# Patient Record
Sex: MALE | Race: WHITE
[De-identification: names, ages, dates, MRNs, and addresses within clinical notes are randomized per-mention and may not be internally consistent; named-entity substitution may affect disease eponyms.]

---

## 2018-01-24 ENCOUNTER — TRANSCRIPTION ENCOUNTER (OUTPATIENT)
Age: 48
End: 2018-01-24

## 2019-11-22 ENCOUNTER — EMERGENCY (EMERGENCY)
Facility: HOSPITAL | Age: 49
LOS: 0 days | Discharge: LEFT BEFORE DISCHARGE | End: 2019-11-22
Attending: EMERGENCY MEDICINE | Admitting: EMERGENCY MEDICINE
Payer: MEDICAID

## 2019-11-22 VITALS
TEMPERATURE: 97 F | WEIGHT: 279.99 LBS | HEART RATE: 89 BPM | SYSTOLIC BLOOD PRESSURE: 111 MMHG | HEIGHT: 69 IN | DIASTOLIC BLOOD PRESSURE: 71 MMHG | OXYGEN SATURATION: 98 % | RESPIRATION RATE: 18 BRPM

## 2019-11-22 DIAGNOSIS — S61.501A UNSPECIFIED OPEN WOUND OF RIGHT WRIST, INITIAL ENCOUNTER: ICD-10-CM
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
## 2019-11-22 DIAGNOSIS — S61.502A UNSPECIFIED OPEN WOUND OF LEFT WRIST, INITIAL ENCOUNTER: ICD-10-CM
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
## 2019-11-22 DIAGNOSIS — M25.539 PAIN IN UNSPECIFIED WRIST: ICD-10-CM

## 2019-11-22 PROCEDURE — 99283 EMERGENCY DEPT VISIT LOW MDM: CPT

## 2019-11-22 RX ORDER — AZTREONAM 2 G
1 VIAL (EA) INJECTION
Qty: 14 | Refills: 0
Start: 2019-11-22 | End: 2019-11-28

## 2019-11-22 RX ORDER — CEPHALEXIN 500 MG
1 CAPSULE ORAL
Qty: 28 | Refills: 0
Start: 2019-11-22 | End: 2019-11-28

## 2019-11-22 NOTE — ED PROVIDER NOTE - ATTENDING CONTRIBUTION TO CARE
50yo M with PMHx polysusbtance use disorder, h/o IVDA (heroin) sober for several months, presents for wounds to B/L dorsal wrists. Pt was brought in by his friend who was concerned about the way the wounds looked. Pt was previously undergoing wound debridements but stopped going 4 months ago, pt states he checked into a drug rehab program and thus has been sober but has not been following up for the wounds. Denies fever. Denies pain. Denies discharge from wounds. Denies numbness, tingling, weakness.     Vital signs reviewed  GENERAL: Patient nontoxic appearing, NAD  HEAD: NCAT  EYES: Anicteric  ENT: MMM  RESPIRATORY: Normal respiratory effort. CTA B/L. No wheezing, rales, rhonchi  CARDIOVASCULAR: Regular rate and rhythm  ABDOMEN: Soft. Nondistended. Nontender. No guarding or rebound.   MUSCULOSKELETAL/EXTREMITIES: Brisk cap refill. Equal radial pulses.   SKIN:  Warm and dry. B/L wrist wounds on dorsal aspects, pink granulation tissue with areas of white nonviable tissue, areas of scabs, minimal serous drainage. Nontender. Mild surrounding erythema. Skin contractures in surrounding areas.   NEURO: AAOx3. Speech clear and coherent. No gross FND.

## 2019-11-22 NOTE — ED PROVIDER NOTE - PHYSICAL EXAMINATION
CONST: Well appearing in NAD  EYES: Sclera and conjunctiva clear.   NECK: Non-tender, normal ROM  CARD: Normal S1 S2; Normal rate and rhythm  RESP: Equal BS B/L, No wheezes, rhonchi or rales. No distress  MS: Normal ROM in all extremities. UE: FROM of shoulder, elbow, wrist and metacarpals bilaterally. pulses 2 + bilaterally.   SKIN: Bilateral open weeping wounds to dorsal aspects of wrists with surrounding necrotic tissue. dorsum of hands with diffuse swelling, no pitting edema, no warmth. no purulent drainage. no apparent odor. RUE: Dorsal aspect of wrist with exposed fascia, no tendon/bone/muscle exposure.   NEURO: A&Ox3, No focal deficits. Strength 5/5 with no sensory deficits. Steady gait.

## 2019-11-22 NOTE — ED ADULT NURSE NOTE - NSIMPLEMENTINTERV_GEN_ALL_ED
Implemented All Universal Safety Interventions:  [place] to call system. Call bell, personal items and telephone within reach. Instruct patient to call for assistance. Room bathroom lighting operational. Non-slip footwear when patient is off stretcher. Physically safe environment: no spills, clutter or unnecessary equipment. Stretcher in lowest position, wheels locked, appropriate side rails in place.

## 2019-11-22 NOTE — ED PROVIDER NOTE - NSFOLLOWUPINSTRUCTIONS_ED_ALL_ED_FT
Please return to the emergency room if you decide to receive further care for your wrist wounds.  Please follow-up with burn clinic for treatment of your wounds.   Please take Keflex four times a day and Bactrim twice a day for 7 days.

## 2019-11-22 NOTE — ED PROVIDER NOTE - NSFOLLOWUPCLINICS_GEN_ALL_ED_FT
[facility]  Burn  [address], [address]  [place] [address]  Phone: [phone]  Fax:   Follow Up Time: 1-3 Days

## 2019-11-22 NOTE — ED PROVIDER NOTE - OBJECTIVE STATEMENT
49 year old male, hx polysubstance use disorder, who presents with open wounds to bilateral wrists x4 months. Patient with history of IVDU resulting in bilateral wounds to wrists. Patient was undergoing weekly wound debridement, however, stopped going 4 months ago. Denies fever, chills, chest pain, SOB. No wrist pain, weakness, numbness, paresthesias. Denies current IVDU.

## 2019-11-22 NOTE — ED PROVIDER NOTE - PATIENT PORTAL LINK FT
You can access the FollowMyHealth Patient Portal offered by [facility] by registering at the following website: [email or web address]. By joining [facility]’s FollowMyHealth portal, you will also be able to view your health information using other applications (apps) compatible with our system.

## 2019-11-22 NOTE — ED PROVIDER NOTE - NS ED ROS FT
Review of Systems:  	•	CONSTITUTIONAL: no fever, no diaphoresis, no chills  	•	SKIN: open wounds to bilateral wrists with hand swelling bilaterally   	•	EYES: no eye pain, no blurry vision  	•	RESPIRATORY: no shortness of breath, no cough  	•	CARDIAC: no chest pain, no palpitations  	•	GI: no abd pain, no nausea, no vomiting, no diarrhea  	•	MUSCULOSKELETAL: no joint paint  	•	NEUROLOGIC: no weakness, no numbness, no paresthesias

## 2019-11-22 NOTE — ED PROVIDER NOTE - CLINICAL SUMMARY MEDICAL DECISION MAKING FREE TEXT BOX
50yo M presents with chronic wounds to B/L wrists. Wounds have granulation tissue as well as several nonviable areas. Afebrile. I offered to do workup and transfer to [facility] to have burn eval as pt has unreliable follow up. Pt does not wish to stay, states he only came because his friend made him and is adamant about not wanting workup and transfer as he needs to f/u with his drug rehab program. I explained that the program will understand if he does not show up if he is admitted and pt still refuses intervention. Will provide ABx and told to go to burn clinic for f/u or to return to ED for worsening symptoms. Wounds dressed.    The patient wishes to leave against medical advice.  I have discussed the risks, benefits and alternatives (including the possibility of worsening of disease, pain, permanent disability, and/or death) with the patient and his/her family (if available).  The patient voices understanding of these risks, benefits, and alternatives and still wishes to sign out against medical advice.  The patient is awake, alert, oriented  x 3 and has demonstrated capacity to refuse/direct care.  I have advised the patient that they can and should return immediately should they develop any worse/different/additional symptoms, or if they change their mind and want to continue their care.

## 2022-01-15 ENCOUNTER — INPATIENT (INPATIENT)
Facility: HOSPITAL | Age: 52
LOS: 17 days | Discharge: NURSING, REHAB OR LONG-TERM CARE | End: 2022-02-02
Attending: HOSPITALIST | Admitting: HOSPITALIST
Payer: MEDICAID

## 2022-01-15 VITALS
HEIGHT: 69 IN | TEMPERATURE: 98 F | RESPIRATION RATE: 17 BRPM | DIASTOLIC BLOOD PRESSURE: 84 MMHG | SYSTOLIC BLOOD PRESSURE: 144 MMHG | HEART RATE: 91 BPM | OXYGEN SATURATION: 97 %

## 2022-01-15 LAB
ALBUMIN SERPL ELPH-MCNC: 3.4 G/DL — LOW (ref 3.5–5.2)
ALP SERPL-CCNC: 99 U/L — SIGNIFICANT CHANGE UP (ref 30–115)
ALT FLD-CCNC: 10 U/L — SIGNIFICANT CHANGE UP (ref 0–41)
ANION GAP SERPL CALC-SCNC: 14 MMOL/L — SIGNIFICANT CHANGE UP (ref 7–14)
APTT BLD: 31 SEC — SIGNIFICANT CHANGE UP (ref 27–39.2)
AST SERPL-CCNC: 11 U/L — SIGNIFICANT CHANGE UP (ref 0–41)
BASOPHILS # BLD AUTO: 0.02 K/UL — SIGNIFICANT CHANGE UP (ref 0–0.2)
BASOPHILS NFR BLD AUTO: 0.1 % — SIGNIFICANT CHANGE UP (ref 0–1)
BILIRUB SERPL-MCNC: 0.5 MG/DL — SIGNIFICANT CHANGE UP (ref 0.2–1.2)
BUN SERPL-MCNC: 14 MG/DL — SIGNIFICANT CHANGE UP (ref 10–20)
CALCIUM SERPL-MCNC: 8.9 MG/DL — SIGNIFICANT CHANGE UP (ref 8.5–10.1)
CHLORIDE SERPL-SCNC: 96 MMOL/L — LOW (ref 98–110)
CK SERPL-CCNC: 63 U/L — SIGNIFICANT CHANGE UP (ref 0–225)
CO2 SERPL-SCNC: 23 MMOL/L — SIGNIFICANT CHANGE UP (ref 17–32)
CREAT SERPL-MCNC: 0.8 MG/DL — SIGNIFICANT CHANGE UP (ref 0.7–1.5)
EOSINOPHIL # BLD AUTO: 0 K/UL — SIGNIFICANT CHANGE UP (ref 0–0.7)
EOSINOPHIL NFR BLD AUTO: 0 % — SIGNIFICANT CHANGE UP (ref 0–8)
GLUCOSE SERPL-MCNC: 124 MG/DL — HIGH (ref 70–99)
HCT VFR BLD CALC: 31.2 % — LOW (ref 42–52)
HGB BLD-MCNC: 10 G/DL — LOW (ref 14–18)
IMM GRANULOCYTES NFR BLD AUTO: 0.8 % — HIGH (ref 0.1–0.3)
INR BLD: 1.38 RATIO — HIGH (ref 0.65–1.3)
LYMPHOCYTES # BLD AUTO: 0.63 K/UL — LOW (ref 1.2–3.4)
LYMPHOCYTES # BLD AUTO: 3.8 % — LOW (ref 20.5–51.1)
MCHC RBC-ENTMCNC: 26.1 PG — LOW (ref 27–31)
MCHC RBC-ENTMCNC: 32.1 G/DL — SIGNIFICANT CHANGE UP (ref 32–37)
MCV RBC AUTO: 81.5 FL — SIGNIFICANT CHANGE UP (ref 80–94)
MONOCYTES # BLD AUTO: 0.94 K/UL — HIGH (ref 0.1–0.6)
MONOCYTES NFR BLD AUTO: 5.7 % — SIGNIFICANT CHANGE UP (ref 1.7–9.3)
NEUTROPHILS # BLD AUTO: 14.91 K/UL — HIGH (ref 1.4–6.5)
NEUTROPHILS NFR BLD AUTO: 89.6 % — HIGH (ref 42.2–75.2)
NRBC # BLD: 0 /100 WBCS — SIGNIFICANT CHANGE UP (ref 0–0)
PLATELET # BLD AUTO: 352 K/UL — SIGNIFICANT CHANGE UP (ref 130–400)
POTASSIUM SERPL-MCNC: 3.9 MMOL/L — SIGNIFICANT CHANGE UP (ref 3.5–5)
POTASSIUM SERPL-SCNC: 3.9 MMOL/L — SIGNIFICANT CHANGE UP (ref 3.5–5)
PROT SERPL-MCNC: 8.2 G/DL — HIGH (ref 6–8)
PROTHROM AB SERPL-ACNC: 15.8 SEC — HIGH (ref 9.95–12.87)
RBC # BLD: 3.83 M/UL — LOW (ref 4.7–6.1)
RBC # FLD: 15.9 % — HIGH (ref 11.5–14.5)
SODIUM SERPL-SCNC: 133 MMOL/L — LOW (ref 135–146)
WBC # BLD: 16.63 K/UL — HIGH (ref 4.8–10.8)
WBC # FLD AUTO: 16.63 K/UL — HIGH (ref 4.8–10.8)

## 2022-01-15 PROCEDURE — 99285 EMERGENCY DEPT VISIT HI MDM: CPT | Mod: 25

## 2022-01-15 PROCEDURE — 93010 ELECTROCARDIOGRAM REPORT: CPT

## 2022-01-15 PROCEDURE — 74177 CT ABD & PELVIS W/CONTRAST: CPT | Mod: 26,MA

## 2022-01-15 PROCEDURE — 36000 PLACE NEEDLE IN VEIN: CPT

## 2022-01-15 PROCEDURE — 73090 X-RAY EXAM OF FOREARM: CPT | Mod: 26,50

## 2022-01-15 PROCEDURE — 76937 US GUIDE VASCULAR ACCESS: CPT | Mod: 26

## 2022-01-15 PROCEDURE — 71260 CT THORAX DX C+: CPT | Mod: 26,MA

## 2022-01-15 PROCEDURE — 72125 CT NECK SPINE W/O DYE: CPT | Mod: 26,MA

## 2022-01-15 PROCEDURE — 73110 X-RAY EXAM OF WRIST: CPT | Mod: 26,50

## 2022-01-15 PROCEDURE — 70450 CT HEAD/BRAIN W/O DYE: CPT | Mod: 26,MA

## 2022-01-15 RX ORDER — MORPHINE SULFATE 50 MG/1
2 CAPSULE, EXTENDED RELEASE ORAL ONCE
Refills: 0 | Status: DISCONTINUED | OUTPATIENT
Start: 2022-01-15 | End: 2022-01-15

## 2022-01-15 RX ORDER — VANCOMYCIN HCL 1 G
1000 VIAL (EA) INTRAVENOUS ONCE
Refills: 0 | Status: COMPLETED | OUTPATIENT
Start: 2022-01-15 | End: 2022-01-15

## 2022-01-15 RX ORDER — CEFEPIME 1 G/1
2000 INJECTION, POWDER, FOR SOLUTION INTRAMUSCULAR; INTRAVENOUS ONCE
Refills: 0 | Status: COMPLETED | OUTPATIENT
Start: 2022-01-15 | End: 2022-01-15

## 2022-01-15 RX ORDER — TETANUS TOXOID, REDUCED DIPHTHERIA TOXOID AND ACELLULAR PERTUSSIS VACCINE, ADSORBED 5; 2.5; 8; 8; 2.5 [IU]/.5ML; [IU]/.5ML; UG/.5ML; UG/.5ML; UG/.5ML
0.5 SUSPENSION INTRAMUSCULAR ONCE
Refills: 0 | Status: COMPLETED | OUTPATIENT
Start: 2022-01-15 | End: 2022-01-15

## 2022-01-15 RX ORDER — SODIUM CHLORIDE 9 MG/ML
2200 INJECTION INTRAMUSCULAR; INTRAVENOUS; SUBCUTANEOUS ONCE
Refills: 0 | Status: COMPLETED | OUTPATIENT
Start: 2022-01-15 | End: 2022-01-15

## 2022-01-15 RX ADMIN — Medication 250 MILLIGRAM(S): at 20:11

## 2022-01-15 RX ADMIN — TETANUS TOXOID, REDUCED DIPHTHERIA TOXOID AND ACELLULAR PERTUSSIS VACCINE, ADSORBED 0.5 MILLILITER(S): 5; 2.5; 8; 8; 2.5 SUSPENSION INTRAMUSCULAR at 23:06

## 2022-01-15 RX ADMIN — MORPHINE SULFATE 2 MILLIGRAM(S): 50 CAPSULE, EXTENDED RELEASE ORAL at 23:07

## 2022-01-15 RX ADMIN — CEFEPIME 100 MILLIGRAM(S): 1 INJECTION, POWDER, FOR SOLUTION INTRAMUSCULAR; INTRAVENOUS at 20:11

## 2022-01-15 RX ADMIN — MORPHINE SULFATE 2 MILLIGRAM(S): 50 CAPSULE, EXTENDED RELEASE ORAL at 21:15

## 2022-01-15 RX ADMIN — SODIUM CHLORIDE 2200 MILLILITER(S): 9 INJECTION INTRAMUSCULAR; INTRAVENOUS; SUBCUTANEOUS at 20:11

## 2022-01-15 RX ADMIN — MORPHINE SULFATE 2 MILLIGRAM(S): 50 CAPSULE, EXTENDED RELEASE ORAL at 21:51

## 2022-01-15 NOTE — ED PROVIDER NOTE - NS ED ROS FT
Constitutional: (-) fever (-) chills (-) lightheadedness   Eyes/ENT: (-) blurry vision, (-) epistaxis (-) rhinorrhea (-) nasal congestion  Cardiovascular: (-) chest pain, (-) syncope (-) palpitations   Respiratory: (-) cough, (-) shortness of breath (-) pleurisy   Gastrointestinal: (-) vomiting, (-) diarrhea (-) abdominal pain (-) nausea (-) anorexia  Musculoskeletal: (-) neck pain, (+) back pain, (-) joint pain (-) joint swelling (-) painful ROM  Integumentary: (-) rash, (-) edema (-) lacerations (-) pruritis   Neurological: (-) headache, (-) altered mental status (-) LOC (-) dizziness (-) paresthesias (-) gait abnormalities   Psychiatric: (-) hallucinations (-) SI (-) HI

## 2022-01-15 NOTE — ED PROVIDER NOTE - PHYSICAL EXAMINATION
Physical Exam    Vital Signs: I have reviewed the initial vital signs.  Constitutional: well-nourished, appears stated age, no acute distress  Eyes: Conjunctiva pink, Sclera clear, PERRLA, EOMI, no ptosis, no entrapment, no racoon eyes.  Cardiovascular: S1 and S2, regular rate, regular rhythm, well-perfused extremities, radial pulses equal and 2+, calves non ttp, equal in size  Respiratory: unlabored respiratory effort, speaking in full sentences, handling oral secretions,  clear to auscultation bilaterally no wheezing, rales and rhonchi  Gastrointestinal: soft, non-tender abdomen, no pulsatile mass, normal bowl sounds  Musculoskeletal: supple neck, no lower extremity edema, no midline tenderness, + thoracic and lumbar b/l reproducible paraspinal tenderness, no swelling.  Integumentary: warm, dry, + necrosis and open wounds with purulence, exposed bone b/l at wrists.   Neurologic: awake, alert, no nystagmus, tremors, no fasciculations no facial droop, no ataxia, no dysmetria  unable to ambulate

## 2022-01-15 NOTE — ED ADULT TRIAGE NOTE - CHIEF COMPLAINT QUOTE
Pt BIBA from home. Pt states he fell last night and couldn't get up. Has been on floor for 24 hours. As per EMS, about 100 needles on the floor In apt. necrotic tissue noted to both wrists. Pt awake and alert in triage

## 2022-01-15 NOTE — ED PROVIDER NOTE - OBJECTIVE STATEMENT
: 51-year-old male past medical history of polysubstance abuse who presents status post fall.  Patient states that approximately 12 hours ago he fell from bed due to sciatica pain, and has and was not able to get up from the floor.  Patient also states that he has been having pain to his wrist bilaterally.  Of note patient has known open wounds to bilateral wrist.  Patient reports that the last time he used heroin IV was approximately 1 week ago.  Patient has no other medical complaints. 51-year-old male past medical history of polysubstance abuse who presents status post fall.  Patient states that approximately 12 hours ago he fell from bed due to sciatica pain, and has and was not able to get up from the floor.  Patient also states that he has been having pain to his wrist bilaterally.  Of note patient has known open wounds to bilateral wrist.  Patient reports that the last time he used heroin IV was approximately 1 week ago.  Patient has no other medical complaints.

## 2022-01-15 NOTE — ED PROVIDER NOTE - CARE PLAN
1 Principal Discharge DX:	Osteomyelitis of vertebra, lumbar region  Secondary Diagnosis:	Multiple wounds of skin

## 2022-01-15 NOTE — ED PROVIDER NOTE - PROGRESS NOTE DETAILS
ER: pt signed out to Dr. Wilson Discussed with ortho, recommended to obtain repeat x-rays of the wrist and hand and reconsult as needed. Called radiology core and requested for the x-ray readings. Discussed with GABBI Marques for burn consult regarding chronic b/l wrist wounds. Will evaluate in.  Discussed case with Neurosurgery for osteomyelitis , evaluated in ER and recommending MR w w/o IV contrast Thoracic and Lumbar.

## 2022-01-15 NOTE — ED PROVIDER NOTE - ATTENDING CONTRIBUTION TO CARE
51-year-old male past medical history of polysubstance abuse who presents status post fall.  Patient states that approximately 12 hours ago he fell from bed due to sciatica pain, and has and was not able to get up from the floor.  Patient also states that he has been having pain to his wrist bilaterally.  Of note patient has known open wounds to bilateral wrist.  Patient reports that the last time he used heroin IV was approximately 1 week ago.  Patient has no other medical complaints.    VITAL SIGNS: I have reviewed nursing notes and confirm.  CONSTITUTIONAL: non-toxic, well appearing  SKIN: no rash, no petechiae.  EYES: EOMI, pink conjunctiva, anicteric  ENT: tongue midline, no exudates, MMM  NECK: Supple; no meningismus, no JVD  CARD: RRR, no murmurs, equal radial pulses bilaterally 2+  RESP: CTAB, no respiratory distress  ABD: Soft, non-tender, non-distended, no peritoneal signs,   EXT: No edema. No calves tenderness. wound noted to the wrist bilaterally, discharge noted from the area w/ a foul smell. pt has sensation in the area. There is pain on palpation midline in the thoracic and lumbar area. no pain on palpation of the cervical, no step offs, no ecchymosis or any other complaints.   NEURO: Alert, oriented x3. CN2-12 intact, equal strength bilaterally    a/p  51 yr old m that presents s/p fall   -labs  -imaging  -iv antibiotics  -consider admission

## 2022-01-15 NOTE — ED PROVIDER NOTE - CLINICAL SUMMARY MEDICAL DECISION MAKING FREE TEXT BOX
Patient remained hemodynamically stable during the course of ED stay. Discussed with patient about the results of the diagnostic studies. Discussed with admitting physician and MAR, patient is admitted to Medicine for further evaluation and care.

## 2022-01-16 PROBLEM — F19.10 OTHER PSYCHOACTIVE SUBSTANCE ABUSE, UNCOMPLICATED: Chronic | Status: ACTIVE | Noted: 2019-11-22

## 2022-01-16 LAB
ALBUMIN SERPL ELPH-MCNC: 3.2 G/DL — LOW (ref 3.5–5.2)
ALBUMIN SERPL ELPH-MCNC: 3.3 G/DL — LOW (ref 3.5–5.2)
ALP SERPL-CCNC: 94 U/L — SIGNIFICANT CHANGE UP (ref 30–115)
ALP SERPL-CCNC: 96 U/L — SIGNIFICANT CHANGE UP (ref 30–115)
ALT FLD-CCNC: 14 U/L — SIGNIFICANT CHANGE UP (ref 0–41)
ALT FLD-CCNC: 15 U/L — SIGNIFICANT CHANGE UP (ref 0–41)
ANION GAP SERPL CALC-SCNC: 16 MMOL/L — HIGH (ref 7–14)
ANION GAP SERPL CALC-SCNC: 16 MMOL/L — HIGH (ref 7–14)
AST SERPL-CCNC: 16 U/L — SIGNIFICANT CHANGE UP (ref 0–41)
AST SERPL-CCNC: 18 U/L — SIGNIFICANT CHANGE UP (ref 0–41)
BILIRUB DIRECT SERPL-MCNC: <0.2 MG/DL — SIGNIFICANT CHANGE UP (ref 0–0.3)
BILIRUB INDIRECT FLD-MCNC: >0.1 MG/DL — LOW (ref 0.2–1.2)
BILIRUB SERPL-MCNC: 0.3 MG/DL — SIGNIFICANT CHANGE UP (ref 0.2–1.2)
BILIRUB SERPL-MCNC: 0.3 MG/DL — SIGNIFICANT CHANGE UP (ref 0.2–1.2)
BUN SERPL-MCNC: 15 MG/DL — SIGNIFICANT CHANGE UP (ref 10–20)
BUN SERPL-MCNC: 17 MG/DL — SIGNIFICANT CHANGE UP (ref 10–20)
CALCIUM SERPL-MCNC: 8.2 MG/DL — LOW (ref 8.5–10.1)
CALCIUM SERPL-MCNC: 8.4 MG/DL — LOW (ref 8.5–10.1)
CHLORIDE SERPL-SCNC: 97 MMOL/L — LOW (ref 98–110)
CHLORIDE SERPL-SCNC: 98 MMOL/L — SIGNIFICANT CHANGE UP (ref 98–110)
CHOLEST SERPL-MCNC: 132 MG/DL — SIGNIFICANT CHANGE UP
CO2 SERPL-SCNC: 20 MMOL/L — SIGNIFICANT CHANGE UP (ref 17–32)
CO2 SERPL-SCNC: 21 MMOL/L — SIGNIFICANT CHANGE UP (ref 17–32)
CREAT SERPL-MCNC: 0.7 MG/DL — SIGNIFICANT CHANGE UP (ref 0.7–1.5)
CREAT SERPL-MCNC: 0.8 MG/DL — SIGNIFICANT CHANGE UP (ref 0.7–1.5)
ERYTHROCYTE [SEDIMENTATION RATE] IN BLOOD: 131 MM/HR — HIGH (ref 0–10)
GLUCOSE SERPL-MCNC: 123 MG/DL — HIGH (ref 70–99)
GLUCOSE SERPL-MCNC: 127 MG/DL — HIGH (ref 70–99)
GRAM STN FLD: SIGNIFICANT CHANGE UP
HCT VFR BLD CALC: 31.3 % — LOW (ref 42–52)
HDLC SERPL-MCNC: 44 MG/DL — SIGNIFICANT CHANGE UP
HGB BLD-MCNC: 9.6 G/DL — LOW (ref 14–18)
IRON SATN MFR SERPL: 18 UG/DL — LOW (ref 35–150)
IRON SATN MFR SERPL: 8 % — LOW (ref 15–50)
LACTATE SERPL-SCNC: 0.9 MMOL/L — SIGNIFICANT CHANGE UP (ref 0.7–2)
LIPID PNL WITH DIRECT LDL SERPL: 78 MG/DL — SIGNIFICANT CHANGE UP
MAGNESIUM SERPL-MCNC: 1.9 MG/DL — SIGNIFICANT CHANGE UP (ref 1.8–2.4)
MCHC RBC-ENTMCNC: 25.3 PG — LOW (ref 27–31)
MCHC RBC-ENTMCNC: 30.7 G/DL — LOW (ref 32–37)
MCV RBC AUTO: 82.4 FL — SIGNIFICANT CHANGE UP (ref 80–94)
METHOD TYPE: SIGNIFICANT CHANGE UP
MSSA DNA SPEC QL NAA+PROBE: SIGNIFICANT CHANGE UP
NON HDL CHOLESTEROL: 88 MG/DL — SIGNIFICANT CHANGE UP
NRBC # BLD: 0 /100 WBCS — SIGNIFICANT CHANGE UP (ref 0–0)
PHOSPHATE SERPL-MCNC: 2.4 MG/DL — SIGNIFICANT CHANGE UP (ref 2.1–4.9)
PLATELET # BLD AUTO: 319 K/UL — SIGNIFICANT CHANGE UP (ref 130–400)
POTASSIUM SERPL-MCNC: 3.7 MMOL/L — SIGNIFICANT CHANGE UP (ref 3.5–5)
POTASSIUM SERPL-MCNC: 3.9 MMOL/L — SIGNIFICANT CHANGE UP (ref 3.5–5)
POTASSIUM SERPL-SCNC: 3.7 MMOL/L — SIGNIFICANT CHANGE UP (ref 3.5–5)
POTASSIUM SERPL-SCNC: 3.9 MMOL/L — SIGNIFICANT CHANGE UP (ref 3.5–5)
PROT SERPL-MCNC: 7.4 G/DL — SIGNIFICANT CHANGE UP (ref 6–8)
PROT SERPL-MCNC: 7.5 G/DL — SIGNIFICANT CHANGE UP (ref 6–8)
RBC # BLD: 3.8 M/UL — LOW (ref 4.7–6.1)
RBC # FLD: 15.9 % — HIGH (ref 11.5–14.5)
SARS-COV-2 RNA SPEC QL NAA+PROBE: SIGNIFICANT CHANGE UP
SODIUM SERPL-SCNC: 133 MMOL/L — LOW (ref 135–146)
SODIUM SERPL-SCNC: 135 MMOL/L — SIGNIFICANT CHANGE UP (ref 135–146)
SPECIMEN SOURCE: SIGNIFICANT CHANGE UP
SPECIMEN SOURCE: SIGNIFICANT CHANGE UP
TIBC SERPL-MCNC: 215 UG/DL — LOW (ref 220–430)
TRIGL SERPL-MCNC: 66 MG/DL — SIGNIFICANT CHANGE UP
UIBC SERPL-MCNC: 197 UG/DL — SIGNIFICANT CHANGE UP (ref 110–370)
WBC # BLD: 9.99 K/UL — SIGNIFICANT CHANGE UP (ref 4.8–10.8)
WBC # FLD AUTO: 9.99 K/UL — SIGNIFICANT CHANGE UP (ref 4.8–10.8)

## 2022-01-16 PROCEDURE — 73120 X-RAY EXAM OF HAND: CPT | Mod: 26,LT

## 2022-01-16 PROCEDURE — 99221 1ST HOSP IP/OBS SF/LOW 40: CPT

## 2022-01-16 PROCEDURE — 73620 X-RAY EXAM OF FOOT: CPT | Mod: 26,LT

## 2022-01-16 PROCEDURE — 93010 ELECTROCARDIOGRAM REPORT: CPT

## 2022-01-16 PROCEDURE — 73110 X-RAY EXAM OF WRIST: CPT | Mod: 26,50

## 2022-01-16 PROCEDURE — 73090 X-RAY EXAM OF FOREARM: CPT | Mod: 26,50

## 2022-01-16 PROCEDURE — 99222 1ST HOSP IP/OBS MODERATE 55: CPT

## 2022-01-16 PROCEDURE — 99223 1ST HOSP IP/OBS HIGH 75: CPT

## 2022-01-16 RX ORDER — HYDROMORPHONE HYDROCHLORIDE 2 MG/ML
1.5 INJECTION INTRAMUSCULAR; INTRAVENOUS; SUBCUTANEOUS ONCE
Refills: 0 | Status: DISCONTINUED | OUTPATIENT
Start: 2022-01-16 | End: 2022-01-16

## 2022-01-16 RX ORDER — ALPRAZOLAM 0.25 MG
2 TABLET ORAL
Qty: 0 | Refills: 0 | DISCHARGE

## 2022-01-16 RX ORDER — QUETIAPINE FUMARATE 200 MG/1
200 TABLET, FILM COATED ORAL THREE TIMES A DAY
Refills: 0 | Status: DISCONTINUED | OUTPATIENT
Start: 2022-01-16 | End: 2022-02-02

## 2022-01-16 RX ORDER — KETOROLAC TROMETHAMINE 30 MG/ML
15 SYRINGE (ML) INJECTION EVERY 6 HOURS
Refills: 0 | Status: DISCONTINUED | OUTPATIENT
Start: 2022-01-16 | End: 2022-01-16

## 2022-01-16 RX ORDER — KETOROLAC TROMETHAMINE 30 MG/ML
30 SYRINGE (ML) INJECTION ONCE
Refills: 0 | Status: DISCONTINUED | OUTPATIENT
Start: 2022-01-16 | End: 2022-01-16

## 2022-01-16 RX ORDER — VANCOMYCIN HCL 1 G
VIAL (EA) INTRAVENOUS
Refills: 0 | Status: DISCONTINUED | OUTPATIENT
Start: 2022-01-16 | End: 2022-01-17

## 2022-01-16 RX ORDER — VANCOMYCIN HCL 1 G
2000 VIAL (EA) INTRAVENOUS ONCE
Refills: 0 | Status: COMPLETED | OUTPATIENT
Start: 2022-01-16 | End: 2022-01-16

## 2022-01-16 RX ORDER — CEFEPIME 1 G/1
2000 INJECTION, POWDER, FOR SOLUTION INTRAMUSCULAR; INTRAVENOUS EVERY 8 HOURS
Refills: 0 | Status: DISCONTINUED | OUTPATIENT
Start: 2022-01-16 | End: 2022-01-17

## 2022-01-16 RX ORDER — LANOLIN ALCOHOL/MO/W.PET/CERES
3 CREAM (GRAM) TOPICAL AT BEDTIME
Refills: 0 | Status: DISCONTINUED | OUTPATIENT
Start: 2022-01-16 | End: 2022-02-02

## 2022-01-16 RX ORDER — THIAMINE MONONITRATE (VIT B1) 100 MG
100 TABLET ORAL DAILY
Refills: 0 | Status: COMPLETED | OUTPATIENT
Start: 2022-01-16 | End: 2022-01-18

## 2022-01-16 RX ORDER — ONDANSETRON 8 MG/1
4 TABLET, FILM COATED ORAL EVERY 8 HOURS
Refills: 0 | Status: DISCONTINUED | OUTPATIENT
Start: 2022-01-16 | End: 2022-02-02

## 2022-01-16 RX ORDER — ACETAMINOPHEN 500 MG
650 TABLET ORAL EVERY 6 HOURS
Refills: 0 | Status: DISCONTINUED | OUTPATIENT
Start: 2022-01-16 | End: 2022-01-17

## 2022-01-16 RX ORDER — MORPHINE SULFATE 50 MG/1
4 CAPSULE, EXTENDED RELEASE ORAL ONCE
Refills: 0 | Status: DISCONTINUED | OUTPATIENT
Start: 2022-01-16 | End: 2022-01-16

## 2022-01-16 RX ORDER — MORPHINE SULFATE 50 MG/1
4 CAPSULE, EXTENDED RELEASE ORAL EVERY 4 HOURS
Refills: 0 | Status: DISCONTINUED | OUTPATIENT
Start: 2022-01-16 | End: 2022-01-17

## 2022-01-16 RX ORDER — MIRTAZAPINE 45 MG/1
60 TABLET, ORALLY DISINTEGRATING ORAL AT BEDTIME
Refills: 0 | Status: DISCONTINUED | OUTPATIENT
Start: 2022-01-16 | End: 2022-02-02

## 2022-01-16 RX ORDER — VANCOMYCIN HCL 1 G
1250 VIAL (EA) INTRAVENOUS EVERY 8 HOURS
Refills: 0 | Status: DISCONTINUED | OUTPATIENT
Start: 2022-01-17 | End: 2022-01-17

## 2022-01-16 RX ORDER — HYDROMORPHONE HYDROCHLORIDE 2 MG/ML
1 INJECTION INTRAMUSCULAR; INTRAVENOUS; SUBCUTANEOUS ONCE
Refills: 0 | Status: DISCONTINUED | OUTPATIENT
Start: 2022-01-16 | End: 2022-01-16

## 2022-01-16 RX ORDER — ALPRAZOLAM 0.25 MG
0.5 TABLET ORAL ONCE
Refills: 0 | Status: DISCONTINUED | OUTPATIENT
Start: 2022-01-16 | End: 2022-01-16

## 2022-01-16 RX ORDER — PANTOPRAZOLE SODIUM 20 MG/1
40 TABLET, DELAYED RELEASE ORAL
Refills: 0 | Status: DISCONTINUED | OUTPATIENT
Start: 2022-01-16 | End: 2022-02-02

## 2022-01-16 RX ORDER — FOLIC ACID 0.8 MG
1 TABLET ORAL DAILY
Refills: 0 | Status: DISCONTINUED | OUTPATIENT
Start: 2022-01-16 | End: 2022-02-02

## 2022-01-16 RX ORDER — INFLUENZA VIRUS VACCINE 15; 15; 15; 15 UG/.5ML; UG/.5ML; UG/.5ML; UG/.5ML
0.5 SUSPENSION INTRAMUSCULAR ONCE
Refills: 0 | Status: DISCONTINUED | OUTPATIENT
Start: 2022-01-16 | End: 2022-02-02

## 2022-01-16 RX ORDER — MIRTAZAPINE 45 MG/1
1 TABLET, ORALLY DISINTEGRATING ORAL
Qty: 0 | Refills: 0 | DISCHARGE

## 2022-01-16 RX ORDER — ENOXAPARIN SODIUM 100 MG/ML
40 INJECTION SUBCUTANEOUS DAILY
Refills: 0 | Status: DISCONTINUED | OUTPATIENT
Start: 2022-01-16 | End: 2022-02-02

## 2022-01-16 RX ORDER — SODIUM CHLORIDE 9 MG/ML
1000 INJECTION, SOLUTION INTRAVENOUS
Refills: 0 | Status: DISCONTINUED | OUTPATIENT
Start: 2022-01-16 | End: 2022-01-21

## 2022-01-16 RX ORDER — BUPROPION HYDROCHLORIDE 150 MG/1
300 TABLET, EXTENDED RELEASE ORAL DAILY
Refills: 0 | Status: DISCONTINUED | OUTPATIENT
Start: 2022-01-16 | End: 2022-02-02

## 2022-01-16 RX ORDER — ALPRAZOLAM 0.25 MG
2 TABLET ORAL THREE TIMES A DAY
Refills: 0 | Status: DISCONTINUED | OUTPATIENT
Start: 2022-01-16 | End: 2022-01-23

## 2022-01-16 RX ORDER — VANCOMYCIN HCL 1 G
1750 VIAL (EA) INTRAVENOUS EVERY 12 HOURS
Refills: 0 | Status: DISCONTINUED | OUTPATIENT
Start: 2022-01-16 | End: 2022-01-16

## 2022-01-16 RX ADMIN — QUETIAPINE FUMARATE 200 MILLIGRAM(S): 200 TABLET, FILM COATED ORAL at 13:28

## 2022-01-16 RX ADMIN — BUPROPION HYDROCHLORIDE 300 MILLIGRAM(S): 150 TABLET, EXTENDED RELEASE ORAL at 13:28

## 2022-01-16 RX ADMIN — MORPHINE SULFATE 4 MILLIGRAM(S): 50 CAPSULE, EXTENDED RELEASE ORAL at 09:18

## 2022-01-16 RX ADMIN — ENOXAPARIN SODIUM 40 MILLIGRAM(S): 100 INJECTION SUBCUTANEOUS at 12:39

## 2022-01-16 RX ADMIN — Medication 100 MILLIGRAM(S): at 12:36

## 2022-01-16 RX ADMIN — Medication 1 MILLIGRAM(S): at 16:51

## 2022-01-16 RX ADMIN — PANTOPRAZOLE SODIUM 40 MILLIGRAM(S): 20 TABLET, DELAYED RELEASE ORAL at 12:42

## 2022-01-16 RX ADMIN — Medication 1 MILLIGRAM(S): at 09:18

## 2022-01-16 RX ADMIN — Medication 2 MILLIGRAM(S): at 13:47

## 2022-01-16 RX ADMIN — Medication 2 MILLIGRAM(S): at 22:34

## 2022-01-16 RX ADMIN — MORPHINE SULFATE 4 MILLIGRAM(S): 50 CAPSULE, EXTENDED RELEASE ORAL at 14:19

## 2022-01-16 RX ADMIN — Medication 1 MILLIGRAM(S): at 20:27

## 2022-01-16 RX ADMIN — Medication 0.5 MILLIGRAM(S): at 00:59

## 2022-01-16 RX ADMIN — Medication 1 TABLET(S): at 12:41

## 2022-01-16 RX ADMIN — Medication 250 MILLIGRAM(S): at 22:50

## 2022-01-16 RX ADMIN — QUETIAPINE FUMARATE 200 MILLIGRAM(S): 200 TABLET, FILM COATED ORAL at 22:35

## 2022-01-16 RX ADMIN — HYDROMORPHONE HYDROCHLORIDE 1 MILLIGRAM(S): 2 INJECTION INTRAMUSCULAR; INTRAVENOUS; SUBCUTANEOUS at 17:04

## 2022-01-16 RX ADMIN — Medication 30 MILLIGRAM(S): at 12:33

## 2022-01-16 RX ADMIN — Medication 650 MILLIGRAM(S): at 04:09

## 2022-01-16 RX ADMIN — CEFEPIME 100 MILLIGRAM(S): 1 INJECTION, POWDER, FOR SOLUTION INTRAMUSCULAR; INTRAVENOUS at 22:34

## 2022-01-16 RX ADMIN — HYDROMORPHONE HYDROCHLORIDE 1 MILLIGRAM(S): 2 INJECTION INTRAMUSCULAR; INTRAVENOUS; SUBCUTANEOUS at 16:51

## 2022-01-16 RX ADMIN — MIRTAZAPINE 60 MILLIGRAM(S): 45 TABLET, ORALLY DISINTEGRATING ORAL at 22:34

## 2022-01-16 RX ADMIN — Medication 15 MILLIGRAM(S): at 20:27

## 2022-01-16 RX ADMIN — Medication 1 MILLIGRAM(S): at 12:36

## 2022-01-16 RX ADMIN — HYDROMORPHONE HYDROCHLORIDE 1.5 MILLIGRAM(S): 2 INJECTION INTRAMUSCULAR; INTRAVENOUS; SUBCUTANEOUS at 21:09

## 2022-01-16 RX ADMIN — Medication 30 MILLIGRAM(S): at 14:19

## 2022-01-16 NOTE — CONSULT NOTE ADULT - SUBJECTIVE AND OBJECTIVE BOX
51y  Male  HPI:  51-year-old male past medical history of polysubstance abuse who presents status post fall.  Patient states that approximately 12 hours ago he fell from bed due to sciatica pain, and has and was not able to get up from the floor.  The pain is sacral with a shooting pain to his left foot. It feels as if his foot can fall off. He states he is unable to lie flat secondary to the pain. Patient also states that he has been having pain to his wrist bilaterally.  Of note patient has known open wounds to bilateral wrist.  Patient reports that the last time he used heroin IV was approximately 2 week ago.  Patient has no other medical complaints. He reports no f/c/n/v, change in urinary or bowel habits.    Pt states that he takes 135mg of methadone a day, call his clinic and doctor (Dr. Milton Gates), no response.    In the ED: Pt tachy 91, wbc 16.6k, CT spine: osseous destructive process at the left L5-S1 facet joints with infiltrative changes extending to the adjacent spinal canal and   neural foramina and associated stenosis. Additional erosive changes noted at the left T10-T11 facet joint. Neuro sx consulted, pt needs MRI. Pt unable to tolerate procedure since he cant lie flat. s/p bolus and broad spectrum abx.    (16 Jan 2022 07:50)    Hospital course***  Allergies    No Known Allergies    Intolerances      PAST MEDICAL & SURGICAL HISTORY:  IV drug abuse        Labs:                        9.6    9.99  )-----------( 319      ( 16 Jan 2022 04:30 )             31.3     01-16    133<L>  |  97<L>  |  17  ----------------------------<  123<H>  3.7   |  20  |  0.8    Ca    8.2<L>      16 Jan 2022 04:30    TPro  7.4  /  Alb  3.3<L>  /  TBili  0.3  /  DBili  x   /  AST  16  /  ALT  14  /  AlkPhos  94  01-16        PE:  PHYSICAL EXAM: AAO x 3  Full thickness wounds to b/l dorsal wrists with granulation tissue  dry crusty and epibole edges  contractures at the wrist joints

## 2022-01-16 NOTE — H&P ADULT - ATTENDING COMMENTS
ROS and physical exam as above.    A/P    51-year-old male past medical history of polysubstance abuse who presents status post fall. Has back pain radiating to LLE, CT concerning for osteomyelitis.     # Radiating Back pain: R/O Vertebral Osteomyelitis   # Sepsis POA (HR>90, WBC 16)  - Back pain for several days, hx of IVDU   - on admission HR 91, wbc 16.6k, s/p bolus and abx  - CT scan noted   - Neuro sx consulted:   - MRI Thoracic / Lumbar spine with and without contrast    - pain control   - ESR/CRP, BCx, U/A U/c  - ID consult   - start on cefepime and vanc for now     # IVDU  - Pt reports taking Herion for years, last use 2 weeks ago  - c/w home meds for now, confirm methadone dose  - addiction medicine consult , watch susy withdrawal symp  - f/u drug screen     # Fall: looks mechanical, PT/Physiatry     # Bl open wounds on wrist : wound care per burn recs     Rest as above  discussed with patient and admitting resident ROS and physical exam as above.    A/P    51-year-old male past medical history of polysubstance abuse who presents status post fall. Has back pain radiating to LLE, CT concerning for osteomyelitis.     # Radiating Back pain: R/O Vertebral Osteomyelitis   # Sepsis POA (HR>90, WBC 16)  - Back pain for several days, hx of IVDU   - CT scan noted   - Neuro sx is following   - MRI Thoracic / Lumbar spine with and without contrast    - pain control   - ESR/CRP, BCx, U/A U/c  - ID consult   - start on cefepime and vanc for now     # IVDU  - Pt reports taking Herion for years, last use 2 weeks ago  - c/w home meds for now, confirm methadone dose  - addiction medicine consult , watch susy withdrawal symp  - EKG with QTc 462  - f/u drug screen     # Fall: looks mechanical, PT/Physiatry     # Bl open wounds on wrist : wound care per burn recs     Rest as above  discussed with patient and admitting resident

## 2022-01-16 NOTE — H&P ADULT - HISTORY OF PRESENT ILLNESS
51-year-old male past medical history of polysubstance abuse who presents status post fall.  Patient states that approximately 12 hours ago he fell from bed due to sciatica pain, and has and was not able to get up from the floor.  Patient also states that he has been having pain to his wrist bilaterally.  Of note patient has known open wounds to bilateral wrist.  Patient reports that the last time he used heroin IV was approximately 1 week ago.  Patient has no other medical complaints 51-year-old male past medical history of polysubstance abuse who presents status post fall.  Patient states that approximately 12 hours ago he fell from bed due to sciatica pain, and has and was not able to get up from the floor.  The pain is sacral with a shooting pain to his left foot. It feels as if his foot can fall off. He states he is unable to lie flat secondary to the pain. Patient also states that he has been having pain to his wrist bilaterally.  Of note patient has known open wounds to bilateral wrist.  Patient reports that the last time he used heroin IV was approximately 2 week ago.  Patient has no other medical complaints. He reports no f/c/n/v, change in urinary or bowel habits.    Pt states that he takes 135mg of methadone a day, call his clinic and doctor (Dr. Milton Gates), no response.    In the ED: Pt tachy 91, wbc 16.6k, CT spine: osseous destructive process at the left L5-S1 facet joints with infiltrative changes extending to the adjacent spinal canal and   neural foramina and associated stenosis. Additional erosive changes noted at the left T10-T11 facet joint. Neuro sx consulted, pt needs MRI. Pt unable to tolerate procedure since he cant lie flat. s/p bolus and broad spectrum abx.

## 2022-01-16 NOTE — H&P ADULT - NSHPLABSRESULTS_GEN_ALL_CORE
.  LABS:                         10.0   16.63 )-----------( 352      ( 15 Baljinder 2022 20:00 )             31.2     01-15    133<L>  |  96<L>  |  14  ----------------------------<  124<H>  3.9   |  23  |  0.8    Ca    8.9      15 Baljinder 2022 20:00    TPro  8.2<H>  /  Alb  3.4<L>  /  TBili  0.5  /  DBili  x   /  AST  11  /  ALT  10  /  AlkPhos  99  01-15    PT/INR - ( 15 Baljinder 2022 20:00 )   PT: 15.80 sec;   INR: 1.38 ratio         PTT - ( 15 Baljinder 2022 20:00 )  PTT:31.0 sec          RADIOLOGY, EKG & ADDITIONAL TESTS: Reviewed.

## 2022-01-16 NOTE — PATIENT PROFILE ADULT - FALL HARM RISK - HARM RISK INTERVENTIONS
Assistance with ambulation/Assistance OOB with selected safe patient handling equipment/Communicate Risk of Fall with Harm to all staff/Discuss with provider need for PT consult/Monitor gait and stability/Reinforce activity limits and safety measures with patient and family/Tailored Fall Risk Interventions/Visual Cue: Yellow wristband and red socks/Bed in lowest position, wheels locked, appropriate side rails in place/Call bell, personal items and telephone in reach/Instruct patient to call for assistance before getting out of bed or chair/Non-slip footwear when patient is out of bed/[place] to call system/Physically safe environment - no spills, clutter or unnecessary equipment/Purposeful Proactive Rounding/Room/bathroom lighting operational, light cord in reach

## 2022-01-16 NOTE — CONSULT NOTE ADULT - SUBJECTIVE AND OBJECTIVE BOX
HPI:  51-year-old male past medical history of polysubstance abuse who presents status post fall.  Patient states that approximately 12 hours ago he fell from bed due to sciatica pain, and has and was not able to get up from the floor.  Patient also states that he has been having pain to his wrist bilaterally.  Of note patient has known open wounds to bilateral wrist.  Patient reports that the last time he used heroin IV was approximately 1 week ago.  Patient has no other medical complaints.    PAST MEDICAL & SURGICAL HISTORY:  IV drug abuse    FAMILY HISTORY:    Allergies    No Known Allergies    Intolerances    REVIEW OF SYSTEMS : As listed in HPI  Head and Neck:   Cardio :   Pum :  GI:  Neuro:     MEDICATIONS  (STANDING):    MEDICATIONS  (PRN):    Anticoagulation:    Vital Signs Last 24 Hrs  T(C): 36.9 (15 Baljinder 2022 17:00), Max: 36.9 (15 Baljinder 2022 17:00)  T(F): 98.5 (15 Baljinder 2022 17:00), Max: 98.5 (15 Baljinder 2022 17:00)  HR: 91 (15 Baljinder 2022 17:00) (91 - 91)  BP: 144/84 (15 Baljinder 2022 17:00) (144/84 - 144/84)  BP(mean): --  RR: 17 (15 Baljinder 2022 17:00) (17 - 17)  SpO2: 97% (15 Baljinder 2022 17:00) (97% - 97%)    Physical Exam :  General :   A&O x  Tongue midline  Facial features symmetric, No droop  Speech clear and appropriate, no slur   Pt speaking in full sentences   Follows all commands   Occular :   PERRLA, EOMI   Motor :   MAEx4   LUE  RUE  LLE  LLE   No spinal point tenderness   Sensory :  Intact bilaterally   Cerbellar :    Pronator Drift :     Hoffmans :     Drains / Devices :    LABS:                        10.0   16.63 )-----------( 352      ( 15 Baljinder 2022 20:00 )             31.2     01-15    133<L>  |  96<L>  |  14  ----------------------------<  124<H>  3.9   |  23  |  0.8    Ca    8.9      15 Baljinder 2022 20:00    TPro  8.2<H>  /  Alb  3.4<L>  /  TBili  0.5  /  DBili  x   /  AST  11  /  ALT  10  /  AlkPhos  99  01-15    PT/INR - ( 15 Baljinder 2022 20:00 )   PT: 15.80 sec;   INR: 1.38 ratio      PTT - ( 15 Baljinder 2022 20:00 )  PTT:31.0 sec    CULTURES:    RADIOLOGY & ADDITIONAL STUDIES:  < from: CT Head No Cont (01.15.22 @ 22:23) >  IMPRESSION:    No CT evidence for acute intracranial pathology.    --- End of Report ---      LYLA PINTO MD; Attending Radiologist  This document has been electronically signed. Baljinder 15 2022 11:12PM    < end of copied text >  < from: CT Cervical Spine No Cont (01.15.22 @ 22:23) >    IMPRESSION:    No acute fracture or subluxation of the cervical spine.    --- End of Report ---    LYLA PINTO MD; Attending Radiologist  This document has been electronically signed. Baljinder 15 2022 11:18PM    < end of copied text >    < from: CT Chest w/ IV Cont (01.15.22 @ 22:24) >    IMPRESSION:    Definitive acute traumatic injury is not identified to the chest, abdomen   or pelvis.    There is an osseous destructive process at the left L5-S1 facet joints   with infiltrative changes extending to the adjacent spinal canal and   neural foramina and associated stenosis. Per notes, patient with   polysubstance abuse. Consider osteomyelitis. Underlying mass is not   excluded. Additional erosive changes noted at the left T10-T11 facet   joint. Further evaluation with MRI can be obtained as needed.    --- End of Report ---    < end of copied text >    Assessment / Plan: 51y M with pmhx IVDA presents to ED with acute thoracic / lumbar back pain rendering him unable to ambulate. +TTP of thoracic / lumbar spine, exam grossly limited by pain. WBC 16, pt currently afebrile.   - MRI Thoracic / Lumbar spine with and without contrast STAT   - pain control   - ESR/CRP, BCx, U/A U/c, covid swab   - Medical workup / ID consult   - Will follow imaging    HPI:  51-year-old male past medical history of polysubstance abuse who presents status post fall.  Patient states that approximately 12 hours ago he fell from bed due to sciatica pain, and has and was not able to get up from the floor.  Patient also states that he has been having pain to his wrist bilaterally.  Of note patient has known open wounds to bilateral wrist.  Patient reports that the last time he used heroin IV was approximately 1 week ago.  Patient has no other medical complaints.    PAST MEDICAL & SURGICAL HISTORY:  IV drug abuse    FAMILY HISTORY:    Allergies    No Known Allergies    Intolerances    REVIEW OF SYSTEMS : As listed in HPI  Head and Neck:   Cardio :   Pum :  GI:  Neuro:     MEDICATIONS  (STANDING):    MEDICATIONS  (PRN):    Anticoagulation:    Vital Signs Last 24 Hrs  T(C): 36.9 (15 Baljinder 2022 17:00), Max: 36.9 (15 Baljinder 2022 17:00)  T(F): 98.5 (15 Baljinder 2022 17:00), Max: 98.5 (15 Baljinder 2022 17:00)  HR: 91 (15 Baljinder 2022 17:00) (91 - 91)  BP: 144/84 (15 Baljinder 2022 17:00) (144/84 - 144/84)  BP(mean): --  RR: 17 (15 Baljinder 2022 17:00) (17 - 17)  SpO2: 97% (15 Baljinder 2022 17:00) (97% - 97%)    Physical Exam :   General : Poor Hygiene   A&O x 3 oriented to person place and time  Patient laying on right side states he is unable to sit up or lay on his back   + Large chronic open ulcers to b/l dorsal wrists with tissue destruction and surrounding edema   States he is unable to lift b/l LE d/t pain   Dorsiflexion / plantarflexion 4/5 to right lower extremity 4-/5 to left lower extremity  flexes b/l knees  refuses exam of UE but spontaneously moves left upper extremity vs gravity   + tenderness to palpation of thoracic and lumbar spine   Sensation to light touch intact to b/l LE     Hoffmans : unable to preform     LABS:                        10.0   16.63 )-----------( 352      ( 15 Baljinder 2022 20:00 )             31.2     01-15    133<L>  |  96<L>  |  14  ----------------------------<  124<H>  3.9   |  23  |  0.8    Ca    8.9      15 Baljinder 2022 20:00    TPro  8.2<H>  /  Alb  3.4<L>  /  TBili  0.5  /  DBili  x   /  AST  11  /  ALT  10  /  AlkPhos  99  01-15    PT/INR - ( 15 Baljinder 2022 20:00 )   PT: 15.80 sec;   INR: 1.38 ratio      PTT - ( 15 Baljinder 2022 20:00 )  PTT:31.0 sec    CULTURES:    RADIOLOGY & ADDITIONAL STUDIES:  < from: CT Head No Cont (01.15.22 @ 22:23) >  IMPRESSION:    No CT evidence for acute intracranial pathology.    --- End of Report ---      LYLA PINTO MD; Attending Radiologist  This document has been electronically signed. Baljinder 15 2022 11:12PM    < end of copied text >  < from: CT Cervical Spine No Cont (01.15.22 @ 22:23) >    IMPRESSION:    No acute fracture or subluxation of the cervical spine.    --- End of Report ---    LYLA PINTO MD; Attending Radiologist  This document has been electronically signed. Baljinder 15 2022 11:18PM    < end of copied text >    < from: CT Chest w/ IV Cont (01.15.22 @ 22:24) >    IMPRESSION:    Definitive acute traumatic injury is not identified to the chest, abdomen   or pelvis.    There is an osseous destructive process at the left L5-S1 facet joints   with infiltrative changes extending to the adjacent spinal canal and   neural foramina and associated stenosis. Per notes, patient with   polysubstance abuse. Consider osteomyelitis. Underlying mass is not   excluded. Additional erosive changes noted at the left T10-T11 facet   joint. Further evaluation with MRI can be obtained as needed.    --- End of Report ---    < end of copied text >    Assessment / Plan: 51y M with pmhx IVDA, chronic large open ulcers to b/l dorsal wrists, presents to ED with acute exacerbation of "sciatica" as well as thoracic / lumbar back pain rendering him unable to ambulate. Patient extremely poor historian states he "thinks he fell out of bed and laid on the floor for 12 hours" unable to ambulate since, uncertain about bladder/bowel changes, repetitively asking for Xanax stating he " is going into withdrawal" refusing further physical examination at this time.   - MRI Thoracic / Lumbar spine with and without contrast STAT   - pain control   - ESR/CRP, BCx, U/A U/c, covid swab   - Medical workup / ID consult   - Will follow imaging    HPI:  51-year-old male past medical history of polysubstance abuse who presents status post fall.  Patient states that approximately 12 hours ago he fell from bed due to sciatica pain, and has and was not able to get up from the floor.  Patient also states that he has been having pain to his wrist bilaterally.  Of note patient has known open wounds to bilateral wrist.  Patient reports that the last time he used heroin IV was approximately 1 week ago.  Patient has no other medical complaints.    PAST MEDICAL & SURGICAL HISTORY:  IV drug abuse    FAMILY HISTORY:    Allergies    No Known Allergies    Intolerances    REVIEW OF SYSTEMS : As listed in HPI  Head and Neck:   Cardio :   Pum :  GI:  Neuro:     MEDICATIONS  (STANDING):    MEDICATIONS  (PRN):    Anticoagulation:    Vital Signs Last 24 Hrs  T(C): 36.9 (15 Baljinder 2022 17:00), Max: 36.9 (15 Baljinder 2022 17:00)  T(F): 98.5 (15 Baljinder 2022 17:00), Max: 98.5 (15 Baljinder 2022 17:00)  HR: 91 (15 Baljinder 2022 17:00) (91 - 91)  BP: 144/84 (15 Baljinder 2022 17:00) (144/84 - 144/84)  BP(mean): --  RR: 17 (15 Baljinder 2022 17:00) (17 - 17)  SpO2: 97% (15 Baljinder 2022 17:00) (97% - 97%)    Physical Exam :   General : Poor Hygiene   A&O x 3 oriented to person place and time  Patient laying on right side states he is unable to sit up or lay on his back   + Large chronic open ulcers to b/l dorsal wrists with tissue destruction and surrounding edema   States he is unable to lift b/l LE d/t pain   Dorsiflexion / plantarflexion 4/5 to right lower extremity 4-/5 to left lower extremity  flexes b/l knees  refuses exam of UE but spontaneously moves left upper extremity vs gravity   + tenderness to palpation of thoracic and lumbar spine   Sensation to light touch intact to b/l LE     Hoffmans : unable to preform     LABS:                        10.0   16.63 )-----------( 352      ( 15 Baljinder 2022 20:00 )             31.2     01-15    133<L>  |  96<L>  |  14  ----------------------------<  124<H>  3.9   |  23  |  0.8    Ca    8.9      15 Baljinder 2022 20:00    TPro  8.2<H>  /  Alb  3.4<L>  /  TBili  0.5  /  DBili  x   /  AST  11  /  ALT  10  /  AlkPhos  99  01-15    PT/INR - ( 15 Baljinder 2022 20:00 )   PT: 15.80 sec;   INR: 1.38 ratio      PTT - ( 15 Baljinder 2022 20:00 )  PTT:31.0 sec    CULTURES:    RADIOLOGY & ADDITIONAL STUDIES:  < from: CT Head No Cont (01.15.22 @ 22:23) >  IMPRESSION:    No CT evidence for acute intracranial pathology.    --- End of Report ---      LYLA PINTO MD; Attending Radiologist  This document has been electronically signed. Baljinder 15 2022 11:12PM    < end of copied text >  < from: CT Cervical Spine No Cont (01.15.22 @ 22:23) >    IMPRESSION:    No acute fracture or subluxation of the cervical spine.    --- End of Report ---    LYLA PINTO MD; Attending Radiologist  This document has been electronically signed. Baljinder 15 2022 11:18PM    < end of copied text >    < from: CT Chest w/ IV Cont (01.15.22 @ 22:24) >    IMPRESSION:    Definitive acute traumatic injury is not identified to the chest, abdomen   or pelvis.    There is an osseous destructive process at the left L5-S1 facet joints   with infiltrative changes extending to the adjacent spinal canal and   neural foramina and associated stenosis. Per notes, patient with   polysubstance abuse. Consider osteomyelitis. Underlying mass is not   excluded. Additional erosive changes noted at the left T10-T11 facet   joint. Further evaluation with MRI can be obtained as needed.    --- End of Report ---    < end of copied text >    Assessment / Plan: 51y M with pmhx IVDA, chronic large open ulcers to b/l dorsal wrists, presents to ED with acute exacerbation of "sciatica" as well as thoracic / lumbar back pain rendering him unable to ambulate. Patient extremely poor historian states he "thinks he fell out of bed and laid on the floor for 12 hours" unable to ambulate since, uncertain about bladder/bowel changes, repetitively asking for Xanax stating he " is going into withdrawal" refusing further physical examination at this time.   - MRI Thoracic / Lumbar spine with and without contrast STAT   - pain control   - ESR/CRP, BCx, U/A U/c, covid swab   - Medical workup / ID consult   - Consider bladder scan / PVR   - Will follow imaging

## 2022-01-16 NOTE — CONSULT NOTE ADULT - SUBJECTIVE AND OBJECTIVE BOX
Addiction medicine consult placed for heroin use. Per resident, no detox recommendations necessary at this time, patient's symptoms are currently well-controlled, though patient does require referral for services. Resident was reminded to call Methadone clinic to confirm patient's dose of methadone.    CATCH team social workers to be made aware, and will follow patient.

## 2022-01-16 NOTE — H&P ADULT - ASSESSMENT
51-year-old male past medical history of polysubstance abuse who presents status post fall. 51-year-old male past medical history of polysubstance abuse who presents status post fall. Has back pain radiating to LLE, CT conerning for osteomyolitis.     # Radiating Back pain concerning for Vertebral Osteomyelitis   # Sepsis POA  - Back pain for several days, hx of IVDU,   - on admission HR 91, wbc 16.6k, s/p bolus and abx  - Pan CT scan: There is an osseous destructive process at the left L5-S1 facet joints with infiltrative changes extending to the adjacent spinal canal and neural foramina and associated stenosis. Consider osteomyelitis. Underlying mass is not excluded. erosive changes noted at the left T10-T11 facet joint. Trauma workup (-)  - Neuro sx consulted:   - MRI Thoracic / Lumbar spine with and without contrast STAT, however, pt is unable to tolerate. Will give pain medication and reassess  - pain control   - ESR/CRP, BCx, U/A U/c  - ID consulted   - Consider bladder scan / PVR   - start on cefepime and vanc for now     # Bl open wounds on wrist   - Chronic  - F/u Burn consult, f/u recs    # Lt foot pain, severe  - warm, pulse palpated, parasthesias noted  - f/y xray foot  - f/u duplex     # IVDU  - Pt reports taking heiorn for years, last use 2 weeks ago  - f/u with methadone clinic ([phone]), pt reports taking 135mg methadone per day  - addiction medicine consulted  - pt takes xanax 2mg TID, has withdrawal symptoms when not taking them. Taper while inpatient?  - c/w welbutrin 300 qd and Seroquel 300 TID Meds confirmed with pharmacy  - f/u drug screen     # DVT ppx- LMWH  # GI ppx- PPI  # Activity- AAT  # Diet- Reg  # Code status - full  # Dispo- from home, acute    51-year-old male past medical history of polysubstance abuse who presents status post fall. Has back pain radiating to LLE, CT conerning for osteomyolitis.     # Radiating Back pain concerning for Vertebral Osteomyelitis   # Sepsis POA  - Back pain for several days, hx of IVDU,   - on admission HR 91, wbc 16.6k, s/p bolus and abx  - Pan CT scan: There is an osseous destructive process at the left L5-S1 facet joints with infiltrative changes extending to the adjacent spinal canal and neural foramina and associated stenosis. Consider osteomyelitis. Underlying mass is not excluded. erosive changes noted at the left T10-T11 facet joint. Trauma workup (-)  - Neuro sx consulted:   - MRI Thoracic / Lumbar spine with and without contrast STAT, however, pt is unable to tolerate. Will give pain medication and reassess  - pain control   - ESR/CRP, BCx, U/A U/c  - ID consulted   - Consider bladder scan / PVR   - start on cefepime and vanc for now     # Bl open wounds on wrist   - Chronic  - F/u Burn consult, f/u recs    # Lt foot pain, severe  - warm, pulse palpated, parasthesias noted  - f/y xray foot  - f/u duplex     # IVDU  - Pt reports taking heiorn for years, last use 2 weeks ago  - f/u with methadone clinic ([phone]), pt reports taking 135mg methadone per day  - addiction medicine consulted  - pt takes xanax 2mg TID, has withdrawal symptoms when not taking them. Taper while inpatient?  - c/w welbutrin 300 qd and Seroquel 300 TID Meds confirmed with pharmacy  - f/u drug screen   - anemia noted, start on folate, b12, thiamine, MV    # DVT ppx- LMWH  # GI ppx- PPI  # Activity- AAT  # Diet- Reg  # Code status - full  # Dispo- from home, acute

## 2022-01-16 NOTE — CHART NOTE - NSCHARTNOTEFT_GEN_A_CORE
Attempted to see patient for Physiatry consult. He was too lethargic. Will try tomorrow. PT to see. Attempted to see patient for Physiatry consult. He was too lethargic. Will try tomorrow. Attempted to see patient for Physiatry consult. He was too lethargic. Will try tomorrow. Will order PT consult.

## 2022-01-16 NOTE — H&P ADULT - NSHPPHYSICALEXAM_GEN_ALL_CORE
VITALS:   Vital Signs Last 24 Hrs  T(C): 37.4 (16 Jan 2022 04:00), Max: 37.8 (16 Jan 2022 00:15)  T(F): 99.4 (16 Jan 2022 04:00), Max: 100 (16 Jan 2022 00:15)  HR: 87 (16 Jan 2022 04:00) (87 - 95)  BP: 117/70 (16 Jan 2022 04:00) (117/70 - 144/84)  BP(mean): --  RR: 16 (16 Jan 2022 04:00) (16 - 19)  SpO2: 96% (16 Jan 2022 05:31) (96% - 97%)  I&O's Summary    CAPILLARY BLOOD GLUCOSE          PHYSICAL EXAM:  General: WN/WD NAD  HEENT: PERRLA, EOMI, moist mucous membranes  Neurology: A&Ox3, nonfocal, JACQUES x 4  Respiratory: CTA B/L, normal respiratory effort, no wheezes, crackles, rales  CV: RRR, S1S2, no murmurs, rubs or gallops  Abdominal: Soft, NT, ND +BS, Last BM  Extremities: No edema, + peripheral pulses  Incisions:   Tubes: VITALS:   Vital Signs Last 24 Hrs  T(C): 37.4 (16 Jan 2022 04:00), Max: 37.8 (16 Jan 2022 00:15)  T(F): 99.4 (16 Jan 2022 04:00), Max: 100 (16 Jan 2022 00:15)  HR: 87 (16 Jan 2022 04:00) (87 - 95)  BP: 117/70 (16 Jan 2022 04:00) (117/70 - 144/84)  BP(mean): --  RR: 16 (16 Jan 2022 04:00) (16 - 19)  SpO2: 96% (16 Jan 2022 05:31) (96% - 97%)  I&O's Summary    CAPILLARY BLOOD GLUCOSE          PHYSICAL EXAM:  General: WN/WD NAD  HEENT: PERRLA, EOMI, moist mucous membranes  Neurology: A&Ox3, nonfocal, JACQUES x 4  Respiratory: CTA B/L, normal respiratory effort,   CV: RRR, S1S2, no murmurs, rubs or gallops  Abdominal: Soft, NT, ND   Extremities: No edema, pain on palpaion on left foot, warm, pulses palpated, numbness noted   Spine: Pain on deep palpation of sacral spine with radiation to left foot, pain on active flextion of spine as well   Incisions:   Tubes:

## 2022-01-17 DIAGNOSIS — M54.42 LUMBAGO WITH SCIATICA, LEFT SIDE: ICD-10-CM

## 2022-01-17 LAB
A1C WITH ESTIMATED AVERAGE GLUCOSE RESULT: 6.6 % — HIGH (ref 4–5.6)
APTT BLD: SIGNIFICANT CHANGE UP SEC (ref 27–39.2)
CRP SERPL-MCNC: 141 MG/L — HIGH
ESTIMATED AVERAGE GLUCOSE: 143 MG/DL — HIGH (ref 68–114)
FERRITIN SERPL-MCNC: 153 NG/ML — SIGNIFICANT CHANGE UP (ref 30–400)
INR BLD: 1.21 RATIO — SIGNIFICANT CHANGE UP (ref 0.65–1.3)
PROCALCITONIN SERPL-MCNC: 0.13 NG/ML — HIGH (ref 0.02–0.1)
PROTHROM AB SERPL-ACNC: 13.9 SEC — HIGH (ref 9.95–12.87)

## 2022-01-17 PROCEDURE — 99233 SBSQ HOSP IP/OBS HIGH 50: CPT

## 2022-01-17 PROCEDURE — 93970 EXTREMITY STUDY: CPT | Mod: 26

## 2022-01-17 RX ORDER — ACETAMINOPHEN 500 MG
650 TABLET ORAL EVERY 6 HOURS
Refills: 0 | Status: DISCONTINUED | OUTPATIENT
Start: 2022-01-17 | End: 2022-02-02

## 2022-01-17 RX ORDER — METHADONE HYDROCHLORIDE 40 MG/1
40 TABLET ORAL EVERY 8 HOURS
Refills: 0 | Status: DISCONTINUED | OUTPATIENT
Start: 2022-01-17 | End: 2022-01-24

## 2022-01-17 RX ORDER — CYCLOBENZAPRINE HYDROCHLORIDE 10 MG/1
10 TABLET, FILM COATED ORAL EVERY 8 HOURS
Refills: 0 | Status: DISCONTINUED | OUTPATIENT
Start: 2022-01-17 | End: 2022-02-02

## 2022-01-17 RX ORDER — GABAPENTIN 400 MG/1
300 CAPSULE ORAL THREE TIMES A DAY
Refills: 0 | Status: DISCONTINUED | OUTPATIENT
Start: 2022-01-17 | End: 2022-02-02

## 2022-01-17 RX ORDER — NAFCILLIN 10 G/100ML
2 INJECTION, POWDER, FOR SOLUTION INTRAVENOUS ONCE
Refills: 0 | Status: COMPLETED | OUTPATIENT
Start: 2022-01-17 | End: 2022-01-17

## 2022-01-17 RX ORDER — FERROUS SULFATE 325(65) MG
325 TABLET ORAL DAILY
Refills: 0 | Status: DISCONTINUED | OUTPATIENT
Start: 2022-01-17 | End: 2022-02-02

## 2022-01-17 RX ORDER — NAFCILLIN 10 G/100ML
INJECTION, POWDER, FOR SOLUTION INTRAVENOUS
Refills: 0 | Status: DISCONTINUED | OUTPATIENT
Start: 2022-01-17 | End: 2022-01-24

## 2022-01-17 RX ORDER — NAFCILLIN 10 G/100ML
2 INJECTION, POWDER, FOR SOLUTION INTRAVENOUS EVERY 4 HOURS
Refills: 0 | Status: DISCONTINUED | OUTPATIENT
Start: 2022-01-17 | End: 2022-01-24

## 2022-01-17 RX ORDER — HYDROMORPHONE HYDROCHLORIDE 2 MG/ML
6 INJECTION INTRAMUSCULAR; INTRAVENOUS; SUBCUTANEOUS EVERY 4 HOURS
Refills: 0 | Status: DISCONTINUED | OUTPATIENT
Start: 2022-01-17 | End: 2022-01-17

## 2022-01-17 RX ADMIN — QUETIAPINE FUMARATE 200 MILLIGRAM(S): 200 TABLET, FILM COATED ORAL at 21:41

## 2022-01-17 RX ADMIN — Medication 2 MILLIGRAM(S): at 21:41

## 2022-01-17 RX ADMIN — Medication 250 MILLIGRAM(S): at 05:22

## 2022-01-17 RX ADMIN — Medication 1 TABLET(S): at 12:19

## 2022-01-17 RX ADMIN — Medication 1 MILLIGRAM(S): at 12:19

## 2022-01-17 RX ADMIN — CYCLOBENZAPRINE HYDROCHLORIDE 10 MILLIGRAM(S): 10 TABLET, FILM COATED ORAL at 14:07

## 2022-01-17 RX ADMIN — Medication 2 MILLIGRAM(S): at 14:05

## 2022-01-17 RX ADMIN — HYDROMORPHONE HYDROCHLORIDE 6 MILLIGRAM(S): 2 INJECTION INTRAMUSCULAR; INTRAVENOUS; SUBCUTANEOUS at 12:20

## 2022-01-17 RX ADMIN — QUETIAPINE FUMARATE 200 MILLIGRAM(S): 200 TABLET, FILM COATED ORAL at 14:04

## 2022-01-17 RX ADMIN — QUETIAPINE FUMARATE 200 MILLIGRAM(S): 200 TABLET, FILM COATED ORAL at 05:22

## 2022-01-17 RX ADMIN — Medication 650 MILLIGRAM(S): at 17:37

## 2022-01-17 RX ADMIN — Medication 100 MILLIGRAM(S): at 12:19

## 2022-01-17 RX ADMIN — Medication 650 MILLIGRAM(S): at 18:37

## 2022-01-17 RX ADMIN — Medication 2 MILLIGRAM(S): at 05:27

## 2022-01-17 RX ADMIN — NAFCILLIN 200 GRAM(S): 10 INJECTION, POWDER, FOR SOLUTION INTRAVENOUS at 14:44

## 2022-01-17 RX ADMIN — Medication 30 MILLILITER(S): at 00:38

## 2022-01-17 RX ADMIN — BUPROPION HYDROCHLORIDE 300 MILLIGRAM(S): 150 TABLET, EXTENDED RELEASE ORAL at 12:19

## 2022-01-17 RX ADMIN — MIRTAZAPINE 60 MILLIGRAM(S): 45 TABLET, ORALLY DISINTEGRATING ORAL at 21:40

## 2022-01-17 RX ADMIN — MORPHINE SULFATE 4 MILLIGRAM(S): 50 CAPSULE, EXTENDED RELEASE ORAL at 08:25

## 2022-01-17 RX ADMIN — HYDROMORPHONE HYDROCHLORIDE 6 MILLIGRAM(S): 2 INJECTION INTRAMUSCULAR; INTRAVENOUS; SUBCUTANEOUS at 13:20

## 2022-01-17 RX ADMIN — METHADONE HYDROCHLORIDE 40 MILLIGRAM(S): 40 TABLET ORAL at 16:32

## 2022-01-17 RX ADMIN — ENOXAPARIN SODIUM 40 MILLIGRAM(S): 100 INJECTION SUBCUTANEOUS at 12:20

## 2022-01-17 RX ADMIN — NAFCILLIN 200 GRAM(S): 10 INJECTION, POWDER, FOR SOLUTION INTRAVENOUS at 21:42

## 2022-01-17 RX ADMIN — METHADONE HYDROCHLORIDE 40 MILLIGRAM(S): 40 TABLET ORAL at 21:41

## 2022-01-17 RX ADMIN — NAFCILLIN 200 GRAM(S): 10 INJECTION, POWDER, FOR SOLUTION INTRAVENOUS at 17:37

## 2022-01-17 RX ADMIN — Medication 650 MILLIGRAM(S): at 14:02

## 2022-01-17 RX ADMIN — CEFEPIME 100 MILLIGRAM(S): 1 INJECTION, POWDER, FOR SOLUTION INTRAMUSCULAR; INTRAVENOUS at 05:22

## 2022-01-17 RX ADMIN — PANTOPRAZOLE SODIUM 40 MILLIGRAM(S): 20 TABLET, DELAYED RELEASE ORAL at 05:22

## 2022-01-17 RX ADMIN — MORPHINE SULFATE 4 MILLIGRAM(S): 50 CAPSULE, EXTENDED RELEASE ORAL at 08:11

## 2022-01-17 RX ADMIN — GABAPENTIN 300 MILLIGRAM(S): 400 CAPSULE ORAL at 14:04

## 2022-01-17 RX ADMIN — Medication 325 MILLIGRAM(S): at 14:06

## 2022-01-17 RX ADMIN — Medication 1 MILLIGRAM(S): at 10:57

## 2022-01-17 RX ADMIN — GABAPENTIN 300 MILLIGRAM(S): 400 CAPSULE ORAL at 21:41

## 2022-01-17 RX ADMIN — Medication 650 MILLIGRAM(S): at 15:02

## 2022-01-17 RX ADMIN — CYCLOBENZAPRINE HYDROCHLORIDE 10 MILLIGRAM(S): 10 TABLET, FILM COATED ORAL at 21:40

## 2022-01-17 RX ADMIN — MORPHINE SULFATE 4 MILLIGRAM(S): 50 CAPSULE, EXTENDED RELEASE ORAL at 03:35

## 2022-01-17 NOTE — PHYSICAL THERAPY INITIAL EVALUATION ADULT - PERTINENT HX OF CURRENT PROBLEM, REHAB EVAL
51-year-old male past medical history of polysubstance abuse who presents status post fall.  Patient states that approximately 12 hours ago he fell from bed due to sciatica pain, and has and was not able to get up from the floor.  The pain is sacral with a shooting pain to his left foot. It feels as if his foot can fall off. He states he is unable to lie flat secondary to the pain. Patient also states that he has been having pain to his wrist bilaterally.

## 2022-01-17 NOTE — CONSULT NOTE ADULT - SUBJECTIVE AND OBJECTIVE BOX
GENERAL SURGERY CONSULT NOTE    Patient: MIKAEL MCDERMOTT , 51y ([birth year])Male   MRN: [id number]  Location: [facility] [id number] 022 B  Visit: 01-16-22 Inpatient  Date: 01-17-22 @ 11:56    HPI:  51-year-old male past medical history of polysubstance abuse who presents status post fall.  Patient states that approximately 12 hours ago he fell from bed due to sciatica pain, and has and was not able to get up from the floor.  The pain is sacral with a shooting pain to his left foot. It feels as if his foot can fall off. He states he is unable to lie flat secondary to the pain. Patient also states that he has been having pain to his wrist bilaterally.  Of note patient has known open wounds to bilateral wrist.  Patient reports that the last time he used heroin IV was approximately 2 week ago.  Patient has no other medical complaints. He reports no f/c/n/v, change in urinary or bowel habits.    Pt states that he takes 135mg of methadone a day, call his clinic and doctor (Dr. Milton Gates), no response.    In the ED: Pt tachy 91, wbc 16.6k, CT spine: osseous destructive process at the left L5-S1 facet joints with infiltrative changes extending to the adjacent spinal canal and   neural foramina and associated stenosis. Additional erosive changes noted at the left T10-T11 facet joint. Neuro sx consulted, pt needs MRI. Pt unable to tolerate procedure since he cant lie flat. s/p bolus and broad spectrum abx.    (16 Jan 2022 07:50)      Patient states that he has had chronic wounds of bilateral wrists for over a year and denies any previous trauma or new wounds of his hands/arms    PAST MEDICAL & SURGICAL HISTORY:  IV drug abuse        Home Medications:  Remeron 30 mg oral tablet: 2 tab(s) orally once a day (at bedtime) (16 Jan 2022 10:02)  SEROquel 200 mg oral tablet: orally 3 times a day (16 Jan 2022 10:02)  Wellbutrin: 300 milligram(s) orally once a day (16 Jan 2022 10:02)  Xanax: 2 milligram(s) orally 3 times a day (16 Jan 2022 10:02)        VITALS:  T(F): 99.2 (01-17-22 @ 04:46), Max: 99.6 (01-16-22 @ 20:01)  HR: 81 (01-17-22 @ 04:46) (81 - 90)  BP: 132/68 (01-17-22 @ 04:46) (121/64 - 144/76)  RR: 18 (01-16-22 @ 20:01) (18 - 18)  SpO2: --    PHYSICAL EXAM:  General: NAD, AAOx3, agitated  HEENT: NCAT, KULDEEP, EOMI, Trachea ML, Neck supple  Cardiac: RRR S1, S2,   Respiratory: CTAB, normal respiratory effort, breath sounds equal BL, no wheeze, rhonchi or crackles  Abdomen: Soft, non-distended, non-tender, no rebound, no guarding. +BS.  Musculoskeletal: Strength 5/5 BL UE/LE, however, unable to flex right hand, ROM intact, compartments soft  Neuro: Sensation grossly intact and equal throughout, no focal deficits  Vascular: Pulses 2+ throughout, extremities well perfused  Skin: Warm/dry, normal color, no jaundice  Incision/wound:  dressings in place, clean, dry and intact    MEDICATIONS  (STANDING):  acetaminophen     Tablet .. 650 milliGRAM(s) Oral every 6 hours  ALPRAZolam 2 milliGRAM(s) Oral three times a day  buPROPion XL (24-Hour) . 300 milliGRAM(s) Oral daily  cyclobenzaprine 10 milliGRAM(s) Oral every 8 hours  enoxaparin Injectable 40 milliGRAM(s) SubCutaneous daily  ferrous    sulfate 325 milliGRAM(s) Oral daily  folic acid 1 milliGRAM(s) Oral daily  gabapentin 300 milliGRAM(s) Oral three times a day  influenza   Vaccine 0.5 milliLiter(s) IntraMuscular once  lactated ringers. 1000 milliLiter(s) (100 mL/Hr) IV Continuous <Continuous>  mirtazapine 60 milliGRAM(s) Oral at bedtime  multivitamin 1 Tablet(s) Oral daily  pantoprazole    Tablet 40 milliGRAM(s) Oral before breakfast  QUEtiapine 200 milliGRAM(s) Oral three times a day  thiamine 100 milliGRAM(s) Oral daily  vancomycin  IVPB      vancomycin  IVPB 1250 milliGRAM(s) IV Intermittent every 8 hours    MEDICATIONS  (PRN):  aluminum hydroxide/magnesium hydroxide/simethicone Suspension 30 milliLiter(s) Oral every 4 hours PRN Dyspepsia  HYDROmorphone   Tablet 6 milliGRAM(s) Oral every 4 hours PRN Severe Pain (7 - 10)  ketorolac   Injectable 15 milliGRAM(s) IV Push every 6 hours PRN Moderate Pain (4 - 6)  LORazepam     Tablet 1 milliGRAM(s) Oral every 2 hours PRN CIWA-Ar score increase by 2 points and a total score of 7 or less  melatonin 3 milliGRAM(s) Oral at bedtime PRN Insomnia  ondansetron Injectable 4 milliGRAM(s) IV Push every 8 hours PRN Nausea and/or Vomiting      LAB/STUDIES:                        9.6    9.99  )-----------( 319      ( 16 Jan 2022 04:30 )             31.3     01-16    135  |  98  |  15  ----------------------------<  127<H>  3.9   |  21  |  0.7    Ca    8.4<L>      16 Jan 2022 12:30  Phos  2.4     01-16  Mg     1.9     01-16    TPro  7.5  /  Alb  3.2<L>  /  TBili  0.3  /  DBili  <0.2  /  AST  18  /  ALT  15  /  AlkPhos  96  01-16    PT/INR - ( 15 Baljinder 2022 20:00 )   PT: 15.80 sec;   INR: 1.38 ratio         PTT - ( 15 Baljinder 2022 20:00 )  PTT:31.0 sec  LIVER FUNCTIONS - ( 16 Jan 2022 12:30 )  Alb: 3.2 g/dL / Pro: 7.5 g/dL / ALK PHOS: 96 U/L / ALT: 15 U/L / AST: 18 U/L / GGT: x             CARDIAC MARKERS ( 15 Baljinder 2022 20:00 )  x     / x     / 63 U/L / x     / x                  Culture - Blood (collected 15 Baljinder 2022 20:31)  Source: .Blood Blood-Peripheral  Gram Stain (16 Jan 2022 20:40):    Growth in aerobic bottle: Gram Positive Cocci in Clusters    Growth in anaerobic bottle: Gram Positive Cocci in Clusters  Preliminary Report (16 Jan 2022 20:41):    Growth in aerobic bottle: Gram Positive Cocci in Clusters    Growth in anaerobic bottle: Gram Positive Cocci in Clusters    ***Blood Panel PCR results on this specimen are available    approximately 3 hours after the Gram stain result.***    Gram stain, PCR, and/or culture results may not always    correspond due to difference in methodologies.    ************************************************************    This PCR assay was performed by multiplex PCR. This    Assay tests for 66 bacterial and resistance gene targets.    Please refer to the [facility] test directory    at [email or web address] for details.  Organism: Blood Culture PCR (16 Jan 2022 18:10)  Organism: Blood Culture PCR (16 Jan 2022 18:10)    Culture - Blood (collected 15 Baljinder 2022 20:00)  Source: .Blood Blood-Peripheral  Gram Stain (16 Jan 2022 20:41):    Growth in aerobic bottle: Gram Positive Cocci in Clusters    Growth in anaerobic bottle: Gram Positive Cocci in Clusters  Preliminary Report (16 Jan 2022 20:41):    Growth in aerobic bottle: Gram Positive Cocci in Clusters    Growth in anaerobic bottle: Gram Positive Cocci in Clusters      IMAGING:  < from: Xray Forearm, Bilateral (01.16.22 @ 21:44) >  IMPRESSION:    No acute displaced fracture.    1.2 cm linear foreign body within the soft tissues overlying the right   radius.    Previously seen left forearm foreign body is no longer visualized.    < end of copied text >      ACCESS DEVICES:  [ x] Peripheral IV  [ ] Central Venous Line	[ ] R	[ ] L	[ ] IJ	[ ] Fem	[ ] SC	Placed:   [ ] Arterial Line		[ ] R	[ ] L	[ ] Fem	[ ] Rad	[ ] Ax	Placed:   [ ] PICC:					[ ] Mediport  [ ] Urinary Catheter, Date Placed:

## 2022-01-17 NOTE — CONSULT NOTE ADULT - SUBJECTIVE AND OBJECTIVE BOX
Pain Medicine Consult Note    History of Present Illness  Patient is a 50 y/o man with a history of polysubstance abuse (opioids, benzodiazepines) who presented with acute low back pain after a fall on 1/15/2022. The patient states that he has been having pain in the low back for approximately a week but that this has gotten dramatically worse since a fall on Saturday. He describes the pain as sharp and throbbing in the left low back that radiates to the left posterolateral thigh. No focal numbness or weakness, bowel or bladder incontinence or saddle anesthesia. The patient states that he last used IV heroin approximately one week ago (~1/10/2022). He uses methadone 135mg daily for several years. The patient notes that his current regimen is not helping with his pain and endorses having withdrawal symptoms--restlessness, nausea, abdominal cramping, and diaphoresis.     Current Inpatient Medication Regimen:  acetaminophen     Tablet .. 650 milliGRAM(s) Oral every 6 hours PRN  ALPRAZolam 2 milliGRAM(s) Oral three times a day  aluminum hydroxide/magnesium hydroxide/simethicone Suspension 30 milliLiter(s) Oral every 4 hours PRN  buPROPion XL (24-Hour) . 300 milliGRAM(s) Oral daily  enoxaparin Injectable 40 milliGRAM(s) SubCutaneous daily  folic acid 1 milliGRAM(s) Oral daily  influenza   Vaccine 0.5 milliLiter(s) IntraMuscular once  ketorolac   Injectable 15 milliGRAM(s) IV Push every 6 hours PRN  lactated ringers. 1000 milliLiter(s) IV Continuous <Continuous>  LORazepam     Tablet 1 milliGRAM(s) Oral every 2 hours PRN  melatonin 3 milliGRAM(s) Oral at bedtime PRN  mirtazapine 60 milliGRAM(s) Oral at bedtime  morphine  - Injectable 4 milliGRAM(s) IV Push every 4 hours PRN  multivitamin 1 Tablet(s) Oral daily  ondansetron Injectable 4 milliGRAM(s) IV Push every 8 hours PRN  pantoprazole    Tablet 40 milliGRAM(s) Oral before breakfast  QUEtiapine 200 milliGRAM(s) Oral three times a day  thiamine 100 milliGRAM(s) Oral daily  vancomycin  IVPB      vancomycin  IVPB 1250 milliGRAM(s) IV Intermittent every 8 hours      Home Analgesic Regimen:  Methadone 135mg daily    Allergies:  No Known Allergies      Past Medical History:  Opioid use disorder - on methadone and IV opioids      Past Surgical History:  Denies    Family History:  Denies    Social History:  Tobacco - active smoker   EtOH - denies  Drugs - as per HPI, most recent IV drug use ~1/10/2022      Review of Systems:  General: +sweats/diaphoresis  Eyes: no diplopia, blurred vision  ENT: + rhinorrhea  CV: no chest pain  Resp: no cough, dyspnea  GI: +cramping abdominal pain   : no urinary incontinence, dysuria  Neuro: no focal weakness, numbness   Psych: +anxiety    Physical Exam:  T(C): 37.3 (01-17-22 @ 04:46), Max: 37.6 (01-16-22 @ 20:01)  HR: 81 (01-17-22 @ 04:46) (81 - 90)  BP: 132/68 (01-17-22 @ 04:46) (121/64 - 144/76)  RR: 18 (01-16-22 @ 20:01) (18 - 18)  SpO2: --  Gen: NAD  Eyes: no glasses, scleral icterus  Head: Normocephalic / Atraumatic  CV: no JVD  Lungs: nonlabored breathing  Abdomen: nondistended, soft  : no bee catheter in place  Back: + tenderness to palpation in the low left lumbar facet region  Neuro: AOx3, Cranial nerves intact   Extremities: RUE: wrist fixed in 90 degrees of extension  Psych: normal affect      Labs:  CBC  9.9 > 9.6 / 31.3 < 319      BMP  135 mmol/L [135 - 146] | 98 mmol/L [98 - 110] | 15 mg/dL [10 - 20]  3.9 mmol/L [3.5 - 5.0] | 21 mmol/L [17 - 32] | 0.7 mg/dL [0.7 - 1.5]    127 mg/dL<H> [70 - 99]      Imaging Studies:  CT Chest/abdomen (1/15/2022):  IMPRESSION:    Definitive acute traumatic injury is not identified to the chest, abdomen   or pelvis.    There is an osseous destructive process at the left L5-S1 facet joints   with infiltrative changes extending to the adjacent spinal canal and   neural foramina and associated stenosis. Per notes, patient with   polysubstance abuse. Consider osteomyelitis. Underlying mass is not   excluded. Additional erosive changes noted at the left T10-T11 facet   joint. Further evaluation with MRI can be obtained as needed.    45 minutes of face to face time was spent with the patient, more than 50% of which was spent on counseling.

## 2022-01-17 NOTE — CONSULT NOTE ADULT - PROBLEM SELECTOR RECOMMENDATION 9
Pain likely 2/2 left L5-S1 facet joint changes seen on CT. Considering MRI.  1) Start methadone PO 40mg Q8h; confirm  2) Discontinue morphine IV; will try to avoid IV opioids in the setting of OUD  3) Start hydromorphone 6mg Q4h prn  4) Change acetaminophen to 650mg Q6h standing  5) Start cyclobenzaprine 10mg Q8h  6) Start gabapentin 300mg Q8h  7) Continue ketorolac 15mg Q6h prn; may transition to standing or PO NSAID  8) Continue alprazolam 2mg Q8h; would use caution and monitor respiratory status in the setting of opioid and benzodiazepine administration  9) Follow up with [facility] team for addiction treatment Pain likely 2/2 left L5-S1 facet joint changes seen on CT. Considering MRI.  1) Start methadone PO 40mg Q8h; confirm dose  2) Discontinue morphine IV; will try to avoid IV opioids in the setting of OUD  3) Start hydromorphone 6mg Q4h prn  4) Change acetaminophen to 650mg Q6h standing  5) Start cyclobenzaprine 10mg Q8h  6) Start gabapentin 300mg Q8h  7) Continue ketorolac 15mg Q6h prn; may transition to standing or PO NSAID  8) Continue alprazolam 2mg Q8h; would use caution and monitor respiratory status in the setting of opioid and benzodiazepine administration  9) Follow up with [facility] team for addiction treatment

## 2022-01-17 NOTE — PROGRESS NOTE ADULT - ASSESSMENT
51-year-old male  with PMH of polysubstance abuse on Methadone came to ED for worsening lower back pain and fall. Back pain radiating to LLE, CT concerning for osteomyelitis     A/P:   Acute on Chronic Lower Back pain, to rule out Osteomyelitis.   Bacteremia with sepsis on admission.   Patient with back pain for one week, radiates to lower leg, unable to ambulate, recent IV heroin abuse.   CT abdomen showed osseous destructive process at the left L5-S1 facet joints with infiltrative changes extending to the adjacent spinal canal and neural foramina and associated stenosis. Consider osteomyelitis. Underlying mass is not excluded.  ,   Blood culture is growing gram positive cocci in both bottles.   Unable to do MRI as patient had foreign body, will send bone scan to rule out osteomyelitis.   ID consult.   Continue Vancomycin and Cefepime, check Vancomycin trough before the 4th dose.   Repeat blood cx daily.    Pain management Hydromorphone PO 6mg q 4 hrs, continue Methadone dose. No IV opioids.   Continue Toradol, can switch to Ibuprofen 600mg q 8hrs.     Polysubstance abuse:   On methadone, continue 40mg q 8hrs.   Also patient on Xanax, started on hydromorphone  Check urine toxicology. Monitor for respiratory depression.       Right forearm foreign body:   X ray showed 1.2cm linear foreign body overlying right radius  Surgery consult for removal - no evidence of infection, not recommending removal     Bilateral wrist wounds:   Seen by burn, no need for debridement, granulated tissue noted, continue wound care.     Normocytic Anemia   multifactorial, iron deficiency, anemia of chronic disease,   start on folate, MV,  ferrous sulfate 325mg qd     # DVT ppx- LMWH

## 2022-01-17 NOTE — PHYSICAL THERAPY INITIAL EVALUATION ADULT - BED MOBILITY TRAINING, PT EVAL
Patient/Caregiver provided printed discharge information.
Pt will participate in supine to sit and reverse using side rails with CGA by discharge to facilitate return to PLOF.

## 2022-01-17 NOTE — PROGRESS NOTE ADULT - SUBJECTIVE AND OBJECTIVE BOX
MIKAEL MCDERMOTT  51y  Male      Patient is a 51y old  Male who presents with a chief complaint of vertebral osteomyelitis (17 Jan 2022 11:37)      INTERVAL HPI/OVERNIGHT EVENTS:  He is still with back pain, requesting IV pain medication.   Vital Signs Last 24 Hrs  T(C): 37.3 (17 Jan 2022 04:46), Max: 37.6 (16 Jan 2022 20:01)  T(F): 99.2 (17 Jan 2022 04:46), Max: 99.6 (16 Jan 2022 20:01)  HR: 81 (17 Jan 2022 04:46) (81 - 90)  BP: 132/68 (17 Jan 2022 04:46) (121/64 - 144/76)  BP(mean): 100 (16 Jan 2022 20:01) (100 - 100)  RR: 18 (16 Jan 2022 20:01) (18 - 18)  SpO2: --            Consultant(s) Notes Reviewed:  [x ] YES  [ ] NO          MEDICATIONS  (STANDING):  acetaminophen     Tablet .. 650 milliGRAM(s) Oral every 6 hours  ALPRAZolam 2 milliGRAM(s) Oral three times a day  buPROPion XL (24-Hour) . 300 milliGRAM(s) Oral daily  cyclobenzaprine 10 milliGRAM(s) Oral every 8 hours  enoxaparin Injectable 40 milliGRAM(s) SubCutaneous daily  ferrous    sulfate 325 milliGRAM(s) Oral daily  folic acid 1 milliGRAM(s) Oral daily  gabapentin 300 milliGRAM(s) Oral three times a day  influenza   Vaccine 0.5 milliLiter(s) IntraMuscular once  lactated ringers. 1000 milliLiter(s) (100 mL/Hr) IV Continuous <Continuous>  mirtazapine 60 milliGRAM(s) Oral at bedtime  multivitamin 1 Tablet(s) Oral daily  pantoprazole    Tablet 40 milliGRAM(s) Oral before breakfast  QUEtiapine 200 milliGRAM(s) Oral three times a day  thiamine 100 milliGRAM(s) Oral daily  vancomycin  IVPB      vancomycin  IVPB 1250 milliGRAM(s) IV Intermittent every 8 hours    MEDICATIONS  (PRN):  aluminum hydroxide/magnesium hydroxide/simethicone Suspension 30 milliLiter(s) Oral every 4 hours PRN Dyspepsia  HYDROmorphone   Tablet 6 milliGRAM(s) Oral every 4 hours PRN Severe Pain (7 - 10)  ketorolac   Injectable 15 milliGRAM(s) IV Push every 6 hours PRN Moderate Pain (4 - 6)  LORazepam     Tablet 1 milliGRAM(s) Oral every 2 hours PRN CIWA-Ar score increase by 2 points and a total score of 7 or less  melatonin 3 milliGRAM(s) Oral at bedtime PRN Insomnia  ondansetron Injectable 4 milliGRAM(s) IV Push every 8 hours PRN Nausea and/or Vomiting      LABS                          9.6    9.99  )-----------( 319      ( 16 Jan 2022 04:30 )             31.3     01-16    135  |  98  |  15  ----------------------------<  127<H>  3.9   |  21  |  0.7    Ca    8.4<L>      16 Jan 2022 12:30  Phos  2.4     01-16  Mg     1.9     01-16    TPro  7.5  /  Alb  3.2<L>  /  TBili  0.3  /  DBili  <0.2  /  AST  18  /  ALT  15  /  AlkPhos  96  01-16        PT/INR - ( 15 Baljinder 2022 20:00 )   PT: 15.80 sec;   INR: 1.38 ratio         PTT - ( 15 Baljinder 2022 20:00 )  PTT:31.0 sec  Lactate Trend  01-16 @ 16:00 Lactate:0.9     CARDIAC MARKERS ( 15 Baljinder 2022 20:00 )  x     / x     / 63 U/L / x     / x          CAPILLARY BLOOD GLUCOSE          Culture - Blood (collected 01-15-22 @ 20:31)  Source: .Blood Blood-Peripheral  Gram Stain (01-16-22 @ 20:40):    Growth in aerobic bottle: Gram Positive Cocci in Clusters    Growth in anaerobic bottle: Gram Positive Cocci in Clusters  Preliminary Report (01-16-22 @ 20:41):    Growth in aerobic bottle: Gram Positive Cocci in Clusters    Growth in anaerobic bottle: Gram Positive Cocci in Clusters    ***Blood Panel PCR results on this specimen are available    approximately 3 hours after the Gram stain result.***    Gram stain, PCR, and/or culture results may not always    correspond due to difference in methodologies.    ************************************************************    This PCR assay was performed by multiplex PCR. This    Assay tests for 66 bacterial and resistance gene targets.    Please refer to the [facility] test directory    at [email or web address] for details.  Organism: Blood Culture PCR (01-16-22 @ 18:10)  Organism: Blood Culture PCR (01-16-22 @ 18:10)      -  Staphylococcus aureus: Detec Any isolate of Staphylococcus aureus from a blood culture is NOT considered a contaminant.      Method Type: PCR    Culture - Blood (collected 01-15-22 @ 20:00)  Source: .Blood Blood-Peripheral  Gram Stain (01-16-22 @ 20:41):    Growth in aerobic bottle: Gram Positive Cocci in Clusters    Growth in anaerobic bottle: Gram Positive Cocci in Clusters  Preliminary Report (01-16-22 @ 20:41):    Growth in aerobic bottle: Gram Positive Cocci in Clusters    Growth in anaerobic bottle: Gram Positive Cocci in Clusters        RADIOLOGY & ADDITIONAL TESTS:    Imaging Personally Reviewed:  [ ] YES  [ ] NO    HEALTH ISSUES - PROBLEM Dx:  Acute left-sided low back pain with left-sided sciatica            PHYSICAL EXAM:  GENERAL: NAD, well-developed.  HEAD:  Atraumatic, Normocephalic.  EYES: EOMI, PERRLA, conjunctiva and sclera clear.  NECK: Supple, No JVD.  CHEST/LUNG: Clear to auscultation bilaterally; No wheeze.  HEART: Regular rate and rhythm; S1 S2.   ABDOMEN: Soft, Nontender, Nondistended; Bowel sounds present.  EXTREMITIES: leg edema, venous stasis changes,   PSYCH: AAOx3.  NEUROLOGY: non-focal.  SKIN: bilateral wrist full thickness wound.

## 2022-01-17 NOTE — PHYSICAL THERAPY INITIAL EVALUATION ADULT - GENERAL OBSERVATIONS, REHAB EVAL
14:00-14:30. chart reviewed. Pt received side-lying  at B/S, alert, oriented, able to follow multi-step instructions and agreeable to PT evaluation. +IV, B/L wrist dressing, CC severe back pain radiating to B/L hips 10/10 with movement. able to participate in sitting at EOB unable to stand 2/2 severe pain. PT will follow up.

## 2022-01-17 NOTE — CONSULT NOTE ADULT - SUBJECTIVE AND OBJECTIVE BOX
HPI:  51-year-old male past medical history of polysubstance abuse who presents status post fall.  Patient states that approximately 12 hours ago he fell from bed due to sciatica pain, and has and was not able to get up from the floor.  The pain is sacral with a shooting pain to his left foot. It feels as if his foot can fall off. He states he is unable to lie flat secondary to the pain. Patient also states that he has been having pain to his wrist bilaterally.  Of note patient has known open wounds to bilateral wrist.  Patient reports that the last time he used heroin IV was approximately 2 week ago.  Patient has no other medical complaints. He reports no f/c/n/v, change in urinary or bowel habits.    Pt states that he takes 135mg of methadone a day, call his clinic and doctor (Dr. Milton Gates), no response.    In the ED: Pt tachy 91, wbc 16.6k, CT spine: osseous destructive process at the left L5-S1 facet joints with infiltrative changes extending to the adjacent spinal canal and   neural foramina and associated stenosis. Additional erosive changes noted at the left T10-T11 facet joint. Neuro sx consulted, pt needs MRI. Pt unable to tolerate procedure since he cant lie flat. s/p bolus and broad spectrum abx.    (16 Jan 2022 07:50)      PAST MEDICAL & SURGICAL HISTORY:  IV drug abuse        Hospital Course: Stable. Has not been able to get out of bed secondary to severe LLE pain.     TODAY'S SUBJECTIVE & REVIEW OF SYMPTOMS:     Constitutional WNL   Cardio WNL   Resp WNL   GI WNL  Heme WNL  Endo WNL  Skin ulcers both wrist. IV heroin use  MSK WNL  Neuro WNL  Cognitive WNL  Psych substance abuse, on Methadone. Followed by Psychiatrist.  IV heroin use      MEDICATIONS  (STANDING):  ALPRAZolam 2 milliGRAM(s) Oral three times a day  buPROPion XL (24-Hour) . 300 milliGRAM(s) Oral daily  enoxaparin Injectable 40 milliGRAM(s) SubCutaneous daily  folic acid 1 milliGRAM(s) Oral daily  influenza   Vaccine 0.5 milliLiter(s) IntraMuscular once  lactated ringers. 1000 milliLiter(s) (100 mL/Hr) IV Continuous <Continuous>  mirtazapine 60 milliGRAM(s) Oral at bedtime  multivitamin 1 Tablet(s) Oral daily  pantoprazole    Tablet 40 milliGRAM(s) Oral before breakfast  QUEtiapine 200 milliGRAM(s) Oral three times a day  thiamine 100 milliGRAM(s) Oral daily  vancomycin  IVPB      vancomycin  IVPB 1250 milliGRAM(s) IV Intermittent every 8 hours    MEDICATIONS  (PRN):  acetaminophen     Tablet .. 650 milliGRAM(s) Oral every 6 hours PRN Temp greater or equal to 38C (100.4F), Mild Pain (1 - 3)  aluminum hydroxide/magnesium hydroxide/simethicone Suspension 30 milliLiter(s) Oral every 4 hours PRN Dyspepsia  ketorolac   Injectable 15 milliGRAM(s) IV Push every 6 hours PRN Moderate Pain (4 - 6)  LORazepam     Tablet 1 milliGRAM(s) Oral every 2 hours PRN CIWA-Ar score increase by 2 points and a total score of 7 or less  melatonin 3 milliGRAM(s) Oral at bedtime PRN Insomnia  morphine  - Injectable 4 milliGRAM(s) IV Push every 4 hours PRN Severe Pain (7 - 10)  ondansetron Injectable 4 milliGRAM(s) IV Push every 8 hours PRN Nausea and/or Vomiting      FAMILY HISTORY:      Allergies    No Known Allergies    Intolerances        SOCIAL HISTORY:    [  ] Etoh  [  ] Smoking  [x  ] Substance abuse     Home Environment:  [ x ] Home Alone  [  ] Lives with Family  [  ] Home Health Aid    Dwelling:  [ x ] Apartment  [  ] Private House  [  ] Adult Home  [  ] Skilled Nursing Facility      [  ] Short Term  [  ] Long Term  [ x ] Stairs       Elevator [x  ]    FUNCTIONAL STATUS PTA: (Check all that apply)  Ambulation: [ x  ]Independent   [   ] Requires Assistance   [  ] Dependent     [  ] Non-Ambulatory       Assistive Device: [  ] SA Cane  [  ]  Q Cane  [  ] Walker  [  ]  Wheelchair - using 1 crutch a few days PTA. Drives  ADL : [  ] Independent    [ x  ] Requires Assistance    [  ]  Dependent       Vital Signs Last 24 Hrs  T(C): 37.3 (17 Jan 2022 04:46), Max: 37.6 (16 Jan 2022 20:01)  T(F): 99.2 (17 Jan 2022 04:46), Max: 99.6 (16 Jan 2022 20:01)  HR: 81 (17 Jan 2022 04:46) (81 - 90)  BP: 132/68 (17 Jan 2022 04:46) (121/64 - 144/76)  BP(mean): 100 (16 Jan 2022 20:01) (100 - 100)  RR: 18 (16 Jan 2022 20:01) (18 - 18)  SpO2: --      PHYSICAL EXAM: Alert & Oriented X3  GENERAL: obese,   HEAD:  Atraumatic, Normocephalic  EYES: EOMI, PERRL  NECK: Supple  CHEST/LUNG: Clear to auscultation  HEART: Regular rate and rhythm  ABDOMEN: Soft, obese, Nontender, Nondistended  EXTREMITIES:  Marked right hand swelling. Legs soft  ROM:  [   ] WFL all extremities  [  ] Abnormal  - unable to cooperate BLE secondary to pain    NERVOUS SYSTEM:   Cranial Nerves 2-12: [ x  ] intact  [  ] Abnormal   Motor Strength: [   ] WFL all extremities   [x ] Abnormal  -- prox BLE not testable secondary to pain. DF 5/5 bilaterally. BUE WFL  Sensation: [   ] intact to light touch  [x  ] Abnormal - decreased both feet  Reflexes: [   ] Symmetric  [  ]  Abnormal     FUNCTIONAL STATUS:  Bed Mobility: [   ]Independent  [  ] Supervision  [ x ] Needs Assistance   [  ] N/A   Transfers: [   ] Independent  [  ] Supervision  [x  ] Needs Assistance  [  ]  N/A   Ambulation: [   ] Independent  [  ] Supervision  [ x ] Needs Assistance  [  ] N/A   ADL: [   ] Independent  [x ] Requires Assistance  [  ] N/A       LABS:                        9.6    9.99  )-----------( 319      ( 16 Jan 2022 04:30 )             31.3     01-16    135  |  98  |  15  ----------------------------<  127<H>  3.9   |  21  |  0.7    Ca    8.4<L>      16 Jan 2022 12:30  Phos  2.4     01-16  Mg     1.9     01-16    TPro  7.5  /  Alb  3.2<L>  /  TBili  0.3  /  DBili  <0.2  /  AST  18  /  ALT  15  /  AlkPhos  96  01-16    PT/INR - ( 15 Baljinder 2022 20:00 )   PT: 15.80 sec;   INR: 1.38 ratio         PTT - ( 15 Baljinder 2022 20:00 )  PTT:31.0 sec      RADIOLOGY & ADDITIONAL STUDIES:  per HPI          Assesment:

## 2022-01-17 NOTE — CONSULT NOTE ADULT - TIME BILLING
wound eval and treat  answered all concerns
I have personally seen and examined this patient.    I have reviewed all pertinent clinical information and reviewed all relevant imaging and diagnostic studies personally.   I counseled the patient about diagnostic testing and treatment plan. All questions were answered.   I discussed recommendations with the primary team.

## 2022-01-17 NOTE — PHYSICAL THERAPY INITIAL EVALUATION ADULT - GAIT TRAINING, PT EVAL
Pt will ambulate using RW or least restrictive AD for 30 ft with CGA by discharge to facilitate return to PLOF.

## 2022-01-17 NOTE — PROGRESS NOTE ADULT - ASSESSMENT
51-year-old male past medical history of polysubstance abuse who presents status post fall. Has back pain radiating to LLE, CT conerning for osteomyolitis.     # Radiating Back pain concerning for Vertebral Osteomyelitis,  # Sepsis POA  # gram positive bacteremia   - Back pain for several days, hx of IVDU,   - on admission HR 91, wbc 16.6k, s/p bolus and abx  - Blood culture 1/15 gram positive cocci in clusters    - CT AP - There is an osseous destructive process at the left L5-S1 facet joints with infiltrative changes extending to the adjacent spinal canal and neural foramina and associated stenosis. Consider osteomyelitis. Underlying mass is not excluded. erosive changes noted at the left T10-T11 facet joint. Trauma workup (-)  - Neuro sx consulted:   - MRI Thoracic / Lumbar spine with and without contrast - unable to perform given foreign body concerning for needle   - pain control   - ESR/CRP, BCx, U/A U/c  - ID consulted   - Consider bladder scan / PVR   - start on cefepime and vanc for now     # foreign body in right forearm   - 1.2cm linear foreign body overlying right radius  - surgery consult for removal - no evidence of infection, not recommending removal   -      #Fall   - likely mechanical   - cth negative, trauma workup negative   - pt ot rehab     # Bl open wounds on wrist   - Chronic  - F/u Burn consult, f/u recs    # Lt foot pain, severe  - warm, pulse palpated, parasthesias noted  - f/y xray foot - no fracture no   - f/u duplex     # IVDU  - Pt reports taking heiorn for years, last use 2 weeks ago  - f/u with methadone clinic ([phone]), pt reports taking 135mg methadone per day  - addiction medicine consulted  - pt takes xanax 2mg TID, has withdrawal symptoms when not taking them. Taper while inpatient?  - c/w welbutrin 300 qd and Seroquel 300 TID Meds confirmed with pharmacy  - f/u drug screen   - anemia noted, start on folate, b12, thiamine, MV    # DVT ppx- LMWH  # GI ppx- PPI  # Activity- AAT  # Diet- Reg  # Code status - full  # Dispo- from home, acute    51-year-old male past medical history of polysubstance abuse who presents status post fall. Has back pain radiating to LLE, CT conerning for osteomyolitis.     # Radiating Back pain concerning for Vertebral Osteomyelitis,  # Sepsis POA  # gram positive bacteremia   - on admission HR 91, wbc 16.6k  - , CRP pending    - Blood culture 1/15 gram positive cocci in clusters    - CT AP - There is an osseous destructive process at the left L5-S1 facet joints with infiltrative changes extending to the adjacent spinal canal and neural foramina and associated stenosis. Consider osteomyelitis. Underlying mass is not excluded.  - Neuro sx consulted   - unable to perform MRI in setting of retained foreign body in arm, will need bone scan or tagged WBC scan to eval for osteomyelitis.    - ID consulted   - continue vancomycin 1250mg q8 for now,     # foreign body in right forearm   - 1.2cm linear foreign body overlying right radius  - surgery consult for removal - no evidence of infection, not recommending removal   - unable to obtain MRI, radiology recommending nuclear bone scan to eval for osteomyelitis.     #Fall   - likely mechanical   - cth negative, trauma workup negative   - pt ot rehab     # Bl open wounds on wrist   - Chronic  - F/u Burn consult, f/u recs    # Lt foot pain, severe  - warm, pulse palpated, parasthesias noted  - f/y xray foot - no fracture, midfoot degenerative changes,  - f/u duplex LE     # IVDU  # suspected ETOH use   # suspected thiamine folate de  - Pt reports taking heiorn for years, last use 2 weeks ago  - f/u with methadone clinic ([phone]), pt reports taking 135mg methadone per day  - addiction medicine consulted  - pt takes xanax 2mg TID, has withdrawal symptoms when not taking them.   - c/w welbutrin 300 qd and Seroquel 300 TID   - f/u drug screen     #Normocytic Anemia   - multifactorial, iron deficiency, anemia of chronic disease,   - start on folate, MV,  - ferrous sulfate 325mg qd     # DVT ppx- LMWH  # GI ppx- PPI  # Activity- AAT  # Diet- Reg  # Code status - full  # Dispo- from home, acute    51-year-old male past medical history of polysubstance abuse who presents status post fall. Has back pain radiating to LLE, CT conerning for osteomyolitis.     # Radiating Back pain concerning for Vertebral Osteomyelitis,  # Sepsis POA  # gram positive bacteremia   - on admission HR 91, wbc 16.6k  - , CRP pending    - Blood culture 1/15 gram positive cocci in clusters    - CT AP - There is an osseous destructive process at the left L5-S1 facet joints with infiltrative changes extending to the adjacent spinal canal and neural foramina and associated stenosis. Consider osteomyelitis. Underlying mass is not excluded.  - Neuro sx consulted   - unable to perform MRI in setting of retained foreign body in arm, will need bone scan or tagged WBC scan to eval for osteomyelitis.    - ID consulted   - continue vancomycin 1250mg q8 for now,   - repeat blood cultures   - echo     # foreign body in right forearm   - 1.2cm linear foreign body overlying right radius  - surgery consult for removal - no evidence of infection, not recommending removal   - unable to obtain MRI, radiology recommending nuclear bone scan to eval for osteomyelitis.     #Fall   - likely mechanical   - cth negative, trauma workup negative   - pt ot rehab     # Bl open wounds on wrist   - Chronic  - F/u Burn consult, f/u recs    # Lt foot pain, severe  - warm, pulse palpated, parasthesias noted  - f/y xray foot - no fracture, midfoot degenerative changes,  - f/u duplex LE     # IVDU  # suspected ETOH use   # suspected thiamine folate deficiency   - Pt reports taking heiorn for years, last use 2 weeks ago  - f/u with methadone clinic ([phone]), pt reports taking 135mg methadone per day  - addiction medicine consulted  - pt takes xanax 2mg TID, has withdrawal symptoms when not taking them.   - c/w welbutrin 300 qd and Seroquel 300 TID   - f/u drug screen   - continue thiamine folate supplementation   - pain management appreciated   - tylenol 650mg q8 standing   - gabapentin 300mg q8hr   - cyclobenzaprine 10mg q8hr   - dilaudid 6mg po q4 prn   - methadone 40mg q8hr standing   - ketorolac 15mg q6hr prn   - xanax 2mg q8hr     #Normocytic Anemia   - multifactorial, iron deficiency, anemia of chronic disease,   - start on folate, MV,  - ferrous sulfate 325mg qd     # DVT ppx- LMWH  # GI ppx- PPI  # Activity- AAT  # Diet- Reg  # Code status - full  # Dispo- from home, acute    51-year-old male past medical history of polysubstance abuse who presents status post fall. Has back pain radiating to LLE, CT conerning for osteomyolitis.     # Radiating Back pain concerning for Vertebral Osteomyelitis,  # Sepsis POA  # gram positive bacteremia   - on admission HR 91, wbc 16.6k  - , CRP pending    - Blood culture 1/15 gram positive cocci in clusters    - CT AP - There is an osseous destructive process at the left L5-S1 facet joints with infiltrative changes extending to the adjacent spinal canal and neural foramina and associated stenosis. Consider osteomyelitis. Underlying mass is not excluded.  - Neuro sx consulted   - unable to perform MRI in setting of retained foreign body in arm, will need bone scan or tagged WBC scan to eval for osteomyelitis.    - ID consulted   - continue vancomycin 1250mg q8 for now,   - repeat blood cultures   - echo     #Right forearm foreign body  - 1.2cm linear foreign body overlying right radius  - surgery consult for removal - no evidence of infection, not recommending removal   - unable to obtain MRI, radiology recommending nuclear bone scan to eval for osteomyelitis.     #Fall   - likely mechanical   - cth negative, trauma workup negative   - pt ot rehab     # Bl open wounds on wrist   - burn appreciated, Wound care - Xeroform/Kelrex BID, No Surgical debridement     # Lt foot pain,   - warm, pulse palpated, parasthesias noted  - f/y xray foot - no fracture, midfoot degenerative changes,  - f/u duplex LE     # IVDU  # suspected ETOH use   # suspected thiamine folate deficiency   - Pt reports taking heiorn for years, last use 2 weeks ago  - f/u with methadone clinic ([phone]), pt reports taking 135mg methadone per day  - addiction medicine consulted  - pt takes xanax 2mg TID, has withdrawal symptoms when not taking them.   - c/w welbutrin 300 qd and Seroquel 300 TID   - f/u drug screen   - continue thiamine folate supplementation   - pain management appreciated   - tylenol 650mg q8 standing   - gabapentin 300mg q8hr   - cyclobenzaprine 10mg q8hr   - dilaudid 6mg po q4 prn   - methadone 40mg q8hr standing   - ketorolac 15mg q6hr prn   - xanax 2mg q8hr     #Normocytic Anemia   - multifactorial, iron deficiency, anemia of chronic disease,   - start on folate, MV,  - ferrous sulfate 325mg qd     # DVT ppx- LMWH  # GI ppx- PPI  # Activity- AAT  # Diet- Reg  # Code status - full  # Dispo- from home, acute

## 2022-01-17 NOTE — CONSULT NOTE ADULT - SUBJECTIVE AND OBJECTIVE BOX
MIKAEL MCDERMOTT  51y, Male  Allergy: No Known Allergies      CHIEF COMPLAINT: possible osteomyelitis (16 Jan 2022 11:53)      LOS      HPI:  51-year-old male past medical history of polysubstance abuse who presents status post fall.  Patient states that approximately 12 hours ago he fell from bed due to sciatica pain, and has and was not able to get up from the floor.  The pain is sacral with a shooting pain to his left foot. It feels as if his foot can fall off. He states he is unable to lie flat secondary to the pain. Patient also states that he has been having pain to his wrist bilaterally.  Of note patient has known open wounds to bilateral wrist.  Patient reports that the last time he used heroin IV was approximately 2 week ago.  Patient has no other medical complaints. He reports no f/c/n/v, change in urinary or bowel habits.    Pt states that he takes 135mg of methadone a day, call his clinic and doctor (Dr. Milton Gates), no response.    In the ED: Pt tachy 91, wbc 16.6k, CT spine: osseous destructive process at the left L5-S1 facet joints with infiltrative changes extending to the adjacent spinal canal and  neural foramina and associated stenosis. Additional erosive changes noted at the left T10-T11 facet joint. Neuro sx consulted, pt needs MRI. Pt unable to tolerate procedure since he cant lie flat. s/p bolus and broad spectrum abx.   (16 Jan 2022 07:50)      INFECTIOUS DISEASE HISTORY:  History as above.   Currently IVDA with heroin, also on xanax.   Follows with methadone.   Denies any chest pain, shortness of breath.   Denies any pleuritic chest pain, nausea, vomiting.   Denies any other joint pain other than lower back pain.     PAST MEDICAL & SURGICAL HISTORY:  IV drug abuse        FAMILY HISTORY  Family hx reviewed and non-contributory     SOCIAL HISTORY  Social History:  pt denies drinking  Pt reports smoking 1/2 - 1 pack/day. Last time IVDU 2 weeks ago, heroin (16 Jan 2022 07:50)        ROS  General: Denies rigors, nightsweats  HEENT: Denies headache, rhinorrhea, sore throat, eye pain  CV: Denies CP, palpitations  PULM: Denies wheezing, hemoptysis  GI: Denies hematemesis, hematochezia, melena  : Denies discharge, hematuria  MSK: Denies arthralgias, myalgias  SKIN: Denies rash, lesions  NEURO: Denies paresthesias, weakness  PSYCH: Denies depression, anxiety    VITALS:  T(F): 99.6, Max: 100 (01-16-22 @ 00:15)  HR: 83  BP: 144/76  RR: 18Vital Signs Last 24 Hrs  T(C): 37.6 (16 Jan 2022 20:01), Max: 37.8 (16 Jan 2022 00:15)  T(F): 99.6 (16 Jan 2022 20:01), Max: 100 (16 Jan 2022 00:15)  HR: 83 (16 Jan 2022 20:01) (83 - 95)  BP: 144/76 (16 Jan 2022 20:01) (117/70 - 144/76)  BP(mean): 100 (16 Jan 2022 20:01) (100 - 100)  RR: 18 (16 Jan 2022 20:01) (16 - 19)  SpO2: 96% (16 Jan 2022 05:31) (96% - 97%)    PHYSICAL EXAM:  Gen: NAD, resting in bed  HEENT: Normocephalic, atraumatic  Neck: supple, no lymphadenopathy  CV: Regular rate & regular rhythm  Lungs: decreased BS at bases, no fremitus  Abdomen: Soft, BS present  Ext: Warm, well perfused  Neuro: non focal, awake  Skin: no rash, no erythema  Lines: no phlebitis    TESTS & MEASUREMENTS:                        9.6    9.99  )-----------( 319      ( 16 Jan 2022 04:30 )             31.3    01-16    135  |  98  |  15  ----------------------------<  127<H>  3.9   |  21  |  0.7    Ca    8.4<L>      16 Jan 2022 12:30  Phos  2.4     01-16  Mg     1.9     01-16    TPro  7.5  /  Alb  3.2<L>  /  TBili  0.3  /  DBili  <0.2  /  AST  18  /  ALT  15  /  AlkPhos  96  01-16    eGFR if : 126 mL/min/1.73M2 (01-16-22 @ 12:30)  eGFR if Non African American: 108 mL/min/1.73M2 (01-16-22 @ 12:30)  eGFR if Non African American: 103 mL/min/1.73M2 (01-16-22 @ 04:30)  eGFR if : 119 mL/min/1.73M2 (01-16-22 @ 04:30)    LIVER FUNCTIONS - ( 16 Jan 2022 12:30 )  Alb: 3.2 g/dL / Pro: 7.5 g/dL / ALK PHOS: 96 U/L / ALT: 15 U/L / AST: 18 U/L / GGT: x              Culture - Blood (collected 01-15-22 @ 20:31)  Source: .Blood Blood-Peripheral  Gram Stain (01-16-22 @ 20:40):    Growth in aerobic bottle: Gram Positive Cocci in Clusters    Growth in anaerobic bottle: Gram Positive Cocci in Clusters  Preliminary Report (01-16-22 @ 20:41):    Growth in aerobic bottle: Gram Positive Cocci in Clusters    Growth in anaerobic bottle: Gram Positive Cocci in Clusters    ***Blood Panel PCR results on this specimen are available    approximately 3 hours after the Gram stain result.***    Gram stain, PCR, and/or culture results may not always    correspond due to difference in methodologies.    ************************************************************    This PCR assay was performed by multiplex PCR. This    Assay tests for 66 bacterial and resistance gene targets.    Please refer to the [facility] test directory    at [email or web address] for details.  Organism: Blood Culture PCR (01-16-22 @ 18:10)  Organism: Blood Culture PCR (01-16-22 @ 18:10)      -  Staphylococcus aureus: Detec Any isolate of Staphylococcus aureus from a blood culture is NOT considered a contaminant.      Method Type: PCR    Culture - Blood (collected 01-15-22 @ 20:00)  Source: .Blood Blood-Peripheral  Gram Stain (01-16-22 @ 20:41):    Growth in aerobic bottle: Gram Positive Cocci in Clusters    Growth in anaerobic bottle: Gram Positive Cocci in Clusters  Preliminary Report (01-16-22 @ 20:41):    Growth in aerobic bottle: Gram Positive Cocci in Clusters    Growth in anaerobic bottle: Gram Positive Cocci in Clusters        Lactate, Blood: 0.9 mmol/L (01-16-22 @ 16:00)      INFECTIOUS DISEASES TESTING  COVID-19 PCR: NotDetec (01-15-22 @ 23:32)      RADIOLOGY & ADDITIONAL TESTS:  I have personally reviewed the last Chest xray  CXR      CT  CT Chest w/ IV Cont:  ACC: [id number] EXAM:  CT ABDOMEN AND PELVIS IC                        ACC: [id number] EXAM:  CT CHEST IC                          PROCEDURE DATE:  01/15/2022          INTERPRETATION:  CLINICAL STATEMENT: Trauma code    TECHNIQUE: Contiguous CT images were obtained of the chest, abdomen and  pelvis.    Intravenous Contrast:  Intravenous contrast administered.  100 cc Omnipaque 350 intravenous contrast was administered. 0 cc  discarded.  Oral contrast: was not administered.      COMPARISON:  None    FINDINGS:    CHEST:    LUNGS, PLEURA AND AIRWAYS: Motion degraded evaluation of parenchymal  detail. Paraseptal emphysematous changes. Bilateral subpleural reticular  opacities and subsegmental atelectatic changes. No evidence for focal  consolidation, pleural effusion or pneumothorax. Central airways are  patent.    HEART AND VESSELS: Heart size appears within normal limits. No  pericardial effusion is present. Normal caliber thoracic aorta. Main  pulmonary artery measures upper limits of normal at 3.2 cm.    THORACIC INLET/MEDIASTINUM/LYMPH NODES: The visualized portion of the  thyroid gland is unremarkable. There are no enlarged thoracic lymph nodes.    ABDOMEN/PELVIS:    LIVER: Unremarkable.    SPLEEN: Unremarkable.    PANCREAS: Unremarkable.    GALLBLADDER AND BILIARY TREE: Unremarkable CT appearance of the  gallbladder. No biliary ductal dilatation.    ADRENALS: Unremarkable.    KIDNEYS: Symmetric pattern of renal enhancement. No hydronephrosis.    LYMPH NODES: There are no enlarged abdominal or pelvic lymph nodes.    VASCULATURE: The abdominal aorta is normal in caliber.    BOWEL: No evidence for bowel obstruction or bowel wall thickening.  Unremarkable appendix.    PERITONEUM/RETROPERITONEUM/MESENTERY: There is no ascites or  pneumoperitoneum.    PELVIC VISCERA: Unremarkable.    BONES AND SOFT TISSUES: No definite acute fractures identified. There is  an osseous destructive process at the left L5-S1 facet joints with  infiltrative changes extending to the adjacent spinal canal and neural  foramen. There is spinal canal stenosis at L4-L5 and L5-S1. Additional  erosive changes are noted at the left T10-T11 facet joints.      IMPRESSION:    Definitive acute traumatic injury is not identified to the chest, abdomen  or pelvis.    There is an osseous destructive process at the left L5-S1 facet joints  with infiltrative changes extending to the adjacent spinal canal and  neural foramina and associated stenosis. Per notes, patient with  polysubstance abuse. Consider osteomyelitis. Underlying mass is not  excluded. Additional erosive changes noted at the left T10-T11 facet  joint. Further evaluation with MRI can be obtained as needed.    --- End of Report ---            LYLA PINTO MD; Attending Radiologist  This document has been electronically signed. Baljinder 15 2022 11:46PM (01-15-22 @ 22:24)      CARDIOLOGY TESTING  12 Lead ECG:  Ventricular Rate 79 BPM    Atrial Rate 79 BPM    P-R Interval 146 ms    QRS Duration 90 ms    Q-T Interval 412 ms    QTC Calculation(Bazett) 472 ms    P Axis 65 degrees    R Axis 74 degrees    T Axis 74 degrees    Diagnosis Line Normal sinus rhythm  Normal ECG    Confirmed by Gabe Brown ([id number]) on 1/16/2022 12:56:17 PM (01-16-22 @ 10:43)  12 Lead ECG:  Ventricular Rate 89 BPM    Atrial Rate 89 BPM    P-R Interval 142 ms    QRS Duration 94 ms    Q-T Interval 380 ms    QTC Calculation(Bazett) 462 ms    P Axis 65 degrees    R Axis 65 degrees    T Axis 64 degrees    Diagnosis Line Normal sinus rhythm  Normal ECG    Confirmed by Gabe Brown ([id number]) on 1/16/2022 12:49:12 PM (01-15-22 @ 19:55)      MEDICATIONS  ALPRAZolam 2 Oral three times a day  buPROPion XL (24-Hour) . 300 Oral daily  cefepime   IVPB 2000 IV Intermittent every 8 hours  enoxaparin Injectable 40 SubCutaneous daily  folic acid 1 Oral daily  influenza   Vaccine 0.5 IntraMuscular once  lactated ringers. 1000 IV Continuous <Continuous>  mirtazapine 60 Oral at bedtime  multivitamin 1 Oral daily  pantoprazole    Tablet 40 Oral before breakfast  QUEtiapine 200 Oral three times a day  thiamine 100 Oral daily  vancomycin  IVPB    vancomycin  IVPB 2000 IV Intermittent once      Weight      ANTIBIOTICS:  cefepime   IVPB 2000 milliGRAM(s) IV Intermittent every 8 hours  vancomycin  IVPB      vancomycin  IVPB 2000 milliGRAM(s) IV Intermittent once      ALLERGIES:  No Known Allergies         MIKAEL MCDERMOTT  51y, Male  Allergy: No Known Allergies      CHIEF COMPLAINT: possible osteomyelitis (16 Jan 2022 11:53)      LOS      HPI:  51-year-old male past medical history of polysubstance abuse who presents status post fall.  Patient states that approximately 12 hours ago he fell from bed due to sciatica pain, and has and was not able to get up from the floor.  The pain is sacral with a shooting pain to his left foot. It feels as if his foot can fall off. He states he is unable to lie flat secondary to the pain. Patient also states that he has been having pain to his wrist bilaterally.  Of note patient has known open wounds to bilateral wrist.  Patient reports that the last time he used heroin IV was approximately 2 week ago.  Patient has no other medical complaints. He reports no f/c/n/v, change in urinary or bowel habits.    Pt states that he takes 135mg of methadone a day, call his clinic and doctor (Dr. Milton Gates), no response.    In the ED: Pt tachy 91, wbc 16.6k, CT spine: osseous destructive process at the left L5-S1 facet joints with infiltrative changes extending to the adjacent spinal canal and  neural foramina and associated stenosis. Additional erosive changes noted at the left T10-T11 facet joint. Neuro sx consulted, pt needs MRI. Pt unable to tolerate procedure since he cant lie flat. s/p bolus and broad spectrum abx.   (16 Jan 2022 07:50)      INFECTIOUS DISEASE HISTORY:  History as above.   Currently IVDA with heroin, also on xanax.   Follows with methadone.   Denies any chest pain, shortness of breath.   Denies any pleuritic chest pain, nausea, vomiting.   Denies any other joint pain other than lower back pain.     PAST MEDICAL & SURGICAL HISTORY:  IV drug abuse        FAMILY HISTORY  Family hx reviewed and non-contributory     SOCIAL HISTORY  Social History:  pt denies drinking  Pt reports smoking 1/2 - 1 pack/day. Last time IVDU 2 weeks ago, heroin (16 Jan 2022 07:50)        ROS  General: Denies rigors, nightsweats  HEENT: Denies headache, rhinorrhea, sore throat, eye pain  CV: Denies CP, palpitations  PULM: Denies wheezing, hemoptysis  GI: Denies hematemesis, hematochezia, melena  : Denies discharge, hematuria  MSK: Denies arthralgias, myalgias  SKIN: Denies rash, lesions  NEURO: Denies paresthesias, weakness  PSYCH: Denies depression, anxiety    VITALS:  T(F): 99.6, Max: 100 (01-16-22 @ 00:15)  HR: 83  BP: 144/76  RR: 18Vital Signs Last 24 Hrs  T(C): 37.6 (16 Jan 2022 20:01), Max: 37.8 (16 Jan 2022 00:15)  T(F): 99.6 (16 Jan 2022 20:01), Max: 100 (16 Jan 2022 00:15)  HR: 83 (16 Jan 2022 20:01) (83 - 95)  BP: 144/76 (16 Jan 2022 20:01) (117/70 - 144/76)  BP(mean): 100 (16 Jan 2022 20:01) (100 - 100)  RR: 18 (16 Jan 2022 20:01) (16 - 19)  SpO2: 96% (16 Jan 2022 05:31) (96% - 97%)    PHYSICAL EXAM:  Gen: NAD, resting in bed  HEENT: Normocephalic, atraumatic  Neck: supple, no lymphadenopathy  CV: Regular rate & regular rhythm  Lungs: decreased BS at bases, no fremitus  Abdomen: Soft, BS present  Ext: Warm, well perfused  Neuro: non focal, awake - unable to check gait  - midline lumbar back pain on palpation   Skin: no rash, no erythema: full thickeness wounds to wrist - no erythema   Lines: no phlebitis    TESTS & MEASUREMENTS:                        9.6    9.99  )-----------( 319      ( 16 Jan 2022 04:30 )             31.3    01-16    135  |  98  |  15  ----------------------------<  127<H>  3.9   |  21  |  0.7    Ca    8.4<L>      16 Jan 2022 12:30  Phos  2.4     01-16  Mg     1.9     01-16    TPro  7.5  /  Alb  3.2<L>  /  TBili  0.3  /  DBili  <0.2  /  AST  18  /  ALT  15  /  AlkPhos  96  01-16    eGFR if : 126 mL/min/1.73M2 (01-16-22 @ 12:30)  eGFR if Non African American: 108 mL/min/1.73M2 (01-16-22 @ 12:30)  eGFR if Non African American: 103 mL/min/1.73M2 (01-16-22 @ 04:30)  eGFR if : 119 mL/min/1.73M2 (01-16-22 @ 04:30)    LIVER FUNCTIONS - ( 16 Jan 2022 12:30 )  Alb: 3.2 g/dL / Pro: 7.5 g/dL / ALK PHOS: 96 U/L / ALT: 15 U/L / AST: 18 U/L / GGT: x              Culture - Blood (collected 01-15-22 @ 20:31)  Source: .Blood Blood-Peripheral  Gram Stain (01-16-22 @ 20:40):    Growth in aerobic bottle: Gram Positive Cocci in Clusters    Growth in anaerobic bottle: Gram Positive Cocci in Clusters  Preliminary Report (01-16-22 @ 20:41):    Growth in aerobic bottle: Gram Positive Cocci in Clusters    Growth in anaerobic bottle: Gram Positive Cocci in Clusters    ***Blood Panel PCR results on this specimen are available    approximately 3 hours after the Gram stain result.***    Gram stain, PCR, and/or culture results may not always    correspond due to difference in methodologies.    ************************************************************    This PCR assay was performed by multiplex PCR. This    Assay tests for 66 bacterial and resistance gene targets.    Please refer to the [facility] test directory    at [email or web address] for details.  Organism: Blood Culture PCR (01-16-22 @ 18:10)  Organism: Blood Culture PCR (01-16-22 @ 18:10)      -  Staphylococcus aureus: Detec Any isolate of Staphylococcus aureus from a blood culture is NOT considered a contaminant.      Method Type: PCR    Culture - Blood (collected 01-15-22 @ 20:00)  Source: .Blood Blood-Peripheral  Gram Stain (01-16-22 @ 20:41):    Growth in aerobic bottle: Gram Positive Cocci in Clusters    Growth in anaerobic bottle: Gram Positive Cocci in Clusters  Preliminary Report (01-16-22 @ 20:41):    Growth in aerobic bottle: Gram Positive Cocci in Clusters    Growth in anaerobic bottle: Gram Positive Cocci in Clusters        Lactate, Blood: 0.9 mmol/L (01-16-22 @ 16:00)      INFECTIOUS DISEASES TESTING  COVID-19 PCR: NotDetec (01-15-22 @ 23:32)      RADIOLOGY & ADDITIONAL TESTS:  I have personally reviewed the last Chest xray  CXR      CT  CT Chest w/ IV Cont:  ACC: [id number] EXAM:  CT ABDOMEN AND PELVIS IC                        ACC: [id number] EXAM:  CT CHEST IC                          PROCEDURE DATE:  01/15/2022          INTERPRETATION:  CLINICAL STATEMENT: Trauma code    TECHNIQUE: Contiguous CT images were obtained of the chest, abdomen and  pelvis.    Intravenous Contrast:  Intravenous contrast administered.  100 cc Omnipaque 350 intravenous contrast was administered. 0 cc  discarded.  Oral contrast: was not administered.      COMPARISON:  None    FINDINGS:    CHEST:    LUNGS, PLEURA AND AIRWAYS: Motion degraded evaluation of parenchymal  detail. Paraseptal emphysematous changes. Bilateral subpleural reticular  opacities and subsegmental atelectatic changes. No evidence for focal  consolidation, pleural effusion or pneumothorax. Central airways are  patent.    HEART AND VESSELS: Heart size appears within normal limits. No  pericardial effusion is present. Normal caliber thoracic aorta. Main  pulmonary artery measures upper limits of normal at 3.2 cm.    THORACIC INLET/MEDIASTINUM/LYMPH NODES: The visualized portion of the  thyroid gland is unremarkable. There are no enlarged thoracic lymph nodes.    ABDOMEN/PELVIS:    LIVER: Unremarkable.    SPLEEN: Unremarkable.    PANCREAS: Unremarkable.    GALLBLADDER AND BILIARY TREE: Unremarkable CT appearance of the  gallbladder. No biliary ductal dilatation.    ADRENALS: Unremarkable.    KIDNEYS: Symmetric pattern of renal enhancement. No hydronephrosis.    LYMPH NODES: There are no enlarged abdominal or pelvic lymph nodes.    VASCULATURE: The abdominal aorta is normal in caliber.    BOWEL: No evidence for bowel obstruction or bowel wall thickening.  Unremarkable appendix.    PERITONEUM/RETROPERITONEUM/MESENTERY: There is no ascites or  pneumoperitoneum.    PELVIC VISCERA: Unremarkable.    BONES AND SOFT TISSUES: No definite acute fractures identified. There is  an osseous destructive process at the left L5-S1 facet joints with  infiltrative changes extending to the adjacent spinal canal and neural  foramen. There is spinal canal stenosis at L4-L5 and L5-S1. Additional  erosive changes are noted at the left T10-T11 facet joints.      IMPRESSION:    Definitive acute traumatic injury is not identified to the chest, abdomen  or pelvis.    There is an osseous destructive process at the left L5-S1 facet joints  with infiltrative changes extending to the adjacent spinal canal and  neural foramina and associated stenosis. Per notes, patient with  polysubstance abuse. Consider osteomyelitis. Underlying mass is not  excluded. Additional erosive changes noted at the left T10-T11 facet  joint. Further evaluation with MRI can be obtained as needed.    --- End of Report ---            LYLA PINTO MD; Attending Radiologist  This document has been electronically signed. Baljinder 15 2022 11:46PM (01-15-22 @ 22:24)      CARDIOLOGY TESTING  12 Lead ECG:  Ventricular Rate 79 BPM    Atrial Rate 79 BPM    P-R Interval 146 ms    QRS Duration 90 ms    Q-T Interval 412 ms    QTC Calculation(Bazett) 472 ms    P Axis 65 degrees    R Axis 74 degrees    T Axis 74 degrees    Diagnosis Line Normal sinus rhythm  Normal ECG    Confirmed by Gabe Brown ([id number]) on 1/16/2022 12:56:17 PM (01-16-22 @ 10:43)  12 Lead ECG:  Ventricular Rate 89 BPM    Atrial Rate 89 BPM    P-R Interval 142 ms    QRS Duration 94 ms    Q-T Interval 380 ms    QTC Calculation(Bazett) 462 ms    P Axis 65 degrees    R Axis 65 degrees    T Axis 64 degrees    Diagnosis Line Normal sinus rhythm  Normal ECG    Confirmed by Gabe Brown ([id number]) on 1/16/2022 12:49:12 PM (01-15-22 @ 19:55)      MEDICATIONS  ALPRAZolam 2 Oral three times a day  buPROPion XL (24-Hour) . 300 Oral daily  cefepime   IVPB 2000 IV Intermittent every 8 hours  enoxaparin Injectable 40 SubCutaneous daily  folic acid 1 Oral daily  influenza   Vaccine 0.5 IntraMuscular once  lactated ringers. 1000 IV Continuous <Continuous>  mirtazapine 60 Oral at bedtime  multivitamin 1 Oral daily  pantoprazole    Tablet 40 Oral before breakfast  QUEtiapine 200 Oral three times a day  thiamine 100 Oral daily  vancomycin  IVPB    vancomycin  IVPB 2000 IV Intermittent once      Weight      ANTIBIOTICS:  cefepime   IVPB 2000 milliGRAM(s) IV Intermittent every 8 hours  vancomycin  IVPB      vancomycin  IVPB 2000 milliGRAM(s) IV Intermittent once      ALLERGIES:  No Known Allergies

## 2022-01-17 NOTE — PROGRESS NOTE ADULT - SUBJECTIVE AND OBJECTIVE BOX
MIKAEL MCDERMOTT 51y Male  MRN#: [id number]   CODE STATUS: full code     Hospital Day: 1d    Pt is currently admitted with the primary diagnosis of vertebral osteomyelitis     SUBJECTIVE    no acute events overnight, pt seen and examined, complaining of severe back pain radiating to legs,                                             ----------------------------------------------------------  OBJECTIVE  PAST MEDICAL & SURGICAL HISTORY  IV drug abuse                                              -----------------------------------------------------------  ALLERGIES:  No Known Allergies                                            ------------------------------------------------------------    HOME MEDICATIONS  Home Medications:  Remeron 30 mg oral tablet: 2 tab(s) orally once a day (at bedtime) (16 Jan 2022 10:02)  SEROquel 200 mg oral tablet: orally 3 times a day (16 Jan 2022 10:02)  Wellbutrin: 300 milligram(s) orally once a day (16 Jan 2022 10:02)  Xanax: 2 milligram(s) orally 3 times a day (16 Jan 2022 10:02)                           MEDICATIONS:  STANDING MEDICATIONS  ALPRAZolam 2 milliGRAM(s) Oral three times a day  buPROPion XL (24-Hour) . 300 milliGRAM(s) Oral daily  enoxaparin Injectable 40 milliGRAM(s) SubCutaneous daily  folic acid 1 milliGRAM(s) Oral daily  influenza   Vaccine 0.5 milliLiter(s) IntraMuscular once  lactated ringers. 1000 milliLiter(s) IV Continuous <Continuous>  mirtazapine 60 milliGRAM(s) Oral at bedtime  multivitamin 1 Tablet(s) Oral daily  pantoprazole    Tablet 40 milliGRAM(s) Oral before breakfast  QUEtiapine 200 milliGRAM(s) Oral three times a day  thiamine 100 milliGRAM(s) Oral daily  vancomycin  IVPB      vancomycin  IVPB 1250 milliGRAM(s) IV Intermittent every 8 hours    PRN MEDICATIONS  acetaminophen     Tablet .. 650 milliGRAM(s) Oral every 6 hours PRN  aluminum hydroxide/magnesium hydroxide/simethicone Suspension 30 milliLiter(s) Oral every 4 hours PRN  ketorolac   Injectable 15 milliGRAM(s) IV Push every 6 hours PRN  LORazepam     Tablet 1 milliGRAM(s) Oral every 2 hours PRN  melatonin 3 milliGRAM(s) Oral at bedtime PRN  morphine  - Injectable 4 milliGRAM(s) IV Push every 4 hours PRN  ondansetron Injectable 4 milliGRAM(s) IV Push every 8 hours PRN                                            ------------------------------------------------------------  VITAL SIGNS: Last 24 Hours  T(C): 37.3 (17 Jan 2022 04:46), Max: 37.6 (16 Jan 2022 20:01)  T(F): 99.2 (17 Jan 2022 04:46), Max: 99.6 (16 Jan 2022 20:01)  HR: 81 (17 Jan 2022 04:46) (81 - 90)  BP: 132/68 (17 Jan 2022 04:46) (121/64 - 144/76)  BP(mean): 100 (16 Jan 2022 20:01) (100 - 100)  RR: 18 (16 Jan 2022 20:01) (18 - 18)  SpO2: --                                             --------------------------------------------------------------  LABS:                        9.6    9.99  )-----------( 319      ( 16 Jan 2022 04:30 )             31.3     01-16    135  |  98  |  15  ----------------------------<  127<H>  3.9   |  21  |  0.7    Ca    8.4<L>      16 Jan 2022 12:30  Phos  2.4     01-16  Mg     1.9     01-16    TPro  7.5  /  Alb  3.2<L>  /  TBili  0.3  /  DBili  <0.2  /  AST  18  /  ALT  15  /  AlkPhos  96  01-16    PT/INR - ( 15 Baljinder 2022 20:00 )   PT: 15.80 sec;   INR: 1.38 ratio         PTT - ( 15 Baljinder 2022 20:00 )  PTT:31.0 sec      Sedimentation Rate, Erythrocyte: 131 mm/Hr *H* (01-16-22 @ 18:19)  Lactate, Blood: 0.9 mmol/L (01-16-22 @ 16:00)        Culture - Blood (collected 15 Baljinder 2022 20:31)  Source: .Blood Blood-Peripheral  Gram Stain (16 Jan 2022 20:40):    Growth in aerobic bottle: Gram Positive Cocci in Clusters    Growth in anaerobic bottle: Gram Positive Cocci in Clusters  Preliminary Report (16 Jan 2022 20:41):    Growth in aerobic bottle: Gram Positive Cocci in Clusters    Growth in anaerobic bottle: Gram Positive Cocci in Clusters    ***Blood Panel PCR results on this specimen are available    approximately 3 hours after the Gram stain result.***    Gram stain, PCR, and/or culture results may not always    correspond due to difference in methodologies.    ************************************************************    This PCR assay was performed by multiplex PCR. This    Assay tests for 66 bacterial and resistance gene targets.    Please refer to the [facility] Labs test directory    at [email or web address] for details.  Organism: Blood Culture PCR (16 Jan 2022 18:10)  Organism: Blood Culture PCR (16 Jan 2022 18:10)    Culture - Blood (collected 15 Baljinder 2022 20:00)  Source: .Blood Blood-Peripheral  Gram Stain (16 Jan 2022 20:41):    Growth in aerobic bottle: Gram Positive Cocci in Clusters    Growth in anaerobic bottle: Gram Positive Cocci in Clusters  Preliminary Report (16 Jan 2022 20:41):    Growth in aerobic bottle: Gram Positive Cocci in Clusters    Growth in anaerobic bottle: Gram Positive Cocci in Clusters        CARDIAC MARKERS ( 15 Baljinder 2022 20:00 )  x     / x     / 63 U/L / x     / x                                                  -------------------------------------------------------------  RADIOLOGY:    ACC: [id number] EXAM:  CT BRAIN                          PROCEDURE DATE:  01/15/2022          INTERPRETATION:  Clinical History / Reason for exam: Trauma code    Technique: Noncontrast head CT.  Contiguous unenhanced CT axial images of   the head from the base to the vertex.    Comparison:  CT head dated 4/2/2010    Findings:    The ventricles and cortical sulci pattern are age-appropriate.    Gray-white matter differentiation is maintained.    There is no acute intracranial hemorrhage, extra-axial fluid collection   or midline shift.    No acute osseous abnormality/depressed skull fracture is identified.    The visualized paranasal sinuses are well aerated.    The mastoids are well-aerated.    IMPRESSION:    No CT evidence for acute intracranial pathology.      ACC: [id number] EXAM:  CT CERVICAL SPINE                          PROCEDURE DATE:  01/15/2022          INTERPRETATION:  CLINICAL STATEMENT: Trauma code    TECHNIQUE:  Contiguous unenhanced CT axial images of the cervical spine   with coronal and sagittal re-formations.    COMPARISON: None available    FINDINGS:    There is straightening of the normal cervical lordosis, which may be on   the basis of pain, muscle spasm, positioning or a combination of the   above.    There is no evidence of acute fracture or facet subluxation.    No significant prevertebral soft tissue swelling.    There is complete loss of disc height with bony fusion at C5-C6. There   are multilevel degenerative changes without definitive severe central   canal or neuroforaminal stenosis.      IMPRESSION:    No acute fracture or subluxation of the cervical spine.      ACC: [id number] EXAM:  CT ABDOMEN AND PELVIS IC                        ACC: [id number] EXAM:  CT CHEST IC                          PROCEDURE DATE:  01/15/2022          INTERPRETATION:  CLINICAL STATEMENT: Trauma code    TECHNIQUE: Contiguous CT images were obtained of the chest, abdomen and   pelvis.    Intravenous Contrast:  Intravenous contrast administered.  100 cc Omnipaque 350 intravenous contrast was administered. 0 cc   discarded.  Oral contrast: was not administered.      COMPARISON:  None    FINDINGS:    CHEST:    LUNGS, PLEURA AND AIRWAYS: Motion degraded evaluation of parenchymal   detail. Paraseptal emphysematous changes. Bilateral subpleural reticular   opacities and subsegmental atelectatic changes. No evidence for focal   consolidation, pleural effusion or pneumothorax. Central airways are   patent.    HEART AND VESSELS: Heart size appears within normal limits. No   pericardial effusion is present. Normal caliber thoracic aorta. Main   pulmonary artery measures upper limits of normal at 3.2 cm.    THORACIC INLET/MEDIASTINUM/LYMPH NODES: The visualized portion of the   thyroid gland is unremarkable. There are no enlarged thoracic lymph nodes.    ABDOMEN/PELVIS:    LIVER: Unremarkable.    SPLEEN: Unremarkable.    PANCREAS: Unremarkable.    GALLBLADDER AND BILIARY TREE: Unremarkable CT appearance of the   gallbladder. No biliary ductal dilatation.    ADRENALS: Unremarkable.    KIDNEYS: Symmetric pattern of renal enhancement. No hydronephrosis.    LYMPH NODES: There are no enlarged abdominal or pelvic lymph nodes.    VASCULATURE: The abdominal aorta is normal in caliber.    BOWEL: No evidence for bowel obstruction or bowel wall thickening.   Unremarkable appendix.    PERITONEUM/RETROPERITONEUM/MESENTERY: There is no ascites or   pneumoperitoneum.    PELVIC VISCERA: Unremarkable.    BONES AND SOFT TISSUES: No definite acute fractures identified. There is   an osseous destructive process at the left L5-S1 facet joints with   infiltrative changes extending to the adjacent spinal canal and neural   foramen. There is spinal canal stenosis at L4-L5 and L5-S1. Additional   erosive changes are noted at the left T10-T11 facet joints.      IMPRESSION:    Definitive acute traumatic injury is not identified to the chest, abdomen   or pelvis.    There is an osseous destructive process at the left L5-S1 facet joints   with infiltrative changes extending to the adjacent spinal canal and   neural foramina and associated stenosis. Per notes, patient with   polysubstance abuse. Consider osteomyelitis. Underlying mass is not   excluded. Additional erosive changes noted at the left T10-T11 facet   joint. Further evaluation with MRI can be obtained as needed.        ACC: [id number] EXAM:  XR WRIST COMP MIN 3 VIEWS BI                          PROCEDURE DATE:  01/15/2022          INTERPRETATION:  CLINICAL INDICATION:necrotic tissue, bone exposed    TECHNIQUE: 2 views of the right wrist. 2 views of the left wrist. One   image is provided with indeterminate laterality.    COMPARISON: No direct comparison available    FINDINGS:  There is no evidence of acute fracture or dislocation. Severe apparent   complete soft tissue defect at the distal dorsal wrist.    IMPRESSION:  No evidence of acute fracture or dislocation.    Severe apparent complete soft tissue defect at the distal dorsal wrist.    1.2 cm linear radiodensity within the anterior soft tissue of the right   forearm suspicious for foreign body.        ACC: [id number] EXAM:  XR FOREARM  2 VIEWS BI                          PROCEDURE DATE:  01/15/2022          INTERPRETATION:  CLINICAL INDICATION:necrotic tissue, bone exposed    TECHNIQUE: 2 views of the right forearm. 2 views of the left forearm.    COMPARISON: No direct comparison available    FINDINGS:  There is no evidence of acute fracture or dislocation. Severe apparent   complete soft tissue defect at the distal dorsal wrist bilaterally.    IMPRESSION:  No evidence of acute fracture or dislocation.    Severe apparent complete soft tissue defect at the distal dorsal wrist   bilaterally.    1.2 cm linear radiodensity within the anterior soft tissue of the left   forearm suspicious for foreign body.    --- End of Report ---    SOLEDAD HANNON MD; Attending Radiologist  This document has been electronically signed. Jan 16 2022  6:19PM      ACC: [id number] EXAM:  XR HAND 2 VIEWS LT                          PROCEDURE DATE:  01/16/2022          INTERPRETATION:  CLINICAL INDICATION:trauma    TECHNIQUE: 3 views of the left hand.    COMPARISON: No direct comparison available    FINDINGS:  There is no evidence of acute fracture or dislocation of the left hand.    IMPRESSION:  No evidence of acute fracture or dislocation of the left hand.        ACC: [id number] EXAM:  XR FOOT 2 VIEWS LT                          PROCEDURE DATE:  01/16/2022          INTERPRETATION:  CLINICAL HISTORY / REASON FOR EXAM: Pain    COMPARISON: None    TECHNIQUE: Three views, left foot radiographs    FINDINGS /  IMPRESSION:    No acute displaced fracture. Moderate midfoot degenerative changes.   Achilles tendon insertional enthesophyte measuring 1.1 cm.        ACC: [id number] EXAM:  XR FOREARM  2 VIEWS BI                          PROCEDURE DATE:  01/16/2022          INTERPRETATION:  CLINICAL HISTORY / REASON FOR EXAM: Foreign body    COMPARISON: Forearm radiographs from January 15, 2022    TECHNIQUE: Four views, bilateral forearm radiographs    FINDINGS:    RIGHT FOREARM: No acute displaced fracture. Peripheral IV within the   antecubital soft tissues. 1.2 cm linear foreign body within the soft   tissues overlying the radius.    LEFT FOREARM: No acute displaced fracture. Peripheral IV within the   antecubital soft tissues. Previously seen radiopaque foreign bodies no   longer visualized. Left distal forearm soft tissue defect.      IMPRESSION:    No acute displaced fracture.    1.2 cm linear foreign body within the soft tissues overlying the right   radius.    Previously seen left forearm foreign body is no longer visualized.                                                            --------------------------------------------------------------    PHYSICAL EXAM:  General: disheveled appearance, uncomfortable   LUNGS: air entry bilat  HEART: RRR, +S1,S2,  ABDOMEN: SNTTP, ND x 4 q's  BACK: tender to palpation along spine at lumbar region   EXT: Warm, well perfused x 4,   NEURO: AxOx3, No FND's noted  SKIN: full thickness wound to bilateral wrists                                            --------------------------------------------------------------   MIKAEL MCDERMOTT 51y Male  MRN#: [id number]   CODE STATUS: full code     Hospital Day: 1d    Pt is currently admitted with the primary diagnosis of vertebral osteomyelitis     SUBJECTIVE    no acute events overnight, pt seen and examined, complaining of severe back pain radiating to legs,                                             ----------------------------------------------------------  OBJECTIVE  PAST MEDICAL & SURGICAL HISTORY  IV drug abuse                                              -----------------------------------------------------------  ALLERGIES:  No Known Allergies                                            ------------------------------------------------------------    HOME MEDICATIONS  Home Medications:  Remeron 30 mg oral tablet: 2 tab(s) orally once a day (at bedtime) (16 Jan 2022 10:02)  SEROquel 200 mg oral tablet: orally 3 times a day (16 Jan 2022 10:02)  Wellbutrin: 300 milligram(s) orally once a day (16 Jan 2022 10:02)  Xanax: 2 milligram(s) orally 3 times a day (16 Jan 2022 10:02)                           MEDICATIONS:  STANDING MEDICATIONS  ALPRAZolam 2 milliGRAM(s) Oral three times a day  buPROPion XL (24-Hour) . 300 milliGRAM(s) Oral daily  enoxaparin Injectable 40 milliGRAM(s) SubCutaneous daily  folic acid 1 milliGRAM(s) Oral daily  influenza   Vaccine 0.5 milliLiter(s) IntraMuscular once  lactated ringers. 1000 milliLiter(s) IV Continuous <Continuous>  mirtazapine 60 milliGRAM(s) Oral at bedtime  multivitamin 1 Tablet(s) Oral daily  pantoprazole    Tablet 40 milliGRAM(s) Oral before breakfast  QUEtiapine 200 milliGRAM(s) Oral three times a day  thiamine 100 milliGRAM(s) Oral daily  vancomycin  IVPB      vancomycin  IVPB 1250 milliGRAM(s) IV Intermittent every 8 hours    PRN MEDICATIONS  acetaminophen     Tablet .. 650 milliGRAM(s) Oral every 6 hours PRN  aluminum hydroxide/magnesium hydroxide/simethicone Suspension 30 milliLiter(s) Oral every 4 hours PRN  ketorolac   Injectable 15 milliGRAM(s) IV Push every 6 hours PRN  LORazepam     Tablet 1 milliGRAM(s) Oral every 2 hours PRN  melatonin 3 milliGRAM(s) Oral at bedtime PRN  morphine  - Injectable 4 milliGRAM(s) IV Push every 4 hours PRN  ondansetron Injectable 4 milliGRAM(s) IV Push every 8 hours PRN                                            ------------------------------------------------------------  VITAL SIGNS: Last 24 Hours  T(C): 37.3 (17 Jan 2022 04:46), Max: 37.6 (16 Jan 2022 20:01)  T(F): 99.2 (17 Jan 2022 04:46), Max: 99.6 (16 Jan 2022 20:01)  HR: 81 (17 Jan 2022 04:46) (81 - 90)  BP: 132/68 (17 Jan 2022 04:46) (121/64 - 144/76)  BP(mean): 100 (16 Jan 2022 20:01) (100 - 100)  RR: 18 (16 Jan 2022 20:01) (18 - 18)  SpO2: --                                             --------------------------------------------------------------  LABS:                        9.6    9.99  )-----------( 319      ( 16 Jan 2022 04:30 )             31.3     01-16    135  |  98  |  15  ----------------------------<  127<H>  3.9   |  21  |  0.7    Ca    8.4<L>      16 Jan 2022 12:30  Phos  2.4     01-16  Mg     1.9     01-16    TPro  7.5  /  Alb  3.2<L>  /  TBili  0.3  /  DBili  <0.2  /  AST  18  /  ALT  15  /  AlkPhos  96  01-16    PT/INR - ( 15 Baljinder 2022 20:00 )   PT: 15.80 sec;   INR: 1.38 ratio         PTT - ( 15 Baljinder 2022 20:00 )  PTT:31.0 sec      Sedimentation Rate, Erythrocyte: 131 mm/Hr *H* (01-16-22 @ 18:19)  Lactate, Blood: 0.9 mmol/L (01-16-22 @ 16:00)        Culture - Blood (collected 15 Baljinder 2022 20:31)  Source: .Blood Blood-Peripheral  Gram Stain (16 Jan 2022 20:40):    Growth in aerobic bottle: Gram Positive Cocci in Clusters    Growth in anaerobic bottle: Gram Positive Cocci in Clusters  Preliminary Report (16 Jan 2022 20:41):    Growth in aerobic bottle: Gram Positive Cocci in Clusters    Growth in anaerobic bottle: Gram Positive Cocci in Clusters    ***Blood Panel PCR results on this specimen are available    approximately 3 hours after the Gram stain result.***    Gram stain, PCR, and/or culture results may not always    correspond due to difference in methodologies.    ************************************************************    This PCR assay was performed by multiplex PCR. This    Assay tests for 66 bacterial and resistance gene targets.    Please refer to the [facility] test directory    at [email or web address] for details.  Organism: Blood Culture PCR (16 Jan 2022 18:10)  Organism: Blood Culture PCR (16 Jan 2022 18:10)    Culture - Blood (collected 15 Baljinder 2022 20:00)  Source: .Blood Blood-Peripheral  Gram Stain (16 Jan 2022 20:41):    Growth in aerobic bottle: Gram Positive Cocci in Clusters    Growth in anaerobic bottle: Gram Positive Cocci in Clusters  Preliminary Report (16 Jan 2022 20:41):    Growth in aerobic bottle: Gram Positive Cocci in Clusters    Growth in anaerobic bottle: Gram Positive Cocci in Clusters        CARDIAC MARKERS ( 15 Baljinder 2022 20:00 )  x     / x     / 63 U/L / x     / x                                                  -------------------------------------------------------------  RADIOLOGY:    ACC: [id number] EXAM:  CT BRAIN                          PROCEDURE DATE:  01/15/2022          INTERPRETATION:  Clinical History / Reason for exam: Trauma code    Technique: Noncontrast head CT.  Contiguous unenhanced CT axial images of   the head from the base to the vertex.    Comparison:  CT head dated 4/2/2010    Findings:    The ventricles and cortical sulci pattern are age-appropriate.    Gray-white matter differentiation is maintained.    There is no acute intracranial hemorrhage, extra-axial fluid collection   or midline shift.    No acute osseous abnormality/depressed skull fracture is identified.    The visualized paranasal sinuses are well aerated.    The mastoids are well-aerated.    IMPRESSION:    No CT evidence for acute intracranial pathology.      ACC: [id number] EXAM:  CT CERVICAL SPINE                          PROCEDURE DATE:  01/15/2022          INTERPRETATION:  CLINICAL STATEMENT: Trauma code    TECHNIQUE:  Contiguous unenhanced CT axial images of the cervical spine   with coronal and sagittal re-formations.    COMPARISON: None available    FINDINGS:    There is straightening of the normal cervical lordosis, which may be on   the basis of pain, muscle spasm, positioning or a combination of the   above.    There is no evidence of acute fracture or facet subluxation.    No significant prevertebral soft tissue swelling.    There is complete loss of disc height with bony fusion at C5-C6. There   are multilevel degenerative changes without definitive severe central   canal or neuroforaminal stenosis.      IMPRESSION:    No acute fracture or subluxation of the cervical spine.      ACC: [id number] EXAM:  CT ABDOMEN AND PELVIS IC                        ACC: [id number] EXAM:  CT CHEST IC                          PROCEDURE DATE:  01/15/2022          INTERPRETATION:  CLINICAL STATEMENT: Trauma code    TECHNIQUE: Contiguous CT images were obtained of the chest, abdomen and   pelvis.    Intravenous Contrast:  Intravenous contrast administered.  100 cc Omnipaque 350 intravenous contrast was administered. 0 cc   discarded.  Oral contrast: was not administered.      COMPARISON:  None    FINDINGS:    CHEST:    LUNGS, PLEURA AND AIRWAYS: Motion degraded evaluation of parenchymal   detail. Paraseptal emphysematous changes. Bilateral subpleural reticular   opacities and subsegmental atelectatic changes. No evidence for focal   consolidation, pleural effusion or pneumothorax. Central airways are   patent.    HEART AND VESSELS: Heart size appears within normal limits. No   pericardial effusion is present. Normal caliber thoracic aorta. Main   pulmonary artery measures upper limits of normal at 3.2 cm.    THORACIC INLET/MEDIASTINUM/LYMPH NODES: The visualized portion of the   thyroid gland is unremarkable. There are no enlarged thoracic lymph nodes.    ABDOMEN/PELVIS:    LIVER: Unremarkable.    SPLEEN: Unremarkable.    PANCREAS: Unremarkable.    GALLBLADDER AND BILIARY TREE: Unremarkable CT appearance of the   gallbladder. No biliary ductal dilatation.    ADRENALS: Unremarkable.    KIDNEYS: Symmetric pattern of renal enhancement. No hydronephrosis.    LYMPH NODES: There are no enlarged abdominal or pelvic lymph nodes.    VASCULATURE: The abdominal aorta is normal in caliber.    BOWEL: No evidence for bowel obstruction or bowel wall thickening.   Unremarkable appendix.    PERITONEUM/RETROPERITONEUM/MESENTERY: There is no ascites or   pneumoperitoneum.    PELVIC VISCERA: Unremarkable.    BONES AND SOFT TISSUES: No definite acute fractures identified. There is   an osseous destructive process at the left L5-S1 facet joints with   infiltrative changes extending to the adjacent spinal canal and neural   foramen. There is spinal canal stenosis at L4-L5 and L5-S1. Additional   erosive changes are noted at the left T10-T11 facet joints.      IMPRESSION:    Definitive acute traumatic injury is not identified to the chest, abdomen   or pelvis.    There is an osseous destructive process at the left L5-S1 facet joints   with infiltrative changes extending to the adjacent spinal canal and   neural foramina and associated stenosis. Per notes, patient with   polysubstance abuse. Consider osteomyelitis. Underlying mass is not   excluded. Additional erosive changes noted at the left T10-T11 facet   joint. Further evaluation with MRI can be obtained as needed.        ACC: [id number] EXAM:  XR WRIST COMP MIN 3 VIEWS BI                          PROCEDURE DATE:  01/15/2022          INTERPRETATION:  CLINICAL INDICATION:necrotic tissue, bone exposed    TECHNIQUE: 2 views of the right wrist. 2 views of the left wrist. One   image is provided with indeterminate laterality.    COMPARISON: No direct comparison available    FINDINGS:  There is no evidence of acute fracture or dislocation. Severe apparent   complete soft tissue defect at the distal dorsal wrist.    IMPRESSION:  No evidence of acute fracture or dislocation.    Severe apparent complete soft tissue defect at the distal dorsal wrist.    1.2 cm linear radiodensity within the anterior soft tissue of the right   forearm suspicious for foreign body.        ACC: [id number] EXAM:  XR FOREARM  2 VIEWS BI                          PROCEDURE DATE:  01/15/2022          INTERPRETATION:  CLINICAL INDICATION:necrotic tissue, bone exposed    TECHNIQUE: 2 views of the right forearm. 2 views of the left forearm.    COMPARISON: No direct comparison available    FINDINGS:  There is no evidence of acute fracture or dislocation. Severe apparent   complete soft tissue defect at the distal dorsal wrist bilaterally.    IMPRESSION:  No evidence of acute fracture or dislocation.    Severe apparent complete soft tissue defect at the distal dorsal wrist   bilaterally.    1.2 cm linear radiodensity within the anterior soft tissue of the left   forearm suspicious for foreign body.    --- End of Report ---    SOLEDAD HANNON MD; Attending Radiologist  This document has been electronically signed. Jan 16 2022  6:19PM      ACC: [id number] EXAM:  XR HAND 2 VIEWS LT                          PROCEDURE DATE:  01/16/2022          INTERPRETATION:  CLINICAL INDICATION:trauma    TECHNIQUE: 3 views of the left hand.    COMPARISON: No direct comparison available    FINDINGS:  There is no evidence of acute fracture or dislocation of the left hand.    IMPRESSION:  No evidence of acute fracture or dislocation of the left hand.        ACC: [id number] EXAM:  XR FOOT 2 VIEWS LT                          PROCEDURE DATE:  01/16/2022          INTERPRETATION:  CLINICAL HISTORY / REASON FOR EXAM: Pain    COMPARISON: None    TECHNIQUE: Three views, left foot radiographs    FINDINGS /  IMPRESSION:    No acute displaced fracture. Moderate midfoot degenerative changes.   Achilles tendon insertional enthesophyte measuring 1.1 cm.        ACC: [id number] EXAM:  XR FOREARM  2 VIEWS BI                          PROCEDURE DATE:  01/16/2022          INTERPRETATION:  CLINICAL HISTORY / REASON FOR EXAM: Foreign body    COMPARISON: Forearm radiographs from January 15, 2022    TECHNIQUE: Four views, bilateral forearm radiographs    FINDINGS:    RIGHT FOREARM: No acute displaced fracture. Peripheral IV within the   antecubital soft tissues. 1.2 cm linear foreign body within the soft   tissues overlying the radius.    LEFT FOREARM: No acute displaced fracture. Peripheral IV within the   antecubital soft tissues. Previously seen radiopaque foreign bodies no   longer visualized. Left distal forearm soft tissue defect.      IMPRESSION:    No acute displaced fracture.    1.2 cm linear foreign body within the soft tissues overlying the right   radius.    Previously seen left forearm foreign body is no longer visualized.                                                            --------------------------------------------------------------    PHYSICAL EXAM:  General: disheveled appearance, uncomfortable   LUNGS: air entry bilat  HEART: RRR, +S1,S2,  ABDOMEN: SNTTP, ND x 4 q's  BACK: tender to palpation along spine at lumbar region   EXT: Warm, well perfused x 4,   NEURO: AxOx3, No FND's noted  SKIN: full thickness wound to bilateral wrists                                            --------------------------------------------------------------

## 2022-01-18 LAB
-  AMPICILLIN/SULBACTAM: SIGNIFICANT CHANGE UP
-  CEFAZOLIN: SIGNIFICANT CHANGE UP
-  CLINDAMYCIN: SIGNIFICANT CHANGE UP
-  ERYTHROMYCIN: SIGNIFICANT CHANGE UP
-  GENTAMICIN: SIGNIFICANT CHANGE UP
-  OXACILLIN: SIGNIFICANT CHANGE UP
-  PENICILLIN: SIGNIFICANT CHANGE UP
-  RIFAMPIN: SIGNIFICANT CHANGE UP
-  TETRACYCLINE: SIGNIFICANT CHANGE UP
-  TRIMETHOPRIM/SULFAMETHOXAZOLE: SIGNIFICANT CHANGE UP
-  VANCOMYCIN: SIGNIFICANT CHANGE UP
ALBUMIN SERPL ELPH-MCNC: 3 G/DL — LOW (ref 3.5–5.2)
ALP SERPL-CCNC: 100 U/L — SIGNIFICANT CHANGE UP (ref 30–115)
ALT FLD-CCNC: 35 U/L — SIGNIFICANT CHANGE UP (ref 0–41)
ANION GAP SERPL CALC-SCNC: 14 MMOL/L — SIGNIFICANT CHANGE UP (ref 7–14)
AST SERPL-CCNC: 26 U/L — SIGNIFICANT CHANGE UP (ref 0–41)
BASOPHILS # BLD AUTO: 0.02 K/UL — SIGNIFICANT CHANGE UP (ref 0–0.2)
BASOPHILS NFR BLD AUTO: 0.2 % — SIGNIFICANT CHANGE UP (ref 0–1)
BILIRUB SERPL-MCNC: 0.3 MG/DL — SIGNIFICANT CHANGE UP (ref 0.2–1.2)
BUN SERPL-MCNC: 16 MG/DL — SIGNIFICANT CHANGE UP (ref 10–20)
CALCIUM SERPL-MCNC: 8.2 MG/DL — LOW (ref 8.5–10.1)
CHLORIDE SERPL-SCNC: 101 MMOL/L — SIGNIFICANT CHANGE UP (ref 98–110)
CO2 SERPL-SCNC: 24 MMOL/L — SIGNIFICANT CHANGE UP (ref 17–32)
CREAT SERPL-MCNC: 0.9 MG/DL — SIGNIFICANT CHANGE UP (ref 0.7–1.5)
CULTURE RESULTS: SIGNIFICANT CHANGE UP
CULTURE RESULTS: SIGNIFICANT CHANGE UP
EOSINOPHIL # BLD AUTO: 0.07 K/UL — SIGNIFICANT CHANGE UP (ref 0–0.7)
EOSINOPHIL NFR BLD AUTO: 0.9 % — SIGNIFICANT CHANGE UP (ref 0–8)
GLUCOSE SERPL-MCNC: 142 MG/DL — HIGH (ref 70–99)
GRAM STN FLD: SIGNIFICANT CHANGE UP
GRAM STN FLD: SIGNIFICANT CHANGE UP
HAV IGM SER-ACNC: SIGNIFICANT CHANGE UP
HBV CORE IGM SER-ACNC: SIGNIFICANT CHANGE UP
HBV SURFACE AG SER-ACNC: SIGNIFICANT CHANGE UP
HCT VFR BLD CALC: 31.3 % — LOW (ref 42–52)
HCV AB S/CO SERPL IA: 2.41 S/CO — HIGH (ref 0–0.99)
HCV AB SERPL-IMP: ABNORMAL
HGB BLD-MCNC: 9.7 G/DL — LOW (ref 14–18)
HIV 1+2 AB+HIV1 P24 AG SERPL QL IA: SIGNIFICANT CHANGE UP
IMM GRANULOCYTES NFR BLD AUTO: 0.2 % — SIGNIFICANT CHANGE UP (ref 0.1–0.3)
LYMPHOCYTES # BLD AUTO: 1.47 K/UL — SIGNIFICANT CHANGE UP (ref 1.2–3.4)
LYMPHOCYTES # BLD AUTO: 17.9 % — LOW (ref 20.5–51.1)
MAGNESIUM SERPL-MCNC: 1.9 MG/DL — SIGNIFICANT CHANGE UP (ref 1.8–2.4)
MCHC RBC-ENTMCNC: 25.5 PG — LOW (ref 27–31)
MCHC RBC-ENTMCNC: 31 G/DL — LOW (ref 32–37)
MCV RBC AUTO: 82.2 FL — SIGNIFICANT CHANGE UP (ref 80–94)
METHOD TYPE: SIGNIFICANT CHANGE UP
MONOCYTES # BLD AUTO: 0.68 K/UL — HIGH (ref 0.1–0.6)
MONOCYTES NFR BLD AUTO: 8.3 % — SIGNIFICANT CHANGE UP (ref 1.7–9.3)
NEUTROPHILS # BLD AUTO: 5.94 K/UL — SIGNIFICANT CHANGE UP (ref 1.4–6.5)
NEUTROPHILS NFR BLD AUTO: 72.5 % — SIGNIFICANT CHANGE UP (ref 42.2–75.2)
NRBC # BLD: 0 /100 WBCS — SIGNIFICANT CHANGE UP (ref 0–0)
ORGANISM # SPEC MICROSCOPIC CNT: SIGNIFICANT CHANGE UP
PLATELET # BLD AUTO: 295 K/UL — SIGNIFICANT CHANGE UP (ref 130–400)
POTASSIUM SERPL-MCNC: 3.7 MMOL/L — SIGNIFICANT CHANGE UP (ref 3.5–5)
POTASSIUM SERPL-SCNC: 3.7 MMOL/L — SIGNIFICANT CHANGE UP (ref 3.5–5)
PROT SERPL-MCNC: 7.1 G/DL — SIGNIFICANT CHANGE UP (ref 6–8)
RBC # BLD: 3.81 M/UL — LOW (ref 4.7–6.1)
RBC # FLD: 15.9 % — HIGH (ref 11.5–14.5)
SARS-COV-2 RNA SPEC QL NAA+PROBE: SIGNIFICANT CHANGE UP
SODIUM SERPL-SCNC: 139 MMOL/L — SIGNIFICANT CHANGE UP (ref 135–146)
SPECIMEN SOURCE: SIGNIFICANT CHANGE UP
WBC # BLD: 8.2 K/UL — SIGNIFICANT CHANGE UP (ref 4.8–10.8)
WBC # FLD AUTO: 8.2 K/UL — SIGNIFICANT CHANGE UP (ref 4.8–10.8)

## 2022-01-18 PROCEDURE — 72158 MRI LUMBAR SPINE W/O & W/DYE: CPT | Mod: 26

## 2022-01-18 PROCEDURE — 72157 MRI CHEST SPINE W/O & W/DYE: CPT | Mod: 26

## 2022-01-18 PROCEDURE — 93306 TTE W/DOPPLER COMPLETE: CPT | Mod: 26

## 2022-01-18 PROCEDURE — 99233 SBSQ HOSP IP/OBS HIGH 50: CPT

## 2022-01-18 RX ORDER — MAGNESIUM SULFATE 500 MG/ML
1 VIAL (ML) INJECTION ONCE
Refills: 0 | Status: COMPLETED | OUTPATIENT
Start: 2022-01-18 | End: 2022-01-18

## 2022-01-18 RX ORDER — POLYETHYLENE GLYCOL 3350 17 G/17G
17 POWDER, FOR SOLUTION ORAL
Refills: 0 | Status: DISCONTINUED | OUTPATIENT
Start: 2022-01-18 | End: 2022-02-02

## 2022-01-18 RX ORDER — SENNA PLUS 8.6 MG/1
2 TABLET ORAL AT BEDTIME
Refills: 0 | Status: DISCONTINUED | OUTPATIENT
Start: 2022-01-18 | End: 2022-02-02

## 2022-01-18 RX ADMIN — MIRTAZAPINE 60 MILLIGRAM(S): 45 TABLET, ORALLY DISINTEGRATING ORAL at 21:14

## 2022-01-18 RX ADMIN — CYCLOBENZAPRINE HYDROCHLORIDE 10 MILLIGRAM(S): 10 TABLET, FILM COATED ORAL at 13:18

## 2022-01-18 RX ADMIN — Medication 1 MILLIGRAM(S): at 11:50

## 2022-01-18 RX ADMIN — QUETIAPINE FUMARATE 200 MILLIGRAM(S): 200 TABLET, FILM COATED ORAL at 06:16

## 2022-01-18 RX ADMIN — METHADONE HYDROCHLORIDE 40 MILLIGRAM(S): 40 TABLET ORAL at 06:16

## 2022-01-18 RX ADMIN — METHADONE HYDROCHLORIDE 40 MILLIGRAM(S): 40 TABLET ORAL at 13:19

## 2022-01-18 RX ADMIN — NAFCILLIN 200 GRAM(S): 10 INJECTION, POWDER, FOR SOLUTION INTRAVENOUS at 21:15

## 2022-01-18 RX ADMIN — NAFCILLIN 200 GRAM(S): 10 INJECTION, POWDER, FOR SOLUTION INTRAVENOUS at 01:35

## 2022-01-18 RX ADMIN — Medication 2 MILLIGRAM(S): at 13:19

## 2022-01-18 RX ADMIN — POLYETHYLENE GLYCOL 3350 17 GRAM(S): 17 POWDER, FOR SOLUTION ORAL at 10:14

## 2022-01-18 RX ADMIN — QUETIAPINE FUMARATE 200 MILLIGRAM(S): 200 TABLET, FILM COATED ORAL at 13:18

## 2022-01-18 RX ADMIN — NAFCILLIN 200 GRAM(S): 10 INJECTION, POWDER, FOR SOLUTION INTRAVENOUS at 18:59

## 2022-01-18 RX ADMIN — POLYETHYLENE GLYCOL 3350 17 GRAM(S): 17 POWDER, FOR SOLUTION ORAL at 18:19

## 2022-01-18 RX ADMIN — Medication 100 GRAM(S): at 11:51

## 2022-01-18 RX ADMIN — Medication 325 MILLIGRAM(S): at 11:50

## 2022-01-18 RX ADMIN — CYCLOBENZAPRINE HYDROCHLORIDE 10 MILLIGRAM(S): 10 TABLET, FILM COATED ORAL at 06:17

## 2022-01-18 RX ADMIN — NAFCILLIN 200 GRAM(S): 10 INJECTION, POWDER, FOR SOLUTION INTRAVENOUS at 13:19

## 2022-01-18 RX ADMIN — Medication 650 MILLIGRAM(S): at 18:19

## 2022-01-18 RX ADMIN — Medication 2 MILLIGRAM(S): at 21:13

## 2022-01-18 RX ADMIN — NAFCILLIN 200 GRAM(S): 10 INJECTION, POWDER, FOR SOLUTION INTRAVENOUS at 06:16

## 2022-01-18 RX ADMIN — CYCLOBENZAPRINE HYDROCHLORIDE 10 MILLIGRAM(S): 10 TABLET, FILM COATED ORAL at 21:15

## 2022-01-18 RX ADMIN — PANTOPRAZOLE SODIUM 40 MILLIGRAM(S): 20 TABLET, DELAYED RELEASE ORAL at 06:16

## 2022-01-18 RX ADMIN — GABAPENTIN 300 MILLIGRAM(S): 400 CAPSULE ORAL at 06:16

## 2022-01-18 RX ADMIN — GABAPENTIN 300 MILLIGRAM(S): 400 CAPSULE ORAL at 13:18

## 2022-01-18 RX ADMIN — Medication 650 MILLIGRAM(S): at 11:49

## 2022-01-18 RX ADMIN — Medication 650 MILLIGRAM(S): at 19:00

## 2022-01-18 RX ADMIN — Medication 650 MILLIGRAM(S): at 12:30

## 2022-01-18 RX ADMIN — NAFCILLIN 200 GRAM(S): 10 INJECTION, POWDER, FOR SOLUTION INTRAVENOUS at 10:13

## 2022-01-18 RX ADMIN — Medication 100 MILLIGRAM(S): at 11:50

## 2022-01-18 RX ADMIN — METHADONE HYDROCHLORIDE 40 MILLIGRAM(S): 40 TABLET ORAL at 21:13

## 2022-01-18 RX ADMIN — SENNA PLUS 2 TABLET(S): 8.6 TABLET ORAL at 21:13

## 2022-01-18 RX ADMIN — Medication 2 MILLIGRAM(S): at 06:16

## 2022-01-18 RX ADMIN — QUETIAPINE FUMARATE 200 MILLIGRAM(S): 200 TABLET, FILM COATED ORAL at 21:14

## 2022-01-18 RX ADMIN — Medication 650 MILLIGRAM(S): at 06:17

## 2022-01-18 RX ADMIN — GABAPENTIN 300 MILLIGRAM(S): 400 CAPSULE ORAL at 21:12

## 2022-01-18 RX ADMIN — ENOXAPARIN SODIUM 40 MILLIGRAM(S): 100 INJECTION SUBCUTANEOUS at 11:49

## 2022-01-18 RX ADMIN — BUPROPION HYDROCHLORIDE 300 MILLIGRAM(S): 150 TABLET, EXTENDED RELEASE ORAL at 11:50

## 2022-01-18 RX ADMIN — Medication 1 TABLET(S): at 11:50

## 2022-01-18 RX ADMIN — Medication 650 MILLIGRAM(S): at 23:15

## 2022-01-18 NOTE — CHART NOTE - NSCHARTNOTEFT_GEN_A_CORE
Called by radiologist regarding findings on MRI spine. Called neurosurgery to notify of findings, they will discuss with attending    IMPRESSION:    THORACIC SPINE:  Motion limited examination.    Minimally increased signal and enhancement involving the left T10-T11   facet joint space which may be related to degenerative change though an   early facetitis only and cannot be entirely excluded.    LUMBAR SPINE:  Findings consistent with left L5-S1 facetitis with associated multifocal   abscess formation directly adjacent to the left facet joint, within the   left retroperitoneal soft tissues as well as the right psoas musculature.    Ventral epidural phlegmon overlying the L5 vertebral body causing   significant compression of the descending nerve roots.

## 2022-01-18 NOTE — PROGRESS NOTE ADULT - SUBJECTIVE AND OBJECTIVE BOX
MIKAEL MCDERMOTT 51y Male  MRN#: [id number]   CODE STATUS: full code     Hospital Day: 2d    Pt is currently admitted with the primary diagnosis of vertebral osteomyelitis     SUBJECTIVE    no acute events overnight, pt seen and examined,   pain controlled, no fever chills, nvd abdominal pain   no chest pain, sob                                             ----------------------------------------------------------  OBJECTIVE  PAST MEDICAL & SURGICAL HISTORY  IV drug abuse                                              -----------------------------------------------------------  ALLERGIES:  No Known Allergies                                            ------------------------------------------------------------    HOME MEDICATIONS  Home Medications:  Remeron 30 mg oral tablet: 2 tab(s) orally once a day (at bedtime) (16 Jan 2022 10:02)  SEROquel 200 mg oral tablet: orally 3 times a day (16 Jan 2022 10:02)  Wellbutrin: 300 milligram(s) orally once a day (16 Jan 2022 10:02)  Xanax: 2 milligram(s) orally 3 times a day (16 Jan 2022 10:02)                           MEDICATIONS:  STANDING MEDICATIONS  acetaminophen     Tablet .. 650 milliGRAM(s) Oral every 6 hours  ALPRAZolam 2 milliGRAM(s) Oral three times a day  buPROPion XL (24-Hour) . 300 milliGRAM(s) Oral daily  cyclobenzaprine 10 milliGRAM(s) Oral every 8 hours  enoxaparin Injectable 40 milliGRAM(s) SubCutaneous daily  ferrous    sulfate 325 milliGRAM(s) Oral daily  folic acid 1 milliGRAM(s) Oral daily  gabapentin 300 milliGRAM(s) Oral three times a day  influenza   Vaccine 0.5 milliLiter(s) IntraMuscular once  lactated ringers. 1000 milliLiter(s) IV Continuous <Continuous>  methadone    Tablet 40 milliGRAM(s) Oral every 8 hours  mirtazapine 60 milliGRAM(s) Oral at bedtime  multivitamin 1 Tablet(s) Oral daily  nafcillin  IVPB 2 Gram(s) IV Intermittent every 4 hours  nafcillin  IVPB      pantoprazole    Tablet 40 milliGRAM(s) Oral before breakfast  polyethylene glycol 3350 17 Gram(s) Oral two times a day  QUEtiapine 200 milliGRAM(s) Oral three times a day  senna 2 Tablet(s) Oral at bedtime  thiamine 100 milliGRAM(s) Oral daily    PRN MEDICATIONS  aluminum hydroxide/magnesium hydroxide/simethicone Suspension 30 milliLiter(s) Oral every 4 hours PRN  HYDROmorphone   Tablet 6 milliGRAM(s) Oral every 4 hours PRN  ketorolac   Injectable 15 milliGRAM(s) IV Push every 6 hours PRN  LORazepam     Tablet 1 milliGRAM(s) Oral every 2 hours PRN  melatonin 3 milliGRAM(s) Oral at bedtime PRN  ondansetron Injectable 4 milliGRAM(s) IV Push every 8 hours PRN                                            ------------------------------------------------------------  VITAL SIGNS: Last 24 Hours  T(C): 37.8 (18 Jan 2022 04:56), Max: 37.8 (18 Jan 2022 04:56)  T(F): 100 (18 Jan 2022 04:56), Max: 100 (18 Jan 2022 04:56)  HR: 85 (18 Jan 2022 04:56) (85 - 85)  BP: 137/76 (18 Jan 2022 04:56) (137/76 - 137/76)  BP(mean): --  RR: 18 (18 Jan 2022 04:56) (18 - 18)  SpO2: --      01-17-22 @ 07:01  -  01-18-22 @ 07:00  --------------------------------------------------------  IN: 0 mL / OUT: 2425 mL / NET: -2425 mL                                             --------------------------------------------------------------  LABS:                        9.7    8.20  )-----------( 295      ( 18 Jan 2022 04:30 )             31.3     01-18    139  |  101  |  16  ----------------------------<  142<H>  3.7   |  24  |  0.9    Ca    8.2<L>      18 Jan 2022 04:30  Phos  2.4     01-16  Mg     1.9     01-18    TPro  7.1  /  Alb  3.0<L>  /  TBili  0.3  /  DBili  x   /  AST  26  /  ALT  35  /  AlkPhos  100  01-18    PT/INR - ( 17 Jan 2022 11:00 )   PT: CLOTTED sec;   INR: CLOTTED ratio         PTT - ( 17 Jan 2022 11:00 )  PTT:TNP sec            Culture - Blood (collected 17 Jan 2022 11:00)  Source: .Blood Blood  Gram Stain (18 Jan 2022 09:25):    Growth in aerobic bottle: Gram Positive Cocci in Clusters  Preliminary Report (18 Jan 2022 09:25):    Growth in aerobic bottle: Gram Positive Cocci in Clusters    Culture - Blood (collected 15 Baljinder 2022 20:31)  Source: .Blood Blood-Peripheral  Gram Stain (16 Jan 2022 20:40):    Growth in aerobic bottle: Gram Positive Cocci in Clusters    Growth in anaerobic bottle: Gram Positive Cocci in Clusters  Preliminary Report (17 Jan 2022 18:16):    Growth in aerobic and anaerobic bottles: Staphylococcus aureus    ***Blood Panel PCR results on this specimen are available    approximately 3 hours after the Gram stain result.***    Gram stain, PCR, and/or culture results may not always    correspond dueto difference in methodologies.    ************************************************************    This PCR assay was performed by multiplex PCR. This    Assay tests for 66 bacterial and resistance gene targets.    Please refer to the [facility] test directory    at [email or web address] for details.  Organism: Blood Culture PCR (16 Jan 2022 18:10)  Organism: Blood Culture PCR (16 Jan 2022 18:10)    Culture - Blood (collected 15 Baljinder 2022 20:00)  Source: .Blood Blood-Peripheral  Gram Stain (16 Jan 2022 20:41):    Growth in aerobic bottle: Gram Positive Cocci in Clusters    Growth in anaerobic bottle: Gram Positive Cocci in Clusters  Preliminary Report (17 Jan 2022 19:39):    Growth in aerobic and anaerobic bottles: Staphylococcus aureus    See previous culture [id number]                                                    -------------------------------------------------------------  RADIOLOGY:  ACC: [id number] EXAM:  CT BRAIN                          PROCEDURE DATE:  01/15/2022          INTERPRETATION:  Clinical History / Reason for exam: Trauma code    Technique: Noncontrast head CT.  Contiguous unenhanced CT axial images of   the head from the base to the vertex.    Comparison:  CT head dated 4/2/2010    Findings:    The ventricles and cortical sulci pattern are age-appropriate.    Gray-white matter differentiation is maintained.    There is no acute intracranial hemorrhage, extra-axial fluid collection   or midline shift.    No acute osseous abnormality/depressed skull fracture is identified.    The visualized paranasal sinuses are well aerated.    The mastoids are well-aerated.    IMPRESSION:    No CT evidence for acute intracranial pathology.      ACC: [id number] EXAM:  CT CERVICAL SPINE                          PROCEDURE DATE:  01/15/2022          INTERPRETATION:  CLINICAL STATEMENT: Trauma code    TECHNIQUE:  Contiguous unenhanced CT axial images of the cervical spine   with coronal and sagittal re-formations.    COMPARISON: None available    FINDINGS:    There is straightening of the normal cervical lordosis, which may be on   the basis of pain, muscle spasm, positioning or a combination of the   above.    There is no evidence of acute fracture or facet subluxation.    No significant prevertebral soft tissue swelling.    There is complete loss of disc height with bony fusion at C5-C6. There   are multilevel degenerative changes without definitive severe central   canal or neuroforaminal stenosis.      IMPRESSION:    No acute fracture or subluxation of the cervical spine.      ACC: [id number] EXAM:  CT ABDOMEN AND PELVIS IC                        ACC: [id number] EXAM:  CT CHEST IC                          PROCEDURE DATE:  01/15/2022          INTERPRETATION:  CLINICAL STATEMENT: Trauma code    TECHNIQUE: Contiguous CT images were obtained of the chest, abdomen and   pelvis.    Intravenous Contrast:  Intravenous contrast administered.  100 cc Omnipaque 350 intravenous contrast was administered. 0 cc   discarded.  Oral contrast: was not administered.      COMPARISON:  None    FINDINGS:    CHEST:    LUNGS, PLEURA AND AIRWAYS: Motion degraded evaluation of parenchymal   detail. Paraseptal emphysematous changes. Bilateral subpleural reticular   opacities and subsegmental atelectatic changes. No evidence for focal   consolidation, pleural effusion or pneumothorax. Central airways are   patent.    HEART AND VESSELS: Heart size appears within normal limits. No   pericardial effusion is present. Normal caliber thoracic aorta. Main   pulmonary artery measures upper limits of normal at 3.2 cm.    THORACIC INLET/MEDIASTINUM/LYMPH NODES: The visualized portion of the   thyroid gland is unremarkable. There are no enlarged thoracic lymph nodes.    ABDOMEN/PELVIS:    LIVER: Unremarkable.    SPLEEN: Unremarkable.    PANCREAS: Unremarkable.    GALLBLADDER AND BILIARY TREE: Unremarkable CT appearance of the   gallbladder. No biliary ductal dilatation.    ADRENALS: Unremarkable.    KIDNEYS: Symmetric pattern of renal enhancement. No hydronephrosis.    LYMPH NODES: There are no enlarged abdominal or pelvic lymph nodes.    VASCULATURE: The abdominal aorta is normal in caliber.    BOWEL: No evidence for bowel obstruction or bowel wall thickening.   Unremarkable appendix.    PERITONEUM/RETROPERITONEUM/MESENTERY: There is no ascites or   pneumoperitoneum.    PELVIC VISCERA: Unremarkable.    BONES AND SOFT TISSUES: No definite acute fractures identified. There is   an osseous destructive process at the left L5-S1 facet joints with   infiltrative changes extending to the adjacent spinal canal and neural   foramen. There is spinal canal stenosis at L4-L5 and L5-S1. Additional   erosive changes are noted at the left T10-T11 facet joints.      IMPRESSION:    Definitive acute traumatic injury is not identified to the chest, abdomen   or pelvis.    There is an osseous destructive process at the left L5-S1 facet joints   with infiltrative changes extending to the adjacent spinal canal and   neural foramina and associated stenosis. Per notes, patient with   polysubstance abuse. Consider osteomyelitis. Underlying mass is not   excluded. Additional erosive changes noted at the left T10-T11 facet   joint. Further evaluation with MRI can be obtained as needed.        ACC: [id number] EXAM:  XR WRIST COMP MIN 3 VIEWS BI                          PROCEDURE DATE:  01/15/2022          INTERPRETATION:  CLINICAL INDICATION:necrotic tissue, bone exposed    TECHNIQUE: 2 views of the right wrist. 2 views of the left wrist. One   image is provided with indeterminate laterality.    COMPARISON: No direct comparison available    FINDINGS:  There is no evidence of acute fracture or dislocation. Severe apparent   complete soft tissue defect at the distal dorsal wrist.    IMPRESSION:  No evidence of acute fracture or dislocation.    Severe apparent complete soft tissue defect at the distal dorsal wrist.    1.2 cm linear radiodensity within the anterior soft tissue of the right   forearm suspicious for foreign body.        ACC: [id number] EXAM:  XR FOREARM  2 VIEWS BI                          PROCEDURE DATE:  01/15/2022          INTERPRETATION:  CLINICAL INDICATION:necrotic tissue, bone exposed    TECHNIQUE: 2 views of the right forearm. 2 views of the left forearm.    COMPARISON: No direct comparison available    FINDINGS:  There is no evidence of acute fracture or dislocation. Severe apparent   complete soft tissue defect at the distal dorsal wrist bilaterally.    IMPRESSION:  No evidence of acute fracture or dislocation.    Severe apparent complete soft tissue defect at the distal dorsal wrist   bilaterally.    1.2 cm linear radiodensity within the anterior soft tissue of the left   forearm suspicious for foreign body.    --- End of Report ---    SOLEDAD HANNON MD; Attending Radiologist  This document has been electronically signed. Jan 16 2022  6:19PM      ACC: [id number] EXAM:  XR HAND 2 VIEWS LT                          PROCEDURE DATE:  01/16/2022          INTERPRETATION:  CLINICAL INDICATION:trauma    TECHNIQUE: 3 views of the left hand.    COMPARISON: No direct comparison available    FINDINGS:  There is no evidence of acute fracture or dislocation of the left hand.    IMPRESSION:  No evidence of acute fracture or dislocation of the left hand.        ACC: [id number] EXAM:  XR FOOT 2 VIEWS LT                          PROCEDURE DATE:  01/16/2022          INTERPRETATION:  CLINICAL HISTORY / REASON FOR EXAM: Pain    COMPARISON: None    TECHNIQUE: Three views, left foot radiographs    FINDINGS /  IMPRESSION:    No acute displaced fracture. Moderate midfoot degenerative changes.   Achilles tendon insertional enthesophyte measuring 1.1 cm.        ACC: [id number] EXAM:  XR FOREARM  2 VIEWS BI                          PROCEDURE DATE:  01/16/2022          INTERPRETATION:  CLINICAL HISTORY / REASON FOR EXAM: Foreign body    COMPARISON: Forearm radiographs from January 15, 2022    TECHNIQUE: Four views, bilateral forearm radiographs    FINDINGS:    RIGHT FOREARM: No acute displaced fracture. Peripheral IV within the   antecubital soft tissues. 1.2 cm linear foreign body within the soft   tissues overlying the radius.    LEFT FOREARM: No acute displaced fracture. Peripheral IV within the   antecubital soft tissues. Previously seen radiopaque foreign bodies no   longer visualized. Left distal forearm soft tissue defect.      IMPRESSION:    No acute displaced fracture.    1.2 cm linear foreign body within the soft tissues overlying the right   radius.    Previously seen left forearm foreign body is no longer visualized.                                         --------------------------------------------------------------    PHYSICAL EXAM:  General: not in acute distress  LUNGS: air entry bilat  HEART: RRR,  ABDOMEN: SNTTP, ND x 4 q's  BACK: tender to palpation along spine lower lumbar region   EXT: Warm, well perfused x 4  NEURO: AxOx3, No FND's noted  SKIN: bilateral skin breakdown at wrists                                            --------------------------------------------------------------

## 2022-01-18 NOTE — CONSULT NOTE ADULT - ASSESSMENT
ASSESSMENT:  51yM w/ PMHx of  polysubstance abuse who presented with sciatic pain. Physical exam findings, imaging, and labs as documented above.     PLAN:  -no plan to remove foreign body currently be difficult to find, high potential to be unsuccessful and the foreign body is not actively causing an issue.  -although there is concern for osteomyelitis and the foreign body is preventing MRI, recommend nuclear bone scan to rule out osteomyelitis. If negative, then no further workup. If positive, will revisit removal of foreign body    Lines/Tubes: PIV    Above plan discussed with Attending Surgeon Dr. Sands  , patient, and Primary team  01-17-22 @ 11:56
ASSESSMENT  51-year-old male past medical history of polysubstance abuse who presents status post fall.      IMPRESSION  #s/p Fall    #MSSA Bacteremia  - BLood Cx 1/15 +    #Spinal OM, rule out epidural Abscess  - CT Abdomen and Pelvis w/ IV Cont (01.15.22 @ 22:24):vThere is an osseous destructive process at the left L5-S1 facet joints vwith infiltrative changes extending to the adjacent spinal canal and  neural foramina and associated stenosis. Per notes, patient with vpolysubstance abuse. Consider osteomyelitis. Underlying mass is not excluded. Additional erosive changes noted at the left T10-T11 facet vjoint. Further evaluation with MRI can be obtained as needed.    #IVDA  #Opioid abuse on Methadone   #Bilateral Wrist wounds - s/p Burn evaluation 1/16    #Abx allergy: NKDA      RECOMMENDATIONS  - stop vancomycin + cefepime  - switch to nafcillin 2g q 4 hours  - appreciate surgery evaluation - unable to find foreign body -- he likely has OM, but MRI preferred to evaluate for epidural abscess and any cord involvement   - TTE  - repeat blood cx daily for surveillance  - screen for HIV, Hep B, and Hep C     Please call or message on [employer] Teams if with any questions.  Spectra [id number]
ASSESSMENT:  Chronic Full thickness wound to b/l dorsal wrists  no sign of infection    RECOMMENDATION:  Wound care - Xeroform/Kelrex BID  No Surgical debridement   
IMPRESSION: Rehab of severe LLE radicular pain exacerbated by recent fall out of bed. Unable or unwilling to get out of bed since admission. Unable to cooperate with motor exam of legs. CT LS spine c/w osteomyelitis of L5, S1 facets with local infiltration and involvement of T10/11. Physical therapy to see, but unsure if he will be able/ willing to cooperate. Will likely require STR for physical therapy and antibiotics and to be able to get his Methadone.     PRECAUTIONS: [  ] Cardiac  [  ] Respiratory  [  ] Seizures [  ] Contact Isolation  [  ] Droplet Isolation  [  ] Other    Weight Bearing Status: wbat    RECOMMENDATION:    Out of Bed to Chair     DVT/Decubiti Prophylaxis    REHAB PLAN:     [ x  ] Bedside P/T 3-5 times a week   [   ]   Bedside O/T  2-3 times a week             [   ] No Rehab Therapy Indicated                   [   ]  Speech Therapy   Conditioning/ROM                                    ADL  Bed Mobility                                               Conditioning/ROM  Transfers                                                     Bed Mobility  Sitting /Standing Balance                         Transfers                                        Gait Training                                               Sitting/Standing Balance  Stair Training [   ]Applicable                    Home equipment Eval                                                                        Splinting  [   ] Only      GOALS:   ADL   [ x  ]   Independent                    Transfers  [  x ] Independent                          Ambulation  [ x  ] Independent     [x    ] With device                            [   ]  CG                                                         [   ]  CG                                                                  [   ] CG                            [    ] Min A                                                   [   ] Min A                                                              [   ] Min  A          DISCHARGE PLAN:   [   ]  Good candidate for Intensive Rehabilitation/Hospital based-[facility]A [facility]                                             Will tolerate 3hrs Intensive Rehab Daily                                       [  xx  ]  Short Term Rehab in Skilled Nursing Facility                                       [    ]  Home with Outpatient or VN services                                         [    ]  Possible Candidate for Intensive Hospital based Rehab                                       
51 y.o male patient with PMH as above, found to have bacteremia    # Bacteremia  - Suspected vertebral osteomyelitis  - In light of IVDU, will need to R/O endocarditis  - Will schedule for PAM on 1/19/2022; please keep NPO after midnight tonight; please send COVID swab
Patient is a 52 y/o man with polysubstance abuse now presenting with acute left low back pain with imaging c/f osteomyelitis. 
Patient is a 50 y/o man with polysubstance abuse now presenting with acute left low back pain with imaging c/f osteomyelitis.

## 2022-01-18 NOTE — CONSULT NOTE ADULT - PROBLEM SELECTOR RECOMMENDATION 9
Withdrawal symptoms and pain control improved.  1) Continue methadone PO 40mg Q8h; Qtc 472ms from EKG 1/16  2) Continue hydromorphone PO 6mg Q4h prn  3) Continue acetaminophen 650mg Q6h  4) Continue cyclobenzaprine 10mg Q8h  5) Continue gabapentin 300mg Q8h  6) Continue alprazolam 2mg Q8h; would use caution and monitor respiratory status in the setting of opioid and benzodiazepine administration  7) Continue follow up with CATCH team

## 2022-01-18 NOTE — PROGRESS NOTE ADULT - ASSESSMENT
51-year-old male past medical history of polysubstance abuse who presents status post fall. Has back pain radiating to LLE, CT conerning for osteomyolitis.     # Sepsis POA  # Vertebral Osteomyelitis,  # Gram Positive Bacteremia   - on admission HR 91, wbc 16.6k  - ,      - Blood culture 1/15 + GPC in clusters, 1/17 + GPC in clusters     - CT AP - osseous destructive process at the left L5-S1 facet joints with infiltrative changes extending to the adjacent spinal canal and neural foramina and associated stenosis. Consider osteomyelitis. - unable to perform MRI in setting of retained foreign body in arm, pending gallium scan,   - ID appreciated   - nafcillin 2g q4hrs   - repeat blood cultures daily   - TTE to evaluate for endocarditis     #Right forearm foreign body  - 1.2cm linear foreign body overlying right radius  - surgery consult for removal - not recommending removal given difficulty of extraction, no evidence of infection,   - contraindication for MRI     #Fall   - likely mechanical   - cth negative, trauma workup negative   - pt ot rehab     # Bl open wounds on wrist   - burn appreciated, Wound care - Xeroform/Kelrex BID, no Surgical debridement     # Lt foot pain   - xray foot - no fracture, midfoot degenerative changes,  - duplex LE negative for DVT     # IVDU  # suspected ETOH use   # suspected thiamine folate deficiency   - active heroin use, last use 2 weeks ago  - reports taking 135mg methadone per day, dose confirmed with Juan Ramon Flores RN at methadone clinic, last received 3 day supply on 1/14,  - addiction medicine consulted  - continue thiamine folate supplementation   - pain management appreciated   - methadone 40mg q8hr standing   - tylenol 650mg q8 standing   - gabapentin 300mg q8hr   - cyclobenzaprine 10mg q8hr   - dilaudid 6mg po q4 prn   - ketorolac 15mg q6hr prn     #Mood dx   #Anxiety   - continue home xanax 2mg TID, has withdrawal symptoms when not taking them.   - c/w welbutrin 300 qd and Seroquel 300 TID   - f/u drug screen     #Normocytic Anemia   - multifactorial, iron deficiency, anemia of chronic disease,   - continue folate, MV,  - ferrous sulfate 325mg qd     # DVT ppx- LMWH  # GI ppx- PPI  # Activity- AAT  # Diet- Reg  # Code status - full  # Dispo- from home, acute     Pending - blood cultures, gallium scan, Echo, MRI?          51-year-old male past medical history of polysubstance abuse who presents status post fall. Has back pain radiating to LLE, CT conerning for osteomyolitis.     # Sepsis POA  # Vertebral Osteomyelitis,  # Gram Positive Bacteremia   - on admission HR 91, wbc 16.6k  - ,      - Blood culture 1/15 + GPC in clusters, 1/17 + GPC in clusters     - CT AP - osseous destructive process at the left L5-S1 facet joints with infiltrative changes extending to the adjacent spinal canal and neural foramina and associated stenosis. Consider osteomyelitis. - ok to perform MRI despite retained foreign body in arm per radiology    - ID appreciated   - nafcillin 2g q4hrs   - repeat blood cultures daily   - TTE to evaluate for endocarditis     #Right forearm foreign body  - 1.2cm linear foreign body overlying right radius  - surgery consult for removal - not recommending removal given difficulty of extraction, no evidence of infection,   - not a contraindication for MRI per radiology     #Fall   - likely mechanical   - cth negative, trauma workup negative   - pt ot rehab     # Bl open wounds on wrist   - burn appreciated, Wound care - Xeroform/Kelrex BID, no Surgical debridement     # Lt foot pain   - xray foot - no fracture, midfoot degenerative changes,  - duplex LE negative for DVT     # IVDU  # suspected ETOH use   # suspected thiamine folate deficiency   - active heroin use, last use 2 weeks ago  - reports taking 135mg methadone per day, dose confirmed with Juan Ramon Flores RN at methadone clinic, last received 3 day supply on 1/14,  - addiction medicine consulted  - continue thiamine folate supplementation   - pain management appreciated   - methadone 40mg q8hr standing   - tylenol 650mg q8 standing   - gabapentin 300mg q8hr   - cyclobenzaprine 10mg q8hr   - dilaudid 6mg po q4 prn   - ketorolac 15mg q6hr prn     #Mood dx   #Anxiety   - continue home xanax 2mg TID, has withdrawal symptoms when not taking them.   - c/w welbutrin 300 qd and Seroquel 300 TID   - f/u drug screen     #Normocytic Anemia   - multifactorial, iron deficiency, anemia of chronic disease,   - continue folate, MV,  - ferrous sulfate 325mg qd     # DVT ppx- LMWH  # GI ppx- PPI  # Activity- AAT  # Diet- Reg  # Code status - full  # Dispo- from home, acute     Pending - blood cultures, gallium scan, Echo, MRI?          51-year-old male past medical history of polysubstance abuse who presents status post fall. Has back pain radiating to LLE, CT conerning for osteomyolitis.     # Sepsis POA  # Vertebral Osteomyelitis,  # Gram Positive Bacteremia   - on admission HR 91, wbc 16.6k  - ,      - Blood culture 1/15 + GPC in clusters, 1/17 + GPC in clusters     - CT AP - osseous destructive process at the left L5-S1 facet joints with infiltrative changes extending to the adjacent spinal canal and neural foramina and associated stenosis. Consider osteomyelitis.   - ok to perform MRI despite retained foreign body in arm per radiology    - ID appreciated   - nafcillin 2g q4hrs   - repeat blood cultures daily   - TTE to evaluate for endocarditis     #Right forearm foreign body  - 1.2cm linear foreign body overlying right radius  - surgery consult for removal - not recommending removal given difficulty of extraction, no evidence of infection,   - not a contraindication for MRI per radiology     #Fall   - likely mechanical   - cth negative, trauma workup negative   - pt ot rehab     # Bl open wounds on wrist   - burn appreciated, Wound care - Xeroform/Kelrex BID, no Surgical debridement     # Lt foot pain   - xray foot - no fracture, midfoot degenerative changes,  - duplex LE negative for DVT     # IVDU  # suspected ETOH use   # suspected thiamine folate deficiency   - active heroin use, last use 2 weeks ago  - reports taking 135mg methadone per day, dose confirmed with Juan Ramon Flores RN at methadone clinic, last received 3 day supply on 1/14,  - addiction medicine consulted  - continue thiamine folate supplementation   - pain management appreciated   - methadone 40mg q8hr standing   - tylenol 650mg q8 standing   - gabapentin 300mg q8hr   - cyclobenzaprine 10mg q8hr   - dilaudid 6mg po q4 prn   - ketorolac 15mg q6hr prn     #Mood dx   #Anxiety   - continue home xanax 2mg TID, has withdrawal symptoms when not taking them.   - c/w welbutrin 300 qd and Seroquel 300 TID   - f/u drug screen     #Normocytic Anemia   - multifactorial, iron deficiency, anemia of chronic disease,   - continue folate, MV,  - ferrous sulfate 325mg qd     # DVT ppx- LMWH  # GI ppx- PPI  # Activity- AAT  # Diet- Reg  # Code status - full  # Dispo- from home, acute     Pending - blood cultures, gallium scan, Echo, MRI?

## 2022-01-18 NOTE — MEDICAL STUDENT PROGRESS NOTE(EDUCATION) - NS MD HP STUD ASPLAN PLAN FT
# Radiating Back pain concerning for Vertebral Osteomyelitis,  # Sepsis POA  # Gram positive bacteremia   > on admission HR 91, wbc 16.6k  > ,     > Blood culture 1/15 gram positive cocci in clusters    > CT AP - There is an osseous destructive process at the left L5-S1 facet joints with infiltrative changes extending to the adjacent spinal canal and neural foramina and associated stenosis. Consider osteomyelitis. Underlying mass is not excluded.  > Neuro sx consulted   - unable to perform MRI in setting of retained foreign body in arm, will need bone scan or tagged WBC scan to eval for osteomyelitis.  > ID consulted: (d/c vancomycin + cefepime, switch to nafcillin, he likely has OM, but MRI preferred to evaluate for epidural abscess and any cord involvement   - Nafcillin 2g q4h  - Repeat blood cultures daily  - TTE  - Screen for HIV, Hep B, Hep C    #Right forearm foreign body  - 1.2cm linear foreign body overlying right radius  - Surgery consult for removal - no evidence of infection, not recommending removal (If positive for osteomyelitis, will revisit removal of foreign body)  - unable to obtain MRI, radiology recommending nuclear bone scan to eval for osteomyelitis.     #Fall   - likely mechanical   - cth negative, trauma workup negative   - pt ot rehab     # Bl open wounds on wrist   - burn appreciated, Wound care - Xeroform/Kelrex BID, No Surgical debridement     # Lt foot pain,   - warm, pulse palpated, parasthesias noted  - f/y xray foot - no fracture, midfoot degenerative changes,  - Duplex LE neg    # IVDU  # suspected ETOH use   # suspected thiamine folate deficiency   - Pt reports taking heroin for years, last use 2 weeks ago  - f/u with methadone clinic ([phone]), pt reports taking 135mg methadone per day  - addiction medicine consulted  - pt takes xanax 2mg TID, has withdrawal symptoms when not taking them.   - c/w welbutrin 300 qd and Seroquel 300 TID   - f/u drug screen   - continue thiamine folate supplementation   - pain management appreciated   - tylenol 650mg q8 standing   - gabapentin 300mg q8hr   - cyclobenzaprine 10mg q8hr   - dilaudid 6mg po q4 prn   - methadone 40mg q8hr standing   - ketorolac 15mg q6hr prn   - xanax 2mg q8hr     #Normocytic Anemia   - multifactorial, iron deficiency, anemia of chronic disease,   - start on folate, MV,  - ferrous sulfate 325mg qd     # DVT ppx- LMWH  # GI ppx- PPI  # Activity- AAT  # Diet- Reg  # Code status - full  # Dispo- from home, acute     Today:  - Miralax for constipation   - f/u Echo  - f/u blood cultures  - c/w PT  # Radiating Back pain concerning for Vertebral Osteomyelitis,  # Sepsis POA  # Gram positive bacteremia   > on admission HR 91, wbc 16.6k  > ,     > Blood culture 1/15 gram positive cocci in clusters    > CT AP - There is an osseous destructive process at the left L5-S1 facet joints with infiltrative changes extending to the adjacent spinal canal and neural foramina and associated stenosis. Consider osteomyelitis. Underlying mass is not excluded.  > Neuro sx consulted   - unable to perform MRI in setting of retained foreign body in arm, will need bone scan or tagged WBC scan to eval for osteomyelitis.  > ID consulted: (d/c vancomycin + cefepime, switch to nafcillin, he likely has OM, but MRI preferred to evaluate for epidural abscess and any cord involvement   - Nafcillin 2g q4h  - Repeat blood cultures daily  - TTE  - Screen for HIV, Hep B, Hep C    #Right forearm foreign body  - 1.2cm linear foreign body overlying right radius  - Surgery consult for removal - no evidence of infection, not recommending removal (If positive for osteomyelitis, will revisit removal of foreign body)  - Unable to obtain MRI, radiology recommending nuclear bone scan to eval for osteomyelitis.     #Fall   - likely mechanical   - cth negative, trauma workup negative   - pt ot rehab     # Bl open wounds on wrist   - burn appreciated, Wound care - Xeroform/Kelrex BID, No Surgical debridement     # Lt foot pain,   - warm, pulse palpated, parasthesias noted  - xray foot - no fracture, midfoot degenerative changes  - Duplex LE neg    # IVDU  # suspected ETOH use   # suspected thiamine folate deficiency   - Pt reports taking heroin for years, last use 2 weeks ago  - f/u with methadone clinic ([phone]), pt reports taking 135mg methadone per day  - addiction medicine consulted  - pt takes xanax 2mg TID, has withdrawal symptoms when not taking them.   - c/w welbutrin 300 qd and Seroquel 300 TID   - f/u drug screen   - continue thiamine folate supplementation   - pain management appreciated   - tylenol 650mg q8 standing   - gabapentin 300mg q8hr   - cyclobenzaprine 10mg q8hr   - dilaudid 6mg po q4 prn   - methadone 40mg q8hr standing   - ketorolac 15mg q6hr prn   - xanax 2mg q8hr     #Normocytic Anemia   - multifactorial, iron deficiency, anemia of chronic disease,   - start on folate, MV  - ferrous sulfate 325mg qd     # DVT ppx- LMWH  # GI ppx- PPI  # Activity- AAT  # Diet- Reg  # Code status - full  # Dispo- from home, acute     Today:  - Miralax for constipation   - f/u Echo  - f/u blood cultures  - c/w PT  # Radiating Back pain concerning for Vertebral Osteomyelitis,  # Sepsis POA  # Gram positive bacteremia   > on admission HR 91, wbc 16.6k  > ,     > Blood culture 1/15 gram positive cocci in clusters = Staph aureus    > CT AP - There is an osseous destructive process at the left L5-S1 facet joints with infiltrative changes extending to the adjacent spinal canal and neural foramina and associated stenosis. Consider osteomyelitis. Underlying mass is not excluded.  > Neuro sx consulted   - unable to perform MRI in setting of retained foreign body in arm, will need bone scan or tagged WBC scan to eval for osteomyelitis.  > ID consulted: (d/c vancomycin + cefepime, switch to nafcillin, he likely has OM, but MRI preferred to evaluate for epidural abscess and any cord involvement   - Nafcillin 2g q4h  - Repeat blood cultures daily  - TTE  - Screen for HIV, Hep B, Hep C    #Right forearm foreign body  - 1.2cm linear foreign body overlying right radius  - Surgery consult for removal - no evidence of infection, not recommending removal (If positive for osteomyelitis, will revisit removal of foreign body)  - Unable to obtain MRI, radiology recommending nuclear bone scan to eval for osteomyelitis.     #Fall   - likely mechanical   - cth negative, trauma workup negative   - pt ot rehab     # Bl open wounds on wrist   - burn appreciated, Wound care - Xeroform/Kelrex BID, No Surgical debridement     # Lt foot pain,   - warm, pulse palpated, parasthesias noted  - xray foot - no fracture, midfoot degenerative changes  - Duplex LE neg    # IVDU  # suspected ETOH use   # suspected thiamine folate deficiency   - Pt reports taking heroin for years, last use 2 weeks ago  - Confirmed with methadone clinic ([phone]), pt reports taking 135mg methadone per day  - addiction medicine consulted  - pt takes xanax 2mg TID, has withdrawal symptoms when not taking them.   - c/w welbutrin 300 qd and Seroquel 300 TID   - f/u drug screen   - continue thiamine folate supplementation   - pain management appreciated   - tylenol 650mg q8 standing   - gabapentin 300mg q8hr   - cyclobenzaprine 10mg q8hr   - dilaudid 6mg po q4 prn   - methadone 40mg q8hr standing   - ketorolac 15mg q6hr prn   - xanax 2mg q8hr     #Normocytic Anemia   - multifactorial, iron deficiency, anemia of chronic disease,   - start on folate, MV  - ferrous sulfate 325mg qd     # DVT ppx- LMWH  # GI ppx- PPI  # Activity- AAT  # Diet- Reg  # Code status - full  # Dispo- from home, acute     Today:  - Miralax for constipation   - f/u Echo  - c/w PT

## 2022-01-18 NOTE — PROGRESS NOTE ADULT - ASSESSMENT
ASSESSMENT:  51yM w/ PMHx of  polysubstance abuse who presented with sciatic pain. Surgery consulted for foreign body in right forearm    PLAN:  -Management per primary team   -No indications to remove foreign body, harm from procedure exceeds benefits   -Foreign body currently be difficult to find, high potential to be unsuccessful and the foreign body is not actively causing an issue  -Please reconsult surgery as needed     GREEN TEAM SPECTRA: [phone]

## 2022-01-18 NOTE — CONSULT NOTE ADULT - REASON FOR ADMISSION
vertebral osteomyelitis
Acute low back pain, c/f osteomyelitis
Back Pain/Fall
possible osteomyelitis
Acute low back pain, c/f osteomyelitis

## 2022-01-18 NOTE — PROGRESS NOTE ADULT - SUBJECTIVE AND OBJECTIVE BOX
GENERAL SURGERY PROGRESS NOTE    Patient: MIKAEL MCDERMOTT , 51y ([birth year])Male   MRN: [id number]  Location: [facility] T4-3B 022 B  Visit: 01-16-22 Inpatient  Date: 01-18-22 @ 09:22    Hospital Day #:3    Procedure/Dx/Injuries: foreign body in right forearm.     Events of past 24 hours: Pt seen and examined at bedside. No acute overnight events. Afebrile     PAST MEDICAL & SURGICAL HISTORY:  IV drug abuse    Vitals:   T(F): 100 (01-18-22 @ 04:56), Max: 100 (01-18-22 @ 04:56)  HR: 85 (01-18-22 @ 04:56)  BP: 137/76 (01-18-22 @ 04:56)  RR: 18 (01-18-22 @ 04:56)  SpO2: --    Diet, Regular    01-17-22 @ 07:01  -  01-18-22 @ 07:00  --------------------------------------------------------  IN:  Total IN: 0 mL  OUT:    Voided (mL): 2425 mL  Total OUT: 2425 mL  Total NET: -2425 mL  `  PHYSICAL EXAM:  General: NAD  HEENT: NCAT, EOMI  Cardiac: S1, S2  Respiratory: normal respiratory effort, breath sounds equal BL  Abdomen: Soft, non-distended, non-tender  Musculoskeletal: Strength 5/5 BL UE/LE, however, unable to flex right hand, ROM intact, compartments soft  Skin: no jaundice    MEDICATIONS  (STANDING):  acetaminophen     Tablet .. 650 milliGRAM(s) Oral every 6 hours  ALPRAZolam 2 milliGRAM(s) Oral three times a day  buPROPion XL (24-Hour) . 300 milliGRAM(s) Oral daily  cyclobenzaprine 10 milliGRAM(s) Oral every 8 hours  enoxaparin Injectable 40 milliGRAM(s) SubCutaneous daily  ferrous    sulfate 325 milliGRAM(s) Oral daily  folic acid 1 milliGRAM(s) Oral daily  gabapentin 300 milliGRAM(s) Oral three times a day  influenza   Vaccine 0.5 milliLiter(s) IntraMuscular once  lactated ringers. 1000 milliLiter(s) (100 mL/Hr) IV Continuous <Continuous>  methadone    Tablet 40 milliGRAM(s) Oral every 8 hours  mirtazapine 60 milliGRAM(s) Oral at bedtime  multivitamin 1 Tablet(s) Oral daily  nafcillin  IVPB 2 Gram(s) IV Intermittent every 4 hours  nafcillin  IVPB      pantoprazole    Tablet 40 milliGRAM(s) Oral before breakfast  polyethylene glycol 3350 17 Gram(s) Oral two times a day  QUEtiapine 200 milliGRAM(s) Oral three times a day  senna 2 Tablet(s) Oral at bedtime  thiamine 100 milliGRAM(s) Oral daily    MEDICATIONS  (PRN):  aluminum hydroxide/magnesium hydroxide/simethicone Suspension 30 milliLiter(s) Oral every 4 hours PRN Dyspepsia  HYDROmorphone   Tablet 6 milliGRAM(s) Oral every 4 hours PRN Severe Pain (7 - 10)  ketorolac   Injectable 15 milliGRAM(s) IV Push every 6 hours PRN Moderate Pain (4 - 6)  LORazepam     Tablet 1 milliGRAM(s) Oral every 2 hours PRN CIWA-Ar score increase by 2 points and a total score of 7 or less  melatonin 3 milliGRAM(s) Oral at bedtime PRN Insomnia  ondansetron Injectable 4 milliGRAM(s) IV Push every 8 hours PRN Nausea and/or Vomiting    DVT PROPHYLAXIS: enoxaparin Injectable 40 milliGRAM(s) SubCutaneous daily  GI PROPHYLAXIS: pantoprazole    Tablet 40 milliGRAM(s) Oral before breakfast  ANTIBIOTICS:  nafcillin  IVPB 2 Gram(s)  nafcillin  IVPB      LAB/STUDIES:  Labs:                  9.7    8.20  )-----------( 295      ( 18 Jan 2022 04:30 )             31.3       Auto Neutrophil %: 72.5 % (01-18-22 @ 04:30)  Auto Immature Granulocyte %: 0.2 % (01-18-22 @ 04:30)    01-16    135  |  98  |  15  ----------------------------<  127<H>  3.9   |  21  |  0.7    LFTs:             7.5  | 0.3  | 18       ------------------[96      ( 16 Jan 2022 12:30 )  3.2  | <0.2 | 15          Lipase:x      Amylase:x         Lactate, Blood: 0.9 mmol/L (01-16-22 @ 16:00)    Coags:     CLOTTED  ----< CLOTTED   ( 17 Jan 2022 11:00 )     TNP       Culture - Blood (collected 15 Baljinder 2022 20:31)  Source: .Blood Blood-Peripheral  Gram Stain (16 Jan 2022 20:40):    Growth in aerobic bottle: Gram Positive Cocci in Clusters    Growth in anaerobic bottle: Gram Positive Cocci in Clusters  Preliminary Report (17 Jan 2022 18:16):    Growth in aerobic and anaerobic bottles: Staphylococcus aureus    ***Blood Panel PCR results on this specimen are available    approximately 3 hours after the Gram stain result.***    Gram stain, PCR, and/or culture results may not always    correspond dueto difference in methodologies.    ************************************************************    This PCR assay was performed by multiplex PCR. This    Assay tests for 66 bacterial and resistance gene targets.    Please refer to the [facility] test directory    at [email or web address] for details.  Organism: Blood Culture PCR (16 Jan 2022 18:10)  Organism: Blood Culture PCR (16 Jan 2022 18:10)    Culture - Blood (collected 15 Baljinder 2022 20:00)  Source: .Blood Blood-Peripheral  Gram Stain (16 Jan 2022 20:41):    Growth in aerobic bottle: Gram Positive Cocci in Clusters    Growth in anaerobic bottle: Gram Positive Cocci in Clusters  Preliminary Report (17 Jan 2022 19:39):    Growth in aerobic and anaerobic bottles: Staphylococcus aureus    See previous culture [id number]    IMAGING:  < from: [facility] Duplex Lower Ext Vein Scan, Bilat (01.17.22 @ 19:31) >  Impression:  No evidence of deep venous thrombosis or superficial thrombophlebitis in   thebilateral lower extremities.  ICD-10:M79.89  ******PRELIMINARY REPORT******    ******PRELIMINARY REPORT******   < end of copied text >    < from: Xray Forearm, Bilateral (01.16.22 @ 21:44) >  IMPRESSION:  No acute displaced fracture.  1.2 cm linear foreign body within the soft tissues overlying the right   radius.  Previously seen left forearm foreign body is no longer visualized.  --- End of Report ---    < from: Xray Foot AP + Lateral, Left (01.16.22 @ 17:54) >  IMPRESSION:  No acute displaced fracture. Moderate midfoot degenerative changes.   Achilles tendon insertional enthesophyte measuring 1.1 cm.  --- End of Report ---

## 2022-01-18 NOTE — CONSULT NOTE ADULT - SUBJECTIVE AND OBJECTIVE BOX
Pain Medicine Follow Up Visit    Subjective  Patient states that pain is much better controlled today since initiating methadone and starting hydromorphone PO. The patient states that his withdrawal symptoms--restlessness, diaphoresis, abdominal cramping--have resolved. He reports significant improvement in his pain control and denies having any side effects, including nausea, pruritis, or sedation.       Current Medication Regimen  acetaminophen     Tablet .. 650 milliGRAM(s) Oral every 6 hours  ALPRAZolam 2 milliGRAM(s) Oral three times a day  aluminum hydroxide/magnesium hydroxide/simethicone Suspension 30 milliLiter(s) Oral every 4 hours PRN  buPROPion XL (24-Hour) . 300 milliGRAM(s) Oral daily  cyclobenzaprine 10 milliGRAM(s) Oral every 8 hours  enoxaparin Injectable 40 milliGRAM(s) SubCutaneous daily  ferrous    sulfate 325 milliGRAM(s) Oral daily  folic acid 1 milliGRAM(s) Oral daily  gabapentin 300 milliGRAM(s) Oral three times a day  HYDROmorphone   Tablet 6 milliGRAM(s) Oral every 4 hours PRN  influenza   Vaccine 0.5 milliLiter(s) IntraMuscular once  ketorolac   Injectable 15 milliGRAM(s) IV Push every 6 hours PRN  lactated ringers. 1000 milliLiter(s) IV Continuous <Continuous>  LORazepam     Tablet 1 milliGRAM(s) Oral every 2 hours PRN  melatonin 3 milliGRAM(s) Oral at bedtime PRN  methadone    Tablet 40 milliGRAM(s) Oral every 8 hours  mirtazapine 60 milliGRAM(s) Oral at bedtime  multivitamin 1 Tablet(s) Oral daily  nafcillin  IVPB 2 Gram(s) IV Intermittent every 4 hours  nafcillin  IVPB      ondansetron Injectable 4 milliGRAM(s) IV Push every 8 hours PRN  pantoprazole    Tablet 40 milliGRAM(s) Oral before breakfast  polyethylene glycol 3350 17 Gram(s) Oral two times a day  QUEtiapine 200 milliGRAM(s) Oral three times a day  senna 2 Tablet(s) Oral at bedtime  thiamine 100 milliGRAM(s) Oral daily        Allergies  No Known Allergies          Physical Exam  T(C): 37.8 (01-18-22 @ 04:56), Max: 37.8 (01-18-22 @ 04:56)  HR: 85 (01-18-22 @ 04:56) (85 - 85)  BP: 137/76 (01-18-22 @ 04:56) (137/76 - 137/76)  RR: 18 (01-18-22 @ 04:56) (18 - 18)  SpO2: --  Gen: NAD  Eyes: no glasses, scleral icterus  Head: Normocephalic / Atraumatic  CV: no JVD  Lungs: nonlabored breathing  Abdomen: nondistended, soft  : no bee catheter in place  Neuro: AOx3, Cranial nerves intact  Psych: normal affect      Labs  CBC  8.2 > 9.7 / 31.3 < 295

## 2022-01-18 NOTE — CONSULT NOTE ADULT - SUBJECTIVE AND OBJECTIVE BOX
HPI:  51-year-old male past medical history of polysubstance abuse who presents status post fall.  Patient states that approximately 12 hours ago he fell from bed due to sciatica pain, and has and was not able to get up from the floor.  The pain is sacral with a shooting pain to his left foot. It feels as if his foot can fall off. He states he is unable to lie flat secondary to the pain. Patient also states that he has been having pain to his wrist bilaterally.  Of note patient has known open wounds to bilateral wrist.  Patient reports that the last time he used heroin IV was approximately 2 week ago.  Patient has no other medical complaints. He reports no f/c/n/v, change in urinary or bowel habits.    Pt states that he takes 135mg of methadone a day, call his clinic and doctor (Dr. Milton Gates), no response.    In the ED: Pt tachy 91, wbc 16.6k, CT spine: osseous destructive process at the left L5-S1 facet joints with infiltrative changes extending to the adjacent spinal canal and   neural foramina and associated stenosis. Additional erosive changes noted at the left T10-T11 facet joint. Neuro sx consulted, pt needs MRI. Pt unable to tolerate procedure since he cant lie flat. s/p bolus and broad spectrum abx.    (16 Jan 2022 07:50)    Patient with MSSA bacteremia    PAST MEDICAL & SURGICAL HISTORY  IV drug abuse        FAMILY HISTORY:  FAMILY HISTORY:      SOCIAL HISTORY:  []smoker  []Alcohol  [X]Drug    ALLERGIES:  No Known Allergies      MEDICATIONS:  MEDICATIONS  (STANDING):  acetaminophen     Tablet .. 650 milliGRAM(s) Oral every 6 hours  ALPRAZolam 2 milliGRAM(s) Oral three times a day  buPROPion XL (24-Hour) . 300 milliGRAM(s) Oral daily  cyclobenzaprine 10 milliGRAM(s) Oral every 8 hours  enoxaparin Injectable 40 milliGRAM(s) SubCutaneous daily  ferrous    sulfate 325 milliGRAM(s) Oral daily  folic acid 1 milliGRAM(s) Oral daily  gabapentin 300 milliGRAM(s) Oral three times a day  influenza   Vaccine 0.5 milliLiter(s) IntraMuscular once  lactated ringers. 1000 milliLiter(s) (100 mL/Hr) IV Continuous <Continuous>  methadone    Tablet 40 milliGRAM(s) Oral every 8 hours  mirtazapine 60 milliGRAM(s) Oral at bedtime  multivitamin 1 Tablet(s) Oral daily  nafcillin  IVPB 2 Gram(s) IV Intermittent every 4 hours  nafcillin  IVPB      pantoprazole    Tablet 40 milliGRAM(s) Oral before breakfast  polyethylene glycol 3350 17 Gram(s) Oral two times a day  QUEtiapine 200 milliGRAM(s) Oral three times a day  senna 2 Tablet(s) Oral at bedtime    MEDICATIONS  (PRN):  aluminum hydroxide/magnesium hydroxide/simethicone Suspension 30 milliLiter(s) Oral every 4 hours PRN Dyspepsia  HYDROmorphone   Tablet 6 milliGRAM(s) Oral every 4 hours PRN Severe Pain (7 - 10)  ketorolac   Injectable 15 milliGRAM(s) IV Push every 6 hours PRN Moderate Pain (4 - 6)  LORazepam     Tablet 1 milliGRAM(s) Oral every 2 hours PRN CIWA-Ar score increase by 2 points and a total score of 7 or less  melatonin 3 milliGRAM(s) Oral at bedtime PRN Insomnia  ondansetron Injectable 4 milliGRAM(s) IV Push every 8 hours PRN Nausea and/or Vomiting      HOME MEDICATIONS:  Home Medications:  Remeron 30 mg oral tablet: 2 tab(s) orally once a day (at bedtime) (16 Jan 2022 10:02)  SEROquel 200 mg oral tablet: orally 3 times a day (16 Jan 2022 10:02)  Wellbutrin: 300 milligram(s) orally once a day (16 Jan 2022 10:02)  Xanax: 2 milligram(s) orally 3 times a day (16 Jan 2022 10:02)      VITALS:   T(F): 99.5 (01-18 @ 14:38), Max: 100 (01-16 @ 00:15)  HR: 93 (01-18 @ 14:38) (81 - 95)  BP: 117/75 (01-18 @ 14:38) (117/70 - 144/84)  BP(mean): 100 (01-16 @ 20:01) (100 - 100)  RR: 20 (01-18 @ 14:38) (16 - 20)  SpO2: 96% (01-16 @ 05:31) (96% - 97%)    I&O's Summary    17 Jan 2022 07:01  -  18 Jan 2022 07:00  --------------------------------------------------------  IN: 0 mL / OUT: 2425 mL / NET: -2425 mL    18 Jan 2022 07:01  -  18 Jan 2022 17:24  --------------------------------------------------------  IN: 480 mL / OUT: 700 mL / NET: -220 mL        REVIEW OF SYSTEMS:  CONSTITUTIONAL: No weakness, fevers or chills  EYES: No visual changes  ENT: No vertigo or throat pain   NECK: No pain or stiffness  RESPIRATORY: No cough, wheezing, hemoptysis; No shortness of breath  CARDIOVASCULAR: No chest pain or palpitations  GASTROINTESTINAL: No abdominal or epigastric pain. No nausea, vomiting, or hematemesis; No diarrhea or constipation. No melena or hematochezia.  GENITOURINARY: No dysuria, frequency or hematuria  NEUROLOGICAL: No numbness or weakness  SKIN: No itching, no rashes  MSK: Pain as described in HPI    PHYSICAL EXAM:  NEURO: patient is awake , alert and oriented  GEN: Not in acute distress  NECK: no thyroid enlargement, no JVD  LUNGS: Clear to auscultation bilaterally   CARDIOVASCULAR: S1/S2 present, RRR  ABD: Soft  EXT: No ASHKAN  SKIN: Bilateral wrist dressing for bilateral wrist wounds    LABS:                        9.7    8.20  )-----------( 295      ( 18 Jan 2022 04:30 )             31.3     01-18    139  |  101  |  16  ----------------------------<  142<H>  3.7   |  24  |  0.9    Ca    8.2<L>      18 Jan 2022 04:30  Mg     1.9     01-18    TPro  7.1  /  Alb  3.0<L>  /  TBili  0.3  /  DBili  x   /  AST  26  /  ALT  35  /  AlkPhos  100  01-18    PT/INR - ( 17 Jan 2022 11:00 )   PT: CLOTTED sec;   INR: CLOTTED ratio         PTT - ( 17 Jan 2022 11:00 )  PTT:TNP sec          Troponin trend:      01-16 Chol 132 LDL -- HDL 44 Trig 66      RADIOLOGY:  -CXR:    -TTE:  < from: TTE Echo Complete w/o Contrast w/ Doppler (01.18.22 @ 10:56) >  Summary:   1. LV Ejection Fraction by Chester's Method with a biplane EF of 56 %.   2. Normal global left ventricular systolic function.   3. Spectral Doppler shows impaired relaxation pattern of left   ventricular myocardial filling (Grade I diastolic dysfunction).   4. Normal left atrial size.   5. Normal right atrial size.    < end of copied text >    -CCTA:  -STRESS TEST:  -CATHETERIZATION:    ECG: NSR

## 2022-01-18 NOTE — MEDICAL STUDENT PROGRESS NOTE(EDUCATION) - SUBJECTIVE AND OBJECTIVE BOX
Hospital Day: 2d    50y/o male with PMH of IV drug use and polysubstance abuse who presented s/p fall, currently admitted with the primary diagnosis of vertebral osteomyelitis.     SUBJECTIVE    No acute events overnight. Patient examined at bedside, AOX3. Pt states he's able to move better than yesterday. Pt has good appetite. Has not had a BM in a week. Denies fever, cough, sob,N/V,abdominal pain.    OBJECTIVE  PAST MEDICAL & SURGICAL HISTORY  IV drug abuse    ALLERGIES:  No Known Allergies    MEDICATIONS  (STANDING):  acetaminophen     Tablet .. 650 milliGRAM(s) Oral every 6 hours  ALPRAZolam 2 milliGRAM(s) Oral three times a day  buPROPion XL (24-Hour) . 300 milliGRAM(s) Oral daily  cyclobenzaprine 10 milliGRAM(s) Oral every 8 hours  enoxaparin Injectable 40 milliGRAM(s) SubCutaneous daily  ferrous    sulfate 325 milliGRAM(s) Oral daily  folic acid 1 milliGRAM(s) Oral daily  gabapentin 300 milliGRAM(s) Oral three times a day  influenza   Vaccine 0.5 milliLiter(s) IntraMuscular once  lactated ringers. 1000 milliLiter(s) (100 mL/Hr) IV Continuous <Continuous>  methadone    Tablet 40 milliGRAM(s) Oral every 8 hours  mirtazapine 60 milliGRAM(s) Oral at bedtime  multivitamin 1 Tablet(s) Oral daily  nafcillin  IVPB 2 Gram(s) IV Intermittent every 4 hours  nafcillin  IVPB      pantoprazole    Tablet 40 milliGRAM(s) Oral before breakfast  polyethylene glycol 3350 17 Gram(s) Oral two times a day  QUEtiapine 200 milliGRAM(s) Oral three times a day  senna 2 Tablet(s) Oral at bedtime  thiamine 100 milliGRAM(s) Oral daily    MEDICATIONS  (PRN):  aluminum hydroxide/magnesium hydroxide/simethicone Suspension 30 milliLiter(s) Oral every 4 hours PRN Dyspepsia  HYDROmorphone   Tablet 6 milliGRAM(s) Oral every 4 hours PRN Severe Pain (7 - 10)  ketorolac   Injectable 15 milliGRAM(s) IV Push every 6 hours PRN Moderate Pain (4 - 6)  LORazepam     Tablet 1 milliGRAM(s) Oral every 2 hours PRN CIWA-Ar score increase by 2 points and a total score of 7 or less  melatonin 3 milliGRAM(s) Oral at bedtime PRN Insomnia  ondansetron Injectable 4 milliGRAM(s) IV Push every 8 hours PRN Nausea and/or Vomiting      REVIEW OF SYSTEMS:  CONSTITUTIONAL: No fever, weight loss, or fatigue  RESPIRATORY: No cough, wheezing, chills or hemoptysis; No shortness of breath  CARDIOVASCULAR: No chest pain, palpitations, dizziness, or leg swelling  GASTROINTESTINAL: No abdominal or epigastric pain. No nausea, vomiting, or hematemesis; No diarrhea or constipation. No melena or hematochezia.  NEUROLOGICAL: No headaches, memory loss, loss of strength, numbness, or tremors  SKIN: No itching, burning, rashes, or lesions     Vital Signs Last 24 Hrs  T(C): 37.8 (18 Jan 2022 04:56), Max: 37.8 (18 Jan 2022 04:56)  T(F): 100 (18 Jan 2022 04:56), Max: 100 (18 Jan 2022 04:56)  HR: 85 (18 Jan 2022 04:56) (85 - 85)  BP: 137/76 (18 Jan 2022 04:56) (137/76 - 137/76)  RR: 18 (18 Jan 2022 04:56) (18 - 18)    PHYSICAL EXAM:  GENERAL: NAD, well-groomed, well-developed  HEAD:  Atraumatic, Normocephalic  CHEST/LUNG: Breath sounds bilaterally; No accessory muscle use  CARDIOVASCULAR: Regular rate and rhythm; No edema  ABDOMEN: Soft, No guarding, Nondistended; Bowel sounds present  NERVOUS SYSTEM:  Alert & Oriented, Good concentration, No focal deficits  BACK: Tenderness to palpation of lumbar vertebrae  EXTREMITIES:  2+ Peripheral Pulses, No clubbing, cyanosis  SKIN: Wounds on bilateral wrists, No rashes    LABS:               9.7    8.20  )-----------( 295      ( 18 Jan 2022 04:30 )             31.3     01-16    135  |  98  |  15  ----------------------------<  127<H>  3.9   |  21  |  0.7    Ca    8.4<L>      16 Jan 2022 12:30  Phos  2.4     01-16  Mg     1.9     01-16    TPro  7.5  /  Alb  3.2<L>  /  TBili  0.3  /  DBili  <0.2  /  AST  18  /  ALT  15  /  AlkPhos  96  01-16    PT/INR - ( 17 Jan 2022 11:00 )   PT: CLOTTED sec;   INR: CLOTTED ratio         PTT - ( 17 Jan 2022 11:00 )  PTT:TNP sec      Sedimentation Rate, Erythrocyte: 131 mm/Hr *H* (01-16-22 @ 18:19)  C-Reactive Protein, Serum: 141 mg/L (01-16-22 @ 16:00)  Lactate, Blood: 0.9 mmol/L (01-16-22 @ 16:00)    Gram Stain (16 Jan 2022 20:41):    Growth in aerobic bottle: Gram Positive Cocci in Clusters    Growth in anaerobic bottle: Gram Positive Cocci in Clusters  Preliminary Report (16 Jan 2022 20:41):    Growth in aerobic bottle: Gram Positive Cocci in Clusters    Growth in anaerobic bottle: Gram Positive Cocci in Clusters    CARDIAC MARKERS ( 15 Baljinder 2022 20:00 )  x     / x     / 63 U/L / x     / x        RADIOLOGY & ADDITIONAL TESTS:    ACC: [id number] EXAM:  CT CERVICAL SPINE                        PROCEDURE DATE:  01/15/2022  @22:23  IMPRESSION:  No acute fracture or subluxation of the cervical spine.      ACC: [id number] EXAM:  CT BRAIN                        PROCEDURE DATE:  01/15/2022  @22:23  IMPRESSION:  No CT evidence for acute intracranial pathology.      ACC: [id number] EXAM:  CT ABDOMEN AND PELVIS IC                        ACC: [id number] EXAM:  CT CHEST IC                        PROCEDURE DATE:  01/15/2022  @22:24  IMPRESSION:  Definitive acute traumatic injury is not identified to the chest, abdomen   or pelvis.  There is an osseous destructive process at the left L5-S1 facet joints   with infiltrative changes extending to the adjacent spinal canal and   neural foramina and associated stenosis. Per notes, patient with   polysubstance abuse. Consider osteomyelitis. Underlying mass is not   excluded. Additional erosive changes noted at the left T10-T11 facet   joint. Further evaluation with MRI can be obtained as needed.      ACC: [id number] EXAM:  XR WRIST COMP MIN 3 VIEWS BI   ACC: [id number] EXAM:  XR FOREARM  2 VIEWS BI                              PROCEDURE DATE:  01/15/2022  @22:31  IMPRESSION:  No evidence of acute fracture or dislocation.  Severe apparent complete soft tissue defect at the distal dorsal wrist.  1.2 cm linear radiodensity within the anterior soft tissue of the right   forearm suspicious for foreign body.      ACC: [id number] EXAM:  XR WRIST COMP MIN 3 VIEWS BI                        PROCEDURE DATE:  01/16/2022  @01:43  IMPRESSION:  Limited evaluation due to positioning. No definite evidence of acute   fracture or dislocation. Consider further evaluation with CT if there is   clinical concern for fracture. If there is clinical concern for   osteomyelitis, consider further evaluation with MRI if not   contraindicated in this patient.  Severe apparent complete soft tissue defect at the distal dorsal wrist   bilaterally.      ACC: [id number] EXAM:  XR HAND 2 VIEWS LT                        PROCEDURE DATE:  01/16/2022  @01:43  IMPRESSION:  No evidence of acute fracture or dislocation of the left hand.      ACC: [id number] EXAM:  XR FOOT 2 VIEWS LT                        PROCEDURE DATE:  01/16/2022  @17:54  IMPRESSION:  No acute displaced fracture. Moderate midfoot degenerative changes.   Achilles tendon insertional enthesophyte measuring 1.1 cm.      ACC: [id number] EXAM:  XR FOREARM  2 VIEWS BI                        PROCEDURE DATE:  01/16/2022  @21:44  IMPRESSION:  No acute displaced fracture.  1.2 cm linear foreign body within the soft tissues overlying the right   radius.  Previously seen left forearm foreign body is no longer visualized.      ACC: [id number] EXAM:  DUPLEX SCAN EXT VEINS LOWER BI                        PROCEDURE DATE:  01/17/2022  Impression:  No evidence of deep venous thrombosis or superficial thrombophlebitis in   the bilateral lower extremities. Hospital Day: 2d    52y/o male with PMH of IV drug use and polysubstance abuse who presented s/p fall, currently admitted with the primary diagnosis of vertebral osteomyelitis.     SUBJECTIVE    No acute events overnight. Patient examined at bedside, AOX3. Pt states he's able to move better than yesterday. Pt has good appetite. Has not had a BM in a week. Denies fever, cough, sob, N/V, abdominal pain.    OBJECTIVE  PAST MEDICAL & SURGICAL HISTORY  IV drug abuse    ALLERGIES:  No Known Allergies    MEDICATIONS  (STANDING):  acetaminophen     Tablet .. 650 milliGRAM(s) Oral every 6 hours  ALPRAZolam 2 milliGRAM(s) Oral three times a day  buPROPion XL (24-Hour) . 300 milliGRAM(s) Oral daily  cyclobenzaprine 10 milliGRAM(s) Oral every 8 hours  enoxaparin Injectable 40 milliGRAM(s) SubCutaneous daily  ferrous    sulfate 325 milliGRAM(s) Oral daily  folic acid 1 milliGRAM(s) Oral daily  gabapentin 300 milliGRAM(s) Oral three times a day  influenza   Vaccine 0.5 milliLiter(s) IntraMuscular once  lactated ringers. 1000 milliLiter(s) (100 mL/Hr) IV Continuous <Continuous>  methadone    Tablet 40 milliGRAM(s) Oral every 8 hours  mirtazapine 60 milliGRAM(s) Oral at bedtime  multivitamin 1 Tablet(s) Oral daily  nafcillin  IVPB 2 Gram(s) IV Intermittent every 4 hours  nafcillin  IVPB      pantoprazole    Tablet 40 milliGRAM(s) Oral before breakfast  polyethylene glycol 3350 17 Gram(s) Oral two times a day  QUEtiapine 200 milliGRAM(s) Oral three times a day  senna 2 Tablet(s) Oral at bedtime  thiamine 100 milliGRAM(s) Oral daily    MEDICATIONS  (PRN):  aluminum hydroxide/magnesium hydroxide/simethicone Suspension 30 milliLiter(s) Oral every 4 hours PRN Dyspepsia  HYDROmorphone   Tablet 6 milliGRAM(s) Oral every 4 hours PRN Severe Pain (7 - 10)  ketorolac   Injectable 15 milliGRAM(s) IV Push every 6 hours PRN Moderate Pain (4 - 6)  LORazepam     Tablet 1 milliGRAM(s) Oral every 2 hours PRN CIWA-Ar score increase by 2 points and a total score of 7 or less  melatonin 3 milliGRAM(s) Oral at bedtime PRN Insomnia  ondansetron Injectable 4 milliGRAM(s) IV Push every 8 hours PRN Nausea and/or Vomiting      REVIEW OF SYSTEMS:  CONSTITUTIONAL: No fever, weight loss, or fatigue  RESPIRATORY: No cough, wheezing, chills or hemoptysis; No shortness of breath  CARDIOVASCULAR: No chest pain, palpitations, dizziness  GASTROINTESTINAL: No abdominal or epigastric pain. No nausea, vomiting, or hematemesis  NEUROLOGICAL: No headaches, loss of strength, numbness, or tremors  SKIN: No itching, burning, rashes     Vital Signs Last 24 Hrs  T(C): 37.8 (18 Jan 2022 04:56), Max: 37.8 (18 Jan 2022 04:56)  T(F): 100 (18 Jan 2022 04:56), Max: 100 (18 Jan 2022 04:56)  HR: 85 (18 Jan 2022 04:56) (85 - 85)  BP: 137/76 (18 Jan 2022 04:56) (137/76 - 137/76)  RR: 18 (18 Jan 2022 04:56) (18 - 18)    PHYSICAL EXAM:  GENERAL: NAD, well-groomed, well-developed  HEAD:  Atraumatic, Normocephalic  CHEST/LUNG: Breath sounds bilaterally; No accessory muscle use  CARDIOVASCULAR: Regular rate and rhythm; No edema  ABDOMEN: Soft, No guarding, Nondistended; Bowel sounds present  NERVOUS SYSTEM:  Alert & Oriented, Good concentration, No focal deficits  BACK: + Tenderness to palpation of lumbar vertebrae  EXTREMITIES:  2+ Peripheral Pulses, No clubbing, cyanosis  SKIN: Wounds on bilateral wrists, No rashes    LABS:               9.7    8.20  )-----------( 295      ( 18 Jan 2022 04:30 )             31.3     01-16    135  |  98  |  15  ----------------------------<  127<H>  3.9   |  21  |  0.7    Ca    8.4<L>      16 Jan 2022 12:30  Phos  2.4     01-16  Mg     1.9     01-16    TPro  7.5  /  Alb  3.2<L>  /  TBili  0.3  /  DBili  <0.2  /  AST  18  /  ALT  15  /  AlkPhos  96  01-16    PT/INR - ( 17 Jan 2022 11:00 )   PT: CLOTTED sec;   INR: CLOTTED ratio         PTT - ( 17 Jan 2022 11:00 )  PTT:TNP sec      Sedimentation Rate, Erythrocyte: 131 mm/Hr *H* (01-16-22 @ 18:19)  C-Reactive Protein, Serum: 141 mg/L (01-16-22 @ 16:00)  Lactate, Blood: 0.9 mmol/L (01-16-22 @ 16:00)    Gram Stain (16 Jan 2022 20:41):    Growth in aerobic bottle: Gram Positive Cocci in Clusters    Growth in anaerobic bottle: Gram Positive Cocci in Clusters  Preliminary Report (16 Jan 2022 20:41):    Growth in aerobic bottle: Gram Positive Cocci in Clusters    Growth in anaerobic bottle: Gram Positive Cocci in Clusters    CARDIAC MARKERS ( 15 Baljinder 2022 20:00 )  x     / x     / 63 U/L / x     / x        RADIOLOGY & ADDITIONAL TESTS:    ACC: [id number] EXAM:  CT CERVICAL SPINE                        PROCEDURE DATE:  01/15/2022  @22:23  IMPRESSION:  No acute fracture or subluxation of the cervical spine.      ACC: [id number] EXAM:  CT BRAIN                        PROCEDURE DATE:  01/15/2022  @22:23  IMPRESSION:  No CT evidence for acute intracranial pathology.      ACC: [id number] EXAM:  CT ABDOMEN AND PELVIS IC                        ACC: [id number] EXAM:  CT CHEST IC                        PROCEDURE DATE:  01/15/2022  @22:24  IMPRESSION:  Definitive acute traumatic injury is not identified to the chest, abdomen   or pelvis.  There is an osseous destructive process at the left L5-S1 facet joints   with infiltrative changes extending to the adjacent spinal canal and   neural foramina and associated stenosis. Per notes, patient with   polysubstance abuse. Consider osteomyelitis. Underlying mass is not   excluded. Additional erosive changes noted at the left T10-T11 facet   joint. Further evaluation with MRI can be obtained as needed.      ACC: [id number] EXAM:  XR WRIST COMP MIN 3 VIEWS BI   ACC: [id number] EXAM:  XR FOREARM  2 VIEWS BI                              PROCEDURE DATE:  01/15/2022  @22:31  IMPRESSION:  No evidence of acute fracture or dislocation.  Severe apparent complete soft tissue defect at the distal dorsal wrist.  1.2 cm linear radiodensity within the anterior soft tissue of the right   forearm suspicious for foreign body.      ACC: [id number] EXAM:  XR WRIST COMP MIN 3 VIEWS BI                        PROCEDURE DATE:  01/16/2022  @01:43  IMPRESSION:  Limited evaluation due to positioning. No definite evidence of acute   fracture or dislocation. Consider further evaluation with CT if there is   clinical concern for fracture. If there is clinical concern for   osteomyelitis, consider further evaluation with MRI if not   contraindicated in this patient.  Severe apparent complete soft tissue defect at the distal dorsal wrist   bilaterally.      ACC: [id number] EXAM:  XR HAND 2 VIEWS LT                        PROCEDURE DATE:  01/16/2022  @01:43  IMPRESSION:  No evidence of acute fracture or dislocation of the left hand.      ACC: [id number] EXAM:  XR FOOT 2 VIEWS LT                        PROCEDURE DATE:  01/16/2022  @17:54  IMPRESSION:  No acute displaced fracture. Moderate midfoot degenerative changes.   Achilles tendon insertional enthesophyte measuring 1.1 cm.      ACC: [id number] EXAM:  XR FOREARM  2 VIEWS BI                        PROCEDURE DATE:  01/16/2022  @21:44  IMPRESSION:  No acute displaced fracture.  1.2 cm linear foreign body within the soft tissues overlying the right   radius.  Previously seen left forearm foreign body is no longer visualized.      ACC: [id number] EXAM:  DUPLEX SCAN EXT VEINS LOWER BI                        PROCEDURE DATE:  01/17/2022  Impression:  No evidence of deep venous thrombosis or superficial thrombophlebitis in   the bilateral lower extremities. Hospital Day: 2d    52y/o male with PMH of IV drug use and polysubstance abuse who presented s/p fall, currently admitted with the primary diagnosis of vertebral osteomyelitis.     SUBJECTIVE    No acute events overnight. Patient examined at bedside, AOX3. Pt states he's able to move better than yesterday. Pt has good appetite. Has not had a BM in a week. Denies fever, cough, sob, N/V, abdominal pain.    OBJECTIVE  PAST MEDICAL & SURGICAL HISTORY  IV drug abuse    ALLERGIES:  No Known Allergies    MEDICATIONS  (STANDING):  acetaminophen     Tablet .. 650 milliGRAM(s) Oral every 6 hours  ALPRAZolam 2 milliGRAM(s) Oral three times a day  buPROPion XL (24-Hour) . 300 milliGRAM(s) Oral daily  cyclobenzaprine 10 milliGRAM(s) Oral every 8 hours  enoxaparin Injectable 40 milliGRAM(s) SubCutaneous daily  ferrous    sulfate 325 milliGRAM(s) Oral daily  folic acid 1 milliGRAM(s) Oral daily  gabapentin 300 milliGRAM(s) Oral three times a day  influenza   Vaccine 0.5 milliLiter(s) IntraMuscular once  lactated ringers. 1000 milliLiter(s) (100 mL/Hr) IV Continuous <Continuous>  methadone    Tablet 40 milliGRAM(s) Oral every 8 hours  mirtazapine 60 milliGRAM(s) Oral at bedtime  multivitamin 1 Tablet(s) Oral daily  nafcillin  IVPB 2 Gram(s) IV Intermittent every 4 hours  nafcillin  IVPB      pantoprazole    Tablet 40 milliGRAM(s) Oral before breakfast  polyethylene glycol 3350 17 Gram(s) Oral two times a day  QUEtiapine 200 milliGRAM(s) Oral three times a day  senna 2 Tablet(s) Oral at bedtime  thiamine 100 milliGRAM(s) Oral daily    MEDICATIONS  (PRN):  aluminum hydroxide/magnesium hydroxide/simethicone Suspension 30 milliLiter(s) Oral every 4 hours PRN Dyspepsia  HYDROmorphone   Tablet 6 milliGRAM(s) Oral every 4 hours PRN Severe Pain (7 - 10)  ketorolac   Injectable 15 milliGRAM(s) IV Push every 6 hours PRN Moderate Pain (4 - 6)  LORazepam     Tablet 1 milliGRAM(s) Oral every 2 hours PRN CIWA-Ar score increase by 2 points and a total score of 7 or less  melatonin 3 milliGRAM(s) Oral at bedtime PRN Insomnia  ondansetron Injectable 4 milliGRAM(s) IV Push every 8 hours PRN Nausea and/or Vomiting      REVIEW OF SYSTEMS:  CONSTITUTIONAL: No fever, weight loss, or fatigue  RESPIRATORY: No cough, wheezing, chills or hemoptysis; No shortness of breath  CARDIOVASCULAR: No chest pain, palpitations, dizziness  GASTROINTESTINAL: No abdominal or epigastric pain. No nausea, vomiting, or hematemesis  NEUROLOGICAL: No headaches, loss of strength, numbness, or tremors  SKIN: No itching, burning, rashes     Vital Signs Last 24 Hrs  T(C): 37.8 (18 Jan 2022 04:56), Max: 37.8 (18 Jan 2022 04:56)  T(F): 100 (18 Jan 2022 04:56), Max: 100 (18 Jan 2022 04:56)  HR: 85 (18 Jan 2022 04:56) (85 - 85)  BP: 137/76 (18 Jan 2022 04:56) (137/76 - 137/76)  RR: 18 (18 Jan 2022 04:56) (18 - 18)    PHYSICAL EXAM:  GENERAL: NAD, well-groomed, well-developed  HEAD:  Atraumatic, Normocephalic  CHEST/LUNG: Breath sounds bilaterally; No accessory muscle use  CARDIOVASCULAR: Regular rate and rhythm; No edema  ABDOMEN: Soft, No guarding, Nondistended; Bowel sounds present  NERVOUS SYSTEM:  Alert & Oriented, Good concentration, No focal deficits  BACK: + Tenderness to palpation of lumbar vertebrae  EXTREMITIES:  2+ Peripheral Pulses, No clubbing, cyanosis  SKIN: Wounds on bilateral wrists, No rashes    LABS:               9.7    8.20  )-----------( 295      ( 18 Jan 2022 04:30 )             31.3     01-16    135  |  98  |  15  ----------------------------<  127<H>  3.9   |  21  |  0.7    Ca    8.4<L>      16 Jan 2022 12:30  Phos  2.4     01-16  Mg     1.9     01-16    TPro  7.5  /  Alb  3.2<L>  /  TBili  0.3  /  DBili  <0.2  /  AST  18  /  ALT  15  /  AlkPhos  96  01-16    PT/INR - ( 17 Jan 2022 11:00 )   PT: CLOTTED sec;   INR: CLOTTED ratio         PTT - ( 17 Jan 2022 11:00 )  PTT:TNP sec      Sedimentation Rate, Erythrocyte: 131 mm/Hr *H* (01-16-22 @ 18:19)  C-Reactive Protein, Serum: 141 mg/L (01-16-22 @ 16:00)  Lactate, Blood: 0.9 mmol/L (01-16-22 @ 16:00)    Gram Stain (16 Jan 2022 20:41):    Growth in aerobic bottle: Gram Positive Cocci in Clusters    Growth in anaerobic bottle: Gram Positive Cocci in Clusters  Preliminary Report (16 Jan 2022 20:41):    Growth in aerobic bottle: Gram Positive Cocci in Clusters    Growth in anaerobic bottle: Gram Positive Cocci in Clusters    ***Blood Panel PCR results on this specimen are available    approximately 3 hours after the Gram stain result.***    Gram stain, PCR, and/or culture results may not always    correspond due to difference in methodologies.    ************************************************************    This PCR assay was performed by multiplex PCR. This    Assay tests for 66 bacterial and resistance gene targets.    Please refer to the [facility] test directory    at [email or web address] for details.  Organism: Blood Culture PCR (01-16-22 @ 18:10)  Organism: Blood Culture PCR (01-16-22 @ 18:10)      -  Staphylococcus aureus: Detec Any isolate of Staphylococcus aureus from a blood culture is NOT considered a contaminant.      Method Type: PCR    Culture - Blood (collected 01-15-22 @ 20:00)  Source: .Blood Blood-Peripheral  Gram Stain (01-16-22 @ 20:41):    Growth in aerobic bottle: Gram Positive Cocci in Clusters    Growth in anaerobic bottle: Gram Positive Cocci in Clusters  Preliminary Report (01-16-22 @ 20:41):    Growth in aerobic bottle: Gram Positive Cocci in Clusters    Growth in anaerobic bottle: Gram Positive Cocci in Clusters    CARDIAC MARKERS ( 15 Baljinder 2022 20:00 )  x     / x     / 63 U/L / x     / x        RADIOLOGY & ADDITIONAL TESTS:    ACC: [id number] EXAM:  CT CERVICAL SPINE                        PROCEDURE DATE:  01/15/2022  @22:23  IMPRESSION:  No acute fracture or subluxation of the cervical spine.      ACC: [id number] EXAM:  CT BRAIN                        PROCEDURE DATE:  01/15/2022  @22:23  IMPRESSION:  No CT evidence for acute intracranial pathology.      ACC: [id number] EXAM:  CT ABDOMEN AND PELVIS IC                        ACC: [id number] EXAM:  CT CHEST IC                        PROCEDURE DATE:  01/15/2022  @22:24  IMPRESSION:  Definitive acute traumatic injury is not identified to the chest, abdomen   or pelvis.  There is an osseous destructive process at the left L5-S1 facet joints   with infiltrative changes extending to the adjacent spinal canal and   neural foramina and associated stenosis. Per notes, patient with   polysubstance abuse. Consider osteomyelitis. Underlying mass is not   excluded. Additional erosive changes noted at the left T10-T11 facet   joint. Further evaluation with MRI can be obtained as needed.      ACC: [id number] EXAM:  XR WRIST COMP MIN 3 VIEWS BI   ACC: [id number] EXAM:  XR FOREARM  2 VIEWS BI                              PROCEDURE DATE:  01/15/2022  @22:31  IMPRESSION:  No evidence of acute fracture or dislocation.  Severe apparent complete soft tissue defect at the distal dorsal wrist.  1.2 cm linear radiodensity within the anterior soft tissue of the right   forearm suspicious for foreign body.      ACC: [id number] EXAM:  XR WRIST COMP MIN 3 VIEWS BI                        PROCEDURE DATE:  01/16/2022  @01:43  IMPRESSION:  Limited evaluation due to positioning. No definite evidence of acute   fracture or dislocation. Consider further evaluation with CT if there is   clinical concern for fracture. If there is clinical concern for   osteomyelitis, consider further evaluation with MRI if not   contraindicated in this patient.  Severe apparent complete soft tissue defect at the distal dorsal wrist   bilaterally.      ACC: [id number] EXAM:  XR HAND 2 VIEWS LT                        PROCEDURE DATE:  01/16/2022  @01:43  IMPRESSION:  No evidence of acute fracture or dislocation of the left hand.      ACC: [id number] EXAM:  XR FOOT 2 VIEWS LT                        PROCEDURE DATE:  01/16/2022  @17:54  IMPRESSION:  No acute displaced fracture. Moderate midfoot degenerative changes.   Achilles tendon insertional enthesophyte measuring 1.1 cm.      ACC: [id number] EXAM:  XR FOREARM  2 VIEWS BI                        PROCEDURE DATE:  01/16/2022  @21:44  IMPRESSION:  No acute displaced fracture.  1.2 cm linear foreign body within the soft tissues overlying the right   radius.  Previously seen left forearm foreign body is no longer visualized.      ACC: [id number] EXAM:  DUPLEX SCAN EXT VEINS LOWER BI                        PROCEDURE DATE:  01/17/2022  Impression:  No evidence of deep venous thrombosis or superficial thrombophlebitis in   the bilateral lower extremities.

## 2022-01-19 LAB
ALBUMIN SERPL ELPH-MCNC: 3.1 G/DL — LOW (ref 3.5–5.2)
ALP SERPL-CCNC: 126 U/L — HIGH (ref 30–115)
ALT FLD-CCNC: 37 U/L — SIGNIFICANT CHANGE UP (ref 0–41)
ANION GAP SERPL CALC-SCNC: 17 MMOL/L — HIGH (ref 7–14)
AST SERPL-CCNC: 20 U/L — SIGNIFICANT CHANGE UP (ref 0–41)
BASOPHILS # BLD AUTO: 0.02 K/UL — SIGNIFICANT CHANGE UP (ref 0–0.2)
BASOPHILS NFR BLD AUTO: 0.3 % — SIGNIFICANT CHANGE UP (ref 0–1)
BILIRUB SERPL-MCNC: <0.2 MG/DL — SIGNIFICANT CHANGE UP (ref 0.2–1.2)
BUN SERPL-MCNC: 15 MG/DL — SIGNIFICANT CHANGE UP (ref 10–20)
CALCIUM SERPL-MCNC: 8.4 MG/DL — LOW (ref 8.5–10.1)
CHLORIDE SERPL-SCNC: 104 MMOL/L — SIGNIFICANT CHANGE UP (ref 98–110)
CO2 SERPL-SCNC: 21 MMOL/L — SIGNIFICANT CHANGE UP (ref 17–32)
CREAT SERPL-MCNC: 0.9 MG/DL — SIGNIFICANT CHANGE UP (ref 0.7–1.5)
CULTURE RESULTS: SIGNIFICANT CHANGE UP
EOSINOPHIL # BLD AUTO: 0.12 K/UL — SIGNIFICANT CHANGE UP (ref 0–0.7)
EOSINOPHIL NFR BLD AUTO: 1.7 % — SIGNIFICANT CHANGE UP (ref 0–8)
GLUCOSE SERPL-MCNC: 173 MG/DL — HIGH (ref 70–99)
HCT VFR BLD CALC: 31.2 % — LOW (ref 42–52)
HGB BLD-MCNC: 9.6 G/DL — LOW (ref 14–18)
IMM GRANULOCYTES NFR BLD AUTO: 0.4 % — HIGH (ref 0.1–0.3)
LYMPHOCYTES # BLD AUTO: 1.85 K/UL — SIGNIFICANT CHANGE UP (ref 1.2–3.4)
LYMPHOCYTES # BLD AUTO: 25.9 % — SIGNIFICANT CHANGE UP (ref 20.5–51.1)
MAGNESIUM SERPL-MCNC: 2.2 MG/DL — SIGNIFICANT CHANGE UP (ref 1.8–2.4)
MCHC RBC-ENTMCNC: 25.5 PG — LOW (ref 27–31)
MCHC RBC-ENTMCNC: 30.8 G/DL — LOW (ref 32–37)
MCV RBC AUTO: 82.8 FL — SIGNIFICANT CHANGE UP (ref 80–94)
MONOCYTES # BLD AUTO: 0.65 K/UL — HIGH (ref 0.1–0.6)
MONOCYTES NFR BLD AUTO: 9.1 % — SIGNIFICANT CHANGE UP (ref 1.7–9.3)
NEUTROPHILS # BLD AUTO: 4.48 K/UL — SIGNIFICANT CHANGE UP (ref 1.4–6.5)
NEUTROPHILS NFR BLD AUTO: 62.6 % — SIGNIFICANT CHANGE UP (ref 42.2–75.2)
NRBC # BLD: 0 /100 WBCS — SIGNIFICANT CHANGE UP (ref 0–0)
PLATELET # BLD AUTO: 326 K/UL — SIGNIFICANT CHANGE UP (ref 130–400)
POTASSIUM SERPL-MCNC: 3.9 MMOL/L — SIGNIFICANT CHANGE UP (ref 3.5–5)
POTASSIUM SERPL-SCNC: 3.9 MMOL/L — SIGNIFICANT CHANGE UP (ref 3.5–5)
PROT SERPL-MCNC: 7.1 G/DL — SIGNIFICANT CHANGE UP (ref 6–8)
RBC # BLD: 3.77 M/UL — LOW (ref 4.7–6.1)
RBC # FLD: 16.2 % — HIGH (ref 11.5–14.5)
SODIUM SERPL-SCNC: 142 MMOL/L — SIGNIFICANT CHANGE UP (ref 135–146)
SPECIMEN SOURCE: SIGNIFICANT CHANGE UP
WBC # BLD: 7.15 K/UL — SIGNIFICANT CHANGE UP (ref 4.8–10.8)
WBC # FLD AUTO: 7.15 K/UL — SIGNIFICANT CHANGE UP (ref 4.8–10.8)

## 2022-01-19 PROCEDURE — 99221 1ST HOSP IP/OBS SF/LOW 40: CPT

## 2022-01-19 PROCEDURE — 93325 DOPPLER ECHO COLOR FLOW MAPG: CPT | Mod: 26

## 2022-01-19 PROCEDURE — 99233 SBSQ HOSP IP/OBS HIGH 50: CPT

## 2022-01-19 PROCEDURE — 93312 ECHO TRANSESOPHAGEAL: CPT | Mod: 26

## 2022-01-19 PROCEDURE — 93320 DOPPLER ECHO COMPLETE: CPT | Mod: 26

## 2022-01-19 RX ADMIN — Medication 325 MILLIGRAM(S): at 11:47

## 2022-01-19 RX ADMIN — Medication 2 MILLIGRAM(S): at 21:06

## 2022-01-19 RX ADMIN — GABAPENTIN 300 MILLIGRAM(S): 400 CAPSULE ORAL at 05:10

## 2022-01-19 RX ADMIN — Medication 2 MILLIGRAM(S): at 13:17

## 2022-01-19 RX ADMIN — NAFCILLIN 200 GRAM(S): 10 INJECTION, POWDER, FOR SOLUTION INTRAVENOUS at 13:18

## 2022-01-19 RX ADMIN — NAFCILLIN 200 GRAM(S): 10 INJECTION, POWDER, FOR SOLUTION INTRAVENOUS at 21:05

## 2022-01-19 RX ADMIN — Medication 1 TABLET(S): at 11:47

## 2022-01-19 RX ADMIN — GABAPENTIN 300 MILLIGRAM(S): 400 CAPSULE ORAL at 13:18

## 2022-01-19 RX ADMIN — Medication 650 MILLIGRAM(S): at 23:07

## 2022-01-19 RX ADMIN — Medication 1 MILLIGRAM(S): at 11:46

## 2022-01-19 RX ADMIN — Medication 650 MILLIGRAM(S): at 12:22

## 2022-01-19 RX ADMIN — MIRTAZAPINE 60 MILLIGRAM(S): 45 TABLET, ORALLY DISINTEGRATING ORAL at 21:08

## 2022-01-19 RX ADMIN — NAFCILLIN 200 GRAM(S): 10 INJECTION, POWDER, FOR SOLUTION INTRAVENOUS at 09:06

## 2022-01-19 RX ADMIN — METHADONE HYDROCHLORIDE 40 MILLIGRAM(S): 40 TABLET ORAL at 05:08

## 2022-01-19 RX ADMIN — NAFCILLIN 200 GRAM(S): 10 INJECTION, POWDER, FOR SOLUTION INTRAVENOUS at 05:09

## 2022-01-19 RX ADMIN — NAFCILLIN 200 GRAM(S): 10 INJECTION, POWDER, FOR SOLUTION INTRAVENOUS at 01:07

## 2022-01-19 RX ADMIN — QUETIAPINE FUMARATE 200 MILLIGRAM(S): 200 TABLET, FILM COATED ORAL at 13:18

## 2022-01-19 RX ADMIN — GABAPENTIN 300 MILLIGRAM(S): 400 CAPSULE ORAL at 21:07

## 2022-01-19 RX ADMIN — Medication 650 MILLIGRAM(S): at 05:09

## 2022-01-19 RX ADMIN — METHADONE HYDROCHLORIDE 40 MILLIGRAM(S): 40 TABLET ORAL at 13:17

## 2022-01-19 RX ADMIN — QUETIAPINE FUMARATE 200 MILLIGRAM(S): 200 TABLET, FILM COATED ORAL at 21:08

## 2022-01-19 RX ADMIN — CYCLOBENZAPRINE HYDROCHLORIDE 10 MILLIGRAM(S): 10 TABLET, FILM COATED ORAL at 13:23

## 2022-01-19 RX ADMIN — NAFCILLIN 200 GRAM(S): 10 INJECTION, POWDER, FOR SOLUTION INTRAVENOUS at 17:04

## 2022-01-19 RX ADMIN — Medication 650 MILLIGRAM(S): at 11:46

## 2022-01-19 RX ADMIN — PANTOPRAZOLE SODIUM 40 MILLIGRAM(S): 20 TABLET, DELAYED RELEASE ORAL at 06:27

## 2022-01-19 RX ADMIN — QUETIAPINE FUMARATE 200 MILLIGRAM(S): 200 TABLET, FILM COATED ORAL at 05:10

## 2022-01-19 RX ADMIN — POLYETHYLENE GLYCOL 3350 17 GRAM(S): 17 POWDER, FOR SOLUTION ORAL at 17:08

## 2022-01-19 RX ADMIN — POLYETHYLENE GLYCOL 3350 17 GRAM(S): 17 POWDER, FOR SOLUTION ORAL at 05:09

## 2022-01-19 RX ADMIN — BUPROPION HYDROCHLORIDE 300 MILLIGRAM(S): 150 TABLET, EXTENDED RELEASE ORAL at 11:47

## 2022-01-19 RX ADMIN — Medication 2 MILLIGRAM(S): at 05:09

## 2022-01-19 RX ADMIN — Medication 1 MILLIGRAM(S): at 11:47

## 2022-01-19 RX ADMIN — CYCLOBENZAPRINE HYDROCHLORIDE 10 MILLIGRAM(S): 10 TABLET, FILM COATED ORAL at 21:07

## 2022-01-19 RX ADMIN — SENNA PLUS 2 TABLET(S): 8.6 TABLET ORAL at 21:08

## 2022-01-19 RX ADMIN — CYCLOBENZAPRINE HYDROCHLORIDE 10 MILLIGRAM(S): 10 TABLET, FILM COATED ORAL at 05:09

## 2022-01-19 RX ADMIN — METHADONE HYDROCHLORIDE 40 MILLIGRAM(S): 40 TABLET ORAL at 21:08

## 2022-01-19 NOTE — CONSULT NOTE ADULT - ATTENDING COMMENTS
aristeo today  risks and benefits explained  further recom after aristeo
Abscesses too small for drain to be placed with no safe percutaneous window for drainage of psoas abscess.

## 2022-01-19 NOTE — CHART NOTE - NSCHARTNOTEFT_GEN_A_CORE
POST OPERATIVE PROCEDURAL DOCUMENTATION  PRE-OP DIAGNOSIS:  r/o IE    POST-OP DIAGNOSIS: r/o IE    PROCEDURE: Transesophageal echocardiogram    Primary Physician: Dr. Roth  Assistant: Lillian Keith    ANESTHESIA TYPE  [  ] General Anesthesia  [ x ] Conscious Sedation  [  ] Local/Regional    CONDITION  [  ] Critical  [  ] Serious  [  ] Fair  [ x ] Good    SPECIMENS REMOVED (IF APPLICABLE): N/A    IMPLANTS (IF APPLICABLE): None    ESTIMATED BLOOD LOSS: None    COMPLICATIONS: None    After risks and benefits of procedures were explained, informed consent was obtained and placed in chart.   The patient received topical anesthetic to the oropharynx with viscous lidocaine and benzocaine spray.  Refer to Anesthesia note for sedation details.  The PAM probe was passed into the esophagus without difficulty.  Transesophageal and transgastric images were obtained.  The PAM probe was removed without difficulty and examined.  There was no evidence for bleeding.  The patient tolerated the procedure well without any immediate PAM-related complications.      Preliminary Findings:  LA: normal size  JOHN: Left atrial appendage was clear of clot and smoke.  LV: LVEF was estimated at 55-65%  MV: No evidence for MR, No evidence for MS.   AV: No evidence for AI, no evidence for AS.  RA: normal size  RV: normal size and function  TV: no TR.   IAS: no PFO. NO R-> L shunt.    DIAGNOSIS/IMPRESSION:    no evidence of IE  Full report to follow    PLAN OF CARE:  [x] Return to inpatient bed when stable and fully awake.  [] Resume eliquis, xarelto, coumadin, heparin   [] No eating or drinking for 1 hour  [] EP eval to interrogate device    Results of procedure/ plan of care discussed with patient/  in detail. POST OPERATIVE PROCEDURAL DOCUMENTATION  PRE-OP DIAGNOSIS:  r/o IE    POST-OP DIAGNOSIS: r/o IE    PROCEDURE: Transesophageal echocardiogram    Primary Physician: Dr. Roth  Assistant: Lillian Keith    ANESTHESIA TYPE  [  ] General Anesthesia  [ x ] Conscious Sedation  [  ] Local/Regional    CONDITION  [  ] Critical  [  ] Serious  [  ] Fair  [ x ] Good    SPECIMENS REMOVED (IF APPLICABLE): N/A    IMPLANTS (IF APPLICABLE): None    ESTIMATED BLOOD LOSS: None    COMPLICATIONS: None    After risks and benefits of procedures were explained, informed consent was obtained and placed in chart.   The patient received topical anesthetic to the oropharynx with viscous lidocaine and benzocaine spray.  Refer to Anesthesia note for sedation details.  The PAM probe was passed into the esophagus without difficulty.  Transesophageal and transgastric images were obtained.  The PAM probe was removed without difficulty and examined.  There was no evidence for bleeding.  The patient tolerated the procedure well without any immediate PAM-related complications.      Preliminary Findings:  LA: normal size  JOHN: Left atrial appendage was clear of clot and smoke.  LV: LVEF was estimated at 55-65%  MV: Trace MR, No evidence for MS.   AV: No evidence for AI, no evidence for AS.  RA: normal size  RV: normal size and function  TV: trace TR.   IAS: no PFO. NO R-> L shunt.    DIAGNOSIS/IMPRESSION:    no evidence of IE  Full report to follow    PLAN OF CARE:  [x] Return to inpatient bed when stable and fully awake.  [] Resume eliquis, xarelto, coumadin, heparin   [] No eating or drinking for 1 hour  [] EP eval to interrogate device    Results of procedure/ plan of care discussed with patient/  in detail.

## 2022-01-19 NOTE — PROGRESS NOTE ADULT - SUBJECTIVE AND OBJECTIVE BOX
MIKAEL MCDERMOTT  51y, Male  Allergy: No Known Allergies      LOS  3d    CHIEF COMPLAINT: Acute low back pain, c/f osteomyelitis (18 Jan 2022 10:36)      INTERVAL EVENTS/HPI  - No acute events overnight  - T(F): , Max: 97.8 (01-18-22 @ 21:16)  - s/p PAM - negative for endocarditis   - WBC Count: 7.15 (01-19-22 @ 08:25)  WBC Count: 8.20 (01-18-22 @ 04:30)     - Creatinine, Serum: 0.9 (01-19-22 @ 08:25)  Creatinine, Serum: 0.9 (01-18-22 @ 04:30)       ROS  General: Denies rigors, nightsweats  HEENT: Denies headache, rhinorrhea, sore throat, eye pain  CV: Denies CP, palpitations  PULM: Denies wheezing, hemoptysis  GI: Denies hematemesis, hematochezia, melena  : Denies discharge, hematuria  MSK: Denies arthralgias, myalgias  SKIN: Denies rash, lesions  NEURO: Denies paresthesias, weakness  PSYCH: Denies depression, anxiety    VITALS:  T(F): 96.1, Max: 97.8 (01-18-22 @ 21:16)  HR: 92  BP: 122/71  RR: 20Vital Signs Last 24 Hrs  T(C): 35.6 (19 Jan 2022 14:51), Max: 36.6 (18 Jan 2022 21:16)  T(F): 96.1 (19 Jan 2022 14:51), Max: 97.8 (18 Jan 2022 21:16)  HR: 92 (19 Jan 2022 14:51) (86 - 97)  BP: 122/71 (19 Jan 2022 14:51) (122/71 - 151/92)  BP(mean): --  RR: 20 (19 Jan 2022 14:51) (18 - 22)  SpO2: --    PHYSICAL EXAM:  Gen: NAD, resting in bed  HEENT: Normocephalic, atraumatic  Neck: supple, no lymphadenopathy  CV: Regular rate & regular rhythm  Lungs: decreased BS at bases, no fremitus  Abdomen: Soft, BS present  Ext: Warm, well perfused  Neuro: non focal, awake  Skin: no rash, no erythema  Lines: no phlebitis    FH: Non-contributory  Social Hx: Non-contributory    TESTS & MEASUREMENTS:                        9.6    7.15  )-----------( 326      ( 19 Jan 2022 08:25 )             31.2     01-19    142  |  104  |  15  ----------------------------<  173<H>  3.9   |  21  |  0.9    Ca    8.4<L>      19 Jan 2022 08:25  Mg     2.2     01-19    TPro  7.1  /  Alb  3.1<L>  /  TBili  <0.2  /  DBili  x   /  AST  20  /  ALT  37  /  AlkPhos  126<H>  01-19    eGFR if : 113 mL/min/1.73M2 (01-19-22 @ 08:25)  eGFR if Non African American: 98 mL/min/1.73M2 (01-19-22 @ 08:25)    LIVER FUNCTIONS - ( 19 Jan 2022 08:25 )  Alb: 3.1 g/dL / Pro: 7.1 g/dL / ALK PHOS: 126 U/L / ALT: 37 U/L / AST: 20 U/L / GGT: x               Culture - Blood (collected 01-17-22 @ 11:00)  Source: .Blood Blood  Gram Stain (01-18-22 @ 11:15):    Growth in aerobic bottle: Gram Positive Cocci in Clusters    Growth in anaerobic bottle: Gram Positive Cocci in Clusters  Preliminary Report (01-18-22 @ 11:15):    Growth in aerobic bottle: Gram Positive Cocci in Clusters    Growth in anaerobic bottle: Gram Positive Cocci in Clusters    Culture - Blood (collected 01-15-22 @ 20:31)  Source: .Blood Blood-Peripheral  Gram Stain (01-16-22 @ 20:40):    Growth in aerobic bottle: Gram Positive Cocci in Clusters    Growth in anaerobic bottle: Gram Positive Cocci in Clusters  Final Report (01-18-22 @ 19:30):    Growth in aerobic and anaerobic bottles: Staphylococcus aureus    ***Blood Panel PCR results on this specimen are available    approximately 3 hours after the Gram stain result.***    Gram stain, PCR, and/or culture results may not always    correspond dueto difference in methodologies.    ************************************************************    This PCR assay was performed by multiplex PCR. This    Assay tests for 66 bacterial and resistance gene targets.    Please refer to the [facility] test directory    at [email or web address] for details.  Organism: Blood Culture PCR  Staphylococcus aureus (01-18-22 @ 19:30)  Organism: Staphylococcus aureus (01-18-22 @ 19:30)      -  Ampicillin/Sulbactam: S <=8/4      -  Cefazolin: S <=4      -  Clindamycin: S <=0.25      -  Erythromycin: S <=0.25      -  Gentamicin: S <=1 Should not be used as monotherapy      -  Oxacillin: S <=0.25      -  Penicillin: R >8      -  Rifampin: S <=1 Should not be used as monotherapy      -  Tetra/Doxy: S <=1      -  Trimethoprim/Sulfamethoxazole: S <=0.5/9.5      -  Vancomycin: S 2      Method Type: WILLY  Organism: Blood Culture PCR (01-18-22 @ 19:30)      -  Staphylococcus aureus: Detec Any isolate of Staphylococcus aureus from a blood culture is NOT considered a contaminant.      Method Type: PCR    Culture - Blood (collected 01-15-22 @ 20:00)  Source: .Blood Blood-Peripheral  Gram Stain (01-16-22 @ 20:41):    Growth in aerobic bottle: Gram Positive Cocci in Clusters    Growth in anaerobic bottle: Gram Positive Cocci in Clusters  Final Report (01-18-22 @ 19:31):    Growth in aerobic and anaerobic bottles: Staphylococcus aureus    See previous culture [id number]        Lactate, Blood: 0.9 mmol/L (01-16-22 @ 16:00)      INFECTIOUS DISEASES TESTING  COVID-19 PCR: NotDetec (01-18-22 @ 18:45)  HIV-1/2 Combo Result: Nonreact (01-18-22 @ 04:30)  Procalcitonin, Serum: 0.13 (01-16-22 @ 16:00)  COVID-19 PCR: NotDetec (01-15-22 @ 23:32)      INFLAMMATORY MARKERS  Sedimentation Rate, Erythrocyte: 131 mm/Hr (01-16-22 @ 18:19)  C-Reactive Protein, Serum: 141 mg/L (01-16-22 @ 16:00)      RADIOLOGY & ADDITIONAL TESTS:  I have personally reviewed the last available Chest xray  CXR      CT      CARDIOLOGY TESTING  12 Lead ECG:   Ventricular Rate 79 BPM    Atrial Rate 79 BPM    P-R Interval 146 ms    QRS Duration 90 ms    Q-T Interval 412 ms    QTC Calculation(Bazett) 472 ms    P Axis 65 degrees    R Axis 74 degrees    T Axis 74 degrees    Diagnosis Line Normal sinus rhythm  Normal ECG    Confirmed by Gabe Brown ([id number]) on 1/16/2022 12:56:17 PM (01-16-22 @ 10:43)  12 Lead ECG:   Ventricular Rate 89 BPM    Atrial Rate 89 BPM    P-R Interval 142 ms    QRS Duration 94 ms    Q-T Interval 380 ms    QTC Calculation(Bazett) 462 ms    P Axis 65 degrees    R Axis 65 degrees    T Axis 64 degrees    Diagnosis Line Normal sinus rhythm  Normal ECG    Confirmed by Gabe Brown ([id number]) on 1/16/2022 12:49:12 PM (01-15-22 @ 19:55)      MEDICATIONS  acetaminophen     Tablet .. 650 Oral every 6 hours  ALPRAZolam 2 Oral three times a day  buPROPion XL (24-Hour) . 300 Oral daily  cyclobenzaprine 10 Oral every 8 hours  enoxaparin Injectable 40 SubCutaneous daily  ferrous    sulfate 325 Oral daily  folic acid 1 Oral daily  gabapentin 300 Oral three times a day  influenza   Vaccine 0.5 IntraMuscular once  lactated ringers. 1000 IV Continuous <Continuous>  methadone    Tablet 40 Oral every 8 hours  mirtazapine 60 Oral at bedtime  multivitamin 1 Oral daily  nafcillin  IVPB 2 IV Intermittent every 4 hours  nafcillin  IVPB     pantoprazole    Tablet 40 Oral before breakfast  polyethylene glycol 3350 17 Oral two times a day  QUEtiapine 200 Oral three times a day  senna 2 Oral at bedtime      WEIGHT  Weight (kg): 107 (01-19-22 @ 13:45)  Creatinine, Serum: 0.9 mg/dL (01-19-22 @ 08:25)      ANTIBIOTICS:  nafcillin  IVPB 2 Gram(s) IV Intermittent every 4 hours  nafcillin  IVPB          All available historical records have been reviewed

## 2022-01-19 NOTE — MEDICAL STUDENT PROGRESS NOTE(EDUCATION) - NS MD HP STUD ASPLAN PLAN FT
# Vertebral Osteomyelitis  # Sepsis POA  # Gram positive bacteremia   > on admission HR 91, wbc 16.6k  > ,     > Blood culture 1/15 + GPC in clusters, 1/17 + GPC in clusters      > CT AP - There is an osseous destructive process at the left L5-S1 facet joints with infiltrative changes extending to the adjacent spinal canal and neural foramina and associated stenosis. Consider osteomyelitis. Underlying mass is not excluded.  >MRI performed with retained foreign object in right arm - okay by radiology  >MRI showed left L5-S1 facetitis with associated multifocal   abscess formation directly adjacent to the left facet joint, within the   left retroperitoneal soft tissues as well as the right psoas musculature.  > TTE neg  > Hep C weakly reactive - obtain viral load  - Nafcillin 2g q4h  - Repeat blood cultures daily  - PAM  - Consult IR for abscess    #Right forearm foreign body  - 1.2cm linear foreign body overlying right radius  - Surgery consult for removal - no evidence of infection, not recommending removal (If positive for osteomyelitis, will revisit removal of foreign body)  - As per radiology, the foreign body is not contraindication for MRI     #Fall   - likely mechanical   - cth negative, trauma workup negative   - pt ot rehab     # Bl open wounds on wrist   - burn appreciated, Wound care - Xeroform/Kelrex BID, No Surgical debridement     # Lt foot pain,   - warm, pulse palpated, parasthesias noted  - xray foot - no fracture, midfoot degenerative changes  - Duplex LE neg    # IVDU  # suspected ETOH use   # suspected thiamine folate deficiency   - Pt reports taking heroin for years, last use 2 weeks ago  - Confirmed with methadone clinic ([phone]), pt reports taking 135mg methadone per day  - addiction medicine consulted  - pt takes xanax 2mg TID, has withdrawal symptoms when not taking them.   - c/w welbutrin 300 qd and Seroquel 300 TID   - f/u drug screen   - continue thiamine folate supplementation   - pain management appreciated   - tylenol 650mg q8 standing   - gabapentin 300mg q8hr   - cyclobenzaprine 10mg q8hr   - dilaudid 6mg po q4 prn   - methadone 40mg q8hr standing   - ketorolac 15mg q6hr prn   - xanax 2mg q8hr     #Anxiety   - continue home xanax 2mg TID, has withdrawal symptoms when not taking them.   - c/w welbutrin 300 qd and Seroquel 300 TID   - f/u drug screen     #Normocytic Anemia   - multifactorial, iron deficiency, anemia of chronic disease,   - start on folate, MV  - ferrous sulfate 325mg qd     # DVT ppx- LMWH  # GI ppx- PPI  # Activity- AAT  # Diet- Reg  # Code status - full  # Dispo- from home, acute     Today:  - Repeat blood cultures daily  - PAM  - Consult IR for abscess             # Vertebral Osteomyelitis  # Sepsis POA  # Gram positive bacteremia   > on admission HR 91, wbc 16.6k  > ,     > Blood culture 1/15 + GPC in clusters, 1/17 + GPC in clusters      > CT AP - There is an osseous destructive process at the left L5-S1 facet joints with infiltrative changes extending to the adjacent spinal canal and neural foramina and associated stenosis. Consider osteomyelitis. Underlying mass is not excluded.  >MRI performed with retained foreign object in right arm - okay by radiology  >MRI showed left L5-S1 facetitis with associated multifocal   abscess formation directly adjacent to the left facet joint, within the   left retroperitoneal soft tissues as well as the right psoas musculature.  > TTE neg  > Hep C weakly reactive - obtain viral load  - Nafcillin 2g q4h  - Repeat blood cultures daily  - PAM  - Consult IR for abscess    #Right forearm foreign body  - 1.2cm linear foreign body overlying right radius  - Surgery consult for removal - no evidence of infection, not recommending removal (If positive for osteomyelitis, will revisit removal of foreign body)  - As per radiology, the foreign body is not contraindication for MRI     #Fall   - likely mechanical   - cth negative, trauma workup negative   - pt ot rehab     # Bl open wounds on wrist   - burn appreciated, Wound care - Xeroform/Kelrex BID, No Surgical debridement     # Lt foot pain,   - warm, pulse palpated, parasthesias noted  - xray foot - no fracture, midfoot degenerative changes  - Duplex LE neg    # IVDU  # suspected ETOH use   # suspected thiamine folate deficiency   - Pt reports taking heroin for years, last use 2 weeks ago  - Confirmed with methadone clinic ([phone]), pt reports taking 135mg methadone per day  - addiction medicine consulted  - pt takes xanax 2mg TID, has withdrawal symptoms when not taking them.   - c/w welbutrin 300 qd and Seroquel 300 TID   - f/u drug screen   - continue thiamine folate supplementation   - pain management appreciated   - tylenol 650mg q8 standing   - gabapentin 300mg q8hr   - cyclobenzaprine 10mg q8hr   - dilaudid 6mg po q4 prn   - methadone 40mg q8hr standing   - ketorolac 15mg q6hr prn   - xanax 2mg q8hr     #Anxiety   - continue home xanax 2mg TID, has withdrawal symptoms when not taking them.   - c/w welbutrin 300 qd and Seroquel 300 TID   - f/u drug screen     #Normocytic Anemia   - multifactorial, iron deficiency, anemia of chronic disease,   - start on folate, MV  - ferrous sulfate 325mg qd     # DVT ppx- LMWH  # GI ppx- PPI  # Activity- AAT  # Diet- Reg  # Code status - full  # Dispo- from home, acute     Today:  - Repeat blood cultures daily  - PAM  - Consult IR for abscess

## 2022-01-19 NOTE — CONSULT NOTE ADULT - CONSULT REQUESTED DATE/TIME
16-Jan-2022 11:57
17-Jan-2022 13:06
16-Jan-2022 00:04
17-Jan-2022 11:37
17-Jan-2022 11:55
18-Jan-2022 17:24
19-Jan-2022 11:04
15-Baljinder-2022 09:00
16-Jan-2022 10:00
17-Jan-2022 09:00

## 2022-01-19 NOTE — PROGRESS NOTE ADULT - ASSESSMENT
51-year-old male past medical history of polysubstance abuse who presents status post fall. Has back pain radiating to LLE, CT conerning for osteomyolitis.     # Sepsis POA  # Vertebral Osteomyelitis  # MSSA Bacteremia   - on admission HR 91, wbc 16.6k  - ,      - Blood culture 1/15 + GPC in clusters, 1/17 + GPC in clusters     - CT AP - osseous destructive process at the left L5-S1 facet joints with infiltrative changes extending to the adjacent spinal canal and neural foramina and associated stenosis. Consider osteomyelitis.   - MRI - findings consistent with L5 S1 facetitis, multifocal abscess within left retroperitoneal soft tissues and right psoas   - ID appreciated   - nafcillin 2g q4hrs   - repeat blood cultures daily   - TTE no evidence of endocarditis   - PAM today   - IR consult for possible abscess drainage     #Right forearm foreign body  - 1.2cm linear foreign body overlying right radius  - surgery consult for removal - not recommending removal given difficulty of extraction, and no evidence of infection,   - not a contraindication for MRI     #Fall   - likely mechanical   - cth negative, trauma workup negative   - pt ot rehab     # Bl open wounds on wrist   - burn appreciated, Wound care - Xeroform/Kelrex BID, no Surgical debridement     # Lt foot pain   - likely radicular etiology   - xray foot - no fracture, midfoot degenerative changes,  - duplex LE negative for DVT     # IVDU  # suspected ETOH use   # suspected thiamine folate deficiency   - active heroin use, last use 2 weeks ago  - reports taking 135mg methadone per day, dose confirmed with Juan Ramon Flores RN at methadone clinic, last received 3 day supply on 1/14,  - addiction medicine consulted  - continue thiamine folate supplementation   - pain management appreciated   - methadone 40mg q8hr standing   - tylenol 650mg q8 standing   - gabapentin 300mg q8hr   - cyclobenzaprine 10mg q8hr   - dilaudid 6mg po q4 prn   - ketorolac 15mg q6hr prn     #Mood dx   #Anxiety   - continue home xanax 2mg TID, has withdrawal symptoms when not taking them.   - c/w welbutrin 300 qd and Seroquel 300 TID   - f/u drug screen     #Normocytic Anemia   - multifactorial, iron deficiency, anemia of chronic disease,   - continue folate, MV,  - ferrous sulfate 325mg qd     # DVT ppx- LMWH  # GI ppx- PPI  # Activity- AAT  # Diet- Reg  # Code status - full  # Dispo- from home, acute     Pending - blood cultures daily, PAM, IR fu       51-year-old male past medical history of polysubstance abuse who presents status post fall. Has back pain radiating to LLE, CT conerning for osteomyolitis.     # Sepsis POA  # Vertebral Osteomyelitis with adjacent soft tissue abscess   # MSSA Bacteremia   - on admission HR 91, wbc 16.6k  - ,      - Blood culture 1/15 + GPC in clusters, 1/17 + GPC in clusters     - CT AP - osseous destructive process at the left L5-S1 facet joints with infiltrative changes extending to the adjacent spinal canal and neural foramina and associated stenosis. Consider osteomyelitis.   - MRI - findings consistent with L5 S1 facetitis, multifocal abscess within left retroperitoneal soft tissues and right psoas   - ID appreciated   - nafcillin 2g q4hrs   - repeat blood cultures daily   - TTE no evidence of endocarditis   - PAM today   - IR consult for possible abscess drainage     #Right forearm foreign body  - 1.2cm linear foreign body overlying right radius  - surgery consult for removal - not recommending removal given difficulty of extraction, and no evidence of infection,   - not a contraindication for MRI     #Fall   - likely mechanical   - cth negative, trauma workup negative   - pt ot rehab     # Bl open wounds on wrist   - burn appreciated, Wound care - Xeroform/Kelrex BID, no Surgical debridement     # Lt foot pain   - likely radicular etiology   - xray foot - no fracture, midfoot degenerative changes,  - duplex LE negative for DVT     # IVDU  # suspected ETOH use   # suspected thiamine folate deficiency   - active heroin use, last use 2 weeks ago  - reports taking 135mg methadone per day, dose confirmed with Juan Ramon Flores RN at methadone clinic, last received 3 day supply on 1/14,  - addiction medicine consulted  - continue thiamine folate supplementation   - pain management appreciated   - methadone 40mg q8hr standing   - tylenol 650mg q8 standing   - gabapentin 300mg q8hr   - cyclobenzaprine 10mg q8hr   - dilaudid 6mg po q4 prn   - ketorolac 15mg q6hr prn     #Mood dx   #Anxiety   - continue home xanax 2mg TID, has withdrawal symptoms when not taking them.   - c/w welbutrin 300 qd and Seroquel 300 TID   - f/u drug screen     #Normocytic Anemia   - multifactorial, iron deficiency, anemia of chronic disease,   - continue folate, MV,  - ferrous sulfate 325mg qd     # DVT ppx- LMWH  # GI ppx- PPI  # Activity- AAT  # Diet- Reg  # Code status - full  # Dispo- from home, acute     Pending - blood cultures daily, PAM, IR fu

## 2022-01-19 NOTE — CONSULT NOTE ADULT - PROVIDER SPECIALTY LIST ADULT
Neurosurgery
Pain Medicine
Addiction Medicine
Burn
Cardiology
Rehab Medicine
Surgery
Intervent Radiology
Infectious Disease
Pain Medicine

## 2022-01-19 NOTE — PRE-ANESTHESIA EVALUATION ADULT - NSANTHOSAYNRD_GEN_A_CORE
No. DIONICIO screening performed.  STOP BANG Legend: 0-2 = LOW Risk; 3-4 = INTERMEDIATE Risk; 5-8 = HIGH Risk

## 2022-01-19 NOTE — CONSULT NOTE ADULT - CONSULT REQUESTED BY NAME
Dudley Rosenberg
ED
Medicine team
[facility]
Dr. Roth
Medicine
Medicine
house doctor
team
Dudley Rosenberg

## 2022-01-19 NOTE — MEDICAL STUDENT PROGRESS NOTE(EDUCATION) - SUBJECTIVE AND OBJECTIVE BOX
Hospital Day: 2d    50y/o male with PMH of IV drug use and polysubstance abuse who presented s/p fall, currently admitted with the primary diagnosis of vertebral osteomyelitis.     SUBJECTIVE    No acute events overnight. Patient examined at bedside, AOX3. Pt states he feels well. Pt's constipation resolved with laxative use. Denies fever, cough, chest pain, sob, N/V, abdominal pain.    OBJECTIVE  PAST MEDICAL & SURGICAL HISTORY  IV drug abuse    ALLERGIES:  No Known Allergies    MEDICATIONS  (STANDING):  acetaminophen     Tablet .. 650 milliGRAM(s) Oral every 6 hours  ALPRAZolam 2 milliGRAM(s) Oral three times a day  buPROPion XL (24-Hour) . 300 milliGRAM(s) Oral daily  cyclobenzaprine 10 milliGRAM(s) Oral every 8 hours  enoxaparin Injectable 40 milliGRAM(s) SubCutaneous daily  ferrous    sulfate 325 milliGRAM(s) Oral daily  folic acid 1 milliGRAM(s) Oral daily  gabapentin 300 milliGRAM(s) Oral three times a day  influenza   Vaccine 0.5 milliLiter(s) IntraMuscular once  lactated ringers. 1000 milliLiter(s) (100 mL/Hr) IV Continuous <Continuous>  methadone    Tablet 40 milliGRAM(s) Oral every 8 hours  mirtazapine 60 milliGRAM(s) Oral at bedtime  multivitamin 1 Tablet(s) Oral daily  nafcillin  IVPB      nafcillin  IVPB 2 Gram(s) IV Intermittent every 4 hours  pantoprazole    Tablet 40 milliGRAM(s) Oral before breakfast  polyethylene glycol 3350 17 Gram(s) Oral two times a day  QUEtiapine 200 milliGRAM(s) Oral three times a day  senna 2 Tablet(s) Oral at bedtime    MEDICATIONS  (PRN):  aluminum hydroxide/magnesium hydroxide/simethicone Suspension 30 milliLiter(s) Oral every 4 hours PRN Dyspepsia  HYDROmorphone   Tablet 6 milliGRAM(s) Oral every 4 hours PRN Severe Pain (7 - 10)  ketorolac   Injectable 15 milliGRAM(s) IV Push every 6 hours PRN Moderate Pain (4 - 6)  LORazepam     Tablet 1 milliGRAM(s) Oral every 2 hours PRN CIWA-Ar score increase by 2 points and a total score of 7 or less  melatonin 3 milliGRAM(s) Oral at bedtime PRN Insomnia  ondansetron Injectable 4 milliGRAM(s) IV Push every 8 hours PRN Nausea and/or Vomiting    REVIEW OF SYSTEMS:  CONSTITUTIONAL: No fever, weight loss, or fatigue  RESPIRATORY: No cough, wheezing, chills or hemoptysis; No shortness of breath  CARDIOVASCULAR: No chest pain, palpitations, dizziness  GASTROINTESTINAL: No abdominal or epigastric pain. No nausea, vomiting, or hematemesis  NEUROLOGICAL: No headaches, loss of strength, numbness, or tremors  SKIN: No itching, burning, rashes     Vital Signs Last 24 Hrs  T(C): 36.5 (19 Jan 2022 05:15), Max: 37.5 (18 Jan 2022 14:38)  T(F): 97.7 (19 Jan 2022 05:15), Max: 99.5 (18 Jan 2022 14:38)  HR: 86 (19 Jan 2022 05:15) (86 - 97)  BP: 129/78 (19 Jan 2022 05:15) (117/75 - 151/92)  RR: 18 (19 Jan 2022 05:15) (18 - 22)      PHYSICAL EXAM:  GENERAL: NAD, well-groomed, well-developed  HEAD:  Atraumatic, Normocephalic  CHEST/LUNG: Breath sounds bilaterally; No accessory muscle use  CARDIOVASCULAR: Regular rate and rhythm; No edema  ABDOMEN: Soft, No guarding, Nondistended; Bowel sounds present  NERVOUS SYSTEM:  Alert & Oriented, Good concentration, No focal deficits  BACK: + Tenderness to palpation of lumbar vertebrae  EXTREMITIES:  2+ Peripheral Pulses, No clubbing, cyanosis  SKIN: Wounds on bilateral wrists, No rashes    LABS:                        9.7    8.20  )-----------( 295      ( 18 Jan 2022 04:30 )             31.3     01-18    139  |  101  |  16  ----------------------------<  142<H>  3.7   |  24  |  0.9    Ca    8.2<L>      18 Jan 2022 04:30  Mg     1.9     01-18    TPro  7.1  /  Alb  3.0<L>  /  TBili  0.3  /  DBili  x   /  AST  26  /  ALT  35  /  AlkPhos  100  01-18    PT/INR - ( 17 Jan 2022 11:00 )   PT: CLOTTED sec;   INR: CLOTTED ratio         PTT - ( 17 Jan 2022 11:00 )  PTT:TNP sec    Sedimentation Rate, Erythrocyte: 131 mm/Hr *H* (01-16-22 @ 18:19)  C-Reactive Protein, Serum: 141 mg/L (01-16-22 @ 16:00)  Lactate, Blood: 0.9 mmol/L (01-16-22 @ 16:00)    Gram Stain (17 Jan 2022 11:00):    Growth in aerobic bottle: Gram Positive Cocci in Clusters    Growth in anaerobic bottle: Gram Positive Cocci in Clusters  Preliminary Report (17 Jan 2022 20:41):    Growth in aerobic bottle: Gram Positive Cocci in Clusters    Growth in anaerobic bottle: Gram Positive Cocci in Clusters    Gram Stain (16 Jan 2022 11:00):    Growth in aerobic bottle: Gram Positive Cocci in Clusters    Growth in anaerobic bottle: Gram Positive Cocci in Clusters  Preliminary Report (16 Jan 2022 20:41):    Growth in aerobic bottle: Gram Positive Cocci in Clusters    Growth in anaerobic bottle: Gram Positive Cocci in Clusters    ***Blood Panel PCR results on this specimen are available    approximately 3 hours after the Gram stain result.***    Gram stain, PCR, and/or culture results may not always    correspond due to difference in methodologies.    ************************************************************    This PCR assay was performed by multiplex PCR. This    Assay tests for 66 bacterial and resistance gene targets.    Please refer to the [facility] test directory    at [email or web address] for details.  Organism: Blood Culture PCR (01-16-22 @ 18:10)  Organism: Blood Culture PCR (01-16-22 @ 18:10)      -  Staphylococcus aureus: Detec Any isolate of Staphylococcus aureus from a blood culture is NOT considered a contaminant.      Method Type: PCR    Culture - Blood (collected 01-15-22 @ 20:00)  Source: .Blood Blood-Peripheral  Gram Stain (01-16-22 @ 20:41):    Growth in aerobic bottle: Gram Positive Cocci in Clusters    Growth in anaerobic bottle: Gram Positive Cocci in Clusters  Preliminary Report (01-16-22 @ 20:41):    Growth in aerobic bottle: Gram Positive Cocci in Clusters    Growth in anaerobic bottle: Gram Positive Cocci in Clusters    CARDIAC MARKERS ( 15 Baljinder 2022 20:00 )  x     / x     / 63 U/L / x     / x        RADIOLOGY & ADDITIONAL TESTS:    ACC: [id number] EXAM:  CT CERVICAL SPINE                        PROCEDURE DATE:  01/15/2022  @22:23  IMPRESSION:  No acute fracture or subluxation of the cervical spine.      ACC: [id number] EXAM:  CT BRAIN                        PROCEDURE DATE:  01/15/2022  @22:23  IMPRESSION:  No CT evidence for acute intracranial pathology.      ACC: [id number] EXAM:  CT ABDOMEN AND PELVIS IC                        ACC: [id number] EXAM:  CT CHEST IC                        PROCEDURE DATE:  01/15/2022  @22:24  IMPRESSION:  Definitive acute traumatic injury is not identified to the chest, abdomen   or pelvis.  There is an osseous destructive process at the left L5-S1 facet joints   with infiltrative changes extending to the adjacent spinal canal and   neural foramina and associated stenosis. Per notes, patient with   polysubstance abuse. Consider osteomyelitis. Underlying mass is not   excluded. Additional erosive changes noted at the left T10-T11 facet   joint. Further evaluation with MRI can be obtained as needed.      ACC: [id number] EXAM:  XR WRIST COMP MIN 3 VIEWS BI   ACC: [id number] EXAM:  XR FOREARM  2 VIEWS BI                              PROCEDURE DATE:  01/15/2022  @22:31  IMPRESSION:  No evidence of acute fracture or dislocation.  Severe apparent complete soft tissue defect at the distal dorsal wrist.  1.2 cm linear radiodensity within the anterior soft tissue of the right   forearm suspicious for foreign body.      ACC: [id number] EXAM:  XR WRIST COMP MIN 3 VIEWS BI                        PROCEDURE DATE:  01/16/2022  @01:43  IMPRESSION:  Limited evaluation due to positioning. No definite evidence of acute   fracture or dislocation. Consider further evaluation with CT if there is   clinical concern for fracture. If there is clinical concern for   osteomyelitis, consider further evaluation with MRI if not   contraindicated in this patient.  Severe apparent complete soft tissue defect at the distal dorsal wrist   bilaterally.      ACC: [id number] EXAM:  XR HAND 2 VIEWS LT                        PROCEDURE DATE:  01/16/2022  @01:43  IMPRESSION:  No evidence of acute fracture or dislocation of the left hand.      ACC: [id number] EXAM:  XR FOOT 2 VIEWS LT                        PROCEDURE DATE:  01/16/2022  @17:54  IMPRESSION:  No acute displaced fracture. Moderate midfoot degenerative changes.   Achilles tendon insertional enthesophyte measuring 1.1 cm.      ACC: [id number] EXAM:  XR FOREARM  2 VIEWS BI                        PROCEDURE DATE:  01/16/2022  @21:44  IMPRESSION:  No acute displaced fracture.  1.2 cm linear foreign body within the soft tissues overlying the right   radius.  Previously seen left forearm foreign body is no longer visualized.      ACC: [id number] EXAM:  DUPLEX SCAN EXT VEINS LOWER BI                        PROCEDURE DATE:  01/17/2022  Impression:  No evidence of deep venous thrombosis or superficial thrombophlebitis in   the bilateral lower extremities.      EXAM:  ECHO TTE WO CON COMP W DOPP    PROCEDURE DATE:  01/18/2022  @ 10:56  Summary:   1. LV Ejection Fraction by Chester's Method with a biplane EF of 56 %.   2. Normal global left ventricular systolic function.   3. Spectral Doppler shows impaired relaxation pattern of left   ventricular myocardial filling (Grade I diastolic dysfunction).   4. Normal left atrial size.   5. Normal right atrial size.      ACC: [id number] EXAM:  MR SPINE LUMBAR WAW IC                        ACC: [id number] EXAM:  MR SPINE THORACIC WAW IC                        PROCEDURE DATE:  01/18/2022  @16:32  IMPRESSION:    THORACIC SPINE:  Motion limited examination.  Minimally increased signal and enhancement involving the left T10-T11   facet joint space which represent early facet joint   infection/inflammation however could also be related to degenerative   changes.    LUMBAR SPINE:  Findings consistent with left L5-S1 facetitis with associated multifocal   abscess formation directly adjacent to the left facet joint, within the   left retroperitoneal soft tissues as well as the right psoas musculature.    Ventral epidural phlegmon overlying the L5 vertebral body causing   significant compression of the descending nerve roots. Hospital Day: 3d    50y/o male with PMH of IV drug use and polysubstance abuse who presented s/p fall, currently admitted with the primary diagnosis of vertebral osteomyelitis.     SUBJECTIVE    No acute events overnight. Patient examined at bedside, AOX3. Pt states he feels well. Pt's constipation resolved with laxative use. Denies fever, cough, chest pain, sob, N/V, abdominal pain.    OBJECTIVE  PAST MEDICAL & SURGICAL HISTORY  IV drug abuse    ALLERGIES:  No Known Allergies    MEDICATIONS  (STANDING):  acetaminophen     Tablet .. 650 milliGRAM(s) Oral every 6 hours  ALPRAZolam 2 milliGRAM(s) Oral three times a day  buPROPion XL (24-Hour) . 300 milliGRAM(s) Oral daily  cyclobenzaprine 10 milliGRAM(s) Oral every 8 hours  enoxaparin Injectable 40 milliGRAM(s) SubCutaneous daily  ferrous    sulfate 325 milliGRAM(s) Oral daily  folic acid 1 milliGRAM(s) Oral daily  gabapentin 300 milliGRAM(s) Oral three times a day  influenza   Vaccine 0.5 milliLiter(s) IntraMuscular once  lactated ringers. 1000 milliLiter(s) (100 mL/Hr) IV Continuous <Continuous>  methadone    Tablet 40 milliGRAM(s) Oral every 8 hours  mirtazapine 60 milliGRAM(s) Oral at bedtime  multivitamin 1 Tablet(s) Oral daily  nafcillin  IVPB      nafcillin  IVPB 2 Gram(s) IV Intermittent every 4 hours  pantoprazole    Tablet 40 milliGRAM(s) Oral before breakfast  polyethylene glycol 3350 17 Gram(s) Oral two times a day  QUEtiapine 200 milliGRAM(s) Oral three times a day  senna 2 Tablet(s) Oral at bedtime    MEDICATIONS  (PRN):  aluminum hydroxide/magnesium hydroxide/simethicone Suspension 30 milliLiter(s) Oral every 4 hours PRN Dyspepsia  HYDROmorphone   Tablet 6 milliGRAM(s) Oral every 4 hours PRN Severe Pain (7 - 10)  ketorolac   Injectable 15 milliGRAM(s) IV Push every 6 hours PRN Moderate Pain (4 - 6)  LORazepam     Tablet 1 milliGRAM(s) Oral every 2 hours PRN CIWA-Ar score increase by 2 points and a total score of 7 or less  melatonin 3 milliGRAM(s) Oral at bedtime PRN Insomnia  ondansetron Injectable 4 milliGRAM(s) IV Push every 8 hours PRN Nausea and/or Vomiting    REVIEW OF SYSTEMS:  CONSTITUTIONAL: No fever, weight loss, or fatigue  RESPIRATORY: No cough, wheezing, chills or hemoptysis; No shortness of breath  CARDIOVASCULAR: No chest pain, palpitations, dizziness  GASTROINTESTINAL: No abdominal or epigastric pain. No nausea, vomiting, or hematemesis  NEUROLOGICAL: No headaches, loss of strength, numbness, or tremors  SKIN: No itching, burning, rashes     Vital Signs Last 24 Hrs  T(C): 36.5 (19 Jan 2022 05:15), Max: 37.5 (18 Jan 2022 14:38)  T(F): 97.7 (19 Jan 2022 05:15), Max: 99.5 (18 Jan 2022 14:38)  HR: 86 (19 Jan 2022 05:15) (86 - 97)  BP: 129/78 (19 Jan 2022 05:15) (117/75 - 151/92)  RR: 18 (19 Jan 2022 05:15) (18 - 22)      PHYSICAL EXAM:  GENERAL: NAD, well-groomed, well-developed  HEAD:  Atraumatic, Normocephalic  CHEST/LUNG: Breath sounds bilaterally; No accessory muscle use  CARDIOVASCULAR: Regular rate and rhythm; No edema  ABDOMEN: Soft, No guarding, Nondistended; Bowel sounds present  NERVOUS SYSTEM:  Alert & Oriented, Good concentration, No focal deficits  BACK: + Tenderness to palpation of lumbar vertebrae  EXTREMITIES:  2+ Peripheral Pulses, No clubbing, cyanosis  SKIN: Wounds on bilateral wrists, No rashes    LABS:                        9.7    8.20  )-----------( 295      ( 18 Jan 2022 04:30 )             31.3     01-18    139  |  101  |  16  ----------------------------<  142<H>  3.7   |  24  |  0.9    Ca    8.2<L>      18 Jan 2022 04:30  Mg     1.9     01-18    TPro  7.1  /  Alb  3.0<L>  /  TBili  0.3  /  DBili  x   /  AST  26  /  ALT  35  /  AlkPhos  100  01-18    PT/INR - ( 17 Jan 2022 11:00 )   PT: CLOTTED sec;   INR: CLOTTED ratio         PTT - ( 17 Jan 2022 11:00 )  PTT:TNP sec    Sedimentation Rate, Erythrocyte: 131 mm/Hr *H* (01-16-22 @ 18:19)  C-Reactive Protein, Serum: 141 mg/L (01-16-22 @ 16:00)  Lactate, Blood: 0.9 mmol/L (01-16-22 @ 16:00)    Gram Stain (17 Jan 2022 11:00):    Growth in aerobic bottle: Gram Positive Cocci in Clusters    Growth in anaerobic bottle: Gram Positive Cocci in Clusters  Preliminary Report (17 Jan 2022 20:41):    Growth in aerobic bottle: Gram Positive Cocci in Clusters    Growth in anaerobic bottle: Gram Positive Cocci in Clusters    Gram Stain (16 Jan 2022 11:00):    Growth in aerobic bottle: Gram Positive Cocci in Clusters    Growth in anaerobic bottle: Gram Positive Cocci in Clusters  Preliminary Report (16 Jan 2022 20:41):    Growth in aerobic bottle: Gram Positive Cocci in Clusters    Growth in anaerobic bottle: Gram Positive Cocci in Clusters    ***Blood Panel PCR results on this specimen are available    approximately 3 hours after the Gram stain result.***    Gram stain, PCR, and/or culture results may not always    correspond due to difference in methodologies.    ************************************************************    This PCR assay was performed by multiplex PCR. This    Assay tests for 66 bacterial and resistance gene targets.    Please refer to the [facility] test directory    at [email or web address] for details.  Organism: Blood Culture PCR (01-16-22 @ 18:10)  Organism: Blood Culture PCR (01-16-22 @ 18:10)      -  Staphylococcus aureus: Detec Any isolate of Staphylococcus aureus from a blood culture is NOT considered a contaminant.      Method Type: PCR    Culture - Blood (collected 01-15-22 @ 20:00)  Source: .Blood Blood-Peripheral  Gram Stain (01-16-22 @ 20:41):    Growth in aerobic bottle: Gram Positive Cocci in Clusters    Growth in anaerobic bottle: Gram Positive Cocci in Clusters  Preliminary Report (01-16-22 @ 20:41):    Growth in aerobic bottle: Gram Positive Cocci in Clusters    Growth in anaerobic bottle: Gram Positive Cocci in Clusters    CARDIAC MARKERS ( 15 Baljinder 2022 20:00 )  x     / x     / 63 U/L / x     / x        RADIOLOGY & ADDITIONAL TESTS:    ACC: [id number] EXAM:  CT CERVICAL SPINE                        PROCEDURE DATE:  01/15/2022  @22:23  IMPRESSION:  No acute fracture or subluxation of the cervical spine.      ACC: [id number] EXAM:  CT BRAIN                        PROCEDURE DATE:  01/15/2022  @22:23  IMPRESSION:  No CT evidence for acute intracranial pathology.      ACC: [id number] EXAM:  CT ABDOMEN AND PELVIS IC                        ACC: [id number] EXAM:  CT CHEST IC                        PROCEDURE DATE:  01/15/2022  @22:24  IMPRESSION:  Definitive acute traumatic injury is not identified to the chest, abdomen   or pelvis.  There is an osseous destructive process at the left L5-S1 facet joints   with infiltrative changes extending to the adjacent spinal canal and   neural foramina and associated stenosis. Per notes, patient with   polysubstance abuse. Consider osteomyelitis. Underlying mass is not   excluded. Additional erosive changes noted at the left T10-T11 facet   joint. Further evaluation with MRI can be obtained as needed.      ACC: [id number] EXAM:  XR WRIST COMP MIN 3 VIEWS BI   ACC: [id number] EXAM:  XR FOREARM  2 VIEWS BI                              PROCEDURE DATE:  01/15/2022  @22:31  IMPRESSION:  No evidence of acute fracture or dislocation.  Severe apparent complete soft tissue defect at the distal dorsal wrist.  1.2 cm linear radiodensity within the anterior soft tissue of the right   forearm suspicious for foreign body.      ACC: [id number] EXAM:  XR WRIST COMP MIN 3 VIEWS BI                        PROCEDURE DATE:  01/16/2022  @01:43  IMPRESSION:  Limited evaluation due to positioning. No definite evidence of acute   fracture or dislocation. Consider further evaluation with CT if there is   clinical concern for fracture. If there is clinical concern for   osteomyelitis, consider further evaluation with MRI if not   contraindicated in this patient.  Severe apparent complete soft tissue defect at the distal dorsal wrist   bilaterally.      ACC: [id number] EXAM:  XR HAND 2 VIEWS LT                        PROCEDURE DATE:  01/16/2022  @01:43  IMPRESSION:  No evidence of acute fracture or dislocation of the left hand.      ACC: [id number] EXAM:  XR FOOT 2 VIEWS LT                        PROCEDURE DATE:  01/16/2022  @17:54  IMPRESSION:  No acute displaced fracture. Moderate midfoot degenerative changes.   Achilles tendon insertional enthesophyte measuring 1.1 cm.      ACC: [id number] EXAM:  XR FOREARM  2 VIEWS BI                        PROCEDURE DATE:  01/16/2022  @21:44  IMPRESSION:  No acute displaced fracture.  1.2 cm linear foreign body within the soft tissues overlying the right   radius.  Previously seen left forearm foreign body is no longer visualized.      ACC: [id number] EXAM:  DUPLEX SCAN EXT VEINS LOWER BI                        PROCEDURE DATE:  01/17/2022  Impression:  No evidence of deep venous thrombosis or superficial thrombophlebitis in   the bilateral lower extremities.      EXAM:  ECHO TTE WO CON COMP W DOPP    PROCEDURE DATE:  01/18/2022  @ 10:56  Summary:   1. LV Ejection Fraction by Chester's Method with a biplane EF of 56 %.   2. Normal global left ventricular systolic function.   3. Spectral Doppler shows impaired relaxation pattern of left   ventricular myocardial filling (Grade I diastolic dysfunction).   4. Normal left atrial size.   5. Normal right atrial size.      ACC: [id number] EXAM:  MR SPINE LUMBAR WAW IC                        ACC: [id number] EXAM:  MR SPINE THORACIC WAW IC                        PROCEDURE DATE:  01/18/2022  @16:32  IMPRESSION:    THORACIC SPINE:  Motion limited examination.  Minimally increased signal and enhancement involving the left T10-T11   facet joint space which represent early facet joint   infection/inflammation however could also be related to degenerative   changes.    LUMBAR SPINE:  Findings consistent with left L5-S1 facetitis with associated multifocal   abscess formation directly adjacent to the left facet joint, within the   left retroperitoneal soft tissues as well as the right psoas musculature.    Ventral epidural phlegmon overlying the L5 vertebral body causing   significant compression of the descending nerve roots.

## 2022-01-19 NOTE — PROGRESS NOTE ADULT - ASSESSMENT
51-year-old male past medical history of polysubstance abuse who presents status post fall. Has back pain radiating to LLE, CT concerning for osteomyelitis, was diagnosed with MSSA bacteremia, PAM was negative for IE.  Pt was consulted by ID, treated with IV Abx, needs a long term of IV Abx.       A/P  # Sepsis POA/ L5-S1  Vertebral Osteomyelitis with adjacent soft tissue abscess /  MSSA Bacteremia   - sepsis resolved , pt was consulted by ID, recommendations noted   - f/u repeat blood Cx, daily blood Cx   - PAM was negative for infectious endocarditis   - ,      - c/w nafcillin 2g q4hrs   - IR consulted  for possible abscess drainage , no intervention recommended     #Right forearm foreign body  - 1.2cm linear foreign body overlying right radius  - surgery consult for removal - not recommending removal given difficulty of extraction, and no evidence of infection    #Fall   - likely mechanical   - HCT is negative, trauma workup negative   - pt ot rehab   - fall precuations     # Bl open wounds on wrists   - burn appreciated, Wound care - Xeroform/Kelrex BID, no Surgical debridement       # IVDU/ suspected ETOH use /suspected thiamine folate deficiency   - active heroin use, last use 2 weeks ago  - reports taking 135mg methadone per day, dose confirmed with Juan Ramon Flores RN at methadone clinic, last received 3 day supply on 1/14,  - addiction medicine consulted  - continue thiamine and folate supplementals   - pain management appreciated   - methadone 40mg q8hr standing   - tylenol 650mg q8 standing   - gabapentin 300mg q8hr   - cyclobenzaprine 10mg q8hr   - dilaudid 6mg po q4 prn   - ketorolac 15mg q6hr prn     #Mood dx /Anxiety   - continue home xanax 2mg TID, has withdrawal symptoms when not taking them.   - c/w welbutrin 300 qd and Seroquel 300 TID   - f/u drug screen     #Normocytic Anemia   - multifactorial, iron deficiency, anemia of chronic disease,   - continue folate, MV,  - ferrous sulfate 325mg qd     # DVT ppx- LMWH  # GI ppx- PPI  # Activity- AAT  # Diet- Reg  # Code status - full  # Dispo- from home, acute     Pending - blood cultures daily

## 2022-01-19 NOTE — PROGRESS NOTE ADULT - ASSESSMENT
ASSESSMENT  51-year-old male past medical history of polysubstance abuse who presents status post fall.      IMPRESSION  #s/p Fall    #MSSA Bacteremia  - BLood Cx 1/15-17 +  - PAM 1/19 - negative for vegetation     #Spinal OM, with epidural abscess/phlegmon   - CT Abdomen and Pelvis w/ IV Cont (01.15.22 @ 22:24):vThere is an osseous destructive process at the left L5-S1 facet joints vwith infiltrative changes extending to the adjacent spinal canal and neural foramina and associated stenosis. Per notes, patient with vpolysubstance abuse. Consider osteomyelitis. Underlying mass is not excluded. Additional erosive changes noted at the left T10-T11 facet vjoint. Further evaluation with MRI can be obtained as needed.  - MR Lumbar Spine w/wo IV Cont (01.18.22 @ 16:33): Minimally increased signal and enhancement involving the left T10-T11  facet joint space which represent early facet joint   infection/inflammation however could also be related to degenerative  changes.LUMBAR SPINE: Findings consistent with left L5-S1 facetitis with associated multifocal  abscess formation directly adjacent to the left facet joint, within the left retroperitoneal soft tissues as well as the right psoas musculature. Ventral epidural phlegmon overlying the L5 vertebral body causing significant compression of the descending nerve roots.    #IVDA  #Opioid abuse on Methadone   #Bilateral Wrist wounds - s/p Burn evaluation 1/16    #Abx allergy: NKDA      RECOMMENDATIONS  - nafcillin 2g q 4 hours  - Neurosurgery follow-up regarding phlegmon causing compression of nerve roots  - follow-up Hep C RNA   - appreciate cardiology   - follow-up surveillance cultures    Please call or message on [employer] Teams if with any questions.  Spectra [phone]

## 2022-01-19 NOTE — PROGRESS NOTE ADULT - SUBJECTIVE AND OBJECTIVE BOX
51-year-old male past medical history of polysubstance abuse who presents status post fall. Has back pain radiating to LLE, CT concerning for osteomyelitis, was diagnosed with MSSA bacteremia, PAM was negative for IE.  Pt was consulted by ID, treated with IV Abx, needs a long term of IV Abx.   Today pt was very anxious, refusing PAM, after a long conversation he agreed for PAM, he c/o lower back pain and anxiety.      OBJECTIVE  PAST MEDICAL & SURGICAL HISTORY  IV drug abuse      ALLERGIES:  No Known Allergies      HOME MEDICATIONS  Home Medications:  Remeron 30 mg oral tablet: 2 tab(s) orally once a day (at bedtime) (16 Jan 2022 10:02)  SEROquel 200 mg oral tablet: orally 3 times a day (16 Jan 2022 10:02)  Wellbutrin: 300 milligram(s) orally once a day (16 Jan 2022 10:02)  Xanax: 2 milligram(s) orally 3 times a day (16 Jan 2022 10:02)    VITAL SIGNS: Last 24 Hours  T(C): 36.5 (19 Jan 2022 05:15), Max: 37.5 (18 Jan 2022 14:38)  T(F): 97.7 (19 Jan 2022 05:15), Max: 99.5 (18 Jan 2022 14:38)  HR: 86 (19 Jan 2022 05:15) (86 - 97)  BP: 129/78 (19 Jan 2022 05:15) (117/75 - 151/92)  BP(mean): --  RR: 18 (19 Jan 2022 05:15) (18 - 22)  SpO2: --    PHYSICAL EXAM:  General: not in acute distress, very poor hygiene   LUNGS: CTA b/l   HEART: RRR, +S1,S2,  ABDOMEN: SNTTP, ND x 4 q's  BACK: tenderness to palpation midspinal  lumbar region  EXT: Warm, well perfused x 4, edema noted on right wrist   NEURO: AxOx3, No FND's noted  SKIN: open wound on right wrist, multiple skin abrasions     LABS:                        9.6    7.15  )-----------( 326      ( 19 Jan 2022 08:25 )             31.2     01-19    142  |  104  |  15  ----------------------------<  173<H>  3.9   |  21  |  0.9    Ca    8.4<L>      19 Jan 2022 08:25  Mg     2.2     01-19    TPro  7.1  /  Alb  3.1<L>  /  TBili  <0.2  /  DBili  x   /  AST  20  /  ALT  37  /  AlkPhos  126<H>  01-19    PT/INR - ( 17 Jan 2022 11:00 )   PT: CLOTTED sec;   INR: CLOTTED ratio         PTT - ( 17 Jan 2022 11:00 )  PTT:TNP sec            Culture - Blood (collected 17 Jan 2022 11:00)  Source: .Blood Blood  Gram Stain (18 Jan 2022 11:15):    Growth in aerobic bottle: Gram Positive Cocci in Clusters    Growth in anaerobic bottle: Gram Positive Cocci in Clusters  Preliminary Report (18 Jan 2022 11:15):    Growth in aerobic bottle: Gram Positive Cocci in Clusters    Growth in anaerobic bottle: Gram Positive Cocci in Clusters        RADIOLOGY:    RADIOLOGY:  ACC: [id number] EXAM:  CT BRAIN                          PROCEDURE DATE:  01/15/2022        IMPRESSION:    No CT evidence for acute intracranial pathology.      ACC: [id number] EXAM:  CT CERVICAL SPINE                          PROCEDURE DATE:  01/15/2022          INTERPRETATION:  CLINICAL STATEMENT: Trauma code    TECHNIQUE:  Contiguous unenhanced CT axial images of the cervical spine   with coronal and sagittal re-formations.    COMPARISON: None available    FINDINGS:    There is straightening of the normal cervical lordosis, which may be on   the basis of pain, muscle spasm, positioning or a combination of the   above.    There is no evidence of acute fracture or facet subluxation.    No significant prevertebral soft tissue swelling.    There is complete loss of disc height with bony fusion at C5-C6. There   are multilevel degenerative changes without definitive severe central   canal or neuroforaminal stenosis.      IMPRESSION:    No acute fracture or subluxation of the cervical spine.      ACC: [id number] EXAM:  CT ABDOMEN AND PELVIS IC                        ACC: [id number] EXAM:  CT CHEST IC                          PROCEDURE DATE:  01/15/2022          INTERPRETATION:  CLINICAL STATEMENT: Trauma code    TECHNIQUE: Contiguous CT images were obtained of the chest, abdomen and   pelvis.    Intravenous Contrast:  Intravenous contrast administered.  100 cc Omnipaque 350 intravenous contrast was administered. 0 cc   discarded.  Oral contrast: was not administered.      COMPARISON:  None    FINDINGS:      BONES AND SOFT TISSUES: No definite acute fractures identified. There is   an osseous destructive process at the left L5-S1 facet joints with   infiltrative changes extending to the adjacent spinal canal and neural   foramen. There is spinal canal stenosis at L4-L5 and L5-S1. Additional   erosive changes are noted at the left T10-T11 facet joints.      IMPRESSION:    Definitive acute traumatic injury is not identified to the chest, abdomen   or pelvis.    There is an osseous destructive process at the left L5-S1 facet joints   with infiltrative changes extending to the adjacent spinal canal and   neural foramina and associated stenosis. Per notes, patient with   polysubstance abuse. Consider osteomyelitis. Underlying mass is not   excluded. Additional erosive changes noted at the left T10-T11 facet   joint. Further evaluation with MRI can be obtained as needed.        ACC: [id number] EXAM:  XR WRIST COMP MIN 3 VIEWS BI                          PROCEDURE DATE:  01/15/2022          INTERPRETATION:  CLINICAL INDICATION:necrotic tissue, bone exposed    TECHNIQUE: 2 views of the right wrist. 2 views of the left wrist. One   image is provided with indeterminate laterality.    COMPARISON: No direct comparison available    FINDINGS:  There is no evidence of acute fracture or dislocation. Severe apparent   complete soft tissue defect at the distal dorsal wrist.    IMPRESSION:  No evidence of acute fracture or dislocation.    Severe apparent complete soft tissue defect at the distal dorsal wrist.    1.2 cm linear radiodensity within the anterior soft tissue of the right   forearm suspicious for foreign body.        ACC: [id number] EXAM:  XR FOREARM  2 VIEWS BI                          PROCEDURE DATE:  01/15/2022          INTERPRETATION:  CLINICAL INDICATION:necrotic tissue, bone exposed    TECHNIQUE: 2 views of the right forearm. 2 views of the left forearm.    COMPARISON: No direct comparison available    FINDINGS:  There is no evidence of acute fracture or dislocation. Severe apparent   complete soft tissue defect at the distal dorsal wrist bilaterally.    IMPRESSION:  No evidence of acute fracture or dislocation.    Severe apparent complete soft tissue defect at the distal dorsal wrist   bilaterally.    1.2 cm linear radiodensity within the anterior soft tissue of the left   forearm suspicious for foreign body.    --- End of Report ---    SOLEDAD HANNON MD; Attending Radiologist  This document has been electronically signed. Jan 16 2022  6:19PM      ACC: [id number] EXAM:  XR HAND 2 VIEWS LT                          PROCEDURE DATE:  01/16/2022          INTERPRETATION:  CLINICAL INDICATION:trauma    TECHNIQUE: 3 views of the left hand.    COMPARISON: No direct comparison available    FINDINGS:  There is no evidence of acute fracture or dislocation of the left hand.    IMPRESSION:  No evidence of acute fracture or dislocation of the left hand.        ACC: [id number] EXAM:  XR FOOT 2 VIEWS LT                          PROCEDURE DATE:  01/16/2022          INTERPRETATION:  CLINICAL HISTORY / REASON FOR EXAM: Pain    COMPARISON: None    TECHNIQUE: Three views, left foot radiographs    FINDINGS /  IMPRESSION:    No acute displaced fracture. Moderate midfoot degenerative changes.   Achilles tendon insertional enthesophyte measuring 1.1 cm.      ACC: [id number] EXAM:  XR FOREARM  2 VIEWS BI                          IMPRESSION:    No acute displaced fracture.    1.2 cm linear foreign body within the soft tissues overlying the right   radius.    Previously seen left forearm foreign body is no longer visualized.      ACC: [id number] EXAM:  MR SPINE LUMBAR WAW IC                        ACC: [id number] EXAM:  MR SPINE THORACIC WAW IC                          PROCEDURE DATE:  01/18/2022          INTERPRETATION:  CLINICAL INDICATION: Osteomyelitis.    TECHNIQUE: Pre and postcontrast MRI of the thoracic and lumbar spine was   performed.    Sagittal T1, T2, and STIR, and axial T2 and T1 sequences were obtained of   the thoracic and lumbar spine. 10 cc Gadavist was administered. 0 cc was   discarded.    COMPARISON: Correlation with CT of the abdomen and pelvis dated   1/15/2022..    FINDINGS:    THORACIC SPINE:  VERTEBRAL BODIES/ALIGNMENT: There is increased signal and enhancement   noted involving the left T10-T11 facet joint space without erosive   changes identified.    There is increased STIR signal involving the anterior aspects of the T11   and T12 vertebral bodies at likely related to degenerative change. The    SPINAL CORD: There is no abnormal spinal cord signal or enhancement..    SPINAL CANAL:  No intradural or extradural defects are seen.    INTERVERTEBRAL DISCS:  The disc spaces are well-maintained.    There is trace disc bulging at T9-T10 as well as mild facet arthrosis   without spinal canal or neuroforaminal narrowing.    There is trace disc bulging at T10-T11 indenting the ventral thecal sac   as well as bilateral facet arthrosis contributing to moderate left   neuroforaminal narrowing.    At T11-T12 there is trace disc bulging as well as bilateral facet   arthrosis resulting in mildleft neuroforaminal narrowing. There is no   spinal canal stenosis.    MISCELLANEOUS:  None.      LUMBAR SPINE:  VERTEBRAL BODIES/ALIGNMENT AND SOFT TISSUES:  If clinically abnormal   signal or enhancement which is centered within the left L5-S1 facet   joint. There is osseous erosion involving the facet joint as well as   surrounding increased signal and associated enhancement. There   rim-enhancing fluid collections noted just anterior to the facet joint   measuring approximately 1.3 x 0.8 x 1.3 cm, series 1200 image 14. This   fluid collection extends to the superior aspect of the facet joint within   the paraspinal soft tissues with an oblong fluid collection measuring 2.0   x 0.7 x 1.3 cm (AP x TR x CC). There are additional enhancing fluid   collections involving the left retroperitoneal soft tissues just   posterior to the psoas musculature which measure approximately 3.0 x 1.0   cm in the axial plane, series 1800 image 41 there is increased signal   noted involving the posterior aspect of the L5 and L4 vertebral bodies   there is epidural phlegmon noted overlying the L5 and S1 vertebral bodies   compression of the descending nerve roots at the level of L5.    There is a rim-enhancing collection within the right psoas musculature   measuring 1.1 x 0.9 cm in axial plane though incompletely evaluated on   sagittal imaging..    There is chronic wedge compression deformity of the L1 vertebral body   with approximately 20% height loss.    There are Modic type II degenerative changes at L2-3.    CONUS/CAUDA EQUINA: Unremarkable terminating at the superior endplate of   L1    Evaluation of individual disc levels is slightly limited due to motion    At L4-L5 there is disc bulging, posterior ligamentous hypertrophy and   bilateral facet arthrosis contributing to severe spinal stenosis as well   as severe bilateral neuroforaminal narrowing.    At L5-S1 there is disc bulging and ventral epidural phlegmon, posterior   ligamentous hypertrophy and bilateral facet arthrosis contributing to   severe spinal stenosis as well as severe lateral neuroforaminal narrowing.    MISCELLANEOUS:  None.      IMPRESSION:    THORACIC SPINE:  Motion limited examination.    Minimally increased signal and enhancement involving the left T10-T11   facet joint space which represent early facet joint   infection/inflammation however could also be related to degenerative   changes.    LUMBAR SPINE:  Findings consistent with left L5-S1 facetitis with associated multifocal   abscess formation directly adjacent to the left facet joint, within the   left retroperitoneal soft tissues as well as the right psoas musculature.    Ventral epidural phlegmon overlying the L5 vertebral body causing   significant compression of the descending nerve roots.    These findings were discussed with Dr. Plummer at 1/18/2022 at 5:06 PM   by Dr. Ellis with read back confirmation.       MEDICATIONS  (STANDING):  acetaminophen     Tablet .. 650 milliGRAM(s) Oral every 6 hours  ALPRAZolam 2 milliGRAM(s) Oral three times a day  buPROPion XL (24-Hour) . 300 milliGRAM(s) Oral daily  cyclobenzaprine 10 milliGRAM(s) Oral every 8 hours  enoxaparin Injectable 40 milliGRAM(s) SubCutaneous daily  ferrous    sulfate 325 milliGRAM(s) Oral daily  folic acid 1 milliGRAM(s) Oral daily  gabapentin 300 milliGRAM(s) Oral three times a day  influenza   Vaccine 0.5 milliLiter(s) IntraMuscular once  lactated ringers. 1000 milliLiter(s) (100 mL/Hr) IV Continuous <Continuous>  methadone    Tablet 40 milliGRAM(s) Oral every 8 hours  mirtazapine 60 milliGRAM(s) Oral at bedtime  multivitamin 1 Tablet(s) Oral daily  nafcillin  IVPB 2 Gram(s) IV Intermittent every 4 hours  nafcillin  IVPB      pantoprazole    Tablet 40 milliGRAM(s) Oral before breakfast  polyethylene glycol 3350 17 Gram(s) Oral two times a day  QUEtiapine 200 milliGRAM(s) Oral three times a day  senna 2 Tablet(s) Oral at bedtime    MEDICATIONS  (PRN):  aluminum hydroxide/magnesium hydroxide/simethicone Suspension 30 milliLiter(s) Oral every 4 hours PRN Dyspepsia  HYDROmorphone   Tablet 6 milliGRAM(s) Oral every 4 hours PRN Severe Pain (7 - 10)  ketorolac   Injectable 15 milliGRAM(s) IV Push every 6 hours PRN Moderate Pain (4 - 6)  LORazepam     Tablet 1 milliGRAM(s) Oral every 2 hours PRN CIWA-Ar score increase by 2 points and a total score of 7 or less  melatonin 3 milliGRAM(s) Oral at bedtime PRN Insomnia  ondansetron Injectable 4 milliGRAM(s) IV Push every 8 hours PRN Nausea and/or Vomiting

## 2022-01-19 NOTE — CONSULT NOTE ADULT - SUBJECTIVE AND OBJECTIVE BOX
INTERVENTIONAL RADIOLOGY CONSULT:     Procedure Requested: paraspinal abscess    HPI:  51-year-old male past medical history of polysubstance abuse who presents status post fall.  Patient states that approximately 12 hours ago he fell from bed due to sciatica pain, and has and was not able to get up from the floor.  The pain is sacral with a shooting pain to his left foot. It feels as if his foot can fall off. He states he is unable to lie flat secondary to the pain. Patient also states that he has been having pain to his wrist bilaterally.  Of note patient has known open wounds to bilateral wrist.  Patient reports that the last time he used heroin IV was approximately 2 week ago.  Patient has no other medical complaints. He reports no f/c/n/v, change in urinary or bowel habits.    Pt states that he takes 135mg of methadone a day, call his clinic and doctor (Dr. Milton Gates), no response.    In the ED: Pt tachy 91, wbc 16.6k, CT spine: osseous destructive process at the left L5-S1 facet joints with infiltrative changes extending to the adjacent spinal canal and   neural foramina and associated stenosis. Additional erosive changes noted at the left T10-T11 facet joint. Neuro sx consulted, pt needs MRI. Pt unable to tolerate procedure since he cant lie flat. s/p bolus and broad spectrum abx.    (16 Jan 2022 07:50)      PAST MEDICAL & SURGICAL HISTORY:  IV drug abuse        MEDICATIONS  (STANDING):  acetaminophen     Tablet .. 650 milliGRAM(s) Oral every 6 hours  ALPRAZolam 2 milliGRAM(s) Oral three times a day  buPROPion XL (24-Hour) . 300 milliGRAM(s) Oral daily  cyclobenzaprine 10 milliGRAM(s) Oral every 8 hours  enoxaparin Injectable 40 milliGRAM(s) SubCutaneous daily  ferrous    sulfate 325 milliGRAM(s) Oral daily  folic acid 1 milliGRAM(s) Oral daily  gabapentin 300 milliGRAM(s) Oral three times a day  influenza   Vaccine 0.5 milliLiter(s) IntraMuscular once  lactated ringers. 1000 milliLiter(s) (100 mL/Hr) IV Continuous <Continuous>  methadone    Tablet 40 milliGRAM(s) Oral every 8 hours  mirtazapine 60 milliGRAM(s) Oral at bedtime  multivitamin 1 Tablet(s) Oral daily  nafcillin  IVPB      nafcillin  IVPB 2 Gram(s) IV Intermittent every 4 hours  pantoprazole    Tablet 40 milliGRAM(s) Oral before breakfast  polyethylene glycol 3350 17 Gram(s) Oral two times a day  QUEtiapine 200 milliGRAM(s) Oral three times a day  senna 2 Tablet(s) Oral at bedtime    MEDICATIONS  (PRN):  aluminum hydroxide/magnesium hydroxide/simethicone Suspension 30 milliLiter(s) Oral every 4 hours PRN Dyspepsia  HYDROmorphone   Tablet 6 milliGRAM(s) Oral every 4 hours PRN Severe Pain (7 - 10)  ketorolac   Injectable 15 milliGRAM(s) IV Push every 6 hours PRN Moderate Pain (4 - 6)  LORazepam     Tablet 1 milliGRAM(s) Oral every 2 hours PRN CIWA-Ar score increase by 2 points and a total score of 7 or less  melatonin 3 milliGRAM(s) Oral at bedtime PRN Insomnia  ondansetron Injectable 4 milliGRAM(s) IV Push every 8 hours PRN Nausea and/or Vomiting      Allergies    No Known Allergies    Intolerances      Physical Exam:   Vital Signs Last 24 Hrs  T(C): 36.5 (19 Jan 2022 05:15), Max: 37.5 (18 Jan 2022 14:38)  T(F): 97.7 (19 Jan 2022 05:15), Max: 99.5 (18 Jan 2022 14:38)  HR: 86 (19 Jan 2022 05:15) (86 - 97)  BP: 129/78 (19 Jan 2022 05:15) (117/75 - 151/92)  BP(mean): --  RR: 18 (19 Jan 2022 05:15) (18 - 22)  SpO2: --      Labs:                         9.6    7.15  )-----------( 326      ( 19 Jan 2022 08:25 )             31.2     01-19    142  |  104  |  15  ----------------------------<  173<H>  3.9   |  21  |  0.9    Ca    8.4<L>      19 Jan 2022 08:25  Mg     2.2     01-19    TPro  7.1  /  Alb  3.1<L>  /  TBili  <0.2  /  DBili  x   /  AST  20  /  ALT  37  /  AlkPhos  126<H>  01-19      Radiology & Additional Studies:     Radiology imaging reviewed.       ASSESSMENT AND PLAN:    51-year-old male past medical history of polysubstance abuse (IV) who presents status post fall. Found to have sepsis 2/2 mssa bacteremia, vertebral osteomyelitis with paraspinal/psoas abscess.    - Pt with positive blood cultures growing mssa. Multiple paraspinal abscess and psoas abscess are not amenable for drain placement given their size. Additionally, technically difficult anatomic window for access to psoas abscess. Given positive cultures to guide therapy, no IR intervention planned at this time.    Thank you for the courtesy of this consult, please call [phone] with any further questions.

## 2022-01-19 NOTE — CONSULT NOTE ADULT - CONSULT REASON
Inability to ambulate
MSSA Bacteremia
foreign body in Right arm
hx of ivdu, says he is withdrawing, last reported use 2 weeks ago.
leg pain, vertebral osteomyelitis
PAM to R/O endocarditis
paraspinal abscesses
Acute low back pain in the setting of polysubstance abuse
b/l UE wounds
Acute low back pain in the setting of polysubstance abuse

## 2022-01-19 NOTE — PROGRESS NOTE ADULT - SUBJECTIVE AND OBJECTIVE BOX
MIKAEL MCDERMOTT 51y Male  MRN#: [id number]   CODE STATUS: full code     Hospital Day: 3d    Pt is currently admitted with the primary diagnosis of osteomyelitis     SUBJECTIVE    no acute events overnight, pt seen and examined, no new complaints,   denies fever chills, nvd abdominal pain,  back pain is improved                                             ----------------------------------------------------------  OBJECTIVE  PAST MEDICAL & SURGICAL HISTORY  IV drug abuse                                              -----------------------------------------------------------  ALLERGIES:  No Known Allergies                                            ------------------------------------------------------------    HOME MEDICATIONS  Home Medications:  Remeron 30 mg oral tablet: 2 tab(s) orally once a day (at bedtime) (16 Jan 2022 10:02)  SEROquel 200 mg oral tablet: orally 3 times a day (16 Jan 2022 10:02)  Wellbutrin: 300 milligram(s) orally once a day (16 Jan 2022 10:02)  Xanax: 2 milligram(s) orally 3 times a day (16 Jan 2022 10:02)                           MEDICATIONS:  STANDING MEDICATIONS  acetaminophen     Tablet .. 650 milliGRAM(s) Oral every 6 hours  ALPRAZolam 2 milliGRAM(s) Oral three times a day  buPROPion XL (24-Hour) . 300 milliGRAM(s) Oral daily  cyclobenzaprine 10 milliGRAM(s) Oral every 8 hours  enoxaparin Injectable 40 milliGRAM(s) SubCutaneous daily  ferrous    sulfate 325 milliGRAM(s) Oral daily  folic acid 1 milliGRAM(s) Oral daily  gabapentin 300 milliGRAM(s) Oral three times a day  influenza   Vaccine 0.5 milliLiter(s) IntraMuscular once  lactated ringers. 1000 milliLiter(s) IV Continuous <Continuous>  methadone    Tablet 40 milliGRAM(s) Oral every 8 hours  mirtazapine 60 milliGRAM(s) Oral at bedtime  multivitamin 1 Tablet(s) Oral daily  nafcillin  IVPB      nafcillin  IVPB 2 Gram(s) IV Intermittent every 4 hours  pantoprazole    Tablet 40 milliGRAM(s) Oral before breakfast  polyethylene glycol 3350 17 Gram(s) Oral two times a day  QUEtiapine 200 milliGRAM(s) Oral three times a day  senna 2 Tablet(s) Oral at bedtime    PRN MEDICATIONS  aluminum hydroxide/magnesium hydroxide/simethicone Suspension 30 milliLiter(s) Oral every 4 hours PRN  HYDROmorphone   Tablet 6 milliGRAM(s) Oral every 4 hours PRN  ketorolac   Injectable 15 milliGRAM(s) IV Push every 6 hours PRN  LORazepam     Tablet 1 milliGRAM(s) Oral every 2 hours PRN  melatonin 3 milliGRAM(s) Oral at bedtime PRN  ondansetron Injectable 4 milliGRAM(s) IV Push every 8 hours PRN                                            ------------------------------------------------------------  VITAL SIGNS: Last 24 Hours  T(C): 36.5 (19 Jan 2022 05:15), Max: 37.5 (18 Jan 2022 14:38)  T(F): 97.7 (19 Jan 2022 05:15), Max: 99.5 (18 Jan 2022 14:38)  HR: 86 (19 Jan 2022 05:15) (86 - 97)  BP: 129/78 (19 Jan 2022 05:15) (117/75 - 151/92)  BP(mean): --  RR: 18 (19 Jan 2022 05:15) (18 - 22)  SpO2: --      01-18-22 @ 07:01  -  01-19-22 @ 07:00  --------------------------------------------------------  IN: 980 mL / OUT: 950 mL / NET: 30 mL                                             --------------------------------------------------------------  LABS:                        9.6    7.15  )-----------( 326      ( 19 Jan 2022 08:25 )             31.2     01-19    142  |  104  |  15  ----------------------------<  173<H>  3.9   |  21  |  0.9    Ca    8.4<L>      19 Jan 2022 08:25  Mg     2.2     01-19    TPro  7.1  /  Alb  3.1<L>  /  TBili  <0.2  /  DBili  x   /  AST  20  /  ALT  37  /  AlkPhos  126<H>  01-19    PT/INR - ( 17 Jan 2022 11:00 )   PT: CLOTTED sec;   INR: CLOTTED ratio         PTT - ( 17 Jan 2022 11:00 )  PTT:TNP sec            Culture - Blood (collected 17 Jan 2022 11:00)  Source: .Blood Blood  Gram Stain (18 Jan 2022 11:15):    Growth in aerobic bottle: Gram Positive Cocci in Clusters    Growth in anaerobic bottle: Gram Positive Cocci in Clusters  Preliminary Report (18 Jan 2022 11:15):    Growth in aerobic bottle: Gram Positive Cocci in Clusters    Growth in anaerobic bottle: Gram Positive Cocci in Clusters                                                      -------------------------------------------------------------  RADIOLOGY:    RADIOLOGY:  ACC: [id number] EXAM:  CT BRAIN                          PROCEDURE DATE:  01/15/2022          INTERPRETATION:  Clinical History / Reason for exam: Trauma code    Technique: Noncontrast head CT.  Contiguous unenhanced CT axial images of   the head from the base to the vertex.    Comparison:  CT head dated 4/2/2010    Findings:    The ventricles and cortical sulci pattern are age-appropriate.    Gray-white matter differentiation is maintained.    There is no acute intracranial hemorrhage, extra-axial fluid collection   or midline shift.    No acute osseous abnormality/depressed skull fracture is identified.    The visualized paranasal sinuses are well aerated.    The mastoids are well-aerated.    IMPRESSION:    No CT evidence for acute intracranial pathology.      ACC: [id number] EXAM:  CT CERVICAL SPINE                          PROCEDURE DATE:  01/15/2022          INTERPRETATION:  CLINICAL STATEMENT: Trauma code    TECHNIQUE:  Contiguous unenhanced CT axial images of the cervical spine   with coronal and sagittal re-formations.    COMPARISON: None available    FINDINGS:    There is straightening of the normal cervical lordosis, which may be on   the basis of pain, muscle spasm, positioning or a combination of the   above.    There is no evidence of acute fracture or facet subluxation.    No significant prevertebral soft tissue swelling.    There is complete loss of disc height with bony fusion at C5-C6. There   are multilevel degenerative changes without definitive severe central   canal or neuroforaminal stenosis.      IMPRESSION:    No acute fracture or subluxation of the cervical spine.      ACC: [id number] EXAM:  CT ABDOMEN AND PELVIS IC                        ACC: [id number] EXAM:  CT CHEST IC                          PROCEDURE DATE:  01/15/2022          INTERPRETATION:  CLINICAL STATEMENT: Trauma code    TECHNIQUE: Contiguous CT images were obtained of the chest, abdomen and   pelvis.    Intravenous Contrast:  Intravenous contrast administered.  100 cc Omnipaque 350 intravenous contrast was administered. 0 cc   discarded.  Oral contrast: was not administered.      COMPARISON:  None    FINDINGS:      BONES AND SOFT TISSUES: No definite acute fractures identified. There is   an osseous destructive process at the left L5-S1 facet joints with   infiltrative changes extending to the adjacent spinal canal and neural   foramen. There is spinal canal stenosis at L4-L5 and L5-S1. Additional   erosive changes are noted at the left T10-T11 facet joints.      IMPRESSION:    Definitive acute traumatic injury is not identified to the chest, abdomen   or pelvis.    There is an osseous destructive process at the left L5-S1 facet joints   with infiltrative changes extending to the adjacent spinal canal and   neural foramina and associated stenosis. Per notes, patient with   polysubstance abuse. Consider osteomyelitis. Underlying mass is not   excluded. Additional erosive changes noted at the left T10-T11 facet   joint. Further evaluation with MRI can be obtained as needed.        ACC: [id number] EXAM:  XR WRIST COMP MIN 3 VIEWS BI                          PROCEDURE DATE:  01/15/2022          INTERPRETATION:  CLINICAL INDICATION:necrotic tissue, bone exposed    TECHNIQUE: 2 views of the right wrist. 2 views of the left wrist. One   image is provided with indeterminate laterality.    COMPARISON: No direct comparison available    FINDINGS:  There is no evidence of acute fracture or dislocation. Severe apparent   complete soft tissue defect at the distal dorsal wrist.    IMPRESSION:  No evidence of acute fracture or dislocation.    Severe apparent complete soft tissue defect at the distal dorsal wrist.    1.2 cm linear radiodensity within the anterior soft tissue of the right   forearm suspicious for foreign body.        ACC: [id number] EXAM:  XR FOREARM  2 VIEWS BI                          PROCEDURE DATE:  01/15/2022          INTERPRETATION:  CLINICAL INDICATION:necrotic tissue, bone exposed    TECHNIQUE: 2 views of the right forearm. 2 views of the left forearm.    COMPARISON: No direct comparison available    FINDINGS:  There is no evidence of acute fracture or dislocation. Severe apparent   complete soft tissue defect at the distal dorsal wrist bilaterally.    IMPRESSION:  No evidence of acute fracture or dislocation.    Severe apparent complete soft tissue defect at the distal dorsal wrist   bilaterally.    1.2 cm linear radiodensity within the anterior soft tissue of the left   forearm suspicious for foreign body.    --- End of Report ---    SOLEDAD HANNON MD; Attending Radiologist  This document has been electronically signed. Jan 16 2022  6:19PM      ACC: [id number] EXAM:  XR HAND 2 VIEWS LT                          PROCEDURE DATE:  01/16/2022          INTERPRETATION:  CLINICAL INDICATION:trauma    TECHNIQUE: 3 views of the left hand.    COMPARISON: No direct comparison available    FINDINGS:  There is no evidence of acute fracture or dislocation of the left hand.    IMPRESSION:  No evidence of acute fracture or dislocation of the left hand.        ACC: [id number] EXAM:  XR FOOT 2 VIEWS LT                          PROCEDURE DATE:  01/16/2022          INTERPRETATION:  CLINICAL HISTORY / REASON FOR EXAM: Pain    COMPARISON: None    TECHNIQUE: Three views, left foot radiographs    FINDINGS /  IMPRESSION:    No acute displaced fracture. Moderate midfoot degenerative changes.   Achilles tendon insertional enthesophyte measuring 1.1 cm.      ACC: [id number] EXAM:  XR FOREARM  2 VIEWS BI                          IMPRESSION:    No acute displaced fracture.    1.2 cm linear foreign body within the soft tissues overlying the right   radius.    Previously seen left forearm foreign body is no longer visualized.      ACC: [id number] EXAM:  MR SPINE LUMBAR WAW IC                        ACC: [id number] EXAM:  MR SPINE THORACIC WAW IC                          PROCEDURE DATE:  01/18/2022          INTERPRETATION:  CLINICAL INDICATION: Osteomyelitis.    TECHNIQUE: Pre and postcontrast MRI of the thoracic and lumbar spine was   performed.    Sagittal T1, T2, and STIR, and axial T2 and T1 sequences were obtained of   the thoracic and lumbar spine. 10 cc Gadavist was administered. 0 cc was   discarded.    COMPARISON: Correlation with CT of the abdomen and pelvis dated   1/15/2022..    FINDINGS:    THORACIC SPINE:  VERTEBRAL BODIES/ALIGNMENT: There is increased signal and enhancement   noted involving the left T10-T11 facet joint space without erosive   changes identified.    There is increased STIR signal involving the anterior aspects of the T11   and T12 vertebral bodies at likely related to degenerative change. The    SPINAL CORD: There is no abnormal spinal cord signal or enhancement..    SPINAL CANAL:  No intradural or extradural defects are seen.    INTERVERTEBRAL DISCS:  The disc spaces are well-maintained.    There is trace disc bulging at T9-T10 as well as mild facet arthrosis   without spinal canal or neuroforaminal narrowing.    There is trace disc bulging at T10-T11 indenting the ventral thecal sac   as well as bilateral facet arthrosis contributing to moderate left   neuroforaminal narrowing.    At T11-T12 there is trace disc bulging as well as bilateral facet   arthrosis resulting in mildleft neuroforaminal narrowing. There is no   spinal canal stenosis.    MISCELLANEOUS:  None.      LUMBAR SPINE:  VERTEBRAL BODIES/ALIGNMENT AND SOFT TISSUES:  If clinically abnormal   signal or enhancement which is centered within the left L5-S1 facet   joint. There is osseous erosion involving the facet joint as well as   surrounding increased signal and associated enhancement. There   rim-enhancing fluid collections noted just anterior to the facet joint   measuring approximately 1.3 x 0.8 x 1.3 cm, series 1200 image 14. This   fluid collection extends to the superior aspect of the facet joint within   the paraspinal soft tissues with an oblong fluid collection measuring 2.0   x 0.7 x 1.3 cm (AP x TR x CC). There are additional enhancing fluid   collections involving the left retroperitoneal soft tissues just   posterior to the psoas musculature which measure approximately 3.0 x 1.0   cm in the axial plane, series 1800 image 41 there is increased signal   noted involving the posterior aspect of the L5 and L4 vertebral bodies   there is epidural phlegmon noted overlying the L5 and S1 vertebral bodies   compression of the descending nerve roots at the level of L5.    There is a rim-enhancing collection within the right psoas musculature   measuring 1.1 x 0.9 cm in axial plane though incompletely evaluated on   sagittal imaging..    There is chronic wedge compression deformity of the L1 vertebral body   with approximately 20% height loss.    There are Modic type II degenerative changes at L2-3.    CONUS/CAUDA EQUINA: Unremarkable terminating at the superior endplate of   L1    Evaluation of individual disc levels is slightly limited due to motion    At L4-L5 there is disc bulging, posterior ligamentous hypertrophy and   bilateral facet arthrosis contributing to severe spinal stenosis as well   as severe bilateral neuroforaminal narrowing.    At L5-S1 there is disc bulging and ventral epidural phlegmon, posterior   ligamentous hypertrophy and bilateral facet arthrosis contributing to   severe spinal stenosis as well as severe lateral neuroforaminal narrowing.    MISCELLANEOUS:  None.      IMPRESSION:    THORACIC SPINE:  Motion limited examination.    Minimally increased signal and enhancement involving the left T10-T11   facet joint space which represent early facet joint   infection/inflammation however could also be related to degenerative   changes.    LUMBAR SPINE:  Findings consistent with left L5-S1 facetitis with associated multifocal   abscess formation directly adjacent to the left facet joint, within the   left retroperitoneal soft tissues as well as the right psoas musculature.    Ventral epidural phlegmon overlying the L5 vertebral body causing   significant compression of the descending nerve roots.    These findings were discussed with Dr. Plummer at 1/18/2022 at 5:06 PM   by Dr. Ellis with read back confirmation.                                           --------------------------------------------------------------    PHYSICAL EXAM:  General: not in acute distress  LUNGS: air entry bilat  HEART: RRR, +S1,S2,  ABDOMEN: SNTTP, ND x 4 q's  BACK: tenderness to palpation midspinal region lumbar region  EXT: Warm, well perfused x 4  NEURO: AxOx3, No FND's noted  SKIN: No new breakdown or rashes noted                                           --------------------------------------------------------------

## 2022-01-20 LAB
ALBUMIN SERPL ELPH-MCNC: 3 G/DL — LOW (ref 3.5–5.2)
ALP SERPL-CCNC: 117 U/L — HIGH (ref 30–115)
ALT FLD-CCNC: 26 U/L — SIGNIFICANT CHANGE UP (ref 0–41)
ANION GAP SERPL CALC-SCNC: 18 MMOL/L — HIGH (ref 7–14)
AST SERPL-CCNC: 15 U/L — SIGNIFICANT CHANGE UP (ref 0–41)
BASOPHILS # BLD AUTO: 0.02 K/UL — SIGNIFICANT CHANGE UP (ref 0–0.2)
BASOPHILS NFR BLD AUTO: 0.2 % — SIGNIFICANT CHANGE UP (ref 0–1)
BILIRUB SERPL-MCNC: 0.2 MG/DL — SIGNIFICANT CHANGE UP (ref 0.2–1.2)
BUN SERPL-MCNC: 15 MG/DL — SIGNIFICANT CHANGE UP (ref 10–20)
CALCIUM SERPL-MCNC: 8.3 MG/DL — LOW (ref 8.5–10.1)
CHLORIDE SERPL-SCNC: 101 MMOL/L — SIGNIFICANT CHANGE UP (ref 98–110)
CO2 SERPL-SCNC: 17 MMOL/L — SIGNIFICANT CHANGE UP (ref 17–32)
CREAT SERPL-MCNC: 0.9 MG/DL — SIGNIFICANT CHANGE UP (ref 0.7–1.5)
EOSINOPHIL # BLD AUTO: 0.24 K/UL — SIGNIFICANT CHANGE UP (ref 0–0.7)
EOSINOPHIL NFR BLD AUTO: 2.8 % — SIGNIFICANT CHANGE UP (ref 0–8)
GLUCOSE SERPL-MCNC: 153 MG/DL — HIGH (ref 70–99)
HCT VFR BLD CALC: 33.2 % — LOW (ref 42–52)
HGB BLD-MCNC: 10.2 G/DL — LOW (ref 14–18)
IMM GRANULOCYTES NFR BLD AUTO: 0.5 % — HIGH (ref 0.1–0.3)
LYMPHOCYTES # BLD AUTO: 2.06 K/UL — SIGNIFICANT CHANGE UP (ref 1.2–3.4)
LYMPHOCYTES # BLD AUTO: 24 % — SIGNIFICANT CHANGE UP (ref 20.5–51.1)
MAGNESIUM SERPL-MCNC: 2 MG/DL — SIGNIFICANT CHANGE UP (ref 1.8–2.4)
MCHC RBC-ENTMCNC: 25.5 PG — LOW (ref 27–31)
MCHC RBC-ENTMCNC: 30.7 G/DL — LOW (ref 32–37)
MCV RBC AUTO: 83 FL — SIGNIFICANT CHANGE UP (ref 80–94)
MONOCYTES # BLD AUTO: 0.62 K/UL — HIGH (ref 0.1–0.6)
MONOCYTES NFR BLD AUTO: 7.2 % — SIGNIFICANT CHANGE UP (ref 1.7–9.3)
NEUTROPHILS # BLD AUTO: 5.6 K/UL — SIGNIFICANT CHANGE UP (ref 1.4–6.5)
NEUTROPHILS NFR BLD AUTO: 65.3 % — SIGNIFICANT CHANGE UP (ref 42.2–75.2)
NRBC # BLD: 0 /100 WBCS — SIGNIFICANT CHANGE UP (ref 0–0)
PLATELET # BLD AUTO: 382 K/UL — SIGNIFICANT CHANGE UP (ref 130–400)
POTASSIUM SERPL-MCNC: 4.2 MMOL/L — SIGNIFICANT CHANGE UP (ref 3.5–5)
POTASSIUM SERPL-SCNC: 4.2 MMOL/L — SIGNIFICANT CHANGE UP (ref 3.5–5)
PROT SERPL-MCNC: 7.4 G/DL — SIGNIFICANT CHANGE UP (ref 6–8)
RBC # BLD: 4 M/UL — LOW (ref 4.7–6.1)
RBC # FLD: 16.4 % — HIGH (ref 11.5–14.5)
SODIUM SERPL-SCNC: 136 MMOL/L — SIGNIFICANT CHANGE UP (ref 135–146)
WBC # BLD: 8.58 K/UL — SIGNIFICANT CHANGE UP (ref 4.8–10.8)
WBC # FLD AUTO: 8.58 K/UL — SIGNIFICANT CHANGE UP (ref 4.8–10.8)

## 2022-01-20 PROCEDURE — 99233 SBSQ HOSP IP/OBS HIGH 50: CPT

## 2022-01-20 RX ADMIN — METHADONE HYDROCHLORIDE 40 MILLIGRAM(S): 40 TABLET ORAL at 21:07

## 2022-01-20 RX ADMIN — GABAPENTIN 300 MILLIGRAM(S): 400 CAPSULE ORAL at 13:35

## 2022-01-20 RX ADMIN — Medication 2 MILLIGRAM(S): at 21:07

## 2022-01-20 RX ADMIN — METHADONE HYDROCHLORIDE 40 MILLIGRAM(S): 40 TABLET ORAL at 13:35

## 2022-01-20 RX ADMIN — BUPROPION HYDROCHLORIDE 300 MILLIGRAM(S): 150 TABLET, EXTENDED RELEASE ORAL at 11:20

## 2022-01-20 RX ADMIN — NAFCILLIN 200 GRAM(S): 10 INJECTION, POWDER, FOR SOLUTION INTRAVENOUS at 13:37

## 2022-01-20 RX ADMIN — Medication 650 MILLIGRAM(S): at 23:24

## 2022-01-20 RX ADMIN — QUETIAPINE FUMARATE 200 MILLIGRAM(S): 200 TABLET, FILM COATED ORAL at 05:02

## 2022-01-20 RX ADMIN — Medication 1 MILLIGRAM(S): at 11:21

## 2022-01-20 RX ADMIN — Medication 650 MILLIGRAM(S): at 18:29

## 2022-01-20 RX ADMIN — QUETIAPINE FUMARATE 200 MILLIGRAM(S): 200 TABLET, FILM COATED ORAL at 13:36

## 2022-01-20 RX ADMIN — POLYETHYLENE GLYCOL 3350 17 GRAM(S): 17 POWDER, FOR SOLUTION ORAL at 05:02

## 2022-01-20 RX ADMIN — NAFCILLIN 200 GRAM(S): 10 INJECTION, POWDER, FOR SOLUTION INTRAVENOUS at 05:03

## 2022-01-20 RX ADMIN — QUETIAPINE FUMARATE 200 MILLIGRAM(S): 200 TABLET, FILM COATED ORAL at 21:07

## 2022-01-20 RX ADMIN — Medication 1 TABLET(S): at 11:22

## 2022-01-20 RX ADMIN — CYCLOBENZAPRINE HYDROCHLORIDE 10 MILLIGRAM(S): 10 TABLET, FILM COATED ORAL at 05:03

## 2022-01-20 RX ADMIN — POLYETHYLENE GLYCOL 3350 17 GRAM(S): 17 POWDER, FOR SOLUTION ORAL at 17:02

## 2022-01-20 RX ADMIN — GABAPENTIN 300 MILLIGRAM(S): 400 CAPSULE ORAL at 21:08

## 2022-01-20 RX ADMIN — METHADONE HYDROCHLORIDE 40 MILLIGRAM(S): 40 TABLET ORAL at 05:03

## 2022-01-20 RX ADMIN — Medication 650 MILLIGRAM(S): at 17:02

## 2022-01-20 RX ADMIN — Medication 2 MILLIGRAM(S): at 13:35

## 2022-01-20 RX ADMIN — ENOXAPARIN SODIUM 40 MILLIGRAM(S): 100 INJECTION SUBCUTANEOUS at 11:21

## 2022-01-20 RX ADMIN — Medication 650 MILLIGRAM(S): at 11:20

## 2022-01-20 RX ADMIN — Medication 650 MILLIGRAM(S): at 12:00

## 2022-01-20 RX ADMIN — CYCLOBENZAPRINE HYDROCHLORIDE 10 MILLIGRAM(S): 10 TABLET, FILM COATED ORAL at 21:08

## 2022-01-20 RX ADMIN — NAFCILLIN 200 GRAM(S): 10 INJECTION, POWDER, FOR SOLUTION INTRAVENOUS at 01:00

## 2022-01-20 RX ADMIN — PANTOPRAZOLE SODIUM 40 MILLIGRAM(S): 20 TABLET, DELAYED RELEASE ORAL at 06:25

## 2022-01-20 RX ADMIN — GABAPENTIN 300 MILLIGRAM(S): 400 CAPSULE ORAL at 05:02

## 2022-01-20 RX ADMIN — Medication 325 MILLIGRAM(S): at 11:20

## 2022-01-20 RX ADMIN — SENNA PLUS 2 TABLET(S): 8.6 TABLET ORAL at 21:07

## 2022-01-20 RX ADMIN — MIRTAZAPINE 60 MILLIGRAM(S): 45 TABLET, ORALLY DISINTEGRATING ORAL at 21:07

## 2022-01-20 RX ADMIN — NAFCILLIN 200 GRAM(S): 10 INJECTION, POWDER, FOR SOLUTION INTRAVENOUS at 10:31

## 2022-01-20 RX ADMIN — CYCLOBENZAPRINE HYDROCHLORIDE 10 MILLIGRAM(S): 10 TABLET, FILM COATED ORAL at 13:36

## 2022-01-20 RX ADMIN — Medication 650 MILLIGRAM(S): at 06:02

## 2022-01-20 RX ADMIN — Medication 650 MILLIGRAM(S): at 05:02

## 2022-01-20 RX ADMIN — NAFCILLIN 200 GRAM(S): 10 INJECTION, POWDER, FOR SOLUTION INTRAVENOUS at 17:02

## 2022-01-20 RX ADMIN — Medication 2 MILLIGRAM(S): at 05:02

## 2022-01-20 RX ADMIN — NAFCILLIN 200 GRAM(S): 10 INJECTION, POWDER, FOR SOLUTION INTRAVENOUS at 21:07

## 2022-01-20 NOTE — PROGRESS NOTE ADULT - SUBJECTIVE AND OBJECTIVE BOX
MIKAEL MCDERMOTT 51y Male  MRN#: [id number]   CODE STATUS: full code     Hospital Day: 4d    Pt is currently admitted with the primary diagnosis of vertebral osteomyelitis     SUBJECTIVE    no acute events overnight, pt seen and examined, no new complaints,   back pain improved,   no fever chills,                                             ----------------------------------------------------------  OBJECTIVE  PAST MEDICAL & SURGICAL HISTORY  IV drug abuse                                              -----------------------------------------------------------  ALLERGIES:  No Known Allergies                                            ------------------------------------------------------------    HOME MEDICATIONS  Home Medications:  Remeron 30 mg oral tablet: 2 tab(s) orally once a day (at bedtime) (16 Jan 2022 10:02)  SEROquel 200 mg oral tablet: orally 3 times a day (16 Jan 2022 10:02)  Wellbutrin: 300 milligram(s) orally once a day (16 Jan 2022 10:02)  Xanax: 2 milligram(s) orally 3 times a day (16 Jan 2022 10:02)                           MEDICATIONS:  STANDING MEDICATIONS  acetaminophen     Tablet .. 650 milliGRAM(s) Oral every 6 hours  ALPRAZolam 2 milliGRAM(s) Oral three times a day  buPROPion XL (24-Hour) . 300 milliGRAM(s) Oral daily  cyclobenzaprine 10 milliGRAM(s) Oral every 8 hours  enoxaparin Injectable 40 milliGRAM(s) SubCutaneous daily  ferrous    sulfate 325 milliGRAM(s) Oral daily  folic acid 1 milliGRAM(s) Oral daily  gabapentin 300 milliGRAM(s) Oral three times a day  influenza   Vaccine 0.5 milliLiter(s) IntraMuscular once  lactated ringers. 1000 milliLiter(s) IV Continuous <Continuous>  methadone    Tablet 40 milliGRAM(s) Oral every 8 hours  mirtazapine 60 milliGRAM(s) Oral at bedtime  multivitamin 1 Tablet(s) Oral daily  nafcillin  IVPB 2 Gram(s) IV Intermittent every 4 hours  nafcillin  IVPB      pantoprazole    Tablet 40 milliGRAM(s) Oral before breakfast  polyethylene glycol 3350 17 Gram(s) Oral two times a day  QUEtiapine 200 milliGRAM(s) Oral three times a day  senna 2 Tablet(s) Oral at bedtime    PRN MEDICATIONS  aluminum hydroxide/magnesium hydroxide/simethicone Suspension 30 milliLiter(s) Oral every 4 hours PRN  HYDROmorphone   Tablet 6 milliGRAM(s) Oral every 4 hours PRN  LORazepam     Tablet 1 milliGRAM(s) Oral every 2 hours PRN  melatonin 3 milliGRAM(s) Oral at bedtime PRN  ondansetron Injectable 4 milliGRAM(s) IV Push every 8 hours PRN                                            ------------------------------------------------------------  VITAL SIGNS: Last 24 Hours  T(C): 36.4 (20 Jan 2022 06:04), Max: 37.5 (19 Jan 2022 20:18)  T(F): 97.5 (20 Jan 2022 06:04), Max: 99.5 (19 Jan 2022 20:18)  HR: 80 (20 Jan 2022 06:04) (80 - 99)  BP: 121/65 (20 Jan 2022 06:04) (121/65 - 128/77)  BP(mean): --  RR: 18 (20 Jan 2022 06:04) (17 - 20)  SpO2: --      01-19-22 @ 07:01  -  01-20-22 @ 07:00  --------------------------------------------------------  IN: 350 mL / OUT: 500 mL / NET: -150 mL                                             --------------------------------------------------------------  LABS:                        9.6    7.15  )-----------( 326      ( 19 Jan 2022 08:25 )             31.2     01-19    142  |  104  |  15  ----------------------------<  173<H>  3.9   |  21  |  0.9    Ca    8.4<L>      19 Jan 2022 08:25  Mg     2.2     01-19    TPro  7.1  /  Alb  3.1<L>  /  TBili  <0.2  /  DBili  x   /  AST  20  /  ALT  37  /  AlkPhos  126<H>  01-19                Culture - Blood (collected 18 Jan 2022 04:30)  Source: .Blood Blood  Preliminary Report (19 Jan 2022 20:01):    No growth to date.    Culture - Blood (collected 17 Jan 2022 11:00)  Source: .Blood Blood  Gram Stain (18 Jan 2022 11:15):    Growth in aerobic bottle: Gram Positive Cocci in Clusters    Growth in anaerobic bottle: Gram Positive Cocci in Clusters  Final Report (19 Jan 2022 17:30):    Growth in aerobic and anaerobic bottles: Staphylococcus aureus    See previous culture [id number]                                                    -------------------------------------------------------------  RADIOLOGY:   RADIOLOGY:    RADIOLOGY:  ACC: [id number] EXAM:  CT BRAIN                          PROCEDURE DATE:  01/15/2022          INTERPRETATION:  Clinical History / Reason for exam: Trauma code    Technique: Noncontrast head CT.  Contiguous unenhanced CT axial images of   the head from the base to the vertex.    Comparison:  CT head dated 4/2/2010    Findings:    The ventricles and cortical sulci pattern are age-appropriate.    Gray-white matter differentiation is maintained.    There is no acute intracranial hemorrhage, extra-axial fluid collection   or midline shift.    No acute osseous abnormality/depressed skull fracture is identified.    The visualized paranasal sinuses are well aerated.    The mastoids are well-aerated.    IMPRESSION:    No CT evidence for acute intracranial pathology.      ACC: [id number] EXAM:  CT CERVICAL SPINE                          PROCEDURE DATE:  01/15/2022          INTERPRETATION:  CLINICAL STATEMENT: Trauma code    TECHNIQUE:  Contiguous unenhanced CT axial images of the cervical spine   with coronal and sagittal re-formations.    COMPARISON: None available    FINDINGS:    There is straightening of the normal cervical lordosis, which may be on   the basis of pain, muscle spasm, positioning or a combination of the   above.    There is no evidence of acute fracture or facet subluxation.    No significant prevertebral soft tissue swelling.    There is complete loss of disc height with bony fusion at C5-C6. There   are multilevel degenerative changes without definitive severe central   canal or neuroforaminal stenosis.      IMPRESSION:    No acute fracture or subluxation of the cervical spine.      ACC: [id number] EXAM:  CT ABDOMEN AND PELVIS IC                        ACC: [id number] EXAM:  CT CHEST IC                          PROCEDURE DATE:  01/15/2022          INTERPRETATION:  CLINICAL STATEMENT: Trauma code    TECHNIQUE: Contiguous CT images were obtained of the chest, abdomen and   pelvis.    Intravenous Contrast:  Intravenous contrast administered.  100 cc Omnipaque 350 intravenous contrast was administered. 0 cc   discarded.  Oral contrast: was not administered.      COMPARISON:  None    FINDINGS:      BONES AND SOFT TISSUES: No definite acute fractures identified. There is   an osseous destructive process at the left L5-S1 facet joints with   infiltrative changes extending to the adjacent spinal canal and neural   foramen. There is spinal canal stenosis at L4-L5 and L5-S1. Additional   erosive changes are noted at the left T10-T11 facet joints.      IMPRESSION:    Definitive acute traumatic injury is not identified to the chest, abdomen   or pelvis.    There is an osseous destructive process at the left L5-S1 facet joints   with infiltrative changes extending to the adjacent spinal canal and   neural foramina and associated stenosis. Per notes, patient with   polysubstance abuse. Consider osteomyelitis. Underlying mass is not   excluded. Additional erosive changes noted at the left T10-T11 facet   joint. Further evaluation with MRI can be obtained as needed.        ACC: [id number] EXAM:  XR WRIST COMP MIN 3 VIEWS BI                          PROCEDURE DATE:  01/15/2022          INTERPRETATION:  CLINICAL INDICATION:necrotic tissue, bone exposed    TECHNIQUE: 2 views of the right wrist. 2 views of the left wrist. One   image is provided with indeterminate laterality.    COMPARISON: No direct comparison available    FINDINGS:  There is no evidence of acute fracture or dislocation. Severe apparent   complete soft tissue defect at the distal dorsal wrist.    IMPRESSION:  No evidence of acute fracture or dislocation.    Severe apparent complete soft tissue defect at the distal dorsal wrist.    1.2 cm linear radiodensity within the anterior soft tissue of the right   forearm suspicious for foreign body.        ACC: [id number] EXAM:  XR FOREARM  2 VIEWS BI                          PROCEDURE DATE:  01/15/2022          INTERPRETATION:  CLINICAL INDICATION:necrotic tissue, bone exposed    TECHNIQUE: 2 views of the right forearm. 2 views of the left forearm.    COMPARISON: No direct comparison available    FINDINGS:  There is no evidence of acute fracture or dislocation. Severe apparent   complete soft tissue defect at the distal dorsal wrist bilaterally.    IMPRESSION:  No evidence of acute fracture or dislocation.    Severe apparent complete soft tissue defect at the distal dorsal wrist   bilaterally.    1.2 cm linear radiodensity within the anterior soft tissue of the left   forearm suspicious for foreign body.    --- End of Report ---    SOLEDAD HANNON MD; Attending Radiologist  This document has been electronically signed. Jan 16 2022  6:19PM      ACC: [id number] EXAM:  XR HAND 2 VIEWS LT                          PROCEDURE DATE:  01/16/2022          INTERPRETATION:  CLINICAL INDICATION:trauma    TECHNIQUE: 3 views of the left hand.    COMPARISON: No direct comparison available    FINDINGS:  There is no evidence of acute fracture or dislocation of the left hand.    IMPRESSION:  No evidence of acute fracture or dislocation of the left hand.        ACC: [id number] EXAM:  XR FOOT 2 VIEWS LT                          PROCEDURE DATE:  01/16/2022          INTERPRETATION:  CLINICAL HISTORY / REASON FOR EXAM: Pain    COMPARISON: None    TECHNIQUE: Three views, left foot radiographs    FINDINGS /  IMPRESSION:    No acute displaced fracture. Moderate midfoot degenerative changes.   Achilles tendon insertional enthesophyte measuring 1.1 cm.      ACC: [id number] EXAM:  XR FOREARM  2 VIEWS BI                          IMPRESSION:    No acute displaced fracture.    1.2 cm linear foreign body within the soft tissues overlying the right   radius.    Previously seen left forearm foreign body is no longer visualized.      ACC: [id number] EXAM:  MR SPINE LUMBAR WAW IC                        ACC: [id number] EXAM:  MR SPINE THORACIC WAW IC                          PROCEDURE DATE:  01/18/2022          INTERPRETATION:  CLINICAL INDICATION: Osteomyelitis.    TECHNIQUE: Pre and postcontrast MRI of the thoracic and lumbar spine was   performed.    Sagittal T1, T2, and STIR, and axial T2 and T1 sequences were obtained of   the thoracic and lumbar spine. 10 cc Gadavist was administered. 0 cc was   discarded.    COMPARISON: Correlation with CT of the abdomen and pelvis dated   1/15/2022..    FINDINGS:    THORACIC SPINE:  VERTEBRAL BODIES/ALIGNMENT: There is increased signal and enhancement   noted involving the left T10-T11 facet joint space without erosive   changes identified.    There is increased STIR signal involving the anterior aspects of the T11   and T12 vertebral bodies at likely related to degenerative change. The    SPINAL CORD: There is no abnormal spinal cord signal or enhancement..    SPINAL CANAL:  No intradural or extradural defects are seen.    INTERVERTEBRAL DISCS:  The disc spaces are well-maintained.    There is trace disc bulging at T9-T10 as well as mild facet arthrosis   without spinal canal or neuroforaminal narrowing.    There is trace disc bulging at T10-T11 indenting the ventral thecal sac   as well as bilateral facet arthrosis contributing to moderate left   neuroforaminal narrowing.    At T11-T12 there is trace disc bulging as well as bilateral facet   arthrosis resulting in mildleft neuroforaminal narrowing. There is no   spinal canal stenosis.    MISCELLANEOUS:  None.      LUMBAR SPINE:  VERTEBRAL BODIES/ALIGNMENT AND SOFT TISSUES:  If clinically abnormal   signal or enhancement which is centered within the left L5-S1 facet   joint. There is osseous erosion involving the facet joint as well as   surrounding increased signal and associated enhancement. There   rim-enhancing fluid collections noted just anterior to the facet joint   measuring approximately 1.3 x 0.8 x 1.3 cm, series 1200 image 14. This   fluid collection extends to the superior aspect of the facet joint within   the paraspinal soft tissues with an oblong fluid collection measuring 2.0   x 0.7 x 1.3 cm (AP x TR x CC). There are additional enhancing fluid   collections involving the left retroperitoneal soft tissues just   posterior to the psoas musculature which measure approximately 3.0 x 1.0   cm in the axial plane, series 1800 image 41 there is increased signal   noted involving the posterior aspect of the L5 and L4 vertebral bodies   there is epidural phlegmon noted overlying the L5 and S1 vertebral bodies   compression of the descending nerve roots at the level of L5.    There is a rim-enhancing collection within the right psoas musculature   measuring 1.1 x 0.9 cm in axial plane though incompletely evaluated on   sagittal imaging..    There is chronic wedge compression deformity of the L1 vertebral body   with approximately 20% height loss.    There are Modic type II degenerative changes at L2-3.    CONUS/CAUDA EQUINA: Unremarkable terminating at the superior endplate of   L1    Evaluation of individual disc levels is slightly limited due to motion    At L4-L5 there is disc bulging, posterior ligamentous hypertrophy and   bilateral facet arthrosis contributing to severe spinal stenosis as well   as severe bilateral neuroforaminal narrowing.    At L5-S1 there is disc bulging and ventral epidural phlegmon, posterior   ligamentous hypertrophy and bilateral facet arthrosis contributing to   severe spinal stenosis as well as severe lateral neuroforaminal narrowing.    MISCELLANEOUS:  None.      IMPRESSION:    THORACIC SPINE:  Motion limited examination.    Minimally increased signal and enhancement involving the left T10-T11   facet joint space which represent early facet joint   infection/inflammation however could also be related to degenerative   changes.    LUMBAR SPINE:  Findings consistent with left L5-S1 facetitis with associated multifocal   abscess formation directly adjacent to the left facet joint, within the   left retroperitoneal soft tissues as well as the right psoas musculature.    Ventral epidural phlegmon overlying the L5 vertebral body causing   significant compression of the descending nerve roots.    These findings were discussed with Dr. Plummer at 1/18/2022 at 5:06 PM   by Dr. Ellis with read back confirmation.      TTE 1/18/22   Summary:   1. LV Ejection Fraction by Chester's Method with a biplane EF of 56 %.   2. Normal global left ventricular systolic function.   3. Spectral Doppler shows impaired relaxation pattern of left   ventricular myocardial filling (Grade I diastolic dysfunction).   4. Normal left atrial size.   5. Normal right atrial size.    PAM 1/19/22     Summary:   1. Left ventricular ejection fraction, by visual estimation, is 60 to   65%.   2. Normal global left ventricular systolic function.   3. Normal left atrial size.   4. Normal right atrial size.   5. Trace mitral valve regurgitation.   6. No evidence of any vegetations.                                                --------------------------------------------------------------    PHYSICAL EXAM:  General: not in acute distress  LUNGS: air entry bilat, decreased at lower bases   HEART: RRR,   ABDOMEN: SNTTP, ND x 4 q's  EXT: Warm, well perfused x 4  BACK: tenderness to palpation along spine in lower lumbar region, improved   NEURO: AxOx3, No FND's noted  SKIN: bilateral wrists skin breakdown,                                            --------------------------------------------------------------

## 2022-01-20 NOTE — MEDICAL STUDENT PROGRESS NOTE(EDUCATION) - NS MD HP STUD ASPLAN PLAN FT
# Sepsis POA  # Vertebral Osteomyelitis with adjacent soft tissue abscess   # MSSA Bacteremia   - on admission HR 91, wbc 16.6k  - ,      - Blood culture 1/15 + GPC in clusters, 1/17 + GPC in clusters, 1/18 No Growth  - CT AP - osseous destructive process at the left L5-S1 facet joints with infiltrative changes extending to the adjacent spinal canal and neural foramina and associated stenosis. Consider osteomyelitis.   - MRI - findings consistent with L5 S1 facetitis, multifocal abscess within left retroperitoneal soft tissues and right psoas   - ID appreciated   - nafcillin 2g q4hrs   - repeat blood cultures daily   - TTE no evidence of endocarditis   - PAM no evidence of endocarditis   - IR consulted  for possible abscess drainage, no intervention recommended  - ID consult for last day of antibiotics    #Right forearm foreign body  - 1.2cm linear foreign body overlying right radius  - surgery consult for removal - not recommending removal given difficulty of extraction, and no evidence of infection,   - not a contraindication for MRI     #Fall   - likely mechanical   - cth negative, trauma workup negative   - pt ot rehab     # Bl open wounds on wrist   - burn appreciated, Wound care - Xeroform/Kelrex BID, no Surgical debridement     # Lt foot pain   - likely radicular etiology   - xray foot - no fracture, midfoot degenerative changes,  - duplex LE negative for DVT     # IVDU  # suspected ETOH use   # suspected thiamine folate deficiency   - active heroin use, last use 2 weeks ago  - reports taking 135mg methadone per day, dose confirmed with Juan Ramon Flores RN at methadone clinic, last received 3 day supply on 1/14,  - addiction medicine consulted  - continue thiamine folate supplementation   - pain management appreciated   - methadone 40mg q8hr standing   - tylenol 650mg q8 standing   - gabapentin 300mg q8hr   - cyclobenzaprine 10mg q8hr   - dilaudid 6mg po q4 prn   - ketorolac 15mg q6hr prn     #Mood dx   #Anxiety   - continue home xanax 2mg TID, has withdrawal symptoms when not taking them.   - c/w welbutrin 300 qd and Seroquel 300 TID   - f/u drug screen     #Normocytic Anemia   - multifactorial, iron deficiency, anemia of chronic disease,   - continue folate, MV,  - ferrous sulfate 325mg qd     # DVT ppx- LMWH  # GI ppx- PPI  # Activity- AAT  # Diet- Reg  # Code status - full  # Dispo- from home, acute     Pending   - blood cultures daily  - f/u Hep C RNA  - ID consult for antibiotics end date   - Prepare for discharge/ Nursing home placement

## 2022-01-20 NOTE — PROGRESS NOTE ADULT - SUBJECTIVE AND OBJECTIVE BOX
51-year-old male past medical history of polysubstance abuse who presents status post fall. Has back pain radiating to LLE, CT concerning for osteomyelitis, was diagnosed with MSSA bacteremia, PAM was negative for IE.  Pt was consulted by ID, treated with IV Abx, repeat blood Cx ( one set ) is negative, will f/u second set of blood Cx and consult IR for a PICE line.   Today pt feels better, c/o stuttering denies pain, asking about discharge, agreeable for NH placement.      OBJECTIVE  PAST MEDICAL & SURGICAL HISTORY  IV drug abuse      ALLERGIES:  No Known Allergies      HOME MEDICATIONS  Home Medications:  Remeron 30 mg oral tablet: 2 tab(s) orally once a day (at bedtime) (16 Jan 2022 10:02)  SEROquel 200 mg oral tablet: orally 3 times a day (16 Jan 2022 10:02)  Wellbutrin: 300 milligram(s) orally once a day (16 Jan 2022 10:02)  Xanax: 2 milligram(s) orally 3 times a day (16 Jan 2022 10:02)    VITAL SIGNS: Last 24 Hours  T(C): 35.7 (20 Jan 2022 12:18), Max: 37.5 (19 Jan 2022 20:18)  T(F): 96.3 (20 Jan 2022 12:18), Max: 99.5 (19 Jan 2022 20:18)  HR: 87 (20 Jan 2022 12:18) (80 - 99)  BP: 148/91 (20 Jan 2022 12:18) (121/65 - 148/91)  BP(mean): --  RR: 18 (20 Jan 2022 12:18) (17 - 20)  SpO2: --    PHYSICAL EXAM:  General: not in acute distress, very poor hygiene   LUNGS: CTA b/l   HEART: RRR, +S1,S2,  ABDOMEN: SNTTP, ND x 4 q's  BACK: tenderness to palpation midspinal  lumbar region  EXT: Warm, well perfused x 4, edema noted on right wrist   NEURO: AxOx3, No FND's noted  SKIN: open wound on right wrist, multiple skin abrasions     LABS:                                   9.6    7.15  )-----------( 326      ( 19 Jan 2022 08:25 )             31.2   01-19    142  |  104  |  15  ----------------------------<  173<H>  3.9   |  21  |  0.9    Ca    8.4<L>      19 Jan 2022 08:25  Mg     2.2     01-19    TPro  7.1  /  Alb  3.1<L>  /  TBili  <0.2  /  DBili  x   /  AST  20  /  ALT  37  /  AlkPhos  126<H>  01-19         PTT - ( 17 Jan 2022 11:00 )  PTT:TNP sec    Culture - Blood in AM (01.18.22 @ 04:30)   Specimen Source: .Blood Blood   Culture Results:   No growth to date.         Culture - Blood (collected 17 Jan 2022 11:00)  Source: .Blood Blood  Gram Stain (18 Jan 2022 11:15):    Growth in aerobic bottle: Gram Positive Cocci in Clusters    Growth in anaerobic bottle: Gram Positive Cocci in Clusters  Preliminary Report (18 Jan 2022 11:15):    Growth in aerobic bottle: Gram Positive Cocci in Clusters    Growth in anaerobic bottle: Gram Positive Cocci in Clusters        RADIOLOGY:    RADIOLOGY:  ACC: [id number] EXAM:  CT BRAIN                          PROCEDURE DATE:  01/15/2022        IMPRESSION:    No CT evidence for acute intracranial pathology.      ACC: [id number] EXAM:  CT CERVICAL SPINE                          PROCEDURE DATE:  01/15/2022          INTERPRETATION:  CLINICAL STATEMENT: Trauma code    TECHNIQUE:  Contiguous unenhanced CT axial images of the cervical spine   with coronal and sagittal re-formations.    COMPARISON: None available    FINDINGS:    There is straightening of the normal cervical lordosis, which may be on   the basis of pain, muscle spasm, positioning or a combination of the   above.    There is no evidence of acute fracture or facet subluxation.    No significant prevertebral soft tissue swelling.    There is complete loss of disc height with bony fusion at C5-C6. There   are multilevel degenerative changes without definitive severe central   canal or neuroforaminal stenosis.      IMPRESSION:    No acute fracture or subluxation of the cervical spine.      ACC: [id number] EXAM:  CT ABDOMEN AND PELVIS IC                        ACC: [id number] EXAM:  CT CHEST IC                          PROCEDURE DATE:  01/15/2022          INTERPRETATION:  CLINICAL STATEMENT: Trauma code    TECHNIQUE: Contiguous CT images were obtained of the chest, abdomen and   pelvis.    Intravenous Contrast:  Intravenous contrast administered.  100 cc Omnipaque 350 intravenous contrast was administered. 0 cc   discarded.  Oral contrast: was not administered.      COMPARISON:  None    FINDINGS:      BONES AND SOFT TISSUES: No definite acute fractures identified. There is   an osseous destructive process at the left L5-S1 facet joints with   infiltrative changes extending to the adjacent spinal canal and neural   foramen. There is spinal canal stenosis at L4-L5 and L5-S1. Additional   erosive changes are noted at the left T10-T11 facet joints.      IMPRESSION:    Definitive acute traumatic injury is not identified to the chest, abdomen   or pelvis.    There is an osseous destructive process at the left L5-S1 facet joints   with infiltrative changes extending to the adjacent spinal canal and   neural foramina and associated stenosis. Per notes, patient with   polysubstance abuse. Consider osteomyelitis. Underlying mass is not   excluded. Additional erosive changes noted at the left T10-T11 facet   joint. Further evaluation with MRI can be obtained as needed.        ACC: [id number] EXAM:  XR WRIST COMP MIN 3 VIEWS BI                          PROCEDURE DATE:  01/15/2022          INTERPRETATION:  CLINICAL INDICATION:necrotic tissue, bone exposed    TECHNIQUE: 2 views of the right wrist. 2 views of the left wrist. One   image is provided with indeterminate laterality.    COMPARISON: No direct comparison available    FINDINGS:  There is no evidence of acute fracture or dislocation. Severe apparent   complete soft tissue defect at the distal dorsal wrist.    IMPRESSION:  No evidence of acute fracture or dislocation.    Severe apparent complete soft tissue defect at the distal dorsal wrist.    1.2 cm linear radiodensity within the anterior soft tissue of the right   forearm suspicious for foreign body.        ACC: [id number] EXAM:  XR FOREARM  2 VIEWS BI                          PROCEDURE DATE:  01/15/2022          INTERPRETATION:  CLINICAL INDICATION:necrotic tissue, bone exposed    TECHNIQUE: 2 views of the right forearm. 2 views of the left forearm.    COMPARISON: No direct comparison available    FINDINGS:  There is no evidence of acute fracture or dislocation. Severe apparent   complete soft tissue defect at the distal dorsal wrist bilaterally.    IMPRESSION:  No evidence of acute fracture or dislocation.    Severe apparent complete soft tissue defect at the distal dorsal wrist   bilaterally.    1.2 cm linear radiodensity within the anterior soft tissue of the left   forearm suspicious for foreign body.    --- End of Report ---    SOLEDAD HANNON MD; Attending Radiologist  This document has been electronically signed. Jan 16 2022  6:19PM      ACC: [id number] EXAM:  XR HAND 2 VIEWS LT                          PROCEDURE DATE:  01/16/2022          INTERPRETATION:  CLINICAL INDICATION:trauma    TECHNIQUE: 3 views of the left hand.    COMPARISON: No direct comparison available    FINDINGS:  There is no evidence of acute fracture or dislocation of the left hand.    IMPRESSION:  No evidence of acute fracture or dislocation of the left hand.        ACC: [id number] EXAM:  XR FOOT 2 VIEWS LT                          PROCEDURE DATE:  01/16/2022          INTERPRETATION:  CLINICAL HISTORY / REASON FOR EXAM: Pain    COMPARISON: None    TECHNIQUE: Three views, left foot radiographs    FINDINGS /  IMPRESSION:    No acute displaced fracture. Moderate midfoot degenerative changes.   Achilles tendon insertional enthesophyte measuring 1.1 cm.      ACC: [id number] EXAM:  XR FOREARM  2 VIEWS BI                          IMPRESSION:    No acute displaced fracture.    1.2 cm linear foreign body within the soft tissues overlying the right   radius.    Previously seen left forearm foreign body is no longer visualized.      ACC: [id number] EXAM:  MR SPINE LUMBAR WAW IC                        ACC: [id number] EXAM:  MR SPINE THORACIC WAW IC                          PROCEDURE DATE:  01/18/2022          INTERPRETATION:  CLINICAL INDICATION: Osteomyelitis.    TECHNIQUE: Pre and postcontrast MRI of the thoracic and lumbar spine was   performed.    Sagittal T1, T2, and STIR, and axial T2 and T1 sequences were obtained of   the thoracic and lumbar spine. 10 cc Gadavist was administered. 0 cc was   discarded.    COMPARISON: Correlation with CT of the abdomen and pelvis dated   1/15/2022..    FINDINGS:    THORACIC SPINE:  VERTEBRAL BODIES/ALIGNMENT: There is increased signal and enhancement   noted involving the left T10-T11 facet joint space without erosive   changes identified.    There is increased STIR signal involving the anterior aspects of the T11   and T12 vertebral bodies at likely related to degenerative change. The    SPINAL CORD: There is no abnormal spinal cord signal or enhancement..    SPINAL CANAL:  No intradural or extradural defects are seen.    INTERVERTEBRAL DISCS:  The disc spaces are well-maintained.    There is trace disc bulging at T9-T10 as well as mild facet arthrosis   without spinal canal or neuroforaminal narrowing.    There is trace disc bulging at T10-T11 indenting the ventral thecal sac   as well as bilateral facet arthrosis contributing to moderate left   neuroforaminal narrowing.    At T11-T12 there is trace disc bulging as well as bilateral facet   arthrosis resulting in mildleft neuroforaminal narrowing. There is no   spinal canal stenosis.    MISCELLANEOUS:  None.      LUMBAR SPINE:  VERTEBRAL BODIES/ALIGNMENT AND SOFT TISSUES:  If clinically abnormal   signal or enhancement which is centered within the left L5-S1 facet   joint. There is osseous erosion involving the facet joint as well as   surrounding increased signal and associated enhancement. There   rim-enhancing fluid collections noted just anterior to the facet joint   measuring approximately 1.3 x 0.8 x 1.3 cm, series 1200 image 14. This   fluid collection extends to the superior aspect of the facet joint within   the paraspinal soft tissues with an oblong fluid collection measuring 2.0   x 0.7 x 1.3 cm (AP x TR x CC). There are additional enhancing fluid   collections involving the left retroperitoneal soft tissues just   posterior to the psoas musculature which measure approximately 3.0 x 1.0   cm in the axial plane, series 1800 image 41 there is increased signal   noted involving the posterior aspect of the L5 and L4 vertebral bodies   there is epidural phlegmon noted overlying the L5 and S1 vertebral bodies   compression of the descending nerve roots at the level of L5.    There is a rim-enhancing collection within the right psoas musculature   measuring 1.1 x 0.9 cm in axial plane though incompletely evaluated on   sagittal imaging..    There is chronic wedge compression deformity of the L1 vertebral body   with approximately 20% height loss.    There are Modic type II degenerative changes at L2-3.    CONUS/CAUDA EQUINA: Unremarkable terminating at the superior endplate of   L1    Evaluation of individual disc levels is slightly limited due to motion    At L4-L5 there is disc bulging, posterior ligamentous hypertrophy and   bilateral facet arthrosis contributing to severe spinal stenosis as well   as severe bilateral neuroforaminal narrowing.    At L5-S1 there is disc bulging and ventral epidural phlegmon, posterior   ligamentous hypertrophy and bilateral facet arthrosis contributing to   severe spinal stenosis as well as severe lateral neuroforaminal narrowing.    MISCELLANEOUS:  None.      IMPRESSION:    THORACIC SPINE:  Motion limited examination.    Minimally increased signal and enhancement involving the left T10-T11   facet joint space which represent early facet joint   infection/inflammation however could also be related to degenerative   changes.    LUMBAR SPINE:  Findings consistent with left L5-S1 facetitis with associated multifocal   abscess formation directly adjacent to the left facet joint, within the   left retroperitoneal soft tissues as well as the right psoas musculature.    Ventral epidural phlegmon overlying the L5 vertebral body causing   significant compression of the descending nerve roots.    These findings were discussed with Dr. Plummer at 1/18/2022 at 5:06 PM   by Dr. Ellis with read back confirmation.       < from: Transesophageal Echocardiogram (01.19.22 @ 15:03) >  Summary:   1. Left ventricular ejection fraction, by visual estimation, is 60 to   65%.   2. Normal global left ventricular systolic function.   3. Normal left atrial size.   4. Normal right atrial size.   5. Trace mitral valve regurgitation.   6. No evidence of any vegetations.    MEDICATIONS  (STANDING):  acetaminophen     Tablet .. 650 milliGRAM(s) Oral every 6 hours  ALPRAZolam 2 milliGRAM(s) Oral three times a day  buPROPion XL (24-Hour) . 300 milliGRAM(s) Oral daily  cyclobenzaprine 10 milliGRAM(s) Oral every 8 hours  enoxaparin Injectable 40 milliGRAM(s) SubCutaneous daily  ferrous    sulfate 325 milliGRAM(s) Oral daily  folic acid 1 milliGRAM(s) Oral daily  gabapentin 300 milliGRAM(s) Oral three times a day  influenza   Vaccine 0.5 milliLiter(s) IntraMuscular once  lactated ringers. 1000 milliLiter(s) (100 mL/Hr) IV Continuous <Continuous>  methadone    Tablet 40 milliGRAM(s) Oral every 8 hours  mirtazapine 60 milliGRAM(s) Oral at bedtime  multivitamin 1 Tablet(s) Oral daily  nafcillin  IVPB 2 Gram(s) IV Intermittent every 4 hours  nafcillin  IVPB      pantoprazole    Tablet 40 milliGRAM(s) Oral before breakfast  polyethylene glycol 3350 17 Gram(s) Oral two times a day  QUEtiapine 200 milliGRAM(s) Oral three times a day  senna 2 Tablet(s) Oral at bedtime    MEDICATIONS  (PRN):  aluminum hydroxide/magnesium hydroxide/simethicone Suspension 30 milliLiter(s) Oral every 4 hours PRN Dyspepsia  HYDROmorphone   Tablet 6 milliGRAM(s) Oral every 4 hours PRN Severe Pain (7 - 10)  LORazepam     Tablet 1 milliGRAM(s) Oral every 2 hours PRN CIWA-Ar score increase by 2 points and a total score of 7 or less  melatonin 3 milliGRAM(s) Oral at bedtime PRN Insomnia  ondansetron Injectable 4 milliGRAM(s) IV Push every 8 hours PRN Nausea and/or Vomiting

## 2022-01-20 NOTE — PROGRESS NOTE ADULT - ASSESSMENT
51-year-old male past medical history of polysubstance abuse who presents status post fall. Has back pain radiating to LLE, CT concerning for osteomyelitis     # Sepsis POA  # Vertebral Osteomyelitis with adjacent soft tissue abscess   # MSSA Bacteremia   - on admission HR 91, wbc 16.6k  - ,      - Blood culture 1/15 + GPC in clusters, 1/17 + GPC in clusters, 1/18 NGTD      - CT AP - osseous destructive process at the left L5-S1 facet joints with infiltrative changes extending to the adjacent spinal canal and neural foramina and associated stenosis. Consider osteomyelitis.   - MRI - findings consistent with L5 S1 facetitis, multifocal abscess within left retroperitoneal soft tissues and right psoas   - ID appreciated   - nafcillin 2g q4hrs   - repeat blood cultures daily until negative cultures x 2,    - TTE PAM without evidence of endocarditis,    - IR appreciated, abscesses not amenable to drainage    #Right forearm foreign body  - 1.2cm linear foreign body overlying right radius  - surgery consult for removal - not recommending removal given difficulty of extraction, and no evidence of infection,   - not a contraindication for MRI     #Fall   - likely mechanical   - cth negative, trauma workup negative   - pt ot rehab     # Bl open wounds on wrist   - burn appreciated, Wound care - Xeroform/Kelrex BID, no Surgical debridement     # IVDU  # suspected ETOH use   # suspected thiamine folate deficiency   - active heroin use, last use 2 weeks ago  - reports taking 135mg methadone per day, dose confirmed with Juan Ramon Flores RN at methadone clinic, last received 3 day supply on 1/14,  - addiction medicine consulted  - continue thiamine folate supplementation   - pain management recs appreciated   - methadone 40mg q8hr standing   - tylenol 650mg q8 standing   - gabapentin 300mg q8hr   - cyclobenzaprine 10mg q8hr   - dilaudid 6mg po q4 prn   - ketorolac 15mg q6hr prn     #Mood dx   #Anxiety   - continue home xanax 2mg TID, has withdrawal symptoms when not taking them.   - c/w welbutrin 300 qd and Seroquel 300 TID   - f/u drug screen     #Normocytic Anemia   - multifactorial, iron deficiency, anemia of chronic disease,   - continue folate, MV,  - ferrous sulfate 325mg qd     # DVT ppx- LMWH  # GI ppx- PPI  # Activity- AAT  # Diet- Reg  # Code status - full  # Dispo- from home, acute     Pending - surveillance blood cultures, will need picc for long term abx, anticipate dc in 48hrs.

## 2022-01-20 NOTE — MEDICAL STUDENT PROGRESS NOTE(EDUCATION) - SUBJECTIVE AND OBJECTIVE BOX
Hospital Day: 4d    52y/o male with PMH of IV drug use and polysubstance abuse who presented s/p fall, currently admitted with the primary diagnosis of vertebral osteomyelitis.     SUBJECTIVE    No acute events overnight. Patient examined at bedside, AOX3. Pt states he feels well. Denies fever, cough, chest pain, sob, N/V, abdominal pain.    OBJECTIVE  PAST MEDICAL & SURGICAL HISTORY  IV drug abuse    ALLERGIES:  No Known Allergies    MEDICATIONS  (STANDING):  acetaminophen     Tablet .. 650 milliGRAM(s) Oral every 6 hours  ALPRAZolam 2 milliGRAM(s) Oral three times a day  buPROPion XL (24-Hour) . 300 milliGRAM(s) Oral daily  cyclobenzaprine 10 milliGRAM(s) Oral every 8 hours  enoxaparin Injectable 40 milliGRAM(s) SubCutaneous daily  ferrous    sulfate 325 milliGRAM(s) Oral daily  folic acid 1 milliGRAM(s) Oral daily  gabapentin 300 milliGRAM(s) Oral three times a day  influenza   Vaccine 0.5 milliLiter(s) IntraMuscular once  lactated ringers. 1000 milliLiter(s) (100 mL/Hr) IV Continuous <Continuous>  methadone    Tablet 40 milliGRAM(s) Oral every 8 hours  mirtazapine 60 milliGRAM(s) Oral at bedtime  multivitamin 1 Tablet(s) Oral daily  nafcillin  IVPB 2 Gram(s) IV Intermittent every 4 hours  nafcillin  IVPB      pantoprazole    Tablet 40 milliGRAM(s) Oral before breakfast  polyethylene glycol 3350 17 Gram(s) Oral two times a day  QUEtiapine 200 milliGRAM(s) Oral three times a day  senna 2 Tablet(s) Oral at bedtime    MEDICATIONS  (PRN):  aluminum hydroxide/magnesium hydroxide/simethicone Suspension 30 milliLiter(s) Oral every 4 hours PRN Dyspepsia  HYDROmorphone   Tablet 6 milliGRAM(s) Oral every 4 hours PRN Severe Pain (7 - 10)  LORazepam     Tablet 1 milliGRAM(s) Oral every 2 hours PRN CIWA-Ar score increase by 2 points and a total score of 7 or less  melatonin 3 milliGRAM(s) Oral at bedtime PRN Insomnia  ondansetron Injectable 4 milliGRAM(s) IV Push every 8 hours PRN Nausea and/or Vomiting      REVIEW OF SYSTEMS:  CONSTITUTIONAL: No fever, weight loss, or fatigue  RESPIRATORY: No cough, wheezing, chills or hemoptysis; No shortness of breath  CARDIOVASCULAR: No chest pain, palpitations, dizziness  GASTROINTESTINAL: No abdominal or epigastric pain. No nausea, vomiting, or hematemesis  NEUROLOGICAL: No headaches, loss of strength, numbness, or tremors  SKIN: No itching, burning, rashes     Vital Signs Last 24 Hrs  T(C): 36.4 (20 Jan 2022 06:04), Max: 37.5 (19 Jan 2022 20:18)  T(F): 97.5 (20 Jan 2022 06:04), Max: 99.5 (19 Jan 2022 20:18)  HR: 80 (20 Jan 2022 06:04) (80 - 99)  BP: 121/65 (20 Jan 2022 06:04) (121/65 - 128/77)  RR: 18 (20 Jan 2022 06:04) (17 - 20)    PHYSICAL EXAM:  GENERAL: NAD, well-groomed, well-developed  HEAD:  Atraumatic, Normocephalic  CHEST/LUNG: Breath sounds bilaterally; No accessory muscle use  CARDIOVASCULAR: Regular rate and rhythm; No edema  ABDOMEN: Soft, No guarding, Nondistended; Bowel sounds present  NERVOUS SYSTEM:  Alert & Oriented, Good concentration, No focal deficits  BACK: + Tenderness to palpation of lumbar vertebrae  EXTREMITIES:  2+ Peripheral Pulses, No clubbing, cyanosis  SKIN: Wounds on bilateral wrists, No rashes    LABS:                        9.6    7.15  )-----------( 326      ( 19 Jan 2022 08:25 )             31.2     01-19    142  |  104  |  15  ----------------------------<  173<H>  3.9   |  21  |  0.9    Ca    8.4<L>      19 Jan 2022 08:25  Mg     2.2     01-19    TPro  7.1  /  Alb  3.1<L>  /  TBili  <0.2  /  DBili  x   /  AST  20  /  ALT  37  /  AlkPhos  126<H>  01-19      Sedimentation Rate, Erythrocyte: 131 mm/Hr *H* (01-16-22 @ 18:19)  C-Reactive Protein, Serum: 141 mg/L (01-16-22 @ 16:00)  Lactate, Blood: 0.9 mmol/L (01-16-22 @ 16:00)      Culture - Blood (18 Jan 2022 04:30)  No Growth    Gram Stain (17 Jan 2022 11:00):    Growth in aerobic bottle: Gram Positive Cocci in Clusters    Growth in anaerobic bottle: Gram Positive Cocci in Clusters  Preliminary Report (17 Jan 2022 20:41):    Growth in aerobic bottle: Gram Positive Cocci in Clusters    Growth in anaerobic bottle: Gram Positive Cocci in Clusters    Gram Stain (16 Jan 2022 11:00):    Growth in aerobic bottle: Gram Positive Cocci in Clusters    Growth in anaerobic bottle: Gram Positive Cocci in Clusters  Preliminary Report (16 Jan 2022 20:41):    Growth in aerobic bottle: Gram Positive Cocci in Clusters    Growth in anaerobic bottle: Gram Positive Cocci in Clusters    Culture - Blood (collected 01-15-22 @ 20:00)  Source: .Blood Blood-Peripheral  Gram Stain (01-16-22 @ 20:41):    Growth in aerobic bottle: Gram Positive Cocci in Clusters    Growth in anaerobic bottle: Gram Positive Cocci in Clusters  Preliminary Report (01-16-22 @ 20:41):    Growth in aerobic bottle: Gram Positive Cocci in Clusters    Growth in anaerobic bottle: Gram Positive Cocci in Clusters    CARDIAC MARKERS ( 15 Baljinder 2022 20:00 )  x     / x     / 63 U/L / x     / x          RADIOLOGY & ADDITIONAL TESTS:      ACC: [id number] EXAM:  CT CERVICAL SPINE                        PROCEDURE DATE:  01/15/2022  @22:23  IMPRESSION:  No acute fracture or subluxation of the cervical spine.      ACC: [id number] EXAM:  CT BRAIN                        PROCEDURE DATE:  01/15/2022  @22:23  IMPRESSION:  No CT evidence for acute intracranial pathology.      ACC: [id number] EXAM:  CT ABDOMEN AND PELVIS IC                        ACC: [id number] EXAM:  CT CHEST IC                        PROCEDURE DATE:  01/15/2022  @22:24  IMPRESSION:  Definitive acute traumatic injury is not identified to the chest, abdomen   or pelvis.  There is an osseous destructive process at the left L5-S1 facet joints   with infiltrative changes extending to the adjacent spinal canal and   neural foramina and associated stenosis. Per notes, patient with   polysubstance abuse. Consider osteomyelitis. Underlying mass is not   excluded. Additional erosive changes noted at the left T10-T11 facet   joint. Further evaluation with MRI can be obtained as needed.      ACC: [id number] EXAM:  XR WRIST COMP MIN 3 VIEWS BI   ACC: [id number] EXAM:  XR FOREARM  2 VIEWS BI                              PROCEDURE DATE:  01/15/2022  @22:31  IMPRESSION:  No evidence of acute fracture or dislocation.  Severe apparent complete soft tissue defect at the distal dorsal wrist.  1.2 cm linear radiodensity within the anterior soft tissue of the right   forearm suspicious for foreign body.      ACC: [id number] EXAM:  XR WRIST COMP MIN 3 VIEWS BI                        PROCEDURE DATE:  01/16/2022  @01:43  IMPRESSION:  Limited evaluation due to positioning. No definite evidence of acute   fracture or dislocation. Consider further evaluation with CT if there is   clinical concern for fracture. If there is clinical concern for   osteomyelitis, consider further evaluation with MRI if not   contraindicated in this patient.  Severe apparent complete soft tissue defect at the distal dorsal wrist   bilaterally.      ACC: [id number] EXAM:  XR HAND 2 VIEWS LT                        PROCEDURE DATE:  01/16/2022  @01:43  IMPRESSION:  No evidence of acute fracture or dislocation of the left hand.      ACC: [id number] EXAM:  XR FOOT 2 VIEWS LT                        PROCEDURE DATE:  01/16/2022  @17:54  IMPRESSION:  No acute displaced fracture. Moderate midfoot degenerative changes.   Achilles tendon insertional enthesophyte measuring 1.1 cm.      ACC: [id number] EXAM:  XR FOREARM  2 VIEWS BI                        PROCEDURE DATE:  01/16/2022  @21:44  IMPRESSION:  No acute displaced fracture.  1.2 cm linear foreign body within the soft tissues overlying the right   radius.  Previously seen left forearm foreign body is no longer visualized.      ACC: [id number] EXAM:  DUPLEX SCAN EXT VEINS LOWER BI                        PROCEDURE DATE:  01/17/2022  Impression:  No evidence of deep venous thrombosis or superficial thrombophlebitis in   the bilateral lower extremities.      EXAM:  ECHO TTE WO CON COMP W DOPP    PROCEDURE DATE:  01/18/2022  @ 10:56  Summary:   1. LV Ejection Fraction by Chester's Method with a biplane EF of 56 %.   2. Normal global left ventricular systolic function.   3. Spectral Doppler shows impaired relaxation pattern of left   ventricular myocardial filling (Grade I diastolic dysfunction).   4. Normal left atrial size.   5. Normal right atrial size.      ACC: [id number] EXAM:  MR SPINE LUMBAR WAW IC                        ACC: [id number] EXAM:  MR SPINE THORACIC WAW IC                        PROCEDURE DATE:  01/18/2022  @16:32  IMPRESSION:    THORACIC SPINE:  Motion limited examination.  Minimally increased signal and enhancement involving the left T10-T11   facet joint space which represent early facet joint   infection/inflammation however could also be related to degenerative   changes.    LUMBAR SPINE:  Findings consistent with left L5-S1 facetitis with associated multifocal   abscess formation directly adjacent to the left facet joint, within the   left retroperitoneal soft tissues as well as the right psoas musculature.    Ventral epidural phlegmon overlying the L5 vertebral body causing   significant compression of the descending nerve roots.        EXAM:  ECHO PAM W DOPP COLOR FLOW#    PROCEDURE DATE:  01/19/2022    Summary:   1. Left ventricular ejection fraction, by visual estimation, is 60 to   65%.   2. Normal global left ventricular systolic function.   3. Normal left atrial size.   4. Normal right atrial size.   5. Trace mitral valve regurgitation.   6. No evidence of any vegetations.

## 2022-01-20 NOTE — PROGRESS NOTE ADULT - ASSESSMENT
51-year-old male past medical history of polysubstance abuse who presents status post fall. Has back pain radiating to LLE, CT concerning for osteomyelitis, was diagnosed with MSSA bacteremia, PAM was negative for IE.  Pt was consulted by ID, treated with IV Abx, needs a long term of IV Abx.       A/P  # Sepsis POA/ L5-S1  Vertebral Osteomyelitis with adjacent soft tissue abscess /  MSSA Bacteremia   - sepsis resolved , pt was consulted by ID, recommendations noted   - f/u repeat blood Cx, daily blood Cx ( one set from 1/18 is negative) , will call IR for PICC if second set of blood Cx will come back negative   - PAM was negative for infectious endocarditis   - ,      - c/w nafcillin 2g q4hrs while in the hospital   - pt needs a 6 weeks of IV Abx , ID recommendations noted     #Right forearm foreign body  - 1.2cm linear foreign body overlying right radius  - surgery consult for removal - not recommending removal given difficulty of extraction, and no evidence of infection    #Fall   - likely mechanical   - HCT is negative, trauma workup negative   - pt ot rehab   - fall precautions     # Bl open wounds on wrists   - burn appreciated, Wound care - Xeroform/Kelrex BID, no Surgical debridement       # IVDU/ suspected ETOH use /suspected thiamine folate deficiency   - active heroin use, last use 2 weeks ago  - reports taking 135mg methadone per day, dose confirmed with Juan Ramon Flores RN at methadone clinic, last received 3 day supply on 1/14,  - addiction medicine consulted  - continue thiamine and folate supplementals   - pain management appreciated   - methadone 40mg q8hr standing   - tylenol 650mg q8 standing   - gabapentin 300mg q8hr   - cyclobenzaprine 10mg q8hr   - dilaudid 6mg po q4 prn   - ketorolac 15mg q6hr prn     #Mood dx /Anxiety   - continue home xanax 2mg TID, has withdrawal symptoms when not taking them.   - c/w welbutrin 300 qd and Seroquel 300 TID       #Normocytic Anemia   - multifactorial, iron deficiency, anemia of chronic disease,   - continue folate, MV,  - ferrous sulfate 325mg qd     # DVT ppx- LMWH  # GI ppx- PPI  # Activity- AAT  # Diet- Reg  # Code status - full  # Dispo- from home, acute     #Progress Note Handoff  Pending (specify):  daily blood Cx, if 2 consecutive sets negative, call IP for PICC line, c/w Abx, pt needs burn follow up ( might need a skin graft for large right wrist wound in the future) , anticipate discharge in 48 hours   Family discussion: I spoke with pt, he agreed with a plan of care   Disposition: Home___/SNF__x _/Other________/Unknown at this time________     51-year-old male past medical history of polysubstance abuse who presents status post fall. Has back pain radiating to LLE, CT concerning for osteomyelitis, was diagnosed with MSSA bacteremia, PAM was negative for IE.  Pt was consulted by ID, treated with IV Abx, needs a long term of IV Abx.       A/P  # Sepsis POA/ L5-S1  Vertebral Osteomyelitis with adjacent soft tissue abscess /  MSSA Bacteremia   - sepsis resolved , pt was consulted by ID, recommendations noted   - f/u repeat blood Cx, daily blood Cx ( one set from 1/18 is negative) , will call IR for PICC if second set of blood Cx will come back negative   - PAM was negative for infectious endocarditis   - ,      - c/w nafcillin 2g q4hrs while in the hospital   - pt needs a 6 weeks of IV Abx , ID recommendations noted     #Right forearm foreign body  - 1.2cm linear foreign body overlying right radius  - surgery consult for removal - not recommending removal given difficulty of extraction, and no evidence of infection    #Fall   - likely mechanical   - HCT is negative, trauma workup negative   - pt ot rehab   - fall precautions     # Bl open wounds on wrists   - burn appreciated, Wound care - Xeroform/Kelrex BID, no Surgical debridement       # IVDU/ suspected ETOH use /suspected thiamine folate deficiency   - active heroin use, last use 2 weeks ago  - reports taking 135mg methadone per day, dose confirmed with Juan Ramon Flores RN at methadone clinic, last received 3 day supply on 1/14,  - addiction medicine consulted  - continue thiamine and folate supplementals   - pain management appreciated   - methadone 40mg q8hr standing   - tylenol 650mg q8 standing   - gabapentin 300mg q8hr   - cyclobenzaprine 10mg q8hr   - dilaudid 6mg po q4 prn   - ketorolac 15mg q6hr prn     #Mood dx /Anxiety   - continue home xanax 2mg TID, has withdrawal symptoms when not taking them.   - c/w welbutrin 300 qd and Seroquel 300 TID       #Normocytic Anemia   - multifactorial, iron deficiency, anemia of chronic disease,   - continue folate, MV,  - ferrous sulfate 325mg qd     # DVT ppx- LMWH  # GI ppx- PPI  # Activity- AAT  # Diet- Reg  # Code status - full  # Dispo- from home, acute     #Progress Note Handoff  Pending (specify):  daily blood Cx, if 2 consecutive sets negative, call IR for PICC line, c/w Abx, pt needs burn follow up ( might need a skin graft for large right wrist wound in the future) , anticipate discharge in 48 hours   Family discussion: I spoke with pt, he agreed with a plan of care   Disposition: Home___/SNF__x _/Other________/Unknown at this time________

## 2022-01-21 ENCOUNTER — TRANSCRIPTION ENCOUNTER (OUTPATIENT)
Age: 52
End: 2022-01-21

## 2022-01-21 LAB
ALBUMIN SERPL ELPH-MCNC: 3.3 G/DL — LOW (ref 3.5–5.2)
ALP SERPL-CCNC: 116 U/L — HIGH (ref 30–115)
ALT FLD-CCNC: 24 U/L — SIGNIFICANT CHANGE UP (ref 0–41)
ANION GAP SERPL CALC-SCNC: 18 MMOL/L — HIGH (ref 7–14)
AST SERPL-CCNC: 14 U/L — SIGNIFICANT CHANGE UP (ref 0–41)
BASOPHILS # BLD AUTO: 0.02 K/UL — SIGNIFICANT CHANGE UP (ref 0–0.2)
BASOPHILS NFR BLD AUTO: 0.3 % — SIGNIFICANT CHANGE UP (ref 0–1)
BILIRUB SERPL-MCNC: 0.3 MG/DL — SIGNIFICANT CHANGE UP (ref 0.2–1.2)
BUN SERPL-MCNC: 14 MG/DL — SIGNIFICANT CHANGE UP (ref 10–20)
CALCIUM SERPL-MCNC: 9.1 MG/DL — SIGNIFICANT CHANGE UP (ref 8.5–10.1)
CHLORIDE SERPL-SCNC: 101 MMOL/L — SIGNIFICANT CHANGE UP (ref 98–110)
CO2 SERPL-SCNC: 20 MMOL/L — SIGNIFICANT CHANGE UP (ref 17–32)
CREAT SERPL-MCNC: 0.8 MG/DL — SIGNIFICANT CHANGE UP (ref 0.7–1.5)
EOSINOPHIL # BLD AUTO: 0.31 K/UL — SIGNIFICANT CHANGE UP (ref 0–0.7)
EOSINOPHIL NFR BLD AUTO: 4.1 % — SIGNIFICANT CHANGE UP (ref 0–8)
GLUCOSE SERPL-MCNC: 149 MG/DL — HIGH (ref 70–99)
HCT VFR BLD CALC: 34.2 % — LOW (ref 42–52)
HGB BLD-MCNC: 10.2 G/DL — LOW (ref 14–18)
IMM GRANULOCYTES NFR BLD AUTO: 0.4 % — HIGH (ref 0.1–0.3)
LYMPHOCYTES # BLD AUTO: 2.22 K/UL — SIGNIFICANT CHANGE UP (ref 1.2–3.4)
LYMPHOCYTES # BLD AUTO: 29.6 % — SIGNIFICANT CHANGE UP (ref 20.5–51.1)
MAGNESIUM SERPL-MCNC: 2.1 MG/DL — SIGNIFICANT CHANGE UP (ref 1.8–2.4)
MCHC RBC-ENTMCNC: 25.1 PG — LOW (ref 27–31)
MCHC RBC-ENTMCNC: 29.8 G/DL — LOW (ref 32–37)
MCV RBC AUTO: 84.2 FL — SIGNIFICANT CHANGE UP (ref 80–94)
MONOCYTES # BLD AUTO: 0.51 K/UL — SIGNIFICANT CHANGE UP (ref 0.1–0.6)
MONOCYTES NFR BLD AUTO: 6.8 % — SIGNIFICANT CHANGE UP (ref 1.7–9.3)
NEUTROPHILS # BLD AUTO: 4.41 K/UL — SIGNIFICANT CHANGE UP (ref 1.4–6.5)
NEUTROPHILS NFR BLD AUTO: 58.8 % — SIGNIFICANT CHANGE UP (ref 42.2–75.2)
NRBC # BLD: 0 /100 WBCS — SIGNIFICANT CHANGE UP (ref 0–0)
PLATELET # BLD AUTO: 424 K/UL — HIGH (ref 130–400)
POTASSIUM SERPL-MCNC: 4.3 MMOL/L — SIGNIFICANT CHANGE UP (ref 3.5–5)
POTASSIUM SERPL-SCNC: 4.3 MMOL/L — SIGNIFICANT CHANGE UP (ref 3.5–5)
PROT SERPL-MCNC: 8 G/DL — SIGNIFICANT CHANGE UP (ref 6–8)
RBC # BLD: 4.06 M/UL — LOW (ref 4.7–6.1)
RBC # FLD: 16.4 % — HIGH (ref 11.5–14.5)
SODIUM SERPL-SCNC: 139 MMOL/L — SIGNIFICANT CHANGE UP (ref 135–146)
WBC # BLD: 7.5 K/UL — SIGNIFICANT CHANGE UP (ref 4.8–10.8)
WBC # FLD AUTO: 7.5 K/UL — SIGNIFICANT CHANGE UP (ref 4.8–10.8)

## 2022-01-21 PROCEDURE — 99233 SBSQ HOSP IP/OBS HIGH 50: CPT

## 2022-01-21 PROCEDURE — 36573 INSJ PICC RS&I 5 YR+: CPT

## 2022-01-21 RX ORDER — CYCLOBENZAPRINE HYDROCHLORIDE 10 MG/1
1 TABLET, FILM COATED ORAL
Qty: 0 | Refills: 0 | DISCHARGE
Start: 2022-01-21

## 2022-01-21 RX ORDER — GABAPENTIN 400 MG/1
1 CAPSULE ORAL
Qty: 0 | Refills: 0 | DISCHARGE
Start: 2022-01-21

## 2022-01-21 RX ORDER — FERROUS SULFATE 325(65) MG
1 TABLET ORAL
Qty: 0 | Refills: 0 | DISCHARGE
Start: 2022-01-21

## 2022-01-21 RX ORDER — FOLIC ACID 0.8 MG
1 TABLET ORAL
Qty: 0 | Refills: 0 | DISCHARGE
Start: 2022-01-21

## 2022-01-21 RX ADMIN — CYCLOBENZAPRINE HYDROCHLORIDE 10 MILLIGRAM(S): 10 TABLET, FILM COATED ORAL at 05:17

## 2022-01-21 RX ADMIN — QUETIAPINE FUMARATE 200 MILLIGRAM(S): 200 TABLET, FILM COATED ORAL at 14:20

## 2022-01-21 RX ADMIN — NAFCILLIN 200 GRAM(S): 10 INJECTION, POWDER, FOR SOLUTION INTRAVENOUS at 05:11

## 2022-01-21 RX ADMIN — Medication 650 MILLIGRAM(S): at 05:12

## 2022-01-21 RX ADMIN — NAFCILLIN 200 GRAM(S): 10 INJECTION, POWDER, FOR SOLUTION INTRAVENOUS at 21:06

## 2022-01-21 RX ADMIN — Medication 2 MILLIGRAM(S): at 14:20

## 2022-01-21 RX ADMIN — QUETIAPINE FUMARATE 200 MILLIGRAM(S): 200 TABLET, FILM COATED ORAL at 21:05

## 2022-01-21 RX ADMIN — Medication 2 MILLIGRAM(S): at 05:11

## 2022-01-21 RX ADMIN — ENOXAPARIN SODIUM 40 MILLIGRAM(S): 100 INJECTION SUBCUTANEOUS at 12:24

## 2022-01-21 RX ADMIN — METHADONE HYDROCHLORIDE 40 MILLIGRAM(S): 40 TABLET ORAL at 05:12

## 2022-01-21 RX ADMIN — NAFCILLIN 200 GRAM(S): 10 INJECTION, POWDER, FOR SOLUTION INTRAVENOUS at 17:19

## 2022-01-21 RX ADMIN — NAFCILLIN 200 GRAM(S): 10 INJECTION, POWDER, FOR SOLUTION INTRAVENOUS at 12:24

## 2022-01-21 RX ADMIN — Medication 650 MILLIGRAM(S): at 23:02

## 2022-01-21 RX ADMIN — Medication 1 TABLET(S): at 12:25

## 2022-01-21 RX ADMIN — MIRTAZAPINE 60 MILLIGRAM(S): 45 TABLET, ORALLY DISINTEGRATING ORAL at 21:05

## 2022-01-21 RX ADMIN — PANTOPRAZOLE SODIUM 40 MILLIGRAM(S): 20 TABLET, DELAYED RELEASE ORAL at 06:10

## 2022-01-21 RX ADMIN — GABAPENTIN 300 MILLIGRAM(S): 400 CAPSULE ORAL at 05:12

## 2022-01-21 RX ADMIN — NAFCILLIN 200 GRAM(S): 10 INJECTION, POWDER, FOR SOLUTION INTRAVENOUS at 01:01

## 2022-01-21 RX ADMIN — GABAPENTIN 300 MILLIGRAM(S): 400 CAPSULE ORAL at 14:20

## 2022-01-21 RX ADMIN — CYCLOBENZAPRINE HYDROCHLORIDE 10 MILLIGRAM(S): 10 TABLET, FILM COATED ORAL at 21:05

## 2022-01-21 RX ADMIN — METHADONE HYDROCHLORIDE 40 MILLIGRAM(S): 40 TABLET ORAL at 14:20

## 2022-01-21 RX ADMIN — GABAPENTIN 300 MILLIGRAM(S): 400 CAPSULE ORAL at 21:05

## 2022-01-21 RX ADMIN — Medication 650 MILLIGRAM(S): at 12:24

## 2022-01-21 RX ADMIN — NAFCILLIN 200 GRAM(S): 10 INJECTION, POWDER, FOR SOLUTION INTRAVENOUS at 14:19

## 2022-01-21 RX ADMIN — Medication 1 MILLIGRAM(S): at 12:25

## 2022-01-21 RX ADMIN — METHADONE HYDROCHLORIDE 40 MILLIGRAM(S): 40 TABLET ORAL at 21:06

## 2022-01-21 RX ADMIN — QUETIAPINE FUMARATE 200 MILLIGRAM(S): 200 TABLET, FILM COATED ORAL at 05:12

## 2022-01-21 RX ADMIN — Medication 650 MILLIGRAM(S): at 17:19

## 2022-01-21 RX ADMIN — Medication 2 MILLIGRAM(S): at 21:06

## 2022-01-21 RX ADMIN — Medication 325 MILLIGRAM(S): at 12:25

## 2022-01-21 RX ADMIN — CYCLOBENZAPRINE HYDROCHLORIDE 10 MILLIGRAM(S): 10 TABLET, FILM COATED ORAL at 14:20

## 2022-01-21 RX ADMIN — POLYETHYLENE GLYCOL 3350 17 GRAM(S): 17 POWDER, FOR SOLUTION ORAL at 05:11

## 2022-01-21 RX ADMIN — BUPROPION HYDROCHLORIDE 300 MILLIGRAM(S): 150 TABLET, EXTENDED RELEASE ORAL at 12:24

## 2022-01-21 NOTE — PROGRESS NOTE ADULT - ASSESSMENT
51-year-old male past medical history of polysubstance abuse who presents status post fall. Has back pain radiating to LLE, CT concerning for osteomyelitis     # Sepsis POA  # Vertebral Osteomyelitis with adjacent soft tissue abscess   # MSSA Bacteremia   - on admission HR 91, wbc 16.6k  - ,      - Blood culture 1/15 + GPC in clusters, 1/17 + GPC in clusters, 1/18 NGTD, 1/19 NGTD      - CT AP - osseous destructive process at the left L5-S1 facet joints with infiltrative changes extending to the adjacent spinal canal and neural foramina and associated stenosis. Consider osteomyelitis.   - MRI - findings consistent with L5 S1 facetitis, multifocal abscess within left retroperitoneal soft tissues and right psoas   - ID appreciated   - nafcillin 2g q4hrs   - repeat blood cultures daily until negative cultures x 2,    - TTE PAM without evidence of endocarditis,    - IR appreciated, abscesses not amenable to drainage    #Right forearm foreign body  - 1.2cm linear foreign body overlying right radius  - surgery consult for removal - not recommending removal given difficulty of extraction, and no evidence of infection,   - not a contraindication for MRI     #Fall   - likely mechanical   - cth negative, trauma workup negative   - pt ot rehab     # Bl open wounds on wrist   - burn appreciated, Wound care - Xeroform/Kelrex BID, no Surgical debridement     # IVDU  # suspected ETOH use   # suspected thiamine folate deficiency   - active heroin use, last use 2 weeks ago  - reports taking 135mg methadone per day, dose confirmed with Juan Ramon Flores RN at methadone clinic, last received 3 day supply on 1/14,  - addiction medicine consulted  - continue thiamine folate supplementation   - pain management recs appreciated   - methadone 40mg q8hr standing   - tylenol 650mg q8 standing   - gabapentin 300mg q8hr   - cyclobenzaprine 10mg q8hr   - dilaudid 6mg po q4 prn   - ketorolac 15mg q6hr prn     #Mood dx   #Anxiety   - continue home xanax 2mg TID, has withdrawal symptoms when not taking them.   - c/w welbutrin 300 qd and Seroquel 300 TID   - f/u drug screen     #Normocytic Anemia   - multifactorial, iron deficiency, anemia of chronic disease,   - continue folate, MV,  - ferrous sulfate 325mg qd     # DVT ppx- LMWH  # GI ppx- PPI  # Activity- AAT  # Diet- Reg  # Code status - full  # Dispo- from home, acute     Pending - final blood cultures, ID recs for abx duration for dc, picc placement, anticipate dc in 24hrs

## 2022-01-21 NOTE — MEDICAL STUDENT PROGRESS NOTE(EDUCATION) - SUBJECTIVE AND OBJECTIVE BOX
Hospital Day: 5d    50y/o male with PMH of IV drug use and polysubstance abuse who presented s/p fall, currently admitted with the primary diagnosis of vertebral osteomyelitis.     SUBJECTIVE    No acute events overnight. Patient examined at bedside, AOX3. Pt states he feels well and has no complaints. Denies fever, cough, chest pain, sob, N/V, abdominal pain.    OBJECTIVE  PAST MEDICAL & SURGICAL HISTORY  IV drug abuse    ALLERGIES:  No Known Allergies    MEDICATIONS  (STANDING):  acetaminophen     Tablet .. 650 milliGRAM(s) Oral every 6 hours  ALPRAZolam 2 milliGRAM(s) Oral three times a day  buPROPion XL (24-Hour) . 300 milliGRAM(s) Oral daily  cyclobenzaprine 10 milliGRAM(s) Oral every 8 hours  enoxaparin Injectable 40 milliGRAM(s) SubCutaneous daily  ferrous    sulfate 325 milliGRAM(s) Oral daily  folic acid 1 milliGRAM(s) Oral daily  gabapentin 300 milliGRAM(s) Oral three times a day  influenza   Vaccine 0.5 milliLiter(s) IntraMuscular once  methadone    Tablet 40 milliGRAM(s) Oral every 8 hours  mirtazapine 60 milliGRAM(s) Oral at bedtime  multivitamin 1 Tablet(s) Oral daily  nafcillin  IVPB 2 Gram(s) IV Intermittent every 4 hours  nafcillin  IVPB      pantoprazole    Tablet 40 milliGRAM(s) Oral before breakfast  polyethylene glycol 3350 17 Gram(s) Oral two times a day  QUEtiapine 200 milliGRAM(s) Oral three times a day  senna 2 Tablet(s) Oral at bedtime    MEDICATIONS  (PRN):  aluminum hydroxide/magnesium hydroxide/simethicone Suspension 30 milliLiter(s) Oral every 4 hours PRN Dyspepsia  HYDROmorphone   Tablet 6 milliGRAM(s) Oral every 4 hours PRN Severe Pain (7 - 10)  LORazepam     Tablet 1 milliGRAM(s) Oral every 2 hours PRN CIWA-Ar score increase by 2 points and a total score of 7 or less  melatonin 3 milliGRAM(s) Oral at bedtime PRN Insomnia  ondansetron Injectable 4 milliGRAM(s) IV Push every 8 hours PRN Nausea and/or Vomiting    REVIEW OF SYSTEMS:  CONSTITUTIONAL: No fever, weight loss, or fatigue  RESPIRATORY: No cough, wheezing, chills or hemoptysis; No shortness of breath  CARDIOVASCULAR: No chest pain, palpitations, dizziness  GASTROINTESTINAL: No abdominal or epigastric pain. No nausea, vomiting, or hematemesis  NEUROLOGICAL: No headaches, loss of strength, numbness, or tremors  SKIN: No itching, burning, rashes     Vital Signs Last 24 Hrs  T(C): 36.2 (21 Jan 2022 05:16), Max: 36.2 (21 Jan 2022 05:16)  T(F): 97.1 (21 Jan 2022 05:16), Max: 97.1 (21 Jan 2022 05:16)  HR: 82 (21 Jan 2022 05:16) (82 - 87)  BP: 134/94 (21 Jan 2022 05:16) (133/90 - 148/91)  BP(mean): 107 (20 Jan 2022 19:47) (107 - 107)  RR: 18 (21 Jan 2022 05:16) (18 - 18)      PHYSICAL EXAM:  GENERAL: NAD  HEAD:  Atraumatic, Normocephalic  CHEST/LUNG: Breath sounds bilaterally; No accessory muscle use  CARDIOVASCULAR: Regular rate and rhythm; No edema  ABDOMEN: Soft, No guarding, Nondistended; Bowel sounds present  NERVOUS SYSTEM:  Alert & Oriented, Good concentration, No focal deficits  BACK: + Tenderness to palpation of lumbar vertebrae  EXTREMITIES:  2+ Peripheral Pulses, No clubbing, cyanosis  SKIN: open wound on right wrist, multiple skin abrasions     LABS:                        10.2   8.58  )-----------( 382      ( 20 Jan 2022 18:41 )             33.2     01-20    136  |  101  |  15  ----------------------------<  153<H>  4.2   |  17  |  0.9    Ca    8.3<L>      20 Jan 2022 18:41  Mg     2.0     01-20    TPro  7.4  /  Alb  3.0<L>  /  TBili  0.2  /  DBili  x   /  AST  15  /  ALT  26  /  AlkPhos  117<H>  01-20    Sedimentation Rate, Erythrocyte: 131 mm/Hr *H* (01-16-22 @ 18:19)  C-Reactive Protein, Serum: 141 mg/L (01-16-22 @ 16:00)  Lactate, Blood: 0.9 mmol/L (01-16-22 @ 16:00)    Culture - Blood (19 Jan 2022  No Growth    Culture - Blood (18 Jan 2022 04:30)  No Growth    Gram Stain (17 Jan 2022 11:00):    Growth in aerobic bottle: Gram Positive Cocci in Clusters    Growth in anaerobic bottle: Gram Positive Cocci in Clusters  Preliminary Report (17 Jan 2022 20:41):    Growth in aerobic bottle: Gram Positive Cocci in Clusters    Growth in anaerobic bottle: Gram Positive Cocci in Clusters    Gram Stain (16 Jan 2022 11:00):    Growth in aerobic bottle: Gram Positive Cocci in Clusters    Growth in anaerobic bottle: Gram Positive Cocci in Clusters  Preliminary Report (16 Jan 2022 20:41):    Growth in aerobic bottle: Gram Positive Cocci in Clusters    Growth in anaerobic bottle: Gram Positive Cocci in Clusters    Culture - Blood (collected 01-15-22 @ 20:00)  Source: .Blood Blood-Peripheral  Gram Stain (01-16-22 @ 20:41):    Growth in aerobic bottle: Gram Positive Cocci in Clusters    Growth in anaerobic bottle: Gram Positive Cocci in Clusters  Preliminary Report (01-16-22 @ 20:41):    Growth in aerobic bottle: Gram Positive Cocci in Clusters    Growth in anaerobic bottle: Gram Positive Cocci in Clusters    CARDIAC MARKERS ( 15 Baljinder 2022 20:00 )  x     / x     / 63 U/L / x     / x          RADIOLOGY & ADDITIONAL TESTS:      ACC: [id number] EXAM:  CT CERVICAL SPINE                        PROCEDURE DATE:  01/15/2022  @22:23  IMPRESSION:  No acute fracture or subluxation of the cervical spine.      ACC: [id number] EXAM:  CT BRAIN                        PROCEDURE DATE:  01/15/2022  @22:23  IMPRESSION:  No CT evidence for acute intracranial pathology.      ACC: [id number] EXAM:  CT ABDOMEN AND PELVIS IC                        ACC: [id number] EXAM:  CT CHEST IC                        PROCEDURE DATE:  01/15/2022  @22:24  IMPRESSION:  Definitive acute traumatic injury is not identified to the chest, abdomen   or pelvis.  There is an osseous destructive process at the left L5-S1 facet joints   with infiltrative changes extending to the adjacent spinal canal and   neural foramina and associated stenosis. Per notes, patient with   polysubstance abuse. Consider osteomyelitis. Underlying mass is not   excluded. Additional erosive changes noted at the left T10-T11 facet   joint. Further evaluation with MRI can be obtained as needed.      ACC: [id number] EXAM:  XR WRIST COMP MIN 3 VIEWS BI   ACC: [id number] EXAM:  XR FOREARM  2 VIEWS BI                              PROCEDURE DATE:  01/15/2022  @22:31  IMPRESSION:  No evidence of acute fracture or dislocation.  Severe apparent complete soft tissue defect at the distal dorsal wrist.  1.2 cm linear radiodensity within the anterior soft tissue of the right   forearm suspicious for foreign body.      ACC: [id number] EXAM:  XR WRIST COMP MIN 3 VIEWS BI                        PROCEDURE DATE:  01/16/2022  @01:43  IMPRESSION:  Limited evaluation due to positioning. No definite evidence of acute   fracture or dislocation. Consider further evaluation with CT if there is   clinical concern for fracture. If there is clinical concern for   osteomyelitis, consider further evaluation with MRI if not   contraindicated in this patient.  Severe apparent complete soft tissue defect at the distal dorsal wrist   bilaterally.      ACC: [id number] EXAM:  XR HAND 2 VIEWS LT                        PROCEDURE DATE:  01/16/2022  @01:43  IMPRESSION:  No evidence of acute fracture or dislocation of the left hand.      ACC: [id number] EXAM:  XR FOOT 2 VIEWS LT                        PROCEDURE DATE:  01/16/2022  @17:54  IMPRESSION:  No acute displaced fracture. Moderate midfoot degenerative changes.   Achilles tendon insertional enthesophyte measuring 1.1 cm.      ACC: [id number] EXAM:  XR FOREARM  2 VIEWS BI                        PROCEDURE DATE:  01/16/2022  @21:44  IMPRESSION:  No acute displaced fracture.  1.2 cm linear foreign body within the soft tissues overlying the right   radius.  Previously seen left forearm foreign body is no longer visualized.      ACC: [id number] EXAM:  DUPLEX SCAN EXT VEINS LOWER BI                        PROCEDURE DATE:  01/17/2022  Impression:  No evidence of deep venous thrombosis or superficial thrombophlebitis in   the bilateral lower extremities.      EXAM:  ECHO TTE WO CON COMP W DOPP    PROCEDURE DATE:  01/18/2022  @ 10:56  Summary:   1. LV Ejection Fraction by Chester's Method with a biplane EF of 56 %.   2. Normal global left ventricular systolic function.   3. Spectral Doppler shows impaired relaxation pattern of left   ventricular myocardial filling (Grade I diastolic dysfunction).   4. Normal left atrial size.   5. Normal right atrial size.      ACC: [id number] EXAM:  MR SPINE LUMBAR WAW IC                        ACC: [id number] EXAM:  MR SPINE THORACIC WAW IC                        PROCEDURE DATE:  01/18/2022  @16:32  IMPRESSION:    THORACIC SPINE:  Motion limited examination.  Minimally increased signal and enhancement involving the left T10-T11   facet joint space which represent early facet joint   infection/inflammation however could also be related to degenerative   changes.    LUMBAR SPINE:  Findings consistent with left L5-S1 facetitis with associated multifocal   abscess formation directly adjacent to the left facet joint, within the   left retroperitoneal soft tissues as well as the right psoas musculature.    Ventral epidural phlegmon overlying the L5 vertebral body causing   significant compression of the descending nerve roots.        EXAM:  ECHO PAM W DOPP COLOR FLOW#    PROCEDURE DATE:  01/19/2022    Summary:   1. Left ventricular ejection fraction, by visual estimation, is 60 to   65%.   2. Normal global left ventricular systolic function.   3. Normal left atrial size.   4. Normal right atrial size.   5. Trace mitral valve regurgitation.   6. No evidence of any vegetations.

## 2022-01-21 NOTE — MEDICAL STUDENT PROGRESS NOTE(EDUCATION) - NS MD HP STUD ASPLAN ASSES FT
50y/o male with PMH of IV drug use and polysubstance abuse who presents s/p fall. Pt has back pain radiating to LLE, CT concerning for osteomyelitis, diagnosed with MSSA bacteremia, PAM was negative for IE.
50y/o male with PMH of IV drug use and polysubstance abuse who presents s/p fall. Pt has back pain radiating to LLE, CT concerning for osteomyelitis.
52y/o male with PMH of IV drug use and polysubstance abuse who presents s/p fall. Pt has back pain radiating to LLE, CT concerning for osteomyelitis
52y/o male with PMH of IV drug use and polysubstance abuse who presents s/p fall. Pt has back pain radiating to LLE, CT concerning for osteomyelitis, diagnosed with MSSA bacteremia, PAM was negative for IE.

## 2022-01-21 NOTE — PROGRESS NOTE ADULT - SUBJECTIVE AND OBJECTIVE BOX
MIKAEL MCDERMOTT  51y, Male  Allergy: No Known Allergies      LOS  5d    CHIEF COMPLAINT: vertebral osteomyelitis  (21 Jan 2022 15:00)      INTERVAL EVENTS/HPI  - No acute events overnight  - T(F): , Max: 97.8 (01-21-22 @ 16:05)  - reports back pain present, but improving   - WBC Count: 7.50 (01-21-22 @ 04:30)  WBC Count: 8.58 (01-20-22 @ 18:41)     - Creatinine, Serum: 0.8 (01-21-22 @ 04:30)  Creatinine, Serum: 0.9 (01-20-22 @ 18:41)       ROS  General: Denies rigors, nightsweats  HEENT: Denies headache, rhinorrhea, sore throat, eye pain  CV: Denies CP, palpitations  PULM: Denies wheezing, hemoptysis  GI: Denies hematemesis, hematochezia, melena  : Denies discharge, hematuria  MSK: Denies arthralgias, myalgias  SKIN: Denies rash, lesions  NEURO: Denies paresthesias, weakness  PSYCH: Denies depression, anxiety    VITALS:  T(F): 97.8, Max: 97.8 (01-21-22 @ 16:05)  HR: 86  BP: 142/91  RR: 20Vital Signs Last 24 Hrs  T(C): 36.6 (21 Jan 2022 16:05), Max: 36.6 (21 Jan 2022 16:05)  T(F): 97.8 (21 Jan 2022 16:05), Max: 97.8 (21 Jan 2022 16:05)  HR: 86 (21 Jan 2022 16:05) (82 - 86)  BP: 142/91 (21 Jan 2022 16:05) (133/90 - 142/91)  BP(mean): 107 (20 Jan 2022 19:47) (107 - 107)  RR: 20 (21 Jan 2022 16:05) (18 - 20)  SpO2: --    PHYSICAL EXAM:  Gen: NAD, resting in bed  HEENT: Normocephalic, atraumatic  Neck: supple, no lymphadenopathy  CV: Regular rate & regular rhythm  Lungs: decreased BS at bases, no fremitus  Abdomen: Soft, BS present  Ext: Warm, well perfused  Neuro: non focal, awake  Skin: no rash, no erythema  Lines: no phlebitis    FH: Non-contributory  Social Hx: Non-contributory    TESTS & MEASUREMENTS:                        10.2   7.50  )-----------( 424      ( 21 Jan 2022 04:30 )             34.2     01-21    139  |  101  |  14  ----------------------------<  149<H>  4.3   |  20  |  0.8    Ca    9.1      21 Jan 2022 04:30  Mg     2.1     01-21    TPro  8.0  /  Alb  3.3<L>  /  TBili  0.3  /  DBili  x   /  AST  14  /  ALT  24  /  AlkPhos  116<H>  01-21    eGFR if Non African American: 103 mL/min/1.73M2 (01-21-22 @ 04:30)  eGFR if : 119 mL/min/1.73M2 (01-21-22 @ 04:30)  eGFR if African American: 113 mL/min/1.73M2 (01-20-22 @ 18:41)  eGFR if Non African American: 98 mL/min/1.73M2 (01-20-22 @ 18:41)    LIVER FUNCTIONS - ( 21 Jan 2022 04:30 )  Alb: 3.3 g/dL / Pro: 8.0 g/dL / ALK PHOS: 116 U/L / ALT: 24 U/L / AST: 14 U/L / GGT: x               Culture - Blood (collected 01-19-22 @ 08:25)  Source: .Blood None  Preliminary Report (01-20-22 @ 19:02):    No growth to date.    Culture - Blood (collected 01-18-22 @ 04:30)  Source: .Blood Blood  Preliminary Report (01-19-22 @ 20:01):    No growth to date.    Culture - Blood (collected 01-17-22 @ 11:00)  Source: .Blood Blood  Gram Stain (01-18-22 @ 11:15):    Growth in aerobic bottle: Gram Positive Cocci in Clusters    Growth in anaerobic bottle: Gram Positive Cocci in Clusters  Final Report (01-19-22 @ 17:30):    Growth in aerobic and anaerobic bottles: Staphylococcus aureus    See previous culture [id number]    Culture - Blood (collected 01-15-22 @ 20:31)  Source: .Blood Blood-Peripheral  Gram Stain (01-16-22 @ 20:40):    Growth in aerobic bottle: Gram Positive Cocci in Clusters    Growth in anaerobic bottle: Gram Positive Cocci in Clusters  Final Report (01-18-22 @ 19:30):    Growth in aerobic and anaerobic bottles: Staphylococcus aureus    ***Blood Panel PCR results on this specimen are available    approximately 3 hours after the Gram stain result.***    Gram stain, PCR, and/or culture results may not always    correspond dueto difference in methodologies.    ************************************************************    This PCR assay was performed by multiplex PCR. This    Assay tests for 66 bacterial and resistance gene targets.    Please refer to the [facility] test directory    at [email or web address] for details.  Organism: Blood Culture PCR  Staphylococcus aureus (01-18-22 @ 19:30)  Organism: Staphylococcus aureus (01-18-22 @ 19:30)      -  Ampicillin/Sulbactam: S <=8/4      -  Cefazolin: S <=4      -  Clindamycin: S <=0.25      -  Erythromycin: S <=0.25      -  Gentamicin: S <=1 Should not be used as monotherapy      -  Oxacillin: S <=0.25      -  Penicillin: R >8      -  Rifampin: S <=1 Should not be used as monotherapy      -  Tetra/Doxy: S <=1      -  Trimethoprim/Sulfamethoxazole: S <=0.5/9.5      -  Vancomycin: S 2      Method Type: WILLY  Organism: Blood Culture PCR (01-18-22 @ 19:30)      -  Staphylococcus aureus: Detec Any isolate of Staphylococcus aureus from a blood culture is NOT considered a contaminant.      Method Type: PCR    Culture - Blood (collected 01-15-22 @ 20:00)  Source: .Blood Blood-Peripheral  Gram Stain (01-16-22 @ 20:41):    Growth in aerobic bottle: Gram Positive Cocci in Clusters    Growth in anaerobic bottle: Gram Positive Cocci in Clusters  Final Report (01-18-22 @ 19:31):    Growth in aerobic and anaerobic bottles: Staphylococcus aureus    See previous culture [id number]            INFECTIOUS DISEASES TESTING  COVID-19 PCR: NotDetec (01-18-22 @ 18:45)  HIV-1/2 Combo Result: Nonreact (01-18-22 @ 04:30)  Procalcitonin, Serum: 0.13 (01-16-22 @ 16:00)  COVID-19 PCR: NotDetec (01-15-22 @ 23:32)      INFLAMMATORY MARKERS  Sedimentation Rate, Erythrocyte: 131 mm/Hr (01-16-22 @ 18:19)  C-Reactive Protein, Serum: 141 mg/L (01-16-22 @ 16:00)      RADIOLOGY & ADDITIONAL TESTS:  I have personally reviewed the last available Chest xray  CXR      CT      CARDIOLOGY TESTING  12 Lead ECG:   Ventricular Rate 79 BPM    Atrial Rate 79 BPM    P-R Interval 146 ms    QRS Duration 90 ms    Q-T Interval 412 ms    QTC Calculation(Bazett) 472 ms    P Axis 65 degrees    R Axis 74 degrees    T Axis 74 degrees    Diagnosis Line Normal sinus rhythm  Normal ECG    Confirmed by Gabe Brown ([id number]) on 1/16/2022 12:56:17 PM (01-16-22 @ 10:43)  12 Lead ECG:   Ventricular Rate 89 BPM    Atrial Rate 89 BPM    P-R Interval 142 ms    QRS Duration 94 ms    Q-T Interval 380 ms    QTC Calculation(Bazett) 462 ms    P Axis 65 degrees    R Axis 65 degrees    T Axis 64 degrees    Diagnosis Line Normal sinus rhythm  Normal ECG    Confirmed by Gabe Brown ([id number]) on 1/16/2022 12:49:12 PM (01-15-22 @ 19:55)      MEDICATIONS  acetaminophen     Tablet .. 650 Oral every 6 hours  ALPRAZolam 2 Oral three times a day  buPROPion XL (24-Hour) . 300 Oral daily  cyclobenzaprine 10 Oral every 8 hours  enoxaparin Injectable 40 SubCutaneous daily  ferrous    sulfate 325 Oral daily  folic acid 1 Oral daily  gabapentin 300 Oral three times a day  influenza   Vaccine 0.5 IntraMuscular once  methadone    Tablet 40 Oral every 8 hours  mirtazapine 60 Oral at bedtime  multivitamin 1 Oral daily  nafcillin  IVPB 2 IV Intermittent every 4 hours  nafcillin  IVPB     pantoprazole    Tablet 40 Oral before breakfast  polyethylene glycol 3350 17 Oral two times a day  QUEtiapine 200 Oral three times a day  senna 2 Oral at bedtime      WEIGHT  Weight (kg): 107 (01-19-22 @ 13:45)  Creatinine, Serum: 0.8 mg/dL (01-21-22 @ 04:30)  Creatinine, Serum: 0.9 mg/dL (01-20-22 @ 18:41)      ANTIBIOTICS:  nafcillin  IVPB 2 Gram(s) IV Intermittent every 4 hours  nafcillin  IVPB          All available historical records have been reviewed

## 2022-01-21 NOTE — DISCHARGE NOTE PROVIDER - CARE PROVIDER_API CALL
Guero Arevalo)  Plastic Surgery  [address] [place] [address]  Phone: [phone]  Fax: [phone]  Follow Up Time: 2 weeks   Guero Arevalo)  Plastic Surgery  [address] [place] [address]  Phone: [phone]  Fax: [phone]  Follow Up Time: 2 weeks    Augie Lynn)  Internal Medicine  [address] [facility], [address]st [address]  [place], [place] [id number]  Phone: [phone]  Fax: [phone]  Follow Up Time: 2 weeks   Guero Arevalo)  Plastic Surgery  [address] [place] [address]  Phone: [phone]  Fax: [phone]  Follow Up Time: 2 weeks    Augie Lynn)  Internal Medicine  [address] [facility], [address]st [address]  [place], [place] [id number]  Phone: [phone]  Fax: [phone]  Follow Up Time: 2 weeks    Prashanth Shearer)  Internal Medicine  [address] [place] [address]  Phone: [phone]  Fax: [phone]  Follow Up Time: 1 month

## 2022-01-21 NOTE — PROGRESS NOTE ADULT - SUBJECTIVE AND OBJECTIVE BOX
51-year-old male past medical history of polysubstance abuse who presents status post fall. Has back pain radiating to LLE, CT concerning for osteomyelitis, was diagnosed with MSSA bacteremia, PAM was negative for IE.  Pt was consulted by ID, treated with IV Abx, repeat blood Cx ( one set ) is negative, second set of blood Cx came back negative today,  consult IR for a PICE line.   Today pt feels OK, c/o back pain at times, overall feels better.      OBJECTIVE  PAST MEDICAL & SURGICAL HISTORY  IV drug abuse      ALLERGIES:  No Known Allergies      HOME MEDICATIONS  Home Medications:  Remeron 30 mg oral tablet: 2 tab(s) orally once a day (at bedtime) (16 Jan 2022 10:02)  SEROquel 200 mg oral tablet: orally 3 times a day (16 Jan 2022 10:02)  Wellbutrin: 300 milligram(s) orally once a day (16 Jan 2022 10:02)  Xanax: 2 milligram(s) orally 3 times a day (16 Jan 2022 10:02)    VITAL SIGNS: Last 24 Hours  T(C): 36.2 (21 Jan 2022 05:16), Max: 36.2 (21 Jan 2022 05:16)  T(F): 97.1 (21 Jan 2022 05:16), Max: 97.1 (21 Jan 2022 05:16)  HR: 82 (21 Jan 2022 05:16) (82 - 86)  BP: 134/94 (21 Jan 2022 05:16) (133/90 - 134/94)  BP(mean): 107 (20 Jan 2022 19:47) (107 - 107)  RR: 18 (21 Jan 2022 05:16) (18 - 18)  SpO2: --    PHYSICAL EXAM:  General: not in acute distress, very poor hygiene   LUNGS: CTA b/l   HEART: RRR, +S1,S2,  ABDOMEN: SNTTP, ND x 4 q's  BACK: tenderness to palpation midspinal  lumbar region  EXT: Warm, well perfused x 4, edema noted on right wrist   NEURO: AxOx3, No FND's noted  SKIN: open wound on right wrist, multiple skin abrasions     LABS:                                   10.2   7.50  )-----------( 424      ( 21 Jan 2022 04:30 )             34.2   01-21    139  |  101  |  14  ----------------------------<  149<H>  4.3   |  20  |  0.8    Ca    9.1      21 Jan 2022 04:30  Mg     2.1     01-21    TPro  8.0  /  Alb  3.3<L>  /  TBili  0.3  /  DBili  x   /  AST  14  /  ALT  24  /  AlkPhos  116<H>  01-21       PTT - ( 17 Jan 2022 11:00 )  PTT:TNP sec    Culture - Blood in AM (01.18.22 @ 04:30)   Specimen Source: .Blood Blood   Culture Results:   No growth to date.         Culture - Blood (collected 17 Jan 2022 11:00)  Source: .Blood Blood  Gram Stain (18 Jan 2022 11:15):    Growth in aerobic bottle: Gram Positive Cocci in Clusters    Growth in anaerobic bottle: Gram Positive Cocci in Clusters  Preliminary Report (18 Jan 2022 11:15):    Growth in aerobic bottle: Gram Positive Cocci in Clusters    Growth in anaerobic bottle: Gram Positive Cocci in Clusters    Culture - Blood in AM (01.18.22 @ 04:30)   Specimen Source: .Blood Blood   Culture Results:   No growth to date. Culture - Blood (01.19.22 @ 08:25)   Specimen Source: .Blood None   Culture Results:   No growth to date.     RADIOLOGY:    RADIOLOGY:  ACC: [id number] EXAM:  CT BRAIN                          PROCEDURE DATE:  01/15/2022        IMPRESSION:    No CT evidence for acute intracranial pathology.      ACC: [id number] EXAM:  CT CERVICAL SPINE                          PROCEDURE DATE:  01/15/2022          INTERPRETATION:  CLINICAL STATEMENT: Trauma code    TECHNIQUE:  Contiguous unenhanced CT axial images of the cervical spine   with coronal and sagittal re-formations.    COMPARISON: None available    FINDINGS:    There is straightening of the normal cervical lordosis, which may be on   the basis of pain, muscle spasm, positioning or a combination of the   above.    There is no evidence of acute fracture or facet subluxation.    No significant prevertebral soft tissue swelling.    There is complete loss of disc height with bony fusion at C5-C6. There   are multilevel degenerative changes without definitive severe central   canal or neuroforaminal stenosis.      IMPRESSION:    No acute fracture or subluxation of the cervical spine.      ACC: [id number] EXAM:  CT ABDOMEN AND PELVIS IC                        ACC: [id number] EXAM:  CT CHEST IC                          PROCEDURE DATE:  01/15/2022          INTERPRETATION:  CLINICAL STATEMENT: Trauma code    TECHNIQUE: Contiguous CT images were obtained of the chest, abdomen and   pelvis.    Intravenous Contrast:  Intravenous contrast administered.  100 cc Omnipaque 350 intravenous contrast was administered. 0 cc   discarded.  Oral contrast: was not administered.      COMPARISON:  None    FINDINGS:      BONES AND SOFT TISSUES: No definite acute fractures identified. There is   an osseous destructive process at the left L5-S1 facet joints with   infiltrative changes extending to the adjacent spinal canal and neural   foramen. There is spinal canal stenosis at L4-L5 and L5-S1. Additional   erosive changes are noted at the left T10-T11 facet joints.      IMPRESSION:    Definitive acute traumatic injury is not identified to the chest, abdomen   or pelvis.    There is an osseous destructive process at the left L5-S1 facet joints   with infiltrative changes extending to the adjacent spinal canal and   neural foramina and associated stenosis. Per notes, patient with   polysubstance abuse. Consider osteomyelitis. Underlying mass is not   excluded. Additional erosive changes noted at the left T10-T11 facet   joint. Further evaluation with MRI can be obtained as needed.        ACC: [id number] EXAM:  XR WRIST COMP MIN 3 VIEWS BI                          PROCEDURE DATE:  01/15/2022          INTERPRETATION:  CLINICAL INDICATION:necrotic tissue, bone exposed    TECHNIQUE: 2 views of the right wrist. 2 views of the left wrist. One   image is provided with indeterminate laterality.    COMPARISON: No direct comparison available    FINDINGS:  There is no evidence of acute fracture or dislocation. Severe apparent   complete soft tissue defect at the distal dorsal wrist.    IMPRESSION:  No evidence of acute fracture or dislocation.    Severe apparent complete soft tissue defect at the distal dorsal wrist.    1.2 cm linear radiodensity within the anterior soft tissue of the right   forearm suspicious for foreign body.        ACC: [id number] EXAM:  XR FOREARM  2 VIEWS BI                          PROCEDURE DATE:  01/15/2022          INTERPRETATION:  CLINICAL INDICATION:necrotic tissue, bone exposed    TECHNIQUE: 2 views of the right forearm. 2 views of the left forearm.    COMPARISON: No direct comparison available    FINDINGS:  There is no evidence of acute fracture or dislocation. Severe apparent   complete soft tissue defect at the distal dorsal wrist bilaterally.    IMPRESSION:  No evidence of acute fracture or dislocation.    Severe apparent complete soft tissue defect at the distal dorsal wrist   bilaterally.    1.2 cm linear radiodensity within the anterior soft tissue of the left   forearm suspicious for foreign body.    --- End of Report ---    SOLEDAD HANNON MD; Attending Radiologist  This document has been electronically signed. Jan 16 2022  6:19PM      ACC: [id number] EXAM:  XR HAND 2 VIEWS LT                          PROCEDURE DATE:  01/16/2022          INTERPRETATION:  CLINICAL INDICATION:trauma    TECHNIQUE: 3 views of the left hand.    COMPARISON: No direct comparison available    FINDINGS:  There is no evidence of acute fracture or dislocation of the left hand.    IMPRESSION:  No evidence of acute fracture or dislocation of the left hand.        ACC: [id number] EXAM:  XR FOOT 2 VIEWS LT                          PROCEDURE DATE:  01/16/2022          INTERPRETATION:  CLINICAL HISTORY / REASON FOR EXAM: Pain    COMPARISON: None    TECHNIQUE: Three views, left foot radiographs    FINDINGS /  IMPRESSION:    No acute displaced fracture. Moderate midfoot degenerative changes.   Achilles tendon insertional enthesophyte measuring 1.1 cm.      ACC: [id number] EXAM:  XR FOREARM  2 VIEWS BI                          IMPRESSION:    No acute displaced fracture.    1.2 cm linear foreign body within the soft tissues overlying the right   radius.    Previously seen left forearm foreign body is no longer visualized.      ACC: [id number] EXAM:  MR SPINE LUMBAR WAW IC                        ACC: [id number] EXAM:  MR SPINE THORACIC [facility]                          PROCEDURE DATE:  01/18/2022          INTERPRETATION:  CLINICAL INDICATION: Osteomyelitis.    TECHNIQUE: Pre and postcontrast MRI of the thoracic and lumbar spine was   performed.    Sagittal T1, T2, and STIR, and axial T2 and T1 sequences were obtained of   the thoracic and lumbar spine. 10 cc Gadavist was administered. 0 cc was   discarded.    COMPARISON: Correlation with CT of the abdomen and pelvis dated   1/15/2022..    FINDINGS:    THORACIC SPINE:  VERTEBRAL BODIES/ALIGNMENT: There is increased signal and enhancement   noted involving the left T10-T11 facet joint space without erosive   changes identified.    There is increased STIR signal involving the anterior aspects of the T11   and T12 vertebral bodies at likely related to degenerative change. The    SPINAL CORD: There is no abnormal spinal cord signal or enhancement..    SPINAL CANAL:  No intradural or extradural defects are seen.    INTERVERTEBRAL DISCS:  The disc spaces are well-maintained.    There is trace disc bulging at T9-T10 as well as mild facet arthrosis   without spinal canal or neuroforaminal narrowing.    There is trace disc bulging at T10-T11 indenting the ventral thecal sac   as well as bilateral facet arthrosis contributing to moderate left   neuroforaminal narrowing.    At T11-T12 there is trace disc bulging as well as bilateral facet   arthrosis resulting in mildleft neuroforaminal narrowing. There is no   spinal canal stenosis.    MISCELLANEOUS:  None.      LUMBAR SPINE:  VERTEBRAL BODIES/ALIGNMENT AND SOFT TISSUES:  If clinically abnormal   signal or enhancement which is centered within the left L5-S1 facet   joint. There is osseous erosion involving the facet joint as well as   surrounding increased signal and associated enhancement. There   rim-enhancing fluid collections noted just anterior to the facet joint   measuring approximately 1.3 x 0.8 x 1.3 cm, series 1200 image 14. This   fluid collection extends to the superior aspect of the facet joint within   the paraspinal soft tissues with an oblong fluid collection measuring 2.0   x 0.7 x 1.3 cm (AP x TR x CC). There are additional enhancing fluid   collections involving the left retroperitoneal soft tissues just   posterior to the psoas musculature which measure approximately 3.0 x 1.0   cm in the axial plane, series 1800 image 41 there is increased signal   noted involving the posterior aspect of the L5 and L4 vertebral bodies   there is epidural phlegmon noted overlying the L5 and S1 vertebral bodies   compression of the descending nerve roots at the level of L5.    There is a rim-enhancing collection within the right psoas musculature   measuring 1.1 x 0.9 cm in axial plane though incompletely evaluated on   sagittal imaging..    There is chronic wedge compression deformity of the L1 vertebral body   with approximately 20% height loss.    There are Modic type II degenerative changes at L2-3.    CONUS/CAUDA EQUINA: Unremarkable terminating at the superior endplate of   L1    Evaluation of individual disc levels is slightly limited due to motion    At L4-L5 there is disc bulging, posterior ligamentous hypertrophy and   bilateral facet arthrosis contributing to severe spinal stenosis as well   as severe bilateral neuroforaminal narrowing.    At L5-S1 there is disc bulging and ventral epidural phlegmon, posterior   ligamentous hypertrophy and bilateral facet arthrosis contributing to   severe spinal stenosis as well as severe lateral neuroforaminal narrowing.    MISCELLANEOUS:  None.      IMPRESSION:    THORACIC SPINE:  Motion limited examination.    Minimally increased signal and enhancement involving the left T10-T11   facet joint space which represent early facet joint   infection/inflammation however could also be related to degenerative   changes.    LUMBAR SPINE:  Findings consistent with left L5-S1 facetitis with associated multifocal   abscess formation directly adjacent to the left facet joint, within the   left retroperitoneal soft tissues as well as the right psoas musculature.    Ventral epidural phlegmon overlying the L5 vertebral body causing   significant compression of the descending nerve roots.    These findings were discussed with Dr. Plummer at 1/18/2022 at 5:06 PM   by Dr. Ellis with read back confirmation.       < from: Transesophageal Echocardiogram (01.19.22 @ 15:03) >  Summary:   1. Left ventricular ejection fraction, by visual estimation, is 60 to   65%.   2. Normal global left ventricular systolic function.   3. Normal left atrial size.   4. Normal right atrial size.   5. Trace mitral valve regurgitation.   6. No evidence of any vegetations.    MEDICATIONS  (STANDING):  acetaminophen     Tablet .. 650 milliGRAM(s) Oral every 6 hours  ALPRAZolam 2 milliGRAM(s) Oral three times a day  buPROPion XL (24-Hour) . 300 milliGRAM(s) Oral daily  cyclobenzaprine 10 milliGRAM(s) Oral every 8 hours  enoxaparin Injectable 40 milliGRAM(s) SubCutaneous daily  ferrous    sulfate 325 milliGRAM(s) Oral daily  folic acid 1 milliGRAM(s) Oral daily  gabapentin 300 milliGRAM(s) Oral three times a day  influenza   Vaccine 0.5 milliLiter(s) IntraMuscular once  methadone    Tablet 40 milliGRAM(s) Oral every 8 hours  mirtazapine 60 milliGRAM(s) Oral at bedtime  multivitamin 1 Tablet(s) Oral daily  nafcillin  IVPB 2 Gram(s) IV Intermittent every 4 hours  nafcillin  IVPB      pantoprazole    Tablet 40 milliGRAM(s) Oral before breakfast  polyethylene glycol 3350 17 Gram(s) Oral two times a day  QUEtiapine 200 milliGRAM(s) Oral three times a day  senna 2 Tablet(s) Oral at bedtime    MEDICATIONS  (PRN):  aluminum hydroxide/magnesium hydroxide/simethicone Suspension 30 milliLiter(s) Oral every 4 hours PRN Dyspepsia  HYDROmorphone   Tablet 6 milliGRAM(s) Oral every 4 hours PRN Severe Pain (7 - 10)  LORazepam     Tablet 1 milliGRAM(s) Oral every 2 hours PRN CIWA-Ar score increase by 2 points and a total score of 7 or less  melatonin 3 milliGRAM(s) Oral at bedtime PRN Insomnia  ondansetron Injectable 4 milliGRAM(s) IV Push every 8 hours PRN Nausea and/or Vomiting

## 2022-01-21 NOTE — DISCHARGE NOTE PROVIDER - CARE PROVIDERS DIRECT ADDRESSES
[email or web address]

## 2022-01-21 NOTE — DISCHARGE NOTE PROVIDER - HOSPITAL COURSE
51-year-old male past medical history of polysubstance abuse who presents status post fall.  Patient states that approximately 12 hours ago he fell from bed due to sciatica pain, and has and was not able to get up from the floor.  The pain is sacral with a shooting pain to his left foot. It feels as if his foot can fall off. He states he is unable to lie flat secondary to the pain. Patient also states that he has been having pain to his wrist bilaterally.  Of note patient has known open wounds to bilateral wrist.  Patient reports that the last time he used heroin IV was approximately 2 week ago.  Patient has no other medical complaints. He reports no f/c/n/v, change in urinary or bowel habits.    Pt states that he takes 135mg of methadone a day, call his clinic and doctor (Dr. Milton Gates), no response.    In the ED: Pt tachy 91, wbc 16.6k, CT spine: osseous destructive process at the left L5-S1 facet joints with infiltrative changes extending to the adjacent spinal canal and   neural foramina and associated stenosis. Additional erosive changes noted at the left T10-T11 facet joint. Neuro sx consulted, pt needs MRI. Pt unable to tolerate procedure since he cant lie flat. s/p bolus and broad spectrum abx.    Hospital course     pt admitted with vertebral osteomyelitis, underwent MRI, found to have facetitis L5, S1, with adjacent abscesses, Blood cultures positive for MSSA  seen by ID started on Iv nafcillin, PAM negative for endocarditis repeat blood cultures negative x2,   picc line placed for long term abx   will be dcd on long term abx     Was seen by Burn team for evaluation of wrist skin breakdown, they recommended wound care       Pt clinically improved will be discharged on long term abx, fu with pcp,          51-year-old male past medical history of polysubstance abuse who presents status post fall.  Patient states that approximately 12 hours ago he fell from bed due to sciatica pain, and has and was not able to get up from the floor.  The pain is sacral with a shooting pain to his left foot. It feels as if his foot can fall off. He states he is unable to lie flat secondary to the pain. Patient also states that he has been having pain to his wrist bilaterally.  Of note patient has known open wounds to bilateral wrist.  Patient reports that the last time he used heroin IV was approximately 2 week ago.  Patient has no other medical complaints. He reports no f/c/n/v, change in urinary or bowel habits.    Pt states that he takes 135mg of methadone a day, call his clinic and doctor (Dr. Milton Gates), no response.    In the ED: Pt tachy 91, wbc 16.6k, CT spine: osseous destructive process at the left L5-S1 facet joints with infiltrative changes extending to the adjacent spinal canal and   neural foramina and associated stenosis. Additional erosive changes noted at the left T10-T11 facet joint. Neuro sx consulted, pt needs MRI. Pt unable to tolerate procedure since he cant lie flat. s/p bolus and broad spectrum abx.    Hospital course     pt admitted with vertebral osteomyelitis, underwent MRI, found to have facetitis L5, S1, with adjacent abscesses, Blood cultures positive for MSSA  seen by ID started on Iv nafcillin, PAM negative for endocarditis repeat blood cultures negative x2,   picc line placed for long term abx   will be dcd on long term abx     Was seen by Burn team for evaluation of wrist skin breakdown, they recommended wound care     Pt clinically improved will be discharged on long term abx (Cefazolin) fu with pcp,          51-year-old male past medical history of polysubstance abuse who presents status post fall.  Patient states that approximately 12 hours ago he fell from bed due to sciatica pain, and has and was not able to get up from the floor.  The pain is sacral with a shooting pain to his left foot. It feels as if his foot can fall off. He states he is unable to lie flat secondary to the pain. Patient also states that he has been having pain to his wrist bilaterally.  Of note patient has known open wounds to bilateral wrist.  Patient reports that the last time he used heroin IV was approximately 2 week ago.  Patient has no other medical complaints. He reports no f/c/n/v, change in urinary or bowel habits.    Pt states that he takes 135mg of methadone a day, call his clinic and doctor (Dr. Milton Gates), no response.    In the ED: Pt tachy 91, wbc 16.6k, CT spine: osseous destructive process at the left L5-S1 facet joints with infiltrative changes extending to the adjacent spinal canal and   neural foramina and associated stenosis. Additional erosive changes noted at the left T10-T11 facet joint. Neuro sx consulted, pt needs MRI. Pt unable to tolerate procedure since he cant lie flat. s/p bolus and broad spectrum abx.    Hospital course     pt admitted with vertebral osteomyelitis, underwent MRI, found to have facetitis L5, S1, with adjacent abscesses, Blood cultures positive for MSSA  seen by ID started on Iv nafcillin, PAM negative for endocarditis repeat blood cultures negative x2,   picc line placed for long term abx   will be dcd on long term abx     Was seen by Burn team for evaluation of wrist skin breakdown, they recommended wound care     Pt clinically improved will be discharged on long term abx (Cefazolin) fu with pcp,

## 2022-01-21 NOTE — PROGRESS NOTE ADULT - ASSESSMENT
ASSESSMENT  51-year-old male past medical history of polysubstance abuse who presents status post fall.      IMPRESSION  #s/p Fall    #MSSA Bacteremia  - BLood Cx 1/15-17 +  - Blood Cx 1/18 NG  - PAM 1/19 - negative for vegetation     #Spinal OM, with epidural abscess/phlegmon   - CT Abdomen and Pelvis w/ IV Cont (01.15.22 @ 22:24):vThere is an osseous destructive process at the left L5-S1 facet joints vwith infiltrative changes extending to the adjacent spinal canal and neural foramina and associated stenosis. Per notes, patient with vpolysubstance abuse. Consider osteomyelitis. Underlying mass is not excluded. Additional erosive changes noted at the left T10-T11 facet vjoint. Further evaluation with MRI can be obtained as needed.  - MR Lumbar Spine w/wo IV Cont (01.18.22 @ 16:33): Minimally increased signal and enhancement involving the left T10-T11  facet joint space which represent early facet joint   infection/inflammation however could also be related to degenerative  changes.LUMBAR SPINE: Findings consistent with left L5-S1 facetitis with associated multifocal  abscess formation directly adjacent to the left facet joint, within the left retroperitoneal soft tissues as well as the right psoas musculature. Ventral epidural phlegmon overlying the L5 vertebral body causing significant compression of the descending nerve roots.  - Sedimentation Rate, Erythrocyte: 131 mm/Hr (01.16.22 @ 18:19)  C-Reactive Protein, Serum: 141 mg/L (01.16.22 @ 16:00)    #IVDA  - HIV negative   - Hep C weakly reactive     #Opioid abuse on Methadone   #Bilateral Wrist wounds - s/p Burn evaluation 1/16    #Abx allergy: NKDA      RECOMMENDATIONS  - switch to cefazolin 2g q 8 hours   - will need at least 6 weeks of antibiotics from culture clearance -- followed by possible PO course  - follow-up Hep C RNA     - Weekly CBC, CMP, ESR/CRP  - ID follow-up with Dr. John Lange for Telehealth. We will call the patient between 10:30-1:30      [address]        [phone]       Fax [phone]    Please call or message on [employer] Teams if with any questions.  Spectra [phone]

## 2022-01-21 NOTE — DISCHARGE NOTE PROVIDER - NSDCMRMEDTOKEN_GEN_ALL_CORE_FT
cyclobenzaprine 10 mg oral tablet: 1 tab(s) orally every 8 hours  ferrous sulfate 325 mg (65 mg elemental iron) oral tablet: 1 tab(s) orally once a day  folic acid 1 mg oral tablet: 1 tab(s) orally once a day  gabapentin 300 mg oral capsule: 1 cap(s) orally 3 times a day  methadone: 135 milligram(s) orally once a day  Multiple Vitamins oral tablet: 1 tab(s) orally once a day  Remeron 30 mg oral tablet: 2 tab(s) orally once a day (at bedtime)  SEROquel 200 mg oral tablet: orally 3 times a day  Wellbutrin: 300 milligram(s) orally once a day  Xanax: 2 milligram(s) orally 3 times a day   acetaminophen 325 mg oral tablet: 2 tab(s) orally every 6 hours  ceFAZolin: 2000 milligram(s) intravenous every 8 hours. To be administered until 3/1/22.  cyclobenzaprine 10 mg oral tablet: 1 tab(s) orally every 8 hours  ferrous sulfate 325 mg (65 mg elemental iron) oral tablet: 1 tab(s) orally once a day  folic acid 1 mg oral tablet: 1 tab(s) orally once a day  gabapentin 300 mg oral capsule: 1 cap(s) orally 3 times a day  methadone: 135 milligram(s) orally once a day  Multiple Vitamins oral tablet: 1 tab(s) orally once a day  Remeron 30 mg oral tablet: 2 tab(s) orally once a day (at bedtime)  SEROquel 200 mg oral tablet: orally 3 times a day  Wellbutrin: 300 milligram(s) orally once a day  Xanax: 2 milligram(s) orally 3 times a day   acetaminophen 325 mg oral tablet: 2 tab(s) orally every 6 hours  ceFAZolin: 2000 milligram(s) intravenous every 8 hours. To be administered until 3/1/22.  cyclobenzaprine 10 mg oral tablet: 1 tab(s) orally every 8 hours  ferrous sulfate 325 mg (65 mg elemental iron) oral tablet: 1 tab(s) orally once a day  folic acid 1 mg oral tablet: 1 tab(s) orally once a day  gabapentin 300 mg oral capsule: 1 cap(s) orally 3 times a day  methadone: 135 milligram(s) orally once a day  Multiple Vitamins oral tablet: 1 tab(s) orally once a day  nicotine 21 mg/24 hr transdermal film, extended release: 1 patch transdermal once a day  Remeron 30 mg oral tablet: 2 tab(s) orally once a day (at bedtime)  SEROquel 200 mg oral tablet: orally 3 times a day  Wellbutrin: 300 milligram(s) orally once a day  Xanax: 2 milligram(s) orally 3 times a day

## 2022-01-21 NOTE — DISCHARGE NOTE PROVIDER - PROVIDER TOKENS
PROVIDER:[TOKEN:[[id number]],FOLLOWUP:[2 weeks]] PROVIDER:[TOKEN:[[id number]],FOLLOWUP:[2 weeks]],PROVIDER:[TOKEN:[[id number]],FOLLOWUP:[2 weeks]] PROVIDER:[TOKEN:[[id number]],FOLLOWUP:[2 weeks]],PROVIDER:[TOKEN:[[id number]],FOLLOWUP:[2 weeks]],PROVIDER:[TOKEN:[[id number]],FOLLOWUP:[1 month]]

## 2022-01-21 NOTE — PROCEDURE NOTE - ADDITIONAL PROCEDURE DETAILS
Existing right sided catheter exchanged for 5 Fr 39cm PICC, power injectable. Heparinized and ready to use Scope 5 PowerPICC used  Tip in SVC  Existing right sided catheter exchanged for 5 Fr 39cm PICC, power injectable. Heparinized and ready to use

## 2022-01-21 NOTE — PROGRESS NOTE ADULT - TIME BILLING
I have personally seen and examined this patient.    I have reviewed all pertinent clinical information and reviewed all relevant imaging and diagnostic studies personally.   I counseled the patient about diagnostic testing and treatment plan. All questions were answered.   I discussed recommendations with the primary team.
direct pt's care, communication with medical team, pts counseling and chart review
direct pt's care, communication with medical team, pts counseling and chart review
I have personally seen and examined this patient.    I have reviewed all pertinent clinical information and reviewed all relevant imaging and diagnostic studies personally.   I counseled the patient about diagnostic testing and treatment plan. All questions were answered.   I discussed recommendations with the primary team.
direct pt's care, communication with medical team, pts counseling and chart review

## 2022-01-21 NOTE — DISCHARGE NOTE PROVIDER - NSDCCPCAREPLAN_GEN_ALL_CORE_FT
PRINCIPAL DISCHARGE DIAGNOSIS  Diagnosis: Osteomyelitis of vertebra, lumbar region  Assessment and Plan of Treatment: you were found to have infection of the bone in your spinal column,   as well as collections in the muscles adjacent to your spine,   you were treated with antibiotics and you will continue to take them after discharge.   This infection was likely a result of your IV drug use, pls seek help and resources for narcotic cessation following discharge,      SECONDARY DISCHARGE DIAGNOSES  Diagnosis: Multiple wounds of skin  Assessment and Plan of Treatment: You were seen by Burn team for the skin breakdonw on your wrists,   they recommended local wound care and dressings,     PRINCIPAL DISCHARGE DIAGNOSIS  Diagnosis: Osteomyelitis of vertebra, lumbar region  Assessment and Plan of Treatment: you were found to have infection of the bone in your spinal column,   as well as collections in the muscles adjacent to your spine,   you were treated with antibiotics and you will continue to take them after discharge.   This infection was likely a result of your IV drug use, please seek help and resources for narcotic cessation following discharge.  You are to be discharged with an antibiotic (Ancef) that will be given intravenously through a PICC (Peripherally Inserrted Central Catheter) for 6 weeks from January 18th, 2022. The last day of the antibiotics is March 1st, 2022. Please follow up with infectious disease specialist Dr. Shearer in 5 weeks.      SECONDARY DISCHARGE DIAGNOSES  Diagnosis: Multiple wounds of skin  Assessment and Plan of Treatment: You were seen by Burn team for the skin breakdonw on your wrists,   they recommended local wound care and dressings,

## 2022-01-21 NOTE — PROCEDURE NOTE - NSPROCDETAILS_GEN_ALL_CORE
location identified, draped/prepped, sterile technique used location identified, draped/prepped, sterile technique used/sterile dressing applied/sterile technique, catheter placed/supine position

## 2022-01-21 NOTE — MEDICAL STUDENT PROGRESS NOTE(EDUCATION) - NS MD HP STUD ASPLAN PLAN FT
# Sepsis POA  # Vertebral Osteomyelitis with adjacent soft tissue abscess   # MSSA Bacteremia   - on admission HR 91, wbc 16.6k  - ,      - Blood culture 1/15 + GPC in clusters, 1/17 + GPC in clusters, 1/18 No Growth, 1/19 No Growth  - CT AP - osseous destructive process at the left L5-S1 facet joints with infiltrative changes extending to the adjacent spinal canal and neural foramina and associated stenosis. Consider osteomyelitis.   - MRI - findings consistent with L5 S1 facetitis, multifocal abscess within left retroperitoneal soft tissues and right psoas   - ID appreciated   - nafcillin 2g q4hrs   - repeat blood cultures daily   - TTE and PAM no evidence of endocarditis   - IR consulted  for possible abscess drainage, no intervention recommended  - ID consult for antibiotics  - 2 sets of neg cultures- Call IR for PICC    #Right forearm foreign body  - 1.2cm linear foreign body overlying right radius  - surgery consult for removal - not recommending removal given difficulty of extraction, and no evidence of infection,   - not a contraindication for MRI     #Fall   - likely mechanical   - cth negative, trauma workup negative   - pt ot rehab     # Bl open wounds on wrist   - burn appreciated, Wound care - Xeroform/Kelrex BID, no Surgical debridement     # Lt foot pain   - likely radicular etiology   - xray foot - no fracture, midfoot degenerative changes,  - duplex LE negative for DVT     # IVDU  # suspected ETOH use   # suspected thiamine folate deficiency   - active heroin use, last use 2 weeks ago  - reports taking 135mg methadone per day, dose confirmed with Juan Ramon Flores RN at methadone clinic, last received 3 day supply on 1/14,  - addiction medicine consulted  - continue thiamine folate supplementation   - pain management appreciated   - methadone 40mg q8hr standing   - tylenol 650mg q8 standing   - gabapentin 300mg q8hr   - cyclobenzaprine 10mg q8hr   - dilaudid 6mg po q4 prn   - ketorolac 15mg q6hr prn     #Mood dx   #Anxiety   - continue home xanax 2mg TID, has withdrawal symptoms when not taking them.   - c/w welbutrin 300 qd and Seroquel 300 TID   - psych consult for stuttering and psych meds    #Normocytic Anemia   - multifactorial, iron deficiency, anemia of chronic disease,   - continue folate, MV,  - ferrous sulfate 325mg qd     # DVT ppx- LMWH  # GI ppx- PPI  # Activity- AAT  # Diet- Reg  # Code status - full  # Dispo- from home, acute     Pending   - Call IR for PICC  - ID consult for antibiotics  - f/u psych consult   - Anticipate dc in 24 hr

## 2022-01-21 NOTE — DISCHARGE NOTE PROVIDER - NSDCCAREPROVSEEN_GEN_ALL_CORE_FT
[facility] medicine team, [facility] infectious disease. [facility] IR, [facility] neurosurgery, [facility] team, pain management

## 2022-01-21 NOTE — PROGRESS NOTE ADULT - SUBJECTIVE AND OBJECTIVE BOX
MIKAEL MCDERMOTT 51y Male  MRN#: [id number]   CODE STATUS: full code     Hospital Day: 5d    Pt is currently admitted with the primary diagnosis of vertebral osteomyelitis     SUBJECTIVE    no acute events overnight, pt seen and examined, no new complaints                                             ----------------------------------------------------------  OBJECTIVE  PAST MEDICAL & SURGICAL HISTORY  IV drug abuse                                              -----------------------------------------------------------  ALLERGIES:  No Known Allergies                                            ------------------------------------------------------------    HOME MEDICATIONS  Home Medications:  Remeron 30 mg oral tablet: 2 tab(s) orally once a day (at bedtime) (16 Jan 2022 10:02)  SEROquel 200 mg oral tablet: orally 3 times a day (16 Jan 2022 10:02)  Wellbutrin: 300 milligram(s) orally once a day (16 Jan 2022 10:02)  Xanax: 2 milligram(s) orally 3 times a day (16 Jan 2022 10:02)                           MEDICATIONS:  STANDING MEDICATIONS  acetaminophen     Tablet .. 650 milliGRAM(s) Oral every 6 hours  ALPRAZolam 2 milliGRAM(s) Oral three times a day  buPROPion XL (24-Hour) . 300 milliGRAM(s) Oral daily  cyclobenzaprine 10 milliGRAM(s) Oral every 8 hours  enoxaparin Injectable 40 milliGRAM(s) SubCutaneous daily  ferrous    sulfate 325 milliGRAM(s) Oral daily  folic acid 1 milliGRAM(s) Oral daily  gabapentin 300 milliGRAM(s) Oral three times a day  influenza   Vaccine 0.5 milliLiter(s) IntraMuscular once  methadone    Tablet 40 milliGRAM(s) Oral every 8 hours  mirtazapine 60 milliGRAM(s) Oral at bedtime  multivitamin 1 Tablet(s) Oral daily  nafcillin  IVPB 2 Gram(s) IV Intermittent every 4 hours  nafcillin  IVPB      pantoprazole    Tablet 40 milliGRAM(s) Oral before breakfast  polyethylene glycol 3350 17 Gram(s) Oral two times a day  QUEtiapine 200 milliGRAM(s) Oral three times a day  senna 2 Tablet(s) Oral at bedtime    PRN MEDICATIONS  aluminum hydroxide/magnesium hydroxide/simethicone Suspension 30 milliLiter(s) Oral every 4 hours PRN  HYDROmorphone   Tablet 6 milliGRAM(s) Oral every 4 hours PRN  LORazepam     Tablet 1 milliGRAM(s) Oral every 2 hours PRN  melatonin 3 milliGRAM(s) Oral at bedtime PRN  ondansetron Injectable 4 milliGRAM(s) IV Push every 8 hours PRN                                            ------------------------------------------------------------  VITAL SIGNS: Last 24 Hours  T(C): 36.2 (21 Jan 2022 05:16), Max: 36.2 (21 Jan 2022 05:16)  T(F): 97.1 (21 Jan 2022 05:16), Max: 97.1 (21 Jan 2022 05:16)  HR: 82 (21 Jan 2022 05:16) (82 - 87)  BP: 134/94 (21 Jan 2022 05:16) (133/90 - 148/91)  BP(mean): 107 (20 Jan 2022 19:47) (107 - 107)  RR: 18 (21 Jan 2022 05:16) (18 - 18)  SpO2: --      01-20-22 @ 07:01  -  01-21-22 @ 07:00  --------------------------------------------------------  IN: 200 mL / OUT: 700 mL / NET: -500 mL    01-21-22 @ 07:01  -  01-21-22 @ 10:45  --------------------------------------------------------  IN: 0 mL / OUT: 800 mL / NET: -800 mL                                             --------------------------------------------------------------  LABS:                        10.2   7.50  )-----------( 424      ( 21 Jan 2022 04:30 )             34.2     01-20    136  |  101  |  15  ----------------------------<  153<H>  4.2   |  17  |  0.9    Ca    8.3<L>      20 Jan 2022 18:41  Mg     2.0     01-20    TPro  7.4  /  Alb  3.0<L>  /  TBili  0.2  /  DBili  x   /  AST  15  /  ALT  26  /  AlkPhos  117<H>  01-20                Culture - Blood (collected 19 Jan 2022 08:25)  Source: .Blood None  Preliminary Report (20 Jan 2022 19:02):    No growth to date.                                                    -------------------------------------------------------------  RADIOLOGY:  RADIOLOGY:  ACC: [id number] EXAM:  CT BRAIN                          PROCEDURE DATE:  01/15/2022          INTERPRETATION:  Clinical History / Reason for exam: Trauma code    Technique: Noncontrast head CT.  Contiguous unenhanced CT axial images of   the head from the base to the vertex.    Comparison:  CT head dated 4/2/2010    Findings:    The ventricles and cortical sulci pattern are age-appropriate.    Gray-white matter differentiation is maintained.    There is no acute intracranial hemorrhage, extra-axial fluid collection   or midline shift.    No acute osseous abnormality/depressed skull fracture is identified.    The visualized paranasal sinuses are well aerated.    The mastoids are well-aerated.    IMPRESSION:    No CT evidence for acute intracranial pathology.      ACC: [id number] EXAM:  CT CERVICAL SPINE                          PROCEDURE DATE:  01/15/2022          INTERPRETATION:  CLINICAL STATEMENT: Trauma code    TECHNIQUE:  Contiguous unenhanced CT axial images of the cervical spine   with coronal and sagittal re-formations.    COMPARISON: None available    FINDINGS:    There is straightening of the normal cervical lordosis, which may be on   the basis of pain, muscle spasm, positioning or a combination of the   above.    There is no evidence of acute fracture or facet subluxation.    No significant prevertebral soft tissue swelling.    There is complete loss of disc height with bony fusion at C5-C6. There   are multilevel degenerative changes without definitive severe central   canal or neuroforaminal stenosis.      IMPRESSION:    No acute fracture or subluxation of the cervical spine.      ACC: [id number] EXAM:  CT ABDOMEN AND PELVIS IC                        ACC: [id number] EXAM:  CT CHEST IC                          PROCEDURE DATE:  01/15/2022          INTERPRETATION:  CLINICAL STATEMENT: Trauma code    TECHNIQUE: Contiguous CT images were obtained of the chest, abdomen and   pelvis.    Intravenous Contrast:  Intravenous contrast administered.  100 cc Omnipaque 350 intravenous contrast was administered. 0 cc   discarded.  Oral contrast: was not administered.      COMPARISON:  None    FINDINGS:      BONES AND SOFT TISSUES: No definite acute fractures identified. There is   an osseous destructive process at the left L5-S1 facet joints with   infiltrative changes extending to the adjacent spinal canal and neural   foramen. There is spinal canal stenosis at L4-L5 and L5-S1. Additional   erosive changes are noted at the left T10-T11 facet joints.      IMPRESSION:    Definitive acute traumatic injury is not identified to the chest, abdomen   or pelvis.    There is an osseous destructive process at the left L5-S1 facet joints   with infiltrative changes extending to the adjacent spinal canal and   neural foramina and associated stenosis. Per notes, patient with   polysubstance abuse. Consider osteomyelitis. Underlying mass is not   excluded. Additional erosive changes noted at the left T10-T11 facet   joint. Further evaluation with MRI can be obtained as needed.        ACC: [id number] EXAM:  XR WRIST COMP MIN 3 VIEWS BI                          PROCEDURE DATE:  01/15/2022          INTERPRETATION:  CLINICAL INDICATION:necrotic tissue, bone exposed    TECHNIQUE: 2 views of the right wrist. 2 views of the left wrist. One   image is provided with indeterminate laterality.    COMPARISON: No direct comparison available    FINDINGS:  There is no evidence of acute fracture or dislocation. Severe apparent   complete soft tissue defect at the distal dorsal wrist.    IMPRESSION:  No evidence of acute fracture or dislocation.    Severe apparent complete soft tissue defect at the distal dorsal wrist.    1.2 cm linear radiodensity within the anterior soft tissue of the right   forearm suspicious for foreign body.        ACC: [id number] EXAM:  XR FOREARM  2 VIEWS BI                          PROCEDURE DATE:  01/15/2022          INTERPRETATION:  CLINICAL INDICATION:necrotic tissue, bone exposed    TECHNIQUE: 2 views of the right forearm. 2 views of the left forearm.    COMPARISON: No direct comparison available    FINDINGS:  There is no evidence of acute fracture or dislocation. Severe apparent   complete soft tissue defect at the distal dorsal wrist bilaterally.    IMPRESSION:  No evidence of acute fracture or dislocation.    Severe apparent complete soft tissue defect at the distal dorsal wrist   bilaterally.    1.2 cm linear radiodensity within the anterior soft tissue of the left   forearm suspicious for foreign body.    --- End of Report ---    SOLEDAD HANNON MD; Attending Radiologist  This document has been electronically signed. Jan 16 2022  6:19PM      ACC: [id number] EXAM:  XR HAND 2 VIEWS LT                          PROCEDURE DATE:  01/16/2022          INTERPRETATION:  CLINICAL INDICATION:trauma    TECHNIQUE: 3 views of the left hand.    COMPARISON: No direct comparison available    FINDINGS:  There is no evidence of acute fracture or dislocation of the left hand.    IMPRESSION:  No evidence of acute fracture or dislocation of the left hand.        ACC: [id number] EXAM:  XR FOOT 2 VIEWS LT                          PROCEDURE DATE:  01/16/2022          INTERPRETATION:  CLINICAL HISTORY / REASON FOR EXAM: Pain    COMPARISON: None    TECHNIQUE: Three views, left foot radiographs    FINDINGS /  IMPRESSION:    No acute displaced fracture. Moderate midfoot degenerative changes.   Achilles tendon insertional enthesophyte measuring 1.1 cm.      ACC: [id number] EXAM:  XR FOREARM  2 VIEWS BI                          IMPRESSION:    No acute displaced fracture.    1.2 cm linear foreign body within the soft tissues overlying the right   radius.    Previously seen left forearm foreign body is no longer visualized.      ACC: [id number] EXAM:  MR SPINE LUMBAR WAW IC                        ACC: [id number] EXAM:  MR SPINE THORACIC WAW IC                          PROCEDURE DATE:  01/18/2022          INTERPRETATION:  CLINICAL INDICATION: Osteomyelitis.    TECHNIQUE: Pre and postcontrast MRI of the thoracic and lumbar spine was   performed.    Sagittal T1, T2, and STIR, and axial T2 and T1 sequences were obtained of   the thoracic and lumbar spine. 10 cc Gadavist was administered. 0 cc was   discarded.    COMPARISON: Correlation with CT of the abdomen and pelvis dated   1/15/2022..    FINDINGS:    THORACIC SPINE:  VERTEBRAL BODIES/ALIGNMENT: There is increased signal and enhancement   noted involving the left T10-T11 facet joint space without erosive   changes identified.    There is increased STIR signal involving the anterior aspects of the T11   and T12 vertebral bodies at likely related to degenerative change. The    SPINAL CORD: There is no abnormal spinal cord signal or enhancement..    SPINAL CANAL:  No intradural or extradural defects are seen.    INTERVERTEBRAL DISCS:  The disc spaces are well-maintained.    There is trace disc bulging at T9-T10 as well as mild facet arthrosis   without spinal canal or neuroforaminal narrowing.    There is trace disc bulging at T10-T11 indenting the ventral thecal sac   as well as bilateral facet arthrosis contributing to moderate left   neuroforaminal narrowing.    At T11-T12 there is trace disc bulging as well as bilateral facet   arthrosis resulting in mildleft neuroforaminal narrowing. There is no   spinal canal stenosis.    MISCELLANEOUS:  None.      LUMBAR SPINE:  VERTEBRAL BODIES/ALIGNMENT AND SOFT TISSUES:  If clinically abnormal   signal or enhancement which is centered within the left L5-S1 facet   joint. There is osseous erosion involving the facet joint as well as   surrounding increased signal and associated enhancement. There   rim-enhancing fluid collections noted just anterior to the facet joint   measuring approximately 1.3 x 0.8 x 1.3 cm, series 1200 image 14. This   fluid collection extends to the superior aspect of the facet joint within   the paraspinal soft tissues with an oblong fluid collection measuring 2.0   x 0.7 x 1.3 cm (AP x TR x CC). There are additional enhancing fluid   collections involving the left retroperitoneal soft tissues just   posterior to the psoas musculature which measure approximately 3.0 x 1.0   cm in the axial plane, series 1800 image 41 there is increased signal   noted involving the posterior aspect of the L5 and L4 vertebral bodies   there is epidural phlegmon noted overlying the L5 and S1 vertebral bodies   compression of the descending nerve roots at the level of L5.    There is a rim-enhancing collection within the right psoas musculature   measuring 1.1 x 0.9 cm in axial plane though incompletely evaluated on   sagittal imaging..    There is chronic wedge compression deformity of the L1 vertebral body   with approximately 20% height loss.    There are Modic type II degenerative changes at L2-3.    CONUS/CAUDA EQUINA: Unremarkable terminating at the superior endplate of   L1    Evaluation of individual disc levels is slightly limited due to motion    At L4-L5 there is disc bulging, posterior ligamentous hypertrophy and   bilateral facet arthrosis contributing to severe spinal stenosis as well   as severe bilateral neuroforaminal narrowing.    At L5-S1 there is disc bulging and ventral epidural phlegmon, posterior   ligamentous hypertrophy and bilateral facet arthrosis contributing to   severe spinal stenosis as well as severe lateral neuroforaminal narrowing.    MISCELLANEOUS:  None.      IMPRESSION:    THORACIC SPINE:  Motion limited examination.    Minimally increased signal and enhancement involving the left T10-T11   facet joint space which represent early facet joint   infection/inflammation however could also be related to degenerative   changes.    LUMBAR SPINE:  Findings consistent with left L5-S1 facetitis with associated multifocal   abscess formation directly adjacent to the left facet joint, within the   left retroperitoneal soft tissues as well as the right psoas musculature.    Ventral epidural phlegmon overlying the L5 vertebral body causing   significant compression of the descending nerve roots.    These findings were discussed with Dr. Plummer at 1/18/2022 at 5:06 PM   by Dr. Ellis with read back confirmation.      TTE 1/18/22   Summary:   1. LV Ejection Fraction by Chester's Method with a biplane EF of 56 %.   2. Normal global left ventricular systolic function.   3. Spectral Doppler shows impaired relaxation pattern of left   ventricular myocardial filling (Grade I diastolic dysfunction).   4. Normal left atrial size.   5. Normal right atrial size.    PAM 1/19/22     Summary:   1. Left ventricular ejection fraction, by visual estimation, is 60 to   65%.   2. Normal global left ventricular systolic function.   3. Normal left atrial size.   4. Normal right atrial size.   5. Trace mitral valve regurgitation.   6. No evidence of any vegetations.                                                  --------------------------------------------------------------    PHYSICAL EXAM:  General: not in acute distress  LUNGS: air entry bilat  HEART: RRR,   ABDOMEN: SNTTP, ND x 4 q's  EXT: Warm, well perfused x 4  NEURO: AxOx3, No FND's noted  SKIN: bilateral wrist full thickness skin breakdown                                            --------------------------------------------------------------

## 2022-01-21 NOTE — PROCEDURE NOTE - NSPOSTPRCRAD_GEN_A_CORE
line in appropriate postion chest radiograph/depth of insertion/fluoroscopy/line adjusted to depth of insertion/line in appropriate postion

## 2022-01-21 NOTE — PROGRESS NOTE ADULT - ASSESSMENT
51-year-old male past medical history of polysubstance abuse who presents status post fall. Has back pain radiating to LLE, CT concerning for osteomyelitis, was diagnosed with MSSA bacteremia, PAM was negative for IE.  Pt was consulted by ID, treated with IV Abx, needs a long term of IV Abx.       A/P  # Sepsis POA/ L5-S1  Vertebral Osteomyelitis with adjacent soft tissue abscess /  MSSA Bacteremia   - sepsis resolved , pt was consulted by ID, recommendations noted   - blood Cx x 2 came back negative , will get  IR for PICC today   - PAM was negative for infectious endocarditis   - ,      - c/w nafcillin 2g q4hrs while in the hospital   - pt needs a 6 weeks of IV Abx , ID follow up to finalize Abx on discharge     #Right forearm foreign body  - 1.2cm linear foreign body overlying right radius  - surgery consult for removal - not recommending removal given difficulty of extraction, and no evidence of infection    #Fall   - likely mechanical   - HCT is negative, trauma workup negative   - pt ot rehab   - fall precautions     # Bl open wounds on wrists   - burn appreciated, Wound care - Xeroform/Kelrex BID, no Surgical debridement , pt needs a quick follow up before discharge       # IVDU/ suspected ETOH use /suspected thiamine folate deficiency   - active heroin use, last use 2 weeks ago  - reports taking 135mg methadone per day, dose confirmed with Juan Ramon Flores RN at methadone clinic, last received 3 day supply on 1/14,  - addiction medicine consulted  - continue thiamine and folate supplementals   - pain management appreciated   - methadone 40mg q8hr standing   - tylenol 650mg q8 standing   - gabapentin 300mg q8hr   - cyclobenzaprine 10mg q8hr   - dilaudid 6mg po q4 prn   - ketorolac 15mg q6hr prn     #Mood dx /Anxiety   - continue home xanax 2mg TID, has withdrawal symptoms when not taking them.   - c/w welbutrin 300 qd and Seroquel 300 TID       #Normocytic Anemia   - multifactorial, iron deficiency, anemia of chronic disease,   - continue folate, MV,  - ferrous sulfate 325mg qd     # DVT ppx- LMWH  # GI ppx- PPI  # Activity- AAT  # Diet- Reg  # Code status - full  # Dispo- from home, acute     #Progress Note Handoff  Pending (specify):   IR for PICC line, c/w Abx, pt needs burn and ID  follow up , anticipate discharge in 24 hours   Family discussion: I spoke with pt, he agreed with a plan of care   Disposition: Home___/SNF__x _/Other________/Unknown at this time________

## 2022-01-22 LAB
ALBUMIN SERPL ELPH-MCNC: 3.1 G/DL — LOW (ref 3.5–5.2)
ALP SERPL-CCNC: 103 U/L — SIGNIFICANT CHANGE UP (ref 30–115)
ALT FLD-CCNC: 19 U/L — SIGNIFICANT CHANGE UP (ref 0–41)
ANION GAP SERPL CALC-SCNC: 16 MMOL/L — HIGH (ref 7–14)
AST SERPL-CCNC: 13 U/L — SIGNIFICANT CHANGE UP (ref 0–41)
BASOPHILS # BLD AUTO: 0.02 K/UL — SIGNIFICANT CHANGE UP (ref 0–0.2)
BASOPHILS NFR BLD AUTO: 0.2 % — SIGNIFICANT CHANGE UP (ref 0–1)
BILIRUB SERPL-MCNC: 0.3 MG/DL — SIGNIFICANT CHANGE UP (ref 0.2–1.2)
BUN SERPL-MCNC: 14 MG/DL — SIGNIFICANT CHANGE UP (ref 10–20)
CALCIUM SERPL-MCNC: 8.6 MG/DL — SIGNIFICANT CHANGE UP (ref 8.5–10.1)
CHLORIDE SERPL-SCNC: 100 MMOL/L — SIGNIFICANT CHANGE UP (ref 98–110)
CO2 SERPL-SCNC: 21 MMOL/L — SIGNIFICANT CHANGE UP (ref 17–32)
CREAT SERPL-MCNC: 0.8 MG/DL — SIGNIFICANT CHANGE UP (ref 0.7–1.5)
EOSINOPHIL # BLD AUTO: 0.27 K/UL — SIGNIFICANT CHANGE UP (ref 0–0.7)
EOSINOPHIL NFR BLD AUTO: 2.8 % — SIGNIFICANT CHANGE UP (ref 0–8)
GLUCOSE SERPL-MCNC: 121 MG/DL — HIGH (ref 70–99)
HCT VFR BLD CALC: 32.6 % — LOW (ref 42–52)
HCV RNA FLD QL NAA+PROBE: SIGNIFICANT CHANGE UP
HGB BLD-MCNC: 10.1 G/DL — LOW (ref 14–18)
IMM GRANULOCYTES NFR BLD AUTO: 0.8 % — HIGH (ref 0.1–0.3)
LYMPHOCYTES # BLD AUTO: 1.78 K/UL — SIGNIFICANT CHANGE UP (ref 1.2–3.4)
LYMPHOCYTES # BLD AUTO: 18.4 % — LOW (ref 20.5–51.1)
MAGNESIUM SERPL-MCNC: 2 MG/DL — SIGNIFICANT CHANGE UP (ref 1.8–2.4)
MCHC RBC-ENTMCNC: 25.7 PG — LOW (ref 27–31)
MCHC RBC-ENTMCNC: 31 G/DL — LOW (ref 32–37)
MCV RBC AUTO: 83 FL — SIGNIFICANT CHANGE UP (ref 80–94)
MONOCYTES # BLD AUTO: 0.73 K/UL — HIGH (ref 0.1–0.6)
MONOCYTES NFR BLD AUTO: 7.5 % — SIGNIFICANT CHANGE UP (ref 1.7–9.3)
NEUTROPHILS # BLD AUTO: 6.79 K/UL — HIGH (ref 1.4–6.5)
NEUTROPHILS NFR BLD AUTO: 70.3 % — SIGNIFICANT CHANGE UP (ref 42.2–75.2)
NRBC # BLD: 0 /100 WBCS — SIGNIFICANT CHANGE UP (ref 0–0)
PLATELET # BLD AUTO: 408 K/UL — HIGH (ref 130–400)
POTASSIUM SERPL-MCNC: 4 MMOL/L — SIGNIFICANT CHANGE UP (ref 3.5–5)
POTASSIUM SERPL-SCNC: 4 MMOL/L — SIGNIFICANT CHANGE UP (ref 3.5–5)
PROT SERPL-MCNC: 7.3 G/DL — SIGNIFICANT CHANGE UP (ref 6–8)
RBC # BLD: 3.93 M/UL — LOW (ref 4.7–6.1)
RBC # FLD: 16.5 % — HIGH (ref 11.5–14.5)
SODIUM SERPL-SCNC: 137 MMOL/L — SIGNIFICANT CHANGE UP (ref 135–146)
WBC # BLD: 9.67 K/UL — SIGNIFICANT CHANGE UP (ref 4.8–10.8)
WBC # FLD AUTO: 9.67 K/UL — SIGNIFICANT CHANGE UP (ref 4.8–10.8)

## 2022-01-22 PROCEDURE — 99232 SBSQ HOSP IP/OBS MODERATE 35: CPT

## 2022-01-22 RX ADMIN — GABAPENTIN 300 MILLIGRAM(S): 400 CAPSULE ORAL at 06:22

## 2022-01-22 RX ADMIN — Medication 2 MILLIGRAM(S): at 06:28

## 2022-01-22 RX ADMIN — Medication 2 MILLIGRAM(S): at 21:19

## 2022-01-22 RX ADMIN — METHADONE HYDROCHLORIDE 40 MILLIGRAM(S): 40 TABLET ORAL at 06:28

## 2022-01-22 RX ADMIN — PANTOPRAZOLE SODIUM 40 MILLIGRAM(S): 20 TABLET, DELAYED RELEASE ORAL at 06:22

## 2022-01-22 RX ADMIN — METHADONE HYDROCHLORIDE 40 MILLIGRAM(S): 40 TABLET ORAL at 13:25

## 2022-01-22 RX ADMIN — Medication 1 TABLET(S): at 13:15

## 2022-01-22 RX ADMIN — NAFCILLIN 200 GRAM(S): 10 INJECTION, POWDER, FOR SOLUTION INTRAVENOUS at 06:21

## 2022-01-22 RX ADMIN — Medication 650 MILLIGRAM(S): at 06:22

## 2022-01-22 RX ADMIN — Medication 2 MILLIGRAM(S): at 13:25

## 2022-01-22 RX ADMIN — Medication 650 MILLIGRAM(S): at 13:15

## 2022-01-22 RX ADMIN — BUPROPION HYDROCHLORIDE 300 MILLIGRAM(S): 150 TABLET, EXTENDED RELEASE ORAL at 13:26

## 2022-01-22 RX ADMIN — GABAPENTIN 300 MILLIGRAM(S): 400 CAPSULE ORAL at 21:19

## 2022-01-22 RX ADMIN — NAFCILLIN 200 GRAM(S): 10 INJECTION, POWDER, FOR SOLUTION INTRAVENOUS at 02:00

## 2022-01-22 RX ADMIN — Medication 325 MILLIGRAM(S): at 13:16

## 2022-01-22 RX ADMIN — NAFCILLIN 200 GRAM(S): 10 INJECTION, POWDER, FOR SOLUTION INTRAVENOUS at 09:57

## 2022-01-22 RX ADMIN — Medication 650 MILLIGRAM(S): at 23:06

## 2022-01-22 RX ADMIN — Medication 1 MILLIGRAM(S): at 13:15

## 2022-01-22 RX ADMIN — Medication 650 MILLIGRAM(S): at 07:53

## 2022-01-22 RX ADMIN — CYCLOBENZAPRINE HYDROCHLORIDE 10 MILLIGRAM(S): 10 TABLET, FILM COATED ORAL at 13:17

## 2022-01-22 RX ADMIN — CYCLOBENZAPRINE HYDROCHLORIDE 10 MILLIGRAM(S): 10 TABLET, FILM COATED ORAL at 21:19

## 2022-01-22 RX ADMIN — CYCLOBENZAPRINE HYDROCHLORIDE 10 MILLIGRAM(S): 10 TABLET, FILM COATED ORAL at 06:23

## 2022-01-22 RX ADMIN — ENOXAPARIN SODIUM 40 MILLIGRAM(S): 100 INJECTION SUBCUTANEOUS at 13:16

## 2022-01-22 RX ADMIN — Medication 650 MILLIGRAM(S): at 17:01

## 2022-01-22 RX ADMIN — Medication 650 MILLIGRAM(S): at 14:10

## 2022-01-22 RX ADMIN — QUETIAPINE FUMARATE 200 MILLIGRAM(S): 200 TABLET, FILM COATED ORAL at 21:20

## 2022-01-22 RX ADMIN — NAFCILLIN 200 GRAM(S): 10 INJECTION, POWDER, FOR SOLUTION INTRAVENOUS at 21:18

## 2022-01-22 RX ADMIN — GABAPENTIN 300 MILLIGRAM(S): 400 CAPSULE ORAL at 13:15

## 2022-01-22 RX ADMIN — METHADONE HYDROCHLORIDE 40 MILLIGRAM(S): 40 TABLET ORAL at 21:19

## 2022-01-22 RX ADMIN — MIRTAZAPINE 60 MILLIGRAM(S): 45 TABLET, ORALLY DISINTEGRATING ORAL at 21:19

## 2022-01-22 RX ADMIN — QUETIAPINE FUMARATE 200 MILLIGRAM(S): 200 TABLET, FILM COATED ORAL at 06:23

## 2022-01-22 RX ADMIN — Medication 650 MILLIGRAM(S): at 00:15

## 2022-01-22 RX ADMIN — NAFCILLIN 200 GRAM(S): 10 INJECTION, POWDER, FOR SOLUTION INTRAVENOUS at 13:26

## 2022-01-22 RX ADMIN — QUETIAPINE FUMARATE 200 MILLIGRAM(S): 200 TABLET, FILM COATED ORAL at 13:25

## 2022-01-22 RX ADMIN — NAFCILLIN 200 GRAM(S): 10 INJECTION, POWDER, FOR SOLUTION INTRAVENOUS at 17:00

## 2022-01-22 NOTE — PROGRESS NOTE ADULT - SUBJECTIVE AND OBJECTIVE BOX
51-year-old male past medical history of polysubstance abuse who presents status post fall. Has back pain radiating to LLE, CT concerning for osteomyelitis, was diagnosed with MSSA bacteremia, PAM was negative for IE.  Pt was consulted by ID, treated with IV Abx, repeat blood Cx ( one set ) is negative, second set of blood Cx came back negative, PICC line placed on 1/21  Today pt feels OK, able to ambulate, denies pain.      OBJECTIVE  PAST MEDICAL & SURGICAL HISTORY  IV drug abuse      ALLERGIES:  No Known Allergies      HOME MEDICATIONS  Home Medications:  Remeron 30 mg oral tablet: 2 tab(s) orally once a day (at bedtime) (16 Jan 2022 10:02)  SEROquel 200 mg oral tablet: orally 3 times a day (16 Jan 2022 10:02)  Wellbutrin: 300 milligram(s) orally once a day (16 Jan 2022 10:02)  Xanax: 2 milligram(s) orally 3 times a day (16 Jan 2022 10:02)    VITAL SIGNS: Last 24 Hours  T(C): 36.9 (22 Jan 2022 13:10), Max: 36.9 (22 Jan 2022 13:10)  T(F): 98.5 (22 Jan 2022 13:10), Max: 98.5 (22 Jan 2022 13:10)  HR: 82 (22 Jan 2022 13:10) (81 - 95)  BP: 120/71 (22 Jan 2022 13:10) (120/71 - 138/72)  BP(mean): --  RR: 18 (22 Jan 2022 13:10) (18 - 18)  SpO2: 97% (21 Jan 2022 20:09) (97% - 97%)    PHYSICAL EXAM:  General: not in acute distress, very poor hygiene   LUNGS: CTA b/l   HEART: RRR, +S1,S2,  ABDOMEN: SNTTP, ND x 4 q's  BACK: tenderness to palpation midspinal  lumbar region  EXT: Warm, well perfused x 4, edema noted on right wrist   NEURO: AxOx3, No FND's noted  SKIN: open wound on right wrist, multiple skin abrasions     LABS:                                         10.1   9.67  )-----------( 408      ( 22 Jan 2022 04:30 )             32.6   01-22    137  |  100  |  14  ----------------------------<  121<H>  4.0   |  21  |  0.8    Ca    8.6      22 Jan 2022 04:30  Mg     2.0     01-22    TPro  7.3  /  Alb  3.1<L>  /  TBili  0.3  /  DBili  x   /  AST  13  /  ALT  19  /  AlkPhos  103  01-22         PTT - ( 17 Jan 2022 11:00 )  PTT:TNP sec    Culture - Blood in AM (01.18.22 @ 04:30)   Specimen Source: .Blood Blood   Culture Results:   No growth to date.         Culture - Blood (collected 17 Jan 2022 11:00)  Source: .Blood Blood  Gram Stain (18 Jan 2022 11:15):    Growth in aerobic bottle: Gram Positive Cocci in Clusters    Growth in anaerobic bottle: Gram Positive Cocci in Clusters  Preliminary Report (18 Jan 2022 11:15):    Growth in aerobic bottle: Gram Positive Cocci in Clusters    Growth in anaerobic bottle: Gram Positive Cocci in Clusters    Culture - Blood in AM (01.18.22 @ 04:30)   Specimen Source: .Blood Blood   Culture Results:   No growth to date. Culture - Blood (01.19.22 @ 08:25)   Specimen Source: .Blood None   Culture Results:   No growth to date.     RADIOLOGY:    RADIOLOGY:  ACC: [id number] EXAM:  CT BRAIN                          PROCEDURE DATE:  01/15/2022        IMPRESSION:    No CT evidence for acute intracranial pathology.      ACC: [id number] EXAM:  CT CERVICAL SPINE                          PROCEDURE DATE:  01/15/2022          INTERPRETATION:  CLINICAL STATEMENT: Trauma code    TECHNIQUE:  Contiguous unenhanced CT axial images of the cervical spine   with coronal and sagittal re-formations.    COMPARISON: None available    FINDINGS:    There is straightening of the normal cervical lordosis, which may be on   the basis of pain, muscle spasm, positioning or a combination of the   above.    There is no evidence of acute fracture or facet subluxation.    No significant prevertebral soft tissue swelling.    There is complete loss of disc height with bony fusion at C5-C6. There   are multilevel degenerative changes without definitive severe central   canal or neuroforaminal stenosis.      IMPRESSION:    No acute fracture or subluxation of the cervical spine.      ACC: [id number] EXAM:  CT ABDOMEN AND PELVIS IC                        ACC: [id number] EXAM:  CT CHEST IC                          PROCEDURE DATE:  01/15/2022          INTERPRETATION:  CLINICAL STATEMENT: Trauma code    TECHNIQUE: Contiguous CT images were obtained of the chest, abdomen and   pelvis.    Intravenous Contrast:  Intravenous contrast administered.  100 cc Omnipaque 350 intravenous contrast was administered. 0 cc   discarded.  Oral contrast: was not administered.      COMPARISON:  None    FINDINGS:      BONES AND SOFT TISSUES: No definite acute fractures identified. There is   an osseous destructive process at the left L5-S1 facet joints with   infiltrative changes extending to the adjacent spinal canal and neural   foramen. There is spinal canal stenosis at L4-L5 and L5-S1. Additional   erosive changes are noted at the left T10-T11 facet joints.      IMPRESSION:    Definitive acute traumatic injury is not identified to the chest, abdomen   or pelvis.    There is an osseous destructive process at the left L5-S1 facet joints   with infiltrative changes extending to the adjacent spinal canal and   neural foramina and associated stenosis. Per notes, patient with   polysubstance abuse. Consider osteomyelitis. Underlying mass is not   excluded. Additional erosive changes noted at the left T10-T11 facet   joint. Further evaluation with MRI can be obtained as needed.        ACC: [id number] EXAM:  XR WRIST COMP MIN 3 VIEWS BI                          PROCEDURE DATE:  01/15/2022          INTERPRETATION:  CLINICAL INDICATION:necrotic tissue, bone exposed    TECHNIQUE: 2 views of the right wrist. 2 views of the left wrist. One   image is provided with indeterminate laterality.    COMPARISON: No direct comparison available    FINDINGS:  There is no evidence of acute fracture or dislocation. Severe apparent   complete soft tissue defect at the distal dorsal wrist.    IMPRESSION:  No evidence of acute fracture or dislocation.    Severe apparent complete soft tissue defect at the distal dorsal wrist.    1.2 cm linear radiodensity within the anterior soft tissue of the right   forearm suspicious for foreign body.        ACC: [id number] EXAM:  XR FOREARM  2 VIEWS BI                          PROCEDURE DATE:  01/15/2022          INTERPRETATION:  CLINICAL INDICATION:necrotic tissue, bone exposed    TECHNIQUE: 2 views of the right forearm. 2 views of the left forearm.    COMPARISON: No direct comparison available    FINDINGS:  There is no evidence of acute fracture or dislocation. Severe apparent   complete soft tissue defect at the distal dorsal wrist bilaterally.    IMPRESSION:  No evidence of acute fracture or dislocation.    Severe apparent complete soft tissue defect at the distal dorsal wrist   bilaterally.    1.2 cm linear radiodensity within the anterior soft tissue of the left   forearm suspicious for foreign body.    --- End of Report ---    SOLEDAD HANNON MD; Attending Radiologist  This document has been electronically signed. Jan 16 2022  6:19PM      ACC: [id number] EXAM:  XR HAND 2 VIEWS LT                          PROCEDURE DATE:  01/16/2022          INTERPRETATION:  CLINICAL INDICATION:trauma    TECHNIQUE: 3 views of the left hand.    COMPARISON: No direct comparison available    FINDINGS:  There is no evidence of acute fracture or dislocation of the left hand.    IMPRESSION:  No evidence of acute fracture or dislocation of the left hand.        ACC: [id number] EXAM:  XR FOOT 2 VIEWS LT                          PROCEDURE DATE:  01/16/2022          INTERPRETATION:  CLINICAL HISTORY / REASON FOR EXAM: Pain    COMPARISON: None    TECHNIQUE: Three views, left foot radiographs    FINDINGS /  IMPRESSION:    No acute displaced fracture. Moderate midfoot degenerative changes.   Achilles tendon insertional enthesophyte measuring 1.1 cm.      ACC: [id number] EXAM:  XR FOREARM  2 VIEWS BI                          IMPRESSION:    No acute displaced fracture.    1.2 cm linear foreign body within the soft tissues overlying the right   radius.    Previously seen left forearm foreign body is no longer visualized.      ACC: [id number] EXAM:  MR SPINE LUMBAR WAW IC                        ACC: [id number] EXAM:  MR SPINE THORACIC WAW IC                          PROCEDURE DATE:  01/18/2022          INTERPRETATION:  CLINICAL INDICATION: Osteomyelitis.    TECHNIQUE: Pre and postcontrast MRI of the thoracic and lumbar spine was   performed.    Sagittal T1, T2, and STIR, and axial T2 and T1 sequences were obtained of   the thoracic and lumbar spine. 10 cc Gadavist was administered. 0 cc was   discarded.    COMPARISON: Correlation with CT of the abdomen and pelvis dated   1/15/2022..    FINDINGS:    THORACIC SPINE:  VERTEBRAL BODIES/ALIGNMENT: There is increased signal and enhancement   noted involving the left T10-T11 facet joint space without erosive   changes identified.    There is increased STIR signal involving the anterior aspects of the T11   and T12 vertebral bodies at likely related to degenerative change. The    SPINAL CORD: There is no abnormal spinal cord signal or enhancement..    SPINAL CANAL:  No intradural or extradural defects are seen.    INTERVERTEBRAL DISCS:  The disc spaces are well-maintained.    There is trace disc bulging at T9-T10 as well as mild facet arthrosis   without spinal canal or neuroforaminal narrowing.    There is trace disc bulging at T10-T11 indenting the ventral thecal sac   as well as bilateral facet arthrosis contributing to moderate left   neuroforaminal narrowing.    At T11-T12 there is trace disc bulging as well as bilateral facet   arthrosis resulting in mildleft neuroforaminal narrowing. There is no   spinal canal stenosis.    MISCELLANEOUS:  None.      LUMBAR SPINE:  VERTEBRAL BODIES/ALIGNMENT AND SOFT TISSUES:  If clinically abnormal   signal or enhancement which is centered within the left L5-S1 facet   joint. There is osseous erosion involving the facet joint as well as   surrounding increased signal and associated enhancement. There   rim-enhancing fluid collections noted just anterior to the facet joint   measuring approximately 1.3 x 0.8 x 1.3 cm, series 1200 image 14. This   fluid collection extends to the superior aspect of the facet joint within   the paraspinal soft tissues with an oblong fluid collection measuring 2.0   x 0.7 x 1.3 cm (AP x TR x CC). There are additional enhancing fluid   collections involving the left retroperitoneal soft tissues just   posterior to the psoas musculature which measure approximately 3.0 x 1.0   cm in the axial plane, series 1800 image 41 there is increased signal   noted involving the posterior aspect of the L5 and L4 vertebral bodies   there is epidural phlegmon noted overlying the L5 and S1 vertebral bodies   compression of the descending nerve roots at the level of L5.    There is a rim-enhancing collection within the right psoas musculature   measuring 1.1 x 0.9 cm in axial plane though incompletely evaluated on   sagittal imaging..    There is chronic wedge compression deformity of the L1 vertebral body   with approximately 20% height loss.    There are Modic type II degenerative changes at L2-3.    CONUS/CAUDA EQUINA: Unremarkable terminating at the superior endplate of   L1    Evaluation of individual disc levels is slightly limited due to motion    At L4-L5 there is disc bulging, posterior ligamentous hypertrophy and   bilateral facet arthrosis contributing to severe spinal stenosis as well   as severe bilateral neuroforaminal narrowing.    At L5-S1 there is disc bulging and ventral epidural phlegmon, posterior   ligamentous hypertrophy and bilateral facet arthrosis contributing to   severe spinal stenosis as well as severe lateral neuroforaminal narrowing.    MISCELLANEOUS:  None.      IMPRESSION:    THORACIC SPINE:  Motion limited examination.    Minimally increased signal and enhancement involving the left T10-T11   facet joint space which represent early facet joint   infection/inflammation however could also be related to degenerative   changes.    LUMBAR SPINE:  Findings consistent with left L5-S1 facetitis with associated multifocal   abscess formation directly adjacent to the left facet joint, within the   left retroperitoneal soft tissues as well as the right psoas musculature.    Ventral epidural phlegmon overlying the L5 vertebral body causing   significant compression of the descending nerve roots.    These findings were discussed with Dr. Plummer at 1/18/2022 at 5:06 PM   by Dr. Ellis with read back confirmation.       < from: Transesophageal Echocardiogram (01.19.22 @ 15:03) >  Summary:   1. Left ventricular ejection fraction, by visual estimation, is 60 to   65%.   2. Normal global left ventricular systolic function.   3. Normal left atrial size.   4. Normal right atrial size.   5. Trace mitral valve regurgitation.   6. No evidence of any vegetations.    MEDICATIONS  (STANDING):  acetaminophen     Tablet .. 650 milliGRAM(s) Oral every 6 hours  ALPRAZolam 2 milliGRAM(s) Oral three times a day  buPROPion XL (24-Hour) . 300 milliGRAM(s) Oral daily  cyclobenzaprine 10 milliGRAM(s) Oral every 8 hours  enoxaparin Injectable 40 milliGRAM(s) SubCutaneous daily  ferrous    sulfate 325 milliGRAM(s) Oral daily  folic acid 1 milliGRAM(s) Oral daily  gabapentin 300 milliGRAM(s) Oral three times a day  influenza   Vaccine 0.5 milliLiter(s) IntraMuscular once  methadone    Tablet 40 milliGRAM(s) Oral every 8 hours  mirtazapine 60 milliGRAM(s) Oral at bedtime  multivitamin 1 Tablet(s) Oral daily  nafcillin  IVPB 2 Gram(s) IV Intermittent every 4 hours  nafcillin  IVPB      pantoprazole    Tablet 40 milliGRAM(s) Oral before breakfast  polyethylene glycol 3350 17 Gram(s) Oral two times a day  QUEtiapine 200 milliGRAM(s) Oral three times a day  senna 2 Tablet(s) Oral at bedtime    MEDICATIONS  (PRN):  aluminum hydroxide/magnesium hydroxide/simethicone Suspension 30 milliLiter(s) Oral every 4 hours PRN Dyspepsia  HYDROmorphone   Tablet 6 milliGRAM(s) Oral every 4 hours PRN Severe Pain (7 - 10)  LORazepam     Tablet 1 milliGRAM(s) Oral every 2 hours PRN CIWA-Ar score increase by 2 points and a total score of 7 or less  melatonin 3 milliGRAM(s) Oral at bedtime PRN Insomnia  ondansetron Injectable 4 milliGRAM(s) IV Push every 8 hours PRN Nausea and/or Vomiting

## 2022-01-22 NOTE — PROGRESS NOTE ADULT - ASSESSMENT
51-year-old male past medical history of polysubstance abuse who presents status post fall. Has back pain radiating to LLE, CT concerning for osteomyelitis, was diagnosed with MSSA bacteremia, PAM was negative for IE.  Pt was consulted by ID, treated with IV Abx, needs a long term of IV Abx.       A/P  # Sepsis POA/ L5-S1  Vertebral Osteomyelitis with adjacent soft tissue abscess /  MSSA Bacteremia   - sepsis resolved , pt was consulted by ID, recommendations noted, will change Abx to Cefazolin on discharge   - blood Cx x 2 came back negative ,  PICC placed on 1/21  - PAM was negative for infectious endocarditis   - ,      - c/w nafcillin 2g q4hrs while in the hospital       #Right forearm foreign body  - 1.2cm linear foreign body overlying right radius  - surgery consult for removal - not recommending removal given difficulty of extraction, and no evidence of infection    #Fall   - likely mechanical   - HCT is negative, trauma workup negative   - pt ot rehab   - fall precautions     # Bl open wounds on wrists   - burn appreciated, Wound care - Xeroform/Kelrex BID, no Surgical debridement , pt needs a quick follow up before discharge       # IVDU/ suspected ETOH use /suspected thiamine folate deficiency   - active heroin use, last use 2 weeks ago  - reports taking 135mg methadone per day, dose confirmed with Juan Ramon Flores RN at methadone clinic, last received 3 day supply on 1/14,  - addiction medicine consulted  - continue thiamine and folate supplementals   - pain management appreciated   - methadone 40mg q8hr standing   - tylenol 650mg q8 standing   - gabapentin 300mg q8hr   - cyclobenzaprine 10mg q8hr   - dilaudid 6mg po q4 prn   - ketorolac 15mg q6hr prn     #Mood dx /Anxiety   - continue home xanax 2mg TID, has withdrawal symptoms when not taking them.   - c/w welbutrin 300 qd and Seroquel 300 TID       #Normocytic Anemia   - multifactorial, iron deficiency, anemia of chronic disease,   - continue folate, MV,  - ferrous sulfate 325mg qd     # DVT ppx- LMWH  # GI ppx- PPI  # Activity- AAT  # Diet- Reg  # Code status - full  # Dispo- from home, acute     #Progress Note Handoff  Pending (specify):  approval for Methadone for the NH  , anticipate discharge on Monday   Family discussion: I spoke with pt, he agreed with a plan of care   Disposition: Home___/SNF__x _/Other________/Unknown at this time________

## 2022-01-23 LAB
ALBUMIN SERPL ELPH-MCNC: 3.1 G/DL — LOW (ref 3.5–5.2)
ALP SERPL-CCNC: 108 U/L — SIGNIFICANT CHANGE UP (ref 30–115)
ALT FLD-CCNC: 19 U/L — SIGNIFICANT CHANGE UP (ref 0–41)
ANION GAP SERPL CALC-SCNC: 17 MMOL/L — HIGH (ref 7–14)
AST SERPL-CCNC: 14 U/L — SIGNIFICANT CHANGE UP (ref 0–41)
BASOPHILS # BLD AUTO: 0.03 K/UL — SIGNIFICANT CHANGE UP (ref 0–0.2)
BASOPHILS NFR BLD AUTO: 0.4 % — SIGNIFICANT CHANGE UP (ref 0–1)
BILIRUB SERPL-MCNC: 0.2 MG/DL — SIGNIFICANT CHANGE UP (ref 0.2–1.2)
BUN SERPL-MCNC: 14 MG/DL — SIGNIFICANT CHANGE UP (ref 10–20)
CALCIUM SERPL-MCNC: 8.8 MG/DL — SIGNIFICANT CHANGE UP (ref 8.5–10.1)
CHLORIDE SERPL-SCNC: 102 MMOL/L — SIGNIFICANT CHANGE UP (ref 98–110)
CO2 SERPL-SCNC: 19 MMOL/L — SIGNIFICANT CHANGE UP (ref 17–32)
CREAT SERPL-MCNC: 1 MG/DL — SIGNIFICANT CHANGE UP (ref 0.7–1.5)
CULTURE RESULTS: SIGNIFICANT CHANGE UP
EOSINOPHIL # BLD AUTO: 0.3 K/UL — SIGNIFICANT CHANGE UP (ref 0–0.7)
EOSINOPHIL NFR BLD AUTO: 4.5 % — SIGNIFICANT CHANGE UP (ref 0–8)
GLUCOSE SERPL-MCNC: 169 MG/DL — HIGH (ref 70–99)
HCT VFR BLD CALC: 35.1 % — LOW (ref 42–52)
HGB BLD-MCNC: 10.6 G/DL — LOW (ref 14–18)
IMM GRANULOCYTES NFR BLD AUTO: 0.7 % — HIGH (ref 0.1–0.3)
LYMPHOCYTES # BLD AUTO: 1.99 K/UL — SIGNIFICANT CHANGE UP (ref 1.2–3.4)
LYMPHOCYTES # BLD AUTO: 29.7 % — SIGNIFICANT CHANGE UP (ref 20.5–51.1)
MAGNESIUM SERPL-MCNC: 2.2 MG/DL — SIGNIFICANT CHANGE UP (ref 1.8–2.4)
MCHC RBC-ENTMCNC: 25.6 PG — LOW (ref 27–31)
MCHC RBC-ENTMCNC: 30.2 G/DL — LOW (ref 32–37)
MCV RBC AUTO: 84.8 FL — SIGNIFICANT CHANGE UP (ref 80–94)
MONOCYTES # BLD AUTO: 0.48 K/UL — SIGNIFICANT CHANGE UP (ref 0.1–0.6)
MONOCYTES NFR BLD AUTO: 7.2 % — SIGNIFICANT CHANGE UP (ref 1.7–9.3)
NEUTROPHILS # BLD AUTO: 3.84 K/UL — SIGNIFICANT CHANGE UP (ref 1.4–6.5)
NEUTROPHILS NFR BLD AUTO: 57.5 % — SIGNIFICANT CHANGE UP (ref 42.2–75.2)
NRBC # BLD: 0 /100 WBCS — SIGNIFICANT CHANGE UP (ref 0–0)
PLATELET # BLD AUTO: 402 K/UL — HIGH (ref 130–400)
POTASSIUM SERPL-MCNC: 4.5 MMOL/L — SIGNIFICANT CHANGE UP (ref 3.5–5)
POTASSIUM SERPL-SCNC: 4.5 MMOL/L — SIGNIFICANT CHANGE UP (ref 3.5–5)
PROT SERPL-MCNC: 7.7 G/DL — SIGNIFICANT CHANGE UP (ref 6–8)
RBC # BLD: 4.14 M/UL — LOW (ref 4.7–6.1)
RBC # FLD: 16.9 % — HIGH (ref 11.5–14.5)
SODIUM SERPL-SCNC: 138 MMOL/L — SIGNIFICANT CHANGE UP (ref 135–146)
SPECIMEN SOURCE: SIGNIFICANT CHANGE UP
WBC # BLD: 6.69 K/UL — SIGNIFICANT CHANGE UP (ref 4.8–10.8)
WBC # FLD AUTO: 6.69 K/UL — SIGNIFICANT CHANGE UP (ref 4.8–10.8)

## 2022-01-23 PROCEDURE — 99232 SBSQ HOSP IP/OBS MODERATE 35: CPT

## 2022-01-23 RX ORDER — NICOTINE POLACRILEX 2 MG
1 GUM BUCCAL DAILY
Refills: 0 | Status: DISCONTINUED | OUTPATIENT
Start: 2022-01-23 | End: 2022-02-02

## 2022-01-23 RX ADMIN — PANTOPRAZOLE SODIUM 40 MILLIGRAM(S): 20 TABLET, DELAYED RELEASE ORAL at 05:53

## 2022-01-23 RX ADMIN — QUETIAPINE FUMARATE 200 MILLIGRAM(S): 200 TABLET, FILM COATED ORAL at 21:32

## 2022-01-23 RX ADMIN — NAFCILLIN 200 GRAM(S): 10 INJECTION, POWDER, FOR SOLUTION INTRAVENOUS at 21:28

## 2022-01-23 RX ADMIN — Medication 1 TABLET(S): at 11:59

## 2022-01-23 RX ADMIN — Medication 650 MILLIGRAM(S): at 12:39

## 2022-01-23 RX ADMIN — NAFCILLIN 200 GRAM(S): 10 INJECTION, POWDER, FOR SOLUTION INTRAVENOUS at 13:30

## 2022-01-23 RX ADMIN — ENOXAPARIN SODIUM 40 MILLIGRAM(S): 100 INJECTION SUBCUTANEOUS at 12:00

## 2022-01-23 RX ADMIN — GABAPENTIN 300 MILLIGRAM(S): 400 CAPSULE ORAL at 05:40

## 2022-01-23 RX ADMIN — Medication 650 MILLIGRAM(S): at 11:58

## 2022-01-23 RX ADMIN — Medication 650 MILLIGRAM(S): at 05:40

## 2022-01-23 RX ADMIN — NAFCILLIN 200 GRAM(S): 10 INJECTION, POWDER, FOR SOLUTION INTRAVENOUS at 11:57

## 2022-01-23 RX ADMIN — GABAPENTIN 300 MILLIGRAM(S): 400 CAPSULE ORAL at 13:29

## 2022-01-23 RX ADMIN — Medication 325 MILLIGRAM(S): at 11:59

## 2022-01-23 RX ADMIN — MIRTAZAPINE 60 MILLIGRAM(S): 45 TABLET, ORALLY DISINTEGRATING ORAL at 21:31

## 2022-01-23 RX ADMIN — BUPROPION HYDROCHLORIDE 300 MILLIGRAM(S): 150 TABLET, EXTENDED RELEASE ORAL at 11:59

## 2022-01-23 RX ADMIN — CYCLOBENZAPRINE HYDROCHLORIDE 10 MILLIGRAM(S): 10 TABLET, FILM COATED ORAL at 05:40

## 2022-01-23 RX ADMIN — NAFCILLIN 200 GRAM(S): 10 INJECTION, POWDER, FOR SOLUTION INTRAVENOUS at 17:19

## 2022-01-23 RX ADMIN — Medication 650 MILLIGRAM(S): at 17:19

## 2022-01-23 RX ADMIN — NAFCILLIN 200 GRAM(S): 10 INJECTION, POWDER, FOR SOLUTION INTRAVENOUS at 02:26

## 2022-01-23 RX ADMIN — CYCLOBENZAPRINE HYDROCHLORIDE 10 MILLIGRAM(S): 10 TABLET, FILM COATED ORAL at 13:30

## 2022-01-23 RX ADMIN — METHADONE HYDROCHLORIDE 40 MILLIGRAM(S): 40 TABLET ORAL at 05:51

## 2022-01-23 RX ADMIN — METHADONE HYDROCHLORIDE 40 MILLIGRAM(S): 40 TABLET ORAL at 13:29

## 2022-01-23 RX ADMIN — Medication 650 MILLIGRAM(S): at 00:10

## 2022-01-23 RX ADMIN — NAFCILLIN 200 GRAM(S): 10 INJECTION, POWDER, FOR SOLUTION INTRAVENOUS at 05:40

## 2022-01-23 RX ADMIN — Medication 2 MILLIGRAM(S): at 21:29

## 2022-01-23 RX ADMIN — Medication 1 MILLIGRAM(S): at 11:59

## 2022-01-23 RX ADMIN — Medication 1 PATCH: at 21:32

## 2022-01-23 RX ADMIN — QUETIAPINE FUMARATE 200 MILLIGRAM(S): 200 TABLET, FILM COATED ORAL at 05:40

## 2022-01-23 RX ADMIN — QUETIAPINE FUMARATE 200 MILLIGRAM(S): 200 TABLET, FILM COATED ORAL at 13:29

## 2022-01-23 RX ADMIN — CYCLOBENZAPRINE HYDROCHLORIDE 10 MILLIGRAM(S): 10 TABLET, FILM COATED ORAL at 21:29

## 2022-01-23 RX ADMIN — Medication 2 MILLIGRAM(S): at 13:30

## 2022-01-23 RX ADMIN — METHADONE HYDROCHLORIDE 40 MILLIGRAM(S): 40 TABLET ORAL at 21:27

## 2022-01-23 RX ADMIN — GABAPENTIN 300 MILLIGRAM(S): 400 CAPSULE ORAL at 21:28

## 2022-01-23 RX ADMIN — Medication 2 MILLIGRAM(S): at 05:51

## 2022-01-23 NOTE — PROGRESS NOTE ADULT - SUBJECTIVE AND OBJECTIVE BOX
51-year-old male past medical history of polysubstance abuse who presents status post fall. Has back pain radiating to LLE, CT concerning for osteomyelitis, was diagnosed with MSSA bacteremia, PAM was negative for IE.  Pt was consulted by ID, treated with IV Abx, repeat blood Cx ( one set ) is negative, second set of blood Cx came back negative, PICC line placed on 1/21  Today pt feels OK, has cravings for smoking, asking for a Nicotine patch.      OBJECTIVE  PAST MEDICAL & SURGICAL HISTORY  IV drug abuse      ALLERGIES:  No Known Allergies      HOME MEDICATIONS  Home Medications:  Remeron 30 mg oral tablet: 2 tab(s) orally once a day (at bedtime) (16 Jan 2022 10:02)  SEROquel 200 mg oral tablet: orally 3 times a day (16 Jan 2022 10:02)  Wellbutrin: 300 milligram(s) orally once a day (16 Jan 2022 10:02)  Xanax: 2 milligram(s) orally 3 times a day (16 Jan 2022 10:02)    VITAL SIGNS: Last 24 Hours  T(C): 36 (23 Jan 2022 05:03), Max: 36.4 (22 Jan 2022 20:43)  T(F): 96.8 (23 Jan 2022 05:03), Max: 97.6 (22 Jan 2022 20:43)  HR: 75 (23 Jan 2022 05:03) (75 - 140)  BP: 133/84 (23 Jan 2022 05:03) (133/84 - 141/86)  BP(mean): --  RR: 19 (23 Jan 2022 05:03) (18 - 19)  SpO2: 96% (22 Jan 2022 22:23) (96% - 96%)    PHYSICAL EXAM:  General: not in acute distress, very poor hygiene   LUNGS: CTA b/l   HEART: RRR, +S1,S2,  ABDOMEN: SNTTP, ND x 4 q's  BACK: tenderness to palpation midspinal  lumbar region  EXT: Warm, well perfused x 4, edema noted on right wrist   NEURO: AxOx3, No FND's noted  SKIN: open wound on right wrist, multiple skin abrasions     LABS:                                   10.6   6.69  )-----------( 402      ( 23 Jan 2022 04:30 )             35.1   01-23    138  |  102  |  14  ----------------------------<  169<H>  4.5   |  19  |  1.0    Ca    8.8      23 Jan 2022 04:30  Mg     2.2     01-23    TPro  7.7  /  Alb  3.1<L>  /  TBili  0.2  /  DBili  x   /  AST  14  /  ALT  19  /  AlkPhos  108  01-23       PTT - ( 17 Jan 2022 11:00 )  PTT:TNP sec    Culture - Blood in AM (01.18.22 @ 04:30)   Specimen Source: .Blood Blood   Culture Results:   No growth to date.         Culture - Blood (collected 17 Jan 2022 11:00)  Source: .Blood Blood  Gram Stain (18 Jan 2022 11:15):    Growth in aerobic bottle: Gram Positive Cocci in Clusters    Growth in anaerobic bottle: Gram Positive Cocci in Clusters  Preliminary Report (18 Jan 2022 11:15):    Growth in aerobic bottle: Gram Positive Cocci in Clusters    Growth in anaerobic bottle: Gram Positive Cocci in Clusters    Culture - Blood in AM (01.18.22 @ 04:30)   Specimen Source: .Blood Blood   Culture Results:   No growth to date. Culture - Blood (01.19.22 @ 08:25)   Specimen Source: .Blood None   Culture Results:   No growth to date.     RADIOLOGY:    RADIOLOGY:  ACC: [id number] EXAM:  CT BRAIN                          PROCEDURE DATE:  01/15/2022        IMPRESSION:    No CT evidence for acute intracranial pathology.      ACC: [id number] EXAM:  CT CERVICAL SPINE                          PROCEDURE DATE:  01/15/2022          INTERPRETATION:  CLINICAL STATEMENT: Trauma code    TECHNIQUE:  Contiguous unenhanced CT axial images of the cervical spine   with coronal and sagittal re-formations.    COMPARISON: None available    FINDINGS:    There is straightening of the normal cervical lordosis, which may be on   the basis of pain, muscle spasm, positioning or a combination of the   above.    There is no evidence of acute fracture or facet subluxation.    No significant prevertebral soft tissue swelling.    There is complete loss of disc height with bony fusion at C5-C6. There   are multilevel degenerative changes without definitive severe central   canal or neuroforaminal stenosis.      IMPRESSION:    No acute fracture or subluxation of the cervical spine.      ACC: [id number] EXAM:  CT ABDOMEN AND PELVIS IC                        ACC: [id number] EXAM:  CT CHEST IC                          PROCEDURE DATE:  01/15/2022          INTERPRETATION:  CLINICAL STATEMENT: Trauma code    TECHNIQUE: Contiguous CT images were obtained of the chest, abdomen and   pelvis.    Intravenous Contrast:  Intravenous contrast administered.  100 cc Omnipaque 350 intravenous contrast was administered. 0 cc   discarded.  Oral contrast: was not administered.      COMPARISON:  None    FINDINGS:      BONES AND SOFT TISSUES: No definite acute fractures identified. There is   an osseous destructive process at the left L5-S1 facet joints with   infiltrative changes extending to the adjacent spinal canal and neural   foramen. There is spinal canal stenosis at L4-L5 and L5-S1. Additional   erosive changes are noted at the left T10-T11 facet joints.      IMPRESSION:    Definitive acute traumatic injury is not identified to the chest, abdomen   or pelvis.    There is an osseous destructive process at the left L5-S1 facet joints   with infiltrative changes extending to the adjacent spinal canal and   neural foramina and associated stenosis. Per notes, patient with   polysubstance abuse. Consider osteomyelitis. Underlying mass is not   excluded. Additional erosive changes noted at the left T10-T11 facet   joint. Further evaluation with MRI can be obtained as needed.        ACC: [id number] EXAM:  XR WRIST COMP MIN 3 VIEWS BI                          PROCEDURE DATE:  01/15/2022          INTERPRETATION:  CLINICAL INDICATION:necrotic tissue, bone exposed    TECHNIQUE: 2 views of the right wrist. 2 views of the left wrist. One   image is provided with indeterminate laterality.    COMPARISON: No direct comparison available    FINDINGS:  There is no evidence of acute fracture or dislocation. Severe apparent   complete soft tissue defect at the distal dorsal wrist.    IMPRESSION:  No evidence of acute fracture or dislocation.    Severe apparent complete soft tissue defect at the distal dorsal wrist.    1.2 cm linear radiodensity within the anterior soft tissue of the right   forearm suspicious for foreign body.        ACC: [id number] EXAM:  XR FOREARM  2 VIEWS BI                          PROCEDURE DATE:  01/15/2022          INTERPRETATION:  CLINICAL INDICATION:necrotic tissue, bone exposed    TECHNIQUE: 2 views of the right forearm. 2 views of the left forearm.    COMPARISON: No direct comparison available    FINDINGS:  There is no evidence of acute fracture or dislocation. Severe apparent   complete soft tissue defect at the distal dorsal wrist bilaterally.    IMPRESSION:  No evidence of acute fracture or dislocation.    Severe apparent complete soft tissue defect at the distal dorsal wrist   bilaterally.    1.2 cm linear radiodensity within the anterior soft tissue of the left   forearm suspicious for foreign body.    --- End of Report ---    SOLEDAD HANNON MD; Attending Radiologist  This document has been electronically signed. Jan 16 2022  6:19PM      ACC: [id number] EXAM:  XR HAND 2 VIEWS LT                          PROCEDURE DATE:  01/16/2022          INTERPRETATION:  CLINICAL INDICATION:trauma    TECHNIQUE: 3 views of the left hand.    COMPARISON: No direct comparison available    FINDINGS:  There is no evidence of acute fracture or dislocation of the left hand.    IMPRESSION:  No evidence of acute fracture or dislocation of the left hand.        ACC: [id number] EXAM:  XR FOOT 2 VIEWS LT                          PROCEDURE DATE:  01/16/2022          INTERPRETATION:  CLINICAL HISTORY / REASON FOR EXAM: Pain    COMPARISON: None    TECHNIQUE: Three views, left foot radiographs    FINDINGS /  IMPRESSION:    No acute displaced fracture. Moderate midfoot degenerative changes.   Achilles tendon insertional enthesophyte measuring 1.1 cm.      ACC: [id number] EXAM:  XR FOREARM  2 VIEWS BI                          IMPRESSION:    No acute displaced fracture.    1.2 cm linear foreign body within the soft tissues overlying the right   radius.    Previously seen left forearm foreign body is no longer visualized.      ACC: [id number] EXAM:  MR SPINE LUMBAR WAW IC                        ACC: [id number] EXAM:  MR SPINE THORACIC WAW IC                          PROCEDURE DATE:  01/18/2022          INTERPRETATION:  CLINICAL INDICATION: Osteomyelitis.    TECHNIQUE: Pre and postcontrast MRI of the thoracic and lumbar spine was   performed.    Sagittal T1, T2, and STIR, and axial T2 and T1 sequences were obtained of   the thoracic and lumbar spine. 10 cc Gadavist was administered. 0 cc was   discarded.    COMPARISON: Correlation with CT of the abdomen and pelvis dated   1/15/2022..    FINDINGS:    THORACIC SPINE:  VERTEBRAL BODIES/ALIGNMENT: There is increased signal and enhancement   noted involving the left T10-T11 facet joint space without erosive   changes identified.    There is increased STIR signal involving the anterior aspects of the T11   and T12 vertebral bodies at likely related to degenerative change. The    SPINAL CORD: There is no abnormal spinal cord signal or enhancement..    SPINAL CANAL:  No intradural or extradural defects are seen.    INTERVERTEBRAL DISCS:  The disc spaces are well-maintained.    There is trace disc bulging at T9-T10 as well as mild facet arthrosis   without spinal canal or neuroforaminal narrowing.    There is trace disc bulging at T10-T11 indenting the ventral thecal sac   as well as bilateral facet arthrosis contributing to moderate left   neuroforaminal narrowing.    At T11-T12 there is trace disc bulging as well as bilateral facet   arthrosis resulting in mildleft neuroforaminal narrowing. There is no   spinal canal stenosis.    MISCELLANEOUS:  None.      LUMBAR SPINE:  VERTEBRAL BODIES/ALIGNMENT AND SOFT TISSUES:  If clinically abnormal   signal or enhancement which is centered within the left L5-S1 facet   joint. There is osseous erosion involving the facet joint as well as   surrounding increased signal and associated enhancement. There   rim-enhancing fluid collections noted just anterior to the facet joint   measuring approximately 1.3 x 0.8 x 1.3 cm, series 1200 image 14. This   fluid collection extends to the superior aspect of the facet joint within   the paraspinal soft tissues with an oblong fluid collection measuring 2.0   x 0.7 x 1.3 cm (AP x TR x CC). There are additional enhancing fluid   collections involving the left retroperitoneal soft tissues just   posterior to the psoas musculature which measure approximately 3.0 x 1.0   cm in the axial plane, series 1800 image 41 there is increased signal   noted involving the posterior aspect of the L5 and L4 vertebral bodies   there is epidural phlegmon noted overlying the L5 and S1 vertebral bodies   compression of the descending nerve roots at the level of L5.    There is a rim-enhancing collection within the right psoas musculature   measuring 1.1 x 0.9 cm in axial plane though incompletely evaluated on   sagittal imaging..    There is chronic wedge compression deformity of the L1 vertebral body   with approximately 20% height loss.    There are Modic type II degenerative changes at L2-3.    CONUS/CAUDA EQUINA: Unremarkable terminating at the superior endplate of   L1    Evaluation of individual disc levels is slightly limited due to motion    At L4-L5 there is disc bulging, posterior ligamentous hypertrophy and   bilateral facet arthrosis contributing to severe spinal stenosis as well   as severe bilateral neuroforaminal narrowing.    At L5-S1 there is disc bulging and ventral epidural phlegmon, posterior   ligamentous hypertrophy and bilateral facet arthrosis contributing to   severe spinal stenosis as well as severe lateral neuroforaminal narrowing.    MISCELLANEOUS:  None.      IMPRESSION:    THORACIC SPINE:  Motion limited examination.    Minimally increased signal and enhancement involving the left T10-T11   facet joint space which represent early facet joint   infection/inflammation however could also be related to degenerative   changes.    LUMBAR SPINE:  Findings consistent with left L5-S1 facetitis with associated multifocal   abscess formation directly adjacent to the left facet joint, within the   left retroperitoneal soft tissues as well as the right psoas musculature.    Ventral epidural phlegmon overlying the L5 vertebral body causing   significant compression of the descending nerve roots.    These findings were discussed with Dr. Plummer at 1/18/2022 at 5:06 PM   by Dr. Ellis with read back confirmation.       < from: Transesophageal Echocardiogram (01.19.22 @ 15:03) >  Summary:   1. Left ventricular ejection fraction, by visual estimation, is 60 to   65%.   2. Normal global left ventricular systolic function.   3. Normal left atrial size.   4. Normal right atrial size.   5. Trace mitral valve regurgitation.   6. No evidence of any vegetations.    MEDICATIONS  (STANDING):  acetaminophen     Tablet .. 650 milliGRAM(s) Oral every 6 hours  ALPRAZolam 2 milliGRAM(s) Oral three times a day  buPROPion XL (24-Hour) . 300 milliGRAM(s) Oral daily  cyclobenzaprine 10 milliGRAM(s) Oral every 8 hours  enoxaparin Injectable 40 milliGRAM(s) SubCutaneous daily  ferrous    sulfate 325 milliGRAM(s) Oral daily  folic acid 1 milliGRAM(s) Oral daily  gabapentin 300 milliGRAM(s) Oral three times a day  influenza   Vaccine 0.5 milliLiter(s) IntraMuscular once  methadone    Tablet 40 milliGRAM(s) Oral every 8 hours  mirtazapine 60 milliGRAM(s) Oral at bedtime  multivitamin 1 Tablet(s) Oral daily  nafcillin  IVPB 2 Gram(s) IV Intermittent every 4 hours  nafcillin  IVPB      pantoprazole    Tablet 40 milliGRAM(s) Oral before breakfast  polyethylene glycol 3350 17 Gram(s) Oral two times a day  QUEtiapine 200 milliGRAM(s) Oral three times a day  senna 2 Tablet(s) Oral at bedtime    MEDICATIONS  (PRN):  aluminum hydroxide/magnesium hydroxide/simethicone Suspension 30 milliLiter(s) Oral every 4 hours PRN Dyspepsia  HYDROmorphone   Tablet 6 milliGRAM(s) Oral every 4 hours PRN Severe Pain (7 - 10)  LORazepam     Tablet 1 milliGRAM(s) Oral every 2 hours PRN CIWA-Ar score increase by 2 points and a total score of 7 or less  melatonin 3 milliGRAM(s) Oral at bedtime PRN Insomnia  ondansetron Injectable 4 milliGRAM(s) IV Push every 8 hours PRN Nausea and/or Vomiting

## 2022-01-23 NOTE — PROGRESS NOTE ADULT - SUBJECTIVE AND OBJECTIVE BOX
MIKAEL MCDERMOTT 51y Male  MRN#: [id number]   CODE STATUS: full code     Hospital Day: 7d    Pt is currently admitted with the primary diagnosis of vertebral osteomyelitis     SUBJECTIVE    no acute events overnight, pt seen and examined, no new complaints                                               ----------------------------------------------------------  OBJECTIVE  PAST MEDICAL & SURGICAL HISTORY  IV drug abuse                                              -----------------------------------------------------------  ALLERGIES:  No Known Allergies                                            ------------------------------------------------------------    HOME MEDICATIONS  Home Medications:  cyclobenzaprine 10 mg oral tablet: 1 tab(s) orally every 8 hours (21 Jan 2022 15:09)  ferrous sulfate 325 mg (65 mg elemental iron) oral tablet: 1 tab(s) orally once a day (21 Jan 2022 15:09)  folic acid 1 mg oral tablet: 1 tab(s) orally once a day (21 Jan 2022 15:09)  gabapentin 300 mg oral capsule: 1 cap(s) orally 3 times a day (21 Jan 2022 15:09)  methadone: 135 milligram(s) orally once a day (21 Jan 2022 15:09)  Multiple Vitamins oral tablet: 1 tab(s) orally once a day (21 Jan 2022 15:09)  Remeron 30 mg oral tablet: 2 tab(s) orally once a day (at bedtime) (16 Jan 2022 10:02)  SEROquel 200 mg oral tablet: orally 3 times a day (16 Jan 2022 10:02)  Wellbutrin: 300 milligram(s) orally once a day (16 Jan 2022 10:02)  Xanax: 2 milligram(s) orally 3 times a day (16 Jan 2022 10:02)                           MEDICATIONS:  STANDING MEDICATIONS  acetaminophen     Tablet .. 650 milliGRAM(s) Oral every 6 hours  ALPRAZolam 2 milliGRAM(s) Oral three times a day  buPROPion XL (24-Hour) . 300 milliGRAM(s) Oral daily  cyclobenzaprine 10 milliGRAM(s) Oral every 8 hours  enoxaparin Injectable 40 milliGRAM(s) SubCutaneous daily  ferrous    sulfate 325 milliGRAM(s) Oral daily  folic acid 1 milliGRAM(s) Oral daily  gabapentin 300 milliGRAM(s) Oral three times a day  influenza   Vaccine 0.5 milliLiter(s) IntraMuscular once  methadone    Tablet 40 milliGRAM(s) Oral every 8 hours  mirtazapine 60 milliGRAM(s) Oral at bedtime  multivitamin 1 Tablet(s) Oral daily  nafcillin  IVPB 2 Gram(s) IV Intermittent every 4 hours  nafcillin  IVPB      pantoprazole    Tablet 40 milliGRAM(s) Oral before breakfast  polyethylene glycol 3350 17 Gram(s) Oral two times a day  QUEtiapine 200 milliGRAM(s) Oral three times a day  senna 2 Tablet(s) Oral at bedtime    PRN MEDICATIONS  aluminum hydroxide/magnesium hydroxide/simethicone Suspension 30 milliLiter(s) Oral every 4 hours PRN  HYDROmorphone   Tablet 6 milliGRAM(s) Oral every 4 hours PRN  LORazepam     Tablet 1 milliGRAM(s) Oral every 2 hours PRN  melatonin 3 milliGRAM(s) Oral at bedtime PRN  ondansetron Injectable 4 milliGRAM(s) IV Push every 8 hours PRN                                            ------------------------------------------------------------  VITAL SIGNS: Last 24 Hours  T(C): 36.4 (22 Jan 2022 20:43), Max: 36.9 (22 Jan 2022 13:10)  T(F): 97.6 (22 Jan 2022 20:43), Max: 98.5 (22 Jan 2022 13:10)  HR: 94 (22 Jan 2022 21:09) (81 - 140)  BP: 141/86 (22 Jan 2022 20:43) (120/71 - 141/86)  BP(mean): --  RR: 18 (22 Jan 2022 20:43) (18 - 18)  SpO2: 96% (22 Jan 2022 22:23) (96% - 96%)      01-21-22 @ 07:01  -  01-22-22 @ 07:00  --------------------------------------------------------  IN: 0 mL / OUT: 1600 mL / NET: -1600 mL    01-22-22 @ 07:01  -  01-23-22 @ 00:01  --------------------------------------------------------  IN: 220 mL / OUT: 0 mL / NET: 220 mL                                             --------------------------------------------------------------  LABS:                        10.1   9.67  )-----------( 408      ( 22 Jan 2022 04:30 )             32.6     01-22    137  |  100  |  14  ----------------------------<  121<H>  4.0   |  21  |  0.8    Ca    8.6      22 Jan 2022 04:30  Mg     2.0     01-22    TPro  7.3  /  Alb  3.1<L>  /  TBili  0.3  /  DBili  x   /  AST  13  /  ALT  19  /  AlkPhos  103  01-22                Culture - Blood (collected 21 Jan 2022 04:30)  Source: .Blood Blood  Preliminary Report (22 Jan 2022 23:01):    No growth to date.    Culture - Blood (collected 21 Jan 2022 04:30)  Source: .Blood Blood  Preliminary Report (22 Jan 2022 23:01):    No growth to date.    Culture - Blood (collected 20 Jan 2022 16:00)  Source: .Blood Blood  Preliminary Report (22 Jan 2022 01:02):    No growth to date.                                                    -------------------------------------------------------------  RADIOLOGY:  ACC: [id number] EXAM:  CT BRAIN                          PROCEDURE DATE:  01/15/2022          INTERPRETATION:  Clinical History / Reason for exam: Trauma code    Technique: Noncontrast head CT.  Contiguous unenhanced CT axial images of   the head from the base to the vertex.    Comparison:  CT head dated 4/2/2010    Findings:    The ventricles and cortical sulci pattern are age-appropriate.    Gray-white matter differentiation is maintained.    There is no acute intracranial hemorrhage, extra-axial fluid collection   or midline shift.    No acute osseous abnormality/depressed skull fracture is identified.    The visualized paranasal sinuses are well aerated.    The mastoids are well-aerated.    IMPRESSION:    No CT evidence for acute intracranial pathology.      ACC: [id number] EXAM:  CT CERVICAL SPINE                          PROCEDURE DATE:  01/15/2022          INTERPRETATION:  CLINICAL STATEMENT: Trauma code    TECHNIQUE:  Contiguous unenhanced CT axial images of the cervical spine   with coronal and sagittal re-formations.    COMPARISON: None available    FINDINGS:    There is straightening of the normal cervical lordosis, which may be on   the basis of pain, muscle spasm, positioning or a combination of the   above.    There is no evidence of acute fracture or facet subluxation.    No significant prevertebral soft tissue swelling.    There is complete loss of disc height with bony fusion at C5-C6. There   are multilevel degenerative changes without definitive severe central   canal or neuroforaminal stenosis.      IMPRESSION:    No acute fracture or subluxation of the cervical spine.      ACC: [id number] EXAM:  CT ABDOMEN AND PELVIS IC                        ACC: [id number] EXAM:  CT CHEST IC                          PROCEDURE DATE:  01/15/2022          INTERPRETATION:  CLINICAL STATEMENT: Trauma code    TECHNIQUE: Contiguous CT images were obtained of the chest, abdomen and   pelvis.    Intravenous Contrast:  Intravenous contrast administered.  100 cc Omnipaque 350 intravenous contrast was administered. 0 cc   discarded.  Oral contrast: was not administered.      COMPARISON:  None    FINDINGS:      BONES AND SOFT TISSUES: No definite acute fractures identified. There is   an osseous destructive process at the left L5-S1 facet joints with   infiltrative changes extending to the adjacent spinal canal and neural   foramen. There is spinal canal stenosis at L4-L5 and L5-S1. Additional   erosive changes are noted at the left T10-T11 facet joints.      IMPRESSION:    Definitive acute traumatic injury is not identified to the chest, abdomen   or pelvis.    There is an osseous destructive process at the left L5-S1 facet joints   with infiltrative changes extending to the adjacent spinal canal and   neural foramina and associated stenosis. Per notes, patient with   polysubstance abuse. Consider osteomyelitis. Underlying mass is not   excluded. Additional erosive changes noted at the left T10-T11 facet   joint. Further evaluation with MRI can be obtained as needed.        ACC: [id number] EXAM:  XR WRIST COMP MIN 3 VIEWS BI                          PROCEDURE DATE:  01/15/2022          INTERPRETATION:  CLINICAL INDICATION:necrotic tissue, bone exposed    TECHNIQUE: 2 views of the right wrist. 2 views of the left wrist. One   image is provided with indeterminate laterality.    COMPARISON: No direct comparison available    FINDINGS:  There is no evidence of acute fracture or dislocation. Severe apparent   complete soft tissue defect at the distal dorsal wrist.    IMPRESSION:  No evidence of acute fracture or dislocation.    Severe apparent complete soft tissue defect at the distal dorsal wrist.    1.2 cm linear radiodensity within the anterior soft tissue of the right   forearm suspicious for foreign body.        ACC: [id number] EXAM:  XR FOREARM  2 VIEWS BI                          PROCEDURE DATE:  01/15/2022          INTERPRETATION:  CLINICAL INDICATION:necrotic tissue, bone exposed    TECHNIQUE: 2 views of the right forearm. 2 views of the left forearm.    COMPARISON: No direct comparison available    FINDINGS:  There is no evidence of acute fracture or dislocation. Severe apparent   complete soft tissue defect at the distal dorsal wrist bilaterally.    IMPRESSION:  No evidence of acute fracture or dislocation.    Severe apparent complete soft tissue defect at the distal dorsal wrist   bilaterally.    1.2 cm linear radiodensity within the anterior soft tissue of the left   forearm suspicious for foreign body.    --- End of Report ---    SOLEDAD HANNON MD; Attending Radiologist  This document has been electronically signed. Jan 16 2022  6:19PM      ACC: [id number] EXAM:  XR HAND 2 VIEWS                           PROCEDURE DATE:  01/16/2022          INTERPRETATION:  CLINICAL INDICATION:trauma    TECHNIQUE: 3 views of the left hand.    COMPARISON: No direct comparison available    FINDINGS:  There is no evidence of acute fracture or dislocation of the left hand.    IMPRESSION:  No evidence of acute fracture or dislocation of the left hand.        ACC: [id number] EXAM:  XR FOOT 2 VIEWS LT                          PROCEDURE DATE:  01/16/2022          INTERPRETATION:  CLINICAL HISTORY / REASON FOR EXAM: Pain    COMPARISON: None    TECHNIQUE: Three views, left foot radiographs    FINDINGS /  IMPRESSION:    No acute displaced fracture. Moderate midfoot degenerative changes.   Achilles tendon insertional enthesophyte measuring 1.1 cm.      ACC: [id number] EXAM:  XR FOREARM  2 VIEWS BI                          IMPRESSION:    No acute displaced fracture.    1.2 cm linear foreign body within the soft tissues overlying the right   radius.    Previously seen left forearm foreign body is no longer visualized.      ACC: [id number] EXAM:  MR SPINE LUMBAR WAW IC                        ACC: [id number] EXAM:  MR SPINE THORACIC WAW IC                          PROCEDURE DATE:  01/18/2022          INTERPRETATION:  CLINICAL INDICATION: Osteomyelitis.    TECHNIQUE: Pre and postcontrast MRI of the thoracic and lumbar spine was   performed.    Sagittal T1, T2, and STIR, and axial T2 and T1 sequences were obtained of   the thoracic and lumbar spine. 10 cc Gadavist was administered. 0 cc was   discarded.    COMPARISON: Correlation with CT of the abdomen and pelvis dated   1/15/2022..    FINDINGS:    THORACIC SPINE:  VERTEBRAL BODIES/ALIGNMENT: There is increased signal and enhancement   noted involving the left T10-T11 facet joint space without erosive   changes identified.    There is increased STIR signal involving the anterior aspects of the T11   and T12 vertebral bodies at likely related to degenerative change. The    SPINAL CORD: There is no abnormal spinal cord signal or enhancement..    SPINAL CANAL:  No intradural or extradural defects are seen.    INTERVERTEBRAL DISCS:  The disc spaces are well-maintained.    There is trace disc bulging at T9-T10 as well as mild facet arthrosis   without spinal canal or neuroforaminal narrowing.    There is trace disc bulging at T10-T11 indenting the ventral thecal sac   as well as bilateral facet arthrosis contributing to moderate left   neuroforaminal narrowing.    At T11-T12 there is trace disc bulging as well as bilateral facet   arthrosis resulting in mildleft neuroforaminal narrowing. There is no   spinal canal stenosis.    MISCELLANEOUS:  None.      LUMBAR SPINE:  VERTEBRAL BODIES/ALIGNMENT AND SOFT TISSUES:  If clinically abnormal   signal or enhancement which is centered within the left L5-S1 facet   joint. There is osseous erosion involving the facet joint as well as   surrounding increased signal and associated enhancement. There   rim-enhancing fluid collections noted just anterior to the facet joint   measuring approximately 1.3 x 0.8 x 1.3 cm, series 1200 image 14. This   fluid collection extends to the superior aspect of the facet joint within   the paraspinal soft tissues with an oblong fluid collection measuring 2.0   x 0.7 x 1.3 cm (AP x TR x CC). There are additional enhancing fluid   collections involving the left retroperitoneal soft tissues just   posterior to the psoas musculature which measure approximately 3.0 x 1.0   cm in the axial plane, series 1800 image 41 there is increased signal   noted involving the posterior aspect of the L5 and L4 vertebral bodies   there is epidural phlegmon noted overlying the L5 and S1 vertebral bodies   compression of the descending nerve roots at the level of L5.    There is a rim-enhancing collection within the right psoas musculature   measuring 1.1 x 0.9 cm in axial plane though incompletely evaluated on   sagittal imaging..    There is chronic wedge compression deformity of the L1 vertebral body   with approximately 20% height loss.    There are Modic type II degenerative changes at L2-3.    CONUS/CAUDA EQUINA: Unremarkable terminating at the superior endplate of   L1    Evaluation of individual disc levels is slightly limited due to motion    At L4-L5 there is disc bulging, posterior ligamentous hypertrophy and   bilateral facet arthrosis contributing to severe spinal stenosis as well   as severe bilateral neuroforaminal narrowing.    At L5-S1 there is disc bulging and ventral epidural phlegmon, posterior   ligamentous hypertrophy and bilateral facet arthrosis contributing to   severe spinal stenosis as well as severe lateral neuroforaminal narrowing.    MISCELLANEOUS:  None.      IMPRESSION:    THORACIC SPINE:  Motion limited examination.    Minimally increased signal and enhancement involving the left T10-T11   facet joint space which represent early facet joint   infection/inflammation however could also be related to degenerative   changes.    LUMBAR SPINE:  Findings consistent with left L5-S1 facetitis with associated multifocal   abscess formation directly adjacent to the left facet joint, within the   left retroperitoneal soft tissues as well as the right psoas musculature.    Ventral epidural phlegmon overlying the L5 vertebral body causing   significant compression of the descending nerve roots.    These findings were discussed with Dr. Plummer at 1/18/2022 at 5:06 PM   by Dr. Ellis with read back confirmation.      TTE 1/18/22   Summary:   1. LV Ejection Fraction by Chester's Method with a biplane EF of 56 %.   2. Normal global left ventricular systolic function.   3. Spectral Doppler shows impaired relaxation pattern of left   ventricular myocardial filling (Grade I diastolic dysfunction).   4. Normal left atrial size.   5. Normal right atrial size.    PAM 1/19/22     Summary:   1. Left ventricular ejection fraction, by visual estimation, is 60 to   65%.   2. Normal global left ventricular systolic function.   3. Normal left atrial size.   4. Normal right atrial size.   5. Trace mitral valve regurgitation.   6. No evidence of any vegetations.                                                --------------------------------------------------------------    PHYSICAL EXAM:  General: not in acute distress  LUNGS: air entry bilat  HEART: RRR, +S1,S2,  ABDOMEN: SNTTP, ND x 4 q's  EXT: Warm, well perfused x 4  NEURO: AxOx3, No FND's noted  SKIN: bilateral wrist skin breakdown in bandages                                            --------------------------------------------------------------

## 2022-01-23 NOTE — PROGRESS NOTE ADULT - ASSESSMENT
51-year-old male past medical history of polysubstance abuse who presents status post fall. Has back pain radiating to LLE, CT concerning for osteomyelitis     # Sepsis POA  # Vertebral Osteomyelitis with adjacent soft tissue abscess   # MSSA Bacteremia   - on admission HR 91, wbc 16.6k  - ,      - Blood culture 1/15 + GPC in clusters, 1/17 + GPC in clusters, cultures negative since 1/18.   - CT AP - osseous destructive process at the left L5-S1 facet joints with infiltrative changes extending to the adjacent spinal canal and neural foramina and associated stenosis. Consider osteomyelitis.   - MRI - findings consistent with L5 S1 facetitis, multifocal abscess within left retroperitoneal soft tissues and right psoas   - ID appreciated   - nafcillin 2g q4hrs, cefazolin 2g q8 for dc 6 weeks from 1/18. outpt ID fu.   - PAM without evidence of endocarditis,    - IR appreciated, abscesses not amenable to drainage    #Right forearm foreign body  - 1.2cm linear foreign body overlying right radius  - surgery consult for removal - not recommending removal given difficulty of extraction, and no evidence of infection,   - not a contraindication for MRI     #Fall   - likely mechanical   - cth negative, trauma workup negative   - pt ot rehab     # Bl open wounds on wrist   - burn appreciated, Wound care - Xeroform/Kelrex BID, no Surgical debridement     # IVDU  # suspected ETOH use   # suspected thiamine folate deficiency   - active heroin use, last use 2 weeks ago  - reports taking 135mg methadone per day, dose confirmed with Juan Ramon Flores RN at methadone clinic, last received 3 day supply on 1/14,  - addiction medicine consulted  - continue thiamine folate supplementation   - pain management recs appreciated   - methadone 40mg q8hr standing   - tylenol 650mg q8 standing   - gabapentin 300mg q8hr   - cyclobenzaprine 10mg q8hr   - dilaudid 6mg po q4 prn   - ketorolac 15mg q6hr prn     #Mood dx   #Anxiety   - continue home xanax 2mg TID, has withdrawal symptoms when not taking them.   - c/w welbutrin 300 qd and Seroquel 300 TID   - f/u drug screen     #Normocytic Anemia   - multifactorial, iron deficiency, anemia of chronic disease,   - continue folate, MV,  - ferrous sulfate 325mg qd     # DVT ppx- LMWH  # GI ppx- PPI  # Activity- AAT  # Diet- Reg  # Code status - full  # Dispo- stable for dc to NH.     Pending - placement.

## 2022-01-24 LAB
ALBUMIN SERPL ELPH-MCNC: 3.4 G/DL — LOW (ref 3.5–5.2)
ALP SERPL-CCNC: 103 U/L — SIGNIFICANT CHANGE UP (ref 30–115)
ALT FLD-CCNC: 16 U/L — SIGNIFICANT CHANGE UP (ref 0–41)
ANION GAP SERPL CALC-SCNC: 17 MMOL/L — HIGH (ref 7–14)
AST SERPL-CCNC: 11 U/L — SIGNIFICANT CHANGE UP (ref 0–41)
BASOPHILS # BLD AUTO: 0.04 K/UL — SIGNIFICANT CHANGE UP (ref 0–0.2)
BASOPHILS NFR BLD AUTO: 0.5 % — SIGNIFICANT CHANGE UP (ref 0–1)
BILIRUB SERPL-MCNC: 0.3 MG/DL — SIGNIFICANT CHANGE UP (ref 0.2–1.2)
BUN SERPL-MCNC: 15 MG/DL — SIGNIFICANT CHANGE UP (ref 10–20)
CALCIUM SERPL-MCNC: 8.7 MG/DL — SIGNIFICANT CHANGE UP (ref 8.5–10.1)
CHLORIDE SERPL-SCNC: 99 MMOL/L — SIGNIFICANT CHANGE UP (ref 98–110)
CO2 SERPL-SCNC: 21 MMOL/L — SIGNIFICANT CHANGE UP (ref 17–32)
CREAT SERPL-MCNC: 1 MG/DL — SIGNIFICANT CHANGE UP (ref 0.7–1.5)
CULTURE RESULTS: SIGNIFICANT CHANGE UP
EOSINOPHIL # BLD AUTO: 0.39 K/UL — SIGNIFICANT CHANGE UP (ref 0–0.7)
EOSINOPHIL NFR BLD AUTO: 4.6 % — SIGNIFICANT CHANGE UP (ref 0–8)
GLUCOSE SERPL-MCNC: 149 MG/DL — HIGH (ref 70–99)
HCT VFR BLD CALC: 34.5 % — LOW (ref 42–52)
HGB BLD-MCNC: 10.7 G/DL — LOW (ref 14–18)
IMM GRANULOCYTES NFR BLD AUTO: 0.8 % — HIGH (ref 0.1–0.3)
LYMPHOCYTES # BLD AUTO: 1.78 K/UL — SIGNIFICANT CHANGE UP (ref 1.2–3.4)
LYMPHOCYTES # BLD AUTO: 21 % — SIGNIFICANT CHANGE UP (ref 20.5–51.1)
MAGNESIUM SERPL-MCNC: 2.1 MG/DL — SIGNIFICANT CHANGE UP (ref 1.8–2.4)
MCHC RBC-ENTMCNC: 25.6 PG — LOW (ref 27–31)
MCHC RBC-ENTMCNC: 31 G/DL — LOW (ref 32–37)
MCV RBC AUTO: 82.5 FL — SIGNIFICANT CHANGE UP (ref 80–94)
MONOCYTES # BLD AUTO: 0.66 K/UL — HIGH (ref 0.1–0.6)
MONOCYTES NFR BLD AUTO: 7.8 % — SIGNIFICANT CHANGE UP (ref 1.7–9.3)
NEUTROPHILS # BLD AUTO: 5.55 K/UL — SIGNIFICANT CHANGE UP (ref 1.4–6.5)
NEUTROPHILS NFR BLD AUTO: 65.3 % — SIGNIFICANT CHANGE UP (ref 42.2–75.2)
NRBC # BLD: 0 /100 WBCS — SIGNIFICANT CHANGE UP (ref 0–0)
PLATELET # BLD AUTO: 449 K/UL — HIGH (ref 130–400)
POTASSIUM SERPL-MCNC: 4.4 MMOL/L — SIGNIFICANT CHANGE UP (ref 3.5–5)
POTASSIUM SERPL-SCNC: 4.4 MMOL/L — SIGNIFICANT CHANGE UP (ref 3.5–5)
PROT SERPL-MCNC: 8 G/DL — SIGNIFICANT CHANGE UP (ref 6–8)
RBC # BLD: 4.18 M/UL — LOW (ref 4.7–6.1)
RBC # FLD: 16.8 % — HIGH (ref 11.5–14.5)
SODIUM SERPL-SCNC: 137 MMOL/L — SIGNIFICANT CHANGE UP (ref 135–146)
SPECIMEN SOURCE: SIGNIFICANT CHANGE UP
WBC # BLD: 8.49 K/UL — SIGNIFICANT CHANGE UP (ref 4.8–10.8)
WBC # FLD AUTO: 8.49 K/UL — SIGNIFICANT CHANGE UP (ref 4.8–10.8)

## 2022-01-24 PROCEDURE — 99231 SBSQ HOSP IP/OBS SF/LOW 25: CPT

## 2022-01-24 RX ORDER — ALPRAZOLAM 0.25 MG
2 TABLET ORAL THREE TIMES A DAY
Refills: 0 | Status: DISCONTINUED | OUTPATIENT
Start: 2022-01-24 | End: 2022-01-31

## 2022-01-24 RX ORDER — CEFAZOLIN SODIUM 1 G
2000 VIAL (EA) INJECTION EVERY 8 HOURS
Refills: 0 | Status: DISCONTINUED | OUTPATIENT
Start: 2022-01-24 | End: 2022-02-02

## 2022-01-24 RX ADMIN — GABAPENTIN 300 MILLIGRAM(S): 400 CAPSULE ORAL at 05:10

## 2022-01-24 RX ADMIN — Medication 1 PATCH: at 18:45

## 2022-01-24 RX ADMIN — CYCLOBENZAPRINE HYDROCHLORIDE 10 MILLIGRAM(S): 10 TABLET, FILM COATED ORAL at 21:14

## 2022-01-24 RX ADMIN — Medication 650 MILLIGRAM(S): at 00:29

## 2022-01-24 RX ADMIN — QUETIAPINE FUMARATE 200 MILLIGRAM(S): 200 TABLET, FILM COATED ORAL at 13:19

## 2022-01-24 RX ADMIN — Medication 650 MILLIGRAM(S): at 17:45

## 2022-01-24 RX ADMIN — Medication 650 MILLIGRAM(S): at 17:24

## 2022-01-24 RX ADMIN — METHADONE HYDROCHLORIDE 40 MILLIGRAM(S): 40 TABLET ORAL at 05:15

## 2022-01-24 RX ADMIN — GABAPENTIN 300 MILLIGRAM(S): 400 CAPSULE ORAL at 21:13

## 2022-01-24 RX ADMIN — Medication 1 PATCH: at 21:23

## 2022-01-24 RX ADMIN — Medication 1 PATCH: at 11:48

## 2022-01-24 RX ADMIN — GABAPENTIN 300 MILLIGRAM(S): 400 CAPSULE ORAL at 13:19

## 2022-01-24 RX ADMIN — Medication 2 MILLIGRAM(S): at 05:47

## 2022-01-24 RX ADMIN — Medication 2 MILLIGRAM(S): at 21:14

## 2022-01-24 RX ADMIN — PANTOPRAZOLE SODIUM 40 MILLIGRAM(S): 20 TABLET, DELAYED RELEASE ORAL at 05:22

## 2022-01-24 RX ADMIN — NAFCILLIN 200 GRAM(S): 10 INJECTION, POWDER, FOR SOLUTION INTRAVENOUS at 02:07

## 2022-01-24 RX ADMIN — METHADONE HYDROCHLORIDE 40 MILLIGRAM(S): 40 TABLET ORAL at 13:18

## 2022-01-24 RX ADMIN — NAFCILLIN 200 GRAM(S): 10 INJECTION, POWDER, FOR SOLUTION INTRAVENOUS at 09:21

## 2022-01-24 RX ADMIN — Medication 2 MILLIGRAM(S): at 13:18

## 2022-01-24 RX ADMIN — Medication 325 MILLIGRAM(S): at 11:47

## 2022-01-24 RX ADMIN — MIRTAZAPINE 60 MILLIGRAM(S): 45 TABLET, ORALLY DISINTEGRATING ORAL at 21:14

## 2022-01-24 RX ADMIN — CYCLOBENZAPRINE HYDROCHLORIDE 10 MILLIGRAM(S): 10 TABLET, FILM COATED ORAL at 05:09

## 2022-01-24 RX ADMIN — CYCLOBENZAPRINE HYDROCHLORIDE 10 MILLIGRAM(S): 10 TABLET, FILM COATED ORAL at 13:19

## 2022-01-24 RX ADMIN — QUETIAPINE FUMARATE 200 MILLIGRAM(S): 200 TABLET, FILM COATED ORAL at 05:10

## 2022-01-24 RX ADMIN — POLYETHYLENE GLYCOL 3350 17 GRAM(S): 17 POWDER, FOR SOLUTION ORAL at 05:15

## 2022-01-24 RX ADMIN — Medication 650 MILLIGRAM(S): at 11:47

## 2022-01-24 RX ADMIN — Medication 100 MILLIGRAM(S): at 13:18

## 2022-01-24 RX ADMIN — SENNA PLUS 2 TABLET(S): 8.6 TABLET ORAL at 21:15

## 2022-01-24 RX ADMIN — Medication 1 MILLIGRAM(S): at 11:47

## 2022-01-24 RX ADMIN — Medication 650 MILLIGRAM(S): at 11:49

## 2022-01-24 RX ADMIN — ENOXAPARIN SODIUM 40 MILLIGRAM(S): 100 INJECTION SUBCUTANEOUS at 11:48

## 2022-01-24 RX ADMIN — QUETIAPINE FUMARATE 200 MILLIGRAM(S): 200 TABLET, FILM COATED ORAL at 21:15

## 2022-01-24 RX ADMIN — METHADONE HYDROCHLORIDE 40 MILLIGRAM(S): 40 TABLET ORAL at 21:13

## 2022-01-24 RX ADMIN — Medication 650 MILLIGRAM(S): at 05:08

## 2022-01-24 RX ADMIN — Medication 1 TABLET(S): at 11:47

## 2022-01-24 RX ADMIN — Medication 650 MILLIGRAM(S): at 05:06

## 2022-01-24 RX ADMIN — NAFCILLIN 200 GRAM(S): 10 INJECTION, POWDER, FOR SOLUTION INTRAVENOUS at 05:15

## 2022-01-24 RX ADMIN — BUPROPION HYDROCHLORIDE 300 MILLIGRAM(S): 150 TABLET, EXTENDED RELEASE ORAL at 11:46

## 2022-01-24 NOTE — PROGRESS NOTE ADULT - ASSESSMENT
51-year-old male past medical history of polysubstance abuse who presents status post fall. Has back pain radiating to LLE, CT concerning for osteomyelitis, was diagnosed with MSSA bacteremia, PAM was negative for IE.  Pt was consulted by ID, treated with IV Abx, needs a long term of IV Abx.       A/P  # Sepsis POA/ L5-S1  Vertebral Osteomyelitis with adjacent soft tissue abscess /  MSSA Bacteremia   - sepsis resolved , pt was consulted by ID, recommendations noted, will change Abx to Cefazolin   - blood Cx x 2 came back negative ,  PICC placed on 1/21  - PAM was negative for infectious endocarditis   - ,        #Right forearm foreign body  - 1.2cm linear foreign body overlying right radius  - surgery consult for removal - not recommending removal given difficulty of extraction, and no evidence of infection    #Fall   - likely mechanical   - HCT is negative, trauma workup negative   - pt ot rehab   - fall precautions     # Bl open wounds on wrists   - burn appreciated, Wound care - Xeroform/Kelrex BID, no Surgical debridement , pt needs a quick follow up before discharge       # IVDU/ suspected ETOH use /suspected thiamine folate deficiency   - active heroin use, last use 2 weeks ago  - reports taking 135mg methadone per day, dose confirmed with Juan Ramon Flores RN at methadone clinic, last received 3 day supply on 1/14,  - addiction medicine consulted  - continue thiamine and folate supplementals   - pain management appreciated   - methadone 40mg q8hr standing   - tylenol 650mg q8 standing   - gabapentin 300mg q8hr   - cyclobenzaprine 10mg q8hr   - dilaudid 6mg po q4 prn   - ketorolac 15mg q6hr prn     #Mood dx /Anxiety   - continue home xanax 2mg TID, has withdrawal symptoms when not taking them.   - c/w welbutrin 300 qd and Seroquel 300 TID       #Normocytic Anemia   - multifactorial, iron deficiency, anemia of chronic disease,   - continue folate, MV,  - ferrous sulfate 325mg qd     # DVT ppx- LMWH  # GI ppx- PPI  # Activity- AAT  # Diet- Reg  # Code status - full  # Dispo- from home, acute     #Progress Note Handoff  Pending (specify):  approval for Methadone for the NH  , anticipate discharge  Family discussion: I spoke with pt, he agreed with a plan of care   Disposition: Home___/SNF__x _/Other________/Unknown at this time________

## 2022-01-24 NOTE — PROGRESS NOTE ADULT - ASSESSMENT
51-year-old male past medical history of polysubstance abuse who presents status post fall. Has back pain radiating to LLE, CT concerning for osteomyelitis     # Sepsis POA  # Vertebral Osteomyelitis with adjacent soft tissue abscess   # MSSA Bacteremia   - on admission HR 91, wbc 16.6k  - ,      - Blood culture 1/15 + [facility]PC in clusters, 1/17 + GPC in clusters, cultures negative since 1/18.   - CT AP - osseous destructive process at the left L5-S1 facet joints with infiltrative changes extending to the adjacent spinal canal and neural foramina and associated stenosis. Consider osteomyelitis.   - MRI - findings consistent with L5 S1 facetitis, multifocal abscess within left retroperitoneal soft tissues and right psoas   - ID appreciated, ABx changed to cefazolin 2 gm q8 hrs today 6 weeks from 1/18.  - PAM without evidence of endocarditis,    - IR appreciated, abscesses not amenable to drainage    #Right forearm foreign body  - 1.2cm linear foreign body overlying right radius  - surgery consult for removal - not recommending removal given difficulty of extraction, and no evidence of infection,   - not a contraindication for MRI     #Fall   - likely mechanical   - cth negative, trauma workup negative   - pt ot rehab     # Bl open wounds on wrist   - burn appreciated, Wound care - Xeroform/Kelrex BID, no Surgical debridement     # IVDU  # suspected ETOH use   # suspected thiamine folate deficiency   - active heroin use, last use 2 weeks ago  - reports taking 135mg methadone per day, dose confirmed with Juan Ramon Flores RN at methadone clinic, last received 3 day supply on 1/14,  - addiction medicine consulted  - continue thiamine folate supplementation   - pain management recs appreciated   - methadone 40mg q8hr standing   - tylenol 650mg q8 standing   - gabapentin 300mg q8hr   - cyclobenzaprine 10mg q8hr   - dilaudid 6mg po q4 prn   - ketorolac 15mg q6hr prn     #Mood dx   #Anxiety   - continue home xanax 2mg TID, has withdrawal symptoms when not taking them.   - c/w welbutrin 300 qd and Seroquel 300 TID   - f/u drug screen     #Normocytic Anemia   - multifactorial, iron deficiency, anemia of chronic disease,   - continue folate, MV,  - ferrous sulfate 325mg qd     # DVT ppx- LMWH  # GI ppx- PPI  # Activity- AAT  # Diet- Reg  # Code status - full  # Dispo- stable for dc to NH.     Pending - placement to NH    none

## 2022-01-24 NOTE — PROGRESS NOTE ADULT - SUBJECTIVE AND OBJECTIVE BOX
MIKAEL MCDERMOTT 51y Male  MRN#: [id number]     Hospital Day: 8d    Pt is currently admitted with the primary diagnosis of  OSTEOMYELITIS OF VERTEBRA;LUMBAR REGION;MULTIPLE WOUNDS OF SKIN        SUBJECTIVE     Overnight events  None      Subjective complaints  Pt did not have any active complaints                                              ----------------------------------------------------------  OBJECTIVE  PAST MEDICAL & SURGICAL HISTORY  IV drug abuse                                              -----------------------------------------------------------  ALLERGIES:  No Known Allergies                                            ------------------------------------------------------------    HOME MEDICATIONS  Home Medications:  cyclobenzaprine 10 mg oral tablet: 1 tab(s) orally every 8 hours (21 Jan 2022 15:09)  ferrous sulfate 325 mg (65 mg elemental iron) oral tablet: 1 tab(s) orally once a day (21 Jan 2022 15:09)  folic acid 1 mg oral tablet: 1 tab(s) orally once a day (21 Jan 2022 15:09)  gabapentin 300 mg oral capsule: 1 cap(s) orally 3 times a day (21 Jan 2022 15:09)  methadone: 135 milligram(s) orally once a day (21 Jan 2022 15:09)  Multiple Vitamins oral tablet: 1 tab(s) orally once a day (21 Jan 2022 15:09)  Remeron 30 mg oral tablet: 2 tab(s) orally once a day (at bedtime) (16 Jan 2022 10:02)  SEROquel 200 mg oral tablet: orally 3 times a day (16 Jan 2022 10:02)  Wellbutrin: 300 milligram(s) orally once a day (16 Jan 2022 10:02)  Xanax: 2 milligram(s) orally 3 times a day (16 Jan 2022 10:02)                           MEDICATIONS:  STANDING MEDICATIONS  acetaminophen     Tablet .. 650 milliGRAM(s) Oral every 6 hours  ALPRAZolam 2 milliGRAM(s) Oral three times a day  buPROPion XL (24-Hour) . 300 milliGRAM(s) Oral daily  ceFAZolin   IVPB 2000 milliGRAM(s) IV Intermittent every 8 hours  cyclobenzaprine 10 milliGRAM(s) Oral every 8 hours  enoxaparin Injectable 40 milliGRAM(s) SubCutaneous daily  ferrous    sulfate 325 milliGRAM(s) Oral daily  folic acid 1 milliGRAM(s) Oral daily  gabapentin 300 milliGRAM(s) Oral three times a day  influenza   Vaccine 0.5 milliLiter(s) IntraMuscular once  methadone    Tablet 40 milliGRAM(s) Oral every 8 hours  mirtazapine 60 milliGRAM(s) Oral at bedtime  multivitamin 1 Tablet(s) Oral daily  nicotine - 21 mG/24Hr(s) Patch 1 patch Transdermal daily  pantoprazole    Tablet 40 milliGRAM(s) Oral before breakfast  polyethylene glycol 3350 17 Gram(s) Oral two times a day  QUEtiapine 200 milliGRAM(s) Oral three times a day  senna 2 Tablet(s) Oral at bedtime    PRN MEDICATIONS  aluminum hydroxide/magnesium hydroxide/simethicone Suspension 30 milliLiter(s) Oral every 4 hours PRN  HYDROmorphone   Tablet 6 milliGRAM(s) Oral every 4 hours PRN  melatonin 3 milliGRAM(s) Oral at bedtime PRN  ondansetron Injectable 4 milliGRAM(s) IV Push every 8 hours PRN                                            ------------------------------------------------------------  VITAL SIGNS: Last 24 Hours  T(C): 36.3 (24 Jan 2022 04:52), Max: 36.6 (23 Jan 2022 15:52)  T(F): 97.4 (24 Jan 2022 04:52), Max: 97.9 (23 Jan 2022 15:52)  HR: 96 (24 Jan 2022 04:52) (96 - 99)  BP: 145/95 (24 Jan 2022 04:52) (127/78 - 145/95)  BP(mean): --  RR: 19 (24 Jan 2022 04:52) (19 - 19)  SpO2: --                                             --------------------------------------------------------------  LABS:                        10.7   8.49  )-----------( 449      ( 24 Jan 2022 05:50 )             34.5     01-24    137  |  99  |  15  ----------------------------<  149<H>  4.4   |  21  |  1.0    Ca    8.7      24 Jan 2022 05:50  Mg     2.1     01-24    TPro  8.0  /  Alb  3.4<L>  /  TBili  0.3  /  DBili  x   /  AST  11  /  ALT  16  /  AlkPhos  103  01-24                                                              -------------------------------------------------------------  RADIOLOGY:  < from: MR Lumbar Spine w/wo IV Cont (01.18.22 @ 16:33) >  THORACIC SPINE:  Motion limited examination.    Minimally increased signal and enhancement involving the left T10-T11   facet joint space which represent early facet joint   infection/inflammation however could also be related to degenerative   changes.    LUMBAR SPINE:  Findings consistent with left L5-S1 facetitis with associated multifocal   abscess formation directly adjacent to the left facet joint, within the   left retroperitoneal soft tissues as well as the right psoas musculature.    Ventral epidural phlegmon overlying the L5 vertebral body causing   significant compression of the descending nerve roots.    < end of copied text >                                            --------------------------------------------------------------    PHYSICAL EXAM:  GENERAL: NAD, lying in bed comfortably  HEAD:  Atraumatic, Normocephalic  EYES: EOMI, PERRLA, conjunctiva and sclera clear  ENT: Moist mucous membranes  NECK: Supple, No JVD  CHEST/LUNG: Clear to auscultation bilaterally; No rales, rhonchi, wheezing, or rubs. Unlabored respirations  HEART: regular rate and rhythm; No murmurs, rubs, or gallops  ABDOMEN: Bowel sounds present; Soft, Nontender, Nondistended.    EXTREMITIES:B/L extremity wounds covered in bandages  NERVOUS SYSTEM:  Alert & Oriented X3, speech clear. No focal deficits   SKIN: No rashes or lesions                                           --------------------------------------------------------------

## 2022-01-24 NOTE — PROGRESS NOTE ADULT - SUBJECTIVE AND OBJECTIVE BOX
51-year-old male past medical history of polysubstance abuse who presents status post fall. Has back pain radiating to LLE, CT concerning for osteomyelitis, was diagnosed with MSSA bacteremia, PAM was negative for IE.  Pt was consulted by ID, treated with IV Abx, repeat blood Cx ( one set ) is negative, second set of blood Cx came back negative, PICC line placed on 1/21  Today pt feels OK, has no complaints.      OBJECTIVE  PAST MEDICAL & SURGICAL HISTORY  IV drug abuse      ALLERGIES:  No Known Allergies      HOME MEDICATIONS  Home Medications:  Remeron 30 mg oral tablet: 2 tab(s) orally once a day (at bedtime) (16 Jan 2022 10:02)  SEROquel 200 mg oral tablet: orally 3 times a day (16 Jan 2022 10:02)  Wellbutrin: 300 milligram(s) orally once a day (16 Jan 2022 10:02)  Xanax: 2 milligram(s) orally 3 times a day (16 Jan 2022 10:02)    VITAL SIGNS: Last 24 Hours  T(C): 37.2 (24 Jan 2022 15:08), Max: 37.2 (24 Jan 2022 15:08)  T(F): 98.9 (24 Jan 2022 15:08), Max: 98.9 (24 Jan 2022 15:08)  HR: 92 (24 Jan 2022 15:08) (92 - 99)  BP: 137/87 (24 Jan 2022 15:08) (127/78 - 145/95)  BP(mean): --  RR: 19 (24 Jan 2022 15:08) (19 - 19)  SpO2: --    PHYSICAL EXAM:  General: not in acute distress, very poor hygiene   LUNGS: CTA b/l   HEART: RRR, +S1,S2,  ABDOMEN: SNTTP, ND x 4 q's  BACK: tenderness to palpation midspinal  lumbar region  EXT: Warm, well perfused x 4, edema noted on right wrist   NEURO: AxOx3, No FND's noted  SKIN: open wound on right wrist, multiple skin abrasions     LABS:                                   10.7   8.49  )-----------( 449      ( 24 Jan 2022 05:50 )             34.5   01-24    137  |  99  |  15  ----------------------------<  149<H>  4.4   |  21  |  1.0    Ca    8.7      24 Jan 2022 05:50  Mg     2.1     01-24    TPro  8.0  /  Alb  3.4<L>  /  TBili  0.3  /  DBili  x   /  AST  11  /  ALT  16  /  AlkPhos  103  01-24         PTT - ( 17 Jan 2022 11:00 )  PTT:TNP sec    Culture - Blood in AM (01.18.22 @ 04:30)   Specimen Source: .Blood Blood   Culture Results:   No growth to date.         Culture - Blood (collected 17 Jan 2022 11:00)  Source: .Blood Blood  Gram Stain (18 Jan 2022 11:15):    Growth in aerobic bottle: Gram Positive Cocci in Clusters    Growth in anaerobic bottle: Gram Positive Cocci in Clusters  Preliminary Report (18 Jan 2022 11:15):    Growth in aerobic bottle: Gram Positive Cocci in Clusters    Growth in anaerobic bottle: Gram Positive Cocci in Clusters    Culture - Blood in AM (01.18.22 @ 04:30)   Specimen Source: .Blood Blood   Culture Results:   No growth to date. Culture - Blood (01.19.22 @ 08:25)   Specimen Source: .Blood None   Culture Results:   No growth to date.     RADIOLOGY:    RADIOLOGY:  ACC: [id number] EXAM:  CT BRAIN                          PROCEDURE DATE:  01/15/2022        IMPRESSION:    No CT evidence for acute intracranial pathology.      ACC: [id number] EXAM:  CT CERVICAL SPINE                          PROCEDURE DATE:  01/15/2022          INTERPRETATION:  CLINICAL STATEMENT: Trauma code    TECHNIQUE:  Contiguous unenhanced CT axial images of the cervical spine   with coronal and sagittal re-formations.    COMPARISON: None available    FINDINGS:    There is straightening of the normal cervical lordosis, which may be on   the basis of pain, muscle spasm, positioning or a combination of the   above.    There is no evidence of acute fracture or facet subluxation.    No significant prevertebral soft tissue swelling.    There is complete loss of disc height with bony fusion at C5-C6. There   are multilevel degenerative changes without definitive severe central   canal or neuroforaminal stenosis.      IMPRESSION:    No acute fracture or subluxation of the cervical spine.      ACC: [id number] EXAM:  CT ABDOMEN AND PELVIS IC                        ACC: [id number] EXAM:  CT CHEST IC                          PROCEDURE DATE:  01/15/2022          INTERPRETATION:  CLINICAL STATEMENT: Trauma code    TECHNIQUE: Contiguous CT images were obtained of the chest, abdomen and   pelvis.    Intravenous Contrast:  Intravenous contrast administered.  100 cc Omnipaque 350 intravenous contrast was administered. 0 cc   discarded.  Oral contrast: was not administered.      COMPARISON:  None    FINDINGS:      BONES AND SOFT TISSUES: No definite acute fractures identified. There is   an osseous destructive process at the left L5-S1 facet joints with   infiltrative changes extending to the adjacent spinal canal and neural   foramen. There is spinal canal stenosis at L4-L5 and L5-S1. Additional   erosive changes are noted at the left T10-T11 facet joints.      IMPRESSION:    Definitive acute traumatic injury is not identified to the chest, abdomen   or pelvis.    There is an osseous destructive process at the left L5-S1 facet joints   with infiltrative changes extending to the adjacent spinal canal and   neural foramina and associated stenosis. Per notes, patient with   polysubstance abuse. Consider osteomyelitis. Underlying mass is not   excluded. Additional erosive changes noted at the left T10-T11 facet   joint. Further evaluation with MRI can be obtained as needed.        ACC: [id number] EXAM:  XR WRIST COMP MIN 3 VIEWS BI                          PROCEDURE DATE:  01/15/2022          INTERPRETATION:  CLINICAL INDICATION:necrotic tissue, bone exposed    TECHNIQUE: 2 views of the right wrist. 2 views of the left wrist. One   image is provided with indeterminate laterality.    COMPARISON: No direct comparison available    FINDINGS:  There is no evidence of acute fracture or dislocation. Severe apparent   complete soft tissue defect at the distal dorsal wrist.    IMPRESSION:  No evidence of acute fracture or dislocation.    Severe apparent complete soft tissue defect at the distal dorsal wrist.    1.2 cm linear radiodensity within the anterior soft tissue of the right   forearm suspicious for foreign body.        ACC: [id number] EXAM:  XR FOREARM  2 VIEWS BI                          PROCEDURE DATE:  01/15/2022          INTERPRETATION:  CLINICAL INDICATION:necrotic tissue, bone exposed    TECHNIQUE: 2 views of the right forearm. 2 views of the left forearm.    COMPARISON: No direct comparison available    FINDINGS:  There is no evidence of acute fracture or dislocation. Severe apparent   complete soft tissue defect at the distal dorsal wrist bilaterally.    IMPRESSION:  No evidence of acute fracture or dislocation.    Severe apparent complete soft tissue defect at the distal dorsal wrist   bilaterally.    1.2 cm linear radiodensity within the anterior soft tissue of the left   forearm suspicious for foreign body.    --- End of Report ---    SOLEDAD HANNON MD; Attending Radiologist  This document has been electronically signed. Baljinder 16 2022  6:19PM      ACC: [id number] EXAM:  XR HAND 2 VIEWS LT                          PROCEDURE DATE:  01/16/2022          INTERPRETATION:  CLINICAL INDICATION:trauma    TECHNIQUE: 3 views of the left hand.    COMPARISON: No direct comparison available    FINDINGS:  There is no evidence of acute fracture or dislocation of the left hand.    IMPRESSION:  No evidence of acute fracture or dislocation of the left hand.        ACC: [id number] EXAM:  XR FOOT 2 VIEWS LT                          PROCEDURE DATE:  01/16/2022          INTERPRETATION:  CLINICAL HISTORY / REASON FOR EXAM: Pain    COMPARISON: None    TECHNIQUE: Three views, left foot radiographs    FINDINGS /  IMPRESSION:    No acute displaced fracture. Moderate midfoot degenerative changes.   Achilles tendon insertional enthesophyte measuring 1.1 cm.      ACC: [id number] EXAM:  XR FOREARM  2 VIEWS BI                          IMPRESSION:    No acute displaced fracture.    1.2 cm linear foreign body within the soft tissues overlying the right   radius.    Previously seen left forearm foreign body is no longer visualized.      ACC: [id number] EXAM:  MR SPINE LUMBAR WAW IC                        ACC: [id number] EXAM:  MR SPINE THORACIC WAW IC                          PROCEDURE DATE:  01/18/2022          INTERPRETATION:  CLINICAL INDICATION: Osteomyelitis.    TECHNIQUE: Pre and postcontrast MRI of the thoracic and lumbar spine was   performed.    Sagittal T1, T2, and STIR, and axial T2 and T1 sequences were obtained of   the thoracic and lumbar spine. 10 cc Gadavist was administered. 0 cc was   discarded.    COMPARISON: Correlation with CT of the abdomen and pelvis dated   1/15/2022..    FINDINGS:    THORACIC SPINE:  VERTEBRAL BODIES/ALIGNMENT: There is increased signal and enhancement   noted involving the left T10-T11 facet joint space without erosive   changes identified.    There is increased STIR signal involving the anterior aspects of the T11   and T12 vertebral bodies at likely related to degenerative change. The    SPINAL CORD: There is no abnormal spinal cord signal or enhancement..    SPINAL CANAL:  No intradural or extradural defects are seen.    INTERVERTEBRAL DISCS:  The disc spaces are well-maintained.    There is trace disc bulging at T9-T10 as well as mild facet arthrosis   without spinal canal or neuroforaminal narrowing.    There is trace disc bulging at T10-T11 indenting the ventral thecal sac   as well as bilateral facet arthrosis contributing to moderate left   neuroforaminal narrowing.    At T11-T12 there is trace disc bulging as well as bilateral facet   arthrosis resulting in mildleft neuroforaminal narrowing. There is no   spinal canal stenosis.    MISCELLANEOUS:  None.      LUMBAR SPINE:  VERTEBRAL BODIES/ALIGNMENT AND SOFT TISSUES:  If clinically abnormal   signal or enhancement which is centered within the left L5-S1 facet   joint. There is osseous erosion involving the facet joint as well as   surrounding increased signal and associated enhancement. There   rim-enhancing fluid collections noted just anterior to the facet joint   measuring approximately 1.3 x 0.8 x 1.3 cm, series 1200 image 14. This   fluid collection extends to the superior aspect of the facet joint within   the paraspinal soft tissues with an oblong fluid collection measuring 2.0   x 0.7 x 1.3 cm (AP x TR x CC). There are additional enhancing fluid   collections involving the left retroperitoneal soft tissues just   posterior to the psoas musculature which measure approximately 3.0 x 1.0   cm in the axial plane, series 1800 image 41 there is increased signal   noted involving the posterior aspect of the L5 and L4 vertebral bodies   there is epidural phlegmon noted overlying the L5 and S1 vertebral bodies   compression of the descending nerve roots at the level of L5.    There is a rim-enhancing collection within the right psoas musculature   measuring 1.1 x 0.9 cm in axial plane though incompletely evaluated on   sagittal imaging..    There is chronic wedge compression deformity of the L1 vertebral body   with approximately 20% height loss.    There are Modic type II degenerative changes at L2-3.    CONUS/CAUDA EQUINA: Unremarkable terminating at the superior endplate of   L1    Evaluation of individual disc levels is slightly limited due to motion    At L4-L5 there is disc bulging, posterior ligamentous hypertrophy and   bilateral facet arthrosis contributing to severe spinal stenosis as well   as severe bilateral neuroforaminal narrowing.    At L5-S1 there is disc bulging and ventral epidural phlegmon, posterior   ligamentous hypertrophy and bilateral facet arthrosis contributing to   severe spinal stenosis as well as severe lateral neuroforaminal narrowing.    MISCELLANEOUS:  None.      IMPRESSION:    THORACIC SPINE:  Motion limited examination.    Minimally increased signal and enhancement involving the left T10-T11   facet joint space which represent early facet joint   infection/inflammation however could also be related to degenerative   changes.    LUMBAR SPINE:  Findings consistent with left L5-S1 facetitis with associated multifocal   abscess formation directly adjacent to the left facet joint, within the   left retroperitoneal soft tissues as well as the right psoas musculature.    Ventral epidural phlegmon overlying the L5 vertebral body causing   significant compression of the descending nerve roots.    These findings were discussed with Dr. Plummer at 1/18/2022 at 5:06 PM   by Dr. Ellis with read back confirmation.       < from: Transesophageal Echocardiogram (01.19.22 @ 15:03) >  Summary:   1. Left ventricular ejection fraction, by visual estimation, is 60 to   65%.   2. Normal global left ventricular systolic function.   3. Normal left atrial size.   4. Normal right atrial size.   5. Trace mitral valve regurgitation.   6. No evidence of any vegetations.    MEDICATIONS  (STANDING):  acetaminophen     Tablet .. 650 milliGRAM(s) Oral every 6 hours  ALPRAZolam 2 milliGRAM(s) Oral three times a day  buPROPion XL (24-Hour) . 300 milliGRAM(s) Oral daily  ceFAZolin   IVPB 2000 milliGRAM(s) IV Intermittent every 8 hours  cyclobenzaprine 10 milliGRAM(s) Oral every 8 hours  enoxaparin Injectable 40 milliGRAM(s) SubCutaneous daily  ferrous    sulfate 325 milliGRAM(s) Oral daily  folic acid 1 milliGRAM(s) Oral daily  gabapentin 300 milliGRAM(s) Oral three times a day  influenza   Vaccine 0.5 milliLiter(s) IntraMuscular once  methadone    Tablet 40 milliGRAM(s) Oral every 8 hours  mirtazapine 60 milliGRAM(s) Oral at bedtime  multivitamin 1 Tablet(s) Oral daily  nicotine - 21 mG/24Hr(s) Patch 1 patch Transdermal daily  pantoprazole    Tablet 40 milliGRAM(s) Oral before breakfast  polyethylene glycol 3350 17 Gram(s) Oral two times a day  QUEtiapine 200 milliGRAM(s) Oral three times a day  senna 2 Tablet(s) Oral at bedtime    MEDICATIONS  (PRN):  aluminum hydroxide/magnesium hydroxide/simethicone Suspension 30 milliLiter(s) Oral every 4 hours PRN Dyspepsia  HYDROmorphone   Tablet 6 milliGRAM(s) Oral every 4 hours PRN Severe Pain (7 - 10)  melatonin 3 milliGRAM(s) Oral at bedtime PRN Insomnia  ondansetron Injectable 4 milliGRAM(s) IV Push every 8 hours PRN Nausea and/or Vomiting

## 2022-01-25 LAB
ALBUMIN SERPL ELPH-MCNC: 3.5 G/DL — SIGNIFICANT CHANGE UP (ref 3.5–5.2)
ALP SERPL-CCNC: 117 U/L — HIGH (ref 30–115)
ALT FLD-CCNC: 15 U/L — SIGNIFICANT CHANGE UP (ref 0–41)
ANION GAP SERPL CALC-SCNC: 16 MMOL/L — HIGH (ref 7–14)
AST SERPL-CCNC: 9 U/L — SIGNIFICANT CHANGE UP (ref 0–41)
BASOPHILS # BLD AUTO: 0.04 K/UL — SIGNIFICANT CHANGE UP (ref 0–0.2)
BASOPHILS NFR BLD AUTO: 0.4 % — SIGNIFICANT CHANGE UP (ref 0–1)
BILIRUB SERPL-MCNC: 0.2 MG/DL — SIGNIFICANT CHANGE UP (ref 0.2–1.2)
BUN SERPL-MCNC: 17 MG/DL — SIGNIFICANT CHANGE UP (ref 10–20)
CALCIUM SERPL-MCNC: 9.5 MG/DL — SIGNIFICANT CHANGE UP (ref 8.5–10.1)
CHLORIDE SERPL-SCNC: 101 MMOL/L — SIGNIFICANT CHANGE UP (ref 98–110)
CO2 SERPL-SCNC: 21 MMOL/L — SIGNIFICANT CHANGE UP (ref 17–32)
CREAT SERPL-MCNC: 0.9 MG/DL — SIGNIFICANT CHANGE UP (ref 0.7–1.5)
EOSINOPHIL # BLD AUTO: 0.25 K/UL — SIGNIFICANT CHANGE UP (ref 0–0.7)
EOSINOPHIL NFR BLD AUTO: 2.7 % — SIGNIFICANT CHANGE UP (ref 0–8)
GLUCOSE SERPL-MCNC: 131 MG/DL — HIGH (ref 70–99)
HCT VFR BLD CALC: 35.7 % — LOW (ref 42–52)
HGB BLD-MCNC: 10.7 G/DL — LOW (ref 14–18)
IMM GRANULOCYTES NFR BLD AUTO: 0.6 % — HIGH (ref 0.1–0.3)
LYMPHOCYTES # BLD AUTO: 2.13 K/UL — SIGNIFICANT CHANGE UP (ref 1.2–3.4)
LYMPHOCYTES # BLD AUTO: 22.6 % — SIGNIFICANT CHANGE UP (ref 20.5–51.1)
MAGNESIUM SERPL-MCNC: 2.1 MG/DL — SIGNIFICANT CHANGE UP (ref 1.8–2.4)
MCHC RBC-ENTMCNC: 25.1 PG — LOW (ref 27–31)
MCHC RBC-ENTMCNC: 30 G/DL — LOW (ref 32–37)
MCV RBC AUTO: 83.6 FL — SIGNIFICANT CHANGE UP (ref 80–94)
MONOCYTES # BLD AUTO: 0.59 K/UL — SIGNIFICANT CHANGE UP (ref 0.1–0.6)
MONOCYTES NFR BLD AUTO: 6.3 % — SIGNIFICANT CHANGE UP (ref 1.7–9.3)
NEUTROPHILS # BLD AUTO: 6.35 K/UL — SIGNIFICANT CHANGE UP (ref 1.4–6.5)
NEUTROPHILS NFR BLD AUTO: 67.4 % — SIGNIFICANT CHANGE UP (ref 42.2–75.2)
NRBC # BLD: 0 /100 WBCS — SIGNIFICANT CHANGE UP (ref 0–0)
PLATELET # BLD AUTO: 488 K/UL — HIGH (ref 130–400)
POTASSIUM SERPL-MCNC: 4.8 MMOL/L — SIGNIFICANT CHANGE UP (ref 3.5–5)
POTASSIUM SERPL-SCNC: 4.8 MMOL/L — SIGNIFICANT CHANGE UP (ref 3.5–5)
PROT SERPL-MCNC: 8 G/DL — SIGNIFICANT CHANGE UP (ref 6–8)
RBC # BLD: 4.27 M/UL — LOW (ref 4.7–6.1)
RBC # FLD: 17 % — HIGH (ref 11.5–14.5)
SARS-COV-2 RNA SPEC QL NAA+PROBE: SIGNIFICANT CHANGE UP
SODIUM SERPL-SCNC: 138 MMOL/L — SIGNIFICANT CHANGE UP (ref 135–146)
WBC # BLD: 9.42 K/UL — SIGNIFICANT CHANGE UP (ref 4.8–10.8)
WBC # FLD AUTO: 9.42 K/UL — SIGNIFICANT CHANGE UP (ref 4.8–10.8)

## 2022-01-25 PROCEDURE — 71045 X-RAY EXAM CHEST 1 VIEW: CPT | Mod: 26

## 2022-01-25 PROCEDURE — 99232 SBSQ HOSP IP/OBS MODERATE 35: CPT

## 2022-01-25 RX ORDER — METHADONE HYDROCHLORIDE 40 MG/1
135 TABLET ORAL DAILY
Refills: 0 | Status: DISCONTINUED | OUTPATIENT
Start: 2022-01-25 | End: 2022-01-25

## 2022-01-25 RX ORDER — METHADONE HYDROCHLORIDE 40 MG/1
135 TABLET ORAL ONCE
Refills: 0 | Status: DISCONTINUED | OUTPATIENT
Start: 2022-01-25 | End: 2022-01-25

## 2022-01-25 RX ORDER — METHADONE HYDROCHLORIDE 40 MG/1
40 TABLET ORAL EVERY 8 HOURS
Refills: 0 | Status: DISCONTINUED | OUTPATIENT
Start: 2022-01-25 | End: 2022-01-25

## 2022-01-25 RX ADMIN — Medication 100 MILLIGRAM(S): at 13:28

## 2022-01-25 RX ADMIN — CYCLOBENZAPRINE HYDROCHLORIDE 10 MILLIGRAM(S): 10 TABLET, FILM COATED ORAL at 05:43

## 2022-01-25 RX ADMIN — SENNA PLUS 2 TABLET(S): 8.6 TABLET ORAL at 21:12

## 2022-01-25 RX ADMIN — BUPROPION HYDROCHLORIDE 300 MILLIGRAM(S): 150 TABLET, EXTENDED RELEASE ORAL at 11:19

## 2022-01-25 RX ADMIN — Medication 100 MILLIGRAM(S): at 21:12

## 2022-01-25 RX ADMIN — ENOXAPARIN SODIUM 40 MILLIGRAM(S): 100 INJECTION SUBCUTANEOUS at 11:20

## 2022-01-25 RX ADMIN — QUETIAPINE FUMARATE 200 MILLIGRAM(S): 200 TABLET, FILM COATED ORAL at 05:43

## 2022-01-25 RX ADMIN — PANTOPRAZOLE SODIUM 40 MILLIGRAM(S): 20 TABLET, DELAYED RELEASE ORAL at 05:45

## 2022-01-25 RX ADMIN — CYCLOBENZAPRINE HYDROCHLORIDE 10 MILLIGRAM(S): 10 TABLET, FILM COATED ORAL at 13:28

## 2022-01-25 RX ADMIN — CYCLOBENZAPRINE HYDROCHLORIDE 10 MILLIGRAM(S): 10 TABLET, FILM COATED ORAL at 21:13

## 2022-01-25 RX ADMIN — Medication 1 PATCH: at 11:20

## 2022-01-25 RX ADMIN — Medication 2 MILLIGRAM(S): at 05:43

## 2022-01-25 RX ADMIN — GABAPENTIN 300 MILLIGRAM(S): 400 CAPSULE ORAL at 21:13

## 2022-01-25 RX ADMIN — Medication 325 MILLIGRAM(S): at 11:19

## 2022-01-25 RX ADMIN — Medication 650 MILLIGRAM(S): at 23:00

## 2022-01-25 RX ADMIN — GABAPENTIN 300 MILLIGRAM(S): 400 CAPSULE ORAL at 13:28

## 2022-01-25 RX ADMIN — POLYETHYLENE GLYCOL 3350 17 GRAM(S): 17 POWDER, FOR SOLUTION ORAL at 05:45

## 2022-01-25 RX ADMIN — Medication 650 MILLIGRAM(S): at 17:01

## 2022-01-25 RX ADMIN — Medication 2 MILLIGRAM(S): at 21:12

## 2022-01-25 RX ADMIN — MIRTAZAPINE 60 MILLIGRAM(S): 45 TABLET, ORALLY DISINTEGRATING ORAL at 21:13

## 2022-01-25 RX ADMIN — GABAPENTIN 300 MILLIGRAM(S): 400 CAPSULE ORAL at 05:43

## 2022-01-25 RX ADMIN — Medication 1 MILLIGRAM(S): at 11:19

## 2022-01-25 RX ADMIN — Medication 1 TABLET(S): at 11:19

## 2022-01-25 RX ADMIN — METHADONE HYDROCHLORIDE 135 MILLIGRAM(S): 40 TABLET ORAL at 16:29

## 2022-01-25 RX ADMIN — Medication 650 MILLIGRAM(S): at 11:19

## 2022-01-25 RX ADMIN — QUETIAPINE FUMARATE 200 MILLIGRAM(S): 200 TABLET, FILM COATED ORAL at 21:13

## 2022-01-25 RX ADMIN — Medication 650 MILLIGRAM(S): at 00:27

## 2022-01-25 RX ADMIN — QUETIAPINE FUMARATE 200 MILLIGRAM(S): 200 TABLET, FILM COATED ORAL at 13:29

## 2022-01-25 RX ADMIN — Medication 650 MILLIGRAM(S): at 05:43

## 2022-01-25 RX ADMIN — Medication 2 MILLIGRAM(S): at 13:27

## 2022-01-25 NOTE — PROGRESS NOTE ADULT - SUBJECTIVE AND OBJECTIVE BOX
51-year-old male past medical history of polysubstance abuse who presents status post fall. Has back pain radiating to LLE, CT concerning for osteomyelitis, was diagnosed with MSSA bacteremia, PAM was negative for IE.  Pt was consulted by ID, treated with IV Abx, repeat blood Cx ( one set ) is negative, second set of blood Cx came back negative, PICC line placed on 1/21  Today pt feels better, has no specific complaints.      OBJECTIVE  PAST MEDICAL & SURGICAL HISTORY  IV drug abuse      ALLERGIES:  No Known Allergies      HOME MEDICATIONS  Home Medications:  Remeron 30 mg oral tablet: 2 tab(s) orally once a day (at bedtime) (16 Jan 2022 10:02)  SEROquel 200 mg oral tablet: orally 3 times a day (16 Jan 2022 10:02)  Wellbutrin: 300 milligram(s) orally once a day (16 Jan 2022 10:02)  Xanax: 2 milligram(s) orally 3 times a day (16 Jan 2022 10:02)    VITAL SIGNS: Last 24 Hours  T(C): 35.9 (25 Jan 2022 16:26), Max: 37.2 (24 Jan 2022 21:57)  T(F): 96.6 (25 Jan 2022 16:26), Max: 98.9 (24 Jan 2022 21:57)  HR: 108 (25 Jan 2022 16:26) (89 - 108)  BP: 132/80 (25 Jan 2022 16:26) (113/79 - 132/80)  BP(mean): --  RR: 18 (25 Jan 2022 16:26) (18 - 19)  SpO2: 95% (25 Jan 2022 16:26) (93% - 95%)    PHYSICAL EXAM:  General: not in acute distress, very poor hygiene   LUNGS: CTA b/l   HEART: RRR, +S1,S2,  ABDOMEN: SNTTP, ND x 4 q's  BACK: tenderness to palpation midspinal  lumbar region  EXT: Warm, well perfused x 4, edema noted on right wrist   NEURO: AxOx3, No FND's noted  SKIN: open wound on right wrist, multiple skin abrasions     LABS:           no new labs                         10.7   8.49  )-----------( 449      ( 24 Jan 2022 05:50 )             34.5   01-24    137  |  99  |  15  ----------------------------<  149<H>  4.4   |  21  |  1.0    Ca    8.7      24 Jan 2022 05:50  Mg     2.1     01-24    TPro  8.0  /  Alb  3.4<L>  /  TBili  0.3  /  DBili  x   /  AST  11  /  ALT  16  /  AlkPhos  103  01-24         PTT - ( 17 Jan 2022 11:00 )  PTT:TNP sec    Culture - Blood in AM (01.18.22 @ 04:30)   Specimen Source: .Blood Blood   Culture Results:   No growth to date.         Culture - Blood (collected 17 Jan 2022 11:00)  Source: .Blood Blood  Gram Stain (18 Jan 2022 11:15):    Growth in aerobic bottle: Gram Positive Cocci in Clusters    Growth in anaerobic bottle: Gram Positive Cocci in Clusters  Preliminary Report (18 Jan 2022 11:15):    Growth in aerobic bottle: Gram Positive Cocci in Clusters    Growth in anaerobic bottle: Gram Positive Cocci in Clusters    Culture - Blood in AM (01.18.22 @ 04:30)   Specimen Source: .Blood Blood   Culture Results:   No growth to date. Culture - Blood (01.19.22 @ 08:25)   Specimen Source: .Blood None   Culture Results:   No growth to date.     RADIOLOGY:    RADIOLOGY:  ACC: [id number] EXAM:  CT BRAIN                          PROCEDURE DATE:  01/15/2022        IMPRESSION:    No CT evidence for acute intracranial pathology.      ACC: [id number] EXAM:  CT CERVICAL SPINE                          PROCEDURE DATE:  01/15/2022          INTERPRETATION:  CLINICAL STATEMENT: Trauma code    TECHNIQUE:  Contiguous unenhanced CT axial images of the cervical spine   with coronal and sagittal re-formations.    COMPARISON: None available    FINDINGS:    There is straightening of the normal cervical lordosis, which may be on   the basis of pain, muscle spasm, positioning or a combination of the   above.    There is no evidence of acute fracture or facet subluxation.    No significant prevertebral soft tissue swelling.    There is complete loss of disc height with bony fusion at C5-C6. There   are multilevel degenerative changes without definitive severe central   canal or neuroforaminal stenosis.      IMPRESSION:    No acute fracture or subluxation of the cervical spine.      ACC: [id number] EXAM:  CT ABDOMEN AND PELVIS IC                        ACC: [id number] EXAM:  CT CHEST IC                          PROCEDURE DATE:  01/15/2022          INTERPRETATION:  CLINICAL STATEMENT: Trauma code    TECHNIQUE: Contiguous CT images were obtained of the chest, abdomen and   pelvis.    Intravenous Contrast:  Intravenous contrast administered.  100 cc Omnipaque 350 intravenous contrast was administered. 0 cc   discarded.  Oral contrast: was not administered.      COMPARISON:  None    FINDINGS:      BONES AND SOFT TISSUES: No definite acute fractures identified. There is   an osseous destructive process at the left L5-S1 facet joints with   infiltrative changes extending to the adjacent spinal canal and neural   foramen. There is spinal canal stenosis at L4-L5 and L5-S1. Additional   erosive changes are noted at the left T10-T11 facet joints.      IMPRESSION:    Definitive acute traumatic injury is not identified to the chest, abdomen   or pelvis.    There is an osseous destructive process at the left L5-S1 facet joints   with infiltrative changes extending to the adjacent spinal canal and   neural foramina and associated stenosis. Per notes, patient with   polysubstance abuse. Consider osteomyelitis. Underlying mass is not   excluded. Additional erosive changes noted at the left T10-T11 facet   joint. Further evaluation with MRI can be obtained as needed.        ACC: [id number] EXAM:  XR WRIST COMP MIN 3 VIEWS BI                          PROCEDURE DATE:  01/15/2022          INTERPRETATION:  CLINICAL INDICATION:necrotic tissue, bone exposed    TECHNIQUE: 2 views of the right wrist. 2 views of the left wrist. One   image is provided with indeterminate laterality.    COMPARISON: No direct comparison available    FINDINGS:  There is no evidence of acute fracture or dislocation. Severe apparent   complete soft tissue defect at the distal dorsal wrist.    IMPRESSION:  No evidence of acute fracture or dislocation.    Severe apparent complete soft tissue defect at the distal dorsal wrist.    1.2 cm linear radiodensity within the anterior soft tissue of the right   forearm suspicious for foreign body.        ACC: [id number] EXAM:  XR FOREARM  2 VIEWS BI                          PROCEDURE DATE:  01/15/2022          INTERPRETATION:  CLINICAL INDICATION:necrotic tissue, bone exposed    TECHNIQUE: 2 views of the right forearm. 2 views of the left forearm.    COMPARISON: No direct comparison available    FINDINGS:  There is no evidence of acute fracture or dislocation. Severe apparent   complete soft tissue defect at the distal dorsal wrist bilaterally.    IMPRESSION:  No evidence of acute fracture or dislocation.    Severe apparent complete soft tissue defect at the distal dorsal wrist   bilaterally.    1.2 cm linear radiodensity within the anterior soft tissue of the left   forearm suspicious for foreign body.    --- End of Report ---    SOLEDAD HANNON MD; Attending Radiologist  This document has been electronically signed. Jan 16 2022  6:19PM      ACC: [id number] EXAM:  XR HAND 2 VIEWS LT                          PROCEDURE DATE:  01/16/2022          INTERPRETATION:  CLINICAL INDICATION:trauma    TECHNIQUE: 3 views of the left hand.    COMPARISON: No direct comparison available    FINDINGS:  There is no evidence of acute fracture or dislocation of the left hand.    IMPRESSION:  No evidence of acute fracture or dislocation of the left hand.        ACC: [id number] EXAM:  XR FOOT 2 VIEWS LT                          PROCEDURE DATE:  01/16/2022          INTERPRETATION:  CLINICAL HISTORY / REASON FOR EXAM: Pain    COMPARISON: None    TECHNIQUE: Three views, left foot radiographs    FINDINGS /  IMPRESSION:    No acute displaced fracture. Moderate midfoot degenerative changes.   Achilles tendon insertional enthesophyte measuring 1.1 cm.      ACC: [id number] EXAM:  XR FOREARM  2 VIEWS BI                          IMPRESSION:    No acute displaced fracture.    1.2 cm linear foreign body within the soft tissues overlying the right   radius.    Previously seen left forearm foreign body is no longer visualized.      ACC: [id number] EXAM:  MR SPINE LUMBAR WAW IC                        ACC: [id number] EXAM:  MR SPINE THORACIC WAW IC                          PROCEDURE DATE:  01/18/2022          INTERPRETATION:  CLINICAL INDICATION: Osteomyelitis.    TECHNIQUE: Pre and postcontrast MRI of the thoracic and lumbar spine was   performed.    Sagittal T1, T2, and STIR, and axial T2 and T1 sequences were obtained of   the thoracic and lumbar spine. 10 cc Gadavist was administered. 0 cc was   discarded.    COMPARISON: Correlation with CT of the abdomen and pelvis dated   1/15/2022..    FINDINGS:    THORACIC SPINE:  VERTEBRAL BODIES/ALIGNMENT: There is increased signal and enhancement   noted involving the left T10-T11 facet joint space without erosive   changes identified.    There is increased STIR signal involving the anterior aspects of the T11   and T12 vertebral bodies at likely related to degenerative change. The    SPINAL CORD: There is no abnormal spinal cord signal or enhancement..    SPINAL CANAL:  No intradural or extradural defects are seen.    INTERVERTEBRAL DISCS:  The disc spaces are well-maintained.    There is trace disc bulging at T9-T10 as well as mild facet arthrosis   without spinal canal or neuroforaminal narrowing.    There is trace disc bulging at T10-T11 indenting the ventral thecal sac   as well as bilateral facet arthrosis contributing to moderate left   neuroforaminal narrowing.    At T11-T12 there is trace disc bulging as well as bilateral facet   arthrosis resulting in mildleft neuroforaminal narrowing. There is no   spinal canal stenosis.    MISCELLANEOUS:  None.      LUMBAR SPINE:  VERTEBRAL BODIES/ALIGNMENT AND SOFT TISSUES:  If clinically abnormal   signal or enhancement which is centered within the left L5-S1 facet   joint. There is osseous erosion involving the facet joint as well as   surrounding increased signal and associated enhancement. There   rim-enhancing fluid collections noted just anterior to the facet joint   measuring approximately 1.3 x 0.8 x 1.3 cm, series 1200 image 14. This   fluid collection extends to the superior aspect of the facet joint within   the paraspinal soft tissues with an oblong fluid collection measuring 2.0   x 0.7 x 1.3 cm (AP x TR x CC). There are additional enhancing fluid   collections involving the left retroperitoneal soft tissues just   posterior to the psoas musculature which measure approximately 3.0 x 1.0   cm in the axial plane, series 1800 image 41 there is increased signal   noted involving the posterior aspect of the L5 and L4 vertebral bodies   there is epidural phlegmon noted overlying the L5 and S1 vertebral bodies   compression of the descending nerve roots at the level of L5.    There is a rim-enhancing collection within the right psoas musculature   measuring 1.1 x 0.9 cm in axial plane though incompletely evaluated on   sagittal imaging..    There is chronic wedge compression deformity of the L1 vertebral body   with approximately 20% height loss.    There are Modic type II degenerative changes at L2-3.    CONUS/CAUDA EQUINA: Unremarkable terminating at the superior endplate of   L1    Evaluation of individual disc levels is slightly limited due to motion    At L4-L5 there is disc bulging, posterior ligamentous hypertrophy and   bilateral facet arthrosis contributing to severe spinal stenosis as well   as severe bilateral neuroforaminal narrowing.    At L5-S1 there is disc bulging and ventral epidural phlegmon, posterior   ligamentous hypertrophy and bilateral facet arthrosis contributing to   severe spinal stenosis as well as severe lateral neuroforaminal narrowing.    MISCELLANEOUS:  None.      IMPRESSION:    THORACIC SPINE:  Motion limited examination.    Minimally increased signal and enhancement involving the left T10-T11   facet joint space which represent early facet joint   infection/inflammation however could also be related to degenerative   changes.    LUMBAR SPINE:  Findings consistent with left L5-S1 facetitis with associated multifocal   abscess formation directly adjacent to the left facet joint, within the   left retroperitoneal soft tissues as well as the right psoas musculature.    Ventral epidural phlegmon overlying the L5 vertebral body causing   significant compression of the descending nerve roots.    These findings were discussed with Dr. Plummer at 1/18/2022 at 5:06 PM   by Dr. Ellis with read back confirmation.       < from: Transesophageal Echocardiogram (01.19.22 @ 15:03) >  Summary:   1. Left ventricular ejection fraction, by visual estimation, is 60 to   65%.   2. Normal global left ventricular systolic function.   3. Normal left atrial size.   4. Normal right atrial size.   5. Trace mitral valve regurgitation.   6. No evidence of any vegetations.    MEDICATIONS  (STANDING):  acetaminophen     Tablet .. 650 milliGRAM(s) Oral every 6 hours  ALPRAZolam 2 milliGRAM(s) Oral three times a day  buPROPion XL (24-Hour) . 300 milliGRAM(s) Oral daily  ceFAZolin   IVPB 2000 milliGRAM(s) IV Intermittent every 8 hours  cyclobenzaprine 10 milliGRAM(s) Oral every 8 hours  enoxaparin Injectable 40 milliGRAM(s) SubCutaneous daily  ferrous    sulfate 325 milliGRAM(s) Oral daily  folic acid 1 milliGRAM(s) Oral daily  gabapentin 300 milliGRAM(s) Oral three times a day  influenza   Vaccine 0.5 milliLiter(s) IntraMuscular once  mirtazapine 60 milliGRAM(s) Oral at bedtime  multivitamin 1 Tablet(s) Oral daily  nicotine - 21 mG/24Hr(s) Patch 1 patch Transdermal daily  pantoprazole    Tablet 40 milliGRAM(s) Oral before breakfast  polyethylene glycol 3350 17 Gram(s) Oral two times a day  QUEtiapine 200 milliGRAM(s) Oral three times a day  senna 2 Tablet(s) Oral at bedtime    MEDICATIONS  (PRN):  aluminum hydroxide/magnesium hydroxide/simethicone Suspension 30 milliLiter(s) Oral every 4 hours PRN Dyspepsia  melatonin 3 milliGRAM(s) Oral at bedtime PRN Insomnia  ondansetron Injectable 4 milliGRAM(s) IV Push every 8 hours PRN Nausea and/or Vomiting

## 2022-01-25 NOTE — PROGRESS NOTE ADULT - ASSESSMENT
51-year-old male past medical history of polysubstance abuse who presents status post fall. Has back pain radiating to LLE, CT concerning for osteomyelitis, was diagnosed with MSSA bacteremia, PAM was negative for IE.  Pt was consulted by ID, treated with IV Abx, needs a long term of IV Abx.       A/P  # Sepsis POA/ L5-S1  Vertebral Osteomyelitis with adjacent soft tissue abscess /  MSSA Bacteremia   - sepsis resolved , pt was consulted by ID, recommendations noted, will change Abx to Cefazolin   - blood Cx x 2 came back negative ,  PICC placed on 1/21 ( it was flushed  by IR today, OK now)   - PAM was negative for infectious endocarditis   - ,        #Right forearm foreign body  - 1.2cm linear foreign body overlying right radius  - surgery consult for removal - not recommending removal given difficulty of extraction, and no evidence of infection    #Fall   - likely mechanical   - HCT is negative, trauma workup negative   - pt ot rehab   - fall precautions     # Bl open wounds on wrists   - burn appreciated, Wound care - Xeroform/Kelrex BID, no Surgical debridement , pt needs a quick follow up before discharge       # IVDU/ suspected ETOH use /suspected thiamine folate deficiency   - active heroin use, last use 2 weeks ago  - reports taking 135mg methadone per day, dose confirmed with Juan Ramon Flores RN at methadone clinic, last received 3 day supply on 1/14,  - addiction medicine consulted  - continue thiamine and folate supplementals   - pain management appreciated   - methadone 40mg q8hr standing   - tylenol 650mg q8 standing   - gabapentin 300mg q8hr   - cyclobenzaprine 10mg q8hr   - dilaudid 6mg po q4 prn   - ketorolac 15mg q6hr prn     #Mood dx /Anxiety   - continue home xanax 2mg TID, has withdrawal symptoms when not taking them.   - c/w welbutrin 300 qd and Seroquel 300 TID       #Normocytic Anemia   - multifactorial, iron deficiency, anemia of chronic disease,   - continue folate, MV,  - ferrous sulfate 325mg qd     # DVT ppx- LMWH  # GI ppx- PPI  # Activity- AAT  # Diet- Reg  # Code status - full  # Dispo- from home, acute     #Progress Note Handoff  Pending (specify):  approval for Methadone for the NH  , anticipate discharge  Family discussion: I spoke with pt, he agreed with a plan of care   Disposition: Home___/SNF__x _/Other________/Unknown at this time________

## 2022-01-25 NOTE — PROGRESS NOTE ADULT - ASSESSMENT
51-year-old male past medical history of polysubstance abuse who presents status post fall. Has back pain radiating to LLE, CT concerning for osteomyelitis     # Sepsis POA  # Vertebral Osteomyelitis with adjacent soft tissue abscess   # MSSA Bacteremia   - on admission HR 91, wbc 16.6k  - ,      - Blood culture 1/15 + GPC in clusters, 1/17 + GPC in clusters, cultures negative since 1/18.   - CT AP - osseous destructive process at the left L5-S1 facet joints with infiltrative changes extending to the adjacent spinal canal and neural foramina and associated stenosis. Consider osteomyelitis.   - MRI - findings consistent with L5 S1 facetitis, multifocal abscess within left retroperitoneal soft tissues and right psoas   - ID appreciated, ABx changed to cefazolin 2 gm q8 hrs today 6 weeks from 1/18.  - PAM without evidence of endocarditis,    - IR appreciated, abscesses not amenable to drainage    #Picc line malfunction  - f/u with IR    #Right forearm foreign body  - 1.2cm linear foreign body overlying right radius  - surgery consult for removal - not recommending removal given difficulty of extraction, and no evidence of infection,   - not a contraindication for MRI     #Fall   - likely mechanical   - cth negative, trauma workup negative   - pt ot rehab     # Bl open wounds on wrist   - burn appreciated, Wound care - Xeroform/Kelrex BID, no Surgical debridement     # IVDU  # suspected ETOH use   # suspected thiamine folate deficiency   - active heroin use, last use 2 weeks ago  - reports taking 135mg methadone per day, dose confirmed with Juan Ramon Flores RN at methadone clinic, last received 3 day supply on 1/14,  - addiction medicine consulted  - continue thiamine folate supplementation   - pain management recs appreciated   - methadone 40mg q8hr standing   - tylenol 650mg q8 standing   - gabapentin 300mg q8hr   - cyclobenzaprine 10mg q8hr   - dilaudid 6mg po q4 prn   - ketorolac 15mg q6hr prn     #Mood dx   #Anxiety   - continue home xanax 2mg TID, has withdrawal symptoms when not taking them.   - c/w welbutrin 300 qd and Seroquel 300 TID   - f/u drug screen     #Normocytic Anemia   - multifactorial, iron deficiency, anemia of chronic disease,   - continue folate, MV,  - ferrous sulfate 325mg qd     # DVT ppx- LMWH  # GI ppx- PPI  # Activity- AAT  # Diet- Reg  # Code status - full  # Dispo- stable for dc to NH.     Pending - placement to NH

## 2022-01-25 NOTE — PROGRESS NOTE ADULT - SUBJECTIVE AND OBJECTIVE BOX
IR called because of PICC line malfunction - not aspirating or flushing. Imaging reviewed, PICC line in similar positioning. Pt seen at bedside; PICC in place and dressing/stitches intact. Pt without any complaints. Catheter flushed/aspirated with 5cc saline, now working properly. Okay to use.

## 2022-01-25 NOTE — PROGRESS NOTE ADULT - SUBJECTIVE AND OBJECTIVE BOX
MIKAEL MCDERMOTT 51y Male  MRN#: [id number]     Hospital Day: 9d    Pt is currently admitted with the primary diagnosis of  OSTEOMYELITIS OF VERTEBRA;LUMBAR REGION;MULTIPLE WOUNDS OF SKIN        SUBJECTIVE     Overnight events  Picc line not working      Subjective complaints  No complaints                                            ----------------------------------------------------------  OBJECTIVE  PAST MEDICAL & SURGICAL HISTORY  IV drug abuse                                              -----------------------------------------------------------  ALLERGIES:  No Known Allergies                                            ------------------------------------------------------------    HOME MEDICATIONS  Home Medications:  cyclobenzaprine 10 mg oral tablet: 1 tab(s) orally every 8 hours (21 Jan 2022 15:09)  ferrous sulfate 325 mg (65 mg elemental iron) oral tablet: 1 tab(s) orally once a day (21 Jan 2022 15:09)  folic acid 1 mg oral tablet: 1 tab(s) orally once a day (21 Jan 2022 15:09)  gabapentin 300 mg oral capsule: 1 cap(s) orally 3 times a day (21 Jan 2022 15:09)  methadone: 135 milligram(s) orally once a day (21 Jan 2022 15:09)  Multiple Vitamins oral tablet: 1 tab(s) orally once a day (21 Jan 2022 15:09)  Remeron 30 mg oral tablet: 2 tab(s) orally once a day (at bedtime) (16 Jan 2022 10:02)  SEROquel 200 mg oral tablet: orally 3 times a day (16 Jan 2022 10:02)  Wellbutrin: 300 milligram(s) orally once a day (16 Jan 2022 10:02)  Xanax: 2 milligram(s) orally 3 times a day (16 Jan 2022 10:02)                           MEDICATIONS:  STANDING MEDICATIONS  acetaminophen     Tablet .. 650 milliGRAM(s) Oral every 6 hours  ALPRAZolam 2 milliGRAM(s) Oral three times a day  buPROPion XL (24-Hour) . 300 milliGRAM(s) Oral daily  ceFAZolin   IVPB 2000 milliGRAM(s) IV Intermittent every 8 hours  cyclobenzaprine 10 milliGRAM(s) Oral every 8 hours  enoxaparin Injectable 40 milliGRAM(s) SubCutaneous daily  ferrous    sulfate 325 milliGRAM(s) Oral daily  folic acid 1 milliGRAM(s) Oral daily  gabapentin 300 milliGRAM(s) Oral three times a day  influenza   Vaccine 0.5 milliLiter(s) IntraMuscular once  mirtazapine 60 milliGRAM(s) Oral at bedtime  multivitamin 1 Tablet(s) Oral daily  nicotine - 21 mG/24Hr(s) Patch 1 patch Transdermal daily  pantoprazole    Tablet 40 milliGRAM(s) Oral before breakfast  polyethylene glycol 3350 17 Gram(s) Oral two times a day  QUEtiapine 200 milliGRAM(s) Oral three times a day  senna 2 Tablet(s) Oral at bedtime    PRN MEDICATIONS  aluminum hydroxide/magnesium hydroxide/simethicone Suspension 30 milliLiter(s) Oral every 4 hours PRN  melatonin 3 milliGRAM(s) Oral at bedtime PRN  ondansetron Injectable 4 milliGRAM(s) IV Push every 8 hours PRN                                            ------------------------------------------------------------  VITAL SIGNS: Last 24 Hours  T(C): 36.2 (25 Jan 2022 05:42), Max: 37.2 (24 Jan 2022 15:08)  T(F): 97.1 (25 Jan 2022 05:42), Max: 98.9 (24 Jan 2022 15:08)  HR: 89 (25 Jan 2022 05:42) (89 - 104)  BP: 113/79 (25 Jan 2022 05:42) (113/79 - 137/87)  BP(mean): --  RR: 18 (25 Jan 2022 05:42) (18 - 19)  SpO2: --                                             --------------------------------------------------------------  LABS:                        10.7   9.42  )-----------( 488      ( 25 Jan 2022 06:00 )             35.7     01-25    138  |  101  |  17  ----------------------------<  131<H>  4.8   |  21  |  0.9    Ca    9.5      25 Jan 2022 06:00  Mg     2.1     01-25    TPro  8.0  /  Alb  3.5  /  TBili  0.2  /  DBili  x   /  AST  9   /  ALT  15  /  AlkPhos  117<H>  01-25                                                              -------------------------------------------------------------  PHYSICAL EXAM:  GENERAL: NAD, lying in bed comfortably  HEAD:  Atraumatic, Normocephalic  EYES: EOMI, PERRLA, conjunctiva and sclera clear  ENT: Moist mucous membranes  NECK: Supple, No JVD  CHEST/LUNG: Clear to auscultation bilaterally; No rales, rhonchi, wheezing, or rubs. Unlabored respirations  HEART: regular rate and rhythm; No murmurs, rubs, or gallops  ABDOMEN: Bowel sounds present; Soft, Nontender, Nondistended.    EXTREMITIES:B/L extremity wounds covered in bandages  NERVOUS SYSTEM:  Alert & Oriented X3, speech clear. No focal deficits   SKIN: No rashes or lesions                                                             --------------------------------------------------------------

## 2022-01-26 LAB
ALBUMIN SERPL ELPH-MCNC: 3.6 G/DL — SIGNIFICANT CHANGE UP (ref 3.5–5.2)
ALP SERPL-CCNC: 95 U/L — SIGNIFICANT CHANGE UP (ref 30–115)
ALT FLD-CCNC: 13 U/L — SIGNIFICANT CHANGE UP (ref 0–41)
ANION GAP SERPL CALC-SCNC: 13 MMOL/L — SIGNIFICANT CHANGE UP (ref 7–14)
AST SERPL-CCNC: 16 U/L — SIGNIFICANT CHANGE UP (ref 0–41)
BASOPHILS # BLD AUTO: 0.03 K/UL — SIGNIFICANT CHANGE UP (ref 0–0.2)
BASOPHILS NFR BLD AUTO: 0.3 % — SIGNIFICANT CHANGE UP (ref 0–1)
BILIRUB SERPL-MCNC: 0.2 MG/DL — SIGNIFICANT CHANGE UP (ref 0.2–1.2)
BUN SERPL-MCNC: 19 MG/DL — SIGNIFICANT CHANGE UP (ref 10–20)
CALCIUM SERPL-MCNC: 9 MG/DL — SIGNIFICANT CHANGE UP (ref 8.5–10.1)
CHLORIDE SERPL-SCNC: 102 MMOL/L — SIGNIFICANT CHANGE UP (ref 98–110)
CO2 SERPL-SCNC: 23 MMOL/L — SIGNIFICANT CHANGE UP (ref 17–32)
CREAT SERPL-MCNC: 1 MG/DL — SIGNIFICANT CHANGE UP (ref 0.7–1.5)
CULTURE RESULTS: SIGNIFICANT CHANGE UP
EOSINOPHIL # BLD AUTO: 0.24 K/UL — SIGNIFICANT CHANGE UP (ref 0–0.7)
EOSINOPHIL NFR BLD AUTO: 2.4 % — SIGNIFICANT CHANGE UP (ref 0–8)
GLUCOSE SERPL-MCNC: 113 MG/DL — HIGH (ref 70–99)
HCT VFR BLD CALC: 36.2 % — LOW (ref 42–52)
HGB BLD-MCNC: 10.9 G/DL — LOW (ref 14–18)
IMM GRANULOCYTES NFR BLD AUTO: 0.4 % — HIGH (ref 0.1–0.3)
LYMPHOCYTES # BLD AUTO: 2.75 K/UL — SIGNIFICANT CHANGE UP (ref 1.2–3.4)
LYMPHOCYTES # BLD AUTO: 27.4 % — SIGNIFICANT CHANGE UP (ref 20.5–51.1)
MAGNESIUM SERPL-MCNC: 2 MG/DL — SIGNIFICANT CHANGE UP (ref 1.8–2.4)
MCHC RBC-ENTMCNC: 25.6 PG — LOW (ref 27–31)
MCHC RBC-ENTMCNC: 30.1 G/DL — LOW (ref 32–37)
MCV RBC AUTO: 85 FL — SIGNIFICANT CHANGE UP (ref 80–94)
MONOCYTES # BLD AUTO: 0.66 K/UL — HIGH (ref 0.1–0.6)
MONOCYTES NFR BLD AUTO: 6.6 % — SIGNIFICANT CHANGE UP (ref 1.7–9.3)
NEUTROPHILS # BLD AUTO: 6.33 K/UL — SIGNIFICANT CHANGE UP (ref 1.4–6.5)
NEUTROPHILS NFR BLD AUTO: 62.9 % — SIGNIFICANT CHANGE UP (ref 42.2–75.2)
NRBC # BLD: 0 /100 WBCS — SIGNIFICANT CHANGE UP (ref 0–0)
PLATELET # BLD AUTO: 437 K/UL — HIGH (ref 130–400)
POTASSIUM SERPL-MCNC: 5 MMOL/L — SIGNIFICANT CHANGE UP (ref 3.5–5)
POTASSIUM SERPL-SCNC: 5 MMOL/L — SIGNIFICANT CHANGE UP (ref 3.5–5)
PROT SERPL-MCNC: 7.8 G/DL — SIGNIFICANT CHANGE UP (ref 6–8)
RBC # BLD: 4.26 M/UL — LOW (ref 4.7–6.1)
RBC # FLD: 17.2 % — HIGH (ref 11.5–14.5)
SODIUM SERPL-SCNC: 138 MMOL/L — SIGNIFICANT CHANGE UP (ref 135–146)
SPECIMEN SOURCE: SIGNIFICANT CHANGE UP
WBC # BLD: 10.05 K/UL — SIGNIFICANT CHANGE UP (ref 4.8–10.8)
WBC # FLD AUTO: 10.05 K/UL — SIGNIFICANT CHANGE UP (ref 4.8–10.8)

## 2022-01-26 PROCEDURE — 99232 SBSQ HOSP IP/OBS MODERATE 35: CPT

## 2022-01-26 RX ORDER — METHADONE HYDROCHLORIDE 40 MG/1
135 TABLET ORAL DAILY
Refills: 0 | Status: DISCONTINUED | OUTPATIENT
Start: 2022-01-26 | End: 2022-02-02

## 2022-01-26 RX ADMIN — Medication 2 MILLIGRAM(S): at 21:39

## 2022-01-26 RX ADMIN — Medication 100 MILLIGRAM(S): at 13:02

## 2022-01-26 RX ADMIN — QUETIAPINE FUMARATE 200 MILLIGRAM(S): 200 TABLET, FILM COATED ORAL at 13:03

## 2022-01-26 RX ADMIN — Medication 650 MILLIGRAM(S): at 23:22

## 2022-01-26 RX ADMIN — Medication 650 MILLIGRAM(S): at 11:51

## 2022-01-26 RX ADMIN — SENNA PLUS 2 TABLET(S): 8.6 TABLET ORAL at 21:37

## 2022-01-26 RX ADMIN — METHADONE HYDROCHLORIDE 135 MILLIGRAM(S): 40 TABLET ORAL at 11:24

## 2022-01-26 RX ADMIN — Medication 100 MILLIGRAM(S): at 05:10

## 2022-01-26 RX ADMIN — CYCLOBENZAPRINE HYDROCHLORIDE 10 MILLIGRAM(S): 10 TABLET, FILM COATED ORAL at 21:38

## 2022-01-26 RX ADMIN — GABAPENTIN 300 MILLIGRAM(S): 400 CAPSULE ORAL at 21:38

## 2022-01-26 RX ADMIN — Medication 650 MILLIGRAM(S): at 17:01

## 2022-01-26 RX ADMIN — QUETIAPINE FUMARATE 200 MILLIGRAM(S): 200 TABLET, FILM COATED ORAL at 21:38

## 2022-01-26 RX ADMIN — GABAPENTIN 300 MILLIGRAM(S): 400 CAPSULE ORAL at 13:03

## 2022-01-26 RX ADMIN — GABAPENTIN 300 MILLIGRAM(S): 400 CAPSULE ORAL at 05:10

## 2022-01-26 RX ADMIN — Medication 650 MILLIGRAM(S): at 11:20

## 2022-01-26 RX ADMIN — Medication 1 PATCH: at 11:22

## 2022-01-26 RX ADMIN — Medication 100 MILLIGRAM(S): at 21:38

## 2022-01-26 RX ADMIN — BUPROPION HYDROCHLORIDE 300 MILLIGRAM(S): 150 TABLET, EXTENDED RELEASE ORAL at 11:21

## 2022-01-26 RX ADMIN — Medication 2 MILLIGRAM(S): at 13:05

## 2022-01-26 RX ADMIN — Medication 1 TABLET(S): at 11:20

## 2022-01-26 RX ADMIN — POLYETHYLENE GLYCOL 3350 17 GRAM(S): 17 POWDER, FOR SOLUTION ORAL at 05:10

## 2022-01-26 RX ADMIN — CYCLOBENZAPRINE HYDROCHLORIDE 10 MILLIGRAM(S): 10 TABLET, FILM COATED ORAL at 13:03

## 2022-01-26 RX ADMIN — Medication 1 PATCH: at 20:09

## 2022-01-26 RX ADMIN — Medication 1 MILLIGRAM(S): at 11:20

## 2022-01-26 RX ADMIN — Medication 650 MILLIGRAM(S): at 17:21

## 2022-01-26 RX ADMIN — QUETIAPINE FUMARATE 200 MILLIGRAM(S): 200 TABLET, FILM COATED ORAL at 05:09

## 2022-01-26 RX ADMIN — Medication 325 MILLIGRAM(S): at 11:21

## 2022-01-26 RX ADMIN — Medication 2 MILLIGRAM(S): at 05:09

## 2022-01-26 RX ADMIN — Medication 650 MILLIGRAM(S): at 05:10

## 2022-01-26 RX ADMIN — MIRTAZAPINE 60 MILLIGRAM(S): 45 TABLET, ORALLY DISINTEGRATING ORAL at 22:20

## 2022-01-26 RX ADMIN — CYCLOBENZAPRINE HYDROCHLORIDE 10 MILLIGRAM(S): 10 TABLET, FILM COATED ORAL at 05:09

## 2022-01-26 RX ADMIN — PANTOPRAZOLE SODIUM 40 MILLIGRAM(S): 20 TABLET, DELAYED RELEASE ORAL at 06:17

## 2022-01-26 RX ADMIN — ENOXAPARIN SODIUM 40 MILLIGRAM(S): 100 INJECTION SUBCUTANEOUS at 11:21

## 2022-01-26 RX ADMIN — Medication 1 PATCH: at 11:52

## 2022-01-26 NOTE — PROGRESS NOTE ADULT - SUBJECTIVE AND OBJECTIVE BOX
Patient reports good relief of his pain with current regimen. Has been able to work well with physical therapy, and reports current methadone regimen has been successful in preventing withdrawal.

## 2022-01-26 NOTE — CHART NOTE - NSCHARTNOTEFT_GEN_A_CORE
Registered Dietitian Limited Assessment:    Admitted with primary diagnosis Sepsis POA. Vertebral Osteomyelitis with adjacent soft tissue abscess noted. MSSA Bacteremia noted. IVDU noted. Suspected ETOH use. Suspected thiamine folate deficiency. Normocytic Anemia - multifactorial, iron deficiency, anemia of chronic disease. Noted continue folate, MVI, FeSO4.    Currently receives Regular diet. Reports good appetite; consuming % of most meals. 1+ edema (B/L wrist). Last BM 1/24. No N/V/D/C reported at this time. No chewing/swallowing difficulty reported. Skin: B/L wrist wounds. Tolerating current nutrition regimen. No new nutrition intervention at this time. Reviewed current det order & encouraged adequate po intake. Nutrition goals: Pt to maintain tolerance to diet, with at least 75% po intake maintained over next 7-10 days.

## 2022-01-26 NOTE — PROGRESS NOTE ADULT - ASSESSMENT
51-year-old male past medical history of polysubstance abuse who presents status post fall. Has back pain radiating to LLE, CT concerning for osteomyelitis     # Sepsis POA  # Vertebral Osteomyelitis with adjacent soft tissue abscess   # MSSA Bacteremia   - on admission HR 91, wbc 16.6k  - ,      - Blood culture 1/15 + GPC in clusters, 1/17 + GPC in clusters, cultures negative since 1/18.   - CT AP - osseous destructive process at the left L5-S1 facet joints with infiltrative changes extending to the adjacent spinal canal and neural foramina and associated stenosis. Consider osteomyelitis.   - MRI - findings consistent with L5 S1 facetitis, multifocal abscess within left retroperitoneal soft tissues and right psoas   - ID appreciated, ABx changed to cefazolin 2 gm q8 hrs today 6 weeks from 1/18.  - PAM without evidence of endocarditis,    - IR appreciated, abscesses not amenable to drainage    #Picc line malfunction (resolved)  -flushing     #Right forearm foreign body  - 1.2cm linear foreign body overlying right radius  - surgery consult for removal - not recommending removal given difficulty of extraction, and no evidence of infection,   - not a contraindication for MRI     #Fall   - likely mechanical   - cth negative, trauma workup negative   - pt ot rehab     # Bl open wounds on wrist   - burn appreciated, Wound care - Xeroform/Kelrex BID, no Surgical debridement     # IVDU  # suspected ETOH use   # suspected thiamine folate deficiency   - active heroin use, last use 2 weeks ago  - reports taking 135mg methadone per day, dose confirmed with Juan Ramon Flores RN at methadone clinic, last received 3 day supply on 1/14,  - addiction medicine consulted  - continue thiamine folate supplementation   - pain management recs appreciated   - tylenol 650mg q8 standing   - gabapentin 300mg q8hr   - cyclobenzaprine 10mg q8hr   - dilaudid 6mg po q4 prn   - ketorolac 15mg q6hr prn     #Mood dx   #Anxiety   - continue home xanax 2mg TID, has withdrawal symptoms when not taking them.   - c/w welbutrin 300 qd and Seroquel 300 TID   - f/u drug screen     #Normocytic Anemia   - multifactorial, iron deficiency, anemia of chronic disease,   - continue folate, MV,  - ferrous sulfate 325mg qd     # DVT ppx- LMWH  # GI ppx- PPI  # Activity- AAT  # Diet- Reg  # Code status - full  # Dispo- stable for dc to NH.     Pending - placement to NH

## 2022-01-26 NOTE — PROGRESS NOTE ADULT - SUBJECTIVE AND OBJECTIVE BOX
MIKAEL MCDERMOTT 51y Male  MRN#: [id number]     Hospital Day: 10d    Pt is currently admitted with the primary diagnosis of  OSTEOMYELITIS OF VERTEBRA;LUMBAR REGION;MULTIPLE WOUNDS OF SKIN        SUBJECTIVE     Overnight events  None      Subjective complaints  No active complaints                                              ----------------------------------------------------------  OBJECTIVE  PAST MEDICAL & SURGICAL HISTORY  IV drug abuse                                              -----------------------------------------------------------  ALLERGIES:  No Known Allergies                                            ------------------------------------------------------------    HOME MEDICATIONS  Home Medications:  cyclobenzaprine 10 mg oral tablet: 1 tab(s) orally every 8 hours (21 Jan 2022 15:09)  ferrous sulfate 325 mg (65 mg elemental iron) oral tablet: 1 tab(s) orally once a day (21 Jan 2022 15:09)  folic acid 1 mg oral tablet: 1 tab(s) orally once a day (21 Jan 2022 15:09)  gabapentin 300 mg oral capsule: 1 cap(s) orally 3 times a day (21 Jan 2022 15:09)  methadone: 135 milligram(s) orally once a day (21 Jan 2022 15:09)  Multiple Vitamins oral tablet: 1 tab(s) orally once a day (21 Jan 2022 15:09)  Remeron 30 mg oral tablet: 2 tab(s) orally once a day (at bedtime) (16 Jan 2022 10:02)  SEROquel 200 mg oral tablet: orally 3 times a day (16 Jan 2022 10:02)  Wellbutrin: 300 milligram(s) orally once a day (16 Jan 2022 10:02)  Xanax: 2 milligram(s) orally 3 times a day (16 Jan 2022 10:02)                           MEDICATIONS:  STANDING MEDICATIONS  acetaminophen     Tablet .. 650 milliGRAM(s) Oral every 6 hours  ALPRAZolam 2 milliGRAM(s) Oral three times a day  buPROPion XL (24-Hour) . 300 milliGRAM(s) Oral daily  ceFAZolin   IVPB 2000 milliGRAM(s) IV Intermittent every 8 hours  cyclobenzaprine 10 milliGRAM(s) Oral every 8 hours  enoxaparin Injectable 40 milliGRAM(s) SubCutaneous daily  ferrous    sulfate 325 milliGRAM(s) Oral daily  folic acid 1 milliGRAM(s) Oral daily  gabapentin 300 milliGRAM(s) Oral three times a day  influenza   Vaccine 0.5 milliLiter(s) IntraMuscular once  mirtazapine 60 milliGRAM(s) Oral at bedtime  multivitamin 1 Tablet(s) Oral daily  nicotine - 21 mG/24Hr(s) Patch 1 patch Transdermal daily  pantoprazole    Tablet 40 milliGRAM(s) Oral before breakfast  polyethylene glycol 3350 17 Gram(s) Oral two times a day  QUEtiapine 200 milliGRAM(s) Oral three times a day  senna 2 Tablet(s) Oral at bedtime    PRN MEDICATIONS  aluminum hydroxide/magnesium hydroxide/simethicone Suspension 30 milliLiter(s) Oral every 4 hours PRN  melatonin 3 milliGRAM(s) Oral at bedtime PRN  ondansetron Injectable 4 milliGRAM(s) IV Push every 8 hours PRN                                            ------------------------------------------------------------  VITAL SIGNS: Last 24 Hours  T(C): 36.5 (26 Jan 2022 05:52), Max: 36.5 (26 Jan 2022 05:52)  T(F): 97.7 (26 Jan 2022 05:52), Max: 97.7 (26 Jan 2022 05:52)  HR: 88 (26 Jan 2022 05:52) (88 - 108)  BP: 105/58 (26 Jan 2022 05:52) (105/58 - 132/80)  BP(mean): --  RR: 18 (26 Jan 2022 05:52) (17 - 19)  SpO2: 95% (25 Jan 2022 16:26) (95% - 95%)      01-25-22 @ 07:01  -  01-26-22 @ 07:00  --------------------------------------------------------  IN: 0 mL / OUT: 800 mL / NET: -800 mL                                             --------------------------------------------------------------  LABS:                        10.7   9.42  )-----------( 488      ( 25 Jan 2022 06:00 )             35.7     01-25    138  |  101  |  17  ----------------------------<  131<H>  4.8   |  21  |  0.9    Ca    9.5      25 Jan 2022 06:00  Mg     2.1     01-25    TPro  8.0  /  Alb  3.5  /  TBili  0.2  /  DBili  x   /  AST  9   /  ALT  15  /  AlkPhos  117<H>  01-25                                                              -------------------------------------------------------------  RADIOLOGY:  < from: Xray Chest 1 View- PORTABLE-Urgent (Xray Chest 1 View- PORTABLE-Urgent .) (01.25.22 @ 09:57) >  Right-sided PICC line with tip in the proximal SVC.    < end of copied text >                                              --------------------------------------------------------------    PHYSICAL EXAM:  GENERAL: NAD, lying in bed comfortably  HEAD:  Atraumatic, Normocephalic  EYES: EOMI, PERRLA, conjunctiva and sclera clear  ENT: Moist mucous membranes  NECK: Supple, No JVD  CHEST/LUNG: Clear to auscultation bilaterally; No rales, rhonchi, wheezing, or rubs. Unlabored respirations  HEART: regular rate and rhythm; No murmurs, rubs, or gallops  ABDOMEN: Bowel sounds present; Soft, Nontender, Nondistended.    EXTREMITIES: :B/L extremity wounds covered in bandages  NERVOUS SYSTEM:  Alert & Oriented X3, speech clear. No focal deficits   SKIN: No rashes or lesions                                           --------------------------------------------------------------

## 2022-01-26 NOTE — PROGRESS NOTE ADULT - ASSESSMENT
·  Problem: Acute left-sided low back pain with left-sided sciatica.   ·  Recommendation: Withdrawal symptoms and pain control improved.  1) Continue methadone  mg QD; monitor Qtc, last 472ms from EKG 1/16  3) Continue acetaminophen 650mg Q6h  4) Continue cyclobenzaprine 10mg Q8h  5) Continue gabapentin 300mg Q8h  6) Continue alprazolam 2mg Q8h; would use caution and monitor respiratory status in the setting of opioid and benzodiazepine administration  7) Continue follow up with CATCH team.

## 2022-01-27 LAB
ALBUMIN SERPL ELPH-MCNC: 3.2 G/DL — LOW (ref 3.5–5.2)
ALP SERPL-CCNC: 102 U/L — SIGNIFICANT CHANGE UP (ref 30–115)
ALT FLD-CCNC: 9 U/L — SIGNIFICANT CHANGE UP (ref 0–41)
ANION GAP SERPL CALC-SCNC: 16 MMOL/L — HIGH (ref 7–14)
AST SERPL-CCNC: 10 U/L — SIGNIFICANT CHANGE UP (ref 0–41)
BASOPHILS # BLD AUTO: 0.03 K/UL — SIGNIFICANT CHANGE UP (ref 0–0.2)
BASOPHILS NFR BLD AUTO: 0.4 % — SIGNIFICANT CHANGE UP (ref 0–1)
BILIRUB SERPL-MCNC: <0.2 MG/DL — SIGNIFICANT CHANGE UP (ref 0.2–1.2)
BUN SERPL-MCNC: 17 MG/DL — SIGNIFICANT CHANGE UP (ref 10–20)
CALCIUM SERPL-MCNC: 8.9 MG/DL — SIGNIFICANT CHANGE UP (ref 8.5–10.1)
CHLORIDE SERPL-SCNC: 100 MMOL/L — SIGNIFICANT CHANGE UP (ref 98–110)
CO2 SERPL-SCNC: 19 MMOL/L — SIGNIFICANT CHANGE UP (ref 17–32)
CREAT SERPL-MCNC: 1 MG/DL — SIGNIFICANT CHANGE UP (ref 0.7–1.5)
EOSINOPHIL # BLD AUTO: 0.2 K/UL — SIGNIFICANT CHANGE UP (ref 0–0.7)
EOSINOPHIL NFR BLD AUTO: 2.8 % — SIGNIFICANT CHANGE UP (ref 0–8)
GLUCOSE SERPL-MCNC: 133 MG/DL — HIGH (ref 70–99)
HCT VFR BLD CALC: 31.8 % — LOW (ref 42–52)
HGB BLD-MCNC: 9.6 G/DL — LOW (ref 14–18)
IMM GRANULOCYTES NFR BLD AUTO: 0.7 % — HIGH (ref 0.1–0.3)
LYMPHOCYTES # BLD AUTO: 1.76 K/UL — SIGNIFICANT CHANGE UP (ref 1.2–3.4)
LYMPHOCYTES # BLD AUTO: 24.3 % — SIGNIFICANT CHANGE UP (ref 20.5–51.1)
MAGNESIUM SERPL-MCNC: 1.9 MG/DL — SIGNIFICANT CHANGE UP (ref 1.8–2.4)
MCHC RBC-ENTMCNC: 25.3 PG — LOW (ref 27–31)
MCHC RBC-ENTMCNC: 30.2 G/DL — LOW (ref 32–37)
MCV RBC AUTO: 83.7 FL — SIGNIFICANT CHANGE UP (ref 80–94)
MONOCYTES # BLD AUTO: 0.55 K/UL — SIGNIFICANT CHANGE UP (ref 0.1–0.6)
MONOCYTES NFR BLD AUTO: 7.6 % — SIGNIFICANT CHANGE UP (ref 1.7–9.3)
NEUTROPHILS # BLD AUTO: 4.66 K/UL — SIGNIFICANT CHANGE UP (ref 1.4–6.5)
NEUTROPHILS NFR BLD AUTO: 64.2 % — SIGNIFICANT CHANGE UP (ref 42.2–75.2)
NRBC # BLD: 0 /100 WBCS — SIGNIFICANT CHANGE UP (ref 0–0)
PLATELET # BLD AUTO: 399 K/UL — SIGNIFICANT CHANGE UP (ref 130–400)
POTASSIUM SERPL-MCNC: 4.7 MMOL/L — SIGNIFICANT CHANGE UP (ref 3.5–5)
POTASSIUM SERPL-SCNC: 4.7 MMOL/L — SIGNIFICANT CHANGE UP (ref 3.5–5)
PROT SERPL-MCNC: 7.4 G/DL — SIGNIFICANT CHANGE UP (ref 6–8)
RBC # BLD: 3.8 M/UL — LOW (ref 4.7–6.1)
RBC # FLD: 17.1 % — HIGH (ref 11.5–14.5)
SODIUM SERPL-SCNC: 135 MMOL/L — SIGNIFICANT CHANGE UP (ref 135–146)
WBC # BLD: 7.25 K/UL — SIGNIFICANT CHANGE UP (ref 4.8–10.8)
WBC # FLD AUTO: 7.25 K/UL — SIGNIFICANT CHANGE UP (ref 4.8–10.8)

## 2022-01-27 PROCEDURE — 99231 SBSQ HOSP IP/OBS SF/LOW 25: CPT

## 2022-01-27 RX ADMIN — Medication 650 MILLIGRAM(S): at 19:19

## 2022-01-27 RX ADMIN — Medication 2 MILLIGRAM(S): at 21:58

## 2022-01-27 RX ADMIN — Medication 650 MILLIGRAM(S): at 06:15

## 2022-01-27 RX ADMIN — Medication 650 MILLIGRAM(S): at 17:44

## 2022-01-27 RX ADMIN — QUETIAPINE FUMARATE 200 MILLIGRAM(S): 200 TABLET, FILM COATED ORAL at 21:59

## 2022-01-27 RX ADMIN — Medication 650 MILLIGRAM(S): at 07:21

## 2022-01-27 RX ADMIN — Medication 1 PATCH: at 08:03

## 2022-01-27 RX ADMIN — BUPROPION HYDROCHLORIDE 300 MILLIGRAM(S): 150 TABLET, EXTENDED RELEASE ORAL at 11:46

## 2022-01-27 RX ADMIN — CYCLOBENZAPRINE HYDROCHLORIDE 10 MILLIGRAM(S): 10 TABLET, FILM COATED ORAL at 13:03

## 2022-01-27 RX ADMIN — GABAPENTIN 300 MILLIGRAM(S): 400 CAPSULE ORAL at 22:01

## 2022-01-27 RX ADMIN — Medication 650 MILLIGRAM(S): at 14:43

## 2022-01-27 RX ADMIN — QUETIAPINE FUMARATE 200 MILLIGRAM(S): 200 TABLET, FILM COATED ORAL at 13:03

## 2022-01-27 RX ADMIN — ENOXAPARIN SODIUM 40 MILLIGRAM(S): 100 INJECTION SUBCUTANEOUS at 11:46

## 2022-01-27 RX ADMIN — Medication 1 MILLIGRAM(S): at 11:46

## 2022-01-27 RX ADMIN — Medication 100 MILLIGRAM(S): at 06:16

## 2022-01-27 RX ADMIN — GABAPENTIN 300 MILLIGRAM(S): 400 CAPSULE ORAL at 06:15

## 2022-01-27 RX ADMIN — METHADONE HYDROCHLORIDE 135 MILLIGRAM(S): 40 TABLET ORAL at 11:51

## 2022-01-27 RX ADMIN — PANTOPRAZOLE SODIUM 40 MILLIGRAM(S): 20 TABLET, DELAYED RELEASE ORAL at 06:16

## 2022-01-27 RX ADMIN — Medication 1 PATCH: at 12:43

## 2022-01-27 RX ADMIN — Medication 2 MILLIGRAM(S): at 06:16

## 2022-01-27 RX ADMIN — Medication 650 MILLIGRAM(S): at 00:49

## 2022-01-27 RX ADMIN — GABAPENTIN 300 MILLIGRAM(S): 400 CAPSULE ORAL at 13:03

## 2022-01-27 RX ADMIN — Medication 325 MILLIGRAM(S): at 11:50

## 2022-01-27 RX ADMIN — Medication 2 MILLIGRAM(S): at 13:02

## 2022-01-27 RX ADMIN — Medication 100 MILLIGRAM(S): at 13:06

## 2022-01-27 RX ADMIN — Medication 1 TABLET(S): at 11:49

## 2022-01-27 RX ADMIN — Medication 650 MILLIGRAM(S): at 11:45

## 2022-01-27 RX ADMIN — CYCLOBENZAPRINE HYDROCHLORIDE 10 MILLIGRAM(S): 10 TABLET, FILM COATED ORAL at 21:58

## 2022-01-27 RX ADMIN — MIRTAZAPINE 60 MILLIGRAM(S): 45 TABLET, ORALLY DISINTEGRATING ORAL at 21:58

## 2022-01-27 RX ADMIN — CYCLOBENZAPRINE HYDROCHLORIDE 10 MILLIGRAM(S): 10 TABLET, FILM COATED ORAL at 06:16

## 2022-01-27 RX ADMIN — Medication 1 PATCH: at 11:50

## 2022-01-27 RX ADMIN — Medication 650 MILLIGRAM(S): at 23:39

## 2022-01-27 RX ADMIN — ONDANSETRON 4 MILLIGRAM(S): 8 TABLET, FILM COATED ORAL at 05:53

## 2022-01-27 RX ADMIN — QUETIAPINE FUMARATE 200 MILLIGRAM(S): 200 TABLET, FILM COATED ORAL at 06:15

## 2022-01-27 NOTE — PROGRESS NOTE ADULT - SUBJECTIVE AND OBJECTIVE BOX
MIKAEL MCDERMOTT 51y Male  MRN#: [id number]     Hospital Day: 11d    Pt is currently admitted with the primary diagnosis of  OSTEOMYELITIS OF VERTEBRA;LUMBAR REGION;MULTIPLE WOUNDS OF SKIN        SUBJECTIVE     Overnight events  None      Subjective complaints  No active complaints                                              ----------------------------------------------------------  OBJECTIVE  PAST MEDICAL & SURGICAL HISTORY  IV drug abuse                                              -----------------------------------------------------------  ALLERGIES:  No Known Allergies                                            ------------------------------------------------------------    HOME MEDICATIONS  Home Medications:  cyclobenzaprine 10 mg oral tablet: 1 tab(s) orally every 8 hours (21 Jan 2022 15:09)  ferrous sulfate 325 mg (65 mg elemental iron) oral tablet: 1 tab(s) orally once a day (21 Jan 2022 15:09)  folic acid 1 mg oral tablet: 1 tab(s) orally once a day (21 Jan 2022 15:09)  gabapentin 300 mg oral capsule: 1 cap(s) orally 3 times a day (21 Jan 2022 15:09)  methadone: 135 milligram(s) orally once a day (21 Jan 2022 15:09)  Multiple Vitamins oral tablet: 1 tab(s) orally once a day (21 Jan 2022 15:09)  Remeron 30 mg oral tablet: 2 tab(s) orally once a day (at bedtime) (16 Jan 2022 10:02)  SEROquel 200 mg oral tablet: orally 3 times a day (16 Jan 2022 10:02)  Wellbutrin: 300 milligram(s) orally once a day (16 Jan 2022 10:02)  Xanax: 2 milligram(s) orally 3 times a day (16 Jan 2022 10:02)                           MEDICATIONS:  STANDING MEDICATIONS  acetaminophen     Tablet .. 650 milliGRAM(s) Oral every 6 hours  ALPRAZolam 2 milliGRAM(s) Oral three times a day  buPROPion XL (24-Hour) . 300 milliGRAM(s) Oral daily  ceFAZolin   IVPB 2000 milliGRAM(s) IV Intermittent every 8 hours  cyclobenzaprine 10 milliGRAM(s) Oral every 8 hours  enoxaparin Injectable 40 milliGRAM(s) SubCutaneous daily  ferrous    sulfate 325 milliGRAM(s) Oral daily  folic acid 1 milliGRAM(s) Oral daily  gabapentin 300 milliGRAM(s) Oral three times a day  influenza   Vaccine 0.5 milliLiter(s) IntraMuscular once  methadone    Tablet 135 milliGRAM(s) Oral daily  mirtazapine 60 milliGRAM(s) Oral at bedtime  multivitamin 1 Tablet(s) Oral daily  nicotine - 21 mG/24Hr(s) Patch 1 patch Transdermal daily  pantoprazole    Tablet 40 milliGRAM(s) Oral before breakfast  polyethylene glycol 3350 17 Gram(s) Oral two times a day  QUEtiapine 200 milliGRAM(s) Oral three times a day  senna 2 Tablet(s) Oral at bedtime    PRN MEDICATIONS  aluminum hydroxide/magnesium hydroxide/simethicone Suspension 30 milliLiter(s) Oral every 4 hours PRN  melatonin 3 milliGRAM(s) Oral at bedtime PRN  ondansetron Injectable 4 milliGRAM(s) IV Push every 8 hours PRN                                            ------------------------------------------------------------  VITAL SIGNS: Last 24 Hours  T(C): 36 (27 Jan 2022 05:30), Max: 37.2 (26 Jan 2022 14:40)  T(F): 96.8 (27 Jan 2022 05:30), Max: 98.9 (26 Jan 2022 14:40)  HR: 78 (27 Jan 2022 05:30) (78 - 98)  BP: 128/75 (27 Jan 2022 05:30) (104/59 - 128/75)  BP(mean): --  RR: 18 (27 Jan 2022 05:30) (18 - 19)  SpO2: 95% (26 Jan 2022 20:15) (95% - 95%)      01-26-22 @ 07:01  -  01-27-22 @ 07:00  --------------------------------------------------------  IN: 100 mL / OUT: 0 mL / NET: 100 mL                                             --------------------------------------------------------------  LABS:                        9.6    7.25  )-----------( 399      ( 27 Jan 2022 07:18 )             31.8     01-26    138  |  102  |  19  ----------------------------<  113<H>  5.0   |  23  |  1.0    Ca    9.0      26 Jan 2022 04:30  Mg     2.0     01-26    TPro  7.8  /  Alb  3.6  /  TBili  0.2  /  DBili  x   /  AST  16  /  ALT  13  /  AlkPhos  95  01-26                                                              -------------------------------------------------------------  RADIOLOGY:                                              --------------------------------------------------------------    PHYSICAL EXAM:  GENERAL: NAD, lying in bed comfortably  HEAD:  Atraumatic, Normocephalic  EYES: EOMI, PERRLA, conjunctiva and sclera clear  ENT: Moist mucous membranes  NECK: Supple, No JVD  CHEST/LUNG: Clear to auscultation bilaterally; No rales, rhonchi, wheezing, or rubs. Unlabored respirations  HEART: regular rate and rhythm; No murmurs, rubs, or gallops  ABDOMEN: Bowel sounds present; Soft, Nontender, Nondistended.    EXTREMITIES:b/l UE wounds covered in bandages  NERVOUS SYSTEM:  Alert & Oriented X3, speech clear. No focal deficits   SKIN: No rashes or lesions                                           --------------------------------------------------------------

## 2022-01-27 NOTE — PROGRESS NOTE ADULT - ASSESSMENT
51-year-old male past medical history of polysubstance abuse who presents status post fall. Has back pain radiating to LLE, CT concerning for osteomyelitis     # Sepsis POA  # Vertebral Osteomyelitis with adjacent soft tissue abscess   # MSSA Bacteremia   - on admission HR 91, wbc 16.6k  - ,      - Blood culture 1/15 + GPC in clusters, 1/17 + GPC in clusters, cultures negative since 1/18.   - CT AP - osseous destructive process at the left L5-S1 facet joints with infiltrative changes extending to the adjacent spinal canal and neural foramina and associated stenosis. Consider osteomyelitis.   - MRI - findings consistent with L5 S1 facetitis, multifocal abscess within left retroperitoneal soft tissues and right psoas   - ID appreciated, ABx changed to cefazolin 2 gm q8 hrs 6 weeks from 1/18.  - PAM without evidence of endocarditis,    - IR appreciated, abscesses not amenable to drainage    #Right forearm foreign body  - 1.2cm linear foreign body overlying right radius  - surgery consult for removal - not recommending removal given difficulty of extraction, and no evidence of infection,   - not a contraindication for MRI     #Fall   - likely mechanical   - cth negative, trauma workup negative   - pt ot rehab     # Bl open wounds on wrist   - burn appreciated, Wound care - Xeroform/Kelrex BID, no Surgical debridement     # IVDU  # suspected ETOH use   # suspected thiamine folate deficiency   - active heroin use, last use 2 weeks ago  - reports taking 135mg methadone per day, dose confirmed with Juan Ramon Flores RN at methadone clinic, last received 3 day supply on 1/14,  - addiction medicine consulted  - continue thiamine folate supplementation   - pain management recs appreciated   - tylenol 650mg q8 standing   - gabapentin 300mg q8hr   - cyclobenzaprine 10mg q8hr   - dilaudid 6mg po q4 prn   - ketorolac 15mg q6hr prn     #Mood dx   #Anxiety   - continue home xanax 2mg TID, has withdrawal symptoms when not taking them.   - c/w welbutrin 300 qd and Seroquel 300 TID   - f/u drug screen     #Normocytic Anemia   - multifactorial, iron deficiency, anemia of chronic disease,   - continue folate, MV,  - ferrous sulfate 325mg qd     # DVT ppx- LMWH  # GI ppx- PPI  # Activity- AAT  # Diet- Reg  # Code status - full  # Dispo- stable for dc to NH.     Pending - placement to NH

## 2022-01-28 LAB
ALBUMIN SERPL ELPH-MCNC: 3.4 G/DL — LOW (ref 3.5–5.2)
ALP SERPL-CCNC: 103 U/L — SIGNIFICANT CHANGE UP (ref 30–115)
ALT FLD-CCNC: 8 U/L — SIGNIFICANT CHANGE UP (ref 0–41)
ANION GAP SERPL CALC-SCNC: 15 MMOL/L — HIGH (ref 7–14)
AST SERPL-CCNC: 9 U/L — SIGNIFICANT CHANGE UP (ref 0–41)
BASOPHILS # BLD AUTO: 0.03 K/UL — SIGNIFICANT CHANGE UP (ref 0–0.2)
BASOPHILS NFR BLD AUTO: 0.4 % — SIGNIFICANT CHANGE UP (ref 0–1)
BILIRUB SERPL-MCNC: <0.2 MG/DL — SIGNIFICANT CHANGE UP (ref 0.2–1.2)
BUN SERPL-MCNC: 17 MG/DL — SIGNIFICANT CHANGE UP (ref 10–20)
CALCIUM SERPL-MCNC: 8.6 MG/DL — SIGNIFICANT CHANGE UP (ref 8.5–10.1)
CHLORIDE SERPL-SCNC: 101 MMOL/L — SIGNIFICANT CHANGE UP (ref 98–110)
CO2 SERPL-SCNC: 20 MMOL/L — SIGNIFICANT CHANGE UP (ref 17–32)
CREAT SERPL-MCNC: 1 MG/DL — SIGNIFICANT CHANGE UP (ref 0.7–1.5)
EOSINOPHIL # BLD AUTO: 0.29 K/UL — SIGNIFICANT CHANGE UP (ref 0–0.7)
EOSINOPHIL NFR BLD AUTO: 4.2 % — SIGNIFICANT CHANGE UP (ref 0–8)
GLUCOSE SERPL-MCNC: 109 MG/DL — HIGH (ref 70–99)
HCT VFR BLD CALC: 32.1 % — LOW (ref 42–52)
HGB BLD-MCNC: 9.5 G/DL — LOW (ref 14–18)
IMM GRANULOCYTES NFR BLD AUTO: 0.6 % — HIGH (ref 0.1–0.3)
LYMPHOCYTES # BLD AUTO: 2.41 K/UL — SIGNIFICANT CHANGE UP (ref 1.2–3.4)
LYMPHOCYTES # BLD AUTO: 34.7 % — SIGNIFICANT CHANGE UP (ref 20.5–51.1)
MAGNESIUM SERPL-MCNC: 1.9 MG/DL — SIGNIFICANT CHANGE UP (ref 1.8–2.4)
MCHC RBC-ENTMCNC: 25.3 PG — LOW (ref 27–31)
MCHC RBC-ENTMCNC: 29.6 G/DL — LOW (ref 32–37)
MCV RBC AUTO: 85.6 FL — SIGNIFICANT CHANGE UP (ref 80–94)
MONOCYTES # BLD AUTO: 0.58 K/UL — SIGNIFICANT CHANGE UP (ref 0.1–0.6)
MONOCYTES NFR BLD AUTO: 8.3 % — SIGNIFICANT CHANGE UP (ref 1.7–9.3)
NEUTROPHILS # BLD AUTO: 3.6 K/UL — SIGNIFICANT CHANGE UP (ref 1.4–6.5)
NEUTROPHILS NFR BLD AUTO: 51.8 % — SIGNIFICANT CHANGE UP (ref 42.2–75.2)
NRBC # BLD: 0 /100 WBCS — SIGNIFICANT CHANGE UP (ref 0–0)
PLATELET # BLD AUTO: 389 K/UL — SIGNIFICANT CHANGE UP (ref 130–400)
POTASSIUM SERPL-MCNC: 4.4 MMOL/L — SIGNIFICANT CHANGE UP (ref 3.5–5)
POTASSIUM SERPL-SCNC: 4.4 MMOL/L — SIGNIFICANT CHANGE UP (ref 3.5–5)
PROT SERPL-MCNC: 7.4 G/DL — SIGNIFICANT CHANGE UP (ref 6–8)
RBC # BLD: 3.75 M/UL — LOW (ref 4.7–6.1)
RBC # FLD: 17 % — HIGH (ref 11.5–14.5)
SODIUM SERPL-SCNC: 136 MMOL/L — SIGNIFICANT CHANGE UP (ref 135–146)
WBC # BLD: 6.95 K/UL — SIGNIFICANT CHANGE UP (ref 4.8–10.8)
WBC # FLD AUTO: 6.95 K/UL — SIGNIFICANT CHANGE UP (ref 4.8–10.8)

## 2022-01-28 PROCEDURE — 99231 SBSQ HOSP IP/OBS SF/LOW 25: CPT

## 2022-01-28 RX ADMIN — QUETIAPINE FUMARATE 200 MILLIGRAM(S): 200 TABLET, FILM COATED ORAL at 13:58

## 2022-01-28 RX ADMIN — QUETIAPINE FUMARATE 200 MILLIGRAM(S): 200 TABLET, FILM COATED ORAL at 05:46

## 2022-01-28 RX ADMIN — CYCLOBENZAPRINE HYDROCHLORIDE 10 MILLIGRAM(S): 10 TABLET, FILM COATED ORAL at 21:58

## 2022-01-28 RX ADMIN — CYCLOBENZAPRINE HYDROCHLORIDE 10 MILLIGRAM(S): 10 TABLET, FILM COATED ORAL at 05:47

## 2022-01-28 RX ADMIN — PANTOPRAZOLE SODIUM 40 MILLIGRAM(S): 20 TABLET, DELAYED RELEASE ORAL at 11:41

## 2022-01-28 RX ADMIN — CYCLOBENZAPRINE HYDROCHLORIDE 10 MILLIGRAM(S): 10 TABLET, FILM COATED ORAL at 13:56

## 2022-01-28 RX ADMIN — Medication 100 MILLIGRAM(S): at 13:55

## 2022-01-28 RX ADMIN — Medication 1 PATCH: at 11:37

## 2022-01-28 RX ADMIN — SENNA PLUS 2 TABLET(S): 8.6 TABLET ORAL at 21:56

## 2022-01-28 RX ADMIN — Medication 650 MILLIGRAM(S): at 05:46

## 2022-01-28 RX ADMIN — BUPROPION HYDROCHLORIDE 300 MILLIGRAM(S): 150 TABLET, EXTENDED RELEASE ORAL at 11:34

## 2022-01-28 RX ADMIN — Medication 650 MILLIGRAM(S): at 12:13

## 2022-01-28 RX ADMIN — Medication 1 PATCH: at 14:10

## 2022-01-28 RX ADMIN — Medication 100 MILLIGRAM(S): at 00:09

## 2022-01-28 RX ADMIN — Medication 100 MILLIGRAM(S): at 21:54

## 2022-01-28 RX ADMIN — METHADONE HYDROCHLORIDE 135 MILLIGRAM(S): 40 TABLET ORAL at 12:06

## 2022-01-28 RX ADMIN — Medication 650 MILLIGRAM(S): at 17:20

## 2022-01-28 RX ADMIN — Medication 2 MILLIGRAM(S): at 13:55

## 2022-01-28 RX ADMIN — QUETIAPINE FUMARATE 200 MILLIGRAM(S): 200 TABLET, FILM COATED ORAL at 21:56

## 2022-01-28 RX ADMIN — Medication 650 MILLIGRAM(S): at 00:32

## 2022-01-28 RX ADMIN — GABAPENTIN 300 MILLIGRAM(S): 400 CAPSULE ORAL at 05:47

## 2022-01-28 RX ADMIN — Medication 2 MILLIGRAM(S): at 21:55

## 2022-01-28 RX ADMIN — GABAPENTIN 300 MILLIGRAM(S): 400 CAPSULE ORAL at 13:57

## 2022-01-28 RX ADMIN — Medication 650 MILLIGRAM(S): at 19:16

## 2022-01-28 RX ADMIN — Medication 1 MILLIGRAM(S): at 11:36

## 2022-01-28 RX ADMIN — Medication 650 MILLIGRAM(S): at 11:33

## 2022-01-28 RX ADMIN — MIRTAZAPINE 60 MILLIGRAM(S): 45 TABLET, ORALLY DISINTEGRATING ORAL at 21:56

## 2022-01-28 RX ADMIN — Medication 2 MILLIGRAM(S): at 05:46

## 2022-01-28 RX ADMIN — Medication 100 MILLIGRAM(S): at 05:46

## 2022-01-28 RX ADMIN — GABAPENTIN 300 MILLIGRAM(S): 400 CAPSULE ORAL at 21:57

## 2022-01-28 RX ADMIN — Medication 325 MILLIGRAM(S): at 11:35

## 2022-01-28 RX ADMIN — Medication 1 TABLET(S): at 11:37

## 2022-01-28 RX ADMIN — Medication 650 MILLIGRAM(S): at 19:17

## 2022-01-28 RX ADMIN — ENOXAPARIN SODIUM 40 MILLIGRAM(S): 100 INJECTION SUBCUTANEOUS at 11:35

## 2022-01-28 NOTE — PROGRESS NOTE ADULT - SUBJECTIVE AND OBJECTIVE BOX
MIKAEL MCDERMOTT 51y Male  MRN#: [id number]     Hospital Day: 12d    Pt is currently admitted with the primary diagnosis of  OSTEOMYELITIS OF VERTEBRA;LUMBAR REGION;MULTIPLE WOUNDS OF SKIN        SUBJECTIVE     Overnight events  None    Subjective complaints  None                                              ----------------------------------------------------------  OBJECTIVE  PAST MEDICAL & SURGICAL HISTORY  IV drug abuse                                              -----------------------------------------------------------  ALLERGIES:  No Known Allergies                                            ------------------------------------------------------------    HOME MEDICATIONS  Home Medications:  cyclobenzaprine 10 mg oral tablet: 1 tab(s) orally every 8 hours (21 Jan 2022 15:09)  ferrous sulfate 325 mg (65 mg elemental iron) oral tablet: 1 tab(s) orally once a day (21 Jan 2022 15:09)  folic acid 1 mg oral tablet: 1 tab(s) orally once a day (21 Jan 2022 15:09)  gabapentin 300 mg oral capsule: 1 cap(s) orally 3 times a day (21 Jan 2022 15:09)  methadone: 135 milligram(s) orally once a day (21 Jan 2022 15:09)  Multiple Vitamins oral tablet: 1 tab(s) orally once a day (21 Jan 2022 15:09)  Remeron 30 mg oral tablet: 2 tab(s) orally once a day (at bedtime) (16 Jan 2022 10:02)  SEROquel 200 mg oral tablet: orally 3 times a day (16 Jan 2022 10:02)  Wellbutrin: 300 milligram(s) orally once a day (16 Jan 2022 10:02)  Xanax: 2 milligram(s) orally 3 times a day (16 Jan 2022 10:02)                           MEDICATIONS:  STANDING MEDICATIONS  acetaminophen     Tablet .. 650 milliGRAM(s) Oral every 6 hours  ALPRAZolam 2 milliGRAM(s) Oral three times a day  buPROPion XL (24-Hour) . 300 milliGRAM(s) Oral daily  ceFAZolin   IVPB 2000 milliGRAM(s) IV Intermittent every 8 hours  cyclobenzaprine 10 milliGRAM(s) Oral every 8 hours  enoxaparin Injectable 40 milliGRAM(s) SubCutaneous daily  ferrous    sulfate 325 milliGRAM(s) Oral daily  folic acid 1 milliGRAM(s) Oral daily  gabapentin 300 milliGRAM(s) Oral three times a day  influenza   Vaccine 0.5 milliLiter(s) IntraMuscular once  methadone    Tablet 135 milliGRAM(s) Oral daily  mirtazapine 60 milliGRAM(s) Oral at bedtime  multivitamin 1 Tablet(s) Oral daily  nicotine - 21 mG/24Hr(s) Patch 1 patch Transdermal daily  pantoprazole    Tablet 40 milliGRAM(s) Oral before breakfast  polyethylene glycol 3350 17 Gram(s) Oral two times a day  QUEtiapine 200 milliGRAM(s) Oral three times a day  senna 2 Tablet(s) Oral at bedtime    PRN MEDICATIONS  aluminum hydroxide/magnesium hydroxide/simethicone Suspension 30 milliLiter(s) Oral every 4 hours PRN  melatonin 3 milliGRAM(s) Oral at bedtime PRN  ondansetron Injectable 4 milliGRAM(s) IV Push every 8 hours PRN                                            ------------------------------------------------------------  VITAL SIGNS: Last 24 Hours  T(C): 36.2 (28 Jan 2022 06:04), Max: 37.2 (27 Jan 2022 14:50)  T(F): 97.1 (28 Jan 2022 06:04), Max: 98.9 (27 Jan 2022 14:50)  HR: 89 (28 Jan 2022 06:04) (76 - 99)  BP: 118/66 (28 Jan 2022 06:04) (118/66 - 126/67)  BP(mean): --  RR: 18 (28 Jan 2022 06:04) (18 - 19)  SpO2: --                                             --------------------------------------------------------------  LABS:                        9.5    6.95  )-----------( 389      ( 28 Jan 2022 06:00 )             32.1     01-28    136  |  101  |  17  ----------------------------<  109<H>  4.4   |  20  |  1.0    Ca    8.6      28 Jan 2022 06:00  Mg     1.9     01-28    TPro  7.4  /  Alb  3.4<L>  /  TBili  <0.2  /  DBili  x   /  AST  9   /  ALT  8   /  AlkPhos  103  01-28                                                              -------------------------------------------------------------  RADIOLOGY:                                            --------------------------------------------------------------    PHYSICAL EXAM:  GENERAL: NAD, lying in bed comfortably  HEAD:  Atraumatic, Normocephalic  EYES: EOMI, PERRLA, conjunctiva and sclera clear  ENT: Moist mucous membranes  NECK: Supple, No JVD  CHEST/LUNG: Clear to auscultation bilaterally; No rales, rhonchi, wheezing, or rubs. Unlabored respirations  HEART: regular rate and rhythm; No murmurs, rubs, or gallops  ABDOMEN: Bowel sounds present; Soft, Nontender, Nondistended.    EXTREMITIES: B/L extremities covered in bandages  NERVOUS SYSTEM:  Alert & Oriented X3, speech clear. No focal deficits   SKIN: No rashes or lesions                                           --------------------------------------------------------------

## 2022-01-29 LAB
ALBUMIN SERPL ELPH-MCNC: 3.3 G/DL — LOW (ref 3.5–5.2)
ALP SERPL-CCNC: 104 U/L — SIGNIFICANT CHANGE UP (ref 30–115)
ALT FLD-CCNC: 7 U/L — SIGNIFICANT CHANGE UP (ref 0–41)
ANION GAP SERPL CALC-SCNC: 16 MMOL/L — HIGH (ref 7–14)
AST SERPL-CCNC: 9 U/L — SIGNIFICANT CHANGE UP (ref 0–41)
BASOPHILS # BLD AUTO: 0.03 K/UL — SIGNIFICANT CHANGE UP (ref 0–0.2)
BASOPHILS NFR BLD AUTO: 0.4 % — SIGNIFICANT CHANGE UP (ref 0–1)
BILIRUB SERPL-MCNC: <0.2 MG/DL — SIGNIFICANT CHANGE UP (ref 0.2–1.2)
BUN SERPL-MCNC: 16 MG/DL — SIGNIFICANT CHANGE UP (ref 10–20)
CALCIUM SERPL-MCNC: 8.8 MG/DL — SIGNIFICANT CHANGE UP (ref 8.5–10.1)
CHLORIDE SERPL-SCNC: 99 MMOL/L — SIGNIFICANT CHANGE UP (ref 98–110)
CO2 SERPL-SCNC: 19 MMOL/L — SIGNIFICANT CHANGE UP (ref 17–32)
CREAT SERPL-MCNC: 0.9 MG/DL — SIGNIFICANT CHANGE UP (ref 0.7–1.5)
EOSINOPHIL # BLD AUTO: 0.24 K/UL — SIGNIFICANT CHANGE UP (ref 0–0.7)
EOSINOPHIL NFR BLD AUTO: 3.5 % — SIGNIFICANT CHANGE UP (ref 0–8)
GLUCOSE SERPL-MCNC: 112 MG/DL — HIGH (ref 70–99)
HCT VFR BLD CALC: 30.3 % — LOW (ref 42–52)
HGB BLD-MCNC: 9.3 G/DL — LOW (ref 14–18)
IMM GRANULOCYTES NFR BLD AUTO: 0.3 % — SIGNIFICANT CHANGE UP (ref 0.1–0.3)
LYMPHOCYTES # BLD AUTO: 2.61 K/UL — SIGNIFICANT CHANGE UP (ref 1.2–3.4)
LYMPHOCYTES # BLD AUTO: 37.6 % — SIGNIFICANT CHANGE UP (ref 20.5–51.1)
MAGNESIUM SERPL-MCNC: 1.9 MG/DL — SIGNIFICANT CHANGE UP (ref 1.8–2.4)
MCHC RBC-ENTMCNC: 25.8 PG — LOW (ref 27–31)
MCHC RBC-ENTMCNC: 30.7 G/DL — LOW (ref 32–37)
MCV RBC AUTO: 83.9 FL — SIGNIFICANT CHANGE UP (ref 80–94)
MONOCYTES # BLD AUTO: 0.57 K/UL — SIGNIFICANT CHANGE UP (ref 0.1–0.6)
MONOCYTES NFR BLD AUTO: 8.2 % — SIGNIFICANT CHANGE UP (ref 1.7–9.3)
NEUTROPHILS # BLD AUTO: 3.47 K/UL — SIGNIFICANT CHANGE UP (ref 1.4–6.5)
NEUTROPHILS NFR BLD AUTO: 50 % — SIGNIFICANT CHANGE UP (ref 42.2–75.2)
NRBC # BLD: 0 /100 WBCS — SIGNIFICANT CHANGE UP (ref 0–0)
PLATELET # BLD AUTO: 409 K/UL — HIGH (ref 130–400)
POTASSIUM SERPL-MCNC: 4.3 MMOL/L — SIGNIFICANT CHANGE UP (ref 3.5–5)
POTASSIUM SERPL-SCNC: 4.3 MMOL/L — SIGNIFICANT CHANGE UP (ref 3.5–5)
PROT SERPL-MCNC: 7.2 G/DL — SIGNIFICANT CHANGE UP (ref 6–8)
RBC # BLD: 3.61 M/UL — LOW (ref 4.7–6.1)
RBC # FLD: 17 % — HIGH (ref 11.5–14.5)
SODIUM SERPL-SCNC: 134 MMOL/L — LOW (ref 135–146)
WBC # BLD: 6.94 K/UL — SIGNIFICANT CHANGE UP (ref 4.8–10.8)
WBC # FLD AUTO: 6.94 K/UL — SIGNIFICANT CHANGE UP (ref 4.8–10.8)

## 2022-01-29 PROCEDURE — 99231 SBSQ HOSP IP/OBS SF/LOW 25: CPT

## 2022-01-29 PROCEDURE — 71045 X-RAY EXAM CHEST 1 VIEW: CPT | Mod: 26

## 2022-01-29 RX ADMIN — QUETIAPINE FUMARATE 200 MILLIGRAM(S): 200 TABLET, FILM COATED ORAL at 13:44

## 2022-01-29 RX ADMIN — Medication 1 MILLIGRAM(S): at 11:57

## 2022-01-29 RX ADMIN — Medication 1 TABLET(S): at 11:56

## 2022-01-29 RX ADMIN — METHADONE HYDROCHLORIDE 135 MILLIGRAM(S): 40 TABLET ORAL at 11:55

## 2022-01-29 RX ADMIN — PANTOPRAZOLE SODIUM 40 MILLIGRAM(S): 20 TABLET, DELAYED RELEASE ORAL at 05:36

## 2022-01-29 RX ADMIN — Medication 100 MILLIGRAM(S): at 05:36

## 2022-01-29 RX ADMIN — Medication 1 PATCH: at 20:00

## 2022-01-29 RX ADMIN — Medication 325 MILLIGRAM(S): at 11:57

## 2022-01-29 RX ADMIN — CYCLOBENZAPRINE HYDROCHLORIDE 10 MILLIGRAM(S): 10 TABLET, FILM COATED ORAL at 22:50

## 2022-01-29 RX ADMIN — CYCLOBENZAPRINE HYDROCHLORIDE 10 MILLIGRAM(S): 10 TABLET, FILM COATED ORAL at 05:35

## 2022-01-29 RX ADMIN — GABAPENTIN 300 MILLIGRAM(S): 400 CAPSULE ORAL at 05:35

## 2022-01-29 RX ADMIN — SENNA PLUS 2 TABLET(S): 8.6 TABLET ORAL at 22:51

## 2022-01-29 RX ADMIN — QUETIAPINE FUMARATE 200 MILLIGRAM(S): 200 TABLET, FILM COATED ORAL at 05:35

## 2022-01-29 RX ADMIN — MIRTAZAPINE 60 MILLIGRAM(S): 45 TABLET, ORALLY DISINTEGRATING ORAL at 22:50

## 2022-01-29 RX ADMIN — Medication 650 MILLIGRAM(S): at 05:34

## 2022-01-29 RX ADMIN — BUPROPION HYDROCHLORIDE 300 MILLIGRAM(S): 150 TABLET, EXTENDED RELEASE ORAL at 11:57

## 2022-01-29 RX ADMIN — ENOXAPARIN SODIUM 40 MILLIGRAM(S): 100 INJECTION SUBCUTANEOUS at 12:02

## 2022-01-29 RX ADMIN — GABAPENTIN 300 MILLIGRAM(S): 400 CAPSULE ORAL at 22:50

## 2022-01-29 RX ADMIN — Medication 650 MILLIGRAM(S): at 11:56

## 2022-01-29 RX ADMIN — Medication 2 MILLIGRAM(S): at 22:49

## 2022-01-29 RX ADMIN — Medication 100 MILLIGRAM(S): at 22:49

## 2022-01-29 RX ADMIN — Medication 1 PATCH: at 11:58

## 2022-01-29 RX ADMIN — Medication 650 MILLIGRAM(S): at 17:54

## 2022-01-29 RX ADMIN — Medication 650 MILLIGRAM(S): at 13:38

## 2022-01-29 RX ADMIN — Medication 1 PATCH: at 11:44

## 2022-01-29 RX ADMIN — Medication 650 MILLIGRAM(S): at 17:34

## 2022-01-29 RX ADMIN — GABAPENTIN 300 MILLIGRAM(S): 400 CAPSULE ORAL at 13:43

## 2022-01-29 RX ADMIN — Medication 2 MILLIGRAM(S): at 05:33

## 2022-01-29 RX ADMIN — CYCLOBENZAPRINE HYDROCHLORIDE 10 MILLIGRAM(S): 10 TABLET, FILM COATED ORAL at 13:43

## 2022-01-29 RX ADMIN — Medication 1 PATCH: at 11:45

## 2022-01-29 RX ADMIN — QUETIAPINE FUMARATE 200 MILLIGRAM(S): 200 TABLET, FILM COATED ORAL at 22:49

## 2022-01-29 RX ADMIN — Medication 100 MILLIGRAM(S): at 13:43

## 2022-01-29 RX ADMIN — Medication 2 MILLIGRAM(S): at 13:42

## 2022-01-29 NOTE — CHART NOTE - NSCHARTNOTEFT_GEN_A_CORE
called by RN for malfunction of PICC line  spoke to IR -> no interventions over the weekend, recommended chest xray  chest xray done -> stable placement  trial of flushing and aspiration with a 10 cc and 5 cc needles -> unable to flush or aspirate  recall IR on monday

## 2022-01-29 NOTE — PROGRESS NOTE ADULT - ASSESSMENT
51-year-old male past medical history of polysubstance abuse who presents status post fall. Has back pain radiating to LLE, CT concerning for osteomyelitis     # Sepsis POA  # Vertebral Osteomyelitis with adjacent soft tissue abscess   # MSSA Bacteremia   - on admission HR 91, wbc 16.6k  - ,      - Blood culture 1/15 + GPC in clusters, 1/17 + GPC in clusters, cultures negative since 1/18.   - CT AP - osseous destructive process at the left L5-S1 facet joints with infiltrative changes extending to the adjacent spinal canal and neural foramina and associated stenosis. Consider osteomyelitis.   - MRI - findings consistent with L5 S1 facetitis, multifocal abscess within left retroperitoneal soft tissues and right psoas   - ID appreciated, ABx changed to cefazolin 2 gm q8 hrs 6 weeks from 1/18.  - PAM without evidence of endocarditis,    - IR appreciated, abscesses not amenable to drainage    # Picc line malfunction  - called IR at night for picc line malfunction  - peripheral iv for now  - repeat cxr shows the picc line to be in place  - IR will follow on Monday    #Right forearm foreign body  - 1.2cm linear foreign body overlying right radius  - surgery consult for removal - not recommending removal given difficulty of extraction, and no evidence of infection,   - not a contraindication for MRI     #Fall   - likely mechanical   - cth negative, trauma workup negative   - pt ot rehab     # Bl open wounds on wrist   - burn appreciated, Wound care - Xeroform/Kelrex BID, no Surgical debridement     # IVDU  # suspected ETOH use   # suspected thiamine folate deficiency   - active heroin use, last use 2 weeks ago  - reports taking 135mg methadone per day, dose confirmed with Juan Ramon Flores RN at methadone clinic, last received 3 day supply on 1/14,  - addiction medicine consulted  - continue thiamine folate supplementation   - pain management recs appreciated   - tylenol 650mg q8 standing   - gabapentin 300mg q8hr   - cyclobenzaprine 10mg q8hr   - dilaudid 6mg po q4 prn   - ketorolac 15mg q6hr prn     #Mood dx   #Anxiety   - continue home xanax 2mg TID, has withdrawal symptoms when not taking them.   - c/w welbutrin 300 qd and Seroquel 300 TID   - f/u drug screen     #Normocytic Anemia   - multifactorial, iron deficiency, anemia of chronic disease,   - continue folate, MV,  - ferrous sulfate 325mg qd     # DVT ppx- LMWH  # GI ppx- PPI  # Activity- AAT  # Diet- Reg  # Code status - full  # Dispo- stable for dc to NH.     Pending - placement to NH

## 2022-01-29 NOTE — PROGRESS NOTE ADULT - SUBJECTIVE AND OBJECTIVE BOX
MIKAEL MCDERMOTT 51y Male  MRN#: [id number]     Hospital Day: 13d    Pt is currently admitted with the primary diagnosis of  OSTEOMYELITIS OF VERTEBRA;LUMBAR REGION;MULTIPLE WOUNDS OF SKIN        SUBJECTIVE     Overnight events  None    Subjective complaints  None                                            ----------------------------------------------------------  OBJECTIVE  PAST MEDICAL & SURGICAL HISTORY  IV drug abuse                                              -----------------------------------------------------------  ALLERGIES:  No Known Allergies                                            ------------------------------------------------------------    HOME MEDICATIONS  Home Medications:  cyclobenzaprine 10 mg oral tablet: 1 tab(s) orally every 8 hours (21 Jan 2022 15:09)  ferrous sulfate 325 mg (65 mg elemental iron) oral tablet: 1 tab(s) orally once a day (21 Jan 2022 15:09)  folic acid 1 mg oral tablet: 1 tab(s) orally once a day (21 Jan 2022 15:09)  gabapentin 300 mg oral capsule: 1 cap(s) orally 3 times a day (21 Jan 2022 15:09)  methadone: 135 milligram(s) orally once a day (21 Jan 2022 15:09)  Multiple Vitamins oral tablet: 1 tab(s) orally once a day (21 Jan 2022 15:09)  Remeron 30 mg oral tablet: 2 tab(s) orally once a day (at bedtime) (16 Jan 2022 10:02)  SEROquel 200 mg oral tablet: orally 3 times a day (16 Jan 2022 10:02)  Wellbutrin: 300 milligram(s) orally once a day (16 Jan 2022 10:02)  Xanax: 2 milligram(s) orally 3 times a day (16 Jan 2022 10:02)                           MEDICATIONS:  STANDING MEDICATIONS  acetaminophen     Tablet .. 650 milliGRAM(s) Oral every 6 hours  ALPRAZolam 2 milliGRAM(s) Oral three times a day  buPROPion XL (24-Hour) . 300 milliGRAM(s) Oral daily  ceFAZolin   IVPB 2000 milliGRAM(s) IV Intermittent every 8 hours  cyclobenzaprine 10 milliGRAM(s) Oral every 8 hours  enoxaparin Injectable 40 milliGRAM(s) SubCutaneous daily  ferrous    sulfate 325 milliGRAM(s) Oral daily  folic acid 1 milliGRAM(s) Oral daily  gabapentin 300 milliGRAM(s) Oral three times a day  influenza   Vaccine 0.5 milliLiter(s) IntraMuscular once  methadone    Tablet 135 milliGRAM(s) Oral daily  mirtazapine 60 milliGRAM(s) Oral at bedtime  multivitamin 1 Tablet(s) Oral daily  nicotine - 21 mG/24Hr(s) Patch 1 patch Transdermal daily  pantoprazole    Tablet 40 milliGRAM(s) Oral before breakfast  polyethylene glycol 3350 17 Gram(s) Oral two times a day  QUEtiapine 200 milliGRAM(s) Oral three times a day  senna 2 Tablet(s) Oral at bedtime    PRN MEDICATIONS  aluminum hydroxide/magnesium hydroxide/simethicone Suspension 30 milliLiter(s) Oral every 4 hours PRN  melatonin 3 milliGRAM(s) Oral at bedtime PRN  ondansetron Injectable 4 milliGRAM(s) IV Push every 8 hours PRN                                            ------------------------------------------------------------  VITAL SIGNS: Last 24 Hours  T(C): 36.9 (29 Jan 2022 04:28), Max: 36.9 (29 Jan 2022 04:28)  T(F): 98.5 (29 Jan 2022 04:28), Max: 98.5 (29 Jan 2022 04:28)  HR: 81 (29 Jan 2022 04:28) (81 - 93)  BP: 112/75 (29 Jan 2022 04:28) (104/60 - 112/75)  BP(mean): --  RR: 18 (28 Jan 2022 21:14) (18 - 18)  SpO2: --                                             --------------------------------------------------------------  LABS:                        9.5    6.95  )-----------( 389      ( 28 Jan 2022 06:00 )             32.1     01-28    136  |  101  |  17  ----------------------------<  109<H>  4.4   |  20  |  1.0    Ca    8.6      28 Jan 2022 06:00  Mg     1.9     01-28    TPro  7.4  /  Alb  3.4<L>  /  TBili  <0.2  /  DBili  x   /  AST  9   /  ALT  8   /  AlkPhos  103  01-28                                                              -------------------------------------------------------------  RADIOLOGY:                                            --------------------------------------------------------------    PHYSICAL EXAM:  GENERAL: NAD, lying in bed comfortably  HEAD:  Atraumatic, Normocephalic  EYES: EOMI, PERRLA, conjunctiva and sclera clear  ENT: Moist mucous membranes  NECK: Supple, No JVD  CHEST/LUNG: Clear to auscultation bilaterally; No rales, rhonchi, wheezing, or rubs. Unlabored respirations  HEART: regular rate and rhythm; No murmurs, rubs, or gallops  ABDOMEN: Bowel sounds present; Soft, Nontender, Nondistended.    EXTREMITIES: B/L wrist wounds  NERVOUS SYSTEM:  Alert & Oriented X3, speech clear. No focal deficits   SKIN: No rashes or lesions                                           --------------------------------------------------------------

## 2022-01-30 PROCEDURE — 99231 SBSQ HOSP IP/OBS SF/LOW 25: CPT

## 2022-01-30 RX ADMIN — QUETIAPINE FUMARATE 200 MILLIGRAM(S): 200 TABLET, FILM COATED ORAL at 13:12

## 2022-01-30 RX ADMIN — QUETIAPINE FUMARATE 200 MILLIGRAM(S): 200 TABLET, FILM COATED ORAL at 06:09

## 2022-01-30 RX ADMIN — Medication 1 PATCH: at 11:41

## 2022-01-30 RX ADMIN — Medication 650 MILLIGRAM(S): at 00:12

## 2022-01-30 RX ADMIN — GABAPENTIN 300 MILLIGRAM(S): 400 CAPSULE ORAL at 13:12

## 2022-01-30 RX ADMIN — Medication 2 MILLIGRAM(S): at 21:42

## 2022-01-30 RX ADMIN — METHADONE HYDROCHLORIDE 135 MILLIGRAM(S): 40 TABLET ORAL at 11:40

## 2022-01-30 RX ADMIN — MIRTAZAPINE 60 MILLIGRAM(S): 45 TABLET, ORALLY DISINTEGRATING ORAL at 21:37

## 2022-01-30 RX ADMIN — Medication 650 MILLIGRAM(S): at 00:11

## 2022-01-30 RX ADMIN — QUETIAPINE FUMARATE 200 MILLIGRAM(S): 200 TABLET, FILM COATED ORAL at 21:37

## 2022-01-30 RX ADMIN — Medication 650 MILLIGRAM(S): at 06:09

## 2022-01-30 RX ADMIN — Medication 100 MILLIGRAM(S): at 06:10

## 2022-01-30 RX ADMIN — Medication 2 MILLIGRAM(S): at 06:08

## 2022-01-30 RX ADMIN — Medication 650 MILLIGRAM(S): at 17:47

## 2022-01-30 RX ADMIN — Medication 2 MILLIGRAM(S): at 13:12

## 2022-01-30 RX ADMIN — Medication 100 MILLIGRAM(S): at 13:13

## 2022-01-30 RX ADMIN — PANTOPRAZOLE SODIUM 40 MILLIGRAM(S): 20 TABLET, DELAYED RELEASE ORAL at 06:10

## 2022-01-30 RX ADMIN — CYCLOBENZAPRINE HYDROCHLORIDE 10 MILLIGRAM(S): 10 TABLET, FILM COATED ORAL at 06:09

## 2022-01-30 RX ADMIN — Medication 1 TABLET(S): at 11:42

## 2022-01-30 RX ADMIN — Medication 1 PATCH: at 11:37

## 2022-01-30 RX ADMIN — Medication 1 MILLIGRAM(S): at 11:42

## 2022-01-30 RX ADMIN — GABAPENTIN 300 MILLIGRAM(S): 400 CAPSULE ORAL at 21:37

## 2022-01-30 RX ADMIN — Medication 325 MILLIGRAM(S): at 11:43

## 2022-01-30 RX ADMIN — CYCLOBENZAPRINE HYDROCHLORIDE 10 MILLIGRAM(S): 10 TABLET, FILM COATED ORAL at 21:37

## 2022-01-30 RX ADMIN — SENNA PLUS 2 TABLET(S): 8.6 TABLET ORAL at 21:37

## 2022-01-30 RX ADMIN — BUPROPION HYDROCHLORIDE 300 MILLIGRAM(S): 150 TABLET, EXTENDED RELEASE ORAL at 11:42

## 2022-01-30 RX ADMIN — Medication 650 MILLIGRAM(S): at 11:41

## 2022-01-30 RX ADMIN — Medication 1 PATCH: at 08:50

## 2022-01-30 RX ADMIN — ENOXAPARIN SODIUM 40 MILLIGRAM(S): 100 INJECTION SUBCUTANEOUS at 11:42

## 2022-01-30 RX ADMIN — Medication 1 PATCH: at 20:04

## 2022-01-30 RX ADMIN — Medication 100 MILLIGRAM(S): at 21:37

## 2022-01-30 RX ADMIN — CYCLOBENZAPRINE HYDROCHLORIDE 10 MILLIGRAM(S): 10 TABLET, FILM COATED ORAL at 13:12

## 2022-01-30 RX ADMIN — GABAPENTIN 300 MILLIGRAM(S): 400 CAPSULE ORAL at 06:09

## 2022-01-30 RX ADMIN — Medication 650 MILLIGRAM(S): at 12:10

## 2022-01-30 NOTE — PROGRESS NOTE ADULT - SUBJECTIVE AND OBJECTIVE BOX
Pt seen and examined at bedside. Patient is stable. No complaints.     VITAL SIGNS (Last 24 hrs):  T(C): 36.5 (01-30-22 @ 06:25), Max: 36.5 (01-30-22 @ 06:25)  HR: 92 (01-30-22 @ 09:30) (92 - 100)  BP: 135/90 (01-30-22 @ 06:25) (135/90 - 146/68)  RR: 18 (01-30-22 @ 06:25) (18 - 18)  SpO2: 92% (01-30-22 @ 09:30) (92% - 92%)  Wt(kg): --  Daily     Daily     I&O's Summary    30 Jan 2022 07:01  -  30 Jan 2022 13:32  -----------------------------------------------   IN: 0 mL / OUT: 900 mL / NET: -900 mL        PHYSICAL EXAM:  GENERAL: NAD, obese   HEAD:  Atraumatic, Normocephalic  EYES: EOMI, PERRLA, conjunctiva and sclera clear  NECK: Supple, No JVD  CHEST/LUNG: Clear to auscultation bilaterally; No wheeze  HEART: Regular rate and rhythm; No murmurs, rubs, or gallops  ABDOMEN: Soft, Nontender, Nondistended; Bowel sounds present  EXTREMITIES:  2+ Peripheral Pulses, No clubbing, cyanosis, or edema  PSYCH: AAOx3  NEUROLOGY: non-focal  SKIN: b/l wrist wounds     Labs Reviewed  Spoke to patient in regards to abnormal labs.    CBC Full  -  ( 29 Jan 2022 07:10 )  WBC Count : 6.94 K/uL  Hemoglobin : 9.3 g/dL  Hematocrit : 30.3 %  Platelet Count - Automated : 409 K/uL  Mean Cell Volume : 83.9 fL  Mean Cell Hemoglobin : 25.8 pg  Mean Cell Hemoglobin Concentration : 30.7 g/dL  Auto Neutrophil # : 3.47 K/uL  Auto Lymphocyte # : 2.61 K/uL  Auto Monocyte # : 0.57 K/uL  Auto Eosinophil # : 0.24 K/uL  Auto Basophil # : 0.03 K/uL  Auto Neutrophil % : 50.0 %  Auto Lymphocyte % : 37.6 %  Auto Monocyte % : 8.2 %  Auto Eosinophil % : 3.5 %  Auto Basophil % : 0.4 %    BMP:    01-29 @ 07:10    Blood Urea Nitrogen - 16  Calcium - 8.8  Carbond Dioxide - 19  Chloride - 99  Creatinine - 0.9  Glucose - 112  Potassium - 4.3  Sodium - 134         MEDICATIONS  (STANDING):  acetaminophen     Tablet .. 650 milliGRAM(s) Oral every 6 hours  ALPRAZolam 2 milliGRAM(s) Oral three times a day  buPROPion XL (24-Hour) . 300 milliGRAM(s) Oral daily  ceFAZolin   IVPB 2000 milliGRAM(s) IV Intermittent every 8 hours  cyclobenzaprine 10 milliGRAM(s) Oral every 8 hours  enoxaparin Injectable 40 milliGRAM(s) SubCutaneous daily  ferrous    sulfate 325 milliGRAM(s) Oral daily  folic acid 1 milliGRAM(s) Oral daily  gabapentin 300 milliGRAM(s) Oral three times a day  influenza   Vaccine 0.5 milliLiter(s) IntraMuscular once  methadone    Tablet 135 milliGRAM(s) Oral daily  mirtazapine 60 milliGRAM(s) Oral at bedtime  multivitamin 1 Tablet(s) Oral daily  nicotine - 21 mG/24Hr(s) Patch 1 patch Transdermal daily  pantoprazole    Tablet 40 milliGRAM(s) Oral before breakfast  polyethylene glycol 3350 17 Gram(s) Oral two times a day  QUEtiapine 200 milliGRAM(s) Oral three times a day  senna 2 Tablet(s) Oral at bedtime    MEDICATIONS  (PRN):  aluminum hydroxide/magnesium hydroxide/simethicone Suspension 30 milliLiter(s) Oral every 4 hours PRN Dyspepsia  melatonin 3 milliGRAM(s) Oral at bedtime PRN Insomnia  ondansetron Injectable 4 milliGRAM(s) IV Push every 8 hours PRN Nausea and/or Vomiting

## 2022-01-30 NOTE — PROGRESS NOTE ADULT - ASSESSMENT
51-year-old male past medical history of polysubstance abuse who presents status post fall. Has back pain radiating to LLE, CT concerning for osteomyelitis.     # Sepsis POA  # Vertebral Osteomyelitis with adjacent soft tissue abscess   # MSSA Bacteremia   - Blood culture 1/15 + GPC in clusters, 1/17 + GPC in clusters, cultures negative since 1/18.   - CT AP - osseous destructive process at the left L5-S1 facet joints with infiltrative changes extending to the adjacent spinal canal and neural foramina and associated stenosis. Consider osteomyelitis.   - MRI - findings consistent with L5 S1 facetitis, multifocal abscess within left retroperitoneal soft tissues and right psoas   - ID appreciated, ABx changed to cefazolin 2 gm q8 hrs today 6 weeks from 1/18.  - PAM without evidence of endocarditis,    - IR appreciated, abscesses not amenable to drainage    #Right forearm foreign body  - 1.2cm linear foreign body overlying right radius  - surgery consulted for removal - not recommending removal given difficulty of extraction, and no evidence of infection   - not a contraindication for MRI     # Bl open wounds on wrist   - burn appreciated, Wound care - Xeroform/Kelrex BID, no Surgical debridement     # IVDU  # suspected ETOH use   # suspected thiamine folate deficiency   - active heroin use, last use 2 weeks ago  - reports taking 135mg methadone per day, dose confirmed with Juan Ramon Flores RN at methadone clinic, last received 3 day supply on 1/14,  - addiction medicine consulted  - continue thiamine folate supplementation   - pain management recs appreciated   - tylenol 650mg q8 standing   - gabapentin 300mg q8hr   - cyclobenzaprine 10mg q8hr   - Dilaudid 6mg po q4 prn   - ketorolac 15mg q6hr prn     #Mood dx   #Anxiety   - continue home xanax 2mg TID, has withdrawal symptoms when not taking them   - c/w welbutrin 300 qd and Seroquel 300 TID     #Normocytic Anemia   - multifactorial, iron deficiency, anemia of chronic disease   - continue folate, MV,  - ferrous sulfate 325mg qd     # DVT ppx- LMWH        Pending - placement to NH  plan of care d/w pt.

## 2022-01-31 LAB — SARS-COV-2 RNA SPEC QL NAA+PROBE: SIGNIFICANT CHANGE UP

## 2022-01-31 PROCEDURE — 99231 SBSQ HOSP IP/OBS SF/LOW 25: CPT

## 2022-01-31 RX ADMIN — QUETIAPINE FUMARATE 200 MILLIGRAM(S): 200 TABLET, FILM COATED ORAL at 05:17

## 2022-01-31 RX ADMIN — Medication 325 MILLIGRAM(S): at 12:04

## 2022-01-31 RX ADMIN — Medication 100 MILLIGRAM(S): at 05:18

## 2022-01-31 RX ADMIN — Medication 1 MILLIGRAM(S): at 12:01

## 2022-01-31 RX ADMIN — Medication 100 MILLIGRAM(S): at 22:19

## 2022-01-31 RX ADMIN — CYCLOBENZAPRINE HYDROCHLORIDE 10 MILLIGRAM(S): 10 TABLET, FILM COATED ORAL at 22:23

## 2022-01-31 RX ADMIN — SENNA PLUS 2 TABLET(S): 8.6 TABLET ORAL at 22:21

## 2022-01-31 RX ADMIN — Medication 650 MILLIGRAM(S): at 12:02

## 2022-01-31 RX ADMIN — Medication 2 MILLIGRAM(S): at 14:41

## 2022-01-31 RX ADMIN — GABAPENTIN 300 MILLIGRAM(S): 400 CAPSULE ORAL at 14:41

## 2022-01-31 RX ADMIN — QUETIAPINE FUMARATE 200 MILLIGRAM(S): 200 TABLET, FILM COATED ORAL at 14:41

## 2022-01-31 RX ADMIN — Medication 1 PATCH: at 12:04

## 2022-01-31 RX ADMIN — CYCLOBENZAPRINE HYDROCHLORIDE 10 MILLIGRAM(S): 10 TABLET, FILM COATED ORAL at 05:18

## 2022-01-31 RX ADMIN — Medication 1 PATCH: at 05:18

## 2022-01-31 RX ADMIN — Medication 2 MILLIGRAM(S): at 05:21

## 2022-01-31 RX ADMIN — Medication 650 MILLIGRAM(S): at 05:18

## 2022-01-31 RX ADMIN — Medication 650 MILLIGRAM(S): at 23:10

## 2022-01-31 RX ADMIN — MIRTAZAPINE 60 MILLIGRAM(S): 45 TABLET, ORALLY DISINTEGRATING ORAL at 22:22

## 2022-01-31 RX ADMIN — GABAPENTIN 300 MILLIGRAM(S): 400 CAPSULE ORAL at 05:18

## 2022-01-31 RX ADMIN — CYCLOBENZAPRINE HYDROCHLORIDE 10 MILLIGRAM(S): 10 TABLET, FILM COATED ORAL at 14:41

## 2022-01-31 RX ADMIN — GABAPENTIN 300 MILLIGRAM(S): 400 CAPSULE ORAL at 22:23

## 2022-01-31 RX ADMIN — Medication 2 MILLIGRAM(S): at 22:21

## 2022-01-31 RX ADMIN — Medication 650 MILLIGRAM(S): at 17:58

## 2022-01-31 RX ADMIN — Medication 1 TABLET(S): at 12:05

## 2022-01-31 RX ADMIN — Medication 1 PATCH: at 19:58

## 2022-01-31 RX ADMIN — Medication 1 PATCH: at 11:01

## 2022-01-31 RX ADMIN — ENOXAPARIN SODIUM 40 MILLIGRAM(S): 100 INJECTION SUBCUTANEOUS at 12:03

## 2022-01-31 RX ADMIN — POLYETHYLENE GLYCOL 3350 17 GRAM(S): 17 POWDER, FOR SOLUTION ORAL at 17:58

## 2022-01-31 RX ADMIN — BUPROPION HYDROCHLORIDE 300 MILLIGRAM(S): 150 TABLET, EXTENDED RELEASE ORAL at 12:02

## 2022-01-31 RX ADMIN — Medication 100 MILLIGRAM(S): at 14:41

## 2022-01-31 RX ADMIN — QUETIAPINE FUMARATE 200 MILLIGRAM(S): 200 TABLET, FILM COATED ORAL at 22:19

## 2022-01-31 RX ADMIN — METHADONE HYDROCHLORIDE 135 MILLIGRAM(S): 40 TABLET ORAL at 12:08

## 2022-01-31 RX ADMIN — PANTOPRAZOLE SODIUM 40 MILLIGRAM(S): 20 TABLET, DELAYED RELEASE ORAL at 05:18

## 2022-01-31 NOTE — PROGRESS NOTE ADULT - SUBJECTIVE AND OBJECTIVE BOX
IR called because of PICC line malfunction - not aspirating or flushing. Imaging reviewed. PICC line in SVC. Pt seen at bedside; PICC in place and dressing/stitches intact. Pt without any complaints. Catheter flushed/aspirated with medallion syringe followed by 10cc NS, now working properly. Okay to use.    If picc line becomes occluded again, can try flushing picc with normal saline solution with extra medallion syringe left at bedside which can be helpful to re-open PICC. Follow this with flush with regular 10cc NS syringe.

## 2022-01-31 NOTE — PROGRESS NOTE ADULT - SUBJECTIVE AND OBJECTIVE BOX
MIKAEL MCDERMOTT 51y Male  MRN#: [id number]   CODE STATUS: FULL      SUBJECTIVE  Patient is a 51y old Male who presents with a chief complaint of back pain (26 Jan 2022 14:15)    Today is hospital day 15d,   INTERVAL HPI/OVERNIGHT EVENTS:    Examined pt a bedside this AM. There were no acute events overnight. Pt denied any fevers, chills, CP, SOB, NVDC, dysuria.     OBJECTIVE  PAST MEDICAL & SURGICAL HISTORY  IV drug abuse      ALLERGIES:  No Known Allergies    HOME MEDICATIONS:  Home Medications:  cyclobenzaprine 10 mg oral tablet: 1 tab(s) orally every 8 hours (21 Jan 2022 15:09)  ferrous sulfate 325 mg (65 mg elemental iron) oral tablet: 1 tab(s) orally once a day (21 Jan 2022 15:09)  folic acid 1 mg oral tablet: 1 tab(s) orally once a day (21 Jan 2022 15:09)  gabapentin 300 mg oral capsule: 1 cap(s) orally 3 times a day (21 Jan 2022 15:09)  methadone: 135 milligram(s) orally once a day (21 Jan 2022 15:09)  Multiple Vitamins oral tablet: 1 tab(s) orally once a day (21 Jan 2022 15:09)  Remeron 30 mg oral tablet: 2 tab(s) orally once a day (at bedtime) (16 Jan 2022 10:02)  SEROquel 200 mg oral tablet: orally 3 times a day (16 Jan 2022 10:02)  Wellbutrin: 300 milligram(s) orally once a day (16 Jan 2022 10:02)  Xanax: 2 milligram(s) orally 3 times a day (16 Jan 2022 10:02)    MEDICATIONS:  STANDING MEDICATIONS  acetaminophen     Tablet .. 650 milliGRAM(s) Oral every 6 hours  ALPRAZolam 2 milliGRAM(s) Oral three times a day  buPROPion XL (24-Hour) . 300 milliGRAM(s) Oral daily  ceFAZolin   IVPB 2000 milliGRAM(s) IV Intermittent every 8 hours  cyclobenzaprine 10 milliGRAM(s) Oral every 8 hours  enoxaparin Injectable 40 milliGRAM(s) SubCutaneous daily  ferrous    sulfate 325 milliGRAM(s) Oral daily  folic acid 1 milliGRAM(s) Oral daily  gabapentin 300 milliGRAM(s) Oral three times a day  influenza   Vaccine 0.5 milliLiter(s) IntraMuscular once  methadone    Tablet 135 milliGRAM(s) Oral daily  mirtazapine 60 milliGRAM(s) Oral at bedtime  multivitamin 1 Tablet(s) Oral daily  nicotine - 21 mG/24Hr(s) Patch 1 patch Transdermal daily  pantoprazole    Tablet 40 milliGRAM(s) Oral before breakfast  polyethylene glycol 3350 17 Gram(s) Oral two times a day  QUEtiapine 200 milliGRAM(s) Oral three times a day  senna 2 Tablet(s) Oral at bedtime    PRN MEDICATIONS  aluminum hydroxide/magnesium hydroxide/simethicone Suspension 30 milliLiter(s) Oral every 4 hours PRN  melatonin 3 milliGRAM(s) Oral at bedtime PRN  ondansetron Injectable 4 milliGRAM(s) IV Push every 8 hours PRN      VITAL SIGNS: Last 24 Hours  T(C): 36.7 (31 Jan 2022 05:45), Max: 37.2 (30 Jan 2022 14:46)  T(F): 98 (31 Jan 2022 05:45), Max: 98.9 (30 Jan 2022 14:46)  HR: 96 (31 Jan 2022 05:45) (78 - 96)  BP: 100/63 (31 Jan 2022 05:45) (100/63 - 127/61)  BP(mean): --  RR: 17 (31 Jan 2022 05:45) (17 - 19)  SpO2: --    LABS:                        RADIOLOGY:          ECHO:      PHYSICAL EXAM:    GENERAL: NAD, well-developed, AAOx3  HEENT:  Atraumatic, Normocephalic. EOMI, PERRLA, conjunctiva and sclera clear, No JVD  PULMONARY: Clear to auscultation bilaterally; No wheeze  CARDIOVASCULAR: Regular rate and rhythm; No murmurs, rubs, or gallops  GASTROINTESTINAL: Soft, Nontender, Nondistended; Bowel sounds present  MUSCULOSKELETAL:  2+ Peripheral Pulses, No clubbing, cyanosis, or edema  NEUROLOGY: non-focal  SKIN: No rashes or lesions    ASSESSMENT & PLAN        PT/REHAB  [facility] score  ACTIVITY: Increase as tolerated   DVT PPX:  GI PPX:  DIET:  CODE:  DIsposition: from home;   Pending:

## 2022-01-31 NOTE — PROGRESS NOTE ADULT - ASSESSMENT
51-year-old male past medical history of polysubstance abuse who presents status post fall. Has back pain radiating to LLE, CT concerning for osteomyelitis     # Sepsis POA  # Vertebral Osteomyelitis with adjacent soft tissue abscess   # MSSA Bacteremia   - on admission HR 91, wbc 16.6k  - ,  on admission   - Blood culture 1/15 + GPC in clusters, 1/17 + GPC in clusters, cultures negative since 1/18.   - CT AP - osseous destructive process at the left L5-S1 facet joints with infiltrative changes extending to the adjacent spinal canal and neural foramina and associated stenosis. Consider osteomyelitis.   - MRI - findings consistent with L5 S1 facetitis, multifocal abscess within left retroperitoneal soft tissues and right psoas   - ID appreciated, c/w cefazolin 2 gm q8 hrs 6 weeks from 1/18.  - PAM without evidence of endocarditis,    - IR appreciated, abscesses not amenable to drainage  - PICC line malfunction on 1/30; f/u IR for PICC line repair    #Right forearm foreign body  - 1.2cm linear foreign body overlying right radius  - surgery consult for removal - not recommending removal given difficulty of extraction, and no evidence of infection,   - not a contraindication for MRI     #Fall   - likely mechanical   - cth negative, trauma workup negative   - pt ot rehab     # Bl open wounds on wrist   - burn appreciated, Wound care - Xeroform/Kelrex BID, no Surgical debridement     # IVDU  # suspected ETOH use   # suspected thiamine folate deficiency   - active heroin use, last use 2 weeks ago  - reports taking 135mg methadone per day, dose confirmed with Juan Ramon Flores RN at methadone clinic, last received 3 day supply on 1/14,  - addiction medicine consulted  - continue thiamine folate supplementation   - pain management recs appreciated   - tylenol 650mg q8 standing   - gabapentin 300mg q8hr   - cyclobenzaprine 10mg q8hr   - dilaudid 6mg po q4 prn   - ketorolac 15mg q6hr prn     #Mood dx   #Anxiety   - continue home xanax 2mg TID, has withdrawal symptoms when not taking them.   - c/w welbutrin 300 qd and Seroquel 300 TID     #Normocytic Anemia   - multifactorial, iron deficiency, anemia of chronic disease,   - continue folate, MV,  - ferrous sulfate 325mg qd     # DVT ppx- LMWH  # GI ppx- PPI  # Activity- AAT  # Diet- Reg  # Code status - full    #Handoff: PICC line f/u; possible d/c to NH

## 2022-02-01 ENCOUNTER — TRANSCRIPTION ENCOUNTER (OUTPATIENT)
Age: 52
End: 2022-02-01

## 2022-02-01 LAB
ALBUMIN SERPL ELPH-MCNC: 3.3 G/DL — LOW (ref 3.5–5.2)
ALP SERPL-CCNC: 110 U/L — SIGNIFICANT CHANGE UP (ref 30–115)
ALT FLD-CCNC: 9 U/L — SIGNIFICANT CHANGE UP (ref 0–41)
ANION GAP SERPL CALC-SCNC: 16 MMOL/L — HIGH (ref 7–14)
AST SERPL-CCNC: 18 U/L — SIGNIFICANT CHANGE UP (ref 0–41)
BILIRUB SERPL-MCNC: <0.2 MG/DL — SIGNIFICANT CHANGE UP (ref 0.2–1.2)
BUN SERPL-MCNC: 13 MG/DL — SIGNIFICANT CHANGE UP (ref 10–20)
CALCIUM SERPL-MCNC: 9 MG/DL — SIGNIFICANT CHANGE UP (ref 8.5–10.1)
CHLORIDE SERPL-SCNC: 102 MMOL/L — SIGNIFICANT CHANGE UP (ref 98–110)
CO2 SERPL-SCNC: 20 MMOL/L — SIGNIFICANT CHANGE UP (ref 17–32)
CREAT SERPL-MCNC: 0.8 MG/DL — SIGNIFICANT CHANGE UP (ref 0.7–1.5)
GLUCOSE SERPL-MCNC: 114 MG/DL — HIGH (ref 70–99)
HCT VFR BLD CALC: 34.9 % — LOW (ref 42–52)
HGB BLD-MCNC: 10.3 G/DL — LOW (ref 14–18)
MAGNESIUM SERPL-MCNC: 1.9 MG/DL — SIGNIFICANT CHANGE UP (ref 1.8–2.4)
MCHC RBC-ENTMCNC: 25.6 PG — LOW (ref 27–31)
MCHC RBC-ENTMCNC: 29.5 G/DL — LOW (ref 32–37)
MCV RBC AUTO: 86.8 FL — SIGNIFICANT CHANGE UP (ref 80–94)
NRBC # BLD: 0 /100 WBCS — SIGNIFICANT CHANGE UP (ref 0–0)
PLATELET # BLD AUTO: 349 K/UL — SIGNIFICANT CHANGE UP (ref 130–400)
POTASSIUM SERPL-MCNC: 4.9 MMOL/L — SIGNIFICANT CHANGE UP (ref 3.5–5)
POTASSIUM SERPL-SCNC: 4.9 MMOL/L — SIGNIFICANT CHANGE UP (ref 3.5–5)
PROT SERPL-MCNC: 7.5 G/DL — SIGNIFICANT CHANGE UP (ref 6–8)
RBC # BLD: 4.02 M/UL — LOW (ref 4.7–6.1)
RBC # FLD: 17.6 % — HIGH (ref 11.5–14.5)
SODIUM SERPL-SCNC: 138 MMOL/L — SIGNIFICANT CHANGE UP (ref 135–146)
WBC # BLD: 6.88 K/UL — SIGNIFICANT CHANGE UP (ref 4.8–10.8)
WBC # FLD AUTO: 6.88 K/UL — SIGNIFICANT CHANGE UP (ref 4.8–10.8)

## 2022-02-01 PROCEDURE — 99231 SBSQ HOSP IP/OBS SF/LOW 25: CPT

## 2022-02-01 RX ORDER — ALPRAZOLAM 0.25 MG
2 TABLET ORAL THREE TIMES A DAY
Refills: 0 | Status: DISCONTINUED | OUTPATIENT
Start: 2022-02-01 | End: 2022-02-02

## 2022-02-01 RX ORDER — ACETAMINOPHEN 500 MG
2 TABLET ORAL
Qty: 0 | Refills: 0 | DISCHARGE
Start: 2022-02-01

## 2022-02-01 RX ORDER — CEFAZOLIN SODIUM 1 G
2000 VIAL (EA) INJECTION
Qty: 0 | Refills: 0 | DISCHARGE
Start: 2022-02-01

## 2022-02-01 RX ORDER — NICOTINE POLACRILEX 2 MG
1 GUM BUCCAL
Qty: 0 | Refills: 0 | DISCHARGE
Start: 2022-02-01

## 2022-02-01 RX ADMIN — Medication 2 MILLIGRAM(S): at 21:14

## 2022-02-01 RX ADMIN — GABAPENTIN 300 MILLIGRAM(S): 400 CAPSULE ORAL at 13:11

## 2022-02-01 RX ADMIN — Medication 1 MILLIGRAM(S): at 11:14

## 2022-02-01 RX ADMIN — PANTOPRAZOLE SODIUM 40 MILLIGRAM(S): 20 TABLET, DELAYED RELEASE ORAL at 06:24

## 2022-02-01 RX ADMIN — Medication 2 MILLIGRAM(S): at 13:11

## 2022-02-01 RX ADMIN — Medication 1 PATCH: at 11:15

## 2022-02-01 RX ADMIN — Medication 2 MILLIGRAM(S): at 08:04

## 2022-02-01 RX ADMIN — Medication 650 MILLIGRAM(S): at 23:10

## 2022-02-01 RX ADMIN — Medication 650 MILLIGRAM(S): at 17:25

## 2022-02-01 RX ADMIN — POLYETHYLENE GLYCOL 3350 17 GRAM(S): 17 POWDER, FOR SOLUTION ORAL at 06:24

## 2022-02-01 RX ADMIN — CYCLOBENZAPRINE HYDROCHLORIDE 10 MILLIGRAM(S): 10 TABLET, FILM COATED ORAL at 06:25

## 2022-02-01 RX ADMIN — CYCLOBENZAPRINE HYDROCHLORIDE 10 MILLIGRAM(S): 10 TABLET, FILM COATED ORAL at 13:11

## 2022-02-01 RX ADMIN — Medication 650 MILLIGRAM(S): at 11:15

## 2022-02-01 RX ADMIN — SENNA PLUS 2 TABLET(S): 8.6 TABLET ORAL at 21:15

## 2022-02-01 RX ADMIN — CYCLOBENZAPRINE HYDROCHLORIDE 10 MILLIGRAM(S): 10 TABLET, FILM COATED ORAL at 21:15

## 2022-02-01 RX ADMIN — GABAPENTIN 300 MILLIGRAM(S): 400 CAPSULE ORAL at 06:24

## 2022-02-01 RX ADMIN — Medication 325 MILLIGRAM(S): at 11:14

## 2022-02-01 RX ADMIN — ENOXAPARIN SODIUM 40 MILLIGRAM(S): 100 INJECTION SUBCUTANEOUS at 11:15

## 2022-02-01 RX ADMIN — Medication 1 TABLET(S): at 11:14

## 2022-02-01 RX ADMIN — QUETIAPINE FUMARATE 200 MILLIGRAM(S): 200 TABLET, FILM COATED ORAL at 06:25

## 2022-02-01 RX ADMIN — GABAPENTIN 300 MILLIGRAM(S): 400 CAPSULE ORAL at 21:15

## 2022-02-01 RX ADMIN — Medication 650 MILLIGRAM(S): at 06:25

## 2022-02-01 RX ADMIN — BUPROPION HYDROCHLORIDE 300 MILLIGRAM(S): 150 TABLET, EXTENDED RELEASE ORAL at 11:14

## 2022-02-01 RX ADMIN — QUETIAPINE FUMARATE 200 MILLIGRAM(S): 200 TABLET, FILM COATED ORAL at 13:11

## 2022-02-01 RX ADMIN — METHADONE HYDROCHLORIDE 135 MILLIGRAM(S): 40 TABLET ORAL at 11:16

## 2022-02-01 RX ADMIN — Medication 100 MILLIGRAM(S): at 13:10

## 2022-02-01 RX ADMIN — Medication 100 MILLIGRAM(S): at 06:25

## 2022-02-01 RX ADMIN — QUETIAPINE FUMARATE 200 MILLIGRAM(S): 200 TABLET, FILM COATED ORAL at 21:15

## 2022-02-01 RX ADMIN — Medication 100 MILLIGRAM(S): at 21:15

## 2022-02-01 RX ADMIN — MIRTAZAPINE 60 MILLIGRAM(S): 45 TABLET, ORALLY DISINTEGRATING ORAL at 21:15

## 2022-02-01 NOTE — DISCHARGE NOTE NURSING/CASE MANAGEMENT/SOCIAL WORK - NSDCPEFALRISK_GEN_ALL_CORE
For information on Fall & Injury Prevention, visit: [email or web address] OR  [email or web address] OR  https://www.cdc.gov/steadi/patient.html

## 2022-02-01 NOTE — PROGRESS NOTE ADULT - ASSESSMENT
51-year-old male past medical history of polysubstance abuse who presents status post fall. Has back pain radiating to LLE, CT concerning for osteomyelitis     # Sepsis POA  # Vertebral Osteomyelitis with adjacent soft tissue abscess   # MSSA Bacteremia   - on admission HR 91, wbc 16.6k  - ,  on admission   - Blood culture 1/15 + GPC in clusters, 1/17 + GPC in clusters, cultures negative since 1/18.   - CT AP - osseous destructive process at the left L5-S1 facet joints with infiltrative changes extending to the adjacent spinal canal and neural foramina and associated stenosis. Consider osteomyelitis.   - MRI - findings consistent with L5 S1 facetitis, multifocal abscess within left retroperitoneal soft tissues and right psoas   - ID appreciated, c/w cefazolin 2 gm q8 hrs 6 weeks from 1/18.  - PAM without evidence of endocarditis,    - IR appreciated, abscesses not amenable to drainage  - PICC line malfunction on 1/30; IR f/u  appreciated, PICC now functioning. Please follow IR recs if PICC line does not flush again    #Right forearm foreign body  - 1.2cm linear foreign body overlying right radius  - surgery consult for removal - not recommending removal given difficulty of extraction, and no evidence of infection,   - not a contraindication for MRI     #Fall   - likely mechanical   - cth negative, trauma workup negative   - pt ot rehab     # Bl open wounds on wrist   - burn appreciated, Wound care - Xeroform/Kelrex BID, no Surgical debridement     # IVDU  # suspected ETOH use   # suspected thiamine folate deficiency   - active heroin use, last use 2 weeks ago  - reports taking 135mg methadone per day, dose confirmed with Juan Ramon Flores RN at methadone clinic, last received 3 day supply on 1/14,  - addiction medicine consulted  - continue thiamine folate supplementation   - pain management recs appreciated   - tylenol 650mg q8 standing   - gabapentin 300mg q8hr   - cyclobenzaprine 10mg q8hr   - dilaudid 6mg po q4 prn   - ketorolac 15mg q6hr prn     #Mood dx   #Anxiety   - continue home xanax 2mg TID, has withdrawal symptoms when not taking them.   - c/w welbutrin 300 qd and Seroquel 300 TID     #Normocytic Anemia   - multifactorial, iron deficiency, anemia of chronic disease,   - continue folate, MV,  - ferrous sulfate 325mg qd     # DVT ppx- LMWH  # GI ppx- PPI  # Activity- AAT  # Diet- Reg  # Code status - full    #Handoff: possible d/c to NH

## 2022-02-01 NOTE — DISCHARGE NOTE NURSING/CASE MANAGEMENT/SOCIAL WORK - NSDCVIVACCINE_GEN_ALL_CORE_FT
Tdap; 15-Baljinder-2022 23:06; Jovanny Singh (STANISLAV); [employer]; [id number] (Exp. Date: 09-Sep-2023); IntraMuscular; Deltoid Left.; 0.5 milliLiter(s); VIS (VIS Published: 09-May-2013, VIS Presented: 15-Baljinder-2022);

## 2022-02-01 NOTE — PROGRESS NOTE ADULT - SUBJECTIVE AND OBJECTIVE BOX
MIKAEL MCDERMOTT 51y Male  MRN#: [id number]   CODE STATUS: FULL      SUBJECTIVE  Patient is a 51y old Male who presents with a chief complaint of back pain (26 Jan 2022 14:15)    Today is hospital day 16d,   INTERVAL HPI/OVERNIGHT EVENTS:    Examined pt at bedside this AM. There were no acute events o/n.    OBJECTIVE  PAST MEDICAL & SURGICAL HISTORY  IV drug abuse      ALLERGIES:  No Known Allergies    HOME MEDICATIONS:  Home Medications:  cyclobenzaprine 10 mg oral tablet: 1 tab(s) orally every 8 hours (21 Jan 2022 15:09)  ferrous sulfate 325 mg (65 mg elemental iron) oral tablet: 1 tab(s) orally once a day (21 Jan 2022 15:09)  folic acid 1 mg oral tablet: 1 tab(s) orally once a day (21 Jan 2022 15:09)  gabapentin 300 mg oral capsule: 1 cap(s) orally 3 times a day (21 Jan 2022 15:09)  methadone: 135 milligram(s) orally once a day (21 Jan 2022 15:09)  Multiple Vitamins oral tablet: 1 tab(s) orally once a day (21 Jan 2022 15:09)  Remeron 30 mg oral tablet: 2 tab(s) orally once a day (at bedtime) (16 Jan 2022 10:02)  SEROquel 200 mg oral tablet: orally 3 times a day (16 Jan 2022 10:02)  Wellbutrin: 300 milligram(s) orally once a day (16 Jan 2022 10:02)  Xanax: 2 milligram(s) orally 3 times a day (16 Jan 2022 10:02)    MEDICATIONS:  STANDING MEDICATIONS  acetaminophen     Tablet .. 650 milliGRAM(s) Oral every 6 hours  buPROPion XL (24-Hour) . 300 milliGRAM(s) Oral daily  ceFAZolin   IVPB 2000 milliGRAM(s) IV Intermittent every 8 hours  cyclobenzaprine 10 milliGRAM(s) Oral every 8 hours  enoxaparin Injectable 40 milliGRAM(s) SubCutaneous daily  ferrous    sulfate 325 milliGRAM(s) Oral daily  folic acid 1 milliGRAM(s) Oral daily  gabapentin 300 milliGRAM(s) Oral three times a day  influenza   Vaccine 0.5 milliLiter(s) IntraMuscular once  methadone    Tablet 135 milliGRAM(s) Oral daily  mirtazapine 60 milliGRAM(s) Oral at bedtime  multivitamin 1 Tablet(s) Oral daily  nicotine - 21 mG/24Hr(s) Patch 1 patch Transdermal daily  pantoprazole    Tablet 40 milliGRAM(s) Oral before breakfast  polyethylene glycol 3350 17 Gram(s) Oral two times a day  QUEtiapine 200 milliGRAM(s) Oral three times a day  senna 2 Tablet(s) Oral at bedtime    PRN MEDICATIONS  aluminum hydroxide/magnesium hydroxide/simethicone Suspension 30 milliLiter(s) Oral every 4 hours PRN  melatonin 3 milliGRAM(s) Oral at bedtime PRN  ondansetron Injectable 4 milliGRAM(s) IV Push every 8 hours PRN      VITAL SIGNS: Last 24 Hours  T(C): 36.6 (31 Jan 2022 20:54), Max: 36.6 (31 Jan 2022 20:54)  T(F): 97.9 (31 Jan 2022 20:54), Max: 97.9 (31 Jan 2022 20:54)  HR: 92 (31 Jan 2022 20:54) (70 - 92)  BP: 121/71 (31 Jan 2022 20:54) (121/71 - 126/65)  BP(mean): --  RR: 19 (31 Jan 2022 20:54) (19 - 19)  SpO2: --      PHYSICAL EXAM:  GENERAL: NAD, well-developed, AAOx3  HEENT:  Atraumatic, Normocephalic. EOMI, PERRLA, conjunctiva and sclera clear, No JVD  PULMONARY: Clear to auscultation bilaterally; No wheeze  CARDIOVASCULAR: Regular rate and rhythm; No murmurs, rubs, or gallops  GASTROINTESTINAL: Soft, Nontender, Nondistended; Bowel sounds present  MUSCULOSKELETAL:  2+ Peripheral Pulses, No clubbing, cyanosis, or edema  NEUROLOGY: no focal defecits  SKIN: No rashes or lesions

## 2022-02-01 NOTE — DISCHARGE NOTE NURSING/CASE MANAGEMENT/SOCIAL WORK - PATIENT PORTAL LINK FT
You can access the FollowMyHealth Patient Portal offered by [facility] by registering at the following website: [email or web address]. By joining [facility]’s FollowMyHealth portal, you will also be able to view your health information using other applications (apps) compatible with our system.

## 2022-02-02 VITALS
DIASTOLIC BLOOD PRESSURE: 79 MMHG | TEMPERATURE: 99 F | SYSTOLIC BLOOD PRESSURE: 142 MMHG | RESPIRATION RATE: 18 BRPM | HEART RATE: 87 BPM

## 2022-02-02 LAB
ALBUMIN SERPL ELPH-MCNC: 3 G/DL — LOW (ref 3.5–5.2)
ALP SERPL-CCNC: 101 U/L — SIGNIFICANT CHANGE UP (ref 30–115)
ALT FLD-CCNC: 7 U/L — SIGNIFICANT CHANGE UP (ref 0–41)
ANION GAP SERPL CALC-SCNC: 11 MMOL/L — SIGNIFICANT CHANGE UP (ref 7–14)
AST SERPL-CCNC: 10 U/L — SIGNIFICANT CHANGE UP (ref 0–41)
BILIRUB SERPL-MCNC: <0.2 MG/DL — SIGNIFICANT CHANGE UP (ref 0.2–1.2)
BUN SERPL-MCNC: 12 MG/DL — SIGNIFICANT CHANGE UP (ref 10–20)
CALCIUM SERPL-MCNC: 8.9 MG/DL — SIGNIFICANT CHANGE UP (ref 8.5–10.1)
CHLORIDE SERPL-SCNC: 96 MMOL/L — LOW (ref 98–110)
CO2 SERPL-SCNC: 24 MMOL/L — SIGNIFICANT CHANGE UP (ref 17–32)
CREAT SERPL-MCNC: 0.9 MG/DL — SIGNIFICANT CHANGE UP (ref 0.7–1.5)
GLUCOSE SERPL-MCNC: 194 MG/DL — HIGH (ref 70–99)
MAGNESIUM SERPL-MCNC: 1.8 MG/DL — SIGNIFICANT CHANGE UP (ref 1.8–2.4)
POTASSIUM SERPL-MCNC: 4.2 MMOL/L — SIGNIFICANT CHANGE UP (ref 3.5–5)
POTASSIUM SERPL-SCNC: 4.2 MMOL/L — SIGNIFICANT CHANGE UP (ref 3.5–5)
PROT SERPL-MCNC: 6.9 G/DL — SIGNIFICANT CHANGE UP (ref 6–8)
SODIUM SERPL-SCNC: 131 MMOL/L — LOW (ref 135–146)

## 2022-02-02 PROCEDURE — 99232 SBSQ HOSP IP/OBS MODERATE 35: CPT

## 2022-02-02 RX ADMIN — Medication 325 MILLIGRAM(S): at 11:24

## 2022-02-02 RX ADMIN — Medication 1 PATCH: at 11:44

## 2022-02-02 RX ADMIN — Medication 2 MILLIGRAM(S): at 05:36

## 2022-02-02 RX ADMIN — QUETIAPINE FUMARATE 200 MILLIGRAM(S): 200 TABLET, FILM COATED ORAL at 05:31

## 2022-02-02 RX ADMIN — ENOXAPARIN SODIUM 40 MILLIGRAM(S): 100 INJECTION SUBCUTANEOUS at 11:36

## 2022-02-02 RX ADMIN — Medication 650 MILLIGRAM(S): at 05:33

## 2022-02-02 RX ADMIN — Medication 1 TABLET(S): at 11:24

## 2022-02-02 RX ADMIN — Medication 1 PATCH: at 11:37

## 2022-02-02 RX ADMIN — BUPROPION HYDROCHLORIDE 300 MILLIGRAM(S): 150 TABLET, EXTENDED RELEASE ORAL at 11:25

## 2022-02-02 RX ADMIN — METHADONE HYDROCHLORIDE 135 MILLIGRAM(S): 40 TABLET ORAL at 11:27

## 2022-02-02 RX ADMIN — CYCLOBENZAPRINE HYDROCHLORIDE 10 MILLIGRAM(S): 10 TABLET, FILM COATED ORAL at 05:32

## 2022-02-02 RX ADMIN — Medication 1 MILLIGRAM(S): at 11:24

## 2022-02-02 RX ADMIN — GABAPENTIN 300 MILLIGRAM(S): 400 CAPSULE ORAL at 05:32

## 2022-02-02 RX ADMIN — Medication 650 MILLIGRAM(S): at 11:25

## 2022-02-02 RX ADMIN — POLYETHYLENE GLYCOL 3350 17 GRAM(S): 17 POWDER, FOR SOLUTION ORAL at 05:31

## 2022-02-02 RX ADMIN — PANTOPRAZOLE SODIUM 40 MILLIGRAM(S): 20 TABLET, DELAYED RELEASE ORAL at 05:31

## 2022-02-02 RX ADMIN — Medication 1 PATCH: at 05:32

## 2022-02-02 RX ADMIN — Medication 100 MILLIGRAM(S): at 05:32

## 2022-02-02 NOTE — PROGRESS NOTE ADULT - ASSESSMENT
51-year-old male past medical history of polysubstance abuse who presents status post fall. Has back pain radiating to LLE, CT concerning for osteomyelitis     # Sepsis POA  # Vertebral Osteomyelitis with adjacent soft tissue abscess   # MSSA Bacteremia   - on admission HR 91, wbc 16.6k  - ,  on admission   - Blood culture 1/15 + GPC in clusters, 1/17 + GPC in clusters, cultures negative since 1/18.   - CT AP - osseous destructive process at the left L5-S1 facet joints with infiltrative changes extending to the adjacent spinal canal and neural foramina and associated stenosis. Consider osteomyelitis.   - MRI - findings consistent with L5 S1 facetitis, multifocal abscess within left retroperitoneal soft tissues and right psoas   - ID appreciated, c/w cefazolin 2 gm q8 hrs 6 weeks from 1/18.  - PAM without evidence of endocarditis,    - IR appreciated, abscesses not amenable to drainage  - PICC line malfunction on 1/30; IR f/u appreciated, PICC now functioning. Please follow IR recs if PICC line does not flush again    #Right forearm foreign body  - 1.2cm linear foreign body overlying right radius  - surgery consult for removal - not recommending removal given difficulty of extraction, and no evidence of infection,   - not a contraindication for MRI     #Fall   - likely mechanical   - cth negative, trauma workup negative   - pt ot rehab     # Bl open wounds on wrist   - burn appreciated, Wound care - Xeroform/Kelrex BID, no Surgical debridement     # IVDU  # suspected ETOH use   # suspected thiamine folate deficiency   - active heroin use, last use 2 weeks ago  - reports taking 135mg methadone per day, dose confirmed with Juan Ramon Flores RN at methadone clinic, last received 3 day supply on 1/14,  - addiction medicine consulted  - continue thiamine folate supplementation   - pain management recs appreciated   - tylenol 650mg q8 standing   - gabapentin 300mg q8hr   - cyclobenzaprine 10mg q8hr   - dilaudid 6mg po q4 prn   - ketorolac 15mg q6hr prn     #Mood dx   #Anxiety   - continue home xanax 2mg TID, has withdrawal symptoms when not taking them.   - c/w welbutrin 300 qd and Seroquel 300 TID     #Normocytic Anemia   - multifactorial, iron deficiency, anemia of chronic disease,   - continue folate, MV,  - ferrous sulfate 325mg qd     # DVT ppx- LMWH  # GI ppx- PPI  # Activity- AAT  # Diet- Reg  # Code status - full    #Handoff: d/c to NH

## 2022-02-02 NOTE — PROGRESS NOTE ADULT - SUBJECTIVE AND OBJECTIVE BOX
MIKAEL MCDERMOTT 51y Male  MRN#: [id number]   CODE STATUS: FULL      SUBJECTIVE  Patient is a 51y old Male who presents with a chief complaint of back pain (26 Jan 2022 14:15)    Today is hospital day 17d,   INTERVAL HPI/OVERNIGHT EVENTS:    Examined pt at bedside this AM. There were no acute events o/n.    OBJECTIVE  PAST MEDICAL & SURGICAL HISTORY  IV drug abuse      ALLERGIES:  No Known Allergies    HOME MEDICATIONS:  Home Medications:  acetaminophen 325 mg oral tablet: 2 tab(s) orally every 6 hours (01 Feb 2022 10:22)  ceFAZolin: 2000 milligram(s) intravenous every 8 hours. To be administered until 3/1/22. (01 Feb 2022 10:28)  cyclobenzaprine 10 mg oral tablet: 1 tab(s) orally every 8 hours (21 Jan 2022 15:09)  ferrous sulfate 325 mg (65 mg elemental iron) oral tablet: 1 tab(s) orally once a day (21 Jan 2022 15:09)  folic acid 1 mg oral tablet: 1 tab(s) orally once a day (21 Jan 2022 15:09)  gabapentin 300 mg oral capsule: 1 cap(s) orally 3 times a day (21 Jan 2022 15:09)  methadone: 135 milligram(s) orally once a day (21 Jan 2022 15:09)  Multiple Vitamins oral tablet: 1 tab(s) orally once a day (21 Jan 2022 15:09)  nicotine 21 mg/24 hr transdermal film, extended release: 1 patch transdermal once a day (01 Feb 2022 16:45)  Remeron 30 mg oral tablet: 2 tab(s) orally once a day (at bedtime) (16 Jan 2022 10:02)  SEROquel 200 mg oral tablet: orally 3 times a day (16 Jan 2022 10:02)  Wellbutrin: 300 milligram(s) orally once a day (16 Jan 2022 10:02)  Xanax: 2 milligram(s) orally 3 times a day (16 Jan 2022 10:02)    MEDICATIONS:  STANDING MEDICATIONS  acetaminophen     Tablet .. 650 milliGRAM(s) Oral every 6 hours  ALPRAZolam 2 milliGRAM(s) Oral three times a day  buPROPion XL (24-Hour) . 300 milliGRAM(s) Oral daily  ceFAZolin   IVPB 2000 milliGRAM(s) IV Intermittent every 8 hours  cyclobenzaprine 10 milliGRAM(s) Oral every 8 hours  enoxaparin Injectable 40 milliGRAM(s) SubCutaneous daily  ferrous    sulfate 325 milliGRAM(s) Oral daily  folic acid 1 milliGRAM(s) Oral daily  gabapentin 300 milliGRAM(s) Oral three times a day  influenza   Vaccine 0.5 milliLiter(s) IntraMuscular once  methadone    Tablet 135 milliGRAM(s) Oral daily  mirtazapine 60 milliGRAM(s) Oral at bedtime  multivitamin 1 Tablet(s) Oral daily  nicotine - 21 mG/24Hr(s) Patch 1 patch Transdermal daily  pantoprazole    Tablet 40 milliGRAM(s) Oral before breakfast  polyethylene glycol 3350 17 Gram(s) Oral two times a day  QUEtiapine 200 milliGRAM(s) Oral three times a day  senna 2 Tablet(s) Oral at bedtime    PRN MEDICATIONS  aluminum hydroxide/magnesium hydroxide/simethicone Suspension 30 milliLiter(s) Oral every 4 hours PRN  melatonin 3 milliGRAM(s) Oral at bedtime PRN  ondansetron Injectable 4 milliGRAM(s) IV Push every 8 hours PRN      VITAL SIGNS: Last 24 Hours  T(C): 37.1 (02 Feb 2022 05:22), Max: 37.1 (02 Feb 2022 05:22)  T(F): 98.8 (02 Feb 2022 05:22), Max: 98.8 (02 Feb 2022 05:22)  HR: 87 (02 Feb 2022 05:22) (87 - 99)  BP: 142/79 (02 Feb 2022 05:22) (122/66 - 142/79)  BP(mean): --  RR: 18 (02 Feb 2022 05:22) (18 - 18)  SpO2: --    LABS:                        10.3   6.88  )-----------( 349      ( 01 Feb 2022 04:30 )             34.9     02-01    138  |  102  |  13  ----------------------------<  114<H>  4.9   |  20  |  0.8    Ca    9.0      01 Feb 2022 04:30  Mg     1.9     02-01    TPro  7.5  /  Alb  3.3<L>  /  TBili  <0.2  /  DBili  x   /  AST  18  /  ALT  9   /  AlkPhos  110  02-01          PHYSICAL EXAM:  GENERAL: NAD, well-developed, AAOx3  HEENT:  Atraumatic, Normocephalic. EOMI, PERRLA, conjunctiva and sclera clear, No JVD  PULMONARY: Clear to auscultation bilaterally; No wheeze  CARDIOVASCULAR: Regular rate and rhythm; No murmurs, rubs, or gallops  GASTROINTESTINAL: Soft, Nontender, Nondistended; Bowel sounds present  MUSCULOSKELETAL:  2+ Peripheral Pulses, No clubbing, cyanosis, or edema  NEUROLOGY: no focal deficits  SKIN: No rashes or lesions

## 2022-02-02 NOTE — PROGRESS NOTE ADULT - ATTENDING COMMENTS
51-year-old male past medical history of polysubstance abuse who presents status post fall. Has back pain radiating to LLE, CT concerning for osteomyelitis.     # Sepsis POA  # Vertebral Osteomyelitis with adjacent soft tissue abscess   # MSSA Bacteremia   - Blood culture 1/15 + GPC in clusters, 1/17 + GPC in clusters, cultures negative since 1/18.   - CT AP - osseous destructive process at the left L5-S1 facet joints with infiltrative changes extending to the adjacent spinal canal and neural foramina and associated stenosis. Consider osteomyelitis.   - MRI - findings consistent with L5 S1 facetitis, multifocal abscess within left retroperitoneal soft tissues and right psoas   - ID appreciated, ABx changed to cefazolin 2 gm q8 hrs today 6 weeks from 1/18.  - PAM without evidence of endocarditis,    - IR appreciated, abscesses not amenable to drainage    #Right forearm foreign body  - 1.2cm linear foreign body overlying right radius  - surgery consulted for removal - not recommending removal given difficulty of extraction, and no evidence of infection   - not a contraindication for MRI     # Bl open wounds on wrist   - burn appreciated, Wound care - Xeroform/Kelrex BID, no Surgical debridement     # IVDU  # suspected ETOH use   # suspected thiamine folate deficiency   - active heroin use, last use 2 weeks ago  - reports taking 135mg methadone per day, dose confirmed with Juan Ramon Flores RN at methadone clinic, last received 3 day supply on 1/14,  - addiction medicine consulted  - continue thiamine folate supplementation   - pain management recs appreciated   - tylenol 650mg q8 standing   - gabapentin 300mg q8hr   - cyclobenzaprine 10mg q8hr   - Dilaudid 6mg po q4 prn   - ketorolac 15mg q6hr prn     #Mood dx   #Anxiety   - continue home xanax 2mg TID, has withdrawal symptoms when not taking them   - c/w Wellbutrin 300 qd and Seroquel 300 TID     #Normocytic Anemia   - multifactorial, iron deficiency, anemia of chronic disease   - continue folate, MV,  - ferrous sulfate 325mg qd     # DVT ppx- LMWH        Pending - placement to NH
51-year-old male past medical history of polysubstance abuse who presents status post fall. Has back pain radiating to LLE, CT concerning for osteomyelitis.     # Sepsis POA  # Vertebral Osteomyelitis with adjacent soft tissue abscess   # MSSA Bacteremia   - Blood culture 1/15 + GPC in clusters, 1/17 + GPC in clusters, cultures negative since 1/18.   - CT AP - osseous destructive process at the left L5-S1 facet joints with infiltrative changes extending to the adjacent spinal canal and neural foramina and associated stenosis. Consider osteomyelitis.   - MRI - findings consistent with L5 S1 facetitis, multifocal abscess within left retroperitoneal soft tissues and right psoas   - ID appreciated, ABx changed to cefazolin 2 gm q8 hrs today 6 weeks from 1/18.  - PAM without evidence of endocarditis,    - IR appreciated, abscesses not amenable to drainage    #Right forearm foreign body  - 1.2cm linear foreign body overlying right radius  - surgery consulted for removal - not recommending removal given difficulty of extraction, and no evidence of infection   - not a contraindication for MRI     # Bl open wounds on wrist   - burn appreciated, Wound care - Xeroform/Kelrex BID, no Surgical debridement     # IVDU  # suspected ETOH use   # suspected thiamine folate deficiency   - active heroin use, last use 2 weeks ago  - reports taking 135mg methadone per day, dose confirmed with Juan Ramon Flores RN at methadone clinic, last received 3 day supply on 1/14,  - addiction medicine consulted  - continue thiamine folate supplementation   - pain management recs appreciated   - tylenol 650mg q8 standing   - gabapentin 300mg q8hr   - cyclobenzaprine 10mg q8hr   - Dilaudid 6mg po q4 prn   - ketorolac 15mg q6hr prn     #Mood dx   #Anxiety   - continue home xanax 2mg TID, has withdrawal symptoms when not taking them   - c/w Wellbutrin 300 qd and Seroquel 300 TID     #Normocytic Anemia   - multifactorial, iron deficiency, anemia of chronic disease   - continue folate, MV,  - ferrous sulfate 325mg qd     # DVT ppx- LMWH        Pending - placement to NH
51-year-old male past medical history of polysubstance abuse who presents status post fall. Has back pain radiating to LLE, CT concerning for osteomyelitis.     # Sepsis POA  # Vertebral Osteomyelitis with adjacent soft tissue abscess   # MSSA Bacteremia   - Blood culture 1/15 + GPC in clusters, 1/17 + GPC in clusters, cultures negative since 1/18.   - CT AP - osseous destructive process at the left L5-S1 facet joints with infiltrative changes extending to the adjacent spinal canal and neural foramina and associated stenosis. Consider osteomyelitis.   - MRI - findings consistent with L5 S1 facetitis, multifocal abscess within left retroperitoneal soft tissues and right psoas   - ID appreciated, ABx changed to cefazolin 2 gm q8 hrs today 6 weeks from 1/18.  - PAM without evidence of endocarditis,    - IR appreciated, abscesses not amenable to drainage    #Right forearm foreign body  - 1.2cm linear foreign body overlying right radius  - surgery consulted for removal - not recommending removal given difficulty of extraction, and no evidence of infection   - not a contraindication for MRI     # Bl open wounds on wrist   - burn appreciated, Wound care - Xeroform/Kelrex BID, no Surgical debridement     # IVDU  # suspected ETOH use   # suspected thiamine folate deficiency   - active heroin use, last use 2 weeks ago  - reports taking 135mg methadone per day, dose confirmed with Juan Ramon Flores RN at methadone clinic, last received 3 day supply on 1/14,  - addiction medicine consulted  - continue thiamine folate supplementation   - pain management recs appreciated   - tylenol 650mg q8 standing   - gabapentin 300mg q8hr   - cyclobenzaprine 10mg q8hr   - dilaudid 6mg po q4 prn   - ketorolac 15mg q6hr prn     #Mood dx   #Anxiety   - continue home xanax 2mg TID, has withdrawal symptoms when not taking them   - c/w welbutrin 300 qd and Seroquel 300 TID     #Normocytic Anemia   - multifactorial, iron deficiency, anemia of chronic disease   - continue folate, MV,  - ferrous sulfate 325mg qd     # DVT ppx- LMWH        Pending - placement to NH
51-year-old male past medical history of polysubstance abuse who presents status post fall. Has back pain radiating to LLE, CT concerning for osteomyelitis.     # Sepsis POA  # Vertebral Osteomyelitis with adjacent soft tissue abscess   # MSSA Bacteremia   - Blood culture 1/15 + GPC in clusters, 1/17 + GPC in clusters, cultures negative since 1/18.   - CT AP - osseous destructive process at the left L5-S1 facet joints with infiltrative changes extending to the adjacent spinal canal and neural foramina and associated stenosis. Consider osteomyelitis.   - MRI - findings consistent with L5 S1 facetitis, multifocal abscess within left retroperitoneal soft tissues and right psoas   - ID appreciated, ABx changed to cefazolin 2 gm q8 hrs today 6 weeks from 1/18.  - PAM without evidence of endocarditis,    - IR appreciated, abscesses not amenable to drainage    #Right forearm foreign body  - 1.2cm linear foreign body overlying right radius  - surgery consulted for removal - not recommending removal given difficulty of extraction, and no evidence of infection   - not a contraindication for MRI     # Bl open wounds on wrist   - burn appreciated, Wound care - Xeroform/Kelrex BID, no Surgical debridement     # IVDU  # suspected ETOH use   # suspected thiamine folate deficiency   - active heroin use, last use 2 weeks ago  - reports taking 135mg methadone per day, dose confirmed with Juan Ramon Flores RN at methadone clinic, last received 3 day supply on 1/14,  - addiction medicine consulted  - continue thiamine folate supplementation   - pain management recs appreciated   - tylenol 650mg q8 standing   - gabapentin 300mg q8hr   - cyclobenzaprine 10mg q8hr   - dilaudid 6mg po q4 prn   - ketorolac 15mg q6hr prn     #Mood dx   #Anxiety   - continue home xanax 2mg TID, has withdrawal symptoms when not taking them   - c/w welbutrin 300 qd and Seroquel 300 TID     #Normocytic Anemia   - multifactorial, iron deficiency, anemia of chronic disease   - continue folate, MV,  - ferrous sulfate 325mg qd     # DVT ppx- LMWH        Pending - placement to NH  plan of care d/w pt
Pt was seen and examined at bedside independently, pt was admitted with lower back, was diagnosed with OM, treated with IV Abx, stable for a last week, was awaiting for a placement to the NH ( pt has a h/o drug addiction, getting Abx via PICC line), all arrangements were made and pt was cleared for discharge today.   I agree with medical resident's findings, assessment and plan above, case was discussed with medical resident's during teaching rounds and case management.
51-year-old male past medical history of polysubstance abuse who presents status post fall. Has back pain radiating to LLE, CT concerning for osteomyelitis.     # Sepsis POA  # Suspected Vertebral Osteomyelitis  # MSSA Bacteremia   - CT AP - osseous destructive process at the left L5-S1 facet joints with infiltrative changes extending to the adjacent spinal canal and neural foramina and associated stenosis. Consider osteomyelitis.   - f/u MRI despite retained foreign body in arm per radiology    - ID appreciated   - nafcillin 2g q4hrs   - repeat blood cultures daily until negative   - TTE     # Right forearm foreign body  - 1.2cm linear foreign body overlying right radius  - surgery consult for removal - not recommending removal given difficulty of extraction - risk of exploration outweighs benefit of removal as per surgery    - not a contraindication for MRI per radiology     # Fall   - likely mechanical   - cth negative, trauma workup negative   - pt ot rehab     # Bl open wounds on wrist   - burn appreciated, Wound care - Xeroform/Kelrex BID, no Surgical debridement     # Lt foot pain   - xray foot - no fracture, midfoot degenerative changes,  - duplex LE negative for DVT     # IVDU  # suspected ETOH use   # suspected thiamine and folate deficiency   - reports taking 135mg methadone per day, dose confirmed with Juan Ramon Flores RN at methadone clinic, last received 3 day supply on 1/14,  - addiction medicine consulted  - continue thiamine folate supplementation   - pain management appreciated   - methadone 40mg q8hr standing   - Tylenol 650mg q8 standing   - gabapentin 300mg q8hr   - cyclobenzaprine 10mg q8hr   - Dilaudid 6mg po q4 prn   - ketorolac 15mg q6hr prn     #Mood dx   #Anxiety   - c/w Wellbutrin 300 qd and Seroquel 300 TID     #Normocytic Anemia   - multifactorial, iron deficiency, anemia of chronic disease  - ferrous sulfate 325mg qd     #Hyperglycemia   - A1c 6.6, monitor blood glucose     # DVT ppx- LMWH    Pending - negative blood cultures, MRI spine, TTE, will eventually need a PICC line and SNF placement for IV abx therapy

## 2022-02-10 DIAGNOSIS — A41.01 SEPSIS DUE TO METHICILLIN SUSCEPTIBLE STAPHYLOCOCCUS AUREUS: ICD-10-CM

## 2022-02-10 DIAGNOSIS — D63.8 ANEMIA IN OTHER CHRONIC DISEASES CLASSIFIED ELSEWHERE: ICD-10-CM

## 2022-02-10 DIAGNOSIS — F11.20 OPIOID DEPENDENCE, UNCOMPLICATED: ICD-10-CM

## 2022-02-10 DIAGNOSIS — S50.851A SUPERFICIAL FOREIGN BODY OF RIGHT FOREARM, INITIAL ENCOUNTER: ICD-10-CM

## 2022-02-10 DIAGNOSIS — E53.8 DEFICIENCY OF OTHER SPECIFIED B GROUP VITAMINS: ICD-10-CM

## 2022-02-10 DIAGNOSIS — A41.9 SEPSIS, UNSPECIFIED ORGANISM: ICD-10-CM

## 2022-02-10 DIAGNOSIS — S61.501A UNSPECIFIED OPEN WOUND OF RIGHT WRIST, INITIAL ENCOUNTER: ICD-10-CM

## 2022-02-10 DIAGNOSIS — F17.210 NICOTINE DEPENDENCE, CIGARETTES, UNCOMPLICATED: ICD-10-CM

## 2022-02-10 DIAGNOSIS — D50.9 IRON DEFICIENCY ANEMIA, UNSPECIFIED: ICD-10-CM
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
## 2022-02-10 DIAGNOSIS — G06.1 INTRASPINAL ABSCESS AND GRANULOMA: ICD-10-CM

## 2022-02-10 DIAGNOSIS — S61.502A UNSPECIFIED OPEN WOUND OF LEFT WRIST, INITIAL ENCOUNTER: ICD-10-CM
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
## 2022-02-10 DIAGNOSIS — F41.9 ANXIETY DISORDER, UNSPECIFIED: ICD-10-CM

## 2022-02-10 DIAGNOSIS — M46.26 OSTEOMYELITIS OF VERTEBRA, LUMBAR REGION: ICD-10-CM

## 2022-02-10 DIAGNOSIS — K68.12 PSOAS MUSCLE ABSCESS: ICD-10-CM
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
## 2022-02-16 PROBLEM — Z00.00 ENCOUNTER FOR PREVENTIVE HEALTH EXAMINATION: Status: ACTIVE | Noted: 2022-02-16

## 2022-02-17 ENCOUNTER — APPOINTMENT (OUTPATIENT)
Dept: BURN CARE | Facility: CLINIC | Age: 52
End: 2022-02-17

## 2022-03-07 ENCOUNTER — APPOINTMENT (OUTPATIENT)
Dept: INTERNAL MEDICINE | Facility: CLINIC | Age: 52
End: 2022-03-07

## 2022-03-12 ENCOUNTER — INPATIENT (INPATIENT)
Facility: HOSPITAL | Age: 52
LOS: 24 days | Discharge: NURSING, REHAB OR LONG-TERM CARE | End: 2022-04-06
Attending: STUDENT IN AN ORGANIZED HEALTH CARE EDUCATION/TRAINING PROGRAM | Admitting: STUDENT IN AN ORGANIZED HEALTH CARE EDUCATION/TRAINING PROGRAM
Payer: MEDICAID

## 2022-03-12 VITALS
OXYGEN SATURATION: 96 % | RESPIRATION RATE: 18 BRPM | HEIGHT: 73 IN | HEART RATE: 84 BPM | TEMPERATURE: 98 F | SYSTOLIC BLOOD PRESSURE: 140 MMHG | DIASTOLIC BLOOD PRESSURE: 94 MMHG

## 2022-03-12 LAB
ALBUMIN SERPL ELPH-MCNC: 3.5 G/DL — SIGNIFICANT CHANGE UP (ref 3.5–5.2)
ALP SERPL-CCNC: 93 U/L — SIGNIFICANT CHANGE UP (ref 30–115)
ALT FLD-CCNC: 8 U/L — SIGNIFICANT CHANGE UP (ref 0–41)
ANION GAP SERPL CALC-SCNC: 10 MMOL/L — SIGNIFICANT CHANGE UP (ref 7–14)
AST SERPL-CCNC: 11 U/L — SIGNIFICANT CHANGE UP (ref 0–41)
BASOPHILS # BLD AUTO: 0.01 K/UL — SIGNIFICANT CHANGE UP (ref 0–0.2)
BASOPHILS NFR BLD AUTO: 0.1 % — SIGNIFICANT CHANGE UP (ref 0–1)
BILIRUB SERPL-MCNC: 0.2 MG/DL — SIGNIFICANT CHANGE UP (ref 0.2–1.2)
BUN SERPL-MCNC: 9 MG/DL — LOW (ref 10–20)
CALCIUM SERPL-MCNC: 8.9 MG/DL — SIGNIFICANT CHANGE UP (ref 8.5–10.1)
CHLORIDE SERPL-SCNC: 101 MMOL/L — SIGNIFICANT CHANGE UP (ref 98–110)
CO2 SERPL-SCNC: 29 MMOL/L — SIGNIFICANT CHANGE UP (ref 17–32)
CREAT SERPL-MCNC: 0.8 MG/DL — SIGNIFICANT CHANGE UP (ref 0.7–1.5)
EGFR: 106 ML/MIN/1.73M2 — SIGNIFICANT CHANGE UP
EOSINOPHIL # BLD AUTO: 0.06 K/UL — SIGNIFICANT CHANGE UP (ref 0–0.7)
EOSINOPHIL NFR BLD AUTO: 0.7 % — SIGNIFICANT CHANGE UP (ref 0–8)
ERYTHROCYTE [SEDIMENTATION RATE] IN BLOOD: 127 MM/HR — HIGH (ref 0–10)
GLUCOSE SERPL-MCNC: 121 MG/DL — HIGH (ref 70–99)
HCT VFR BLD CALC: 29.6 % — LOW (ref 42–52)
HGB BLD-MCNC: 9 G/DL — LOW (ref 14–18)
IMM GRANULOCYTES NFR BLD AUTO: 0.3 % — SIGNIFICANT CHANGE UP (ref 0.1–0.3)
LACTATE SERPL-SCNC: 0.9 MMOL/L — SIGNIFICANT CHANGE UP (ref 0.7–2)
LYMPHOCYTES # BLD AUTO: 1.02 K/UL — LOW (ref 1.2–3.4)
LYMPHOCYTES # BLD AUTO: 11.1 % — LOW (ref 20.5–51.1)
MCHC RBC-ENTMCNC: 25.5 PG — LOW (ref 27–31)
MCHC RBC-ENTMCNC: 30.4 G/DL — LOW (ref 32–37)
MCV RBC AUTO: 83.9 FL — SIGNIFICANT CHANGE UP (ref 80–94)
MONOCYTES # BLD AUTO: 0.61 K/UL — HIGH (ref 0.1–0.6)
MONOCYTES NFR BLD AUTO: 6.7 % — SIGNIFICANT CHANGE UP (ref 1.7–9.3)
NEUTROPHILS # BLD AUTO: 7.44 K/UL — HIGH (ref 1.4–6.5)
NEUTROPHILS NFR BLD AUTO: 81.1 % — HIGH (ref 42.2–75.2)
NRBC # BLD: 0 /100 WBCS — SIGNIFICANT CHANGE UP (ref 0–0)
PLATELET # BLD AUTO: 362 K/UL — SIGNIFICANT CHANGE UP (ref 130–400)
POTASSIUM SERPL-MCNC: 3.6 MMOL/L — SIGNIFICANT CHANGE UP (ref 3.5–5)
POTASSIUM SERPL-SCNC: 3.6 MMOL/L — SIGNIFICANT CHANGE UP (ref 3.5–5)
PROT SERPL-MCNC: 7.1 G/DL — SIGNIFICANT CHANGE UP (ref 6–8)
RBC # BLD: 3.53 M/UL — LOW (ref 4.7–6.1)
RBC # FLD: 17.1 % — HIGH (ref 11.5–14.5)
SARS-COV-2 RNA SPEC QL NAA+PROBE: SIGNIFICANT CHANGE UP
SODIUM SERPL-SCNC: 140 MMOL/L — SIGNIFICANT CHANGE UP (ref 135–146)
WBC # BLD: 9.17 K/UL — SIGNIFICANT CHANGE UP (ref 4.8–10.8)
WBC # FLD AUTO: 9.17 K/UL — SIGNIFICANT CHANGE UP (ref 4.8–10.8)

## 2022-03-12 PROCEDURE — 99223 1ST HOSP IP/OBS HIGH 75: CPT

## 2022-03-12 PROCEDURE — 72132 CT LUMBAR SPINE W/DYE: CPT | Mod: 26,MA

## 2022-03-12 PROCEDURE — 71045 X-RAY EXAM CHEST 1 VIEW: CPT | Mod: 26

## 2022-03-12 PROCEDURE — 72129 CT CHEST SPINE W/DYE: CPT | Mod: 26,MA

## 2022-03-12 PROCEDURE — 71250 CT THORAX DX C-: CPT | Mod: 26,MA

## 2022-03-12 PROCEDURE — 99284 EMERGENCY DEPT VISIT MOD MDM: CPT

## 2022-03-12 PROCEDURE — 99285 EMERGENCY DEPT VISIT HI MDM: CPT

## 2022-03-12 PROCEDURE — 99406 BEHAV CHNG SMOKING 3-10 MIN: CPT

## 2022-03-12 RX ORDER — FERROUS SULFATE 325(65) MG
325 TABLET ORAL DAILY
Refills: 0 | Status: DISCONTINUED | OUTPATIENT
Start: 2022-03-12 | End: 2022-04-06

## 2022-03-12 RX ORDER — NICOTINE POLACRILEX 2 MG
1 GUM BUCCAL DAILY
Refills: 0 | Status: DISCONTINUED | OUTPATIENT
Start: 2022-03-12 | End: 2022-04-06

## 2022-03-12 RX ORDER — FOLIC ACID 0.8 MG
1 TABLET ORAL DAILY
Refills: 0 | Status: DISCONTINUED | OUTPATIENT
Start: 2022-03-12 | End: 2022-04-06

## 2022-03-12 RX ORDER — ACETAMINOPHEN 500 MG
650 TABLET ORAL EVERY 6 HOURS
Refills: 0 | Status: DISCONTINUED | OUTPATIENT
Start: 2022-03-12 | End: 2022-04-06

## 2022-03-12 RX ORDER — ALPRAZOLAM 0.25 MG
2 TABLET ORAL THREE TIMES A DAY
Refills: 0 | Status: DISCONTINUED | OUTPATIENT
Start: 2022-03-12 | End: 2022-03-19

## 2022-03-12 RX ORDER — CYCLOBENZAPRINE HYDROCHLORIDE 10 MG/1
10 TABLET, FILM COATED ORAL THREE TIMES A DAY
Refills: 0 | Status: DISCONTINUED | OUTPATIENT
Start: 2022-03-12 | End: 2022-04-06

## 2022-03-12 RX ORDER — CHLORHEXIDINE GLUCONATE 213 G/1000ML
1 SOLUTION TOPICAL
Refills: 0 | Status: DISCONTINUED | OUTPATIENT
Start: 2022-03-12 | End: 2022-04-06

## 2022-03-12 RX ORDER — MIRTAZAPINE 45 MG/1
60 TABLET, ORALLY DISINTEGRATING ORAL AT BEDTIME
Refills: 0 | Status: DISCONTINUED | OUTPATIENT
Start: 2022-03-12 | End: 2022-04-06

## 2022-03-12 RX ORDER — ENOXAPARIN SODIUM 100 MG/ML
40 INJECTION SUBCUTANEOUS EVERY 24 HOURS
Refills: 0 | Status: DISCONTINUED | OUTPATIENT
Start: 2022-03-12 | End: 2022-04-06

## 2022-03-12 RX ORDER — KETOROLAC TROMETHAMINE 30 MG/ML
15 SYRINGE (ML) INJECTION EVERY 8 HOURS
Refills: 0 | Status: DISCONTINUED | OUTPATIENT
Start: 2022-03-12 | End: 2022-03-13

## 2022-03-12 RX ORDER — IBUPROFEN 200 MG
600 TABLET ORAL EVERY 8 HOURS
Refills: 0 | Status: DISCONTINUED | OUTPATIENT
Start: 2022-03-12 | End: 2022-03-21

## 2022-03-12 RX ORDER — QUETIAPINE FUMARATE 200 MG/1
200 TABLET, FILM COATED ORAL THREE TIMES A DAY
Refills: 0 | Status: DISCONTINUED | OUTPATIENT
Start: 2022-03-12 | End: 2022-04-06

## 2022-03-12 RX ORDER — GABAPENTIN 400 MG/1
300 CAPSULE ORAL THREE TIMES A DAY
Refills: 0 | Status: DISCONTINUED | OUTPATIENT
Start: 2022-03-12 | End: 2022-04-06

## 2022-03-12 RX ORDER — BUPROPION HYDROCHLORIDE 150 MG/1
300 TABLET, EXTENDED RELEASE ORAL DAILY
Refills: 0 | Status: DISCONTINUED | OUTPATIENT
Start: 2022-03-12 | End: 2022-04-06

## 2022-03-12 RX ADMIN — CYCLOBENZAPRINE HYDROCHLORIDE 10 MILLIGRAM(S): 10 TABLET, FILM COATED ORAL at 23:45

## 2022-03-12 RX ADMIN — ENOXAPARIN SODIUM 40 MILLIGRAM(S): 100 INJECTION SUBCUTANEOUS at 23:02

## 2022-03-12 RX ADMIN — QUETIAPINE FUMARATE 200 MILLIGRAM(S): 200 TABLET, FILM COATED ORAL at 23:44

## 2022-03-12 RX ADMIN — GABAPENTIN 300 MILLIGRAM(S): 400 CAPSULE ORAL at 23:01

## 2022-03-12 RX ADMIN — Medication 1 PATCH: at 23:46

## 2022-03-12 RX ADMIN — Medication 2 MILLIGRAM(S): at 23:02

## 2022-03-12 RX ADMIN — MIRTAZAPINE 60 MILLIGRAM(S): 45 TABLET, ORALLY DISINTEGRATING ORAL at 23:44

## 2022-03-12 RX ADMIN — Medication 600 MILLIGRAM(S): at 23:01

## 2022-03-12 NOTE — ED PROVIDER NOTE - OBJECTIVE STATEMENT
51yM pmhx polysubstance abuse, recent admission for vertebral osteomyelitis c/o increasing back pain over the past week, worsening today to the point that he was having difficulty ambulating/bearing weight; pt states he recently finished abx. Denies urinary/bowel incontinence/retention, numbness in legs. Has otherwise been feeling well--denies fever/chills, chest pain, SOB, abd pain, n/v/d.

## 2022-03-12 NOTE — ED PROVIDER NOTE - ATTENDING CONTRIBUTION TO CARE
50 yo m hx polysubstance abuse, admitted 1/15 for osteomyelitis and MSSA bacteremia, IVDU  pt presents for low back pain. pain not as bad as in January but pt taking methodone, xanax and msk relaxer. no fevers/chills. pain LL back w/ rad across and down legs. no numbness/weakness/trauma/incontinence/retention.    vss  gen- NAD, aaox3  card-rrr  lungs-ctab, no wheezing or rhonchi  abd-sntnd, no guarding or rebound  neuro- full str/sensation, cn ii-xii grossly intact, normal coordination  Spine- no midline c/t/l spine ttp, L paralumbar tenderness, neck supple, FROM to neck    given hx, will get interval labs, ctap to assess for acute osteo, less likely abcess  no fnd on exam, pain controlled

## 2022-03-12 NOTE — H&P ADULT - ASSESSMENT
50yo M w/ pmhx of IVDU (heroin), vertebral osteomyelitis, and MSSA bacteremia presents for worsening back pain. Admitted for further management of progressive vertebral osteomyelitis.     #progressive vertebral osteomyelitis   #Hx of MSSA bacteremia  - MRI T-L spine 1/18/22 confirmed osteomyelitis  - PAM 1/19: no endocarditis  - bcx 1/15: MSSA; first cleared bcx on 1/18   - received cefazolin via picc line ended 3/1  -   - CT L-T spine IV con: Since 1/15/2022 there has been significant progression of discitis osteomyelitis at L4-5 with multifocal erosive changes at the endplates and likely prevertebral phlegmon formation. Progression of L5-S1 facetitis, Facetitis in T10-T11.  - get MRI T/L spine w/wo IV con   - f/u bcx and get daily bcx   - neurosurgery said no surgical intervention   - ID consult, IR consult        #Right forearm foreign body  - 1.2cm linear foreign body overlying right radius  - surgery consult for removal - not recommending removal given difficulty of extraction, and no evidence of infection,   - not a contraindication for MRI     #Fall   - likely mechanical   - cth negative, trauma workup negative   - pt ot rehab     # Bl open wounds on wrist   - burn appreciated, Wound care - Xeroform/Kelrex BID, no Surgical debridement     # IVDU  # suspected ETOH use   # suspected thiamine folate deficiency   - active heroin use, last use 2 weeks ago  - reports taking 135mg methadone per day, dose confirmed with Juan Ramon Flores RN at methadone clinic, last received 3 day supply on 1/14,  - addiction medicine consulted  - continue thiamine folate supplementation   - pain management recs appreciated   - tylenol 650mg q8 standing   - gabapentin 300mg q8hr   - cyclobenzaprine 10mg q8hr   - dilaudid 6mg po q4 prn   - ketorolac 15mg q6hr prn     #Mood dx   #Anxiety   - continue home xanax 2mg TID, has withdrawal symptoms when not taking them.   - c/w welbutrin 300 qd and Seroquel 300 TID     #Normocytic Anemia   - multifactorial, iron deficiency, anemia of chronic disease,   - continue folate, MV,  - ferrous sulfate 325mg qd     # DVT ppx- LMWH  # GI ppx- PPI  # Activity- AAT  # Diet- Reg  # Code status - full   52yo M w/ pmhx of IVDU (heroin), vertebral osteomyelitis, and MSSA bacteremia presents for worsening back pain. Admitted for further management of progressive vertebral osteomyelitis.     #progressive vertebral osteomyelitis   #Hx of MSSA bacteremia  - MRI T-L spine 1/18/22 confirmed osteomyelitis  - PAM 1/19: no endocarditis  - bcx 1/15: MSSA; first cleared bcx on 1/18   - received cefazolin via picc line ended 3/1  - , get CRP  - CT L-T spine IV con: Since 1/15/2022 there has been significant progression of discitis osteomyelitis at L4-5 with multifocal erosive changes at the endplates and likely prevertebral phlegmon formation. Progression of L5-S1 facetitis, Facetitis in T10-T11.  - get MRI T/L spine w/wo IV con   - f/u bcx and get daily bcx   - neurosurgery said no surgical intervention   - ID consult, IR consult  - monitor off abx for now   - PT eval     #b/l distal forearm wounds  - surgery saw patient last admission and recommended not removing R foreign body; not a contraindication to MRI   - burn saw patient last admission and rec local wound care  - 1.2cm linear foreign body overlying right radius  - not a contraindication for MRI   - wound care RN   - f/u o/p burn     # IVDU  # suspected folic acid deficiency  # anxiety  - on home methadone 135mg solution   - pt states methadone clinic is closed on sunday, will put in one time dose tmrw   - please call [facility] methadone clinic - #[phone] Monday am   - c/w xanax, welbutrin, seroquel      #Normocytic Anemia   - multifactorial, iron deficiency, anemia of chronic disease,   - c/w folate and ferrous sulfate 325mg qd     DVT ppx: lovenox  GI ppx: none  Diet: regular  Code Status: full code   Dispo: from home, recently discharged from [facility]; get MRI, f/u ID and IR    50yo M w/ pmhx of IVDU (heroin), vertebral osteomyelitis, and MSSA bacteremia presents for worsening back pain. Admitted for further management of progressive vertebral osteomyelitis.     #progressive vertebral osteomyelitis   #Hx of MSSA bacteremia  - MRI T-L spine 1/18/22 confirmed osteomyelitis  - PAM 1/19: no endocarditis  - bcx 1/15: MSSA; first cleared bcx on 1/18   - received cefazolin via picc line ended 3/1  - , get CRP  - CT L-T spine IV con: Since 1/15/2022 there has been significant progression of discitis osteomyelitis at L4-5 with multifocal erosive changes at the endplates and likely prevertebral phlegmon formation. Progression of L5-S1 facetitis, Facetitis in T10-T11.  - get MRI T/L spine w/wo IV con   - f/u bcx and get daily bcx   - neurosurgery said no surgical intervention   - ID consult, IR consult  - monitor off abx for now   - PT eval     #b/l distal forearm wounds  - surgery saw patient last admission and recommended not removing R foreign body; not a contraindication to MRI   - burn saw patient last admission and rec local wound care  - 1.2cm linear foreign body overlying right radius  - not a contraindication for MRI   - wound care RN   - f/u o/p burn     # IVDU  # suspected folic acid deficiency  # anxiety  - on home methadone 135mg solution   - pt states methadone clinic is closed on sunday, will put in one time dose tmrw   - please call [facility] methadone clinic - #[phone] Monday am   - c/w xanax, welbutrin, seroquel      #Normocytic Anemia   - multifactorial, iron deficiency, anemia of chronic disease,   - c/w folate and ferrous sulfate 325mg qd     #RUL opacities  - asymptomatic   - CT chest IV con: several reticulonodular opacities in the posterior segment of the right upper lobe possibly representing UIP  - f/u pulm o/p     DVT ppx: lovenox  GI ppx: none  Diet: regular  Code Status: full code   Dispo: from home, recently discharged from [facility]; get MRI, f/u ID and IR, f/u bcx

## 2022-03-12 NOTE — H&P ADULT - NSHPPHYSICALEXAM_GEN_ALL_CORE
PHYSICAL EXAM:  GENERAL: NAD, lying in bed comfortably  HEAD:  Atraumatic, Normocephalic  EYES: EOMI, PERRLA, conjunctiva and sclera clear  ENT: Moist mucous membranes  NECK: Supple, No JVD  CHEST/LUNG: Clear to auscultation bilaterally; No rales, rhonchi, wheezing, or rubs. Unlabored respirations  HEART: Regular rate and rhythm; No murmurs, rubs, or gallops  ABDOMEN: Bowel sounds present; Soft, Nontender, Nondistended. No hepatomegally  EXTREMITIES:  2+ Peripheral Pulses, brisk capillary refill. No clubbing, cyanosis, or edema  NERVOUS SYSTEM:  Alert & Oriented X3, speech clear. No deficits   MSK: FROM all 4 extremities, full and equal strength  SKIN: No rashes or lesions PHYSICAL EXAM:  GENERAL: NAD, lying in bed comfortably  HEAD:  Atraumatic, Normocephalic  EYES: EOMI, PERRLA, conjunctiva and sclera clear  ENT: Moist mucous membranes  NECK: Supple, No JVD  CHEST/LUNG: Clear to auscultation bilaterally; No rales, rhonchi, wheezing, or rubs. Unlabored respirations  HEART: Regular rate and rhythm; No murmurs, rubs, or gallops  ABDOMEN: Bowel sounds present; Soft, Nontender, Nondistended. No hepatomegally  EXTREMITIES:  2+ Peripheral Pulses, brisk capillary refill. No clubbing, cyanosis, or edema  NERVOUS SYSTEM:  5/5 muscle strength in LLE; 4/5 muscle strength in RLE   MSK: FROM all 4 extremities, full and equal strength  SKIN: significant b/l wrist deformities with b/l distal forearm eschars with surrounding crust

## 2022-03-12 NOTE — H&P ADULT - ATTENDING COMMENTS
Patient seen and examined on 03/12/2022 at 22:50    HPI:  52yo gentleman with a pmhx of IVDU (heroin), vertebral osteomyelitis, and MSSA bacteremia admitted for worsening back pain. Back pain was increasing over the past week and worsening today to the point he was having difficulty ambulating and bearing weight. Back pain started one week ago and difficulty ambulating started on Tuesday 3/8. Was discharged from [facility] on 3/2. Patient was recently admitted from 1/16 to 2/1 for vertebral osteomyelitis and MSSA bacteremia. MRI found facetitis with adjacent abscesses. PAM was negative and ID recommended cefazolin. Patient had picc line placed for 6wks of antibiotics which ended on 3/1. Denies urinary/bowel incontinence/retention, numbness in legs, fever/chills, chest pain, SOB, abd pain, n/v/d.  He note sobriety since last hospitalization, no IVDA. He has severe b/l wrist ulcerations of IVDA which are healing significantly but not fully resolved.   CT Chest non con- also showed possible interstitial pneumonia or possible UIP though he remains asymptomatic   CT Spine showed: Since 1/15/2022 there has been significant progression of discitis osteomyelitis at L4-5 with multifocal erosive changes at the endplates and likely prevertebral phlegmon formation. Additionally there is progression of L5-S1 facetitis with significant erosive changes of the   joint. Progressive erosive changes are noted involving the left T10-T11 facet joint consistent with facetitis      REVIEW OF SYSTEMS:  CONSTITUTIONAL:  No weakness, fevers, chills, night sweats, weight loss  EYES/ENT: No visual changes. No vertigo or dysphagia  NECK: No neck pain or stiffness  RESPIRATORY: No cough, wheezing, hemoptysis. No shortness of breath  CARDIOVASCULAR: No chest pain or palpitations. No lower extremity edema  GASTROINTESTINAL: No abdominal pain. No nausea, vomiting, diarrhea, or hematemesis  GENITOURINARY: No dysuria or hematuria   MSK: + low back pain, difficulty ambulating   NEUROLOGICAL: No focal numbness or weakness  SKIN: No rashes or itching  HEMATOLOGIC: No easy bruising or prolonged bleeding.      PHYSICAL EXAM:  GENERAL: NAD, obese, slightly disheveled, Non-toxic, stated age   HEAD:  Atraumatic, Normocephalic  EYES: EOMI, Sclera White   NECK: Supple, No JVD  CHEST/LUNG: Clear to auscultation bilaterally; No wheezing, rhonchi, or crackles  HEART: Regular rate and rhythm; s1, s2, No murmurs, rubs, or gallops  BACK: minimal spinal point tenderness   ABDOMEN: Soft, Nontender, Nondistended; Bowel sounds present, No rebound or guarding noted   EXTREMITIES:  b/l wrist dorsiflexion contraction. No lower extremity edema or calf tenderness to palpation.  No clubbing or cyanosis  PSYCH: AAOx3, pleasant, cooperative, not anxious  NEUROLOGY: 5/5 strength in upper extremities, no downward drift. 5/5 strength in LLE and 4/5 strength in RLE limited by pain. No downward drift but slight shaking as he held against gravity. Sensation grossly intact.   SKIN: b/l healing dorsal wrist ulcerations, no drainage       ASSESSMENT AND PLAN:  Lower back pain in the stetting of worsening L4-5 vertebral osteomyelitis with increasing phlegmon/abscess     History of MSSA bacteremia (s/p 6/ weeks abx, PAM no endocarditics/vegetations)   -SIRS not present on admission, and patient is not toxic  -ID evaluation   -IR evaluation for the possibility of bone biopsy or phlegmon/abscess drainage   -Hold Abx for now to maximize culture and diagnostic yield if bone biopsy needed by ID. He is stable and non-toxic      -Start vanco and cefepime if he spikes fevers or clinically worsens   -Follow up blood cultures, procal, and CRP.    -Pain control, on methadone 135, and gabapentin. start toradol, tylenol, lidoderm patch, protonix      -If pain remain uncontrolled with need a pain management eval   -Neuro sx following: check MRI w/wo gado for T and L spine   -PT rehab eval     Hx of IVDU --> repost sobriety   Methadone maintenance   Cigarette smoker   -Continued sobriety and tobacco cessation strongly encouraged  -Cont with methadone 135mg qD, please confirm with clinic   -Nicotine patch  -Out patient addiction medicine follow up     b/l Wrist ulceration --> healing  -Wound care team evaluation     DVT ppx: Lovenox/Heparin  GI ppx: protonix while on NSAIDs   GOC: Full code.    My note supersedes the residents in the event of a discrepancy. Patient seen and examined on 03/12/2022 at 22:50    HPI:  50yo gentleman with a pmhx of IVDU (heroin), vertebral osteomyelitis, and MSSA bacteremia admitted for worsening back pain. Back pain was increasing over the past week and worsening today to the point he was having difficulty ambulating and bearing weight. Back pain started one week ago and difficulty ambulating started on Tuesday 3/8. Was discharged from [facility] on 3/2. Patient was recently admitted from 1/16 to 2/1 for vertebral osteomyelitis and MSSA bacteremia. MRI found facetitis with adjacent abscesses. PAM was negative and ID recommended cefazolin. Patient had picc line placed for 6wks of antibiotics which ended on 3/1. Denies urinary/bowel incontinence/retention, numbness in legs, fever/chills, chest pain, SOB, abd pain, n/v/d.  He note sobriety since last hospitalization, no IVDA. He has severe b/l wrist ulcerations of IVDA which are healing significantly but not fully resolved.   CT Chest non con- also showed possible interstitial pneumonia or possible UIP though he remains asymptomatic   CT Spine showed: Since 1/15/2022 there has been significant progression of discitis osteomyelitis at L4-5 with multifocal erosive changes at the endplates and likely prevertebral phlegmon formation. Additionally there is progression of L5-S1 facetitis with significant erosive changes of the   joint. Progressive erosive changes are noted involving the left T10-T11 facet joint consistent with facetitis      REVIEW OF SYSTEMS:  CONSTITUTIONAL:  No weakness, fevers, chills, night sweats, weight loss  EYES/ENT: No visual changes. No vertigo or dysphagia  NECK: No neck pain or stiffness  RESPIRATORY: No cough, wheezing, hemoptysis. No shortness of breath  CARDIOVASCULAR: No chest pain or palpitations. No lower extremity edema  GASTROINTESTINAL: No abdominal pain. No nausea, vomiting, diarrhea, or hematemesis  GENITOURINARY: No dysuria or hematuria   MSK: + low back pain, difficulty ambulating   NEUROLOGICAL: No focal numbness or weakness  SKIN: No rashes or itching  HEMATOLOGIC: No easy bruising or prolonged bleeding.      PHYSICAL EXAM:  GENERAL: NAD, obese, slightly disheveled, Non-toxic, stated age   HEAD:  Atraumatic, Normocephalic  EYES: EOMI, Sclera White   NECK: Supple, No JVD  CHEST/LUNG: Clear to auscultation bilaterally; No wheezing, rhonchi, or crackles  HEART: Regular rate and rhythm; s1, s2, No murmurs, rubs, or gallops  BACK: minimal spinal point tenderness   ABDOMEN: Soft, Nontender, Nondistended; Bowel sounds present, No rebound or guarding noted   EXTREMITIES:  b/l wrist dorsiflexion contraction. No lower extremity edema or calf tenderness to palpation.  No clubbing or cyanosis  PSYCH: AAOx3, pleasant, cooperative, not anxious  NEUROLOGY: 5/5 strength in upper extremities, no downward drift. 5/5 strength in LLE and 4/5 strength in RLE limited by pain. No downward drift but slight shaking as he held against gravity. Sensation grossly intact.   SKIN: b/l healing dorsal wrist ulcerations, no drainage       ASSESSMENT AND PLAN:  Lower back pain in the stetting of worsening L4-5 vertebral osteomyelitis with increasing phlegmon/abscess     History of MSSA bacteremia (s/p 6/ weeks abx, PAM no endocarditics/vegetations)   -SIRS not present on admission, and patient is not toxic  -ID evaluation   -IR evaluation for the possibility of bone biopsy or phlegmon/abscess drainage   -Hold Abx for now to maximize culture and diagnostic yield if bone biopsy needed by ID. He is stable and non-toxic      -Start vanco and cefepime if he spikes fevers or clinically worsens   -Follow up blood cultures, procal, and CRP.    -Pain control, on methadone 135, and gabapentin. start toradol, tylenol, lidoderm patch, flexaril, protonix      -If pain remain uncontrolled with need a pain management eval   -Neuro sx following: check MRI w/wo gado for T and L spine   -PT rehab eval     Hx of IVDU --> repost sobriety   Methadone maintenance   Cigarette smoker   -Continued sobriety and tobacco cessation strongly encouraged  -Cont with methadone 135mg qD, please confirm with clinic   -Nicotine patch  -Out patient addiction medicine follow up     b/l Wrist ulceration --> healing  -Wound care team evaluation     DVT ppx: Lovenox/Heparin  GI ppx: protonix while on NSAIDs   GOC: Full code.    My note supersedes the residents in the event of a discrepancy. Patient seen and examined on 03/12/2022 at 22:50    HPI:  52yo gentleman with a pmhx of IVDU (heroin), vertebral osteomyelitis, and MSSA bacteremia admitted for worsening back pain. Back pain was increasing over the past week and worsening today to the point he was having difficulty ambulating and bearing weight. Back pain started one week ago and difficulty ambulating started on Tuesday 3/8. Was discharged from [facility] on 3/2. Patient was recently admitted from 1/16 to 2/1 for vertebral osteomyelitis and MSSA bacteremia. MRI found facetitis with adjacent abscesses. PAM was negative and ID recommended cefazolin. Patient had picc line placed for 6wks of antibiotics which ended on 3/1. Denies urinary/bowel incontinence/retention, numbness in legs, fever/chills, chest pain, SOB, abd pain, n/v/d.  He note sobriety since last hospitalization, no IVDA. He has severe b/l wrist ulcerations of IVDA which are healing significantly but not fully resolved.   CT Chest non con- also showed possible interstitial pneumonia or possible UIP though he remains asymptomatic   CT Spine showed: Since 1/15/2022 there has been significant progression of discitis osteomyelitis at L4-5 with multifocal erosive changes at the endplates and likely prevertebral phlegmon formation. Additionally there is progression of L5-S1 facetitis with significant erosive changes of the   joint. Progressive erosive changes are noted involving the left T10-T11 facet joint consistent with facetitis      REVIEW OF SYSTEMS:  CONSTITUTIONAL:  No weakness, fevers, chills, night sweats, weight loss  EYES/ENT: No visual changes. No vertigo or dysphagia  NECK: No neck pain or stiffness  RESPIRATORY: No cough, wheezing, hemoptysis. No shortness of breath  CARDIOVASCULAR: No chest pain or palpitations. No lower extremity edema  GASTROINTESTINAL: No abdominal pain. No nausea, vomiting, diarrhea, or hematemesis  GENITOURINARY: No dysuria or hematuria   MSK: + low back pain, difficulty ambulating   NEUROLOGICAL: No focal numbness or weakness  SKIN: No rashes or itching  HEMATOLOGIC: No easy bruising or prolonged bleeding.      PHYSICAL EXAM:  GENERAL: NAD, obese, slightly disheveled, Non-toxic, stated age   HEAD:  Atraumatic, Normocephalic  EYES: EOMI, Sclera White   NECK: Supple, No JVD  CHEST/LUNG: Clear to auscultation bilaterally; No wheezing, rhonchi, or crackles  HEART: Regular rate and rhythm; s1, s2, No murmurs, rubs, or gallops  BACK: minimal spinal point tenderness   ABDOMEN: Soft, Nontender, Nondistended; Bowel sounds present, No rebound or guarding noted   EXTREMITIES:  b/l wrist dorsiflexion contraction. No lower extremity edema or calf tenderness to palpation.  No clubbing or cyanosis  PSYCH: AAOx3, pleasant, cooperative, not anxious  NEUROLOGY: 5/5 strength in upper extremities, no downward drift. 5/5 strength in LLE and 4/5 strength in RLE limited by pain. No downward drift but slight shaking as he held against gravity. Sensation grossly intact.   SKIN: b/l healing dorsal wrist ulcerations, no drainage       ASSESSMENT AND PLAN:  Lower back pain in the stetting of worsening L4-5 vertebral osteomyelitis with increasing phlegmon/abscess     History of MSSA bacteremia (s/p 6/ weeks abx, PAM no endocarditics/vegetations)   -SIRS not present on admission, and patient is not toxic  -ID evaluation   -IR evaluation for the possibility of bone biopsy or phlegmon/abscess drainage   -Hold Abx for now to maximize culture and diagnostic yield if bone biopsy needed by ID. He is stable and non-toxic      -Start vanco and cefepime if he spikes fevers or clinically worsens   -Follow up blood cultures, procal, and CRP.    -Pain control, on methadone 135, and gabapentin. start toradol, tylenol, lidoderm patch, flexaril, protonix      -If pain remain uncontrolled with need a pain management eval   -Neuro sx following: check MRI w/wo gado for T and L spine   -PT rehab eval     Hx of IVDU --> repost sobriety   Methadone maintenance   Cigarette smoker   -Continued sobriety and tobacco cessation strongly encouraged  -Cont with methadone 135mg qD, please confirm with clinic   -Nicotine patch  -Out patient addiction medicine follow up     Anxiety: controlled on xanax, goes through withdrawals without it.   -He is not present drug seeking at this time, simply asking to on his routine medications. Not requesting any excess opioids or IV medications either     b/l Wrist ulceration --> healing  -Wound care team evaluation     DVT ppx: Lovenox/Heparin  GI ppx: protonix while on NSAIDs   GOC: Full code.    My note supersedes the residents in the event of a discrepancy.

## 2022-03-12 NOTE — ED PROVIDER NOTE - CARE PLAN
Principal Discharge DX:	Vertebral osteomyelitis  Secondary Diagnosis:	Back pain  Secondary Diagnosis:	Weakness   1

## 2022-03-12 NOTE — ED PROVIDER NOTE - CLINICAL SUMMARY MEDICAL DECISION MAKING FREE TEXT BOX
pt presents for eval of back pain, CT Lumbar + showing progressive changes, c/f phlegmon and possible lung finding. ID and neurosurg consulted to discuss ABX. pain controlled, pt nontoxic appearing. will admit

## 2022-03-12 NOTE — ED ADULT NURSE NOTE - CAS EDN DISCHARGE INTERVENTIONS
Reason For Visit  NUNO JUDGE is here today for. Patient seen weekly in the wound clinic for wounds to right foot and Lymphedema. NUNO JUDGE accepted a chaperone. He is accompanied by his spouse .      Referred By  Referred By:   DR RAMON Hamilton      History of Present Illness  HPI: Patient has had Lymphedema for 17 years. He has been compressing himself with Comprilan and requires a new garment. He appears to require some manual drainage and will be transitioned to the Lymphedema clinic when wounds close.      Physical Exam  Patient will require manual Lymphatic drainage to reduce the edema in his right leg.  He will transtiotn into the Lymphedema Clinic on Monday       Patient's wounds on his right foot are closed now.      Procedure  REmoved dressings   Washed leg with soap and water  Applied oil to leg  Applied CoFlex      Assessment   1. Lymphedema of right lower extremity (457.1) (I89.0)    Plan  Patient Instructions:.   Patient to return to start Lymphedema Clinic on Monday.      Signatures   Electronically signed by : HECTOR Escobar,SANDIPNShelly,ASHVINS,,; Jul 12 2017  1:24PM CST     Arm band on/IV intact

## 2022-03-12 NOTE — ED PROCEDURE NOTE - ATTENDING CONTRIBUTION TO CARE
I personally supervised the study. I reviewed the images and interpretation by the resident/ACP and have edited where appropriate.    I was present for and supervised the key and critical aspects of the procedures performed during the care of the patient.

## 2022-03-12 NOTE — H&P ADULT - HISTORY OF PRESENT ILLNESS
52yo M w/ pmhx of IVDU (heroin), vertebral osteomyelitis, and MSSA bacteremia presents for worsening back pain. Back pain was increasing over the past week and worsening today to the point he was having difficulty ambulating and bearing weight. Patient was recently admitted from 1/16 to 2/1 for vertebral osteomyelitis and MSSA bacteremia. MRI found facetitis with adjacent abscesses. PAM was negative and ID recommended cefazolin. Patient had picc line placed for 6wks of antibiotics which ended on 3/1. Denies urinary/bowel incontinence/retention, numbness in legs, fever/chills, chest pain, SOB, abd pain, n/v/d.    ED course:  vitals: /94, HR 84, T 98.4F, O2 96% RA   labs: hgb 9.0,   imaging:   - CT L-T spine IV con:   Since 1/15/2022 there has been significant progression of discitis osteomyelitis at L4-5 with multifocal erosive changes at the endplates and likely prevertebral phlegmon formation. Progression of L5-S1 facetitis, Facetitis in T10-T11.  progress: neurosurgery said no surgical intervention  52yo M w/ pmhx of IVDU (heroin), vertebral osteomyelitis, and MSSA bacteremia presents for worsening back pain. Back pain was increasing over the past week and worsening today to the point he was having difficulty ambulating and bearing weight. Back pain started one week ago and difficulty ambulating started on Tuesday 3/8. Was discharged from [facility] on 3/2. Patient was recently admitted from 1/16 to 2/1 for vertebral osteomyelitis and MSSA bacteremia. MRI found facetitis with adjacent abscesses. PAM was negative and ID recommended cefazolin. Patient had picc line placed for 6wks of antibiotics which ended on 3/1. Denies urinary/bowel incontinence/retention, numbness in legs, fever/chills, chest pain, SOB, abd pain, n/v/d.    ED course:  vitals: /94, HR 84, T 98.4F, O2 96% RA   labs: hgb 9.0,   imaging:   - CT L-T spine IV con:   Since 1/15/2022 there has been significant progression of discitis osteomyelitis at L4-5 with multifocal erosive changes at the endplates and likely prevertebral phlegmon formation. Progression of L5-S1 facetitis, Facetitis in T10-T11.  progress: neurosurgery said no surgical intervention

## 2022-03-12 NOTE — H&P ADULT - NSHPLABSRESULTS_GEN_ALL_CORE
LABS:  cret                        9.0    9.17  )-----------( 362      ( 12 Mar 2022 12:12 )             29.6     03-12    140  |  101  |  9<L>  ----------------------------<  121<H>  3.6   |  29  |  0.8    Ca    8.9      12 Mar 2022 12:12    TPro  7.1  /  Alb  3.5  /  TBili  0.2  /  DBili  x   /  AST  11  /  ALT  8   /  AlkPhos  93  03-12    - CT L-T spine IV con:   Since 1/15/2022 there has been significant progression of discitis osteomyelitis at L4-5 with multifocal erosive changes at the endplates and likely prevertebral phlegmon formation. Progression of L5-S1 facetitis, Facetitis in T10-T11.  progress: neurosurgery said no surgical intervention

## 2022-03-12 NOTE — ED PROVIDER NOTE - PHYSICAL EXAMINATION
Vital Signs: Reviewed  GEN: alert, NAD, speaks full sentences  HEAD:  normocephalic, atraumatic  EYES:  PERRLA; conjunctivae without injection, drainage or discharge  ENMT:  nasal mucosa moist; mouth moist without ulcerations or lesions; throat moist without erythema, exudate, ulcerations or lesions  NECK:  supple  CARDIAC:  regular rate, normal S1 and S2, no murmurs  RESP:  respiratory rate and effort appear normal for age; lungs are clear to auscultation bilaterally; no rales or wheezes  ABDOMEN:  soft, nontender, nondistended  MUSCULOSKELETAL/NEURO: lumbar midline tenderness, strength 5/5 b/l LE, sensation intact, DPs 2+; normal movement, normal tone  SKIN: ulceration and scar tissue along b/l dorsal wrists, hands contracted in extension; normal skin color for age and race, well-perfused; warm and dry

## 2022-03-12 NOTE — ED ADULT TRIAGE NOTE - CHIEF COMPLAINT QUOTE
pt biba for lower back pan. described as sharp starting Thursday. crawled to neighbors apartment and had neighbor call EMS. co inability to ambulate.

## 2022-03-12 NOTE — CONSULT NOTE ADULT - SUBJECTIVE AND OBJECTIVE BOX
HPI:    51yM pmhx polysubstance abuse, recent admission for vertebral osteomyelitis c/o increasing back pain over the past week, worsening today to the point that he was having difficulty ambulating/bearing weight; pt states he recently finished abx. Denies urinary/bowel incontinence/retention, numbness in legs. Has otherwise been feeling well--denies fever/chills, chest pain, SOB, abd pain, n/v/d.  Pt seen and examined at bedside in ED.  Pt is known to Neurosurgery service for recent admission for osteomyelitis.  Pt was seen by Infectious disease and PICC line was placed for antibiotic administration x 6 weeks.  Pt completed course of  antibiotics on March 1st, 2022.  He was released from Nursing home after completing antibiotics.  Pt admits he had some temporary relief of pain but for the past week he's had increasing back pain that travels to b/l hips and b/l anterior thighs.  Pain does not radiate below thighs and is not associated with numbness ot tingling. Pt denies urinary or bowel incontinence.  Pt states the pain makes it difficult to ambulate.   Of note, pt has a long history of polysubstance abuse.  He sts he hasn't used any illicit drugs since being in NH.  He is on a Methadone dose daily.     PAST MEDICAL & SURGICAL HISTORY:  IV drug abuse        Imaging:  < from: CT Thoracic Spine w/ IV Cont (03.12.22 @ 14:32) >  IMPRESSION:    Since 1/15/2022 there has been significant progression ofdiscitis   osteomyelitis at L4-5 with multifocal erosive changes at the endplates   and likely prevertebral phlegmon formation. Additionally there is   progression of L5-S1 facetitis with significant erosive changes of the   joint.    Progressive erosive changes are noted involving the left T10-T11 facet   joint consistent with facetitis.    Patchy nodular opacity involves the right upper lung possibly   representing infectious etiology. Dedicated chest CT is recommended for   further evaluation.    These findings were discussed with Dr. Leary at 3/12/2022 3:42 PM by Dr. Ellis with read back confirmation.    < end of copied text >          Home Medications:  acetaminophen 325 mg oral tablet: 2 tab(s) orally every 6 hours (01 Feb 2022 10:22)  ceFAZolin: 2000 milligram(s) intravenous every 8 hours. To be administered until 3/1/22. (01 Feb 2022 10:28)  cyclobenzaprine 10 mg oral tablet: 1 tab(s) orally every 8 hours (21 Jan 2022 15:09)  ferrous sulfate 325 mg (65 mg elemental iron) oral tablet: 1 tab(s) orally once a day (21 Jan 2022 15:09)  folic acid 1 mg oral tablet: 1 tab(s) orally once a day (21 Jan 2022 15:09)  gabapentin 300 mg oral capsule: 1 cap(s) orally 3 times a day (21 Jan 2022 15:09)  methadone: 135 milligram(s) orally once a day (21 Jan 2022 15:09)  Multiple Vitamins oral tablet: 1 tab(s) orally once a day (21 Jan 2022 15:09)  nicotine 21 mg/24 hr transdermal film, extended release: 1 patch transdermal once a day (01 Feb 2022 16:45)  Remeron 30 mg oral tablet: 2 tab(s) orally once a day (at bedtime) (16 Jan 2022 10:02)  SEROquel 200 mg oral tablet: orally 3 times a day (16 Jan 2022 10:02)  Wellbutrin: 300 milligram(s) orally once a day (16 Jan 2022 10:02)  Xanax: 2 milligram(s) orally 3 times a day (16 Jan 2022 10:02)      Allergies    No Known Allergies    Intolerances        ROS:  [ x ] A ten-point review of systems is negative except as noted   [  ] Due to altered mental status/intubation, subjective information were not able to be obtained from the patient. History was obtained, to the extent possible, from review of the chart and collateral sources of information    MEDICATIONS  (STANDING):    MEDICATIONS  (PRN):      ICU Vital Signs Last 24 Hrs  T(C): 36.9 (12 Mar 2022 11:16), Max: 36.9 (12 Mar 2022 11:16)  T(F): 98.4 (12 Mar 2022 11:16), Max: 98.4 (12 Mar 2022 11:16)  HR: 84 (12 Mar 2022 11:16) (84 - 84)  BP: 140/94 (12 Mar 2022 11:16) (140/94 - 140/94)  BP(mean): --  ABP: --  ABP(mean): --  RR: 18 (12 Mar 2022 11:16) (18 - 18)  SpO2: 96% (12 Mar 2022 11:16) (96% - 96%)      I&O's Detail      CBC Full  -  ( 12 Mar 2022 12:12 )  WBC Count : 9.17 K/uL  RBC Count : 3.53 M/uL  Hemoglobin : 9.0 g/dL  Hematocrit : 29.6 %  Platelet Count - Automated : 362 K/uL  Mean Cell Volume : 83.9 fL  Mean Cell Hemoglobin : 25.5 pg  Mean Cell Hemoglobin Concentration : 30.4 g/dL  Auto Neutrophil # : 7.44 K/uL  Auto Lymphocyte # : 1.02 K/uL  Auto Monocyte # : 0.61 K/uL  Auto Eosinophil # : 0.06 K/uL  Auto Basophil # : 0.01 K/uL  Auto Neutrophil % : 81.1 %  Auto Lymphocyte % : 11.1 %  Auto Monocyte % : 6.7 %  Auto Eosinophil % : 0.7 %  Auto Basophil % : 0.1 %    03-12    140  |  101  |  9<L>  ----------------------------<  121<H>  3.6   |  29  |  0.8    Ca    8.9      12 Mar 2022 12:12    TPro  7.1  /  Alb  3.5  /  TBili  0.2  /  DBili  x   /  AST  11  /  ALT  8   /  AlkPhos  93  03-12          Neuro exam:  AAOX3. Verbal function intact  follows commands  appears uncomfortable, poor hygiene overall  b/l UE with old healing ulcerations to dorsal surface of wrists  weak hand  b/l  MAEx4, 5/5 power in b/l LE proximally  5/5 DF/PF  Sensation: intact b/l to LT          Assessment/Plan:  No acute Neurosurgical intervention  recommend Infectious disease consult  recommend IR consult for possible biopsy  Obtain MRI T/L spine w/wo gado if no contraindications  Send Blood cultures, Urine culture  Send ESR, CRP, CBC  Consider pain management consult  Medical work up  Will follow MRI imaging  discussed w attg

## 2022-03-12 NOTE — PATIENT PROFILE ADULT - FALL HARM RISK - HARM RISK INTERVENTIONS

## 2022-03-12 NOTE — ED ADULT NURSE NOTE - OBJECTIVE STATEMENT
pt is a 51yr male BIBA for complaints of lower back pain associated w/ difficulty ambulating. pt. denies fevers/chills or other symptoms at this time.

## 2022-03-13 LAB
ALBUMIN SERPL ELPH-MCNC: 3.4 G/DL — LOW (ref 3.5–5.2)
ALP SERPL-CCNC: 92 U/L — SIGNIFICANT CHANGE UP (ref 30–115)
ALT FLD-CCNC: 9 U/L — SIGNIFICANT CHANGE UP (ref 0–41)
ANION GAP SERPL CALC-SCNC: 13 MMOL/L — SIGNIFICANT CHANGE UP (ref 7–14)
APTT BLD: 32.3 SEC — SIGNIFICANT CHANGE UP (ref 27–39.2)
AST SERPL-CCNC: 14 U/L — SIGNIFICANT CHANGE UP (ref 0–41)
BASOPHILS # BLD AUTO: 0.01 K/UL — SIGNIFICANT CHANGE UP (ref 0–0.2)
BASOPHILS NFR BLD AUTO: 0.1 % — SIGNIFICANT CHANGE UP (ref 0–1)
BILIRUB SERPL-MCNC: <0.2 MG/DL — SIGNIFICANT CHANGE UP (ref 0.2–1.2)
BLD GP AB SCN SERPL QL: SIGNIFICANT CHANGE UP
BUN SERPL-MCNC: 9 MG/DL — LOW (ref 10–20)
CALCIUM SERPL-MCNC: 8.7 MG/DL — SIGNIFICANT CHANGE UP (ref 8.5–10.1)
CHLORIDE SERPL-SCNC: 104 MMOL/L — SIGNIFICANT CHANGE UP (ref 98–110)
CO2 SERPL-SCNC: 27 MMOL/L — SIGNIFICANT CHANGE UP (ref 17–32)
CREAT SERPL-MCNC: 0.8 MG/DL — SIGNIFICANT CHANGE UP (ref 0.7–1.5)
EGFR: 106 ML/MIN/1.73M2 — SIGNIFICANT CHANGE UP
EOSINOPHIL # BLD AUTO: 0.06 K/UL — SIGNIFICANT CHANGE UP (ref 0–0.7)
EOSINOPHIL NFR BLD AUTO: 0.8 % — SIGNIFICANT CHANGE UP (ref 0–8)
GLUCOSE SERPL-MCNC: 98 MG/DL — SIGNIFICANT CHANGE UP (ref 70–99)
HCT VFR BLD CALC: 30.5 % — LOW (ref 42–52)
HGB BLD-MCNC: 9.1 G/DL — LOW (ref 14–18)
IMM GRANULOCYTES NFR BLD AUTO: 0.4 % — HIGH (ref 0.1–0.3)
INR BLD: 1.2 RATIO — SIGNIFICANT CHANGE UP (ref 0.65–1.3)
LYMPHOCYTES # BLD AUTO: 1.2 K/UL — SIGNIFICANT CHANGE UP (ref 1.2–3.4)
LYMPHOCYTES # BLD AUTO: 16.9 % — LOW (ref 20.5–51.1)
MAGNESIUM SERPL-MCNC: 2 MG/DL — SIGNIFICANT CHANGE UP (ref 1.8–2.4)
MCHC RBC-ENTMCNC: 25.1 PG — LOW (ref 27–31)
MCHC RBC-ENTMCNC: 29.8 G/DL — LOW (ref 32–37)
MCV RBC AUTO: 84.3 FL — SIGNIFICANT CHANGE UP (ref 80–94)
MONOCYTES # BLD AUTO: 0.61 K/UL — HIGH (ref 0.1–0.6)
MONOCYTES NFR BLD AUTO: 8.6 % — SIGNIFICANT CHANGE UP (ref 1.7–9.3)
NEUTROPHILS # BLD AUTO: 5.21 K/UL — SIGNIFICANT CHANGE UP (ref 1.4–6.5)
NEUTROPHILS NFR BLD AUTO: 73.2 % — SIGNIFICANT CHANGE UP (ref 42.2–75.2)
NRBC # BLD: 0 /100 WBCS — SIGNIFICANT CHANGE UP (ref 0–0)
PLATELET # BLD AUTO: 360 K/UL — SIGNIFICANT CHANGE UP (ref 130–400)
POTASSIUM SERPL-MCNC: 4 MMOL/L — SIGNIFICANT CHANGE UP (ref 3.5–5)
POTASSIUM SERPL-SCNC: 4 MMOL/L — SIGNIFICANT CHANGE UP (ref 3.5–5)
PROT SERPL-MCNC: 7.4 G/DL — SIGNIFICANT CHANGE UP (ref 6–8)
PROTHROM AB SERPL-ACNC: 13.8 SEC — HIGH (ref 9.95–12.87)
RBC # BLD: 3.62 M/UL — LOW (ref 4.7–6.1)
RBC # FLD: 17.1 % — HIGH (ref 11.5–14.5)
SODIUM SERPL-SCNC: 144 MMOL/L — SIGNIFICANT CHANGE UP (ref 135–146)
WBC # BLD: 7.12 K/UL — SIGNIFICANT CHANGE UP (ref 4.8–10.8)
WBC # FLD AUTO: 7.12 K/UL — SIGNIFICANT CHANGE UP (ref 4.8–10.8)

## 2022-03-13 PROCEDURE — 93010 ELECTROCARDIOGRAM REPORT: CPT

## 2022-03-13 PROCEDURE — 99233 SBSQ HOSP IP/OBS HIGH 50: CPT

## 2022-03-13 RX ORDER — METHADONE HYDROCHLORIDE 40 MG/1
135 TABLET ORAL DAILY
Refills: 0 | Status: DISCONTINUED | OUTPATIENT
Start: 2022-03-13 | End: 2022-03-20

## 2022-03-13 RX ORDER — INFLUENZA VIRUS VACCINE 15; 15; 15; 15 UG/.5ML; UG/.5ML; UG/.5ML; UG/.5ML
0.5 SUSPENSION INTRAMUSCULAR ONCE
Refills: 0 | Status: DISCONTINUED | OUTPATIENT
Start: 2022-03-13 | End: 2022-03-18

## 2022-03-13 RX ORDER — LIDOCAINE 4 G/100G
1 CREAM TOPICAL DAILY
Refills: 0 | Status: DISCONTINUED | OUTPATIENT
Start: 2022-03-13 | End: 2022-04-06

## 2022-03-13 RX ADMIN — LIDOCAINE 1 PATCH: 4 CREAM TOPICAL at 11:21

## 2022-03-13 RX ADMIN — Medication 1 MILLIGRAM(S): at 11:59

## 2022-03-13 RX ADMIN — Medication 1 PATCH: at 22:33

## 2022-03-13 RX ADMIN — LIDOCAINE 1 PATCH: 4 CREAM TOPICAL at 22:33

## 2022-03-13 RX ADMIN — Medication 600 MILLIGRAM(S): at 01:30

## 2022-03-13 RX ADMIN — Medication 1 TABLET(S): at 11:59

## 2022-03-13 RX ADMIN — GABAPENTIN 300 MILLIGRAM(S): 400 CAPSULE ORAL at 05:48

## 2022-03-13 RX ADMIN — Medication 15 MILLIGRAM(S): at 13:19

## 2022-03-13 RX ADMIN — MIRTAZAPINE 60 MILLIGRAM(S): 45 TABLET, ORALLY DISINTEGRATING ORAL at 22:19

## 2022-03-13 RX ADMIN — GABAPENTIN 300 MILLIGRAM(S): 400 CAPSULE ORAL at 17:02

## 2022-03-13 RX ADMIN — BUPROPION HYDROCHLORIDE 300 MILLIGRAM(S): 150 TABLET, EXTENDED RELEASE ORAL at 11:59

## 2022-03-13 RX ADMIN — Medication 15 MILLIGRAM(S): at 22:49

## 2022-03-13 RX ADMIN — QUETIAPINE FUMARATE 200 MILLIGRAM(S): 200 TABLET, FILM COATED ORAL at 17:03

## 2022-03-13 RX ADMIN — LIDOCAINE 1 PATCH: 4 CREAM TOPICAL at 20:06

## 2022-03-13 RX ADMIN — Medication 325 MILLIGRAM(S): at 11:59

## 2022-03-13 RX ADMIN — Medication 15 MILLIGRAM(S): at 22:19

## 2022-03-13 RX ADMIN — ENOXAPARIN SODIUM 40 MILLIGRAM(S): 100 INJECTION SUBCUTANEOUS at 22:18

## 2022-03-13 RX ADMIN — Medication 15 MILLIGRAM(S): at 05:47

## 2022-03-13 RX ADMIN — Medication 2 MILLIGRAM(S): at 13:19

## 2022-03-13 RX ADMIN — METHADONE HYDROCHLORIDE 135 MILLIGRAM(S): 40 TABLET ORAL at 12:52

## 2022-03-13 RX ADMIN — Medication 1 PATCH: at 12:00

## 2022-03-13 RX ADMIN — Medication 2 MILLIGRAM(S): at 22:18

## 2022-03-13 RX ADMIN — QUETIAPINE FUMARATE 200 MILLIGRAM(S): 200 TABLET, FILM COATED ORAL at 05:48

## 2022-03-13 RX ADMIN — QUETIAPINE FUMARATE 200 MILLIGRAM(S): 200 TABLET, FILM COATED ORAL at 22:18

## 2022-03-13 RX ADMIN — GABAPENTIN 300 MILLIGRAM(S): 400 CAPSULE ORAL at 22:19

## 2022-03-13 RX ADMIN — Medication 2 MILLIGRAM(S): at 05:48

## 2022-03-13 NOTE — PROGRESS NOTE ADULT - SUBJECTIVE AND OBJECTIVE BOX
Progress Note:  Provider Speciality                            Hospitalist      AKILMIKAEL LAO MRN-[id number] 51y Male     CHIEF PRESENTING COMPLAINT:  Patient is a 51y old  Male who presents with a chief complaint of vertebral osteomyelitis (13 Mar 2022 10:17)        SUBJECTIVE:  Patient was seen and examined at bedside. No new complaints described by patient in morning rounds.   No significant overnight events reported.     HISTORY OF PRESENTING ILLNESS:  HPI:  50yo M w/ pmhx of IVDU (heroin), vertebral osteomyelitis, and MSSA bacteremia presents for worsening back pain. Back pain was increasing over the past week and worsening today to the point he was having difficulty ambulating and bearing weight. Back pain started one week ago and difficulty ambulating started on Tuesday 3/8. Was discharged from [facility] on 3/2. Patient was recently admitted from 1/16 to 2/1 for vertebral osteomyelitis and MSSA bacteremia. MRI found facetitis with adjacent abscesses. PAM was negative and ID recommended cefazolin. Patient had picc line placed for 6wks of antibiotics which ended on 3/1. Denies urinary/bowel incontinence/retention, numbness in legs, fever/chills, chest pain, SOB, abd pain, n/v/d.    ED course:  vitals: /94, HR 84, T 98.4F, O2 96% RA   labs: hgb 9.0,   imaging:   - CT L-T spine IV con:   Since 1/15/2022 there has been significant progression of discitis osteomyelitis at L4-5 with multifocal erosive changes at the endplates and likely prevertebral phlegmon formation. Progression of L5-S1 facetitis, Facetitis in T10-T11.  progress: neurosurgery said no surgical intervention  (12 Mar 2022 21:00)        REVIEW OF SYSTEMS:  Patient denies any headache, any vision complaints, runny nose, fever, chills. Denies chest pain, shortness of breath, palpitation. Denies nausea, vomiting, abdominal pain or diarrhoea, Denies dysuria. Denies  localized weakness in any part of the body or numbness.   At least 10 systems were reviewed in ROS. All systems reviewed  are within normal limits except for the complaints as described in Subjective.    PAST MEDICAL & SURGICAL HISTORY:  PAST MEDICAL & SURGICAL HISTORY:  IV drug abuse    Vertebral osteomyelitis    MSSA bacteremia    No significant past surgical history            VITAL SIGNS:  Vital Signs Last 24 Hrs  T(C): 36.2 (13 Mar 2022 07:00), Max: 36.2 (13 Mar 2022 07:00)  T(F): 97.1 (13 Mar 2022 07:00), Max: 97.1 (13 Mar 2022 07:00)  HR: 89 (13 Mar 2022 07:00) (85 - 89)  BP: 127/85 (13 Mar 2022 07:00) (127/85 - 145/89)  BP(mean): --  RR: 18 (13 Mar 2022 07:00) (18 - 18)  SpO2: --          PHYSICAL EXAMINATION:  Not in acute distress, obese  General: No icterus  HEENT:   no JVD.  Heart: S1+S2 audible  Lungs: bilateral  moderate air entry, no wheezing, no crepitations.  Abdomen: Soft, non-tender, non-distended , no  rigidity or guarding.  CNS: Awake alert, CN  grossly intact.  Extremities:  No edema    , bilateral wrist chronic healing ulceration  R hip ulcer , healing        CONSULTS:  Consultant(s) Notes Reviewed by me.   Care Discussed with Consultants/Other Providers where required.        MEDICATIONS:  MEDICATIONS  (STANDING):  ALPRAZolam 2 milliGRAM(s) Oral three times a day  buPROPion XL (24-Hour) . 300 milliGRAM(s) Oral daily  chlorhexidine 4% Liquid 1 Application(s) Topical <User Schedule>  enoxaparin Injectable 40 milliGRAM(s) SubCutaneous every 24 hours  ferrous    sulfate 325 milliGRAM(s) Oral daily  folic acid 1 milliGRAM(s) Oral daily  gabapentin 300 milliGRAM(s) Oral three times a day  influenza   Vaccine 0.5 milliLiter(s) IntraMuscular once  lidocaine   4% Patch 1 Patch Transdermal daily  methadone    Tablet 135 milliGRAM(s) Oral daily  mirtazapine 60 milliGRAM(s) Oral at bedtime  multivitamin 1 Tablet(s) Oral daily  nicotine - 21 mG/24Hr(s) Patch 1 patch Transdermal daily  QUEtiapine 200 milliGRAM(s) Oral three times a day    MEDICATIONS  (PRN):  acetaminophen     Tablet .. 650 milliGRAM(s) Oral every 6 hours PRN Temp greater or equal to 38C (100.4F), Mild Pain (1 - 3)  cyclobenzaprine 10 milliGRAM(s) Oral three times a day PRN Muscle Spasm  ibuprofen  Tablet. 600 milliGRAM(s) Oral every 8 hours PRN Temp greater or equal to 38C (100.4F), Moderate Pain (4 - 6)  ketorolac   Injectable 15 milliGRAM(s) IV Push every 8 hours PRN Severe Pain (7 - 10)            ASSESSMENT:      Patient was seen independently by attending. Latest vital signs and labs were reviewed today. Case was discussed with house staff including resident and nursing & . Following additions/modifications to assessment & plan override resident note above .       ASSESSMENT & PLAN:  50yo M w/ pmhx of IVDU (heroin), vertebral osteomyelitis, and MSSA bacteremia presents for worsening back pain    Known  L4-5 vertebral osteomyelitis, need to r/o new abscess  Recent history & post treatment for MSSA bacteremia  Hx of IVDU  Active nicotine dependence        No sepsis on admission  ID noted- cefazolin 2g q 8 hours after IR procedure tomorrow  MRI MRI T/L spine w/wo gado to ensure no new developing abscess   Follow-up blood cx   IR consult for possible biopsy  Continue methadone 135, and gabapentin  Neuro sx following- no current intervention until result of MRI  PT rehab eval   Chronic b/l Wrist ulceration - healing  Continue current medical management for active chronic comorbid conditions.  DVT Prophylaxis as appropriate  Supportive care including activity, diet, goals of care discussed in AM rounds.  Discussed with  / in AM rounds  Handoff: Pending MRI & IR for procedure         Progress Note:  Provider Speciality                            Hospitalist      AKILMIKAEL LAO MRN-[id number] 51y Male     CHIEF PRESENTING COMPLAINT:  Patient is a 51y old  Male who presents with a chief complaint of vertebral osteomyelitis (13 Mar 2022 10:17)        SUBJECTIVE:  Patient was seen and examined at bedside. No new complaints described by patient in morning rounds.   No significant overnight events reported.     HISTORY OF PRESENTING ILLNESS:  HPI:  50yo M w/ pmhx of IVDU (heroin), vertebral osteomyelitis, and MSSA bacteremia presents for worsening back pain. Back pain was increasing over the past week and worsening today to the point he was having difficulty ambulating and bearing weight. Back pain started one week ago and difficulty ambulating started on Tuesday 3/8. Was discharged from [facility] on 3/2. Patient was recently admitted from 1/16 to 2/1 for vertebral osteomyelitis and MSSA bacteremia. MRI found facetitis with adjacent abscesses. PAM was negative and ID recommended cefazolin. Patient had picc line placed for 6wks of antibiotics which ended on 3/1. Denies urinary/bowel incontinence/retention, numbness in legs, fever/chills, chest pain, SOB, abd pain, n/v/d.    ED course:  vitals: /94, HR 84, T 98.4F, O2 96% RA   labs: hgb 9.0,   imaging:   - CT L-T spine IV con:   Since 1/15/2022 there has been significant progression of discitis osteomyelitis at L4-5 with multifocal erosive changes at the endplates and likely prevertebral phlegmon formation. Progression of L5-S1 facetitis, Facetitis in T10-T11.  progress: neurosurgery said no surgical intervention  (12 Mar 2022 21:00)        REVIEW OF SYSTEMS:  Patient denies any headache, any vision complaints, runny nose, fever, chills. Denies chest pain, shortness of breath, palpitation. Denies nausea, vomiting, abdominal pain or diarrhoea, Denies dysuria. Denies  localized weakness in any part of the body or numbness.   At least 10 systems were reviewed in ROS. All systems reviewed  are within normal limits except for the complaints as described in Subjective.    PAST MEDICAL & SURGICAL HISTORY:  PAST MEDICAL & SURGICAL HISTORY:  IV drug abuse    Vertebral osteomyelitis    MSSA bacteremia    No significant past surgical history            VITAL SIGNS:  Vital Signs Last 24 Hrs  T(C): 36.2 (13 Mar 2022 07:00), Max: 36.2 (13 Mar 2022 07:00)  T(F): 97.1 (13 Mar 2022 07:00), Max: 97.1 (13 Mar 2022 07:00)  HR: 89 (13 Mar 2022 07:00) (85 - 89)  BP: 127/85 (13 Mar 2022 07:00) (127/85 - 145/89)  BP(mean): --  RR: 18 (13 Mar 2022 07:00) (18 - 18)  SpO2: --          PHYSICAL EXAMINATION:  Not in acute distress, obese  General: No icterus  HEENT:   no JVD.  Heart: S1+S2 audible  Lungs: bilateral  moderate air entry, no wheezing, no crepitations.  Abdomen: Soft, non-tender, non-distended , no  rigidity or guarding.  CNS: Awake alert, CN  grossly intact.  Extremities:  No edema    , bilateral wrist chronic healing ulceration  R hip ulcer , healing        CONSULTS:  Consultant(s) Notes Reviewed by me.   Care Discussed with Consultants/Other Providers where required.        MEDICATIONS:  MEDICATIONS  (STANDING):  ALPRAZolam 2 milliGRAM(s) Oral three times a day  buPROPion XL (24-Hour) . 300 milliGRAM(s) Oral daily  chlorhexidine 4% Liquid 1 Application(s) Topical <User Schedule>  enoxaparin Injectable 40 milliGRAM(s) SubCutaneous every 24 hours  ferrous    sulfate 325 milliGRAM(s) Oral daily  folic acid 1 milliGRAM(s) Oral daily  gabapentin 300 milliGRAM(s) Oral three times a day  influenza   Vaccine 0.5 milliLiter(s) IntraMuscular once  lidocaine   4% Patch 1 Patch Transdermal daily  methadone    Tablet 135 milliGRAM(s) Oral daily  mirtazapine 60 milliGRAM(s) Oral at bedtime  multivitamin 1 Tablet(s) Oral daily  nicotine - 21 mG/24Hr(s) Patch 1 patch Transdermal daily  QUEtiapine 200 milliGRAM(s) Oral three times a day    MEDICATIONS  (PRN):  acetaminophen     Tablet .. 650 milliGRAM(s) Oral every 6 hours PRN Temp greater or equal to 38C (100.4F), Mild Pain (1 - 3)  cyclobenzaprine 10 milliGRAM(s) Oral three times a day PRN Muscle Spasm  ibuprofen  Tablet. 600 milliGRAM(s) Oral every 8 hours PRN Temp greater or equal to 38C (100.4F), Moderate Pain (4 - 6)  ketorolac   Injectable 15 milliGRAM(s) IV Push every 8 hours PRN Severe Pain (7 - 10)            ASSESSMENT:      Patient was seen independently by attending. Latest vital signs and labs were reviewed today. Case was discussed with house staff including resident and nursing & . Following additions/modifications to assessment & plan override resident note above .       ASSESSMENT & PLAN:  50yo M w/ pmhx of IVDU (heroin), vertebral osteomyelitis, and MSSA bacteremia presents for worsening back pain    Known  L4-5 vertebral osteomyelitis, need to r/o new abscess  Recent history & post treatment for MSSA bacteremia  Hx of IVDU  Active nicotine dependence        No sepsis on admission  Recently completed 6wks of antibiotics which ended on 3/1.  ID noted- cefazolin 2g q 8 hours after IR procedure tomorrow  MRI MRI T/L spine w/wo gado to ensure no new developing abscess   Follow-up blood cx   IR consult for possible biopsy  Continue methadone 135, and gabapentin  Neuro sx following- no current intervention until result of MRI  PT rehab eval   Chronic b/l Wrist ulceration - healing  Continue current medical management for active chronic comorbid conditions.  DVT Prophylaxis as appropriate  Supportive care including activity, diet, goals of care discussed in AM rounds.  Discussed with  / in AM rounds  Handoff: Pending MRI & IR for procedure

## 2022-03-13 NOTE — CONSULT NOTE ADULT - SUBJECTIVE AND OBJECTIVE BOX
MIKAEL MCDERMOTT  51y, Male  Allergy: No Known Allergies      CHIEF COMPLAINT: vertebral osteomyelitis (12 Mar 2022 21:00)      LOS  1d    HPI:  52yo M w/ pmhx of IVDU (heroin), vertebral osteomyelitis, and MSSA bacteremia presents for worsening back pain. Back pain was increasing over the past week and worsening today to the point he was having difficulty ambulating and bearing weight. Back pain started one week ago and difficulty ambulating started on Tuesday 3/8. Was discharged from [facility] on 3/2. Patient was recently admitted from 1/16 to 2/1 for vertebral osteomyelitis and MSSA bacteremia. MRI found facetitis with adjacent abscesses. PAM was negative and ID recommended cefazolin. Patient had picc line placed for 6wks of antibiotics which ended on 3/1. Denies urinary/bowel incontinence/retention, numbness in legs, fever/chills, chest pain, SOB, abd pain, n/v/d.    ED course:  vitals: /94, HR 84, T 98.4F, O2 96% RA  labs: hgb 9.0,   imaging:  - CT L-T spine IV con:  Since 1/15/2022 there has been significant progression of discitis osteomyelitis at L4-5 with multifocal erosive changes at the endplates and likely prevertebral phlegmon formation. Progression of L5-S1 facetitis, Facetitis in T10-T11.  progress: neurosurgery said no surgical intervention  (12 Mar 2022 21:00)      INFECTIOUS DISEASE HISTORY:    PAST MEDICAL & SURGICAL HISTORY:  IV drug abuse    Vertebral osteomyelitis    MSSA bacteremia    No significant past surgical history        FAMILY HISTORY  No pertinent family history in first degree relatives        SOCIAL HISTORY  Social History:  hx if IVDU heroin (12 Mar 2022 21:00)        ROS  General: Denies rigors, nightsweats  HEENT: Denies headache, rhinorrhea, sore throat, eye pain  CV: Denies CP, palpitations  PULM: Denies wheezing, hemoptysis  GI: Denies hematemesis, hematochezia, melena  : Denies discharge, hematuria  MSK: Denies arthralgias, myalgias  SKIN: Denies rash, lesions  NEURO: Denies paresthesias, weakness  PSYCH: Denies depression, anxiety    VITALS:  T(F): 98.4, Max: 98.4 (03-12-22 @ 11:16)  HR: 85  BP: 145/89  RR: 18Vital Signs Last 24 Hrs  T(C): 36.9 (12 Mar 2022 11:16), Max: 36.9 (12 Mar 2022 11:16)  T(F): 98.4 (12 Mar 2022 11:16), Max: 98.4 (12 Mar 2022 11:16)  HR: 85 (13 Mar 2022 00:25) (84 - 85)  BP: 145/89 (13 Mar 2022 00:25) (140/94 - 145/89)  BP(mean): --  RR: 18 (13 Mar 2022 00:25) (18 - 18)  SpO2: 96% (12 Mar 2022 11:16) (96% - 96%)    PHYSICAL EXAM:  Gen: NAD, resting in bed  HEENT: Normocephalic, atraumatic  Neck: supple, no lymphadenopathy  CV: Regular rate & regular rhythm  Lungs: decreased BS at bases, no fremitus  Abdomen: Soft, BS present  Ext: Warm, well perfused  Neuro: non focal, awake  Skin: no rash, no erythema  Lines: no phlebitis    TESTS & MEASUREMENTS:                        9.0    9.17  )-----------( 362      ( 12 Mar 2022 12:12 )             29.6    03-12    140  |  101  |  9<L>  ----------------------------<  121<H>  3.6   |  29  |  0.8    Ca    8.9      12 Mar 2022 12:12    TPro  7.1  /  Alb  3.5  /  TBili  0.2  /  DBili  x   /  AST  11  /  ALT  8   /  AlkPhos  93  03-12      LIVER FUNCTIONS - ( 12 Mar 2022 12:12 )  Alb: 3.5 g/dL / Pro: 7.1 g/dL / ALK PHOS: 93 U/L / ALT: 8 U/L / AST: 11 U/L / GGT: x              Culture - Blood (collected 01-21-22 @ 04:30)  Source: .Blood Blood  Final Report (01-26-22 @ 23:01):    No Growth Final    Culture - Blood (collected 01-21-22 @ 04:30)  Source: .Blood Blood  Final Report (01-26-22 @ 23:01):    No Growth Final    Culture - Blood (collected 01-20-22 @ 16:00)  Source: .Blood Blood  Final Report (01-26-22 @ 01:00):    No Growth Final    Culture - Blood (collected 01-19-22 @ 08:25)  Source: .Blood None  Final Report (01-24-22 @ 19:00):    No Growth Final    Culture - Blood (collected 01-18-22 @ 04:30)  Source: .Blood Blood  Final Report (01-23-22 @ 20:01):    No Growth Final    Culture - Blood (collected 01-17-22 @ 11:00)  Source: .Blood Blood  Gram Stain (01-18-22 @ 11:15):    Growth in aerobic bottle: Gram Positive Cocci in Clusters    Growth in anaerobic bottle: Gram Positive Cocci in Clusters  Final Report (01-19-22 @ 17:30):    Growth in aerobic and anaerobic bottles: Staphylococcus aureus    See previous culture [id number]    Culture - Blood (collected 01-15-22 @ 20:31)  Source: .Blood Blood-Peripheral  Gram Stain (01-16-22 @ 20:40):    Growth in aerobic bottle: Gram Positive Cocci in Clusters    Growth in anaerobic bottle: Gram Positive Cocci in Clusters  Final Report (01-18-22 @ 19:30):    Growth in aerobic and anaerobic bottles: Staphylococcus aureus    ***Blood Panel PCR results on this specimen are available    approximately 3 hours after the Gram stain result.***    Gram stain, PCR, and/or culture results may not always    correspond dueto difference in methodologies.    ************************************************************    This PCR assay was performed by multiplex PCR. This    Assay tests for 66 bacterial and resistance gene targets.    Please refer to the [facility] test directory    at [email or web address] for details.  Organism: Blood Culture PCR  Staphylococcus aureus (01-18-22 @ 19:30)  Organism: Staphylococcus aureus (01-18-22 @ 19:30)      -  Ampicillin/Sulbactam: S <=8/4      -  Cefazolin: S <=4      -  Clindamycin: S <=0.25      -  Erythromycin: S <=0.25      -  Gentamicin: S <=1 Should not be used as monotherapy      -  Oxacillin: S <=0.25      -  Penicillin: R >8      -  Rifampin: S <=1 Should not be used as monotherapy      -  Tetra/Doxy: S <=1      -  Trimethoprim/Sulfamethoxazole: S <=0.5/9.5      -  Vancomycin: S 2      Method Type: WILLY  Organism: Blood Culture PCR (01-18-22 @ 19:30)      -  Staphylococcus aureus: Detec Any isolate of Staphylococcus aureus from a blood culture is NOT considered a contaminant.      Method Type: PCR    Culture - Blood (collected 01-15-22 @ 20:00)  Source: .Blood Blood-Peripheral  Gram Stain (01-16-22 @ 20:41):    Growth in aerobic bottle: Gram Positive Cocci in Clusters    Growth in anaerobic bottle: Gram Positive Cocci in Clusters  Final Report (01-18-22 @ 19:31):    Growth in aerobic and anaerobic bottles: Staphylococcus aureus    See previous culture [id number]        Lactate, Blood: 0.9 mmol/L (03-12-22 @ 12:12)      INFECTIOUS DISEASES TESTING  COVID-19 PCR: NotDetec (03-12-22 @ 20:19)  COVID-19 PCR: NotDetec (01-31-22 @ 09:10)  COVID-19 PCR: NotDetec (01-25-22 @ 11:21)  COVID-19 PCR: NotDetec (01-18-22 @ 18:45)  HIV-1/2 Combo Result: Nonreact (01-18-22 @ 04:30)  Hepatitis B Surface Antigen: Nonreact (01-18-22 @ 04:30)  Hepatitis C Virus Interpretation: Weakly Reactive Hepatitis C AB  S/CO Ratio                        Interpretation  < 1.00                                   Non-Reactive  1.00 - 4.99                         Weakly-Reactive  >= 5.00                                Reactive  Non-Reactive: A person with a non-reactive HCV antibody result is  considered uninfected.  No further action is needed unless recent  infection is suspected.  In these cases, consider repeat testing later to  detect seroconversion..  Weakly-Reactive: HCV antibody test is abnormal, HCV RNA Qualitative test  will follow.  Reactive: HCV antibody test is abnormal, HCV RNA Qualitative test will  follow.  Note: HCV antibody testing is performed on the Abbott  system. (01-18-22 @ 04:30)  Procalcitonin, Serum: 0.13 ng/mL (01-16-22 @ 16:00)  COVID-19 PCR: NotDetec (01-15-22 @ 23:32)      RADIOLOGY & ADDITIONAL TESTS:  I have personally reviewed the last Chest xray  CXR      CT  CT Chest No Cont:  ACC: [id number] EXAM:  CT CHEST                          PROCEDURE DATE:  03/12/2022          INTERPRETATION:  Reason for Exam:  Follow-up for parenchymal lung opacity  CT of the chest was performed from the thoracic inlet to the level of the  adrenal glands without contrast injection. Coronal and sagittal images  have been submitted.    Comparison: 1/15/2022 and correlated with CT of the lumbar spine and  thoracic spine of 3/12/2022    There is several reticulonodular opacities in the posterior segment of  the right upper lobe which appears new from 1/15/2022. There are  reticulonodular subpleural opacities at the right and left bases which  appears increased from 1/15/2022. These findings may represent  interstitial pneumonia perhapsusual interstitial pneumonia (UIP).  Pulmonary consultation and follow-up exam suggested.  No pleural effusion or air leak is seen.  No mediastinal or hilar lymphadenopathy or mass formation is seen.  The bony structures appear intact.  The soft tissues of the chest wall appear intact.    IMPRESSION:There is several reticulonodular opacities in the posterior  segment of the right upper lobe which appears new from 1/15/2022. There  are reticulonodular subpleural opacities at the right and left bases  which appears increased from 1/15/2022. These findings may represent  interstitial pneumonia perhaps usual interstitial pneumonia (UIP).  Pulmonary consultation and follow-up exam suggested        --- End of Report ---            ALMAS DAVID MD; Attending Radiologist  This document has been electronically signed. Mar 12 2022  7:21PM (03-12-22 @ 17:13)      CARDIOLOGY TESTING      MEDICATIONS  ALPRAZolam 2 Oral three times a day  buPROPion XL (24-Hour) . 300 Oral daily  chlorhexidine 4% Liquid 1 Topical <User Schedule>  enoxaparin Injectable 40 SubCutaneous every 24 hours  ferrous    sulfate 325 Oral daily  folic acid 1 Oral daily  gabapentin 300 Oral three times a day  influenza   Vaccine 0.5 IntraMuscular once  lidocaine   4% Patch 1 Transdermal daily  methadone    Tablet 135 Oral daily  mirtazapine 60 Oral at bedtime  multivitamin 1 Oral daily  nicotine - 21 mG/24Hr(s) Patch 1 Transdermal daily  QUEtiapine 200 Oral three times a day      Weight  Weight (kg): 121.5 (03-13-22 @ 05:10)    ANTIBIOTICS:      ALLERGIES:  No Known Allergies

## 2022-03-13 NOTE — PHYSICAL THERAPY INITIAL EVALUATION ADULT - ADDITIONAL COMMENTS
Patient lives alone in apartment building with elevators. Was independent in ADL's and ambulation using one crutch.
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
## 2022-03-13 NOTE — PHYSICAL THERAPY INITIAL EVALUATION ADULT - PERTINENT HX OF CURRENT PROBLEM, REHAB EVAL
52yo M w/ pmhx of IVDU (heroin), vertebral osteomyelitis, and MSSA bacteremia presents for worsening back pain. Back pain was increasing over the past week and worsening today to the point he was having difficulty ambulating and bearing weight. Was discharged from [facility] on 3/2. Patient was recently admitted from 1/16 to 2/1 for vertebral osteomyelitis and MSSA bacteremia. MRI found facetitis with adjacent abscesses.

## 2022-03-14 LAB — CRP SERPL-MCNC: 140 MG/L — HIGH

## 2022-03-14 PROCEDURE — 72157 MRI CHEST SPINE W/O & W/DYE: CPT | Mod: 26

## 2022-03-14 PROCEDURE — 72158 MRI LUMBAR SPINE W/O & W/DYE: CPT | Mod: 26

## 2022-03-14 PROCEDURE — 99233 SBSQ HOSP IP/OBS HIGH 50: CPT

## 2022-03-14 RX ORDER — CEFAZOLIN SODIUM 1 G
2000 VIAL (EA) INJECTION EVERY 8 HOURS
Refills: 0 | Status: DISCONTINUED | OUTPATIENT
Start: 2022-03-14 | End: 2022-04-06

## 2022-03-14 RX ORDER — KETOROLAC TROMETHAMINE 30 MG/ML
15 SYRINGE (ML) INJECTION EVERY 8 HOURS
Refills: 0 | Status: DISCONTINUED | OUTPATIENT
Start: 2022-03-14 | End: 2022-03-15

## 2022-03-14 RX ADMIN — Medication 1 PATCH: at 11:09

## 2022-03-14 RX ADMIN — Medication 600 MILLIGRAM(S): at 11:59

## 2022-03-14 RX ADMIN — Medication 100 MILLIGRAM(S): at 21:39

## 2022-03-14 RX ADMIN — Medication 1 MILLIGRAM(S): at 11:02

## 2022-03-14 RX ADMIN — QUETIAPINE FUMARATE 200 MILLIGRAM(S): 200 TABLET, FILM COATED ORAL at 13:01

## 2022-03-14 RX ADMIN — Medication 2 MILLIGRAM(S): at 18:47

## 2022-03-14 RX ADMIN — ENOXAPARIN SODIUM 40 MILLIGRAM(S): 100 INJECTION SUBCUTANEOUS at 22:38

## 2022-03-14 RX ADMIN — CYCLOBENZAPRINE HYDROCHLORIDE 10 MILLIGRAM(S): 10 TABLET, FILM COATED ORAL at 21:42

## 2022-03-14 RX ADMIN — Medication 325 MILLIGRAM(S): at 11:02

## 2022-03-14 RX ADMIN — Medication 15 MILLIGRAM(S): at 15:50

## 2022-03-14 RX ADMIN — GABAPENTIN 300 MILLIGRAM(S): 400 CAPSULE ORAL at 21:41

## 2022-03-14 RX ADMIN — GABAPENTIN 300 MILLIGRAM(S): 400 CAPSULE ORAL at 13:02

## 2022-03-14 RX ADMIN — Medication 2 MILLIGRAM(S): at 05:52

## 2022-03-14 RX ADMIN — QUETIAPINE FUMARATE 200 MILLIGRAM(S): 200 TABLET, FILM COATED ORAL at 05:53

## 2022-03-14 RX ADMIN — CYCLOBENZAPRINE HYDROCHLORIDE 10 MILLIGRAM(S): 10 TABLET, FILM COATED ORAL at 13:07

## 2022-03-14 RX ADMIN — GABAPENTIN 300 MILLIGRAM(S): 400 CAPSULE ORAL at 05:53

## 2022-03-14 RX ADMIN — Medication 15 MILLIGRAM(S): at 15:38

## 2022-03-14 RX ADMIN — Medication 2 MILLIGRAM(S): at 13:02

## 2022-03-14 RX ADMIN — Medication 15 MILLIGRAM(S): at 06:44

## 2022-03-14 RX ADMIN — Medication 600 MILLIGRAM(S): at 12:30

## 2022-03-14 RX ADMIN — MIRTAZAPINE 60 MILLIGRAM(S): 45 TABLET, ORALLY DISINTEGRATING ORAL at 21:41

## 2022-03-14 RX ADMIN — QUETIAPINE FUMARATE 200 MILLIGRAM(S): 200 TABLET, FILM COATED ORAL at 21:41

## 2022-03-14 RX ADMIN — LIDOCAINE 1 PATCH: 4 CREAM TOPICAL at 11:10

## 2022-03-14 RX ADMIN — Medication 15 MILLIGRAM(S): at 07:08

## 2022-03-14 RX ADMIN — Medication 1 TABLET(S): at 11:02

## 2022-03-14 RX ADMIN — Medication 100 MILLIGRAM(S): at 16:19

## 2022-03-14 RX ADMIN — BUPROPION HYDROCHLORIDE 300 MILLIGRAM(S): 150 TABLET, EXTENDED RELEASE ORAL at 11:02

## 2022-03-14 RX ADMIN — Medication 2 MILLIGRAM(S): at 21:40

## 2022-03-14 RX ADMIN — METHADONE HYDROCHLORIDE 135 MILLIGRAM(S): 40 TABLET ORAL at 11:02

## 2022-03-14 NOTE — PROGRESS NOTE ADULT - SUBJECTIVE AND OBJECTIVE BOX
MIKAEL MCDERMOTT 51y Male  MRN#: [id number]   CODE STATUS: FULL     Hospital Day: 2d    Pt is currently admitted with the primary diagnosis of     SUBJECTIVE  HPI: 52yo M w/ pmhx of IVDU (heroin), vertebral osteomyelitis, and MSSA bacteremia presents for worsening back pain. Back pain was increasing over the past week and worsening today to the point he was having difficulty ambulating and bearing weight. Back pain started one week ago and difficulty ambulating started on Tuesday 3/8. Was discharged from [facility] on 3/2. Patient was recently admitted from 1/16 to 2/1 for vertebral osteomyelitis and MSSA bacteremia. MRI found facetitis with adjacent abscesses. PAM was negative and ID recommended cefazolin. Patient had picc line placed for 6wks of antibiotics which ended on 3/1. Denies urinary/bowel incontinence/retention, numbness in legs, fever/chills, chest pain, SOB, abd pain, n/v/d.    ED course:  vitals: /94, HR 84, T 98.4F, O2 96% RA   labs: hgb 9.0,   imaging:   - CT L-T spine IV con:   Since 1/15/2022 there has been significant progression of discitis osteomyelitis at L4-5 with multifocal erosive changes at the endplates and likely prevertebral phlegmon formation. Progression of L5-S1 facetitis, Facetitis in T10-T11.  progress: neurosurgery said no surgical intervention  (12 Mar 2022 21:00)    Overnight events: none    Interval hx/Subjective complaints: Pt feeling well, has some mild back pain however well controlled at this time.     Pt denies any fevers, chills, fatigue, CP, palpitations, cough, SOB, abdominal pain, n/v/d, hematochezia, melena, weakness, numbness, tingling, or other FND.                                           ----------------------------------------------------------  OBJECTIVE  PAST MEDICAL & SURGICAL HISTORY  IV drug abuse    Vertebral osteomyelitis    MSSA bacteremia    No significant past surgical history                                              -----------------------------------------------------------  ALLERGIES:  No Known Allergies                                            ------------------------------------------------------------    HOME MEDICATIONS  Home Medications:  acetaminophen 325 mg oral tablet: 2 tab(s) orally every 6 hours (12 Mar 2022 21:41)  cyclobenzaprine 10 mg oral tablet: 1 tab(s) orally every 8 hours (12 Mar 2022 21:41)  ferrous sulfate 325 mg (65 mg elemental iron) oral tablet: 1 tab(s) orally once a day (12 Mar 2022 21:41)  folic acid 1 mg oral tablet: 1 tab(s) orally once a day (12 Mar 2022 21:41)  gabapentin 300 mg oral capsule: 1 cap(s) orally 3 times a day (12 Mar 2022 21:41)  methadone: 135 milligram(s) orally once a day (12 Mar 2022 21:41)  Multiple Vitamins oral tablet: 1 tab(s) orally once a day (12 Mar 2022 21:41)  nicotine 21 mg/24 hr transdermal film, extended release: 1 patch transdermal once a day (12 Mar 2022 21:41)  Remeron 30 mg oral tablet: 2 tab(s) orally once a day (at bedtime) (12 Mar 2022 21:41)  SEROquel 200 mg oral tablet: orally 3 times a day (12 Mar 2022 21:41)  Wellbutrin: 300 milligram(s) orally once a day (12 Mar 2022 21:41)  Xanax: 2 milligram(s) orally 3 times a day (12 Mar 2022 21:41)                           MEDICATIONS:  STANDING MEDICATIONS  ALPRAZolam 2 milliGRAM(s) Oral three times a day  buPROPion XL (24-Hour) . 300 milliGRAM(s) Oral daily  chlorhexidine 4% Liquid 1 Application(s) Topical <User Schedule>  enoxaparin Injectable 40 milliGRAM(s) SubCutaneous every 24 hours  ferrous    sulfate 325 milliGRAM(s) Oral daily  folic acid 1 milliGRAM(s) Oral daily  gabapentin 300 milliGRAM(s) Oral three times a day  influenza   Vaccine 0.5 milliLiter(s) IntraMuscular once  lidocaine   4% Patch 1 Patch Transdermal daily  methadone    Tablet 135 milliGRAM(s) Oral daily  mirtazapine 60 milliGRAM(s) Oral at bedtime  multivitamin 1 Tablet(s) Oral daily  nicotine - 21 mG/24Hr(s) Patch 1 patch Transdermal daily  QUEtiapine 200 milliGRAM(s) Oral three times a day    PRN MEDICATIONS  acetaminophen     Tablet .. 650 milliGRAM(s) Oral every 6 hours PRN  cyclobenzaprine 10 milliGRAM(s) Oral three times a day PRN  ibuprofen  Tablet. 600 milliGRAM(s) Oral every 8 hours PRN  ketorolac   Injectable 15 milliGRAM(s) IV Push every 8 hours PRN                                            ------------------------------------------------------------  VITAL SIGNS: Last 24 Hours  T(C): 36.1 (14 Mar 2022 07:18), Max: 36.7 (13 Mar 2022 15:19)  T(F): 97 (14 Mar 2022 07:18), Max: 98 (13 Mar 2022 15:19)  HR: 70 (14 Mar 2022 07:18) (70 - 75)  BP: 156/100 (14 Mar 2022 07:18) (140/88 - 161/76)  BP(mean): --  RR: 18 (14 Mar 2022 07:18) (18 - 18)  SpO2: --      03-14-22 @ 07:01  -  03-14-22 @ 11:37  --------------------------------------------------------  IN: 420 mL / OUT: 0 mL / NET: 420 mL                                             --------------------------------------------------------------  LABS:                        9.1    7.12  )-----------( 360      ( 13 Mar 2022 06:42 )             30.5     03-13    144  |  104  |  9<L>  ----------------------------<  98  4.0   |  27  |  0.8    Ca    8.7      13 Mar 2022 06:42  Mg     2.0     03-13    TPro  7.4  /  Alb  3.4<L>  /  TBili  <0.2  /  DBili  x   /  AST  14  /  ALT  9   /  AlkPhos  92  03-13    PT/INR - ( 13 Mar 2022 06:42 )   PT: 13.80 sec;   INR: 1.20 ratio         PTT - ( 13 Mar 2022 06:42 )  PTT:32.3 sec            Culture - Blood (collected 12 Mar 2022 12:13)  Source: .Blood Blood-Peripheral  Preliminary Report (13 Mar 2022 21:01):    No growth to date.    Culture - Blood (collected 12 Mar 2022 12:13)  Source: .Blood Blood-Peripheral  Preliminary Report (13 Mar 2022 21:01):    No growth to date.                                                    -------------------------------------------------------------  RADIOLOGY:  < from: Xray Chest 1 View- PORTABLE-Urgent (Xray Chest 1 View- PORTABLE-Urgent .) (03.12.22 @ 17:34) >  Impression:  Lordotic view without definite evidence of focal consolidation pleural   effusion or pneumothorax.    < end of copied text >    < from: CT Lumbar Spine w/ IV Cont (03.12.22 @ 14:33) >  IMPRESSION:    Since 1/15/2022 there has been significant progression ofdiscitis   osteomyelitis at L4-5 with multifocal erosive changes at the endplates   and likely prevertebral phlegmon formation. Additionally there is   progression of L5-S1 facetitis with significant erosive changes of the   joint.    Progressive erosive changes are noted involving the left T10-T11 facet   joint consistent with facetitis.    Patchy nodular opacity involves the right upper lung possibly   representing infectious etiology. Dedicated chest CT is recommended for   further evaluation.    These findings were discussed with Dr. Leary at 3/12/2022 3:42 PM by Dr. Ellis with read back confirmation.    < end of copied text >  There is several reticulonodular opacities in the posterior   segment of the right upper lobe which appears new from 1/15/2022. There   are reticulonodular subpleural opacities at the right and left bases   which appears increased from 1/15/2022. These findings may represent   interstitial pneumonia perhaps usual interstitial pneumonia (UIP).   Pulmonary consultation and follow-up exam suggested                                            --------------------------------------------------------------  PHYSICAL EXAM:  General: Man laying in bed in nad  HEENT: ncat, eomi, mmm  LUNGS: dec bs bl, normal wob   HEART: s1/s2, rrr  ABDOMEN: soft, ntnd, bs+  EXT: wwp, no cyanosis, clubbing, edema  NEURO: aox4, moves all extremities   SKIN: intact, no rashes or lesions                                          --------------------------------------------------------------    ASSESSMENT & PLAN        #Maintainence   - DVT ppx:   - GI ppx:   - Diet:  - Activity:   - Code status:  - Dispo:     #Handoff  MIKAEL MCDERMOTT 51y Male  MRN#: [id number]   CODE STATUS: FULL     Hospital Day: 2d    Pt is currently admitted with the primary diagnosis of vertebral OM     SUBJECTIVE  HPI: 50yo M w/ pmhx of IVDU (heroin), vertebral osteomyelitis, and MSSA bacteremia presents for worsening back pain. Back pain was increasing over the past week and worsening today to the point he was having difficulty ambulating and bearing weight. Back pain started one week ago and difficulty ambulating started on Tuesday 3/8. Was discharged from [facility] on 3/2. Patient was recently admitted from 1/16 to 2/1 for vertebral osteomyelitis and MSSA bacteremia. MRI found facetitis with adjacent abscesses. PAM was negative and ID recommended cefazolin. Patient had picc line placed for 6wks of antibiotics which ended on 3/1. Denies urinary/bowel incontinence/retention, numbness in legs, fever/chills, chest pain, SOB, abd pain, n/v/d.    ED course:  vitals: /94, HR 84, T 98.4F, O2 96% RA   labs: hgb 9.0,   imaging:   - CT L-T spine IV con:   Since 1/15/2022 there has been significant progression of discitis osteomyelitis at L4-5 with multifocal erosive changes at the endplates and likely prevertebral phlegmon formation. Progression of L5-S1 facetitis, Facetitis in T10-T11.  progress: neurosurgery said no surgical intervention  (12 Mar 2022 21:00)    Overnight events: none    Interval hx/Subjective complaints: Pt feeling well, has some mild back pain however well controlled at this time.     Pt denies any fevers, chills, fatigue, CP, palpitations, cough, SOB, abdominal pain, n/v/d, hematochezia, melena, weakness, numbness, tingling, or other FND.                                           ----------------------------------------------------------  OBJECTIVE  PAST MEDICAL & SURGICAL HISTORY  IV drug abuse    Vertebral osteomyelitis    MSSA bacteremia    No significant past surgical history                                              -----------------------------------------------------------  ALLERGIES:  No Known Allergies                                            ------------------------------------------------------------    HOME MEDICATIONS  Home Medications:  acetaminophen 325 mg oral tablet: 2 tab(s) orally every 6 hours (12 Mar 2022 21:41)  cyclobenzaprine 10 mg oral tablet: 1 tab(s) orally every 8 hours (12 Mar 2022 21:41)  ferrous sulfate 325 mg (65 mg elemental iron) oral tablet: 1 tab(s) orally once a day (12 Mar 2022 21:41)  folic acid 1 mg oral tablet: 1 tab(s) orally once a day (12 Mar 2022 21:41)  gabapentin 300 mg oral capsule: 1 cap(s) orally 3 times a day (12 Mar 2022 21:41)  methadone: 135 milligram(s) orally once a day (12 Mar 2022 21:41)  Multiple Vitamins oral tablet: 1 tab(s) orally once a day (12 Mar 2022 21:41)  nicotine 21 mg/24 hr transdermal film, extended release: 1 patch transdermal once a day (12 Mar 2022 21:41)  Remeron 30 mg oral tablet: 2 tab(s) orally once a day (at bedtime) (12 Mar 2022 21:41)  SEROquel 200 mg oral tablet: orally 3 times a day (12 Mar 2022 21:41)  Wellbutrin: 300 milligram(s) orally once a day (12 Mar 2022 21:41)  Xanax: 2 milligram(s) orally 3 times a day (12 Mar 2022 21:41)                           MEDICATIONS:  STANDING MEDICATIONS  ALPRAZolam 2 milliGRAM(s) Oral three times a day  buPROPion XL (24-Hour) . 300 milliGRAM(s) Oral daily  chlorhexidine 4% Liquid 1 Application(s) Topical <User Schedule>  enoxaparin Injectable 40 milliGRAM(s) SubCutaneous every 24 hours  ferrous    sulfate 325 milliGRAM(s) Oral daily  folic acid 1 milliGRAM(s) Oral daily  gabapentin 300 milliGRAM(s) Oral three times a day  influenza   Vaccine 0.5 milliLiter(s) IntraMuscular once  lidocaine   4% Patch 1 Patch Transdermal daily  methadone    Tablet 135 milliGRAM(s) Oral daily  mirtazapine 60 milliGRAM(s) Oral at bedtime  multivitamin 1 Tablet(s) Oral daily  nicotine - 21 mG/24Hr(s) Patch 1 patch Transdermal daily  QUEtiapine 200 milliGRAM(s) Oral three times a day    PRN MEDICATIONS  acetaminophen     Tablet .. 650 milliGRAM(s) Oral every 6 hours PRN  cyclobenzaprine 10 milliGRAM(s) Oral three times a day PRN  ibuprofen  Tablet. 600 milliGRAM(s) Oral every 8 hours PRN  ketorolac   Injectable 15 milliGRAM(s) IV Push every 8 hours PRN                                            ------------------------------------------------------------  VITAL SIGNS: Last 24 Hours  T(C): 36.1 (14 Mar 2022 07:18), Max: 36.7 (13 Mar 2022 15:19)  T(F): 97 (14 Mar 2022 07:18), Max: 98 (13 Mar 2022 15:19)  HR: 70 (14 Mar 2022 07:18) (70 - 75)  BP: 156/100 (14 Mar 2022 07:18) (140/88 - 161/76)  BP(mean): --  RR: 18 (14 Mar 2022 07:18) (18 - 18)  SpO2: --      03-14-22 @ 07:01  -  03-14-22 @ 11:37  --------------------------------------------------------  IN: 420 mL / OUT: 0 mL / NET: 420 mL                                             --------------------------------------------------------------  LABS:                        9.1    7.12  )-----------( 360      ( 13 Mar 2022 06:42 )             30.5     03-13    144  |  104  |  9<L>  ----------------------------<  98  4.0   |  27  |  0.8    Ca    8.7      13 Mar 2022 06:42  Mg     2.0     03-13    TPro  7.4  /  Alb  3.4<L>  /  TBili  <0.2  /  DBili  x   /  AST  14  /  ALT  9   /  AlkPhos  92  03-13    PT/INR - ( 13 Mar 2022 06:42 )   PT: 13.80 sec;   INR: 1.20 ratio         PTT - ( 13 Mar 2022 06:42 )  PTT:32.3 sec            Culture - Blood (collected 12 Mar 2022 12:13)  Source: .Blood Blood-Peripheral  Preliminary Report (13 Mar 2022 21:01):    No growth to date.    Culture - Blood (collected 12 Mar 2022 12:13)  Source: .Blood Blood-Peripheral  Preliminary Report (13 Mar 2022 21:01):    No growth to date.                                                    -------------------------------------------------------------  RADIOLOGY:  < from: Xray Chest 1 View- PORTABLE-Urgent (Xray Chest 1 View- PORTABLE-Urgent .) (03.12.22 @ 17:34) >  Impression:  Lordotic view without definite evidence of focal consolidation pleural   effusion or pneumothorax.    < end of copied text >    < from: CT Lumbar Spine w/ IV Cont (03.12.22 @ 14:33) >  IMPRESSION:    Since 1/15/2022 there has been significant progression ofdiscitis   osteomyelitis at L4-5 with multifocal erosive changes at the endplates   and likely prevertebral phlegmon formation. Additionally there is   progression of L5-S1 facetitis with significant erosive changes of the   joint.    Progressive erosive changes are noted involving the left T10-T11 facet   joint consistent with facetitis.    Patchy nodular opacity involves the right upper lung possibly   representing infectious etiology. Dedicated chest CT is recommended for   further evaluation.    These findings were discussed with Dr. Leary at 3/12/2022 3:42 PM by Dr. Ellis with read back confirmation.    < end of copied text >  There is several reticulonodular opacities in the posterior   segment of the right upper lobe which appears new from 1/15/2022. There   are reticulonodular subpleural opacities at the right and left bases   which appears increased from 1/15/2022. These findings may represent   interstitial pneumonia perhaps usual interstitial pneumonia (UIP).   Pulmonary consultation and follow-up exam suggested                                            --------------------------------------------------------------  PHYSICAL EXAM:  General: Man laying in bed in nad  HEENT: ncat, eomi, mmm  LUNGS: dec bs bl, normal wob   HEART: s1/s2, rrr  ABDOMEN: soft, ntnd, bs+  EXT: wwp, no cyanosis, clubbing, edema  NEURO: aox4, moves all extremities   SKIN: intact, no rashes or lesions                                          --------------------------------------------------------------    ASSESSMENT & PLAN  50yo M w/ pmhx of IVDU (heroin), vertebral osteomyelitis, and MSSA bacteremia presents for worsening back pain. Admitted for further management of progressive vertebral osteomyelitis.     #progressive vertebral osteomyelitis   #Hx of MSSA bacteremia  - MRI T-L spine 1/18/22 confirmed osteomyelitis  - PAM 1/19: no endocarditis  - bcx 1/15: MSSA; first cleared bcx on 1/18   - received cefazolin via picc line ended 3/1  - ,   - No sepsis on admission   - CT L-T spine IV con: Since 1/15/2022 there has been significant progression of discitis osteomyelitis at L4-5 with multifocal erosive changes at the endplates and likely prevertebral phlegmon formation. Progression of L5-S1 facetitis, Facetitis in T10-T11.  - f/u MRI T/L spine w/wo IV con (to ensure no abscess) - ativan 2mg IVP pre MRI sedation   - BCx 3/12: NGTD   - neurosurgery recs appreciated, no acute neurosurgical intervention at this time   - IR consulted for Bx, consult pending   - ID consult appreciated; rec cefazolin 2g q8h- will hold for now pending possible IR bx today     #BL distal forearm wounds- healing   - surgery saw patient last admission and recommended not removing R foreign body; not a contraindication to MRI   - burn saw patient last admission and rec local wound care  - 1.2cm linear foreign body overlying right radius  - not a contraindication for MRI   - wound care RN   - f/u o/p burn     # IVDU  # suspected folic acid deficiency  # anxiety  - on home methadone 135mg solution   - c/w xanax, welbutrin, seroquel      #Normocytic Anemia   - multifactorial, iron deficiency, anemia of chronic disease   - c/w folate and ferrous sulfate 325mg qd     #RUL opacities  - asymptomatic   - CT chest IV con: several reticulonodular opacities in the posterior segment of the right upper lobe possibly representing UIP  - f/u pulm o/p     #Misc  - DVT ppx: Lovenox  - GI ppx: none  - Diet: regular  - Code Status: full code   - Dispo: from home, recently discharged from [facility]; get MRI, f/u ID and IR, f/u bcx

## 2022-03-14 NOTE — ADVANCED PRACTICE NURSE CONSULT - RECOMMEDATIONS
1. Bilateral wrist wounds- Cleanse wound bed w/ normal saline, gently pat dry. Apply Cavilon no-sting barrier film to wound edges and periwound to prevent maceration. Apply Medihoney to wound to maintain clean wound bed, promote moist wound healing, and assist w/ autolytic debridement of devitalized tissue. Cover w/ dry gauze, wrap w/ Kerlex dressing, & ACE wrap. Apply Medihoney and change dressings daily and prn for strike-through drainage or soiling. *Please note-given it's osmotic effect, increased wound drainage may be seen upon initiation of Medihoney usage requiring more frequent dressing changes initially.  -Assess skin/wound qshift, report changes to primary provider.     Plan of Care: Primary RN Carly made aware of above recs. Spoke w/  covering/primary MD Luis Alberto (at [phone]) in regards to above. Signing off on patient, no further needs/recs from [facility] service at this time. Staff RN to perform routine skin/wound assessment and manage wound care. Questions or concerns or if wound worsens and reconsult needed, please contact [facility], [phone].

## 2022-03-14 NOTE — PROGRESS NOTE ADULT - SUBJECTIVE AND OBJECTIVE BOX
Progress Note:  Provider Speciality                            Hospitalist      AKILMIKAEL LAO MRN-[id number] 51y Male     CHIEF PRESENTING COMPLAINT:  Patient is a 51y old  Male who presents with a chief complaint of vertebral osteomyelitis (13 Mar 2022 10:17)        SUBJECTIVE:  Patient was seen and examined at bedside. No new complaints described by patient in morning rounds.   No significant overnight events reported.     HISTORY OF PRESENTING ILLNESS:  HPI:  50yo M w/ pmhx of IVDU (heroin), vertebral osteomyelitis, and MSSA bacteremia presents for worsening back pain. Back pain was increasing over the past week and worsening today to the point he was having difficulty ambulating and bearing weight. Back pain started one week ago and difficulty ambulating started on Tuesday 3/8. Was discharged from [facility] on 3/2. Patient was recently admitted from 1/16 to 2/1 for vertebral osteomyelitis and MSSA bacteremia. MRI found facetitis with adjacent abscesses. PAM was negative and ID recommended cefazolin. Patient had picc line placed for 6wks of antibiotics which ended on 3/1. Denies urinary/bowel incontinence/retention, numbness in legs, fever/chills, chest pain, SOB, abd pain, n/v/d.    ED course:  vitals: /94, HR 84, T 98.4F, O2 96% RA   labs: hgb 9.0,   imaging:   - CT L-T spine IV con:   Since 1/15/2022 there has been significant progression of discitis osteomyelitis at L4-5 with multifocal erosive changes at the endplates and likely prevertebral phlegmon formation. Progression of L5-S1 facetitis, Facetitis in T10-T11.  progress: neurosurgery said no surgical intervention  (12 Mar 2022 21:00)        REVIEW OF SYSTEMS:  Patient denies any headache, any vision complaints, runny nose, fever, chills. Denies chest pain, shortness of breath, palpitation. Denies nausea, vomiting, abdominal pain or diarrhoea, Denies dysuria. Denies  localized weakness in any part of the body or numbness.   At least 10 systems were reviewed in ROS. All systems reviewed  are within normal limits except for the complaints as described in Subjective.    PAST MEDICAL & SURGICAL HISTORY:  PAST MEDICAL & SURGICAL HISTORY:  IV drug abuse    Vertebral osteomyelitis    MSSA bacteremia    No significant past surgical history            VITAL SIGNS:  Vital Signs Last 24 Hrs  T(C): 36.1 (14 Mar 2022 07:18), Max: 36.7 (13 Mar 2022 15:19)  T(F): 97 (14 Mar 2022 07:18), Max: 98 (13 Mar 2022 15:19)  HR: 70 (14 Mar 2022 07:18) (70 - 75)  BP: 156/100 (14 Mar 2022 07:18) (140/88 - 161/76)  BP(mean): --  RR: 18 (14 Mar 2022 07:18) (18 - 18)  SpO2: --      PHYSICAL EXAMINATION:  Not in acute distress, obese  General: No icterus  HEENT:   no JVD.  Heart: S1+S2 audible  Lungs: bilateral  moderate air entry, no wheezing, no crepitations.  Abdomen: Soft, non-tender, non-distended , no  rigidity or guarding.  CNS: Awake alert, CN  grossly intact.  Extremities:  No edema    , bilateral wrist chronic healing ulceration  R hip ulcer , healing        CONSULTS:  Consultant(s) Notes Reviewed by me.   Care Discussed with Consultants/Other Providers where required.        MEDICATIONS:  MEDICATIONS  (STANDING):  ALPRAZolam 2 milliGRAM(s) Oral three times a day  buPROPion XL (24-Hour) . 300 milliGRAM(s) Oral daily  chlorhexidine 4% Liquid 1 Application(s) Topical <User Schedule>  enoxaparin Injectable 40 milliGRAM(s) SubCutaneous every 24 hours  ferrous    sulfate 325 milliGRAM(s) Oral daily  folic acid 1 milliGRAM(s) Oral daily  gabapentin 300 milliGRAM(s) Oral three times a day  influenza   Vaccine 0.5 milliLiter(s) IntraMuscular once  lidocaine   4% Patch 1 Patch Transdermal daily  methadone    Tablet 135 milliGRAM(s) Oral daily  mirtazapine 60 milliGRAM(s) Oral at bedtime  multivitamin 1 Tablet(s) Oral daily  nicotine - 21 mG/24Hr(s) Patch 1 patch Transdermal daily  QUEtiapine 200 milliGRAM(s) Oral three times a day    MEDICATIONS  (PRN):  acetaminophen     Tablet .. 650 milliGRAM(s) Oral every 6 hours PRN Temp greater or equal to 38C (100.4F), Mild Pain (1 - 3)  cyclobenzaprine 10 milliGRAM(s) Oral three times a day PRN Muscle Spasm  ibuprofen  Tablet. 600 milliGRAM(s) Oral every 8 hours PRN Temp greater or equal to 38C (100.4F), Moderate Pain (4 - 6)  ketorolac   Injectable 15 milliGRAM(s) IV Push every 8 hours PRN Severe Pain (7 - 10)            ASSESSMENT:      Patient was seen independently by attending. Latest vital signs and labs were reviewed today. Case was discussed with house staff including resident and nursing & . Following additions/modifications to assessment & plan override resident note above .       ASSESSMENT & PLAN:  50yo M w/ pmhx of IVDU (heroin), vertebral osteomyelitis, and MSSA bacteremia presents for worsening back pain    Known  L4-5 vertebral osteomyelitis, need to r/o new abscess  Recent history & post treatment for MSSA bacteremia  Hx of IVDU  Active nicotine dependence        No sepsis on admission  Recently completed 6wks of antibiotics which ended on 3/1.  ID noted- cefazolin 2g q 8 hours after IR procedure tomorrow  MRI MRI T/L spine w/wo gado to ensure no new developing abscess   Follow-up blood cx   IR consult for possible biopsy  Continue methadone 135, and gabapentin  Neuro sx following- no current intervention until result of MRI  PT rehab eval   Chronic b/l Wrist ulceration - healing  Continue current medical management for active chronic comorbid conditions.  DVT Prophylaxis as appropriate  Supportive care including activity, diet, goals of care discussed in AM rounds.  Discussed with  / in AM rounds  Handoff: Pending MRI & IR for procedure

## 2022-03-14 NOTE — SBIRT NOTE ADULT - NSSBIRTDRGPOSREINDET_GEN_A_CORE
Pt reports that he has been sober since approx 01/05/2022. LMSW provided emotional support and positive reinforcement to encourage pt to abstain. Pt declined outpatient and inpatient referrals as he attends [facility] weekly. CATCH notified.

## 2022-03-14 NOTE — ADVANCED PRACTICE NURSE CONSULT - ASSESSMENT
52yo M w/ PMHx of IVDU (heroin), vertebral osteomyelitis, and MSSA bacteremia presents (3/12) for worsening back pain. Back pain was increasing over the past week and worsening today to the point he was having difficulty ambulating and bearing weight. Back pain started one week ago and difficulty ambulating started on Tuesday 3/8. Was discharged from [facility] on 3/2. Patient was recently admitted from 1/16 to 2/1 for vertebral osteomyelitis and MSSA bacteremia. MRI found facetitis with adjacent abscesses. PAM was negative and ID recommended cefazolin. Patient had picc line placed for 6wks of antibiotics which ended on 3/1.   ED course: vitals: /94, HR 84, T 98.4F, O2 96% RA labs: hgb 9.0,  imaging: - CT L-T spine IV con: Since 1/15/2022 there has been significant progression of discitis osteomyelitis at L4-5 with multifocal erosive changes at the endplates and likely prevertebral phlegmon formation. Progression of L5-S1 facetitis, Facetitis in T10-T11. Progress: neurosurgery said no surgical intervention.     Currently admitted to medicine w/ primary diagnosis of vertebral OM; today is hospital day-2d; on 4B, being managed for progressive vertebral osteomyelitis; Hx of MSSA bacteremia; BL distal forearm wounds- healing;  IVDU; suspected folic acid deficiency; anxiety; Normocytic Anemia; RUL opacities.      Received patient on 4B, sitting up in bed, HOB elevated 30 degrees. Pt awake, A&Ox3, made aware of purpose of WOCN visit, agreeable to consult. Kerlex dressing & ACE wraps to b/l wrists in place, dressings removed.    Type of wound: Full thickness ulcerations- previous IVD injection sites; pt reports wounds present "for a long time"  Location: b/l posterior wrists   Wound measurements: R wrist-multiple wounds in close proximity to each other & measured together at 6cm x 9cm x 0.3cm; L wrist- multiple wounds in close proximity to each other & measured all together at 4cm x 8cm x 0.2cm  Tunneling/Undermining: none  Wound bed: R wrist-moist, opening purple scabbed/devitalized tissue; marbleized w/ yellow subcutaneous & dark pink tissue; L wrist- moist, opening purple scabbed/devitalized tissue    Wound edges: attached, flush, irregular   Periwound: scabbed white tissue throughout b/l wrist area; R hand edematous; both hands in flexed position/contracted   Wound exudate: minimal amount of serosanguinous drainage present on removed dressing; small amount of purulent drainage expressed from right wrist wound bed   Wound odor: none  Induration, erythema,  warmth: none   Wound pain: pt denies & no s/s pain present on assessment

## 2022-03-15 LAB
ALBUMIN SERPL ELPH-MCNC: 3.6 G/DL — SIGNIFICANT CHANGE UP (ref 3.5–5.2)
ALP SERPL-CCNC: 101 U/L — SIGNIFICANT CHANGE UP (ref 30–115)
ALT FLD-CCNC: 15 U/L — SIGNIFICANT CHANGE UP (ref 0–41)
ANION GAP SERPL CALC-SCNC: 15 MMOL/L — HIGH (ref 7–14)
APTT BLD: 27.9 SEC — SIGNIFICANT CHANGE UP (ref 27–39.2)
AST SERPL-CCNC: 18 U/L — SIGNIFICANT CHANGE UP (ref 0–41)
BASOPHILS # BLD AUTO: 0.03 K/UL — SIGNIFICANT CHANGE UP (ref 0–0.2)
BASOPHILS NFR BLD AUTO: 0.3 % — SIGNIFICANT CHANGE UP (ref 0–1)
BILIRUB SERPL-MCNC: <0.2 MG/DL — SIGNIFICANT CHANGE UP (ref 0.2–1.2)
BUN SERPL-MCNC: 12 MG/DL — SIGNIFICANT CHANGE UP (ref 10–20)
CALCIUM SERPL-MCNC: 8.7 MG/DL — SIGNIFICANT CHANGE UP (ref 8.5–10.1)
CHLORIDE SERPL-SCNC: 101 MMOL/L — SIGNIFICANT CHANGE UP (ref 98–110)
CO2 SERPL-SCNC: 23 MMOL/L — SIGNIFICANT CHANGE UP (ref 17–32)
CREAT SERPL-MCNC: 0.8 MG/DL — SIGNIFICANT CHANGE UP (ref 0.7–1.5)
EGFR: 106 ML/MIN/1.73M2 — SIGNIFICANT CHANGE UP
EOSINOPHIL # BLD AUTO: 0.3 K/UL — SIGNIFICANT CHANGE UP (ref 0–0.7)
EOSINOPHIL NFR BLD AUTO: 3.4 % — SIGNIFICANT CHANGE UP (ref 0–8)
GLUCOSE SERPL-MCNC: 147 MG/DL — HIGH (ref 70–99)
HCT VFR BLD CALC: 33.8 % — LOW (ref 42–52)
HGB BLD-MCNC: 10.2 G/DL — LOW (ref 14–18)
IMM GRANULOCYTES NFR BLD AUTO: 0.3 % — SIGNIFICANT CHANGE UP (ref 0.1–0.3)
INR BLD: 1.15 RATIO — SIGNIFICANT CHANGE UP (ref 0.65–1.3)
LYMPHOCYTES # BLD AUTO: 1.73 K/UL — SIGNIFICANT CHANGE UP (ref 1.2–3.4)
LYMPHOCYTES # BLD AUTO: 19.8 % — LOW (ref 20.5–51.1)
MAGNESIUM SERPL-MCNC: 1.9 MG/DL — SIGNIFICANT CHANGE UP (ref 1.8–2.4)
MCHC RBC-ENTMCNC: 25.3 PG — LOW (ref 27–31)
MCHC RBC-ENTMCNC: 30.2 G/DL — LOW (ref 32–37)
MCV RBC AUTO: 83.9 FL — SIGNIFICANT CHANGE UP (ref 80–94)
MONOCYTES # BLD AUTO: 0.59 K/UL — SIGNIFICANT CHANGE UP (ref 0.1–0.6)
MONOCYTES NFR BLD AUTO: 6.7 % — SIGNIFICANT CHANGE UP (ref 1.7–9.3)
NEUTROPHILS # BLD AUTO: 6.07 K/UL — SIGNIFICANT CHANGE UP (ref 1.4–6.5)
NEUTROPHILS NFR BLD AUTO: 69.5 % — SIGNIFICANT CHANGE UP (ref 42.2–75.2)
NRBC # BLD: 0 /100 WBCS — SIGNIFICANT CHANGE UP (ref 0–0)
PLATELET # BLD AUTO: 396 K/UL — SIGNIFICANT CHANGE UP (ref 130–400)
POTASSIUM SERPL-MCNC: 4.5 MMOL/L — SIGNIFICANT CHANGE UP (ref 3.5–5)
POTASSIUM SERPL-SCNC: 4.5 MMOL/L — SIGNIFICANT CHANGE UP (ref 3.5–5)
PROT SERPL-MCNC: 7.7 G/DL — SIGNIFICANT CHANGE UP (ref 6–8)
PROTHROM AB SERPL-ACNC: 13.2 SEC — HIGH (ref 9.95–12.87)
RBC # BLD: 4.03 M/UL — LOW (ref 4.7–6.1)
RBC # FLD: 16.8 % — HIGH (ref 11.5–14.5)
SODIUM SERPL-SCNC: 139 MMOL/L — SIGNIFICANT CHANGE UP (ref 135–146)
WBC # BLD: 8.75 K/UL — SIGNIFICANT CHANGE UP (ref 4.8–10.8)
WBC # FLD AUTO: 8.75 K/UL — SIGNIFICANT CHANGE UP (ref 4.8–10.8)

## 2022-03-15 PROCEDURE — 99222 1ST HOSP IP/OBS MODERATE 55: CPT

## 2022-03-15 PROCEDURE — 99233 SBSQ HOSP IP/OBS HIGH 50: CPT

## 2022-03-15 RX ORDER — SODIUM CHLORIDE 9 MG/ML
30 INJECTION INTRAMUSCULAR; INTRAVENOUS; SUBCUTANEOUS ONCE
Refills: 0 | Status: DISCONTINUED | OUTPATIENT
Start: 2022-03-15 | End: 2022-03-18

## 2022-03-15 RX ADMIN — BUPROPION HYDROCHLORIDE 300 MILLIGRAM(S): 150 TABLET, EXTENDED RELEASE ORAL at 11:49

## 2022-03-15 RX ADMIN — ENOXAPARIN SODIUM 40 MILLIGRAM(S): 100 INJECTION SUBCUTANEOUS at 21:22

## 2022-03-15 RX ADMIN — CYCLOBENZAPRINE HYDROCHLORIDE 10 MILLIGRAM(S): 10 TABLET, FILM COATED ORAL at 05:39

## 2022-03-15 RX ADMIN — Medication 100 MILLIGRAM(S): at 13:02

## 2022-03-15 RX ADMIN — GABAPENTIN 300 MILLIGRAM(S): 400 CAPSULE ORAL at 05:38

## 2022-03-15 RX ADMIN — QUETIAPINE FUMARATE 200 MILLIGRAM(S): 200 TABLET, FILM COATED ORAL at 13:01

## 2022-03-15 RX ADMIN — Medication 325 MILLIGRAM(S): at 11:49

## 2022-03-15 RX ADMIN — Medication 15 MILLIGRAM(S): at 05:38

## 2022-03-15 RX ADMIN — Medication 600 MILLIGRAM(S): at 09:50

## 2022-03-15 RX ADMIN — QUETIAPINE FUMARATE 200 MILLIGRAM(S): 200 TABLET, FILM COATED ORAL at 21:26

## 2022-03-15 RX ADMIN — Medication 2 MILLIGRAM(S): at 21:21

## 2022-03-15 RX ADMIN — QUETIAPINE FUMARATE 200 MILLIGRAM(S): 200 TABLET, FILM COATED ORAL at 05:38

## 2022-03-15 RX ADMIN — GABAPENTIN 300 MILLIGRAM(S): 400 CAPSULE ORAL at 13:01

## 2022-03-15 RX ADMIN — Medication 2 MILLIGRAM(S): at 05:39

## 2022-03-15 RX ADMIN — CYCLOBENZAPRINE HYDROCHLORIDE 10 MILLIGRAM(S): 10 TABLET, FILM COATED ORAL at 09:50

## 2022-03-15 RX ADMIN — Medication 2 MILLIGRAM(S): at 13:03

## 2022-03-15 RX ADMIN — MIRTAZAPINE 60 MILLIGRAM(S): 45 TABLET, ORALLY DISINTEGRATING ORAL at 21:25

## 2022-03-15 RX ADMIN — GABAPENTIN 300 MILLIGRAM(S): 400 CAPSULE ORAL at 21:23

## 2022-03-15 RX ADMIN — METHADONE HYDROCHLORIDE 135 MILLIGRAM(S): 40 TABLET ORAL at 11:45

## 2022-03-15 RX ADMIN — LIDOCAINE 1 PATCH: 4 CREAM TOPICAL at 11:46

## 2022-03-15 RX ADMIN — Medication 100 MILLIGRAM(S): at 21:22

## 2022-03-15 RX ADMIN — Medication 1 TABLET(S): at 11:48

## 2022-03-15 RX ADMIN — Medication 1 MILLIGRAM(S): at 11:49

## 2022-03-15 RX ADMIN — Medication 1 PATCH: at 11:48

## 2022-03-15 RX ADMIN — Medication 100 MILLIGRAM(S): at 05:39

## 2022-03-15 NOTE — PROGRESS NOTE ADULT - ASSESSMENT
ASSESSMENT  52yo M w/ pmhx of IVDU (heroin), vertebral osteomyelitis, and MSSA bacteremia presents for worsening back pain    IMPRESSION  #Spinal OM, with epidural abscess/phlegmon  - MR Lumbar Spine w/wo IV Cont (01.18.22 @ 16:33): Minimally increased signal and enhancement involving the left T10-T11  facet joint space which represent early facet joint  infection/inflammation however could also be related to degenerative  changes.LUMBAR SPINE: Findings consistent with left L5-S1 facetitis with associated multifocal  abscess formation directly adjacent to the left facet joint, within the left retroperitoneal soft tissues as well as the right psoas musculature. Ventral epidural phlegmon overlying the L5 vertebral body causing significant compression of the descending nerve roots.  - completed 6 week course of IV cefazolin (end date 3/1)  - CT Lumbar Spine w/ IV Cont (03.12.22 @ 14:33): Since 1/15/2022 there has been significant progression ofdiscitis  osteomyelitis at L4-5 with multifocal erosive changes at the endplates  and likely prevertebral phlegmon formation. Additionally there is  progression of L5-S1 facetitis with significant erosive changes of the  joint. Progressive erosive changes are noted involving the left T10-T11 facet joint consistent with facetitis.  - MR Lumbar Spine w/wo IV Cont (03.14.22 @ 20:23): Since prior examination there has been progression of osteomyelitis  involving the L4 and L5 vertebral bodies as well as severe facetitis   involving the left L5-S1 facet joint. 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 Extensive epidural phlegmon which is a slightly decreased in overall  extent extent since prior MRI predominantly involving the ventral epidural space extending into the L4-5 disc space causing severe spinal stenosis and mass effectupon the descending nerve roots though slightly improved. Similar-appearing right psoas abscess as well as left retroperitoneal abscess extending to the left L5-S1 facet joint which is slightly increased in size since the prior examination    #RUL Nodular Opacities  - CT Chest No Cont (03.12.22 @ 17:13): here is several reticulonodular opacities in the posterior  segment of the right upper lobe which appears new from 1/15/2022. There  are reticulonodular subpleural opacities at the right and left bases  which appears increased from 1/15/2022. These findings may represent  interstitial pneumonia perhaps usual interstitial pneumonia (UIP). Pulmonary    - Sedimentation Rate, Erythrocyte: 131 mm/Hr (01.16.22 @ 18:19) --> Sedimentation Rate, Erythrocyte: 127 mm/Hr (03.12.22 @ 12:12)  - C-Reactive Protein, Serum: 141 mg/L (01.16.22 @ 16:00)    #MSSA Bacteremia  - BLood Cx 1/15-17 +  - Blood Cx 1/18 NG  - PAM 1/19 - negative for vegetation    #IVDA  - HIV negative  - Hep C weakly reactive    #Opioid abuse on Methadone  #Bilateral Wrist wounds - s/p Burn evaluation 1/16    #Abx allergy: NKDA      RECOMMENDATIONS  - MRI reviewed -- would discuss with IR if ammenable to sampling of small psoas abscess or of actual vertebra/discs, esepcially given differential of possible TB, and incomplete improvement while on 6 weeks of MSSA treatement -- if ammenable, would send for bacterial, Fungal, and AFB cultures  - have low suspicion of TB, and could possibly be from severe Staph infection,  but given RUL CT chest findings and involvement of facet joints, would rule out TB with AFB sputum x 3   - follow-up NSGY     Please call or message on [employer] Teams if with any questions.  Spectra [phone]     ASSESSMENT  52yo M w/ pmhx of IVDU (heroin), vertebral osteomyelitis, and MSSA bacteremia presents for worsening back pain    IMPRESSION  #Spinal OM, with epidural abscess/phlegmon  - MR Lumbar Spine w/wo IV Cont (01.18.22 @ 16:33): Minimally increased signal and enhancement involving the left T10-T11  facet joint space which represent early facet joint  infection/inflammation however could also be related to degenerative  changes.LUMBAR SPINE: Findings consistent with left L5-S1 facetitis with associated multifocal  abscess formation directly adjacent to the left facet joint, within the left retroperitoneal soft tissues as well as the right psoas musculature. Ventral epidural phlegmon overlying the L5 vertebral body causing significant compression of the descending nerve roots.  - completed 6 week course of IV cefazolin (end date 3/1)  - CT Lumbar Spine w/ IV Cont (03.12.22 @ 14:33): Since 1/15/2022 there has been significant progression ofdiscitis  osteomyelitis at L4-5 with multifocal erosive changes at the endplates  and likely prevertebral phlegmon formation. Additionally there is  progression of L5-S1 facetitis with significant erosive changes of the  joint. Progressive erosive changes are noted involving the left T10-T11 facet joint consistent with facetitis.  - MR Lumbar Spine w/wo IV Cont (03.14.22 @ 20:23): Since prior examination there has been progression of osteomyelitis  involving the L4 and L5 vertebral bodies as well as severe facetitis   involving the left L5-S1 facet joint. 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 Extensive epidural phlegmon which is a slightly decreased in overall  extent extent since prior MRI predominantly involving the ventral epidural space extending into the L4-5 disc space causing severe spinal stenosis and mass effectupon the descending nerve roots though slightly improved. Similar-appearing right psoas abscess as well as left retroperitoneal abscess extending to the left L5-S1 facet joint which is slightly increased in size since the prior examination    #RUL Nodular Opacities  - CT Chest No Cont (03.12.22 @ 17:13): here is several reticulonodular opacities in the posterior  segment of the right upper lobe which appears new from 1/15/2022. There  are reticulonodular subpleural opacities at the right and left bases  which appears increased from 1/15/2022. These findings may represent  interstitial pneumonia perhaps usual interstitial pneumonia (UIP). Pulmonary    - Sedimentation Rate, Erythrocyte: 131 mm/Hr (01.16.22 @ 18:19) --> Sedimentation Rate, Erythrocyte: 127 mm/Hr (03.12.22 @ 12:12)  - C-Reactive Protein, Serum: 141 mg/L (01.16.22 @ 16:00)    #MSSA Bacteremia  - BLood Cx 1/15-17 +  - Blood Cx 1/18 NG  - PAM 1/19 - negative for vegetation    #IVDA  - HIV negative  - Hep C weakly reactive    #Opioid abuse on Methadone  #Bilateral Wrist wounds - s/p Burn evaluation 1/16    #Abx allergy: NKDA      RECOMMENDATIONS  - MRI reviewed -- would discuss with IR if ammenable to sampling of small psoas abscess or of actual vertebra/discs, esepcially given differential of possible TB, and incomplete improvement while on 6 weeks of MSSA treatement -- if ammenable, would send for bacterial, Fungal, and AFB cultures  - have low suspicion of TB, and could possibly be from severe Staph infection,  but given RUL CT chest findings and involvement of facet joints, would rule out TB with AFB sputum x 3   - check quantiferon gold   - repeat HIV screen   - follow-up NSGY     Please call or message on [employer] Teams if with any questions.  Spectra [phone]     ASSESSMENT  52yo M w/ pmhx of IVDU (heroin), vertebral osteomyelitis, and MSSA bacteremia presents for worsening back pain    IMPRESSION  #Spinal OM, with epidural abscess/phlegmon  - MR Lumbar Spine w/wo IV Cont (01.18.22 @ 16:33): Minimally increased signal and enhancement involving the left T10-T11  facet joint space which represent early facet joint  infection/inflammation however could also be related to degenerative  changes.LUMBAR SPINE: Findings consistent with left L5-S1 facetitis with associated multifocal  abscess formation directly adjacent to the left facet joint, within the left retroperitoneal soft tissues as well as the right psoas musculature. Ventral epidural phlegmon overlying the L5 vertebral body causing significant compression of the descending nerve roots.  - completed 6 week course of IV cefazolin (end date 3/1)  - CT Lumbar Spine w/ IV Cont (03.12.22 @ 14:33): Since 1/15/2022 there has been significant progression ofdiscitis  osteomyelitis at L4-5 with multifocal erosive changes at the endplates  and likely prevertebral phlegmon formation. Additionally there is  progression of L5-S1 facetitis with significant erosive changes of the  joint. Progressive erosive changes are noted involving the left T10-T11 facet joint consistent with facetitis.  - MR Lumbar Spine w/wo IV Cont (03.14.22 @ 20:23): Since prior examination there has been progression of osteomyelitis  involving the L4 and L5 vertebral bodies as well as severe facetitis   involving the left L5-S1 facet joint. 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 Extensive epidural phlegmon which is a slightly decreased in overall  extent extent since prior MRI predominantly involving the ventral epidural space extending into the L4-5 disc space causing severe spinal stenosis and mass effectupon the descending nerve roots though slightly improved. Similar-appearing right psoas abscess as well as left retroperitoneal abscess extending to the left L5-S1 facet joint which is slightly increased in size since the prior examination    #RUL Nodular Opacities  - CT Chest No Cont (03.12.22 @ 17:13): here is several reticulonodular opacities in the posterior  segment of the right upper lobe which appears new from 1/15/2022. There  are reticulonodular subpleural opacities at the right and left bases  which appears increased from 1/15/2022. These findings may represent  interstitial pneumonia perhaps usual interstitial pneumonia (UIP). Pulmonary    - Sedimentation Rate, Erythrocyte: 131 mm/Hr (01.16.22 @ 18:19) --> Sedimentation Rate, Erythrocyte: 127 mm/Hr (03.12.22 @ 12:12)  - C-Reactive Protein, Serum: 141 mg/L (01.16.22 @ 16:00)    #MSSA Bacteremia  - BLood Cx 1/15-17 +  - Blood Cx 1/18 NG  - PAM 1/19 - negative for vegetation    #IVDA  - HIV negative  - Hep C weakly reactive    #Opioid abuse on Methadone  #Bilateral Wrist wounds - s/p Burn evaluation 1/16    #Abx allergy: NKDA      RECOMMENDATIONS  - MRI reviewed -- would discuss with IR if ammenable to sampling of small psoas abscess or of vertebra/discs, esepcially given differential of possible TB, and incomplete improvement while on 6 weeks of MSSA treatement -- if ammenable, would send for bacterial, Fungal, and AFB cultures  - have low suspicion of TB, and could possibly be from severe Staph infection,  but given RUL CT chest findings and involvement of facet joints, would rule out TB with AFB sputum x 3   - check quantiferon gold   - repeat HIV screen  - pulmonary evaluation    - follow-up NSGY     Please call or message on [employer] Teams if with any questions.  Spectra [phone]

## 2022-03-15 NOTE — PROGRESS NOTE ADULT - SUBJECTIVE AND OBJECTIVE BOX
MIKAEL MCDERMOTT 51y Male  MRN#: [id number]   CODE STATUS: FULL      Hospital Day: 3d    Pt is currently admitted with the primary diagnosis of vertebral OM     SUBJECTIVE  Hospital Course: 52yo M w/ pmhx of IVDU (heroin), vertebral osteomyelitis, and MSSA bacteremia presents for worsening back pain. Back pain was increasing over the past week and worsening today to the point he was having difficulty ambulating and bearing weight. Back pain started one week ago and difficulty ambulating started on Tuesday 3/8. Was discharged from [facility] on 3/2. Patient was recently admitted from 1/16 to 2/1 for vertebral osteomyelitis and MSSA bacteremia. MRI found facetitis with adjacent abscesses. PAM was negative and ID recommended cefazolin. Patient had picc line placed for 6wks of antibiotics which ended on 3/1. Denies urinary/bowel incontinence/retention, numbness in legs, fever/chills, chest pain, SOB, abd pain, n/v/d.    ED course:  vitals: /94, HR 84, T 98.4F, O2 96% RA   labs: hgb 9.0,   imaging:   - CT L-T spine IV con:   Since 1/15/2022 there has been significant progression of discitis osteomyelitis at L4-5 with multifocal erosive changes at the endplates and likely prevertebral phlegmon formation. Progression of L5-S1 facetitis, Facetitis in T10-T11.  progress: neurosurgery said no surgical intervention    Overnight events: none    Interval hx/Subjective complaints: Pt feeling well this morning, has persistent back pain, not worsening, otherwise no complaints.     Pt denies any fevers, chills, fatigue, CP, palpitations, cough, SOB, abdominal pain, n/v/d, hematochezia, melena, weakness, numbness, tingling, or other FND.                                           ----------------------------------------------------------  OBJECTIVE  PAST MEDICAL & SURGICAL HISTORY  IV drug abuse    Vertebral osteomyelitis    MSSA bacteremia    No significant past surgical history                                              -----------------------------------------------------------  ALLERGIES:  No Known Allergies                                            ------------------------------------------------------------    HOME MEDICATIONS  Home Medications:  acetaminophen 325 mg oral tablet: 2 tab(s) orally every 6 hours (12 Mar 2022 21:41)  cyclobenzaprine 10 mg oral tablet: 1 tab(s) orally every 8 hours (12 Mar 2022 21:41)  ferrous sulfate 325 mg (65 mg elemental iron) oral tablet: 1 tab(s) orally once a day (12 Mar 2022 21:41)  folic acid 1 mg oral tablet: 1 tab(s) orally once a day (12 Mar 2022 21:41)  gabapentin 300 mg oral capsule: 1 cap(s) orally 3 times a day (12 Mar 2022 21:41)  methadone: 135 milligram(s) orally once a day (12 Mar 2022 21:41)  Multiple Vitamins oral tablet: 1 tab(s) orally once a day (12 Mar 2022 21:41)  nicotine 21 mg/24 hr transdermal film, extended release: 1 patch transdermal once a day (12 Mar 2022 21:41)  Remeron 30 mg oral tablet: 2 tab(s) orally once a day (at bedtime) (12 Mar 2022 21:41)  SEROquel 200 mg oral tablet: orally 3 times a day (12 Mar 2022 21:41)  Wellbutrin: 300 milligram(s) orally once a day (12 Mar 2022 21:41)  Xanax: 2 milligram(s) orally 3 times a day (12 Mar 2022 21:41)                           MEDICATIONS:  STANDING MEDICATIONS  ALPRAZolam 2 milliGRAM(s) Oral three times a day  buPROPion XL (24-Hour) . 300 milliGRAM(s) Oral daily  ceFAZolin   IVPB 2000 milliGRAM(s) IV Intermittent every 8 hours  chlorhexidine 4% Liquid 1 Application(s) Topical <User Schedule>  enoxaparin Injectable 40 milliGRAM(s) SubCutaneous every 24 hours  ferrous    sulfate 325 milliGRAM(s) Oral daily  folic acid 1 milliGRAM(s) Oral daily  gabapentin 300 milliGRAM(s) Oral three times a day  influenza   Vaccine 0.5 milliLiter(s) IntraMuscular once  lidocaine   4% Patch 1 Patch Transdermal daily  methadone    Tablet 135 milliGRAM(s) Oral daily  mirtazapine 60 milliGRAM(s) Oral at bedtime  multivitamin 1 Tablet(s) Oral daily  nicotine - 21 mG/24Hr(s) Patch 1 patch Transdermal daily  QUEtiapine 200 milliGRAM(s) Oral three times a day    PRN MEDICATIONS  acetaminophen     Tablet .. 650 milliGRAM(s) Oral every 6 hours PRN  cyclobenzaprine 10 milliGRAM(s) Oral three times a day PRN  ibuprofen  Tablet. 600 milliGRAM(s) Oral every 8 hours PRN                                            ------------------------------------------------------------  VITAL SIGNS: Last 24 Hours  T(C): 36.2 (15 Mar 2022 07:54), Max: 36.5 (15 Mar 2022 00:01)  T(F): 97.1 (15 Mar 2022 07:54), Max: 97.7 (15 Mar 2022 00:01)  HR: 888 (15 Mar 2022 07:54) (81 - 888)  BP: 128/82 (15 Mar 2022 07:54) (128/82 - 153/93)  BP(mean): --  RR: 18 (15 Mar 2022 07:54) (18 - 18)  SpO2: --      03-14-22 @ 07:01  -  03-15-22 @ 07:00  --------------------------------------------------------  IN: 840 mL / OUT: 1000 mL / NET: -160 mL                                             --------------------------------------------------------------  LABS:                        10.2   8.75  )-----------( 396      ( 15 Mar 2022 07:10 )             33.8     03-15    139  |  101  |  12  ----------------------------<  147<H>  4.5   |  23  |  0.8    Ca    8.7      15 Mar 2022 07:10  Mg     1.9     03-15    TPro  7.7  /  Alb  3.6  /  TBili  <0.2  /  DBili  x   /  AST  18  /  ALT  15  /  AlkPhos  101  03-15    PT/INR - ( 15 Mar 2022 07:10 )   PT: 13.20 sec;   INR: 1.15 ratio         PTT - ( 15 Mar 2022 07:10 )  PTT:27.9 sec            Culture - Blood (collected 13 Mar 2022 06:42)  Source: .Blood None  Preliminary Report (14 Mar 2022 13:01):    No growth to date.    Culture - Blood (collected 12 Mar 2022 12:13)  Source: .Blood Blood-Peripheral  Preliminary Report (13 Mar 2022 21:01):    No growth to date.    Culture - Blood (collected 12 Mar 2022 12:13)  Source: .Blood Blood-Peripheral  Preliminary Report (13 Mar 2022 21:01):    No growth to date.                                                    -------------------------------------------------------------  RADIOLOGY:                                            --------------------------------------------------------------  PHYSICAL EXAM:  General: Man laying in bed in nad  HEENT: ncat, eomi, mmm  LUNGS: ctab  HEART: s1/s2, rrr  ABDOMEN: soft, ntnd, bs+  EXT: wwp, no cyanosis, clubbing, edema  NEURO: aox4, JACQUES  SKIN: BL wrist wounds, otherwise intact, no rashes or lesions                                          --------------------------------------------------------------    ASSESSMENT & PLAN  52yo M w/ pmhx of IVDU (heroin), vertebral osteomyelitis, and MSSA bacteremia presents for worsening back pain. Admitted for further management of progressive vertebral osteomyelitis.     #progressive vertebral osteomyelitis   #Hx of MSSA bacteremia  - MRI T-L spine 1/18/22 confirmed osteomyelitis  - PAM 1/19: no endocarditis  - bcx 1/15: MSSA; first cleared bcx on 1/18   - received cefazolin via picc line ended 3/1  - ,   - No sepsis on admission   - CT L-T spine IV con: Since 1/15/2022 there has been significant progression of discitis osteomyelitis at L4-5 with multifocal erosive changes at the endplates and likely prevertebral phlegmon formation. Progression of L5-S1 facetitis, Facetitis in T10-T11.  - MRI Thoracic/Lumbar spine 3/14: similar increased signal and enhancement involving T10-T11 facet joint space frompevious, degenerative changes vs facetitis. 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   - c/w cefazolin 2g q8h   - ID recs appreciated     #BL distal forearm wounds- healing   - surgery saw patient last admission and recommended not removing R foreign body; not a contraindication to MRI   - burn saw patient last admission and rec local wound care  - 1.2cm linear foreign body overlying right radius  - not a contraindication for MRI   - wound care recs appreciated   - f/u o/p burn     # IVDU  # suspected folic acid deficiency  # anxiety  - on home methadone 135mg solution   - c/w xanax, welbutrin, seroquel   - SBIRT c/s appreciated      #Normocytic Anemia   - multifactorial, iron deficiency, anemia of chronic disease   - c/w folate and ferrous sulfate 325mg qd     #RUL opacities  - asymptomatic   - CT chest IV con: several reticulonodular opacities in the posterior segment of the right upper lobe possibly representing UIP  - f/u pulm o/p     #Misc  - DVT ppx: Lovenox  - GI ppx: none  - Diet: regular  - Code Status: full code   - Dispo: acute from home    #Handoff   - f/u IR/neuro IR   - f/u ID recs   - f/u daily bcx   - f/u quantiferon

## 2022-03-15 NOTE — CONSULT NOTE ADULT - ATTENDING COMMENTS
IMPRESSION:    Epidural Phlegmon, OM  Right Psoas Abscess   MSSA Bacteremia  HO IVDA, Opioid Abuse on Methadone   HO Morbid Obesity BMI 35.4  CT is not suggestive of UIP.  Had clear bases on January's CT     Plan as outlined above

## 2022-03-15 NOTE — PROGRESS NOTE ADULT - SUBJECTIVE AND OBJECTIVE BOX
Progress Note:  Provider Speciality                            Hospitalist      AKILMIKAEL LAO MRN-[id number] 51y Male     CHIEF PRESENTING COMPLAINT:  Patient is a 51y old  Male who presents with a chief complaint of vertebral osteomyelitis (13 Mar 2022 10:17)        SUBJECTIVE:  Patient was seen and examined at bedside. No new complaints described by patient in morning rounds.   No significant overnight events reported.     HISTORY OF PRESENTING ILLNESS:  HPI:  50yo M w/ pmhx of IVDU (heroin), vertebral osteomyelitis, and MSSA bacteremia presents for worsening back pain. Back pain was increasing over the past week and worsening today to the point he was having difficulty ambulating and bearing weight. Back pain started one week ago and difficulty ambulating started on Tuesday 3/8. Was discharged from [facility] on 3/2. Patient was recently admitted from 1/16 to 2/1 for vertebral osteomyelitis and MSSA bacteremia. MRI found facetitis with adjacent abscesses. PAM was negative and ID recommended cefazolin. Patient had picc line placed for 6wks of antibiotics which ended on 3/1. Denies urinary/bowel incontinence/retention, numbness in legs, fever/chills, chest pain, SOB, abd pain, n/v/d.    ED course:  vitals: /94, HR 84, T 98.4F, O2 96% RA   labs: hgb 9.0,   imaging:   - CT L-T spine IV con:   Since 1/15/2022 there has been significant progression of discitis osteomyelitis at L4-5 with multifocal erosive changes at the endplates and likely prevertebral phlegmon formation. Progression of L5-S1 facetitis, Facetitis in T10-T11.  progress: neurosurgery said no surgical intervention  (12 Mar 2022 21:00)        REVIEW OF SYSTEMS:  Patient denies any headache, any vision complaints, runny nose, fever, chills. Denies chest pain, shortness of breath, palpitation. Denies nausea, vomiting, abdominal pain or diarrhoea, Denies dysuria. Denies  localized weakness in any part of the body or numbness.   At least 10 systems were reviewed in ROS. All systems reviewed  are within normal limits except for the complaints as described in Subjective.    PAST MEDICAL & SURGICAL HISTORY:  PAST MEDICAL & SURGICAL HISTORY:  IV drug abuse    Vertebral osteomyelitis    MSSA bacteremia    No significant past surgical history            VITAL SIGNS:  Vital Signs Last 24 Hrs  T(C): 36.2 (15 Mar 2022 07:54), Max: 36.5 (15 Mar 2022 00:01)  T(F): 97.1 (15 Mar 2022 07:54), Max: 97.7 (15 Mar 2022 00:01)  HR: 888 (15 Mar 2022 07:54) (81 - 888)  BP: 128/82 (15 Mar 2022 07:54) (128/82 - 153/93)  BP(mean): --  RR: 18 (15 Mar 2022 07:54) (18 - 18)  SpO2: --    PHYSICAL EXAMINATION:  Not in acute distress, obese  General: No icterus  HEENT:   no JVD.  Heart: S1+S2 audible  Lungs: bilateral  moderate air entry, no wheezing, no crepitations.  Abdomen: Soft, non-tender, non-distended , no  rigidity or guarding.  CNS: Awake alert, CN  grossly intact.  Extremities:  No edema    , bilateral wrist chronic healing ulceration  R hip ulcer , healing        CONSULTS:  Consultant(s) Notes Reviewed by me.   Care Discussed with Consultants/Other Providers where required.        MEDICATIONS:  MEDICATIONS  (STANDING):  ALPRAZolam 2 milliGRAM(s) Oral three times a day  buPROPion XL (24-Hour) . 300 milliGRAM(s) Oral daily  chlorhexidine 4% Liquid 1 Application(s) Topical <User Schedule>  enoxaparin Injectable 40 milliGRAM(s) SubCutaneous every 24 hours  ferrous    sulfate 325 milliGRAM(s) Oral daily  folic acid 1 milliGRAM(s) Oral daily  gabapentin 300 milliGRAM(s) Oral three times a day  influenza   Vaccine 0.5 milliLiter(s) IntraMuscular once  lidocaine   4% Patch 1 Patch Transdermal daily  methadone    Tablet 135 milliGRAM(s) Oral daily  mirtazapine 60 milliGRAM(s) Oral at bedtime  multivitamin 1 Tablet(s) Oral daily  nicotine - 21 mG/24Hr(s) Patch 1 patch Transdermal daily  QUEtiapine 200 milliGRAM(s) Oral three times a day    MEDICATIONS  (PRN):  acetaminophen     Tablet .. 650 milliGRAM(s) Oral every 6 hours PRN Temp greater or equal to 38C (100.4F), Mild Pain (1 - 3)  cyclobenzaprine 10 milliGRAM(s) Oral three times a day PRN Muscle Spasm  ibuprofen  Tablet. 600 milliGRAM(s) Oral every 8 hours PRN Temp greater or equal to 38C (100.4F), Moderate Pain (4 - 6)  ketorolac   Injectable 15 milliGRAM(s) IV Push every 8 hours PRN Severe Pain (7 - 10)            ASSESSMENT:      Patient was seen independently by attending. Latest vital signs and labs were reviewed today. Case was discussed with house staff including resident and nursing & . Following additions/modifications to assessment & plan override resident note above .       ASSESSMENT & PLAN:  50yo M w/ pmhx of IVDU (heroin), vertebral osteomyelitis, and MSSA bacteremia presents for worsening back pain    Spinal OM, with epidural abscess/phlegmon  Opioid abuse on Methadone  RUL Nodular Opacities  Recent history & post treatment for MSSA bacteremia  Hx of IVDU  Active nicotine dependence        No sepsis on admission  Recently completed 6wks of antibiotics which ended on 3/1.  MR Lumbar Spine 03/18 significant for  left L5-S1 facetitis with associated multifocal  abscess formation directly adjacent to the left facet joint, Ventral epidural phlegmon overlying the L5 vertebral body causing significant compression of the descending nerve roots.  ID noted-chest findings and involvement of facet joints, would rule out TB with AFB sputum x 3 , quantiferon gold , repeat HIV screen & pulmonary evaluation    Airbron precautions until ruled out for TB  cefazolin 2g q 8 hours for now  Follow-up blood cx   IR consult for possible aspiration & cx  Continue methadone 135, and gabapentin  Neuro sx following- no current intervention until result of MRI  Chronic b/l Wrist ulceration - healing  Handoff: Pending IR for procedure, AFB to rule out TB

## 2022-03-15 NOTE — CONSULT NOTE ADULT - SUBJECTIVE AND OBJECTIVE BOX
INTERVENTIONAL RADIOLOGY CONSULT:     HPI:  50yo M w/ pmhx of IVDU (heroin), vertebral osteomyelitis, and MSSA bacteremia presents for worsening back pain. Back pain was increasing over the past week and worsening today to the point he was having difficulty ambulating and bearing weight. Back pain started one week ago and difficulty ambulating started on Tuesday 3/8. Was discharged from [facility] on 3/2. Patient was recently admitted from 1/16 to 2/1 for vertebral osteomyelitis and MSSA bacteremia. MRI found facetitis with adjacent abscesses. PAM was negative and ID recommended cefazolin. Patient had picc line placed for 6wks of antibiotics which ended on 3/1. Denies urinary/bowel incontinence/retention, numbness in legs, fever/chills, chest pain, SOB, abd pain, n/v/d.    ED course:  vitals: /94, HR 84, T 98.4F, O2 96% RA   labs: hgb 9.0,   imaging:   - CT L-T spine IV con:   Since 1/15/2022 there has been significant progression of discitis osteomyelitis at L4-5 with multifocal erosive changes at the endplates and likely prevertebral phlegmon formation. Progression of L5-S1 facetitis, Facetitis in T10-T11.  progress: neurosurgery said no surgical intervention  (12 Mar 2022 21:00)      PAST MEDICAL & SURGICAL HISTORY:  IV drug abuse    Vertebral osteomyelitis    MSSA bacteremia    No significant past surgical history        MEDICATIONS  (STANDING):  ALPRAZolam 2 milliGRAM(s) Oral three times a day  buPROPion XL (24-Hour) . 300 milliGRAM(s) Oral daily  ceFAZolin   IVPB 2000 milliGRAM(s) IV Intermittent every 8 hours  chlorhexidine 4% Liquid 1 Application(s) Topical <User Schedule>  enoxaparin Injectable 40 milliGRAM(s) SubCutaneous every 24 hours  ferrous    sulfate 325 milliGRAM(s) Oral daily  folic acid 1 milliGRAM(s) Oral daily  gabapentin 300 milliGRAM(s) Oral three times a day  influenza   Vaccine 0.5 milliLiter(s) IntraMuscular once  lidocaine   4% Patch 1 Patch Transdermal daily  methadone    Tablet 135 milliGRAM(s) Oral daily  mirtazapine 60 milliGRAM(s) Oral at bedtime  multivitamin 1 Tablet(s) Oral daily  nicotine - 21 mG/24Hr(s) Patch 1 patch Transdermal daily  QUEtiapine 200 milliGRAM(s) Oral three times a day    MEDICATIONS  (PRN):  acetaminophen     Tablet .. 650 milliGRAM(s) Oral every 6 hours PRN Temp greater or equal to 38C (100.4F), Mild Pain (1 - 3)  cyclobenzaprine 10 milliGRAM(s) Oral three times a day PRN Muscle Spasm  ibuprofen  Tablet. 600 milliGRAM(s) Oral every 8 hours PRN Temp greater or equal to 38C (100.4F), Moderate Pain (4 - 6)      Allergies    No Known Allergies    Intolerances        Physical Exam:   Vital Signs Last 24 Hrs  T(C): 36.2 (15 Mar 2022 07:54), Max: 36.5 (15 Mar 2022 00:01)  T(F): 97.1 (15 Mar 2022 07:54), Max: 97.7 (15 Mar 2022 00:01)  HR: 888 (15 Mar 2022 07:54) (81 - 888)  BP: 128/82 (15 Mar 2022 07:54) (128/82 - 153/93)  BP(mean): --  RR: 18 (15 Mar 2022 07:54) (18 - 18)  SpO2: --    Labs:                         10.2   8.75  )-----------( 396      ( 15 Mar 2022 07:10 )             33.8     03-15    139  |  101  |  12  ----------------------------<  147<H>  4.5   |  23  |  0.8    Ca    8.7      15 Mar 2022 07:10  Mg     1.9     03-15    TPro  7.7  /  Alb  3.6  /  TBili  <0.2  /  DBili  x   /  AST  18  /  ALT  15  /  AlkPhos  101  03-15    PT/INR - ( 15 Mar 2022 07:10 )   PT: 13.20 sec;   INR: 1.15 ratio         PTT - ( 15 Mar 2022 07:10 )  PTT:27.9 sec    Pertinent labs:                      10.2   8.75  )-----------( 396      ( 15 Mar 2022 07:10 )             33.8       03-15    139  |  101  |  12  ----------------------------<  147<H>  4.5   |  23  |  0.8    Ca    8.7      15 Mar 2022 07:10  Mg     1.9     03-15    TPro  7.7  /  Alb  3.6  /  TBili  <0.2  /  DBili  x   /  AST  18  /  ALT  15  /  AlkPhos  101  03-15      PT/INR - ( 15 Mar 2022 07:10 )   PT: 13.20 sec;   INR: 1.15 ratio         PTT - ( 15 Mar 2022 07:10 )  PTT:27.9 sec    Radiology imaging reviewed.     ASSESSMENT/PLAN:   50yo M w/ pmhx of IVDU (heroin), vertebral osteomyelitis, and MSSA bacteremia presents for worsening back pain. Back pain was increasing over the past week and worsening today to the point he was having difficulty ambulating and bearing weight. Back pain started one week ago and difficulty ambulating started on Tuesday 3/8. Was discharged from [facility] on 3/2. Patient was recently admitted from 1/16 to 2/1 for vertebral osteomyelitis and MSSA bacteremia. MRI found facetitis with adjacent abscesses. Neuroradiology was consulted for abscess aspiration.   - Plan for L4-L5 abscess aspiration 3/16/22  - Please make the patient NPO midnight  - coags and most recent COVID results are within goals    Thank you for the courtesy of this consult, please call [phone] between 8am and 5pm on weekdays, and [phone] at any other time with any further questions.

## 2022-03-15 NOTE — CONSULT NOTE ADULT - SUBJECTIVE AND OBJECTIVE BOX
Patient is a 51y old  Male who presents with a chief complaint of worsening back pain (15 Mar 2022 14:48)      HPI: 52yo M with PMHx of IVDA (heroin), vertebral osteomyelitis, and MSSA bacteremia presents for worsening back pain. Back pain was increasing over the past week and worsening today to the point he was having difficulty ambulating and bearing weight. Back pain started one week ago and difficulty ambulating started on Tuesday 3/8. Was discharged from [facility] on 3/2. Patient was recently admitted from 1/16 to 2/1 for vertebral osteomyelitis and MSSA bacteremia. MRI found facetitis with adjacent abscesses. PAM was negative and ID recommended cefazolin. Patient had picc line placed for 6wks of antibiotics which ended on 3/1. Denies urinary/bowel incontinence/retention, numbness in legs, fever/chills, chest pain, SOB, abd pain, n/v/d.      PAST MEDICAL & SURGICAL HISTORY:  IV drug abuse    Vertebral osteomyelitis    MSSA bacteremia    No significant past surgical history        SOCIAL HX:   Smoking                         ETOH                            Other    FAMILY HISTORY:  No pertinent family history in first degree relatives    .  No cardiovascular or pulmonary family history     REVIEW OF SYSTEMS:    All ROS are negative except per HPI       Allergies    No Known Allergies    Intolerances          PHYSICAL EXAM  Vital Signs Last 24 Hrs  T(C): 36.2 (15 Mar 2022 07:54), Max: 36.5 (15 Mar 2022 00:01)  T(F): 97.1 (15 Mar 2022 07:54), Max: 97.7 (15 Mar 2022 00:01)  HR: 888 (15 Mar 2022 07:54) (81 - 888)  BP: 128/82 (15 Mar 2022 07:54) (128/82 - 153/93)  BP(mean): --  RR: 18 (15 Mar 2022 07:54) (18 - 18)  SpO2: --    CONSTITUTIONAL:  NAD    ENT:   Airway patent,   No thrush    EYES:   Clear bilaterally,   pupils equal,   round and reactive to light.    CARDIAC:   Normal rate,   regular rhythm.    no edema      RESPIRATORY:   No wheezing   Normal chest expansion  Not tachypneic,  No use of accessory muscles    GASTROINTESTINAL:  Abdomen soft, non-tender,   No guarding,   Positive BS    MUSCULOSKELETAL:   Range of motion is not limited,  No clubbing, cyanosis    NEUROLOGICAL:   Alert and oriented   No motor deficits.    SKIN:   Skin normal color for race,   No evidence of rash.      HEME LYMPH:   No cervical  lymphadenopathy.  no inguinal lymphadenopathy          LABS:                          10.2   8.75  )-----------( 396      ( 15 Mar 2022 07:10 )             33.8                                               03-15    139  |  101  |  12  ----------------------------<  147<H>  4.5   |  23  |  0.8    Ca    8.7      15 Mar 2022 07:10  Mg     1.9     03-15    TPro  7.7  /  Alb  3.6  /  TBili  <0.2  /  DBili  x   /  AST  18  /  ALT  15  /  AlkPhos  101  03-15      PT/INR - ( 15 Mar 2022 07:10 )   PT: 13.20 sec;   INR: 1.15 ratio         PTT - ( 15 Mar 2022 07:10 )  PTT:27.9 sec                                                                                     LIVER FUNCTIONS - ( 15 Mar 2022 07:10 )  Alb: 3.6 g/dL / Pro: 7.7 g/dL / ALK PHOS: 101 U/L / ALT: 15 U/L / AST: 18 U/L / GGT: x                                                  Culture - Blood (collected 13 Mar 2022 06:42)  Source: .Blood None  Preliminary Report (14 Mar 2022 13:01):    No growth to date.                                                    MEDICATIONS  (STANDING):  ALPRAZolam 2 milliGRAM(s) Oral three times a day  buPROPion XL (24-Hour) . 300 milliGRAM(s) Oral daily  ceFAZolin   IVPB 2000 milliGRAM(s) IV Intermittent every 8 hours  chlorhexidine 4% Liquid 1 Application(s) Topical <User Schedule>  enoxaparin Injectable 40 milliGRAM(s) SubCutaneous every 24 hours  ferrous    sulfate 325 milliGRAM(s) Oral daily  folic acid 1 milliGRAM(s) Oral daily  gabapentin 300 milliGRAM(s) Oral three times a day  influenza   Vaccine 0.5 milliLiter(s) IntraMuscular once  lidocaine   4% Patch 1 Patch Transdermal daily  methadone    Tablet 135 milliGRAM(s) Oral daily  mirtazapine 60 milliGRAM(s) Oral at bedtime  multivitamin 1 Tablet(s) Oral daily  nicotine - 21 mG/24Hr(s) Patch 1 patch Transdermal daily  QUEtiapine 200 milliGRAM(s) Oral three times a day    MEDICATIONS  (PRN):  acetaminophen     Tablet .. 650 milliGRAM(s) Oral every 6 hours PRN Temp greater or equal to 38C (100.4F), Mild Pain (1 - 3)  cyclobenzaprine 10 milliGRAM(s) Oral three times a day PRN Muscle Spasm  ibuprofen  Tablet. 600 milliGRAM(s) Oral every 8 hours PRN Temp greater or equal to 38C (100.4F), Moderate Pain (4 - 6)  sodium chloride 3%  Inhalation 30 milliLiter(s) Inhalation once PRN sputum culture    CT CHEST NC: 3/12    There is several reticulonodular opacities in the posterior segment of the right upper lobe which appears new from 1/15/2022. There are reticulonodular subpleural opacities at the right and left bases which appears increased from 1/15/2022. These findings may represent interstitial pneumonia perhaps usual interstitial pneumonia (UIP). Pulmonary consultation and follow-up exam suggested.  No pleural effusion or air leak is seen.  No mediastinal or hilar lymphadenopathy or mass formation is seen.  The bony structures appear intact.  The soft tissues of the chest wall appear intact.    IMPRESSION:There is several reticulonodular opacities in the posterior segment of the right upper lobe which appears new from 1/15/2022. There are reticulonodular subpleural opacities at the right and left bases which appears increased from 1/15/2022. These findings may represent interstitial pneumonia perhaps usual interstitial pneumonia (UIP). Pulmonary consultation and follow-up exam suggested   Patient is a 51y old  Male who presents with a chief complaint of worsening back pain (15 Mar 2022 14:48)      HPI: 52yo M with PMHx of IVDA (heroin), vertebral osteomyelitis, and MSSA bacteremia presents for worsening back pain. Back pain was increasing over the past week and worsening today to the point he was having difficulty ambulating and bearing weight. Back pain started one week ago and difficulty ambulating started on Tuesday 3/8. Was discharged from [facility] on 3/2. Patient was recently admitted from 1/16 to 2/1 for vertebral osteomyelitis and MSSA bacteremia. MRI found facetitis with adjacent abscesses. PAM was negative and ID recommended cefazolin. Patient had picc line placed for 6wks of antibiotics which ended on 3/1. Denies urinary/bowel incontinence/retention, numbness in legs, fever/chills, chest pain, SOB, abd pain, n/v/d.      PAST MEDICAL & SURGICAL HISTORY:  IV drug abuse    Vertebral osteomyelitis    MSSA bacteremia    No significant past surgical history        SOCIAL HX:   Smoking             denies            ETOH         denies                   Other history of IVDA    FAMILY HISTORY:  No pertinent family history in first degree relatives    .  No cardiovascular or pulmonary family history     REVIEW OF SYSTEMS:    All ROS are negative except per HPI       Allergies    No Known Allergies    Intolerances          PHYSICAL EXAM  Vital Signs Last 24 Hrs  T(C): 36.2 (15 Mar 2022 07:54), Max: 36.5 (15 Mar 2022 00:01)  T(F): 97.1 (15 Mar 2022 07:54), Max: 97.7 (15 Mar 2022 00:01)  HR: 888 (15 Mar 2022 07:54) (81 - 888)  BP: 128/82 (15 Mar 2022 07:54) (128/82 - 153/93)  BP(mean): --  RR: 18 (15 Mar 2022 07:54) (18 - 18)  SpO2: --    CONSTITUTIONAL:  obese  NAD    ENT:   Airway patent,   No thrush    EYES:   Clear bilaterally,   pupils equal,   round and reactive to light.    CARDIAC:   Normal rate,   regular rhythm.    no edema      RESPIRATORY:   No wheezing   Normal chest expansion  Not tachypneic,  No use of accessory muscles    GASTROINTESTINAL:  Abdomen soft, non-tender,   No guarding,   Positive BS    MUSCULOSKELETAL:   Range of motion is not limited,  No clubbing, cyanosis    NEUROLOGICAL:   Alert and oriented   No motor deficits.    SKIN:   Skin normal color for race,   No evidence of rash.      HEME LYMPH:   No cervical  lymphadenopathy.  no inguinal lymphadenopathy          LABS:                          10.2   8.75  )-----------( 396      ( 15 Mar 2022 07:10 )             33.8                                               03-15    139  |  101  |  12  ----------------------------<  147<H>  4.5   |  23  |  0.8    Ca    8.7      15 Mar 2022 07:10  Mg     1.9     03-15    TPro  7.7  /  Alb  3.6  /  TBili  <0.2  /  DBili  x   /  AST  18  /  ALT  15  /  AlkPhos  101  03-15      PT/INR - ( 15 Mar 2022 07:10 )   PT: 13.20 sec;   INR: 1.15 ratio         PTT - ( 15 Mar 2022 07:10 )  PTT:27.9 sec                                                                                     LIVER FUNCTIONS - ( 15 Mar 2022 07:10 )  Alb: 3.6 g/dL / Pro: 7.7 g/dL / ALK PHOS: 101 U/L / ALT: 15 U/L / AST: 18 U/L / GGT: x                                                  Culture - Blood (collected 13 Mar 2022 06:42)  Source: .Blood None  Preliminary Report (14 Mar 2022 13:01):    No growth to date.                                                    MEDICATIONS  (STANDING):  ALPRAZolam 2 milliGRAM(s) Oral three times a day  buPROPion XL (24-Hour) . 300 milliGRAM(s) Oral daily  ceFAZolin   IVPB 2000 milliGRAM(s) IV Intermittent every 8 hours  chlorhexidine 4% Liquid 1 Application(s) Topical <User Schedule>  enoxaparin Injectable 40 milliGRAM(s) SubCutaneous every 24 hours  ferrous    sulfate 325 milliGRAM(s) Oral daily  folic acid 1 milliGRAM(s) Oral daily  gabapentin 300 milliGRAM(s) Oral three times a day  influenza   Vaccine 0.5 milliLiter(s) IntraMuscular once  lidocaine   4% Patch 1 Patch Transdermal daily  methadone    Tablet 135 milliGRAM(s) Oral daily  mirtazapine 60 milliGRAM(s) Oral at bedtime  multivitamin 1 Tablet(s) Oral daily  nicotine - 21 mG/24Hr(s) Patch 1 patch Transdermal daily  QUEtiapine 200 milliGRAM(s) Oral three times a day    MEDICATIONS  (PRN):  acetaminophen     Tablet .. 650 milliGRAM(s) Oral every 6 hours PRN Temp greater or equal to 38C (100.4F), Mild Pain (1 - 3)  cyclobenzaprine 10 milliGRAM(s) Oral three times a day PRN Muscle Spasm  ibuprofen  Tablet. 600 milliGRAM(s) Oral every 8 hours PRN Temp greater or equal to 38C (100.4F), Moderate Pain (4 - 6)  sodium chloride 3%  Inhalation 30 milliLiter(s) Inhalation once PRN sputum culture    CT CHEST NC: 3/12    There is several reticulonodular opacities in the posterior segment of the right upper lobe which appears new from 1/15/2022. There are reticulonodular subpleural opacities at the right and left bases which appears increased from 1/15/2022. These findings may represent interstitial pneumonia perhaps usual interstitial pneumonia (UIP). Pulmonary consultation and follow-up exam suggested.  No pleural effusion or air leak is seen.  No mediastinal or hilar lymphadenopathy or mass formation is seen.  The bony structures appear intact.  The soft tissues of the chest wall appear intact.    IMPRESSION:There is several reticulonodular opacities in the posterior segment of the right upper lobe which appears new from 1/15/2022. There are reticulonodular subpleural opacities at the right and left bases which appears increased from 1/15/2022. These findings may represent interstitial pneumonia perhaps usual interstitial pneumonia (UIP). Pulmonary consultation and follow-up exam suggested

## 2022-03-15 NOTE — CONSULT NOTE ADULT - ASSESSMENT
IMPRESSION:    Probable UIP  Epidural Phlegmon, OM  Right Psoas Abscess   MSSA Bacteremia  HO IVDA, Opioid Abuse on Methadone     PLAN"    ABX per ID  Check Rheumatologic workup   Pulmonary toilet  Aspiration precautions  HOB 45    IMPRESSION:    Probable UIP  Epidural Phlegmon, OM  Right Psoas Abscess   MSSA Bacteremia  HO IVDA, Opioid Abuse on Methadone   HO Morbid Obesity BMI 35.4    PLAN"    ABX per ID  Check Rheumatologic workup   Check QuantiFeron   Pulmonary toilet  Aspiration precautions  HOB 45   Outpatient Pulmonary follow up IMPRESSION:    Epidural Phlegmon, OM  Right Psoas Abscess   MSSA Bacteremia  HO IVDA, Opioid Abuse on Methadone   HO Morbid Obesity BMI 35.4  CT is not suggestive of UIP.  Had clear bases on January's CT     PLAN    ABX per ID  Pulmonary toilet  Aspiration precautions  HOB 45   Consider repeat CT in 6 weeks prone

## 2022-03-15 NOTE — PROGRESS NOTE ADULT - SUBJECTIVE AND OBJECTIVE BOX
MIKAEL MCDERMOTT  51y, Male  Allergy: No Known Allergies      LOS  3d    CHIEF COMPLAINT: vertebral osteomyelitis (15 Mar 2022 11:12)      INTERVAL EVENTS/HPI  - No acute events overnight  - T(F): , Max: 97.7 (03-15-22 @ 00:01)  - s/p MRI yesterday -- continued back pain   - WBC Count: 8.75 (03-15-22 @ 07:10)  WBC Count: 7.12 (03-13-22 @ 06:42)     - Creatinine, Serum: 0.8 (03-15-22 @ 07:10)       ROS  General: Denies rigors, nightsweats  HEENT: Denies headache, rhinorrhea, sore throat, eye pain  CV: Denies CP, palpitations  PULM: Denies wheezing, hemoptysis  GI: Denies hematemesis, hematochezia, melena  : Denies discharge, hematuria  MSK: Denies arthralgias, myalgias  SKIN: Denies rash, lesions  NEURO: Denies paresthesias, weakness  PSYCH: Denies depression, anxiety    VITALS:  T(F): 97.1, Max: 97.7 (03-15-22 @ 00:01)  HR: 888  BP: 128/82  RR: 18Vital Signs Last 24 Hrs  T(C): 36.2 (15 Mar 2022 07:54), Max: 36.5 (15 Mar 2022 00:01)  T(F): 97.1 (15 Mar 2022 07:54), Max: 97.7 (15 Mar 2022 00:01)  HR: 888 (15 Mar 2022 07:54) (81 - 888)  BP: 128/82 (15 Mar 2022 07:54) (128/82 - 153/93)  BP(mean): --  RR: 18 (15 Mar 2022 07:54) (18 - 18)  SpO2: --    PHYSICAL EXAM:  Gen: NAD, resting in bed  HEENT: Normocephalic, atraumatic  Neck: supple, no lymphadenopathy  CV: Regular rate & regular rhythm  Lungs: decreased BS at bases, no fremitus  Abdomen: Soft, BS present  Ext: Warm, well perfused  Neuro: non focal, awake  Skin: no rash, no erythema  Lines: no phlebitis    FH: Non-contributory  Social Hx: Non-contributory    TESTS & MEASUREMENTS:                        10.2   8.75  )-----------( 396      ( 15 Mar 2022 07:10 )             33.8     03-15    139  |  101  |  12  ----------------------------<  147<H>  4.5   |  23  |  0.8    Ca    8.7      15 Mar 2022 07:10  Mg     1.9     03-15    TPro  7.7  /  Alb  3.6  /  TBili  <0.2  /  DBili  x   /  AST  18  /  ALT  15  /  AlkPhos  101  03-15      LIVER FUNCTIONS - ( 15 Mar 2022 07:10 )  Alb: 3.6 g/dL / Pro: 7.7 g/dL / ALK PHOS: 101 U/L / ALT: 15 U/L / AST: 18 U/L / GGT: x               Culture - Blood (collected 03-13-22 @ 06:42)  Source: .Blood None  Preliminary Report (03-14-22 @ 13:01):    No growth to date.    Culture - Blood (collected 03-12-22 @ 12:13)  Source: .Blood Blood-Peripheral  Preliminary Report (03-13-22 @ 21:01):    No growth to date.    Culture - Blood (collected 03-12-22 @ 12:13)  Source: .Blood Blood-Peripheral  Preliminary Report (03-13-22 @ 21:01):    No growth to date.        Lactate, Blood: 0.9 mmol/L (03-12-22 @ 12:12)      INFECTIOUS DISEASES TESTING  COVID-19 PCR: NotDetec (03-12-22 @ 20:19)  COVID-19 PCR: NotDetec (01-31-22 @ 09:10)  COVID-19 PCR: NotDetec (01-25-22 @ 11:21)  COVID-19 PCR: NotDetec (01-18-22 @ 18:45)  HIV-1/2 Combo Result: Nonreact (01-18-22 @ 04:30)  Procalcitonin, Serum: 0.13 (01-16-22 @ 16:00)  COVID-19 PCR: NotDetec (01-15-22 @ 23:32)      INFLAMMATORY MARKERS  C-Reactive Protein, Serum: 140 mg/L (03-13-22 @ 06:42)  Sedimentation Rate, Erythrocyte: 127 mm/Hr (03-12-22 @ 12:12)  Sedimentation Rate, Erythrocyte: 131 mm/Hr (01-16-22 @ 18:19)  C-Reactive Protein, Serum: 141 mg/L (01-16-22 @ 16:00)      RADIOLOGY & ADDITIONAL TESTS:  I have personally reviewed the last available Chest xray  CXR  Xray Chest 1 View- PORTABLE-Urgent:   ACC: [id number] EXAM:  XR CHEST PORTABLE URGENT 1V                          PROCEDURE DATE:  03/12/2022          INTERPRETATION:  Clinical History / Reason for exam: r/o pna    Comparison : Chest radiograph:  1/28/2022    Technique/Positioning: Frontal view of the chest    Findings:    Cardiac/mediastinum/hilum: The cardiac silhouette is normal in size.    Lung parenchyma/Pleura: Lordotic without definite evidence of focal   consolidation pleural effusion or pneumothorax.    Skeleton/soft tissues: No evidence of acute osseous abnormality.    Impression:  Lordotic view without definite evidence of focal consolidation pleural   effusion or pneumothorax.            --- End of Report ---            SOLEDAD HANNON MD; Attending Radiologist  This document has been electronically signed. Mar 13 2022  8:43AM (03-12-22 @ 17:34)      CT  CT Chest No Cont:   ACC: [id number] EXAM:  CT CHEST                          PROCEDURE DATE:  03/12/2022          INTERPRETATION:  Reason for Exam:  Follow-up for parenchymal lung opacity  CT of the chest was performed from the thoracic inlet to the level of the   adrenal glands without contrast injection. Coronal and sagittal images   have been submitted.    Comparison: 1/15/2022 and correlated with CT of the lumbar spine and   thoracic spine of 3/12/2022    There is several reticulonodular opacities in the posterior segment of   the right upper lobe which appears new from 1/15/2022. There are   reticulonodular subpleural opacities at the right and left bases which   appears increased from 1/15/2022. These findings may represent   interstitial pneumonia perhapsusual interstitial pneumonia (UIP).   Pulmonary consultation and follow-up exam suggested.  No pleural effusion or air leak is seen.  No mediastinal or hilar lymphadenopathy or mass formation is seen.  The bony structures appear intact.  The soft tissues of the chest wall appear intact.    IMPRESSION:There is several reticulonodular opacities in the posterior   segment of the right upper lobe which appears new from 1/15/2022. There   are reticulonodular subpleural opacities at the right and left bases   which appears increased from 1/15/2022. These findings may represent   interstitial pneumonia perhaps usual interstitial pneumonia (UIP).   Pulmonary consultation and follow-up exam suggested        --- End of Report ---            ALMAS DAVID MD; Attending Radiologist  This document has been electronically signed. Mar 12 2022  7:21PM (03-12-22 @ 17:13)      CARDIOLOGY TESTING  12 Lead ECG:   Ventricular Rate 82 BPM    Atrial Rate 82 BPM    P-R Interval 138 ms    QRS Duration 112 ms    Q-T Interval 402 ms    QTC Calculation(Bazett) 469 ms    P Axis 20 degrees    R Axis 1 degrees    T Axis -10 degrees    Diagnosis Line Normal sinus rhythm  Moderate voltage criteria for LVH, may be normal variant  Inferior infarct , age undetermined  Abnormal ECG    Confirmed by JOVANI FANG, [facility] ([id number]) on 3/13/2022 10:55:08 PM (03-13-22 @ 08:09)      MEDICATIONS  ALPRAZolam 2 Oral three times a day  buPROPion XL (24-Hour) . 300 Oral daily  ceFAZolin   IVPB 2000 IV Intermittent every 8 hours  chlorhexidine 4% Liquid 1 Topical <User Schedule>  enoxaparin Injectable 40 SubCutaneous every 24 hours  ferrous    sulfate 325 Oral daily  folic acid 1 Oral daily  gabapentin 300 Oral three times a day  influenza   Vaccine 0.5 IntraMuscular once  lidocaine   4% Patch 1 Transdermal daily  methadone    Tablet 135 Oral daily  mirtazapine 60 Oral at bedtime  multivitamin 1 Oral daily  nicotine - 21 mG/24Hr(s) Patch 1 Transdermal daily  QUEtiapine 200 Oral three times a day      WEIGHT  Weight (kg): 121.5 (03-13-22 @ 05:10)  Creatinine, Serum: 0.8 mg/dL (03-15-22 @ 07:10)      ANTIBIOTICS:  ceFAZolin   IVPB 2000 milliGRAM(s) IV Intermittent every 8 hours      All available historical records have been reviewed

## 2022-03-16 PROCEDURE — 77003 FLUOROGUIDE FOR SPINE INJECT: CPT | Mod: 26

## 2022-03-16 PROCEDURE — 99233 SBSQ HOSP IP/OBS HIGH 50: CPT

## 2022-03-16 PROCEDURE — 62267 INTERDISCAL PERQ ASPIR DX: CPT

## 2022-03-16 RX ADMIN — Medication 100 MILLIGRAM(S): at 06:29

## 2022-03-16 RX ADMIN — Medication 1 MILLIGRAM(S): at 11:34

## 2022-03-16 RX ADMIN — Medication 1 TABLET(S): at 11:34

## 2022-03-16 RX ADMIN — Medication 100 MILLIGRAM(S): at 14:22

## 2022-03-16 RX ADMIN — Medication 1 PATCH: at 11:40

## 2022-03-16 RX ADMIN — GABAPENTIN 300 MILLIGRAM(S): 400 CAPSULE ORAL at 06:28

## 2022-03-16 RX ADMIN — QUETIAPINE FUMARATE 200 MILLIGRAM(S): 200 TABLET, FILM COATED ORAL at 15:00

## 2022-03-16 RX ADMIN — MIRTAZAPINE 60 MILLIGRAM(S): 45 TABLET, ORALLY DISINTEGRATING ORAL at 21:48

## 2022-03-16 RX ADMIN — CHLORHEXIDINE GLUCONATE 1 APPLICATION(S): 213 SOLUTION TOPICAL at 06:19

## 2022-03-16 RX ADMIN — LIDOCAINE 1 PATCH: 4 CREAM TOPICAL at 11:35

## 2022-03-16 RX ADMIN — Medication 2 MILLIGRAM(S): at 14:26

## 2022-03-16 RX ADMIN — Medication 1 PATCH: at 11:34

## 2022-03-16 RX ADMIN — CYCLOBENZAPRINE HYDROCHLORIDE 10 MILLIGRAM(S): 10 TABLET, FILM COATED ORAL at 10:14

## 2022-03-16 RX ADMIN — GABAPENTIN 300 MILLIGRAM(S): 400 CAPSULE ORAL at 14:24

## 2022-03-16 RX ADMIN — QUETIAPINE FUMARATE 200 MILLIGRAM(S): 200 TABLET, FILM COATED ORAL at 06:27

## 2022-03-16 RX ADMIN — Medication 100 MILLIGRAM(S): at 21:47

## 2022-03-16 RX ADMIN — QUETIAPINE FUMARATE 200 MILLIGRAM(S): 200 TABLET, FILM COATED ORAL at 21:48

## 2022-03-16 RX ADMIN — Medication 325 MILLIGRAM(S): at 11:33

## 2022-03-16 RX ADMIN — Medication 2 MILLIGRAM(S): at 21:48

## 2022-03-16 RX ADMIN — Medication 1 PATCH: at 08:14

## 2022-03-16 RX ADMIN — BUPROPION HYDROCHLORIDE 300 MILLIGRAM(S): 150 TABLET, EXTENDED RELEASE ORAL at 11:33

## 2022-03-16 RX ADMIN — GABAPENTIN 300 MILLIGRAM(S): 400 CAPSULE ORAL at 21:47

## 2022-03-16 RX ADMIN — METHADONE HYDROCHLORIDE 135 MILLIGRAM(S): 40 TABLET ORAL at 11:37

## 2022-03-16 RX ADMIN — Medication 650 MILLIGRAM(S): at 10:14

## 2022-03-16 RX ADMIN — Medication 2 MILLIGRAM(S): at 06:28

## 2022-03-16 RX ADMIN — CYCLOBENZAPRINE HYDROCHLORIDE 10 MILLIGRAM(S): 10 TABLET, FILM COATED ORAL at 06:43

## 2022-03-16 NOTE — CHART NOTE - NSCHARTNOTEFT_GEN_A_CORE
patient is NPO for possible procedure with IR (aspiration of abscess)  patient refusing to be NPO and wants to eat breakfast  I explained to him that he needs to be NPO for the procedure and that eating will delay his intervention and his stay ->patient refused and wanted to eat  NPO order removed -> diet reinstated

## 2022-03-16 NOTE — PROGRESS NOTE ADULT - SUBJECTIVE AND OBJECTIVE BOX
MIKAEL MCDERMOTT 51y Male  MRN#: [id number]     Hospital Day: 4d    Pt is currently admitted with the primary diagnosis of vertebral OM    SUBJECTIVE    No Overnight events     No Subjective complaints     patient is seen and examined    Present Today:   - Pablito:  No                                             ----------------------------------------------------------  OBJECTIVE  PAST MEDICAL & SURGICAL HISTORY  IV drug abuse    Vertebral osteomyelitis    MSSA bacteremia    No significant past surgical history                                              -----------------------------------------------------------  ALLERGIES:  No Known Allergies                                            ------------------------------------------------------------    HOME MEDICATIONS  Home Medications:  acetaminophen 325 mg oral tablet: 2 tab(s) orally every 6 hours (12 Mar 2022 21:41)  cyclobenzaprine 10 mg oral tablet: 1 tab(s) orally every 8 hours (12 Mar 2022 21:41)  ferrous sulfate 325 mg (65 mg elemental iron) oral tablet: 1 tab(s) orally once a day (12 Mar 2022 21:41)  folic acid 1 mg oral tablet: 1 tab(s) orally once a day (12 Mar 2022 21:41)  gabapentin 300 mg oral capsule: 1 cap(s) orally 3 times a day (12 Mar 2022 21:41)  methadone: 135 milligram(s) orally once a day (12 Mar 2022 21:41)  Multiple Vitamins oral tablet: 1 tab(s) orally once a day (12 Mar 2022 21:41)  nicotine 21 mg/24 hr transdermal film, extended release: 1 patch transdermal once a day (12 Mar 2022 21:41)  Remeron 30 mg oral tablet: 2 tab(s) orally once a day (at bedtime) (12 Mar 2022 21:41)  SEROquel 200 mg oral tablet: orally 3 times a day (12 Mar 2022 21:41)  Wellbutrin: 300 milligram(s) orally once a day (12 Mar 2022 21:41)  Xanax: 2 milligram(s) orally 3 times a day (12 Mar 2022 21:41)                           MEDICATIONS:  STANDING MEDICATIONS  ALPRAZolam 2 milliGRAM(s) Oral three times a day  buPROPion XL (24-Hour) . 300 milliGRAM(s) Oral daily  ceFAZolin   IVPB 2000 milliGRAM(s) IV Intermittent every 8 hours  chlorhexidine 4% Liquid 1 Application(s) Topical <User Schedule>  enoxaparin Injectable 40 milliGRAM(s) SubCutaneous every 24 hours  ferrous    sulfate 325 milliGRAM(s) Oral daily  folic acid 1 milliGRAM(s) Oral daily  gabapentin 300 milliGRAM(s) Oral three times a day  influenza   Vaccine 0.5 milliLiter(s) IntraMuscular once  lidocaine   4% Patch 1 Patch Transdermal daily  methadone    Tablet 135 milliGRAM(s) Oral daily  mirtazapine 60 milliGRAM(s) Oral at bedtime  multivitamin 1 Tablet(s) Oral daily  nicotine - 21 mG/24Hr(s) Patch 1 patch Transdermal daily  QUEtiapine 200 milliGRAM(s) Oral three times a day    PRN MEDICATIONS  acetaminophen     Tablet .. 650 milliGRAM(s) Oral every 6 hours PRN  cyclobenzaprine 10 milliGRAM(s) Oral three times a day PRN  ibuprofen  Tablet. 600 milliGRAM(s) Oral every 8 hours PRN  sodium chloride 3%  Inhalation 30 milliLiter(s) Inhalation once PRN                                            ------------------------------------------------------------  VITAL SIGNS: Last 24 Hours  T(C): 37.8 (16 Mar 2022 08:00), Max: 37.8 (16 Mar 2022 08:00)  T(F): 100.1 (16 Mar 2022 08:00), Max: 100.1 (16 Mar 2022 08:00)  HR: 106 (16 Mar 2022 08:00) (83 - 106)  BP: 149/94 (16 Mar 2022 08:00) (118/77 - 149/94)  BP(mean): --  RR: 18 (16 Mar 2022 08:00) (18 - 18)  SpO2: --                                             --------------------------------------------------------------  LABS:                        10.2   8.75  )-----------( 396      ( 15 Mar 2022 07:10 )             33.8     03-15    139  |  101  |  12  ----------------------------<  147<H>  4.5   |  23  |  0.8    Ca    8.7      15 Mar 2022 07:10  Mg     1.9     03-15    TPro  7.7  /  Alb  3.6  /  TBili  <0.2  /  DBili  x   /  AST  18  /  ALT  15  /  AlkPhos  101  03-15    PT/INR - ( 15 Mar 2022 07:10 )   PT: 13.20 sec;   INR: 1.15 ratio         PTT - ( 15 Mar 2022 07:10 )  PTT:27.9 sec                                                          -------------------------------------------------------------  RADIOLOGY:                                            --------------------------------------------------------------    PHYSICAL EXAM:    General: NAD  LUNGS: clear air entry bilaterally, no wheezing or crackles  HEART: s1/s2, RRR  ABDOMEN: soft, ntnd, bs+  EXT: no LLE bilaterally, warm extremities  NEURO: AAOX4  SKIN: BL wrist wounds, otherwise intact, no rashes or lesions

## 2022-03-16 NOTE — PROCEDURE NOTE - PROCEDURE FINDINGS AND DETAILS
L4-L5 disc space was visualized under CT guidance, wherein a needle was inserted and aspirant was acquired and sent for laboratory analysis. The patient tolerated the procedure well and was sent to the floors without incident.

## 2022-03-16 NOTE — PROGRESS NOTE ADULT - ASSESSMENT
52yo M w/ pmhx of IVDU (heroin), vertebral osteomyelitis, and MSSA bacteremia presents for worsening back pain. Admitted for further management of progressive vertebral osteomyelitis.     #progressive vertebral osteomyelitis   #Hx of MSSA bacteremia  - MRI T-L spine 1/18/22 confirmed osteomyelitis  - PAM 1/19: no endocarditis  - bcx 1/15: MSSA; first cleared bcx on 1/18   - received cefazolin via picc line ended 3/1  - ,   - No sepsis on admission   - CT L-T spine IV con: Since 1/15/2022 there has been significant progression of discitis osteomyelitis at L4-5 with multifocal erosive changes at the endplates and likely prevertebral phlegmon formation. Progression of L5-S1 facetitis, Facetitis in T10-T11.  - MRI Thoracic/Lumbar spine 3/14: similar increased signal and enhancement involving T10-T11 facet joint space from pervious degenerative changes vs facetitis. 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, given RUL opacities seen on CT, will send QuantiFeron and HIV  - BCx 3/12, 3/13: NGTD   - c/w daily bcx   - neurosurgery recs appreciated, no acute neurosurgical intervention at this time   - f/u IR/neuro IR re drainage of abscess/Bx of OM -> Plan for L4-L5 abscess aspiration 3/16/22  - c/w cefazolin 2g q8h   - ID recs appreciated   - pulm eval -> Consider repeat CT in 6 weeks prone (CT no suggestive of UIP)      #BL distal forearm wounds- healing   - surgery saw patient last admission and recommended not removing R foreign body; not a contraindication to MRI   - burn saw patient last admission and rec local wound care  - 1.2cm linear foreign body overlying right radius  - not a contraindication for MRI   - wound care recs appreciated   - f/u o/p burn     # IVDU  # suspected folic acid deficiency  # anxiety  - on home methadone 135mg solution   - c/w xanax, welbutrin, seroquel   - SBIRT c/s appreciated      #Normocytic Anemia   - multifactorial, iron deficiency, anemia of chronic disease   - c/w folate and ferrous sulfate 325mg qd     #RUL opacities  - asymptomatic   - CT chest IV con: several reticulonodular opacities in the posterior segment of the right upper lobe possibly representing UIP  - f/u pulm o/p     #Misc  - DVT ppx: Lovenox  - GI ppx: none  - Diet: regular  - Code Status: full code   - Dispo: acute from home

## 2022-03-16 NOTE — PROGRESS NOTE ADULT - ATTENDING COMMENTS
50yo M w/ pmhx of IVDU (heroin), vertebral osteomyelitis, and MSSA bacteremia presents for worsening back pain    Spinal OM, with epidural abscess/phlegmon  Opioid abuse on Methadone  RUL Nodular Opacities  Recent history & post treatment for MSSA bacteremia  Hx of IVDU  Active nicotine dependence        No sepsis on admission  Recently completed 6wks of antibiotics which ended on 3/1.  MR Lumbar Spine 03/18 significant for  left L5-S1 facetitis with associated multifocal  abscess formation directly adjacent to the left facet joint, Ventral epidural phlegmon overlying the L5 vertebral body causing significant compression of the descending nerve roots.  ID noted-chest findings and involvement of facet joints, would rule out TB with AFB sputum x 3 , quantiferon gold , repeat HIV screen & pulmonary evaluation    Airbron precautions until ruled out for TB  cefazolin 2g q 8 hours for now  Follow-up blood cx   IR consult for possible aspiration & cx-- IR biopsy 3/16  Continue methadone 135, and gabapentin  Neuro sx following- no current intervention   Chronic b/l Wrist ulceration - healing  Handoff: Pending IR for procedure, AFB to rule out TB

## 2022-03-17 LAB
ALBUMIN SERPL ELPH-MCNC: 3.2 G/DL — LOW (ref 3.5–5.2)
ALP SERPL-CCNC: 99 U/L — SIGNIFICANT CHANGE UP (ref 30–115)
ALT FLD-CCNC: 13 U/L — SIGNIFICANT CHANGE UP (ref 0–41)
ANION GAP SERPL CALC-SCNC: 13 MMOL/L — SIGNIFICANT CHANGE UP (ref 7–14)
AST SERPL-CCNC: 13 U/L — SIGNIFICANT CHANGE UP (ref 0–41)
BASOPHILS # BLD AUTO: 0.01 K/UL — SIGNIFICANT CHANGE UP (ref 0–0.2)
BASOPHILS NFR BLD AUTO: 0.1 % — SIGNIFICANT CHANGE UP (ref 0–1)
BILIRUB SERPL-MCNC: 0.2 MG/DL — SIGNIFICANT CHANGE UP (ref 0.2–1.2)
BUN SERPL-MCNC: 13 MG/DL — SIGNIFICANT CHANGE UP (ref 10–20)
CALCIUM SERPL-MCNC: 9 MG/DL — SIGNIFICANT CHANGE UP (ref 8.5–10.1)
CHLORIDE SERPL-SCNC: 97 MMOL/L — LOW (ref 98–110)
CO2 SERPL-SCNC: 26 MMOL/L — SIGNIFICANT CHANGE UP (ref 17–32)
CREAT SERPL-MCNC: 1 MG/DL — SIGNIFICANT CHANGE UP (ref 0.7–1.5)
CULTURE RESULTS: SIGNIFICANT CHANGE UP
CULTURE RESULTS: SIGNIFICANT CHANGE UP
EGFR: 91 ML/MIN/1.73M2 — SIGNIFICANT CHANGE UP
EOSINOPHIL # BLD AUTO: 0.36 K/UL — SIGNIFICANT CHANGE UP (ref 0–0.7)
EOSINOPHIL NFR BLD AUTO: 4.5 % — SIGNIFICANT CHANGE UP (ref 0–8)
GLUCOSE SERPL-MCNC: 105 MG/DL — HIGH (ref 70–99)
HCT VFR BLD CALC: 33.3 % — LOW (ref 42–52)
HGB BLD-MCNC: 10 G/DL — LOW (ref 14–18)
HIV 1+2 AB+HIV1 P24 AG SERPL QL IA: SIGNIFICANT CHANGE UP
IMM GRANULOCYTES NFR BLD AUTO: 0.8 % — HIGH (ref 0.1–0.3)
LYMPHOCYTES # BLD AUTO: 1.84 K/UL — SIGNIFICANT CHANGE UP (ref 1.2–3.4)
LYMPHOCYTES # BLD AUTO: 23 % — SIGNIFICANT CHANGE UP (ref 20.5–51.1)
MAGNESIUM SERPL-MCNC: 2.2 MG/DL — SIGNIFICANT CHANGE UP (ref 1.8–2.4)
MCHC RBC-ENTMCNC: 25.3 PG — LOW (ref 27–31)
MCHC RBC-ENTMCNC: 30 G/DL — LOW (ref 32–37)
MCV RBC AUTO: 84.1 FL — SIGNIFICANT CHANGE UP (ref 80–94)
MONOCYTES # BLD AUTO: 0.83 K/UL — HIGH (ref 0.1–0.6)
MONOCYTES NFR BLD AUTO: 10.4 % — HIGH (ref 1.7–9.3)
NEUTROPHILS # BLD AUTO: 4.89 K/UL — SIGNIFICANT CHANGE UP (ref 1.4–6.5)
NEUTROPHILS NFR BLD AUTO: 61.2 % — SIGNIFICANT CHANGE UP (ref 42.2–75.2)
NIGHT BLUE STAIN TISS: SIGNIFICANT CHANGE UP
NRBC # BLD: 0 /100 WBCS — SIGNIFICANT CHANGE UP (ref 0–0)
PLATELET # BLD AUTO: 411 K/UL — HIGH (ref 130–400)
POTASSIUM SERPL-MCNC: 4.8 MMOL/L — SIGNIFICANT CHANGE UP (ref 3.5–5)
POTASSIUM SERPL-SCNC: 4.8 MMOL/L — SIGNIFICANT CHANGE UP (ref 3.5–5)
PROT SERPL-MCNC: 7.6 G/DL — SIGNIFICANT CHANGE UP (ref 6–8)
RBC # BLD: 3.96 M/UL — LOW (ref 4.7–6.1)
RBC # FLD: 17 % — HIGH (ref 11.5–14.5)
SODIUM SERPL-SCNC: 136 MMOL/L — SIGNIFICANT CHANGE UP (ref 135–146)
SPECIMEN SOURCE: SIGNIFICANT CHANGE UP
WBC # BLD: 7.99 K/UL — SIGNIFICANT CHANGE UP (ref 4.8–10.8)
WBC # FLD AUTO: 7.99 K/UL — SIGNIFICANT CHANGE UP (ref 4.8–10.8)

## 2022-03-17 PROCEDURE — 99233 SBSQ HOSP IP/OBS HIGH 50: CPT

## 2022-03-17 RX ADMIN — Medication 100 MILLIGRAM(S): at 06:15

## 2022-03-17 RX ADMIN — Medication 1 MILLIGRAM(S): at 11:37

## 2022-03-17 RX ADMIN — Medication 2 MILLIGRAM(S): at 13:17

## 2022-03-17 RX ADMIN — LIDOCAINE 1 PATCH: 4 CREAM TOPICAL at 11:36

## 2022-03-17 RX ADMIN — Medication 1 TABLET(S): at 11:37

## 2022-03-17 RX ADMIN — QUETIAPINE FUMARATE 200 MILLIGRAM(S): 200 TABLET, FILM COATED ORAL at 13:17

## 2022-03-17 RX ADMIN — METHADONE HYDROCHLORIDE 135 MILLIGRAM(S): 40 TABLET ORAL at 11:38

## 2022-03-17 RX ADMIN — QUETIAPINE FUMARATE 200 MILLIGRAM(S): 200 TABLET, FILM COATED ORAL at 21:59

## 2022-03-17 RX ADMIN — Medication 1 PATCH: at 08:45

## 2022-03-17 RX ADMIN — CYCLOBENZAPRINE HYDROCHLORIDE 10 MILLIGRAM(S): 10 TABLET, FILM COATED ORAL at 11:36

## 2022-03-17 RX ADMIN — GABAPENTIN 300 MILLIGRAM(S): 400 CAPSULE ORAL at 21:59

## 2022-03-17 RX ADMIN — Medication 100 MILLIGRAM(S): at 13:17

## 2022-03-17 RX ADMIN — Medication 100 MILLIGRAM(S): at 21:58

## 2022-03-17 RX ADMIN — BUPROPION HYDROCHLORIDE 300 MILLIGRAM(S): 150 TABLET, EXTENDED RELEASE ORAL at 11:37

## 2022-03-17 RX ADMIN — GABAPENTIN 300 MILLIGRAM(S): 400 CAPSULE ORAL at 06:16

## 2022-03-17 RX ADMIN — Medication 1 PATCH: at 11:36

## 2022-03-17 RX ADMIN — MIRTAZAPINE 60 MILLIGRAM(S): 45 TABLET, ORALLY DISINTEGRATING ORAL at 21:59

## 2022-03-17 RX ADMIN — GABAPENTIN 300 MILLIGRAM(S): 400 CAPSULE ORAL at 13:17

## 2022-03-17 RX ADMIN — Medication 2 MILLIGRAM(S): at 06:15

## 2022-03-17 RX ADMIN — CHLORHEXIDINE GLUCONATE 1 APPLICATION(S): 213 SOLUTION TOPICAL at 06:05

## 2022-03-17 RX ADMIN — Medication 1 PATCH: at 11:38

## 2022-03-17 RX ADMIN — Medication 325 MILLIGRAM(S): at 11:37

## 2022-03-17 RX ADMIN — Medication 2 MILLIGRAM(S): at 22:00

## 2022-03-17 RX ADMIN — QUETIAPINE FUMARATE 200 MILLIGRAM(S): 200 TABLET, FILM COATED ORAL at 06:16

## 2022-03-17 NOTE — PROGRESS NOTE ADULT - SUBJECTIVE AND OBJECTIVE BOX
MIKAEL MCDERMOTT  51y, Male  Allergy: No Known Allergies      LOS  5d    CHIEF COMPLAINT: vertebral osteomyelitis (17 Mar 2022 08:24)      INTERVAL EVENTS/HPI  - No acute events overnight  - T(F): , Max: 97.3 (03-17-22 @ 09:04)  - Denies any worsening symptoms  - Tolerating medication  - WBC Count: 7.99 (03-17-22 @ 06:07)  WBC Count: 8.75 (03-15-22 @ 07:10)     - Creatinine, Serum: 1.0 (03-17-22 @ 06:07)       ROS  General: Denies rigors, nightsweats  HEENT: Denies headache, rhinorrhea, sore throat, eye pain  CV: Denies CP, palpitations  PULM: Denies wheezing, hemoptysis  GI: Denies hematemesis, hematochezia, melena  : Denies discharge, hematuria  MSK: Denies arthralgias, myalgias  SKIN: Denies rash, lesions  NEURO: Denies paresthesias, weakness  PSYCH: Denies depression, anxiety    VITALS:  T(F): 97.3, Max: 97.3 (03-17-22 @ 09:04)  HR: 91  BP: 91/70  RR: 18Vital Signs Last 24 Hrs  T(C): 36.3 (17 Mar 2022 09:04), Max: 36.3 (17 Mar 2022 09:04)  T(F): 97.3 (17 Mar 2022 09:04), Max: 97.3 (17 Mar 2022 09:04)  HR: 91 (17 Mar 2022 09:04) (91 - 92)  BP: 91/70 (17 Mar 2022 09:04) (91/70 - 130/81)  BP(mean): --  RR: 18 (17 Mar 2022 09:04) (18 - 18)  SpO2: 93% (17 Mar 2022 09:04) (93% - 93%)    PHYSICAL EXAM:  Gen: NAD, resting in bed  HEENT: Normocephalic, atraumatic  Neck: supple, no lymphadenopathy  CV: Regular rate & regular rhythm  Lungs: decreased BS at bases, no fremitus  Abdomen: Soft, BS present  Ext: Warm, well perfused  Neuro: non focal, awake  Skin: no rash, no erythema  Lines: no phlebitis    FH: Non-contributory  Social Hx: Non-contributory    TESTS & MEASUREMENTS:                        10.0   7.99  )-----------( 411      ( 17 Mar 2022 06:07 )             33.3     03-17    136  |  97<L>  |  13  ----------------------------<  105<H>  4.8   |  26  |  1.0    Ca    9.0      17 Mar 2022 06:07  Mg     2.2     03-17    TPro  7.6  /  Alb  3.2<L>  /  TBili  0.2  /  DBili  x   /  AST  13  /  ALT  13  /  AlkPhos  99  03-17      LIVER FUNCTIONS - ( 17 Mar 2022 06:07 )  Alb: 3.2 g/dL / Pro: 7.6 g/dL / ALK PHOS: 99 U/L / ALT: 13 U/L / AST: 13 U/L / GGT: x               Culture - Fungal, Other (collected 03-16-22 @ 18:51)  Source: .Other abscess  Preliminary Report (03-17-22 @ 12:26):    Testing in progress    Culture - Acid Fast - Other w/Smear (collected 03-16-22 @ 18:51)  Source: .Other Other, psoas abscess    Culture - Blood (collected 03-15-22 @ 07:10)  Source: .Blood Blood  Preliminary Report (03-16-22 @ 13:01):    No growth to date.    Culture - Blood (collected 03-15-22 @ 07:10)  Source: .Blood Blood-Venous  Preliminary Report (03-16-22 @ 13:01):    No growth to date.    Culture - Blood (collected 03-13-22 @ 06:42)  Source: .Blood None  Preliminary Report (03-14-22 @ 13:01):    No growth to date.    Culture - Blood (collected 03-12-22 @ 12:13)  Source: .Blood Blood-Peripheral  Preliminary Report (03-13-22 @ 21:01):    No growth to date.    Culture - Blood (collected 03-12-22 @ 12:13)  Source: .Blood Blood-Peripheral  Preliminary Report (03-13-22 @ 21:01):    No growth to date.            INFECTIOUS DISEASES TESTING  HIV-1/2 Combo Result: Nonreact (03-16-22 @ 12:10)  COVID-19 PCR: NotDetec (03-12-22 @ 20:19)  COVID-19 PCR: NotDetec (01-31-22 @ 09:10)  COVID-19 PCR: NotDetec (01-25-22 @ 11:21)  COVID-19 PCR: NotDetec (01-18-22 @ 18:45)  HIV-1/2 Combo Result: Nonreact (01-18-22 @ 04:30)  Procalcitonin, Serum: 0.13 (01-16-22 @ 16:00)  COVID-19 PCR: NotDetec (01-15-22 @ 23:32)      INFLAMMATORY MARKERS  C-Reactive Protein, Serum: 140 mg/L (03-13-22 @ 06:42)  Sedimentation Rate, Erythrocyte: 127 mm/Hr (03-12-22 @ 12:12)  Sedimentation Rate, Erythrocyte: 131 mm/Hr (01-16-22 @ 18:19)  C-Reactive Protein, Serum: 141 mg/L (01-16-22 @ 16:00)      RADIOLOGY & ADDITIONAL TESTS:  I have personally reviewed the last available Chest xray  CXR      CT      CARDIOLOGY TESTING  12 Lead ECG:   Ventricular Rate 82 BPM    Atrial Rate 82 BPM    P-R Interval 138 ms    QRS Duration 112 ms    Q-T Interval 402 ms    QTC Calculation(Bazett) 469 ms    P Axis 20 degrees    R Axis 1 degrees    T Axis -10 degrees    Diagnosis Line Normal sinus rhythm  Moderate voltage criteria for LVH, may be normal variant  Inferior infarct , age undetermined  Abnormal ECG    Confirmed by JOVANI FANG, [facility] ([id number]) on 3/13/2022 10:55:08 PM (03-13-22 @ 08:09)      MEDICATIONS  ALPRAZolam 2 Oral three times a day  buPROPion XL (24-Hour) . 300 Oral daily  ceFAZolin   IVPB 2000 IV Intermittent every 8 hours  chlorhexidine 4% Liquid 1 Topical <User Schedule>  enoxaparin Injectable 40 SubCutaneous every 24 hours  ferrous    sulfate 325 Oral daily  folic acid 1 Oral daily  gabapentin 300 Oral three times a day  influenza   Vaccine 0.5 IntraMuscular once  lidocaine   4% Patch 1 Transdermal daily  methadone    Tablet 135 Oral daily  mirtazapine 60 Oral at bedtime  multivitamin 1 Oral daily  nicotine - 21 mG/24Hr(s) Patch 1 Transdermal daily  QUEtiapine 200 Oral three times a day      WEIGHT  Weight (kg): 121.5 (03-16-22 @ 11:09)  Creatinine, Serum: 1.0 mg/dL (03-17-22 @ 06:07)      ANTIBIOTICS:  ceFAZolin   IVPB 2000 milliGRAM(s) IV Intermittent every 8 hours      All available historical records have been reviewed

## 2022-03-17 NOTE — PROGRESS NOTE ADULT - ASSESSMENT
ASSESSMENT  52yo M w/ pmhx of IVDU (heroin), vertebral osteomyelitis, and MSSA bacteremia presents for worsening back pain    IMPRESSION  #Spinal OM, with epidural abscess/phlegmon  - MR Lumbar Spine w/wo IV Cont (01.18.22 @ 16:33): Minimally increased signal and enhancement involving the left T10-T11  facet joint space which represent early facet joint  infection/inflammation however could also be related to degenerative  changes.LUMBAR SPINE: Findings consistent with left L5-S1 facetitis with associated multifocal  abscess formation directly adjacent to the left facet joint, within the left retroperitoneal soft tissues as well as the right psoas musculature. Ventral epidural phlegmon overlying the L5 vertebral body causing significant compression of the descending nerve roots.  - completed 6 week course of IV cefazolin (end date 3/1)  - CT Lumbar Spine w/ IV Cont (03.12.22 @ 14:33): Since 1/15/2022 there has been significant progression ofdiscitis  osteomyelitis at L4-5 with multifocal erosive changes at the endplates  and likely prevertebral phlegmon formation. Additionally there is  progression of L5-S1 facetitis with significant erosive changes of the  joint. Progressive erosive changes are noted involving the left T10-T11 facet joint consistent with facetitis.  - MR Lumbar Spine w/wo IV Cont (03.14.22 @ 20:23): Since prior examination there has been progression of osteomyelitis  involving the L4 and L5 vertebral bodies as well as severe facetitis   involving the left L5-S1 facet joint. 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 Extensive epidural phlegmon which is a slightly decreased in overall  extent extent since prior MRI predominantly involving the ventral epidural space extending into the L4-5 disc space causing severe spinal stenosis and mass effectupon the descending nerve roots though slightly improved. Similar-appearing right psoas abscess as well as left retroperitoneal abscess extending to the left L5-S1 facet joint which is slightly increased in size since the prior examination  - s/p IR guided drainage of L4-L5 disc space 3/16    #RUL Nodular Opacities  - CT Chest No Cont (03.12.22 @ 17:13): here is several reticulonodular opacities in the posterior  segment of the right upper lobe which appears new from 1/15/2022. There  are reticulonodular subpleural opacities at the right and left bases  which appears increased from 1/15/2022. These findings may represent  interstitial pneumonia perhaps usual interstitial pneumonia (UIP). Pulmonary    - Sedimentation Rate, Erythrocyte: 131 mm/Hr (01.16.22 @ 18:19) --> Sedimentation Rate, Erythrocyte: 127 mm/Hr (03.12.22 @ 12:12)  - C-Reactive Protein, Serum: 141 mg/L (01.16.22 @ 16:00)    #MSSA Bacteremia  - BLood Cx 1/15-17 +  - Blood Cx 1/18 NG  - PAM 1/19 - negative for vegetation    #IVDA  - HIV negative  - Hep C weakly reactive    #Opioid abuse on Methadone  #Bilateral Wrist wounds - s/p Burn evaluation 1/16    #Abx allergy: NKDA      RECOMMENDATIONS  - follow-up IR guided Cx   - follow-up rest of AFB smears   - follow-up quantiferon gold   - continue cefazolin 2g q 8 hours -- pending Cx, will likely plan at least another 4 week course     Please call or message on [employer] Teams if with any questions.  Spectra [phone]

## 2022-03-17 NOTE — PROGRESS NOTE ADULT - SUBJECTIVE AND OBJECTIVE BOX
MIKAEL MCDERMOTT 51y Male  MRN#: [id number]     Hospital Day: 5d    Pt is currently admitted with the primary diagnosis of vertebral OM    SUBJECTIVE    No Overnight events     No Subjective complaints     patient is seen and examined    Present Today:   - Pablito:  No                                             ----------------------------------------------------------  OBJECTIVE  PAST MEDICAL & SURGICAL HISTORY  IV drug abuse    Vertebral osteomyelitis    MSSA bacteremia    No significant past surgical history                                              -----------------------------------------------------------  ALLERGIES:  No Known Allergies                                            ------------------------------------------------------------    HOME MEDICATIONS  Home Medications:  acetaminophen 325 mg oral tablet: 2 tab(s) orally every 6 hours (12 Mar 2022 21:41)  cyclobenzaprine 10 mg oral tablet: 1 tab(s) orally every 8 hours (12 Mar 2022 21:41)  ferrous sulfate 325 mg (65 mg elemental iron) oral tablet: 1 tab(s) orally once a day (12 Mar 2022 21:41)  folic acid 1 mg oral tablet: 1 tab(s) orally once a day (12 Mar 2022 21:41)  gabapentin 300 mg oral capsule: 1 cap(s) orally 3 times a day (12 Mar 2022 21:41)  methadone: 135 milligram(s) orally once a day (12 Mar 2022 21:41)  Multiple Vitamins oral tablet: 1 tab(s) orally once a day (12 Mar 2022 21:41)  nicotine 21 mg/24 hr transdermal film, extended release: 1 patch transdermal once a day (12 Mar 2022 21:41)  Remeron 30 mg oral tablet: 2 tab(s) orally once a day (at bedtime) (12 Mar 2022 21:41)  SEROquel 200 mg oral tablet: orally 3 times a day (12 Mar 2022 21:41)  Wellbutrin: 300 milligram(s) orally once a day (12 Mar 2022 21:41)  Xanax: 2 milligram(s) orally 3 times a day (12 Mar 2022 21:41)                           MEDICATIONS:  STANDING MEDICATIONS  ALPRAZolam 2 milliGRAM(s) Oral three times a day  buPROPion XL (24-Hour) . 300 milliGRAM(s) Oral daily  ceFAZolin   IVPB 2000 milliGRAM(s) IV Intermittent every 8 hours  chlorhexidine 4% Liquid 1 Application(s) Topical <User Schedule>  enoxaparin Injectable 40 milliGRAM(s) SubCutaneous every 24 hours  ferrous    sulfate 325 milliGRAM(s) Oral daily  folic acid 1 milliGRAM(s) Oral daily  gabapentin 300 milliGRAM(s) Oral three times a day  influenza   Vaccine 0.5 milliLiter(s) IntraMuscular once  lidocaine   4% Patch 1 Patch Transdermal daily  methadone    Tablet 135 milliGRAM(s) Oral daily  mirtazapine 60 milliGRAM(s) Oral at bedtime  multivitamin 1 Tablet(s) Oral daily  nicotine - 21 mG/24Hr(s) Patch 1 patch Transdermal daily  QUEtiapine 200 milliGRAM(s) Oral three times a day    PRN MEDICATIONS  acetaminophen     Tablet .. 650 milliGRAM(s) Oral every 6 hours PRN  cyclobenzaprine 10 milliGRAM(s) Oral three times a day PRN  ibuprofen  Tablet. 600 milliGRAM(s) Oral every 8 hours PRN  sodium chloride 3%  Inhalation 30 milliLiter(s) Inhalation once PRN                                            ------------------------------------------------------------  VITAL SIGNS: Last 24 Hours  T(C): 36.2 (17 Mar 2022 00:00), Max: 37.8 (16 Mar 2022 11:09)  T(F): 97.2 (17 Mar 2022 00:00), Max: 98.1 (16 Mar 2022 16:17)  HR: 92 (17 Mar 2022 00:00) (92 - 106)  BP: 130/81 (17 Mar 2022 00:00) (130/81 - 149/94)  BP(mean): --  RR: 18 (17 Mar 2022 00:00) (18 - 18)  SpO2: 100% (16 Mar 2022 16:17) (100% - 100%)                                             --------------------------------------------------------------  LABS:                        10.0   7.99  )-----------( 411      ( 17 Mar 2022 06:07 )             33.3     03-17    136  |  97<L>  |  13  ----------------------------<  105<H>  4.8   |  26  |  1.0    Ca    9.0      17 Mar 2022 06:07  Mg     2.2     03-17    TPro  7.6  /  Alb  3.2<L>  /  TBili  0.2  /  DBili  x   /  AST  13  /  ALT  13  /  AlkPhos  99  03-17                Culture - Blood (collected 15 Mar 2022 07:10)  Source: .Blood Blood  Preliminary Report (16 Mar 2022 13:01):    No growth to date.    Culture - Blood (collected 15 Mar 2022 07:10)  Source: .Blood Blood-Venous  Preliminary Report (16 Mar 2022 13:01):    No growth to date.                                                    -------------------------------------------------------------  RADIOLOGY:                                            --------------------------------------------------------------    PHYSICAL EXAM:    General: NAD  LUNGS: clear air entry bilaterally, no wheezing or crackles  HEART: s1/s2, RRR  ABDOMEN: soft, ntnd, bs+  EXT: no LLE bilaterally, warm extremities  NEURO: AAOX4  SKIN: BL wrist wounds, otherwise intact, no rashes or lesions

## 2022-03-17 NOTE — PROGRESS NOTE ADULT - ASSESSMENT
50yo M w/ pmhx of IVDU (heroin), vertebral osteomyelitis, and MSSA bacteremia presents for worsening back pain. Admitted for further management of progressive vertebral osteomyelitis.     #progressive vertebral osteomyelitis   #Hx of MSSA bacteremia  - MRI T-L spine 1/18/22 confirmed osteomyelitis  - PAM 1/19: no endocarditis  - bcx 1/15: MSSA; first cleared bcx on 1/18   - received cefazolin via picc line ended 3/1  - ,   - No sepsis on admission   - CT L-T spine IV con: Since 1/15/2022 there has been significant progression of discitis osteomyelitis at L4-5 with multifocal erosive changes at the endplates and likely prevertebral phlegmon formation. Progression of L5-S1 facetitis, Facetitis in T10-T11.  - MRI Thoracic/Lumbar spine 3/14: similar increased signal and enhancement involving T10-T11 facet joint space from pervious degenerative changes vs facetitis. 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, given RUL opacities seen on CT -> f/u QuantiFeron  - HIV negative  - BCx 3/12-15: NGTD   - neurosurgery recs appreciated, no acute neurosurgical intervention at this time   - f/u IR/neuro IR re drainage of abscess/Bx of OM -> L4-L5 biopsy of suspected discitis done 3/16  - c/w cefazolin 2g q8h   - ID recs appreciated   - pulm eval -> Consider repeat CT in 6 weeks prone (CT no suggestive of UIP)      #BL distal forearm wounds- healing   - surgery saw patient last admission and recommended not removing R foreign body; not a contraindication to MRI   - burn saw patient last admission and rec local wound care  - 1.2cm linear foreign body overlying right radius  - not a contraindication for MRI   - wound care recs appreciated   - f/u o/p burn     # IVDU  # suspected folic acid deficiency  # anxiety  - on home methadone 135mg solution   - c/w xanax, welbutrin, seroquel   - SBIRT c/s appreciated      #Normocytic Anemia   - multifactorial, iron deficiency, anemia of chronic disease   - c/w folate and ferrous sulfate 325mg qd     #RUL opacities  - asymptomatic   - CT chest IV con: several reticulonodular opacities in the posterior segment of the right upper lobe possibly representing UIP  - f/u pulm o/p     #Misc  - DVT ppx: Lovenox  - GI ppx: none  - Diet: regular  - Code Status: full code   - Dispo: acute from home   50yo M w/ pmhx of IVDU (heroin), vertebral osteomyelitis, and MSSA bacteremia presents for worsening back pain. Admitted for further management of progressive vertebral osteomyelitis.     #progressive vertebral osteomyelitis   #Hx of MSSA bacteremia  - MRI T-L spine 1/18/22 confirmed osteomyelitis  - PAM 1/19: no endocarditis  - bcx 1/15: MSSA; first cleared bcx on 1/18   - received cefazolin via picc line ended 3/1  - ,   - No sepsis on admission   - CT L-T spine IV con: Since 1/15/2022 there has been significant progression of discitis osteomyelitis at L4-5 with multifocal erosive changes at the endplates and likely prevertebral phlegmon formation. Progression of L5-S1 facetitis, Facetitis in T10-T11.  - MRI Thoracic/Lumbar spine 3/14: similar increased signal and enhancement involving T10-T11 facet joint space from pervious degenerative changes vs facetitis. 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, given RUL opacities seen on CT -> f/u QuantiFeron  - HIV negative  - BCx 3/12-15: NGTD   - neurosurgery recs appreciated, no acute neurosurgical intervention at this time   - f/u IR/neuro IR re drainage of abscess/Bx of OM -> L4-L5 biopsy of suspected discitis done 3/16 -> f/u results  - c/w cefazolin 2g q8h   - ID recs appreciated   - pulm eval -> Consider repeat CT in 6 weeks prone (CT no suggestive of UIP)      #BL distal forearm wounds- healing   - surgery saw patient last admission and recommended not removing R foreign body; not a contraindication to MRI   - burn saw patient last admission and rec local wound care  - 1.2cm linear foreign body overlying right radius  - not a contraindication for MRI   - wound care recs appreciated   - f/u o/p burn     # IVDU  # suspected folic acid deficiency  # anxiety  - on home methadone 135mg solution   - c/w xanax, welbutrin, seroquel   - SBIRT c/s appreciated      #Normocytic Anemia   - multifactorial, iron deficiency, anemia of chronic disease   - c/w folate and ferrous sulfate 325mg qd     #RUL opacities  - asymptomatic   - CT chest IV con: several reticulonodular opacities in the posterior segment of the right upper lobe possibly representing UIP  - f/u pulm o/p     #Misc  - DVT ppx: Lovenox  - GI ppx: none  - Diet: regular  - Code Status: full code   - Dispo: acute from home

## 2022-03-18 LAB
ALBUMIN SERPL ELPH-MCNC: 3.6 G/DL — SIGNIFICANT CHANGE UP (ref 3.5–5.2)
ALP SERPL-CCNC: 118 U/L — HIGH (ref 30–115)
ALT FLD-CCNC: 13 U/L — SIGNIFICANT CHANGE UP (ref 0–41)
ANION GAP SERPL CALC-SCNC: 12 MMOL/L — SIGNIFICANT CHANGE UP (ref 7–14)
AST SERPL-CCNC: 16 U/L — SIGNIFICANT CHANGE UP (ref 0–41)
BASOPHILS # BLD AUTO: 0.04 K/UL — SIGNIFICANT CHANGE UP (ref 0–0.2)
BASOPHILS NFR BLD AUTO: 0.4 % — SIGNIFICANT CHANGE UP (ref 0–1)
BILIRUB SERPL-MCNC: 0.2 MG/DL — SIGNIFICANT CHANGE UP (ref 0.2–1.2)
BUN SERPL-MCNC: 19 MG/DL — SIGNIFICANT CHANGE UP (ref 10–20)
CALCIUM SERPL-MCNC: 9.3 MG/DL — SIGNIFICANT CHANGE UP (ref 8.5–10.1)
CHLORIDE SERPL-SCNC: 97 MMOL/L — LOW (ref 98–110)
CO2 SERPL-SCNC: 25 MMOL/L — SIGNIFICANT CHANGE UP (ref 17–32)
CREAT SERPL-MCNC: 1 MG/DL — SIGNIFICANT CHANGE UP (ref 0.7–1.5)
CULTURE RESULTS: SIGNIFICANT CHANGE UP
EGFR: 91 ML/MIN/1.73M2 — SIGNIFICANT CHANGE UP
EOSINOPHIL # BLD AUTO: 0.34 K/UL — SIGNIFICANT CHANGE UP (ref 0–0.7)
EOSINOPHIL NFR BLD AUTO: 3.5 % — SIGNIFICANT CHANGE UP (ref 0–8)
GAMMA INTERFERON BACKGROUND BLD IA-ACNC: 0.05 IU/ML — SIGNIFICANT CHANGE UP
GLUCOSE SERPL-MCNC: 114 MG/DL — HIGH (ref 70–99)
HCT VFR BLD CALC: 36 % — LOW (ref 42–52)
HGB BLD-MCNC: 10.7 G/DL — LOW (ref 14–18)
IMM GRANULOCYTES NFR BLD AUTO: 0.4 % — HIGH (ref 0.1–0.3)
LYMPHOCYTES # BLD AUTO: 1.71 K/UL — SIGNIFICANT CHANGE UP (ref 1.2–3.4)
LYMPHOCYTES # BLD AUTO: 17.5 % — LOW (ref 20.5–51.1)
M TB IFN-G BLD-IMP: NEGATIVE — SIGNIFICANT CHANGE UP
M TB IFN-G CD4+ BCKGRND COR BLD-ACNC: 0.01 IU/ML — SIGNIFICANT CHANGE UP
M TB IFN-G CD4+CD8+ BCKGRND COR BLD-ACNC: 0.01 IU/ML — SIGNIFICANT CHANGE UP
MAGNESIUM SERPL-MCNC: 2.3 MG/DL — SIGNIFICANT CHANGE UP (ref 1.8–2.4)
MCHC RBC-ENTMCNC: 25.1 PG — LOW (ref 27–31)
MCHC RBC-ENTMCNC: 29.7 G/DL — LOW (ref 32–37)
MCV RBC AUTO: 84.3 FL — SIGNIFICANT CHANGE UP (ref 80–94)
MONOCYTES # BLD AUTO: 0.69 K/UL — HIGH (ref 0.1–0.6)
MONOCYTES NFR BLD AUTO: 7.1 % — SIGNIFICANT CHANGE UP (ref 1.7–9.3)
NEUTROPHILS # BLD AUTO: 6.95 K/UL — HIGH (ref 1.4–6.5)
NEUTROPHILS NFR BLD AUTO: 71.1 % — SIGNIFICANT CHANGE UP (ref 42.2–75.2)
NRBC # BLD: 0 /100 WBCS — SIGNIFICANT CHANGE UP (ref 0–0)
PLATELET # BLD AUTO: 423 K/UL — HIGH (ref 130–400)
POTASSIUM SERPL-MCNC: 5 MMOL/L — SIGNIFICANT CHANGE UP (ref 3.5–5)
POTASSIUM SERPL-SCNC: 5 MMOL/L — SIGNIFICANT CHANGE UP (ref 3.5–5)
PROT SERPL-MCNC: 8.2 G/DL — HIGH (ref 6–8)
QUANT TB PLUS MITOGEN MINUS NIL: 7.58 IU/ML — SIGNIFICANT CHANGE UP
RBC # BLD: 4.27 M/UL — LOW (ref 4.7–6.1)
RBC # FLD: 16.7 % — HIGH (ref 11.5–14.5)
SODIUM SERPL-SCNC: 134 MMOL/L — LOW (ref 135–146)
SPECIMEN SOURCE: SIGNIFICANT CHANGE UP
WBC # BLD: 9.77 K/UL — SIGNIFICANT CHANGE UP (ref 4.8–10.8)
WBC # FLD AUTO: 9.77 K/UL — SIGNIFICANT CHANGE UP (ref 4.8–10.8)

## 2022-03-18 PROCEDURE — 99233 SBSQ HOSP IP/OBS HIGH 50: CPT

## 2022-03-18 RX ORDER — CALCIUM CARBONATE 500(1250)
1 TABLET ORAL ONCE
Refills: 0 | Status: COMPLETED | OUTPATIENT
Start: 2022-03-18 | End: 2022-03-18

## 2022-03-18 RX ORDER — ONDANSETRON 8 MG/1
4 TABLET, FILM COATED ORAL EVERY 4 HOURS
Refills: 0 | Status: DISCONTINUED | OUTPATIENT
Start: 2022-03-18 | End: 2022-04-06

## 2022-03-18 RX ORDER — KETOROLAC TROMETHAMINE 30 MG/ML
15 SYRINGE (ML) INJECTION ONCE
Refills: 0 | Status: DISCONTINUED | OUTPATIENT
Start: 2022-03-18 | End: 2022-03-18

## 2022-03-18 RX ADMIN — Medication 2 MILLIGRAM(S): at 13:00

## 2022-03-18 RX ADMIN — METHADONE HYDROCHLORIDE 135 MILLIGRAM(S): 40 TABLET ORAL at 11:09

## 2022-03-18 RX ADMIN — Medication 100 MILLIGRAM(S): at 13:01

## 2022-03-18 RX ADMIN — Medication 15 MILLIGRAM(S): at 15:46

## 2022-03-18 RX ADMIN — BUPROPION HYDROCHLORIDE 300 MILLIGRAM(S): 150 TABLET, EXTENDED RELEASE ORAL at 11:03

## 2022-03-18 RX ADMIN — Medication 100 MILLIGRAM(S): at 06:11

## 2022-03-18 RX ADMIN — ENOXAPARIN SODIUM 40 MILLIGRAM(S): 100 INJECTION SUBCUTANEOUS at 22:40

## 2022-03-18 RX ADMIN — CYCLOBENZAPRINE HYDROCHLORIDE 10 MILLIGRAM(S): 10 TABLET, FILM COATED ORAL at 06:11

## 2022-03-18 RX ADMIN — Medication 325 MILLIGRAM(S): at 11:02

## 2022-03-18 RX ADMIN — QUETIAPINE FUMARATE 200 MILLIGRAM(S): 200 TABLET, FILM COATED ORAL at 21:46

## 2022-03-18 RX ADMIN — Medication 1 MILLIGRAM(S): at 11:02

## 2022-03-18 RX ADMIN — LIDOCAINE 1 PATCH: 4 CREAM TOPICAL at 11:04

## 2022-03-18 RX ADMIN — Medication 2 MILLIGRAM(S): at 06:10

## 2022-03-18 RX ADMIN — Medication 600 MILLIGRAM(S): at 17:10

## 2022-03-18 RX ADMIN — QUETIAPINE FUMARATE 200 MILLIGRAM(S): 200 TABLET, FILM COATED ORAL at 06:10

## 2022-03-18 RX ADMIN — MIRTAZAPINE 60 MILLIGRAM(S): 45 TABLET, ORALLY DISINTEGRATING ORAL at 21:46

## 2022-03-18 RX ADMIN — Medication 2 MILLIGRAM(S): at 21:47

## 2022-03-18 RX ADMIN — GABAPENTIN 300 MILLIGRAM(S): 400 CAPSULE ORAL at 21:46

## 2022-03-18 RX ADMIN — Medication 1 PATCH: at 11:04

## 2022-03-18 RX ADMIN — QUETIAPINE FUMARATE 200 MILLIGRAM(S): 200 TABLET, FILM COATED ORAL at 13:01

## 2022-03-18 RX ADMIN — Medication 600 MILLIGRAM(S): at 17:12

## 2022-03-18 RX ADMIN — Medication 1 TABLET(S): at 17:01

## 2022-03-18 RX ADMIN — CYCLOBENZAPRINE HYDROCHLORIDE 10 MILLIGRAM(S): 10 TABLET, FILM COATED ORAL at 13:01

## 2022-03-18 RX ADMIN — Medication 1 TABLET(S): at 11:02

## 2022-03-18 RX ADMIN — GABAPENTIN 300 MILLIGRAM(S): 400 CAPSULE ORAL at 13:00

## 2022-03-18 NOTE — PROGRESS NOTE ADULT - SUBJECTIVE AND OBJECTIVE BOX
MIKAEL MCDERMOTT 51y Male  MRN#: [id number]     Hospital Day: 6d    Pt is currently admitted with the primary diagnosis of vertebral OM    SUBJECTIVE    No Overnight events     No Subjective complaints     patient is seen and examined    Present Today:   - Pablito:  No                                             ----------------------------------------------------------  OBJECTIVE  PAST MEDICAL & SURGICAL HISTORY  IV drug abuse    Vertebral osteomyelitis    MSSA bacteremia    No significant past surgical history                                              -----------------------------------------------------------  ALLERGIES:  No Known Allergies                                            ------------------------------------------------------------    HOME MEDICATIONS  Home Medications:  acetaminophen 325 mg oral tablet: 2 tab(s) orally every 6 hours (12 Mar 2022 21:41)  cyclobenzaprine 10 mg oral tablet: 1 tab(s) orally every 8 hours (12 Mar 2022 21:41)  ferrous sulfate 325 mg (65 mg elemental iron) oral tablet: 1 tab(s) orally once a day (12 Mar 2022 21:41)  folic acid 1 mg oral tablet: 1 tab(s) orally once a day (12 Mar 2022 21:41)  gabapentin 300 mg oral capsule: 1 cap(s) orally 3 times a day (12 Mar 2022 21:41)  methadone: 135 milligram(s) orally once a day (12 Mar 2022 21:41)  Multiple Vitamins oral tablet: 1 tab(s) orally once a day (12 Mar 2022 21:41)  nicotine 21 mg/24 hr transdermal film, extended release: 1 patch transdermal once a day (12 Mar 2022 21:41)  Remeron 30 mg oral tablet: 2 tab(s) orally once a day (at bedtime) (12 Mar 2022 21:41)  SEROquel 200 mg oral tablet: orally 3 times a day (12 Mar 2022 21:41)  Wellbutrin: 300 milligram(s) orally once a day (12 Mar 2022 21:41)  Xanax: 2 milligram(s) orally 3 times a day (12 Mar 2022 21:41)                           MEDICATIONS:  STANDING MEDICATIONS  ALPRAZolam 2 milliGRAM(s) Oral three times a day  buPROPion XL (24-Hour) . 300 milliGRAM(s) Oral daily  ceFAZolin   IVPB 2000 milliGRAM(s) IV Intermittent every 8 hours  chlorhexidine 4% Liquid 1 Application(s) Topical <User Schedule>  enoxaparin Injectable 40 milliGRAM(s) SubCutaneous every 24 hours  ferrous    sulfate 325 milliGRAM(s) Oral daily  folic acid 1 milliGRAM(s) Oral daily  gabapentin 300 milliGRAM(s) Oral three times a day  influenza   Vaccine 0.5 milliLiter(s) IntraMuscular once  lidocaine   4% Patch 1 Patch Transdermal daily  methadone    Tablet 135 milliGRAM(s) Oral daily  mirtazapine 60 milliGRAM(s) Oral at bedtime  multivitamin 1 Tablet(s) Oral daily  nicotine - 21 mG/24Hr(s) Patch 1 patch Transdermal daily  QUEtiapine 200 milliGRAM(s) Oral three times a day    PRN MEDICATIONS  acetaminophen     Tablet .. 650 milliGRAM(s) Oral every 6 hours PRN  cyclobenzaprine 10 milliGRAM(s) Oral three times a day PRN  ibuprofen  Tablet. 600 milliGRAM(s) Oral every 8 hours PRN  sodium chloride 3%  Inhalation 30 milliLiter(s) Inhalation once PRN                                            ------------------------------------------------------------  VITAL SIGNS: Last 24 Hours  T(C): 36.8 (18 Mar 2022 03:31), Max: 36.8 (18 Mar 2022 03:31)  T(F): 98.3 (18 Mar 2022 03:31), Max: 98.3 (18 Mar 2022 03:31)  HR: 97 (18 Mar 2022 03:31) (91 - 97)  BP: 105/67 (18 Mar 2022 03:31) (91/70 - 105/67)  BP(mean): --  RR: 18 (18 Mar 2022 03:31) (18 - 18)  SpO2: 93% (17 Mar 2022 09:04) (93% - 93%)                                             --------------------------------------------------------------  LABS:                        10.7   9.77  )-----------( 423      ( 18 Mar 2022 05:47 )             36.0     03-18    134<L>  |  97<L>  |  19  ----------------------------<  114<H>  5.0   |  25  |  1.0    Ca    9.3      18 Mar 2022 05:47  Mg     2.3     03-18    TPro  8.2<H>  /  Alb  3.6  /  TBili  0.2  /  DBili  x   /  AST  16  /  ALT  13  /  AlkPhos  118<H>  03-18                Culture - Fungal, Other (collected 16 Mar 2022 18:51)  Source: .Other abscess  Preliminary Report (17 Mar 2022 12:26):    Testing in progress    Culture - Acid Fast - Other w/Smear (collected 16 Mar 2022 18:51)  Source: .Other Other, psoas abscess                                                    -------------------------------------------------------------  RADIOLOGY:                                            --------------------------------------------------------------    PHYSICAL EXAM:    General: NAD  LUNGS: clear air entry bilaterally, no wheezing or crackles  HEART: s1/s2, RRR  ABDOMEN: soft, ntnd, bs+  EXT: no LLE bilaterally, warm extremities  NEURO: AAOX4  SKIN: BL wrist wounds, otherwise intact, no rashes or lesions

## 2022-03-18 NOTE — PROGRESS NOTE ADULT - ATTENDING COMMENTS
50yo M w/ pmhx of IVDU (heroin), vertebral osteomyelitis, and MSSA bacteremia presents for worsening back pain    Spinal OM, with epidural abscess/phlegmon---Recently completed 6wks of antibiotics which ended on 3/1.  MR Lumbar Spine 03/18 significant for  left L5-S1 facetitis with associated multifocal  abscess formation directly adjacent to the left facet joint, Ventral epidural phlegmon overlying the L5 vertebral body causing significant compression of the descending nerve roots.  ID noted-chest findings and involvement of facet joints, would rule out TB with AFB sputum x 1 negative so far , quantiferon gold is negative , repeat HIV screen negative & pulmonary evaluation suggested no new intervention  Airbron precautions until ruled out for TB  cefazolin 2g q 8 hours for now--  Follow-up blood cx are negative  will follow results of IR biopsy done 3/16---staph aureus-- continue cefazolin for now for 4 more weeks    Opioid abuse on Methadone  RUL Nodular Opacities  Recent history & post treatment for MSSA bacteremia  Hx of IVDU  Active nicotine dependence  morbid obesity    monitor for clinical recovery              IR consult for possible aspiration & cx-- IR biopsy 3/16  Continue methadone 135, and gabapentin  Neuro sx following- no current intervention   Chronic b/l Wrist ulceration - healing  Handoff: Pending IR for procedure, AFB to rule out TB . 50yo M w/ pmhx of IVDU (heroin), vertebral osteomyelitis, and MSSA bacteremia presents for worsening back pain    Spinal OM, with epidural abscess/phlegmon---Recently completed 6wks of antibiotics which ended on 3/1.  MR Lumbar Spine 03/18 significant for  left L5-S1 facetitis with associated multifocal  abscess formation directly adjacent to the left facet joint, Ventral epidural phlegmon overlying the L5 vertebral body causing significant compression of the descending nerve roots.  ID noted-chest findings and involvement of facet joints, would rule out TB with AFB sputum x 1 negative so far , quantiferon gold is negative , repeat HIV screen negative & pulmonary evaluation suggested no new intervention  Airbron precautions until ruled out for TB  cefazolin 2g q 8 hours for now--  Follow-up blood cx are negative  will follow results of IR biopsy done 3/16---staph aureus-- continue cefazolin for now for 4 more weeks    Opioid abuse on Methadone  RUL Nodular Opacities  Recent history & post treatment for MSSA bacteremia  Hx of IVDU  Active nicotine dependence  morbid obesity    monitor for clinical recovery

## 2022-03-18 NOTE — PROGRESS NOTE ADULT - TIME BILLING
I have personally seen and examined this patient.    I have reviewed all pertinent clinical information and reviewed all relevant imaging and diagnostic studies personally.   I counseled the patient about diagnostic testing and treatment plan. All questions were answered.   I discussed recommendations with the primary team.

## 2022-03-18 NOTE — PROGRESS NOTE ADULT - SUBJECTIVE AND OBJECTIVE BOX
MIKAEL MCDERMOTT  51y, Male  Allergy: No Known Allergies      LOS  6d    CHIEF COMPLAINT: vertebral osteomyelitis (18 Mar 2022 07:59)      INTERVAL EVENTS/HPI  - No acute events overnight  - T(F): , Max: 98.3 (03-18-22 @ 03:31)  - Denies any worsening symptoms  - Tolerating medication  - WBC Count: 9.77 (03-18-22 @ 05:47)  WBC Count: 7.99 (03-17-22 @ 06:07)     - Creatinine, Serum: 1.0 (03-18-22 @ 05:47)  Creatinine, Serum: 1.0 (03-17-22 @ 06:07)       ROS  General: Denies rigors, nightsweats  HEENT: Denies headache, rhinorrhea, sore throat, eye pain  CV: Denies CP, palpitations  PULM: Denies wheezing, hemoptysis  GI: Denies hematemesis, hematochezia, melena  : Denies discharge, hematuria  MSK: Denies arthralgias, myalgias  SKIN: Denies rash, lesions  NEURO: Denies paresthesias, weakness  PSYCH: Denies depression, anxiety    VITALS:  T(F): 97.7, Max: 98.3 (03-18-22 @ 03:31)  HR: 100  BP: 118/72  RR: 19Vital Signs Last 24 Hrs  T(C): 36.5 (18 Mar 2022 08:32), Max: 36.8 (18 Mar 2022 03:31)  T(F): 97.7 (18 Mar 2022 08:32), Max: 98.3 (18 Mar 2022 03:31)  HR: 100 (18 Mar 2022 08:32) (97 - 100)  BP: 118/72 (18 Mar 2022 08:32) (105/67 - 118/72)  BP(mean): --  RR: 19 (18 Mar 2022 08:32) (18 - 19)  SpO2: --    PHYSICAL EXAM:  Gen: NAD, resting in bed  HEENT: Normocephalic, atraumatic  Neck: supple, no lymphadenopathy  CV: Regular rate & regular rhythm  Lungs: decreased BS at bases, no fremitus  Abdomen: Soft, BS present  Ext: Warm, well perfused  Neuro: non focal, awake  Skin: no rash, no erythema  Lines: no phlebitis    FH: Non-contributory  Social Hx: Non-contributory    TESTS & MEASUREMENTS:                        10.7   9.77  )-----------( 423      ( 18 Mar 2022 05:47 )             36.0     03-18    134<L>  |  97<L>  |  19  ----------------------------<  114<H>  5.0   |  25  |  1.0    Ca    9.3      18 Mar 2022 05:47  Mg     2.3     03-18    TPro  8.2<H>  /  Alb  3.6  /  TBili  0.2  /  DBili  x   /  AST  16  /  ALT  13  /  AlkPhos  118<H>  03-18      LIVER FUNCTIONS - ( 18 Mar 2022 05:47 )  Alb: 3.6 g/dL / Pro: 8.2 g/dL / ALK PHOS: 118 U/L / ALT: 13 U/L / AST: 16 U/L / GGT: x               Culture - Fungal, Other (collected 03-16-22 @ 18:51)  Source: .Other abscess  Preliminary Report (03-17-22 @ 12:26):    Testing in progress    Culture - Abscess with Gram Stain (collected 03-16-22 @ 18:51)  Source: .Abscess Arm - Left  Preliminary Report (03-18-22 @ 10:41):    Few Staphylococcus aureus    Culture - Acid Fast - Other w/Smear (collected 03-16-22 @ 18:51)  Source: .Other Other, psoas abscess    Culture - Blood (collected 03-15-22 @ 07:10)  Source: .Blood Blood  Preliminary Report (03-16-22 @ 13:01):    No growth to date.    Culture - Blood (collected 03-15-22 @ 07:10)  Source: .Blood Blood-Venous  Preliminary Report (03-16-22 @ 13:01):    No growth to date.    Culture - Blood (collected 03-13-22 @ 06:42)  Source: .Blood None  Final Report (03-18-22 @ 13:00):    No Growth Final    Culture - Blood (collected 03-12-22 @ 12:13)  Source: .Blood Blood-Peripheral  Final Report (03-17-22 @ 21:00):    No Growth Final    Culture - Blood (collected 03-12-22 @ 12:13)  Source: .Blood Blood-Peripheral  Final Report (03-17-22 @ 21:00):    No Growth Final            INFECTIOUS DISEASES TESTING  HIV-1/2 Combo Result: Nonreact (03-16-22 @ 12:10)  COVID-19 PCR: NotDetec (03-12-22 @ 20:19)  COVID-19 PCR: NotDetec (01-31-22 @ 09:10)  COVID-19 PCR: NotDetec (01-25-22 @ 11:21)  COVID-19 PCR: NotDetec (01-18-22 @ 18:45)  HIV-1/2 Combo Result: Nonreact (01-18-22 @ 04:30)  Procalcitonin, Serum: 0.13 (01-16-22 @ 16:00)  COVID-19 PCR: NotDetec (01-15-22 @ 23:32)      INFLAMMATORY MARKERS  C-Reactive Protein, Serum: 140 mg/L (03-13-22 @ 06:42)  Sedimentation Rate, Erythrocyte: 127 mm/Hr (03-12-22 @ 12:12)  Sedimentation Rate, Erythrocyte: 131 mm/Hr (01-16-22 @ 18:19)  C-Reactive Protein, Serum: 141 mg/L (01-16-22 @ 16:00)      RADIOLOGY & ADDITIONAL TESTS:  I have personally reviewed the last available Chest xray  CXR      CT      CARDIOLOGY TESTING  12 Lead ECG:   Ventricular Rate 82 BPM    Atrial Rate 82 BPM    P-R Interval 138 ms    QRS Duration 112 ms    Q-T Interval 402 ms    QTC Calculation(Bazett) 469 ms    P Axis 20 degrees    R Axis 1 degrees    T Axis -10 degrees    Diagnosis Line Normal sinus rhythm  Moderate voltage criteria for LVH, may be normal variant  Inferior infarct , age undetermined  Abnormal ECG    Confirmed by JOVANI FANG, [facility] ([id number]) on 3/13/2022 10:55:08 PM (03-13-22 @ 08:09)      MEDICATIONS  ALPRAZolam 2 Oral three times a day  buPROPion XL (24-Hour) . 300 Oral daily  ceFAZolin   IVPB 2000 IV Intermittent every 8 hours  chlorhexidine 4% Liquid 1 Topical <User Schedule>  enoxaparin Injectable 40 SubCutaneous every 24 hours  ferrous    sulfate 325 Oral daily  folic acid 1 Oral daily  gabapentin 300 Oral three times a day  lidocaine   4% Patch 1 Transdermal daily  methadone    Tablet 135 Oral daily  mirtazapine 60 Oral at bedtime  multivitamin 1 Oral daily  nicotine - 21 mG/24Hr(s) Patch 1 Transdermal daily  QUEtiapine 200 Oral three times a day      WEIGHT  Weight (kg): 121.5 (03-16-22 @ 11:09)  Creatinine, Serum: 1.0 mg/dL (03-18-22 @ 05:47)      ANTIBIOTICS:  ceFAZolin   IVPB 2000 milliGRAM(s) IV Intermittent every 8 hours      All available historical records have been reviewed

## 2022-03-18 NOTE — PROGRESS NOTE ADULT - ASSESSMENT
ASSESSMENT  50yo M w/ pmhx of IVDU (heroin), vertebral osteomyelitis, and MSSA bacteremia presents for worsening back pain    IMPRESSION  #Spinal OM, with epidural abscess/phlegmon  - MR Lumbar Spine w/wo IV Cont (01.18.22 @ 16:33): Minimally increased signal and enhancement involving the left T10-T11  facet joint space which represent early facet joint  infection/inflammation however could also be related to degenerative  changes.LUMBAR SPINE: Findings consistent with left L5-S1 facetitis with associated multifocal  abscess formation directly adjacent to the left facet joint, within the left retroperitoneal soft tissues as well as the right psoas musculature. Ventral epidural phlegmon overlying the L5 vertebral body causing significant compression of the descending nerve roots.  - completed 6 week course of IV cefazolin (end date 3/1)  - CT Lumbar Spine w/ IV Cont (03.12.22 @ 14:33): Since 1/15/2022 there has been significant progression ofdiscitis  osteomyelitis at L4-5 with multifocal erosive changes at the endplates  and likely prevertebral phlegmon formation. Additionally there is  progression of L5-S1 facetitis with significant erosive changes of the  joint. Progressive erosive changes are noted involving the left T10-T11 facet joint consistent with facetitis.  - MR Lumbar Spine w/wo IV Cont (03.14.22 @ 20:23): Since prior examination there has been progression of osteomyelitis  involving the L4 and L5 vertebral bodies as well as severe facetitis   involving the left L5-S1 facet joint. 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 Extensive epidural phlegmon which is a slightly decreased in overall  extent extent since prior MRI predominantly involving the ventral epidural space extending into the L4-5 disc space causing severe spinal stenosis and mass effectupon the descending nerve roots though slightly improved. Similar-appearing right psoas abscess as well as left retroperitoneal abscess extending to the left L5-S1 facet joint which is slightly increased in size since the prior examination  - s/p IR guided drainage of L4-L5 disc space 3/16 -- wound Cx Staph aureus     #RUL Nodular Opacities  - CT Chest No Cont (03.12.22 @ 17:13): here is several reticulonodular opacities in the posterior  segment of the right upper lobe which appears new from 1/15/2022. There  are reticulonodular subpleural opacities at the right and left bases  which appears increased from 1/15/2022. These findings may represent  interstitial pneumonia perhaps usual interstitial pneumonia (UIP). Pulmonary    - Sedimentation Rate, Erythrocyte: 131 mm/Hr (01.16.22 @ 18:19) --> Sedimentation Rate, Erythrocyte: 127 mm/Hr (03.12.22 @ 12:12)  - C-Reactive Protein, Serum: 141 mg/L (01.16.22 @ 16:00)    #MSSA Bacteremia  - BLood Cx 1/15-17 +  - Blood Cx 1/18 NG  - PAM 1/19 - negative for vegetation    #IVDA  - HIV negative  - Hep C weakly reactive    #Opioid abuse on Methadone  #Bilateral Wrist wounds - s/p Burn evaluation 1/16    #Abx allergy: NKDA      RECOMMENDATIONS  - follow-up IR guided Cx -- growign Staph aureus -- follow-up susceptibilities   - as Staph growing, can stop collection of AFB sputum as low suspicion for TB   - continue cefazolin 2g q 8 hours -- pending Cx, will likely plan at least another 4 week course, but will need repeat imaging prior to antibiotics to guide course   --     This is a preliminary incomplete pended note, all final recommendations to follow after interview and examination of the patient.      Please call or message on [employer] Teams if with any questions.  Spectra [phone]     ASSESSMENT  52yo M w/ pmhx of IVDU (heroin), vertebral osteomyelitis, and MSSA bacteremia presents for worsening back pain    IMPRESSION  #Spinal OM, with epidural abscess/phlegmon  - MR Lumbar Spine w/wo IV Cont (01.18.22 @ 16:33): Minimally increased signal and enhancement involving the left T10-T11  facet joint space which represent early facet joint  infection/inflammation however could also be related to degenerative  changes.LUMBAR SPINE: Findings consistent with left L5-S1 facetitis with associated multifocal  abscess formation directly adjacent to the left facet joint, within the left retroperitoneal soft tissues as well as the right psoas musculature. Ventral epidural phlegmon overlying the L5 vertebral body causing significant compression of the descending nerve roots.  - completed 6 week course of IV cefazolin (end date 3/1)  - CT Lumbar Spine w/ IV Cont (03.12.22 @ 14:33): Since 1/15/2022 there has been significant progression ofdiscitis  osteomyelitis at L4-5 with multifocal erosive changes at the endplates  and likely prevertebral phlegmon formation. Additionally there is  progression of L5-S1 facetitis with significant erosive changes of the  joint. Progressive erosive changes are noted involving the left T10-T11 facet joint consistent with facetitis.  - MR Lumbar Spine w/wo IV Cont (03.14.22 @ 20:23): Since prior examination there has been progression of osteomyelitis  involving the L4 and L5 vertebral bodies as well as severe facetitis   involving the left L5-S1 facet joint. 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 Extensive epidural phlegmon which is a slightly decreased in overall  extent extent since prior MRI predominantly involving the ventral epidural space extending into the L4-5 disc space causing severe spinal stenosis and mass effectupon the descending nerve roots though slightly improved. Similar-appearing right psoas abscess as well as left retroperitoneal abscess extending to the left L5-S1 facet joint which is slightly increased in size since the prior examination  - s/p IR guided drainage of L4-L5 disc space 3/16 -- wound Cx Staph aureus     #RUL Nodular Opacities  - CT Chest No Cont (03.12.22 @ 17:13): here is several reticulonodular opacities in the posterior  segment of the right upper lobe which appears new from 1/15/2022. There  are reticulonodular subpleural opacities at the right and left bases  which appears increased from 1/15/2022. These findings may represent  interstitial pneumonia perhaps usual interstitial pneumonia (UIP). Pulmonary    - Sedimentation Rate, Erythrocyte: 131 mm/Hr (01.16.22 @ 18:19) --> Sedimentation Rate, Erythrocyte: 127 mm/Hr (03.12.22 @ 12:12)  - C-Reactive Protein, Serum: 141 mg/L (01.16.22 @ 16:00)    #MSSA Bacteremia  - BLood Cx 1/15-17 +  - Blood Cx 1/18 NG  - PAM 1/19 - negative for vegetation    #IVDA  - HIV negative  - Hep C weakly reactive    #Opioid abuse on Methadone  #Bilateral Wrist wounds - s/p Burn evaluation 1/16    #Abx allergy: NKDA      RECOMMENDATIONS  - follow-up IR guided Cx -- growign Staph aureus -- follow-up susceptibilities to confirm MSSA  - as Staph growing, can stop collection of AFB sputum as low suspicion for TB   - continue cefazolin 2g q 8 hours -- will likely plan at least another 4 week course, but will need repeat imaging prior to stopping antibiotics to guide course   -- plan for PICC line in case longer than 4 weeks is needed     Please call or message on [employer] Teams if with any questions.  Spectra [phone]

## 2022-03-18 NOTE — PROGRESS NOTE ADULT - ASSESSMENT
50yo M w/ pmhx of IVDU (heroin), vertebral osteomyelitis, and MSSA bacteremia presents for worsening back pain. Admitted for further management of progressive vertebral osteomyelitis.     #progressive vertebral osteomyelitis   #Hx of MSSA bacteremia  - MRI T-L spine 1/18/22 confirmed osteomyelitis  - PAM 1/19: no endocarditis  - bcx 1/15: MSSA; first cleared bcx on 1/18   - received cefazolin via picc line ended 3/1  - ,   - No sepsis on admission   - CT L-T spine IV con: Since 1/15/2022 there has been significant progression of discitis osteomyelitis at L4-5 with multifocal erosive changes at the endplates and likely prevertebral phlegmon formation. Progression of L5-S1 facetitis, Facetitis in T10-T11.  - MRI Thoracic/Lumbar spine 3/14: similar increased signal and enhancement involving T10-T11 facet joint space from pervious degenerative changes vs facetitis. 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, given RUL opacities seen on CT -> f/u QuantiFeron  - HIV negative  - BCx 3/12-15: NGTD   - neurosurgery recs appreciated, no acute neurosurgical intervention at this time   - f/u IR/neuro IR re drainage of abscess/Bx of OM -> L4-L5 biopsy of suspected discitis done 3/16 -> fluid sent for gram stain, AFB, culture, pathology -> f/u results  - c/w cefazolin 2g q8h   - ID recs appreciated -> will likely plan at least another 4 week course   - pulm eval -> Consider repeat CT in 6 weeks prone (CT not suggestive of UIP)      #BL distal forearm wounds- healing   - surgery saw patient last admission and recommended not removing R foreign body; not a contraindication to MRI   - burn saw patient last admission and rec local wound care  - 1.2cm linear foreign body overlying right radius  - not a contraindication for MRI   - wound care recs appreciated   - f/u o/p burn     # IVDU  # suspected folic acid deficiency  # anxiety  - on home methadone 135mg solution   - c/w xanax, welbutrin, seroquel   - SBIRT c/s appreciated      #Normocytic Anemia   - multifactorial, iron deficiency, anemia of chronic disease   - c/w folate and ferrous sulfate 325mg qd     #RUL opacities  - asymptomatic   - CT chest IV con: several reticulonodular opacities in the posterior segment of the right upper lobe possibly representing UIP  - f/u pulm o/p     #Misc  - DVT ppx: Lovenox  - GI ppx: none  - Diet: regular  - Code Status: full code   - Dispo: acute from home

## 2022-03-19 LAB
-  AMPICILLIN/SULBACTAM: SIGNIFICANT CHANGE UP
-  CEFAZOLIN: SIGNIFICANT CHANGE UP
-  CLINDAMYCIN: SIGNIFICANT CHANGE UP
-  ERYTHROMYCIN: SIGNIFICANT CHANGE UP
-  GENTAMICIN: SIGNIFICANT CHANGE UP
-  OXACILLIN: SIGNIFICANT CHANGE UP
-  PENICILLIN: SIGNIFICANT CHANGE UP
-  RIFAMPIN: SIGNIFICANT CHANGE UP
-  TETRACYCLINE: SIGNIFICANT CHANGE UP
-  TRIMETHOPRIM/SULFAMETHOXAZOLE: SIGNIFICANT CHANGE UP
-  VANCOMYCIN: SIGNIFICANT CHANGE UP
ALBUMIN SERPL ELPH-MCNC: 3.6 G/DL — SIGNIFICANT CHANGE UP (ref 3.5–5.2)
ALP SERPL-CCNC: 115 U/L — SIGNIFICANT CHANGE UP (ref 30–115)
ALT FLD-CCNC: 14 U/L — SIGNIFICANT CHANGE UP (ref 0–41)
ANION GAP SERPL CALC-SCNC: 16 MMOL/L — HIGH (ref 7–14)
AST SERPL-CCNC: 14 U/L — SIGNIFICANT CHANGE UP (ref 0–41)
BASOPHILS # BLD AUTO: 0.01 K/UL — SIGNIFICANT CHANGE UP (ref 0–0.2)
BASOPHILS NFR BLD AUTO: 0.2 % — SIGNIFICANT CHANGE UP (ref 0–1)
BILIRUB SERPL-MCNC: <0.2 MG/DL — SIGNIFICANT CHANGE UP (ref 0.2–1.2)
BUN SERPL-MCNC: 20 MG/DL — SIGNIFICANT CHANGE UP (ref 10–20)
CALCIUM SERPL-MCNC: 9.4 MG/DL — SIGNIFICANT CHANGE UP (ref 8.5–10.1)
CHLORIDE SERPL-SCNC: 98 MMOL/L — SIGNIFICANT CHANGE UP (ref 98–110)
CO2 SERPL-SCNC: 23 MMOL/L — SIGNIFICANT CHANGE UP (ref 17–32)
CREAT SERPL-MCNC: 0.8 MG/DL — SIGNIFICANT CHANGE UP (ref 0.7–1.5)
EGFR: 106 ML/MIN/1.73M2 — SIGNIFICANT CHANGE UP
EOSINOPHIL # BLD AUTO: 0.18 K/UL — SIGNIFICANT CHANGE UP (ref 0–0.7)
EOSINOPHIL NFR BLD AUTO: 3.1 % — SIGNIFICANT CHANGE UP (ref 0–8)
GLUCOSE SERPL-MCNC: 178 MG/DL — HIGH (ref 70–99)
HCT VFR BLD CALC: 35.9 % — LOW (ref 42–52)
HGB BLD-MCNC: 10.4 G/DL — LOW (ref 14–18)
IMM GRANULOCYTES NFR BLD AUTO: 0.3 % — SIGNIFICANT CHANGE UP (ref 0.1–0.3)
LYMPHOCYTES # BLD AUTO: 1.72 K/UL — SIGNIFICANT CHANGE UP (ref 1.2–3.4)
LYMPHOCYTES # BLD AUTO: 29.9 % — SIGNIFICANT CHANGE UP (ref 20.5–51.1)
MAGNESIUM SERPL-MCNC: 2.3 MG/DL — SIGNIFICANT CHANGE UP (ref 1.8–2.4)
MCHC RBC-ENTMCNC: 25 PG — LOW (ref 27–31)
MCHC RBC-ENTMCNC: 29 G/DL — LOW (ref 32–37)
MCV RBC AUTO: 86.3 FL — SIGNIFICANT CHANGE UP (ref 80–94)
METHOD TYPE: SIGNIFICANT CHANGE UP
MONOCYTES # BLD AUTO: 0.51 K/UL — SIGNIFICANT CHANGE UP (ref 0.1–0.6)
MONOCYTES NFR BLD AUTO: 8.9 % — SIGNIFICANT CHANGE UP (ref 1.7–9.3)
NEUTROPHILS # BLD AUTO: 3.31 K/UL — SIGNIFICANT CHANGE UP (ref 1.4–6.5)
NEUTROPHILS NFR BLD AUTO: 57.6 % — SIGNIFICANT CHANGE UP (ref 42.2–75.2)
NRBC # BLD: 0 /100 WBCS — SIGNIFICANT CHANGE UP (ref 0–0)
PLATELET # BLD AUTO: 428 K/UL — HIGH (ref 130–400)
POTASSIUM SERPL-MCNC: 4.3 MMOL/L — SIGNIFICANT CHANGE UP (ref 3.5–5)
POTASSIUM SERPL-SCNC: 4.3 MMOL/L — SIGNIFICANT CHANGE UP (ref 3.5–5)
PROT SERPL-MCNC: 8.2 G/DL — HIGH (ref 6–8)
RBC # BLD: 4.16 M/UL — LOW (ref 4.7–6.1)
RBC # FLD: 16.3 % — HIGH (ref 11.5–14.5)
SODIUM SERPL-SCNC: 137 MMOL/L — SIGNIFICANT CHANGE UP (ref 135–146)
WBC # BLD: 5.75 K/UL — SIGNIFICANT CHANGE UP (ref 4.8–10.8)
WBC # FLD AUTO: 5.75 K/UL — SIGNIFICANT CHANGE UP (ref 4.8–10.8)

## 2022-03-19 PROCEDURE — 99232 SBSQ HOSP IP/OBS MODERATE 35: CPT

## 2022-03-19 RX ORDER — KETOROLAC TROMETHAMINE 30 MG/ML
15 SYRINGE (ML) INJECTION ONCE
Refills: 0 | Status: DISCONTINUED | OUTPATIENT
Start: 2022-03-19 | End: 2022-03-19

## 2022-03-19 RX ADMIN — BUPROPION HYDROCHLORIDE 300 MILLIGRAM(S): 150 TABLET, EXTENDED RELEASE ORAL at 11:24

## 2022-03-19 RX ADMIN — LIDOCAINE 1 PATCH: 4 CREAM TOPICAL at 11:23

## 2022-03-19 RX ADMIN — CHLORHEXIDINE GLUCONATE 1 APPLICATION(S): 213 SOLUTION TOPICAL at 06:19

## 2022-03-19 RX ADMIN — METHADONE HYDROCHLORIDE 135 MILLIGRAM(S): 40 TABLET ORAL at 11:29

## 2022-03-19 RX ADMIN — Medication 2 MILLIGRAM(S): at 13:10

## 2022-03-19 RX ADMIN — Medication 2 MILLIGRAM(S): at 05:14

## 2022-03-19 RX ADMIN — GABAPENTIN 300 MILLIGRAM(S): 400 CAPSULE ORAL at 21:37

## 2022-03-19 RX ADMIN — Medication 100 MILLIGRAM(S): at 13:16

## 2022-03-19 RX ADMIN — Medication 1 PATCH: at 11:23

## 2022-03-19 RX ADMIN — Medication 1 TABLET(S): at 11:24

## 2022-03-19 RX ADMIN — Medication 600 MILLIGRAM(S): at 13:29

## 2022-03-19 RX ADMIN — CYCLOBENZAPRINE HYDROCHLORIDE 10 MILLIGRAM(S): 10 TABLET, FILM COATED ORAL at 21:37

## 2022-03-19 RX ADMIN — Medication 15 MILLIGRAM(S): at 22:00

## 2022-03-19 RX ADMIN — CYCLOBENZAPRINE HYDROCHLORIDE 10 MILLIGRAM(S): 10 TABLET, FILM COATED ORAL at 13:10

## 2022-03-19 RX ADMIN — Medication 600 MILLIGRAM(S): at 13:16

## 2022-03-19 RX ADMIN — GABAPENTIN 300 MILLIGRAM(S): 400 CAPSULE ORAL at 13:10

## 2022-03-19 RX ADMIN — Medication 325 MILLIGRAM(S): at 11:23

## 2022-03-19 RX ADMIN — ONDANSETRON 4 MILLIGRAM(S): 8 TABLET, FILM COATED ORAL at 09:00

## 2022-03-19 RX ADMIN — Medication 2 MILLIGRAM(S): at 21:36

## 2022-03-19 RX ADMIN — QUETIAPINE FUMARATE 200 MILLIGRAM(S): 200 TABLET, FILM COATED ORAL at 05:15

## 2022-03-19 RX ADMIN — ENOXAPARIN SODIUM 40 MILLIGRAM(S): 100 INJECTION SUBCUTANEOUS at 22:15

## 2022-03-19 RX ADMIN — Medication 1 MILLIGRAM(S): at 11:25

## 2022-03-19 RX ADMIN — GABAPENTIN 300 MILLIGRAM(S): 400 CAPSULE ORAL at 05:15

## 2022-03-19 RX ADMIN — Medication 100 MILLIGRAM(S): at 21:37

## 2022-03-19 RX ADMIN — Medication 100 MILLIGRAM(S): at 06:18

## 2022-03-19 RX ADMIN — QUETIAPINE FUMARATE 200 MILLIGRAM(S): 200 TABLET, FILM COATED ORAL at 21:37

## 2022-03-19 RX ADMIN — MIRTAZAPINE 60 MILLIGRAM(S): 45 TABLET, ORALLY DISINTEGRATING ORAL at 21:37

## 2022-03-19 RX ADMIN — QUETIAPINE FUMARATE 200 MILLIGRAM(S): 200 TABLET, FILM COATED ORAL at 13:10

## 2022-03-19 NOTE — PROGRESS NOTE ADULT - ASSESSMENT
50yo M w/ pmhx of IVDU (heroin), vertebral osteomyelitis, and MSSA bacteremia presents for worsening back pain. Admitted for further management of progressive vertebral osteomyelitis.     #progressive vertebral osteomyelitis   #Hx of MSSA bacteremia  - MRI T-L spine 1/18/22 confirmed osteomyelitis  - PAM 1/19: no endocarditis  - bcx 1/15: MSSA; first cleared bcx on 1/18   - received cefazolin via picc line ended 3/1  - ,   - No sepsis on admission   - CT L-T spine IV con: Since 1/15/2022 there has been significant progression of discitis osteomyelitis at L4-5 with multifocal erosive changes at the endplates and likely prevertebral phlegmon formation. Progression of L5-S1 facetitis, Facetitis in T10-T11.  - MRI Thoracic/Lumbar spine 3/14: similar increased signal and enhancement involving T10-T11 facet joint space from pervious degenerative changes vs facetitis. 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, given RUL opacities seen on CT -> f/u QuantiFeron -> negative  - HIV negative  - BCx 3/12-17: NGTD   - neurosurgery recs appreciated, no acute neurosurgical intervention at this time   - f/u IR/neuro IR re drainage of abscess/Bx of OM -> L4-L5 biopsy of suspected discitis done 3/16 -> fluid sent for gram stain, AFB, culture, pathology -> growing staph aureus  - c/w cefazolin 2g q8h   - ID recs appreciated -> will likely plan at least another 4 week course   - pulm eval -> Consider repeat CT in 6 weeks prone (CT not suggestive of UIP)      #BL distal forearm wounds- healing   - surgery saw patient last admission and recommended not removing R foreign body; not a contraindication to MRI   - burn saw patient last admission and rec local wound care  - 1.2cm linear foreign body overlying right radius  - not a contraindication for MRI   - wound care recs appreciated   - f/u o/p burn     # IVDU  # suspected folic acid deficiency  # anxiety  - on home methadone 135mg solution   - c/w xanax, welbutrin, seroquel   - SBIRT c/s appreciated      #Normocytic Anemia   - multifactorial, iron deficiency, anemia of chronic disease   - c/w folate and ferrous sulfate 325mg qd     #RUL opacities  - asymptomatic   - CT chest IV con: several reticulonodular opacities in the posterior segment of the right upper lobe possibly representing UIP  - f/u pulm o/p     #Misc  - DVT ppx: Lovenox  - GI ppx: none  - Diet: regular  - Code Status: full code   - Dispo: acute from home

## 2022-03-19 NOTE — PROGRESS NOTE ADULT - SUBJECTIVE AND OBJECTIVE BOX
MIKAEL MCDERMOTT 51y Male  MRN#: [id number]     Hospital Day: 7d    Pt is currently admitted with the primary diagnosis of vertebral OM    SUBJECTIVE    No Overnight events     No Subjective complaints     patient is seen and examined    Present Today:   - [place]ito:  No                                             ----------------------------------------------------------  OBJECTIVE  PAST MEDICAL & SURGICAL HISTORY  IV drug abuse    Vertebral osteomyelitis    MSSA bacteremia    No significant past surgical history                                              -----------------------------------------------------------  ALLERGIES:  No Known Allergies                                            ------------------------------------------------------------    HOME MEDICATIONS  Home Medications:  acetaminophen 325 mg oral tablet: 2 tab(s) orally every 6 hours (12 Mar 2022 21:41)  cyclobenzaprine 10 mg oral tablet: 1 tab(s) orally every 8 hours (12 Mar 2022 21:41)  ferrous sulfate 325 mg (65 mg elemental iron) oral tablet: 1 tab(s) orally once a day (12 Mar 2022 21:41)  folic acid 1 mg oral tablet: 1 tab(s) orally once a day (12 Mar 2022 21:41)  gabapentin 300 mg oral capsule: 1 cap(s) orally 3 times a day (12 Mar 2022 21:41)  methadone: 135 milligram(s) orally once a day (12 Mar 2022 21:41)  Multiple Vitamins oral tablet: 1 tab(s) orally once a day (12 Mar 2022 21:41)  nicotine 21 mg/24 hr transdermal film, extended release: 1 patch transdermal once a day (12 Mar 2022 21:41)  Remeron 30 mg oral tablet: 2 tab(s) orally once a day (at bedtime) (12 Mar 2022 21:41)  SEROquel 200 mg oral tablet: orally 3 times a day (12 Mar 2022 21:41)  Wellbutrin: 300 milligram(s) orally once a day (12 Mar 2022 21:41)  Xanax: 2 milligram(s) orally 3 times a day (12 Mar 2022 21:41)                           MEDICATIONS:  STANDING MEDICATIONS  ALPRAZolam 2 milliGRAM(s) Oral three times a day  buPROPion XL (24-Hour) . 300 milliGRAM(s) Oral daily  ceFAZolin   IVPB 2000 milliGRAM(s) IV Intermittent every 8 hours  chlorhexidine 4% Liquid 1 Application(s) Topical <User Schedule>  enoxaparin Injectable 40 milliGRAM(s) SubCutaneous every 24 hours  ferrous    sulfate 325 milliGRAM(s) Oral daily  folic acid 1 milliGRAM(s) Oral daily  gabapentin 300 milliGRAM(s) Oral three times a day  lidocaine   4% Patch 1 Patch Transdermal daily  methadone    Tablet 135 milliGRAM(s) Oral daily  mirtazapine 60 milliGRAM(s) Oral at bedtime  multivitamin 1 Tablet(s) Oral daily  nicotine - 21 mG/24Hr(s) Patch 1 patch Transdermal daily  QUEtiapine 200 milliGRAM(s) Oral three times a day    PRN MEDICATIONS  acetaminophen     Tablet .. 650 milliGRAM(s) Oral every 6 hours PRN  cyclobenzaprine 10 milliGRAM(s) Oral three times a day PRN  ibuprofen  Tablet. 600 milliGRAM(s) Oral every 8 hours PRN  ondansetron Injectable 4 milliGRAM(s) IV Push every 4 hours PRN                                            ------------------------------------------------------------  VITAL SIGNS: Last 24 Hours  T(C): 36.1 (19 Mar 2022 08:27), Max: 36.2 (19 Mar 2022 00:00)  T(F): 97 (19 Mar 2022 08:27), Max: 97.2 (19 Mar 2022 00:00)  HR: 95 (19 Mar 2022 08:27) (93 - 95)  BP: 120/68 (19 Mar 2022 08:27) (115/66 - 120/68)  BP(mean): --  RR: 18 (19 Mar 2022 08:27) (18 - 18)  SpO2: --                                             --------------------------------------------------------------  LABS:                        10.4   5.75  )-----------( 428      ( 19 Mar 2022 04:30 )             35.9     03-19    137  |  98  |  20  ----------------------------<  178<H>  4.3   |  23  |  0.8    Ca    9.4      19 Mar 2022 04:30  Mg     2.3     03-19    TPro  8.2<H>  /  Alb  3.6  /  TBili  <0.2  /  DBili  x   /  AST  14  /  ALT  14  /  AlkPhos  115  03-19                Culture - Blood (collected 17 Mar 2022 06:07)  Source: .Blood None  Preliminary Report (18 Mar 2022 15:01):    No growth to date.    Culture - Fungal, Other (collected 16 Mar 2022 18:51)  Source: .Other abscess  Preliminary Report (17 Mar 2022 12:26):    Testing in progress    Culture - Abscess with Gram Stain (collected 16 Mar 2022 18:51)  Source: .Abscess Arm - Left  Preliminary Report (18 Mar 2022 10:41):    Few Staphylococcus aureus    Culture - Acid Fast - Other w/Smear (collected 16 Mar 2022 18:51)  Source: .Other Other, psoas abscess                                                    -------------------------------------------------------------  RADIOLOGY:                                            --------------------------------------------------------------    PHYSICAL EXAM:    General: NAD  LUNGS: clear air entry bilaterally, no wheezing or crackles  HEART: s1/s2, RRR  ABDOMEN: soft, ntnd, bs+  EXT: no LLE bilaterally, warm extremities  NEURO: AAOX4  SKIN: BL wrist wounds, otherwise intact, no rashes or lesions

## 2022-03-20 LAB
ALBUMIN SERPL ELPH-MCNC: 3.4 G/DL — LOW (ref 3.5–5.2)
ALP SERPL-CCNC: 112 U/L — SIGNIFICANT CHANGE UP (ref 30–115)
ALT FLD-CCNC: 14 U/L — SIGNIFICANT CHANGE UP (ref 0–41)
ANION GAP SERPL CALC-SCNC: 11 MMOL/L — SIGNIFICANT CHANGE UP (ref 7–14)
AST SERPL-CCNC: 15 U/L — SIGNIFICANT CHANGE UP (ref 0–41)
BASOPHILS # BLD AUTO: 0.02 K/UL — SIGNIFICANT CHANGE UP (ref 0–0.2)
BASOPHILS NFR BLD AUTO: 0.3 % — SIGNIFICANT CHANGE UP (ref 0–1)
BILIRUB SERPL-MCNC: <0.2 MG/DL — SIGNIFICANT CHANGE UP (ref 0.2–1.2)
BUN SERPL-MCNC: 24 MG/DL — HIGH (ref 10–20)
CALCIUM SERPL-MCNC: 9 MG/DL — SIGNIFICANT CHANGE UP (ref 8.5–10.1)
CHLORIDE SERPL-SCNC: 98 MMOL/L — SIGNIFICANT CHANGE UP (ref 98–110)
CO2 SERPL-SCNC: 26 MMOL/L — SIGNIFICANT CHANGE UP (ref 17–32)
CREAT SERPL-MCNC: 0.9 MG/DL — SIGNIFICANT CHANGE UP (ref 0.7–1.5)
CULTURE RESULTS: SIGNIFICANT CHANGE UP
CULTURE RESULTS: SIGNIFICANT CHANGE UP
EGFR: 103 ML/MIN/1.73M2 — SIGNIFICANT CHANGE UP
EOSINOPHIL # BLD AUTO: 0.26 K/UL — SIGNIFICANT CHANGE UP (ref 0–0.7)
EOSINOPHIL NFR BLD AUTO: 4 % — SIGNIFICANT CHANGE UP (ref 0–8)
GLUCOSE SERPL-MCNC: 111 MG/DL — HIGH (ref 70–99)
HCT VFR BLD CALC: 37 % — LOW (ref 42–52)
HGB BLD-MCNC: 10.8 G/DL — LOW (ref 14–18)
IMM GRANULOCYTES NFR BLD AUTO: 0.3 % — SIGNIFICANT CHANGE UP (ref 0.1–0.3)
LYMPHOCYTES # BLD AUTO: 1.81 K/UL — SIGNIFICANT CHANGE UP (ref 1.2–3.4)
LYMPHOCYTES # BLD AUTO: 27.5 % — SIGNIFICANT CHANGE UP (ref 20.5–51.1)
MAGNESIUM SERPL-MCNC: 2.2 MG/DL — SIGNIFICANT CHANGE UP (ref 1.8–2.4)
MCHC RBC-ENTMCNC: 25 PG — LOW (ref 27–31)
MCHC RBC-ENTMCNC: 29.2 G/DL — LOW (ref 32–37)
MCV RBC AUTO: 85.6 FL — SIGNIFICANT CHANGE UP (ref 80–94)
MONOCYTES # BLD AUTO: 0.52 K/UL — SIGNIFICANT CHANGE UP (ref 0.1–0.6)
MONOCYTES NFR BLD AUTO: 7.9 % — SIGNIFICANT CHANGE UP (ref 1.7–9.3)
NEUTROPHILS # BLD AUTO: 3.94 K/UL — SIGNIFICANT CHANGE UP (ref 1.4–6.5)
NEUTROPHILS NFR BLD AUTO: 60 % — SIGNIFICANT CHANGE UP (ref 42.2–75.2)
NRBC # BLD: 0 /100 WBCS — SIGNIFICANT CHANGE UP (ref 0–0)
PLATELET # BLD AUTO: 398 K/UL — SIGNIFICANT CHANGE UP (ref 130–400)
POTASSIUM SERPL-MCNC: 4.9 MMOL/L — SIGNIFICANT CHANGE UP (ref 3.5–5)
POTASSIUM SERPL-SCNC: 4.9 MMOL/L — SIGNIFICANT CHANGE UP (ref 3.5–5)
PROT SERPL-MCNC: 8 G/DL — SIGNIFICANT CHANGE UP (ref 6–8)
RBC # BLD: 4.32 M/UL — LOW (ref 4.7–6.1)
RBC # FLD: 16.2 % — HIGH (ref 11.5–14.5)
SODIUM SERPL-SCNC: 135 MMOL/L — SIGNIFICANT CHANGE UP (ref 135–146)
SPECIMEN SOURCE: SIGNIFICANT CHANGE UP
SPECIMEN SOURCE: SIGNIFICANT CHANGE UP
WBC # BLD: 6.57 K/UL — SIGNIFICANT CHANGE UP (ref 4.8–10.8)
WBC # FLD AUTO: 6.57 K/UL — SIGNIFICANT CHANGE UP (ref 4.8–10.8)

## 2022-03-20 PROCEDURE — 99233 SBSQ HOSP IP/OBS HIGH 50: CPT

## 2022-03-20 RX ORDER — ALPRAZOLAM 0.25 MG
2 TABLET ORAL THREE TIMES A DAY
Refills: 0 | Status: DISCONTINUED | OUTPATIENT
Start: 2022-03-20 | End: 2022-03-27

## 2022-03-20 RX ADMIN — MIRTAZAPINE 60 MILLIGRAM(S): 45 TABLET, ORALLY DISINTEGRATING ORAL at 21:23

## 2022-03-20 RX ADMIN — LIDOCAINE 1 PATCH: 4 CREAM TOPICAL at 11:29

## 2022-03-20 RX ADMIN — ENOXAPARIN SODIUM 40 MILLIGRAM(S): 100 INJECTION SUBCUTANEOUS at 23:54

## 2022-03-20 RX ADMIN — CYCLOBENZAPRINE HYDROCHLORIDE 10 MILLIGRAM(S): 10 TABLET, FILM COATED ORAL at 21:35

## 2022-03-20 RX ADMIN — Medication 2 MILLIGRAM(S): at 13:32

## 2022-03-20 RX ADMIN — QUETIAPINE FUMARATE 200 MILLIGRAM(S): 200 TABLET, FILM COATED ORAL at 05:45

## 2022-03-20 RX ADMIN — CYCLOBENZAPRINE HYDROCHLORIDE 10 MILLIGRAM(S): 10 TABLET, FILM COATED ORAL at 13:32

## 2022-03-20 RX ADMIN — METHADONE HYDROCHLORIDE 135 MILLIGRAM(S): 40 TABLET ORAL at 11:32

## 2022-03-20 RX ADMIN — Medication 2 MILLIGRAM(S): at 21:23

## 2022-03-20 RX ADMIN — Medication 100 MILLIGRAM(S): at 21:22

## 2022-03-20 RX ADMIN — QUETIAPINE FUMARATE 200 MILLIGRAM(S): 200 TABLET, FILM COATED ORAL at 21:23

## 2022-03-20 RX ADMIN — GABAPENTIN 300 MILLIGRAM(S): 400 CAPSULE ORAL at 13:33

## 2022-03-20 RX ADMIN — Medication 1 PATCH: at 11:29

## 2022-03-20 RX ADMIN — CHLORHEXIDINE GLUCONATE 1 APPLICATION(S): 213 SOLUTION TOPICAL at 05:45

## 2022-03-20 RX ADMIN — Medication 325 MILLIGRAM(S): at 11:28

## 2022-03-20 RX ADMIN — GABAPENTIN 300 MILLIGRAM(S): 400 CAPSULE ORAL at 21:22

## 2022-03-20 RX ADMIN — Medication 1 MILLIGRAM(S): at 11:28

## 2022-03-20 RX ADMIN — QUETIAPINE FUMARATE 200 MILLIGRAM(S): 200 TABLET, FILM COATED ORAL at 13:34

## 2022-03-20 RX ADMIN — Medication 100 MILLIGRAM(S): at 13:36

## 2022-03-20 RX ADMIN — BUPROPION HYDROCHLORIDE 300 MILLIGRAM(S): 150 TABLET, EXTENDED RELEASE ORAL at 11:28

## 2022-03-20 RX ADMIN — Medication 1 TABLET(S): at 11:28

## 2022-03-20 RX ADMIN — Medication 2 MILLIGRAM(S): at 06:10

## 2022-03-20 RX ADMIN — GABAPENTIN 300 MILLIGRAM(S): 400 CAPSULE ORAL at 05:45

## 2022-03-20 RX ADMIN — Medication 100 MILLIGRAM(S): at 05:45

## 2022-03-20 NOTE — PROGRESS NOTE ADULT - SUBJECTIVE AND OBJECTIVE BOX
SUBJECTIVE:    Patient is a 51y old Male who presents with a chief complaint of vertebral osteomyelitis (19 Mar 2022 10:16)    Currently admitted to medicine with the primary diagnosis of Vertebral osteomyelitis       Today is hospital day 8d.     PAST MEDICAL & SURGICAL HISTORY  IV drug abuse    Vertebral osteomyelitis    MSSA bacteremia    No significant past surgical history      ALLERGIES:  No Known Allergies    MEDICATIONS:  STANDING MEDICATIONS  ALPRAZolam 2 milliGRAM(s) Oral three times a day  buPROPion XL (24-Hour) . 300 milliGRAM(s) Oral daily  ceFAZolin   IVPB 2000 milliGRAM(s) IV Intermittent every 8 hours  chlorhexidine 4% Liquid 1 Application(s) Topical <User Schedule>  enoxaparin Injectable 40 milliGRAM(s) SubCutaneous every 24 hours  ferrous    sulfate 325 milliGRAM(s) Oral daily  folic acid 1 milliGRAM(s) Oral daily  gabapentin 300 milliGRAM(s) Oral three times a day  lidocaine   4% Patch 1 Patch Transdermal daily  mirtazapine 60 milliGRAM(s) Oral at bedtime  multivitamin 1 Tablet(s) Oral daily  nicotine - 21 mG/24Hr(s) Patch 1 patch Transdermal daily  QUEtiapine 200 milliGRAM(s) Oral three times a day    PRN MEDICATIONS  acetaminophen     Tablet .. 650 milliGRAM(s) Oral every 6 hours PRN  cyclobenzaprine 10 milliGRAM(s) Oral three times a day PRN  ibuprofen  Tablet. 600 milliGRAM(s) Oral every 8 hours PRN  ondansetron Injectable 4 milliGRAM(s) IV Push every 4 hours PRN    VITALS:   T(F): 96.4  HR: 97  BP: 111/77  RR: 18  SpO2: --    LABS:                        10.8   6.57  )-----------( 398      ( 20 Mar 2022 08:05 )             37.0     03-20    135  |  98  |  24<H>  ----------------------------<  111<H>  4.9   |  26  |  0.9    Ca    9.0      20 Mar 2022 08:05  Mg     2.2     03-20    TPro  8.0  /  Alb  3.4<L>  /  TBili  <0.2  /  DBili  x   /  AST  15  /  ALT  14  /  AlkPhos  112  03-20                  RADIOLOGY:    PHYSICAL EXAM:  GEN: No acute distress  LUNGS: Clear to auscultation bilaterally   HEART: S1/S2 present. RRR.   ABD/ GI: Soft, non-tender, non-distended. Bowel sounds present  EXT: NC/NC/NE/2+PP/JACQUES  NEURO: AAOX3

## 2022-03-20 NOTE — PROGRESS NOTE ADULT - ASSESSMENT
50yo M w/ pmhx of IVDU (heroin), vertebral osteomyelitis, and MSSA bacteremia presents for worsening back pain    Spinal OM, with epidural abscess/phlegmon---Recently completed 6wks of antibiotics which ended on 3/1.  MR Lumbar Spine 03/18 significant for  left L5-S1 facetitis with associated multifocal  abscess formation directly adjacent to the left facet joint, Ventral epidural phlegmon overlying the L5 vertebral body causing significant compression of the descending nerve roots.  ID noted-chest findings and involvement of facet joints, would rule out TB with AFB sputum x 1 negative so far , quantiferon gold is negative , repeat HIV screen negative & pulmonary evaluation suggested no new intervention    cefazolin 2g q 8 hours for now--  Follow-up blood cx are negative  will follow results of IR biopsy done 3/16---staph aureus-- continue cefazolin for now for 4 more week as per ID-- will need PICC line incase req longer course of ABX    Opioid abuse on Methadone  RUL Nodular Opacities  Recent history & post treatment for MSSA bacteremia  Hx of IVDU  Active nicotine dependence  morbid obesity    PICC line monday and DC to SNF-- case management made aware-- [facility]

## 2022-03-21 ENCOUNTER — TRANSCRIPTION ENCOUNTER (OUTPATIENT)
Age: 52
End: 2022-03-21

## 2022-03-21 LAB
ALBUMIN SERPL ELPH-MCNC: 3.5 G/DL — SIGNIFICANT CHANGE UP (ref 3.5–5.2)
ALP SERPL-CCNC: 115 U/L — SIGNIFICANT CHANGE UP (ref 30–115)
ALT FLD-CCNC: 10 U/L — SIGNIFICANT CHANGE UP (ref 0–41)
ANION GAP SERPL CALC-SCNC: 12 MMOL/L — SIGNIFICANT CHANGE UP (ref 7–14)
AST SERPL-CCNC: 13 U/L — SIGNIFICANT CHANGE UP (ref 0–41)
BASOPHILS # BLD AUTO: 0.03 K/UL — SIGNIFICANT CHANGE UP (ref 0–0.2)
BASOPHILS NFR BLD AUTO: 0.5 % — SIGNIFICANT CHANGE UP (ref 0–1)
BILIRUB SERPL-MCNC: <0.2 MG/DL — SIGNIFICANT CHANGE UP (ref 0.2–1.2)
BUN SERPL-MCNC: 17 MG/DL — SIGNIFICANT CHANGE UP (ref 10–20)
CALCIUM SERPL-MCNC: 9.1 MG/DL — SIGNIFICANT CHANGE UP (ref 8.5–10.1)
CHLORIDE SERPL-SCNC: 97 MMOL/L — LOW (ref 98–110)
CO2 SERPL-SCNC: 28 MMOL/L — SIGNIFICANT CHANGE UP (ref 17–32)
CREAT SERPL-MCNC: 0.8 MG/DL — SIGNIFICANT CHANGE UP (ref 0.7–1.5)
EGFR: 106 ML/MIN/1.73M2 — SIGNIFICANT CHANGE UP
EOSINOPHIL # BLD AUTO: 0.24 K/UL — SIGNIFICANT CHANGE UP (ref 0–0.7)
EOSINOPHIL NFR BLD AUTO: 4.2 % — SIGNIFICANT CHANGE UP (ref 0–8)
GLUCOSE SERPL-MCNC: 175 MG/DL — HIGH (ref 70–99)
HCT VFR BLD CALC: 35.6 % — LOW (ref 42–52)
HGB BLD-MCNC: 10.9 G/DL — LOW (ref 14–18)
IMM GRANULOCYTES NFR BLD AUTO: 0.5 % — HIGH (ref 0.1–0.3)
LYMPHOCYTES # BLD AUTO: 1.92 K/UL — SIGNIFICANT CHANGE UP (ref 1.2–3.4)
LYMPHOCYTES # BLD AUTO: 33.4 % — SIGNIFICANT CHANGE UP (ref 20.5–51.1)
MAGNESIUM SERPL-MCNC: 2.2 MG/DL — SIGNIFICANT CHANGE UP (ref 1.8–2.4)
MCHC RBC-ENTMCNC: 25.8 PG — LOW (ref 27–31)
MCHC RBC-ENTMCNC: 30.6 G/DL — LOW (ref 32–37)
MCV RBC AUTO: 84.2 FL — SIGNIFICANT CHANGE UP (ref 80–94)
MONOCYTES # BLD AUTO: 0.49 K/UL — SIGNIFICANT CHANGE UP (ref 0.1–0.6)
MONOCYTES NFR BLD AUTO: 8.5 % — SIGNIFICANT CHANGE UP (ref 1.7–9.3)
NEUTROPHILS # BLD AUTO: 3.04 K/UL — SIGNIFICANT CHANGE UP (ref 1.4–6.5)
NEUTROPHILS NFR BLD AUTO: 52.9 % — SIGNIFICANT CHANGE UP (ref 42.2–75.2)
NRBC # BLD: 0 /100 WBCS — SIGNIFICANT CHANGE UP (ref 0–0)
PLATELET # BLD AUTO: 309 K/UL — SIGNIFICANT CHANGE UP (ref 130–400)
POTASSIUM SERPL-MCNC: 4 MMOL/L — SIGNIFICANT CHANGE UP (ref 3.5–5)
POTASSIUM SERPL-SCNC: 4 MMOL/L — SIGNIFICANT CHANGE UP (ref 3.5–5)
PROT SERPL-MCNC: 8.1 G/DL — HIGH (ref 6–8)
RBC # BLD: 4.23 M/UL — LOW (ref 4.7–6.1)
RBC # FLD: 16.7 % — HIGH (ref 11.5–14.5)
SODIUM SERPL-SCNC: 137 MMOL/L — SIGNIFICANT CHANGE UP (ref 135–146)
WBC # BLD: 5.75 K/UL — SIGNIFICANT CHANGE UP (ref 4.8–10.8)
WBC # FLD AUTO: 5.75 K/UL — SIGNIFICANT CHANGE UP (ref 4.8–10.8)

## 2022-03-21 PROCEDURE — 99232 SBSQ HOSP IP/OBS MODERATE 35: CPT

## 2022-03-21 RX ORDER — KETOROLAC TROMETHAMINE 30 MG/ML
15 SYRINGE (ML) INJECTION EVERY 8 HOURS
Refills: 0 | Status: DISCONTINUED | OUTPATIENT
Start: 2022-03-21 | End: 2022-03-26

## 2022-03-21 RX ORDER — METHADONE HYDROCHLORIDE 40 MG/1
135 TABLET ORAL DAILY
Refills: 0 | Status: DISCONTINUED | OUTPATIENT
Start: 2022-03-21 | End: 2022-03-28

## 2022-03-21 RX ADMIN — QUETIAPINE FUMARATE 200 MILLIGRAM(S): 200 TABLET, FILM COATED ORAL at 05:11

## 2022-03-21 RX ADMIN — QUETIAPINE FUMARATE 200 MILLIGRAM(S): 200 TABLET, FILM COATED ORAL at 13:16

## 2022-03-21 RX ADMIN — Medication 1 MILLIGRAM(S): at 12:31

## 2022-03-21 RX ADMIN — MIRTAZAPINE 60 MILLIGRAM(S): 45 TABLET, ORALLY DISINTEGRATING ORAL at 21:30

## 2022-03-21 RX ADMIN — Medication 2 MILLIGRAM(S): at 05:08

## 2022-03-21 RX ADMIN — GABAPENTIN 300 MILLIGRAM(S): 400 CAPSULE ORAL at 05:09

## 2022-03-21 RX ADMIN — Medication 1 PATCH: at 11:30

## 2022-03-21 RX ADMIN — Medication 15 MILLIGRAM(S): at 14:46

## 2022-03-21 RX ADMIN — GABAPENTIN 300 MILLIGRAM(S): 400 CAPSULE ORAL at 21:30

## 2022-03-21 RX ADMIN — CYCLOBENZAPRINE HYDROCHLORIDE 10 MILLIGRAM(S): 10 TABLET, FILM COATED ORAL at 21:28

## 2022-03-21 RX ADMIN — BUPROPION HYDROCHLORIDE 300 MILLIGRAM(S): 150 TABLET, EXTENDED RELEASE ORAL at 12:32

## 2022-03-21 RX ADMIN — Medication 2 MILLIGRAM(S): at 13:16

## 2022-03-21 RX ADMIN — Medication 325 MILLIGRAM(S): at 12:32

## 2022-03-21 RX ADMIN — Medication 100 MILLIGRAM(S): at 21:31

## 2022-03-21 RX ADMIN — Medication 2 MILLIGRAM(S): at 21:29

## 2022-03-21 RX ADMIN — CHLORHEXIDINE GLUCONATE 1 APPLICATION(S): 213 SOLUTION TOPICAL at 05:09

## 2022-03-21 RX ADMIN — Medication 1 TABLET(S): at 12:32

## 2022-03-21 RX ADMIN — ENOXAPARIN SODIUM 40 MILLIGRAM(S): 100 INJECTION SUBCUTANEOUS at 22:54

## 2022-03-21 RX ADMIN — Medication 15 MILLIGRAM(S): at 16:37

## 2022-03-21 RX ADMIN — Medication 1 PATCH: at 12:27

## 2022-03-21 RX ADMIN — LIDOCAINE 1 PATCH: 4 CREAM TOPICAL at 12:28

## 2022-03-21 RX ADMIN — QUETIAPINE FUMARATE 200 MILLIGRAM(S): 200 TABLET, FILM COATED ORAL at 21:30

## 2022-03-21 RX ADMIN — Medication 100 MILLIGRAM(S): at 13:17

## 2022-03-21 RX ADMIN — CYCLOBENZAPRINE HYDROCHLORIDE 10 MILLIGRAM(S): 10 TABLET, FILM COATED ORAL at 14:03

## 2022-03-21 RX ADMIN — METHADONE HYDROCHLORIDE 135 MILLIGRAM(S): 40 TABLET ORAL at 14:04

## 2022-03-21 RX ADMIN — GABAPENTIN 300 MILLIGRAM(S): 400 CAPSULE ORAL at 13:16

## 2022-03-21 RX ADMIN — Medication 100 MILLIGRAM(S): at 05:08

## 2022-03-21 NOTE — PROGRESS NOTE ADULT - ASSESSMENT
50yo M w/ pmhx of IVDU (heroin), vertebral osteomyelitis, and MSSA bacteremia presents for worsening back pain. Admitted for further management of progressive vertebral osteomyelitis.     #progressive vertebral osteomyelitis   #Hx of MSSA bacteremia  - MRI T-L spine 1/18/22 confirmed osteomyelitis  - PAM 1/19: no endocarditis  - bcx 1/15: MSSA; first cleared bcx on 1/18   - received cefazolin via picc line ended 3/1  - ,   - No sepsis on admission   - CT L-T spine IV con: Since 1/15/2022 there has been significant progression of discitis osteomyelitis at L4-5 with multifocal erosive changes at the endplates and likely prevertebral phlegmon formation. Progression of L5-S1 facetitis, Facetitis in T10-T11.  - MRI Thoracic/Lumbar spine 3/14: similar increased signal and enhancement involving T10-T11 facet joint space from pervious degenerative changes vs facetitis. 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, given RUL opacities seen on CT -> f/u QuantiFeron -> negative -> isolation removed  - HIV negative  - BCx 3/12-17: NGTD   - neurosurgery recs appreciated, no acute neurosurgical intervention at this time   - f/u IR/neuro IR re drainage of abscess/Bx of OM -> L4-L5 biopsy of suspected discitis done 3/16 -> fluid sent for gram stain, AFB, culture, pathology -> growing NICOLE  - c/w cefazolin 2g q8h   - ID recs appreciated -> will likely plan at least another 4 week course   - pulm eval -> Consider repeat CT in 6 weeks prone (CT not suggestive of UIP)    - IR cs for PICC line insertion    #BL distal forearm wounds- healing   - surgery saw patient last admission and recommended not removing R foreign body; not a contraindication to MRI   - burn saw patient last admission and rec local wound care  - 1.2cm linear foreign body overlying right radius  - not a contraindication for MRI   - wound care recs appreciated   - f/u o/p burn     # IVDU  # suspected folic acid deficiency  # anxiety  - on home methadone 135mg solution   - c/w xanax, welbutrin, seroquel   - SBIRT c/s appreciated      #Normocytic Anemia   - multifactorial, iron deficiency, anemia of chronic disease   - c/w folate and ferrous sulfate 325mg qd     #RUL opacities  - asymptomatic   - CT chest IV con: several reticulonodular opacities in the posterior segment of the right upper lobe possibly representing UIP  - f/u pulm o/p     #Misc  - DVT ppx: Lovenox  - GI ppx: none  - Diet: regular  - Code Status: full code   - Dispo: acute from home

## 2022-03-21 NOTE — DISCHARGE NOTE PROVIDER - NSDCCPCAREPLAN_GEN_ALL_CORE_FT
PRINCIPAL DISCHARGE DIAGNOSIS  Diagnosis: Vertebral osteomyelitis  Assessment and Plan of Treatment: you presented for an increase in your back pain. you recently finished a long course of antibiotics for vertebral osteoyelitis. MRI was done and showed progression of osteomyelitis -> finidings were concerning at first for tuberculous osteomyelits (especialy with th epresence of an opacity on rhe CTscan of your lung).  pulmonology were consulted -> recommended repeat of CT scan after 6 weeks. neurosurgery were consulted -> no surgical intervention interventional radiology were consulted -> biopsy and culture of L4L5 disc -> growing staph aureus (same organism as before) and sensitive to the same antibiotics that you are receiving in the hospital. quantiferon old TB test was negative and culture from the biopsywas negative for acid fast bacilli. infectious disease also consulted -> planning for long term antibiotics -> recommended PICC line placement. you should follow up with your PCP, pulm and infectious disease      SECONDARY DISCHARGE DIAGNOSES  Diagnosis: Opacity of lung on imaging study  Assessment and Plan of Treatment: you presented for an increase in your back pain. you recently finished a long course of antibiotics for vertebral osteoyelitis. MRI was done and showed progression of osteomyelitis -> finidings were concerning at first for tuberculous osteomyelits (especialy with th epresence of an opacity on rhe CTscan of your lung. pulmonology were consulted -> recommended repeat of CT scan after 6 weeks     PRINCIPAL DISCHARGE DIAGNOSIS  Diagnosis: Vertebral osteomyelitis  Assessment and Plan of Treatment: you presented for an increase in your back pain. you recently finished a long course of antibiotics for vertebral osteoyelitis. MRI was done and showed progression of osteomyelitis -> finidings were concerning at first for tuberculous osteomyelits (especialy with th epresence of an opacity on rhe CTscan of your lung).  pulmonology were consulted -> recommended repeat of CT scan after 6 weeks. neurosurgery were consulted -> no surgical intervention interventional radiology were consulted -> biopsy and culture of L4L5 disc -> growing staph aureus (same organism as before) and sensitive to the same antibiotics that you are receiving in the hospital. quantiferon old TB test was negative and culture from the biopsywas negative for acid fast bacilli. infectious disease also consulted -> planning for long term antibiotics -> cefazolin 2g q 8 hours for 4 weeks(end date 04/11) . Will need repeat imaging prior to stopping antibiotics to guide course. you should follow up with your PCP, pulm and infectious disease      SECONDARY DISCHARGE DIAGNOSES  Diagnosis: Opacity of lung on imaging study  Assessment and Plan of Treatment: you presented for an increase in your back pain. you recently finished a long course of antibiotics for vertebral osteoyelitis. MRI was done and showed progression of osteomyelitis -> finidings were concerning at first for tuberculous osteomyelits (especialy with th epresence of an opacity on rhe CTscan of your lung. pulmonology were consulted -> recommended repeat of CT scan after 6 weeks     PRINCIPAL DISCHARGE DIAGNOSIS  Diagnosis: Vertebral osteomyelitis  Assessment and Plan of Treatment: you presented for an increase in your back pain. you recently finished a long course of antibiotics for vertebral osteoyelitis. MRI was done and showed progression of osteomyelitis -> finidings were concerning at first for tuberculous osteomyelits (especialy with th epresence of an opacity on rhe CTscan of your lung).  pulmonology were consulted -> recommended repeat of CT scan after 6 weeks. neurosurgery were consulted -> no surgical intervention interventional radiology were consulted -> biopsy and culture of L4L5 disc -> growing staph aureus (same organism as before) and sensitive to the same antibiotics that you are receiving in the hospital. quantiferon old TB test was negative and culture from the biopsywas negative for acid fast bacilli. infectious disease also consulted -> planning for long term antibiotics ->  Per ID cefazolin 2g q 8 hours open-ended but at least 6 weeks from 4/6 with a repeat CT scan in 4 weeks and weekly CRP and ESR to decide on the course of abx. Pt has a picc line. Will need repeat imaging prior to stopping -antibiotics to guide course      SECONDARY DISCHARGE DIAGNOSES  Diagnosis: Opacity of lung on imaging study  Assessment and Plan of Treatment: you presented for an increase in your back pain. you recently finished a long course of antibiotics for vertebral osteoyelitis. MRI was done and showed progression of osteomyelitis -> finidings were concerning at first for tuberculous osteomyelits (especialy with th epresence of an opacity on rhe CTscan of your lung. pulmonology were consulted -> recommended repeat of CT scan after 6 weeks     PRINCIPAL DISCHARGE DIAGNOSIS  Diagnosis: Vertebral osteomyelitis  Assessment and Plan of Treatment: you presented for an increase in your back pain. you recently finished a long course of antibiotics for vertebral osteoyelitis. MRI was done and showed progression of osteomyelitis -> finidings were concerning at first for tuberculous osteomyelits (especialy with th epresence of an opacity on rhe CTscan of your lung).  pulmonology were consulted -> recommended repeat of CT scan after 6 weeks. neurosurgery were consulted -> no surgical intervention interventional radiology were consulted -> biopsy and culture of L4L5 disc -> growing staph aureus (same organism as before) and sensitive to the same antibiotics that you are receiving in the hospital. quantiferon old TB test was negative and culture from the biopsywas negative for acid fast bacilli. infectious disease also consulted -> planning for long term antibiotics ->    Per ID cefazolin 2g q 8 hours open-ended but at least 6 weeks from 4/6 (tentiviely end 5/18/2022)  but he should have:  - repeat CT scan in 4 weeks (5/6/2022) from   - weekly labs: CRP and ESR   to guide antibiotic course and need for longer treatment  He should followup with ID Dr. Giraldo for further treatment needs      SECONDARY DISCHARGE DIAGNOSES  Diagnosis: Opacity of lung on imaging study  Assessment and Plan of Treatment: you presented for an increase in your back pain. you recently finished a long course of antibiotics for vertebral osteoyelitis. MRI was done and showed progression of osteomyelitis -> finidings were concerning at first for tuberculous osteomyelits (especialy with th epresence of an opacity on rhe CTscan of your lung. pulmonology were consulted -> recommended repeat of CT scan after 6 weeks

## 2022-03-21 NOTE — DISCHARGE NOTE PROVIDER - CARE PROVIDERS DIRECT ADDRESSES
,DirectAddress_Unknown,DirectAddress_Unknown,DirectAddress_Unknown ,DirectAddress_Unknown,DirectAddress_Unknown ,DirectAddress_Unknown,DirectAddress_Unknown,DirectAddress_Unknown,DirectAddress_Unknown

## 2022-03-21 NOTE — DISCHARGE NOTE PROVIDER - NSDCFUADDAPPT_GEN_ALL_CORE_FT
Please followup with our infectious disease doctor, Dr. Giraldo to ensure appropriate response to treatment Please followup with our infectious disease doctor, Dr. Giraldo to ensure appropriate response to treatment and should have repeat CT spine in 4 weeks (5/4/2022) from discharge and weekly labs: ESR , CRP

## 2022-03-21 NOTE — DISCHARGE NOTE PROVIDER - CARE PROVIDER_API CALL
LUCY PETERSEN  Endocrinology, Diabetes and Metabolism  [address] [place] [address]  Phone: [phone]  Fax: [phone]  Established Patient  Follow Up Time:     John Giraldo)  Infectious Disease; Internal Medicine  [address] [place] [address]  Phone: [phone]  Fax: [phone]  Follow Up Time:     Stanton Velasco)  Critical Care Medicine; Pulmonary Disease; Sleep Medicine  [address] [place] [address]  Phone: [phone]  Fax: [phone]  Follow Up Time:    LUCY PETERSEN  Endocrinology, Diabetes and Metabolism  [facility] [place] [address]  Phone: [phone]  Fax: [phone]  Established Patient  Follow Up Time:     Stanton Velasco)  Critical Care Medicine; Pulmonary Disease; Sleep Medicine  [address] [place] [address]  Phone: [phone]  Fax: [phone]  Follow Up Time:    LUCY PETERSEN  Endocrinology, Diabetes and Metabolism  [address] [place] [address]  Phone: [phone]  Fax: [phone]  Established Patient  Follow Up Time:     Stanton Velasco)  Critical Care Medicine; Pulmonary Disease; Sleep Medicine  [address] [place] [address]  Phone: [phone]  Fax: [phone]  Follow Up Time:     Lida Giraldo)  Infectious Disease; Internal Medicine  [address] [place] [address]  Phone: [phone]  Fax: [phone]  Follow Up Time: 1 month   LUCY PETERSEN  Endocrinology, Diabetes and Metabolism  [address] [place] [address]  Phone: [phone]  Fax: [phone]  Established Patient  Follow Up Time:     Stanton Velasco)  Critical Care Medicine; Pulmonary Disease; Sleep Medicine  [address] [place] [address]  Phone: [phone]  Fax: [phone]  Follow Up Time:     Lida Giraldo)  Infectious Disease; Internal Medicine  [address] [place] [address]  Phone: [phone]  Fax: [phone]  Follow Up Time: 1 month    John Giraldo)  Infectious Disease; Internal Medicine  [address] [place] [address]  Phone: [phone]  Fax: [phone]  Follow Up Time: 1 month

## 2022-03-21 NOTE — DISCHARGE NOTE PROVIDER - NPI NUMBER (FOR SYSADMIN USE ONLY) :
[[id number]],[[id number] [[id number]] [[id number]],[[id number]],[[id number]] [[id number]],[[id number]],[[id number]],[[id number]]

## 2022-03-21 NOTE — DISCHARGE NOTE PROVIDER - ATTENDING DISCHARGE PHYSICAL EXAMINATION:
· Constitutional	Well-developed, well nourished  · Eyes	conjuctivae clear  · ENMT	No oral lesions; no gross abnormalities  · Neck	No bruits; no thyromegaly or nodules   · Back	No deformity or limitation of movement   · Respiratory	Breath Sounds equal & clear to percussion & auscultation, no accessory muscle use  · Cardiovascular	Regular rate & rhythm, normal S1, S2; no murmurs, gallops or rubs; no S3, S4  · Gastrointestinal	Soft, non-tender, no hepatosplenomegaly, normal bowel sounds  · Extremities	No cyanosis, clubbing or edema   · Neurological	Alert & oriented; no sensory, motor or coordination deficits, normal reflexes  · Musculoskeletal	No joint pain, swelling or deformity; no limitation of movement  - picc line in place

## 2022-03-21 NOTE — DISCHARGE NOTE PROVIDER - HOSPITAL COURSE
50yo M w/ pmhx of IVDU (heroin), vertebral osteomyelitis, and MSSA bacteremia presents for worsening back pain. Back pain was increasing over the past week and worsening today to the point he was having difficulty ambulating and bearing weight. Back pain started one week ago and difficulty ambulating started on Tuesday 3/8. Was discharged from [facility] on 3/2. Patient was recently admitted from 1/16 to 2/1 for vertebral osteomyelitis and MSSA bacteremia. MRI found facetitis with adjacent abscesses. PAM was negative and ID recommended cefazolin. Patient had picc line placed for 6wks of antibiotics which ended on 3/1. Denies urinary/bowel incontinence/retention, numbness in legs, fever/chills, chest pain, SOB, abd pain, n/v/d.    ED course:  vitals: /94, HR 84, T 98.4F, O2 96% RA   labs: hgb 9.0,   imaging:   - CT L-T spine IV con:   Since 1/15/2022 there has been significant progression of discitis osteomyelitis at L4-5 with multifocal erosive changes at the endplates and likely prevertebral phlegmon formation. Progression of L5-S1 facetitis, Facetitis in T10-T11.  progress: neurosurgery said no surgical intervention   admitted for management of vertebral OM -> MRI Thoracic/Lumbar spine 3/14: similar increased signal and enhancement involving T10-T11 facet joint space from pervious degenerative changes vs facetitis. L spine; progression of OM involving L4/L5, severe facetitis involving left L5/s1 facet joint, extensive epidural phlegmon slightly decreased in size from prior MR causing severe spinal stenosis (however mass effect on descending nerve roots slightly improved), similar appearing right psoas and left RP abscess extending to left L5/S1 facet joint, slightly increased in size from prior -> MR findings concerning for possible tuberculous OM, given RUL opacities seen on CT -> quatiferon negative amd IR were consulted -> biopsy of L4/L5 disc -> culture -> NICOLE  IR cs for PICC line insertion 50yo M w/ pmhx of IVDU (heroin), vertebral osteomyelitis, and MSSA bacteremia presents for worsening back pain. Back pain was increasing over the past week and worsening today to the point he was having difficulty ambulating and bearing weight. Back pain started one week ago and difficulty ambulating started on Tuesday 3/8. Was discharged from [facility] on 3/2. Patient was recently admitted from 1/16 to 2/1 for vertebral osteomyelitis and MSSA bacteremia. MRI found facetitis with adjacent abscesses. PAM was negative and ID recommended cefazolin. Patient had picc line placed for 6wks of antibiotics which ended on 3/1. Denies urinary/bowel incontinence/retention, numbness in legs, fever/chills, chest pain, SOB, abd pain, n/v/d.    ED course:  vitals: /94, HR 84, T 98.4F, O2 96% RA   labs: hgb 9.0,   imaging:   - CT L-T spine IV con:   Since 1/15/2022 there has been significant progression of discitis osteomyelitis at L4-5 with multifocal erosive changes at the endplates and likely prevertebral phlegmon formation. Progression of L5-S1 facetitis, Facetitis in T10-T11.  progress: neurosurgery said no surgical intervention   admitted for management of vertebral OM -> MRI Thoracic/Lumbar spine 3/14: similar increased signal and enhancement involving T10-T11 facet joint space from pervious degenerative changes vs facetitis. L spine; progression of OM involving L4/L5, severe facetitis involving left L5/s1 facet joint, extensive epidural phlegmon slightly decreased in size from prior MR causing severe spinal stenosis (however mass effect on descending nerve roots slightly improved), similar appearing right psoas and left RP abscess extending to left L5/S1 facet joint, slightly increased in size from prior -> MR findings concerning for possible tuberculous OM, given RUL opacities seen on CT -> quatiferon negative amd IR were consulted -> biopsy of L4/L5 disc -> culture -> NICOLE  cefazolin 2g q 8 hours for 4 weeks(end date 04/11) . Pt has a picc line. Will need repeat imaging prior to stopping antibiotics to guide course 50yo M w/ pmhx of IVDU (heroin), vertebral osteomyelitis, and MSSA bacteremia presents for worsening back pain. Back pain was increasing over the past week and worsening today to the point he was having difficulty ambulating and bearing weight. Back pain started one week ago and difficulty ambulating started on Tuesday 3/8. Was discharged from [facility] on 3/2. Patient was recently admitted from 1/16 to 2/1 for vertebral osteomyelitis and MSSA bacteremia. MRI found facetitis with adjacent abscesses. PAM was negative and ID recommended cefazolin. Patient had picc line placed for 6wks of antibiotics which ended on 3/1. Denies urinary/bowel incontinence/retention, numbness in legs, fever/chills, chest pain, SOB, abd pain, n/v/d.    ED course:  vitals: /94, HR 84, T 98.4F, O2 96% RA   labs: hgb 9.0,   imaging:   - CT L-T spine IV con:   Since 1/15/2022 there has been significant progression of discitis osteomyelitis at L4-5 with multifocal erosive changes at the endplates and likely prevertebral phlegmon formation. Progression of L5-S1 facetitis, Facetitis in T10-T11.  progress: neurosurgery said no surgical intervention   admitted for management of vertebral OM -> MRI Thoracic/Lumbar spine 3/14: similar increased signal and enhancement involving T10-T11 facet joint space from pervious degenerative changes vs facetitis. L spine; progression of OM involving L4/L5, severe facetitis involving left L5/s1 facet joint, extensive epidural phlegmon slightly decreased in size from prior MR causing severe spinal stenosis (however mass effect on descending nerve roots slightly improved), similar appearing right psoas and left RP abscess extending to left L5/S1 facet joint, slightly increased in size from prior -> MR findings concerning for possible tuberculous OM, given RUL opacities seen on CT -> quatiferon negative amd IR were consulted -> biopsy of L4/L5 disc -> culture -> NICOLE  cefazolin 2g q 8 hours for 4 weeks(end date 04/11) . Pt has a picc line. Will need repeat imaging prior to stopping antibiotics to guide course   Attending Attestation:  Patient was seen & examined independently. At least 10 systems were reviewed in ROS. All systems reviewed  are within normal limits. Latest vital signs and labs were reviewed today. Case was discussed with house staff in morning rounds for assessment and plan.  Patient is medically stable for discharge . About 38 mins spent on discharge disposition. 50yo M w/ pmhx of IVDU (heroin), vertebral osteomyelitis, and MSSA bacteremia presents for worsening back pain. Back pain was increasing over the past week and worsening today to the point he was having difficulty ambulating and bearing weight. Back pain started one week ago and difficulty ambulating started on Tuesday 3/8. Was discharged from [facility] on 3/2. Patient was recently admitted from 1/16 to 2/1 for vertebral osteomyelitis and MSSA bacteremia. MRI found facetitis with adjacent abscesses. PAM was negative and ID recommended cefazolin. Patient had picc line placed for 6wks of antibiotics which ended on 3/1. Denies urinary/bowel incontinence/retention, numbness in legs, fever/chills, chest pain, SOB, abd pain, n/v/d.      50yo M w/ pmhx of IVDU (heroin), vertebral osteomyelitis, and MSSA bacteremia presents for worsening back pain    MRI from previous visits have shown discitis/facetitis.   CT on this admission showed worsening discitis, OM at L4-L5 and progression faceitits in L5-S1 and T10-T11.     Per neurosurgery, no intervention.     Patient underwent drainage of spinal abscess on 3/16 with IR. Cultures grew NICOLE.     Patient is receiving IV Antibiotics per ID until 4/11.     Cannot be discharged with PICC given hx of IVDU.     Otherwise medically clear for discharge on 4/5, will complete antibiotic course via picc line until 4/11. 50yo M w/ pmhx of IVDU (heroin), vertebral osteomyelitis, and MSSA bacteremia presents for worsening back pain. Back pain was increasing over the past week and worsening today to the point he was having difficulty ambulating and bearing weight. Back pain started one week ago and difficulty ambulating started on Tuesday 3/8. Was discharged from [facility] on 3/2. Patient was recently admitted from 1/16 to 2/1 for vertebral osteomyelitis and MSSA bacteremia. MRI found facetitis with adjacent abscesses. PAM was negative and ID recommended cefazolin. Patient had picc line placed for 6wks of antibiotics which ended on 3/1. Denies urinary/bowel incontinence/retention, numbness in legs, fever/chills, chest pain, SOB, abd pain, n/v/d.      50yo M w/ pmhx of IVDU (heroin), vertebral osteomyelitis, and MSSA bacteremia presents for worsening back pain    MRI from previous visits have shown discitis/facetitis.   CT on this admission showed worsening discitis, OM at L4-L5 and progression faceitits in L5-S1 and T10-T11.     Per neurosurgery, no intervention.     Patient underwent drainage of spinal abscess on 3/16 with IR. Cultures grew NICOLE.     Patient is receiving IV Antibiotics per ID until 4/11.     Cannot be discharged with PICC given hx of IVDU.     Otherwise medically clear for discharge on 4/6,    Per ID c/w cefazolin 2g q 8 hours open-ended but at least 6 weeks from 4/6 with a repeat CT scan in 4 weeks and weekly CRP and ESR to decide on the course of abx. Pt has a picc line. Will need repeat imaging prior to stopping -antibiotics to guide course

## 2022-03-21 NOTE — DISCHARGE NOTE PROVIDER - NSDCMRMEDTOKEN_GEN_ALL_CORE_FT
acetaminophen 325 mg oral tablet: 2 tab(s) orally every 6 hours  cyclobenzaprine 10 mg oral tablet: 1 tab(s) orally every 8 hours  ferrous sulfate 325 mg (65 mg elemental iron) oral tablet: 1 tab(s) orally once a day  folic acid 1 mg oral tablet: 1 tab(s) orally once a day  gabapentin 300 mg oral capsule: 1 cap(s) orally 3 times a day  methadone: 135 milligram(s) orally once a day  Multiple Vitamins oral tablet: 1 tab(s) orally once a day  nicotine 21 mg/24 hr transdermal film, extended release: 1 patch transdermal once a day  Remeron 30 mg oral tablet: 2 tab(s) orally once a day (at bedtime)  SEROquel 200 mg oral tablet: orally 3 times a day  Wellbutrin: 300 milligram(s) orally once a day  Xanax: 2 milligram(s) orally 3 times a day   acetaminophen 325 mg oral tablet: 2 tab(s) orally every 6 hours  ceFAZolin: 2 gram(s) intravenous every 8 hours until 4/11/2022  cyclobenzaprine 10 mg oral tablet: 1 tab(s) orally every 8 hours  ferrous sulfate 325 mg (65 mg elemental iron) oral tablet: 1 tab(s) orally once a day  folic acid 1 mg oral tablet: 1 tab(s) orally once a day  gabapentin 300 mg oral capsule: 1 cap(s) orally 3 times a day  methadone: 135 milligram(s) orally once a day  Multiple Vitamins oral tablet: 1 tab(s) orally once a day  nicotine 21 mg/24 hr transdermal film, extended release: 1 patch transdermal once a day  Remeron 30 mg oral tablet: 2 tab(s) orally once a day (at bedtime)  SEROquel 200 mg oral tablet: orally 3 times a day  Wellbutrin: 300 milligram(s) orally once a day  Xanax: 2 milligram(s) orally 3 times a day   acetaminophen 325 mg oral tablet: 2 tab(s) orally every 6 hours  ALPRAZolam 2 mg oral tablet: 1 tab(s) orally 3 times a day  ceFAZolin: 2 gram(s) intravenous every 8 hours until 4/11/2022  cyclobenzaprine 10 mg oral tablet: 1 tab(s) orally every 8 hours  ferrous sulfate 325 mg (65 mg elemental iron) oral tablet: 1 tab(s) orally once a day  fluticasone 50 mcg/inh nasal spray: 1 spray(s) nasal 2 times a day  folic acid 1 mg oral tablet: 1 tab(s) orally once a day  gabapentin 300 mg oral capsule: 1 cap(s) orally 3 times a day  methadone 5 mg oral tablet: 135 milligram(s) orally once a day  Multiple Vitamins oral tablet: 1 tab(s) orally once a day  nicotine 21 mg/24 hr transdermal film, extended release: 1 patch transdermal once a day  Remeron 30 mg oral tablet: 2 tab(s) orally once a day (at bedtime)  SEROquel 200 mg oral tablet: orally 3 times a day  Wellbutrin: 300 milligram(s) orally once a day   acetaminophen 325 mg oral tablet: 2 tab(s) orally every 6 hours  ALPRAZolam 2 mg oral tablet: 1 tab(s) orally 3 times a day  ceFAZolin: 2 gram(s) intravenous every 8 hours until at least 5/4/2022 pending ID followup  cyclobenzaprine 10 mg oral tablet: 1 tab(s) orally every 8 hours  ferrous sulfate 325 mg (65 mg elemental iron) oral tablet: 1 tab(s) orally once a day  fluticasone 50 mcg/inh nasal spray: 1 spray(s) nasal 2 times a day  folic acid 1 mg oral tablet: 1 tab(s) orally once a day  gabapentin 300 mg oral capsule: 1 cap(s) orally 3 times a day  methadone 5 mg oral tablet: 135 milligram(s) orally once a day  Multiple Vitamins oral tablet: 1 tab(s) orally once a day  nicotine 21 mg/24 hr transdermal film, extended release: 1 patch transdermal once a day  Remeron 30 mg oral tablet: 2 tab(s) orally once a day (at bedtime)  SEROquel 200 mg oral tablet: orally 3 times a day  Wellbutrin: 300 milligram(s) orally once a day   acetaminophen 325 mg oral tablet: 2 tab(s) orally every 6 hours  ALPRAZolam 2 mg oral tablet: 1 tab(s) orally 3 times a day  ceFAZolin: 2 gram(s) intravenous every 8 hours until at least 5/18/2022 pending ID followup  cyclobenzaprine 10 mg oral tablet: 1 tab(s) orally every 8 hours  ferrous sulfate 325 mg (65 mg elemental iron) oral tablet: 1 tab(s) orally once a day  fluticasone 50 mcg/inh nasal spray: 1 spray(s) nasal 2 times a day  folic acid 1 mg oral tablet: 1 tab(s) orally once a day  gabapentin 300 mg oral capsule: 1 cap(s) orally 3 times a day  methadone 5 mg oral tablet: 135 milligram(s) orally once a day  Multiple Vitamins oral tablet: 1 tab(s) orally once a day  nicotine 21 mg/24 hr transdermal film, extended release: 1 patch transdermal once a day  Remeron 30 mg oral tablet: 2 tab(s) orally once a day (at bedtime)  SEROquel 200 mg oral tablet: orally 3 times a day  Wellbutrin: 300 milligram(s) orally once a day

## 2022-03-21 NOTE — PROGRESS NOTE ADULT - SUBJECTIVE AND OBJECTIVE BOX
MIKAEL MCDERMOTT 51y Male  MRN#: [id number]     Hospital Day: 9d    Pt is currently admitted with the primary diagnosis of vertebral OM    SUBJECTIVE    No Overnight events     No Subjective complaints     patient is seen and examined    Present Today:   - Pablito:  No                                             ----------------------------------------------------------  OBJECTIVE  PAST MEDICAL & SURGICAL HISTORY  IV drug abuse    Vertebral osteomyelitis    MSSA bacteremia    No significant past surgical history                                              -----------------------------------------------------------  ALLERGIES:  No Known Allergies                                            ------------------------------------------------------------    HOME MEDICATIONS  Home Medications:  acetaminophen 325 mg oral tablet: 2 tab(s) orally every 6 hours (12 Mar 2022 21:41)  cyclobenzaprine 10 mg oral tablet: 1 tab(s) orally every 8 hours (12 Mar 2022 21:41)  ferrous sulfate 325 mg (65 mg elemental iron) oral tablet: 1 tab(s) orally once a day (12 Mar 2022 21:41)  folic acid 1 mg oral tablet: 1 tab(s) orally once a day (12 Mar 2022 21:41)  gabapentin 300 mg oral capsule: 1 cap(s) orally 3 times a day (12 Mar 2022 21:41)  methadone: 135 milligram(s) orally once a day (12 Mar 2022 21:41)  Multiple Vitamins oral tablet: 1 tab(s) orally once a day (12 Mar 2022 21:41)  nicotine 21 mg/24 hr transdermal film, extended release: 1 patch transdermal once a day (12 Mar 2022 21:41)  Remeron 30 mg oral tablet: 2 tab(s) orally once a day (at bedtime) (12 Mar 2022 21:41)  SEROquel 200 mg oral tablet: orally 3 times a day (12 Mar 2022 21:41)  Wellbutrin: 300 milligram(s) orally once a day (12 Mar 2022 21:41)  Xanax: 2 milligram(s) orally 3 times a day (12 Mar 2022 21:41)                           MEDICATIONS:  STANDING MEDICATIONS  ALPRAZolam 2 milliGRAM(s) Oral three times a day  buPROPion XL (24-Hour) . 300 milliGRAM(s) Oral daily  ceFAZolin   IVPB 2000 milliGRAM(s) IV Intermittent every 8 hours  chlorhexidine 4% Liquid 1 Application(s) Topical <User Schedule>  enoxaparin Injectable 40 milliGRAM(s) SubCutaneous every 24 hours  ferrous    sulfate 325 milliGRAM(s) Oral daily  folic acid 1 milliGRAM(s) Oral daily  gabapentin 300 milliGRAM(s) Oral three times a day  lidocaine   4% Patch 1 Patch Transdermal daily  mirtazapine 60 milliGRAM(s) Oral at bedtime  multivitamin 1 Tablet(s) Oral daily  nicotine - 21 mG/24Hr(s) Patch 1 patch Transdermal daily  QUEtiapine 200 milliGRAM(s) Oral three times a day    PRN MEDICATIONS  acetaminophen     Tablet .. 650 milliGRAM(s) Oral every 6 hours PRN  cyclobenzaprine 10 milliGRAM(s) Oral three times a day PRN  ibuprofen  Tablet. 600 milliGRAM(s) Oral every 8 hours PRN  ondansetron Injectable 4 milliGRAM(s) IV Push every 4 hours PRN                                            ------------------------------------------------------------  VITAL SIGNS: Last 24 Hours  T(C): 36.1 (21 Mar 2022 07:48), Max: 36.1 (21 Mar 2022 07:48)  T(F): 97 (21 Mar 2022 07:48), Max: 97 (21 Mar 2022 07:48)  HR: 60 (21 Mar 2022 07:48) (60 - 91)  BP: 125/66 (21 Mar 2022 07:48) (121/62 - 127/68)  BP(mean): --  RR: 18 (21 Mar 2022 07:48) (18 - 18)  SpO2: --                                             --------------------------------------------------------------  LABS:                        10.9   5.75  )-----------( 309      ( 21 Mar 2022 07:23 )             35.6     03-21    137  |  97<L>  |  17  ----------------------------<  175<H>  4.0   |  28  |  0.8    Ca    9.1      21 Mar 2022 07:23  Mg     2.2     03-21    TPro  8.1<H>  /  Alb  3.5  /  TBili  <0.2  /  DBili  x   /  AST  13  /  ALT  10  /  AlkPhos  115  03-21                                                              -------------------------------------------------------------  RADIOLOGY:                                            --------------------------------------------------------------    PHYSICAL EXAM:    General: NAD  LUNGS: clear air entry bilaterally, no wheezing or crackles  HEART: s1/s2, RRR  ABDOMEN: soft, ntnd, bs+  EXT: no LLE bilaterally, warm extremities  NEURO: AAOX4  SKIN: BL wrist wounds, otherwise intact, no rashes or lesions

## 2022-03-21 NOTE — DISCHARGE NOTE PROVIDER - PROVIDER TOKENS
PROVIDER:[TOKEN:[[id number]],ESTABLISHEDPATIENT:[T]],PROVIDER:[TOKEN:[[id number]]],PROVIDER:[TOKEN:[[id number]]] PROVIDER:[TOKEN:[[id number]],ESTABLISHEDPATIENT:[T]],PROVIDER:[TOKEN:[[id number]]] PROVIDER:[TOKEN:[[id number]],ESTABLISHEDPATIENT:[T]],PROVIDER:[TOKEN:[[id number]]],PROVIDER:[TOKEN:[[id number]],FOLLOWUP:[1 month]] PROVIDER:[TOKEN:[[id number]],ESTABLISHEDPATIENT:[T]],PROVIDER:[TOKEN:[[id number]]],PROVIDER:[TOKEN:[[id number]],FOLLOWUP:[1 month]],PROVIDER:[TOKEN:[[id number]],FOLLOWUP:[1 month]]

## 2022-03-21 NOTE — CONSULT NOTE ADULT - SUBJECTIVE AND OBJECTIVE BOX
INTERVENTIONAL RADIOLOGY CONSULT:     HPI:  52yo M w/ pmhx of IVDU (heroin), vertebral osteomyelitis, and MSSA bacteremia presents for worsening back pain. Back pain was increasing over the past week and worsening today to the point he was having difficulty ambulating and bearing weight. Back pain started one week ago and difficulty ambulating started on Tuesday 3/8. Was discharged from [facility] on 3/2. Patient was recently admitted from 1/16 to 2/1 for vertebral osteomyelitis and MSSA bacteremia. MRI found facetitis with adjacent abscesses. PAM was negative and ID recommended cefazolin. Patient had picc line placed for 6wks of antibiotics which ended on 3/1. Denies urinary/bowel incontinence/retention, numbness in legs, fever/chills, chest pain, SOB, abd pain, n/v/d.    ED course:  vitals: /94, HR 84, T 98.4F, O2 96% RA   labs: hgb 9.0,   imaging:   - CT L-T spine IV con:   Since 1/15/2022 there has been significant progression of discitis osteomyelitis at L4-5 with multifocal erosive changes at the endplates and likely prevertebral phlegmon formation. Progression of L5-S1 facetitis, Facetitis in T10-T11.  progress: neurosurgery said no surgical intervention  (12 Mar 2022 21:00)      PAST MEDICAL & SURGICAL HISTORY:  IV drug abuse    Vertebral osteomyelitis    MSSA bacteremia    No significant past surgical history        MEDICATIONS  (STANDING):  ALPRAZolam 2 milliGRAM(s) Oral three times a day  buPROPion XL (24-Hour) . 300 milliGRAM(s) Oral daily  ceFAZolin   IVPB 2000 milliGRAM(s) IV Intermittent every 8 hours  chlorhexidine 4% Liquid 1 Application(s) Topical <User Schedule>  enoxaparin Injectable 40 milliGRAM(s) SubCutaneous every 24 hours  ferrous    sulfate 325 milliGRAM(s) Oral daily  folic acid 1 milliGRAM(s) Oral daily  gabapentin 300 milliGRAM(s) Oral three times a day  lidocaine   4% Patch 1 Patch Transdermal daily  mirtazapine 60 milliGRAM(s) Oral at bedtime  multivitamin 1 Tablet(s) Oral daily  nicotine - 21 mG/24Hr(s) Patch 1 patch Transdermal daily  QUEtiapine 200 milliGRAM(s) Oral three times a day    MEDICATIONS  (PRN):  acetaminophen     Tablet .. 650 milliGRAM(s) Oral every 6 hours PRN Temp greater or equal to 38C (100.4F), Mild Pain (1 - 3)  cyclobenzaprine 10 milliGRAM(s) Oral three times a day PRN Muscle Spasm  ibuprofen  Tablet. 600 milliGRAM(s) Oral every 8 hours PRN Temp greater or equal to 38C (100.4F), Moderate Pain (4 - 6)  ondansetron Injectable 4 milliGRAM(s) IV Push every 4 hours PRN Nausea and/or Vomiting      Allergies    No Known Allergies    Intolerances        Physical Exam:   Vital Signs Last 24 Hrs  T(C): 36.1 (21 Mar 2022 07:48), Max: 36.1 (21 Mar 2022 07:48)  T(F): 97 (21 Mar 2022 07:48), Max: 97 (21 Mar 2022 07:48)  HR: 60 (21 Mar 2022 07:48) (60 - 91)  BP: 125/66 (21 Mar 2022 07:48) (121/62 - 127/68)  BP(mean): --  RR: 18 (21 Mar 2022 07:48) (18 - 18)  SpO2: --    Labs:                         10.9   5.75  )-----------( 309      ( 21 Mar 2022 07:23 )             35.6     03-21    137  |  97<L>  |  17  ----------------------------<  175<H>  4.0   |  28  |  0.8    Ca    9.1      21 Mar 2022 07:23  Mg     2.2     03-21    TPro  8.1<H>  /  Alb  3.5  /  TBili  <0.2  /  DBili  x   /  AST  13  /  ALT  10  /  AlkPhos  115  03-21        Pertinent labs:                      10.9   5.75  )-----------( 309      ( 21 Mar 2022 07:23 )             35.6       03-21    137  |  97<L>  |  17  ----------------------------<  175<H>  4.0   |  28  |  0.8    Ca    9.1      21 Mar 2022 07:23  Mg     2.2     03-21    TPro  8.1<H>  /  Alb  3.5  /  TBili  <0.2  /  DBili  x   /  AST  13  /  ALT  10  /  AlkPhos  115  03-21          Radiology imaging reviewed.     ASSESSMENT/PLAN:     52yo M w/ pmhx of IVDU (heroin), vertebral osteomyelitis, and MSSA bacteremia presents for worsening back pain. Back pain was increasing over the past week and worsening today to the point he was having difficulty ambulating and bearing weight. Back pain started one week ago and difficulty ambulating started on Tuesday 3/8. Was discharged from [facility] on 3/2. Patient was recently admitted from 1/16 to 2/1 for vertebral osteomyelitis and MSSA bacteremia. MRI found facetitis with adjacent abscesses. Patient will require prolonged course of antibiotic treatment, for which IR was consulted for PICC placement.    - Please contact when patient is likely to be discharged within 24 hours, was informed by primary team that placement may be an issue for this patient.      Thank you for the courtesy of this consult, please call [phone] between 8am and 5pm on weekdays, and [phone] at any other time with any further questions.

## 2022-03-21 NOTE — PROGRESS NOTE ADULT - ATTENDING COMMENTS
50yo M w/ pmhx of IVDU (heroin), vertebral osteomyelitis, and MSSA bacteremia presents for worsening back pain    Spinal OM, with epidural abscess/phlegmon---Recently completed 6wks of antibiotics which ended on 3/1.  MR Lumbar Spine 03/18 significant for  left L5-S1 facetitis with associated multifocal  abscess formation directly adjacent to the left facet joint, Ventral epidural phlegmon overlying the L5 vertebral body causing significant compression of the descending nerve roots.  ID noted-chest findings and involvement of facet joints, would rule out TB with AFB sputum x 1 negative so far , quantiferon gold is negative , repeat HIV screen negative & pulmonary evaluation suggested no new intervention    cefazolin 2g q 8 hours for now--  Follow-up blood cx are negative  will follow results of IR biopsy done 3/16---staph aureus-- continue cefazolin for now for 4 more week as per ID-- will need PICC line in case req longer course of ABX    Opioid abuse on Methadone  RUL Nodular Opacities  Recent history & post treatment for MSSA bacteremia  Hx of IVDU  Active nicotine dependence  morbid obesity    PICC line pending- IR recall in AM- case management  waiting for auth--SPOKE with mom at bedside-- will go to [facility]

## 2022-03-22 LAB
ALBUMIN SERPL ELPH-MCNC: 3.3 G/DL — LOW (ref 3.5–5.2)
ALP SERPL-CCNC: 114 U/L — SIGNIFICANT CHANGE UP (ref 30–115)
ALT FLD-CCNC: 10 U/L — SIGNIFICANT CHANGE UP (ref 0–41)
ANION GAP SERPL CALC-SCNC: 13 MMOL/L — SIGNIFICANT CHANGE UP (ref 7–14)
AST SERPL-CCNC: 14 U/L — SIGNIFICANT CHANGE UP (ref 0–41)
BASOPHILS # BLD AUTO: 0.02 K/UL — SIGNIFICANT CHANGE UP (ref 0–0.2)
BASOPHILS NFR BLD AUTO: 0.4 % — SIGNIFICANT CHANGE UP (ref 0–1)
BILIRUB SERPL-MCNC: <0.2 MG/DL — SIGNIFICANT CHANGE UP (ref 0.2–1.2)
BUN SERPL-MCNC: 17 MG/DL — SIGNIFICANT CHANGE UP (ref 10–20)
CALCIUM SERPL-MCNC: 9 MG/DL — SIGNIFICANT CHANGE UP (ref 8.5–10.1)
CHLORIDE SERPL-SCNC: 98 MMOL/L — SIGNIFICANT CHANGE UP (ref 98–110)
CO2 SERPL-SCNC: 25 MMOL/L — SIGNIFICANT CHANGE UP (ref 17–32)
CREAT SERPL-MCNC: 0.8 MG/DL — SIGNIFICANT CHANGE UP (ref 0.7–1.5)
CULTURE RESULTS: SIGNIFICANT CHANGE UP
CULTURE RESULTS: SIGNIFICANT CHANGE UP
EGFR: 106 ML/MIN/1.73M2 — SIGNIFICANT CHANGE UP
EOSINOPHIL # BLD AUTO: 0.23 K/UL — SIGNIFICANT CHANGE UP (ref 0–0.7)
EOSINOPHIL NFR BLD AUTO: 4.7 % — SIGNIFICANT CHANGE UP (ref 0–8)
GLUCOSE SERPL-MCNC: 149 MG/DL — HIGH (ref 70–99)
HCT VFR BLD CALC: 34.6 % — LOW (ref 42–52)
HGB BLD-MCNC: 10.2 G/DL — LOW (ref 14–18)
IMM GRANULOCYTES NFR BLD AUTO: 0.4 % — HIGH (ref 0.1–0.3)
LYMPHOCYTES # BLD AUTO: 2.09 K/UL — SIGNIFICANT CHANGE UP (ref 1.2–3.4)
LYMPHOCYTES # BLD AUTO: 42.8 % — SIGNIFICANT CHANGE UP (ref 20.5–51.1)
MAGNESIUM SERPL-MCNC: 2.2 MG/DL — SIGNIFICANT CHANGE UP (ref 1.8–2.4)
MCHC RBC-ENTMCNC: 25.4 PG — LOW (ref 27–31)
MCHC RBC-ENTMCNC: 29.5 G/DL — LOW (ref 32–37)
MCV RBC AUTO: 86.1 FL — SIGNIFICANT CHANGE UP (ref 80–94)
MONOCYTES # BLD AUTO: 0.54 K/UL — SIGNIFICANT CHANGE UP (ref 0.1–0.6)
MONOCYTES NFR BLD AUTO: 11.1 % — HIGH (ref 1.7–9.3)
NEUTROPHILS # BLD AUTO: 1.98 K/UL — SIGNIFICANT CHANGE UP (ref 1.4–6.5)
NEUTROPHILS NFR BLD AUTO: 40.6 % — LOW (ref 42.2–75.2)
NRBC # BLD: 0 /100 WBCS — SIGNIFICANT CHANGE UP (ref 0–0)
ORGANISM # SPEC MICROSCOPIC CNT: SIGNIFICANT CHANGE UP
ORGANISM # SPEC MICROSCOPIC CNT: SIGNIFICANT CHANGE UP
PLATELET # BLD AUTO: 393 K/UL — SIGNIFICANT CHANGE UP (ref 130–400)
POTASSIUM SERPL-MCNC: 4.6 MMOL/L — SIGNIFICANT CHANGE UP (ref 3.5–5)
POTASSIUM SERPL-SCNC: 4.6 MMOL/L — SIGNIFICANT CHANGE UP (ref 3.5–5)
PROT SERPL-MCNC: 7.9 G/DL — SIGNIFICANT CHANGE UP (ref 6–8)
RBC # BLD: 4.02 M/UL — LOW (ref 4.7–6.1)
RBC # FLD: 16.3 % — HIGH (ref 11.5–14.5)
SODIUM SERPL-SCNC: 136 MMOL/L — SIGNIFICANT CHANGE UP (ref 135–146)
SPECIMEN SOURCE: SIGNIFICANT CHANGE UP
SPECIMEN SOURCE: SIGNIFICANT CHANGE UP
WBC # BLD: 4.88 K/UL — SIGNIFICANT CHANGE UP (ref 4.8–10.8)
WBC # FLD AUTO: 4.88 K/UL — SIGNIFICANT CHANGE UP (ref 4.8–10.8)

## 2022-03-22 PROCEDURE — 99233 SBSQ HOSP IP/OBS HIGH 50: CPT

## 2022-03-22 PROCEDURE — 99221 1ST HOSP IP/OBS SF/LOW 40: CPT

## 2022-03-22 RX ORDER — CHLORHEXIDINE GLUCONATE 213 G/1000ML
1 SOLUTION TOPICAL
Refills: 0 | Status: DISCONTINUED | OUTPATIENT
Start: 2022-03-22 | End: 2022-04-06

## 2022-03-22 RX ORDER — SODIUM CHLORIDE 9 MG/ML
10 INJECTION INTRAMUSCULAR; INTRAVENOUS; SUBCUTANEOUS
Refills: 0 | Status: DISCONTINUED | OUTPATIENT
Start: 2022-03-22 | End: 2022-04-06

## 2022-03-22 RX ADMIN — Medication 1 MILLIGRAM(S): at 11:45

## 2022-03-22 RX ADMIN — MIRTAZAPINE 60 MILLIGRAM(S): 45 TABLET, ORALLY DISINTEGRATING ORAL at 21:19

## 2022-03-22 RX ADMIN — Medication 15 MILLIGRAM(S): at 14:39

## 2022-03-22 RX ADMIN — Medication 2 MILLIGRAM(S): at 14:41

## 2022-03-22 RX ADMIN — Medication 100 MILLIGRAM(S): at 14:39

## 2022-03-22 RX ADMIN — Medication 2 MILLIGRAM(S): at 05:04

## 2022-03-22 RX ADMIN — GABAPENTIN 300 MILLIGRAM(S): 400 CAPSULE ORAL at 21:19

## 2022-03-22 RX ADMIN — CYCLOBENZAPRINE HYDROCHLORIDE 10 MILLIGRAM(S): 10 TABLET, FILM COATED ORAL at 21:19

## 2022-03-22 RX ADMIN — Medication 2 MILLIGRAM(S): at 21:19

## 2022-03-22 RX ADMIN — Medication 100 MILLIGRAM(S): at 05:04

## 2022-03-22 RX ADMIN — GABAPENTIN 300 MILLIGRAM(S): 400 CAPSULE ORAL at 14:42

## 2022-03-22 RX ADMIN — QUETIAPINE FUMARATE 200 MILLIGRAM(S): 200 TABLET, FILM COATED ORAL at 21:19

## 2022-03-22 RX ADMIN — LIDOCAINE 1 PATCH: 4 CREAM TOPICAL at 11:45

## 2022-03-22 RX ADMIN — QUETIAPINE FUMARATE 200 MILLIGRAM(S): 200 TABLET, FILM COATED ORAL at 14:42

## 2022-03-22 RX ADMIN — CYCLOBENZAPRINE HYDROCHLORIDE 10 MILLIGRAM(S): 10 TABLET, FILM COATED ORAL at 05:15

## 2022-03-22 RX ADMIN — BUPROPION HYDROCHLORIDE 300 MILLIGRAM(S): 150 TABLET, EXTENDED RELEASE ORAL at 11:45

## 2022-03-22 RX ADMIN — Medication 1 TABLET(S): at 11:45

## 2022-03-22 RX ADMIN — GABAPENTIN 300 MILLIGRAM(S): 400 CAPSULE ORAL at 05:04

## 2022-03-22 RX ADMIN — Medication 100 MILLIGRAM(S): at 21:18

## 2022-03-22 RX ADMIN — Medication 1 PATCH: at 11:45

## 2022-03-22 RX ADMIN — CHLORHEXIDINE GLUCONATE 1 APPLICATION(S): 213 SOLUTION TOPICAL at 05:05

## 2022-03-22 RX ADMIN — QUETIAPINE FUMARATE 200 MILLIGRAM(S): 200 TABLET, FILM COATED ORAL at 05:04

## 2022-03-22 RX ADMIN — METHADONE HYDROCHLORIDE 135 MILLIGRAM(S): 40 TABLET ORAL at 11:46

## 2022-03-22 RX ADMIN — Medication 325 MILLIGRAM(S): at 11:45

## 2022-03-22 RX ADMIN — Medication 15 MILLIGRAM(S): at 05:15

## 2022-03-22 NOTE — PROGRESS NOTE ADULT - ATTENDING COMMENTS
52yo M w/ pmhx of IVDU (heroin), vertebral osteomyelitis, and MSSA bacteremia presents for worsening back pain    Spinal OM, with epidural abscess/phlegmon---Recently completed 6wks of antibiotics which ended on 3/1.  MR Lumbar Spine 03/18 significant for  left L5-S1 facetitis with associated multifocal  abscess formation directly adjacent to the left facet joint, Ventral epidural phlegmon overlying the L5 vertebral body causing significant compression of the descending nerve roots.  ID noted-chest findings and involvement of facet joints, would rule out TB with AFB sputum x 1 negative so far , quantiferon gold is negative , repeat HIV screen negative & pulmonary evaluation suggested no new intervention    cefazolin 2g q 8 hours for now--  Follow-up blood cx are negative  will follow results of IR biopsy done 3/16---staph aureus-- continue cefazolin for now for 4 more week as per ID-- will need PICC line in case req longer course of ABX    Opioid abuse on Methadone  RUL Nodular Opacities  Recent history & post treatment for MSSA bacteremia  Hx of IVDU  Active nicotine dependence  morbid obesity   wounds on hands-- wound care done-- no debridement needed-- seen by Burn today    PICC line pending- - case management  waiting for auth--SPOKE with mom at bedside-- will go to [facility] -- patient reluctant.

## 2022-03-22 NOTE — PROGRESS NOTE ADULT - SUBJECTIVE AND OBJECTIVE BOX
MIKAEL MCDERMOTT 51y Male  MRN#: [id number]     Hospital Day: 10d    Pt is currently admitted with the primary diagnosis of vertebral OM    SUBJECTIVE    No Overnight events     No Subjective complaints     patient is seen and examined    Present Today:   - Pablito:  No                                             ----------------------------------------------------------  OBJECTIVE  PAST MEDICAL & SURGICAL HISTORY  IV drug abuse    Vertebral osteomyelitis    MSSA bacteremia    No significant past surgical history                                              -----------------------------------------------------------  ALLERGIES:  No Known Allergies                                            ------------------------------------------------------------    HOME MEDICATIONS  Home Medications:  acetaminophen 325 mg oral tablet: 2 tab(s) orally every 6 hours (12 Mar 2022 21:41)  cyclobenzaprine 10 mg oral tablet: 1 tab(s) orally every 8 hours (12 Mar 2022 21:41)  ferrous sulfate 325 mg (65 mg elemental iron) oral tablet: 1 tab(s) orally once a day (12 Mar 2022 21:41)  folic acid 1 mg oral tablet: 1 tab(s) orally once a day (12 Mar 2022 21:41)  gabapentin 300 mg oral capsule: 1 cap(s) orally 3 times a day (12 Mar 2022 21:41)  methadone: 135 milligram(s) orally once a day (12 Mar 2022 21:41)  Multiple Vitamins oral tablet: 1 tab(s) orally once a day (12 Mar 2022 21:41)  nicotine 21 mg/24 hr transdermal film, extended release: 1 patch transdermal once a day (12 Mar 2022 21:41)  Remeron 30 mg oral tablet: 2 tab(s) orally once a day (at bedtime) (12 Mar 2022 21:41)  SEROquel 200 mg oral tablet: orally 3 times a day (12 Mar 2022 21:41)  Wellbutrin: 300 milligram(s) orally once a day (12 Mar 2022 21:41)  Xanax: 2 milligram(s) orally 3 times a day (12 Mar 2022 21:41)                           MEDICATIONS:  STANDING MEDICATIONS  ALPRAZolam 2 milliGRAM(s) Oral three times a day  buPROPion XL (24-Hour) . 300 milliGRAM(s) Oral daily  ceFAZolin   IVPB 2000 milliGRAM(s) IV Intermittent every 8 hours  chlorhexidine 4% Liquid 1 Application(s) Topical <User Schedule>  enoxaparin Injectable 40 milliGRAM(s) SubCutaneous every 24 hours  ferrous    sulfate 325 milliGRAM(s) Oral daily  folic acid 1 milliGRAM(s) Oral daily  gabapentin 300 milliGRAM(s) Oral three times a day  lidocaine   4% Patch 1 Patch Transdermal daily  methadone    Tablet 135 milliGRAM(s) Oral daily  mirtazapine 60 milliGRAM(s) Oral at bedtime  multivitamin 1 Tablet(s) Oral daily  nicotine - 21 mG/24Hr(s) Patch 1 patch Transdermal daily  QUEtiapine 200 milliGRAM(s) Oral three times a day    PRN MEDICATIONS  acetaminophen     Tablet .. 650 milliGRAM(s) Oral every 6 hours PRN  cyclobenzaprine 10 milliGRAM(s) Oral three times a day PRN  ketorolac   Injectable 15 milliGRAM(s) IV Push every 8 hours PRN  ondansetron Injectable 4 milliGRAM(s) IV Push every 4 hours PRN                                            ------------------------------------------------------------  VITAL SIGNS: Last 24 Hours  T(C): 36.1 (22 Mar 2022 00:00), Max: 36.1 (22 Mar 2022 00:00)  T(F): 97 (22 Mar 2022 00:00), Max: 97 (22 Mar 2022 00:00)  HR: 75 (22 Mar 2022 00:00) (75 - 75)  BP: 107/66 (22 Mar 2022 00:00) (107/66 - 107/66)  BP(mean): --  RR: 18 (22 Mar 2022 00:00) (18 - 18)  SpO2: --      03-21-22 @ 07:01  -  03-22-22 @ 07:00  --------------------------------------------------------  IN: 50 mL / OUT: 450 mL / NET: -400 mL                                             --------------------------------------------------------------  LABS:                        10.2   4.88  )-----------( 393      ( 22 Mar 2022 06:00 )             34.6     03-22    136  |  98  |  17  ----------------------------<  149<H>  4.6   |  25  |  0.8    Ca    9.0      22 Mar 2022 06:00  Mg     2.2     03-22    TPro  7.9  /  Alb  3.3<L>  /  TBili  <0.2  /  DBili  x   /  AST  14  /  ALT  10  /  AlkPhos  114  03-22                                                              -------------------------------------------------------------  RADIOLOGY:                                            --------------------------------------------------------------    PHYSICAL EXAM:    General: NAD  LUNGS: clear air entry bilaterally, no wheezing or crackles  HEART: s1/s2, RRR  ABDOMEN: soft, ntnd, bs+  EXT: no LLE bilaterally, warm extremities  NEURO: AAOX4  SKIN: BL wrist wounds, otherwise intact, no rashes or lesions

## 2022-03-22 NOTE — CONSULT NOTE ADULT - CONSULT REQUESTED DATE/TIME
22-Mar-2022 15:28
15-Mar-2022 14:48
12-Mar-2022 17:20
15-Mar-2022 15:13
21-Mar-2022 11:03
13-Mar-2022 10:17

## 2022-03-22 NOTE — CONSULT NOTE ADULT - REASON FOR ADMISSION
vertebral osteomyelitis

## 2022-03-22 NOTE — CONSULT NOTE ADULT - SUBJECTIVE AND OBJECTIVE BOX
HPI:  52yo M w/ pmhx of IVDU (heroin), vertebral osteomyelitis, and MSSA bacteremia presents for worsening back pain. Back pain was increasing over the past week and worsening today to the point he was having difficulty ambulating and bearing weight. Back pain started one week ago and difficulty ambulating started on Tuesday 3/8. Was discharged from [facility] on 3/2. Patient was recently admitted from 1/16 to 2/1 for vertebral osteomyelitis and MSSA bacteremia. MRI found facetitis with adjacent abscesses. PAM was negative and ID recommended cefazolin. Patient had picc line placed for 6wks of antibiotics which ended on 3/1. Denies urinary/bowel incontinence/retention, numbness in legs, fever/chills, chest pain, SOB, abd pain, n/v/d.    ED course:  vitals: /94, HR 84, T 98.4F, O2 96% RA   labs: hgb 9.0,   imaging:   - CT L-T spine IV con:   Since 1/15/2022 there has been significant progression of discitis osteomyelitis at L4-5 with multifocal erosive changes at the endplates and likely prevertebral phlegmon formation. Progression of L5-S1 facetitis, Facetitis in T10-T11.  progress: neurosurgery said no surgical intervention  (12 Mar 2022 21:00)    BURN consulted for bilateral wrist wounds. Per patient, Has hx of chronic Full thickness wound to b/l dorsal wrists, was being treated with Medihoney ointment. Patient states worsening of wounds due to ointment.       Allergies    No Known Allergies    Intolerances      PAST MEDICAL & SURGICAL HISTORY:  IV drug abuse    Vertebral osteomyelitis    MSSA bacteremia    No significant past surgical history                          10.2   4.88  )-----------( 393      ( 22 Mar 2022 06:00 )             34.6     ICU Vital Signs Last 24 Hrs  T(C): 36.1 (22 Mar 2022 00:00), Max: 36.1 (22 Mar 2022 00:00)  T(F): 97 (22 Mar 2022 00:00), Max: 97 (22 Mar 2022 00:00)  HR: 75 (22 Mar 2022 00:00) (75 - 75)  BP: 107/66 (22 Mar 2022 00:00) (107/66 - 107/66)  RR: 18 (22 Mar 2022 00:00) (18 - 18)    PHYSICAL EXAM:  GENERAL: NAD, lying in bed comfortably   CHEST/LUNG: Breathing comfortably on room air. No increased work of breathing noted.   HEART: In no acute cardiopulmonary distress.   PSYCH: AAOx3  SKIN: Bilateral dorsal wrist: contracted at wrist joints- with dry crusted adherent tissue that was excised using suture removal kit revealing pink, moist granulating wound base. No purulent drainage noted. No malodor noted. No active bleeding evident.      Local excisional debridement performed at bedside using suture removal kit involving dermis.   Dressing applied. Patient tolerated well.          HPI:  52yo M w/ pmhx of IVDU (heroin), vertebral osteomyelitis, and MSSA bacteremia presents for worsening back pain. Back pain was increasing over the past week and worsening today to the point he was having difficulty ambulating and bearing weight. Back pain started one week ago and difficulty ambulating started on Tuesday 3/8. Was discharged from [facility] on 3/2. Patient was recently admitted from 1/16 to 2/1 for vertebral osteomyelitis and MSSA bacteremia. MRI found facetitis with adjacent abscesses. PAM was negative and ID recommended cefazolin. Patient had picc line placed for 6wks of antibiotics which ended on 3/1. Denies urinary/bowel incontinence/retention, numbness in legs, fever/chills, chest pain, SOB, abd pain, n/v/d.    ED course:  vitals: /94, HR 84, T 98.4F, O2 96% RA   labs: hgb 9.0,   imaging:   - CT L-T spine IV con:   Since 1/15/2022 there has been significant progression of discitis osteomyelitis at L4-5 with multifocal erosive changes at the endplates and likely prevertebral phlegmon formation. Progression of L5-S1 facetitis, Facetitis in T10-T11.  progress: neurosurgery said no surgical intervention  (12 Mar 2022 21:00)    BURN consulted for bilateral wrist wounds. Per patient, Has hx of chronic Full thickness wound to b/l dorsal wrists, was being treated with Medihoney ointment. Patient states worsening of wounds due to ointment.       Allergies    No Known Allergies    Intolerances      PAST MEDICAL & SURGICAL HISTORY:  IV drug abuse    Vertebral osteomyelitis    MSSA bacteremia    No significant past surgical history                          10.2   4.88  )-----------( 393      ( 22 Mar 2022 06:00 )             34.6     ICU Vital Signs Last 24 Hrs  T(C): 36.1 (22 Mar 2022 00:00), Max: 36.1 (22 Mar 2022 00:00)  T(F): 97 (22 Mar 2022 00:00), Max: 97 (22 Mar 2022 00:00)  HR: 75 (22 Mar 2022 00:00) (75 - 75)  BP: 107/66 (22 Mar 2022 00:00) (107/66 - 107/66)  RR: 18 (22 Mar 2022 00:00) (18 - 18)    PHYSICAL EXAM:  GENERAL: NAD, lying in bed comfortably   CHEST/LUNG: Breathing comfortably on room air. No increased work of breathing noted.   HEART: In no acute cardiopulmonary distress.   PSYCH: AAOx3  SKIN: Bilateral dorsal wrist: contracted at wrist joints- with dry crusted adherent tissue that was excised using suture removal kit revealing pink, moist granulating wound base. No purulent drainage noted. No malodor noted. No active bleeding evident.      Procedure - with pt's verbal consent- excisional debridement performed at bedside using suture removal kit - dry eschar and devitalized tissue removed including dermis.     Dressing applied. Patient tolerated well.

## 2022-03-22 NOTE — CONSULT NOTE ADULT - CONSULT REASON
Aspiration of lumbar abscess.
Bilateral wrist wounds
Back pain, hx Osteomyelitis
vertebral osteomyelitis
PICC line placement
Abnormal CT

## 2022-03-22 NOTE — CONSULT NOTE ADULT - ASSESSMENT
Ass/ Rec:  Full thickness wounds to b/l dorsal wrists with granulation tissue    Wound care - Please wash wound with soap and water, apply hydrogel cover with xeroform, wrap with Kerlix and ACE wrap twice a day.   IV abx as indicated  No Surgical debridement needed at this time.   Elevation and compression     Remainder of care per primary team.    Ass/ Rec:  Full thickness wounds to b/l dorsal wrists with granulation tissue    Wound care - Please wash wound with soap and water, apply hydrogel cover with xeroform, wrap with Kerlix and ACE wrap twice a day.   IV abx as indicated  No Surgical debridement needed at this time.   Elevation and compression   OT c/s    Remainder of care per primary team.    Ass/ Rec:  healed skin and small full thickness wounds and yellow exudate under thick adherent flaky eschar  b/l dorsal wrists Wound care - Please wash wound with soap and water, apply hydrogel cover with xeroform, wrap with Kerlix and ACE wrap twice a day.   IV abx as indicated  No further surgical debridement needed at this time.   Elevation and compression   OT c/s    Remainder of care per primary team.

## 2022-03-23 LAB
ALBUMIN SERPL ELPH-MCNC: 3.2 G/DL — LOW (ref 3.5–5.2)
ALP SERPL-CCNC: 106 U/L — SIGNIFICANT CHANGE UP (ref 30–115)
ALT FLD-CCNC: 8 U/L — SIGNIFICANT CHANGE UP (ref 0–41)
ANION GAP SERPL CALC-SCNC: 11 MMOL/L — SIGNIFICANT CHANGE UP (ref 7–14)
AST SERPL-CCNC: 15 U/L — SIGNIFICANT CHANGE UP (ref 0–41)
BASOPHILS # BLD AUTO: 0.04 K/UL — SIGNIFICANT CHANGE UP (ref 0–0.2)
BASOPHILS NFR BLD AUTO: 0.7 % — SIGNIFICANT CHANGE UP (ref 0–1)
BILIRUB SERPL-MCNC: <0.2 MG/DL — SIGNIFICANT CHANGE UP (ref 0.2–1.2)
BUN SERPL-MCNC: 18 MG/DL — SIGNIFICANT CHANGE UP (ref 10–20)
CALCIUM SERPL-MCNC: 8.9 MG/DL — SIGNIFICANT CHANGE UP (ref 8.5–10.1)
CHLORIDE SERPL-SCNC: 100 MMOL/L — SIGNIFICANT CHANGE UP (ref 98–110)
CO2 SERPL-SCNC: 24 MMOL/L — SIGNIFICANT CHANGE UP (ref 17–32)
CREAT SERPL-MCNC: 0.9 MG/DL — SIGNIFICANT CHANGE UP (ref 0.7–1.5)
EGFR: 103 ML/MIN/1.73M2 — SIGNIFICANT CHANGE UP
EOSINOPHIL # BLD AUTO: 0.25 K/UL — SIGNIFICANT CHANGE UP (ref 0–0.7)
EOSINOPHIL NFR BLD AUTO: 4.5 % — SIGNIFICANT CHANGE UP (ref 0–8)
GLUCOSE SERPL-MCNC: 88 MG/DL — SIGNIFICANT CHANGE UP (ref 70–99)
HCT VFR BLD CALC: 31.9 % — LOW (ref 42–52)
HGB BLD-MCNC: 9.8 G/DL — LOW (ref 14–18)
IMM GRANULOCYTES NFR BLD AUTO: 0.5 % — HIGH (ref 0.1–0.3)
LYMPHOCYTES # BLD AUTO: 1.87 K/UL — SIGNIFICANT CHANGE UP (ref 1.2–3.4)
LYMPHOCYTES # BLD AUTO: 33.5 % — SIGNIFICANT CHANGE UP (ref 20.5–51.1)
MAGNESIUM SERPL-MCNC: 2.1 MG/DL — SIGNIFICANT CHANGE UP (ref 1.8–2.4)
MCHC RBC-ENTMCNC: 26.1 PG — LOW (ref 27–31)
MCHC RBC-ENTMCNC: 30.7 G/DL — LOW (ref 32–37)
MCV RBC AUTO: 85.1 FL — SIGNIFICANT CHANGE UP (ref 80–94)
MONOCYTES # BLD AUTO: 0.63 K/UL — HIGH (ref 0.1–0.6)
MONOCYTES NFR BLD AUTO: 11.3 % — HIGH (ref 1.7–9.3)
NEUTROPHILS # BLD AUTO: 2.76 K/UL — SIGNIFICANT CHANGE UP (ref 1.4–6.5)
NEUTROPHILS NFR BLD AUTO: 49.5 % — SIGNIFICANT CHANGE UP (ref 42.2–75.2)
NRBC # BLD: 0 /100 WBCS — SIGNIFICANT CHANGE UP (ref 0–0)
PLATELET # BLD AUTO: 367 K/UL — SIGNIFICANT CHANGE UP (ref 130–400)
POTASSIUM SERPL-MCNC: 5.2 MMOL/L — HIGH (ref 3.5–5)
POTASSIUM SERPL-SCNC: 5.2 MMOL/L — HIGH (ref 3.5–5)
PROT SERPL-MCNC: 7.5 G/DL — SIGNIFICANT CHANGE UP (ref 6–8)
RBC # BLD: 3.75 M/UL — LOW (ref 4.7–6.1)
RBC # FLD: 16.5 % — HIGH (ref 11.5–14.5)
SODIUM SERPL-SCNC: 135 MMOL/L — SIGNIFICANT CHANGE UP (ref 135–146)
WBC # BLD: 5.58 K/UL — SIGNIFICANT CHANGE UP (ref 4.8–10.8)
WBC # FLD AUTO: 5.58 K/UL — SIGNIFICANT CHANGE UP (ref 4.8–10.8)

## 2022-03-23 PROCEDURE — 99233 SBSQ HOSP IP/OBS HIGH 50: CPT

## 2022-03-23 PROCEDURE — 36573 INSJ PICC RS&I 5 YR+: CPT

## 2022-03-23 RX ADMIN — GABAPENTIN 300 MILLIGRAM(S): 400 CAPSULE ORAL at 21:20

## 2022-03-23 RX ADMIN — GABAPENTIN 300 MILLIGRAM(S): 400 CAPSULE ORAL at 14:27

## 2022-03-23 RX ADMIN — Medication 100 MILLIGRAM(S): at 21:21

## 2022-03-23 RX ADMIN — QUETIAPINE FUMARATE 200 MILLIGRAM(S): 200 TABLET, FILM COATED ORAL at 06:06

## 2022-03-23 RX ADMIN — Medication 15 MILLIGRAM(S): at 14:56

## 2022-03-23 RX ADMIN — Medication 1 MILLIGRAM(S): at 11:54

## 2022-03-23 RX ADMIN — Medication 1 TABLET(S): at 11:54

## 2022-03-23 RX ADMIN — Medication 15 MILLIGRAM(S): at 06:25

## 2022-03-23 RX ADMIN — CHLORHEXIDINE GLUCONATE 1 APPLICATION(S): 213 SOLUTION TOPICAL at 06:10

## 2022-03-23 RX ADMIN — GABAPENTIN 300 MILLIGRAM(S): 400 CAPSULE ORAL at 06:06

## 2022-03-23 RX ADMIN — LIDOCAINE 1 PATCH: 4 CREAM TOPICAL at 14:27

## 2022-03-23 RX ADMIN — CYCLOBENZAPRINE HYDROCHLORIDE 10 MILLIGRAM(S): 10 TABLET, FILM COATED ORAL at 06:25

## 2022-03-23 RX ADMIN — Medication 1 PATCH: at 14:28

## 2022-03-23 RX ADMIN — Medication 100 MILLIGRAM(S): at 14:33

## 2022-03-23 RX ADMIN — CYCLOBENZAPRINE HYDROCHLORIDE 10 MILLIGRAM(S): 10 TABLET, FILM COATED ORAL at 21:20

## 2022-03-23 RX ADMIN — Medication 2 MILLIGRAM(S): at 06:06

## 2022-03-23 RX ADMIN — QUETIAPINE FUMARATE 200 MILLIGRAM(S): 200 TABLET, FILM COATED ORAL at 21:20

## 2022-03-23 RX ADMIN — QUETIAPINE FUMARATE 200 MILLIGRAM(S): 200 TABLET, FILM COATED ORAL at 14:27

## 2022-03-23 RX ADMIN — CYCLOBENZAPRINE HYDROCHLORIDE 10 MILLIGRAM(S): 10 TABLET, FILM COATED ORAL at 14:54

## 2022-03-23 RX ADMIN — METHADONE HYDROCHLORIDE 135 MILLIGRAM(S): 40 TABLET ORAL at 11:53

## 2022-03-23 RX ADMIN — BUPROPION HYDROCHLORIDE 300 MILLIGRAM(S): 150 TABLET, EXTENDED RELEASE ORAL at 11:54

## 2022-03-23 RX ADMIN — Medication 2 MILLIGRAM(S): at 14:27

## 2022-03-23 RX ADMIN — MIRTAZAPINE 60 MILLIGRAM(S): 45 TABLET, ORALLY DISINTEGRATING ORAL at 21:20

## 2022-03-23 RX ADMIN — Medication 325 MILLIGRAM(S): at 11:54

## 2022-03-23 RX ADMIN — Medication 2 MILLIGRAM(S): at 21:21

## 2022-03-23 RX ADMIN — Medication 100 MILLIGRAM(S): at 06:07

## 2022-03-23 NOTE — PROGRESS NOTE ADULT - SUBJECTIVE AND OBJECTIVE BOX
SUBJECTIVE:    Patient is a 51y old Male who presents with a chief complaint of vertebral osteomyelitis (23 Mar 2022 17:21)    Currently admitted to medicine with the primary diagnosis of Vertebral osteomyelitis      Today is hospital day 11d. This morning he is resting comfortably in bed and reports no new issues or overnight events.     PAST MEDICAL & SURGICAL HISTORY  IV drug abuse    Vertebral osteomyelitis    MSSA bacteremia    No significant past surgical history      SOCIAL HISTORY:  Negative for smoking/alcohol/drug use.     ALLERGIES:  No Known Allergies    MEDICATIONS:  STANDING MEDICATIONS  ALPRAZolam 2 milliGRAM(s) Oral three times a day  buPROPion XL (24-Hour) . 300 milliGRAM(s) Oral daily  ceFAZolin   IVPB 2000 milliGRAM(s) IV Intermittent every 8 hours  chlorhexidine 4% Liquid 1 Application(s) Topical <User Schedule>  chlorhexidine 4% Liquid 1 Application(s) Topical <User Schedule>  enoxaparin Injectable 40 milliGRAM(s) SubCutaneous every 24 hours  ferrous    sulfate 325 milliGRAM(s) Oral daily  folic acid 1 milliGRAM(s) Oral daily  gabapentin 300 milliGRAM(s) Oral three times a day  lidocaine   4% Patch 1 Patch Transdermal daily  methadone    Tablet 135 milliGRAM(s) Oral daily  mirtazapine 60 milliGRAM(s) Oral at bedtime  multivitamin 1 Tablet(s) Oral daily  nicotine - 21 mG/24Hr(s) Patch 1 patch Transdermal daily  QUEtiapine 200 milliGRAM(s) Oral three times a day    PRN MEDICATIONS  acetaminophen     Tablet .. 650 milliGRAM(s) Oral every 6 hours PRN  cyclobenzaprine 10 milliGRAM(s) Oral three times a day PRN  ketorolac   Injectable 15 milliGRAM(s) IV Push every 8 hours PRN  ondansetron Injectable 4 milliGRAM(s) IV Push every 4 hours PRN  sodium chloride 0.9% lock flush 10 milliLiter(s) IV Push every 1 hour PRN      LABS:                        9.8    5.58  )-----------( 367      ( 23 Mar 2022 04:30 )             31.9     03-23    135  |  100  |  18  ----------------------------<  88  5.2<H>   |  24  |  0.9    Ca    8.9      23 Mar 2022 04:30  Mg     2.1     03-23    TPro  7.5  /  Alb  3.2<L>  /  TBili  <0.2  /  DBili  x   /  AST  15  /  ALT  8   /  AlkPhos  106  03-23                  RADIOLOGY:    VITALS:   T(F): 99.1, Max: 99.1 (03-23-22 @ 15:15)  HR: 89  BP: 126/83  RR: 19  SpO2: --  PHYSICAL EXAM:  GEN: No acute distress  LUNGS: Clear to auscultation bilaterally, no wheezes rhonchi or rales  HEART: Regular rate and rhythm, S1, S2 auscultated, no murmurs, rubs, or gallops  ABD: Soft, non-tender, non-distended.  EXT: No edema, no pallor, pulses 2+ BL   NEURO: AAOX3    Intravenous access:   NG tube:   Kenney Catheter:

## 2022-03-23 NOTE — PROGRESS NOTE ADULT - SUBJECTIVE AND OBJECTIVE BOX
Progress Note:  Provider Speciality                            Hospitalist      AKILMIKAEL LAO MRN-[id number] 51y Male     CHIEF PRESENTING COMPLAINT:  Patient is a 51y old  Male who presents with a chief complaint of vertebral osteomyelitis (23 Mar 2022 19:16)        SUBJECTIVE:  Patient was seen and examined at bedside. No new complaints described by patient in morning rounds.   No significant overnight events reported.     HISTORY OF PRESENTING ILLNESS:  HPI:  50yo M w/ pmhx of IVDU (heroin), vertebral osteomyelitis, and MSSA bacteremia presents for worsening back pain. Back pain was increasing over the past week and worsening today to the point he was having difficulty ambulating and bearing weight. Back pain started one week ago and difficulty ambulating started on Tuesday 3/8. Was discharged from [facility] on 3/2. Patient was recently admitted from 1/16 to 2/1 for vertebral osteomyelitis and MSSA bacteremia. MRI found facetitis with adjacent abscesses. PAM was negative and ID recommended cefazolin. Patient had picc line placed for 6wks of antibiotics which ended on 3/1. Denies urinary/bowel incontinence/retention, numbness in legs, fever/chills, chest pain, SOB, abd pain, n/v/d.    ED course:  vitals: /94, HR 84, T 98.4F, O2 96% RA   labs: hgb 9.0,   imaging:   - CT L-T spine IV con:   Since 1/15/2022 there has been significant progression of discitis osteomyelitis at L4-5 with multifocal erosive changes at the endplates and likely prevertebral phlegmon formation. Progression of L5-S1 facetitis, Facetitis in T10-T11.  progress: neurosurgery said no surgical intervention  (12 Mar 2022 21:00)        REVIEW OF SYSTEMS:  Patient denies any headache, any vision complaints, runny nose, fever, chills. Denies chest pain, shortness of breath, palpitation. Denies nausea, vomiting, abdominal pain or diarrhoea, Denies dysuria. Denies  localized weakness in any part of the body or numbness.   At least 10 systems were reviewed in ROS. All systems reviewed  are within normal limits except for the complaints as described in Subjective.    PAST MEDICAL & SURGICAL HISTORY:  PAST MEDICAL & SURGICAL HISTORY:  IV drug abuse    Vertebral osteomyelitis    MSSA bacteremia    No significant past surgical history            VITAL SIGNS:  Vital Signs Last 24 Hrs  T(C): 35.6 (24 Mar 2022 08:00), Max: 37.3 (23 Mar 2022 15:15)  T(F): 96.1 (24 Mar 2022 08:00), Max: 99.1 (23 Mar 2022 15:15)  HR: 88 (24 Mar 2022 08:00) (72 - 89)  BP: 109/75 (24 Mar 2022 08:00) (109/75 - 126/83)  BP(mean): --  RR: 18 (24 Mar 2022 08:00) (18 - 19)  SpO2: --          PHYSICAL EXAMINATION:  Not in acute distress, obese  General: No icterus  HEENT:   no JVD.  Heart: S1+S2 audible  Lungs: bilateral  moderate air entry, no wheezing, no crepitations.  Abdomen: Soft, non-tender, non-distended , no  rigidity or guarding.  CNS: Awake alert, CN  grossly intact.  Extremities:  No edema    ,BL distal forearm wounds- healing         CONSULTS:  Consultant(s) Notes Reviewed by me.   Care Discussed with Consultants/Other Providers where required.        MEDICATIONS:  MEDICATIONS  (STANDING):  ALPRAZolam 2 milliGRAM(s) Oral three times a day  buPROPion XL (24-Hour) . 300 milliGRAM(s) Oral daily  ceFAZolin   IVPB 2000 milliGRAM(s) IV Intermittent every 8 hours  chlorhexidine 4% Liquid 1 Application(s) Topical <User Schedule>  chlorhexidine 4% Liquid 1 Application(s) Topical <User Schedule>  enoxaparin Injectable 40 milliGRAM(s) SubCutaneous every 24 hours  ferrous    sulfate 325 milliGRAM(s) Oral daily  folic acid 1 milliGRAM(s) Oral daily  gabapentin 300 milliGRAM(s) Oral three times a day  lidocaine   4% Patch 1 Patch Transdermal daily  methadone    Tablet 135 milliGRAM(s) Oral daily  mirtazapine 60 milliGRAM(s) Oral at bedtime  multivitamin 1 Tablet(s) Oral daily  nicotine - 21 mG/24Hr(s) Patch 1 patch Transdermal daily  QUEtiapine 200 milliGRAM(s) Oral three times a day    MEDICATIONS  (PRN):  acetaminophen     Tablet .. 650 milliGRAM(s) Oral every 6 hours PRN Temp greater or equal to 38C (100.4F), Mild Pain (1 - 3)  cyclobenzaprine 10 milliGRAM(s) Oral three times a day PRN Muscle Spasm  ketorolac   Injectable 15 milliGRAM(s) IV Push every 8 hours PRN Severe Pain (7 - 10)  ondansetron Injectable 4 milliGRAM(s) IV Push every 4 hours PRN Nausea and/or Vomiting  sodium chloride 0.9% lock flush 10 milliLiter(s) IV Push every 1 hour PRN Pre/post blood products, medications, blood draw, and to maintain line patency            ASSESSMENT:        50yo M w/ pmhx of IVDU (heroin), vertebral osteomyelitis, and MSSA bacteremia presents for worsening back pain    Spinal OM, with epidural abscess/phlegmon  Opioid abuse on Methadone  RUL Nodular Opacities  Recent history & post treatment for MSSA bacteremia  Hx of IVDU  Active nicotine dependence  Morbid obesity        No sepsis on admission  Recently completed 6 wks of antibiotics which ended on 3/1.  MR Lumbar Spine 03/18 significant for  left L5-S1 facetitis with associated multifocal  abscess formation directly adjacent to the left facet joint, Ventral epidural phlegmon overlying the L5 vertebral body causing significant compression of the descending nerve roots.  S/p IR  drainage of abscess/Bx of L4-L5 biopsy of suspected discitis done 3/16 -fluid  growing NICOLE  ID noted-QuantiFeron & HIV is negative .Airbron precautions taken off a slow risk for TB  cefazolin 2g q 8 hours for 4 weeks(end date 04/11) . Pt has a picc line. Will need repeat imaging prior to stopping antibiotics to guide course   Continue methadone 135, and gabapentin  Neuro sx- no current intervention  Chronic b/l Wrist ulceration - healing  Handoff: Stable for discharge to SNF

## 2022-03-23 NOTE — PROCEDURE NOTE - ADDITIONAL PROCEDURE DETAILS
Left transbasilic 55cm 5French powerinjectable PICC with tip overlying the cavoatrial junction. Catheter tested manually and heparinized. Ready for immediate use.

## 2022-03-23 NOTE — PROGRESS NOTE ADULT - ASSESSMENT
52yo M w/ pmhx of IVDU (heroin), vertebral osteomyelitis, and MSSA bacteremia presents for worsening back pain. Admitted for further management of progressive vertebral osteomyelitis.     #progressive vertebral osteomyelitis   #Hx of MSSA bacteremia  - MRI T-L spine 1/18/22 confirmed osteomyelitis  - PAM 1/19: no endocarditis  - bcx 1/15: MSSA; first cleared bcx on 1/18   - received cefazolin via picc line ended 3/1  - ,   - No sepsis on admission   - CT L-T spine IV con: Since 1/15/2022 there has been significant progression of discitis osteomyelitis at L4-5 with multifocal erosive changes at the endplates and likely prevertebral phlegmon formation. Progression of L5-S1 facetitis, Facetitis in T10-T11.  - MRI Thoracic/Lumbar spine 3/14: similar increased signal and enhancement involving T10-T11 facet joint space from pervious degenerative changes vs facetitis. 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, given RUL opacities seen on CT -> f/u QuantiFeron -> negative -> isolation removed  - HIV negative  - BCx 3/12-17: NGTD   - neurosurgery recs appreciated, no acute neurosurgical intervention at this time   - f/u IR/neuro IR re drainage of abscess/Bx of OM -> L4-L5 biopsy of suspected discitis done 3/16 -> fluid sent for gram stain, AFB, culture, pathology -> growing NICOLE  - c/w cefazolin 2g q8h   - ID recs appreciated -> will likely plan at least another 4 week course   - pulm eval -> Consider repeat CT in 6 weeks prone (CT not suggestive of UIP)    - s/p PICC line today  - d/c planning    #BL distal forearm wounds- healing   - surgery saw patient last admission and recommended not removing R foreign body; not a contraindication to MRI   - burn saw patient last admission and rec local wound care  - 1.2cm linear foreign body overlying right radius  - not a contraindication for MRI   - wound care recs appreciated   - f/u o/p burn     # IVDU  # suspected folic acid deficiency  # anxiety  - on home methadone 135mg solution   - c/w xanax, welbutrin, seroquel   - SBIRT c/s appreciated      #Normocytic Anemia   - multifactorial, iron deficiency, anemia of chronic disease   - c/w folate and ferrous sulfate 325mg qd     #RUL opacities  - asymptomatic   - CT chest IV con: several reticulonodular opacities in the posterior segment of the right upper lobe possibly representing UIP  - f/u pulm o/p     #Misc  - DVT ppx: Lovenox  - GI ppx: none  - Diet: regular  - Code Status: full code   - Dispo: acute from home

## 2022-03-24 PROCEDURE — 99233 SBSQ HOSP IP/OBS HIGH 50: CPT

## 2022-03-24 RX ADMIN — Medication 1 PATCH: at 12:07

## 2022-03-24 RX ADMIN — ENOXAPARIN SODIUM 40 MILLIGRAM(S): 100 INJECTION SUBCUTANEOUS at 00:15

## 2022-03-24 RX ADMIN — Medication 2 MILLIGRAM(S): at 21:08

## 2022-03-24 RX ADMIN — Medication 100 MILLIGRAM(S): at 21:08

## 2022-03-24 RX ADMIN — GABAPENTIN 300 MILLIGRAM(S): 400 CAPSULE ORAL at 21:07

## 2022-03-24 RX ADMIN — Medication 2 MILLIGRAM(S): at 14:36

## 2022-03-24 RX ADMIN — Medication 15 MILLIGRAM(S): at 12:17

## 2022-03-24 RX ADMIN — QUETIAPINE FUMARATE 200 MILLIGRAM(S): 200 TABLET, FILM COATED ORAL at 21:07

## 2022-03-24 RX ADMIN — QUETIAPINE FUMARATE 200 MILLIGRAM(S): 200 TABLET, FILM COATED ORAL at 05:18

## 2022-03-24 RX ADMIN — BUPROPION HYDROCHLORIDE 300 MILLIGRAM(S): 150 TABLET, EXTENDED RELEASE ORAL at 12:09

## 2022-03-24 RX ADMIN — Medication 1 MILLIGRAM(S): at 12:09

## 2022-03-24 RX ADMIN — CYCLOBENZAPRINE HYDROCHLORIDE 10 MILLIGRAM(S): 10 TABLET, FILM COATED ORAL at 05:18

## 2022-03-24 RX ADMIN — Medication 100 MILLIGRAM(S): at 14:37

## 2022-03-24 RX ADMIN — METHADONE HYDROCHLORIDE 135 MILLIGRAM(S): 40 TABLET ORAL at 12:10

## 2022-03-24 RX ADMIN — GABAPENTIN 300 MILLIGRAM(S): 400 CAPSULE ORAL at 05:18

## 2022-03-24 RX ADMIN — Medication 1 PATCH: at 07:11

## 2022-03-24 RX ADMIN — CHLORHEXIDINE GLUCONATE 1 APPLICATION(S): 213 SOLUTION TOPICAL at 05:17

## 2022-03-24 RX ADMIN — CYCLOBENZAPRINE HYDROCHLORIDE 10 MILLIGRAM(S): 10 TABLET, FILM COATED ORAL at 12:17

## 2022-03-24 RX ADMIN — CYCLOBENZAPRINE HYDROCHLORIDE 10 MILLIGRAM(S): 10 TABLET, FILM COATED ORAL at 21:08

## 2022-03-24 RX ADMIN — Medication 100 MILLIGRAM(S): at 05:18

## 2022-03-24 RX ADMIN — Medication 2 MILLIGRAM(S): at 05:18

## 2022-03-24 RX ADMIN — GABAPENTIN 300 MILLIGRAM(S): 400 CAPSULE ORAL at 14:33

## 2022-03-24 RX ADMIN — ENOXAPARIN SODIUM 40 MILLIGRAM(S): 100 INJECTION SUBCUTANEOUS at 22:33

## 2022-03-24 RX ADMIN — Medication 1 PATCH: at 14:44

## 2022-03-24 RX ADMIN — Medication 325 MILLIGRAM(S): at 12:08

## 2022-03-24 RX ADMIN — QUETIAPINE FUMARATE 200 MILLIGRAM(S): 200 TABLET, FILM COATED ORAL at 14:33

## 2022-03-24 RX ADMIN — LIDOCAINE 1 PATCH: 4 CREAM TOPICAL at 12:10

## 2022-03-24 RX ADMIN — Medication 15 MILLIGRAM(S): at 12:49

## 2022-03-24 RX ADMIN — Medication 1 TABLET(S): at 12:08

## 2022-03-24 RX ADMIN — MIRTAZAPINE 60 MILLIGRAM(S): 45 TABLET, ORALLY DISINTEGRATING ORAL at 21:08

## 2022-03-24 NOTE — PROGRESS NOTE ADULT - SUBJECTIVE AND OBJECTIVE BOX
Progress Note:  Provider Speciality                            Hospitalist      AKILMIKAEL LAO MRN-[id number] 51y Male     CHIEF PRESENTING COMPLAINT:  Patient is a 51y old  Male who presents with a chief complaint of vertebral osteomyelitis (23 Mar 2022 19:16)        SUBJECTIVE:  Patient was seen and examined at bedside. No new complaints described by patient in morning rounds.   No significant overnight events reported.     HISTORY OF PRESENTING ILLNESS:  HPI:  52yo M w/ pmhx of IVDU (heroin), vertebral osteomyelitis, and MSSA bacteremia presents for worsening back pain. Back pain was increasing over the past week and worsening today to the point he was having difficulty ambulating and bearing weight. Back pain started one week ago and difficulty ambulating started on Tuesday 3/8. Was discharged from [facility] on 3/2. Patient was recently admitted from 1/16 to 2/1 for vertebral osteomyelitis and MSSA bacteremia. MRI found facetitis with adjacent abscesses. PAM was negative and ID recommended cefazolin. Patient had picc line placed for 6wks of antibiotics which ended on 3/1. Denies urinary/bowel incontinence/retention, numbness in legs, fever/chills, chest pain, SOB, abd pain, n/v/d.    ED course:  vitals: /94, HR 84, T 98.4F, O2 96% RA   labs: hgb 9.0,   imaging:   - CT L-T spine IV con:   Since 1/15/2022 there has been significant progression of discitis osteomyelitis at L4-5 with multifocal erosive changes at the endplates and likely prevertebral phlegmon formation. Progression of L5-S1 facetitis, Facetitis in T10-T11.  progress: neurosurgery said no surgical intervention  (12 Mar 2022 21:00)        REVIEW OF SYSTEMS:  Patient denies any headache, any vision complaints, runny nose, fever, chills. Denies chest pain, shortness of breath, palpitation. Denies nausea, vomiting, abdominal pain or diarrhoea, Denies dysuria. Denies  localized weakness in any part of the body or numbness.   At least 10 systems were reviewed in ROS. All systems reviewed  are within normal limits except for the complaints as described in Subjective.    PAST MEDICAL & SURGICAL HISTORY:  PAST MEDICAL & SURGICAL HISTORY:  IV drug abuse    Vertebral osteomyelitis    MSSA bacteremia    No significant past surgical history            VITAL SIGNS:  Vital Signs Last 24 Hrs  T(C): 35.6 (24 Mar 2022 08:00), Max: 37.3 (23 Mar 2022 15:15)  T(F): 96.1 (24 Mar 2022 08:00), Max: 99.1 (23 Mar 2022 15:15)  HR: 88 (24 Mar 2022 08:00) (72 - 89)  BP: 109/75 (24 Mar 2022 08:00) (109/75 - 126/83)  BP(mean): --  RR: 18 (24 Mar 2022 08:00) (18 - 19)  SpO2: --        PHYSICAL EXAMINATION:  Not in acute distress, obese  General: No icterus  HEENT:   no JVD.  Heart: S1+S2 audible  Lungs: bilateral  moderate air entry, no wheezing, no crepitations.  Abdomen: Soft, non-tender, non-distended , no  rigidity or guarding.  CNS: Awake alert, CN  grossly intact.  Extremities:  No edema    ,BL distal forearm wounds- healing         CONSULTS:  Consultant(s) Notes Reviewed by me.   Care Discussed with Consultants/Other Providers where required.        MEDICATIONS:  MEDICATIONS  (STANDING):  ALPRAZolam 2 milliGRAM(s) Oral three times a day  buPROPion XL (24-Hour) . 300 milliGRAM(s) Oral daily  ceFAZolin   IVPB 2000 milliGRAM(s) IV Intermittent every 8 hours  chlorhexidine 4% Liquid 1 Application(s) Topical <User Schedule>  chlorhexidine 4% Liquid 1 Application(s) Topical <User Schedule>  enoxaparin Injectable 40 milliGRAM(s) SubCutaneous every 24 hours  ferrous    sulfate 325 milliGRAM(s) Oral daily  folic acid 1 milliGRAM(s) Oral daily  gabapentin 300 milliGRAM(s) Oral three times a day  lidocaine   4% Patch 1 Patch Transdermal daily  methadone    Tablet 135 milliGRAM(s) Oral daily  mirtazapine 60 milliGRAM(s) Oral at bedtime  multivitamin 1 Tablet(s) Oral daily  nicotine - 21 mG/24Hr(s) Patch 1 patch Transdermal daily  QUEtiapine 200 milliGRAM(s) Oral three times a day    MEDICATIONS  (PRN):  acetaminophen     Tablet .. 650 milliGRAM(s) Oral every 6 hours PRN Temp greater or equal to 38C (100.4F), Mild Pain (1 - 3)  cyclobenzaprine 10 milliGRAM(s) Oral three times a day PRN Muscle Spasm  ketorolac   Injectable 15 milliGRAM(s) IV Push every 8 hours PRN Severe Pain (7 - 10)  ondansetron Injectable 4 milliGRAM(s) IV Push every 4 hours PRN Nausea and/or Vomiting  sodium chloride 0.9% lock flush 10 milliLiter(s) IV Push every 1 hour PRN Pre/post blood products, medications, blood draw, and to maintain line patency            ASSESSMENT:        52yo M w/ pmhx of IVDU (heroin), vertebral osteomyelitis, and MSSA bacteremia presents for worsening back pain    Spinal OM, with epidural abscess/phlegmon  Opioid abuse on Methadone  RUL Nodular Opacities  Recent history & post treatment for MSSA bacteremia  Hx of IVDU  Active nicotine dependence  Morbid obesity        No sepsis on admission  Recently completed 6 wks of antibiotics which ended on 3/1.  MR Lumbar Spine 03/18 significant for  left L5-S1 facetitis with associated multifocal  abscess formation directly adjacent to the left facet joint, Ventral epidural phlegmon overlying the L5 vertebral body causing significant compression of the descending nerve roots.  S/p IR  drainage of abscess/Bx of L4-L5 biopsy of suspected discitis done 3/16 -fluid  growing NICOLE  ID noted-QuantiFeron & HIV is negative .Airbron precautions taken off a slow risk for TB  cefazolin 2g q 8 hours for 4 weeks(end date 04/11) . Pt has a picc line. Will need repeat imaging prior to stopping antibiotics to guide course   Continue methadone 135, and gabapentin  Neuro sx- no current intervention  Chronic b/l Wrist ulceration - healing  Handoff: Stable for discharge to SNF

## 2022-03-25 PROCEDURE — 99233 SBSQ HOSP IP/OBS HIGH 50: CPT

## 2022-03-25 RX ORDER — CEFAZOLIN SODIUM 1 G
2 VIAL (EA) INJECTION
Qty: 0 | Refills: 0 | DISCHARGE
Start: 2022-03-25

## 2022-03-25 RX ADMIN — LIDOCAINE 1 PATCH: 4 CREAM TOPICAL at 11:05

## 2022-03-25 RX ADMIN — LIDOCAINE 1 PATCH: 4 CREAM TOPICAL at 10:52

## 2022-03-25 RX ADMIN — QUETIAPINE FUMARATE 200 MILLIGRAM(S): 200 TABLET, FILM COATED ORAL at 13:38

## 2022-03-25 RX ADMIN — CYCLOBENZAPRINE HYDROCHLORIDE 10 MILLIGRAM(S): 10 TABLET, FILM COATED ORAL at 11:04

## 2022-03-25 RX ADMIN — BUPROPION HYDROCHLORIDE 300 MILLIGRAM(S): 150 TABLET, EXTENDED RELEASE ORAL at 11:06

## 2022-03-25 RX ADMIN — Medication 1 MILLIGRAM(S): at 11:04

## 2022-03-25 RX ADMIN — CHLORHEXIDINE GLUCONATE 1 APPLICATION(S): 213 SOLUTION TOPICAL at 05:48

## 2022-03-25 RX ADMIN — QUETIAPINE FUMARATE 200 MILLIGRAM(S): 200 TABLET, FILM COATED ORAL at 05:51

## 2022-03-25 RX ADMIN — Medication 100 MILLIGRAM(S): at 05:51

## 2022-03-25 RX ADMIN — Medication 100 MILLIGRAM(S): at 21:49

## 2022-03-25 RX ADMIN — Medication 1 TABLET(S): at 11:04

## 2022-03-25 RX ADMIN — GABAPENTIN 300 MILLIGRAM(S): 400 CAPSULE ORAL at 21:50

## 2022-03-25 RX ADMIN — Medication 15 MILLIGRAM(S): at 11:36

## 2022-03-25 RX ADMIN — MIRTAZAPINE 60 MILLIGRAM(S): 45 TABLET, ORALLY DISINTEGRATING ORAL at 21:50

## 2022-03-25 RX ADMIN — Medication 2 MILLIGRAM(S): at 21:49

## 2022-03-25 RX ADMIN — QUETIAPINE FUMARATE 200 MILLIGRAM(S): 200 TABLET, FILM COATED ORAL at 21:50

## 2022-03-25 RX ADMIN — ENOXAPARIN SODIUM 40 MILLIGRAM(S): 100 INJECTION SUBCUTANEOUS at 22:30

## 2022-03-25 RX ADMIN — Medication 1 PATCH: at 09:53

## 2022-03-25 RX ADMIN — Medication 15 MILLIGRAM(S): at 11:05

## 2022-03-25 RX ADMIN — CYCLOBENZAPRINE HYDROCHLORIDE 10 MILLIGRAM(S): 10 TABLET, FILM COATED ORAL at 21:49

## 2022-03-25 RX ADMIN — Medication 100 MILLIGRAM(S): at 13:39

## 2022-03-25 RX ADMIN — METHADONE HYDROCHLORIDE 135 MILLIGRAM(S): 40 TABLET ORAL at 11:04

## 2022-03-25 RX ADMIN — GABAPENTIN 300 MILLIGRAM(S): 400 CAPSULE ORAL at 05:51

## 2022-03-25 RX ADMIN — GABAPENTIN 300 MILLIGRAM(S): 400 CAPSULE ORAL at 13:38

## 2022-03-25 RX ADMIN — Medication 1 PATCH: at 11:06

## 2022-03-25 RX ADMIN — Medication 2 MILLIGRAM(S): at 05:49

## 2022-03-25 RX ADMIN — CHLORHEXIDINE GLUCONATE 1 APPLICATION(S): 213 SOLUTION TOPICAL at 08:51

## 2022-03-25 RX ADMIN — Medication 1 PATCH: at 07:46

## 2022-03-25 RX ADMIN — Medication 2 MILLIGRAM(S): at 13:39

## 2022-03-25 RX ADMIN — CYCLOBENZAPRINE HYDROCHLORIDE 10 MILLIGRAM(S): 10 TABLET, FILM COATED ORAL at 06:32

## 2022-03-25 RX ADMIN — Medication 325 MILLIGRAM(S): at 11:04

## 2022-03-25 NOTE — PROGRESS NOTE ADULT - SUBJECTIVE AND OBJECTIVE BOX
Progress Note:  Provider Speciality                            Hospitalist      AKILMIKAEL LAO MRN-[id number] 51y Male     CHIEF PRESENTING COMPLAINT:  Patient is a 51y old  Male who presents with a chief complaint of vertebral osteomyelitis (23 Mar 2022 19:16)        SUBJECTIVE:  Patient was seen and examined at bedside. No new complaints described by patient in morning rounds.   No significant overnight events reported.     HISTORY OF PRESENTING ILLNESS:  HPI:  50yo M w/ pmhx of IVDU (heroin), vertebral osteomyelitis, and MSSA bacteremia presents for worsening back pain. Back pain was increasing over the past week and worsening today to the point he was having difficulty ambulating and bearing weight. Back pain started one week ago and difficulty ambulating started on Tuesday 3/8. Was discharged from [facility] on 3/2. Patient was recently admitted from 1/16 to 2/1 for vertebral osteomyelitis and MSSA bacteremia. MRI found facetitis with adjacent abscesses. PAM was negative and ID recommended cefazolin. Patient had picc line placed for 6wks of antibiotics which ended on 3/1. Denies urinary/bowel incontinence/retention, numbness in legs, fever/chills, chest pain, SOB, abd pain, n/v/d.    ED course:  vitals: /94, HR 84, T 98.4F, O2 96% RA   labs: hgb 9.0,   imaging:   - CT L-T spine IV con:   Since 1/15/2022 there has been significant progression of discitis osteomyelitis at L4-5 with multifocal erosive changes at the endplates and likely prevertebral phlegmon formation. Progression of L5-S1 facetitis, Facetitis in T10-T11.  progress: neurosurgery said no surgical intervention  (12 Mar 2022 21:00)        REVIEW OF SYSTEMS:  Patient denies any headache, any vision complaints, runny nose, fever, chills. Denies chest pain, shortness of breath, palpitation. Denies nausea, vomiting, abdominal pain or diarrhoea, Denies dysuria. Denies  localized weakness in any part of the body or numbness.   At least 10 systems were reviewed in ROS. All systems reviewed  are within normal limits except for the complaints as described in Subjective.    PAST MEDICAL & SURGICAL HISTORY:  PAST MEDICAL & SURGICAL HISTORY:  IV drug abuse    Vertebral osteomyelitis    MSSA bacteremia    No significant past surgical history            VITAL SIGNS:  Vital Signs Last 24 Hrs  T(C): 36.5 (25 Mar 2022 07:30), Max: 36.5 (25 Mar 2022 07:30)  T(F): 97.7 (25 Mar 2022 07:30), Max: 97.7 (25 Mar 2022 07:30)  HR: 91 (25 Mar 2022 07:30) (80 - 91)  BP: 107/67 (25 Mar 2022 07:30) (107/67 - 115/60)  BP(mean): --  RR: 18 (25 Mar 2022 07:30) (18 - 18)  SpO2: 94% (25 Mar 2022 07:30) (94% - 94%)      PHYSICAL EXAMINATION:  Not in acute distress, obese  General: No icterus  HEENT:   no JVD.  Heart: S1+S2 audible  Lungs: bilateral  moderate air entry, no wheezing, no crepitations.  Abdomen: Soft, non-tender, non-distended , no  rigidity or guarding.  CNS: Awake alert, CN  grossly intact.  Extremities:  No edema    ,BL distal forearm wounds- healing         CONSULTS:  Consultant(s) Notes Reviewed by me.   Care Discussed with Consultants/Other Providers where required.        MEDICATIONS:  MEDICATIONS  (STANDING):  ALPRAZolam 2 milliGRAM(s) Oral three times a day  buPROPion XL (24-Hour) . 300 milliGRAM(s) Oral daily  ceFAZolin   IVPB 2000 milliGRAM(s) IV Intermittent every 8 hours  chlorhexidine 4% Liquid 1 Application(s) Topical <User Schedule>  chlorhexidine 4% Liquid 1 Application(s) Topical <User Schedule>  enoxaparin Injectable 40 milliGRAM(s) SubCutaneous every 24 hours  ferrous    sulfate 325 milliGRAM(s) Oral daily  folic acid 1 milliGRAM(s) Oral daily  gabapentin 300 milliGRAM(s) Oral three times a day  lidocaine   4% Patch 1 Patch Transdermal daily  methadone    Tablet 135 milliGRAM(s) Oral daily  mirtazapine 60 milliGRAM(s) Oral at bedtime  multivitamin 1 Tablet(s) Oral daily  nicotine - 21 mG/24Hr(s) Patch 1 patch Transdermal daily  QUEtiapine 200 milliGRAM(s) Oral three times a day    MEDICATIONS  (PRN):  acetaminophen     Tablet .. 650 milliGRAM(s) Oral every 6 hours PRN Temp greater or equal to 38C (100.4F), Mild Pain (1 - 3)  cyclobenzaprine 10 milliGRAM(s) Oral three times a day PRN Muscle Spasm  ketorolac   Injectable 15 milliGRAM(s) IV Push every 8 hours PRN Severe Pain (7 - 10)  ondansetron Injectable 4 milliGRAM(s) IV Push every 4 hours PRN Nausea and/or Vomiting  sodium chloride 0.9% lock flush 10 milliLiter(s) IV Push every 1 hour PRN Pre/post blood products, medications, blood draw, and to maintain line patency            ASSESSMENT:        50yo M w/ pmhx of IVDU (heroin), vertebral osteomyelitis, and MSSA bacteremia presents for worsening back pain    Spinal OM, with epidural abscess/phlegmon  Opioid abuse on Methadone  RUL Nodular Opacities  Recent history & post treatment for MSSA bacteremia  Hx of IVDU  Active nicotine dependence  Morbid obesity        No sepsis on admission  Recently completed 6 wks of antibiotics which ended on 3/1.  MR Lumbar Spine 03/18 significant for  left L5-S1 facetitis with associated multifocal  abscess formation directly adjacent to the left facet joint, Ventral epidural phlegmon overlying the L5 vertebral body causing significant compression of the descending nerve roots.  S/p IR  drainage of abscess/Bx of L4-L5 biopsy of suspected discitis done 3/16 -fluid  growing NICOLE  ID noted-QuantiFeron & HIV is negative .Airbron precautions taken off a slow risk for TB  cefazolin 2g q 8 hours for 4 weeks(end date 04/11) . Pt has a picc line. Will need repeat imaging prior to stopping antibiotics to guide course   Continue methadone 135, and gabapentin  Neuro sx- no current intervention  Chronic b/l Wrist ulceration - healing  Handoff: Stable for discharge to SNF

## 2022-03-26 PROCEDURE — 99233 SBSQ HOSP IP/OBS HIGH 50: CPT

## 2022-03-26 RX ADMIN — MIRTAZAPINE 60 MILLIGRAM(S): 45 TABLET, ORALLY DISINTEGRATING ORAL at 22:26

## 2022-03-26 RX ADMIN — Medication 325 MILLIGRAM(S): at 11:09

## 2022-03-26 RX ADMIN — GABAPENTIN 300 MILLIGRAM(S): 400 CAPSULE ORAL at 22:25

## 2022-03-26 RX ADMIN — Medication 15 MILLIGRAM(S): at 11:11

## 2022-03-26 RX ADMIN — QUETIAPINE FUMARATE 200 MILLIGRAM(S): 200 TABLET, FILM COATED ORAL at 13:08

## 2022-03-26 RX ADMIN — GABAPENTIN 300 MILLIGRAM(S): 400 CAPSULE ORAL at 13:08

## 2022-03-26 RX ADMIN — CYCLOBENZAPRINE HYDROCHLORIDE 10 MILLIGRAM(S): 10 TABLET, FILM COATED ORAL at 22:24

## 2022-03-26 RX ADMIN — GABAPENTIN 300 MILLIGRAM(S): 400 CAPSULE ORAL at 05:03

## 2022-03-26 RX ADMIN — Medication 2 MILLIGRAM(S): at 22:24

## 2022-03-26 RX ADMIN — Medication 100 MILLIGRAM(S): at 22:25

## 2022-03-26 RX ADMIN — CYCLOBENZAPRINE HYDROCHLORIDE 10 MILLIGRAM(S): 10 TABLET, FILM COATED ORAL at 11:11

## 2022-03-26 RX ADMIN — Medication 15 MILLIGRAM(S): at 11:42

## 2022-03-26 RX ADMIN — QUETIAPINE FUMARATE 200 MILLIGRAM(S): 200 TABLET, FILM COATED ORAL at 05:03

## 2022-03-26 RX ADMIN — Medication 2 MILLIGRAM(S): at 05:02

## 2022-03-26 RX ADMIN — BUPROPION HYDROCHLORIDE 300 MILLIGRAM(S): 150 TABLET, EXTENDED RELEASE ORAL at 11:10

## 2022-03-26 RX ADMIN — Medication 2 MILLIGRAM(S): at 13:08

## 2022-03-26 RX ADMIN — LIDOCAINE 1 PATCH: 4 CREAM TOPICAL at 11:09

## 2022-03-26 RX ADMIN — Medication 1 TABLET(S): at 11:10

## 2022-03-26 RX ADMIN — Medication 1 PATCH: at 07:42

## 2022-03-26 RX ADMIN — Medication 100 MILLIGRAM(S): at 13:08

## 2022-03-26 RX ADMIN — METHADONE HYDROCHLORIDE 135 MILLIGRAM(S): 40 TABLET ORAL at 11:11

## 2022-03-26 RX ADMIN — Medication 100 MILLIGRAM(S): at 05:03

## 2022-03-26 RX ADMIN — CHLORHEXIDINE GLUCONATE 1 APPLICATION(S): 213 SOLUTION TOPICAL at 05:03

## 2022-03-26 RX ADMIN — Medication 1 PATCH: at 10:53

## 2022-03-26 RX ADMIN — Medication 1 PATCH: at 11:09

## 2022-03-26 RX ADMIN — Medication 1 MILLIGRAM(S): at 11:10

## 2022-03-26 RX ADMIN — ENOXAPARIN SODIUM 40 MILLIGRAM(S): 100 INJECTION SUBCUTANEOUS at 22:25

## 2022-03-26 RX ADMIN — QUETIAPINE FUMARATE 200 MILLIGRAM(S): 200 TABLET, FILM COATED ORAL at 22:25

## 2022-03-26 RX ADMIN — CYCLOBENZAPRINE HYDROCHLORIDE 10 MILLIGRAM(S): 10 TABLET, FILM COATED ORAL at 05:02

## 2022-03-26 NOTE — PROGRESS NOTE ADULT - SUBJECTIVE AND OBJECTIVE BOX
Progress Note:  Provider Speciality                            Hospitalist      AKILMIKAEL LAO MRN-[id number] 51y Male     CHIEF PRESENTING COMPLAINT:  Patient is a 51y old  Male who presents with a chief complaint of vertebral osteomyelitis (23 Mar 2022 19:16)        SUBJECTIVE:  Patient was seen and examined at bedside. No new complaints described by patient in morning rounds.   No significant overnight events reported.     HISTORY OF PRESENTING ILLNESS:  HPI:  52yo M w/ pmhx of IVDU (heroin), vertebral osteomyelitis, and MSSA bacteremia presents for worsening back pain. Back pain was increasing over the past week and worsening today to the point he was having difficulty ambulating and bearing weight. Back pain started one week ago and difficulty ambulating started on Tuesday 3/8. Was discharged from [facility] on 3/2. Patient was recently admitted from 1/16 to 2/1 for vertebral osteomyelitis and MSSA bacteremia. MRI found facetitis with adjacent abscesses. PAM was negative and ID recommended cefazolin. Patient had picc line placed for 6wks of antibiotics which ended on 3/1. Denies urinary/bowel incontinence/retention, numbness in legs, fever/chills, chest pain, SOB, abd pain, n/v/d.    ED course:  vitals: /94, HR 84, T 98.4F, O2 96% RA   labs: hgb 9.0,   imaging:   - CT L-T spine IV con:   Since 1/15/2022 there has been significant progression of discitis osteomyelitis at L4-5 with multifocal erosive changes at the endplates and likely prevertebral phlegmon formation. Progression of L5-S1 facetitis, Facetitis in T10-T11.  progress: neurosurgery said no surgical intervention  (12 Mar 2022 21:00)        REVIEW OF SYSTEMS:  Patient denies any headache, any vision complaints, runny nose, fever, chills. Denies chest pain, shortness of breath, palpitation. Denies nausea, vomiting, abdominal pain or diarrhoea, Denies dysuria. Denies  localized weakness in any part of the body or numbness.   At least 10 systems were reviewed in ROS. All systems reviewed  are within normal limits except for the complaints as described in Subjective.    PAST MEDICAL & SURGICAL HISTORY:  PAST MEDICAL & SURGICAL HISTORY:  IV drug abuse    Vertebral osteomyelitis    MSSA bacteremia    No significant past surgical history            VITAL SIGNS:  Vital Signs Last 24 Hrs  T(C): 36.4 (26 Mar 2022 09:00), Max: 36.6 (25 Mar 2022 15:30)  T(F): 97.6 (26 Mar 2022 09:00), Max: 97.9 (25 Mar 2022 15:30)  HR: 92 (26 Mar 2022 09:00) (83 - 92)  BP: 110/71 (26 Mar 2022 09:00) (101/76 - 110/71)  BP(mean): --  RR: 18 (26 Mar 2022 09:00) (18 - 18)  SpO2: --    PHYSICAL EXAMINATION:  Not in acute distress, obese  General: No icterus  HEENT:   no JVD.  Heart: S1+S2 audible  Lungs: bilateral  moderate air entry, no wheezing, no crepitations.  Abdomen: Soft, non-tender, non-distended , no  rigidity or guarding.  CNS: Awake alert, CN  grossly intact.  Extremities:  No edema    ,BL distal forearm wounds- healing         CONSULTS:  Consultant(s) Notes Reviewed by me.   Care Discussed with Consultants/Other Providers where required.        MEDICATIONS:  MEDICATIONS  (STANDING):  ALPRAZolam 2 milliGRAM(s) Oral three times a day  buPROPion XL (24-Hour) . 300 milliGRAM(s) Oral daily  ceFAZolin   IVPB 2000 milliGRAM(s) IV Intermittent every 8 hours  chlorhexidine 4% Liquid 1 Application(s) Topical <User Schedule>  chlorhexidine 4% Liquid 1 Application(s) Topical <User Schedule>  enoxaparin Injectable 40 milliGRAM(s) SubCutaneous every 24 hours  ferrous    sulfate 325 milliGRAM(s) Oral daily  folic acid 1 milliGRAM(s) Oral daily  gabapentin 300 milliGRAM(s) Oral three times a day  lidocaine   4% Patch 1 Patch Transdermal daily  methadone    Tablet 135 milliGRAM(s) Oral daily  mirtazapine 60 milliGRAM(s) Oral at bedtime  multivitamin 1 Tablet(s) Oral daily  nicotine - 21 mG/24Hr(s) Patch 1 patch Transdermal daily  QUEtiapine 200 milliGRAM(s) Oral three times a day    MEDICATIONS  (PRN):  acetaminophen     Tablet .. 650 milliGRAM(s) Oral every 6 hours PRN Temp greater or equal to 38C (100.4F), Mild Pain (1 - 3)  cyclobenzaprine 10 milliGRAM(s) Oral three times a day PRN Muscle Spasm  ketorolac   Injectable 15 milliGRAM(s) IV Push every 8 hours PRN Severe Pain (7 - 10)  ondansetron Injectable 4 milliGRAM(s) IV Push every 4 hours PRN Nausea and/or Vomiting  sodium chloride 0.9% lock flush 10 milliLiter(s) IV Push every 1 hour PRN Pre/post blood products, medications, blood draw, and to maintain line patency            ASSESSMENT:        52yo M w/ pmhx of IVDU (heroin), vertebral osteomyelitis, and MSSA bacteremia presents for worsening back pain    Spinal OM, with epidural abscess/phlegmon  Opioid abuse on Methadone  RUL Nodular Opacities  Recent history & post treatment for MSSA bacteremia  Hx of IVDU  Active nicotine dependence  Morbid obesity        No sepsis on admission  Recently completed 6 wks of antibiotics which ended on 3/1.  MR Lumbar Spine 03/18 significant for  left L5-S1 facetitis with associated multifocal  abscess formation directly adjacent to the left facet joint, Ventral epidural phlegmon overlying the L5 vertebral body causing significant compression of the descending nerve roots.  S/p IR  drainage of abscess/Bx of L4-L5 biopsy of suspected discitis done 3/16 -fluid  growing NICOLE  ID noted-QuantiFeron & HIV is negative .Airbron precautions taken off a slow risk for TB  cefazolin 2g q 8 hours for 4 weeks(end date 04/11) . Pt has a picc line. Will need repeat imaging prior to stopping antibiotics to guide course   Continue methadone 135, and gabapentin  Neuro sx- no current intervention  Chronic b/l Wrist ulceration - healing  Handoff: Stable for discharge to SNF, pending placement

## 2022-03-27 LAB
ANION GAP SERPL CALC-SCNC: 11 MMOL/L — SIGNIFICANT CHANGE UP (ref 7–14)
BUN SERPL-MCNC: 18 MG/DL — SIGNIFICANT CHANGE UP (ref 10–20)
CALCIUM SERPL-MCNC: 8.9 MG/DL — SIGNIFICANT CHANGE UP (ref 8.5–10.1)
CHLORIDE SERPL-SCNC: 99 MMOL/L — SIGNIFICANT CHANGE UP (ref 98–110)
CO2 SERPL-SCNC: 26 MMOL/L — SIGNIFICANT CHANGE UP (ref 17–32)
CREAT SERPL-MCNC: 0.9 MG/DL — SIGNIFICANT CHANGE UP (ref 0.7–1.5)
EGFR: 103 ML/MIN/1.73M2 — SIGNIFICANT CHANGE UP
GLUCOSE BLDC GLUCOMTR-MCNC: 109 MG/DL — HIGH (ref 70–99)
GLUCOSE BLDC GLUCOMTR-MCNC: 123 MG/DL — HIGH (ref 70–99)
GLUCOSE BLDC GLUCOMTR-MCNC: 135 MG/DL — HIGH (ref 70–99)
GLUCOSE BLDC GLUCOMTR-MCNC: 142 MG/DL — HIGH (ref 70–99)
GLUCOSE SERPL-MCNC: 98 MG/DL — SIGNIFICANT CHANGE UP (ref 70–99)
HCT VFR BLD CALC: 34.4 % — LOW (ref 42–52)
HGB BLD-MCNC: 10.2 G/DL — LOW (ref 14–18)
MCHC RBC-ENTMCNC: 25.5 PG — LOW (ref 27–31)
MCHC RBC-ENTMCNC: 29.7 G/DL — LOW (ref 32–37)
MCV RBC AUTO: 86 FL — SIGNIFICANT CHANGE UP (ref 80–94)
NRBC # BLD: 0 /100 WBCS — SIGNIFICANT CHANGE UP (ref 0–0)
PLATELET # BLD AUTO: 345 K/UL — SIGNIFICANT CHANGE UP (ref 130–400)
POTASSIUM SERPL-MCNC: 4.7 MMOL/L — SIGNIFICANT CHANGE UP (ref 3.5–5)
POTASSIUM SERPL-SCNC: 4.7 MMOL/L — SIGNIFICANT CHANGE UP (ref 3.5–5)
RBC # BLD: 4 M/UL — LOW (ref 4.7–6.1)
RBC # FLD: 17 % — HIGH (ref 11.5–14.5)
SODIUM SERPL-SCNC: 136 MMOL/L — SIGNIFICANT CHANGE UP (ref 135–146)
WBC # BLD: 6.15 K/UL — SIGNIFICANT CHANGE UP (ref 4.8–10.8)
WBC # FLD AUTO: 6.15 K/UL — SIGNIFICANT CHANGE UP (ref 4.8–10.8)

## 2022-03-27 PROCEDURE — 99232 SBSQ HOSP IP/OBS MODERATE 35: CPT

## 2022-03-27 RX ADMIN — QUETIAPINE FUMARATE 200 MILLIGRAM(S): 200 TABLET, FILM COATED ORAL at 05:37

## 2022-03-27 RX ADMIN — QUETIAPINE FUMARATE 200 MILLIGRAM(S): 200 TABLET, FILM COATED ORAL at 13:08

## 2022-03-27 RX ADMIN — Medication 325 MILLIGRAM(S): at 11:44

## 2022-03-27 RX ADMIN — GABAPENTIN 300 MILLIGRAM(S): 400 CAPSULE ORAL at 05:37

## 2022-03-27 RX ADMIN — GABAPENTIN 300 MILLIGRAM(S): 400 CAPSULE ORAL at 21:23

## 2022-03-27 RX ADMIN — Medication 100 MILLIGRAM(S): at 05:36

## 2022-03-27 RX ADMIN — QUETIAPINE FUMARATE 200 MILLIGRAM(S): 200 TABLET, FILM COATED ORAL at 21:23

## 2022-03-27 RX ADMIN — Medication 100 MILLIGRAM(S): at 21:23

## 2022-03-27 RX ADMIN — Medication 2 MILLIGRAM(S): at 21:23

## 2022-03-27 RX ADMIN — MIRTAZAPINE 60 MILLIGRAM(S): 45 TABLET, ORALLY DISINTEGRATING ORAL at 21:23

## 2022-03-27 RX ADMIN — Medication 1 MILLIGRAM(S): at 11:45

## 2022-03-27 RX ADMIN — Medication 1 PATCH: at 19:12

## 2022-03-27 RX ADMIN — CHLORHEXIDINE GLUCONATE 1 APPLICATION(S): 213 SOLUTION TOPICAL at 05:35

## 2022-03-27 RX ADMIN — CYCLOBENZAPRINE HYDROCHLORIDE 10 MILLIGRAM(S): 10 TABLET, FILM COATED ORAL at 21:23

## 2022-03-27 RX ADMIN — Medication 2 MILLIGRAM(S): at 13:07

## 2022-03-27 RX ADMIN — CYCLOBENZAPRINE HYDROCHLORIDE 10 MILLIGRAM(S): 10 TABLET, FILM COATED ORAL at 11:47

## 2022-03-27 RX ADMIN — GABAPENTIN 300 MILLIGRAM(S): 400 CAPSULE ORAL at 13:08

## 2022-03-27 RX ADMIN — Medication 100 MILLIGRAM(S): at 13:08

## 2022-03-27 RX ADMIN — CHLORHEXIDINE GLUCONATE 1 APPLICATION(S): 213 SOLUTION TOPICAL at 08:37

## 2022-03-27 RX ADMIN — Medication 1 PATCH: at 11:00

## 2022-03-27 RX ADMIN — Medication 1 PATCH: at 07:54

## 2022-03-27 RX ADMIN — Medication 1 PATCH: at 11:44

## 2022-03-27 RX ADMIN — ENOXAPARIN SODIUM 40 MILLIGRAM(S): 100 INJECTION SUBCUTANEOUS at 23:49

## 2022-03-27 RX ADMIN — Medication 1 TABLET(S): at 11:45

## 2022-03-27 RX ADMIN — LIDOCAINE 1 PATCH: 4 CREAM TOPICAL at 19:11

## 2022-03-27 RX ADMIN — BUPROPION HYDROCHLORIDE 300 MILLIGRAM(S): 150 TABLET, EXTENDED RELEASE ORAL at 11:44

## 2022-03-27 RX ADMIN — METHADONE HYDROCHLORIDE 135 MILLIGRAM(S): 40 TABLET ORAL at 11:44

## 2022-03-27 RX ADMIN — CYCLOBENZAPRINE HYDROCHLORIDE 10 MILLIGRAM(S): 10 TABLET, FILM COATED ORAL at 05:35

## 2022-03-27 RX ADMIN — Medication 2 MILLIGRAM(S): at 05:36

## 2022-03-27 RX ADMIN — LIDOCAINE 1 PATCH: 4 CREAM TOPICAL at 11:45

## 2022-03-27 NOTE — PROGRESS NOTE ADULT - SUBJECTIVE AND OBJECTIVE BOX
Progress Note:  Provider Speciality                            Hospitalist      AKILMIKAEL LAO MRN-[id number] 51y Male     CHIEF PRESENTING COMPLAINT:  Patient is a 51y old  Male who presents with a chief complaint of vertebral osteomyelitis (23 Mar 2022 19:16)        SUBJECTIVE:  Patient was seen and examined at bedside. No new complaints described by patient in morning rounds.   No significant overnight events reported.     HISTORY OF PRESENTING ILLNESS:  HPI:  50yo M w/ pmhx of IVDU (heroin), vertebral osteomyelitis, and MSSA bacteremia presents for worsening back pain. Back pain was increasing over the past week and worsening today to the point he was having difficulty ambulating and bearing weight. Back pain started one week ago and difficulty ambulating started on Tuesday 3/8. Was discharged from [facility] on 3/2. Patient was recently admitted from 1/16 to 2/1 for vertebral osteomyelitis and MSSA bacteremia. MRI found facetitis with adjacent abscesses. PAM was negative and ID recommended cefazolin. Patient had picc line placed for 6wks of antibiotics which ended on 3/1. Denies urinary/bowel incontinence/retention, numbness in legs, fever/chills, chest pain, SOB, abd pain, n/v/d.    ED course:  vitals: /94, HR 84, T 98.4F, O2 96% RA   labs: hgb 9.0,   imaging:   - CT L-T spine IV con:   Since 1/15/2022 there has been significant progression of discitis osteomyelitis at L4-5 with multifocal erosive changes at the endplates and likely prevertebral phlegmon formation. Progression of L5-S1 facetitis, Facetitis in T10-T11.  progress: neurosurgery said no surgical intervention  (12 Mar 2022 21:00)        REVIEW OF SYSTEMS:  Patient denies any headache, any vision complaints, runny nose, fever, chills. Denies chest pain, shortness of breath, palpitation. Denies nausea, vomiting, abdominal pain or diarrhoea, Denies dysuria. Denies  localized weakness in any part of the body or numbness.   At least 10 systems were reviewed in ROS. All systems reviewed  are within normal limits except for the complaints as described in Subjective.    PAST MEDICAL & SURGICAL HISTORY:  PAST MEDICAL & SURGICAL HISTORY:  IV drug abuse    Vertebral osteomyelitis    MSSA bacteremia    No significant past surgical history            VITAL SIGNS:  Vital Signs Last 24 Hrs  T(C): 36.1 (27 Mar 2022 07:53), Max: 36.1 (27 Mar 2022 07:53)  T(F): 97 (27 Mar 2022 07:53), Max: 97 (27 Mar 2022 07:53)  HR: 80 (27 Mar 2022 07:53) (80 - 87)  BP: 96/50 (27 Mar 2022 07:53) (96/50 - 109/67)  BP(mean): --  RR: 18 (27 Mar 2022 07:53) (18 - 18)  SpO2: --        PHYSICAL EXAMINATION:  Not in acute distress, obese  General: No icterus  HEENT:   no JVD.  Heart: S1+S2 audible  Lungs: bilateral  moderate air entry, no wheezing, no crepitations.  Abdomen: Soft, non-tender, non-distended , no  rigidity or guarding.  CNS: Awake alert, CN  grossly intact.  Extremities:  No edema    ,BL distal forearm wounds- healing         CONSULTS:  Consultant(s) Notes Reviewed by me.   Care Discussed with Consultants/Other Providers where required.        MEDICATIONS:  MEDICATIONS  (STANDING):  ALPRAZolam 2 milliGRAM(s) Oral three times a day  buPROPion XL (24-Hour) . 300 milliGRAM(s) Oral daily  ceFAZolin   IVPB 2000 milliGRAM(s) IV Intermittent every 8 hours  chlorhexidine 4% Liquid 1 Application(s) Topical <User Schedule>  chlorhexidine 4% Liquid 1 Application(s) Topical <User Schedule>  enoxaparin Injectable 40 milliGRAM(s) SubCutaneous every 24 hours  ferrous    sulfate 325 milliGRAM(s) Oral daily  folic acid 1 milliGRAM(s) Oral daily  gabapentin 300 milliGRAM(s) Oral three times a day  lidocaine   4% Patch 1 Patch Transdermal daily  methadone    Tablet 135 milliGRAM(s) Oral daily  mirtazapine 60 milliGRAM(s) Oral at bedtime  multivitamin 1 Tablet(s) Oral daily  nicotine - 21 mG/24Hr(s) Patch 1 patch Transdermal daily  QUEtiapine 200 milliGRAM(s) Oral three times a day    MEDICATIONS  (PRN):  acetaminophen     Tablet .. 650 milliGRAM(s) Oral every 6 hours PRN Temp greater or equal to 38C (100.4F), Mild Pain (1 - 3)  cyclobenzaprine 10 milliGRAM(s) Oral three times a day PRN Muscle Spasm  ketorolac   Injectable 15 milliGRAM(s) IV Push every 8 hours PRN Severe Pain (7 - 10)  ondansetron Injectable 4 milliGRAM(s) IV Push every 4 hours PRN Nausea and/or Vomiting  sodium chloride 0.9% lock flush 10 milliLiter(s) IV Push every 1 hour PRN Pre/post blood products, medications, blood draw, and to maintain line patency            ASSESSMENT:        50yo M w/ pmhx of IVDU (heroin), vertebral osteomyelitis, and MSSA bacteremia presents for worsening back pain    Spinal OM, with epidural abscess/phlegmon  Opioid abuse on Methadone  RUL Nodular Opacities  Recent history & post treatment for MSSA bacteremia  Hx of IVDU  Active nicotine dependence  Morbid obesity        No sepsis on admission  Recently completed 6 wks of antibiotics which ended on 3/1.  MR Lumbar Spine 03/18 significant for  left L5-S1 facetitis with associated multifocal  abscess formation directly adjacent to the left facet joint, Ventral epidural phlegmon overlying the L5 vertebral body causing significant compression of the descending nerve roots.  S/p IR  drainage of abscess/Bx of L4-L5 biopsy of suspected discitis done 3/16 -fluid  growing NICOLE  ID noted-QuantiFeron & HIV is negative .Airbron precautions taken off a slow risk for TB  cefazolin 2g q 8 hours for 4 weeks(end date 04/11) . Pt has a picc line. Will need repeat imaging prior to stopping antibiotics to guide course   Continue methadone 135, and gabapentin  Neuro sx- no current intervention  Chronic b/l Wrist ulceration - healing  Handoff: Stable for discharge to SNF, pending placement

## 2022-03-28 LAB — SARS-COV-2 RNA SPEC QL NAA+PROBE: SIGNIFICANT CHANGE UP

## 2022-03-28 PROCEDURE — 99233 SBSQ HOSP IP/OBS HIGH 50: CPT

## 2022-03-28 RX ORDER — ALPRAZOLAM 0.25 MG
2 TABLET ORAL THREE TIMES A DAY
Refills: 0 | Status: DISCONTINUED | OUTPATIENT
Start: 2022-03-28 | End: 2022-04-04

## 2022-03-28 RX ORDER — ALPRAZOLAM 0.25 MG
2 TABLET ORAL ONCE
Refills: 0 | Status: DISCONTINUED | OUTPATIENT
Start: 2022-03-28 | End: 2022-03-28

## 2022-03-28 RX ORDER — KETOROLAC TROMETHAMINE 30 MG/ML
15 SYRINGE (ML) INJECTION EVERY 8 HOURS
Refills: 0 | Status: DISCONTINUED | OUTPATIENT
Start: 2022-03-28 | End: 2022-04-02

## 2022-03-28 RX ADMIN — Medication 1 PATCH: at 07:38

## 2022-03-28 RX ADMIN — Medication 1 TABLET(S): at 11:17

## 2022-03-28 RX ADMIN — BUPROPION HYDROCHLORIDE 300 MILLIGRAM(S): 150 TABLET, EXTENDED RELEASE ORAL at 11:19

## 2022-03-28 RX ADMIN — Medication 15 MILLIGRAM(S): at 21:37

## 2022-03-28 RX ADMIN — Medication 1 PATCH: at 11:19

## 2022-03-28 RX ADMIN — GABAPENTIN 300 MILLIGRAM(S): 400 CAPSULE ORAL at 05:51

## 2022-03-28 RX ADMIN — METHADONE HYDROCHLORIDE 135 MILLIGRAM(S): 40 TABLET ORAL at 11:18

## 2022-03-28 RX ADMIN — Medication 2 MILLIGRAM(S): at 21:19

## 2022-03-28 RX ADMIN — QUETIAPINE FUMARATE 200 MILLIGRAM(S): 200 TABLET, FILM COATED ORAL at 13:16

## 2022-03-28 RX ADMIN — GABAPENTIN 300 MILLIGRAM(S): 400 CAPSULE ORAL at 13:16

## 2022-03-28 RX ADMIN — Medication 100 MILLIGRAM(S): at 05:50

## 2022-03-28 RX ADMIN — Medication 2 MILLIGRAM(S): at 06:39

## 2022-03-28 RX ADMIN — GABAPENTIN 300 MILLIGRAM(S): 400 CAPSULE ORAL at 21:17

## 2022-03-28 RX ADMIN — Medication 100 MILLIGRAM(S): at 13:16

## 2022-03-28 RX ADMIN — Medication 1 PATCH: at 11:14

## 2022-03-28 RX ADMIN — CYCLOBENZAPRINE HYDROCHLORIDE 10 MILLIGRAM(S): 10 TABLET, FILM COATED ORAL at 11:17

## 2022-03-28 RX ADMIN — CHLORHEXIDINE GLUCONATE 1 APPLICATION(S): 213 SOLUTION TOPICAL at 05:50

## 2022-03-28 RX ADMIN — MIRTAZAPINE 60 MILLIGRAM(S): 45 TABLET, ORALLY DISINTEGRATING ORAL at 21:17

## 2022-03-28 RX ADMIN — Medication 1 MILLIGRAM(S): at 11:17

## 2022-03-28 RX ADMIN — Medication 100 MILLIGRAM(S): at 21:17

## 2022-03-28 RX ADMIN — CYCLOBENZAPRINE HYDROCHLORIDE 10 MILLIGRAM(S): 10 TABLET, FILM COATED ORAL at 05:52

## 2022-03-28 RX ADMIN — Medication 2 MILLIGRAM(S): at 13:42

## 2022-03-28 RX ADMIN — QUETIAPINE FUMARATE 200 MILLIGRAM(S): 200 TABLET, FILM COATED ORAL at 05:51

## 2022-03-28 RX ADMIN — LIDOCAINE 1 PATCH: 4 CREAM TOPICAL at 11:18

## 2022-03-28 RX ADMIN — QUETIAPINE FUMARATE 200 MILLIGRAM(S): 200 TABLET, FILM COATED ORAL at 21:17

## 2022-03-28 RX ADMIN — Medication 325 MILLIGRAM(S): at 11:19

## 2022-03-28 RX ADMIN — ENOXAPARIN SODIUM 40 MILLIGRAM(S): 100 INJECTION SUBCUTANEOUS at 23:20

## 2022-03-28 NOTE — CHART NOTE - NSCHARTNOTEFT_GEN_A_CORE
patient seen and exmained at bedside. patient being d/c'd today patient seen and examined at bedside. Patient stable w no new issues or acute complaints. patient pending d/c (bed availability).

## 2022-03-28 NOTE — CHART NOTE - NSCHARTNOTEFT_GEN_A_CORE
Pt reports good po/appetite PTA and at admit. Consuming 100% of meals. No wt loss PTA. NO GI discomfort reported, LBM 3/27.     Pr remains at low risk, will re-assess in 10 days. Continue with current diet order.     RE-c/s nutrition PRN.

## 2022-03-28 NOTE — PROGRESS NOTE ADULT - SUBJECTIVE AND OBJECTIVE BOX
Progress Note:  Provider Speciality                            Hospitalist      AKILMIKAEL LAO MRN-[id number] 51y Male     CHIEF PRESENTING COMPLAINT:  Patient is a 51y old  Male who presents with a chief complaint of vertebral osteomyelitis (23 Mar 2022 19:16)        SUBJECTIVE:  Patient was seen and examined at bedside. No new complaints described by patient in morning rounds.   No significant overnight events reported.     HISTORY OF PRESENTING ILLNESS:  HPI:  50yo M w/ pmhx of IVDU (heroin), vertebral osteomyelitis, and MSSA bacteremia presents for worsening back pain. Back pain was increasing over the past week and worsening today to the point he was having difficulty ambulating and bearing weight. Back pain started one week ago and difficulty ambulating started on Tuesday 3/8. Was discharged from [facility] on 3/2. Patient was recently admitted from 1/16 to 2/1 for vertebral osteomyelitis and MSSA bacteremia. MRI found facetitis with adjacent abscesses. PAM was negative and ID recommended cefazolin. Patient had picc line placed for 6wks of antibiotics which ended on 3/1. Denies urinary/bowel incontinence/retention, numbness in legs, fever/chills, chest pain, SOB, abd pain, n/v/d.    ED course:  vitals: /94, HR 84, T 98.4F, O2 96% RA   labs: hgb 9.0,   imaging:   - CT L-T spine IV con:   Since 1/15/2022 there has been significant progression of discitis osteomyelitis at L4-5 with multifocal erosive changes at the endplates and likely prevertebral phlegmon formation. Progression of L5-S1 facetitis, Facetitis in T10-T11.  progress: neurosurgery said no surgical intervention  (12 Mar 2022 21:00)        REVIEW OF SYSTEMS:  Patient denies any headache, any vision complaints, runny nose, fever, chills. Denies chest pain, shortness of breath, palpitation. Denies nausea, vomiting, abdominal pain or diarrhoea, Denies dysuria. Denies  localized weakness in any part of the body or numbness.   At least 10 systems were reviewed in ROS. All systems reviewed  are within normal limits except for the complaints as described in Subjective.    PAST MEDICAL & SURGICAL HISTORY:  PAST MEDICAL & SURGICAL HISTORY:  IV drug abuse    Vertebral osteomyelitis    MSSA bacteremia    No significant past surgical history            VITAL SIGNS:  Vital Signs Last 24 Hrs  T(C): 36.3 (28 Mar 2022 08:09), Max: 36.3 (27 Mar 2022 15:35)  T(F): 97.3 (28 Mar 2022 08:09), Max: 97.4 (27 Mar 2022 15:35)  HR: 93 (28 Mar 2022 08:09) (84 - 93)  BP: 113/71 (28 Mar 2022 08:09) (113/71 - 140/92)  BP(mean): --  RR: 18 (28 Mar 2022 08:09) (18 - 18)  SpO2: --      PHYSICAL EXAMINATION:  Not in acute distress, obese  General: No icterus  HEENT:   no JVD.  Heart: S1+S2 audible  Lungs: bilateral  moderate air entry, no wheezing, no crepitations.  Abdomen: Soft, non-tender, non-distended , no  rigidity or guarding.  CNS: Awake alert, CN  grossly intact.  Extremities:  No edema    ,BL distal forearm wounds- healing         CONSULTS:  Consultant(s) Notes Reviewed by me.   Care Discussed with Consultants/Other Providers where required.        MEDICATIONS:  MEDICATIONS  (STANDING):  ALPRAZolam 2 milliGRAM(s) Oral three times a day  buPROPion XL (24-Hour) . 300 milliGRAM(s) Oral daily  ceFAZolin   IVPB 2000 milliGRAM(s) IV Intermittent every 8 hours  chlorhexidine 4% Liquid 1 Application(s) Topical <User Schedule>  chlorhexidine 4% Liquid 1 Application(s) Topical <User Schedule>  enoxaparin Injectable 40 milliGRAM(s) SubCutaneous every 24 hours  ferrous    sulfate 325 milliGRAM(s) Oral daily  folic acid 1 milliGRAM(s) Oral daily  gabapentin 300 milliGRAM(s) Oral three times a day  lidocaine   4% Patch 1 Patch Transdermal daily  methadone    Tablet 135 milliGRAM(s) Oral daily  mirtazapine 60 milliGRAM(s) Oral at bedtime  multivitamin 1 Tablet(s) Oral daily  nicotine - 21 mG/24Hr(s) Patch 1 patch Transdermal daily  QUEtiapine 200 milliGRAM(s) Oral three times a day    MEDICATIONS  (PRN):  acetaminophen     Tablet .. 650 milliGRAM(s) Oral every 6 hours PRN Temp greater or equal to 38C (100.4F), Mild Pain (1 - 3)  cyclobenzaprine 10 milliGRAM(s) Oral three times a day PRN Muscle Spasm  ketorolac   Injectable 15 milliGRAM(s) IV Push every 8 hours PRN Severe Pain (7 - 10)  ondansetron Injectable 4 milliGRAM(s) IV Push every 4 hours PRN Nausea and/or Vomiting  sodium chloride 0.9% lock flush 10 milliLiter(s) IV Push every 1 hour PRN Pre/post blood products, medications, blood draw, and to maintain line patency            ASSESSMENT:        50yo M w/ pmhx of IVDU (heroin), vertebral osteomyelitis, and MSSA bacteremia presents for worsening back pain    Spinal OM, with epidural abscess/phlegmon  Opioid abuse on Methadone  RUL Nodular Opacities  Recent history & post treatment for MSSA bacteremia  Hx of IVDU  Active nicotine dependence  Morbid obesity        No sepsis on admission  Recently completed 6 wks of antibiotics which ended on 3/1.  MR Lumbar Spine 03/18 significant for  left L5-S1 facetitis with associated multifocal  abscess formation directly adjacent to the left facet joint, Ventral epidural phlegmon overlying the L5 vertebral body causing significant compression of the descending nerve roots.  S/p IR  drainage of abscess/Bx of L4-L5 biopsy of suspected discitis done 3/16 -fluid  growing NICOLE  ID noted-QuantiFeron & HIV is negative .Airbron precautions taken off a slow risk for TB  cefazolin 2g q 8 hours for 4 weeks(end date 04/11) . Pt has a picc line. Will need repeat imaging prior to stopping antibiotics to guide course   Continue methadone 135, and gabapentin  Neuro sx- no current intervention  Chronic b/l Wrist ulceration - healing  Handoff: Stable for discharge to SNF, pending placement

## 2022-03-29 LAB
ALBUMIN SERPL ELPH-MCNC: 3.4 G/DL — LOW (ref 3.5–5.2)
ALP SERPL-CCNC: 117 U/L — HIGH (ref 30–115)
ALT FLD-CCNC: 5 U/L — SIGNIFICANT CHANGE UP (ref 0–41)
ANION GAP SERPL CALC-SCNC: 13 MMOL/L — SIGNIFICANT CHANGE UP (ref 7–14)
AST SERPL-CCNC: 12 U/L — SIGNIFICANT CHANGE UP (ref 0–41)
BASOPHILS # BLD AUTO: 0.03 K/UL — SIGNIFICANT CHANGE UP (ref 0–0.2)
BASOPHILS NFR BLD AUTO: 0.4 % — SIGNIFICANT CHANGE UP (ref 0–1)
BILIRUB SERPL-MCNC: <0.2 MG/DL — SIGNIFICANT CHANGE UP (ref 0.2–1.2)
BUN SERPL-MCNC: 21 MG/DL — HIGH (ref 10–20)
CALCIUM SERPL-MCNC: 8.7 MG/DL — SIGNIFICANT CHANGE UP (ref 8.5–10.1)
CHLORIDE SERPL-SCNC: 100 MMOL/L — SIGNIFICANT CHANGE UP (ref 98–110)
CO2 SERPL-SCNC: 27 MMOL/L — SIGNIFICANT CHANGE UP (ref 17–32)
CREAT SERPL-MCNC: 0.9 MG/DL — SIGNIFICANT CHANGE UP (ref 0.7–1.5)
EGFR: 103 ML/MIN/1.73M2 — SIGNIFICANT CHANGE UP
EOSINOPHIL # BLD AUTO: 0.31 K/UL — SIGNIFICANT CHANGE UP (ref 0–0.7)
EOSINOPHIL NFR BLD AUTO: 4.3 % — SIGNIFICANT CHANGE UP (ref 0–8)
GLUCOSE SERPL-MCNC: 122 MG/DL — HIGH (ref 70–99)
HCT VFR BLD CALC: 32 % — LOW (ref 42–52)
HGB BLD-MCNC: 9.5 G/DL — LOW (ref 14–18)
IMM GRANULOCYTES NFR BLD AUTO: 0.4 % — HIGH (ref 0.1–0.3)
LYMPHOCYTES # BLD AUTO: 1.81 K/UL — SIGNIFICANT CHANGE UP (ref 1.2–3.4)
LYMPHOCYTES # BLD AUTO: 25.2 % — SIGNIFICANT CHANGE UP (ref 20.5–51.1)
MAGNESIUM SERPL-MCNC: 1.8 MG/DL — SIGNIFICANT CHANGE UP (ref 1.8–2.4)
MCHC RBC-ENTMCNC: 25.7 PG — LOW (ref 27–31)
MCHC RBC-ENTMCNC: 29.7 G/DL — LOW (ref 32–37)
MCV RBC AUTO: 86.7 FL — SIGNIFICANT CHANGE UP (ref 80–94)
MONOCYTES # BLD AUTO: 0.67 K/UL — HIGH (ref 0.1–0.6)
MONOCYTES NFR BLD AUTO: 9.3 % — SIGNIFICANT CHANGE UP (ref 1.7–9.3)
NEUTROPHILS # BLD AUTO: 4.33 K/UL — SIGNIFICANT CHANGE UP (ref 1.4–6.5)
NEUTROPHILS NFR BLD AUTO: 60.4 % — SIGNIFICANT CHANGE UP (ref 42.2–75.2)
NRBC # BLD: 0 /100 WBCS — SIGNIFICANT CHANGE UP (ref 0–0)
PLATELET # BLD AUTO: 296 K/UL — SIGNIFICANT CHANGE UP (ref 130–400)
POTASSIUM SERPL-MCNC: 4.7 MMOL/L — SIGNIFICANT CHANGE UP (ref 3.5–5)
POTASSIUM SERPL-SCNC: 4.7 MMOL/L — SIGNIFICANT CHANGE UP (ref 3.5–5)
PROT SERPL-MCNC: 7.5 G/DL — SIGNIFICANT CHANGE UP (ref 6–8)
RBC # BLD: 3.69 M/UL — LOW (ref 4.7–6.1)
RBC # FLD: 17.1 % — HIGH (ref 11.5–14.5)
SODIUM SERPL-SCNC: 140 MMOL/L — SIGNIFICANT CHANGE UP (ref 135–146)
WBC # BLD: 7.18 K/UL — SIGNIFICANT CHANGE UP (ref 4.8–10.8)
WBC # FLD AUTO: 7.18 K/UL — SIGNIFICANT CHANGE UP (ref 4.8–10.8)

## 2022-03-29 PROCEDURE — 99233 SBSQ HOSP IP/OBS HIGH 50: CPT

## 2022-03-29 RX ORDER — METHADONE HYDROCHLORIDE 40 MG/1
135 TABLET ORAL DAILY
Refills: 0 | Status: DISCONTINUED | OUTPATIENT
Start: 2022-03-29 | End: 2022-04-05

## 2022-03-29 RX ORDER — METHADONE HYDROCHLORIDE 40 MG/1
135 TABLET ORAL
Qty: 0 | Refills: 0 | DISCHARGE

## 2022-03-29 RX ORDER — METHADONE HYDROCHLORIDE 40 MG/1
135 TABLET ORAL
Qty: 0 | Refills: 0 | DISCHARGE
Start: 2022-03-29

## 2022-03-29 RX ORDER — ALPRAZOLAM 0.25 MG
1 TABLET ORAL
Qty: 0 | Refills: 0 | DISCHARGE
Start: 2022-03-29

## 2022-03-29 RX ORDER — ALPRAZOLAM 0.25 MG
2 TABLET ORAL
Qty: 0 | Refills: 0 | DISCHARGE

## 2022-03-29 RX ORDER — METHADONE HYDROCHLORIDE 40 MG/1
27 TABLET ORAL
Qty: 0 | Refills: 0 | DISCHARGE
Start: 2022-03-29

## 2022-03-29 RX ORDER — FLUTICASONE PROPIONATE 50 MCG
1 SPRAY, SUSPENSION NASAL
Qty: 0 | Refills: 0 | DISCHARGE
Start: 2022-03-29

## 2022-03-29 RX ORDER — FLUTICASONE PROPIONATE 50 MCG
1 SPRAY, SUSPENSION NASAL
Refills: 0 | Status: DISCONTINUED | OUTPATIENT
Start: 2022-03-29 | End: 2022-04-06

## 2022-03-29 RX ADMIN — Medication 2 MILLIGRAM(S): at 06:17

## 2022-03-29 RX ADMIN — LIDOCAINE 1 PATCH: 4 CREAM TOPICAL at 11:13

## 2022-03-29 RX ADMIN — Medication 15 MILLIGRAM(S): at 11:41

## 2022-03-29 RX ADMIN — Medication 1 PATCH: at 11:06

## 2022-03-29 RX ADMIN — Medication 100 MILLIGRAM(S): at 13:30

## 2022-03-29 RX ADMIN — CYCLOBENZAPRINE HYDROCHLORIDE 10 MILLIGRAM(S): 10 TABLET, FILM COATED ORAL at 11:11

## 2022-03-29 RX ADMIN — QUETIAPINE FUMARATE 200 MILLIGRAM(S): 200 TABLET, FILM COATED ORAL at 21:10

## 2022-03-29 RX ADMIN — METHADONE HYDROCHLORIDE 135 MILLIGRAM(S): 40 TABLET ORAL at 11:54

## 2022-03-29 RX ADMIN — GABAPENTIN 300 MILLIGRAM(S): 400 CAPSULE ORAL at 13:30

## 2022-03-29 RX ADMIN — Medication 1 SPRAY(S): at 17:37

## 2022-03-29 RX ADMIN — QUETIAPINE FUMARATE 200 MILLIGRAM(S): 200 TABLET, FILM COATED ORAL at 13:30

## 2022-03-29 RX ADMIN — GABAPENTIN 300 MILLIGRAM(S): 400 CAPSULE ORAL at 21:10

## 2022-03-29 RX ADMIN — Medication 1 SPRAY(S): at 11:12

## 2022-03-29 RX ADMIN — Medication 650 MILLIGRAM(S): at 21:15

## 2022-03-29 RX ADMIN — GABAPENTIN 300 MILLIGRAM(S): 400 CAPSULE ORAL at 06:14

## 2022-03-29 RX ADMIN — Medication 2 MILLIGRAM(S): at 13:30

## 2022-03-29 RX ADMIN — QUETIAPINE FUMARATE 200 MILLIGRAM(S): 200 TABLET, FILM COATED ORAL at 06:14

## 2022-03-29 RX ADMIN — ENOXAPARIN SODIUM 40 MILLIGRAM(S): 100 INJECTION SUBCUTANEOUS at 22:44

## 2022-03-29 RX ADMIN — Medication 100 MILLIGRAM(S): at 21:10

## 2022-03-29 RX ADMIN — Medication 1 MILLIGRAM(S): at 11:12

## 2022-03-29 RX ADMIN — BUPROPION HYDROCHLORIDE 300 MILLIGRAM(S): 150 TABLET, EXTENDED RELEASE ORAL at 11:12

## 2022-03-29 RX ADMIN — Medication 1 PATCH: at 18:55

## 2022-03-29 RX ADMIN — Medication 1 PATCH: at 11:12

## 2022-03-29 RX ADMIN — Medication 325 MILLIGRAM(S): at 11:12

## 2022-03-29 RX ADMIN — MIRTAZAPINE 60 MILLIGRAM(S): 45 TABLET, ORALLY DISINTEGRATING ORAL at 21:10

## 2022-03-29 RX ADMIN — Medication 1 TABLET(S): at 11:12

## 2022-03-29 RX ADMIN — Medication 100 MILLIGRAM(S): at 06:14

## 2022-03-29 RX ADMIN — Medication 15 MILLIGRAM(S): at 11:11

## 2022-03-29 NOTE — PROGRESS NOTE ADULT - SUBJECTIVE AND OBJECTIVE BOX
Progress Note:  Provider Speciality                            Hospitalist      AKILMIKAEL LAO MRN-[id number] 51y Male     CHIEF PRESENTING COMPLAINT:  Patient is a 51y old  Male who presents with a chief complaint of vertebral osteomyelitis (23 Mar 2022 19:16)        SUBJECTIVE:  Patient was seen and examined at bedside. No new complaints described by patient in morning rounds.   No significant overnight events reported.     HISTORY OF PRESENTING ILLNESS:  HPI:  52yo M w/ pmhx of IVDU (heroin), vertebral osteomyelitis, and MSSA bacteremia presents for worsening back pain. Back pain was increasing over the past week and worsening today to the point he was having difficulty ambulating and bearing weight. Back pain started one week ago and difficulty ambulating started on Tuesday 3/8. Was discharged from [facility] on 3/2. Patient was recently admitted from 1/16 to 2/1 for vertebral osteomyelitis and MSSA bacteremia. MRI found facetitis with adjacent abscesses. PAM was negative and ID recommended cefazolin. Patient had picc line placed for 6wks of antibiotics which ended on 3/1. Denies urinary/bowel incontinence/retention, numbness in legs, fever/chills, chest pain, SOB, abd pain, n/v/d.    ED course:  vitals: /94, HR 84, T 98.4F, O2 96% RA   labs: hgb 9.0,   imaging:   - CT L-T spine IV con:   Since 1/15/2022 there has been significant progression of discitis osteomyelitis at L4-5 with multifocal erosive changes at the endplates and likely prevertebral phlegmon formation. Progression of L5-S1 facetitis, Facetitis in T10-T11.  progress: neurosurgery said no surgical intervention  (12 Mar 2022 21:00)        REVIEW OF SYSTEMS:  Patient denies any headache, any vision complaints, runny nose, fever, chills. Denies chest pain, shortness of breath, palpitation. Denies nausea, vomiting, abdominal pain or diarrhoea, Denies dysuria. Denies  localized weakness in any part of the body or numbness.   At least 10 systems were reviewed in ROS. All systems reviewed  are within normal limits except for the complaints as described in Subjective.    PAST MEDICAL & SURGICAL HISTORY:  PAST MEDICAL & SURGICAL HISTORY:  IV drug abuse    Vertebral osteomyelitis    MSSA bacteremia    No significant past surgical history            VITAL SIGNS:      PHYSICAL EXAMINATION:  Not in acute distress, obese  General: No icterus  HEENT:   no JVD.  Heart: S1+S2 audible  Lungs: bilateral  moderate air entry, no wheezing, no crepitations.  Abdomen: Soft, non-tender, non-distended , no  rigidity or guarding.  CNS: Awake alert, CN  grossly intact.  Extremities:  No edema    ,BL distal forearm wounds- healing         CONSULTS:  Consultant(s) Notes Reviewed by me.   Care Discussed with Consultants/Other Providers where required.        MEDICATIONS:  MEDICATIONS  (STANDING):  ALPRAZolam 2 milliGRAM(s) Oral three times a day  buPROPion XL (24-Hour) . 300 milliGRAM(s) Oral daily  ceFAZolin   IVPB 2000 milliGRAM(s) IV Intermittent every 8 hours  chlorhexidine 4% Liquid 1 Application(s) Topical <User Schedule>  chlorhexidine 4% Liquid 1 Application(s) Topical <User Schedule>  enoxaparin Injectable 40 milliGRAM(s) SubCutaneous every 24 hours  ferrous    sulfate 325 milliGRAM(s) Oral daily  folic acid 1 milliGRAM(s) Oral daily  gabapentin 300 milliGRAM(s) Oral three times a day  lidocaine   4% Patch 1 Patch Transdermal daily  methadone    Tablet 135 milliGRAM(s) Oral daily  mirtazapine 60 milliGRAM(s) Oral at bedtime  multivitamin 1 Tablet(s) Oral daily  nicotine - 21 mG/24Hr(s) Patch 1 patch Transdermal daily  QUEtiapine 200 milliGRAM(s) Oral three times a day    MEDICATIONS  (PRN):  acetaminophen     Tablet .. 650 milliGRAM(s) Oral every 6 hours PRN Temp greater or equal to 38C (100.4F), Mild Pain (1 - 3)  cyclobenzaprine 10 milliGRAM(s) Oral three times a day PRN Muscle Spasm  ketorolac   Injectable 15 milliGRAM(s) IV Push every 8 hours PRN Severe Pain (7 - 10)  ondansetron Injectable 4 milliGRAM(s) IV Push every 4 hours PRN Nausea and/or Vomiting  sodium chloride 0.9% lock flush 10 milliLiter(s) IV Push every 1 hour PRN Pre/post blood products, medications, blood draw, and to maintain line patency            ASSESSMENT:        52yo M w/ pmhx of IVDU (heroin), vertebral osteomyelitis, and MSSA bacteremia presents for worsening back pain    Spinal OM, with epidural abscess/phlegmon  Opioid abuse on Methadone  RUL Nodular Opacities  Recent history & post treatment for MSSA bacteremia  Hx of IVDU  Active nicotine dependence  Morbid obesity        No sepsis on admission  Recently completed 6 wks of antibiotics which ended on 3/1.  MR Lumbar Spine 03/18 significant for  left L5-S1 facetitis with associated multifocal  abscess formation directly adjacent to the left facet joint, Ventral epidural phlegmon overlying the L5 vertebral body causing significant compression of the descending nerve roots.  S/p IR  drainage of abscess/Bx of L4-L5 biopsy of suspected discitis done 3/16 -fluid  growing NICOLE  ID noted-QuantiFeron & HIV is negative .Airbron precautions taken off a slow risk for TB  cefazolin 2g q 8 hours for 4 weeks(end date 04/11) . Pt has a picc line. Will need repeat imaging prior to stopping antibiotics to guide course   Continue methadone 135, and gabapentin  Neuro sx- no current intervention  Chronic b/l Wrist ulceration - healing  Handoff: Stable for discharge to SNF, pending placement        Progress Note:  Provider Speciality                            Hospitalist      AKILMIKAEL LAO MRN-[id number] 51y Male     CHIEF PRESENTING COMPLAINT:  Patient is a 51y old  Male who presents with a chief complaint of vertebral osteomyelitis (23 Mar 2022 19:16)        SUBJECTIVE:  Patient was seen and examined at bedside. No new complaints described by patient in morning rounds.   No significant overnight events reported.     HISTORY OF PRESENTING ILLNESS:  HPI:  50yo M w/ pmhx of IVDU (heroin), vertebral osteomyelitis, and MSSA bacteremia presents for worsening back pain. Back pain was increasing over the past week and worsening today to the point he was having difficulty ambulating and bearing weight. Back pain started one week ago and difficulty ambulating started on Tuesday 3/8. Was discharged from [facility] on 3/2. Patient was recently admitted from 1/16 to 2/1 for vertebral osteomyelitis and MSSA bacteremia. MRI found facetitis with adjacent abscesses. PAM was negative and ID recommended cefazolin. Patient had picc line placed for 6wks of antibiotics which ended on 3/1. Denies urinary/bowel incontinence/retention, numbness in legs, fever/chills, chest pain, SOB, abd pain, n/v/d.    ED course:  vitals: /94, HR 84, T 98.4F, O2 96% RA   labs: hgb 9.0,   imaging:   - CT L-T spine IV con:   Since 1/15/2022 there has been significant progression of discitis osteomyelitis at L4-5 with multifocal erosive changes at the endplates and likely prevertebral phlegmon formation. Progression of L5-S1 facetitis, Facetitis in T10-T11.  progress: neurosurgery said no surgical intervention  (12 Mar 2022 21:00)        REVIEW OF SYSTEMS:  Patient denies any headache, any vision complaints, runny nose, fever, chills. Denies chest pain, shortness of breath, palpitation. Denies nausea, vomiting, abdominal pain or diarrhoea, Denies dysuria. Denies  localized weakness in any part of the body or numbness.   At least 10 systems were reviewed in ROS. All systems reviewed  are within normal limits except for the complaints as described in Subjective.    PAST MEDICAL & SURGICAL HISTORY:  PAST MEDICAL & SURGICAL HISTORY:  IV drug abuse    Vertebral osteomyelitis    MSSA bacteremia    No significant past surgical history            VITAL SIGNS:  T(C): 36.6 (29 Mar 2022 07:34), Max: 36.6 (29 Mar 2022 07:34)  T(F): 97.8 (29 Mar 2022 07:34), Max: 97.8 (29 Mar 2022 07:34)  HR: 85 (29 Mar 2022 07:34) (85 - 91)  BP: 138/67 (29 Mar 2022 07:34) (106/63 - 138/67)  BP(mean): --  RR: 18 (29 Mar 2022 07:34) (18 - 18)  SpO2: --      PHYSICAL EXAMINATION:  Not in acute distress, obese  General: No icterus  HEENT:   no JVD.  Heart: S1+S2 audible  Lungs: bilateral  moderate air entry, no wheezing, no crepitations.  Abdomen: Soft, non-tender, non-distended , no  rigidity or guarding.  CNS: Awake alert, CN  grossly intact.  Extremities:  No edema    ,BL distal forearm wounds- healing         CONSULTS:  Consultant(s) Notes Reviewed by me.   Care Discussed with Consultants/Other Providers where required.        MEDICATIONS:  MEDICATIONS  (STANDING):  ALPRAZolam 2 milliGRAM(s) Oral three times a day  buPROPion XL (24-Hour) . 300 milliGRAM(s) Oral daily  ceFAZolin   IVPB 2000 milliGRAM(s) IV Intermittent every 8 hours  chlorhexidine 4% Liquid 1 Application(s) Topical <User Schedule>  chlorhexidine 4% Liquid 1 Application(s) Topical <User Schedule>  enoxaparin Injectable 40 milliGRAM(s) SubCutaneous every 24 hours  ferrous    sulfate 325 milliGRAM(s) Oral daily  folic acid 1 milliGRAM(s) Oral daily  gabapentin 300 milliGRAM(s) Oral three times a day  lidocaine   4% Patch 1 Patch Transdermal daily  methadone    Tablet 135 milliGRAM(s) Oral daily  mirtazapine 60 milliGRAM(s) Oral at bedtime  multivitamin 1 Tablet(s) Oral daily  nicotine - 21 mG/24Hr(s) Patch 1 patch Transdermal daily  QUEtiapine 200 milliGRAM(s) Oral three times a day    MEDICATIONS  (PRN):  acetaminophen     Tablet .. 650 milliGRAM(s) Oral every 6 hours PRN Temp greater or equal to 38C (100.4F), Mild Pain (1 - 3)  cyclobenzaprine 10 milliGRAM(s) Oral three times a day PRN Muscle Spasm  ketorolac   Injectable 15 milliGRAM(s) IV Push every 8 hours PRN Severe Pain (7 - 10)  ondansetron Injectable 4 milliGRAM(s) IV Push every 4 hours PRN Nausea and/or Vomiting  sodium chloride 0.9% lock flush 10 milliLiter(s) IV Push every 1 hour PRN Pre/post blood products, medications, blood draw, and to maintain line patency            ASSESSMENT:        50yo M w/ pmhx of IVDU (heroin), vertebral osteomyelitis, and MSSA bacteremia presents for worsening back pain    Spinal OM, with epidural abscess/phlegmon  Opioid abuse on Methadone  RUL Nodular Opacities  Recent history & post treatment for MSSA bacteremia  Hx of IVDU  Active nicotine dependence  Morbid obesity        No sepsis on admission  Recently completed 6 wks of antibiotics which ended on 3/1.  MR Lumbar Spine 03/18 significant for  left L5-S1 facetitis with associated multifocal  abscess formation directly adjacent to the left facet joint, Ventral epidural phlegmon overlying the L5 vertebral body causing significant compression of the descending nerve roots.  S/p IR  drainage of abscess/Bx of L4-L5 biopsy of suspected discitis done 3/16 -fluid  growing NICOLE  ID noted-QuantiFeron & HIV is negative .Airbron precautions taken off a slow risk for TB  cefazolin 2g q 8 hours for 4 weeks(end date 04/11) . Pt has a picc line. Will need repeat imaging prior to stopping antibiotics to guide course   Continue methadone 135, and gabapentin  Neuro sx- no current intervention  Chronic b/l Wrist ulceration - healing  Handoff: Stable for discharge to SNF, pending placement

## 2022-03-29 NOTE — PROGRESS NOTE ADULT - SUBJECTIVE AND OBJECTIVE BOX
MIKAEL MCDERMOTT 51y Male  MRN#: [id number]   Hospital Day: 17d    SUBJECTIVE  Patient is a 51y old Male who presents with a chief complaint of vertebral osteomyelitis (28 Mar 2022 13:33)  Currently admitted to medicine with the primary diagnosis of Vertebral osteomyelitis      INTERVAL HPI AND OVERNIGHT EVENTS:  Patient was examined and seen at bedside. This morning he is resting comfortably in bed and reports no issues or overnight events.    OBJECTIVE  PAST MEDICAL & SURGICAL HISTORY  IV drug abuse    Vertebral osteomyelitis    MSSA bacteremia    No significant past surgical history      ALLERGIES:  No Known Allergies    MEDICATIONS:  STANDING MEDICATIONS  ALPRAZolam 2 milliGRAM(s) Oral three times a day  buPROPion XL (24-Hour) . 300 milliGRAM(s) Oral daily  ceFAZolin   IVPB 2000 milliGRAM(s) IV Intermittent every 8 hours  chlorhexidine 4% Liquid 1 Application(s) Topical <User Schedule>  chlorhexidine 4% Liquid 1 Application(s) Topical <User Schedule>  enoxaparin Injectable 40 milliGRAM(s) SubCutaneous every 24 hours  ferrous    sulfate 325 milliGRAM(s) Oral daily  fluticasone propionate 50 MICROgram(s)/spray Nasal Spray 1 Spray(s) Both Nostrils two times a day  folic acid 1 milliGRAM(s) Oral daily  gabapentin 300 milliGRAM(s) Oral three times a day  lidocaine   4% Patch 1 Patch Transdermal daily  mirtazapine 60 milliGRAM(s) Oral at bedtime  multivitamin 1 Tablet(s) Oral daily  nicotine - 21 mG/24Hr(s) Patch 1 patch Transdermal daily  QUEtiapine 200 milliGRAM(s) Oral three times a day    PRN MEDICATIONS  acetaminophen     Tablet .. 650 milliGRAM(s) Oral every 6 hours PRN  cyclobenzaprine 10 milliGRAM(s) Oral three times a day PRN  ketorolac   Injectable 15 milliGRAM(s) IV Push every 8 hours PRN  ondansetron Injectable 4 milliGRAM(s) IV Push every 4 hours PRN  sodium chloride 0.9% lock flush 10 milliLiter(s) IV Push every 1 hour PRN      VITAL SIGNS: Last 24 Hours  T(C): 36.6 (29 Mar 2022 07:34), Max: 36.6 (29 Mar 2022 07:34)  T(F): 97.8 (29 Mar 2022 07:34), Max: 97.8 (29 Mar 2022 07:34)  HR: 85 (29 Mar 2022 07:34) (85 - 91)  BP: 138/67 (29 Mar 2022 07:34) (106/63 - 138/67)  BP(mean): --  RR: 18 (29 Mar 2022 07:34) (18 - 18)  SpO2: --    LABS:                        9.5    7.18  )-----------( 296      ( 29 Mar 2022 06:55 )             32.0     03-29    140  |  100  |  21<H>  ----------------------------<  122<H>  4.7   |  27  |  0.9    Ca    8.7      29 Mar 2022 06:55  Mg     1.8     03-29    TPro  7.5  /  Alb  3.4<L>  /  TBili  <0.2  /  DBili  x   /  AST  12  /  ALT  5   /  AlkPhos  117<H>  03-29      RADIOLOGY: No New Imaging    PHYSICAL EXAM:  CONSTITUTIONAL: No acute distress, well-developed, well-groomed, AAOx3  HEAD: Atraumatic, normocephalic  EYES: EOM intact, PERRLA, conjunctiva and sclera clear  ENT: Supple, no masses, no thyromegaly, no bruits, no JVD; moist mucous membranes  PULMONARY: Clear to auscultation bilaterally; no wheezes, rales, or rhonchi  CARDIOVASCULAR: Regular rate and rhythm; no murmurs, rubs, or gallops  GASTROINTESTINAL: Soft, non-tender, non-distended; bowel sounds present  MUSCULOSKELETAL: 2+ peripheral pulses; no clubbing, no cyanosis, no edema  NEUROLOGY: non-focal  SKIN: No rashes or lesions; warm and dry

## 2022-03-29 NOTE — PROGRESS NOTE ADULT - ASSESSMENT
50yo M w/ pmhx of IVDU (heroin), vertebral osteomyelitis, and MSSA bacteremia presents for worsening back pain    #Spinal OM, with epidural abscess/phlegmon  -No sepsis on admission  -Recently completed 6 wks of antibiotics which ended on 3/1.  -MR Lumbar Spine 03/18 significant for  left L5-S1 facetitis with associated multifocal  abscess formation directly -adjacent to the left facet joint, Ventral epidural phlegmon overlying the L5 vertebral body causing significant -compression of the descending nerve roots.  -S/p IR  drainage of abscess/Bx of L4-L5 biopsy of suspected discitis done 3/16 -fluid  growing NICOLE  -ID noted-QuantiFeron & HIV is negative .Airbron precautions taken off a slow risk for TB  -cefazolin 2g q 8 hours for 4 weeks(end date 04/11) . Pt has a picc line. Will need repeat imaging prior to stopping -antibiotics to guide course   -Neuro sx- no current intervention  - c/w flexeril     #Opioid abuse on Methadone  Continue methadone 135, and gabapentin    #RUL Nodular Opacities    #Recent history & post treatment for MSSA bacteremia  #Hx of IVDU      #Active nicotine dependence    #Morbid obesity    #Chronic b/l Wrist ulceration - healing    #Misc  -DVT: Lovenox  -GI: na  -Activity- IAT  -Diet: Regular  -IVF: N/a  -Code: Full Code  52yo M w/ pmhx of IVDU (heroin), vertebral osteomyelitis, and MSSA bacteremia presents for worsening back pain    #Spinal OM, with epidural abscess/phlegmon  -No sepsis on admission  -Recently completed 6 wks of antibiotics which ended on 3/1.  -MR Lumbar Spine 03/18 significant for  left L5-S1 facetitis with associated multifocal  abscess formation directly -adjacent to the left facet joint, Ventral epidural phlegmon overlying the L5 vertebral body causing significant -compression of the descending nerve roots.  -S/p IR  drainage of abscess/Bx of L4-L5 biopsy of suspected discitis done 3/16 -fluid  growing NICOLE  -ID noted-QuantiFeron & HIV is negative .Airbron precautions taken off a slow risk for TB  - c/w cefazolin 2g q 8 hours for 4 weeks(end date 04/11) . Pt has a picc line. Will need repeat imaging prior to stopping -antibiotics to guide course   -Neuro sx- no current intervention  - c/w flexeril + Toradol    #Opioid abuse on Methadone  - Continue methadone 135, and gabapentin    #RUL Nodular Opacities  - CT Chest No Cont 03.12- several reticulonodular opacities in the posterior segment of the right upper lobe which appears new from 1/15/2022. There are reticulonodular subpleural opacities at the right and left bases which appears increased from 1/15/2022.    #Recent history & post treatment for MSSA bacteremia  #Hx of IVD    #Anxiety/Depression  -c/w mirtazapine + quetiapine    #Active nicotine dependence  - c/w nicotine patch  - c/w buproprion    #Morbid obesity    #Chronic b/l Wrist ulceration - healing    #Misc  -DVT: Lovenox  -GI: na  -Activity- IAT  -Diet: Regular  -IVF: N/a  -Code: Full Code

## 2022-03-30 LAB
ALBUMIN SERPL ELPH-MCNC: 3.4 G/DL — LOW (ref 3.5–5.2)
ALP SERPL-CCNC: 97 U/L — SIGNIFICANT CHANGE UP (ref 30–115)
ALT FLD-CCNC: 5 U/L — SIGNIFICANT CHANGE UP (ref 0–41)
ANION GAP SERPL CALC-SCNC: 13 MMOL/L — SIGNIFICANT CHANGE UP (ref 7–14)
AST SERPL-CCNC: 13 U/L — SIGNIFICANT CHANGE UP (ref 0–41)
BASOPHILS # BLD AUTO: 0.02 K/UL — SIGNIFICANT CHANGE UP (ref 0–0.2)
BASOPHILS NFR BLD AUTO: 0.2 % — SIGNIFICANT CHANGE UP (ref 0–1)
BILIRUB SERPL-MCNC: 0.3 MG/DL — SIGNIFICANT CHANGE UP (ref 0.2–1.2)
BUN SERPL-MCNC: 18 MG/DL — SIGNIFICANT CHANGE UP (ref 10–20)
CALCIUM SERPL-MCNC: 8.8 MG/DL — SIGNIFICANT CHANGE UP (ref 8.5–10.1)
CHLORIDE SERPL-SCNC: 98 MMOL/L — SIGNIFICANT CHANGE UP (ref 98–110)
CO2 SERPL-SCNC: 26 MMOL/L — SIGNIFICANT CHANGE UP (ref 17–32)
CREAT SERPL-MCNC: 0.8 MG/DL — SIGNIFICANT CHANGE UP (ref 0.7–1.5)
EGFR: 106 ML/MIN/1.73M2 — SIGNIFICANT CHANGE UP
EOSINOPHIL # BLD AUTO: 0.16 K/UL — SIGNIFICANT CHANGE UP (ref 0–0.7)
EOSINOPHIL NFR BLD AUTO: 1.3 % — SIGNIFICANT CHANGE UP (ref 0–8)
GLUCOSE SERPL-MCNC: 111 MG/DL — HIGH (ref 70–99)
HCT VFR BLD CALC: 31.8 % — LOW (ref 42–52)
HGB BLD-MCNC: 9.9 G/DL — LOW (ref 14–18)
IMM GRANULOCYTES NFR BLD AUTO: 0.5 % — HIGH (ref 0.1–0.3)
LYMPHOCYTES # BLD AUTO: 1.01 K/UL — LOW (ref 1.2–3.4)
LYMPHOCYTES # BLD AUTO: 8.5 % — LOW (ref 20.5–51.1)
MAGNESIUM SERPL-MCNC: 1.8 MG/DL — SIGNIFICANT CHANGE UP (ref 1.8–2.4)
MCHC RBC-ENTMCNC: 26.3 PG — LOW (ref 27–31)
MCHC RBC-ENTMCNC: 31.1 G/DL — LOW (ref 32–37)
MCV RBC AUTO: 84.4 FL — SIGNIFICANT CHANGE UP (ref 80–94)
MONOCYTES # BLD AUTO: 0.78 K/UL — HIGH (ref 0.1–0.6)
MONOCYTES NFR BLD AUTO: 6.5 % — SIGNIFICANT CHANGE UP (ref 1.7–9.3)
NEUTROPHILS # BLD AUTO: 9.92 K/UL — HIGH (ref 1.4–6.5)
NEUTROPHILS NFR BLD AUTO: 83 % — HIGH (ref 42.2–75.2)
NRBC # BLD: 0 /100 WBCS — SIGNIFICANT CHANGE UP (ref 0–0)
PLATELET # BLD AUTO: 303 K/UL — SIGNIFICANT CHANGE UP (ref 130–400)
POTASSIUM SERPL-MCNC: 4.6 MMOL/L — SIGNIFICANT CHANGE UP (ref 3.5–5)
POTASSIUM SERPL-SCNC: 4.6 MMOL/L — SIGNIFICANT CHANGE UP (ref 3.5–5)
PROT SERPL-MCNC: 7.5 G/DL — SIGNIFICANT CHANGE UP (ref 6–8)
RBC # BLD: 3.77 M/UL — LOW (ref 4.7–6.1)
RBC # FLD: 17.4 % — HIGH (ref 11.5–14.5)
SARS-COV-2 RNA SPEC QL NAA+PROBE: SIGNIFICANT CHANGE UP
SODIUM SERPL-SCNC: 137 MMOL/L — SIGNIFICANT CHANGE UP (ref 135–146)
WBC # BLD: 11.95 K/UL — HIGH (ref 4.8–10.8)
WBC # FLD AUTO: 11.95 K/UL — HIGH (ref 4.8–10.8)

## 2022-03-30 PROCEDURE — 93010 ELECTROCARDIOGRAM REPORT: CPT

## 2022-03-30 PROCEDURE — 99232 SBSQ HOSP IP/OBS MODERATE 35: CPT

## 2022-03-30 RX ORDER — NALOXONE HYDROCHLORIDE 4 MG/.1ML
1 SPRAY NASAL ONCE
Refills: 0 | Status: DISCONTINUED | OUTPATIENT
Start: 2022-03-30 | End: 2022-04-06

## 2022-03-30 RX ADMIN — Medication 325 MILLIGRAM(S): at 11:21

## 2022-03-30 RX ADMIN — Medication 1 PATCH: at 12:20

## 2022-03-30 RX ADMIN — LIDOCAINE 1 PATCH: 4 CREAM TOPICAL at 11:22

## 2022-03-30 RX ADMIN — GABAPENTIN 300 MILLIGRAM(S): 400 CAPSULE ORAL at 05:35

## 2022-03-30 RX ADMIN — METHADONE HYDROCHLORIDE 135 MILLIGRAM(S): 40 TABLET ORAL at 11:20

## 2022-03-30 RX ADMIN — Medication 100 MILLIGRAM(S): at 05:35

## 2022-03-30 RX ADMIN — LIDOCAINE 1 PATCH: 4 CREAM TOPICAL at 23:13

## 2022-03-30 RX ADMIN — Medication 1 PATCH: at 11:23

## 2022-03-30 RX ADMIN — GABAPENTIN 300 MILLIGRAM(S): 400 CAPSULE ORAL at 14:40

## 2022-03-30 RX ADMIN — Medication 2 MILLIGRAM(S): at 14:40

## 2022-03-30 RX ADMIN — LIDOCAINE 1 PATCH: 4 CREAM TOPICAL at 03:00

## 2022-03-30 RX ADMIN — Medication 1 PATCH: at 08:34

## 2022-03-30 RX ADMIN — Medication 1 PATCH: at 20:05

## 2022-03-30 RX ADMIN — Medication 15 MILLIGRAM(S): at 15:53

## 2022-03-30 RX ADMIN — Medication 100 MILLIGRAM(S): at 14:41

## 2022-03-30 RX ADMIN — Medication 1 SPRAY(S): at 05:37

## 2022-03-30 RX ADMIN — BUPROPION HYDROCHLORIDE 300 MILLIGRAM(S): 150 TABLET, EXTENDED RELEASE ORAL at 11:21

## 2022-03-30 RX ADMIN — CHLORHEXIDINE GLUCONATE 1 APPLICATION(S): 213 SOLUTION TOPICAL at 05:36

## 2022-03-30 RX ADMIN — MIRTAZAPINE 60 MILLIGRAM(S): 45 TABLET, ORALLY DISINTEGRATING ORAL at 21:12

## 2022-03-30 RX ADMIN — QUETIAPINE FUMARATE 200 MILLIGRAM(S): 200 TABLET, FILM COATED ORAL at 05:35

## 2022-03-30 RX ADMIN — GABAPENTIN 300 MILLIGRAM(S): 400 CAPSULE ORAL at 21:12

## 2022-03-30 RX ADMIN — LIDOCAINE 1 PATCH: 4 CREAM TOPICAL at 20:05

## 2022-03-30 RX ADMIN — QUETIAPINE FUMARATE 200 MILLIGRAM(S): 200 TABLET, FILM COATED ORAL at 21:12

## 2022-03-30 RX ADMIN — Medication 1 MILLIGRAM(S): at 11:21

## 2022-03-30 RX ADMIN — Medication 1 SPRAY(S): at 18:03

## 2022-03-30 RX ADMIN — CYCLOBENZAPRINE HYDROCHLORIDE 10 MILLIGRAM(S): 10 TABLET, FILM COATED ORAL at 12:24

## 2022-03-30 RX ADMIN — Medication 1 TABLET(S): at 11:21

## 2022-03-30 RX ADMIN — Medication 15 MILLIGRAM(S): at 15:26

## 2022-03-30 RX ADMIN — Medication 2 MILLIGRAM(S): at 21:11

## 2022-03-30 RX ADMIN — Medication 100 MILLIGRAM(S): at 21:13

## 2022-03-30 RX ADMIN — QUETIAPINE FUMARATE 200 MILLIGRAM(S): 200 TABLET, FILM COATED ORAL at 14:40

## 2022-03-30 NOTE — PROGRESS NOTE ADULT - ATTENDING COMMENTS
52yo M w/ pmhx of IVDU (heroin), vertebral osteomyelitis, and MSSA bacteremia presents for worsening back pain    Spinal OM, with epidural abscess/phlegmon  Opioid abuse on Methadone  RUL Nodular Opacities  Recent history & post treatment for MSSA bacteremia  Hx of IVDU  Active nicotine dependence  Morbid obesity        No sepsis on admission  Recently completed 6 wks of antibiotics which ended on 3/1.  MR Lumbar Spine 03/18 significant for  left L5-S1 facetitis with associated multifocal  abscess formation directly adjacent to the left facet joint, Ventral epidural phlegmon overlying the L5 vertebral body causing significant compression of the descending nerve roots.  S/p IR  drainage of abscess/Bx of L4-L5 biopsy of suspected discitis done 3/16 -fluid  growing NICOLE  ID noted-QuantiFeron & HIV is negative .Airbron precautions taken off   cefazolin 2g q 8 hours for 4 weeks(end date 04/11) . Pt has a picc line. Will need repeat imaging prior to stopping antibiotics to guide course   Continue methadone 135, and gabapentin  Neuro sx- no current intervention  Chronic b/l Wrist ulceration - healing  Handoff: Stable for discharge to SNF, pending placement

## 2022-03-30 NOTE — PROGRESS NOTE ADULT - ASSESSMENT
52yo M w/ pmhx of IVDU (heroin), vertebral osteomyelitis, and MSSA bacteremia presents for worsening back pain    #Spinal OM, with epidural abscess/phlegmon  -No sepsis on admission  -Recently completed 6 wks of antibiotics which ended on 3/1.  -MR Lumbar Spine 03/18 significant for  left L5-S1 facetitis with associated multifocal  abscess formation directly -adjacent to the left facet joint, Ventral epidural phlegmon overlying the L5 vertebral body causing significant -compression of the descending nerve roots.  -S/p IR  drainage of abscess/Bx of L4-L5 biopsy of suspected discitis done 3/16 -fluid  growing NICOLE  -ID noted-QuantiFeron & HIV is negative .Airbron precautions taken off a slow risk for TB  - c/w cefazolin 2g q 8 hours for 4 weeks(end date 04/11) . Pt has a picc line. Will need repeat imaging prior to stopping -antibiotics to guide course   -Neuro sx- no current intervention  - c/w flexeril + Toradol    #Opioid abuse on Methadone  - Continue methadone 135, and gabapentin    #RUL Nodular Opacities  - CT Chest No Cont 03.12- several reticulonodular opacities in the posterior segment of the right upper lobe which appears new from 1/15/2022. There are reticulonodular subpleural opacities at the right and left bases which appears increased from 1/15/2022.    #Recent history & post treatment for MSSA bacteremia  #Hx of IVD    #Anxiety/Depression  -c/w mirtazapine + quetiapine    #Active nicotine dependence  - c/w nicotine patch  - c/w buproprion    #Morbid obesity    #Chronic b/l Wrist ulceration - healing    #Misc  -DVT: Lovenox  -GI: na  -Activity- IAT  -Diet: Regular  -IVF: N/a  -Code: Full Code    Dispo: [facility] once bed is available

## 2022-03-30 NOTE — PROGRESS NOTE ADULT - SUBJECTIVE AND OBJECTIVE BOX
MIKAEL MCDERMOTT 51y Male  MRN#: [id number]   Hospital Day: 18d    SUBJECTIVE  Patient is a 51y old Male who presents with a chief complaint of vertebral osteomyelitis (29 Mar 2022 11:39)  Currently admitted to medicine with the primary diagnosis of Vertebral osteomyelitis      INTERVAL HPI AND OVERNIGHT EVENTS:  Patient was examined and seen at bedside. This morning he is resting comfortably in bed and reports no issues or overnight events.    OBJECTIVE  PAST MEDICAL & SURGICAL HISTORY  IV drug abuse    Vertebral osteomyelitis    MSSA bacteremia    No significant past surgical history      ALLERGIES:  No Known Allergies    MEDICATIONS:  STANDING MEDICATIONS  ALPRAZolam 2 milliGRAM(s) Oral three times a day  buPROPion XL (24-Hour) . 300 milliGRAM(s) Oral daily  ceFAZolin   IVPB 2000 milliGRAM(s) IV Intermittent every 8 hours  chlorhexidine 4% Liquid 1 Application(s) Topical <User Schedule>  chlorhexidine 4% Liquid 1 Application(s) Topical <User Schedule>  enoxaparin Injectable 40 milliGRAM(s) SubCutaneous every 24 hours  ferrous    sulfate 325 milliGRAM(s) Oral daily  fluticasone propionate 50 MICROgram(s)/spray Nasal Spray 1 Spray(s) Both Nostrils two times a day  folic acid 1 milliGRAM(s) Oral daily  gabapentin 300 milliGRAM(s) Oral three times a day  lidocaine   4% Patch 1 Patch Transdermal daily  methadone    Tablet 135 milliGRAM(s) Oral daily  mirtazapine 60 milliGRAM(s) Oral at bedtime  multivitamin 1 Tablet(s) Oral daily  nicotine - 21 mG/24Hr(s) Patch 1 patch Transdermal daily  QUEtiapine 200 milliGRAM(s) Oral three times a day    PRN MEDICATIONS  acetaminophen     Tablet .. 650 milliGRAM(s) Oral every 6 hours PRN  cyclobenzaprine 10 milliGRAM(s) Oral three times a day PRN  ketorolac   Injectable 15 milliGRAM(s) IV Push every 8 hours PRN  ondansetron Injectable 4 milliGRAM(s) IV Push every 4 hours PRN  sodium chloride 0.9% lock flush 10 milliLiter(s) IV Push every 1 hour PRN      VITAL SIGNS: Last 24 Hours  T(C): 37.3 (30 Mar 2022 07:32), Max: 37.3 (30 Mar 2022 07:32)  T(F): 99.2 (30 Mar 2022 07:32), Max: 99.2 (30 Mar 2022 07:32)  HR: 84 (30 Mar 2022 07:32) (84 - 94)  BP: 145/76 (30 Mar 2022 07:32) (110/65 - 145/76)  BP(mean): --  RR: 18 (30 Mar 2022 07:32) (18 - 18)  SpO2: 100% (30 Mar 2022 07:32) (100% - 100%)    LABS:                        9.9    11.95 )-----------( 303      ( 30 Mar 2022 04:30 )             31.8     03-30    137  |  98  |  18  ----------------------------<  111<H>  4.6   |  26  |  0.8    Ca    8.8      30 Mar 2022 04:30  Mg     1.8     03-30    TPro  7.5  /  Alb  3.4<L>  /  TBili  0.3  /  DBili  x   /  AST  13  /  ALT  5   /  AlkPhos  97  03-30    RADIOLOGY: No New Imaging     PHYSICAL EXAM:  CONSTITUTIONAL: No acute distress, well-developed, well-groomed, AAOx3  HEAD: Atraumatic, normocephalic  EYES: EOM intact, PERRLA, conjunctiva and sclera clear  ENT: Supple, no masses, no thyromegaly, no bruits, no JVD; moist mucous membranes  PULMONARY: Clear to auscultation bilaterally; no wheezes, rales, or rhonchi  CARDIOVASCULAR: Regular rate and rhythm; no murmurs, rubs, or gallops  GASTROINTESTINAL: Soft, non-tender, non-distended; bowel sounds present  MUSCULOSKELETAL: 2+ peripheral pulses; no clubbing, no cyanosis, no edema  NEUROLOGY: non-focal  SKIN: No rashes or lesions; warm and dry

## 2022-03-31 PROCEDURE — 99232 SBSQ HOSP IP/OBS MODERATE 35: CPT

## 2022-03-31 RX ADMIN — LIDOCAINE 1 PATCH: 4 CREAM TOPICAL at 19:59

## 2022-03-31 RX ADMIN — Medication 100 MILLIGRAM(S): at 05:51

## 2022-03-31 RX ADMIN — Medication 1 MILLIGRAM(S): at 11:15

## 2022-03-31 RX ADMIN — GABAPENTIN 300 MILLIGRAM(S): 400 CAPSULE ORAL at 14:37

## 2022-03-31 RX ADMIN — Medication 1 SPRAY(S): at 06:36

## 2022-03-31 RX ADMIN — QUETIAPINE FUMARATE 200 MILLIGRAM(S): 200 TABLET, FILM COATED ORAL at 21:12

## 2022-03-31 RX ADMIN — Medication 1 PATCH: at 19:59

## 2022-03-31 RX ADMIN — Medication 325 MILLIGRAM(S): at 11:15

## 2022-03-31 RX ADMIN — QUETIAPINE FUMARATE 200 MILLIGRAM(S): 200 TABLET, FILM COATED ORAL at 14:37

## 2022-03-31 RX ADMIN — Medication 100 MILLIGRAM(S): at 21:12

## 2022-03-31 RX ADMIN — ENOXAPARIN SODIUM 40 MILLIGRAM(S): 100 INJECTION SUBCUTANEOUS at 00:37

## 2022-03-31 RX ADMIN — Medication 1 PATCH: at 11:16

## 2022-03-31 RX ADMIN — LIDOCAINE 1 PATCH: 4 CREAM TOPICAL at 23:17

## 2022-03-31 RX ADMIN — Medication 2 MILLIGRAM(S): at 05:51

## 2022-03-31 RX ADMIN — Medication 2 MILLIGRAM(S): at 21:11

## 2022-03-31 RX ADMIN — Medication 100 MILLIGRAM(S): at 14:38

## 2022-03-31 RX ADMIN — GABAPENTIN 300 MILLIGRAM(S): 400 CAPSULE ORAL at 21:12

## 2022-03-31 RX ADMIN — Medication 15 MILLIGRAM(S): at 14:52

## 2022-03-31 RX ADMIN — Medication 1 SPRAY(S): at 17:06

## 2022-03-31 RX ADMIN — BUPROPION HYDROCHLORIDE 300 MILLIGRAM(S): 150 TABLET, EXTENDED RELEASE ORAL at 11:15

## 2022-03-31 RX ADMIN — METHADONE HYDROCHLORIDE 135 MILLIGRAM(S): 40 TABLET ORAL at 11:16

## 2022-03-31 RX ADMIN — MIRTAZAPINE 60 MILLIGRAM(S): 45 TABLET, ORALLY DISINTEGRATING ORAL at 21:12

## 2022-03-31 RX ADMIN — Medication 2 MILLIGRAM(S): at 14:37

## 2022-03-31 RX ADMIN — LIDOCAINE 1 PATCH: 4 CREAM TOPICAL at 11:17

## 2022-03-31 RX ADMIN — Medication 1 TABLET(S): at 11:15

## 2022-03-31 RX ADMIN — CHLORHEXIDINE GLUCONATE 1 APPLICATION(S): 213 SOLUTION TOPICAL at 06:36

## 2022-03-31 RX ADMIN — GABAPENTIN 300 MILLIGRAM(S): 400 CAPSULE ORAL at 05:50

## 2022-03-31 RX ADMIN — QUETIAPINE FUMARATE 200 MILLIGRAM(S): 200 TABLET, FILM COATED ORAL at 05:50

## 2022-03-31 NOTE — PROGRESS NOTE ADULT - ATTENDING COMMENTS
50yo M w/ pmhx of IVDU (heroin), vertebral osteomyelitis, and MSSA bacteremia presents for worsening back pain    Spinal OM, with epidural abscess/phlegmon  Opioid abuse on Methadone  RUL Nodular Opacities  Recent history & post treatment for MSSA bacteremia  Hx of IVDU  Active nicotine dependence  Morbid obesity        No sepsis on admission  Recently completed 6 wks of antibiotics which ended on 3/1.  MR Lumbar Spine 03/18 significant for  left L5-S1 facetitis with associated multifocal  abscess formation directly adjacent to the left facet joint, Ventral epidural phlegmon overlying the L5 vertebral body causing significant compression of the descending nerve roots.  S/p IR  drainage of abscess/Bx of L4-L5 biopsy of suspected discitis done 3/16 -fluid  growing NICOLE  ID noted-QuantiFeron & HIV is negative .   cefazolin 2g q 8 hours for 4 weeks(end date 04/11) . Pt has a picc line. Will need repeat imaging prior to stopping antibiotics to guide course   Continue methadone 135, and gabapentin  Neuro sx- no current intervention  Chronic b/l Wrist ulceration - healing  Handoff: Stable for discharge to SNF, pending placement .

## 2022-03-31 NOTE — PROGRESS NOTE ADULT - ASSESSMENT
52yo M w/ pmhx of IVDU (heroin), vertebral osteomyelitis, and MSSA bacteremia presents for worsening back pain    #Spinal OM, with epidural abscess/phlegmon  -No sepsis on admission  -Recently completed 6 wks of antibiotics which ended on 3/1.  -MR Lumbar Spine 03/18 significant for  left L5-S1 facetitis with associated multifocal  abscess formation directly -adjacent to the left facet joint, Ventral epidural phlegmon overlying the L5 vertebral body causing significant -compression of the descending nerve roots.  -S/p IR  drainage of abscess/Bx of L4-L5 biopsy of suspected discitis done 3/16 -fluid  growing NICOLE  -ID noted-QuantiFeron & HIV is negative .Airbron precautions taken off as low risk for TB  - c/w cefazolin 2g q 8 hours for 4 weeks (end date 04/11) . Pt has a picc line. Will need repeat imaging prior to stopping -antibiotics to guide course   -Neuro sx- no current intervention  - c/w flexeril + Toradol    #Opioid abuse on Methadone  - Continue methadone 135, and gabapentin    #RUL Nodular Opacities  - CT Chest No Cont 03.12- several reticulonodular opacities in the posterior segment of the right upper lobe which appears new from 1/15/2022. There are reticulonodular subpleural opacities at the right and left bases which appears increased from 1/15/2022.    #Recent history & post treatment for MSSA bacteremia  #Hx of IVD    #Anxiety/Depression  -c/w mirtazapine + quetiapine    #Active nicotine dependence  - c/w nicotine patch  - c/w buproprion    #Morbid obesity    #Chronic b/l Wrist ulceration - healing    #Misc  -DVT: Lovenox  -GI: na  -Activity- IAT  -Diet: Regular  -IVF: N/a  -Code: Full Code    Dispo: [facility] once bed is available

## 2022-03-31 NOTE — PROGRESS NOTE ADULT - SUBJECTIVE AND OBJECTIVE BOX
AKILNE MIKAEL 51y Male      Patient is a 51y old  Male who presents with a chief complaint of vertebral osteomyelitis (30 Mar 2022 08:45)        INTERVAL HPI/OVERNIGHT EVENTS: No acute events overnight. Patient was seen and evaluated at the bedside. The patient denies pain. Vitals stable. Patient denies fever/chills, chest pain, shortness of breath, abdominal pain, headaches, nausea/vomiting, and diarrhea/constipation.      PHYSICAL EXAM:  GENERAL: NAD  HEAD:  Normocephalic  EYES:  conjunctiva and sclera clear  ENMT: Moist mucous membranes  NECK: Supple  NERVOUS SYSTEM:  Alert, awake  CHEST/LUNG: Good air exchange bilaterally, no wheeze  HEART: Regular rate and rhythm        Vital Signs Last 24 Hrs  T(C): 36.7 (31 Mar 2022 08:13), Max: 37.1 (30 Mar 2022 16:35)  T(F): 98 (31 Mar 2022 08:13), Max: 98.7 (30 Mar 2022 16:35)  HR: 67 (31 Mar 2022 08:13) (67 - 94)  BP: 140/64 (31 Mar 2022 08:13) (116/62 - 140/64)  BP(mean): --  RR: 18 (31 Mar 2022 08:13) (18 - 18)  SpO2: --      Consultant(s) Notes Reviewed:  [X] YES  [ ] NO  Care Discussed with Consultants/Other Providers [X] YES  [ ] NO  Imaging Personally Reviewed:  [X] YES  [ ] NO      LABS:                        9.9    11.95 )-----------( 303      ( 30 Mar 2022 04:30 )             31.8     03-30    137  |  98  |  18  ----------------------------<  111<H>  4.6   |  26  |  0.8    Ca    8.8      30 Mar 2022 04:30  Mg     1.8     03-30    TPro  7.5  /  Alb  3.4<L>  /  TBili  0.3  /  DBili  x   /  AST  13  /  ALT  5   /  AlkPhos  97  03-30          CAPILLARY BLOOD GLUCOSE

## 2022-04-01 PROCEDURE — 99232 SBSQ HOSP IP/OBS MODERATE 35: CPT

## 2022-04-01 RX ADMIN — Medication 100 MILLIGRAM(S): at 13:42

## 2022-04-01 RX ADMIN — Medication 15 MILLIGRAM(S): at 22:17

## 2022-04-01 RX ADMIN — Medication 1 TABLET(S): at 11:07

## 2022-04-01 RX ADMIN — Medication 15 MILLIGRAM(S): at 14:47

## 2022-04-01 RX ADMIN — Medication 2 MILLIGRAM(S): at 05:14

## 2022-04-01 RX ADMIN — CHLORHEXIDINE GLUCONATE 1 APPLICATION(S): 213 SOLUTION TOPICAL at 05:17

## 2022-04-01 RX ADMIN — CYCLOBENZAPRINE HYDROCHLORIDE 10 MILLIGRAM(S): 10 TABLET, FILM COATED ORAL at 21:13

## 2022-04-01 RX ADMIN — QUETIAPINE FUMARATE 200 MILLIGRAM(S): 200 TABLET, FILM COATED ORAL at 13:41

## 2022-04-01 RX ADMIN — Medication 1 SPRAY(S): at 17:08

## 2022-04-01 RX ADMIN — BUPROPION HYDROCHLORIDE 300 MILLIGRAM(S): 150 TABLET, EXTENDED RELEASE ORAL at 11:07

## 2022-04-01 RX ADMIN — Medication 1 SPRAY(S): at 05:17

## 2022-04-01 RX ADMIN — GABAPENTIN 300 MILLIGRAM(S): 400 CAPSULE ORAL at 21:14

## 2022-04-01 RX ADMIN — METHADONE HYDROCHLORIDE 135 MILLIGRAM(S): 40 TABLET ORAL at 11:08

## 2022-04-01 RX ADMIN — ENOXAPARIN SODIUM 40 MILLIGRAM(S): 100 INJECTION SUBCUTANEOUS at 21:21

## 2022-04-01 RX ADMIN — Medication 1 MILLIGRAM(S): at 11:07

## 2022-04-01 RX ADMIN — QUETIAPINE FUMARATE 200 MILLIGRAM(S): 200 TABLET, FILM COATED ORAL at 21:14

## 2022-04-01 RX ADMIN — LIDOCAINE 1 PATCH: 4 CREAM TOPICAL at 11:09

## 2022-04-01 RX ADMIN — GABAPENTIN 300 MILLIGRAM(S): 400 CAPSULE ORAL at 13:41

## 2022-04-01 RX ADMIN — QUETIAPINE FUMARATE 200 MILLIGRAM(S): 200 TABLET, FILM COATED ORAL at 05:14

## 2022-04-01 RX ADMIN — MIRTAZAPINE 60 MILLIGRAM(S): 45 TABLET, ORALLY DISINTEGRATING ORAL at 21:14

## 2022-04-01 RX ADMIN — GABAPENTIN 300 MILLIGRAM(S): 400 CAPSULE ORAL at 05:14

## 2022-04-01 RX ADMIN — Medication 100 MILLIGRAM(S): at 05:15

## 2022-04-01 RX ADMIN — Medication 100 MILLIGRAM(S): at 21:13

## 2022-04-01 RX ADMIN — Medication 2 MILLIGRAM(S): at 21:14

## 2022-04-01 RX ADMIN — Medication 325 MILLIGRAM(S): at 11:07

## 2022-04-01 RX ADMIN — Medication 2 MILLIGRAM(S): at 13:41

## 2022-04-01 RX ADMIN — Medication 1 PATCH: at 11:08

## 2022-04-01 NOTE — PROGRESS NOTE ADULT - ASSESSMENT
50yo M w/ pmhx of IVDU (heroin), vertebral osteomyelitis, and MSSA bacteremia presents for worsening back pain    #Spinal OM, with epidural abscess/phlegmon  -No sepsis on admission  -Recently completed 6 wks of antibiotics which ended on 3/1.  -MR Lumbar Spine 03/18 significant for  left L5-S1 facetitis with associated multifocal  abscess formation directly -adjacent to the left facet joint, Ventral epidural phlegmon overlying the L5 vertebral body causing significant -compression of the descending nerve roots.  -S/p IR  drainage of abscess/Bx of L4-L5 biopsy of suspected discitis done 3/16 -fluid  growing NICOLE  -ID noted-QuantiFeron & HIV is negative .Airbron precautions taken off as low risk for TB  - c/w cefazolin 2g q 8 hours for 4 weeks (end date 04/11) . Pt has a picc line. Will need repeat imaging prior to stopping -antibiotics to guide course   -Neuro sx- no current intervention  - c/w flexeril + Toradol    #Opioid abuse on Methadone  - Continue methadone 135, and gabapentin    #RUL Nodular Opacities  - CT Chest No Cont 03.12- several reticulonodular opacities in the posterior segment of the right upper lobe which appears new from 1/15/2022. There are reticulonodular subpleural opacities at the right and left bases which appears increased from 1/15/2022.    #Recent history & post treatment for MSSA bacteremia  #Hx of IVD    #Anxiety/Depression  -c/w mirtazapine + quetiapine    #Active nicotine dependence  - c/w nicotine patch  - c/w buproprion    #Morbid obesity    #Chronic b/l Wrist ulceration - healing    #Misc  -DVT: Lovenox  -GI: na  -Activity- IAT  -Diet: Regular  -IVF: N/a  -Code: Full Code    Dispo: [facility] once bed is available

## 2022-04-01 NOTE — PROGRESS NOTE ADULT - ATTENDING COMMENTS
52yo M w/ pmhx of IVDU (heroin), vertebral osteomyelitis, and MSSA bacteremia presents for worsening back pain    Spinal OM, with epidural abscess/phlegmon  Opioid abuse on Methadone  RUL Nodular Opacities  Recent history & post treatment for MSSA bacteremia  Hx of IVDU  Active nicotine dependence  Morbid obesity        No sepsis on admission  Recently completed 6 wks of antibiotics which ended on 3/1.  MR Lumbar Spine 03/18 significant for  left L5-S1 facetitis with associated multifocal  abscess formation directly adjacent to the left facet joint, Ventral epidural phlegmon overlying the L5 vertebral body causing significant compression of the descending nerve roots.  S/p IR  drainage of abscess/Bx of L4-L5 biopsy of suspected discitis done 3/16 -fluid  growing NICOLE  ID noted-QuantiFeron & HIV is negative .   cefazolin 2g q 8 hours for 4 weeks(end date 04/11) . Pt has a picc line. Will need repeat imaging prior to stopping antibiotics to guide course   Continue methadone 135, and gabapentin  Neuro sx- no current intervention  Chronic b/l Wrist ulceration - healing  Handoff: Stable for discharge to SNF, pending placement .

## 2022-04-01 NOTE — PROGRESS NOTE ADULT - SUBJECTIVE AND OBJECTIVE BOX
MIKAEL MCDERMOTT 51y Male      Patient is a 51y old  Male who presents with a chief complaint of vertebral osteomyelitis (31 Mar 2022 09:51)        INTERVAL HPI/OVERNIGHT EVENTS: No acute events overnight. Patient was seen and evaluated at the bedside. The patient denies pain. Vitals stable. Patient denies fever/chills, chest pain, shortness of breath, abdominal pain, headaches, nausea/vomiting, and diarrhea/constipation.      PHYSICAL EXAM:  GENERAL: NAD  HEAD:  Normocephalic  EYES:  conjunctiva and sclera clear  ENMT: Moist mucous membranes  NECK: Supple  NERVOUS SYSTEM:  Alert, awake  CHEST/LUNG: Good air exchange bilaterally, no wheeze  HEART: Regular rate and rhythm        Vital Signs Last 24 Hrs  T(C): 35.9 (01 Apr 2022 08:00), Max: 36.4 (31 Mar 2022 15:21)  T(F): 96.7 (01 Apr 2022 08:00), Max: 97.5 (31 Mar 2022 15:21)  HR: 86 (01 Apr 2022 08:00) (86 - 104)  BP: 104/63 (01 Apr 2022 08:00) (104/63 - 128/63)  BP(mean): --  RR: 18 (01 Apr 2022 08:00) (18 - 19)  SpO2: --      Consultant(s) Notes Reviewed:  [X] YES  [ ] NO  Care Discussed with Consultants/Other Providers [X] YES  [ ] NO  Imaging Personally Reviewed:  [X] YES  [ ] NO      LABS:                CAPILLARY BLOOD GLUCOSE

## 2022-04-02 PROCEDURE — 99232 SBSQ HOSP IP/OBS MODERATE 35: CPT

## 2022-04-02 RX ADMIN — Medication 1 MILLIGRAM(S): at 11:19

## 2022-04-02 RX ADMIN — GABAPENTIN 300 MILLIGRAM(S): 400 CAPSULE ORAL at 21:34

## 2022-04-02 RX ADMIN — METHADONE HYDROCHLORIDE 135 MILLIGRAM(S): 40 TABLET ORAL at 11:24

## 2022-04-02 RX ADMIN — Medication 1 TABLET(S): at 11:19

## 2022-04-02 RX ADMIN — Medication 325 MILLIGRAM(S): at 11:18

## 2022-04-02 RX ADMIN — CYCLOBENZAPRINE HYDROCHLORIDE 10 MILLIGRAM(S): 10 TABLET, FILM COATED ORAL at 13:56

## 2022-04-02 RX ADMIN — Medication 15 MILLIGRAM(S): at 05:50

## 2022-04-02 RX ADMIN — CYCLOBENZAPRINE HYDROCHLORIDE 10 MILLIGRAM(S): 10 TABLET, FILM COATED ORAL at 22:36

## 2022-04-02 RX ADMIN — Medication 2 MILLIGRAM(S): at 05:42

## 2022-04-02 RX ADMIN — QUETIAPINE FUMARATE 200 MILLIGRAM(S): 200 TABLET, FILM COATED ORAL at 14:41

## 2022-04-02 RX ADMIN — MIRTAZAPINE 60 MILLIGRAM(S): 45 TABLET, ORALLY DISINTEGRATING ORAL at 21:35

## 2022-04-02 RX ADMIN — Medication 100 MILLIGRAM(S): at 05:42

## 2022-04-02 RX ADMIN — Medication 1 PATCH: at 11:27

## 2022-04-02 RX ADMIN — ENOXAPARIN SODIUM 40 MILLIGRAM(S): 100 INJECTION SUBCUTANEOUS at 22:36

## 2022-04-02 RX ADMIN — Medication 1 SPRAY(S): at 06:17

## 2022-04-02 RX ADMIN — Medication 1 SPRAY(S): at 17:14

## 2022-04-02 RX ADMIN — CHLORHEXIDINE GLUCONATE 1 APPLICATION(S): 213 SOLUTION TOPICAL at 05:31

## 2022-04-02 RX ADMIN — Medication 2 MILLIGRAM(S): at 13:57

## 2022-04-02 RX ADMIN — QUETIAPINE FUMARATE 200 MILLIGRAM(S): 200 TABLET, FILM COATED ORAL at 21:35

## 2022-04-02 RX ADMIN — BUPROPION HYDROCHLORIDE 300 MILLIGRAM(S): 150 TABLET, EXTENDED RELEASE ORAL at 11:19

## 2022-04-02 RX ADMIN — GABAPENTIN 300 MILLIGRAM(S): 400 CAPSULE ORAL at 05:42

## 2022-04-02 RX ADMIN — LIDOCAINE 1 PATCH: 4 CREAM TOPICAL at 11:27

## 2022-04-02 RX ADMIN — GABAPENTIN 300 MILLIGRAM(S): 400 CAPSULE ORAL at 13:57

## 2022-04-02 RX ADMIN — QUETIAPINE FUMARATE 200 MILLIGRAM(S): 200 TABLET, FILM COATED ORAL at 05:42

## 2022-04-02 RX ADMIN — Medication 100 MILLIGRAM(S): at 21:35

## 2022-04-02 RX ADMIN — Medication 2 MILLIGRAM(S): at 21:35

## 2022-04-02 RX ADMIN — CYCLOBENZAPRINE HYDROCHLORIDE 10 MILLIGRAM(S): 10 TABLET, FILM COATED ORAL at 05:50

## 2022-04-02 RX ADMIN — Medication 15 MILLIGRAM(S): at 15:42

## 2022-04-02 RX ADMIN — Medication 100 MILLIGRAM(S): at 13:57

## 2022-04-02 NOTE — PROGRESS NOTE ADULT - ATTENDING COMMENTS
52yo M w/ pmhx of IVDU (heroin), vertebral osteomyelitis, and MSSA bacteremia presents for worsening back pain    Spinal OM, with epidural abscess/phlegmon  Opioid abuse on Methadone  RUL Nodular Opacities  Recent history & post treatment for MSSA bacteremia  Hx of IVDU  Active nicotine dependence  Morbid obesity        No sepsis on admission  Recently completed 6 wks of antibiotics which ended on 3/1.  MR Lumbar Spine 03/18 significant for  left L5-S1 facetitis with associated multifocal  abscess formation directly adjacent to the left facet joint, Ventral epidural phlegmon overlying the L5 vertebral body causing significant compression of the descending nerve roots.  S/p IR  drainage of abscess/Bx of L4-L5 biopsy of suspected discitis done 3/16 -fluid  growing NICOLE  ID noted-QuantiFeron & HIV is negative .   cefazolin 2g q 8 hours for 4 weeks(end date 04/11) . Pt has a picc line. Will need repeat imaging prior to stopping antibiotics to guide course   Continue methadone 135mg, and gabapentin  Neuro sx- no current intervention  Chronic b/l Wrist ulceration - healing  Handoff: Stable for discharge to SNF, pending placement .

## 2022-04-02 NOTE — PROGRESS NOTE ADULT - ASSESSMENT
50yo M w/ pmhx of IVDU (heroin), vertebral osteomyelitis, and MSSA bacteremia presents for worsening back pain    #Spinal OM, with epidural abscess/phlegmon  -No sepsis on admission  -Recently completed 6 wks of antibiotics which ended on 3/1.  -MR Lumbar Spine 03/18 significant for  left L5-S1 facetitis with associated multifocal  abscess formation directly -adjacent to the left facet joint, Ventral epidural phlegmon overlying the L5 vertebral body causing significant -compression of the descending nerve roots.  -S/p IR  drainage of abscess/Bx of L4-L5 biopsy of suspected discitis done 3/16 -fluid  growing NICOLE  -ID noted-QuantiFeron & HIV is negative .Airbron precautions taken off as low risk for TB  - c/w cefazolin 2g q 8 hours for 4 weeks (end date 04/11) . Pt has a picc line. Will need repeat imaging prior to stopping -antibiotics to guide course   -Neuro sx- no current intervention  - c/w flexeril + Toradol    #Opioid abuse on Methadone  - Continue methadone 135, and gabapentin    #RUL Nodular Opacities  - CT Chest No Cont 03.12- several reticulonodular opacities in the posterior segment of the right upper lobe which appears new from 1/15/2022. There are reticulonodular subpleural opacities at the right and left bases which appears increased from 1/15/2022.    #Recent history & post treatment for MSSA bacteremia  #Hx of IVD    #Anxiety/Depression  -c/w mirtazapine + quetiapine    #Active nicotine dependence  - c/w nicotine patch  - c/w buproprion    #Morbid obesity    #Chronic b/l Wrist ulceration - healing    #Misc  -DVT: Lovenox  -GI: na  -Activity- IAT  -Diet: Regular  -IVF: N/a  -Code: Full Code    Dispo: [facility] once bed is available

## 2022-04-02 NOTE — PROGRESS NOTE ADULT - SUBJECTIVE AND OBJECTIVE BOX
MIKAEL MCDERMOTT 51y Male      Patient is a 51y old  Male who presents with a chief complaint of vertebral osteomyelitis (01 Apr 2022 10:33)        INTERVAL HPI/OVERNIGHT EVENTS: No acute events overnight. Patient was seen and evaluated at the bedside. The patient denies pain. Vitals stable. Patient denies fever/chills, chest pain, shortness of breath, abdominal pain, headaches, nausea/vomiting, and diarrhea/constipation.      PHYSICAL EXAM:  GENERAL: NAD  HEAD:  Normocephalic  EYES:  conjunctiva and sclera clear  ENMT: Moist mucous membranes  NECK: Supple  NERVOUS SYSTEM:  Alert, awake  CHEST/LUNG: Good air exchange bilaterally, no wheeze  HEART: Regular rate and rhythm        Vital Signs Last 24 Hrs  T(C): 36.1 (02 Apr 2022 07:27), Max: 36.6 (01 Apr 2022 15:20)  T(F): 96.9 (02 Apr 2022 07:27), Max: 97.8 (01 Apr 2022 15:20)  HR: 71 (02 Apr 2022 07:27) (71 - 97)  BP: 116/69 (02 Apr 2022 07:27) (110/58 - 116/69)  BP(mean): --  RR: 18 (02 Apr 2022 07:27) (18 - 19)  SpO2: --      Consultant(s) Notes Reviewed:  [X] YES  [ ] NO  Care Discussed with Consultants/Other Providers [X] YES  [ ] NO  Imaging Personally Reviewed:  [X] YES  [ ] NO      LABS:                CAPILLARY BLOOD GLUCOSE

## 2022-04-03 LAB — SARS-COV-2 RNA SPEC QL NAA+PROBE: SIGNIFICANT CHANGE UP

## 2022-04-03 PROCEDURE — 99232 SBSQ HOSP IP/OBS MODERATE 35: CPT

## 2022-04-03 RX ORDER — KETOROLAC TROMETHAMINE 30 MG/ML
15 SYRINGE (ML) INJECTION ONCE
Refills: 0 | Status: DISCONTINUED | OUTPATIENT
Start: 2022-04-03 | End: 2022-04-03

## 2022-04-03 RX ADMIN — LIDOCAINE 1 PATCH: 4 CREAM TOPICAL at 11:45

## 2022-04-03 RX ADMIN — Medication 15 MILLIGRAM(S): at 17:06

## 2022-04-03 RX ADMIN — Medication 1 TABLET(S): at 11:47

## 2022-04-03 RX ADMIN — QUETIAPINE FUMARATE 200 MILLIGRAM(S): 200 TABLET, FILM COATED ORAL at 14:35

## 2022-04-03 RX ADMIN — ENOXAPARIN SODIUM 40 MILLIGRAM(S): 100 INJECTION SUBCUTANEOUS at 23:20

## 2022-04-03 RX ADMIN — Medication 2 MILLIGRAM(S): at 14:33

## 2022-04-03 RX ADMIN — Medication 2 MILLIGRAM(S): at 21:50

## 2022-04-03 RX ADMIN — GABAPENTIN 300 MILLIGRAM(S): 400 CAPSULE ORAL at 21:47

## 2022-04-03 RX ADMIN — MIRTAZAPINE 60 MILLIGRAM(S): 45 TABLET, ORALLY DISINTEGRATING ORAL at 21:47

## 2022-04-03 RX ADMIN — QUETIAPINE FUMARATE 200 MILLIGRAM(S): 200 TABLET, FILM COATED ORAL at 21:47

## 2022-04-03 RX ADMIN — Medication 1 PATCH: at 11:48

## 2022-04-03 RX ADMIN — Medication 2 MILLIGRAM(S): at 05:15

## 2022-04-03 RX ADMIN — GABAPENTIN 300 MILLIGRAM(S): 400 CAPSULE ORAL at 14:35

## 2022-04-03 RX ADMIN — Medication 1 SPRAY(S): at 17:16

## 2022-04-03 RX ADMIN — Medication 1 SPRAY(S): at 05:17

## 2022-04-03 RX ADMIN — Medication 100 MILLIGRAM(S): at 14:34

## 2022-04-03 RX ADMIN — Medication 325 MILLIGRAM(S): at 11:44

## 2022-04-03 RX ADMIN — METHADONE HYDROCHLORIDE 135 MILLIGRAM(S): 40 TABLET ORAL at 11:51

## 2022-04-03 RX ADMIN — Medication 1 MILLIGRAM(S): at 11:44

## 2022-04-03 RX ADMIN — Medication 100 MILLIGRAM(S): at 21:47

## 2022-04-03 RX ADMIN — QUETIAPINE FUMARATE 200 MILLIGRAM(S): 200 TABLET, FILM COATED ORAL at 05:14

## 2022-04-03 RX ADMIN — GABAPENTIN 300 MILLIGRAM(S): 400 CAPSULE ORAL at 05:14

## 2022-04-03 RX ADMIN — CYCLOBENZAPRINE HYDROCHLORIDE 10 MILLIGRAM(S): 10 TABLET, FILM COATED ORAL at 14:52

## 2022-04-03 RX ADMIN — Medication 100 MILLIGRAM(S): at 05:14

## 2022-04-03 RX ADMIN — BUPROPION HYDROCHLORIDE 300 MILLIGRAM(S): 150 TABLET, EXTENDED RELEASE ORAL at 11:45

## 2022-04-03 NOTE — PROGRESS NOTE ADULT - SUBJECTIVE AND OBJECTIVE BOX
SUBJECTIVE:    Patient is a 51y old Male who presents with a chief complaint of vertebral osteomyelitis (02 Apr 2022 10:53)    Currently admitted to medicine with the primary diagnosis of Vertebral osteomyelitis       Today is hospital day 22d.     PAST MEDICAL & SURGICAL HISTORY  IV drug abuse    Vertebral osteomyelitis    MSSA bacteremia    No significant past surgical history      ALLERGIES:  No Known Allergies    MEDICATIONS:  STANDING MEDICATIONS  ALPRAZolam 2 milliGRAM(s) Oral three times a day  buPROPion XL (24-Hour) . 300 milliGRAM(s) Oral daily  ceFAZolin   IVPB 2000 milliGRAM(s) IV Intermittent every 8 hours  chlorhexidine 4% Liquid 1 Application(s) Topical <User Schedule>  chlorhexidine 4% Liquid 1 Application(s) Topical <User Schedule>  enoxaparin Injectable 40 milliGRAM(s) SubCutaneous every 24 hours  ferrous    sulfate 325 milliGRAM(s) Oral daily  fluticasone propionate 50 MICROgram(s)/spray Nasal Spray 1 Spray(s) Both Nostrils two times a day  folic acid 1 milliGRAM(s) Oral daily  gabapentin 300 milliGRAM(s) Oral three times a day  lidocaine   4% Patch 1 Patch Transdermal daily  methadone    Tablet 135 milliGRAM(s) Oral daily  mirtazapine 60 milliGRAM(s) Oral at bedtime  multivitamin 1 Tablet(s) Oral daily  naloxone Injectable 1 milliGRAM(s) IV Push once  nicotine - 21 mG/24Hr(s) Patch 1 patch Transdermal daily  QUEtiapine 200 milliGRAM(s) Oral three times a day    PRN MEDICATIONS  acetaminophen     Tablet .. 650 milliGRAM(s) Oral every 6 hours PRN  cyclobenzaprine 10 milliGRAM(s) Oral three times a day PRN  ondansetron Injectable 4 milliGRAM(s) IV Push every 4 hours PRN  sodium chloride 0.9% lock flush 10 milliLiter(s) IV Push every 1 hour PRN    VITALS:   T(F): 96.5  HR: 75  BP: 111/73  RR: 18  SpO2: --    LABS:                        RADIOLOGY:    PHYSICAL EXAM:  GEN: No acute distress  LUNGS: Clear to auscultation bilaterally   HEART: S1/S2 present. RRR.   ABD/ GI: Soft, non-tender, non-distended. Bowel sounds present  EXT: NC/NC/NE/2+PP/JACQUES  NEURO: AAOX3

## 2022-04-03 NOTE — PROGRESS NOTE ADULT - ASSESSMENT
50yo M w/ pmhx of IVDU (heroin), vertebral osteomyelitis, and MSSA bacteremia presents for worsening back pain    Spinal OM, with epidural abscess/phlegmon  Opioid abuse on Methadone  RUL Nodular Opacities  Recent history & post treatment for MSSA bacteremia  Hx of IVDU  Active nicotine dependence  Morbid obesity        No sepsis on admission  Recently completed 6 wks of antibiotics which ended on 3/1.  MR Lumbar Spine 03/18 significant for  left L5-S1 facetitis with associated multifocal  abscess formation directly adjacent to the left facet joint, Ventral epidural phlegmon overlying the L5 vertebral body causing significant compression of the descending nerve roots.  S/p IR  drainage of abscess/Bx of L4-L5 biopsy of suspected discitis done 3/16 -fluid  growing NICOLE  ID noted-QuantiFeron & HIV is negative .   cefazolin 2g q 8 hours for 4 weeks(end date 04/11) . Pt has a picc line. Will need repeat imaging prior to stopping antibiotics to guide course   Continue methadone 135mg, and gabapentin  Neuro sx- no current intervention  Chronic b/l Wrist ulceration - healing  Handoff: Stable for discharge to SNF, pending placement .

## 2022-04-04 PROCEDURE — 99232 SBSQ HOSP IP/OBS MODERATE 35: CPT

## 2022-04-04 RX ORDER — TRAMADOL HYDROCHLORIDE 50 MG/1
50 TABLET ORAL ONCE
Refills: 0 | Status: DISCONTINUED | OUTPATIENT
Start: 2022-04-04 | End: 2022-04-04

## 2022-04-04 RX ADMIN — QUETIAPINE FUMARATE 200 MILLIGRAM(S): 200 TABLET, FILM COATED ORAL at 21:56

## 2022-04-04 RX ADMIN — Medication 2 MILLIGRAM(S): at 05:12

## 2022-04-04 RX ADMIN — Medication 1 PATCH: at 07:51

## 2022-04-04 RX ADMIN — Medication 1 MILLIGRAM(S): at 12:07

## 2022-04-04 RX ADMIN — GABAPENTIN 300 MILLIGRAM(S): 400 CAPSULE ORAL at 13:54

## 2022-04-04 RX ADMIN — LIDOCAINE 1 PATCH: 4 CREAM TOPICAL at 22:50

## 2022-04-04 RX ADMIN — Medication 2 MILLIGRAM(S): at 21:57

## 2022-04-04 RX ADMIN — METHADONE HYDROCHLORIDE 135 MILLIGRAM(S): 40 TABLET ORAL at 12:10

## 2022-04-04 RX ADMIN — CYCLOBENZAPRINE HYDROCHLORIDE 10 MILLIGRAM(S): 10 TABLET, FILM COATED ORAL at 21:56

## 2022-04-04 RX ADMIN — LIDOCAINE 1 PATCH: 4 CREAM TOPICAL at 12:08

## 2022-04-04 RX ADMIN — Medication 100 MILLIGRAM(S): at 21:57

## 2022-04-04 RX ADMIN — Medication 2 MILLIGRAM(S): at 13:55

## 2022-04-04 RX ADMIN — TRAMADOL HYDROCHLORIDE 50 MILLIGRAM(S): 50 TABLET ORAL at 21:57

## 2022-04-04 RX ADMIN — GABAPENTIN 300 MILLIGRAM(S): 400 CAPSULE ORAL at 05:12

## 2022-04-04 RX ADMIN — GABAPENTIN 300 MILLIGRAM(S): 400 CAPSULE ORAL at 21:57

## 2022-04-04 RX ADMIN — CYCLOBENZAPRINE HYDROCHLORIDE 10 MILLIGRAM(S): 10 TABLET, FILM COATED ORAL at 13:54

## 2022-04-04 RX ADMIN — Medication 1 PATCH: at 11:00

## 2022-04-04 RX ADMIN — Medication 100 MILLIGRAM(S): at 13:56

## 2022-04-04 RX ADMIN — BUPROPION HYDROCHLORIDE 300 MILLIGRAM(S): 150 TABLET, EXTENDED RELEASE ORAL at 12:07

## 2022-04-04 RX ADMIN — Medication 100 MILLIGRAM(S): at 05:12

## 2022-04-04 RX ADMIN — MIRTAZAPINE 60 MILLIGRAM(S): 45 TABLET, ORALLY DISINTEGRATING ORAL at 21:57

## 2022-04-04 RX ADMIN — ENOXAPARIN SODIUM 40 MILLIGRAM(S): 100 INJECTION SUBCUTANEOUS at 21:57

## 2022-04-04 RX ADMIN — Medication 325 MILLIGRAM(S): at 12:07

## 2022-04-04 RX ADMIN — QUETIAPINE FUMARATE 200 MILLIGRAM(S): 200 TABLET, FILM COATED ORAL at 05:12

## 2022-04-04 RX ADMIN — QUETIAPINE FUMARATE 200 MILLIGRAM(S): 200 TABLET, FILM COATED ORAL at 13:54

## 2022-04-04 RX ADMIN — Medication 1 TABLET(S): at 12:07

## 2022-04-04 RX ADMIN — Medication 1 PATCH: at 12:08

## 2022-04-04 NOTE — PROGRESS NOTE ADULT - ASSESSMENT
52yo M w/ pmhx of IVDU (heroin), vertebral osteomyelitis, and MSSA bacteremia presents for worsening back pain    #Spinal OM, with epidural abscess/phlegmon  - No sepsis on admission  - Recently completed 6 wks of antibiotics which ended on 3/1.  - MR Lumbar Spine 03/18 significant for  left L5-S1 facetitis with associated multifocal  abscess formation directly -adjacent to the left facet joint, Ventral epidural phlegmon overlying the L5 vertebral body causing significant -compression of the descending nerve roots.  - S/p IR  drainage of abscess/Bx of L4-L5 biopsy of suspected discitis done 3/16 -fluid  growing NICOLE  - ID noted-QuantiFeron & HIV is negative. Airborne precautions taken off as low risk for TB  - c/w cefazolin 2g q 8 hours for 4 weeks (end date 04/11) . Pt has a picc line. Will need repeat imaging prior to stopping -antibiotics to guide course   - Neurosx- no current intervention  - c/w flexeril + Toradol    #Opioid abuse on Methadone  - Continue methadone 135, and gabapentin    #RUL Nodular Opacities  - CT Chest No Cont 03.12- several reticulonodular opacities in the posterior segment of the right upper lobe which appears new from 1/15/2022. There are reticulonodular subpleural opacities at the right and left bases which appears increased from 1/15/2022.    #Recent history & post treatment for MSSA bacteremia  #Hx of IVD    #Anxiety/Depression  -c/w mirtazapine + quetiapine    #Active nicotine dependence  - c/w nicotine patch  - c/w buproprion    #Morbid obesity    #Chronic b/l Wrist ulceration - healing    #Misc  -DVT: Lovenox  -GI: na  -Activity- IAT  -Diet: Regular  -IVF: N/a  -Code: Full Code    Dispo: [facility] once bed is available

## 2022-04-04 NOTE — PROGRESS NOTE ADULT - SUBJECTIVE AND OBJECTIVE BOX
HPI:  50yo M w/ pmhx of IVDU (heroin), vertebral osteomyelitis, and MSSA bacteremia presents for worsening back pain. Back pain was increasing over the past week and worsening today to the point he was having difficulty ambulating and bearing weight. Back pain started one week ago and difficulty ambulating started on Tuesday 3/8. Was discharged from [facility] on 3/2. Patient was recently admitted from 1/16 to 2/1 for vertebral osteomyelitis and MSSA bacteremia. MRI found facetitis with adjacent abscesses. PAM was negative and ID recommended cefazolin. Patient had picc line placed for 6wks of antibiotics which ended on 3/1. Denies urinary/bowel incontinence/retention, numbness in legs, fever/chills, chest pain, SOB, abd pain, n/v/d.    ED course:  vitals: /94, HR 84, T 98.4F, O2 96% RA   labs: hgb 9.0,   imaging:   - CT L-T spine IV con:   Since 1/15/2022 there has been significant progression of discitis osteomyelitis at L4-5 with multifocal erosive changes at the endplates and likely prevertebral phlegmon formation. Progression of L5-S1 facetitis, Facetitis in T10-T11.  progress: neurosurgery said no surgical intervention  (12 Mar 2022 21:00)    Currently admitted to medicine with the primary diagnosis of Vertebral osteomyelitis       Today is hospital day 23d.     INTERVAL HPI / OVERNIGHT EVENTS:  Patient was examined and seen at bedside. This morning he is resting comfortably in bed and reports no new issues or overnight events. Vitals stable. Patient denies fever/chills, chest pain, shortness of breath, abdominal pain, headaches, nausea/vomiting, and diarrhea/constipation.    ROS: Otherwise unremarkable     PAST MEDICAL & SURGICAL HISTORY  IV drug abuse    Vertebral osteomyelitis    MSSA bacteremia    No significant past surgical history      ALLERGIES  No Known Allergies    MEDICATIONS  STANDING MEDICATIONS  ALPRAZolam 2 milliGRAM(s) Oral three times a day  buPROPion XL (24-Hour) . 300 milliGRAM(s) Oral daily  ceFAZolin   IVPB 2000 milliGRAM(s) IV Intermittent every 8 hours  chlorhexidine 4% Liquid 1 Application(s) Topical <User Schedule>  chlorhexidine 4% Liquid 1 Application(s) Topical <User Schedule>  enoxaparin Injectable 40 milliGRAM(s) SubCutaneous every 24 hours  ferrous    sulfate 325 milliGRAM(s) Oral daily  fluticasone propionate 50 MICROgram(s)/spray Nasal Spray 1 Spray(s) Both Nostrils two times a day  folic acid 1 milliGRAM(s) Oral daily  gabapentin 300 milliGRAM(s) Oral three times a day  lidocaine   4% Patch 1 Patch Transdermal daily  methadone    Tablet 135 milliGRAM(s) Oral daily  mirtazapine 60 milliGRAM(s) Oral at bedtime  multivitamin 1 Tablet(s) Oral daily  naloxone Injectable 1 milliGRAM(s) IV Push once  nicotine - 21 mG/24Hr(s) Patch 1 patch Transdermal daily  QUEtiapine 200 milliGRAM(s) Oral three times a day    PRN MEDICATIONS  acetaminophen     Tablet .. 650 milliGRAM(s) Oral every 6 hours PRN  cyclobenzaprine 10 milliGRAM(s) Oral three times a day PRN  ondansetron Injectable 4 milliGRAM(s) IV Push every 4 hours PRN  sodium chloride 0.9% lock flush 10 milliLiter(s) IV Push every 1 hour PRN    VITALS:  T(F): 95.2  HR: 81  BP: 126/78  RR: 18  SpO2: --    PHYSICAL EXAM  GEN: NAD, Resting comfortably in bed  PULM: Clear to auscultation bilaterally, No wheezes  CVS: Regular rate and rhythm, S1-S2, no murmurs  ABD: Soft, non-tender, non-distended, no guarding  EXT: No edema  NEURO: A&Ox3, no focal deficits    LABS                        RADIOLOGY

## 2022-04-05 LAB
ALBUMIN SERPL ELPH-MCNC: 3.3 G/DL — LOW (ref 3.5–5.2)
ALBUMIN SERPL ELPH-MCNC: 3.7 G/DL — SIGNIFICANT CHANGE UP (ref 3.5–5.2)
ALP SERPL-CCNC: 111 U/L — SIGNIFICANT CHANGE UP (ref 30–115)
ALP SERPL-CCNC: 112 U/L — SIGNIFICANT CHANGE UP (ref 30–115)
ALT FLD-CCNC: 6 U/L — SIGNIFICANT CHANGE UP (ref 0–41)
ANION GAP SERPL CALC-SCNC: 10 MMOL/L — SIGNIFICANT CHANGE UP (ref 7–14)
ANION GAP SERPL CALC-SCNC: 12 MMOL/L — SIGNIFICANT CHANGE UP (ref 7–14)
ANION GAP SERPL CALC-SCNC: 12 MMOL/L — SIGNIFICANT CHANGE UP (ref 7–14)
AST SERPL-CCNC: 14 U/L — SIGNIFICANT CHANGE UP (ref 0–41)
AST SERPL-CCNC: 37 U/L — SIGNIFICANT CHANGE UP (ref 0–41)
BASOPHILS # BLD AUTO: 0.03 K/UL — SIGNIFICANT CHANGE UP (ref 0–0.2)
BASOPHILS NFR BLD AUTO: 0.4 % — SIGNIFICANT CHANGE UP (ref 0–1)
BILIRUB SERPL-MCNC: 0.2 MG/DL — SIGNIFICANT CHANGE UP (ref 0.2–1.2)
BILIRUB SERPL-MCNC: <0.2 MG/DL — SIGNIFICANT CHANGE UP (ref 0.2–1.2)
BUN SERPL-MCNC: 17 MG/DL — SIGNIFICANT CHANGE UP (ref 10–20)
BUN SERPL-MCNC: 19 MG/DL — SIGNIFICANT CHANGE UP (ref 10–20)
BUN SERPL-MCNC: 19 MG/DL — SIGNIFICANT CHANGE UP (ref 10–20)
CALCIUM SERPL-MCNC: 8.8 MG/DL — SIGNIFICANT CHANGE UP (ref 8.5–10.1)
CALCIUM SERPL-MCNC: 9 MG/DL — SIGNIFICANT CHANGE UP (ref 8.5–10.1)
CALCIUM SERPL-MCNC: 9.6 MG/DL — SIGNIFICANT CHANGE UP (ref 8.5–10.1)
CHLORIDE SERPL-SCNC: 96 MMOL/L — LOW (ref 98–110)
CHLORIDE SERPL-SCNC: 98 MMOL/L — SIGNIFICANT CHANGE UP (ref 98–110)
CHLORIDE SERPL-SCNC: 98 MMOL/L — SIGNIFICANT CHANGE UP (ref 98–110)
CO2 SERPL-SCNC: 23 MMOL/L — SIGNIFICANT CHANGE UP (ref 17–32)
CO2 SERPL-SCNC: 27 MMOL/L — SIGNIFICANT CHANGE UP (ref 17–32)
CO2 SERPL-SCNC: 29 MMOL/L — SIGNIFICANT CHANGE UP (ref 17–32)
CREAT SERPL-MCNC: 0.9 MG/DL — SIGNIFICANT CHANGE UP (ref 0.7–1.5)
EGFR: 103 ML/MIN/1.73M2 — SIGNIFICANT CHANGE UP
EOSINOPHIL # BLD AUTO: 0.37 K/UL — SIGNIFICANT CHANGE UP (ref 0–0.7)
EOSINOPHIL NFR BLD AUTO: 5 % — SIGNIFICANT CHANGE UP (ref 0–8)
GLUCOSE SERPL-MCNC: 106 MG/DL — HIGH (ref 70–99)
GLUCOSE SERPL-MCNC: 109 MG/DL — HIGH (ref 70–99)
GLUCOSE SERPL-MCNC: 130 MG/DL — HIGH (ref 70–99)
HCT VFR BLD CALC: 31.6 % — LOW (ref 42–52)
HCT VFR BLD CALC: 31.8 % — LOW (ref 42–52)
HGB BLD-MCNC: 9.7 G/DL — LOW (ref 14–18)
HGB BLD-MCNC: 9.7 G/DL — LOW (ref 14–18)
IMM GRANULOCYTES NFR BLD AUTO: 0.5 % — HIGH (ref 0.1–0.3)
LYMPHOCYTES # BLD AUTO: 2 K/UL — SIGNIFICANT CHANGE UP (ref 1.2–3.4)
LYMPHOCYTES # BLD AUTO: 27.3 % — SIGNIFICANT CHANGE UP (ref 20.5–51.1)
MAGNESIUM SERPL-MCNC: 1.8 MG/DL — SIGNIFICANT CHANGE UP (ref 1.8–2.4)
MAGNESIUM SERPL-MCNC: 2 MG/DL — SIGNIFICANT CHANGE UP (ref 1.8–2.4)
MCHC RBC-ENTMCNC: 25.9 PG — LOW (ref 27–31)
MCHC RBC-ENTMCNC: 25.9 PG — LOW (ref 27–31)
MCHC RBC-ENTMCNC: 30.5 G/DL — LOW (ref 32–37)
MCHC RBC-ENTMCNC: 30.7 G/DL — LOW (ref 32–37)
MCV RBC AUTO: 84.3 FL — SIGNIFICANT CHANGE UP (ref 80–94)
MCV RBC AUTO: 84.8 FL — SIGNIFICANT CHANGE UP (ref 80–94)
MONOCYTES # BLD AUTO: 0.56 K/UL — SIGNIFICANT CHANGE UP (ref 0.1–0.6)
MONOCYTES NFR BLD AUTO: 7.6 % — SIGNIFICANT CHANGE UP (ref 1.7–9.3)
NEUTROPHILS # BLD AUTO: 4.33 K/UL — SIGNIFICANT CHANGE UP (ref 1.4–6.5)
NEUTROPHILS NFR BLD AUTO: 59.2 % — SIGNIFICANT CHANGE UP (ref 42.2–75.2)
NRBC # BLD: 0 /100 WBCS — SIGNIFICANT CHANGE UP (ref 0–0)
NRBC # BLD: 0 /100 WBCS — SIGNIFICANT CHANGE UP (ref 0–0)
PLATELET # BLD AUTO: 283 K/UL — SIGNIFICANT CHANGE UP (ref 130–400)
PLATELET # BLD AUTO: 296 K/UL — SIGNIFICANT CHANGE UP (ref 130–400)
POTASSIUM SERPL-MCNC: 4.3 MMOL/L — SIGNIFICANT CHANGE UP (ref 3.5–5)
POTASSIUM SERPL-MCNC: 4.6 MMOL/L — SIGNIFICANT CHANGE UP (ref 3.5–5)
POTASSIUM SERPL-MCNC: 5.9 MMOL/L — HIGH (ref 3.5–5)
POTASSIUM SERPL-SCNC: 4.3 MMOL/L — SIGNIFICANT CHANGE UP (ref 3.5–5)
POTASSIUM SERPL-SCNC: 4.6 MMOL/L — SIGNIFICANT CHANGE UP (ref 3.5–5)
POTASSIUM SERPL-SCNC: 5.9 MMOL/L — HIGH (ref 3.5–5)
PROT SERPL-MCNC: 7.2 G/DL — SIGNIFICANT CHANGE UP (ref 6–8)
PROT SERPL-MCNC: 8.4 G/DL — HIGH (ref 6–8)
RBC # BLD: 3.75 M/UL — LOW (ref 4.7–6.1)
RBC # BLD: 3.75 M/UL — LOW (ref 4.7–6.1)
RBC # FLD: 17.3 % — HIGH (ref 11.5–14.5)
RBC # FLD: 17.5 % — HIGH (ref 11.5–14.5)
SODIUM SERPL-SCNC: 133 MMOL/L — LOW (ref 135–146)
SODIUM SERPL-SCNC: 135 MMOL/L — SIGNIFICANT CHANGE UP (ref 135–146)
SODIUM SERPL-SCNC: 137 MMOL/L — SIGNIFICANT CHANGE UP (ref 135–146)
WBC # BLD: 6.87 K/UL — SIGNIFICANT CHANGE UP (ref 4.8–10.8)
WBC # BLD: 7.33 K/UL — SIGNIFICANT CHANGE UP (ref 4.8–10.8)
WBC # FLD AUTO: 6.87 K/UL — SIGNIFICANT CHANGE UP (ref 4.8–10.8)
WBC # FLD AUTO: 7.33 K/UL — SIGNIFICANT CHANGE UP (ref 4.8–10.8)

## 2022-04-05 PROCEDURE — 99233 SBSQ HOSP IP/OBS HIGH 50: CPT

## 2022-04-05 PROCEDURE — 71045 X-RAY EXAM CHEST 1 VIEW: CPT | Mod: 26

## 2022-04-05 RX ORDER — ALPRAZOLAM 0.25 MG
2 TABLET ORAL THREE TIMES A DAY
Refills: 0 | Status: DISCONTINUED | OUTPATIENT
Start: 2022-04-05 | End: 2022-04-06

## 2022-04-05 RX ADMIN — Medication 100 MILLIGRAM(S): at 11:50

## 2022-04-05 RX ADMIN — Medication 325 MILLIGRAM(S): at 11:48

## 2022-04-05 RX ADMIN — METHADONE HYDROCHLORIDE 135 MILLIGRAM(S): 40 TABLET ORAL at 11:52

## 2022-04-05 RX ADMIN — QUETIAPINE FUMARATE 200 MILLIGRAM(S): 200 TABLET, FILM COATED ORAL at 21:17

## 2022-04-05 RX ADMIN — LIDOCAINE 1 PATCH: 4 CREAM TOPICAL at 19:22

## 2022-04-05 RX ADMIN — Medication 100 MILLIGRAM(S): at 05:42

## 2022-04-05 RX ADMIN — MIRTAZAPINE 60 MILLIGRAM(S): 45 TABLET, ORALLY DISINTEGRATING ORAL at 21:15

## 2022-04-05 RX ADMIN — GABAPENTIN 300 MILLIGRAM(S): 400 CAPSULE ORAL at 11:48

## 2022-04-05 RX ADMIN — QUETIAPINE FUMARATE 200 MILLIGRAM(S): 200 TABLET, FILM COATED ORAL at 11:49

## 2022-04-05 RX ADMIN — Medication 1 TABLET(S): at 11:49

## 2022-04-05 RX ADMIN — QUETIAPINE FUMARATE 200 MILLIGRAM(S): 200 TABLET, FILM COATED ORAL at 05:43

## 2022-04-05 RX ADMIN — ENOXAPARIN SODIUM 40 MILLIGRAM(S): 100 INJECTION SUBCUTANEOUS at 22:36

## 2022-04-05 RX ADMIN — Medication 1 SPRAY(S): at 11:53

## 2022-04-05 RX ADMIN — LIDOCAINE 1 PATCH: 4 CREAM TOPICAL at 22:36

## 2022-04-05 RX ADMIN — Medication 2 MILLIGRAM(S): at 11:51

## 2022-04-05 RX ADMIN — GABAPENTIN 300 MILLIGRAM(S): 400 CAPSULE ORAL at 05:42

## 2022-04-05 RX ADMIN — Medication 1 PATCH: at 11:46

## 2022-04-05 RX ADMIN — BUPROPION HYDROCHLORIDE 300 MILLIGRAM(S): 150 TABLET, EXTENDED RELEASE ORAL at 11:48

## 2022-04-05 RX ADMIN — LIDOCAINE 1 PATCH: 4 CREAM TOPICAL at 11:45

## 2022-04-05 RX ADMIN — Medication 1 MILLIGRAM(S): at 11:49

## 2022-04-05 RX ADMIN — Medication 1 PATCH: at 19:22

## 2022-04-05 RX ADMIN — Medication 2 MILLIGRAM(S): at 21:15

## 2022-04-05 RX ADMIN — Medication 100 MILLIGRAM(S): at 21:09

## 2022-04-05 RX ADMIN — CYCLOBENZAPRINE HYDROCHLORIDE 10 MILLIGRAM(S): 10 TABLET, FILM COATED ORAL at 05:43

## 2022-04-05 RX ADMIN — GABAPENTIN 300 MILLIGRAM(S): 400 CAPSULE ORAL at 21:15

## 2022-04-05 NOTE — PROGRESS NOTE ADULT - SUBJECTIVE AND OBJECTIVE BOX
MIKAEL MCDERMOTT 51y Male  MRN#: [id number]   Hospital Day: 24d    SUBJECTIVE  Patient is a 51y old Male who presents with a chief complaint of vertebral osteomyelitis (04 Apr 2022 11:41)  Currently admitted to medicine with the primary diagnosis of Vertebral osteomyelitis      INTERVAL HPI AND OVERNIGHT EVENTS:  Patient was examined and seen at bedside. This morning he is resting comfortably in bed and reports no issues or overnight events.      OBJECTIVE  PAST MEDICAL & SURGICAL HISTORY  IV drug abuse    Vertebral osteomyelitis    MSSA bacteremia    No significant past surgical history      ALLERGIES:  No Known Allergies    MEDICATIONS:  STANDING MEDICATIONS  ALPRAZolam 2 milliGRAM(s) Oral three times a day  buPROPion XL (24-Hour) . 300 milliGRAM(s) Oral daily  ceFAZolin   IVPB 2000 milliGRAM(s) IV Intermittent every 8 hours  chlorhexidine 4% Liquid 1 Application(s) Topical <User Schedule>  chlorhexidine 4% Liquid 1 Application(s) Topical <User Schedule>  enoxaparin Injectable 40 milliGRAM(s) SubCutaneous every 24 hours  ferrous    sulfate 325 milliGRAM(s) Oral daily  fluticasone propionate 50 MICROgram(s)/spray Nasal Spray 1 Spray(s) Both Nostrils two times a day  folic acid 1 milliGRAM(s) Oral daily  gabapentin 300 milliGRAM(s) Oral three times a day  lidocaine   4% Patch 1 Patch Transdermal daily  methadone    Tablet 135 milliGRAM(s) Oral daily  mirtazapine 60 milliGRAM(s) Oral at bedtime  multivitamin 1 Tablet(s) Oral daily  naloxone Injectable 1 milliGRAM(s) IV Push once  nicotine - 21 mG/24Hr(s) Patch 1 patch Transdermal daily  QUEtiapine 200 milliGRAM(s) Oral three times a day    PRN MEDICATIONS  acetaminophen     Tablet .. 650 milliGRAM(s) Oral every 6 hours PRN  cyclobenzaprine 10 milliGRAM(s) Oral three times a day PRN  ondansetron Injectable 4 milliGRAM(s) IV Push every 4 hours PRN  sodium chloride 0.9% lock flush 10 milliLiter(s) IV Push every 1 hour PRN      PHYSICAL EXAM:  CONSTITUTIONAL: No acute distress, well-developed  HEAD: Atraumatic, normocephalic  EYES: EOM intact, conjunctiva and sclera clear  PULMONARY: Clear to auscultation bilaterally; no wheezes, rales, or rhonchi  CARDIOVASCULAR: Regular rate and rhythm; no murmurs, rubs, or gallops  GASTROINTESTINAL: Soft, non-tender, very obese  MUSCULOSKELETAL:  no clubbing, no cyanosis, no edema; back/spine has no lesions  NEUROLOGY: Ax O x3   SKIN: No rashes or lesions; warm and dry    VITAL SIGNS: Last 24 Hours  T(C): 36.7 (05 Apr 2022 07:51), Max: 37.7 (04 Apr 2022 23:57)  T(F): 98 (05 Apr 2022 07:51), Max: 99.9 (04 Apr 2022 23:57)  HR: 97 (05 Apr 2022 07:51) (88 - 112)  BP: 115/57 (05 Apr 2022 07:51) (112/54 - 126/82)  BP(mean): --  RR: 18 (05 Apr 2022 07:51) (18 - 18)  SpO2: --    LABS:

## 2022-04-05 NOTE — PROGRESS NOTE ADULT - ASSESSMENT
HOSPITAL COURSE:    52yo M w/ pmhx of IVDU (heroin), vertebral osteomyelitis, and MSSA bacteremia presents for worsening back pain    MRI from previous visits have shown discitis/facetitis.   CT on this admission showed worsening discitis, OM at L4-L5 and progression faceitits in L5-S1 and T10-T11.     Per neurosurgery, no intervention.     Patient underwent drainage of spinal abscess on 3/16 with IR. Cultures grew NICOLE.     Patient is receiving IV Antibiotics per ID until 4/11.     Cannot be discharged with PICC given hx of IVDU.     Otherwise medically clear for discharge pending completion of antibiotic course.     ASSESSMENT & PLAN:    #Spinal OM, with epidural abscess/phlegmon  -No sepsis on admission  -Recently completed 6 wks of antibiotics which ended on 3/1.  -MR Lumbar Spine 03/18 significant for  left L5-S1 facetitis with associated multifocal  abscess formation directly -adjacent to the left facet joint, Ventral epidural phlegmon overlying the L5  - CT 3/12 showed worsening discitis/facetitis/OM  - Seen by neurosurgery, no intervention recommended   - IR Drainage of Abscess/Biopsy of suspected discitis done 3/16 -fluid  growing NICOLE  - c/w cefazolin 2g q 8 hours for 4 weeks (end date 04/11) . Pt has a picc line. Will need repeat imaging prior to stopping -antibiotics to guide course   - c/w flexeril + Toradol for pain management     #Opioid abuse on Methadone  - Continue methadone 135, and gabapentin    #RUL Nodular Opacities  - CT Chest No Cont 03.12- several reticulonodular opacities in the posterior segment of the right upper lobe which appears new from 1/15/2022. There are reticulonodular subpleural opacities at the right and left bases which appears increased from 1/15/2022.    #Recent history & post treatment for MSSA bacteremia  #Hx of IVD    #Anxiety/Depression  -c/w mirtazapine + quetiapine    #Active nicotine dependence  - c/w nicotine patch  - c/w buproprion    #Morbid obesity    #Chronic b/l Wrist ulceration - healing    #Misc  -DVT: Lovenox  -GI: na  -Activity- IAT  -Diet: Regular  -IVF: N/a  -Code: Full Code    Dispo: [facility] once bed is available

## 2022-04-05 NOTE — PROGRESS NOTE ADULT - ATTENDING COMMENTS
Patient seen and examined independently. Agree with resident note with exceptions.    # Spinal OM, with epidural abscess/phlegmon-No sepsis  # H/o MSSA Bacteremia,  BLood Cx 1/15-17 +, Blood Cx 1/18 NG,  PAM 1/19 - negative for vegetation. Completed 6 wks of antibiotics on 3/1.  # H/o IVDA  -  MR Lumbar Spine w/wo IV Cont (03.14.22 @ 20:23) >Since prior examination there has been progression of osteomyelitis  involving the L4 and L5 vertebral bodies as well as severe facetitis involving the left L5-S1 facet joint. Extensive epidural phlegmon which is a slightly decreased in overall extent extent since prior MRI predominantly involving the ventral epidural space extending into the L4-5 disc space causing severe spinal   stenosis and mass effectupon the descending nerve roots though slightly improved. Similar-appearing right psoas abscess as well as left retroperitoneal abscess extending to the left L5-S1 facet joint which is slightly increased in size since the prior examination  - MRI THORACIC SPINE:Similar increased signal and enhancement involving the left T10-T11 facet joint space which may be related to degenerative changes though facetitis cannot be excluded.  - neurosurgery eval: No acute Neurosurgical intervention  - S/p drainage of abscess on 3/16  - blood Culture Results: No Growth Final (03.17.22 @ 06:07)  - abscess  Culture Results: Few Staphylococcus aureus (03.16.22 @ 18:51)  - Sedimentation Rate, Erythrocyte: 127 mm/Hr (03.12.22 @ 12:12)  - C-Reactive Protein, Serum: 140 mg/L (03.13.22 @ 06:42)  - ID F/u continue cefazolin 2g q 8 hours -- will likely plan at least another 4 week course, but will need repeat imaging prior to stopping antibiotics to guide course   - PICC line placed  - c/w pain meds    # H/o Opioid abuse   - c/w methadone    # RUL Nodular densities  -  CT Chest No Cont (03.12.22 @ 17:13) >several reticulonodular opacities in the posterior segment of the right upper lobe which appears new from 1/15/2022.  - repeat xray chest    # H/o Depression  - c/w  home meds    # Nicotine abuse  - c/w nicotine patch  -  pt counseled    # Obesity  - BMI: 35.4    # DVT prophylaxis    # Full code    # Pending: F/u cbc, BMP, xray chest, Pt to complete his antibiotic course inpatient , end date 4/11  # Discussed plan of care with patient  # Disposition: Home     # Time spent: 40 minutes

## 2022-04-06 ENCOUNTER — TRANSCRIPTION ENCOUNTER (OUTPATIENT)
Age: 52
End: 2022-04-06

## 2022-04-06 VITALS
DIASTOLIC BLOOD PRESSURE: 71 MMHG | HEART RATE: 87 BPM | SYSTOLIC BLOOD PRESSURE: 124 MMHG | TEMPERATURE: 96 F | RESPIRATION RATE: 19 BRPM

## 2022-04-06 DIAGNOSIS — R91.8 OTHER NONSPECIFIC ABNORMAL FINDING OF LUNG FIELD: ICD-10-CM

## 2022-04-06 DIAGNOSIS — M46.20 OSTEOMYELITIS OF VERTEBRA, SITE UNSPECIFIED: ICD-10-CM

## 2022-04-06 LAB — ALT FLD-CCNC: <5 U/L — SIGNIFICANT CHANGE UP (ref 0–41)

## 2022-04-06 PROCEDURE — 99239 HOSP IP/OBS DSCHRG MGMT >30: CPT

## 2022-04-06 RX ORDER — METHADONE HYDROCHLORIDE 40 MG/1
135 TABLET ORAL DAILY
Refills: 0 | Status: DISCONTINUED | OUTPATIENT
Start: 2022-04-06 | End: 2022-04-06

## 2022-04-06 RX ADMIN — Medication 1 SPRAY(S): at 17:02

## 2022-04-06 RX ADMIN — QUETIAPINE FUMARATE 200 MILLIGRAM(S): 200 TABLET, FILM COATED ORAL at 05:48

## 2022-04-06 RX ADMIN — LIDOCAINE 1 PATCH: 4 CREAM TOPICAL at 11:04

## 2022-04-06 RX ADMIN — Medication 1 PATCH: at 06:13

## 2022-04-06 RX ADMIN — Medication 100 MILLIGRAM(S): at 05:48

## 2022-04-06 RX ADMIN — Medication 1 TABLET(S): at 11:05

## 2022-04-06 RX ADMIN — GABAPENTIN 300 MILLIGRAM(S): 400 CAPSULE ORAL at 05:48

## 2022-04-06 RX ADMIN — METHADONE HYDROCHLORIDE 135 MILLIGRAM(S): 40 TABLET ORAL at 11:04

## 2022-04-06 RX ADMIN — Medication 100 MILLIGRAM(S): at 14:13

## 2022-04-06 RX ADMIN — Medication 1 PATCH: at 11:04

## 2022-04-06 RX ADMIN — CHLORHEXIDINE GLUCONATE 1 APPLICATION(S): 213 SOLUTION TOPICAL at 05:58

## 2022-04-06 RX ADMIN — BUPROPION HYDROCHLORIDE 300 MILLIGRAM(S): 150 TABLET, EXTENDED RELEASE ORAL at 11:05

## 2022-04-06 RX ADMIN — Medication 1 MILLIGRAM(S): at 11:05

## 2022-04-06 RX ADMIN — GABAPENTIN 300 MILLIGRAM(S): 400 CAPSULE ORAL at 14:13

## 2022-04-06 RX ADMIN — Medication 2 MILLIGRAM(S): at 05:48

## 2022-04-06 RX ADMIN — Medication 325 MILLIGRAM(S): at 11:05

## 2022-04-06 RX ADMIN — Medication 2 MILLIGRAM(S): at 14:13

## 2022-04-06 RX ADMIN — QUETIAPINE FUMARATE 200 MILLIGRAM(S): 200 TABLET, FILM COATED ORAL at 14:13

## 2022-04-06 NOTE — DISCHARGE NOTE NURSING/CASE MANAGEMENT/SOCIAL WORK - NSDCVIVACCINE_GEN_ALL_CORE_FT
Tdap; 15-Baljinder-2022 23:06; Jovanny Singh (STANISLAV); [employer]; [id number] (Exp. Date: 09-Sep-2023); IntraMuscular; Deltoid Left.; 0.5 milliLiter(s); VIS (VIS Published: 09-May-2013, VIS Presented: 15-Baljinder-2022);

## 2022-04-06 NOTE — PROGRESS NOTE ADULT - ASSESSMENT
#Spinal OM, with epidural abscess/phlegmon  -No sepsis on admission  -Recently completed 6 wks of antibiotics which ended on 3/1.  -MR Lumbar Spine 03/18 significant for  left L5-S1 facetitis with associated multifocal  abscess formation directly -adjacent to the left facet joint, Ventral epidural phlegmon overlying the L5  - CT 3/12 showed worsening discitis/facetitis/OM  - Seen by neurosurgery, no intervention recommended   - IR Drainage of Abscess/Biopsy of suspected discitis done 3/16 -fluid  growing NICOLE  - c/w cefazolin 2g q 8 hours for 4 weeks (end date 04/11) . Pt has a picc line. Will need repeat imaging prior to stopping -antibiotics to guide course   - c/w flexeril + Toradol for pain management     #Opioid abuse on Methadone  - Continue methadone 135, and gabapentin    #RUL Nodular Opacities  - CT Chest No Cont 03.12- several reticulonodular opacities in the posterior segment of the right upper lobe which appears new from 1/15/2022. There are reticulonodular subpleural opacities at the right and left bases which appears increased from 1/15/2022.    #Recent history & post treatment for MSSA bacteremia  #Hx of IVD    #Anxiety/Depression  -c/w mirtazapine + quetiapine    #Active nicotine dependence  - c/w nicotine patch  - c/w buproprion    #Morbid obesity    #Chronic b/l Wrist ulceration - healing    #Misc  -DVT: Lovenox  -GI: na  -Activity- IAT  -Diet: Regular  -IVF: N/a  -Code: Full Code    Dispo: [facility] h/e patient is refusing #Spinal OM, with epidural abscess/phlegmon  -No sepsis on admission  -Recently completed 6 wks of antibiotics which ended on 3/1.  -MR Lumbar Spine 03/18 significant for  left L5-S1 facetitis with associated multifocal  abscess formation directly -adjacent to the left facet joint, Ventral epidural phlegmon overlying the L5  - CT 3/12 showed worsening discitis/facetitis/OM  - Seen by neurosurgery, no intervention recommended   - IR Drainage of Abscess/Biopsy of suspected discitis done 3/16 -fluid  growing NICOLE  - Per ID c/w cefazolin 2g q 8 hours open-ended but at least 6 weeks from 4/6 with a repeat CT scan in 4 weeks and weekly CRP and ESR to decide on the course of abx. Pt has a picc line. Will need repeat imaging prior to stopping -antibiotics to guide course   - c/w flexeril + Toradol for pain management     #Opioid abuse on Methadone  - Continue methadone 135, and gabapentin    #RUL Nodular Opacities  - CT Chest No Cont 03.12- several reticulonodular opacities in the posterior segment of the right upper lobe which appears new from 1/15/2022. There are reticulonodular subpleural opacities at the right and left bases which appears increased from 1/15/2022.    #Recent history & post treatment for MSSA bacteremia  #Hx of IVD    #Anxiety/Depression  -c/w mirtazapine + quetiapine    #Active nicotine dependence  - c/w nicotine patch  - c/w buproprion    #Morbid obesity    #Chronic b/l Wrist ulceration - healing    #Misc  -DVT: Lovenox  -GI: na  -Activity- IAT  -Diet: Regular  -IVF: N/a  -Code: Full Code    Dispo: [facility] h/e patient is refusing #Spinal OM, with epidural abscess/phlegmon  -No sepsis on admission  -Recently completed 6 wks of antibiotics which ended on 3/1.  -MR Lumbar Spine 03/18 significant for  left L5-S1 facetitis with associated multifocal  abscess formation directly -adjacent to the left facet joint, Ventral epidural phlegmon overlying the L5  - CT 3/12 showed worsening discitis/facetitis/OM  - Seen by neurosurgery, no intervention recommended   - IR Drainage of Abscess/Biopsy of suspected discitis done 3/16 -fluid  growing NICOLE  - Per ID c/w cefazolin 2g q 8 hours open-ended but at least 6 weeks from 4/6 with a repeat CT scan in 4 weeks and weekly CRP and ESR to decide on the course of abx. Pt has a picc line. Will need repeat imaging prior to stopping -antibiotics to guide course   - c/w flexeril + Toradol for pain management   - Requested Neurosurgery followup ([id number]): they will review imaging and followup.    #Opioid abuse on Methadone  - Continue methadone 135, and gabapentin    #RUL Nodular Opacities  - CT Chest No Cont 03.12- several reticulonodular opacities in the posterior segment of the right upper lobe which appears new from 1/15/2022. There are reticulonodular subpleural opacities at the right and left bases which appears increased from 1/15/2022.    #Recent history & post treatment for MSSA bacteremia  #Hx of IVD    #Anxiety/Depression  -c/w mirtazapine + quetiapine    #Active nicotine dependence  - c/w nicotine patch  - c/w buproprion    #Morbid obesity    #Chronic b/l Wrist ulceration - healing    #Misc  -DVT: Lovenox  -GI: na  -Activity- IAT  -Diet: Regular  -IVF: N/a  -Code: Full Code    Dispo: [facility] h/e patient is refusing

## 2022-04-06 NOTE — PROGRESS NOTE ADULT - REASON FOR ADMISSION
vertebral osteomyelitis

## 2022-04-06 NOTE — PROGRESS NOTE ADULT - PROVIDER SPECIALTY LIST ADULT
Hospitalist
Hospitalist
Internal Medicine
Internal Medicine
Hospitalist
Infectious Disease
Internal Medicine
Hospitalist
Internal Medicine
Neurosurgery
Internal Medicine
Internal Medicine
Infectious Disease
Infectious Disease

## 2022-04-06 NOTE — DISCHARGE NOTE NURSING/CASE MANAGEMENT/SOCIAL WORK - NSDCFUADDAPPT_GEN_ALL_CORE_FT
Please followup with our infectious disease doctor, Dr. Giraldo to ensure appropriate response to treatment and should have repeat CT spine in 4 weeks (5/4/2022) from discharge and weekly labs: ESR , CRP

## 2022-04-06 NOTE — DISCHARGE NOTE NURSING/CASE MANAGEMENT/SOCIAL WORK - NSDCPEFALRISK_GEN_ALL_CORE
For information on Fall & Injury Prevention, visit: [email or web address] OR  [email or web address] OR  https://www.cdc.gov/steadi/patient.html

## 2022-04-06 NOTE — DISCHARGE NOTE NURSING/CASE MANAGEMENT/SOCIAL WORK - PATIENT PORTAL LINK FT
You can access the FollowMyHealth Patient Portal offered by [facility] by registering at the following website: [email or web address]. By joining [employer]’s FollowMyHealth portal, you will also be able to view your health information using other applications (apps) compatible with our system.

## 2022-04-06 NOTE — PROGRESS NOTE ADULT - SUBJECTIVE AND OBJECTIVE BOX
Called by Medicine resident for follow up on patient known to Neurosurgery team from consult on 3/12/22.  MRI imaging from 3/14/22 reviewed by attending.  There is expected evolution of osteomyelitis and discitis, epidural phlegmon during treatment. Pt examined at bedside today.  Motor strength is 5/5 b/l LE.  Pt states his strength and pain is greatly improved.  He is neurologically improved. There is no indication for neurosurgical intervention.  Pt to complete antibiotic course as detailed by Infectious disease team.  We recommend outpatient follow up in 6 weeks.

## 2022-04-06 NOTE — PROGRESS NOTE ADULT - SUBJECTIVE AND OBJECTIVE BOX
MIKAEL MCDERMOTT 51y Male  MRN#: [id number]   Hospital Day: 25d    SUBJECTIVE  Patient is a 51y old Male who presents with a chief complaint of vertebral osteomyelitis (05 Apr 2022 11:24)  Currently admitted to medicine with the primary diagnosis of Vertebral osteomyelitis      INTERVAL HPI AND OVERNIGHT EVENTS:  Patient was examined and seen at bedside. This morning he is resting comfortably in bed and reports no issues or overnight events.    OBJECTIVE  PAST MEDICAL & SURGICAL HISTORY  IV drug abuse    Vertebral osteomyelitis    MSSA bacteremia    No significant past surgical history      ALLERGIES:  No Known Allergies    MEDICATIONS:  STANDING MEDICATIONS  ALPRAZolam 2 milliGRAM(s) Oral three times a day  buPROPion XL (24-Hour) . 300 milliGRAM(s) Oral daily  ceFAZolin   IVPB 2000 milliGRAM(s) IV Intermittent every 8 hours  chlorhexidine 4% Liquid 1 Application(s) Topical <User Schedule>  chlorhexidine 4% Liquid 1 Application(s) Topical <User Schedule>  enoxaparin Injectable 40 milliGRAM(s) SubCutaneous every 24 hours  ferrous    sulfate 325 milliGRAM(s) Oral daily  fluticasone propionate 50 MICROgram(s)/spray Nasal Spray 1 Spray(s) Both Nostrils two times a day  folic acid 1 milliGRAM(s) Oral daily  gabapentin 300 milliGRAM(s) Oral three times a day  lidocaine   4% Patch 1 Patch Transdermal daily  methadone    Tablet 135 milliGRAM(s) Oral daily  mirtazapine 60 milliGRAM(s) Oral at bedtime  multivitamin 1 Tablet(s) Oral daily  naloxone Injectable 1 milliGRAM(s) IV Push once  nicotine - 21 mG/24Hr(s) Patch 1 patch Transdermal daily  QUEtiapine 200 milliGRAM(s) Oral three times a day    PRN MEDICATIONS  acetaminophen     Tablet .. 650 milliGRAM(s) Oral every 6 hours PRN  cyclobenzaprine 10 milliGRAM(s) Oral three times a day PRN  ondansetron Injectable 4 milliGRAM(s) IV Push every 4 hours PRN  sodium chloride 0.9% lock flush 10 milliLiter(s) IV Push every 1 hour PRN      VITAL SIGNS: Last 24 Hours  T(C): 35.4 (06 Apr 2022 09:09), Max: 36.7 (05 Apr 2022 15:20)  T(F): 95.7 (06 Apr 2022 09:09), Max: 98.1 (05 Apr 2022 15:20)  HR: 87 (06 Apr 2022 09:09) (82 - 88)  BP: 124/71 (06 Apr 2022 09:09) (114/74 - 124/71)  BP(mean): --  RR: 19 (06 Apr 2022 09:09) (18 - 19)  SpO2: --    LABS:                        9.7    7.33  )-----------( 283      ( 05 Apr 2022 20:00 )             31.6     04-05    135  |  96<L>  |  19  ----------------------------<  130<H>  4.3   |  27  |  0.9    Ca    9.0      05 Apr 2022 20:00  Mg     1.8     04-05    TPro  7.2  /  Alb  3.3<L>  /  TBili  <0.2  /  DBili  x   /  AST  14  /  ALT  <5  /  AlkPhos  112  04-05    RADIOLOGY: No New Imaging       PHYSICAL EXAM:  CONSTITUTIONAL: No acute distress, well-developed  HEAD: Atraumatic, normocephalic  EYES: EOM intact, conjunctiva and sclera clear  PULMONARY: Clear to auscultation bilaterally; no wheezes, rales, or rhonchi  CARDIOVASCULAR: Regular rate and rhythm; no murmurs, rubs, or gallops  GASTROINTESTINAL: Soft, non-tender, very obese  MUSCULOSKELETAL:  no clubbing, no cyanosis, no edema; back/spine has no lesions  NEUROLOGY: Ax O x3   SKIN: No rashes or lesions; warm and dry

## 2022-04-06 NOTE — DISCHARGE NOTE NURSING/CASE MANAGEMENT/SOCIAL WORK - NSTOBACCOREFERRAL_GEN_A_NCS
Anxiety    Asthma    Headache    HTN (hypertension)    IBS (irritable bowel syndrome) Patient declined information Anxiety    Asthma    Headache    HTN (hypertension)    IBS (irritable bowel syndrome)    Labyrinthitis

## 2022-04-07 PROBLEM — M46.20 OSTEOMYELITIS OF VERTEBRA, SITE UNSPECIFIED: Chronic | Status: ACTIVE | Noted: 2022-03-12

## 2022-04-07 PROBLEM — R78.81 BACTEREMIA: Chronic | Status: ACTIVE | Noted: 2022-03-12

## 2022-04-11 DIAGNOSIS — M46.26 OSTEOMYELITIS OF VERTEBRA, LUMBAR REGION: ICD-10-CM

## 2022-04-11 DIAGNOSIS — S61.501D: ICD-10-CM

## 2022-04-11 DIAGNOSIS — E66.01 MORBID (SEVERE) OBESITY DUE TO EXCESS CALORIES: ICD-10-CM

## 2022-04-11 DIAGNOSIS — S50.851D: ICD-10-CM
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
## 2022-04-11 DIAGNOSIS — F32.A DEPRESSION, UNSPECIFIED: ICD-10-CM

## 2022-04-11 DIAGNOSIS — F17.210 NICOTINE DEPENDENCE, CIGARETTES, UNCOMPLICATED: ICD-10-CM

## 2022-04-11 DIAGNOSIS — F41.9 ANXIETY DISORDER, UNSPECIFIED: ICD-10-CM

## 2022-04-11 DIAGNOSIS — S61.502D: ICD-10-CM

## 2022-04-11 DIAGNOSIS — D64.9 ANEMIA, UNSPECIFIED: ICD-10-CM

## 2022-04-11 DIAGNOSIS — G06.1 INTRASPINAL ABSCESS AND GRANULOMA: ICD-10-CM
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
## 2022-04-11 DIAGNOSIS — K68.12 PSOAS MUSCLE ABSCESS: ICD-10-CM

## 2022-04-11 DIAGNOSIS — B95.61 METHICILLIN SUSCEPTIBLE STAPHYLOCOCCUS AUREUS INFECTION AS THE CAUSE OF DISEASES CLASSIFIED ELSEWHERE: ICD-10-CM

## 2022-04-11 DIAGNOSIS — F11.20 OPIOID DEPENDENCE, UNCOMPLICATED: ICD-10-CM

## 2022-05-07 LAB
CULTURE RESULTS: SIGNIFICANT CHANGE UP
SPECIMEN SOURCE: SIGNIFICANT CHANGE UP

## 2022-06-21 ENCOUNTER — APPOINTMENT (OUTPATIENT)
Age: 52
End: 2022-06-21

## 2022-08-10 ENCOUNTER — INPATIENT (INPATIENT)
Facility: HOSPITAL | Age: 52
LOS: 27 days | Discharge: NURSING, REHAB OR LONG-TERM CARE | End: 2022-09-07
Attending: INTERNAL MEDICINE | Admitting: INTERNAL MEDICINE
Payer: MEDICAID

## 2022-08-10 VITALS
HEART RATE: 99 BPM | OXYGEN SATURATION: 99 % | SYSTOLIC BLOOD PRESSURE: 144 MMHG | HEIGHT: 73 IN | DIASTOLIC BLOOD PRESSURE: 88 MMHG | TEMPERATURE: 101 F | RESPIRATION RATE: 18 BRPM

## 2022-08-10 LAB
ALBUMIN SERPL ELPH-MCNC: 3.4 G/DL — LOW (ref 3.5–5.2)
ALP SERPL-CCNC: 97 U/L — SIGNIFICANT CHANGE UP (ref 30–115)
ALT FLD-CCNC: 46 U/L — HIGH (ref 0–41)
ANION GAP SERPL CALC-SCNC: 10 MMOL/L — SIGNIFICANT CHANGE UP (ref 7–14)
AST SERPL-CCNC: 73 U/L — HIGH (ref 0–41)
BASOPHILS # BLD AUTO: 0.02 K/UL — SIGNIFICANT CHANGE UP (ref 0–0.2)
BASOPHILS NFR BLD AUTO: 0.1 % — SIGNIFICANT CHANGE UP (ref 0–1)
BILIRUB SERPL-MCNC: 0.7 MG/DL — SIGNIFICANT CHANGE UP (ref 0.2–1.2)
BUN SERPL-MCNC: 10 MG/DL — SIGNIFICANT CHANGE UP (ref 10–20)
CALCIUM SERPL-MCNC: 9.1 MG/DL — SIGNIFICANT CHANGE UP (ref 8.5–10.1)
CHLORIDE SERPL-SCNC: 96 MMOL/L — LOW (ref 98–110)
CO2 SERPL-SCNC: 31 MMOL/L — SIGNIFICANT CHANGE UP (ref 17–32)
CREAT SERPL-MCNC: 0.9 MG/DL — SIGNIFICANT CHANGE UP (ref 0.7–1.5)
EGFR: 102 ML/MIN/1.73M2 — SIGNIFICANT CHANGE UP
EOSINOPHIL # BLD AUTO: 0 K/UL — SIGNIFICANT CHANGE UP (ref 0–0.7)
EOSINOPHIL NFR BLD AUTO: 0 % — SIGNIFICANT CHANGE UP (ref 0–8)
GLUCOSE SERPL-MCNC: 149 MG/DL — HIGH (ref 70–99)
HCT VFR BLD CALC: 32.7 % — LOW (ref 42–52)
HGB BLD-MCNC: 10.8 G/DL — LOW (ref 14–18)
IMM GRANULOCYTES NFR BLD AUTO: 0.9 % — HIGH (ref 0.1–0.3)
LACTATE SERPL-SCNC: 1 MMOL/L — SIGNIFICANT CHANGE UP (ref 0.7–2)
LYMPHOCYTES # BLD AUTO: 0.94 K/UL — LOW (ref 1.2–3.4)
LYMPHOCYTES # BLD AUTO: 6.2 % — LOW (ref 20.5–51.1)
MAGNESIUM SERPL-MCNC: 1.7 MG/DL — LOW (ref 1.8–2.4)
MCHC RBC-ENTMCNC: 29.1 PG — SIGNIFICANT CHANGE UP (ref 27–31)
MCHC RBC-ENTMCNC: 33 G/DL — SIGNIFICANT CHANGE UP (ref 32–37)
MCV RBC AUTO: 88.1 FL — SIGNIFICANT CHANGE UP (ref 80–94)
MONOCYTES # BLD AUTO: 1.26 K/UL — HIGH (ref 0.1–0.6)
MONOCYTES NFR BLD AUTO: 8.3 % — SIGNIFICANT CHANGE UP (ref 1.7–9.3)
NEUTROPHILS # BLD AUTO: 12.89 K/UL — HIGH (ref 1.4–6.5)
NEUTROPHILS NFR BLD AUTO: 84.5 % — HIGH (ref 42.2–75.2)
NRBC # BLD: 0 /100 WBCS — SIGNIFICANT CHANGE UP (ref 0–0)
PLATELET # BLD AUTO: 229 K/UL — SIGNIFICANT CHANGE UP (ref 130–400)
POTASSIUM SERPL-MCNC: 4.1 MMOL/L — SIGNIFICANT CHANGE UP (ref 3.5–5)
POTASSIUM SERPL-SCNC: 4.1 MMOL/L — SIGNIFICANT CHANGE UP (ref 3.5–5)
PROT SERPL-MCNC: 6.6 G/DL — SIGNIFICANT CHANGE UP (ref 6–8)
RAPID RVP RESULT: SIGNIFICANT CHANGE UP
RBC # BLD: 3.71 M/UL — LOW (ref 4.7–6.1)
RBC # FLD: 14.7 % — HIGH (ref 11.5–14.5)
SARS-COV-2 RNA SPEC QL NAA+PROBE: SIGNIFICANT CHANGE UP
SODIUM SERPL-SCNC: 137 MMOL/L — SIGNIFICANT CHANGE UP (ref 135–146)
WBC # BLD: 15.24 K/UL — HIGH (ref 4.8–10.8)
WBC # FLD AUTO: 15.24 K/UL — HIGH (ref 4.8–10.8)

## 2022-08-10 PROCEDURE — 71045 X-RAY EXAM CHEST 1 VIEW: CPT | Mod: 26

## 2022-08-10 PROCEDURE — 36000 PLACE NEEDLE IN VEIN: CPT

## 2022-08-10 PROCEDURE — 99285 EMERGENCY DEPT VISIT HI MDM: CPT | Mod: 25

## 2022-08-10 PROCEDURE — 76937 US GUIDE VASCULAR ACCESS: CPT | Mod: 26

## 2022-08-10 PROCEDURE — 93010 ELECTROCARDIOGRAM REPORT: CPT

## 2022-08-10 PROCEDURE — 99221 1ST HOSP IP/OBS SF/LOW 40: CPT

## 2022-08-10 PROCEDURE — 70481 CT ORBIT/EAR/FOSSA W/DYE: CPT | Mod: 26,MA

## 2022-08-10 RX ORDER — BUPROPION HYDROCHLORIDE 150 MG/1
300 TABLET, EXTENDED RELEASE ORAL DAILY
Refills: 0 | Status: DISCONTINUED | OUTPATIENT
Start: 2022-08-10 | End: 2022-08-29

## 2022-08-10 RX ORDER — FOLIC ACID 0.8 MG
1 TABLET ORAL DAILY
Refills: 0 | Status: DISCONTINUED | OUTPATIENT
Start: 2022-08-10 | End: 2022-08-29

## 2022-08-10 RX ORDER — ENOXAPARIN SODIUM 100 MG/ML
40 INJECTION SUBCUTANEOUS EVERY 24 HOURS
Refills: 0 | Status: DISCONTINUED | OUTPATIENT
Start: 2022-08-10 | End: 2022-08-28

## 2022-08-10 RX ORDER — QUETIAPINE FUMARATE 200 MG/1
200 TABLET, FILM COATED ORAL THREE TIMES A DAY
Refills: 0 | Status: DISCONTINUED | OUTPATIENT
Start: 2022-08-10 | End: 2022-08-18

## 2022-08-10 RX ORDER — ACETAMINOPHEN 500 MG
650 TABLET ORAL EVERY 6 HOURS
Refills: 0 | Status: DISCONTINUED | OUTPATIENT
Start: 2022-08-10 | End: 2022-08-25

## 2022-08-10 RX ORDER — GABAPENTIN 400 MG/1
300 CAPSULE ORAL THREE TIMES A DAY
Refills: 0 | Status: DISCONTINUED | OUTPATIENT
Start: 2022-08-10 | End: 2022-08-18

## 2022-08-10 RX ORDER — ALPRAZOLAM 0.25 MG
2 TABLET ORAL THREE TIMES A DAY
Refills: 0 | Status: COMPLETED | OUTPATIENT
Start: 2022-08-10 | End: 2022-08-17

## 2022-08-10 RX ORDER — METHADONE HYDROCHLORIDE 40 MG/1
135 TABLET ORAL DAILY
Refills: 0 | Status: DISCONTINUED | OUTPATIENT
Start: 2022-08-10 | End: 2022-08-11

## 2022-08-10 RX ORDER — CEFAZOLIN SODIUM 1 G
2000 VIAL (EA) INJECTION EVERY 8 HOURS
Refills: 0 | Status: DISCONTINUED | OUTPATIENT
Start: 2022-08-10 | End: 2022-08-12

## 2022-08-10 RX ORDER — ACETAMINOPHEN 500 MG
650 TABLET ORAL ONCE
Refills: 0 | Status: COMPLETED | OUTPATIENT
Start: 2022-08-10 | End: 2022-08-10

## 2022-08-10 RX ORDER — DEXAMETHASONE 0.5 MG/5ML
10 ELIXIR ORAL ONCE
Refills: 0 | Status: COMPLETED | OUTPATIENT
Start: 2022-08-10 | End: 2022-08-10

## 2022-08-10 RX ORDER — LANOLIN ALCOHOL/MO/W.PET/CERES
3 CREAM (GRAM) TOPICAL AT BEDTIME
Refills: 0 | Status: DISCONTINUED | OUTPATIENT
Start: 2022-08-10 | End: 2022-08-29

## 2022-08-10 RX ORDER — MAGNESIUM SULFATE 500 MG/ML
2 VIAL (ML) INJECTION ONCE
Refills: 0 | Status: COMPLETED | OUTPATIENT
Start: 2022-08-10 | End: 2022-08-10

## 2022-08-10 RX ORDER — ONDANSETRON 8 MG/1
4 TABLET, FILM COATED ORAL EVERY 8 HOURS
Refills: 0 | Status: DISCONTINUED | OUTPATIENT
Start: 2022-08-10 | End: 2022-08-17

## 2022-08-10 RX ORDER — MIRTAZAPINE 45 MG/1
30 TABLET, ORALLY DISINTEGRATING ORAL AT BEDTIME
Refills: 0 | Status: DISCONTINUED | OUTPATIENT
Start: 2022-08-10 | End: 2022-08-29

## 2022-08-10 RX ORDER — CEFAZOLIN SODIUM 1 G
2000 VIAL (EA) INJECTION ONCE
Refills: 0 | Status: COMPLETED | OUTPATIENT
Start: 2022-08-10 | End: 2022-08-10

## 2022-08-10 RX ORDER — VANCOMYCIN HCL 1 G
1250 VIAL (EA) INTRAVENOUS EVERY 12 HOURS
Refills: 0 | Status: DISCONTINUED | OUTPATIENT
Start: 2022-08-10 | End: 2022-08-11

## 2022-08-10 RX ADMIN — Medication 10 MILLIGRAM(S): at 16:29

## 2022-08-10 RX ADMIN — Medication 100 MILLIGRAM(S): at 16:28

## 2022-08-10 RX ADMIN — Medication 650 MILLIGRAM(S): at 15:32

## 2022-08-10 RX ADMIN — Medication 25 GRAM(S): at 22:54

## 2022-08-10 NOTE — H&P ADULT - ASSESSMENT
50yo M w/ pmhx of IVDU (heroin), vertebral osteomyelitis, and MSSA bacteremia presents for worsening back pain. Patient was found febrile in the ED with elevated WBC count, CT orbit showing Right periorbital cellulitis.     #Spinal OM, with epidural abscess/phlegmon  #Recent history & post treatment for MSSA bacteremia  #Right periorbital cellulitis  - Sepsis present on admission   - TMax: 100.5, HR: 99,  - WBC 15.24, Lactate 1.0  - CT Orbit w/ IV Cont 8/10: shows Right periorbital (pre-septal) soft tissue swelling consistent with cellulitis. No evidence of abscess. No evidence of post-septal inflammation.  - MR Lumbar Spine 03/18 significant for  left L5-S1 facetitis with associated multifocal  abscess formation directly -adjacent to the left facet joint, Ventral epidural phlegmon overlying the L5  - IR Drainage of Abscess/Biopsy of suspected discitis done 3/16 -fluid  growing NICOLE  - S/p cefazolin 2g q 8 hours for 4 weeks (end date 04/11)   - c/w flexeril + Toradol for pain management     #Hx of IVD  #Opioid abuse on Methadone  - Continue methadone 135, and gabapentin    #RUL Nodular Opacities  - CT Chest No Cont 03.12- several reticulonodular opacities in the posterior segment of the right upper lobe which appears new from 1/15/2022. There are reticulonodular subpleural opacities at the right and left bases which appears increased from 1/15/2022.    #Anxiety/Depression  - c/w mirtazapine + quetiapine    #Active nicotine dependence  - c/w nicotine patch  - c/w buproprion    #Morbid obesity  - diet and lifestyle modification     #Misc   - DVT prophylaxis:   - GI prophylaxis:  - Diet:  - Activity:  - Disposition:    50yo M w/ pmhx of IVDU (heroin), vertebral osteomyelitis, and MSSA bacteremia presents for worsening back pain. Patient was found febrile in the ED with elevated WBC count, CT orbit showing Right periorbital cellulitis.     #Spinal OM, with epidural abscess/phlegmon  #Recent history & post treatment for MSSA bacteremia  #Right periorbital cellulitis  - Sepsis present on admission   - TMax: 100.5, HR: 99,  - WBC 15.24, Lactate 1.0  - CT Orbit w/ IV Cont 8/10: shows Right periorbital (pre-septal) soft tissue swelling consistent with cellulitis. No evidence of abscess. No evidence of post-septal inflammation.  - MR Lumbar Spine 03/18 significant for  left L5-S1 facetitis with associated multifocal  abscess formation directly -adjacent to the left facet joint, Ventral epidural phlegmon overlying the L5  - IR Drainage of Abscess/Biopsy of suspected discitis done 3/16 -fluid  growing NICOLE  - S/p cefazolin 2g q 8 hours for 4 weeks (end date 04/11)   - c/w flexeril + Toradol for pain management   - Neuro recs appreciated: no acute intervention at this time   - F/u ESR / CRP   - F/u Blood cx   - F/u MRI of T & L spine +/- gado   - F/u ID consult   - C/w cefazolin and vancomycin     #Hx of IVD  #Opioid abuse on Methadone  - Continue methadone 135, and gabapentin    #RUL Nodular Opacities  - CT Chest No Cont 03.12- several reticulonodular opacities in the posterior segment of the right upper lobe which appears new from 1/15/2022. There are reticulonodular subpleural opacities at the right and left bases which appears increased from 1/15/2022.    #Anxiety/Depression  - c/w mirtazapine + quetiapine    #Active nicotine dependence  - c/w nicotine patch  - c/w buproprion    #Morbid obesity  - diet and lifestyle modification     #Misc   - DVT prophylaxis:   - GI prophylaxis:  - Diet:  - Activity:  - Disposition:    50yo M w/ pmhx of IVDU (heroin), vertebral osteomyelitis, and MSSA bacteremia presents for worsening back pain. Patient was found febrile in the ED with elevated WBC count, CT orbit showing Right periorbital cellulitis.     #Spinal OM, with epidural abscess/phlegmon  #Recent history & post treatment for MSSA bacteremia  #Right periorbital cellulitis  - Sepsis present on admission   - TMax: 100.5, HR: 99,  - WBC 15.24, Lactate 1.0  - CT Orbit w/ IV Cont 8/10: shows Right periorbital (pre-septal) soft tissue swelling consistent with cellulitis. No evidence of abscess. No evidence of post-septal inflammation.  - MR Lumbar Spine 03/18 significant for  left L5-S1 facetitis with associated multifocal  abscess formation directly -adjacent to the left facet joint, Ventral epidural phlegmon overlying the L5  - IR Drainage of Abscess/Biopsy of suspected discitis done 3/16 -fluid  growing NICOLE  - S/p cefazolin 2g q 8 hours for 4 weeks (end date 04/11)   - Neurosurg recs appreciated: no acute intervention at this time   - F/u ESR / CRP   - F/u Blood cx   - F/u MRI of T & L spine +/- gado   - F/u ID consult   - C/w cefazolin and vancomycin   - F/u Opthalmology consult     #Anemia  - Monitor H+H  - F/u iron studies , B12, Folate     #Hx of IVD  #Opioid abuse on Methadone  - Continue methadone 135, and gabapentin    #RUL Nodular Opacities  - CT Chest No Cont 03.12- several reticulonodular opacities in the posterior segment of the right upper lobe which appears new from 1/15/2022. There are reticulonodular subpleural opacities at the right and left bases which appears increased from 1/15/2022.    #Anxiety/Depression  - c/w mirtazapine + quetiapine    #Active nicotine dependence  - c/w nicotine patch  - c/w buproprion    #Morbid obesity  - diet and lifestyle modification     #Misc   - DVT prophylaxis: Lovenox   - GI prophylaxis: not indicated  - Diet: regular   - Activity: IAT   - Disposition: Admit to medicine    53 yo M w/ pmhx of IVDU (heroin), vertebral osteomyelitis, and MSSA bacteremia presents for worsening back pain. Patient was found febrile in the ED with elevated WBC count, CT orbit showing Right periorbital cellulitis.     #Spinal OM, with epidural abscess/phlegmon  #Recent history & post treatment for MSSA bacteremia  #Right periorbital cellulitis  - Sepsis present on admission   - TMax: 100.5, HR: 99,  - WBC 15.24, Lactate 1.0  - CT Orbit w/ IV Cont 8/10: shows Right periorbital (pre-septal) soft tissue swelling consistent with cellulitis. No evidence of abscess. No evidence of post-septal inflammation.  - MR Lumbar Spine 03/18 significant for  left L5-S1 facetitis with associated multifocal  abscess formation directly -adjacent to the left facet joint, Ventral epidural phlegmon overlying the L5  - IR Drainage of Abscess/Biopsy of suspected discitis done 3/16 -fluid  growing NICOLE  - S/p cefazolin 2g q 8 hours for 4 weeks (end date 04/11)   - Neurosurg recs appreciated: no acute intervention at this time   - F/u ESR / CRP   - F/u Blood cx   - F/u MRI of T & L spine +/- gado   - F/u ID consult   - C/w cefazolin and vancomycin   - F/u Opthalmology consult     #Anemia  - Monitor H+H  - F/u iron studies , B12, Folate     #Hx of IVD  #Opioid abuse on Methadone  - Continue methadone 135, and gabapentin    #RUL Nodular Opacities  - CT Chest No Cont 03.12- several reticulonodular opacities in the posterior segment of the right upper lobe which appears new from 1/15/2022. There are reticulonodular subpleural opacities at the right and left bases which appears increased from 1/15/2022.    #Anxiety/Depression  - c/w mirtazapine + quetiapine    #Active nicotine dependence  - c/w nicotine patch  - c/w buproprion    #Morbid obesity  - diet and lifestyle modification     #Misc   - DVT prophylaxis: Lovenox   - GI prophylaxis: not indicated  - Diet: regular   - Activity: IAT   - Disposition: Admit to medicine    51 yo M w/ pmhx of IVDU (heroin), vertebral osteomyelitis, and MSSA bacteremia presents for worsening back pain. Patient was found febrile in the ED with elevated WBC count, CT orbit showing Right periorbital cellulitis.     #Spinal OM, with epidural abscess/phlegmon  #Recent history & post treatment for MSSA bacteremia  #Right periorbital cellulitis  - Sepsis present on admission   - TMax: 100.5, HR: 99,  - WBC 15.24, Lactate 1.0  - CT Orbit w/ IV Cont 8/10: shows Right periorbital (pre-septal) soft tissue swelling consistent with cellulitis. No evidence of abscess. No evidence of post-septal inflammation.  - MR Lumbar Spine 03/18 significant for  left L5-S1 facetitis with associated multifocal  abscess formation directly -adjacent to the left facet joint, Ventral epidural phlegmon overlying the L5  - IR Drainage of Abscess/Biopsy of suspected discitis done 3/16 -fluid  growing NICOLE  - S/p cefazolin 2g q 8 hours for 4 weeks (end date 04/11)   - Neurosurg recs appreciated: no acute intervention at this time   - F/u ESR / CRP   - F/u Blood cx   - F/u MRI of T & L spine +/- gado   - F/u ID consult   - C/w cefazolin and vancomycin   - F/u Opthalmology consult     #Anemia  - Monitor H+H  - F/u iron studies , B12, Folate     #Hx of IVD  #Opioid abuse on Methadone  - Continue methadone 135, and gabapentin    #Anxiety/Depression  - c/w mirtazapine + quetiapine    #Active nicotine dependence  - c/w nicotine patch  - c/w buproprion    #Morbid obesity  - diet and lifestyle modification     #Misc   - DVT prophylaxis: Lovenox   - GI prophylaxis: not indicated  - Diet: regular   - Activity: IAT   - Disposition: Admit to medicine

## 2022-08-10 NOTE — ED ADULT NURSE NOTE - NSIMPLEMENTINTERV_GEN_ALL_ED
Implemented All Fall Risk Interventions:  [place] to call system. Call bell, personal items and telephone within reach. Instruct patient to call for assistance. Room bathroom lighting operational. Non-slip footwear when patient is off stretcher. Physically safe environment: no spills, clutter or unnecessary equipment. Stretcher in lowest position, wheels locked, appropriate side rails in place. Provide visual cue, wrist band, yellow gown, etc. Monitor gait and stability. Monitor for mental status changes and reorient to person, place, and time. Review medications for side effects contributing to fall risk. Reinforce activity limits and safety measures with patient and family.

## 2022-08-10 NOTE — ED PROVIDER NOTE - OBJECTIVE STATEMENT
52 year old M with hx of IVDU (heroin), admitted 03/12-04/06 for vertebral OM/facetitis with adjacent abscess c/o low back pain x 1 week. Pain is sharp, without radiation and worse with movement. Pt normally able to ambulate with walker sts now having difficulty walking due to pain. Sts feels similar to the pain from prior admission. Pt also c/o rt sided eye swelling/redness x 1 week. Sts he slept on the floor and woke up in the am with redness/swelling. Pt found to be febrile in ed but denies any fever at home, chills, nausea, vomiting, abd pain, LE weakness, paresthesias, bowel/bladder incontinence, vision, changes, discharge from eye, ha.

## 2022-08-10 NOTE — ED PROVIDER NOTE - NS ED ROS FT
Constitutional: no chills, no recent weight loss, change in appetite or malaise  Eyes: no redness/discharge/pain/vision changes  ENT: no rhinorrhea/ear pain/sore throat  Cardiac: No chest pain, SOB or edema.  Respiratory: No cough or respiratory distress  GI: No nausea, vomiting, diarrhea or abdominal pain.  : No dysuria, frequency, urgency or hematuria  MS: + lower back pain. no loss of ROM, no weakness  Neuro: No headache or weakness. No LOC.  Skin: No skin rash.  Endocrine: No history of thyroid disease or diabetes.  Except as documented in the HPI, all other systems are negative.

## 2022-08-10 NOTE — ED PROVIDER NOTE - WR ORDER NAME 1
Minoxidil Counseling: Minoxidil is a topical medication which can increase blood flow where it is applied. It is uncertain how this medication increases hair growth. Side effects are uncommon and include stinging and allergic reactions. Xray Chest 1 View-PORTABLE IMMEDIATE

## 2022-08-10 NOTE — H&P ADULT - NSHPLABSRESULTS_GEN_ALL_CORE
< from: CT Orbit w/ IV Cont (08.10.22 @ 16:05) >    FINDINGS:    There is right periorbital soft tissue swelling with no evidence of   abscess formation.    The globes are normal in contour. The lenses are normally located. The   optic nerve sheath complexes and extraocular muscles are symmetric and   unremarkable.    There is no evidence of retrobulbar inflammation or fluid collection.    There is mild mucosal thickening within scattered portions of the   paranasal sinuses.    IMPRESSION:    Right periorbital (pre-septal) soft tissue swelling consistent with   cellulitis in the appropriate clinical scenario. No evidence of abscess.   No evidence of post-septal inflammation.    --- End of Report ---    < end of copied text >

## 2022-08-10 NOTE — ED PROVIDER NOTE - PROGRESS NOTE DETAILS
pt cultured, given IV abx. Labs reviewed, WBC 15, lactate negative.  CT orbits C/W preseptal cellulitis.  Patient seen and evaluated by neurosurgery, recommend follow-up cultures and MRI, ID consult, no acute surgical intervention at this time.  Patient admitted for further evaluation and treatment.

## 2022-08-10 NOTE — H&P ADULT - HISTORY OF PRESENT ILLNESS
52yo M w/ pmhx of IVDU (heroin), vertebral osteomyelitis, and MSSA bacteremia presents for worsening back pain. Back pain was increasing over the past week and worsening today to the point he was having difficulty ambulating and bearing weight. Pain is sharp, without radiation and worse with movement. Patient normally able to ambulate with walker but now having difficulty walking due to pain. He endorses feeling similar to the pain from prior admission. Pt is also complaining of right sided eye swelling/redness x 1 week. He states, he slept on the floor and woke up in the am with redness/swelling.   Patient was recently admitted 03/12-04/06 for vertebral OM/facetitis. PAM was negative and ID recommended cefazolin. Patient had picc line placed for 6wks of antibiotics. Patient states he finished his antibiotics at [facility] but did not follow up with Dr Giraldo. Patient states he is currently not using heroin.   Patient was found febrile in the ED with elevated WBC count, CT orbit showing Right periorbital cellulitis.     ED vitals T(F): 99 (08-10-22 @ 16:02), Max: 100.5 (08-10-22 @ 13:08), HR: 96 (08-10-22 @ 16:02) (96 - 99), BP: 130/61 (08-10-22 @ 16:02) (130/61 - 144/88), RR: 18 (08-10-22 @ 16:02) (18 - 18), SpO2: 95% (08-10-22 @ 16:02) (95% - 99%)    Labs show HB 10.8, HCT 32.7, WBC 15.24, , Lactate 1.0, TPro  6.6  /  Alb  3.4<L>  /  TBili  0.7  /  DBili  x   /  AST  73<H>  /  ALT  46<H>  /  AlkPhos  97  08-10    137  |  96<L>  |  10  ----------------------------<  149<H>  4.1   |  31  |  0.9    Ca    9.1      10 Aug 2022 15:12  Mg     1.7     08-10    CT Orbit w/ IV Cont shows Right periorbital (pre-septal) soft tissue swelling consistent with cellulitis. No evidence of abscess. No evidence of post-septal inflammation.     50yo M w/ pmhx of IVDU (heroin), vertebral osteomyelitis, and MSSA bacteremia presents for worsening back pain. Back pain was increasing over the past couple of weeks and worsening today to the point he was having difficulty ambulating and bearing weight. Pain is sharp, radiating from low back to b/l hips and worse with movement. Patient normally able to ambulate with walker but now having difficulty walking due to pain. He endorses feeling similar to the pain from prior admission. Pt is also complaining of right sided eye swelling/redness since Tuesday. He states, he slept on the floor and woke up in the am with redness/swelling. Patient denies any visual changes or eye pain. Patient denies any fever, chills, headache, dizziness. Patient denies chest pain, palpitation, SOB. Patient denies abdominal pain, n/v/d/c.   Patient was recently admitted 03/12-04/06 for vertebral OM/facetitis. PAM was negative and ID recommended cefazolin. Patient had picc line placed for 6wks of antibiotics. Patient states he finished his antibiotics at [facility] but did not follow up with Dr Giraldo. Patient states he is currently not using heroin. He last used 10 months ago.   Patient was found febrile in the ED with elevated WBC count, CT orbit showing Right periorbital cellulitis.     ED vitals T(F): 99 (08-10-22 @ 16:02), Max: 100.5 (08-10-22 @ 13:08), HR: 96 (08-10-22 @ 16:02) (96 - 99), BP: 130/61 (08-10-22 @ 16:02) (130/61 - 144/88), RR: 18 (08-10-22 @ 16:02) (18 - 18), SpO2: 95% (08-10-22 @ 16:02) (95% - 99%)    Labs show HB 10.8, HCT 32.7, WBC 15.24, , Lactate 1.0, TPro  6.6  /  Alb  3.4<L>  /  TBili  0.7  /  DBili  x   /  AST  73<H>  /  ALT  46<H>  /  AlkPhos  97  08-10    137  |  96<L>  |  10  ----------------------------<  149<H>  4.1   |  31  |  0.9    Ca    9.1      10 Aug 2022 15:12  Mg     1.7     08-10    CT Orbit w/ IV Cont shows Right periorbital (pre-septal) soft tissue swelling consistent with cellulitis. No evidence of abscess. No evidence of post-septal inflammation.     51 yo M w/ pmhx of IVDU (heroin), vertebral osteomyelitis, and MSSA bacteremia presents for worsening back pain. Back pain was increasing over the past couple of weeks and worsening today to the point he was having difficulty ambulating and bearing weight. Pain is sharp, radiating from low back to b/l hips and worse with movement. Patient normally able to ambulate with walker but now having difficulty walking due to pain. He endorses feeling similar to the pain from prior admission. Pt is also complaining of right sided eye swelling/redness since Tuesday. He states, he slept on the floor and woke up in the am with redness/swelling. Patient denies any visual changes or eye pain. Patient denies any fever, chills, headache, dizziness. Patient denies chest pain, palpitation, SOB. Patient denies abdominal pain, n/v/d/c.   Patient was recently admitted 03/12-04/06 for vertebral OM/facetitis. PAM was negative and ID recommended cefazolin. Patient had picc line placed for 6wks of antibiotics. Patient states he finished his antibiotics at [facility] but did not follow up with Dr Giraldo. Patient states he is currently not using heroin. He last used 10 months ago.   Patient was found febrile in the ED with elevated WBC count, CT orbit showing Right periorbital cellulitis.     ED vitals T(F): 99 (08-10-22 @ 16:02), Max: 100.5 (08-10-22 @ 13:08), HR: 96 (08-10-22 @ 16:02) (96 - 99), BP: 130/61 (08-10-22 @ 16:02) (130/61 - 144/88), RR: 18 (08-10-22 @ 16:02) (18 - 18), SpO2: 95% (08-10-22 @ 16:02) (95% - 99%)    Labs show HB 10.8, HCT 32.7, WBC 15.24, , Lactate 1.0, TPro  6.6  /  Alb  3.4<L>  /  TBili  0.7  /  DBili  x   /  AST  73<H>  /  ALT  46<H>  /  AlkPhos  97  08-10    137  |  96<L>  |  10  ----------------------------<  149<H>  4.1   |  31  |  0.9    Ca    9.1      10 Aug 2022 15:12  Mg     1.7     08-10    CT Orbit w/ IV Cont shows Right periorbital (pre-septal) soft tissue swelling consistent with cellulitis. No evidence of abscess. No evidence of post-septal inflammation.

## 2022-08-10 NOTE — ED ADULT NURSE NOTE - OBJECTIVE STATEMENT
Pt c/o back pain, abdominal pian and nausea and vomiting. Pt c/o back pain, abdominal pian and nausea and vomiting. Pt has abbesses to arms , wrists and back,

## 2022-08-10 NOTE — CONSULT NOTE ADULT - SUBJECTIVE AND OBJECTIVE BOX
HISTORY OF PRESENT ILLNESS:   ·    52 year old M with hx of IVDU (heroin), admitted 03/12-04/06 for vertebral OM/facetitis with adjacent abscess c/o low back pain x 1 week. Pain is sharp, without radiation and worse with movement. Pt normally able to ambulate with walker sts now having difficulty walking due to pain. Sts feels similar to the pain from prior admission. Pt also c/o rt sided eye swelling/redness x 1 week. Sts he slept on the floor and woke up in the am with redness/swelling. Pt found to be febrile in ed but denies any fever at home, chills, nausea, vomiting, abd pain, LE weakness, paresthesias, bowel/bladder incontinence, vision, changes, discharge from eye, ha.       pt seen and examined at bedside pt is alert , pt is known to Neurosurgery , he had Osteo in march. Pt states he finished his antibiotics at [facility] but did   not follow up with Dr Giraldo . States he isnt using heroin       PAST MEDICAL & SURGICAL HISTORY:  IV drug abuse      Vertebral osteomyelitis      MSSA bacteremia      No significant past surgical history        FAMILY HISTORY:  No pertinent family history in first degree relatives        SOCIAL HISTORY:  Tobacco Use:  EtOH use:   Substance:    Allergies    No Known Allergies    Intolerances        REVIEW OF SYSTEMS       Constitutional: no chills, no recent weight loss, change in appetite or malaise  	Eyes: no redness/discharge/pain/vision changes  	ENT: no rhinorrhea/ear pain/sore throat  	Cardiac: No chest pain, SOB or edema.  	Respiratory: No cough or respiratory distress  	GI: No nausea, vomiting, diarrhea or abdominal pain.  	: No dysuria, frequency, urgency or hematuria  	MS: + lower back pain. no loss of ROM, no weakness  	Neuro: No headache or weakness. No LOC.  	Skin: No skin rash.  	Endocrine: No history of thyroid disease or diabetes.     Except as documented in the HPI, all other systems are negative.    MEDICATIONS:  Antibiotics:    Neuro:    Anticoagulation:    OTHER:    IVF:      Vital Signs Last 24 Hrs  T(C): 37.2 (10 Aug 2022 16:02), Max: 38.1 (10 Aug 2022 13:08)  T(F): 99 (10 Aug 2022 16:02), Max: 100.5 (10 Aug 2022 13:08)  HR: 96 (10 Aug 2022 16:02) (96 - 99)  BP: 130/61 (10 Aug 2022 16:02) (130/61 - 144/88)  BP(mean): --  RR: 18 (10 Aug 2022 16:02) (18 - 18)  SpO2: 95% (10 Aug 2022 16:02) (95% - 99%)    Parameters below as of 10 Aug 2022 16:02  Patient On (Oxygen Delivery Method): room air        PHYSICAL EXAM:  ALert, PERRL   poor hygiene overall   Rt eye with + erythema and swelling claudette orbital   b/l UE with old healing ulcerations to dorsal surface of wrists  weak hand  b/l  MAEx4, 5/5 power in b/l LE proximally  5/5 DF/PF  Sensation: intact b/l to LT    LABS:                        10.8   15.24 )-----------( 229      ( 10 Aug 2022 15:12 )             32.7     08-10    137  |  96<L>  |  10  ----------------------------<  149<H>  4.1   |  31  |  0.9    Ca    9.1      10 Aug 2022 15:12  Mg     1.7     08-10    TPro  6.6  /  Alb  3.4<L>  /  TBili  0.7  /  DBili  x   /  AST  73<H>  /  ALT  46<H>  /  AlkPhos  97  08-10            RADIOLOGY & ADDITIONAL STUDIES:    A/p              52 yr old male with hx of Osteo / substance abuse              Back pain              Blood cultures              ESR / CRP              MRI of T & L spine +/- gado              ID consult              medical admission              No acute Neurosurgical intervention needed at this time                  HISTORY OF PRESENT ILLNESS:   ·    52 year old M with hx of IVDU (heroin), admitted 03/12-04/06 for vertebral OM/facetitis with adjacent abscess c/o low back pain x 1 week. Pain is sharp, without radiation and worse with movement. Pt normally able to ambulate with walker sts now having difficulty walking due to pain. Sts feels similar to the pain from prior admission. Pt also c/o rt sided eye swelling/redness x 1 week. Sts he slept on the floor and woke up in the am with redness/swelling. Pt found to be febrile in ed but denies any fever at home, chills, nausea, vomiting, abd pain, LE weakness, paresthesias, bowel/bladder incontinence, vision, changes, discharge from eye, ha.       pt seen and examined at bedside pt is alert , pt is known to Neurosurgery , he had Osteo in march. Pt states he finished his antibiotics at [facility] but did   not follow up with Dr Giraldo . States he isnt using heroin       PAST MEDICAL & SURGICAL HISTORY:  IV drug abuse      Vertebral osteomyelitis      MSSA bacteremia      No significant past surgical history        FAMILY HISTORY:  No pertinent family history in first degree relatives        SOCIAL HISTORY:  Tobacco Use:  EtOH use:   Substance:    Allergies    No Known Allergies    Intolerances        REVIEW OF SYSTEMS       Constitutional: no chills, no recent weight loss, change in appetite or malaise  	Eyes: no redness/discharge/pain/vision changes  	ENT: no rhinorrhea/ear pain/sore throat  	Cardiac: No chest pain, SOB or edema.  	Respiratory: No cough or respiratory distress  	GI: No nausea, vomiting, diarrhea or abdominal pain.  	: No dysuria, frequency, urgency or hematuria  	MS: + lower back pain. no loss of ROM, no weakness  	Neuro: No headache or weakness. No LOC.  	Skin: No skin rash.  	Endocrine: No history of thyroid disease or diabetes.     Except as documented in the HPI, all other systems are negative.    MEDICATIONS:  Antibiotics:    Neuro:    Anticoagulation:    OTHER:    IVF:      Vital Signs Last 24 Hrs  T(C): 37.2 (10 Aug 2022 16:02), Max: 38.1 (10 Aug 2022 13:08)  T(F): 99 (10 Aug 2022 16:02), Max: 100.5 (10 Aug 2022 13:08)  HR: 96 (10 Aug 2022 16:02) (96 - 99)  BP: 130/61 (10 Aug 2022 16:02) (130/61 - 144/88)  BP(mean): --  RR: 18 (10 Aug 2022 16:02) (18 - 18)  SpO2: 95% (10 Aug 2022 16:02) (95% - 99%)    Parameters below as of 10 Aug 2022 16:02  Patient On (Oxygen Delivery Method): room air        PHYSICAL EXAM:  ALert, PERRL   poor hygiene overall   Rt eye with + erythema and swelling claudette orbital   b/l UE with old healing ulcerations to dorsal surface of wrists  weak hand  b/l  MAEx4, 5/5 power in b/l LE proximally  5/5 DF/PF  Sensation: intact b/l to LT    LABS:                        10.8   15.24 )-----------( 229      ( 10 Aug 2022 15:12 )             32.7     08-10    137  |  96<L>  |  10  ----------------------------<  149<H>  4.1   |  31  |  0.9    Ca    9.1      10 Aug 2022 15:12  Mg     1.7     08-10    TPro  6.6  /  Alb  3.4<L>  /  TBili  0.7  /  DBili  x   /  AST  73<H>  /  ALT  46<H>  /  AlkPhos  97  08-10            RADIOLOGY & ADDITIONAL STUDIES:    A/p              52 yr old male with hx of Osteo / substance abuse              Back pain              Blood cultures              ESR / CRP              MRI of T & L spine +/- gado if not contraindicated              ID consult              medical admission              No acute Neurosurgical intervention needed at this time

## 2022-08-10 NOTE — ED ADULT TRIAGE NOTE - CHIEF COMPLAINT QUOTE
Patient c/o lower back pain since yesterday and has not been able to get out of. Patient found to be febrile in triage and has no other complaints at this time . Denies Head aches N/V and abdominal pain

## 2022-08-10 NOTE — ED PROVIDER NOTE - NS ED ATTENDING STATEMENT MOD
This was a shared visit with the TARAN. I reviewed and verified the documentation and independently performed the documented:

## 2022-08-10 NOTE — ED PROVIDER NOTE - CARE PLAN
1 Principal Discharge DX:	Fever  Secondary Diagnosis:	Back pain   Principal Discharge DX:	Fever  Secondary Diagnosis:	Back pain  Secondary Diagnosis:	Preseptal cellulitis

## 2022-08-10 NOTE — ED PROVIDER NOTE - PHYSICAL EXAMINATION
CONSTITUTIONAL: Well-appearing; well-nourished; in no apparent distress.   EYES: PERRL; EOM intact. no entrapment. + rt sided periorbital edema/erythema. No areas of fluctuance noted.   ENT: normal nose; no rhinorrhea; normal pharynx with no tonsillar hypertrophy.   NECK: Supple; non-tender; no cervical lymphadenopathy.   CARDIOVASCULAR: Normal S1, S2; no murmurs, rubs, or gallops.   RESPIRATORY: Normal chest excursion with respiration; breath sounds clear and equal bilaterally; no wheezes, rhonchi, or rales.  GI/: Normal bowel sounds; non-distended; non-tender; no palpable organomegaly.   MS: No evidence of trauma or deformity. + ttp to midline lower lumbar region. distal pulses are normal.   SKIN: Normal for age and race; warm; dry; good turgor; no apparent lesions or exudate.   NEURO/PSYCH: A & O x 4; grossly unremarkable. mood and manner are appropriate. no focal deficits. sensation intact. Neurovascular intact

## 2022-08-10 NOTE — ED PROVIDER NOTE - ATTENDING APP SHARED VISIT CONTRIBUTION OF CARE
53 y/o male with h/o IVDU, vertebral osteomyelitis (admitted to hospital in 1/2022 and 3/2022), in ER with c/o ~ 1 week h/o worsening LBP.  Pt states he's been having persistent LBP since his last episode of vertebral osteomyelitis, but it has worsened over the past week.  States he can't get up from chair anymore due to pain.  no fever noted at home (100.5 in ER), no chills.  no urinary/bowel dysfunction.  no paresthesias. Also c/o redness/swelling around R upper eye lid for 1 week, states he was sleeping on floor and when he woke up it was red.  denies any falls or trauma to area.  no drainage.  no change in VA, no pain with eye movements. no HA/dizziness/LOC.  no abd pain.  no n/v/d.    PE - nad, nc/at, eomi, perrl, + erythema/swelling R superior claudette-orbital area, op - clear, mm dry, cta b/l, no w/r/r, rrr, abd- soft, nt/nd, nabs, back + TTP lower lumbar region, no LE swelling, b/l wrists with scarring, A&O x 3, cn 2-12 intact, sensation intact, motor b/l UE 5/5, b/l LE 4/5  -check labs, iv abx, MR thoracic/lumbar spine, nsx consult.

## 2022-08-10 NOTE — H&P ADULT - NSHPPHYSICALEXAM_GEN_ALL_CORE
LOS:     VITALS:   T(C): 37.2 (08-10-22 @ 16:02), Max: 38.1 (08-10-22 @ 13:08)  HR: 96 (08-10-22 @ 16:02) (96 - 99)  BP: 130/61 (08-10-22 @ 16:02) (130/61 - 144/88)  RR: 18 (08-10-22 @ 16:02) (18 - 18)  SpO2: 95% (08-10-22 @ 16:02) (95% - 99%)    GENERAL: NAD, lying in bed comfortably  HEAD:  Atraumatic, Normocephalic  EYES: EOMI, PERRLA, conjunctiva and sclera clear  ENT: Moist mucous membranes  NECK: Supple, No JVD  CHEST/LUNG: Clear to auscultation bilaterally; No rales, rhonchi, wheezing, or rubs. Unlabored respirations  HEART: Regular rate and rhythm; No murmurs, rubs, or gallops  ABDOMEN: BSx4; Soft, nontender, nondistended  EXTREMITIES:  2+ Peripheral Pulses, brisk capillary refill. No clubbing, cyanosis, or edema  NERVOUS SYSTEM:  A&Ox3, no focal deficits   SKIN: No rashes or lesions LOS:     VITALS:   T(C): 37.2 (08-10-22 @ 16:02), Max: 38.1 (08-10-22 @ 13:08)  HR: 96 (08-10-22 @ 16:02) (96 - 99)  BP: 130/61 (08-10-22 @ 16:02) (130/61 - 144/88)  RR: 18 (08-10-22 @ 16:02) (18 - 18)  SpO2: 95% (08-10-22 @ 16:02) (95% - 99%)    GENERAL: Obese male, NAD, lying in bed comfortably  HEAD:  Atraumatic, Normocephalic  EYES: cellulitis on the right periorbital region   ENT: Moist mucous membranes  NECK: Supple, No JVD  CHEST/LUNG: Clear to auscultation bilaterally; No rales, rhonchi, wheezing, or rubs. Unlabored respirations  HEART: Regular rate and rhythm; No murmurs, rubs, or gallops  ABDOMEN: BSx4; Soft, nontender, nondistended  EXTREMITIES:  2+ Peripheral Pulses, brisk capillary refill. No clubbing, cyanosis, or edema. Decrease ROM to B/L LE due to pain, Tender to palpation on the L5 spinal region, and to L5 transverse process   NERVOUS SYSTEM:  A&Ox3

## 2022-08-11 LAB
ALBUMIN SERPL ELPH-MCNC: 3.3 G/DL — LOW (ref 3.5–5.2)
ALP SERPL-CCNC: 108 U/L — SIGNIFICANT CHANGE UP (ref 30–115)
ALT FLD-CCNC: 47 U/L — HIGH (ref 0–41)
ANION GAP SERPL CALC-SCNC: 10 MMOL/L — SIGNIFICANT CHANGE UP (ref 7–14)
AST SERPL-CCNC: 55 U/L — HIGH (ref 0–41)
BILIRUB SERPL-MCNC: 0.3 MG/DL — SIGNIFICANT CHANGE UP (ref 0.2–1.2)
BUN SERPL-MCNC: 15 MG/DL — SIGNIFICANT CHANGE UP (ref 10–20)
CALCIUM SERPL-MCNC: 8.9 MG/DL — SIGNIFICANT CHANGE UP (ref 8.5–10.1)
CHLORIDE SERPL-SCNC: 97 MMOL/L — LOW (ref 98–110)
CO2 SERPL-SCNC: 29 MMOL/L — SIGNIFICANT CHANGE UP (ref 17–32)
CREAT SERPL-MCNC: 0.7 MG/DL — SIGNIFICANT CHANGE UP (ref 0.7–1.5)
CRP SERPL-MCNC: 330.8 MG/L — HIGH
EGFR: 110 ML/MIN/1.73M2 — SIGNIFICANT CHANGE UP
ERYTHROCYTE [SEDIMENTATION RATE] IN BLOOD: 125 MM/HR — HIGH (ref 0–10)
GLUCOSE SERPL-MCNC: 173 MG/DL — HIGH (ref 70–99)
GRAM STN FLD: SIGNIFICANT CHANGE UP
HCT VFR BLD CALC: 32.1 % — LOW (ref 42–52)
HGB BLD-MCNC: 10.6 G/DL — LOW (ref 14–18)
IRON SATN MFR SERPL: 10 % — LOW (ref 15–50)
IRON SATN MFR SERPL: 19 UG/DL — LOW (ref 35–150)
MAGNESIUM SERPL-MCNC: 2.1 MG/DL — SIGNIFICANT CHANGE UP (ref 1.8–2.4)
MCHC RBC-ENTMCNC: 29.2 PG — SIGNIFICANT CHANGE UP (ref 27–31)
MCHC RBC-ENTMCNC: 33 G/DL — SIGNIFICANT CHANGE UP (ref 32–37)
MCV RBC AUTO: 88.4 FL — SIGNIFICANT CHANGE UP (ref 80–94)
METHOD TYPE: SIGNIFICANT CHANGE UP
MSSA DNA SPEC QL NAA+PROBE: SIGNIFICANT CHANGE UP
NRBC # BLD: 0 /100 WBCS — SIGNIFICANT CHANGE UP (ref 0–0)
PLATELET # BLD AUTO: 240 K/UL — SIGNIFICANT CHANGE UP (ref 130–400)
POTASSIUM SERPL-MCNC: 4.3 MMOL/L — SIGNIFICANT CHANGE UP (ref 3.5–5)
POTASSIUM SERPL-SCNC: 4.3 MMOL/L — SIGNIFICANT CHANGE UP (ref 3.5–5)
PROT SERPL-MCNC: 6.4 G/DL — SIGNIFICANT CHANGE UP (ref 6–8)
RBC # BLD: 3.63 M/UL — LOW (ref 4.7–6.1)
RBC # FLD: 14.6 % — HIGH (ref 11.5–14.5)
SODIUM SERPL-SCNC: 136 MMOL/L — SIGNIFICANT CHANGE UP (ref 135–146)
SPECIMEN SOURCE: SIGNIFICANT CHANGE UP
TIBC SERPL-MCNC: 199 UG/DL — LOW (ref 220–430)
UIBC SERPL-MCNC: 180 UG/DL — SIGNIFICANT CHANGE UP (ref 110–370)
WBC # BLD: 14.61 K/UL — HIGH (ref 4.8–10.8)
WBC # FLD AUTO: 14.61 K/UL — HIGH (ref 4.8–10.8)

## 2022-08-11 PROCEDURE — 93306 TTE W/DOPPLER COMPLETE: CPT | Mod: 26

## 2022-08-11 PROCEDURE — 99222 1ST HOSP IP/OBS MODERATE 55: CPT

## 2022-08-11 PROCEDURE — 99253 IP/OBS CNSLTJ NEW/EST LOW 45: CPT

## 2022-08-11 PROCEDURE — 99233 SBSQ HOSP IP/OBS HIGH 50: CPT

## 2022-08-11 RX ORDER — METHADONE HYDROCHLORIDE 40 MG/1
135 TABLET ORAL DAILY
Refills: 0 | Status: DISCONTINUED | OUTPATIENT
Start: 2022-08-11 | End: 2022-08-18

## 2022-08-11 RX ADMIN — MIRTAZAPINE 30 MILLIGRAM(S): 45 TABLET, ORALLY DISINTEGRATING ORAL at 21:40

## 2022-08-11 RX ADMIN — GABAPENTIN 300 MILLIGRAM(S): 400 CAPSULE ORAL at 05:14

## 2022-08-11 RX ADMIN — Medication 2 MILLIGRAM(S): at 13:33

## 2022-08-11 RX ADMIN — ENOXAPARIN SODIUM 40 MILLIGRAM(S): 100 INJECTION SUBCUTANEOUS at 21:40

## 2022-08-11 RX ADMIN — METHADONE HYDROCHLORIDE 135 MILLIGRAM(S): 40 TABLET ORAL at 12:52

## 2022-08-11 RX ADMIN — QUETIAPINE FUMARATE 200 MILLIGRAM(S): 200 TABLET, FILM COATED ORAL at 21:40

## 2022-08-11 RX ADMIN — Medication 166.67 MILLIGRAM(S): at 06:53

## 2022-08-11 RX ADMIN — Medication 100 MILLIGRAM(S): at 21:40

## 2022-08-11 RX ADMIN — GABAPENTIN 300 MILLIGRAM(S): 400 CAPSULE ORAL at 13:33

## 2022-08-11 RX ADMIN — Medication 2 MILLIGRAM(S): at 21:42

## 2022-08-11 RX ADMIN — BUPROPION HYDROCHLORIDE 300 MILLIGRAM(S): 150 TABLET, EXTENDED RELEASE ORAL at 12:00

## 2022-08-11 RX ADMIN — Medication 1 TABLET(S): at 12:00

## 2022-08-11 RX ADMIN — Medication 1 MILLIGRAM(S): at 12:00

## 2022-08-11 RX ADMIN — QUETIAPINE FUMARATE 200 MILLIGRAM(S): 200 TABLET, FILM COATED ORAL at 05:14

## 2022-08-11 RX ADMIN — Medication 2 MILLIGRAM(S): at 05:14

## 2022-08-11 RX ADMIN — Medication 100 MILLIGRAM(S): at 13:38

## 2022-08-11 RX ADMIN — GABAPENTIN 300 MILLIGRAM(S): 400 CAPSULE ORAL at 21:40

## 2022-08-11 RX ADMIN — Medication 100 MILLIGRAM(S): at 05:14

## 2022-08-11 RX ADMIN — QUETIAPINE FUMARATE 200 MILLIGRAM(S): 200 TABLET, FILM COATED ORAL at 13:33

## 2022-08-11 NOTE — CONSULT NOTE ADULT - ASSESSMENT
ASSESSMENT  Recurrent vertebral OM, h/o NICOLE bacteremia on ancef per ID   H/o IVDU  BCx 8/10 Gram positive cocci in Cx  PAM 1/2022 showed no vegetations     RECOMMENDATIONS:  Plan for PAM, will discuss with attending   Rest of the care per primary team     *THIS IS AN INCOMPLETE NOTE. PENDING EVALUATION BY ATTENDING*  ASSESSMENT  Recurrent vertebral OM, h/o NICOLE bacteremia on ancef per ID   H/o IVDU  BCx 8/10 Gram positive cocci in Cx  PAM 1/2022 showed no vegetations     RECOMMENDATIONS:  Plan for PAM tomorrow, keep NPO after midnight   Rest of the care per primary team

## 2022-08-11 NOTE — CONSULT NOTE ADULT - SUBJECTIVE AND OBJECTIVE BOX
MIKAEL MCDERMOTT  MRN-[id number]  52y Male        Patient is a 52y old  Male who presents with a chief complaint of Back pain (11 Aug 2022 16:05)    HEALTH ISSUES - R/O PROBLEM Dx:    HPI:  53 yo M w/ pmhx of IVDU (heroin), vertebral osteomyelitis, and MSSA bacteremia presents for worsening back pain. Back pain was increasing over the past couple of weeks and worsening today to the point he was having difficulty ambulating and bearing weight. Pain is sharp, radiating from low back to b/l hips and worse with movement. Patient normally able to ambulate with walker but now having difficulty walking due to pain. He endorses feeling similar to the pain from prior admission. Pt is also complaining of right sided eye swelling/redness since Tuesday. He states, he slept on the floor and woke up in the am with redness/swelling. Patient denies any visual changes or eye pain. Patient denies any fever, chills, headache, dizziness. Patient denies chest pain, palpitation, SOB. Patient denies abdominal pain, n/v/d/c.   Patient was recently admitted 03/12-04/06 for vertebral OM/facetitis. PAM was negative and ID recommended cefazolin. Patient had picc line placed for 6wks of antibiotics. Patient states he finished his antibiotics at [facility] but did not follow up with Dr Giraldo. Patient states he is currently not using heroin. He last used 10 months ago.   Patient was found febrile in the ED with elevated WBC count, CT orbit showing Right periorbital cellulitis.     ED vitals T(F): 99 (08-10-22 @ 16:02), Max: 100.5 (08-10-22 @ 13:08), HR: 96 (08-10-22 @ 16:02) (96 - 99), BP: 130/61 (08-10-22 @ 16:02) (130/61 - 144/88), RR: 18 (08-10-22 @ 16:02) (18 - 18), SpO2: 95% (08-10-22 @ 16:02) (95% - 99%)    Labs show HB 10.8, HCT 32.7, WBC 15.24, , Lactate 1.0, TPro  6.6  /  Alb  3.4<L>  /  TBili  0.7  /  DBili  x   /  AST  73<H>  /  ALT  46<H>  /  AlkPhos  97  08-10    137  |  96<L>  |  10  ----------------------------<  149<H>  4.1   |  31  |  0.9    Ca    9.1      10 Aug 2022 15:12  Mg     1.7     08-10    CT Orbit w/ IV Cont shows Right periorbital (pre-septal) soft tissue swelling consistent with cellulitis. No evidence of abscess. No evidence of post-septal inflammation.     (10 Aug 2022 20:20)    PAST MEDICAL & SURGICAL HISTORY:  IV drug abuse      Vertebral osteomyelitis      MSSA bacteremia      No significant past surgical history        MEDICATIONS  (STANDING):  ALPRAZolam 2 milliGRAM(s) Oral three times a day  buPROPion XL (24-Hour) . 300 milliGRAM(s) Oral daily  ceFAZolin   IVPB 2000 milliGRAM(s) IV Intermittent every 8 hours  enoxaparin Injectable 40 milliGRAM(s) SubCutaneous every 24 hours  folic acid 1 milliGRAM(s) Oral daily  gabapentin 300 milliGRAM(s) Oral three times a day  methadone    Tablet 135 milliGRAM(s) Oral daily  mirtazapine 30 milliGRAM(s) Oral at bedtime  multivitamin 1 Tablet(s) Oral daily  QUEtiapine 200 milliGRAM(s) Oral three times a day    MEDICATIONS  (PRN):  acetaminophen     Tablet .. 650 milliGRAM(s) Oral every 6 hours PRN Temp greater or equal to 38C (100.4F), Mild Pain (1 - 3)  aluminum hydroxide/magnesium hydroxide/simethicone Suspension 30 milliLiter(s) Oral every 4 hours PRN Dyspepsia  melatonin 3 milliGRAM(s) Oral at bedtime PRN Insomnia  ondansetron Injectable 4 milliGRAM(s) IV Push every 8 hours PRN Nausea and/or Vomiting    Allergies    No Known Allergies    Intolerances      HEALTH ISSUES - PROBLEM Dx:          ASHKAN: pt not sure  POHx: negative  FOHx: Non-contributory    Extended HPI: see above HPI.  53 yo M w/ pmhx of IVDU (heroin), vertebral osteomyelitis, and MSSA bacteremia presents for worsening back pain. Back pain was increasing over the past couple of weeks and worsening today to the point he was having difficulty ambulating . Complaining of right sided eye swelling/redness since Tuesday 8/9 . He slept on the hard floor ( wanted to stretch himself ) and woke up in the am with redness/swelling. Patient denies any visual changes or eye pain. Patient denies any fever, chills, headache, dizziness. .      Pt reports no pain, discharge change in vision, diplopia.  Bedside exam      Ocular Medications: none        EXTERNAL:    VISUAL ACUITY:       RIGHT EYE: sc: -20/40-                    LEFT EYE: sc: 20/30-      NEURO TESTING  EXTRAOCULAR MOVEMENTS: full OU; Alternating exotropia with some  intermittent component; no diplopia  PUPILS: PERRL; No APD  VFc: FTFC OU    ANTERIOR SEGMENT EVALUATION: :    LIDS/ADENEXA: OD: mild  periorbital erythema swelling nodular subcutaneous  area. No pain on palpation; lateral aspect near the brow, no crusting or discharge. OS clear  CONJUNCTIVA/SCLERA: clear OU  CORNEA: clear OU  ANTERIOR CHAMBER:  deep/formed OU  IRIS/PUPIL: flat /round OU  LENS/VITREOUS: mild NS  OU    INTRAOCULAR PRESSURE:   OD: 10mmHg  OS: 10 mmHg  @ 1:00pm    POSTERIOR SEGMENT EVALUATION  DFE: 1% Tropicamide, 2.5 % Phenylephrine OU    OPTIC NERVE: 0.3/0.25  MACULA: clear OU  A/V: 2/3 OU  FUNDUS/PERIPHERY: flat OU    CT ORBITS IC                          PROCEDURE DATE:  08/10/2022          INTERPRETATION:  Clinical History / Reason for exam: Right eye swelling,   rule out post septal cellulitis    TECHNIQUE: CT of the orbits with contrast.Contiguous CT axial images of   the orbits were obtained following the intravenous administration of 100   cc of Optiray with coronal and sagittal reformats.    Correlation is made with CT head dated 1/15/2022.    FINDINGS:    There is right periorbital soft tissue swelling with no evidence of   abscess formation.    The globes are normal in contour. The lenses are normally located. The   optic nerve sheath complexes and extraocular muscles are symmetric and   unremarkable.    There is no evidence of retrobulbar inflammation or fluid collection.    There is mild mucosal thickening within scattered portions of the   paranasal sinuses.    IMPRESSION:    Right periorbital (pre-septal) soft tissue swelling consistent with   cellulitis in the appropriate clinical scenario. No evidence of abscess.   No evidence of post-septal inflammation.

## 2022-08-11 NOTE — CONSULT NOTE ADULT - SUBJECTIVE AND OBJECTIVE BOX
HPI:  53 yo M w/ pmhx of IVDU (heroin), vertebral osteomyelitis, and MSSA bacteremia presents for worsening back pain. Back pain was increasing over the past couple of weeks and worsening today to the point he was having difficulty ambulating and bearing weight. Pain is sharp, radiating from low back to b/l hips and worse with movement. Patient normally able to ambulate with walker but now having difficulty walking due to pain. He endorses feeling similar to the pain from prior admission. Pt is also complaining of right sided eye swelling/redness since Tuesday. He states, he slept on the floor and woke up in the am with redness/swelling. Patient denies any visual changes or eye pain. Patient denies any fever, chills, headache, dizziness. Patient denies chest pain, palpitation, SOB. Patient denies abdominal pain, n/v/d/c.   Patient was recently admitted 03/12-04/06 for vertebral OM/facetitis. PAM was negative and ID recommended cefazolin. Patient had picc line placed for 6wks of antibiotics. Patient states he finished his antibiotics at [facility] but did not follow up with Dr Giraldo. Patient states he is currently not using heroin. He last used 10 months ago.   Patient was found febrile in the ED with elevated WBC count, CT orbit showing Right periorbital cellulitis.     ED vitals T(F): 99 (08-10-22 @ 16:02), Max: 100.5 (08-10-22 @ 13:08), HR: 96 (08-10-22 @ 16:02) (96 - 99), BP: 130/61 (08-10-22 @ 16:02) (130/61 - 144/88), RR: 18 (08-10-22 @ 16:02) (18 - 18), SpO2: 95% (08-10-22 @ 16:02) (95% - 99%)    Labs show HB 10.8, HCT 32.7, WBC 15.24, , Lactate 1.0, TPro  6.6  /  Alb  3.4<L>  /  TBili  0.7  /  DBili  x   /  AST  73<H>  /  ALT  46<H>  /  AlkPhos  97  08-10    137  |  96<L>  |  10  ----------------------------<  149<H>  4.1   |  31  |  0.9    Ca    9.1      10 Aug 2022 15:12  Mg     1.7     08-10    CT Orbit w/ IV Cont shows Right periorbital (pre-septal) soft tissue swelling consistent with cellulitis. No evidence of abscess. No evidence of post-septal inflammation.     (10 Aug 2022 20:20)      PAST MEDICAL & SURGICAL HISTORY  IV drug abuse    Vertebral osteomyelitis    MSSA bacteremia    No significant past surgical history        FAMILY HISTORY:  FAMILY HISTORY:  No pertinent family history in first degree relatives        SOCIAL HISTORY:  []smoker  []Alcohol  []Drug    ALLERGIES:  No Known Allergies      MEDICATIONS:  MEDICATIONS  (STANDING):  ALPRAZolam 2 milliGRAM(s) Oral three times a day  buPROPion XL (24-Hour) . 300 milliGRAM(s) Oral daily  ceFAZolin   IVPB 2000 milliGRAM(s) IV Intermittent every 8 hours  enoxaparin Injectable 40 milliGRAM(s) SubCutaneous every 24 hours  folic acid 1 milliGRAM(s) Oral daily  gabapentin 300 milliGRAM(s) Oral three times a day  methadone    Tablet 135 milliGRAM(s) Oral daily  mirtazapine 30 milliGRAM(s) Oral at bedtime  multivitamin 1 Tablet(s) Oral daily  QUEtiapine 200 milliGRAM(s) Oral three times a day    MEDICATIONS  (PRN):  acetaminophen     Tablet .. 650 milliGRAM(s) Oral every 6 hours PRN Temp greater or equal to 38C (100.4F), Mild Pain (1 - 3)  aluminum hydroxide/magnesium hydroxide/simethicone Suspension 30 milliLiter(s) Oral every 4 hours PRN Dyspepsia  melatonin 3 milliGRAM(s) Oral at bedtime PRN Insomnia  ondansetron Injectable 4 milliGRAM(s) IV Push every 8 hours PRN Nausea and/or Vomiting      HOME MEDICATIONS:  Home Medications:  ALPRAZolam 2 mg oral tablet: 1 tab(s) orally 3 times a day (29 Mar 2022 11:32)  folic acid 1 mg oral tablet: 1 tab(s) orally once a day (12 Mar 2022 21:41)  gabapentin 300 mg oral capsule: 1 cap(s) orally 3 times a day (12 Mar 2022 21:41)  methadone 5 mg oral tablet: 135 milligram(s) orally once a day (29 Mar 2022 11:35)  Multiple Vitamins oral tablet: 1 tab(s) orally once a day (12 Mar 2022 21:41)  nicotine 21 mg/24 hr transdermal film, extended release: 1 patch transdermal once a day (12 Mar 2022 21:41)  Remeron 30 mg oral tablet: 2 tab(s) orally once a day (at bedtime) (12 Mar 2022 21:41)  SEROquel 200 mg oral tablet: orally 3 times a day (12 Mar 2022 21:41)  Wellbutrin: 300 milligram(s) orally once a day (12 Mar 2022 21:41)      VITALS:   T(F): 98.6 (08-11 @ 12:35), Max: 100.5 (08-10 @ 13:08)  HR: 74 (08-11 @ 12:35) (74 - 99)  BP: 103/56 (08-11 @ 12:35) (96/53 - 144/88)  BP(mean): --  RR: 18 (08-11 @ 12:35) (18 - 18)  SpO2: 95% (08-11 @ 12:35) (94% - 99%)    I&O's Summary      REVIEW OF SYSTEMS:  CONSTITUTIONAL: No weakness, fevers or chills  EYES: No visual changes, L periorbital hematoma   ENT: No vertigo or throat pain   NECK: No pain or stiffness  RESPIRATORY: No cough, wheezing, hemoptysis; No shortness of breath  CARDIOVASCULAR: No chest pain or palpitations  GASTROINTESTINAL: No abdominal or epigastric pain. No nausea, vomiting, or hematemesis; No diarrhea or constipation. No melena or hematochezia.  GENITOURINARY: No dysuria, frequency or hematuria  NEUROLOGICAL: No numbness or weakness  SKIN: No itching, no rashes  MSK: no    PHYSICAL EXAM:  NEURO: AAO x3, sleepy   GEN: Not in acute distress, obese  NECK: no thyroid enlargement, no JVD  LUNGS: poor inspiratory effort   CARDIOVASCULAR: S1/S2 present, RRR , no murmurs or rubs,   ABD: Soft, non-tender, non-distended,  EXT: No ASHKAN, BL wrist contractures on dorsal surfaces and surgical scars on both arms   SKIN: Intact    LABS:                        10.6   14.61 )-----------( 240      ( 11 Aug 2022 06:38 )             32.1     08-11    136  |  97<L>  |  15  ----------------------------<  173<H>  4.3   |  29  |  0.7    Ca    8.9      11 Aug 2022 06:38  Mg     2.1     08-11    TPro  6.4  /  Alb  3.3<L>  /  TBili  0.3  /  DBili  x   /  AST  55<H>  /  ALT  47<H>  /  AlkPhos  108  08-11      Sedimentation Rate, Erythrocyte: 125 mm/Hr *H* (08-11-22 @ 06:38)              RADIOLOGY:  -CXR: < from: Xray Chest 1 View-PORTABLE IMMEDIATE (Xray Chest 1 View-PORTABLE IMMEDIATE .) (08.10.22 @ 16:22) >  Impression:    Left lung base atelectasis.      < end of copied text >    -TTE: < from: TTE Echo Complete w/o Contrast w/ Doppler (08.11.22 @ 12:02) >  Summary:   1. Left ventricular ejection fraction, by visual estimation, is 55 to   60%.   2. Normal global left ventricular systolic function.   3. Spectral Doppler shows impaired relaxation pattern of left   ventricular myocardial filling (Grade I diastolic dysfunction).   4. No evidence of mitral valve regurgitation.   5. Mild tricuspid regurgitation.    < end of copied text >  < from: Transesophageal Echocardiogram (01.19.22 @ 15:03) >    Summary:   1. Left ventricular ejection fraction, by visual estimation, is 60 to   65%.   2. Normal global left ventricular systolic function.   3. Normal left atrial size.   4. Normal right atrial size.   5. Trace mitral valve regurgitation.   6. No evidence of any vegetations.      < end of copied text >    -CCTA:  -STRESS TEST:  -CATHETERIZATION:    ECG:  NSR  TELEMETRY EVENTS:  not on tele

## 2022-08-11 NOTE — CONSULT NOTE ADULT - MUSCULOSKELETAL
no joint swelling/no joint erythema/no joint warmth/no calf tenderness/no chest wall tenderness negative

## 2022-08-11 NOTE — CONSULT NOTE ADULT - ASSESSMENT
51 yo M w/ pmhx of IVDU (heroin), vertebral osteomyelitis, and MSSA bacteremia presents for worsening back pain. Back pain was increasing over the past couple of weeks and worsening today to the point he was having difficulty ambulating . Complaining of right sided eye swelling/redness since Tuesday 8/9 . He slept on the hard floor ( wanted to stretch himself ) and woke up in the am with redness/swelling. Patient denies any visual changes or eye pain. Patient denies any fever, chills, headache, dizziness. Patient denies chest pain, palpitation, SOB. Patient denies abdominal pain, n/v/d/c.   Patient was recently admitted 03/12-04/06 for vertebral OM/facetitis. PAM was negative and ID recommended cefazolin. Patient had picc line placed for 6wks of antibiotics. Patient states he finished his antibiotics at [facility] but did not follow up with Dr Giraldo. Patient states he is currently not using heroin. He last used 10 months ago.    IMPRESSION;  Recurrence of vertebral OM  Hematoma right upper eyelid > no abscess/cellulitis/septic emboli  WBC 15.2    RECOMMENDATIONS;   MRI of thoraco-LS   BCx  PAM  Ancef 2 gm iv q8h  Off loading to prevent pressure sores and preventive measures to avoid aspiration

## 2022-08-11 NOTE — CONSULT NOTE ADULT - SUBJECTIVE AND OBJECTIVE BOX
MIKAEL MCDERMOTT  52y, Male  Allergy: No Known Allergies      All historical available data reviewed.    HPI:  53 yo M w/ pmhx of IVDU (heroin), vertebral osteomyelitis, and MSSA bacteremia presents for worsening back pain. Back pain was increasing over the past couple of weeks and worsening today to the point he was having difficulty ambulating and bearing weight. Pain is sharp, radiating from low back to b/l hips and worse with movement. Patient normally able to ambulate with walker but now having difficulty walking due to pain. He endorses feeling similar to the pain from prior admission. Pt is also complaining of right sided eye swelling/redness since Tuesday. He states, he slept on the floor and woke up in the am with redness/swelling. Patient denies any visual changes or eye pain. Patient denies any fever, chills, headache, dizziness. Patient denies chest pain, palpitation, SOB. Patient denies abdominal pain, n/v/d/c.   Patient was recently admitted 03/12-04/06 for vertebral OM/facetitis. PAM was negative and ID recommended cefazolin. Patient had picc line placed for 6wks of antibiotics. Patient states he finished his antibiotics at [facility] but did not follow up with Dr Giraldo. Patient states he is currently not using heroin. He last used 10 months ago.   Patient was found febrile in the ED with elevated WBC count, CT orbit showing Right periorbital cellulitis.     ED vitals T(F): 99 (08-10-22 @ 16:02), Max: 100.5 (08-10-22 @ 13:08), HR: 96 (08-10-22 @ 16:02) (96 - 99), BP: 130/61 (08-10-22 @ 16:02) (130/61 - 144/88), RR: 18 (08-10-22 @ 16:02) (18 - 18), SpO2: 95% (08-10-22 @ 16:02) (95% - 99%)    Labs show HB 10.8, HCT 32.7, WBC 15.24, , Lactate 1.0, TPro  6.6  /  Alb  3.4<L>  /  TBili  0.7  /  DBili  x   /  AST  73<H>  /  ALT  46<H>  /  AlkPhos  97  08-10    137  |  96<L>  |  10  ----------------------------<  149<H>  4.1   |  31  |  0.9    Ca    9.1      10 Aug 2022 15:12  Mg     1.7     08-10    CT Orbit w/ IV Cont shows Right periorbital (pre-septal) soft tissue swelling consistent with cellulitis. No evidence of abscess. No evidence of post-septal inflammation.     (10 Aug 2022 20:20)    FAMILY HISTORY:  No pertinent family history in first degree relatives      PAST MEDICAL & SURGICAL HISTORY:  IV drug abuse      Vertebral osteomyelitis      MSSA bacteremia      No significant past surgical history            VITALS:  T(F): 98.1, Max: 100.5 (08-10-22 @ 13:08)  HR: 86  BP: 134/72  RR: 18Vital Signs Last 24 Hrs  T(C): 36.7 (11 Aug 2022 09:00), Max: 38.1 (10 Aug 2022 13:08)  T(F): 98.1 (11 Aug 2022 09:00), Max: 100.5 (10 Aug 2022 13:08)  HR: 86 (11 Aug 2022 09:00) (84 - 99)  BP: 134/72 (11 Aug 2022 09:00) (96/53 - 144/88)  BP(mean): --  RR: 18 (11 Aug 2022 09:00) (18 - 18)  SpO2: 95% (11 Aug 2022 09:00) (94% - 99%)    Parameters below as of 11 Aug 2022 00:17  Patient On (Oxygen Delivery Method): room air        TESTS & MEASUREMENTS:                        10.8   15.24 )-----------( 229      ( 10 Aug 2022 15:12 )             32.7     08-10    137  |  96<L>  |  10  ----------------------------<  149<H>  4.1   |  31  |  0.9    Ca    9.1      10 Aug 2022 15:12  Mg     1.7     08-10    TPro  6.6  /  Alb  3.4<L>  /  TBili  0.7  /  DBili  x   /  AST  73<H>  /  ALT  46<H>  /  AlkPhos  97  08-10    LIVER FUNCTIONS - ( 10 Aug 2022 15:12 )  Alb: 3.4 g/dL / Pro: 6.6 g/dL / ALK PHOS: 97 U/L / ALT: 46 U/L / AST: 73 U/L / GGT: x                   RADIOLOGY & ADDITIONAL TESTS:  Personal review of radiological diagnostics performed  Echo and EKG results noted when applicable.     MEDICATIONS:  acetaminophen     Tablet .. 650 milliGRAM(s) Oral every 6 hours PRN  ALPRAZolam 2 milliGRAM(s) Oral three times a day  aluminum hydroxide/magnesium hydroxide/simethicone Suspension 30 milliLiter(s) Oral every 4 hours PRN  buPROPion XL (24-Hour) . 300 milliGRAM(s) Oral daily  ceFAZolin   IVPB 2000 milliGRAM(s) IV Intermittent every 8 hours  enoxaparin Injectable 40 milliGRAM(s) SubCutaneous every 24 hours  folic acid 1 milliGRAM(s) Oral daily  gabapentin 300 milliGRAM(s) Oral three times a day  melatonin 3 milliGRAM(s) Oral at bedtime PRN  methadone    Tablet 135 milliGRAM(s) Oral daily  mirtazapine 30 milliGRAM(s) Oral at bedtime  multivitamin 1 Tablet(s) Oral daily  ondansetron Injectable 4 milliGRAM(s) IV Push every 8 hours PRN  QUEtiapine 200 milliGRAM(s) Oral three times a day  vancomycin  IVPB 1250 milliGRAM(s) IV Intermittent every 12 hours      ANTIBIOTICS:  ceFAZolin   IVPB 2000 milliGRAM(s) IV Intermittent every 8 hours  vancomycin  IVPB 1250 milliGRAM(s) IV Intermittent every 12 hours

## 2022-08-11 NOTE — CONSULT NOTE ADULT - ASSESSMENT
Assessment  - right periorbital hematoma of the RUL  ; CT read shoes no evidence of abscess or post septal involvement.  Recurrence of vertebral OM    - Alternating Exotropia with intermittent component        Plan  - follow ID recs.  Reconsult if needed if presentation worsens.  Advised  pt to follow in the eye clinic as an outpatient

## 2022-08-11 NOTE — PROGRESS NOTE ADULT - ASSESSMENT
53 yo M w/ pmhx of IVDU (heroin), vertebral osteomyelitis, and MSSA bacteremia presents for worsening back pain. Patient was found febrile in the ED with elevated WBC count, CT orbit showing Right periorbital cellulitis.     #Recurrence of vertebral OM   #Recent history & post treatment for MSSA bacteremia  #Right periorbital cellulitis  #Sepsis present on admission: leukocytosis, febrile  - CT Orbit w/ IV Cont 8/10: shows Right periorbital (pre-septal) soft tissue swelling consistent with cellulitis. No evidence of abscess. No evidence of post-septal inflammation.  -h/o IR Drainage of Abscess/Biopsy of suspected discitis done 3/16 -fluid  growing NICOLE  - S/p cefazolin 2g q 8 hours for 4 weeks (end date 04/11)   - Neurosurg recs appreciated: no acute intervention at this time   -, .8   - F/u Blood cx   - F/u MRI of T & L spine +/- gado - pending  - ID consult appreciated: Ancef 2g iv q8h, PAM, MRI  - F/u Opthalmology consult     #Anemia  - Monitor H+H  -pending ferritin level, b12, folate    #Hx of IVD  #Opioid abuse on Methadone  - Continue methadone 135, and gabapentin    #Anxiety/Depression  - c/w mirtazapine + quetiapine  -c/w xanax     #Active nicotine dependence  - c/w nicotine patch  - c/w buproprion    #Morbid obesity  - diet and lifestyle modification     #Hypomagnesemia  -repleted    #Suspected folic acid deficiency  -continue supplement    #DVT PPx- LVX 40mg sq qhs  #GI PPx- None  #Diet- Regular  #CHG  #Activity- AAT  #Dispo- acute  #Code- FULL    #Progress Note Handoff  Pending (specify):  Consults___cardio______, Tests___MRI, TEE_____, Test Results_______, Other_________  Family discussion: n/a  Disposition: Home___/SNF___/Other________/Unknown at this time______x__

## 2022-08-12 LAB
ALBUMIN SERPL ELPH-MCNC: 3.3 G/DL — LOW (ref 3.5–5.2)
ALP SERPL-CCNC: 106 U/L — SIGNIFICANT CHANGE UP (ref 30–115)
ALT FLD-CCNC: 72 U/L — HIGH (ref 0–41)
ANION GAP SERPL CALC-SCNC: 13 MMOL/L — SIGNIFICANT CHANGE UP (ref 7–14)
APPEARANCE UR: CLEAR — SIGNIFICANT CHANGE UP
AST SERPL-CCNC: 75 U/L — HIGH (ref 0–41)
BACTERIA # UR AUTO: NEGATIVE — SIGNIFICANT CHANGE UP
BASOPHILS # BLD AUTO: 0.01 K/UL — SIGNIFICANT CHANGE UP (ref 0–0.2)
BASOPHILS NFR BLD AUTO: 0.1 % — SIGNIFICANT CHANGE UP (ref 0–1)
BILIRUB SERPL-MCNC: 0.2 MG/DL — SIGNIFICANT CHANGE UP (ref 0.2–1.2)
BILIRUB UR-MCNC: NEGATIVE — SIGNIFICANT CHANGE UP
BUN SERPL-MCNC: 18 MG/DL — SIGNIFICANT CHANGE UP (ref 10–20)
CALCIUM SERPL-MCNC: 9.1 MG/DL — SIGNIFICANT CHANGE UP (ref 8.5–10.1)
CHLORIDE SERPL-SCNC: 98 MMOL/L — SIGNIFICANT CHANGE UP (ref 98–110)
CO2 SERPL-SCNC: 27 MMOL/L — SIGNIFICANT CHANGE UP (ref 17–32)
COLOR SPEC: YELLOW — SIGNIFICANT CHANGE UP
CREAT SERPL-MCNC: 0.9 MG/DL — SIGNIFICANT CHANGE UP (ref 0.7–1.5)
DIFF PNL FLD: NEGATIVE — SIGNIFICANT CHANGE UP
EGFR: 102 ML/MIN/1.73M2 — SIGNIFICANT CHANGE UP
EOSINOPHIL # BLD AUTO: 0.07 K/UL — SIGNIFICANT CHANGE UP (ref 0–0.7)
EOSINOPHIL NFR BLD AUTO: 0.6 % — SIGNIFICANT CHANGE UP (ref 0–8)
EPI CELLS # UR: 1 /HPF — SIGNIFICANT CHANGE UP (ref 0–5)
FERRITIN SERPL-MCNC: 351 NG/ML — SIGNIFICANT CHANGE UP (ref 30–400)
FOLATE SERPL-MCNC: 11.1 NG/ML — SIGNIFICANT CHANGE UP
GLUCOSE SERPL-MCNC: 143 MG/DL — HIGH (ref 70–99)
GLUCOSE UR QL: NEGATIVE — SIGNIFICANT CHANGE UP
GRAM STN FLD: SIGNIFICANT CHANGE UP
HCT VFR BLD CALC: 33.5 % — LOW (ref 42–52)
HGB BLD-MCNC: 10.8 G/DL — LOW (ref 14–18)
HYALINE CASTS # UR AUTO: 2 /LPF — SIGNIFICANT CHANGE UP (ref 0–7)
IMM GRANULOCYTES NFR BLD AUTO: 0.6 % — HIGH (ref 0.1–0.3)
KETONES UR-MCNC: NEGATIVE — SIGNIFICANT CHANGE UP
LEUKOCYTE ESTERASE UR-ACNC: NEGATIVE — SIGNIFICANT CHANGE UP
LYMPHOCYTES # BLD AUTO: 16.4 % — LOW (ref 20.5–51.1)
LYMPHOCYTES # BLD AUTO: 2.07 K/UL — SIGNIFICANT CHANGE UP (ref 1.2–3.4)
MAGNESIUM SERPL-MCNC: 1.9 MG/DL — SIGNIFICANT CHANGE UP (ref 1.8–2.4)
MAGNESIUM SERPL-MCNC: 2 MG/DL — SIGNIFICANT CHANGE UP (ref 1.8–2.4)
MCHC RBC-ENTMCNC: 29 PG — SIGNIFICANT CHANGE UP (ref 27–31)
MCHC RBC-ENTMCNC: 32.2 G/DL — SIGNIFICANT CHANGE UP (ref 32–37)
MCV RBC AUTO: 90.1 FL — SIGNIFICANT CHANGE UP (ref 80–94)
MONOCYTES # BLD AUTO: 1.08 K/UL — HIGH (ref 0.1–0.6)
MONOCYTES NFR BLD AUTO: 8.6 % — SIGNIFICANT CHANGE UP (ref 1.7–9.3)
MRSA PCR RESULT.: NEGATIVE — SIGNIFICANT CHANGE UP
NEUTROPHILS # BLD AUTO: 9.28 K/UL — HIGH (ref 1.4–6.5)
NEUTROPHILS NFR BLD AUTO: 73.7 % — SIGNIFICANT CHANGE UP (ref 42.2–75.2)
NITRITE UR-MCNC: NEGATIVE — SIGNIFICANT CHANGE UP
NRBC # BLD: 0 /100 WBCS — SIGNIFICANT CHANGE UP (ref 0–0)
PH UR: 6.5 — SIGNIFICANT CHANGE UP (ref 5–8)
PLATELET # BLD AUTO: 267 K/UL — SIGNIFICANT CHANGE UP (ref 130–400)
POTASSIUM SERPL-MCNC: 4.1 MMOL/L — SIGNIFICANT CHANGE UP (ref 3.5–5)
POTASSIUM SERPL-SCNC: 4.1 MMOL/L — SIGNIFICANT CHANGE UP (ref 3.5–5)
PROT SERPL-MCNC: 6.8 G/DL — SIGNIFICANT CHANGE UP (ref 6–8)
PROT UR-MCNC: ABNORMAL
RBC # BLD: 3.72 M/UL — LOW (ref 4.7–6.1)
RBC # FLD: 15 % — HIGH (ref 11.5–14.5)
RBC CASTS # UR COMP ASSIST: 2 /HPF — SIGNIFICANT CHANGE UP (ref 0–4)
SODIUM SERPL-SCNC: 138 MMOL/L — SIGNIFICANT CHANGE UP (ref 135–146)
SP GR SPEC: 1.03 — SIGNIFICANT CHANGE UP (ref 1.01–1.03)
SPECIMEN SOURCE: SIGNIFICANT CHANGE UP
UROBILINOGEN FLD QL: SIGNIFICANT CHANGE UP
VIT B12 SERPL-MCNC: 361 PG/ML — SIGNIFICANT CHANGE UP (ref 232–1245)
WBC # BLD: 12.59 K/UL — HIGH (ref 4.8–10.8)
WBC # FLD AUTO: 12.59 K/UL — HIGH (ref 4.8–10.8)
WBC UR QL: 2 /HPF — SIGNIFICANT CHANGE UP (ref 0–5)

## 2022-08-12 PROCEDURE — 72158 MRI LUMBAR SPINE W/O & W/DYE: CPT | Mod: 26

## 2022-08-12 PROCEDURE — 99233 SBSQ HOSP IP/OBS HIGH 50: CPT

## 2022-08-12 PROCEDURE — 72157 MRI CHEST SPINE W/O & W/DYE: CPT | Mod: 26

## 2022-08-12 RX ORDER — NAFCILLIN 10 G/100ML
2 INJECTION, POWDER, FOR SOLUTION INTRAVENOUS ONCE
Refills: 0 | Status: COMPLETED | OUTPATIENT
Start: 2022-08-12 | End: 2022-08-12

## 2022-08-12 RX ORDER — NAFCILLIN 10 G/100ML
INJECTION, POWDER, FOR SOLUTION INTRAVENOUS
Refills: 0 | Status: DISCONTINUED | OUTPATIENT
Start: 2022-08-12 | End: 2022-08-16

## 2022-08-12 RX ORDER — GABAPENTIN 400 MG/1
300 CAPSULE ORAL ONCE
Refills: 0 | Status: COMPLETED | OUTPATIENT
Start: 2022-08-12 | End: 2022-08-12

## 2022-08-12 RX ORDER — QUETIAPINE FUMARATE 200 MG/1
200 TABLET, FILM COATED ORAL ONCE
Refills: 0 | Status: COMPLETED | OUTPATIENT
Start: 2022-08-12 | End: 2022-08-12

## 2022-08-12 RX ORDER — ALPRAZOLAM 0.25 MG
2 TABLET ORAL ONCE
Refills: 0 | Status: DISCONTINUED | OUTPATIENT
Start: 2022-08-12 | End: 2022-08-12

## 2022-08-12 RX ORDER — CYCLOBENZAPRINE HYDROCHLORIDE 10 MG/1
5 TABLET, FILM COATED ORAL THREE TIMES A DAY
Refills: 0 | Status: DISCONTINUED | OUTPATIENT
Start: 2022-08-12 | End: 2022-08-25

## 2022-08-12 RX ORDER — NAFCILLIN 10 G/100ML
2 INJECTION, POWDER, FOR SOLUTION INTRAVENOUS EVERY 4 HOURS
Refills: 0 | Status: DISCONTINUED | OUTPATIENT
Start: 2022-08-12 | End: 2022-08-16

## 2022-08-12 RX ADMIN — Medication 100 MILLIGRAM(S): at 18:16

## 2022-08-12 RX ADMIN — QUETIAPINE FUMARATE 200 MILLIGRAM(S): 200 TABLET, FILM COATED ORAL at 22:34

## 2022-08-12 RX ADMIN — METHADONE HYDROCHLORIDE 135 MILLIGRAM(S): 40 TABLET ORAL at 12:17

## 2022-08-12 RX ADMIN — QUETIAPINE FUMARATE 200 MILLIGRAM(S): 200 TABLET, FILM COATED ORAL at 19:07

## 2022-08-12 RX ADMIN — NAFCILLIN 200 GRAM(S): 10 INJECTION, POWDER, FOR SOLUTION INTRAVENOUS at 11:25

## 2022-08-12 RX ADMIN — Medication 2 MILLIGRAM(S): at 22:33

## 2022-08-12 RX ADMIN — NAFCILLIN 200 GRAM(S): 10 INJECTION, POWDER, FOR SOLUTION INTRAVENOUS at 18:16

## 2022-08-12 RX ADMIN — Medication 100 MILLIGRAM(S): at 11:25

## 2022-08-12 RX ADMIN — Medication 2 MILLIGRAM(S): at 19:08

## 2022-08-12 RX ADMIN — Medication 2 MILLIGRAM(S): at 05:37

## 2022-08-12 RX ADMIN — GABAPENTIN 300 MILLIGRAM(S): 400 CAPSULE ORAL at 22:33

## 2022-08-12 RX ADMIN — ENOXAPARIN SODIUM 40 MILLIGRAM(S): 100 INJECTION SUBCUTANEOUS at 21:10

## 2022-08-12 RX ADMIN — MIRTAZAPINE 30 MILLIGRAM(S): 45 TABLET, ORALLY DISINTEGRATING ORAL at 22:34

## 2022-08-12 RX ADMIN — Medication 1 MILLIGRAM(S): at 11:27

## 2022-08-12 RX ADMIN — GABAPENTIN 300 MILLIGRAM(S): 400 CAPSULE ORAL at 05:38

## 2022-08-12 RX ADMIN — GABAPENTIN 300 MILLIGRAM(S): 400 CAPSULE ORAL at 19:08

## 2022-08-12 RX ADMIN — Medication 100 MILLIGRAM(S): at 05:37

## 2022-08-12 RX ADMIN — NAFCILLIN 200 GRAM(S): 10 INJECTION, POWDER, FOR SOLUTION INTRAVENOUS at 22:33

## 2022-08-12 RX ADMIN — Medication 1 TABLET(S): at 11:27

## 2022-08-12 RX ADMIN — QUETIAPINE FUMARATE 200 MILLIGRAM(S): 200 TABLET, FILM COATED ORAL at 05:39

## 2022-08-12 RX ADMIN — BUPROPION HYDROCHLORIDE 300 MILLIGRAM(S): 150 TABLET, EXTENDED RELEASE ORAL at 11:27

## 2022-08-12 RX ADMIN — CYCLOBENZAPRINE HYDROCHLORIDE 5 MILLIGRAM(S): 10 TABLET, FILM COATED ORAL at 22:33

## 2022-08-12 NOTE — PROGRESS NOTE ADULT - SUBJECTIVE AND OBJECTIVE BOX
MIKAEL MCDERMOTT  [facility] [place] [address] [facility]      Patient is a 52y old  Male who presents with a chief complaint of Back pain (12 Aug 2022 09:27)        Interval events:  Patient seen and examined at bedside. C/o weakness and pains in calves. No sensory deficits.     -PMHx: IV drug abuse    Vertebral osteomyelitis    MSSA bacteremia      -PSHx:No significant past surgical history            REVIEW OF SYSTEMS:  CONSTITUTIONAL: No fever, weight loss, or fatigue  RESPIRATORY: No cough, wheezing, chills or hemoptysis; No shortness of breath  CARDIOVASCULAR: No chest pain, palpitations, dizziness, or leg swelling  GASTROINTESTINAL: No abdominal or epigastric pain. No nausea, vomiting, or hematemesis; No diarrhea or constipation. No melena or hematochezia.  NEUROLOGICAL: as above   LYMPH NODES: No enlarged glands  MUSCULOSKELETAL: as above      T(C): , Max: 37.2 (08-11-22 @ 21:16)  HR: 86 (08-12-22 @ 12:20)  BP: 134/99 (08-12-22 @ 12:20)  RR: 18 (08-12-22 @ 12:20)  SpO2: 95% (08-12-22 @ 12:20)  CAPILLARY BLOOD GLUCOSE          PHYSICAL EXAM:  GEN: well-groomed; no distress, lethargic  HEENT: PERRL; EOMI; conjunctiva clear; OD with swelling/erythema around orbit (improved), not painful to move eye   CHEST: clear to auscultation bilaterally; no wheezes; no rales; no rhonchi  CARDS: 4/6 systolic murmur  ABD: soft; nontender; nondistended; normal active bowel sounds, no hernia  NEURO: cranial nerves II-XII intact; superficial reflexes intact, mild motor weakness LEs; no sensory deficits  LYMPH: No lymphadedenopathy  MSK: no joint swelling; no joint erythema; no joint warmth; b/l calf tenderness (no erythema)       LABS:          10.8  12.59  )-------(267          33.5  N=73.7  L=16.4  MCV=90.1    138|98|18  ------------------<143<H>  4.1|27|0.9  eGFR:--  Ca:9.1            Microbiology:    Culture - Blood (collected 08-11-22 @ 06:38)  Source: .Blood Blood  Gram Stain (08-12-22 @ 10:55):    Growth in aerobic bottle: Gram Positive Cocci in Clusters  Preliminary Report (08-12-22 @ 10:55):    Growth in aerobic bottle: Gram Positive Cocci in Clusters    Culture - Blood (collected 08-10-22 @ 15:15)  Source: .Blood Blood  Preliminary Report (08-11-22 @ 23:01):    No growth to date.    Culture - Blood (collected 08-10-22 @ 15:15)  Source: .Blood Blood  Gram Stain (08-11-22 @ 15:33):    Growth in aerobic bottle: Gram Positive Cocci in Clusters  Preliminary Report (08-11-22 @ 15:33):    Growth in aerobic bottle: Gram Positive Cocci in Clusters    ***Blood Panel PCR results on this specimen are available    approximately 3 hours after the Gram stain result.***    Gram stain, PCR, and/or culture results may not always    correspond due to difference in methodologies.    ************************************************************    This PCR assay was performed by multiplex PCR. This    Assay tests for 66 bacterial and resistance gene targets.    Please refer to the [facility] test directory    at [email or web address] for details.  Organism: Blood Culture PCR (08-11-22 @ 22:07)  Organism: Blood Culture PCR (08-11-22 @ 22:07)      -  Methicillin SENSITIVE Staphylococcus aureus (MSSA): Detec Any isolate of Staphylococcus aureus from a blood culture is NOT considered a contaminant.      Method Type: PCR        RADIOLOGY & ADDITIONAL TESTS:        Medications:  acetaminophen     Tablet .. 650 milliGRAM(s) Oral every 6 hours PRN  ALPRAZolam 2 milliGRAM(s) Oral three times a day  aluminum hydroxide/magnesium hydroxide/simethicone Suspension 30 milliLiter(s) Oral every 4 hours PRN  buPROPion XL (24-Hour) . 300 milliGRAM(s) Oral daily  clindamycin IVPB      clindamycin IVPB 900 milliGRAM(s) IV Intermittent every 8 hours  enoxaparin Injectable 40 milliGRAM(s) SubCutaneous every 24 hours  folic acid 1 milliGRAM(s) Oral daily  gabapentin 300 milliGRAM(s) Oral three times a day  melatonin 3 milliGRAM(s) Oral at bedtime PRN  methadone    Tablet 135 milliGRAM(s) Oral daily  mirtazapine 30 milliGRAM(s) Oral at bedtime  multivitamin 1 Tablet(s) Oral daily  nafcillin  IVPB      nafcillin  IVPB 2 Gram(s) IV Intermittent every 4 hours  ondansetron Injectable 4 milliGRAM(s) IV Push every 8 hours PRN  QUEtiapine 200 milliGRAM(s) Oral three times a day

## 2022-08-12 NOTE — PROGRESS NOTE ADULT - SUBJECTIVE AND OBJECTIVE BOX
BALDEMARMIKAEL  52y, Male    All available historical data reviewed    OVERNIGHT EVENTS:  no fevers  weakness LEs and pain in calves  No urine/stool incontinence  No sensory deficits    ROS:  General: Denies rigors, nightsweats  HEENT: Denies headache, rhinorrhea, sore throat, eye pain  CV: Denies CP, palpitations  PULM: Denies wheezing, hemoptysis  GI: Denies hematemesis, hematochezia, melena  : Denies discharge, hematuria  MSK: myalgias  SKIN: Denies rash, lesions  NEURO: LEs weakness  PSYCH: Denies depression, anxiety    VITALS:  T(F): 96.5, Max: 98.9 (22 @ 21:16)  HR: 72  BP: 114/55  RR: 18Vital Signs Last 24 Hrs  T(C): 35.8 (12 Aug 2022 09:18), Max: 37.2 (11 Aug 2022 21:16)  T(F): 96.5 (12 Aug 2022 09:18), Max: 98.9 (11 Aug 2022 21:16)  HR: 72 (12 Aug 2022 09:18) (71 - 81)  BP: 114/55 (12 Aug 2022 09:18) (94/52 - 123/72)  BP(mean): --  RR: 18 (12 Aug 2022 09:18) (18 - 18)  SpO2: 93% (12 Aug 2022 09:18) (91% - 97%)        TESTS & MEASUREMENTS:                        10.6   14.61 )-----------( 240      ( 11 Aug 2022 06:38 )             32.1     12    138  |  98  |  18  ----------------------------<  143<H>  4.1   |  27  |  0.9    Ca    9.1      12 Aug 2022 06:35  Mg     2.0     12    TPro  6.8  /  Alb  3.3<L>  /  TBili  0.2  /  DBili  x   /  AST  75<H>  /  ALT  72<H>  /  AlkPhos  106  08-12    LIVER FUNCTIONS - ( 12 Aug 2022 06:35 )  Alb: 3.3 g/dL / Pro: 6.8 g/dL / ALK PHOS: 106 U/L / ALT: 72 U/L / AST: 75 U/L / GGT: x             Culture - Blood (collected 08-10-22 @ 15:15)  Source: .Blood Blood  Preliminary Report (22 @ 23:01):    No growth to date.    Culture - Blood (collected 08-10-22 @ 15:15)  Source: .Blood Blood  Gram Stain (22 @ 15:33):    Growth in aerobic bottle: Gram Positive Cocci in Clusters  Preliminary Report (22 @ 15:33):    Growth in aerobic bottle: Gram Positive Cocci in Clusters    ***Blood Panel PCR results on this specimen are available    approximately 3 hours after the Gram stain result.***    Gram stain, PCR, and/or culture results may not always    correspond due to difference in methodologies.    ************************************************************    This PCR assay was performed by multiplex PCR. This    Assay tests for 66 bacterial and resistance gene targets.    Please refer to the [facility] test directory    at [email or web address] for details.  Organism: Blood Culture PCR (22 @ 22:07)  Organism: Blood Culture PCR (22 @ 22:07)      -  Methicillin SENSITIVE Staphylococcus aureus (MSSA): Detec Any isolate of Staphylococcus aureus from a blood culture is NOT considered a contaminant.      Method Type: PCR      Urinalysis Basic - ( 12 Aug 2022 05:00 )    Color: Yellow / Appearance: Clear / S.029 / pH: x  Gluc: x / Ketone: Negative  / Bili: Negative / Urobili: <2 mg/dL   Blood: x / Protein: 30 mg/dL / Nitrite: Negative   Leuk Esterase: Negative / RBC: 2 /HPF / WBC 2 /HPF   Sq Epi: x / Non Sq Epi: 1 /HPF / Bacteria: Negative          RADIOLOGY & ADDITIONAL TESTS:  Personal review of radiological diagnostics performed  Echo and EKG results noted when applicable.     MEDICATIONS:  acetaminophen     Tablet .. 650 milliGRAM(s) Oral every 6 hours PRN  ALPRAZolam 2 milliGRAM(s) Oral three times a day  aluminum hydroxide/magnesium hydroxide/simethicone Suspension 30 milliLiter(s) Oral every 4 hours PRN  buPROPion XL (24-Hour) . 300 milliGRAM(s) Oral daily  ceFAZolin   IVPB 2000 milliGRAM(s) IV Intermittent every 8 hours  enoxaparin Injectable 40 milliGRAM(s) SubCutaneous every 24 hours  folic acid 1 milliGRAM(s) Oral daily  gabapentin 300 milliGRAM(s) Oral three times a day  melatonin 3 milliGRAM(s) Oral at bedtime PRN  methadone    Tablet 135 milliGRAM(s) Oral daily  mirtazapine 30 milliGRAM(s) Oral at bedtime  multivitamin 1 Tablet(s) Oral daily  ondansetron Injectable 4 milliGRAM(s) IV Push every 8 hours PRN  QUEtiapine 200 milliGRAM(s) Oral three times a day      ANTIBIOTICS:  ceFAZolin   IVPB 2000 milliGRAM(s) IV Intermittent every 8 hours

## 2022-08-12 NOTE — CHART NOTE - NSCHARTNOTEFT_GEN_A_CORE
POST OPERATIVE PROCEDURAL DOCUMENTATION  PRE-OP DIAGNOSIS:  CVA      POST-OP DIAGNOSIS:  CVA    PROCEDURE: Transesophageal echocardiogram    Primary Physician:  Dr. Velázquez  Assistant: Dr. Blackwell    ANESTHESIA TYPE  [  ] General Anesthesia  [ x ] Conscious Sedation  [  ] Local/Regional    CONDITION  [  ] Critical  [  ] Serious  [  ] Fair  [ x ] Good    SPECIMENS REMOVED (IF APPLICABLE): N/A    IMPLANTS (IF APPLICABLE): None    ESTIMATED BLOOD LOSS: None    COMPLICATIONS: None      FINDINGS:    After risks and benefits of procedures were explained, informed consent was obtained and placed in chart. Refer to Anesthesia note for sedation details.  The PAM probe was passed into the esophagus without difficulty.  Transesophageal and transgastric images were obtained.  The PAM probe was removed without difficulty and examined.  There was no evidence for bleeding.  The patient tolerated the procedure well without any immediate PAM-related complications.      Preliminary Findings:  LA:     OJHN: Left atrial appendage was clear of clot and smoke.  LV: LVEF mildly reduced 45%  MV: mild MR, no evidence of MS.   AV: mild  AI, no evidence of AS.   RA:  TV: mild TR.   PV: not well visualized  IAS: no PFO. No R-> L shunt with bubbles.   Aorta:  simple atheroma of aortic arch / desc aorta        DIAGNOSIS/IMPRESSION:  No intracardiac source of CVA found.     PLAN OF CARE:  return to floor  f/u with neurology team.

## 2022-08-12 NOTE — CHART NOTE - NSCHARTNOTEFT_GEN_A_CORE
PAM rescheduled for Monday due to pt being in MRI for most of afternoon / MRI still ongoing until end of day today.     Please repeat covid swab today or tomorrow.   NPO after mn sunday.     Will add pt on schedule for Monday.    Plan above d/w primary team.

## 2022-08-12 NOTE — PROGRESS NOTE ADULT - ASSESSMENT
· Assessment	  51 yo M w/ pmhx of IVDU (heroin), vertebral osteomyelitis, and MSSA bacteremia presents for worsening back pain. Back pain was increasing over the past couple of weeks and worsening today to the point he was having difficulty ambulating . Complaining of right sided eye swelling/redness since Tuesday 8/9 . He slept on the hard floor ( wanted to stretch himself ) and woke up in the am with redness/swelling. Patient denies any visual changes or eye pain. Patient denies any fever, chills, headache, dizziness. Patient denies chest pain, palpitation, SOB. Patient denies abdominal pain, n/v/d/c.   Patient was recently admitted 03/12-04/06 for vertebral OM/facetitis. PAM was negative and ID recommended cefazolin. Patient had picc line placed for 6wks of antibiotics. Patient states he finished his antibiotics at [facility] but did not follow up with Dr Giraldo. Patient states he is currently not using heroin. He last used 10 months ago.    IMPRESSION;  NICOLE bacteremia  8/10 BCx NICOLE  R/o progression of multifocal abscesses L5-S1 with possible cord compression secondary to an epidural abscess    Hematoma right upper eyelid > no abscess/cellulitis/septic emboli  WBC 14.6    RECOMMENDATIONS;   Stat MRI of thoraco-LS   Stat NS evaluation  This was discussed with Medicine  PAM  Daily BCx till repeatedly negative   Clindamycin 900 mg iv q8h  Nafcillin 2 gm iv q4f  Off loading to prevent pressure sores and preventive measures to avoid aspiration

## 2022-08-12 NOTE — PROVIDER CONTACT NOTE (OTHER) - SITUATION
pt is due for nafcillin at 1400, pt is off unit in MRI should RN hold dose or give when pt returns and adjust schedule as appropriate? pt is due for nafcillin and Clindamycin at 1400, pt is off unit in MRI should RN hold doses or give when pt returns and adjust schedule as appropriate?

## 2022-08-12 NOTE — PROVIDER CONTACT NOTE (OTHER) - ACTION/TREATMENT ORDERED:
Provider stated to give 2pm dose of nafcillin when pt returns to floor then reschedule medication as appropriate Provider stated to give 2pm dose of nafcillin and clinda  when pt returns to floor then reschedule medication as appropriate

## 2022-08-12 NOTE — PROGRESS NOTE ADULT - ASSESSMENT
53 yo M w/ pmhx of IVDU (heroin), vertebral osteomyelitis, and MSSA bacteremia presents for worsening back pain. Patient was found febrile in the ED with elevated WBC count, CT orbit showing Right periorbital cellulitis.     #Recurrence of vertebral OM   #Recent history & post treatment for MSSA bacteremia  #Right periorbital cellulitis  #Sepsis present on admission: leukocytosis, febrile  - CT Orbit w/ IV Cont 8/10: shows Right periorbital (pre-septal) soft tissue swelling consistent with cellulitis. No evidence of abscess. No evidence of post-septal inflammation.  -h/o IR Drainage of Abscess/Biopsy of suspected discitis done 3/16 -fluid  growing NICOLE  -, .8   - daily blood culture until negative  - F/u MRI of T & L spine +/- gado - pending (spoke with radiology, patient going down now)  - ID consult appreciated: clindamycin 900mg iv q8h, nafcillin iv 2g q4h  -PAM today    #Normocytic Anemia  - Monitor H+H  -ferritin, b12, folate wnl    #Hx of IVD  #Opioid abuse on Methadone  - Continue methadone 135, and gabapentin    #Anxiety/Depression  - c/w mirtazapine + quetiapine  -c/w xanax     #Active nicotine dependence  - c/w nicotine patch  - c/w buproprion    #Morbid obesity  - diet and lifestyle modification     #Hypomagnesemia  -repleted    #Suspected folic acid deficiency  -continue supplement    #DVT PPx- LVX 40mg sq qhs  #GI PPx- None  #Diet- Regular  #CHG  #Activity- AAT  #Dispo- acute  #Code- FULL    #Progress Note Handoff  Pending (specify):  Consults_________, Tests___MRI, TEE_____, Test Results_______, Other_________  Family discussion: n/a  Disposition: Home___/SNF___/Other________/Unknown at this time______x__

## 2022-08-13 LAB
-  AMPICILLIN/SULBACTAM: SIGNIFICANT CHANGE UP
-  CEFAZOLIN: SIGNIFICANT CHANGE UP
-  CLINDAMYCIN: SIGNIFICANT CHANGE UP
-  ERYTHROMYCIN: SIGNIFICANT CHANGE UP
-  GENTAMICIN: SIGNIFICANT CHANGE UP
-  OXACILLIN: SIGNIFICANT CHANGE UP
-  PENICILLIN: SIGNIFICANT CHANGE UP
-  RIFAMPIN: SIGNIFICANT CHANGE UP
-  TETRACYCLINE: SIGNIFICANT CHANGE UP
-  TRIMETHOPRIM/SULFAMETHOXAZOLE: SIGNIFICANT CHANGE UP
-  VANCOMYCIN: SIGNIFICANT CHANGE UP
ALBUMIN SERPL ELPH-MCNC: 3.1 G/DL — LOW (ref 3.5–5.2)
ALP SERPL-CCNC: 194 U/L — HIGH (ref 30–115)
ALT FLD-CCNC: 143 U/L — HIGH (ref 0–41)
ANION GAP SERPL CALC-SCNC: 12 MMOL/L — SIGNIFICANT CHANGE UP (ref 7–14)
AST SERPL-CCNC: 98 U/L — HIGH (ref 0–41)
BILIRUB SERPL-MCNC: 0.9 MG/DL — SIGNIFICANT CHANGE UP (ref 0.2–1.2)
BUN SERPL-MCNC: 13 MG/DL — SIGNIFICANT CHANGE UP (ref 10–20)
CALCIUM SERPL-MCNC: 9 MG/DL — SIGNIFICANT CHANGE UP (ref 8.5–10.1)
CHLORIDE SERPL-SCNC: 92 MMOL/L — LOW (ref 98–110)
CO2 SERPL-SCNC: 28 MMOL/L — SIGNIFICANT CHANGE UP (ref 17–32)
CREAT SERPL-MCNC: 0.8 MG/DL — SIGNIFICANT CHANGE UP (ref 0.7–1.5)
CULTURE RESULTS: SIGNIFICANT CHANGE UP
EGFR: 106 ML/MIN/1.73M2 — SIGNIFICANT CHANGE UP
GLUCOSE SERPL-MCNC: 249 MG/DL — HIGH (ref 70–99)
HCT VFR BLD CALC: 36.4 % — LOW (ref 42–52)
HGB BLD-MCNC: 12 G/DL — LOW (ref 14–18)
MAGNESIUM SERPL-MCNC: 1.8 MG/DL — SIGNIFICANT CHANGE UP (ref 1.8–2.4)
MCHC RBC-ENTMCNC: 28.8 PG — SIGNIFICANT CHANGE UP (ref 27–31)
MCHC RBC-ENTMCNC: 33 G/DL — SIGNIFICANT CHANGE UP (ref 32–37)
MCV RBC AUTO: 87.5 FL — SIGNIFICANT CHANGE UP (ref 80–94)
METHOD TYPE: SIGNIFICANT CHANGE UP
NRBC # BLD: 0 /100 WBCS — SIGNIFICANT CHANGE UP (ref 0–0)
ORGANISM # SPEC MICROSCOPIC CNT: SIGNIFICANT CHANGE UP
PLATELET # BLD AUTO: 311 K/UL — SIGNIFICANT CHANGE UP (ref 130–400)
POTASSIUM SERPL-MCNC: 4.5 MMOL/L — SIGNIFICANT CHANGE UP (ref 3.5–5)
POTASSIUM SERPL-SCNC: 4.5 MMOL/L — SIGNIFICANT CHANGE UP (ref 3.5–5)
PROT SERPL-MCNC: 7.2 G/DL — SIGNIFICANT CHANGE UP (ref 6–8)
RBC # BLD: 4.16 M/UL — LOW (ref 4.7–6.1)
RBC # FLD: 15.3 % — HIGH (ref 11.5–14.5)
SARS-COV-2 RNA SPEC QL NAA+PROBE: SIGNIFICANT CHANGE UP
SODIUM SERPL-SCNC: 132 MMOL/L — LOW (ref 135–146)
SPECIMEN SOURCE: SIGNIFICANT CHANGE UP
WBC # BLD: 12.55 K/UL — HIGH (ref 4.8–10.8)
WBC # FLD AUTO: 12.55 K/UL — HIGH (ref 4.8–10.8)

## 2022-08-13 PROCEDURE — 76705 ECHO EXAM OF ABDOMEN: CPT | Mod: 26

## 2022-08-13 PROCEDURE — 72131 CT LUMBAR SPINE W/O DYE: CPT | Mod: 26

## 2022-08-13 PROCEDURE — 99233 SBSQ HOSP IP/OBS HIGH 50: CPT

## 2022-08-13 RX ORDER — DEXAMETHASONE 0.5 MG/5ML
4 ELIXIR ORAL EVERY 6 HOURS
Refills: 0 | Status: DISCONTINUED | OUTPATIENT
Start: 2022-08-13 | End: 2022-08-29

## 2022-08-13 RX ORDER — DEXAMETHASONE 0.5 MG/5ML
10 ELIXIR ORAL ONCE
Refills: 0 | Status: COMPLETED | OUTPATIENT
Start: 2022-08-13 | End: 2022-08-13

## 2022-08-13 RX ADMIN — CYCLOBENZAPRINE HYDROCHLORIDE 5 MILLIGRAM(S): 10 TABLET, FILM COATED ORAL at 21:13

## 2022-08-13 RX ADMIN — BUPROPION HYDROCHLORIDE 300 MILLIGRAM(S): 150 TABLET, EXTENDED RELEASE ORAL at 11:39

## 2022-08-13 RX ADMIN — Medication 2 MILLIGRAM(S): at 15:01

## 2022-08-13 RX ADMIN — CYCLOBENZAPRINE HYDROCHLORIDE 5 MILLIGRAM(S): 10 TABLET, FILM COATED ORAL at 05:45

## 2022-08-13 RX ADMIN — Medication 2 MILLIGRAM(S): at 05:46

## 2022-08-13 RX ADMIN — Medication 1 MILLIGRAM(S): at 11:39

## 2022-08-13 RX ADMIN — GABAPENTIN 300 MILLIGRAM(S): 400 CAPSULE ORAL at 15:00

## 2022-08-13 RX ADMIN — QUETIAPINE FUMARATE 200 MILLIGRAM(S): 200 TABLET, FILM COATED ORAL at 05:45

## 2022-08-13 RX ADMIN — Medication 100 MILLIGRAM(S): at 10:33

## 2022-08-13 RX ADMIN — GABAPENTIN 300 MILLIGRAM(S): 400 CAPSULE ORAL at 05:44

## 2022-08-13 RX ADMIN — NAFCILLIN 200 GRAM(S): 10 INJECTION, POWDER, FOR SOLUTION INTRAVENOUS at 21:23

## 2022-08-13 RX ADMIN — MIRTAZAPINE 30 MILLIGRAM(S): 45 TABLET, ORALLY DISINTEGRATING ORAL at 21:13

## 2022-08-13 RX ADMIN — GABAPENTIN 300 MILLIGRAM(S): 400 CAPSULE ORAL at 21:13

## 2022-08-13 RX ADMIN — Medication 100 MILLIGRAM(S): at 17:03

## 2022-08-13 RX ADMIN — NAFCILLIN 200 GRAM(S): 10 INJECTION, POWDER, FOR SOLUTION INTRAVENOUS at 17:03

## 2022-08-13 RX ADMIN — QUETIAPINE FUMARATE 200 MILLIGRAM(S): 200 TABLET, FILM COATED ORAL at 15:01

## 2022-08-13 RX ADMIN — METHADONE HYDROCHLORIDE 135 MILLIGRAM(S): 40 TABLET ORAL at 11:40

## 2022-08-13 RX ADMIN — Medication 1 TABLET(S): at 11:39

## 2022-08-13 RX ADMIN — QUETIAPINE FUMARATE 200 MILLIGRAM(S): 200 TABLET, FILM COATED ORAL at 21:13

## 2022-08-13 RX ADMIN — Medication 4 MILLIGRAM(S): at 11:39

## 2022-08-13 RX ADMIN — Medication 2 MILLIGRAM(S): at 21:15

## 2022-08-13 RX ADMIN — Medication 4 MILLIGRAM(S): at 17:04

## 2022-08-13 RX ADMIN — Medication 10 MILLIGRAM(S): at 09:00

## 2022-08-13 RX ADMIN — NAFCILLIN 200 GRAM(S): 10 INJECTION, POWDER, FOR SOLUTION INTRAVENOUS at 05:44

## 2022-08-13 RX ADMIN — Medication 100 MILLIGRAM(S): at 01:26

## 2022-08-13 RX ADMIN — ENOXAPARIN SODIUM 40 MILLIGRAM(S): 100 INJECTION SUBCUTANEOUS at 21:14

## 2022-08-13 RX ADMIN — Medication 4 MILLIGRAM(S): at 23:52

## 2022-08-13 RX ADMIN — NAFCILLIN 200 GRAM(S): 10 INJECTION, POWDER, FOR SOLUTION INTRAVENOUS at 10:23

## 2022-08-13 NOTE — PROGRESS NOTE ADULT - SUBJECTIVE AND OBJECTIVE BOX
MIKAEL MCDERMOTT  [facility] [place] [address] [facility]      Patient is a 52y old  Male who presents with a chief complaint of Back pain (12 Aug 2022 13:56)        Interval events:  Patient seen and examined at bedside. Patient c/o crampy pain in calves still. Denies any loss of sensation, incontinence, weakness.     -PMHx: IV drug abuse    Vertebral osteomyelitis    MSSA bacteremia      -PSHx:No significant past surgical history            REVIEW OF SYSTEMS:  CONSTITUTIONAL: No fever, weight loss, or fatigue  RESPIRATORY: No cough, wheezing, chills or hemoptysis; No shortness of breath  CARDIOVASCULAR: No chest pain, palpitations, dizziness, or leg swelling  GASTROINTESTINAL: No abdominal or epigastric pain. No nausea, vomiting, or hematemesis; No diarrhea or constipation. No melena or hematochezia.  NEUROLOGICAL: No headaches  LYMPH NODES: No enlarged glands  MUSCULOSKELETAL: +as above       T(C): , Max: 37.7 (08-12-22 @ 20:53)  HR: 88 (08-13-22 @ 05:00)  BP: 135/70 (08-13-22 @ 05:00)  RR: 18 (08-13-22 @ 05:00)  SpO2: 95% (08-13-22 @ 05:00)  CAPILLARY BLOOD GLUCOSE        PHYSICAL EXAM:  GEN: well-groomed; no distress, lethargic  HEENT: PERRL; EOMI; conjunctiva clear; OD with swelling/erythema around orbit (improved), not painful to move eye   CHEST: clear to auscultation bilaterally; no wheezes; no rales; no rhonchi  CARDS: 4/6 systolic murmur  ABD: soft; nontender; nondistended; normal active bowel sounds, no hernia  NEURO: cranial nerves II-XII intact; superficial reflexes intact, mild motor weakness LEs; no sensory deficits  LYMPH: No lymphadedenopathy  MSK: no joint swelling; no joint erythema; no joint warmth; b/l calf tenderness (no erythema)         LABS:              Microbiology:    Culture - Blood (collected 08-11-22 @ 21:30)  Source: .Blood Blood  Preliminary Report (08-13-22 @ 04:00):    No growth to date.    Culture - Blood (collected 08-11-22 @ 06:38)  Source: .Blood Blood  Gram Stain (08-12-22 @ 10:55):    Growth in aerobic bottle: Gram Positive Cocci in Clusters  Preliminary Report (08-13-22 @ 11:39):    Growth in aerobic bottle: Staphylococcus aureus    See previous culture [id number]    Culture - Blood (collected 08-10-22 @ 15:15)  Source: .Blood Blood  Preliminary Report (08-11-22 @ 23:01):    No growth to date.    Culture - Blood (collected 08-10-22 @ 15:15)  Source: .Blood Blood  Gram Stain (08-11-22 @ 15:33):    Growth in aerobic bottle: Gram Positive Cocci in Clusters  Preliminary Report (08-11-22 @ 15:33):    Growth in aerobic bottle: Gram Positive Cocci in Clusters    ***Blood Panel PCR results on this specimen are available    approximately 3 hours after the Gram stain result.***    Gram stain, PCR, and/or culture results may not always    correspond due to difference in methodologies.    ************************************************************    This PCR assay was performed by multiplex PCR. This    Assay tests for 66 bacterial and resistance gene targets.    Please refer to the [facility] test directory    at [email or web address] for details.  Organism: Blood Culture PCR (08-11-22 @ 22:07)  Organism: Blood Culture PCR (08-11-22 @ 22:07)      -  Methicillin SENSITIVE Staphylococcus aureus (MSSA): Detec Any isolate of Staphylococcus aureus from a blood culture is NOT considered a contaminant.      Method Type: PCR        RADIOLOGY & ADDITIONAL TESTS:  < from: MR Thoracic Spine w/wo IV Cont (08.12.22 @ 16:30) >    IMPRESSION:    THORACIC SPINE:  1. Focal edema and enhancement in the anterior endplates of T11-12,   likely degenerative. Focal edema involving the left T10-T11 facet joint,   also likely degenerative.    LUMBAR SPINE:  Since prior March 14, 2022;  1. Interval progression of discitis osteomyelitis at L4-5 with increased   destruction of the endplate cortices and intervening disc. Associated   mild vertebral height losses of L4 on L5.    2. Significantly increased epidural phlegmon extending from the right   dorsal epidural space from L2-3 down to sacral level with maximal mass   effect at the right L4-5 epidural space resulting in   impingement/compression of the thecal sac and nerve roots.    3. Slightly increased erosion affecting bilateral L4-5facet joints (left   more than right), and bilateral L5-S1 facet joints. Increased   enhancements involving bilateral L4-L5 and L5-S1 neural foraminal spaces.    2. Increased paravertebral phlegmon surrounding L4-5 with slightly   increased size of the left retroperitoneal abscess. Slightly smaller   right psoas abscess.    < end of copied text >        Medications:  acetaminophen     Tablet .. 650 milliGRAM(s) Oral every 6 hours PRN  ALPRAZolam 2 milliGRAM(s) Oral three times a day  aluminum hydroxide/magnesium hydroxide/simethicone Suspension 30 milliLiter(s) Oral every 4 hours PRN  buPROPion XL (24-Hour) . 300 milliGRAM(s) Oral daily  clindamycin IVPB      clindamycin IVPB 900 milliGRAM(s) IV Intermittent every 8 hours  cyclobenzaprine 5 milliGRAM(s) Oral three times a day PRN  dexAMETHasone     Tablet 4 milliGRAM(s) Oral every 6 hours  enoxaparin Injectable 40 milliGRAM(s) SubCutaneous every 24 hours  folic acid 1 milliGRAM(s) Oral daily  gabapentin 300 milliGRAM(s) Oral three times a day  melatonin 3 milliGRAM(s) Oral at bedtime PRN  methadone    Tablet 135 milliGRAM(s) Oral daily  mirtazapine 30 milliGRAM(s) Oral at bedtime  multivitamin 1 Tablet(s) Oral daily  nafcillin  IVPB      nafcillin  IVPB 2 Gram(s) IV Intermittent every 4 hours  ondansetron Injectable 4 milliGRAM(s) IV Push every 8 hours PRN  QUEtiapine 200 milliGRAM(s) Oral three times a day

## 2022-08-13 NOTE — PROGRESS NOTE ADULT - ASSESSMENT
51 yo M w/ pmhx of IVDU (heroin), vertebral osteomyelitis, and MSSA bacteremia presents for worsening back pain. Patient was found febrile in the ED with elevated WBC count, CT orbit showing Right periorbital cellulitis.     #Recurrence of vertebral OM   #Recent history & post treatment for MSSA bacteremia  #Right periorbital cellulitis  #Sepsis present on admission: leukocytosis, febrile  - CT Orbit w/ IV Cont 8/10: shows Right periorbital (pre-septal) soft tissue swelling consistent with cellulitis. No evidence of abscess. No evidence of post-septal inflammation.  -h/o IR Drainage of Abscess/Biopsy of suspected discitis done 3/16 -fluid  growing NICOLE  -, .8   - daily blood culture until negative (growing MSSA still)   -MR lumbar/t spine: 1. Interval progression of discitis osteomyelitis at L4-5 with increased   destruction of the endplate cortices and intervening disc. Associated   mild vertebral height losses of L4 on L5.    2. Significantly increased epidural phlegmon extending from the right   dorsal epidural space from L2-3 down to sacral level with maximal mass   effect at the right L4-5 epidural space resulting in   impingement/compression of the thecal sac and nerve roots.    3. Slightly increased erosion affecting bilateral L4-5 facet joints (left   more than right), and bilateral L5-S1 facet joints. Increased   enhancements involving bilateral L4-L5 and L5-S1 neural foraminal spaces.    2. Increased paravertebral phlegmon surrounding L4-5 with slightly   increased size of the left retroperitoneal abscess. Slightly smaller   right psoas abscess.    - ID consult appreciated: clindamycin 900mg iv q8h, nafcillin iv 2g q4h  -PAM rescheduled for Monday  -spoke with Nsx: STAT dose of IV decadron 10mg, and po decadron 4mg q6h; requesting CT lumbar NC to assess if need to do fusion vs decompression (likely Tuesday)     #Normocytic Anemia  - Monitor H+H  -ferritin, b12, folate wnl    #Hx of IVD  #Opioid abuse on Methadone  - Continue methadone 135, and gabapentin    #Anxiety/Depression  - c/w mirtazapine + quetiapine  -c/w xanax     #Active nicotine dependence  - c/w nicotine patch  - c/w buproprion    #Morbid obesity  - diet and lifestyle modification     #Hypomagnesemia  -repleted    #Suspected folic acid deficiency  -continue supplement    #DVT PPx- LVX 40mg sq qhs  #GI PPx- None  #Diet- Regular  #CHG  #Activity- AAT  #Dispo- acute  #Code- FULL    #Progress Note Handoff  Pending (specify):  Consults_________, Tests___CT, TEE_____, Test Results_______, Other____NSx f/u_____  Family discussion: n/a  Disposition: Home___/SNF___/Other________/Unknown at this time______x__   51 yo M w/ pmhx of IVDU (heroin), vertebral osteomyelitis, and MSSA bacteremia presents for worsening back pain. Patient was found febrile in the ED with elevated WBC count, CT orbit showing Right periorbital cellulitis.     #Recurrence of vertebral OM   #Recent history & post treatment for MSSA bacteremia  #Right periorbital cellulitis  #Sepsis present on admission: leukocytosis, febrile  - CT Orbit w/ IV Cont 8/10: shows Right periorbital (pre-septal) soft tissue swelling consistent with cellulitis. No evidence of abscess. No evidence of post-septal inflammation.  -h/o IR Drainage of Abscess/Biopsy of suspected discitis done 3/16 -fluid  growing NICOLE  -, .8   - daily blood culture until negative (growing MSSA still)   -MR lumbar/t spine: 1. Interval progression of discitis osteomyelitis at L4-5 with increased   destruction of the endplate cortices and intervening disc. Associated   mild vertebral height losses of L4 on L5.    2. Significantly increased epidural phlegmon extending from the right   dorsal epidural space from L2-3 down to sacral level with maximal mass   effect at the right L4-5 epidural space resulting in   impingement/compression of the thecal sac and nerve roots.    3. Slightly increased erosion affecting bilateral L4-5 facet joints (left   more than right), and bilateral L5-S1 facet joints. Increased   enhancements involving bilateral L4-L5 and L5-S1 neural foraminal spaces.    2. Increased paravertebral phlegmon surrounding L4-5 with slightly   increased size of the left retroperitoneal abscess. Slightly smaller   right psoas abscess.    - ID consult appreciated: clindamycin 900mg iv q8h, nafcillin iv 2g q4h  -PAM rescheduled for Monday  -spoke with Nsx: STAT dose of IV decadron 10mg, and po decadron 4mg q6h; requesting CT lumbar NC to assess if need to do fusion vs decompression (likely Tuesday)     #Transaminitis  -get RUQ u/s   -trend LFT's   -no abdominal pain     #Normocytic Anemia  - Monitor H+H  -ferritin, b12, folate wnl    #Hx of IVD  #Opioid abuse on Methadone  - Continue methadone 135, and gabapentin    #Anxiety/Depression  - c/w mirtazapine + quetiapine  -c/w xanax     #Active nicotine dependence  - c/w nicotine patch  - c/w buproprion    #Morbid obesity  - diet and lifestyle modification     #Hypomagnesemia  -repleted    #Suspected folic acid deficiency  -continue supplement    #DVT PPx- LVX 40mg sq qhs  #GI PPx- None  #Diet- Regular  #CHG  #Activity- AAT  #Dispo- acute  #Code- FULL    #Progress Note Handoff  Pending (specify):  Consults_________, Tests___CT, TEE_____, Test Results_______, Other____NSx f/u_____  Family discussion: n/a  Disposition: Home___/SNF___/Other________/Unknown at this time______x__

## 2022-08-13 NOTE — PROVIDER CONTACT NOTE (OTHER) - SITUATION
Patient is very scarred and both hands are contracted. Multiple RNs have tried with no success. Nafcillin for 2pm was not administered due to no access

## 2022-08-13 NOTE — PROGRESS NOTE ADULT - SUBJECTIVE AND OBJECTIVE BOX
Hospital Course:   Patient with worsening osteo with destructive process on MRI    Vital Signs Last 24 Hrs  T(C): 36.9 (13 Aug 2022 05:00), Max: 37.7 (12 Aug 2022 20:53)  T(F): 98.5 (13 Aug 2022 05:00), Max: 99.9 (12 Aug 2022 20:53)  HR: 88 (13 Aug 2022 05:00) (88 - 98)  BP: 135/70 (13 Aug 2022 05:00) (128/78 - 150/72)  BP(mean): --  RR: 18 (13 Aug 2022 05:00) (18 - 18)  SpO2: 95% (13 Aug 2022 05:00) (93% - 95%)    Parameters below as of 12 Aug 2022 20:53  Patient On (Oxygen Delivery Method): room air    I&O's Detail    12 Aug 2022 07:01  -  13 Aug 2022 07:00  --------------------------------------------------------  IN:    IV PiggyBack: 300 mL    Oral Fluid: 300 mL  Total IN: 600 mL    OUT:    Voided (mL): 1450 mL  Total OUT: 1450 mL    Total NET: -850 mL      13 Aug 2022 07:01  -  13 Aug 2022 13:23  --------------------------------------------------------  IN:  Total IN: 0 mL    OUT:    Voided (mL): 200 mL  Total OUT: 200 mL    Total NET: -200 mL    I&O's Summary    12 Aug 2022 07:01  -  13 Aug 2022 07:00  --------------------------------------------------------  IN: 600 mL / OUT: 1450 mL / NET: -850 mL    13 Aug 2022 07:01  -  13 Aug 2022 13:23  --------------------------------------------------------  IN: 0 mL / OUT: 200 mL / NET: -200 mL    PHYSICAL EXAM:  Neurological: awake, alert, NAD, complaints of pain in low back, hips and legs, able to DF/PF and lifts both legs off of bed, bends knees, unable to walk.      LABS:                        12.0   12.55 )-----------( 311      ( 13 Aug 2022 11:30 )             36.4     08-12    138  |  98  |  18  ----------------------------<  143<H>  4.1   |  27  |  0.9    Ca    9.1      12 Aug 2022 06:35  Mg     1.9         TPro  6.8  /  Alb  3.3<L>  /  TBili  0.2  /  DBili  x   /  AST  75<H>  /  ALT  72<H>  /  AlkPhos  106  08-12      Urinalysis Basic - ( 12 Aug 2022 05:00 )    Color: Yellow / Appearance: Clear / S.029 / pH: x  Gluc: x / Ketone: Negative  / Bili: Negative / Urobili: <2 mg/dL   Blood: x / Protein: 30 mg/dL / Nitrite: Negative   Leuk Esterase: Negative / RBC: 2 /HPF / WBC 2 /HPF   Sq Epi: x / Non Sq Epi: 1 /HPF / Bacteria: Negative    CAPILLARY BLOOD GLUCOSE    Drug Levels: [] N/A    CSF Analysis: [] N/A      Allergies    No Known Allergies    Intolerances      MEDICATIONS:  Antibiotics:  clindamycin IVPB      clindamycin IVPB 900 milliGRAM(s) IV Intermittent every 8 hours  nafcillin  IVPB      nafcillin  IVPB 2 Gram(s) IV Intermittent every 4 hours    Neuro:  acetaminophen     Tablet .. 650 milliGRAM(s) Oral every 6 hours PRN  ALPRAZolam 2 milliGRAM(s) Oral three times a day  buPROPion XL (24-Hour) . 300 milliGRAM(s) Oral daily  cyclobenzaprine 5 milliGRAM(s) Oral three times a day PRN  gabapentin 300 milliGRAM(s) Oral three times a day  melatonin 3 milliGRAM(s) Oral at bedtime PRN  methadone    Tablet 135 milliGRAM(s) Oral daily  mirtazapine 30 milliGRAM(s) Oral at bedtime  ondansetron Injectable 4 milliGRAM(s) IV Push every 8 hours PRN  QUEtiapine 200 milliGRAM(s) Oral three times a day    Anticoagulation:  enoxaparin Injectable 40 milliGRAM(s) SubCutaneous every 24 hours    OTHER:  aluminum hydroxide/magnesium hydroxide/simethicone Suspension 30 milliLiter(s) Oral every 4 hours PRN  dexAMETHasone     Tablet 4 milliGRAM(s) Oral every 6 hours    IVF:  folic acid 1 milliGRAM(s) Oral daily  multivitamin 1 Tablet(s) Oral daily    CULTURES:  Culture Results:   No growth to date. ( @ 21:30)  Culture Results:   Growth in aerobic bottle: Staphylococcus aureus  See previous culture [id number] ( @ 06:38)    RADIOLOGY & ADDITIONAL TESTS: < from: MR Lumbar Spine w/wo IV Cont (22 @ 16:30) >    IMPRESSION:    THORACIC SPINE:  1. Focal edema and enhancement in the anterior endplates of T11-12,   likely degenerative. Focal edema involving the left T10-T11 facet joint,   also likely degenerative.    LUMBAR SPINE:  Since prior 2022;  1. Interval progression of discitis osteomyelitis at L4-5 with increased   destruction of the endplate cortices and intervening disc. Associated   mild vertebral height losses of L4 on L5.    2. Significantly increased epidural phlegmon extending from the right   dorsal epidural space from L2-3 down to sacral level with maximal mass   effect at the right L4-5 epidural space resulting in   impingement/compression of the thecal sac and nerve roots.    3. Slightly increased erosion affecting bilateral L4-5facet joints (left   more than right), and bilateral L5-S1 facet joints. Increased   enhancements involving bilateral L4-L5 and L5-S1 neural foraminal spaces.    2. Increased paravertebral phlegmon surrounding L4-5 with slightly   increased size of the left retroperitoneal abscess. Slightly smaller   right psoas abscess.    Preliminary findings discussed with Dr. Ruiz at 6:56 PM on 2022.  Full finalized report will follow.      < end of copied text >    ASSESSMENT:  52y Male osteodiscitis as above in MRI report    FEVER; BACK PAIN    FEVER; BACK PAIN ...    ^FEVER; BACK PAIN ...    No pertinent family history in first degree relatives    Handoff    MEWS Score    IV drug abuse    Vertebral osteomyelitis    MSSA bacteremia    Fever    No significant past surgical history    90+    Back pain    Preseptal cellulitis    [email or web address]        PLAN: recommend CT L spine to evaluate bone of lumbar spine, will need surgery possibly Tuesday.

## 2022-08-14 LAB
ALBUMIN SERPL ELPH-MCNC: 3.3 G/DL — LOW (ref 3.5–5.2)
ALP SERPL-CCNC: 177 U/L — HIGH (ref 30–115)
ALT FLD-CCNC: 127 U/L — HIGH (ref 0–41)
ANION GAP SERPL CALC-SCNC: 15 MMOL/L — HIGH (ref 7–14)
AST SERPL-CCNC: 72 U/L — HIGH (ref 0–41)
BASOPHILS # BLD AUTO: 0.01 K/UL — SIGNIFICANT CHANGE UP (ref 0–0.2)
BASOPHILS NFR BLD AUTO: 0.1 % — SIGNIFICANT CHANGE UP (ref 0–1)
BILIRUB SERPL-MCNC: 0.4 MG/DL — SIGNIFICANT CHANGE UP (ref 0.2–1.2)
BUN SERPL-MCNC: 20 MG/DL — SIGNIFICANT CHANGE UP (ref 10–20)
CALCIUM SERPL-MCNC: 9 MG/DL — SIGNIFICANT CHANGE UP (ref 8.5–10.1)
CHLORIDE SERPL-SCNC: 94 MMOL/L — LOW (ref 98–110)
CO2 SERPL-SCNC: 26 MMOL/L — SIGNIFICANT CHANGE UP (ref 17–32)
CREAT SERPL-MCNC: 0.8 MG/DL — SIGNIFICANT CHANGE UP (ref 0.7–1.5)
EGFR: 106 ML/MIN/1.73M2 — SIGNIFICANT CHANGE UP
EOSINOPHIL # BLD AUTO: 0.01 K/UL — SIGNIFICANT CHANGE UP (ref 0–0.7)
EOSINOPHIL NFR BLD AUTO: 0.1 % — SIGNIFICANT CHANGE UP (ref 0–8)
GLUCOSE SERPL-MCNC: 181 MG/DL — HIGH (ref 70–99)
HCT VFR BLD CALC: 38.1 % — LOW (ref 42–52)
HGB BLD-MCNC: 12.2 G/DL — LOW (ref 14–18)
IMM GRANULOCYTES NFR BLD AUTO: 1.1 % — HIGH (ref 0.1–0.3)
LYMPHOCYTES # BLD AUTO: 1.48 K/UL — SIGNIFICANT CHANGE UP (ref 1.2–3.4)
LYMPHOCYTES # BLD AUTO: 13.1 % — LOW (ref 20.5–51.1)
MAGNESIUM SERPL-MCNC: 2.2 MG/DL — SIGNIFICANT CHANGE UP (ref 1.8–2.4)
MCHC RBC-ENTMCNC: 28.4 PG — SIGNIFICANT CHANGE UP (ref 27–31)
MCHC RBC-ENTMCNC: 32 G/DL — SIGNIFICANT CHANGE UP (ref 32–37)
MCV RBC AUTO: 88.8 FL — SIGNIFICANT CHANGE UP (ref 80–94)
MONOCYTES # BLD AUTO: 1.01 K/UL — HIGH (ref 0.1–0.6)
MONOCYTES NFR BLD AUTO: 8.9 % — SIGNIFICANT CHANGE UP (ref 1.7–9.3)
NEUTROPHILS # BLD AUTO: 8.7 K/UL — HIGH (ref 1.4–6.5)
NEUTROPHILS NFR BLD AUTO: 76.7 % — HIGH (ref 42.2–75.2)
NRBC # BLD: 0 /100 WBCS — SIGNIFICANT CHANGE UP (ref 0–0)
PLATELET # BLD AUTO: 240 K/UL — SIGNIFICANT CHANGE UP (ref 130–400)
POTASSIUM SERPL-MCNC: 5 MMOL/L — SIGNIFICANT CHANGE UP (ref 3.5–5)
POTASSIUM SERPL-SCNC: 5 MMOL/L — SIGNIFICANT CHANGE UP (ref 3.5–5)
PROT SERPL-MCNC: 7.2 G/DL — SIGNIFICANT CHANGE UP (ref 6–8)
RBC # BLD: 4.29 M/UL — LOW (ref 4.7–6.1)
RBC # FLD: 15.4 % — HIGH (ref 11.5–14.5)
SARS-COV-2 RNA SPEC QL NAA+PROBE: SIGNIFICANT CHANGE UP
SODIUM SERPL-SCNC: 135 MMOL/L — SIGNIFICANT CHANGE UP (ref 135–146)
WBC # BLD: 11.33 K/UL — HIGH (ref 4.8–10.8)
WBC # FLD AUTO: 11.33 K/UL — HIGH (ref 4.8–10.8)

## 2022-08-14 PROCEDURE — 99233 SBSQ HOSP IP/OBS HIGH 50: CPT

## 2022-08-14 RX ORDER — NICOTINE POLACRILEX 2 MG
1 GUM BUCCAL ONCE
Refills: 0 | Status: COMPLETED | OUTPATIENT
Start: 2022-08-14 | End: 2022-08-14

## 2022-08-14 RX ADMIN — CYCLOBENZAPRINE HYDROCHLORIDE 5 MILLIGRAM(S): 10 TABLET, FILM COATED ORAL at 22:04

## 2022-08-14 RX ADMIN — GABAPENTIN 300 MILLIGRAM(S): 400 CAPSULE ORAL at 21:59

## 2022-08-14 RX ADMIN — Medication 1 TABLET(S): at 12:26

## 2022-08-14 RX ADMIN — Medication 4 MILLIGRAM(S): at 05:04

## 2022-08-14 RX ADMIN — NAFCILLIN 200 GRAM(S): 10 INJECTION, POWDER, FOR SOLUTION INTRAVENOUS at 01:30

## 2022-08-14 RX ADMIN — Medication 100 MILLIGRAM(S): at 09:05

## 2022-08-14 RX ADMIN — NAFCILLIN 200 GRAM(S): 10 INJECTION, POWDER, FOR SOLUTION INTRAVENOUS at 05:03

## 2022-08-14 RX ADMIN — CYCLOBENZAPRINE HYDROCHLORIDE 5 MILLIGRAM(S): 10 TABLET, FILM COATED ORAL at 05:07

## 2022-08-14 RX ADMIN — Medication 2 MILLIGRAM(S): at 13:17

## 2022-08-14 RX ADMIN — Medication 4 MILLIGRAM(S): at 23:23

## 2022-08-14 RX ADMIN — Medication 4 MILLIGRAM(S): at 18:08

## 2022-08-14 RX ADMIN — QUETIAPINE FUMARATE 200 MILLIGRAM(S): 200 TABLET, FILM COATED ORAL at 14:24

## 2022-08-14 RX ADMIN — Medication 2 MILLIGRAM(S): at 05:06

## 2022-08-14 RX ADMIN — ENOXAPARIN SODIUM 40 MILLIGRAM(S): 100 INJECTION SUBCUTANEOUS at 21:59

## 2022-08-14 RX ADMIN — NAFCILLIN 200 GRAM(S): 10 INJECTION, POWDER, FOR SOLUTION INTRAVENOUS at 23:25

## 2022-08-14 RX ADMIN — QUETIAPINE FUMARATE 200 MILLIGRAM(S): 200 TABLET, FILM COATED ORAL at 05:04

## 2022-08-14 RX ADMIN — NAFCILLIN 200 GRAM(S): 10 INJECTION, POWDER, FOR SOLUTION INTRAVENOUS at 14:25

## 2022-08-14 RX ADMIN — Medication 2 MILLIGRAM(S): at 21:59

## 2022-08-14 RX ADMIN — CYCLOBENZAPRINE HYDROCHLORIDE 5 MILLIGRAM(S): 10 TABLET, FILM COATED ORAL at 14:24

## 2022-08-14 RX ADMIN — Medication 1 MILLIGRAM(S): at 12:26

## 2022-08-14 RX ADMIN — Medication 4 MILLIGRAM(S): at 12:26

## 2022-08-14 RX ADMIN — MIRTAZAPINE 30 MILLIGRAM(S): 45 TABLET, ORALLY DISINTEGRATING ORAL at 21:59

## 2022-08-14 RX ADMIN — NAFCILLIN 200 GRAM(S): 10 INJECTION, POWDER, FOR SOLUTION INTRAVENOUS at 18:08

## 2022-08-14 RX ADMIN — QUETIAPINE FUMARATE 200 MILLIGRAM(S): 200 TABLET, FILM COATED ORAL at 21:59

## 2022-08-14 RX ADMIN — Medication 100 MILLIGRAM(S): at 01:09

## 2022-08-14 RX ADMIN — GABAPENTIN 300 MILLIGRAM(S): 400 CAPSULE ORAL at 05:04

## 2022-08-14 RX ADMIN — GABAPENTIN 300 MILLIGRAM(S): 400 CAPSULE ORAL at 14:25

## 2022-08-14 RX ADMIN — BUPROPION HYDROCHLORIDE 300 MILLIGRAM(S): 150 TABLET, EXTENDED RELEASE ORAL at 12:28

## 2022-08-14 RX ADMIN — Medication 100 MILLIGRAM(S): at 18:09

## 2022-08-14 RX ADMIN — NAFCILLIN 200 GRAM(S): 10 INJECTION, POWDER, FOR SOLUTION INTRAVENOUS at 09:05

## 2022-08-14 RX ADMIN — Medication 1 PATCH: at 14:29

## 2022-08-14 RX ADMIN — METHADONE HYDROCHLORIDE 135 MILLIGRAM(S): 40 TABLET ORAL at 12:25

## 2022-08-14 NOTE — PROGRESS NOTE ADULT - ASSESSMENT
53 yo M w/ pmhx of IVDU (heroin), vertebral osteomyelitis, and MSSA bacteremia presents for worsening back pain. Patient was found febrile in the ED with elevated WBC count, CT orbit showing Right periorbital cellulitis.     #Recurrence of vertebral OM   #Recent history & post treatment for MSSA bacteremia  #Right periorbital cellulitis  #Sepsis present on admission: leukocytosis, febrile  - CT Orbit w/ IV Cont 8/10: shows Right periorbital (pre-septal) soft tissue swelling consistent with cellulitis. No evidence of abscess. No evidence of post-septal inflammation.  -h/o IR Drainage of Abscess/Biopsy of suspected discitis done 3/16 -fluid  growing NICOLE  -, .8   - daily blood culture until negative (growing MSSA still)   -MR lumbar/t spine: 1. Interval progression of discitis osteomyelitis at L4-5 with increased   destruction of the endplate cortices and intervening disc. Associated   mild vertebral height losses of L4 on L5.    2. Significantly increased epidural phlegmon extending from the right   dorsal epidural space from L2-3 down to sacral level with maximal mass   effect at the right L4-5 epidural space resulting in   impingement/compression of the thecal sac and nerve roots.    3. Slightly increased erosion affecting bilateral L4-5 facet joints (left   more than right), and bilateral L5-S1 facet joints. Increased   enhancements involving bilateral L4-L5 and L5-S1 neural foraminal spaces.    2. Increased paravertebral phlegmon surrounding L4-5 with slightly   increased size of the left retroperitoneal abscess. Slightly smaller   right psoas abscess.    - ID consult appreciated: clindamycin 900mg iv q8h, nafcillin iv 2g q4h  -PAM rescheduled for TOMORROW; NPO AT MIDNIGHT, STAT COVID SWAB SENT  -spoke with Nsx: c/w po decadron 4mg q6h; requesting medical and ID clearance prior to surgery Tuesday     #Transaminitis- resolving   -U/S equivocal; no abdominal pain   -trend LFT's     #Normocytic Anemia- improved  - Monitor H+H  -ferritin, b12, folate wnl    #Hx of IVD  #Opioid abuse on Methadone  - Continue methadone 135, and gabapentin    #Anxiety/Depression  - c/w mirtazapine + quetiapine  -c/w xanax     #Active nicotine dependence  - c/w nicotine patch  - c/w buproprion    #Morbid obesity  - diet and lifestyle modification     #Hypomagnesemia  -repleted    #Suspected folic acid deficiency  -continue supplement    #DVT PPx- LVX 40mg sq qhs  #GI PPx- None  #Diet- Regular  #CHG  #Activity- AAT  #Dispo- pending PAM tomorrow, NSx Tuesday  #Code- FULL    #Progress Note Handoff  Pending (specify):  Consults_________, Tests___ TEE_____, Test Results_______, Other____NSx f/u_____  Family discussion: Plan of care discussed with patient, aware and agreeable   Disposition: Home___/SNF___/Other________/Unknown at this time______x__

## 2022-08-14 NOTE — CHART NOTE - NSCHARTNOTEFT_GEN_A_CORE
Patient noted to have worsening osteo of lumbar spine, CT scan shows laya destruction of lumbar vertebrae. Dr Abbott spoke to the patient and the patients mother over the phone as well. Patient informed that he will need surgery this week pending medical, ID clearance and echocardiogram which was ordered 8/12. Surgery planned for Tuesday afternoon. On call medical resident informed. appt 3/4/21, will wait for appt

## 2022-08-14 NOTE — PROGRESS NOTE ADULT - SUBJECTIVE AND OBJECTIVE BOX
MIKAEL MCDERMOTT  [facility] [place] [facility]      Patient is a 52y old  Male who presents with a chief complaint of Back pain (13 Aug 2022 13:19)        Interval events:  Patient seen and examined at bedside. No acute events overnight. Still having cramps in b/l LE. No sensory loss. Weakness improved today.     -PMHx: IV drug abuse    Vertebral osteomyelitis    MSSA bacteremia      -PSHx:No significant past surgical history            REVIEW OF SYSTEMS:  CONSTITUTIONAL: No fever, weight loss, or fatigue  RESPIRATORY: No cough, wheezing, chills or hemoptysis; No shortness of breath  CARDIOVASCULAR: No chest pain, palpitations, dizziness, or leg swelling  GASTROINTESTINAL: No abdominal or epigastric pain. No nausea, vomiting, or hematemesis; No diarrhea or constipation. No melena or hematochezia.  NEUROLOGICAL: No headaches  LYMPH NODES: No enlarged glands  MUSCULOSKELETAL: as above       T(C): , Max: 37.1 (08-14-22 @ 12:49)  HR: 84 (08-14-22 @ 12:49)  BP: 131/83 (08-14-22 @ 12:49)  RR: 18 (08-14-22 @ 12:49)  SpO2: 93% (08-14-22 @ 12:49)  CAPILLARY BLOOD GLUCOSE        PHYSICAL EXAM:  GEN: well-groomed; no distress, lethargic  HEENT: PERRL; EOMI; conjunctiva clear; OD with swelling/erythema around orbit (improved), not painful to move eye   CHEST: clear to auscultation bilaterally; no wheezes; no rales; no rhonchi  CARDS: 4/6 systolic murmur  ABD: soft; nontender; nondistended; normal active bowel sounds, no hernia  NEURO: cranial nerves II-XII intact; superficial reflexes intact, mild motor weakness LEs improved; no sensory deficits  LYMPH: No lymphadedenopathy  MSK: no joint swelling; no joint erythema; no joint warmth; b/l calf tenderness (no erythema)         LABS:           12.2  11.33  )-------(240          38.1  N=76.7  L=13.1  MCV=88.8    135|94<L>|20  ------------------<181<H>  5.0|26|0.8  eGFR:--  Ca:9.0            Microbiology:    Culture - Blood (collected 08-12-22 @ 11:34)  Source: .Blood None  Preliminary Report (08-13-22 @ 23:01):    No growth to date.    Culture - Urine (collected 08-12-22 @ 05:00)  Source: Clean Catch Clean Catch (Midstream)  Final Report (08-13-22 @ 14:11):    <10,000 CFU/mL Normal Urogenital Afua    Culture - Blood (collected 08-11-22 @ 21:30)  Source: .Blood Blood  Preliminary Report (08-13-22 @ 04:00):    No growth to date.    Culture - Blood (collected 08-11-22 @ 06:38)  Source: .Blood Blood  Gram Stain (08-12-22 @ 10:55):    Growth in aerobic bottle: Gram Positive Cocci in Clusters  Final Report (08-13-22 @ 14:04):    Growth in aerobic bottle: Staphylococcus aureus    See previous culture [id number]    Culture - Blood (collected 08-10-22 @ 15:15)  Source: .Blood Blood  Preliminary Report (08-11-22 @ 23:01):    No growth to date.    Culture - Blood (collected 08-10-22 @ 15:15)  Source: .Blood Blood  Gram Stain (08-11-22 @ 15:33):    Growth in aerobic bottle: Gram Positive Cocci in Clusters  Final Report (08-13-22 @ 14:03):    Growth in aerobic bottle: Staphylococcus aureus    ***Blood Panel PCR results on this specimen are available    approximately 3 hours after the Gram stain result.***    Gram stain, PCR, and/or culture results may not always    correspond due to difference in methodologies.    ************************************************************    This PCR assay was performed by multiplex PCR. This    Assay tests for 66 bacterial and resistance gene targets.    Please refer to the [facility] test directory    at [email or web address] for details.  Organism: Blood Culture PCR  Staphylococcus aureus (08-13-22 @ 14:03)  Organism: Staphylococcus aureus (08-13-22 @ 14:03)      -  Ampicillin/Sulbactam: S <=8/4      -  Cefazolin: S <=4      -  Clindamycin: S <=0.25      -  Erythromycin: S <=0.25      -  Gentamicin: S <=1 Should not be used as monotherapy      -  Oxacillin: S <=0.25 Oxacillin predicts susceptibility for dicloxacillin, methicillin, and nafcillin      -  Penicillin: R >8      -  Rifampin: S <=1 Should not be used as monotherapy      -  Tetra/Doxy: S <=1      -  Trimethoprim/Sulfamethoxazole: S <=0.5/9.5      -  Vancomycin: S 2      Method Type: WILLY  Organism: Blood Culture PCR (08-13-22 @ 14:03)      -  Methicillin SENSITIVE Staphylococcus aureus (MSSA): Detec Any isolate of Staphylococcus aureus from a blood culture is NOT considered a contaminant.      Method Type: PCR        RADIOLOGY & ADDITIONAL TESTS:  < from: US Abdomen Upper Quadrant Right (08.13.22 @ 16:30) >  IMPRESSION:    Contracted gallbladder limiting evaluation for cholelithiasis.    < end of copied text >    < from: CT Lumbar Spine No Cont (08.13.22 @ 13:59) >  IMPRESSION:    Interval progression of discitis-osteomyelitis at L4-5 with increased   endplate lytic changes since the prior lumbarCT from 3/12/2022. The L4-5   disc is not well visualized due to lucencies of the L4-5 endplates.   Relatively spared L4-5 and L5-S1 facet joints without significant lytic   changes.    Redemonstrated paravertebral phlegmon and left retropharyngeal abscess   with air-fluid level.    The epidural phlegmon and the spinal space are not well visualized due to   difference in modality. Please see separately dictated report of the most   recent lumbar MRI from 8/12/2022.    < end of copied text >        Medications:  acetaminophen     Tablet .. 650 milliGRAM(s) Oral every 6 hours PRN  ALPRAZolam 2 milliGRAM(s) Oral three times a day  aluminum hydroxide/magnesium hydroxide/simethicone Suspension 30 milliLiter(s) Oral every 4 hours PRN  buPROPion XL (24-Hour) . 300 milliGRAM(s) Oral daily  clindamycin IVPB      clindamycin IVPB 900 milliGRAM(s) IV Intermittent every 8 hours  cyclobenzaprine 5 milliGRAM(s) Oral three times a day PRN  dexAMETHasone     Tablet 4 milliGRAM(s) Oral every 6 hours  enoxaparin Injectable 40 milliGRAM(s) SubCutaneous every 24 hours  folic acid 1 milliGRAM(s) Oral daily  gabapentin 300 milliGRAM(s) Oral three times a day  melatonin 3 milliGRAM(s) Oral at bedtime PRN  methadone    Tablet 135 milliGRAM(s) Oral daily  mirtazapine 30 milliGRAM(s) Oral at bedtime  multivitamin 1 Tablet(s) Oral daily  nafcillin  IVPB      nafcillin  IVPB 2 Gram(s) IV Intermittent every 4 hours  nicotine -  14 mG/24Hr(s) Patch 1 Patch Transdermal once  ondansetron Injectable 4 milliGRAM(s) IV Push every 8 hours PRN  QUEtiapine 200 milliGRAM(s) Oral three times a day

## 2022-08-15 LAB
ALBUMIN SERPL ELPH-MCNC: 3.5 G/DL — SIGNIFICANT CHANGE UP (ref 3.5–5.2)
ALP SERPL-CCNC: 179 U/L — HIGH (ref 30–115)
ALT FLD-CCNC: 126 U/L — HIGH (ref 0–41)
ANION GAP SERPL CALC-SCNC: 13 MMOL/L — SIGNIFICANT CHANGE UP (ref 7–14)
APTT BLD: 28 SEC — SIGNIFICANT CHANGE UP (ref 27–39.2)
AST SERPL-CCNC: 43 U/L — HIGH (ref 0–41)
BASOPHILS # BLD AUTO: 0.04 K/UL — SIGNIFICANT CHANGE UP (ref 0–0.2)
BASOPHILS NFR BLD AUTO: 0.3 % — SIGNIFICANT CHANGE UP (ref 0–1)
BILIRUB SERPL-MCNC: 0.4 MG/DL — SIGNIFICANT CHANGE UP (ref 0.2–1.2)
BLD GP AB SCN SERPL QL: SIGNIFICANT CHANGE UP
BUN SERPL-MCNC: 24 MG/DL — HIGH (ref 10–20)
CALCIUM SERPL-MCNC: 9.2 MG/DL — SIGNIFICANT CHANGE UP (ref 8.5–10.1)
CHLORIDE SERPL-SCNC: 95 MMOL/L — LOW (ref 98–110)
CO2 SERPL-SCNC: 27 MMOL/L — SIGNIFICANT CHANGE UP (ref 17–32)
CREAT SERPL-MCNC: 0.8 MG/DL — SIGNIFICANT CHANGE UP (ref 0.7–1.5)
CULTURE RESULTS: SIGNIFICANT CHANGE UP
EGFR: 106 ML/MIN/1.73M2 — SIGNIFICANT CHANGE UP
EOSINOPHIL # BLD AUTO: 0.01 K/UL — SIGNIFICANT CHANGE UP (ref 0–0.7)
EOSINOPHIL NFR BLD AUTO: 0.1 % — SIGNIFICANT CHANGE UP (ref 0–8)
GLUCOSE SERPL-MCNC: 200 MG/DL — HIGH (ref 70–99)
HCT VFR BLD CALC: 38.4 % — LOW (ref 42–52)
HGB BLD-MCNC: 12.6 G/DL — LOW (ref 14–18)
IMM GRANULOCYTES NFR BLD AUTO: 1.6 % — HIGH (ref 0.1–0.3)
INR BLD: 1.25 RATIO — SIGNIFICANT CHANGE UP (ref 0.65–1.3)
LACTATE SERPL-SCNC: 1.4 MMOL/L — SIGNIFICANT CHANGE UP (ref 0.7–2)
LYMPHOCYTES # BLD AUTO: 1.09 K/UL — LOW (ref 1.2–3.4)
LYMPHOCYTES # BLD AUTO: 8.7 % — LOW (ref 20.5–51.1)
MAGNESIUM SERPL-MCNC: 2.4 MG/DL — SIGNIFICANT CHANGE UP (ref 1.8–2.4)
MCHC RBC-ENTMCNC: 29.1 PG — SIGNIFICANT CHANGE UP (ref 27–31)
MCHC RBC-ENTMCNC: 32.8 G/DL — SIGNIFICANT CHANGE UP (ref 32–37)
MCV RBC AUTO: 88.7 FL — SIGNIFICANT CHANGE UP (ref 80–94)
MONOCYTES # BLD AUTO: 0.95 K/UL — HIGH (ref 0.1–0.6)
MONOCYTES NFR BLD AUTO: 7.6 % — SIGNIFICANT CHANGE UP (ref 1.7–9.3)
NEUTROPHILS # BLD AUTO: 10.21 K/UL — HIGH (ref 1.4–6.5)
NEUTROPHILS NFR BLD AUTO: 81.7 % — HIGH (ref 42.2–75.2)
NRBC # BLD: 0 /100 WBCS — SIGNIFICANT CHANGE UP (ref 0–0)
PLATELET # BLD AUTO: 409 K/UL — HIGH (ref 130–400)
POTASSIUM SERPL-MCNC: 5 MMOL/L — SIGNIFICANT CHANGE UP (ref 3.5–5)
POTASSIUM SERPL-SCNC: 5 MMOL/L — SIGNIFICANT CHANGE UP (ref 3.5–5)
PROT SERPL-MCNC: 7.8 G/DL — SIGNIFICANT CHANGE UP (ref 6–8)
PROTHROM AB SERPL-ACNC: 14.4 SEC — HIGH (ref 9.95–12.87)
RBC # BLD: 4.33 M/UL — LOW (ref 4.7–6.1)
RBC # FLD: 15.5 % — HIGH (ref 11.5–14.5)
SODIUM SERPL-SCNC: 135 MMOL/L — SIGNIFICANT CHANGE UP (ref 135–146)
SPECIMEN SOURCE: SIGNIFICANT CHANGE UP
WBC # BLD: 12.5 K/UL — HIGH (ref 4.8–10.8)
WBC # FLD AUTO: 12.5 K/UL — HIGH (ref 4.8–10.8)

## 2022-08-15 PROCEDURE — 74018 RADEX ABDOMEN 1 VIEW: CPT | Mod: 26

## 2022-08-15 PROCEDURE — 99233 SBSQ HOSP IP/OBS HIGH 50: CPT

## 2022-08-15 RX ORDER — NICOTINE POLACRILEX 2 MG
1 GUM BUCCAL DAILY
Refills: 0 | Status: DISCONTINUED | OUTPATIENT
Start: 2022-08-15 | End: 2022-08-29

## 2022-08-15 RX ORDER — POLYETHYLENE GLYCOL 3350 17 G/17G
17 POWDER, FOR SOLUTION ORAL DAILY
Refills: 0 | Status: DISCONTINUED | OUTPATIENT
Start: 2022-08-15 | End: 2022-08-29

## 2022-08-15 RX ORDER — LACTULOSE 10 G/15ML
10 SOLUTION ORAL
Refills: 0 | Status: DISCONTINUED | OUTPATIENT
Start: 2022-08-15 | End: 2022-08-29

## 2022-08-15 RX ORDER — SENNA PLUS 8.6 MG/1
2 TABLET ORAL AT BEDTIME
Refills: 0 | Status: DISCONTINUED | OUTPATIENT
Start: 2022-08-15 | End: 2022-08-29

## 2022-08-15 RX ADMIN — Medication 1 PATCH: at 05:19

## 2022-08-15 RX ADMIN — Medication 2 MILLIGRAM(S): at 05:10

## 2022-08-15 RX ADMIN — GABAPENTIN 300 MILLIGRAM(S): 400 CAPSULE ORAL at 21:15

## 2022-08-15 RX ADMIN — Medication 4 MILLIGRAM(S): at 11:11

## 2022-08-15 RX ADMIN — Medication 4 MILLIGRAM(S): at 05:08

## 2022-08-15 RX ADMIN — Medication 1 PATCH: at 20:10

## 2022-08-15 RX ADMIN — METHADONE HYDROCHLORIDE 135 MILLIGRAM(S): 40 TABLET ORAL at 11:09

## 2022-08-15 RX ADMIN — ONDANSETRON 4 MILLIGRAM(S): 8 TABLET, FILM COATED ORAL at 04:18

## 2022-08-15 RX ADMIN — NAFCILLIN 200 GRAM(S): 10 INJECTION, POWDER, FOR SOLUTION INTRAVENOUS at 21:16

## 2022-08-15 RX ADMIN — Medication 1 MILLIGRAM(S): at 11:11

## 2022-08-15 RX ADMIN — ENOXAPARIN SODIUM 40 MILLIGRAM(S): 100 INJECTION SUBCUTANEOUS at 21:16

## 2022-08-15 RX ADMIN — GABAPENTIN 300 MILLIGRAM(S): 400 CAPSULE ORAL at 13:30

## 2022-08-15 RX ADMIN — Medication 1 PATCH: at 12:14

## 2022-08-15 RX ADMIN — GABAPENTIN 300 MILLIGRAM(S): 400 CAPSULE ORAL at 05:08

## 2022-08-15 RX ADMIN — QUETIAPINE FUMARATE 200 MILLIGRAM(S): 200 TABLET, FILM COATED ORAL at 13:36

## 2022-08-15 RX ADMIN — LACTULOSE 10 GRAM(S): 10 SOLUTION ORAL at 14:53

## 2022-08-15 RX ADMIN — POLYETHYLENE GLYCOL 3350 17 GRAM(S): 17 POWDER, FOR SOLUTION ORAL at 16:14

## 2022-08-15 RX ADMIN — CYCLOBENZAPRINE HYDROCHLORIDE 5 MILLIGRAM(S): 10 TABLET, FILM COATED ORAL at 10:42

## 2022-08-15 RX ADMIN — NAFCILLIN 200 GRAM(S): 10 INJECTION, POWDER, FOR SOLUTION INTRAVENOUS at 05:10

## 2022-08-15 RX ADMIN — BUPROPION HYDROCHLORIDE 300 MILLIGRAM(S): 150 TABLET, EXTENDED RELEASE ORAL at 11:11

## 2022-08-15 RX ADMIN — CYCLOBENZAPRINE HYDROCHLORIDE 5 MILLIGRAM(S): 10 TABLET, FILM COATED ORAL at 01:02

## 2022-08-15 RX ADMIN — Medication 1 PATCH: at 16:14

## 2022-08-15 RX ADMIN — Medication 4 MILLIGRAM(S): at 23:28

## 2022-08-15 RX ADMIN — SENNA PLUS 2 TABLET(S): 8.6 TABLET ORAL at 21:16

## 2022-08-15 RX ADMIN — QUETIAPINE FUMARATE 200 MILLIGRAM(S): 200 TABLET, FILM COATED ORAL at 05:08

## 2022-08-15 RX ADMIN — Medication 100 MILLIGRAM(S): at 17:25

## 2022-08-15 RX ADMIN — NAFCILLIN 200 GRAM(S): 10 INJECTION, POWDER, FOR SOLUTION INTRAVENOUS at 13:31

## 2022-08-15 RX ADMIN — NAFCILLIN 200 GRAM(S): 10 INJECTION, POWDER, FOR SOLUTION INTRAVENOUS at 10:47

## 2022-08-15 RX ADMIN — ONDANSETRON 4 MILLIGRAM(S): 8 TABLET, FILM COATED ORAL at 12:22

## 2022-08-15 RX ADMIN — NAFCILLIN 200 GRAM(S): 10 INJECTION, POWDER, FOR SOLUTION INTRAVENOUS at 17:29

## 2022-08-15 RX ADMIN — Medication 100 MILLIGRAM(S): at 01:03

## 2022-08-15 RX ADMIN — QUETIAPINE FUMARATE 200 MILLIGRAM(S): 200 TABLET, FILM COATED ORAL at 21:15

## 2022-08-15 RX ADMIN — Medication 2 MILLIGRAM(S): at 21:18

## 2022-08-15 RX ADMIN — Medication 2 MILLIGRAM(S): at 13:27

## 2022-08-15 RX ADMIN — Medication 1 TABLET(S): at 11:10

## 2022-08-15 RX ADMIN — MIRTAZAPINE 30 MILLIGRAM(S): 45 TABLET, ORALLY DISINTEGRATING ORAL at 21:15

## 2022-08-15 RX ADMIN — Medication 100 MILLIGRAM(S): at 10:42

## 2022-08-15 RX ADMIN — Medication 4 MILLIGRAM(S): at 17:25

## 2022-08-15 RX ADMIN — NAFCILLIN 200 GRAM(S): 10 INJECTION, POWDER, FOR SOLUTION INTRAVENOUS at 01:03

## 2022-08-15 NOTE — PROGRESS NOTE ADULT - ASSESSMENT
· Assessment	  53 yo M w/ pmhx of IVDU (heroin), vertebral osteomyelitis, and MSSA bacteremia presents for worsening back pain. Back pain was increasing over the past couple of weeks and worsening today to the point he was having difficulty ambulating . Complaining of right sided eye swelling/redness since Tuesday 8/9 . He slept on the hard floor ( wanted to stretch himself ) and woke up in the am with redness/swelling. Patient denies any visual changes or eye pain. Patient denies any fever, chills, headache, dizziness. Patient denies chest pain, palpitation, SOB. Patient denies abdominal pain, n/v/d/c.   Patient was recently admitted 03/12-04/06 for vertebral OM/facetitis. PAM was negative and ID recommended cefazolin. Patient had picc line placed for 6wks of antibiotics. Patient states he finished his antibiotics at [facility] but did not follow up with Dr Giraldo. Patient states he is currently not using heroin. He last used 10 months ago.    8/13 CT Lumbar Spine No Cont   Interval progression of discitis-osteomyelitis at L4-5 with increased   endplate lytic changes since the prior lumbarCT from 3/12/2022. The L4-5   disc is not well visualized due to lucencies of the L4-5 endplates.   Relatively spared L4-5 and L5-S1 facet joints without significant lytic   changes.    Redemonstrated paravertebral phlegmon and left retropharyngeal abscess   with air-fluid level.    The epidural phlegmon and the spinal space are not well visualized due to   difference in modality.          IMPRESSION;  NICOLE bacteremia with discitis/OM at L4-5.  Paravertebral phlegmon and left retropharyngeal abscess with air-fluid level.  8/10 BCx NICOLE  8/11,12,13 BCx NG    Hematoma right upper eyelid > no abscess/cellulitis/septic emboli  WBC 11.3    RECOMMENDATIONS;   F/u NS evaluation> CT L spine recommended. Possible Sx in am   PAM> canceled today   Clindamycin 900 mg iv q8h  Nafcillin 2 gm iv q4h  Off loading to prevent pressure sores and preventive measures to avoid aspiration

## 2022-08-15 NOTE — PRE-ANESTHESIA EVALUATION ADULT - NSANTHADDINFOFT_GEN_ALL_CORE
This pt had two episode of vomiting, about 150 - 200 cc of bile stained gastric content  this morning, also C/O Abd discomfort. The procedure is cancelled by the cardiologist as it is not urgent and a risk for aspiration. I agree. Please see cardiology note.

## 2022-08-15 NOTE — CHART NOTE - NSCHARTNOTEFT_GEN_A_CORE
Patient seen and examined by Dr Resendiz, lower extremity strength unchanged , stable exam however patient complaining of sever abdominal pain and frequent vomiting, abdomin tender to touch. Medical resident informed. The original plan was for surgery on Tuesday however we will post pone surgery at this time pending medical clearance. Will reschedule when cleared.

## 2022-08-15 NOTE — PROGRESS NOTE ADULT - SUBJECTIVE AND OBJECTIVE BOX
MIKAEL MCDERMOTT  [facility] [place] [facility]      Patient is a 52y old  Male who presents with a chief complaint of Back pain (15 Aug 2022 14:58)        Interval events:  Patient seen and examined at bedside. Had multiple episodes of NBNB vomiting and abdominal pain. Says he hasn't had BM in a "while". Lactate normal. KUB no obstruction but high stool burden. Got zofran, no nausea now.     -PMHx: IV drug abuse    Vertebral osteomyelitis    MSSA bacteremia      -PSHx:No significant past surgical history            REVIEW OF SYSTEMS:  CONSTITUTIONAL: No fever, weight loss, or fatigue  RESPIRATORY: No cough, wheezing, chills or hemoptysis; No shortness of breath  CARDIOVASCULAR: No chest pain, palpitations, dizziness, or leg swelling  GASTROINTESTINAL: as above   NEUROLOGICAL: No headaches  LYMPH NODES: No enlarged glands  MUSCULOSKELETAL: No joint pain or swelling; No muscle, back, or extremity pain      T(C): , Max: 37.1 (08-15-22 @ 05:00)  HR: 75 (08-15-22 @ 09:48)  BP: 149/89 (08-15-22 @ 09:48)  RR: 18 (08-15-22 @ 09:48)  SpO2: 99% (08-15-22 @ 09:48)  CAPILLARY BLOOD GLUCOSE            PHYSICAL EXAM:  GEN: well-groomed; no distress   HEENT: PERRL; EOMI; conjunctiva clear; OD with swelling/erythema around orbit (improved), not painful to move eye   CHEST: clear to auscultation bilaterally; no wheezes; no rales; no rhonchi  CARDS: 4/6 systolic murmur  ABD: soft, but very ttp throughout, especially in lower abdomen; no peritoneal signs   NEURO: cranial nerves II-XII intact; superficial reflexes intact, mild motor weakness LEs improved; no sensory deficits  LYMPH: No lymphadedenopathy  MSK: no joint swelling; no joint erythema; no joint warmth; b/l calf tenderness (no erythema)       LABS:          12.6  12.50  )-------(409          38.4  N=81.7  L=8.7  MCV=88.7    135|95<L>|24<H>  ------------------<200<H>  5.0|27|0.8  eGFR:--  Ca:9.2      PT/INR - ( 15 Aug 2022 06:34 )   PT: 14.40 sec;   INR: 1.25 ratio         PTT - ( 15 Aug 2022 06:34 )  PTT:28.0 sec      Microbiology:    Culture - Blood (collected 08-13-22 @ 11:30)  Source: .Blood None  Preliminary Report (08-14-22 @ 19:01):    No growth to date.    Culture - Blood (collected 08-12-22 @ 11:34)  Source: .Blood None  Preliminary Report (08-13-22 @ 23:01):    No growth to date.    Culture - Urine (collected 08-12-22 @ 05:00)  Source: Clean Catch Clean Catch (Midstream)  Final Report (08-13-22 @ 14:11):    <10,000 CFU/mL Normal Urogenital Afua    Culture - Blood (collected 08-11-22 @ 21:30)  Source: .Blood Blood  Preliminary Report (08-13-22 @ 04:00):    No growth to date.    Culture - Blood (collected 08-11-22 @ 06:38)  Source: .Blood Blood  Gram Stain (08-12-22 @ 10:55):    Growth in aerobic bottle: Gram Positive Cocci in Clusters  Final Report (08-13-22 @ 14:04):    Growth in aerobic bottle: Staphylococcus aureus    See previous culture [id number]    Culture - Blood (collected 08-10-22 @ 15:15)  Source: .Blood Blood  Preliminary Report (08-11-22 @ 23:01):    No growth to date.    Culture - Blood (collected 08-10-22 @ 15:15)  Source: .Blood Blood  Gram Stain (08-11-22 @ 15:33):    Growth in aerobic bottle: Gram Positive Cocci in Clusters  Final Report (08-13-22 @ 14:03):    Growth in aerobic bottle: Staphylococcus aureus    ***Blood Panel PCR results on this specimen are available    approximately 3 hours after the Gram stain result.***    Gram stain, PCR, and/or culture results may not always    correspond due to difference in methodologies.    ************************************************************    This PCR assay was performed by multiplex PCR. This    Assay tests for 66 bacterial and resistance gene targets.    Please refer to the [facility] test directory    at [email or web address] for details.  Organism: Blood Culture PCR  Staphylococcus aureus (08-13-22 @ 14:03)  Organism: Staphylococcus aureus (08-13-22 @ 14:03)      -  Ampicillin/Sulbactam: S <=8/4      -  Cefazolin: S <=4      -  Clindamycin: S <=0.25      -  Erythromycin: S <=0.25      -  Gentamicin: S <=1 Should not be used as monotherapy      -  Oxacillin: S <=0.25 Oxacillin predicts susceptibility for dicloxacillin, methicillin, and nafcillin      -  Penicillin: R >8      -  Rifampin: S <=1 Should not be used as monotherapy      -  Tetra/Doxy: S <=1      -  Trimethoprim/Sulfamethoxazole: S <=0.5/9.5      -  Vancomycin: S 2      Method Type: WILLY  Organism: Blood Culture PCR (08-13-22 @ 14:03)      -  Methicillin SENSITIVE Staphylococcus aureus (MSSA): Detec Any isolate of Staphylococcus aureus from a blood culture is NOT considered a contaminant.      Method Type: PCR        RADIOLOGY & ADDITIONAL TESTS:  < from: Xray Kidney Ureter Bladder (08.15.22 @ 12:46) >    Findings/  impression:    The bowel gas pattern is not obstructed. The osseous structures   demonstrates degenerative changes. There is a moderate colonic stool load.    < end of copied text >        Medications:  acetaminophen     Tablet .. 650 milliGRAM(s) Oral every 6 hours PRN  ALPRAZolam 2 milliGRAM(s) Oral three times a day  aluminum hydroxide/magnesium hydroxide/simethicone Suspension 30 milliLiter(s) Oral every 4 hours PRN  buPROPion XL (24-Hour) . 300 milliGRAM(s) Oral daily  clindamycin IVPB 900 milliGRAM(s) IV Intermittent every 8 hours  clindamycin IVPB      cyclobenzaprine 5 milliGRAM(s) Oral three times a day PRN  dexAMETHasone     Tablet 4 milliGRAM(s) Oral every 6 hours  enoxaparin Injectable 40 milliGRAM(s) SubCutaneous every 24 hours  folic acid 1 milliGRAM(s) Oral daily  gabapentin 300 milliGRAM(s) Oral three times a day  lactulose Syrup 10 Gram(s) Oral two times a day  melatonin 3 milliGRAM(s) Oral at bedtime PRN  methadone    Tablet 135 milliGRAM(s) Oral daily  mirtazapine 30 milliGRAM(s) Oral at bedtime  multivitamin 1 Tablet(s) Oral daily  nafcillin  IVPB      nafcillin  IVPB 2 Gram(s) IV Intermittent every 4 hours  nicotine - 21 mG/24Hr(s) Patch 1 Patch Transdermal daily  ondansetron Injectable 4 milliGRAM(s) IV Push every 8 hours PRN  polyethylene glycol 3350 17 Gram(s) Oral daily  QUEtiapine 200 milliGRAM(s) Oral three times a day  senna 2 Tablet(s) Oral at bedtime

## 2022-08-15 NOTE — PROGRESS NOTE ADULT - ASSESSMENT
51 yo M w/ pmhx of IVDU (heroin), vertebral osteomyelitis, and MSSA bacteremia presents for worsening back pain. Patient was found febrile in the ED with elevated WBC count, CT orbit showing Right periorbital cellulitis.     #Recurrence of vertebral OM   #Recent history & post treatment for MSSA bacteremia  #Right periorbital cellulitis  #Sepsis present on admission: leukocytosis, febrile  - CT Orbit w/ IV Cont 8/10: shows Right periorbital (pre-septal) soft tissue swelling consistent with cellulitis. No evidence of abscess. No evidence of post-septal inflammation.  -h/o IR Drainage of Abscess/Biopsy of suspected discitis done 3/16 -fluid  growing NICOLE  -, .8   - daily blood culture until negative (growing MSSA still)   -MR lumbar/t spine: 1. Interval progression of discitis osteomyelitis at L4-5 with increased   destruction of the endplate cortices and intervening disc. Associated   mild vertebral height losses of L4 on L5.    2. Significantly increased epidural phlegmon extending from the right   dorsal epidural space from L2-3 down to sacral level with maximal mass   effect at the right L4-5 epidural space resulting in   impingement/compression of the thecal sac and nerve roots.    3. Slightly increased erosion affecting bilateral L4-5 facet joints (left   more than right), and bilateral L5-S1 facet joints. Increased   enhancements involving bilateral L4-L5 and L5-S1 neural foraminal spaces.    2. Increased paravertebral phlegmon surrounding L4-5 with slightly   increased size of the left retroperitoneal abscess. Slightly smaller   right psoas abscess.    - ID consult appreciated: clindamycin 900mg iv q8h, nafcillin iv 2g q4h  -PAM rescheduled today due to vomiting; spoke with cardio fellow, patient needs to not have vomited for 24 hours in order to go for PAM; no vomiting since this morning; will make NPO at midnight again, pt on schedule for tomorrow  -spoke with Nsx: c/w po decadron 4mg q6h; requesting medical and ID clearance prior to surgery (needs PAM first)    #Nausea/vomiting/abdominal pain  -KUB showing no obstruction, lactate normal  -constipated  -give lactulose, miralax, senna  -zofran prn    #Transaminitis- resolving   -U/S equivocal  -trend LFT's     #Normocytic Anemia- improved  - Monitor H+H  -ferritin, b12, folate wnl    #Hx of IVD  #Opioid abuse on Methadone  - Continue methadone 135, and gabapentin    #Anxiety/Depression  - c/w mirtazapine + quetiapine  -c/w xanax     #Active nicotine dependence  - c/w nicotine patch  - c/w buproprion    #Morbid obesity  - diet and lifestyle modification     #Hypomagnesemia  -repleted    #Suspected folic acid deficiency  -continue supplement    #DVT PPx- LVX 40mg sq qhs  #GI PPx- None  #Diet- Regular  #CHG  #Activity- AAT  #Dispo- pending PAM, neurosurgery, improvement in abdominal pain   #Code- FULL    #Progress Note Handoff  Pending (specify):  PAM, neurosurgery  Family discussion: Plan of care discussed with patient, aware and agreeable   Disposition: Home___/SNF___/Other________/Unknown at this time______x__

## 2022-08-15 NOTE — PRE-OP CHECKLIST - SELECT TESTS ORDERED
BMP/CBC/CMP/PT/PTT/INR/Hepatic Function/Type and Screen/Urinalysis/EKG/CXR/POCT Blood Glucose/COVID-19 215/BMP/CBC/CMP/PT/PTT/INR/Hepatic Function/Type and Screen/Urinalysis/EKG/CXR/POCT Blood Glucose/COVID-19

## 2022-08-15 NOTE — CHART NOTE - NSCHARTNOTEFT_GEN_A_CORE
Patient has been vomiting and complaining of abdominal pain. He has had multiple episodes of NB/NB vomiting. As such patient is high risk for sedation and passing of PAM probe due to high risk of aspiration. Will reschedule if no vomiting for 24 hours. PAM cancelled at this point in time.

## 2022-08-15 NOTE — PROGRESS NOTE ADULT - SUBJECTIVE AND OBJECTIVE BOX
BALDEMAR MIKAEL  52y, Male    All available historical data reviewed    OVERNIGHT EVENTS:  none    ROS:  General: Denies rigors, nightsweats  HEENT: Denies headache, rhinorrhea, sore throat, eye pain  CV: Denies CP, palpitations  PULM: Denies wheezing, hemoptysis  GI: Denies hematemesis, hematochezia, melena  : Denies discharge, hematuria, no incontinence  MSK: limited movement LEs  SKIN: Denies rash, lesions  NEURO: LEs weakness  PSYCH: anxiety    VITALS:  T(F): 97.6, Max: 98.8 (08-15-22 @ 05:00)  HR: 75  BP: 149/89  RR: 18Vital Signs Last 24 Hrs  T(C): 36.4 (15 Aug 2022 09:48), Max: 37.1 (15 Aug 2022 05:00)  T(F): 97.6 (15 Aug 2022 09:48), Max: 98.8 (15 Aug 2022 05:00)  HR: 75 (15 Aug 2022 09:48) (75 - 85)  BP: 149/89 (15 Aug 2022 09:48) (135/86 - 149/89)  BP(mean): --  RR: 18 (15 Aug 2022 09:48) (18 - 18)  SpO2: 99% (15 Aug 2022 09:48) (93% - 99%)    Parameters below as of 15 Aug 2022 09:48  Patient On (Oxygen Delivery Method): room air        TESTS & MEASUREMENTS:                        12.6   12.50 )-----------( 409      ( 15 Aug 2022 06:34 )             38.4     08-15    135  |  95<L>  |  24<H>  ----------------------------<  200<H>  5.0   |  27  |  0.8    Ca    9.2      15 Aug 2022 06:34  Mg     2.4     08-15    TPro  7.8  /  Alb  3.5  /  TBili  0.4  /  DBili  x   /  AST  43<H>  /  ALT  126<H>  /  AlkPhos  179<H>  08-15    LIVER FUNCTIONS - ( 15 Aug 2022 06:34 )  Alb: 3.5 g/dL / Pro: 7.8 g/dL / ALK PHOS: 179 U/L / ALT: 126 U/L / AST: 43 U/L / GGT: x             Culture - Blood (collected 08-13-22 @ 11:30)  Source: .Blood None  Preliminary Report (08-14-22 @ 19:01):    No growth to date.    Culture - Blood (collected 08-12-22 @ 11:34)  Source: .Blood None  Preliminary Report (08-13-22 @ 23:01):    No growth to date.    Culture - Urine (collected 08-12-22 @ 05:00)  Source: Clean Catch Clean Catch (Midstream)  Final Report (08-13-22 @ 14:11):    <10,000 CFU/mL Normal Urogenital Afua    Culture - Blood (collected 08-11-22 @ 21:30)  Source: .Blood Blood  Preliminary Report (08-13-22 @ 04:00):    No growth to date.    Culture - Blood (collected 08-11-22 @ 06:38)  Source: .Blood Blood  Gram Stain (08-12-22 @ 10:55):    Growth in aerobic bottle: Gram Positive Cocci in Clusters  Final Report (08-13-22 @ 14:04):    Growth in aerobic bottle: Staphylococcus aureus    See previous culture [id number]    Culture - Blood (collected 08-10-22 @ 15:15)  Source: .Blood Blood  Preliminary Report (08-11-22 @ 23:01):    No growth to date.    Culture - Blood (collected 08-10-22 @ 15:15)  Source: .Blood Blood  Gram Stain (08-11-22 @ 15:33):    Growth in aerobic bottle: Gram Positive Cocci in Clusters  Final Report (08-13-22 @ 14:03):    Growth in aerobic bottle: Staphylococcus aureus    ***Blood Panel PCR results on this specimen are available    approximately 3 hours after the Gram stain result.***    Gram stain, PCR, and/or culture results may not always    correspond due to difference in methodologies.    ************************************************************    This PCR assay was performed by multiplex PCR. This    Assay tests for 66 bacterial and resistance gene targets.    Please refer to the [facility] test directory    at [email or web address] for details.  Organism: Blood Culture PCR  Staphylococcus aureus (08-13-22 @ 14:03)  Organism: Staphylococcus aureus (08-13-22 @ 14:03)      -  Ampicillin/Sulbactam: S <=8/4      -  Cefazolin: S <=4      -  Clindamycin: S <=0.25      -  Erythromycin: S <=0.25      -  Gentamicin: S <=1 Should not be used as monotherapy      -  Oxacillin: S <=0.25 Oxacillin predicts susceptibility for dicloxacillin, methicillin, and nafcillin      -  Penicillin: R >8      -  Rifampin: S <=1 Should not be used as monotherapy      -  Tetra/Doxy: S <=1      -  Trimethoprim/Sulfamethoxazole: S <=0.5/9.5      -  Vancomycin: S 2      Method Type: WILLY  Organism: Blood Culture PCR (08-13-22 @ 14:03)      -  Methicillin SENSITIVE Staphylococcus aureus (MSSA): Detec Any isolate of Staphylococcus aureus from a blood culture is NOT considered a contaminant.      Method Type: PCR            RADIOLOGY & ADDITIONAL TESTS:  Personal review of radiological diagnostics performed  Echo and EKG results noted when applicable.     MEDICATIONS:  acetaminophen     Tablet .. 650 milliGRAM(s) Oral every 6 hours PRN  ALPRAZolam 2 milliGRAM(s) Oral three times a day  aluminum hydroxide/magnesium hydroxide/simethicone Suspension 30 milliLiter(s) Oral every 4 hours PRN  buPROPion XL (24-Hour) . 300 milliGRAM(s) Oral daily  clindamycin IVPB      clindamycin IVPB 900 milliGRAM(s) IV Intermittent every 8 hours  cyclobenzaprine 5 milliGRAM(s) Oral three times a day PRN  dexAMETHasone     Tablet 4 milliGRAM(s) Oral every 6 hours  enoxaparin Injectable 40 milliGRAM(s) SubCutaneous every 24 hours  folic acid 1 milliGRAM(s) Oral daily  gabapentin 300 milliGRAM(s) Oral three times a day  lactulose Syrup 10 Gram(s) Oral two times a day  melatonin 3 milliGRAM(s) Oral at bedtime PRN  methadone    Tablet 135 milliGRAM(s) Oral daily  mirtazapine 30 milliGRAM(s) Oral at bedtime  multivitamin 1 Tablet(s) Oral daily  nafcillin  IVPB      nafcillin  IVPB 2 Gram(s) IV Intermittent every 4 hours  nicotine - 21 mG/24Hr(s) Patch 1 Patch Transdermal daily  ondansetron Injectable 4 milliGRAM(s) IV Push every 8 hours PRN  QUEtiapine 200 milliGRAM(s) Oral three times a day      ANTIBIOTICS:  clindamycin IVPB      clindamycin IVPB 900 milliGRAM(s) IV Intermittent every 8 hours  nafcillin  IVPB      nafcillin  IVPB 2 Gram(s) IV Intermittent every 4 hours

## 2022-08-16 LAB
ALBUMIN SERPL ELPH-MCNC: 3.5 G/DL — SIGNIFICANT CHANGE UP (ref 3.5–5.2)
ALP SERPL-CCNC: 162 U/L — HIGH (ref 30–115)
ALT FLD-CCNC: 86 U/L — HIGH (ref 0–41)
ANION GAP SERPL CALC-SCNC: 19 MMOL/L — HIGH (ref 7–14)
AST SERPL-CCNC: 22 U/L — SIGNIFICANT CHANGE UP (ref 0–41)
BASOPHILS # BLD AUTO: 0.03 K/UL — SIGNIFICANT CHANGE UP (ref 0–0.2)
BASOPHILS NFR BLD AUTO: 0.3 % — SIGNIFICANT CHANGE UP (ref 0–1)
BILIRUB SERPL-MCNC: 0.4 MG/DL — SIGNIFICANT CHANGE UP (ref 0.2–1.2)
BUN SERPL-MCNC: 24 MG/DL — HIGH (ref 10–20)
CALCIUM SERPL-MCNC: 8.9 MG/DL — SIGNIFICANT CHANGE UP (ref 8.5–10.1)
CHLORIDE SERPL-SCNC: 101 MMOL/L — SIGNIFICANT CHANGE UP (ref 98–110)
CO2 SERPL-SCNC: 22 MMOL/L — SIGNIFICANT CHANGE UP (ref 17–32)
CREAT SERPL-MCNC: 0.9 MG/DL — SIGNIFICANT CHANGE UP (ref 0.7–1.5)
EGFR: 102 ML/MIN/1.73M2 — SIGNIFICANT CHANGE UP
EOSINOPHIL # BLD AUTO: 0.01 K/UL — SIGNIFICANT CHANGE UP (ref 0–0.7)
EOSINOPHIL NFR BLD AUTO: 0.1 % — SIGNIFICANT CHANGE UP (ref 0–8)
GLUCOSE BLDC GLUCOMTR-MCNC: 215 MG/DL — HIGH (ref 70–99)
GLUCOSE SERPL-MCNC: 169 MG/DL — HIGH (ref 70–99)
HCT VFR BLD CALC: 40.2 % — LOW (ref 42–52)
HGB BLD-MCNC: 13.1 G/DL — LOW (ref 14–18)
IMM GRANULOCYTES NFR BLD AUTO: 2 % — HIGH (ref 0.1–0.3)
LYMPHOCYTES # BLD AUTO: 1.4 K/UL — SIGNIFICANT CHANGE UP (ref 1.2–3.4)
LYMPHOCYTES # BLD AUTO: 11.9 % — LOW (ref 20.5–51.1)
MAGNESIUM SERPL-MCNC: 2.5 MG/DL — HIGH (ref 1.8–2.4)
MCHC RBC-ENTMCNC: 28.8 PG — SIGNIFICANT CHANGE UP (ref 27–31)
MCHC RBC-ENTMCNC: 32.6 G/DL — SIGNIFICANT CHANGE UP (ref 32–37)
MCV RBC AUTO: 88.4 FL — SIGNIFICANT CHANGE UP (ref 80–94)
MONOCYTES # BLD AUTO: 1.17 K/UL — HIGH (ref 0.1–0.6)
MONOCYTES NFR BLD AUTO: 9.9 % — HIGH (ref 1.7–9.3)
NEUTROPHILS # BLD AUTO: 8.92 K/UL — HIGH (ref 1.4–6.5)
NEUTROPHILS NFR BLD AUTO: 75.8 % — HIGH (ref 42.2–75.2)
NRBC # BLD: 0 /100 WBCS — SIGNIFICANT CHANGE UP (ref 0–0)
PLATELET # BLD AUTO: 432 K/UL — HIGH (ref 130–400)
POTASSIUM SERPL-MCNC: 5.1 MMOL/L — HIGH (ref 3.5–5)
POTASSIUM SERPL-SCNC: 5.1 MMOL/L — HIGH (ref 3.5–5)
PROT SERPL-MCNC: 7.9 G/DL — SIGNIFICANT CHANGE UP (ref 6–8)
RBC # BLD: 4.55 M/UL — LOW (ref 4.7–6.1)
RBC # FLD: 15.6 % — HIGH (ref 11.5–14.5)
SODIUM SERPL-SCNC: 142 MMOL/L — SIGNIFICANT CHANGE UP (ref 135–146)
WBC # BLD: 11.77 K/UL — HIGH (ref 4.8–10.8)
WBC # FLD AUTO: 11.77 K/UL — HIGH (ref 4.8–10.8)

## 2022-08-16 PROCEDURE — 74018 RADEX ABDOMEN 1 VIEW: CPT | Mod: 26

## 2022-08-16 PROCEDURE — 99233 SBSQ HOSP IP/OBS HIGH 50: CPT

## 2022-08-16 PROCEDURE — 93010 ELECTROCARDIOGRAM REPORT: CPT

## 2022-08-16 PROCEDURE — 74177 CT ABD & PELVIS W/CONTRAST: CPT | Mod: 26

## 2022-08-16 PROCEDURE — 71045 X-RAY EXAM CHEST 1 VIEW: CPT | Mod: 26

## 2022-08-16 RX ORDER — METHADONE HYDROCHLORIDE 40 MG/1
135 TABLET ORAL ONCE
Refills: 0 | Status: DISCONTINUED | OUTPATIENT
Start: 2022-08-16 | End: 2022-08-16

## 2022-08-16 RX ORDER — ONDANSETRON 8 MG/1
4 TABLET, FILM COATED ORAL ONCE
Refills: 0 | Status: COMPLETED | OUTPATIENT
Start: 2022-08-16 | End: 2022-08-16

## 2022-08-16 RX ORDER — DIATRIZOATE MEGLUMINE 180 MG/ML
30 INJECTION, SOLUTION INTRAVESICAL ONCE
Refills: 0 | Status: DISCONTINUED | OUTPATIENT
Start: 2022-08-16 | End: 2022-08-16

## 2022-08-16 RX ORDER — SODIUM CHLORIDE 9 MG/ML
1000 INJECTION, SOLUTION INTRAVENOUS
Refills: 0 | Status: DISCONTINUED | OUTPATIENT
Start: 2022-08-16 | End: 2022-08-16

## 2022-08-16 RX ORDER — SODIUM CHLORIDE 9 MG/ML
1000 INJECTION, SOLUTION INTRAVENOUS
Refills: 0 | Status: DISCONTINUED | OUTPATIENT
Start: 2022-08-16 | End: 2022-08-30

## 2022-08-16 RX ORDER — DIATRIZOATE MEGLUMINE 180 MG/ML
30 INJECTION, SOLUTION INTRAVESICAL ONCE
Refills: 0 | Status: DISCONTINUED | OUTPATIENT
Start: 2022-08-16 | End: 2022-08-29

## 2022-08-16 RX ORDER — MORPHINE SULFATE 50 MG/1
4 CAPSULE, EXTENDED RELEASE ORAL ONCE
Refills: 0 | Status: DISCONTINUED | OUTPATIENT
Start: 2022-08-16 | End: 2022-08-16

## 2022-08-16 RX ORDER — CEFAZOLIN SODIUM 1 G
2000 VIAL (EA) INJECTION EVERY 8 HOURS
Refills: 0 | Status: DISCONTINUED | OUTPATIENT
Start: 2022-08-16 | End: 2022-08-29

## 2022-08-16 RX ORDER — ONDANSETRON 8 MG/1
4 TABLET, FILM COATED ORAL ONCE
Refills: 0 | Status: DISCONTINUED | OUTPATIENT
Start: 2022-08-16 | End: 2022-08-16

## 2022-08-16 RX ORDER — HALOPERIDOL DECANOATE 100 MG/ML
5 INJECTION INTRAMUSCULAR ONCE
Refills: 0 | Status: COMPLETED | OUTPATIENT
Start: 2022-08-16 | End: 2022-08-16

## 2022-08-16 RX ADMIN — SODIUM CHLORIDE 125 MILLILITER(S): 9 INJECTION, SOLUTION INTRAVENOUS at 17:03

## 2022-08-16 RX ADMIN — Medication 100 MILLIGRAM(S): at 09:58

## 2022-08-16 RX ADMIN — MIRTAZAPINE 30 MILLIGRAM(S): 45 TABLET, ORALLY DISINTEGRATING ORAL at 21:27

## 2022-08-16 RX ADMIN — Medication 1 PATCH: at 05:12

## 2022-08-16 RX ADMIN — NAFCILLIN 200 GRAM(S): 10 INJECTION, POWDER, FOR SOLUTION INTRAVENOUS at 02:38

## 2022-08-16 RX ADMIN — MORPHINE SULFATE 4 MILLIGRAM(S): 50 CAPSULE, EXTENDED RELEASE ORAL at 15:53

## 2022-08-16 RX ADMIN — QUETIAPINE FUMARATE 200 MILLIGRAM(S): 200 TABLET, FILM COATED ORAL at 05:14

## 2022-08-16 RX ADMIN — ONDANSETRON 4 MILLIGRAM(S): 8 TABLET, FILM COATED ORAL at 14:06

## 2022-08-16 RX ADMIN — Medication 2 MILLIGRAM(S): at 05:16

## 2022-08-16 RX ADMIN — SODIUM CHLORIDE 50 MILLILITER(S): 9 INJECTION, SOLUTION INTRAVENOUS at 09:58

## 2022-08-16 RX ADMIN — ONDANSETRON 4 MILLIGRAM(S): 8 TABLET, FILM COATED ORAL at 01:33

## 2022-08-16 RX ADMIN — METHADONE HYDROCHLORIDE 135 MILLIGRAM(S): 40 TABLET ORAL at 15:18

## 2022-08-16 RX ADMIN — Medication 4 MILLIGRAM(S): at 05:14

## 2022-08-16 RX ADMIN — SENNA PLUS 2 TABLET(S): 8.6 TABLET ORAL at 21:31

## 2022-08-16 RX ADMIN — HALOPERIDOL DECANOATE 5 MILLIGRAM(S): 100 INJECTION INTRAMUSCULAR at 17:02

## 2022-08-16 RX ADMIN — Medication 2 MILLIGRAM(S): at 21:31

## 2022-08-16 RX ADMIN — QUETIAPINE FUMARATE 200 MILLIGRAM(S): 200 TABLET, FILM COATED ORAL at 21:27

## 2022-08-16 RX ADMIN — ENOXAPARIN SODIUM 40 MILLIGRAM(S): 100 INJECTION SUBCUTANEOUS at 21:31

## 2022-08-16 RX ADMIN — Medication 4 MILLIGRAM(S): at 23:35

## 2022-08-16 RX ADMIN — Medication 100 MILLIGRAM(S): at 21:37

## 2022-08-16 RX ADMIN — LACTULOSE 10 GRAM(S): 10 SOLUTION ORAL at 05:16

## 2022-08-16 RX ADMIN — NAFCILLIN 200 GRAM(S): 10 INJECTION, POWDER, FOR SOLUTION INTRAVENOUS at 05:13

## 2022-08-16 RX ADMIN — Medication 100 MILLIGRAM(S): at 02:38

## 2022-08-16 RX ADMIN — METHADONE HYDROCHLORIDE 135 MILLIGRAM(S): 40 TABLET ORAL at 09:57

## 2022-08-16 RX ADMIN — GABAPENTIN 300 MILLIGRAM(S): 400 CAPSULE ORAL at 05:14

## 2022-08-16 NOTE — CONSULT NOTE ADULT - ASSESSMENT
Mr. Mayfield is a 53 yo man with a pmhx of IVDU (heroin) on methadone, vertebral osteomyelitis, and MSSA bacteremia, admitted for back pain 2/2 vertebral discitis, with a new complaint of nausea and NBNB vomiting assc with cramping diffuse abdominal pain over the past few days. Labs notable for AlkP 162, ALT 86, KUB shows nonobstructive gas pattern with moderate stool load.     #Nausea/vomiting/abdominal pain   -KUB: No obstruction, moderate stool load    -Lactate normal   -transaminitis resolving, RUQ U/S unremarkable     Plan  -CT A/P   -bowel regimen  -pain control, avoid narcotics          Mr. Mayfield is a 53 yo man with a pmhx of IVDU (heroin) on methadone, vertebral osteomyelitis, and MSSA bacteremia, admitted for back pain 2/2 vertebral discitis, with a new complaint of nausea and NBNB vomiting assc with cramping diffuse abdominal pain over the past few days. Labs notable for AlkP 162, ALT 86, KUB shows nonobstructive gas pattern with moderate stool load.     #Nausea/vomiting/abdominal pain  DDx 2/2 opioid use, gastroparesis, r/o obstruction   -KUB: No obstruction, moderate stool load    -Lactate normal   -transaminitis resolving, RUQ U/S unremarkable     Plan  -IVF  -NG tube to low intermittent suction   -CT A/P w/ & w/o contrast   -bowel regimen  -pain control, avoid narcotics          Mr. Mayfield is a 53 yo man with a pmhx of IVDU (heroin) on methadone, vertebral osteomyelitis, and MSSA bacteremia, admitted for back pain 2/2 vertebral discitis, with a new complaint of nausea and NBNB vomiting assc with cramping diffuse abdominal pain over the past few days. Labs notable for AlkP 162, ALT 86, KUB shows nonobstructive gas pattern with moderate stool load, CT A/P shows no evidence of obstruction.     #Nausea/vomiting/abdominal pain- likely opioid induced   DDx 2/2 opioid use, gastroparesis, r/o obstruction   -KUB: No obstruction, moderate stool load    -Lactate normal   -transaminitis resolving, RUQ U/S unremarkable   -CT A/P: no CT evidence of obstruction   -elevated lipase nonspecific, no evidence of pancreatitis     Plan  -IVF  -symptomatic treatment   -if symptoms persist, place NG tube to low intermittent suction with bowel rest   -pain control, avoid narcotics   -GI followup outpatient   -Addiction medicine followup        Mr. Mayfield is a 53 yo man with a pmhx of IVDU (heroin) on methadone, vertebral osteomyelitis, and MSSA bacteremia, admitted for back pain 2/2 vertebral discitis, with a new complaint of nausea and NBNB vomiting assc with cramping diffuse abdominal pain over the past few days. Labs notable for AlkP 162, ALT 86, KUB shows nonobstructive gas pattern with moderate stool load, CT A/P shows no evidence of obstruction.     #Nausea/vomiting/abdominal pain- likely opioid induced   DDx 2/2 opioid use, gastroparesis, r/o obstruction   -KUB: No obstruction, moderate stool load    -Lactate normal   -transaminitis resolving, RUQ U/S unremarkable   -CT A/P: no CT evidence of obstruction   -elevated lipase nonspecific, no evidence of pancreatitis     Plan  -IVF  -PPI   -symptomatic treatment   -if symptoms persist, place NG tube to low intermittent suction with bowel rest   -pain control, avoid narcotics   -GI followup outpatient   -Addiction medicine followup        Mr. Mayfield is a 53 yo man with a pmhx of IVDU (heroin) on methadone, vertebral osteomyelitis, and MSSA bacteremia, admitted for back pain 2/2 vertebral discitis, with a new complaint of nausea and NBNB vomiting assc with cramping diffuse abdominal pain over the past few days. Labs notable for AlkP 162, ALT 86, KUB shows nonobstructive gas pattern with moderate stool load, CT A/P shows no evidence of obstruction.     # Nausea/vomiting/abdominal pain- likely opioid induced  DDx 2/2 opioid use, gastroparesis   -KUB: No obstruction, moderate stool load    -Lactate normal   -transaminitis resolving, RUQ U/S unremarkable   -CT A/P with PO and IV contrast: no CT evidence of obstruction   -elevated lipase nonspecific, no evidence of pancreatitis     Plan  -IVF  -PPI BID  -symptomatic treatment   -if symptoms persist, place NG tube to low intermittent suction with bowel rest   -pain control, avoid narcotics   -GI followup outpatient   -Addiction medicine followup     will sign off  recall PRN

## 2022-08-16 NOTE — CONSULT NOTE ADULT - SUBJECTIVE AND OBJECTIVE BOX
Gastroenterology Consultation:    Patient is a 52y old  Male who presents with a chief complaint of Back pain (16 Aug 2022 08:18)  Admitted on: 08-10-22      HPI:  Mr. Mayfield is a 53 yo man with a pmhx of IVDU (heroin), vertebral osteomyelitis, and MSSA bacteremia, admitted for back pain 2/2 vertebral discitis, with a new complaint of nausea and vomiting over the past few days. On approach patient was very uncooperative, mother present who answered some questions. Patient endorses crampy diffuse abdominal pain associated with loss of appetite and multiple episodes of NBNB vomiting. Emesis was originally a dark yellow, on evaluation was a dark brown color. Patient was previously constipated but had a normal BM this morning. Patient believes he is withdrawing due to not getting his methadone (which he has been getting daily). He has a history of heroin use but per mother has been doing well at a methadone clinic and is supposed to transition to receiving methadone twice a month from daily. Mother also notes extensive FH of diabetes, says pt admits to having high sugars but denies DM diagnosis. Patient has no prior abdominal surgeries and denies recent use of narcotics, denies early satiety or weight loss.    Prior EGD: N/A  Prior Colonoscopy: N/A    PAST MEDICAL & SURGICAL HISTORY:  IV drug abuse  Vertebral osteomyelitis  MSSA bacteremia  No significant past surgical history    FAMILY HISTORY:  No pertinent family history in first degree relatives  FH of diabetes    Social History:  Tobacco: smokes a pack of cigarettes in one week  Alcohol: denies recent alc use, has in the past  Drugs: past IVDU (heroin), on methadone, denies marijuana use     Home Medications:  ALPRAZolam 2 mg oral tablet: 1 tab(s) orally 3 times a day (29 Mar 2022 11:32)  folic acid 1 mg oral tablet: 1 tab(s) orally once a day (12 Mar 2022 21:41)  gabapentin 300 mg oral capsule: 1 cap(s) orally 3 times a day (12 Mar 2022 21:41)  methadone 5 mg oral tablet: 135 milligram(s) orally once a day (29 Mar 2022 11:35)  Multiple Vitamins oral tablet: 1 tab(s) orally once a day (12 Mar 2022 21:41)  nicotine 21 mg/24 hr transdermal film, extended release: 1 patch transdermal once a day (12 Mar 2022 21:41)  Remeron 30 mg oral tablet: 2 tab(s) orally once a day (at bedtime) (12 Mar 2022 21:41)  SEROquel 200 mg oral tablet: orally 3 times a day (12 Mar 2022 21:41)  Wellbutrin: 300 milligram(s) orally once a day (12 Mar 2022 21:41)    MEDICATIONS  (STANDING):  ALPRAZolam 2 milliGRAM(s) Oral three times a day  buPROPion XL (24-Hour) . 300 milliGRAM(s) Oral daily  ceFAZolin   IVPB 2000 milliGRAM(s) IV Intermittent every 8 hours  clindamycin IVPB      clindamycin IVPB 900 milliGRAM(s) IV Intermittent every 8 hours  dexAMETHasone     Tablet 4 milliGRAM(s) Oral every 6 hours  diatrizoate meglumine/diatrizoate sodium. 30 milliLiter(s) Oral once  enoxaparin Injectable 40 milliGRAM(s) SubCutaneous every 24 hours  folic acid 1 milliGRAM(s) Oral daily  gabapentin 300 milliGRAM(s) Oral three times a day  lactated ringers. 1000 milliLiter(s) (100 mL/Hr) IV Continuous <Continuous>  lactulose Syrup 10 Gram(s) Oral two times a day  methadone    Tablet 135 milliGRAM(s) Oral daily  mirtazapine 30 milliGRAM(s) Oral at bedtime  multivitamin 1 Tablet(s) Oral daily  nicotine - 21 mG/24Hr(s) Patch 1 Patch Transdermal daily  polyethylene glycol 3350 17 Gram(s) Oral daily  QUEtiapine 200 milliGRAM(s) Oral three times a day  senna 2 Tablet(s) Oral at bedtime    MEDICATIONS  (PRN):  acetaminophen     Tablet .. 650 milliGRAM(s) Oral every 6 hours PRN Temp greater or equal to 38C (100.4F), Mild Pain (1 - 3)  aluminum hydroxide/magnesium hydroxide/simethicone Suspension 30 milliLiter(s) Oral every 4 hours PRN Dyspepsia  cyclobenzaprine 5 milliGRAM(s) Oral three times a day PRN Muscle Spasm  melatonin 3 milliGRAM(s) Oral at bedtime PRN Insomnia  ondansetron Injectable 4 milliGRAM(s) IV Push every 8 hours PRN Nausea and/or Vomiting    Allergies  No Known Allergies    Review of Systems:   Constitutional:  No Fever, No Chills  ENT/Mouth:  No Hearing Changes,  No Difficulty Swallowing  Eyes:  No Eye Pain, No Vision Changes  Cardiovascular:  No Chest Pain, No Palpitations  Respiratory:  No Cough, No Dyspnea  Gastrointestinal:  As described in HPI    Physical Examination:  T(C): 35.9 (08-16-22 @ 12:50), Max: 36.6 (08-15-22 @ 23:13)  HR: 81 (08-16-22 @ 12:50) (75 - 81)  BP: 171/103 (08-16-22 @ 12:50) (158/98 - 171/103)  RR: 18 (08-16-22 @ 12:50) (18 - 20)  SpO2: 92% (08-16-22 @ 12:50) (92% - 96%)  Height (cm): 185.4 (08-15-22 @ 23:13)  Weight (kg): 80 (08-15-22 @ 23:13)    08-14-22 @ 07:01  -  08-15-22 @ 07:00  --------------------------------------------------------  IN: 400 mL / OUT: 1475 mL / NET: -1075 mL    08-15-22 @ 07:01  -  08-16-22 @ 07:00  --------------------------------------------------------  IN: 400 mL / OUT: 400 mL / NET: 0 mL    GENERAL: AAOx3, in distress, uncooperative  HEAD:  Atraumatic, Normocephalic  EYES: conjunctiva and sclera clear  CHEST/LUNG: Clear to auscultation bilaterally; No wheeze, rhonchi, or rales  HEART: Regular rate and rhythm; normal S1, S2, No murmurs.  ABDOMEN: Soft, nondistended; tenderness on palpation diffusely, no guarding    SKIN: Intact, no jaundice    Data:                        13.1   11.77 )-----------( 432      ( 16 Aug 2022 06:34 )             40.2     Hgb Trend:  13.1  08-16-22 @ 06:34  12.6  08-15-22 @ 06:34  12.2  08-14-22 @ 08:19    08-16    142  |  101  |  24<H>  ----------------------------<  169<H>  5.1<H>   |  22  |  0.9    Ca    8.9      16 Aug 2022 06:34  Mg     2.5     08-16    TPro  7.9  /  Alb  3.5  /  TBili  0.4  /  DBili  x   /  AST  22  /  ALT  86<H>  /  AlkPhos  162<H>  08-16    Liver panel trend:  TBili 0.4   /   AST 22   /   ALT 86   /   AlkP 162   /   Tptn 7.9   /   Alb 3.5    /   DBili --      08-16  TBili 0.4   /   AST 43   /      /   AlkP 179   /   Tptn 7.8   /   Alb 3.5    /   DBili --      08-15  TBili 0.4   /   AST 72   /      /   AlkP 177   /   Tptn 7.2   /   Alb 3.3    /   DBili --      08-14  TBili 0.9   /   AST 98   /      /   AlkP 194   /   Tptn 7.2   /   Alb 3.1    /   DBili --      08-13  TBili 0.2   /   AST 75   /   ALT 72   /   AlkP 106   /   Tptn 6.8   /   Alb 3.3    /   DBili --      08-12  TBili 0.3   /   AST 55   /   ALT 47   /   AlkP 108   /   Tptn 6.4   /   Alb 3.3    /   DBili --      08-11  TBili 0.7   /   AST 73   /   ALT 46   /   AlkP 97   /   Tptn 6.6   /   Alb 3.4    /   DBili --      08-10    PT/INR - ( 15 Aug 2022 06:34 )   PT: 14.40 sec;   INR: 1.25 ratio    PTT - ( 15 Aug 2022 06:34 )  PTT:28.0 sec    Culture - Blood (collected 14 Aug 2022 11:33)  Source: .Blood None  Preliminary Report (15 Aug 2022 19:02):    No growth to date.    Radiology:  < from: Xray Kidney Ureter Bladder (08.15.22 @ 12:46) >  Findings/  impression:  The bowel gas pattern is not obstructed. The osseous structures   demonstrates degenerative changes. There is a moderate colonic stool load.    --- End of Report ---    < end of copied text >     Gastroenterology Consultation:    Patient is a 52y old  Male who presents with a chief complaint of Back pain (16 Aug 2022 08:18)  Admitted on: 08-10-22      HPI:  Mr. Mayfield is a 53 yo man with a pmhx of IVDU (heroin), vertebral osteomyelitis, and MSSA bacteremia, admitted for back pain 2/2 vertebral discitis, with a new complaint of nausea and vomiting over the past few days. On approach patient was very uncooperative, mother present who answered some questions. Patient endorses crampy diffuse abdominal pain associated with loss of appetite and multiple episodes of NBNB vomiting. Emesis was originally a dark yellow, on evaluation was a dark brown color. Patient was previously constipated but had a normal BM this morning. Patient believes he is withdrawing due to not getting his methadone (which he has been getting daily). He has a history of heroin use but per mother has been doing well at a methadone clinic and is supposed to transition to receiving methadone twice a month from daily. Mother also notes extensive FH of diabetes, says pt admits to having high sugars but denies DM diagnosis. Patient has no prior abdominal surgeries and denies recent use of narcotics, denies early satiety or weight loss.    8/17: Patient only oriented to name and date, uncooperative, states he vomited this morning, is nauseous and has abdominal pain on palpation.     Prior EGD: N/A  Prior Colonoscopy: N/A    PAST MEDICAL & SURGICAL HISTORY:  IV drug abuse  Vertebral osteomyelitis  MSSA bacteremia  No significant past surgical history    FAMILY HISTORY:  No pertinent family history in first degree relatives  FH of diabetes    Social History:  Tobacco: smokes a pack of cigarettes in one week  Alcohol: denies recent alc use, has in the past  Drugs: past IVDU (heroin), on methadone, denies marijuana use     Home Medications:  ALPRAZolam 2 mg oral tablet: 1 tab(s) orally 3 times a day (29 Mar 2022 11:32)  folic acid 1 mg oral tablet: 1 tab(s) orally once a day (12 Mar 2022 21:41)  gabapentin 300 mg oral capsule: 1 cap(s) orally 3 times a day (12 Mar 2022 21:41)  methadone 5 mg oral tablet: 135 milligram(s) orally once a day (29 Mar 2022 11:35)  Multiple Vitamins oral tablet: 1 tab(s) orally once a day (12 Mar 2022 21:41)  nicotine 21 mg/24 hr transdermal film, extended release: 1 patch transdermal once a day (12 Mar 2022 21:41)  Remeron 30 mg oral tablet: 2 tab(s) orally once a day (at bedtime) (12 Mar 2022 21:41)  SEROquel 200 mg oral tablet: orally 3 times a day (12 Mar 2022 21:41)  Wellbutrin: 300 milligram(s) orally once a day (12 Mar 2022 21:41)    MEDICATIONS  (STANDING):  ALPRAZolam 2 milliGRAM(s) Oral three times a day  buPROPion XL (24-Hour) . 300 milliGRAM(s) Oral daily  ceFAZolin   IVPB 2000 milliGRAM(s) IV Intermittent every 8 hours  clindamycin IVPB      clindamycin IVPB 900 milliGRAM(s) IV Intermittent every 8 hours  dexAMETHasone     Tablet 4 milliGRAM(s) Oral every 6 hours  diatrizoate meglumine/diatrizoate sodium. 30 milliLiter(s) Oral once  enoxaparin Injectable 40 milliGRAM(s) SubCutaneous every 24 hours  folic acid 1 milliGRAM(s) Oral daily  gabapentin 300 milliGRAM(s) Oral three times a day  lactated ringers. 1000 milliLiter(s) (100 mL/Hr) IV Continuous <Continuous>  lactulose Syrup 10 Gram(s) Oral two times a day  methadone    Tablet 135 milliGRAM(s) Oral daily  mirtazapine 30 milliGRAM(s) Oral at bedtime  multivitamin 1 Tablet(s) Oral daily  nicotine - 21 mG/24Hr(s) Patch 1 Patch Transdermal daily  polyethylene glycol 3350 17 Gram(s) Oral daily  QUEtiapine 200 milliGRAM(s) Oral three times a day  senna 2 Tablet(s) Oral at bedtime    MEDICATIONS  (PRN):  acetaminophen     Tablet .. 650 milliGRAM(s) Oral every 6 hours PRN Temp greater or equal to 38C (100.4F), Mild Pain (1 - 3)  aluminum hydroxide/magnesium hydroxide/simethicone Suspension 30 milliLiter(s) Oral every 4 hours PRN Dyspepsia  cyclobenzaprine 5 milliGRAM(s) Oral three times a day PRN Muscle Spasm  melatonin 3 milliGRAM(s) Oral at bedtime PRN Insomnia  ondansetron Injectable 4 milliGRAM(s) IV Push every 8 hours PRN Nausea and/or Vomiting    Allergies  No Known Allergies    Review of Systems:   Constitutional:  No Fever, No Chills  ENT/Mouth:  No Hearing Changes, No Difficulty Swallowing  Eyes:  No Eye Pain, No Vision Changes  Cardiovascular:  No Chest Pain, No Palpitations  Respiratory:  No Cough, No Dyspnea  Gastrointestinal:  As described in HPI    Physical Examination:  T(C): 35.9 (08-16-22 @ 12:50), Max: 36.6 (08-15-22 @ 23:13)  HR: 81 (08-16-22 @ 12:50) (75 - 81)  BP: 171/103 (08-16-22 @ 12:50) (158/98 - 171/103)  RR: 18 (08-16-22 @ 12:50) (18 - 20)  SpO2: 92% (08-16-22 @ 12:50) (92% - 96%)  Height (cm): 185.4 (08-15-22 @ 23:13)  Weight (kg): 80 (08-15-22 @ 23:13)    08-14-22 @ 07:01  -  08-15-22 @ 07:00  --------------------------------------------------------  IN: 400 mL / OUT: 1475 mL / NET: -1075 mL    08-15-22 @ 07:01  -  08-16-22 @ 07:00  --------------------------------------------------------  IN: 400 mL / OUT: 400 mL / NET: 0 mL    GENERAL: AAOx3, in distress, uncooperative  HEAD:  Atraumatic, Normocephalic  EYES: conjunctiva and sclera clear  CHEST/LUNG: Clear to auscultation bilaterally; No wheeze, rhonchi, or rales  HEART: Regular rate and rhythm; normal S1, S2, No murmurs.  ABDOMEN: Soft, nondistended; tenderness on palpation diffusely, no guarding    SKIN: Intact, no jaundice    Data:                        13.1   11.77 )-----------( 432      ( 16 Aug 2022 06:34 )             40.2     Hgb Trend:  13.1  08-16-22 @ 06:34  12.6  08-15-22 @ 06:34  12.2  08-14-22 @ 08:19    08-16    142  |  101  |  24<H>  ----------------------------<  169<H>  5.1<H>   |  22  |  0.9    Ca    8.9      16 Aug 2022 06:34  Mg     2.5     08-16    TPro  7.9  /  Alb  3.5  /  TBili  0.4  /  DBili  x   /  AST  22  /  ALT  86<H>  /  AlkPhos  162<H>  08-16    Liver panel trend:  TBili 0.4   /   AST 22   /   ALT 86   /   AlkP 162   /   Tptn 7.9   /   Alb 3.5    /   DBili --      08-16  TBili 0.4   /   AST 43   /      /   AlkP 179   /   Tptn 7.8   /   Alb 3.5    /   DBili --      08-15  TBili 0.4   /   AST 72   /      /   AlkP 177   /   Tptn 7.2   /   Alb 3.3    /   DBili --      08-14  TBili 0.9   /   AST 98   /      /   AlkP 194   /   Tptn 7.2   /   Alb 3.1    /   DBili --      08-13  TBili 0.2   /   AST 75   /   ALT 72   /   AlkP 106   /   Tptn 6.8   /   Alb 3.3    /   DBili --      08-12  TBili 0.3   /   AST 55   /   ALT 47   /   AlkP 108   /   Tptn 6.4   /   Alb 3.3    /   DBili --      08-11  TBili 0.7   /   AST 73   /   ALT 46   /   AlkP 97   /   Tptn 6.6   /   Alb 3.4    /   DBili --      08-10    PT/INR - ( 15 Aug 2022 06:34 )   PT: 14.40 sec;   INR: 1.25 ratio    PTT - ( 15 Aug 2022 06:34 )  PTT:28.0 sec    Culture - Blood (collected 14 Aug 2022 11:33)  Source: .Blood None  Preliminary Report (15 Aug 2022 19:02):    No growth to date.    Radiology:  < from: CT Abdomen and Pelvis w/ Oral Cont and w/ IV Cont (08.16.22 @ 21:34) >  INTERPRETATION:  CLINICAL HISTORY / REASON FOR EXAM: Severe vomiting;   evaluation for obstruction.    FINDINGS:    LOWER CHEST: Bibasilar dependent atelectasis.    HEPATOBILIARY: No intra or extrahepatic biliary ductal dilatation.    SPLEEN: Subcentimeter splenule. Otherwise unremarkable.    PANCREAS: Unremarkable.    ADRENAL GLANDS: Unremarkable.    KIDNEYS: Symmetric enhancement bilaterally. No evidence of hydronephrosis.    ABDOMINOPELVIC NODES: Multiple subcentimeter prominent periaortic lymph   nodes.    PELVIC ORGANS: Distended urinary bladder.    PERITONEUM/MESENTERY/BOWEL: Enteric tube with distal tip within the   gastric lumen. Oral contrast reaches the distal small bowel and cecum. No   bowel obstruction, ascites or intraperitoneal free air. Normal caliber   appendix.    BONES/SOFT TISSUES: Destructive changes at the L4-L5 disc space (series   8, image 253).    VASCULAR: Aorta is normal in caliber.      IMPRESSION:    No CT evidence of obstruction or pneumoperitoneum.    Destructive changes at the L4-L5 disc space. Differential includes   infectious/inflammatory and neoplastic processes. Recommend correlation   with symptoms and consider MRI lumbar spine with contrast for further   evaluation.    --- End of Report ---    < end of copied text >    < from: Xray Kidney Ureter Bladder (08.15.22 @ 12:46) >  Findings/  impression:  The bowel gas pattern is not obstructed. The osseous structures   demonstrates degenerative changes. There is a moderate colonic stool load.    --- End of Report ---    < end of copied text >

## 2022-08-16 NOTE — PROGRESS NOTE ADULT - SUBJECTIVE AND OBJECTIVE BOX
TEESARINAMIKAEL LAO  52y, Male    All available historical data reviewed    OVERNIGHT EVENTS:  no fevers  nausea/emesis  no abd pain    ROS:  General: Denies rigors, nightsweats  HEENT: Denies headache, rhinorrhea, sore throat, eye pain  CV: Denies CP, palpitations  PULM: Denies wheezing, hemoptysis  GI: Denies hematemesis, hematochezia, melena  : Denies discharge, hematuria  MSK: Denies arthralgias, myalgias  SKIN: Denies rash, lesions  NEURO: weakness  PSYCH:  anxiety    VITALS:  T(F): 97.8, Max: 97.8 (08-15-22 @ 23:13)  HR: 76  BP: 163/93  RR: 18Vital Signs Last 24 Hrs  T(C): 36.6 (16 Aug 2022 05:10), Max: 36.6 (15 Aug 2022 23:13)  T(F): 97.8 (16 Aug 2022 05:10), Max: 97.8 (15 Aug 2022 23:13)  HR: 76 (16 Aug 2022 05:10) (75 - 76)  BP: 163/93 (16 Aug 2022 05:10) (149/89 - 163/93)  BP(mean): --  RR: 18 (16 Aug 2022 05:10) (18 - 20)  SpO2: 96% (16 Aug 2022 05:10) (95% - 99%)    Parameters below as of 15 Aug 2022 21:13  Patient On (Oxygen Delivery Method): room air        TESTS & MEASUREMENTS:                        12.6   12.50 )-----------( 409      ( 15 Aug 2022 06:34 )             38.4     08-15    135  |  95<L>  |  24<H>  ----------------------------<  200<H>  5.0   |  27  |  0.8    Ca    9.2      15 Aug 2022 06:34  Mg     2.4     08-15    TPro  7.8  /  Alb  3.5  /  TBili  0.4  /  DBili  x   /  AST  43<H>  /  ALT  126<H>  /  AlkPhos  179<H>  08-15    LIVER FUNCTIONS - ( 15 Aug 2022 06:34 )  Alb: 3.5 g/dL / Pro: 7.8 g/dL / ALK PHOS: 179 U/L / ALT: 126 U/L / AST: 43 U/L / GGT: x             Culture - Blood (collected 08-14-22 @ 11:33)  Source: .Blood None  Preliminary Report (08-15-22 @ 19:02):    No growth to date.    Culture - Blood (collected 08-13-22 @ 11:30)  Source: .Blood None  Preliminary Report (08-14-22 @ 19:01):    No growth to date.    Culture - Blood (collected 08-12-22 @ 11:34)  Source: .Blood None  Preliminary Report (08-13-22 @ 23:01):    No growth to date.    Culture - Urine (collected 08-12-22 @ 05:00)  Source: Clean Catch Clean Catch (Midstream)  Final Report (08-13-22 @ 14:11):    <10,000 CFU/mL Normal Urogenital Afua    Culture - Blood (collected 08-11-22 @ 21:30)  Source: .Blood Blood  Preliminary Report (08-13-22 @ 04:00):    No growth to date.    Culture - Blood (collected 08-11-22 @ 06:38)  Source: .Blood Blood  Gram Stain (08-12-22 @ 10:55):    Growth in aerobic bottle: Gram Positive Cocci in Clusters  Final Report (08-13-22 @ 14:04):    Growth in aerobic bottle: Staphylococcus aureus    See previous culture [id number]    Culture - Blood (collected 08-10-22 @ 15:15)  Source: .Blood Blood  Final Report (08-15-22 @ 23:01):    No Growth Final    Culture - Blood (collected 08-10-22 @ 15:15)  Source: .Blood Blood  Gram Stain (08-11-22 @ 15:33):    Growth in aerobic bottle: Gram Positive Cocci in Clusters  Final Report (08-13-22 @ 14:03):    Growth in aerobic bottle: Staphylococcus aureus    ***Blood Panel PCR results on this specimen are available    approximately 3 hours after the Gram stain result.***    Gram stain, PCR, and/or culture results may not always    correspond due to difference in methodologies.    ************************************************************    This PCR assay was performed by multiplex PCR. This    Assay tests for 66 bacterial and resistance gene targets.    Please refer to the [facility] test directory    at [email or web address] for details.  Organism: Blood Culture PCR  Staphylococcus aureus (08-13-22 @ 14:03)  Organism: Staphylococcus aureus (08-13-22 @ 14:03)      -  Ampicillin/Sulbactam: S <=8/4      -  Cefazolin: S <=4      -  Clindamycin: S <=0.25      -  Erythromycin: S <=0.25      -  Gentamicin: S <=1 Should not be used as monotherapy      -  Oxacillin: S <=0.25 Oxacillin predicts susceptibility for dicloxacillin, methicillin, and nafcillin      -  Penicillin: R >8      -  Rifampin: S <=1 Should not be used as monotherapy      -  Tetra/Doxy: S <=1      -  Trimethoprim/Sulfamethoxazole: S <=0.5/9.5      -  Vancomycin: S 2      Method Type: WILLY  Organism: Blood Culture PCR (08-13-22 @ 14:03)      -  Methicillin SENSITIVE Staphylococcus aureus (MSSA): Detec Any isolate of Staphylococcus aureus from a blood culture is NOT considered a contaminant.      Method Type: PCR            RADIOLOGY & ADDITIONAL TESTS:  Personal review of radiological diagnostics performed  Echo and EKG results noted when applicable.     MEDICATIONS:  acetaminophen     Tablet .. 650 milliGRAM(s) Oral every 6 hours PRN  ALPRAZolam 2 milliGRAM(s) Oral three times a day  aluminum hydroxide/magnesium hydroxide/simethicone Suspension 30 milliLiter(s) Oral every 4 hours PRN  buPROPion XL (24-Hour) . 300 milliGRAM(s) Oral daily  ceFAZolin   IVPB 2000 milliGRAM(s) IV Intermittent every 8 hours  clindamycin IVPB 900 milliGRAM(s) IV Intermittent every 8 hours  clindamycin IVPB      cyclobenzaprine 5 milliGRAM(s) Oral three times a day PRN  dexAMETHasone     Tablet 4 milliGRAM(s) Oral every 6 hours  enoxaparin Injectable 40 milliGRAM(s) SubCutaneous every 24 hours  folic acid 1 milliGRAM(s) Oral daily  gabapentin 300 milliGRAM(s) Oral three times a day  lactulose Syrup 10 Gram(s) Oral two times a day  melatonin 3 milliGRAM(s) Oral at bedtime PRN  methadone    Tablet 135 milliGRAM(s) Oral daily  mirtazapine 30 milliGRAM(s) Oral at bedtime  multivitamin 1 Tablet(s) Oral daily  nicotine - 21 mG/24Hr(s) Patch 1 Patch Transdermal daily  ondansetron Injectable 4 milliGRAM(s) IV Push every 8 hours PRN  polyethylene glycol 3350 17 Gram(s) Oral daily  QUEtiapine 200 milliGRAM(s) Oral three times a day  senna 2 Tablet(s) Oral at bedtime      ANTIBIOTICS:  ceFAZolin   IVPB 2000 milliGRAM(s) IV Intermittent every 8 hours  clindamycin IVPB      clindamycin IVPB 900 milliGRAM(s) IV Intermittent every 8 hours
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
## 2022-08-16 NOTE — PROGRESS NOTE ADULT - ASSESSMENT
53 yo M w/ pmhx of IVDU (heroin), vertebral osteomyelitis, and MSSA bacteremia presents for worsening back pain. Patient was found febrile in the ED with elevated WBC count, CT orbit showing Right periorbital cellulitis.     #Vomiting/abdominal pain  #Delirium- possibly due to withdrawal from Methadone (last dose ~30 hours ago, just gave 4mg iv morphine)   -abdominal pain better after bowel movement  -patient still having NBNB vomiting  -zofran prn  -CT abd/pelvis with oral contrast- if patient does not take contrast then do IV  -patient refusing NG tube  -continue bowel regimen  -IVF while NPO  -lactate normal yesterday, doubt ischemia (also abdominal exam improved)   -lipase elevated; increase fluids to 125cc/hr for now, f/u CT scan     #Recurrence of vertebral OM   #Recent history & post treatment for MSSA bacteremia  #Right periorbital cellulitis  #Sepsis present on admission: leukocytosis, febrile  - CT Orbit w/ IV Cont 8/10: shows Right periorbital (pre-septal) soft tissue swelling consistent with cellulitis. No evidence of abscess. No evidence of post-septal inflammation.  -h/o IR Drainage of Abscess/Biopsy of suspected discitis done 3/16 -fluid  growing NICOLE  -, .8   - daily blood culture until negative   -MR lumbar/t spine: 1. Interval progression of discitis osteomyelitis at L4-5 with increased   destruction of the endplate cortices and intervening disc. Associated   mild vertebral height losses of L4 on L5.    2. Significantly increased epidural phlegmon extending from the right   dorsal epidural space from L2-3 down to sacral level with maximal mass   effect at the right L4-5 epidural space resulting in   impingement/compression of the thecal sac and nerve roots.    3. Slightly increased erosion affecting bilateral L4-5 facet joints (left   more than right), and bilateral L5-S1 facet joints. Increased   enhancements involving bilateral L4-L5 and L5-S1 neural foraminal spaces.    2. Increased paravertebral phlegmon surrounding L4-5 with slightly   increased size of the left retroperitoneal abscess. Slightly smaller   right psoas abscess.    - ID consult appreciated: clindamycin 900mg iv q8h, nafcillin iv 2g q4h  -PAM rescheduled until not vomiting anymore; unsure of when can get again for now  -spoke with Nsx: c/w po decadron 4mg q6h; requesting medical and ID clearance prior to surgery (needs PAM first)    #Transaminitis- resolving   -U/S equivocal  -trend LFT's     #Normocytic Anemia- improved  - Monitor H+H  -ferritin, b12, folate wnl    #Hx of IVD  #Opioid abuse on Methadone  - Continue methadone 135, and gabapentin    #Anxiety/Depression  - c/w mirtazapine + quetiapine  -c/w xanax     #Active nicotine dependence  - c/w nicotine patch  - c/w buproprion    #Morbid obesity  - diet and lifestyle modification     #Hypomagnesemia  -repleted    #Suspected folic acid deficiency  -continue supplement    #DVT PPx- LVX 40mg sq qhs  #GI PPx- None  #Diet- NPO for now  #CHG  #Activity- AAT  #Dispo- pending CT abdomen, PAM, neurosurgery, improvement in vomiting   #Code- FULL    #Progress Note Handoff  Pending (specify): CT abdomen, PAM, neurosurgery  Family discussion: Plan of care discussed with patient's mother at bedside   Disposition: Home___/SNF___/Other________/Unknown at this time______x__

## 2022-08-16 NOTE — PROGRESS NOTE ADULT - ASSESSMENT
· Assessment	  53 yo M w/ pmhx of IVDU (heroin), vertebral osteomyelitis, and MSSA bacteremia presents for worsening back pain. Back pain was increasing over the past couple of weeks and worsening today to the point he was having difficulty ambulating . Complaining of right sided eye swelling/redness since Tuesday 8/9 . He slept on the hard floor ( wanted to stretch himself ) and woke up in the am with redness/swelling. Patient denies any visual changes or eye pain. Patient denies any fever, chills, headache, dizziness. Patient denies chest pain, palpitation, SOB. Patient denies abdominal pain, n/v/d/c.   Patient was recently admitted 03/12-04/06 for vertebral OM/facetitis. PAM was negative and ID recommended cefazolin. Patient had picc line placed for 6wks of antibiotics. Patient states he finished his antibiotics at [facility] but did not follow up with Dr Giraldo. Patient states he is currently not using heroin. He last used 10 months ago.    8/13 CT Lumbar Spine No Cont   Interval progression of discitis-osteomyelitis at L4-5 with increased   endplate lytic changes since the prior lumbarCT from 3/12/2022. The L4-5   disc is not well visualized due to lucencies of the L4-5 endplates.   Relatively spared L4-5 and L5-S1 facet joints without significant lytic   changes.    Redemonstrated paravertebral phlegmon and left retropharyngeal abscess   with air-fluid level.    The epidural phlegmon and the spinal space are not well visualized due to   difference in modality.          IMPRESSION;  NICOLE bacteremia with discitis/OM at L4-5.  Paravertebral phlegmon and left retropharyngeal abscess with air-fluid level.  8/10 BCx NICOLE  8/11,12,13,14 BCx NG    Nafcillin induced nausea/emesis ? will change to ancef    Hematoma right upper eyelid > resolved  WBC 11.3    RECOMMENDATIONS;   F/u NS evaluation> CT L spine recommended. Possible Sx today  PAM> canceled today   Clindamycin 900 mg iv q8h  D/c Nafcillin   Ancef 2 gm iv q6h  Off loading to prevent pressure sores and preventive measures to avoid aspiration

## 2022-08-16 NOTE — PROGRESS NOTE ADULT - SUBJECTIVE AND OBJECTIVE BOX
MIKAEL MCDERMOTT  [facility] [place] [address] ([facility]      Patient is a 52y old  Male who presents with a chief complaint of Back pain (16 Aug 2022 13:18)        Interval events:  Patient seen and examined at bedside. Overnight, still having vomiting. This morning, patient took methadone, then apparently vomited, then was asking for more methadone. Said having bowel movement his abdominal pain is improved. At around 1530, attempted to give patient Methadone again and PO contrast. He is refusing everything, hallucinating, having delusions. Decision made to give patient 4mg IV morphine if he is actually withdrawing from Methadone (last dose ~30 hours ago). Mother at bedside. If patient does not take PO contrast, will just do IV for CT scan.     -PMHx: IV drug abuse    Vertebral osteomyelitis    MSSA bacteremia      -PSHx:No significant past surgical history            REVIEW OF SYSTEMS:  Unable to assess due to patient's mental status     T(C): , Max: 36.6 (08-15-22 @ 23:13)  HR: 81 (08-16-22 @ 12:50)  BP: 171/103 (08-16-22 @ 12:50)  RR: 18 (08-16-22 @ 12:50)  SpO2: 92% (08-16-22 @ 12:50)  CAPILLARY BLOOD GLUCOSE      POCT Blood Glucose.: 215 mg/dL (16 Aug 2022 05:54)        PHYSICAL EXAM:  GEN: ill-appearing, pale   HEENT: PERRL; EOMI; conjunctiva clear; OD with swelling/erythema around orbit (improved), not painful to move eye   CHEST: clear to auscultation bilaterally; no wheezes; no rales; no rhonchi  CARDS: 4/6 systolic murmur  ABD: soft, nt, nd  NEURO: cranial nerves II-XII intact; superficial reflexes intact, mild motor weakness LEs improved; no sensory deficits  LYMPH: No lymphadedenopathy  MSK: no joint swelling; no joint erythema; no joint warmth; b/l calf tenderness (no erythema)     LABS:          13.1  11.77  )-------(432          40.2  N=75.8  L=11.9  MCV=88.4    142|101|24<H>  ------------------<169<H>  5.1<H>|22|0.9  eGFR:--  Ca:8.9      PT/INR - ( 15 Aug 2022 06:34 )   PT: 14.40 sec;   INR: 1.25 ratio         PTT - ( 15 Aug 2022 06:34 )  PTT:28.0 sec      Microbiology:    Culture - Blood (collected 08-14-22 @ 11:33)  Source: .Blood None  Preliminary Report (08-15-22 @ 19:02):    No growth to date.    Culture - Blood (collected 08-13-22 @ 11:30)  Source: .Blood None  Preliminary Report (08-14-22 @ 19:01):    No growth to date.    Culture - Blood (collected 08-12-22 @ 11:34)  Source: .Blood None  Preliminary Report (08-13-22 @ 23:01):    No growth to date.    Culture - Urine (collected 08-12-22 @ 05:00)  Source: Clean Catch Clean Catch (Midstream)  Final Report (08-13-22 @ 14:11):    <10,000 CFU/mL Normal Urogenital Afua    Culture - Blood (collected 08-11-22 @ 21:30)  Source: .Blood Blood  Preliminary Report (08-13-22 @ 04:00):    No growth to date.    Culture - Blood (collected 08-11-22 @ 06:38)  Source: .Blood Blood  Gram Stain (08-12-22 @ 10:55):    Growth in aerobic bottle: Gram Positive Cocci in Clusters  Final Report (08-13-22 @ 14:04):    Growth in aerobic bottle: Staphylococcus aureus    See previous culture [id number]    Culture - Blood (collected 08-10-22 @ 15:15)  Source: .Blood Blood  Final Report (08-15-22 @ 23:01):    No Growth Final    Culture - Blood (collected 08-10-22 @ 15:15)  Source: .Blood Blood  Gram Stain (08-11-22 @ 15:33):    Growth in aerobic bottle: Gram Positive Cocci in Clusters  Final Report (08-13-22 @ 14:03):    Growth in aerobic bottle: Staphylococcus aureus    ***Blood Panel PCR results on this specimen are available    approximately 3 hours after the Gram stain result.***    Gram stain, PCR, and/or culture results may not always    correspond due to difference in methodologies.    ************************************************************    This PCR assay was performed by multiplex PCR. This    Assay tests for 66 bacterial and resistance gene targets.    Please refer to the [facility] test directory    at [email or web address] for details.  Organism: Blood Culture PCR  Staphylococcus aureus (08-13-22 @ 14:03)  Organism: Staphylococcus aureus (08-13-22 @ 14:03)      -  Ampicillin/Sulbactam: S <=8/4      -  Cefazolin: S <=4      -  Clindamycin: S <=0.25      -  Erythromycin: S <=0.25      -  Gentamicin: S <=1 Should not be used as monotherapy      -  Oxacillin: S <=0.25 Oxacillin predicts susceptibility for dicloxacillin, methicillin, and nafcillin      -  Penicillin: R >8      -  Rifampin: S <=1 Should not be used as monotherapy      -  Tetra/Doxy: S <=1      -  Trimethoprim/Sulfamethoxazole: S <=0.5/9.5      -  Vancomycin: S 2      Method Type: WILLY  Organism: Blood Culture PCR (08-13-22 @ 14:03)      -  Methicillin SENSITIVE Staphylococcus aureus (MSSA): Detec Any isolate of Staphylococcus aureus from a blood culture is NOT considered a contaminant.      Method Type: PCR        RADIOLOGY & ADDITIONAL TESTS:  < from: Xray Kidney Ureter Bladder (08.15.22 @ 12:46) >  Findings/  impression:    The bowel gas pattern is not obstructed. The osseous structures   demonstrates degenerative changes. There is a moderate colonic stool load.    < end of copied text >        Medications:  acetaminophen     Tablet .. 650 milliGRAM(s) Oral every 6 hours PRN  ALPRAZolam 2 milliGRAM(s) Oral three times a day  aluminum hydroxide/magnesium hydroxide/simethicone Suspension 30 milliLiter(s) Oral every 4 hours PRN  buPROPion XL (24-Hour) . 300 milliGRAM(s) Oral daily  ceFAZolin   IVPB 2000 milliGRAM(s) IV Intermittent every 8 hours  clindamycin IVPB 900 milliGRAM(s) IV Intermittent every 8 hours  clindamycin IVPB      cyclobenzaprine 5 milliGRAM(s) Oral three times a day PRN  dexAMETHasone     Tablet 4 milliGRAM(s) Oral every 6 hours  diatrizoate meglumine/diatrizoate sodium. 30 milliLiter(s) Oral once  enoxaparin Injectable 40 milliGRAM(s) SubCutaneous every 24 hours  folic acid 1 milliGRAM(s) Oral daily  gabapentin 300 milliGRAM(s) Oral three times a day  lactated ringers. 1000 milliLiter(s) IV Continuous <Continuous>  lactulose Syrup 10 Gram(s) Oral two times a day  melatonin 3 milliGRAM(s) Oral at bedtime PRN  methadone    Tablet 135 milliGRAM(s) Oral daily  mirtazapine 30 milliGRAM(s) Oral at bedtime  multivitamin 1 Tablet(s) Oral daily  nicotine - 21 mG/24Hr(s) Patch 1 Patch Transdermal daily  ondansetron Injectable 4 milliGRAM(s) IV Push every 8 hours PRN  polyethylene glycol 3350 17 Gram(s) Oral daily  QUEtiapine 200 milliGRAM(s) Oral three times a day  senna 2 Tablet(s) Oral at bedtime

## 2022-08-16 NOTE — CONSULT NOTE ADULT - ATTENDING COMMENTS
Seen / examined and above reviewed.    No cardiac history.    Recurrent osteomyelitis / MSSA bacteremia.  PAM requested to evaluate for endocarditis.  Hemodynamics stable.    PAM planned for 8/12/22.
Patient with vomiting. Rec IV PPI bid. If has persistent symptoms will consider EGD

## 2022-08-17 DIAGNOSIS — F11.20 OPIOID DEPENDENCE, UNCOMPLICATED: ICD-10-CM

## 2022-08-17 LAB
ALBUMIN SERPL ELPH-MCNC: 3.6 G/DL — SIGNIFICANT CHANGE UP (ref 3.5–5.2)
ALP SERPL-CCNC: 146 U/L — HIGH (ref 30–115)
ALT FLD-CCNC: 64 U/L — HIGH (ref 0–41)
ANION GAP SERPL CALC-SCNC: 14 MMOL/L — SIGNIFICANT CHANGE UP (ref 7–14)
AST SERPL-CCNC: 21 U/L — SIGNIFICANT CHANGE UP (ref 0–41)
BASOPHILS # BLD AUTO: 0.04 K/UL — SIGNIFICANT CHANGE UP (ref 0–0.2)
BASOPHILS NFR BLD AUTO: 0.3 % — SIGNIFICANT CHANGE UP (ref 0–1)
BILIRUB SERPL-MCNC: 0.3 MG/DL — SIGNIFICANT CHANGE UP (ref 0.2–1.2)
BUN SERPL-MCNC: 26 MG/DL — HIGH (ref 10–20)
CALCIUM SERPL-MCNC: 9.2 MG/DL — SIGNIFICANT CHANGE UP (ref 8.5–10.1)
CHLORIDE SERPL-SCNC: 98 MMOL/L — SIGNIFICANT CHANGE UP (ref 98–110)
CK MB CFR SERPL CALC: 1.8 NG/ML — SIGNIFICANT CHANGE UP (ref 0.6–6.3)
CK SERPL-CCNC: 97 U/L — SIGNIFICANT CHANGE UP (ref 0–225)
CO2 SERPL-SCNC: 28 MMOL/L — SIGNIFICANT CHANGE UP (ref 17–32)
CREAT SERPL-MCNC: 1 MG/DL — SIGNIFICANT CHANGE UP (ref 0.7–1.5)
CULTURE RESULTS: SIGNIFICANT CHANGE UP
CULTURE RESULTS: SIGNIFICANT CHANGE UP
D DIMER BLD IA.RAPID-MCNC: 934 NG/ML DDU — HIGH (ref 0–230)
EGFR: 90 ML/MIN/1.73M2 — SIGNIFICANT CHANGE UP
EOSINOPHIL # BLD AUTO: 0 K/UL — SIGNIFICANT CHANGE UP (ref 0–0.7)
EOSINOPHIL NFR BLD AUTO: 0 % — SIGNIFICANT CHANGE UP (ref 0–8)
GLUCOSE SERPL-MCNC: 189 MG/DL — HIGH (ref 70–99)
HCT VFR BLD CALC: 43.7 % — SIGNIFICANT CHANGE UP (ref 42–52)
HGB BLD-MCNC: 14.2 G/DL — SIGNIFICANT CHANGE UP (ref 14–18)
IMM GRANULOCYTES NFR BLD AUTO: 2.1 % — HIGH (ref 0.1–0.3)
LACTATE SERPL-SCNC: 1.1 MMOL/L — SIGNIFICANT CHANGE UP (ref 0.7–2)
LYMPHOCYTES # BLD AUTO: 1.53 K/UL — SIGNIFICANT CHANGE UP (ref 1.2–3.4)
LYMPHOCYTES # BLD AUTO: 10.6 % — LOW (ref 20.5–51.1)
MAGNESIUM SERPL-MCNC: 2.3 MG/DL — SIGNIFICANT CHANGE UP (ref 1.8–2.4)
MCHC RBC-ENTMCNC: 28.3 PG — SIGNIFICANT CHANGE UP (ref 27–31)
MCHC RBC-ENTMCNC: 32.5 G/DL — SIGNIFICANT CHANGE UP (ref 32–37)
MCV RBC AUTO: 87.2 FL — SIGNIFICANT CHANGE UP (ref 80–94)
MONOCYTES # BLD AUTO: 1.29 K/UL — HIGH (ref 0.1–0.6)
MONOCYTES NFR BLD AUTO: 8.9 % — SIGNIFICANT CHANGE UP (ref 1.7–9.3)
NEUTROPHILS # BLD AUTO: 11.29 K/UL — HIGH (ref 1.4–6.5)
NEUTROPHILS NFR BLD AUTO: 78.1 % — HIGH (ref 42.2–75.2)
NRBC # BLD: 0 /100 WBCS — SIGNIFICANT CHANGE UP (ref 0–0)
PLATELET # BLD AUTO: 578 K/UL — HIGH (ref 130–400)
POTASSIUM SERPL-MCNC: 4.1 MMOL/L — SIGNIFICANT CHANGE UP (ref 3.5–5)
POTASSIUM SERPL-SCNC: 4.1 MMOL/L — SIGNIFICANT CHANGE UP (ref 3.5–5)
PROT SERPL-MCNC: 8 G/DL — SIGNIFICANT CHANGE UP (ref 6–8)
RBC # BLD: 5.01 M/UL — SIGNIFICANT CHANGE UP (ref 4.7–6.1)
RBC # FLD: 15.8 % — HIGH (ref 11.5–14.5)
SODIUM SERPL-SCNC: 140 MMOL/L — SIGNIFICANT CHANGE UP (ref 135–146)
SPECIMEN SOURCE: SIGNIFICANT CHANGE UP
SPECIMEN SOURCE: SIGNIFICANT CHANGE UP
TROPONIN T SERPL-MCNC: <0.01 NG/ML — SIGNIFICANT CHANGE UP
WBC # BLD: 14.45 K/UL — HIGH (ref 4.8–10.8)
WBC # FLD AUTO: 14.45 K/UL — HIGH (ref 4.8–10.8)

## 2022-08-17 PROCEDURE — 99232 SBSQ HOSP IP/OBS MODERATE 35: CPT

## 2022-08-17 PROCEDURE — 93010 ELECTROCARDIOGRAM REPORT: CPT

## 2022-08-17 PROCEDURE — 99221 1ST HOSP IP/OBS SF/LOW 40: CPT

## 2022-08-17 PROCEDURE — 71045 X-RAY EXAM CHEST 1 VIEW: CPT | Mod: 26

## 2022-08-17 PROCEDURE — 99233 SBSQ HOSP IP/OBS HIGH 50: CPT

## 2022-08-17 RX ORDER — SCOPALAMINE 1 MG/3D
1 PATCH, EXTENDED RELEASE TRANSDERMAL
Refills: 0 | Status: DISCONTINUED | OUTPATIENT
Start: 2022-08-17 | End: 2022-08-29

## 2022-08-17 RX ORDER — THIAMINE MONONITRATE (VIT B1) 100 MG
100 TABLET ORAL DAILY
Refills: 0 | Status: DISCONTINUED | OUTPATIENT
Start: 2022-08-17 | End: 2022-08-29

## 2022-08-17 RX ORDER — PANTOPRAZOLE SODIUM 20 MG/1
40 TABLET, DELAYED RELEASE ORAL EVERY 12 HOURS
Refills: 0 | Status: DISCONTINUED | OUTPATIENT
Start: 2022-08-17 | End: 2022-08-29

## 2022-08-17 RX ADMIN — LACTULOSE 10 GRAM(S): 10 SOLUTION ORAL at 17:43

## 2022-08-17 RX ADMIN — METHADONE HYDROCHLORIDE 135 MILLIGRAM(S): 40 TABLET ORAL at 11:34

## 2022-08-17 RX ADMIN — BUPROPION HYDROCHLORIDE 300 MILLIGRAM(S): 150 TABLET, EXTENDED RELEASE ORAL at 11:35

## 2022-08-17 RX ADMIN — PANTOPRAZOLE SODIUM 40 MILLIGRAM(S): 20 TABLET, DELAYED RELEASE ORAL at 17:42

## 2022-08-17 RX ADMIN — LACTULOSE 10 GRAM(S): 10 SOLUTION ORAL at 05:42

## 2022-08-17 RX ADMIN — Medication 100 MILLIGRAM(S): at 10:46

## 2022-08-17 RX ADMIN — Medication 100 MILLIGRAM(S): at 13:32

## 2022-08-17 RX ADMIN — Medication 4 MILLIGRAM(S): at 05:41

## 2022-08-17 RX ADMIN — Medication 1 PATCH: at 18:55

## 2022-08-17 RX ADMIN — Medication 2 MILLIGRAM(S): at 05:44

## 2022-08-17 RX ADMIN — QUETIAPINE FUMARATE 200 MILLIGRAM(S): 200 TABLET, FILM COATED ORAL at 05:41

## 2022-08-17 RX ADMIN — Medication 100 MILLIGRAM(S): at 21:56

## 2022-08-17 RX ADMIN — Medication 100 MILLIGRAM(S): at 01:54

## 2022-08-17 RX ADMIN — MIRTAZAPINE 30 MILLIGRAM(S): 45 TABLET, ORALLY DISINTEGRATING ORAL at 21:56

## 2022-08-17 RX ADMIN — GABAPENTIN 300 MILLIGRAM(S): 400 CAPSULE ORAL at 13:31

## 2022-08-17 RX ADMIN — Medication 4 MILLIGRAM(S): at 17:43

## 2022-08-17 RX ADMIN — Medication 1 MILLIGRAM(S): at 11:35

## 2022-08-17 RX ADMIN — ENOXAPARIN SODIUM 40 MILLIGRAM(S): 100 INJECTION SUBCUTANEOUS at 21:49

## 2022-08-17 RX ADMIN — Medication 1 PATCH: at 11:36

## 2022-08-17 RX ADMIN — Medication 2 MILLIGRAM(S): at 13:31

## 2022-08-17 RX ADMIN — QUETIAPINE FUMARATE 200 MILLIGRAM(S): 200 TABLET, FILM COATED ORAL at 13:31

## 2022-08-17 RX ADMIN — GABAPENTIN 300 MILLIGRAM(S): 400 CAPSULE ORAL at 21:50

## 2022-08-17 RX ADMIN — Medication 4 MILLIGRAM(S): at 11:35

## 2022-08-17 RX ADMIN — SENNA PLUS 2 TABLET(S): 8.6 TABLET ORAL at 21:50

## 2022-08-17 RX ADMIN — Medication 2 MILLIGRAM(S): at 21:49

## 2022-08-17 RX ADMIN — Medication 100 MILLIGRAM(S): at 05:42

## 2022-08-17 RX ADMIN — POLYETHYLENE GLYCOL 3350 17 GRAM(S): 17 POWDER, FOR SOLUTION ORAL at 11:36

## 2022-08-17 RX ADMIN — Medication 100 MILLIGRAM(S): at 17:42

## 2022-08-17 RX ADMIN — QUETIAPINE FUMARATE 200 MILLIGRAM(S): 200 TABLET, FILM COATED ORAL at 21:50

## 2022-08-17 RX ADMIN — Medication 100 MILLIGRAM(S): at 11:50

## 2022-08-17 RX ADMIN — Medication 1 TABLET(S): at 11:35

## 2022-08-17 NOTE — BH CONSULTATION LIAISON ASSESSMENT NOTE - NSBHCHARTREVIEWINVESTIGATE_PSY_A_CORE FT
< from: 12 Lead ECG (08.17.22 @ 10:55) >    Ventricular Rate 81 BPM    Atrial Rate 81 BPM    P-R Interval 152 ms    QRS Duration 90 ms    Q-T Interval 412 ms    QTC Calculation(Bazett) 478 ms    P Axis 64 degrees    R Axis 49 degrees    T Axis 58 degrees    Diagnosis Line Normal sinus rhythm  Normal ECG    Confirmed by SIERRA NEWBERRY MD ([id number]) on 8/17/2022 11:25:24 AM    < end of copied text >

## 2022-08-17 NOTE — BH CONSULTATION LIAISON ASSESSMENT NOTE - NSBHCHARTREVIEWLAB_PSY_A_CORE FT
14.2   14.45 )-----------( 578      ( 17 Aug 2022 07:05 )             43.7   08-17    140  |  98  |  26<H>  ----------------------------<  189<H>  4.1   |  28  |  1.0    Ca    9.2      17 Aug 2022 07:05  Mg     2.3     08-17    TPro  8.0  /  Alb  3.6  /  TBili  0.3  /  DBili  x   /  AST  21  /  ALT  64<H>  /  AlkPhos  146<H>  08-17

## 2022-08-17 NOTE — BH CONSULTATION LIAISON ASSESSMENT NOTE - OTHER PAST PSYCHIATRIC HISTORY (INCLUDE DETAILS REGARDING ONSET, COURSE OF ILLNESS, INPATIENT/OUTPATIENT TREATMENT)
History of heroin use disorder and in treatment with a methadone program.   Currently on seroquel 200mg po three times a day.

## 2022-08-17 NOTE — BH CONSULTATION LIAISON ASSESSMENT NOTE - SUMMARY
52 year old male with vertebral osteomyelitis, and MSSA bacteremia and previous psychiatric history of polysubstance abuse and IVDU, admitted to the hospital since 8/10 for worsening back pain and treatment of possible osteomyelitis. Psychiatry was consulted for  mental health evaluation, due to management of multiple psychiatric medications, agitation and acute delirium.    On evaluation, patient is observed to be awake, alert, but disoriented to time and place. During this evaluation he is observed to be at no distress but as of this morning he was very agitated toward his mother and the staff requiring haldol. Also, his methadone medication has to be withheld. Currently his symptoms are very most likely associated with delirium. He only missed one dose of methadone, therefore unlikely the is due to methadone withdrawal. However, we would strongly recommend to continue his home dose methadone 135mg po every morning, which is confirmed. The episode of paranoia, and hallucinations as well as agitation are all component of paranoia, not decompensation  of a primary psychiatric disorder.     Impression  Delirium, multifactorial  Heroin use disorder on Methadone 135mg po daily    Plan  -On going delirium work up as per primary team  Continue home dose methadone of 135mg po daily,  Consider utilizing Seroquel at 600mg po at night (in lieu of Seroquel 200mg po three times a day)    EKG is reviewed and  QTC Calculation(Bazett) 478 ms on 8/17/22.   -For agitation, please utilize haldol 5mg po/IM every 8hrs prn.  Consider avoiding the use of ativan in this patient  -Will defer the need for nursing 1:1 to nursing staff  Psychiatry will follow up on 8/19/22  But there is no psychiatric contraindication to discharge this patient once he is medically stable.

## 2022-08-17 NOTE — BH CONSULTATION LIAISON ASSESSMENT NOTE - NSBHCONSULTFOLLOWAFTERCARE_PSY_A_CORE FT
Patient will continue follow up with Methadone maintenance at [facility]MTP program at [address] in [place] ([phone]).

## 2022-08-17 NOTE — PROGRESS NOTE ADULT - ASSESSMENT
Assessment and Plan:   · Assessment	  51 yo M w/ pmhx of IVDU (heroin), vertebral osteomyelitis, and MSSA bacteremia presents for worsening back pain. Patient was found febrile in the ED with elevated WBC count, CT orbit showing Right periorbital cellulitis.     #Vomiting/abdominal pain  #Delirium- possibly due to withdrawal from Methadone (last dose ~30 hours ago, just gave 4mg iv morphine)   -abdominal pain better after bowel movement  -patient still having NBNB vomiting  -zofran prn  -CT abd/pelvis with oral contrast- if patient does not take contrast then do IV  -patient refusing NG tube  -continue bowel regimen  -IVF while NPO  -lactate normal yesterday, doubt ischemia (also abdominal exam improved)   -lipase elevated; increase fluids to 125cc/hr for now, f/u CT scan     #Recurrence of vertebral OM   #Recent history & post treatment for MSSA bacteremia  #Right periorbital cellulitis  #Sepsis present on admission: leukocytosis, febrile  - CT Orbit w/ IV Cont 8/10: shows Right periorbital (pre-septal) soft tissue swelling consistent with cellulitis. No evidence of abscess. No evidence of post-septal inflammation.  -h/o IR Drainage of Abscess/Biopsy of suspected discitis done 3/16 -fluid  growing NICOLE  -, .8   - daily blood culture until negative   -MR lumbar/t spine: 1. Interval progression of discitis osteomyelitis at L4-5 with increased   destruction of the endplate cortices and intervening disc. Associated   mild vertebral height losses of L4 on L5.    2. Significantly increased epidural phlegmon extending from the right   dorsal epidural space from L2-3 down to sacral level with maximal mass   effect at the right L4-5 epidural space resulting in   impingement/compression of the thecal sac and nerve roots.    3. Slightly increased erosion affecting bilateral L4-5 facet joints (left   more than right), and bilateral L5-S1 facet joints. Increased   enhancements involving bilateral L4-L5 and L5-S1 neural foraminal spaces.    2. Increased paravertebral phlegmon surrounding L4-5 with slightly   increased size of the left retroperitoneal abscess. Slightly smaller   right psoas abscess.    - ID consult appreciated: clindamycin 900mg iv q8h, nafcillin iv 2g q4h  -PAM rescheduled until not vomiting anymore; unsure of when can get again for now  -spoke with Nsx: c/w po decadron 4mg q6h; requesting medical and ID clearance prior to surgery (needs PAM first)    #Transaminitis- resolving   -U/S equivocal  -trend LFT's     #Normocytic Anemia- improved  - Monitor H+H  -ferritin, b12, folate wnl    #Hx of IVD  #Opioid abuse on Methadone  - Continue methadone 135, and gabapentin    #Anxiety/Depression  - c/w mirtazapine + quetiapine  -c/w xanax     #Active nicotine dependence  - c/w nicotine patch  - c/w buproprion    #Morbid obesity  - diet and lifestyle modification     #Hypomagnesemia  -repleted    #Suspected folic acid deficiency  -continue supplement    #DVT PPx- LVX 40mg sq qhs  #GI PPx- None  #Diet- NPO for now  #CHG  #Activity- AAT  #Dispo- pending CT abdomen, PAM, neurosurgery, improvement in vomiting   #Code- FULL    #Progress Note Handoff  Pending (specify): CT abdomen, PAM, neurosurgery

## 2022-08-17 NOTE — MEDICAL STUDENT PROGRESS NOTE(EDUCATION) - NS MD HP STUD ASPLAN ASSES FT
Rob is a 52 year old male with PMHx of vertebral osteomyelitis, and MSSA bacteremia and previous psychiatric history of polysubstance abuse and IVDU, admitted to the hospital since 8/10 for worsening back pain and treatment of possible osteomyelitis. Today on exam, pt is in acute delirium, which is a drastic decompensation compared to previous notes of pt's condition. Given the pt's history of polysubstance abuse and length of stay, consider ICU delirium vs acute withdrawal.

## 2022-08-17 NOTE — BH CONSULTATION LIAISON ASSESSMENT NOTE - NSBHCHARTREVIEWVS_PSY_A_CORE FT
Vital Signs Last 24 Hrs  T(C): 37.3 (17 Aug 2022 08:00), Max: 37.3 (17 Aug 2022 08:00)  T(F): 99.2 (17 Aug 2022 08:00), Max: 99.2 (17 Aug 2022 08:00)  HR: 99 (17 Aug 2022 08:10) (79 - 104)  BP: 163/98 (17 Aug 2022 08:00) (146/98 - 171/103)  BP(mean): --  RR: 18 (17 Aug 2022 08:00) (18 - 19)  SpO2: 95% (17 Aug 2022 08:00) (92% - 95%)    Parameters below as of 17 Aug 2022 08:00  Patient On (Oxygen Delivery Method): room air

## 2022-08-17 NOTE — BH CONSULTATION LIAISON ASSESSMENT NOTE - MSE UNSTRUCTURED FT
Pt is a poorly appearing, tremulous and diaphoretic 52 year old male, sitting in bed comfortably, but with 2-pt restraints on arms on approach. Pt's mood is stated to be "lousy". Pt's affect is noted to be very flat, with poor eye contact, non-reactive despite threats of violence during the interview.  Speech is mildly pressured. Pt's Thought process is noted to be disorganized and non-linear. Thought content is tangential, and completely disoriented. Pt describes visual hallucinations, but denies auditory or tactile hallucinations. Pt is noted to be A&O x 0. His insight and Judgement are severely impacted. Pt unable to recall any memories during the interview and poorly engages with the interview.

## 2022-08-17 NOTE — MEDICAL STUDENT PROGRESS NOTE(EDUCATION) - SUBJECTIVE AND OBJECTIVE BOX
HPI:  Date & Time: 8/17/2022 11:16AM    Chief Complaint: "If my hands weren't tied, I would wring your neck"        MSE:    Vitals: Vital Signs Last 24 Hrs  T(C): 37.3 (17 Aug 2022 08:00), Max: 37.3 (17 Aug 2022 08:00)  T(F): 99.2 (17 Aug 2022 08:00), Max: 99.2 (17 Aug 2022 08:00)  HR: 99 (17 Aug 2022 08:10) (79 - 104)  BP: 163/98 (17 Aug 2022 08:00) (146/98 - 171/103)  BP(mean): --  RR: 18 (17 Aug 2022 08:00) (18 - 19)  SpO2: 95% (17 Aug 2022 08:00) (92% - 95%)    Parameters below as of 17 Aug 2022 08:00  Patient On (Oxygen Delivery Method): room air    Available Lab results:                       14.2   14.45 )-----------( 578      ( 17 Aug 2022 07:05 )             43.7   CBC Full  -  ( 17 Aug 2022 07:05 )  WBC Count : 14.45 K/uL  RBC Count : 5.01 M/uL  Hemoglobin : 14.2 g/dL  Hematocrit : 43.7 %  Platelet Count - Automated : 578 K/uL  Mean Cell Volume : 87.2 fL  Mean Cell Hemoglobin : 28.3 pg  Mean Cell Hemoglobin Concentration : 32.5 g/dL  Auto Neutrophil # : 11.29 K/uL  Auto Lymphocyte # : 1.53 K/uL  Auto Monocyte # : 1.29 K/uL  Auto Eosinophil # : 0.00 K/uL  Auto Basophil # : 0.04 K/uL  Auto Neutrophil % : 78.1 %  Auto Lymphocyte % : 10.6 %  Auto Monocyte % : 8.9 %  Auto Eosinophil % : 0.0 %  Auto Basophil % : 0.3 %    EKG:   Ventricular Rate 82 BPM  Atrial Rate 82 BPM  P-R Interval 144 ms  QRS Duration 98 ms  Q-T Interval 398 ms  QTC Calculation(Bazett) 464 ms  P Axis 77 degrees  R Axis 61 degrees  T Axis 60 degrees  Diagnosis Line Normal sinus rhythm  Normal ECG  Confirmed by yogi faith ([id number]) on 8/16/2022 1:23:06 PM   HPI:  Date & Time: 8/17/2022 11:16AM    Chief Complaint: "If my hands weren't tied, I would wring your neck"    Rob is a 52 year old male with PMHx of vertebral osteomyelitis, and MSSA bacteremia and previous psychiatric history of polysubstance abuse and IVDU, admitted to the hospital since 8/10 for worsening back pain and treatment of possible osteomyelitis. Pyschiatry was consulted for management of multiple psychiatric medications, agitation and acute delirium.    Per previous provider notes, pt was noted to become disoriented delusional and extremely agitated and aggressive to staff since approximately 4pm on 8/16, requiring restraints and 5mg Haldol IM.     On exam today, interview very limited due to pt's acute delirium. Pt is a poor appearing, tremulous and diaphoretic 52 year old, sitting in bed on approach. While he is conversant, he is disoriented (A&O x0) and non-cooperative. Pt reports that he is "in the bottom of a fishbowl", that he has "no idea how he got here", and reports that he "needs [place] leave [place] He answers all questions inappropriately, is unable to answer who he is, incorrectly identifies the year as 2023. Pt is unable to answer how he got here or why he is restrained, except to note that if he was not restrained he would "wring your neck." Pt denies seeing a psychiatrist, reports he gets his medications "from the doctor", but is unable to elaborate.  Pt is noted to be tremulous at rest on exam, with notable tongue fasciculations. Pupils are normal and reactive, no nystagmus noted on exam.       Reached out to pt's Mother ([phone]) for collateral, no answer. Will attempt to contact again later.       MSE:  Pt is a poorly appearing, tremulous and diaphoretic 52 year old male, sitting in bed comfortably, but with 2-pt restraints on arms on approach. Pt's mood is stated to be "lousy". Pt's affect is noted to be very flat, with poor eye contact, non-reactive despite threats of violence during the interview.  Speech is mildly pressured. Pt's Thought process is noted to be disorganized and non-linear. Thought content is tangential, and completely disoriented. Pt describes visual hallucinations, but denies auditory or tactcile hallucinations. Pt is noted to be A&O x 0. His insight and Judgement are severely impacted. Pt unable to recall any memories during the interview and poorly engages with the interview.      Vitals: Vital Signs Last 24 Hrs  T(C): 37.3 (17 Aug 2022 08:00), Max: 37.3 (17 Aug 2022 08:00)  T(F): 99.2 (17 Aug 2022 08:00), Max: 99.2 (17 Aug 2022 08:00)  HR: 99 (17 Aug 2022 08:10) (79 - 104)  BP: 163/98 (17 Aug 2022 08:00) (146/98 - 171/103)  BP(mean): --  RR: 18 (17 Aug 2022 08:00) (18 - 19)  SpO2: 95% (17 Aug 2022 08:00) (92% - 95%)    Parameters below as of 17 Aug 2022 08:00  Patient On (Oxygen Delivery Method): room air    Available Lab results:                       14.2   14.45 )-----------( 578      ( 17 Aug 2022 07:05 )             43.7   CBC Full  -  ( 17 Aug 2022 07:05 )  WBC Count : 14.45 K/uL  RBC Count : 5.01 M/uL  Hemoglobin : 14.2 g/dL  Hematocrit : 43.7 %  Platelet Count - Automated : 578 K/uL  Mean Cell Volume : 87.2 fL  Mean Cell Hemoglobin : 28.3 pg  Mean Cell Hemoglobin Concentration : 32.5 g/dL  Auto Neutrophil # : 11.29 K/uL  Auto Lymphocyte # : 1.53 K/uL  Auto Monocyte # : 1.29 K/uL  Auto Eosinophil # : 0.00 K/uL  Auto Basophil # : 0.04 K/uL  Auto Neutrophil % : 78.1 %  Auto Lymphocyte % : 10.6 %  Auto Monocyte % : 8.9 %  Auto Eosinophil % : 0.0 %  Auto Basophil % : 0.3 %    EKG:   Ventricular Rate 82 BPM  Atrial Rate 82 BPM  P-R Interval 144 ms  QRS Duration 98 ms  Q-T Interval 398 ms  QTC Calculation(Bazett) 464 ms  P Axis 77 degrees  R Axis 61 degrees  T Axis 60 degrees  Diagnosis Line Normal sinus rhythm  Normal ECG  Confirmed by yogi faith ([id number]) on 8/16/2022 1:23:06 PM   HPI:  Date & Time: 8/17/2022 11:16AM    Chief Complaint: "If my hands weren't tied, I would wring your neck"    Rob is a 52 year old male with PMHx of vertebral osteomyelitis, and MSSA bacteremia and previous psychiatric history of polysubstance abuse and IVDU, admitted to the hospital since 8/10 for worsening back pain and treatment of possible osteomyelitis. Pyschiatry was consulted for management of multiple psychiatric medications, agitation and acute delirium.    Per previous provider notes, pt was noted to become disoriented delusional and extremely agitated and aggressive to staff since approximately 4pm on 8/16, requiring restraints and 5mg Haldol IM. Per nursing staff today, pt has been internally preoccupied, noted to be intermittently talking to himself, but has not been aggressive or agitated for staff today.    On exam today, interview very limited due to pt's acute delirium. Pt is a poor appearing, tremulous and diaphoretic 52 year old, sitting in bed on approach. While he is conversant, he is disoriented (A&O x0) and non-cooperative. Pt reports that he is "in the bottom of a fishbowl", that he has "no idea how he got here", and reports that he "needs to leave to fight [place]." He answers all questions inappropriately, is unable to answer who he is, incorrectly identifies the year as 2023. Pt is unable to answer how he got here or why he is restrained, except to note that if he was not restrained he would "wring your neck." Pt denies seeing a psychiatrist, reports he gets his medications "from the doctor", but is unable to elaborate.  Pt is noted to be tremulous at rest on exam, with notable tongue fasciculations. Pupils are normal and reactive, no nystagmus noted on exam.       Reached out to pt's Mother ([phone]) for collateral, no answer. Will attempt to contact again later.     Istop checked #: [id number]    05/16/2022	05/17/2022	alprazolam 2 mg tablet	30	10	Aubrie Mata	[id number]	Christensen	[facility]  04/26/2022	04/26/2022	alprazolam 2 mg tablet	60	20	Aubrie Mata [id number]	Christensen	[facility]      MSE:  Pt is a poorly appearing, tremulous and diaphoretic 52 year old male, sitting in bed comfortably, but with 2-pt restraints on arms on approach. Pt's mood is stated to be "lousy". Pt's affect is noted to be very flat, with poor eye contact, non-reactive despite threats of violence during the interview.  Speech is mildly pressured. Pt's Thought process is noted to be disorganized and non-linear. Thought content is tangential, and completely disoriented. Pt describes visual hallucinations, but denies auditory or tactcile hallucinations. Pt is noted to be A&O x 0. His insight and Judgement are severely impacted. Pt unable to recall any memories during the interview and poorly engages with the interview.      MEDICATIONS  (STANDING):  ALPRAZolam 2 milliGRAM(s) Oral three times a day  buPROPion XL (24-Hour) . 300 milliGRAM(s) Oral daily  ceFAZolin   IVPB 2000 milliGRAM(s) IV Intermittent every 8 hours  clindamycin IVPB      clindamycin IVPB 900 milliGRAM(s) IV Intermittent every 8 hours  dexAMETHasone     Tablet 4 milliGRAM(s) Oral every 6 hours  diatrizoate meglumine/diatrizoate sodium. 30 milliLiter(s) Oral once  enoxaparin Injectable 40 milliGRAM(s) SubCutaneous every 24 hours  folic acid 1 milliGRAM(s) Oral daily  gabapentin 300 milliGRAM(s) Oral three times a day  lactated ringers. 1000 milliLiter(s) (125 mL/Hr) IV Continuous <Continuous>  lactulose Syrup 10 Gram(s) Oral two times a day  methadone    Tablet 135 milliGRAM(s) Oral daily  mirtazapine 30 milliGRAM(s) Oral at bedtime  multivitamin 1 Tablet(s) Oral daily  nicotine - 21 mG/24Hr(s) Patch 1 Patch Transdermal daily  pantoprazole  Injectable 40 milliGRAM(s) IV Push every 12 hours  polyethylene glycol 3350 17 Gram(s) Oral daily  QUEtiapine 200 milliGRAM(s) Oral three times a day  senna 2 Tablet(s) Oral at bedtime  thiamine 100 milliGRAM(s) Oral daily    MEDICATIONS  (PRN):  acetaminophen     Tablet .. 650 milliGRAM(s) Oral every 6 hours PRN Temp greater or equal to 38C (100.4F), Mild Pain (1 - 3)  aluminum hydroxide/magnesium hydroxide/simethicone Suspension 30 milliLiter(s) Oral every 4 hours PRN Dyspepsia  cyclobenzaprine 5 milliGRAM(s) Oral three times a day PRN Muscle Spasm  melatonin 3 milliGRAM(s) Oral at bedtime PRN Insomnia  scopolamine 1 mG/72 Hr(s) Patch 1 Patch Transdermal every 72 hours PRN Nausea and/or Vomiting    Vitals: Vital Signs Last 24 Hrs  T(C): 37.3 (17 Aug 2022 08:00), Max: 37.3 (17 Aug 2022 08:00)  T(F): 99.2 (17 Aug 2022 08:00), Max: 99.2 (17 Aug 2022 08:00)  HR: 99 (17 Aug 2022 08:10) (79 - 104)  BP: 163/98 (17 Aug 2022 08:00) (146/98 - 171/103)  BP(mean): --  RR: 18 (17 Aug 2022 08:00) (18 - 19)  SpO2: 95% (17 Aug 2022 08:00) (92% - 95%)    Parameters below as of 17 Aug 2022 08:00  Patient On (Oxygen Delivery Method): room air    Available Lab results:                       14.2   14.45 )-----------( 578      ( 17 Aug 2022 07:05 )             43.7   CBC Full  -  ( 17 Aug 2022 07:05 )  WBC Count : 14.45 K/uL  RBC Count : 5.01 M/uL  Hemoglobin : 14.2 g/dL  Hematocrit : 43.7 %  Platelet Count - Automated : 578 K/uL  Mean Cell Volume : 87.2 fL  Mean Cell Hemoglobin : 28.3 pg  Mean Cell Hemoglobin Concentration : 32.5 g/dL  Auto Neutrophil # : 11.29 K/uL  Auto Lymphocyte # : 1.53 K/uL  Auto Monocyte # : 1.29 K/uL  Auto Eosinophil # : 0.00 K/uL  Auto Basophil # : 0.04 K/uL  Auto Neutrophil % : 78.1 %  Auto Lymphocyte % : 10.6 %  Auto Monocyte % : 8.9 %  Auto Eosinophil % : 0.0 %  Auto Basophil % : 0.3 %    EKG:   Ventricular Rate 82 BPM  Atrial Rate 82 BPM  P-R Interval 144 ms  QRS Duration 98 ms  Q-T Interval 398 ms  QTC Calculation(Bazett) 464 ms  P Axis 77 degrees  R Axis 61 degrees  T Axis 60 degrees  Diagnosis Line Normal sinus rhythm  Normal ECG  Confirmed by yogi faith ([id number]) on 8/16/2022 1:23:06 PM     HPI:  Date & Time: 8/17/2022 11:16AM    Chief Complaint: "If my hands weren't tied, I would wring your neck"    Rob is a 52 year old male with PMHx of vertebral osteomyelitis, and MSSA bacteremia and previous psychiatric history of polysubstance abuse and IVDU, admitted to the hospital since 8/10 for worsening back pain and treatment of possible osteomyelitis. Pyschiatry was consulted for management of multiple psychiatric medications, agitation and acute delirium.    Per previous provider notes, pt was noted to become disoriented delusional and extremely agitated and aggressive to staff since approximately 4pm on 8/16, requiring restraints and 5mg Haldol IM. Per nursing staff today, pt has been internally preoccupied, noted to be intermittently talking to himself, but has not been aggressive or agitated for staff today.    On exam today, interview very limited due to pt's acute delirium. Pt is a poor appearing, tremulous and diaphoretic 52 year old, sitting in bed on approach. While he is conversant, he is disoriented (A&O x0) and non-cooperative. Pt reports that he is "in the bottom of a fishbowl", that he has "no idea how he got here", and reports that he "needs to leave to fight [place]." He answers all questions inappropriately, is unable to answer who he is, incorrectly identifies the year as 2023. Pt is unable to answer how he got here or why he is restrained, except to note that if he was not restrained he would "wring your neck." Pt denies seeing a psychiatrist, reports he gets his medications "from the doctor", but is unable to elaborate.  Pt is noted to be tremulous at rest on exam, with notable tongue fasciculations. Pupils are normal and reactive, no nystagmus noted on exam.       Reached out to pt's Mother ([phone]) for collateral, no answer. Will attempt to contact again later.     Istop checked #: [id number]    05/16/2022	05/17/2022	alprazolam 2 mg tablet	30	10	Aubrie Mata	[id number]	Christensen	[facility]  04/26/2022	04/26/2022	alprazolam 2 mg tablet	60	20	Aubrie Mata [id number]	Christensen	[facility]    Pt receives 135mg of Methadone maintenance at [facility] program at [address] in [place] ([phone])    MSE:  Pt is a poorly appearing, tremulous and diaphoretic 52 year old male, sitting in bed comfortably, but with 2-pt restraints on arms on approach. Pt's mood is stated to be "lousy". Pt's affect is noted to be very flat, with poor eye contact, non-reactive despite threats of violence during the interview.  Speech is mildly pressured. Pt's Thought process is noted to be disorganized and non-linear. Thought content is tangential, and completely disoriented. Pt describes visual hallucinations, but denies auditory or tactcile hallucinations. Pt is noted to be A&O x 0. His insight and Judgement are severely impacted. Pt unable to recall any memories during the interview and poorly engages with the interview.      MEDICATIONS  (STANDING):  ALPRAZolam 2 milliGRAM(s) Oral three times a day  buPROPion XL (24-Hour) . 300 milliGRAM(s) Oral daily  ceFAZolin   IVPB 2000 milliGRAM(s) IV Intermittent every 8 hours  clindamycin IVPB      clindamycin IVPB 900 milliGRAM(s) IV Intermittent every 8 hours  dexAMETHasone     Tablet 4 milliGRAM(s) Oral every 6 hours  diatrizoate meglumine/diatrizoate sodium. 30 milliLiter(s) Oral once  enoxaparin Injectable 40 milliGRAM(s) SubCutaneous every 24 hours  folic acid 1 milliGRAM(s) Oral daily  gabapentin 300 milliGRAM(s) Oral three times a day  lactated ringers. 1000 milliLiter(s) (125 mL/Hr) IV Continuous <Continuous>  lactulose Syrup 10 Gram(s) Oral two times a day  methadone    Tablet 135 milliGRAM(s) Oral daily  mirtazapine 30 milliGRAM(s) Oral at bedtime  multivitamin 1 Tablet(s) Oral daily  nicotine - 21 mG/24Hr(s) Patch 1 Patch Transdermal daily  pantoprazole  Injectable 40 milliGRAM(s) IV Push every 12 hours  polyethylene glycol 3350 17 Gram(s) Oral daily  QUEtiapine 200 milliGRAM(s) Oral three times a day  senna 2 Tablet(s) Oral at bedtime  thiamine 100 milliGRAM(s) Oral daily    MEDICATIONS  (PRN):  acetaminophen     Tablet .. 650 milliGRAM(s) Oral every 6 hours PRN Temp greater or equal to 38C (100.4F), Mild Pain (1 - 3)  aluminum hydroxide/magnesium hydroxide/simethicone Suspension 30 milliLiter(s) Oral every 4 hours PRN Dyspepsia  cyclobenzaprine 5 milliGRAM(s) Oral three times a day PRN Muscle Spasm  melatonin 3 milliGRAM(s) Oral at bedtime PRN Insomnia  scopolamine 1 mG/72 Hr(s) Patch 1 Patch Transdermal every 72 hours PRN Nausea and/or Vomiting    Vitals: Vital Signs Last 24 Hrs  T(C): 37.3 (17 Aug 2022 08:00), Max: 37.3 (17 Aug 2022 08:00)  T(F): 99.2 (17 Aug 2022 08:00), Max: 99.2 (17 Aug 2022 08:00)  HR: 99 (17 Aug 2022 08:10) (79 - 104)  BP: 163/98 (17 Aug 2022 08:00) (146/98 - 171/103)  BP(mean): --  RR: 18 (17 Aug 2022 08:00) (18 - 19)  SpO2: 95% (17 Aug 2022 08:00) (92% - 95%)    Parameters below as of 17 Aug 2022 08:00  Patient On (Oxygen Delivery Method): room air    Available Lab results:                       14.2   14.45 )-----------( 578      ( 17 Aug 2022 07:05 )             43.7   CBC Full  -  ( 17 Aug 2022 07:05 )  WBC Count : 14.45 K/uL  RBC Count : 5.01 M/uL  Hemoglobin : 14.2 g/dL  Hematocrit : 43.7 %  Platelet Count - Automated : 578 K/uL  Mean Cell Volume : 87.2 fL  Mean Cell Hemoglobin : 28.3 pg  Mean Cell Hemoglobin Concentration : 32.5 g/dL  Auto Neutrophil # : 11.29 K/uL  Auto Lymphocyte # : 1.53 K/uL  Auto Monocyte # : 1.29 K/uL  Auto Eosinophil # : 0.00 K/uL  Auto Basophil # : 0.04 K/uL  Auto Neutrophil % : 78.1 %  Auto Lymphocyte % : 10.6 %  Auto Monocyte % : 8.9 %  Auto Eosinophil % : 0.0 %  Auto Basophil % : 0.3 %    EKG:   Ventricular Rate 82 BPM  Atrial Rate 82 BPM  P-R Interval 144 ms  QRS Duration 98 ms  Q-T Interval 398 ms  QTC Calculation(Bazett) 464 ms  P Axis 77 degrees  R Axis 61 degrees  T Axis 60 degrees  Diagnosis Line Normal sinus rhythm  Normal ECG  Confirmed by yogi faith ([id number]) on 8/16/2022 1:23:06 PM

## 2022-08-17 NOTE — BH CONSULTATION LIAISON ASSESSMENT NOTE - HPI (INCLUDE ILLNESS QUALITY, SEVERITY, DURATION, TIMING, CONTEXT, MODIFYING FACTORS, ASSOCIATED SIGNS AND SYMPTOMS)
Istop checked #: [id number]    05/16/2022	05/17/2022	alprazolam 2 mg tablet	30	10	Aubrie Mata	[id number]	Christensen	[facility]  04/26/2022	04/26/2022	alprazolam 2 mg tablet	60	20	Aubrie Mata	[id number]	Christensen	[facility]  Patient is seen along with third year medical student Reji Russell     52 year old male with PMHx of vertebral osteomyelitis, and MSSA bacteremia and previous psychiatric history of polysubstance abuse and IVDU, admitted to the hospital since 8/10 for worsening back pain and treatment of possible osteomyelitis. Psychiatry was consulted for  mental health evaluation, due to management of multiple psychiatric medications, agitation and acute delirium.    Per previous provider notes, pt was noted to become disoriented delusional and extremely agitated and aggressive to staff since approximately 4pm on 8/16, requiring restraints and 5mg Haldol IM. Per nursing staff today, pt has been internally preoccupied, noted to be intermittently talking to himself, but has not been aggressive or agitated for staff today.    On exam today, interview very limited due to pt's acute delirium. Pt is a poor appearing, tremulous and diaphoretic 52 year old, sitting in bed on approach. While he is conversant, he is disoriented (A&O x0) and non-cooperative. Pt reports that he is "in the bottom of a fishbowl", that he has "no idea how he got here", and reports that he "needs to leave to fight [place]." He answers all questions inappropriately, is unable to answer who he is, incorrectly identifies the year as 2023. Pt is unable to answer how he got here or why he is restrained, except to note that if he was not restrained he would "wring your neck." Pt denies seeing a psychiatrist, reports he gets his medications "from the doctor", but is unable to elaborate.  Pt is noted to be tremulous at rest on exam, with notable tongue fasciculations. Pupils are normal and reactive, no nystagmus noted on exam.    Follow up evaluation about 1 hour later,  he was much calmer, and there was absent of the behavior noted above. Also, he is observed to be very disorganized, unable to maintain a coherent conversation. However, throughout the interview there has been no abnormal behavior indicative for suicide gesture noted or reported.        Reached out to pt's Mother ([phone]) for collateral, no answer. Will attempt to contact again later.     Istop checked #: [id number]    05/16/2022	05/17/2022	alprazolam 2 mg tablet	30	10	Aubrie Mata	[id number]	Christensen	[facility]  04/26/2022	04/26/2022	alprazolam 2 mg tablet	60	20	Aubrie Mata	[id number]	Christensen	[facility]    Pt receives 135mg of Methadone maintenance at [facility] program at  [address]  in [place] ([phone])

## 2022-08-17 NOTE — BH CONSULTATION LIAISON ASSESSMENT NOTE - CURRENT MEDICATION
MEDICATIONS  (STANDING):  ALPRAZolam 2 milliGRAM(s) Oral three times a day  buPROPion XL (24-Hour) . 300 milliGRAM(s) Oral daily  ceFAZolin   IVPB 2000 milliGRAM(s) IV Intermittent every 8 hours  clindamycin IVPB 900 milliGRAM(s) IV Intermittent every 8 hours  clindamycin IVPB      dexAMETHasone     Tablet 4 milliGRAM(s) Oral every 6 hours  diatrizoate meglumine/diatrizoate sodium. 30 milliLiter(s) Oral once  enoxaparin Injectable 40 milliGRAM(s) SubCutaneous every 24 hours  folic acid 1 milliGRAM(s) Oral daily  gabapentin 300 milliGRAM(s) Oral three times a day  lactated ringers. 1000 milliLiter(s) (125 mL/Hr) IV Continuous <Continuous>  lactulose Syrup 10 Gram(s) Oral two times a day  methadone    Tablet 135 milliGRAM(s) Oral daily  mirtazapine 30 milliGRAM(s) Oral at bedtime  multivitamin 1 Tablet(s) Oral daily  nicotine - 21 mG/24Hr(s) Patch 1 Patch Transdermal daily  pantoprazole  Injectable 40 milliGRAM(s) IV Push every 12 hours  polyethylene glycol 3350 17 Gram(s) Oral daily  QUEtiapine 200 milliGRAM(s) Oral three times a day  senna 2 Tablet(s) Oral at bedtime  thiamine 100 milliGRAM(s) Oral daily    MEDICATIONS  (PRN):  acetaminophen     Tablet .. 650 milliGRAM(s) Oral every 6 hours PRN Temp greater or equal to 38C (100.4F), Mild Pain (1 - 3)  aluminum hydroxide/magnesium hydroxide/simethicone Suspension 30 milliLiter(s) Oral every 4 hours PRN Dyspepsia  cyclobenzaprine 5 milliGRAM(s) Oral three times a day PRN Muscle Spasm  melatonin 3 milliGRAM(s) Oral at bedtime PRN Insomnia  scopolamine 1 mG/72 Hr(s) Patch 1 Patch Transdermal every 72 hours PRN Nausea and/or Vomiting

## 2022-08-17 NOTE — PROGRESS NOTE ADULT - SUBJECTIVE AND OBJECTIVE BOX
AAOx0, on Cefzolin Clinda for L-spine osteo, BCx +jef MSSA, ID on board, N/D cannot tolerate ECHO, QTc <499, c/o LBP. Hx IV drug abuse, Per Psy consult: cont Methadone home dose, Haldol, avoid Benzo.  Pending ECHO              PHYSICAL EXAM:  GEN: ill-appearing, pale   HEENT: PERRL; EOMI; conjunctiva clear; OD with swelling/erythema around orbit (improved), not painful to move eye   CHEST: clear to auscultation bilaterally; no wheezes; no rales; no rhonchi  CARDS: 4/6 systolic murmur  ABD: soft, nt, nd  NEURO: cranial nerves II-XII intact; superficial reflexes intact, mild motor weakness LEs improved; no sensory deficits  LYMPH: No lymphadedenopathy  MSK: no joint swelling; no joint erythema; no joint warmth; b/l calf tenderness (no erythema)     LABS:          13.1  11.77  )-------(432          40.2  N=75.8  L=11.9  MCV=88.4    142|101|24<H>  ------------------<169<H>  5.1<H>|22|0.9  eGFR:--  Ca:8.9      PT/INR - ( 15 Aug 2022 06:34 )   PT: 14.40 sec;   INR: 1.25 ratio         PTT - ( 15 Aug 2022 06:34 )  PTT:28.0 sec      Microbiology:    Culture - Blood (collected 08-14-22 @ 11:33)  Source: .Blood None  Preliminary Report (08-15-22 @ 19:02):    No growth to date.    Culture - Blood (collected 08-13-22 @ 11:30)  Source: .Blood None  Preliminary Report (08-14-22 @ 19:01):    No growth to date.    Culture - Blood (collected 08-12-22 @ 11:34)  Source: .Blood None  Preliminary Report (08-13-22 @ 23:01):    No growth to date.    Culture - Urine (collected 08-12-22 @ 05:00)  Source: Clean Catch Clean Catch (Midstream)  Final Report (08-13-22 @ 14:11):    <10,000 CFU/mL Normal Urogenital Afua    Culture - Blood (collected 08-11-22 @ 21:30)  Source: .Blood Blood  Preliminary Report (08-13-22 @ 04:00):    No growth to date.    Culture - Blood (collected 08-11-22 @ 06:38)  Source: .Blood Blood  Gram Stain (08-12-22 @ 10:55):    Growth in aerobic bottle: Gram Positive Cocci in Clusters  Final Report (08-13-22 @ 14:04):    Growth in aerobic bottle: Staphylococcus aureus    See previous culture [id number]    Culture - Blood (collected 08-10-22 @ 15:15)  Source: .Blood Blood  Final Report (08-15-22 @ 23:01):    No Growth Final    Culture - Blood (collected 08-10-22 @ 15:15)  Source: .Blood Blood  Gram Stain (08-11-22 @ 15:33):    Growth in aerobic bottle: Gram Positive Cocci in Clusters  Final Report (08-13-22 @ 14:03):    Growth in aerobic bottle: Staphylococcus aureus    ***Blood Panel PCR results on this specimen are available    approximately 3 hours after the Gram stain result.***    Gram stain, PCR, and/or culture results may not always    correspond due to difference in methodologies.    ************************************************************    This PCR assay was performed by multiplex PCR. This    Assay tests for 66 bacterial and resistance gene targets.    Please refer to the [facility] Labs test directory    at [email or web address] for details.  Organism: Blood Culture PCR  Staphylococcus aureus (08-13-22 @ 14:03)  Organism: Staphylococcus aureus (08-13-22 @ 14:03)      -  Ampicillin/Sulbactam: S <=8/4      -  Cefazolin: S <=4      -  Clindamycin: S <=0.25      -  Erythromycin: S <=0.25      -  Gentamicin: S <=1 Should not be used as monotherapy      -  Oxacillin: S <=0.25 Oxacillin predicts susceptibility for dicloxacillin, methicillin, and nafcillin      -  Penicillin: R >8      -  Rifampin: S <=1 Should not be used as monotherapy      -  Tetra/Doxy: S <=1      -  Trimethoprim/Sulfamethoxazole: S <=0.5/9.5      -  Vancomycin: S 2      Method Type: WILLY  Organism: Blood Culture PCR (08-13-22 @ 14:03)      -  Methicillin SENSITIVE Staphylococcus aureus (MSSA): Detec Any isolate of Staphylococcus aureus from a blood culture is NOT considered a contaminant.      Method Type: PCR        RADIOLOGY & ADDITIONAL TESTS:  < from: Xray Kidney Ureter Bladder (08.15.22 @ 12:46) >  Findings/  impression:    The bowel gas pattern is not obstructed. The osseous structures   demonstrates degenerative changes. There is a moderate colonic stool load.    < end of copied text >        Medications:  acetaminophen     Tablet .. 650 milliGRAM(s) Oral every 6 hours PRN  ALPRAZolam 2 milliGRAM(s) Oral three times a day  aluminum hydroxide/magnesium hydroxide/simethicone Suspension 30 milliLiter(s) Oral every 4 hours PRN  buPROPion XL (24-Hour) . 300 milliGRAM(s) Oral daily  ceFAZolin   IVPB 2000 milliGRAM(s) IV Intermittent every 8 hours  clindamycin IVPB 900 milliGRAM(s) IV Intermittent every 8 hours  clindamycin IVPB      cyclobenzaprine 5 milliGRAM(s) Oral three times a day PRN  dexAMETHasone     Tablet 4 milliGRAM(s) Oral every 6 hours  diatrizoate meglumine/diatrizoate sodium. 30 milliLiter(s) Oral once  enoxaparin Injectable 40 milliGRAM(s) SubCutaneous every 24 hours  folic acid 1 milliGRAM(s) Oral daily  gabapentin 300 milliGRAM(s) Oral three times a day  lactated ringers. 1000 milliLiter(s) IV Continuous <Continuous>  lactulose Syrup 10 Gram(s) Oral two times a day  melatonin 3 milliGRAM(s) Oral at bedtime PRN  methadone    Tablet 135 milliGRAM(s) Oral daily  mirtazapine 30 milliGRAM(s) Oral at bedtime  multivitamin 1 Tablet(s) Oral daily  nicotine - 21 mG/24Hr(s) Patch 1 Patch Transdermal daily  ondansetron Injectable 4 milliGRAM(s) IV Push every 8 hours PRN  polyethylene glycol 3350 17 Gram(s) Oral daily  QUEtiapine 200 milliGRAM(s) Oral three times a day  senna 2 Tablet(s) Oral at bedtime

## 2022-08-17 NOTE — MEDICAL STUDENT PROGRESS NOTE(EDUCATION) - NS MD HP STUD ASPLAN PLAN FT
·	As long as QTC <500, continue all psychiatric medications  ·	Haldol 5mg, Ativan 2mg q8 PRN for agitation or aggression towards staff.

## 2022-08-18 LAB
CULTURE RESULTS: SIGNIFICANT CHANGE UP
PROCALCITONIN SERPL-MCNC: 0.05 NG/ML — SIGNIFICANT CHANGE UP (ref 0.02–0.1)
SARS-COV-2 RNA SPEC QL NAA+PROBE: SIGNIFICANT CHANGE UP
SPECIMEN SOURCE: SIGNIFICANT CHANGE UP

## 2022-08-18 PROCEDURE — 99233 SBSQ HOSP IP/OBS HIGH 50: CPT

## 2022-08-18 PROCEDURE — 99222 1ST HOSP IP/OBS MODERATE 55: CPT

## 2022-08-18 RX ORDER — ALPRAZOLAM 0.25 MG
2 TABLET ORAL THREE TIMES A DAY
Refills: 0 | Status: DISCONTINUED | OUTPATIENT
Start: 2022-08-18 | End: 2022-08-25

## 2022-08-18 RX ORDER — QUETIAPINE FUMARATE 200 MG/1
400 TABLET, FILM COATED ORAL AT BEDTIME
Refills: 0 | Status: DISCONTINUED | OUTPATIENT
Start: 2022-08-18 | End: 2022-08-18

## 2022-08-18 RX ORDER — QUETIAPINE FUMARATE 200 MG/1
200 TABLET, FILM COATED ORAL AT BEDTIME
Refills: 0 | Status: DISCONTINUED | OUTPATIENT
Start: 2022-08-19 | End: 2022-08-29

## 2022-08-18 RX ADMIN — MIRTAZAPINE 30 MILLIGRAM(S): 45 TABLET, ORALLY DISINTEGRATING ORAL at 21:11

## 2022-08-18 RX ADMIN — Medication 100 MILLIGRAM(S): at 05:03

## 2022-08-18 RX ADMIN — PANTOPRAZOLE SODIUM 40 MILLIGRAM(S): 20 TABLET, DELAYED RELEASE ORAL at 18:02

## 2022-08-18 RX ADMIN — LACTULOSE 10 GRAM(S): 10 SOLUTION ORAL at 18:02

## 2022-08-18 RX ADMIN — BUPROPION HYDROCHLORIDE 300 MILLIGRAM(S): 150 TABLET, EXTENDED RELEASE ORAL at 12:35

## 2022-08-18 RX ADMIN — Medication 4 MILLIGRAM(S): at 05:02

## 2022-08-18 RX ADMIN — Medication 4 MILLIGRAM(S): at 00:53

## 2022-08-18 RX ADMIN — SENNA PLUS 2 TABLET(S): 8.6 TABLET ORAL at 21:11

## 2022-08-18 RX ADMIN — Medication 2 MILLIGRAM(S): at 21:12

## 2022-08-18 RX ADMIN — GABAPENTIN 300 MILLIGRAM(S): 400 CAPSULE ORAL at 14:44

## 2022-08-18 RX ADMIN — PANTOPRAZOLE SODIUM 40 MILLIGRAM(S): 20 TABLET, DELAYED RELEASE ORAL at 05:02

## 2022-08-18 RX ADMIN — Medication 1 MILLIGRAM(S): at 12:36

## 2022-08-18 RX ADMIN — Medication 4 MILLIGRAM(S): at 18:02

## 2022-08-18 RX ADMIN — Medication 4 MILLIGRAM(S): at 12:35

## 2022-08-18 RX ADMIN — Medication 1 PATCH: at 12:37

## 2022-08-18 RX ADMIN — Medication 2 MILLIGRAM(S): at 14:44

## 2022-08-18 RX ADMIN — Medication 1 PATCH: at 20:00

## 2022-08-18 RX ADMIN — Medication 100 MILLIGRAM(S): at 21:15

## 2022-08-18 RX ADMIN — QUETIAPINE FUMARATE 200 MILLIGRAM(S): 200 TABLET, FILM COATED ORAL at 05:02

## 2022-08-18 RX ADMIN — Medication 100 MILLIGRAM(S): at 02:20

## 2022-08-18 RX ADMIN — METHADONE HYDROCHLORIDE 135 MILLIGRAM(S): 40 TABLET ORAL at 12:37

## 2022-08-18 RX ADMIN — Medication 100 MILLIGRAM(S): at 14:45

## 2022-08-18 RX ADMIN — Medication 100 MILLIGRAM(S): at 09:28

## 2022-08-18 RX ADMIN — Medication 1 TABLET(S): at 12:36

## 2022-08-18 RX ADMIN — GABAPENTIN 300 MILLIGRAM(S): 400 CAPSULE ORAL at 05:02

## 2022-08-18 RX ADMIN — Medication 100 MILLIGRAM(S): at 18:02

## 2022-08-18 RX ADMIN — Medication 100 MILLIGRAM(S): at 12:36

## 2022-08-18 NOTE — MEDICAL STUDENT PROGRESS NOTE(EDUCATION) - NS MD HP STUD ASPLAN PLAN FT
·	No indication for Psychiatric Admission at this time  ·	No psychiatric barriers to discharge.  ·	Continue pt's home psychiatric medications  ·	Haldol 5mg q8 PRN for agitation.

## 2022-08-18 NOTE — PROGRESS NOTE ADULT - SUBJECTIVE AND OBJECTIVE BOX
· Subjective and Objective:     8.18.22  N/V under control => ECHO tomorrow? BCx +jef MSSA      8.17.22 AAOx0, on Cefzolin Clinda for L-spine osteo, BCx +jef MSSA, ID on board, N/D cannot tolerate ECHO, QTc <499, c/o LBP. Hx IV drug abuse, Per Psy consult: cont Methadone home dose, Haldol, avoid Benzo.  Pending ECHO              PHYSICAL EXAM:  GEN: ill-appearing, pale   HEENT: PERRL; EOMI; conjunctiva clear; OD with swelling/erythema around orbit (improved), not painful to move eye   CHEST: clear to auscultation bilaterally; no wheezes; no rales; no rhonchi  CARDS: 4/6 systolic murmur  ABD: soft, nt, nd  NEURO: cranial nerves II-XII intact; superficial reflexes intact, mild motor weakness LEs improved; no sensory deficits  LYMPH: No lymphadedenopathy  MSK: no joint swelling; no joint erythema; no joint warmth; b/l calf tenderness (no erythema)     LABS:          13.1  11.77  )-------(432          40.2  N=75.8  L=11.9  MCV=88.4    142|101|24<H>  ------------------<169<H>  5.1<H>|22|0.9  eGFR:--  Ca:8.9      PT/INR - ( 15 Aug 2022 06:34 )   PT: 14.40 sec;   INR: 1.25 ratio         PTT - ( 15 Aug 2022 06:34 )  PTT:28.0 sec      Microbiology:    Culture - Blood (collected 08-14-22 @ 11:33)  Source: .Blood None  Preliminary Report (08-15-22 @ 19:02):    No growth to date.    Culture - Blood (collected 08-13-22 @ 11:30)  Source: .Blood None  Preliminary Report (08-14-22 @ 19:01):    No growth to date.    Culture - Blood (collected 08-12-22 @ 11:34)  Source: .Blood None  Preliminary Report (08-13-22 @ 23:01):    No growth to date.    Culture - Urine (collected 08-12-22 @ 05:00)  Source: Clean Catch Clean Catch (Midstream)  Final Report (08-13-22 @ 14:11):    <10,000 CFU/mL Normal Urogenital Afua    Culture - Blood (collected 08-11-22 @ 21:30)  Source: .Blood Blood  Preliminary Report (08-13-22 @ 04:00):    No growth to date.    Culture - Blood (collected 08-11-22 @ 06:38)  Source: .Blood Blood  Gram Stain (08-12-22 @ 10:55):    Growth in aerobic bottle: Gram Positive Cocci in Clusters  Final Report (08-13-22 @ 14:04):    Growth in aerobic bottle: Staphylococcus aureus    See previous culture [id number]    Culture - Blood (collected 08-10-22 @ 15:15)  Source: .Blood Blood  Final Report (08-15-22 @ 23:01):    No Growth Final    Culture - Blood (collected 08-10-22 @ 15:15)  Source: .Blood Blood  Gram Stain (08-11-22 @ 15:33):    Growth in aerobic bottle: Gram Positive Cocci in Clusters  Final Report (08-13-22 @ 14:03):    Growth in aerobic bottle: Staphylococcus aureus    ***Blood Panel PCR results on this specimen are available    approximately 3 hours after the Gram stain result.***    Gram stain, PCR, and/or culture results may not always    correspond due to difference in methodologies.    ************************************************************    This PCR assay was performed by multiplex PCR. This    Assay tests for 66 bacterial and resistance gene targets.    Please refer to the [facility] Labs test directory    at [email or web address] for details.  Organism: Blood Culture PCR  Staphylococcus aureus (08-13-22 @ 14:03)  Organism: Staphylococcus aureus (08-13-22 @ 14:03)      -  Ampicillin/Sulbactam: S <=8/4      -  Cefazolin: S <=4      -  Clindamycin: S <=0.25      -  Erythromycin: S <=0.25      -  Gentamicin: S <=1 Should not be used as monotherapy      -  Oxacillin: S <=0.25 Oxacillin predicts susceptibility for dicloxacillin, methicillin, and nafcillin      -  Penicillin: R >8      -  Rifampin: S <=1 Should not be used as monotherapy      -  Tetra/Doxy: S <=1      -  Trimethoprim/Sulfamethoxazole: S <=0.5/9.5      -  Vancomycin: S 2      Method Type: WILLY  Organism: Blood Culture PCR (08-13-22 @ 14:03)      -  Methicillin SENSITIVE Staphylococcus aureus (MSSA): Detec Any isolate of Staphylococcus aureus from a blood culture is NOT considered a contaminant.      Method Type: PCR        RADIOLOGY & ADDITIONAL TESTS:  < from: Xray Kidney Ureter Bladder (08.15.22 @ 12:46) >  Findings/  impression:    The bowel gas pattern is not obstructed. The osseous structures   demonstrates degenerative changes. There is a moderate colonic stool load.    < end of copied text >        Medications:  acetaminophen     Tablet .. 650 milliGRAM(s) Oral every 6 hours PRN  ALPRAZolam 2 milliGRAM(s) Oral three times a day  aluminum hydroxide/magnesium hydroxide/simethicone Suspension 30 milliLiter(s) Oral every 4 hours PRN  buPROPion XL (24-Hour) . 300 milliGRAM(s) Oral daily  ceFAZolin   IVPB 2000 milliGRAM(s) IV Intermittent every 8 hours  clindamycin IVPB 900 milliGRAM(s) IV Intermittent every 8 hours  clindamycin IVPB      cyclobenzaprine 5 milliGRAM(s) Oral three times a day PRN  dexAMETHasone     Tablet 4 milliGRAM(s) Oral every 6 hours  diatrizoate meglumine/diatrizoate sodium. 30 milliLiter(s) Oral once  enoxaparin Injectable 40 milliGRAM(s) SubCutaneous every 24 hours  folic acid 1 milliGRAM(s) Oral daily  gabapentin 300 milliGRAM(s) Oral three times a day  lactated ringers. 1000 milliLiter(s) IV Continuous <Continuous>  lactulose Syrup 10 Gram(s) Oral two times a day  melatonin 3 milliGRAM(s) Oral at bedtime PRN  methadone    Tablet 135 milliGRAM(s) Oral daily  mirtazapine 30 milliGRAM(s) Oral at bedtime  multivitamin 1 Tablet(s) Oral daily  nicotine - 21 mG/24Hr(s) Patch 1 Patch Transdermal daily  ondansetron Injectable 4 milliGRAM(s) IV Push every 8 hours PRN  polyethylene glycol 3350 17 Gram(s) Oral daily  QUEtiapine 200 milliGRAM(s) Oral three times a day  senna 2 Tablet(s) Oral at bedtime

## 2022-08-18 NOTE — BH CONSULTATION LIAISON PROGRESS NOTE - NSBHCONSULTFOLLOWAFTERCARE_PSY_A_CORE FT
Patient will continue follow up with Methadone maintenance at [facility] program at [address] in [place] ([phone]).

## 2022-08-18 NOTE — BH CONSULTATION LIAISON PROGRESS NOTE - NSBHASSESSMENTFT_PSY_ALL_CORE
52 year old male with vertebral osteomyelitis, and MSSA bacteremia and previous psychiatric history of polysubstance abuse and IVDU, admitted to the hospital since 8/10 for worsening back pain and treatment of possible osteomyelitis. Psychiatry was consulted for  mental health evaluation, due to management of multiple psychiatric medications, agitation and acute delirium.  Follow up done today, revealed marked improvement of mental status. Psychotic behavior and hallucinations that occurred the past few days were more in the setting od delirium, not decompensation of a primary psychotic disorder. Patient is doing well, and quiet conversant today 8/18/22 with no abnormal behavior noted or reported. There has been no agitation or manic symptom. He is not oversedation, therefore we recommend to continue his home dose Methadone of 135mg po every day.     Impression  Delirium, multifactorial  Heroin use disorder on Methadone 135mg po daily    Plan  -On going delirium work up as per primary team  Continue home dose methadone of 135mg po daily,  Please lower  Seroquel to 200mg po at [facility]- this is his home dose that is confirmed    EKG is reviewed and  QTC Calculation(Bazett) 478 ms on 8/17/22.   It is also confirmed he is on wellbutrin XL 300mg po daily (not the SR form) which needs to be continued, continue Remeron at 30mg po hs only  He is on Gabapentin 300mg TID (but may consider to hold due to active delirium),   -For agitation, please utilize haldol 5mg po/IM every 8hrs prn.  Consider avoiding the use of ativan in this patient  -Will defer the need for nursing 1:1 to nursing staff  Psychiatry will follow up on 8/19/22  But there is no psychiatric contraindication to discharge this patient once he is medically stable.

## 2022-08-18 NOTE — MEDICAL STUDENT PROGRESS NOTE(EDUCATION) - SUBJECTIVE AND OBJECTIVE BOX
HPI  Date & Time:  Chief Complaint: "I think I remember you!"    Rob is a 52 year old male with PMHx of vertebral osteomyelitis, and MSSA bacteremia and previous psychiatric history of polysubstance abuse and IVDU, admitted to the hospital since 8/10 for worsening back pain and treatment of possible osteomyelitis. Psychiatry was consulted for mental health evaluation, due to management of multiple psychiatric medications, agitation and acute delirium.    Per Nursing Staff, Pt has not had any new episodes of agitation or aggression requiring medication or restraints. Pt is reported to be calm and cooperative with staff.     On evaluation today, Pt is a 52 year old male with poor grooming, noted to be sitting upright in bed, eating his breakfast on approach. Today, pt is much more oriented, noted to be A&O x 4 without prompting from this interviewer. While pt is still noted to have a little confusion and deficits in concentration (he briefly goes on a tangent about the "little people" who have been in to see him,  before being redirected), he is significantly more conversant and able to participate in the interview. Pt reports that he is feeling much better today, though he endorses experiencing chronic back pain which is bothering him today (and asks this interviewer to go to "score him a bag to help with his pain".) Pt reports that he sees a psychiatrist, Dr. Gates outpatient for management of his anxiety, depression and bipolar disorder. Today, pt states he has not been sleeping "more than an hour at a time." He endorses feelings of panic, and reports that he feels manic "which is how I end up at the hospital like this." He denies feelings of anhedonia, worthlessness, SI or HI. He denies visual, auditory and tactile hallucinations (however, he still describes visits from "little people" that is likely a visual hallucination.) Pt denies feelings of agitation, and when told about his aggression towards staff members on prior days e apologizes stating he "was completely out of it". Today on exam the pt is no longer observed to be tremulous, and is not observed to have tongue fasciculations     [place]        [facility]    [place] History: Pt reports that he lives alone in an apartment in [place]. He reports previously working at [employer], but was laid off when [employer] closed its operations. Pt has not worked since. Pt states he is financially supported by his parents.   Substance use: Pt endorses a significant history of polysubstance abuse:  ·	Pt reports a previous history of alcohol use, but is unable to quantify how much, and only specifies that he hasn't had a drink in "a long time." Pt denies requiring inpatient rehab for alcohol abuse.  ·	Pt endorses a regular use of Marijuana, reporting that he will "take 4 or 5 puffs from a joint to help with the pain." Pt not forthcoming with when his last use of marijuana was.  ·	Pt denies current cocaine use, but states he would previously use crack cocaine with heroin. Pt again cannot specify a specific time frame but states this was a "long time ago"  Pt endorses regular heroine use "which I have done for a long time." Pt reports regularly using between 3 and 6 bags a week, last use was on 8/10 "which is how I ended up here." Pt reports that he uses heroine to assist with pain management for his back. Pt takes 135mg Methadone daily for treatment of heroine addiction.     MEDICATIONS  (STANDING):  ALPRAZolam 2 milliGRAM(s) Oral three times a day  buPROPion XL (24-Hour) . 300 milliGRAM(s) Oral daily  ceFAZolin   IVPB 2000 milliGRAM(s) IV Intermittent every 8 hours  clindamycin IVPB      clindamycin IVPB 900 milliGRAM(s) IV Intermittent every 8 hours  dexAMETHasone     Tablet 4 milliGRAM(s) Oral every 6 hours  diatrizoate meglumine/diatrizoate sodium. 30 milliLiter(s) Oral once  enoxaparin Injectable 40 milliGRAM(s) SubCutaneous every 24 hours  folic acid 1 milliGRAM(s) Oral daily  gabapentin 300 milliGRAM(s) Oral three times a day  lactated ringers. 1000 milliLiter(s) (125 mL/Hr) IV Continuous <Continuous>  lactulose Syrup 10 Gram(s) Oral two times a day  methadone    Tablet 135 milliGRAM(s) Oral daily  mirtazapine 30 milliGRAM(s) Oral at bedtime  multivitamin 1 Tablet(s) Oral daily  nicotine - 21 mG/24Hr(s) Patch 1 Patch Transdermal daily  pantoprazole  Injectable 40 milliGRAM(s) IV Push every 12 hours  polyethylene glycol 3350 17 Gram(s) Oral daily  QUEtiapine 400 milliGRAM(s) Oral at bedtime  senna 2 Tablet(s) Oral at bedtime  thiamine 100 milliGRAM(s) Oral daily    MEDICATIONS  (PRN):  acetaminophen     Tablet .. 650 milliGRAM(s) Oral every 6 hours PRN Temp greater or equal to 38C (100.4F), Mild Pain (1 - 3)  aluminum hydroxide/magnesium hydroxide/simethicone Suspension 30 milliLiter(s) Oral every 4 hours PRN Dyspepsia  cyclobenzaprine 5 milliGRAM(s) Oral three times a day PRN Muscle Spasm  melatonin 3 milliGRAM(s) Oral at bedtime PRN Insomnia  scopolamine 1 mG/72 Hr(s) Patch 1 Patch Transdermal every 72 hours PRN Nausea and/or Vomiting    Vitals: Vital Signs Last 24 Hrs  T(C): 35.6 (17 Aug 2022 21:53), Max: 37.2 (17 Aug 2022 10:00)  T(F): 96.1 (17 Aug 2022 21:53), Max: 98.9 (17 Aug 2022 10:00)  HR: 68 (17 Aug 2022 21:53) (68 - 81)  BP: 155/92 (17 Aug 2022 21:53) (141/80 - 155/92)  BP(mean): --  RR: 20 (17 Aug 2022 21:53) (18 - 20)  SpO2: 97% (17 Aug 2022 21:53) (95% - 97%)    Parameters below as of 17 Aug 2022 10:00  Patient On (Oxygen Delivery Method): room air    Available Lab results:                       14.2   14.45 )-----------( 578      ( 17 Aug 2022 07:05 )             43.7   CBC Full  -  ( 17 Aug 2022 07:05 )  WBC Count : 14.45 K/uL  RBC Count : 5.01 M/uL  Hemoglobin : 14.2 g/dL  Hematocrit : 43.7 %  Platelet Count - Automated : 578 K/uL  Mean Cell Volume : 87.2 fL  Mean Cell Hemoglobin : 28.3 pg  Mean Cell Hemoglobin Concentration : 32.5 g/dL  Auto Neutrophil # : 11.29 K/uL  Auto Lymphocyte # : 1.53 K/uL  Auto Monocyte # : 1.29 K/uL  Auto Eosinophil # : 0.00 K/uL  Auto Basophil # : 0.04 K/uL  Auto Neutrophil % : 78.1 %  Auto Lymphocyte % : 10.6 %  Auto Monocyte % : 8.9 %  Auto Eosinophil % : 0.0 %  Auto Basophil % : 0.3 %    EKG:  Ventricular Rate 81 BPM  Atrial Rate 81 BPM  P-R Interval 152 ms  QRS Duration 90 ms  Q-T Interval 412 ms  QTC Calculation(Bazett) 478 ms  P Axis 64 degrees  R Axis 49 degrees  T Axis 58 degrees  Diagnosis Line Normal sinus rhythm  Normal ECG  Confirmed by SIERRA NEWBERRY MD ([id number]) on 8/17/2022 11:25:24 AM   HPI  Date & Time:  Chief Complaint: "I think I remember you!"    Rob is a 52 year old single male, unemployed, domiciled with mother in [place], with PMHx of vertebral osteomyelitis, and MSSA bacteremia and previous psychiatric history of polysubstance abuse and IVDU, self-reported MDD, JAVON and Bipolar disorder admitted to the hospital since 8/10 for worsening back pain and treatment of possible osteomyelitis. Psychiatry was consulted for mental health evaluation, due to management of multiple psychiatric medications, agitation and acute delirium.    Per Nursing Staff, Pt has not had any new episodes of agitation or aggression requiring medication or restraints. Pt is reported to be calm and cooperative with staff.     On evaluation today, Pt is a 52 year old male with poor grooming, noted to be sitting upright in bed, eating his breakfast on approach. Today, pt is much more oriented, noted to be A&O x 4 without prompting from this interviewer. While pt is still noted to have a little confusion and deficits in concentration (he briefly goes on a tangent about the "little people" who have been in to see him,  before being redirected), he is significantly more conversant and able to participate in the interview. Pt reports that he is feeling much better today, though he endorses experiencing chronic back pain which is bothering him today (and asks this interviewer to go to "score him a bag to help with his pain".) Pt reports that he sees a psychiatrist, Dr. Gates outpatient for management of his anxiety, depression and bipolar disorder. Today, pt states he has not been sleeping "more than an hour at a time." He endorses feelings of panic, and reports that he feels manic "which is how I end up at the hospital like this." He denies feelings of anhedonia, worthlessness, SI or HI. He denies visual, auditory and tactile hallucinations (however, he still describes visits from "little people" that is likely a visual hallucination.) Pt denies feelings of agitation, and when told about his aggression towards staff members on prior days and apologizes stating he "was completely out of it". Today on exam the pt is no longer observed to be tremulous, and is not observed to have tongue fasciculations     Collateral received from Pt's Mother at [phone]. per collateral, pt is still regularly seeing Dr. Gates, following up via telehealth for the most recent visits due to pt's hospitalizations. Pt has been living with his mother in [place] recently, as he has not been able to take care of himself following his last hospitalization in May. Per collateral, pt has never had episodes of hallucinations or delirium like this. She spoke to Pt this morning, and confirms that he is not currently at his baseline. Collateral reports she is concerned about his irregular sleep schedule at home, often falling asleep during any activity during the day, but also often awake all night. Collateral reports she believes he is reliably taking his medication, but offers her concern that he is on "too many medications"     Called [facility] at [phone] for Med Reconciliation. Per Pharmacy, Pt received Gabapentin 300mg TID, Mirtazapine 30mg 2x daily, Buproprion XL 300mg 1x Daily and Seroquel 200mx 1x Daily, last picked up 7/26      Social History: Pt reports that he lives alone in an apartment in [place]. He reports previously working at [employer], but was laid off when [employer] closed its operations. Pt has not worked since. Pt states he is financially supported by his parents.   Substance use: Pt endorses a significant history of polysubstance abuse:  ·	Pt reports a previous history of alcohol use, but is unable to quantify how much, and only specifies that he hasn't had a drink in "a long time." Pt denies requiring inpatient rehab for alcohol abuse.  ·	Pt endorses a regular use of Marijuana, reporting that he will "take 4 or 5 puffs from a joint to help with the pain." Pt not forthcoming with when his last use of marijuana was.  ·	Pt denies current cocaine use, but states he would previously use crack cocaine with heroin. Pt again cannot specify a specific time frame but states this was a "long time ago"  Pt endorses regular heroine use "which I have done for a long time." Pt reports regularly using between 3 and 6 bags a week, last use was on 8/10 "which is how I ended up here." Pt reports that he uses heroine to assist with pain management for his back. Pt takes 135mg Methadone daily for treatment of heroine addiction.     MEDICATIONS  (STANDING):  ALPRAZolam 2 milliGRAM(s) Oral three times a day  buPROPion XL (24-Hour) . 300 milliGRAM(s) Oral daily  ceFAZolin   IVPB 2000 milliGRAM(s) IV Intermittent every 8 hours  clindamycin IVPB      clindamycin IVPB 900 milliGRAM(s) IV Intermittent every 8 hours  dexAMETHasone     Tablet 4 milliGRAM(s) Oral every 6 hours  diatrizoate meglumine/diatrizoate sodium. 30 milliLiter(s) Oral once  enoxaparin Injectable 40 milliGRAM(s) SubCutaneous every 24 hours  folic acid 1 milliGRAM(s) Oral daily  gabapentin 300 milliGRAM(s) Oral three times a day  lactated ringers. 1000 milliLiter(s) (125 mL/Hr) IV Continuous <Continuous>  lactulose Syrup 10 Gram(s) Oral two times a day  methadone    Tablet 135 milliGRAM(s) Oral daily  mirtazapine 30 milliGRAM(s) Oral at bedtime  multivitamin 1 Tablet(s) Oral daily  nicotine - 21 mG/24Hr(s) Patch 1 Patch Transdermal daily  pantoprazole  Injectable 40 milliGRAM(s) IV Push every 12 hours  polyethylene glycol 3350 17 Gram(s) Oral daily  QUEtiapine 400 milliGRAM(s) Oral at bedtime  senna 2 Tablet(s) Oral at bedtime  thiamine 100 milliGRAM(s) Oral daily    MEDICATIONS  (PRN):  acetaminophen     Tablet .. 650 milliGRAM(s) Oral every 6 hours PRN Temp greater or equal to 38C (100.4F), Mild Pain (1 - 3)  aluminum hydroxide/magnesium hydroxide/simethicone Suspension 30 milliLiter(s) Oral every 4 hours PRN Dyspepsia  cyclobenzaprine 5 milliGRAM(s) Oral three times a day PRN Muscle Spasm  melatonin 3 milliGRAM(s) Oral at bedtime PRN Insomnia  scopolamine 1 mG/72 Hr(s) Patch 1 Patch Transdermal every 72 hours PRN Nausea and/or Vomiting    Vitals: Vital Signs Last 24 Hrs  T(C): 35.6 (17 Aug 2022 21:53), Max: 37.2 (17 Aug 2022 10:00)  T(F): 96.1 (17 Aug 2022 21:53), Max: 98.9 (17 Aug 2022 10:00)  HR: 68 (17 Aug 2022 21:53) (68 - 81)  BP: 155/92 (17 Aug 2022 21:53) (141/80 - 155/92)  BP(mean): --  RR: 20 (17 Aug 2022 21:53) (18 - 20)  SpO2: 97% (17 Aug 2022 21:53) (95% - 97%)    Parameters below as of 17 Aug 2022 10:00  Patient On (Oxygen Delivery Method): room air    Available Lab results:                       14.2   14.45 )-----------( 578      ( 17 Aug 2022 07:05 )             43.7   CBC Full  -  ( 17 Aug 2022 07:05 )  WBC Count : 14.45 K/uL  RBC Count : 5.01 M/uL  Hemoglobin : 14.2 g/dL  Hematocrit : 43.7 %  Platelet Count - Automated : 578 K/uL  Mean Cell Volume : 87.2 fL  Mean Cell Hemoglobin : 28.3 pg  Mean Cell Hemoglobin Concentration : 32.5 g/dL  Auto Neutrophil # : 11.29 K/uL  Auto Lymphocyte # : 1.53 K/uL  Auto Monocyte # : 1.29 K/uL  Auto Eosinophil # : 0.00 K/uL  Auto Basophil # : 0.04 K/uL  Auto Neutrophil % : 78.1 %  Auto Lymphocyte % : 10.6 %  Auto Monocyte % : 8.9 %  Auto Eosinophil % : 0.0 %  Auto Basophil % : 0.3 %    EKG:  Ventricular Rate 81 BPM  Atrial Rate 81 BPM  P-R Interval 152 ms  QRS Duration 90 ms  Q-T Interval 412 ms  QTC Calculation(Bazett) 478 ms  P Axis 64 degrees  R Axis 49 degrees  T Axis 58 degrees  Diagnosis Line Normal sinus rhythm  Normal ECG  Confirmed by SIERRA NEWBERRY MD ([id number]) on 8/17/2022 11:25:24 AM

## 2022-08-18 NOTE — BH CONSULTATION LIAISON PROGRESS NOTE - NSBHFUPINTERVALHXFT_PSY_A_CORE
Patient is seen along with third year medical student Reji Russell.    Rob is a 52 year old single male, unemployed, domiciled with mother in [place], with PMHx of vertebral osteomyelitis, and MSSA bacteremia and previous psychiatric history of polysubstance abuse and IVDU, self-reported MDD, JAVON and Bipolar disorder admitted to the hospital since 8/10 for worsening back pain and treatment of possible osteomyelitis. Psychiatry was consulted for mental health evaluation, due to management of multiple psychiatric medications, agitation and acute delirium.    Per Nursing Staff, Pt has not had any new episodes of agitation or aggression requiring medication or restraints. Pt is reported to be calm and cooperative with staff.   Seen by psychiatry service on 8/17/22 initially  and seroquel dose was switched to night time dose only.    On evaluation today, Pt is a 52 year old male with poor grooming, noted to be sitting upright in bed, eating his breakfast on approach. Today, pt is much more oriented, noted to be A&O x 4 without prompting from this interviewer. While pt is still noted to have a little confusion and deficits in concentration (he briefly goes on a tangent about the "little people" who have been in to see him,  before being redirected), he is significantly more conversant and able to participate in the interview. Pt reports that he is feeling much better today, though he endorses experiencing chronic back pain which is bothering him today (and asks this interviewer to go to "score him a bag to help with his pain".) Pt reports that he sees a psychiatrist, Dr. Gates outpatient for management of his anxiety, depression and bipolar disorder. Today, pt states he has not been sleeping "more than an hour at a time." He endorses feelings of panic, and reports that he feels manic "which is how I end up at the hospital like this." He denies feelings of anhedonia, worthlessness, SI or HI. He denies visual, auditory and tactile hallucinations (however, he still describes visits from "little people" that is likely a visual hallucination.) Pt denies feelings of agitation, and when told about his aggression towards staff members on prior days and apologizes stating he "was completely out of it". Today on exam the pt is no longer observed to be tremulous, and is not observed to have tongue fasciculations     Collateral received from Pt's Mother at [phone]. per collateral, pt is still regularly seeing Dr. Gates, following up via telehealth for the most recent visits due to pt's hospitalizations. Pt has been living with his mother in [place] recently, as he has not been able to take care of himself following his last hospitalization in May. Per collateral, pt has never had episodes of hallucinations or delirium like this. She spoke to Pt this morning, and confirms that he is not currently at his baseline. Collateral reports she is concerned about his irregular sleep schedule at home, often falling asleep during any activity during the day, but also often awake all night. Collateral reports she believes he is reliably taking his medication, but offers her concern that he is on "too many medications"     Called [facility] at [phone] for Med Reconciliation. Per Pharmacy, Pt received Gabapentin 300mg TID, Mirtazapine 30mg 2x daily, Buproprion XL 300mg 1x Daily and Seroquel 200mx 1x Daily, last picked up 7/26      Social History: Pt reports that he lives alone in an apartment in [place]. He reports previously working at [employer], but was laid off when [employer] closed its operations. Pt has not worked since. Pt states he is financially supported by his parents.   Substance use: Pt endorses a significant history of polysubstance abuse:  Pt reports a previous history of alcohol use, but is unable to quantify how much, and only specifies that he hasn't had a drink in "a long time." Pt denies requiring inpatient rehab for alcohol abuse.  Pt endorses a regular use of Marijuana, reporting that he will "take 4 or 5 puffs from a joint to help with the pain." Pt not forthcoming with when his last use of marijuana was.  Pt denies current cocaine use, but states he would previously use crack cocaine with heroin. Pt again cannot specify a specific time frame but states this was a "long time ago"  Pt endorses regular heroine use "which I have done for a long time." Pt reports regularly using between 3 and 6 bags a week, last use was on 8/10 "which is how I ended up here." Pt reports that he uses heroine to assist with pain management for his back. Pt takes 135mg Methadone daily for treatment of heroine addiction.

## 2022-08-18 NOTE — BH CONSULTATION LIAISON PROGRESS NOTE - NSBHCHARTREVIEWVS_PSY_A_CORE FT
Vital Signs Last 24 Hrs  T(C): 37.2 (18 Aug 2022 12:06), Max: 37.2 (18 Aug 2022 12:06)  T(F): 99 (18 Aug 2022 12:06), Max: 99 (18 Aug 2022 12:06)  HR: 84 (18 Aug 2022 12:06) (68 - 84)  BP: 137/92 (18 Aug 2022 12:06) (124/78 - 155/92)  BP(mean): --  RR: 18 (18 Aug 2022 12:06) (18 - 20)  SpO2: 97% (18 Aug 2022 12:06) (95% - 97%)    Parameters below as of 18 Aug 2022 12:06  Patient On (Oxygen Delivery Method): room air

## 2022-08-18 NOTE — PROGRESS NOTE ADULT - ASSESSMENT
Assessment and Plan:   · Assessment	    Assessment and Plan:   · Assessment	  51 yo M w/ pmhx of IVDU (heroin), vertebral osteomyelitis, and MSSA bacteremia presents for worsening back pain. Patient was found febrile in the ED with elevated WBC count, CT orbit showing Right periorbital cellulitis.     #Vomiting/abdominal pain  #Delirium- possibly due to withdrawal from Methadone (last dose ~30 hours ago, just gave 4mg iv morphine)   -abdominal pain better after bowel movement  -patient still having NBNB vomiting  -zofran prn  -CT abd/pelvis with oral contrast- if patient does not take contrast then do IV  -patient refusing NG tube  -continue bowel regimen  -IVF while NPO  -lactate normal yesterday, doubt ischemia (also abdominal exam improved)   -lipase elevated; increase fluids to 125cc/hr for now, f/u CT scan     #Recurrence of vertebral OM   #Recent history & post treatment for MSSA bacteremia  #Right periorbital cellulitis  #Sepsis present on admission: leukocytosis, febrile  - CT Orbit w/ IV Cont 8/10: shows Right periorbital (pre-septal) soft tissue swelling consistent with cellulitis. No evidence of abscess. No evidence of post-septal inflammation.  -h/o IR Drainage of Abscess/Biopsy of suspected discitis done 3/16 -fluid  growing NICOLE  -, .8   - daily blood culture until negative   -MR lumbar/t spine: 1. Interval progression of discitis osteomyelitis at L4-5 with increased   destruction of the endplate cortices and intervening disc. Associated   mild vertebral height losses of L4 on L5.    2. Significantly increased epidural phlegmon extending from the right   dorsal epidural space from L2-3 down to sacral level with maximal mass   effect at the right L4-5 epidural space resulting in   impingement/compression of the thecal sac and nerve roots.    3. Slightly increased erosion affecting bilateral L4-5 facet joints (left   more than right), and bilateral L5-S1 facet joints. Increased   enhancements involving bilateral L4-L5 and L5-S1 neural foraminal spaces.    2. Increased paravertebral phlegmon surrounding L4-5 with slightly   increased size of the left retroperitoneal abscess. Slightly smaller   right psoas abscess.    - ID consult appreciated: clindamycin 900mg iv q8h, nafcillin iv 2g q4h  -PAM rescheduled until not vomiting anymore; unsure of when can get again for now  -spoke with Nsx: c/w po decadron 4mg q6h; requesting medical and ID clearance prior to surgery (needs PAM first)    #Transaminitis- resolving   -U/S equivocal  -trend LFT's     #Normocytic Anemia- improved  - Monitor H+H  -ferritin, b12, folate wnl    #Hx of IVD  #Opioid abuse on Methadone  - Continue methadone 135, and gabapentin    #Anxiety/Depression  - c/w mirtazapine + quetiapine  -c/w xanax     #Active nicotine dependence  - c/w nicotine patch  - c/w buproprion    #Morbid obesity  - diet and lifestyle modification     #Hypomagnesemia  -repleted    #Suspected folic acid deficiency  -continue supplement    #DVT PPx- LVX 40mg sq qhs  #GI PPx- None  #Diet- NPO for now  #CHG  #Activity- AAT  #Dispo- pending CT abdomen, PAM, neurosurgery, improvement in vomiting   #Code- FULL    #Progress Note Handoff  Pending (specify): CT abdomen, PAM, neurosurgery

## 2022-08-18 NOTE — BH CONSULTATION LIAISON PROGRESS NOTE - CURRENT MEDICATION
MEDICATIONS  (STANDING):  ALPRAZolam 2 milliGRAM(s) Oral three times a day  buPROPion XL (24-Hour) . 300 milliGRAM(s) Oral daily  ceFAZolin   IVPB 2000 milliGRAM(s) IV Intermittent every 8 hours  clindamycin IVPB 900 milliGRAM(s) IV Intermittent every 8 hours  clindamycin IVPB      dexAMETHasone     Tablet 4 milliGRAM(s) Oral every 6 hours  diatrizoate meglumine/diatrizoate sodium. 30 milliLiter(s) Oral once  enoxaparin Injectable 40 milliGRAM(s) SubCutaneous every 24 hours  folic acid 1 milliGRAM(s) Oral daily  gabapentin 300 milliGRAM(s) Oral three times a day  lactated ringers. 1000 milliLiter(s) (125 mL/Hr) IV Continuous <Continuous>  lactulose Syrup 10 Gram(s) Oral two times a day  mirtazapine 30 milliGRAM(s) Oral at bedtime  multivitamin 1 Tablet(s) Oral daily  nicotine - 21 mG/24Hr(s) Patch 1 Patch Transdermal daily  pantoprazole  Injectable 40 milliGRAM(s) IV Push every 12 hours  polyethylene glycol 3350 17 Gram(s) Oral daily  QUEtiapine 400 milliGRAM(s) Oral at bedtime  senna 2 Tablet(s) Oral at bedtime  thiamine 100 milliGRAM(s) Oral daily    MEDICATIONS  (PRN):  acetaminophen     Tablet .. 650 milliGRAM(s) Oral every 6 hours PRN Temp greater or equal to 38C (100.4F), Mild Pain (1 - 3)  aluminum hydroxide/magnesium hydroxide/simethicone Suspension 30 milliLiter(s) Oral every 4 hours PRN Dyspepsia  cyclobenzaprine 5 milliGRAM(s) Oral three times a day PRN Muscle Spasm  melatonin 3 milliGRAM(s) Oral at bedtime PRN Insomnia  scopolamine 1 mG/72 Hr(s) Patch 1 Patch Transdermal every 72 hours PRN Nausea and/or Vomiting

## 2022-08-18 NOTE — MEDICAL STUDENT PROGRESS NOTE(EDUCATION) - NS MD HP STUD ASPLAN ASSES FT
Rob is a 52 year old male with PMHx of vertebral osteomyelitis, and MSSA bacteremia and previous psychiatric history of Bipolar disorder, MDD, JAVON, polysubstance abuse and IVDU, admitted to the hospital since 8/10 for worsening back pain and treatment of possible osteomyelitis. Pt's delirium is found to be much improved today on exam, no longer requiring restraints and A&O x 4. Pt delirium likely secondary to drug abuse and withdrawal. At this time, Pt denies any SI/HI, no indication for Psychiatric Admission or barriers to discharge. Pt should be continued on home medications while in the hospital to prevent withdrawal symptoms during his stay. Continue to monitor QTC via EKG. Rob is a 52 year old male with PMHx of vertebral osteomyelitis, and MSSA bacteremia and previous psychiatric history of Bipolar disorder, MDD, JAVON, polysubstance abuse and IVDU, admitted to the hospital since 8/10 for worsening back pain and treatment of possible osteomyelitis. Pt's delirium is found to be much improved today on exam, no longer requiring restraints and A&O x 4. Pt delirium likely secondary to drug abuse and withdrawal. At this time, Pt denies any SI/HI, no indication for Psychiatric Admission or barriers to discharge. Pt should be continued on home medications  of wellbutrin XL 300mg po daily, remeron at 30mg po hs, methadone 135mg po daily, may consider holding gabapentin 300mg po three times a day ) while in the hospital to prevent withdrawal symptoms during his stay. Continue to monitor QTC via EKG.

## 2022-08-18 NOTE — CONSULT NOTE ADULT - ASSESSMENT
Preseptal Cellulitises with improvement on IV Abx  continue treatment  Patient should follow up as outpatient Clinic number is [phone]  Reconsult if needed.

## 2022-08-18 NOTE — CONSULT NOTE ADULT - SUBJECTIVE AND OBJECTIVE BOX
HPI:  51 yo M w/ pmhx of IVDU (heroin), vertebral osteomyelitis, and MSSA bacteremia presents for worsening back pain. Back pain was increasing over the past couple of weeks and worsening today to the point he was having difficulty ambulating and bearing weight. Pain is sharp, radiating from low back to b/l hips and worse with movement. Patient normally able to ambulate with walker but now having difficulty walking due to pain. He endorses feeling similar to the pain from prior admission. Pt is also complaining of right sided eye swelling/redness since Tuesday. He states, he slept on the floor and woke up in the am with redness/swelling. Patient denies any visual changes or eye pain. Patient denies any fever, chills, headache, dizziness. Patient denies chest pain, palpitation, SOB. Patient denies abdominal pain, n/v/d/c.   Patient was recently admitted 03/12-04/06 for vertebral OM/facetitis. PAM was negative and ID recommended cefazolin. Patient had picc line placed for 6wks of antibiotics. Patient states he finished his antibiotics at [facility] but did not follow up with Dr Giraldo. Patient states he is currently not using heroin. He last used 10 months ago.   Patient was found febrile in the ED with elevated WBC count, CT orbit showing Right periorbital cellulitis.     ED vitals T(F): 99 (08-10-22 @ 16:02), Max: 100.5 (08-10-22 @ 13:08), HR: 96 (08-10-22 @ 16:02) (96 - 99), BP: 130/61 (08-10-22 @ 16:02) (130/61 - 144/88), RR: 18 (08-10-22 @ 16:02) (18 - 18), SpO2: 95% (08-10-22 @ 16:02) (95% - 99%)    Labs show HB 10.8, HCT 32.7, WBC 15.24, , Lactate 1.0, TPro  6.6  /  Alb  3.4<L>  /  TBili  0.7  /  DBili  x   /  AST  73<H>  /  ALT  46<H>  /  AlkPhos  97  08-10    137  |  96<L>  |  10  ----------------------------<  149<H>  4.1   |  31  |  0.9    Ca    9.1      10 Aug 2022 15:12  Mg     1.7     08-10    CT Orbit w/ IV Cont shows Right periorbital (pre-septal) soft tissue swelling consistent with cellulitis. No evidence of abscess. No evidence of post-septal inflammation.     (10 Aug 2022 20:20)      PAST MEDICAL & SURGICAL HISTORY:  IV drug abuse      Vertebral osteomyelitis      MSSA bacteremia      No significant past surgical history          Review of Systems    SOCIAL HISTORY:    FAMILY HISTORY:  No pertinent family history in first degree relatives        Vital Signs Last 24 Hrs  T(C): 36 (18 Aug 2022 16:37), Max: 37.2 (18 Aug 2022 12:06)  T(F): 96.8 (18 Aug 2022 16:37), Max: 99 (18 Aug 2022 12:06)  HR: 65 (18 Aug 2022 16:37) (65 - 84)  BP: 136/90 (18 Aug 2022 16:37) (124/78 - 155/92)  BP(mean): --  RR: 18 (18 Aug 2022 16:37) (18 - 20)  SpO2: 95% (18 Aug 2022 16:37) (95% - 97%)    Parameters below as of 18 Aug 2022 16:37  Patient On (Oxygen Delivery Method): room air    EYE Exam Bedside  Patient states Periorbital area is less, and no pain.   Patient last eye exam was awhile ago. (unsure)  Patient notices no change in vision.   Has a hx of having trouble with colors.       Va: OD. OS 20/40 vision with out correction. (Patient stated that he had trouble at [facility] with the eye test there)    Pupils: PERRL NO APD   EOM:  Full    SLE: Right Upper lid edematous, skin flaky       IOP: 15 OD, OS      DFE:    Trop 1 %  C/D .45 good margins  A/V 1/3 and tortuous  Mac wnl flat  No Holes tears flat to extent seen      LABS:                        14.2   14.45 )-----------( 578      ( 17 Aug 2022 07:05 )             43.7     08-17    140  |  98  |  26<H>  ----------------------------<  189<H>  4.1   |  28  |  1.0    Ca    9.2      17 Aug 2022 07:05  Mg     2.3     08-17    TPro  8.0  /  Alb  3.6  /  TBili  0.3  /  DBili  x   /  AST  21  /  ALT  64<H>  /  AlkPhos  146<H>  08-17          RADIOLOGY & ADDITIONAL STUDIES:

## 2022-08-19 LAB
ALBUMIN SERPL ELPH-MCNC: 3.3 G/DL — LOW (ref 3.5–5.2)
ALP SERPL-CCNC: 128 U/L — HIGH (ref 30–115)
ALT FLD-CCNC: 20 U/L — SIGNIFICANT CHANGE UP (ref 0–41)
ANION GAP SERPL CALC-SCNC: 13 MMOL/L — SIGNIFICANT CHANGE UP (ref 7–14)
ANION GAP SERPL CALC-SCNC: 16 MMOL/L — HIGH (ref 7–14)
AST SERPL-CCNC: 19 U/L — SIGNIFICANT CHANGE UP (ref 0–41)
BASOPHILS # BLD AUTO: 0.03 K/UL — SIGNIFICANT CHANGE UP (ref 0–0.2)
BASOPHILS NFR BLD AUTO: 0.2 % — SIGNIFICANT CHANGE UP (ref 0–1)
BILIRUB SERPL-MCNC: 0.3 MG/DL — SIGNIFICANT CHANGE UP (ref 0.2–1.2)
BUN SERPL-MCNC: 22 MG/DL — HIGH (ref 10–20)
BUN SERPL-MCNC: 23 MG/DL — HIGH (ref 10–20)
CALCIUM SERPL-MCNC: 8.4 MG/DL — LOW (ref 8.5–10.1)
CALCIUM SERPL-MCNC: 8.5 MG/DL — SIGNIFICANT CHANGE UP (ref 8.5–10.1)
CHLORIDE SERPL-SCNC: 98 MMOL/L — SIGNIFICANT CHANGE UP (ref 98–110)
CHLORIDE SERPL-SCNC: 99 MMOL/L — SIGNIFICANT CHANGE UP (ref 98–110)
CO2 SERPL-SCNC: 20 MMOL/L — SIGNIFICANT CHANGE UP (ref 17–32)
CO2 SERPL-SCNC: 22 MMOL/L — SIGNIFICANT CHANGE UP (ref 17–32)
CREAT SERPL-MCNC: 0.9 MG/DL — SIGNIFICANT CHANGE UP (ref 0.7–1.5)
CREAT SERPL-MCNC: 0.9 MG/DL — SIGNIFICANT CHANGE UP (ref 0.7–1.5)
CULTURE RESULTS: SIGNIFICANT CHANGE UP
EGFR: 102 ML/MIN/1.73M2 — SIGNIFICANT CHANGE UP
EGFR: 102 ML/MIN/1.73M2 — SIGNIFICANT CHANGE UP
EOSINOPHIL # BLD AUTO: 0.03 K/UL — SIGNIFICANT CHANGE UP (ref 0–0.7)
EOSINOPHIL NFR BLD AUTO: 0.2 % — SIGNIFICANT CHANGE UP (ref 0–8)
GLUCOSE SERPL-MCNC: 190 MG/DL — HIGH (ref 70–99)
GLUCOSE SERPL-MCNC: 234 MG/DL — HIGH (ref 70–99)
HCT VFR BLD CALC: 39 % — LOW (ref 42–52)
HGB BLD-MCNC: 12.9 G/DL — LOW (ref 14–18)
IMM GRANULOCYTES NFR BLD AUTO: 1.4 % — HIGH (ref 0.1–0.3)
LYMPHOCYTES # BLD AUTO: 15.8 % — LOW (ref 20.5–51.1)
LYMPHOCYTES # BLD AUTO: 2.58 K/UL — SIGNIFICANT CHANGE UP (ref 1.2–3.4)
MAGNESIUM SERPL-MCNC: 2.2 MG/DL — SIGNIFICANT CHANGE UP (ref 1.8–2.4)
MCHC RBC-ENTMCNC: 29 PG — SIGNIFICANT CHANGE UP (ref 27–31)
MCHC RBC-ENTMCNC: 33.1 G/DL — SIGNIFICANT CHANGE UP (ref 32–37)
MCV RBC AUTO: 87.6 FL — SIGNIFICANT CHANGE UP (ref 80–94)
MONOCYTES # BLD AUTO: 0.82 K/UL — HIGH (ref 0.1–0.6)
MONOCYTES NFR BLD AUTO: 5 % — SIGNIFICANT CHANGE UP (ref 1.7–9.3)
NEUTROPHILS # BLD AUTO: 12.67 K/UL — HIGH (ref 1.4–6.5)
NEUTROPHILS NFR BLD AUTO: 77.4 % — HIGH (ref 42.2–75.2)
NRBC # BLD: 0 /100 WBCS — SIGNIFICANT CHANGE UP (ref 0–0)
PLATELET # BLD AUTO: 504 K/UL — HIGH (ref 130–400)
POTASSIUM SERPL-MCNC: 4.9 MMOL/L — SIGNIFICANT CHANGE UP (ref 3.5–5)
POTASSIUM SERPL-MCNC: 5.4 MMOL/L — HIGH (ref 3.5–5)
POTASSIUM SERPL-SCNC: 4.9 MMOL/L — SIGNIFICANT CHANGE UP (ref 3.5–5)
POTASSIUM SERPL-SCNC: 5.4 MMOL/L — HIGH (ref 3.5–5)
PROT SERPL-MCNC: 7.4 G/DL — SIGNIFICANT CHANGE UP (ref 6–8)
RBC # BLD: 4.45 M/UL — LOW (ref 4.7–6.1)
RBC # FLD: 15.4 % — HIGH (ref 11.5–14.5)
SODIUM SERPL-SCNC: 133 MMOL/L — LOW (ref 135–146)
SODIUM SERPL-SCNC: 135 MMOL/L — SIGNIFICANT CHANGE UP (ref 135–146)
SPECIMEN SOURCE: SIGNIFICANT CHANGE UP
WBC # BLD: 16.36 K/UL — HIGH (ref 4.8–10.8)
WBC # FLD AUTO: 16.36 K/UL — HIGH (ref 4.8–10.8)

## 2022-08-19 PROCEDURE — 99233 SBSQ HOSP IP/OBS HIGH 50: CPT

## 2022-08-19 RX ORDER — METHADONE HYDROCHLORIDE 40 MG/1
135 TABLET ORAL DAILY
Refills: 0 | Status: DISCONTINUED | OUTPATIENT
Start: 2022-08-19 | End: 2022-08-23

## 2022-08-19 RX ORDER — DEXTROSE 50 % IN WATER 50 %
50 SYRINGE (ML) INTRAVENOUS ONCE
Refills: 0 | Status: COMPLETED | OUTPATIENT
Start: 2022-08-19 | End: 2022-08-19

## 2022-08-19 RX ORDER — INSULIN HUMAN 100 [IU]/ML
10 INJECTION, SOLUTION SUBCUTANEOUS ONCE
Refills: 0 | Status: COMPLETED | OUTPATIENT
Start: 2022-08-19 | End: 2022-08-19

## 2022-08-19 RX ORDER — SODIUM ZIRCONIUM CYCLOSILICATE 10 G/10G
10 POWDER, FOR SUSPENSION ORAL ONCE
Refills: 0 | Status: COMPLETED | OUTPATIENT
Start: 2022-08-19 | End: 2022-08-19

## 2022-08-19 RX ADMIN — INSULIN HUMAN 10 UNIT(S): 100 INJECTION, SOLUTION SUBCUTANEOUS at 18:11

## 2022-08-19 RX ADMIN — Medication 100 MILLIGRAM(S): at 09:25

## 2022-08-19 RX ADMIN — SENNA PLUS 2 TABLET(S): 8.6 TABLET ORAL at 21:59

## 2022-08-19 RX ADMIN — Medication 100 MILLIGRAM(S): at 18:10

## 2022-08-19 RX ADMIN — BUPROPION HYDROCHLORIDE 300 MILLIGRAM(S): 150 TABLET, EXTENDED RELEASE ORAL at 11:43

## 2022-08-19 RX ADMIN — Medication 4 MILLIGRAM(S): at 18:09

## 2022-08-19 RX ADMIN — Medication 4 MILLIGRAM(S): at 11:43

## 2022-08-19 RX ADMIN — METHADONE HYDROCHLORIDE 135 MILLIGRAM(S): 40 TABLET ORAL at 12:31

## 2022-08-19 RX ADMIN — SODIUM ZIRCONIUM CYCLOSILICATE 10 GRAM(S): 10 POWDER, FOR SUSPENSION ORAL at 18:10

## 2022-08-19 RX ADMIN — MIRTAZAPINE 30 MILLIGRAM(S): 45 TABLET, ORALLY DISINTEGRATING ORAL at 21:59

## 2022-08-19 RX ADMIN — Medication 2 MILLIGRAM(S): at 15:07

## 2022-08-19 RX ADMIN — Medication 2 MILLIGRAM(S): at 05:10

## 2022-08-19 RX ADMIN — LACTULOSE 10 GRAM(S): 10 SOLUTION ORAL at 18:11

## 2022-08-19 RX ADMIN — Medication 50 MILLILITER(S): at 18:09

## 2022-08-19 RX ADMIN — PANTOPRAZOLE SODIUM 40 MILLIGRAM(S): 20 TABLET, DELAYED RELEASE ORAL at 18:09

## 2022-08-19 RX ADMIN — PANTOPRAZOLE SODIUM 40 MILLIGRAM(S): 20 TABLET, DELAYED RELEASE ORAL at 05:10

## 2022-08-19 RX ADMIN — Medication 1 MILLIGRAM(S): at 11:43

## 2022-08-19 RX ADMIN — LACTULOSE 10 GRAM(S): 10 SOLUTION ORAL at 05:10

## 2022-08-19 RX ADMIN — Medication 100 MILLIGRAM(S): at 22:02

## 2022-08-19 RX ADMIN — Medication 1 PATCH: at 19:47

## 2022-08-19 RX ADMIN — QUETIAPINE FUMARATE 200 MILLIGRAM(S): 200 TABLET, FILM COATED ORAL at 21:59

## 2022-08-19 RX ADMIN — Medication 1 TABLET(S): at 11:43

## 2022-08-19 RX ADMIN — Medication 100 MILLIGRAM(S): at 15:07

## 2022-08-19 RX ADMIN — Medication 2 MILLIGRAM(S): at 21:59

## 2022-08-19 RX ADMIN — Medication 1 PATCH: at 11:44

## 2022-08-19 RX ADMIN — Medication 100 MILLIGRAM(S): at 05:11

## 2022-08-19 RX ADMIN — Medication 100 MILLIGRAM(S): at 11:43

## 2022-08-19 RX ADMIN — Medication 100 MILLIGRAM(S): at 01:06

## 2022-08-19 RX ADMIN — Medication 3 MILLIGRAM(S): at 22:01

## 2022-08-19 RX ADMIN — Medication 4 MILLIGRAM(S): at 05:11

## 2022-08-19 RX ADMIN — Medication 4 MILLIGRAM(S): at 01:06

## 2022-08-19 NOTE — CHART NOTE - NSCHARTNOTEFT_GEN_A_CORE
Pt refused PAM today and ate in the morning regardless of explaining the importance of it prior to any Neurosurgical procedure.  After long discussion of Medical attending and myself, Pt amendable to proceed with PAM at this time.  Please follow up with Cardiology for next schedule date. Pt refused PAM today and ate in the morning regardless of explaining the importance of it prior to any Neurosurgical procedure.  After long discussion of Medical attending and myself, Pt amendable to proceed with PAM at this time.  Cardiology contacted, Pt will be scheduled add-on on Monday  Please repeat COVID-19 PCR tomorrow.  NPO after Midnight on Sunday for PAM on Monday Morning

## 2022-08-19 NOTE — PROGRESS NOTE ADULT - SUBJECTIVE AND OBJECTIVE BOX
8.19.22  NAEO,  discussed the importance of ECHO prior to Neurosugery at bedside.  Pending ECHO        8.18.22  N/V under control => ECHO tomorrow? BCx +jef MSSA      8.17.22 AAOx0, on Cefzolin Clinda for L-spine osteo, BCx +jef MSSA, ID on board, N/D cannot tolerate ECHO, QTc <499, c/o LBP. Hx IV drug abuse, Per Psy consult: cont Methadone home dose, Haldol, avoid Benzo.  Pending ECHO            PHYSICAL EXAM:  GEN: ill-appearing, pale   HEENT: PERRL; EOMI; conjunctiva clear; OD with swelling/erythema around orbit (improved), not painful to move eye   CHEST: clear to auscultation bilaterally; no wheezes; no rales; no rhonchi  CARDS: 4/6 systolic murmur  ABD: soft, nt, nd  NEURO: cranial nerves II-XII intact; superficial reflexes intact, mild motor weakness LEs improved; no sensory deficits  LYMPH: No lymphadedenopathy  MSK: no joint swelling; no joint erythema; no joint warmth; b/l calf tenderness (no erythema)

## 2022-08-19 NOTE — PROGRESS NOTE ADULT - ASSESSMENT
53 yo M w/ pmhx of IVDU (heroin), vertebral osteomyelitis, and MSSA bacteremia presents for worsening back pain. Patient was found febrile in the ED with elevated WBC count, CT orbit showing Right periorbital cellulitis.     #Vomiting/abdominal pain  #Delirium- possibly due to withdrawal from Methadone (last dose ~30 hours ago, just gave 4mg iv morphine)   -abdominal pain better after bowel movement  -patient still having NBNB vomiting  -zofran prn  -CT abd/pelvis with oral contrast- if patient does not take contrast then do IV  -patient refusing NG tube  -continue bowel regimen  -IVF while NPO  -lactate normal yesterday, doubt ischemia (also abdominal exam improved)   -lipase elevated; increase fluids to 125cc/hr for now, f/u CT scan     #Recurrence of vertebral OM   #Recent history & post treatment for MSSA bacteremia  #Right periorbital cellulitis  #Sepsis present on admission: leukocytosis, febrile  - CT Orbit w/ IV Cont 8/10: shows Right periorbital (pre-septal) soft tissue swelling consistent with cellulitis. No evidence of abscess. No evidence of post-septal inflammation.  -h/o IR Drainage of Abscess/Biopsy of suspected discitis done 3/16 -fluid  growing NICOLE  -, .8   - daily blood culture until negative   -MR lumbar/t spine: 1. Interval progression of discitis osteomyelitis at L4-5 with increased   destruction of the endplate cortices and intervening disc. Associated   mild vertebral height losses of L4 on L5.    2. Significantly increased epidural phlegmon extending from the right   dorsal epidural space from L2-3 down to sacral level with maximal mass   effect at the right L4-5 epidural space resulting in   impingement/compression of the thecal sac and nerve roots.    3. Slightly increased erosion affecting bilateral L4-5 facet joints (left   more than right), and bilateral L5-S1 facet joints. Increased   enhancements involving bilateral L4-L5 and L5-S1 neural foraminal spaces.    2. Increased paravertebral phlegmon surrounding L4-5 with slightly   increased size of the left retroperitoneal abscess. Slightly smaller   right psoas abscess.    - ID consult appreciated: clindamycin 900mg iv q8h, nafcillin iv 2g q4h  -PAM rescheduled until not vomiting anymore; unsure of when can get again for now  -spoke with Nsx: c/w po decadron 4mg q6h; requesting medical and ID clearance prior to surgery (needs PAM first)    #Transaminitis- resolving   -U/S equivocal  -trend LFT's     #Normocytic Anemia- improved  - Monitor H+H  -ferritin, b12, folate wnl    #Hx of IVD  #Opioid abuse on Methadone  - Continue methadone 135, and gabapentin    #Anxiety/Depression  - c/w mirtazapine + quetiapine  -c/w xanax     #Active nicotine dependence  - c/w nicotine patch  - c/w buproprion    #Morbid obesity  - diet and lifestyle modification     #Hypomagnesemia  -repleted    #Suspected folic acid deficiency  -continue supplement    #DVT PPx- LVX 40mg sq qhs  #GI PPx- None  #Diet- NPO for now  #CHG  #Activity- AAT  #Dispo- pending CT abdomen, PAM, neurosurgery, improvement in vomiting   #Code- FULL    #Progress Note Handoff  Pending (specify): CT abdomen, PAM, neurosurgery

## 2022-08-20 LAB
CULTURE RESULTS: SIGNIFICANT CHANGE UP
CULTURE RESULTS: SIGNIFICANT CHANGE UP
SPECIMEN SOURCE: SIGNIFICANT CHANGE UP
SPECIMEN SOURCE: SIGNIFICANT CHANGE UP

## 2022-08-20 PROCEDURE — 99233 SBSQ HOSP IP/OBS HIGH 50: CPT

## 2022-08-20 PROCEDURE — 71275 CT ANGIOGRAPHY CHEST: CPT | Mod: 26

## 2022-08-20 PROCEDURE — 93010 ELECTROCARDIOGRAM REPORT: CPT

## 2022-08-20 RX ADMIN — Medication 2 MILLIGRAM(S): at 05:11

## 2022-08-20 RX ADMIN — Medication 1 PATCH: at 12:08

## 2022-08-20 RX ADMIN — Medication 1 TABLET(S): at 12:07

## 2022-08-20 RX ADMIN — Medication 4 MILLIGRAM(S): at 12:08

## 2022-08-20 RX ADMIN — METHADONE HYDROCHLORIDE 135 MILLIGRAM(S): 40 TABLET ORAL at 11:02

## 2022-08-20 RX ADMIN — Medication 100 MILLIGRAM(S): at 05:11

## 2022-08-20 RX ADMIN — Medication 1 PATCH: at 08:15

## 2022-08-20 RX ADMIN — Medication 2 MILLIGRAM(S): at 14:02

## 2022-08-20 RX ADMIN — Medication 100 MILLIGRAM(S): at 12:07

## 2022-08-20 RX ADMIN — Medication 2 MILLIGRAM(S): at 21:43

## 2022-08-20 RX ADMIN — PANTOPRAZOLE SODIUM 40 MILLIGRAM(S): 20 TABLET, DELAYED RELEASE ORAL at 17:20

## 2022-08-20 RX ADMIN — Medication 100 MILLIGRAM(S): at 14:06

## 2022-08-20 RX ADMIN — MIRTAZAPINE 30 MILLIGRAM(S): 45 TABLET, ORALLY DISINTEGRATING ORAL at 21:41

## 2022-08-20 RX ADMIN — QUETIAPINE FUMARATE 200 MILLIGRAM(S): 200 TABLET, FILM COATED ORAL at 21:41

## 2022-08-20 RX ADMIN — Medication 100 MILLIGRAM(S): at 10:43

## 2022-08-20 RX ADMIN — Medication 100 MILLIGRAM(S): at 21:41

## 2022-08-20 RX ADMIN — Medication 4 MILLIGRAM(S): at 05:12

## 2022-08-20 RX ADMIN — ENOXAPARIN SODIUM 40 MILLIGRAM(S): 100 INJECTION SUBCUTANEOUS at 21:41

## 2022-08-20 RX ADMIN — Medication 4 MILLIGRAM(S): at 01:04

## 2022-08-20 RX ADMIN — BUPROPION HYDROCHLORIDE 300 MILLIGRAM(S): 150 TABLET, EXTENDED RELEASE ORAL at 12:07

## 2022-08-20 RX ADMIN — Medication 100 MILLIGRAM(S): at 01:03

## 2022-08-20 RX ADMIN — Medication 4 MILLIGRAM(S): at 17:20

## 2022-08-20 RX ADMIN — Medication 100 MILLIGRAM(S): at 17:21

## 2022-08-20 RX ADMIN — Medication 1 MILLIGRAM(S): at 12:07

## 2022-08-20 NOTE — PROGRESS NOTE ADULT - SUBJECTIVE AND OBJECTIVE BOX
8.20.22  NAEO, eating talking   PAM scheduled for 8.22.22  <= TTE 8.11.22 EF 55% no vegetation    D-Dimer 934 => CTA lung pending   Pre- Neurosurgery: neg trop, unremarkable CT abd  Cr 0.9        8.19.22  NAEO,  discussed the importance of ECHO prior to Neurosugery at bedside.  Pending ECHO        8.18.22  N/V under control => ECHO tomorrow? BCx +jef MSSA      8.17.22 AAOx0, on Cefzolin Clinda for L-spine osteo, BCx +jef MSSA, ID on board, N/D cannot tolerate ECHO, QTc <499, c/o LBP. Hx IV drug abuse, Per Psy consult: cont Methadone home dose, Haldol, avoid Benzo.  Pending ECHO            PHYSICAL EXAM:  GEN: ill-appearing, pale   HEENT: PERRL; EOMI; conjunctiva clear; OD with swelling/erythema around orbit (improved), not painful to move eye   CHEST: clear to auscultation bilaterally; no wheezes; no rales; no rhonchi  CARDS: 4/6 systolic murmur  ABD: soft, nt, nd  NEURO: cranial nerves II-XII intact; superficial reflexes intact, mild motor weakness LEs improved; no sensory deficits  LYMPH: No lymphadedenopathy  MSK: no joint swelling; no joint erythema; no joint warmth; b/l calf tenderness (no erythema)

## 2022-08-20 NOTE — PROGRESS NOTE ADULT - ASSESSMENT
· Assessment	    51 yo M w/ pmhx of IVDU (heroin), vertebral osteomyelitis, and MSSA bacteremia presents for worsening back pain. Patient was found febrile in the ED with elevated WBC count, CT orbit showing Right periorbital cellulitis.     #Vomiting/abdominal pain  #Delirium- possibly due to withdrawal from Methadone (last dose ~30 hours ago, just gave 4mg iv morphine)   -abdominal pain better after bowel movement  -patient still having NBNB vomiting  -zofran prn  -CT abd/pelvis with oral contrast- if patient does not take contrast then do IV  -patient refusing NG tube  -continue bowel regimen  -IVF while NPO  -lactate normal yesterday, doubt ischemia (also abdominal exam improved)   -lipase elevated; increase fluids to 125cc/hr for now, f/u CT scan     #Recurrence of vertebral OM   #Recent history & post treatment for MSSA bacteremia  #Right periorbital cellulitis  #Sepsis present on admission: leukocytosis, febrile  - CT Orbit w/ IV Cont 8/10: shows Right periorbital (pre-septal) soft tissue swelling consistent with cellulitis. No evidence of abscess. No evidence of post-septal inflammation.  -h/o IR Drainage of Abscess/Biopsy of suspected discitis done 3/16 -fluid  growing NICOLE  -, .8   - daily blood culture until negative   -MR lumbar/t spine: 1. Interval progression of discitis osteomyelitis at L4-5 with increased   destruction of the endplate cortices and intervening disc. Associated   mild vertebral height losses of L4 on L5.    2. Significantly increased epidural phlegmon extending from the right   dorsal epidural space from L2-3 down to sacral level with maximal mass   effect at the right L4-5 epidural space resulting in   impingement/compression of the thecal sac and nerve roots.    3. Slightly increased erosion affecting bilateral L4-5 facet joints (left   more than right), and bilateral L5-S1 facet joints. Increased   enhancements involving bilateral L4-L5 and L5-S1 neural foraminal spaces.    2. Increased paravertebral phlegmon surrounding L4-5 with slightly   increased size of the left retroperitoneal abscess. Slightly smaller   right psoas abscess.    - ID consult appreciated: clindamycin 900mg iv q8h, nafcillin iv 2g q4h  -PAM rescheduled until not vomiting anymore; unsure of when can get again for now  -spoke with Nsx: c/w po decadron 4mg q6h; requesting medical and ID clearance prior to surgery (needs PAM first)    #Transaminitis- resolving   -U/S equivocal  -trend LFT's     #Normocytic Anemia- improved  - Monitor H+H  -ferritin, b12, folate wnl    #Hx of IVD  #Opioid abuse on Methadone  - Continue methadone 135, and gabapentin    #Anxiety/Depression  - c/w mirtazapine + quetiapine  -c/w xanax     #Active nicotine dependence  - c/w nicotine patch  - c/w buproprion    #Morbid obesity  - diet and lifestyle modification     #Hypomagnesemia  -repleted    #Suspected folic acid deficiency  -continue supplement    #DVT PPx- LVX 40mg sq qhs  #GI PPx- None  #Diet- NPO for now  #CHG  #Activity- AAT  #Dispo- pending CT abdomen, PAM, neurosurgery, improvement in vomiting   #Code- FULL    #Progress Note Handoff  Pending (specify): CT abdomen, PAM, neurosurgery

## 2022-08-21 LAB
ANION GAP SERPL CALC-SCNC: 13 MMOL/L — SIGNIFICANT CHANGE UP (ref 7–14)
BUN SERPL-MCNC: 22 MG/DL — HIGH (ref 10–20)
CALCIUM SERPL-MCNC: 8.6 MG/DL — SIGNIFICANT CHANGE UP (ref 8.5–10.1)
CHLORIDE SERPL-SCNC: 96 MMOL/L — LOW (ref 98–110)
CO2 SERPL-SCNC: 21 MMOL/L — SIGNIFICANT CHANGE UP (ref 17–32)
CREAT SERPL-MCNC: 0.8 MG/DL — SIGNIFICANT CHANGE UP (ref 0.7–1.5)
EGFR: 106 ML/MIN/1.73M2 — SIGNIFICANT CHANGE UP
GLUCOSE SERPL-MCNC: 166 MG/DL — HIGH (ref 70–99)
HCT VFR BLD CALC: 39.4 % — LOW (ref 42–52)
HGB BLD-MCNC: 12.9 G/DL — LOW (ref 14–18)
MCHC RBC-ENTMCNC: 29.1 PG — SIGNIFICANT CHANGE UP (ref 27–31)
MCHC RBC-ENTMCNC: 32.7 G/DL — SIGNIFICANT CHANGE UP (ref 32–37)
MCV RBC AUTO: 88.9 FL — SIGNIFICANT CHANGE UP (ref 80–94)
NRBC # BLD: 0 /100 WBCS — SIGNIFICANT CHANGE UP (ref 0–0)
PLATELET # BLD AUTO: 606 K/UL — HIGH (ref 130–400)
POTASSIUM SERPL-MCNC: 5.6 MMOL/L — HIGH (ref 3.5–5)
POTASSIUM SERPL-SCNC: 5.6 MMOL/L — HIGH (ref 3.5–5)
RBC # BLD: 4.43 M/UL — LOW (ref 4.7–6.1)
RBC # FLD: 15.3 % — HIGH (ref 11.5–14.5)
SODIUM SERPL-SCNC: 130 MMOL/L — LOW (ref 135–146)
WBC # BLD: 18.07 K/UL — HIGH (ref 4.8–10.8)
WBC # FLD AUTO: 18.07 K/UL — HIGH (ref 4.8–10.8)

## 2022-08-21 PROCEDURE — 99233 SBSQ HOSP IP/OBS HIGH 50: CPT

## 2022-08-21 RX ADMIN — Medication 1 PATCH: at 11:18

## 2022-08-21 RX ADMIN — Medication 2 MILLIGRAM(S): at 21:30

## 2022-08-21 RX ADMIN — Medication 2 MILLIGRAM(S): at 14:58

## 2022-08-21 RX ADMIN — QUETIAPINE FUMARATE 200 MILLIGRAM(S): 200 TABLET, FILM COATED ORAL at 21:31

## 2022-08-21 RX ADMIN — Medication 4 MILLIGRAM(S): at 23:03

## 2022-08-21 RX ADMIN — Medication 4 MILLIGRAM(S): at 17:49

## 2022-08-21 RX ADMIN — PANTOPRAZOLE SODIUM 40 MILLIGRAM(S): 20 TABLET, DELAYED RELEASE ORAL at 06:07

## 2022-08-21 RX ADMIN — Medication 100 MILLIGRAM(S): at 13:02

## 2022-08-21 RX ADMIN — Medication 100 MILLIGRAM(S): at 02:59

## 2022-08-21 RX ADMIN — Medication 100 MILLIGRAM(S): at 21:28

## 2022-08-21 RX ADMIN — Medication 1 TABLET(S): at 11:19

## 2022-08-21 RX ADMIN — MIRTAZAPINE 30 MILLIGRAM(S): 45 TABLET, ORALLY DISINTEGRATING ORAL at 21:31

## 2022-08-21 RX ADMIN — METHADONE HYDROCHLORIDE 135 MILLIGRAM(S): 40 TABLET ORAL at 09:15

## 2022-08-21 RX ADMIN — Medication 2 MILLIGRAM(S): at 06:05

## 2022-08-21 RX ADMIN — Medication 1 PATCH: at 11:30

## 2022-08-21 RX ADMIN — Medication 100 MILLIGRAM(S): at 11:17

## 2022-08-21 RX ADMIN — Medication 100 MILLIGRAM(S): at 13:54

## 2022-08-21 RX ADMIN — PANTOPRAZOLE SODIUM 40 MILLIGRAM(S): 20 TABLET, DELAYED RELEASE ORAL at 17:50

## 2022-08-21 RX ADMIN — Medication 100 MILLIGRAM(S): at 06:02

## 2022-08-21 RX ADMIN — BUPROPION HYDROCHLORIDE 300 MILLIGRAM(S): 150 TABLET, EXTENDED RELEASE ORAL at 11:18

## 2022-08-21 RX ADMIN — ENOXAPARIN SODIUM 40 MILLIGRAM(S): 100 INJECTION SUBCUTANEOUS at 21:31

## 2022-08-21 RX ADMIN — Medication 4 MILLIGRAM(S): at 00:20

## 2022-08-21 RX ADMIN — Medication 1 PATCH: at 19:58

## 2022-08-21 RX ADMIN — Medication 1 PATCH: at 08:20

## 2022-08-21 RX ADMIN — Medication 1 MILLIGRAM(S): at 11:17

## 2022-08-21 RX ADMIN — Medication 4 MILLIGRAM(S): at 06:02

## 2022-08-21 RX ADMIN — Medication 4 MILLIGRAM(S): at 13:25

## 2022-08-21 NOTE — PROGRESS NOTE ADULT - SUBJECTIVE AND OBJECTIVE BOX
8.21.22  NAEO, eating, talking, pleasant,  neg trop,   CTA chest: neg PE, PNA  PAM scheduled for 8.22.22 8.20.22  NAEO, eating talking   PAM scheduled for 8.22.22  <= TTE 8.11.22 EF 55% no vegetation    D-Dimer 934 => CTA lung pending   Pre- Neurosurgery: neg trop, unremarkable CT abd  Cr 0.9        8.19.22  NAEO,  discussed the importance of ECHO prior to Neurosugery at bedside.  Pending ECHO        8.18.22  N/V under control => ECHO tomorrow? BCx +jef MSSA      8.17.22 AAOx0, on Cefzolin Clinda for L-spine osteo, BCx +jef MSSA, ID on board, N/D cannot tolerate ECHO, QTc <499, c/o LBP. Hx IV drug abuse, Per Psy consult: cont Methadone home dose, Haldol, avoid Benzo.  Pending ECHO            PHYSICAL EXAM:  GEN: ill-appearing, pale   HEENT: PERRL; EOMI; conjunctiva clear; OD with swelling/erythema around orbit (improved), not painful to move eye   CHEST: clear to auscultation bilaterally; no wheezes; no rales; no rhonchi  CARDS: 4/6 systolic murmur  ABD: soft, nt, nd  NEURO: cranial nerves II-XII intact; superficial reflexes intact, mild motor weakness LEs improved; no sensory deficits  LYMPH: No lymphadedenopathy  MSK: no joint swelling; no joint erythema; no joint warmth; b/l calf tenderness (no erythema)

## 2022-08-21 NOTE — PROGRESS NOTE ADULT - ASSESSMENT
· Assessment	    51 yo M w/ pmhx of IVDU (heroin), vertebral osteomyelitis, and MSSA bacteremia presents for worsening back pain. Patient was found febrile in the ED with elevated WBC count, CT orbit showing Right periorbital cellulitis.     #Vomiting/abdominal pain  #Delirium- possibly due to withdrawal from Methadone (last dose ~30 hours ago, just gave 4mg iv morphine)   -abdominal pain better after bowel movement  -patient still having NBNB vomiting  -zofran prn  -CT abd/pelvis with oral contrast- if patient does not take contrast then do IV  -patient refusing NG tube  -continue bowel regimen  -IVF while NPO  -lactate normal yesterday, doubt ischemia (also abdominal exam improved)   -lipase elevated; increase fluids to 125cc/hr for now, f/u CT scan     #Recurrence of vertebral OM   #Recent history & post treatment for MSSA bacteremia  #Right periorbital cellulitis  #Sepsis present on admission: leukocytosis, febrile  - CT Orbit w/ IV Cont 8/10: shows Right periorbital (pre-septal) soft tissue swelling consistent with cellulitis. No evidence of abscess. No evidence of post-septal inflammation.  -h/o IR Drainage of Abscess/Biopsy of suspected discitis done 3/16 -fluid  growing NICOLE  -, .8   - daily blood culture until negative   -MR lumbar/t spine: 1. Interval progression of discitis osteomyelitis at L4-5 with increased   destruction of the endplate cortices and intervening disc. Associated   mild vertebral height losses of L4 on L5.    2. Significantly increased epidural phlegmon extending from the right   dorsal epidural space from L2-3 down to sacral level with maximal mass   effect at the right L4-5 epidural space resulting in   impingement/compression of the thecal sac and nerve roots.    3. Slightly increased erosion affecting bilateral L4-5 facet joints (left   more than right), and bilateral L5-S1 facet joints. Increased   enhancements involving bilateral L4-L5 and L5-S1 neural foraminal spaces.    2. Increased paravertebral phlegmon surrounding L4-5 with slightly   increased size of the left retroperitoneal abscess. Slightly smaller   right psoas abscess.    - ID consult appreciated: clindamycin 900mg iv q8h, nafcillin iv 2g q4h  -PAM rescheduled until not vomiting anymore; unsure of when can get again for now  -spoke with Nsx: c/w po decadron 4mg q6h; requesting medical and ID clearance prior to surgery (needs PAM first)    #Transaminitis- resolving   -U/S equivocal  -trend LFT's     #Normocytic Anemia- improved  - Monitor H+H  -ferritin, b12, folate wnl    #Hx of IVD  #Opioid abuse on Methadone  - Continue methadone 135, and gabapentin    #Anxiety/Depression  - c/w mirtazapine + quetiapine  -c/w xanax     #Active nicotine dependence  - c/w nicotine patch  - c/w buproprion    #Morbid obesity  - diet and lifestyle modification     #Hypomagnesemia  -repleted    #Suspected folic acid deficiency  -continue supplement    #DVT PPx- LVX 40mg sq qhs  #GI PPx- None  #Diet- NPO for now  #CHG  #Activity- AAT  #Code- FULL    #Progress Note Handoff  Pending (specify): PAM, neurosurgery

## 2022-08-22 LAB
ALBUMIN SERPL ELPH-MCNC: 3.5 G/DL — SIGNIFICANT CHANGE UP (ref 3.5–5.2)
ALP SERPL-CCNC: 144 U/L — HIGH (ref 30–115)
ALT FLD-CCNC: 12 U/L — SIGNIFICANT CHANGE UP (ref 0–41)
ANION GAP SERPL CALC-SCNC: 10 MMOL/L — SIGNIFICANT CHANGE UP (ref 7–14)
AST SERPL-CCNC: 22 U/L — SIGNIFICANT CHANGE UP (ref 0–41)
BASOPHILS # BLD AUTO: 0.04 K/UL — SIGNIFICANT CHANGE UP (ref 0–0.2)
BASOPHILS NFR BLD AUTO: 0.2 % — SIGNIFICANT CHANGE UP (ref 0–1)
BILIRUB SERPL-MCNC: 0.2 MG/DL — SIGNIFICANT CHANGE UP (ref 0.2–1.2)
BUN SERPL-MCNC: 22 MG/DL — HIGH (ref 10–20)
CALCIUM SERPL-MCNC: 8.9 MG/DL — SIGNIFICANT CHANGE UP (ref 8.5–10.1)
CHLORIDE SERPL-SCNC: 99 MMOL/L — SIGNIFICANT CHANGE UP (ref 98–110)
CO2 SERPL-SCNC: 24 MMOL/L — SIGNIFICANT CHANGE UP (ref 17–32)
CREAT SERPL-MCNC: 0.8 MG/DL — SIGNIFICANT CHANGE UP (ref 0.7–1.5)
EGFR: 106 ML/MIN/1.73M2 — SIGNIFICANT CHANGE UP
EOSINOPHIL # BLD AUTO: 0.07 K/UL — SIGNIFICANT CHANGE UP (ref 0–0.7)
EOSINOPHIL NFR BLD AUTO: 0.4 % — SIGNIFICANT CHANGE UP (ref 0–8)
GLUCOSE SERPL-MCNC: 156 MG/DL — HIGH (ref 70–99)
HCT VFR BLD CALC: 39.7 % — LOW (ref 42–52)
HGB BLD-MCNC: 13 G/DL — LOW (ref 14–18)
IMM GRANULOCYTES NFR BLD AUTO: 2.5 % — HIGH (ref 0.1–0.3)
LYMPHOCYTES # BLD AUTO: 20 % — LOW (ref 20.5–51.1)
LYMPHOCYTES # BLD AUTO: 3.48 K/UL — HIGH (ref 1.2–3.4)
MAGNESIUM SERPL-MCNC: 2 MG/DL — SIGNIFICANT CHANGE UP (ref 1.8–2.4)
MCHC RBC-ENTMCNC: 29.1 PG — SIGNIFICANT CHANGE UP (ref 27–31)
MCHC RBC-ENTMCNC: 32.7 G/DL — SIGNIFICANT CHANGE UP (ref 32–37)
MCV RBC AUTO: 88.8 FL — SIGNIFICANT CHANGE UP (ref 80–94)
MONOCYTES # BLD AUTO: 0.84 K/UL — HIGH (ref 0.1–0.6)
MONOCYTES NFR BLD AUTO: 4.8 % — SIGNIFICANT CHANGE UP (ref 1.7–9.3)
NEUTROPHILS # BLD AUTO: 12.58 K/UL — HIGH (ref 1.4–6.5)
NEUTROPHILS NFR BLD AUTO: 72.1 % — SIGNIFICANT CHANGE UP (ref 42.2–75.2)
NRBC # BLD: 0 /100 WBCS — SIGNIFICANT CHANGE UP (ref 0–0)
PLATELET # BLD AUTO: 541 K/UL — HIGH (ref 130–400)
POTASSIUM SERPL-MCNC: 5.5 MMOL/L — HIGH (ref 3.5–5)
POTASSIUM SERPL-SCNC: 5.5 MMOL/L — HIGH (ref 3.5–5)
PROT SERPL-MCNC: 7.1 G/DL — SIGNIFICANT CHANGE UP (ref 6–8)
RBC # BLD: 4.47 M/UL — LOW (ref 4.7–6.1)
RBC # FLD: 15.4 % — HIGH (ref 11.5–14.5)
SARS-COV-2 RNA SPEC QL NAA+PROBE: SIGNIFICANT CHANGE UP
SODIUM SERPL-SCNC: 133 MMOL/L — LOW (ref 135–146)
WBC # BLD: 17.44 K/UL — HIGH (ref 4.8–10.8)
WBC # FLD AUTO: 17.44 K/UL — HIGH (ref 4.8–10.8)

## 2022-08-22 PROCEDURE — 99233 SBSQ HOSP IP/OBS HIGH 50: CPT

## 2022-08-22 PROCEDURE — 93010 ELECTROCARDIOGRAM REPORT: CPT

## 2022-08-22 RX ORDER — SODIUM ZIRCONIUM CYCLOSILICATE 10 G/10G
10 POWDER, FOR SUSPENSION ORAL ONCE
Refills: 0 | Status: COMPLETED | OUTPATIENT
Start: 2022-08-22 | End: 2022-08-22

## 2022-08-22 RX ADMIN — POLYETHYLENE GLYCOL 3350 17 GRAM(S): 17 POWDER, FOR SOLUTION ORAL at 11:07

## 2022-08-22 RX ADMIN — Medication 100 MILLIGRAM(S): at 13:30

## 2022-08-22 RX ADMIN — Medication 100 MILLIGRAM(S): at 11:07

## 2022-08-22 RX ADMIN — Medication 1 MILLIGRAM(S): at 11:06

## 2022-08-22 RX ADMIN — Medication 1 PATCH: at 07:37

## 2022-08-22 RX ADMIN — QUETIAPINE FUMARATE 200 MILLIGRAM(S): 200 TABLET, FILM COATED ORAL at 21:19

## 2022-08-22 RX ADMIN — Medication 2 MILLIGRAM(S): at 21:19

## 2022-08-22 RX ADMIN — SENNA PLUS 2 TABLET(S): 8.6 TABLET ORAL at 21:20

## 2022-08-22 RX ADMIN — Medication 1 PATCH: at 12:00

## 2022-08-22 RX ADMIN — Medication 100 MILLIGRAM(S): at 17:02

## 2022-08-22 RX ADMIN — Medication 2 MILLIGRAM(S): at 05:05

## 2022-08-22 RX ADMIN — Medication 1 TABLET(S): at 11:07

## 2022-08-22 RX ADMIN — LACTULOSE 10 GRAM(S): 10 SOLUTION ORAL at 05:06

## 2022-08-22 RX ADMIN — LACTULOSE 10 GRAM(S): 10 SOLUTION ORAL at 17:04

## 2022-08-22 RX ADMIN — Medication 4 MILLIGRAM(S): at 17:03

## 2022-08-22 RX ADMIN — BUPROPION HYDROCHLORIDE 300 MILLIGRAM(S): 150 TABLET, EXTENDED RELEASE ORAL at 11:07

## 2022-08-22 RX ADMIN — Medication 3 MILLIGRAM(S): at 21:19

## 2022-08-22 RX ADMIN — PANTOPRAZOLE SODIUM 40 MILLIGRAM(S): 20 TABLET, DELAYED RELEASE ORAL at 05:06

## 2022-08-22 RX ADMIN — Medication 1 PATCH: at 19:26

## 2022-08-22 RX ADMIN — METHADONE HYDROCHLORIDE 135 MILLIGRAM(S): 40 TABLET ORAL at 11:08

## 2022-08-22 RX ADMIN — SODIUM ZIRCONIUM CYCLOSILICATE 10 GRAM(S): 10 POWDER, FOR SUSPENSION ORAL at 17:49

## 2022-08-22 RX ADMIN — ENOXAPARIN SODIUM 40 MILLIGRAM(S): 100 INJECTION SUBCUTANEOUS at 21:19

## 2022-08-22 RX ADMIN — Medication 100 MILLIGRAM(S): at 05:05

## 2022-08-22 RX ADMIN — Medication 4 MILLIGRAM(S): at 11:07

## 2022-08-22 RX ADMIN — Medication 2 MILLIGRAM(S): at 13:30

## 2022-08-22 RX ADMIN — Medication 1 PATCH: at 11:06

## 2022-08-22 RX ADMIN — Medication 100 MILLIGRAM(S): at 21:19

## 2022-08-22 RX ADMIN — PANTOPRAZOLE SODIUM 40 MILLIGRAM(S): 20 TABLET, DELAYED RELEASE ORAL at 17:04

## 2022-08-22 RX ADMIN — Medication 100 MILLIGRAM(S): at 02:31

## 2022-08-22 RX ADMIN — Medication 4 MILLIGRAM(S): at 05:06

## 2022-08-22 RX ADMIN — MIRTAZAPINE 30 MILLIGRAM(S): 45 TABLET, ORALLY DISINTEGRATING ORAL at 21:19

## 2022-08-22 RX ADMIN — Medication 100 MILLIGRAM(S): at 09:53

## 2022-08-22 NOTE — PROGRESS NOTE ADULT - SUBJECTIVE AND OBJECTIVE BOX
8.22.22  NAEO,   stable for PAM 8.22.22 8.21.22  NAEO, eating, talking, pleasant,  neg trop,   CTA chest: neg PE, PNA  PAM scheduled for 8.22.22 8.20.22  NAEO, eating talking   PAM scheduled for 8.22.22  <= TTE 8.11.22 EF 55% no vegetation    D-Dimer 934 => CTA lung pending   Pre- Neurosurgery: neg trop, unremarkable CT abd  Cr 0.9      8.19.22  NAEO,  discussed the importance of ECHO prior to Neurosugery at bedside.  Pending ECHO      8.18.22  N/V under control => ECHO tomorrow? BCx +jef MSSA      8.17.22 AAOx0, on Cefzolin Clinda for L-spine osteo, BCx +jef MSSA, ID on board, N/D cannot tolerate ECHO, QTc <499, c/o LBP. Hx IV drug abuse, Per Psy consult: cont Methadone home dose, Haldol, avoid Benzo.  Pending ECHO        PHYSICAL EXAM:  GEN: ill-appearing, pale   HEENT: PERRL; EOMI; conjunctiva clear; OD with swelling/erythema around orbit (improved), not painful to move eye   CHEST: clear to auscultation bilaterally; no wheezes; no rales; no rhonchi  CARDS: 4/6 systolic murmur  ABD: soft, nt, nd  NEURO: cranial nerves II-XII intact; superficial reflexes intact, mild motor weakness LEs improved; no sensory deficits  LYMPH: No lymphadedenopathy  MSK: no joint swelling; no joint erythema; no joint warmth; b/l calf tenderness (no erythema)

## 2022-08-22 NOTE — PROGRESS NOTE ADULT - ASSESSMENT
· Assessment	    53 yo M w/ pmhx of IVDU (heroin), vertebral osteomyelitis, and MSSA bacteremia presents for worsening back pain. Patient was found febrile in the ED with elevated WBC count, CT orbit showing Right periorbital cellulitis.     #Vomiting/abdominal pain  #Delirium- possibly due to withdrawal from Methadone (last dose ~30 hours ago, just gave 4mg iv morphine)   -abdominal pain better after bowel movement  -patient still having NBNB vomiting  -zofran prn  -CT abd/pelvis with oral contrast- if patient does not take contrast then do IV  -patient refusing NG tube  -continue bowel regimen  -IVF while NPO  -lactate normal yesterday, doubt ischemia (also abdominal exam improved)   -lipase elevated; increase fluids to 125cc/hr for now, f/u CT scan     #Recurrence of vertebral OM   #Recent history & post treatment for MSSA bacteremia  #Right periorbital cellulitis  #Sepsis present on admission: leukocytosis, febrile  - CT Orbit w/ IV Cont 8/10: shows Right periorbital (pre-septal) soft tissue swelling consistent with cellulitis. No evidence of abscess. No evidence of post-septal inflammation.  -h/o IR Drainage of Abscess/Biopsy of suspected discitis done 3/16 -fluid  growing NICOLE  -, .8   - daily blood culture until negative   -MR lumbar/t spine: 1. Interval progression of discitis osteomyelitis at L4-5 with increased   destruction of the endplate cortices and intervening disc. Associated   mild vertebral height losses of L4 on L5.    2. Significantly increased epidural phlegmon extending from the right   dorsal epidural space from L2-3 down to sacral level with maximal mass   effect at the right L4-5 epidural space resulting in   impingement/compression of the thecal sac and nerve roots.    3. Slightly increased erosion affecting bilateral L4-5 facet joints (left   more than right), and bilateral L5-S1 facet joints. Increased   enhancements involving bilateral L4-L5 and L5-S1 neural foraminal spaces.    2. Increased paravertebral phlegmon surrounding L4-5 with slightly   increased size of the left retroperitoneal abscess. Slightly smaller   right psoas abscess.    - ID consult appreciated: clindamycin 900mg iv q8h, nafcillin iv 2g q4h  -PAM rescheduled until not vomiting anymore; unsure of when can get again for now  -spoke with Nsx: c/w po decadron 4mg q6h; requesting medical and ID clearance prior to surgery (needs PAM first)    #Transaminitis- resolving   -U/S equivocal  -trend LFT's     #Normocytic Anemia- improved  - Monitor H+H  -ferritin, b12, folate wnl    #Hx of IVD  #Opioid abuse on Methadone  - Continue methadone 135, and gabapentin    #Anxiety/Depression  - c/w mirtazapine + quetiapine  -c/w xanax     #Active nicotine dependence  - c/w nicotine patch  - c/w buproprion    #Morbid obesity  - diet and lifestyle modification     #Hypomagnesemia  -repleted    #Suspected folic acid deficiency  -continue supplement    #DVT PPx- LVX 40mg sq qhs  #GI PPx- None  #Diet- NPO for now  #CHG  #Activity- AAT  #Code- FULL    #Progress Note Handoff  Pending (specify): PAM, neurosurgery

## 2022-08-23 LAB
ALBUMIN SERPL ELPH-MCNC: 3.8 G/DL — SIGNIFICANT CHANGE UP (ref 3.5–5.2)
ALP SERPL-CCNC: 153 U/L — HIGH (ref 30–115)
ALT FLD-CCNC: 11 U/L — SIGNIFICANT CHANGE UP (ref 0–41)
ANION GAP SERPL CALC-SCNC: 13 MMOL/L — SIGNIFICANT CHANGE UP (ref 7–14)
ANION GAP SERPL CALC-SCNC: 17 MMOL/L — HIGH (ref 7–14)
AST SERPL-CCNC: 13 U/L — SIGNIFICANT CHANGE UP (ref 0–41)
BASOPHILS # BLD AUTO: 0.04 K/UL — SIGNIFICANT CHANGE UP (ref 0–0.2)
BASOPHILS NFR BLD AUTO: 0.2 % — SIGNIFICANT CHANGE UP (ref 0–1)
BILIRUB SERPL-MCNC: 0.2 MG/DL — SIGNIFICANT CHANGE UP (ref 0.2–1.2)
BUN SERPL-MCNC: 24 MG/DL — HIGH (ref 10–20)
BUN SERPL-MCNC: 25 MG/DL — HIGH (ref 10–20)
CALCIUM SERPL-MCNC: 9 MG/DL — SIGNIFICANT CHANGE UP (ref 8.5–10.1)
CALCIUM SERPL-MCNC: 9.1 MG/DL — SIGNIFICANT CHANGE UP (ref 8.5–10.1)
CHLORIDE SERPL-SCNC: 96 MMOL/L — LOW (ref 98–110)
CHLORIDE SERPL-SCNC: 99 MMOL/L — SIGNIFICANT CHANGE UP (ref 98–110)
CO2 SERPL-SCNC: 22 MMOL/L — SIGNIFICANT CHANGE UP (ref 17–32)
CO2 SERPL-SCNC: 25 MMOL/L — SIGNIFICANT CHANGE UP (ref 17–32)
CREAT SERPL-MCNC: 0.7 MG/DL — SIGNIFICANT CHANGE UP (ref 0.7–1.5)
CREAT SERPL-MCNC: 0.7 MG/DL — SIGNIFICANT CHANGE UP (ref 0.7–1.5)
EGFR: 110 ML/MIN/1.73M2 — SIGNIFICANT CHANGE UP
EGFR: 110 ML/MIN/1.73M2 — SIGNIFICANT CHANGE UP
EOSINOPHIL # BLD AUTO: 0.04 K/UL — SIGNIFICANT CHANGE UP (ref 0–0.7)
EOSINOPHIL NFR BLD AUTO: 0.2 % — SIGNIFICANT CHANGE UP (ref 0–8)
GLUCOSE BLDC GLUCOMTR-MCNC: 138 MG/DL — HIGH (ref 70–99)
GLUCOSE BLDC GLUCOMTR-MCNC: 148 MG/DL — HIGH (ref 70–99)
GLUCOSE BLDC GLUCOMTR-MCNC: 211 MG/DL — HIGH (ref 70–99)
GLUCOSE SERPL-MCNC: 137 MG/DL — HIGH (ref 70–99)
GLUCOSE SERPL-MCNC: 138 MG/DL — HIGH (ref 70–99)
HCT VFR BLD CALC: 42.9 % — SIGNIFICANT CHANGE UP (ref 42–52)
HGB BLD-MCNC: 13.9 G/DL — LOW (ref 14–18)
IMM GRANULOCYTES NFR BLD AUTO: 2.5 % — HIGH (ref 0.1–0.3)
LYMPHOCYTES # BLD AUTO: 10.5 % — LOW (ref 20.5–51.1)
LYMPHOCYTES # BLD AUTO: 2.24 K/UL — SIGNIFICANT CHANGE UP (ref 1.2–3.4)
MAGNESIUM SERPL-MCNC: 2.1 MG/DL — SIGNIFICANT CHANGE UP (ref 1.8–2.4)
MCHC RBC-ENTMCNC: 29 PG — SIGNIFICANT CHANGE UP (ref 27–31)
MCHC RBC-ENTMCNC: 32.4 G/DL — SIGNIFICANT CHANGE UP (ref 32–37)
MCV RBC AUTO: 89.4 FL — SIGNIFICANT CHANGE UP (ref 80–94)
MONOCYTES # BLD AUTO: 1.24 K/UL — HIGH (ref 0.1–0.6)
MONOCYTES NFR BLD AUTO: 5.8 % — SIGNIFICANT CHANGE UP (ref 1.7–9.3)
NEUTROPHILS # BLD AUTO: 17.18 K/UL — HIGH (ref 1.4–6.5)
NEUTROPHILS NFR BLD AUTO: 80.8 % — HIGH (ref 42.2–75.2)
NRBC # BLD: 0 /100 WBCS — SIGNIFICANT CHANGE UP (ref 0–0)
PLATELET # BLD AUTO: 551 K/UL — HIGH (ref 130–400)
POTASSIUM SERPL-MCNC: 5.4 MMOL/L — HIGH (ref 3.5–5)
POTASSIUM SERPL-MCNC: 5.7 MMOL/L — HIGH (ref 3.5–5)
POTASSIUM SERPL-SCNC: 5.4 MMOL/L — HIGH (ref 3.5–5)
POTASSIUM SERPL-SCNC: 5.7 MMOL/L — HIGH (ref 3.5–5)
PROT SERPL-MCNC: 7.2 G/DL — SIGNIFICANT CHANGE UP (ref 6–8)
RBC # BLD: 4.8 M/UL — SIGNIFICANT CHANGE UP (ref 4.7–6.1)
RBC # FLD: 15.7 % — HIGH (ref 11.5–14.5)
SODIUM SERPL-SCNC: 134 MMOL/L — LOW (ref 135–146)
SODIUM SERPL-SCNC: 138 MMOL/L — SIGNIFICANT CHANGE UP (ref 135–146)
WBC # BLD: 21.27 K/UL — HIGH (ref 4.8–10.8)
WBC # FLD AUTO: 21.27 K/UL — HIGH (ref 4.8–10.8)

## 2022-08-23 PROCEDURE — 99233 SBSQ HOSP IP/OBS HIGH 50: CPT

## 2022-08-23 PROCEDURE — 93010 ELECTROCARDIOGRAM REPORT: CPT

## 2022-08-23 RX ORDER — DEXTROSE 50 % IN WATER 50 %
50 SYRINGE (ML) INTRAVENOUS ONCE
Refills: 0 | Status: COMPLETED | OUTPATIENT
Start: 2022-08-23 | End: 2022-08-23

## 2022-08-23 RX ORDER — SODIUM ZIRCONIUM CYCLOSILICATE 10 G/10G
10 POWDER, FOR SUSPENSION ORAL
Refills: 0 | Status: DISCONTINUED | OUTPATIENT
Start: 2022-08-23 | End: 2022-08-23

## 2022-08-23 RX ORDER — METHADONE HYDROCHLORIDE 40 MG/1
135 TABLET ORAL DAILY
Refills: 0 | Status: DISCONTINUED | OUTPATIENT
Start: 2022-08-24 | End: 2022-08-26

## 2022-08-23 RX ORDER — INSULIN HUMAN 100 [IU]/ML
10 INJECTION, SOLUTION SUBCUTANEOUS ONCE
Refills: 0 | Status: COMPLETED | OUTPATIENT
Start: 2022-08-23 | End: 2022-08-23

## 2022-08-23 RX ORDER — SODIUM ZIRCONIUM CYCLOSILICATE 10 G/10G
10 POWDER, FOR SUSPENSION ORAL ONCE
Refills: 0 | Status: COMPLETED | OUTPATIENT
Start: 2022-08-23 | End: 2022-08-23

## 2022-08-23 RX ADMIN — Medication 1 TABLET(S): at 11:03

## 2022-08-23 RX ADMIN — Medication 2 MILLIGRAM(S): at 05:25

## 2022-08-23 RX ADMIN — LACTULOSE 10 GRAM(S): 10 SOLUTION ORAL at 17:30

## 2022-08-23 RX ADMIN — BUPROPION HYDROCHLORIDE 300 MILLIGRAM(S): 150 TABLET, EXTENDED RELEASE ORAL at 11:03

## 2022-08-23 RX ADMIN — PANTOPRAZOLE SODIUM 40 MILLIGRAM(S): 20 TABLET, DELAYED RELEASE ORAL at 05:24

## 2022-08-23 RX ADMIN — Medication 4 MILLIGRAM(S): at 05:21

## 2022-08-23 RX ADMIN — INSULIN HUMAN 10 UNIT(S): 100 INJECTION, SOLUTION SUBCUTANEOUS at 17:52

## 2022-08-23 RX ADMIN — Medication 4 MILLIGRAM(S): at 01:00

## 2022-08-23 RX ADMIN — Medication 100 MILLIGRAM(S): at 10:41

## 2022-08-23 RX ADMIN — Medication 100 MILLIGRAM(S): at 14:15

## 2022-08-23 RX ADMIN — QUETIAPINE FUMARATE 200 MILLIGRAM(S): 200 TABLET, FILM COATED ORAL at 22:12

## 2022-08-23 RX ADMIN — ENOXAPARIN SODIUM 40 MILLIGRAM(S): 100 INJECTION SUBCUTANEOUS at 22:12

## 2022-08-23 RX ADMIN — Medication 100 MILLIGRAM(S): at 22:12

## 2022-08-23 RX ADMIN — METHADONE HYDROCHLORIDE 135 MILLIGRAM(S): 40 TABLET ORAL at 11:03

## 2022-08-23 RX ADMIN — Medication 2 MILLIGRAM(S): at 14:15

## 2022-08-23 RX ADMIN — Medication 1 PATCH: at 11:05

## 2022-08-23 RX ADMIN — Medication 1 MILLIGRAM(S): at 11:04

## 2022-08-23 RX ADMIN — Medication 50 MILLILITER(S): at 17:52

## 2022-08-23 RX ADMIN — Medication 100 MILLIGRAM(S): at 11:04

## 2022-08-23 RX ADMIN — Medication 100 MILLIGRAM(S): at 17:32

## 2022-08-23 RX ADMIN — Medication 100 MILLIGRAM(S): at 01:00

## 2022-08-23 RX ADMIN — Medication 4 MILLIGRAM(S): at 17:31

## 2022-08-23 RX ADMIN — Medication 4 MILLIGRAM(S): at 23:08

## 2022-08-23 RX ADMIN — MIRTAZAPINE 30 MILLIGRAM(S): 45 TABLET, ORALLY DISINTEGRATING ORAL at 22:12

## 2022-08-23 RX ADMIN — Medication 2 MILLIGRAM(S): at 22:14

## 2022-08-23 RX ADMIN — Medication 100 MILLIGRAM(S): at 05:25

## 2022-08-23 RX ADMIN — PANTOPRAZOLE SODIUM 40 MILLIGRAM(S): 20 TABLET, DELAYED RELEASE ORAL at 17:31

## 2022-08-23 RX ADMIN — SODIUM ZIRCONIUM CYCLOSILICATE 10 GRAM(S): 10 POWDER, FOR SUSPENSION ORAL at 15:58

## 2022-08-23 RX ADMIN — LACTULOSE 10 GRAM(S): 10 SOLUTION ORAL at 05:22

## 2022-08-23 RX ADMIN — Medication 4 MILLIGRAM(S): at 11:04

## 2022-08-23 NOTE — PROGRESS NOTE ADULT - SUBJECTIVE AND OBJECTIVE BOX
8.23.22 normal Cr,   K 5.4 => insulin + D50  Pending PAM  Pt likes to eat      8.22.22  NAEO,   stable for PAM 8.22.22 8.21.22  NAEO, eating, talking, pleasant,  neg trop,   CTA chest: neg PE, PNA  PAM scheduled for 8.22.22   plan: after PAM =>then, neurosurgery for L-spine osteo        8.20.22  NAEO, eating talking   PAM scheduled for 8.22.22  <= TTE 8.11.22 EF 55% no vegetation    D-Dimer 934 => CTA lung pending   Pre- Neurosurgery: neg trop, unremarkable CT abd  Cr 0.9      8.19.22  NAEO,  discussed the importance of ECHO prior to Neurosugery at bedside.  Pending ECHO      8.18.22  N/V under control => ECHO tomorrow? BCx +jef MSSA      8.17.22 AAOx0, on Cefzolin Clinda for L-spine osteo, BCx +jef MSSA, ID on board, N/D cannot tolerate ECHO, QTc <499, c/o LBP. Hx IV drug abuse, Per Psy consult: cont Methadone home dose, Haldol, avoid Benzo.  Pending ECHO        PHYSICAL EXAM:  GEN: ill-appearing, pale   HEENT: PERRL; EOMI; conjunctiva clear; OD with swelling/erythema around orbit (improved), not painful to move eye   CHEST: clear to auscultation bilaterally; no wheezes; no rales; no rhonchi  CARDS: 4/6 systolic murmur  ABD: soft, nt, nd  NEURO: cranial nerves II-XII intact; superficial reflexes intact, mild motor weakness LEs improved; no sensory deficits  LYMPH: No lymphadedenopathy  MSK: no joint swelling; no joint erythema; no joint warmth; b/l calf tenderness (no erythema)

## 2022-08-23 NOTE — CHART NOTE - NSCHARTNOTEFT_GEN_A_CORE
Pt has persistent hyperkalemia. Anesthesia cancelled procedure. Will be rescheduled tomorrow if K is below 5

## 2022-08-24 LAB
ALBUMIN SERPL ELPH-MCNC: 3.8 G/DL — SIGNIFICANT CHANGE UP (ref 3.5–5.2)
ALP SERPL-CCNC: 151 U/L — HIGH (ref 30–115)
ALT FLD-CCNC: 11 U/L — SIGNIFICANT CHANGE UP (ref 0–41)
ANION GAP SERPL CALC-SCNC: 10 MMOL/L — SIGNIFICANT CHANGE UP (ref 7–14)
ANION GAP SERPL CALC-SCNC: 15 MMOL/L — HIGH (ref 7–14)
ANION GAP SERPL CALC-SCNC: 16 MMOL/L — HIGH (ref 7–14)
AST SERPL-CCNC: 12 U/L — SIGNIFICANT CHANGE UP (ref 0–41)
BASOPHILS # BLD AUTO: 0.04 K/UL — SIGNIFICANT CHANGE UP (ref 0–0.2)
BASOPHILS NFR BLD AUTO: 0.2 % — SIGNIFICANT CHANGE UP (ref 0–1)
BILIRUB SERPL-MCNC: 0.2 MG/DL — SIGNIFICANT CHANGE UP (ref 0.2–1.2)
BUN SERPL-MCNC: 29 MG/DL — HIGH (ref 10–20)
BUN SERPL-MCNC: 30 MG/DL — HIGH (ref 10–20)
BUN SERPL-MCNC: 31 MG/DL — HIGH (ref 10–20)
CALCIUM SERPL-MCNC: 8.9 MG/DL — SIGNIFICANT CHANGE UP (ref 8.5–10.1)
CALCIUM SERPL-MCNC: 9 MG/DL — SIGNIFICANT CHANGE UP (ref 8.5–10.1)
CALCIUM SERPL-MCNC: 9.2 MG/DL — SIGNIFICANT CHANGE UP (ref 8.5–10.1)
CHLORIDE SERPL-SCNC: 97 MMOL/L — LOW (ref 98–110)
CHLORIDE SERPL-SCNC: 98 MMOL/L — SIGNIFICANT CHANGE UP (ref 98–110)
CHLORIDE SERPL-SCNC: 98 MMOL/L — SIGNIFICANT CHANGE UP (ref 98–110)
CO2 SERPL-SCNC: 19 MMOL/L — SIGNIFICANT CHANGE UP (ref 17–32)
CO2 SERPL-SCNC: 23 MMOL/L — SIGNIFICANT CHANGE UP (ref 17–32)
CO2 SERPL-SCNC: 28 MMOL/L — SIGNIFICANT CHANGE UP (ref 17–32)
CREAT SERPL-MCNC: 0.8 MG/DL — SIGNIFICANT CHANGE UP (ref 0.7–1.5)
CREAT SERPL-MCNC: 0.8 MG/DL — SIGNIFICANT CHANGE UP (ref 0.7–1.5)
CREAT SERPL-MCNC: 1 MG/DL — SIGNIFICANT CHANGE UP (ref 0.7–1.5)
EGFR: 106 ML/MIN/1.73M2 — SIGNIFICANT CHANGE UP
EGFR: 106 ML/MIN/1.73M2 — SIGNIFICANT CHANGE UP
EGFR: 90 ML/MIN/1.73M2 — SIGNIFICANT CHANGE UP
EOSINOPHIL # BLD AUTO: 0.01 K/UL — SIGNIFICANT CHANGE UP (ref 0–0.7)
EOSINOPHIL NFR BLD AUTO: 0.1 % — SIGNIFICANT CHANGE UP (ref 0–8)
GLUCOSE SERPL-MCNC: 125 MG/DL — HIGH (ref 70–99)
GLUCOSE SERPL-MCNC: 157 MG/DL — HIGH (ref 70–99)
GLUCOSE SERPL-MCNC: 163 MG/DL — HIGH (ref 70–99)
HCT VFR BLD CALC: 41.6 % — LOW (ref 42–52)
HCT VFR BLD CALC: 41.9 % — LOW (ref 42–52)
HGB BLD-MCNC: 13.8 G/DL — LOW (ref 14–18)
HGB BLD-MCNC: 13.8 G/DL — LOW (ref 14–18)
IMM GRANULOCYTES NFR BLD AUTO: 2.2 % — HIGH (ref 0.1–0.3)
LYMPHOCYTES # BLD AUTO: 14.9 % — LOW (ref 20.5–51.1)
LYMPHOCYTES # BLD AUTO: 2.6 K/UL — SIGNIFICANT CHANGE UP (ref 1.2–3.4)
MAGNESIUM SERPL-MCNC: 2.2 MG/DL — SIGNIFICANT CHANGE UP (ref 1.8–2.4)
MAGNESIUM SERPL-MCNC: 2.2 MG/DL — SIGNIFICANT CHANGE UP (ref 1.8–2.4)
MCHC RBC-ENTMCNC: 29.2 PG — SIGNIFICANT CHANGE UP (ref 27–31)
MCHC RBC-ENTMCNC: 29.4 PG — SIGNIFICANT CHANGE UP (ref 27–31)
MCHC RBC-ENTMCNC: 32.9 G/DL — SIGNIFICANT CHANGE UP (ref 32–37)
MCHC RBC-ENTMCNC: 33.2 G/DL — SIGNIFICANT CHANGE UP (ref 32–37)
MCV RBC AUTO: 88.7 FL — SIGNIFICANT CHANGE UP (ref 80–94)
MCV RBC AUTO: 88.8 FL — SIGNIFICANT CHANGE UP (ref 80–94)
MONOCYTES # BLD AUTO: 0.78 K/UL — HIGH (ref 0.1–0.6)
MONOCYTES NFR BLD AUTO: 4.5 % — SIGNIFICANT CHANGE UP (ref 1.7–9.3)
NEUTROPHILS # BLD AUTO: 13.65 K/UL — HIGH (ref 1.4–6.5)
NEUTROPHILS NFR BLD AUTO: 78.1 % — HIGH (ref 42.2–75.2)
NRBC # BLD: 0 /100 WBCS — SIGNIFICANT CHANGE UP (ref 0–0)
NRBC # BLD: 0 /100 WBCS — SIGNIFICANT CHANGE UP (ref 0–0)
PLATELET # BLD AUTO: 530 K/UL — HIGH (ref 130–400)
PLATELET # BLD AUTO: 597 K/UL — HIGH (ref 130–400)
POTASSIUM SERPL-MCNC: 5 MMOL/L — SIGNIFICANT CHANGE UP (ref 3.5–5)
POTASSIUM SERPL-MCNC: 5.1 MMOL/L — HIGH (ref 3.5–5)
POTASSIUM SERPL-MCNC: 5.2 MMOL/L — HIGH (ref 3.5–5)
POTASSIUM SERPL-SCNC: 5 MMOL/L — SIGNIFICANT CHANGE UP (ref 3.5–5)
POTASSIUM SERPL-SCNC: 5.1 MMOL/L — HIGH (ref 3.5–5)
POTASSIUM SERPL-SCNC: 5.2 MMOL/L — HIGH (ref 3.5–5)
PROT SERPL-MCNC: 7.2 G/DL — SIGNIFICANT CHANGE UP (ref 6–8)
RBC # BLD: 4.69 M/UL — LOW (ref 4.7–6.1)
RBC # BLD: 4.72 M/UL — SIGNIFICANT CHANGE UP (ref 4.7–6.1)
RBC # FLD: 15.7 % — HIGH (ref 11.5–14.5)
RBC # FLD: 15.8 % — HIGH (ref 11.5–14.5)
SODIUM SERPL-SCNC: 133 MMOL/L — LOW (ref 135–146)
SODIUM SERPL-SCNC: 135 MMOL/L — SIGNIFICANT CHANGE UP (ref 135–146)
SODIUM SERPL-SCNC: 136 MMOL/L — SIGNIFICANT CHANGE UP (ref 135–146)
WBC # BLD: 17.47 K/UL — HIGH (ref 4.8–10.8)
WBC # BLD: 20.86 K/UL — HIGH (ref 4.8–10.8)
WBC # FLD AUTO: 17.47 K/UL — HIGH (ref 4.8–10.8)
WBC # FLD AUTO: 20.86 K/UL — HIGH (ref 4.8–10.8)

## 2022-08-24 PROCEDURE — 93325 DOPPLER ECHO COLOR FLOW MAPG: CPT | Mod: 26

## 2022-08-24 PROCEDURE — 93010 ELECTROCARDIOGRAM REPORT: CPT

## 2022-08-24 PROCEDURE — 93320 DOPPLER ECHO COMPLETE: CPT | Mod: 26

## 2022-08-24 PROCEDURE — 93312 ECHO TRANSESOPHAGEAL: CPT | Mod: 26

## 2022-08-24 PROCEDURE — 99233 SBSQ HOSP IP/OBS HIGH 50: CPT

## 2022-08-24 RX ORDER — SODIUM ZIRCONIUM CYCLOSILICATE 10 G/10G
5 POWDER, FOR SUSPENSION ORAL ONCE
Refills: 0 | Status: COMPLETED | OUTPATIENT
Start: 2022-08-24 | End: 2022-08-24

## 2022-08-24 RX ORDER — DEXTROSE 50 % IN WATER 50 %
50 SYRINGE (ML) INTRAVENOUS ONCE
Refills: 0 | Status: COMPLETED | OUTPATIENT
Start: 2022-08-24 | End: 2022-08-24

## 2022-08-24 RX ORDER — INSULIN HUMAN 100 [IU]/ML
10 INJECTION, SOLUTION SUBCUTANEOUS ONCE
Refills: 0 | Status: COMPLETED | OUTPATIENT
Start: 2022-08-24 | End: 2022-08-24

## 2022-08-24 RX ADMIN — Medication 2 MILLIGRAM(S): at 22:51

## 2022-08-24 RX ADMIN — METHADONE HYDROCHLORIDE 135 MILLIGRAM(S): 40 TABLET ORAL at 11:18

## 2022-08-24 RX ADMIN — QUETIAPINE FUMARATE 200 MILLIGRAM(S): 200 TABLET, FILM COATED ORAL at 22:49

## 2022-08-24 RX ADMIN — Medication 100 MILLIGRAM(S): at 01:54

## 2022-08-24 RX ADMIN — INSULIN HUMAN 10 UNIT(S): 100 INJECTION, SOLUTION SUBCUTANEOUS at 09:42

## 2022-08-24 RX ADMIN — PANTOPRAZOLE SODIUM 40 MILLIGRAM(S): 20 TABLET, DELAYED RELEASE ORAL at 05:31

## 2022-08-24 RX ADMIN — Medication 1 PATCH: at 20:32

## 2022-08-24 RX ADMIN — ENOXAPARIN SODIUM 40 MILLIGRAM(S): 100 INJECTION SUBCUTANEOUS at 22:49

## 2022-08-24 RX ADMIN — SODIUM ZIRCONIUM CYCLOSILICATE 5 GRAM(S): 10 POWDER, FOR SUSPENSION ORAL at 09:42

## 2022-08-24 RX ADMIN — Medication 1 PATCH: at 11:18

## 2022-08-24 RX ADMIN — Medication 4 MILLIGRAM(S): at 11:16

## 2022-08-24 RX ADMIN — Medication 4 MILLIGRAM(S): at 17:14

## 2022-08-24 RX ADMIN — Medication 100 MILLIGRAM(S): at 11:16

## 2022-08-24 RX ADMIN — Medication 4 MILLIGRAM(S): at 05:31

## 2022-08-24 RX ADMIN — Medication 100 MILLIGRAM(S): at 22:48

## 2022-08-24 RX ADMIN — Medication 100 MILLIGRAM(S): at 17:14

## 2022-08-24 RX ADMIN — Medication 2 MILLIGRAM(S): at 05:33

## 2022-08-24 RX ADMIN — Medication 1 TABLET(S): at 11:16

## 2022-08-24 RX ADMIN — Medication 1 MILLIGRAM(S): at 11:17

## 2022-08-24 RX ADMIN — BUPROPION HYDROCHLORIDE 300 MILLIGRAM(S): 150 TABLET, EXTENDED RELEASE ORAL at 11:16

## 2022-08-24 RX ADMIN — Medication 100 MILLIGRAM(S): at 09:01

## 2022-08-24 RX ADMIN — MIRTAZAPINE 30 MILLIGRAM(S): 45 TABLET, ORALLY DISINTEGRATING ORAL at 22:49

## 2022-08-24 RX ADMIN — PANTOPRAZOLE SODIUM 40 MILLIGRAM(S): 20 TABLET, DELAYED RELEASE ORAL at 17:19

## 2022-08-24 RX ADMIN — Medication 4 MILLIGRAM(S): at 23:33

## 2022-08-24 RX ADMIN — Medication 50 MILLILITER(S): at 09:42

## 2022-08-24 RX ADMIN — SENNA PLUS 2 TABLET(S): 8.6 TABLET ORAL at 22:49

## 2022-08-24 RX ADMIN — Medication 2 MILLIGRAM(S): at 17:13

## 2022-08-24 RX ADMIN — Medication 100 MILLIGRAM(S): at 05:34

## 2022-08-24 NOTE — PROGRESS NOTE ADULT - ASSESSMENT
· Assessment	  51 yo M w/ pmhx of IVDU (heroin), vertebral osteomyelitis, and MSSA bacteremia presents for worsening back pain. Back pain was increasing over the past couple of weeks and worsening today to the point he was having difficulty ambulating . Complaining of right sided eye swelling/redness since Tuesday 8/9 . He slept on the hard floor ( wanted to stretch himself ) and woke up in the am with redness/swelling. Patient denies any visual changes or eye pain. Patient denies any fever, chills, headache, dizziness. Patient denies chest pain, palpitation, SOB. Patient denies abdominal pain, n/v/d/c.   Patient was recently admitted 03/12-04/06 for vertebral OM/facetitis. PAM was negative and ID recommended cefazolin. Patient had picc line placed for 6wks of antibiotics. Patient states he finished his antibiotics at [facility] but did not follow up with Dr Giraldo. Patient states he is currently not using heroin. He last used 10 months ago.    8/13 CT Lumbar Spine No Cont   Interval progression of discitis-osteomyelitis at L4-5 with increased   endplate lytic changes since the prior lumbarCT from 3/12/2022. The L4-5   disc is not well visualized due to lucencies of the L4-5 endplates.   Relatively spared L4-5 and L5-S1 facet joints without significant lytic   changes.    Redemonstrated paravertebral phlegmon and left retropharyngeal abscess   with air-fluid level.    The epidural phlegmon and the spinal space are not well visualized due to   difference in modality.          IMPRESSION;  NICOLE bacteremia with discitis/OM at L4-5.  Paravertebral phlegmon and left retropharyngeal abscess with air-fluid level.  8/10 BCx NICOLE  8/11,12,13,14,15 BCx NG    Nafcillin induced nausea/emesis ? will change to ancef    Hematoma right upper eyelid > resolved  WBC 17.4    RECOMMENDATIONS;   F/u NS   PAM p  Clindamycin 900 mg iv q8h  Ancef 2 gm iv q6h ( since 8/16 )  Off loading to prevent pressure sores and preventive measures to avoid aspiration

## 2022-08-24 NOTE — PROGRESS NOTE ADULT - SUBJECTIVE AND OBJECTIVE BOX
MIKAEL MCDERMOTT  [facility] ([facility])      Patient is a 52y old  Male who presents with a chief complaint of Back pain (24 Aug 2022 08:39)        Interval events:  Patient seen and examined at bedside. Got PAM today. Says strength is better in legs, no pain.     -PMHx: IV drug abuse    Vertebral osteomyelitis    MSSA bacteremia      -PSHx:No significant past surgical history            REVIEW OF SYSTEMS:  CONSTITUTIONAL: No fever, weight loss, or fatigue  RESPIRATORY: No cough, wheezing, chills or hemoptysis; No shortness of breath  CARDIOVASCULAR: No chest pain, palpitations, dizziness, or leg swelling  GASTROINTESTINAL: No abdominal or epigastric pain. No nausea, vomiting, or hematemesis; No diarrhea or constipation. No melena or hematochezia.  NEUROLOGICAL: No headaches  LYMPH NODES: No enlarged glands  MUSCULOSKELETAL: No joint pain or swelling; No muscle, back, or extremity pain      T(C): , Max: 36.8 (08-23-22 @ 20:34)  HR: 66 (08-24-22 @ 12:20)  BP: 124/77 (08-24-22 @ 12:20)  RR: 18 (08-24-22 @ 12:20)  SpO2: 96% (08-24-22 @ 12:20)  CAPILLARY BLOOD GLUCOSE      POCT Blood Glucose.: 138 mg/dL (23 Aug 2022 21:40)  POCT Blood Glucose.: 211 mg/dL (23 Aug 2022 18:30)      PHYSICAL EXAM:  GEN: ill-appearing, pale   HEENT: PERRL; EOMI; conjunctiva clear; OD with swelling/erythema around orbit (improved), not painful to move eye   CHEST: clear to auscultation bilaterally; no wheezes; no rales; no rhonchi  CARDS: 4/6 systolic murmur  ABD: soft, nt, nd  NEURO: cranial nerves II-XII intact; superficial reflexes intact, mild motor weakness LEs improved; no sensory deficits  LYMPH: No lymphadedenopathy  MSK: no joint swelling; no joint erythema; no joint warmth; b/l calf tenderness (no erythema)     LABS:          13.8  17.47  )-------(530          41.9  N=78.1  L=14.9  MCV=88.8          13.8  20.86  )-------(597          41.6  N=--  L=--  MCV=88.7    133<L>|98|29<H>  ------------------<125<H>  5.2<H>|19|0.8  eGFR:--  Ca:9.0  135|97<L>|30<H>  ------------------<163<H>  5.1<H>|28|0.8  eGFR:--  Ca:9.2  136|98|31<H>  ------------------<157<H>  5.0|23|1.0  eGFR:--  Ca:8.9            Microbiology:    Culture - Blood (collected 08-15-22 @ 06:34)  Source: .Blood None  Final Report (08-20-22 @ 19:00):    No Growth Final    Culture - Blood (collected 08-15-22 @ 06:25)  Source: .Blood None  Final Report (08-20-22 @ 19:00):    No Growth Final        RADIOLOGY & ADDITIONAL TESTS:  < from: 12 Lead ECG (08.24.22 @ 08:06) >  Diagnosis Line *** Poor data quality, interpretation may be adversely affected  Normal sinus rhythm  Normal ECG    < end of copied text >        Medications:  acetaminophen     Tablet .. 650 milliGRAM(s) Oral every 6 hours PRN  ALPRAZolam 2 milliGRAM(s) Oral three times a day  aluminum hydroxide/magnesium hydroxide/simethicone Suspension 30 milliLiter(s) Oral every 4 hours PRN  buPROPion XL (24-Hour) . 300 milliGRAM(s) Oral daily  ceFAZolin   IVPB 2000 milliGRAM(s) IV Intermittent every 8 hours  clindamycin IVPB 900 milliGRAM(s) IV Intermittent every 8 hours  clindamycin IVPB      cyclobenzaprine 5 milliGRAM(s) Oral three times a day PRN  dexAMETHasone     Tablet 4 milliGRAM(s) Oral every 6 hours  diatrizoate meglumine/diatrizoate sodium. 30 milliLiter(s) Oral once  enoxaparin Injectable 40 milliGRAM(s) SubCutaneous every 24 hours  folic acid 1 milliGRAM(s) Oral daily  lactated ringers. 1000 milliLiter(s) IV Continuous <Continuous>  lactulose Syrup 10 Gram(s) Oral two times a day  melatonin 3 milliGRAM(s) Oral at bedtime PRN  methadone    Tablet 135 milliGRAM(s) Oral daily  mirtazapine 30 milliGRAM(s) Oral at bedtime  multivitamin 1 Tablet(s) Oral daily  nicotine - 21 mG/24Hr(s) Patch 1 Patch Transdermal daily  pantoprazole  Injectable 40 milliGRAM(s) IV Push every 12 hours  polyethylene glycol 3350 17 Gram(s) Oral daily  QUEtiapine 200 milliGRAM(s) Oral at bedtime  scopolamine 1 mG/72 Hr(s) Patch 1 Patch Transdermal every 72 hours PRN  senna 2 Tablet(s) Oral at bedtime  thiamine 100 milliGRAM(s) Oral daily

## 2022-08-24 NOTE — PROGRESS NOTE ADULT - ASSESSMENT
51 yo M w/ pmhx of IVDU (heroin), vertebral osteomyelitis, and MSSA bacteremia presents for worsening back pain. Patient was found febrile in the ED with elevated WBC count, CT orbit showing Right periorbital cellulitis.     #Recurrence of vertebral OM   #Recent history & post treatment for MSSA bacteremia  #Right periorbital cellulitis  #Sepsis present on admission: leukocytosis, febrile  - CT Orbit w/ IV Cont 8/10: shows Right periorbital (pre-septal) soft tissue swelling consistent with cellulitis. No evidence of abscess. No evidence of post-septal inflammation.  -h/o IR Drainage of Abscess/Biopsy of suspected discitis done 3/16 -fluid  growing NICOLE  -, .8   - daily blood culture until negative   -MR lumbar/t spine: 1. Interval progression of discitis osteomyelitis at L4-5 with increased   destruction of the endplate cortices and intervening disc. Associated   mild vertebral height losses of L4 on L5.    2. Significantly increased epidural phlegmon extending from the right   dorsal epidural space from L2-3 down to sacral level with maximal mass   effect at the right L4-5 epidural space resulting in   impingement/compression of the thecal sac and nerve roots.    3. Slightly increased erosion affecting bilateral L4-5 facet joints (left   more than right), and bilateral L5-S1 facet joints. Increased   enhancements involving bilateral L4-L5 and L5-S1 neural foraminal spaces.    2. Increased paravertebral phlegmon surrounding L4-5 with slightly   increased size of the left retroperitoneal abscess. Slightly smaller   right psoas abscess.    - ID consult appreciated: clindamycin 900mg iv q8h, ancef 2g iv q8h  -PAM showing no vegetations; reached out to neurosx, they will get back to us for possible surgery       #Vomiting/abdominal pain- resolved  #Delirium- resolved  -possibly vomited from nafcillin?  -delirium possibly due to vomiting/not getting methadone, now back at baseline    #Transaminitis- resolved  -U/S equivocal  -trend LFT's     #Normocytic Anemia- improved  - Monitor H+H  -ferritin, b12, folate wnl    #Hx of IVD  #Opioid abuse on Methadone  - Continue methadone 135, and gabapentin    #Anxiety/Depression  - c/w mirtazapine + quetiapine  -c/w xanax     #Active nicotine dependence  - c/w nicotine patch  - c/w buproprion    #Morbid obesity  - diet and lifestyle modification     #Hypomagnesemia  -repleted    #Hyperkalemia  -s/p insulin and dextrose today  -repeat K hemolyzed  -f/u K again    #Suspected folic acid deficiency  -continue supplement    #DVT PPx- LVX 40mg sq qhs  #GI PPx- None  #Diet- DASH/TLC  #CHG  #Activity- AAT  #Code- FULL    #Progress Note Handoff  Pending (specify):  neurosurgery  Family discussion: Plan of care discussed with patient, aware and agreeable   Disposition: Unknown at this time

## 2022-08-24 NOTE — CHART NOTE - NSCHARTNOTEFT_GEN_A_CORE
POST OPERATIVE PROCEDURAL DOCUMENTATION  PRE-OP DIAGNOSIS: NICOLE bacteremia r/o infective endocarditis    POST-OP DIAGNOSIS: NICOLE bacteremia no evidence of infective endocarditis    PROCEDURE: Transesophageal echocardiogram    Primary Physician: Dr. Roth  Assistant: Dr. Christian    ANESTHESIA TYPE  [  ] General Anesthesia  [ x ] Conscious Sedation  [  ] Local/Regional    CONDITION  [  ] Critical  [  ] Serious  [  ] Fair  [ X ] Good    SPECIMENS REMOVED (IF APPLICABLE): N/A    IMPLANTS (IF APPLICABLE): None    ESTIMATED BLOOD LOSS: None    COMPLICATIONS: None      FINDINGS:    After risks and benefits of procedures were explained, informed consent was obtained and placed in chart.   The patient received topical anesthestic to the oropharynx with viscous lidocaine and benzocaine spray.  Refer to Anesthesia note for sedation details.  The PAM probe was passed into the esophagus without difficulty.  Transesophageal images were obtained.  The PAM probe was removed without difficulty and examined.  There was no evidence for bleeding.  The patient tolerated the procedure well without any immediate PAM-related complications.      Preliminary Findings:  JOHN: Left atrial appendage was clear of clot and smoke.  LV: LVEF was estimated at 55-65%  MV: No evidence for MR, No evidence for MS. No evidence of vegetation  AV: No evidence for AI, no evidence for AS. No evidence of vegetation  TV: no TR. No evidence of vegetation  Pulmonic Valve: No evidence of vegetation  IAS: no PFO. NO R-> L shunt.   There was mild, non-mobile atheroma seen in the thoracic aorta.     DIAGNOSIS/IMPRESSION: NICOLE Bacteremia with no evidence of infective endocarditis    PLAN OF CARE:  - Antibiotic regimen per ID  - Recall cardiology as needed POST OPERATIVE PROCEDURAL DOCUMENTATION  PRE-OP DIAGNOSIS: NICOLE bacteremia r/o infective endocarditis    POST-OP DIAGNOSIS: NICOLE bacteremia no evidence of infective endocarditis    PROCEDURE: Transesophageal echocardiogram    Primary Physician: Dr. Roth  Assistant: Dr. Christian    ANESTHESIA TYPE  [  ] General Anesthesia  [ x ] Conscious Sedation  [  ] Local/Regional    CONDITION  [  ] Critical  [  ] Serious  [  ] Fair  [ X ] Good    SPECIMENS REMOVED (IF APPLICABLE): N/A    IMPLANTS (IF APPLICABLE): None    ESTIMATED BLOOD LOSS: None    COMPLICATIONS: None      FINDINGS:    After risks and benefits of procedures were explained, informed consent was obtained and placed in chart.   The patient received topical anesthestic to the oropharynx with viscous lidocaine and benzocaine spray.  Refer to Anesthesia note for sedation details.  The PAM probe was passed into the esophagus without difficulty.  Transesophageal images were obtained.  The PAM probe was removed without difficulty and examined.  There was no evidence for bleeding.  The patient tolerated the procedure well without any immediate PAM-related complications.      Preliminary Findings:  JOHN: Left atrial appendage was clear of clot and smoke.  LV: LVEF was estimated at 55-65%  MV: Trace MR, No evidence for MS. No evidence of vegetation  AV: No evidence for AI, no evidence for AS. No evidence of vegetation  TV: no TR. No evidence of vegetation  Pulmonic Valve: No evidence of vegetation  IAS: no PFO. NO R-> L shunt.   There was mild, non-mobile atheroma seen in the thoracic aorta.     DIAGNOSIS/IMPRESSION: NICOLE Bacteremia with no evidence of infective endocarditis    PLAN OF CARE:  - Antibiotic regimen per ID  - Recall cardiology as needed

## 2022-08-24 NOTE — PROGRESS NOTE ADULT - SUBJECTIVE AND OBJECTIVE BOX
BALDEMARMIKAEL  52y, Male    All available historical data reviewed    OVERNIGHT EVENTS:  feels well and has no new complaints  No fevers   no diarrhea  no urine/stool incontinence  Motor LE improved  no sensory deficits    ROS:  General: Denies rigors, nightsweats  HEENT: Denies headache, rhinorrhea, sore throat, eye pain  CV: Denies CP, palpitations  PULM: Denies wheezing, hemoptysis  GI: Denies hematemesis, hematochezia, melena  : Denies discharge, hematuria  MSK: Denies arthralgias, myalgias  SKIN: Denies rash, lesions  NEURO: Denies paresthesias, weakness  PSYCH: Denies depression, anxiety    VITALS:  T(F): 97.7, Max: 98.2 (08-23-22 @ 20:34)  HR: 65  BP: 112/67  RR: 18Vital Signs Last 24 Hrs  T(C): 36.5 (24 Aug 2022 04:45), Max: 36.8 (23 Aug 2022 20:34)  T(F): 97.7 (24 Aug 2022 04:45), Max: 98.2 (23 Aug 2022 20:34)  HR: 65 (24 Aug 2022 04:45) (65 - 98)  BP: 112/67 (24 Aug 2022 04:45) (112/67 - 149/83)  BP(mean): --  RR: 18 (24 Aug 2022 04:45) (18 - 18)  SpO2: 96% (24 Aug 2022 04:45) (93% - 96%)    Parameters below as of 23 Aug 2022 20:34  Patient On (Oxygen Delivery Method): room air        TESTS & MEASUREMENTS:                        13.8   17.47 )-----------( 530      ( 24 Aug 2022 05:45 )             41.9     08-24    135  |  97<L>  |  30<H>  ----------------------------<  163<H>  5.1<H>   |  28  |  0.8    Ca    9.2      24 Aug 2022 05:45  Mg     2.2     08-24    TPro  7.2  /  Alb  3.8  /  TBili  0.2  /  DBili  x   /  AST  12  /  ALT  11  /  AlkPhos  151<H>  08-24    LIVER FUNCTIONS - ( 24 Aug 2022 05:45 )  Alb: 3.8 g/dL / Pro: 7.2 g/dL / ALK PHOS: 151 U/L / ALT: 11 U/L / AST: 12 U/L / GGT: x                   RADIOLOGY & ADDITIONAL TESTS:  Personal review of radiological diagnostics performed  Echo and EKG results noted when applicable.     MEDICATIONS:  acetaminophen     Tablet .. 650 milliGRAM(s) Oral every 6 hours PRN  ALPRAZolam 2 milliGRAM(s) Oral three times a day  aluminum hydroxide/magnesium hydroxide/simethicone Suspension 30 milliLiter(s) Oral every 4 hours PRN  buPROPion XL (24-Hour) . 300 milliGRAM(s) Oral daily  ceFAZolin   IVPB 2000 milliGRAM(s) IV Intermittent every 8 hours  clindamycin IVPB 900 milliGRAM(s) IV Intermittent every 8 hours  clindamycin IVPB      cyclobenzaprine 5 milliGRAM(s) Oral three times a day PRN  dexAMETHasone     Tablet 4 milliGRAM(s) Oral every 6 hours  dextrose 50% Injectable 50 milliLiter(s) IV Push once  diatrizoate meglumine/diatrizoate sodium. 30 milliLiter(s) Oral once  enoxaparin Injectable 40 milliGRAM(s) SubCutaneous every 24 hours  folic acid 1 milliGRAM(s) Oral daily  insulin regular  human recombinant. 10 Unit(s) IV Push once  lactated ringers. 1000 milliLiter(s) IV Continuous <Continuous>  lactulose Syrup 10 Gram(s) Oral two times a day  melatonin 3 milliGRAM(s) Oral at bedtime PRN  methadone    Tablet 135 milliGRAM(s) Oral daily  mirtazapine 30 milliGRAM(s) Oral at bedtime  multivitamin 1 Tablet(s) Oral daily  nicotine - 21 mG/24Hr(s) Patch 1 Patch Transdermal daily  pantoprazole  Injectable 40 milliGRAM(s) IV Push every 12 hours  polyethylene glycol 3350 17 Gram(s) Oral daily  QUEtiapine 200 milliGRAM(s) Oral at bedtime  scopolamine 1 mG/72 Hr(s) Patch 1 Patch Transdermal every 72 hours PRN  senna 2 Tablet(s) Oral at bedtime  sodium zirconium cyclosilicate 5 Gram(s) Oral once  thiamine 100 milliGRAM(s) Oral daily      ANTIBIOTICS:  ceFAZolin   IVPB 2000 milliGRAM(s) IV Intermittent every 8 hours  clindamycin IVPB 900 milliGRAM(s) IV Intermittent every 8 hours  clindamycin IVPB

## 2022-08-25 DIAGNOSIS — M46.20 OSTEOMYELITIS OF VERTEBRA, SITE UNSPECIFIED: ICD-10-CM

## 2022-08-25 LAB
ALBUMIN SERPL ELPH-MCNC: 3.3 G/DL — LOW (ref 3.5–5.2)
ALP SERPL-CCNC: 146 U/L — HIGH (ref 30–115)
ALT FLD-CCNC: 9 U/L — SIGNIFICANT CHANGE UP (ref 0–41)
ANION GAP SERPL CALC-SCNC: 14 MMOL/L — SIGNIFICANT CHANGE UP (ref 7–14)
AST SERPL-CCNC: 16 U/L — SIGNIFICANT CHANGE UP (ref 0–41)
BASOPHILS # BLD AUTO: 0.05 K/UL — SIGNIFICANT CHANGE UP (ref 0–0.2)
BASOPHILS NFR BLD AUTO: 0.3 % — SIGNIFICANT CHANGE UP (ref 0–1)
BILIRUB SERPL-MCNC: <0.2 MG/DL — SIGNIFICANT CHANGE UP (ref 0.2–1.2)
BUN SERPL-MCNC: 28 MG/DL — HIGH (ref 10–20)
CALCIUM SERPL-MCNC: 8.4 MG/DL — LOW (ref 8.5–10.1)
CHLORIDE SERPL-SCNC: 99 MMOL/L — SIGNIFICANT CHANGE UP (ref 98–110)
CO2 SERPL-SCNC: 22 MMOL/L — SIGNIFICANT CHANGE UP (ref 17–32)
CREAT SERPL-MCNC: 0.8 MG/DL — SIGNIFICANT CHANGE UP (ref 0.7–1.5)
EGFR: 106 ML/MIN/1.73M2 — SIGNIFICANT CHANGE UP
EOSINOPHIL # BLD AUTO: 0.01 K/UL — SIGNIFICANT CHANGE UP (ref 0–0.7)
EOSINOPHIL NFR BLD AUTO: 0.1 % — SIGNIFICANT CHANGE UP (ref 0–8)
GLUCOSE SERPL-MCNC: 173 MG/DL — HIGH (ref 70–99)
HCT VFR BLD CALC: 38 % — LOW (ref 42–52)
HGB BLD-MCNC: 12.5 G/DL — LOW (ref 14–18)
IMM GRANULOCYTES NFR BLD AUTO: 2.3 % — HIGH (ref 0.1–0.3)
LYMPHOCYTES # BLD AUTO: 1.95 K/UL — SIGNIFICANT CHANGE UP (ref 1.2–3.4)
LYMPHOCYTES # BLD AUTO: 11.5 % — LOW (ref 20.5–51.1)
MAGNESIUM SERPL-MCNC: 1.8 MG/DL — SIGNIFICANT CHANGE UP (ref 1.8–2.4)
MCHC RBC-ENTMCNC: 29.3 PG — SIGNIFICANT CHANGE UP (ref 27–31)
MCHC RBC-ENTMCNC: 32.9 G/DL — SIGNIFICANT CHANGE UP (ref 32–37)
MCV RBC AUTO: 89.2 FL — SIGNIFICANT CHANGE UP (ref 80–94)
MONOCYTES # BLD AUTO: 0.85 K/UL — HIGH (ref 0.1–0.6)
MONOCYTES NFR BLD AUTO: 5 % — SIGNIFICANT CHANGE UP (ref 1.7–9.3)
NEUTROPHILS # BLD AUTO: 13.69 K/UL — HIGH (ref 1.4–6.5)
NEUTROPHILS NFR BLD AUTO: 80.8 % — HIGH (ref 42.2–75.2)
NRBC # BLD: 0 /100 WBCS — SIGNIFICANT CHANGE UP (ref 0–0)
PLATELET # BLD AUTO: 378 K/UL — SIGNIFICANT CHANGE UP (ref 130–400)
POTASSIUM SERPL-MCNC: 4.5 MMOL/L — SIGNIFICANT CHANGE UP (ref 3.5–5)
POTASSIUM SERPL-SCNC: 4.5 MMOL/L — SIGNIFICANT CHANGE UP (ref 3.5–5)
PROT SERPL-MCNC: 6.3 G/DL — SIGNIFICANT CHANGE UP (ref 6–8)
RBC # BLD: 4.26 M/UL — LOW (ref 4.7–6.1)
RBC # FLD: 16 % — HIGH (ref 11.5–14.5)
SODIUM SERPL-SCNC: 135 MMOL/L — SIGNIFICANT CHANGE UP (ref 135–146)
WBC # BLD: 16.94 K/UL — HIGH (ref 4.8–10.8)
WBC # FLD AUTO: 16.94 K/UL — HIGH (ref 4.8–10.8)

## 2022-08-25 PROCEDURE — 99233 SBSQ HOSP IP/OBS HIGH 50: CPT

## 2022-08-25 PROCEDURE — 93010 ELECTROCARDIOGRAM REPORT: CPT

## 2022-08-25 RX ORDER — CYCLOBENZAPRINE HYDROCHLORIDE 10 MG/1
5 TABLET, FILM COATED ORAL THREE TIMES A DAY
Refills: 0 | Status: DISCONTINUED | OUTPATIENT
Start: 2022-08-25 | End: 2022-08-29

## 2022-08-25 RX ORDER — ACETAMINOPHEN 500 MG
1000 TABLET ORAL EVERY 8 HOURS
Refills: 0 | Status: DISCONTINUED | OUTPATIENT
Start: 2022-08-25 | End: 2022-08-25

## 2022-08-25 RX ORDER — GABAPENTIN 400 MG/1
300 CAPSULE ORAL THREE TIMES A DAY
Refills: 0 | Status: DISCONTINUED | OUTPATIENT
Start: 2022-08-25 | End: 2022-08-25

## 2022-08-25 RX ORDER — ACETAMINOPHEN 500 MG
650 TABLET ORAL EVERY 6 HOURS
Refills: 0 | Status: DISCONTINUED | OUTPATIENT
Start: 2022-08-25 | End: 2022-08-29

## 2022-08-25 RX ORDER — CYCLOBENZAPRINE HYDROCHLORIDE 10 MG/1
5 TABLET, FILM COATED ORAL THREE TIMES A DAY
Refills: 0 | Status: DISCONTINUED | OUTPATIENT
Start: 2022-08-25 | End: 2022-08-25

## 2022-08-25 RX ADMIN — BUPROPION HYDROCHLORIDE 300 MILLIGRAM(S): 150 TABLET, EXTENDED RELEASE ORAL at 11:57

## 2022-08-25 RX ADMIN — Medication 4 MILLIGRAM(S): at 11:58

## 2022-08-25 RX ADMIN — Medication 100 MILLIGRAM(S): at 01:55

## 2022-08-25 RX ADMIN — Medication 100 MILLIGRAM(S): at 21:43

## 2022-08-25 RX ADMIN — Medication 100 MILLIGRAM(S): at 13:10

## 2022-08-25 RX ADMIN — Medication 1 TABLET(S): at 11:59

## 2022-08-25 RX ADMIN — Medication 1 PATCH: at 11:03

## 2022-08-25 RX ADMIN — Medication 1 PATCH: at 12:00

## 2022-08-25 RX ADMIN — Medication 1 MILLIGRAM(S): at 11:59

## 2022-08-25 RX ADMIN — PANTOPRAZOLE SODIUM 40 MILLIGRAM(S): 20 TABLET, DELAYED RELEASE ORAL at 17:17

## 2022-08-25 RX ADMIN — Medication 100 MILLIGRAM(S): at 05:41

## 2022-08-25 RX ADMIN — Medication 4 MILLIGRAM(S): at 17:17

## 2022-08-25 RX ADMIN — MIRTAZAPINE 30 MILLIGRAM(S): 45 TABLET, ORALLY DISINTEGRATING ORAL at 21:40

## 2022-08-25 RX ADMIN — Medication 2 MILLIGRAM(S): at 21:43

## 2022-08-25 RX ADMIN — PANTOPRAZOLE SODIUM 40 MILLIGRAM(S): 20 TABLET, DELAYED RELEASE ORAL at 05:43

## 2022-08-25 RX ADMIN — Medication 1 PATCH: at 06:37

## 2022-08-25 RX ADMIN — ENOXAPARIN SODIUM 40 MILLIGRAM(S): 100 INJECTION SUBCUTANEOUS at 21:40

## 2022-08-25 RX ADMIN — SENNA PLUS 2 TABLET(S): 8.6 TABLET ORAL at 21:41

## 2022-08-25 RX ADMIN — QUETIAPINE FUMARATE 200 MILLIGRAM(S): 200 TABLET, FILM COATED ORAL at 21:41

## 2022-08-25 RX ADMIN — METHADONE HYDROCHLORIDE 135 MILLIGRAM(S): 40 TABLET ORAL at 11:53

## 2022-08-25 RX ADMIN — Medication 100 MILLIGRAM(S): at 11:59

## 2022-08-25 RX ADMIN — Medication 2 MILLIGRAM(S): at 05:41

## 2022-08-25 RX ADMIN — Medication 2 MILLIGRAM(S): at 13:10

## 2022-08-25 RX ADMIN — Medication 4 MILLIGRAM(S): at 05:42

## 2022-08-25 RX ADMIN — Medication 1 PATCH: at 18:17

## 2022-08-25 NOTE — CONSULT NOTE ADULT - PROVIDER SPECIALTY LIST ADULT
Gastroenterology
Ophthalmology
Neurosurgery
Pain Medicine
Cardiology
Ophthalmology
Infectious Disease

## 2022-08-25 NOTE — PROGRESS NOTE ADULT - EYES
PERRL/EOMI/conjunctiva clear/normal
R eye >less edema/erythema laterally
PERRL/EOMI/conjunctiva clear/normal

## 2022-08-25 NOTE — PROGRESS NOTE ADULT - MENTAL STATUS
weakness LEs
can move extremities. No sensory deficits. No urine/stool incontinence
no change
no sensory deficits LEs  has calf tenderness ( no erythema )  weakness LEs

## 2022-08-25 NOTE — PROGRESS NOTE ADULT - ENMT
no gross abnormalities

## 2022-08-25 NOTE — PROGRESS NOTE ADULT - GASTROINTESTINAL
normal/soft/nontender/nondistended/normal active bowel sounds
soft/nontender/no guarding/no rigidity

## 2022-08-25 NOTE — CONSULT NOTE ADULT - SUBJECTIVE AND OBJECTIVE BOX
Pain Medicine Consult Note    History of Present Illness  Patient is a 51 y/o man with history of IVDU, anxiety, depression, and vertebral osteomyelitis diagnosed in January of 2022 who was admitted on 8/10/2022 with worsening low back pain. The patient had recently been treated with a six week course of IV antibiotics but was lost to follow up. On presentation, the patient had fevers, chills, and right periorbital erythema/edema. He has been treated during the admission with IV antibiotics, but MRI lumbar spine showed extension of epidural phlegmon from previous imaging. He states that he has not used IV heroin since January 2022. He also stopped smoking in February 2022. The patient states that his back pain has improved, but he is having pain in the anterior thighs bilaterally. No focal numbness or weakness in the lower extremities, bowel or bladder incontinence or saddle anesthesia. The patient denies any withdrawal symptoms, including abdominal pain, restlessness, or diarrhea.     Current Inpatient Medication Regimen:  acetaminophen     Tablet .. 650 milliGRAM(s) Oral every 6 hours PRN  ALPRAZolam 2 milliGRAM(s) Oral three times a day  aluminum hydroxide/magnesium hydroxide/simethicone Suspension 30 milliLiter(s) Oral every 4 hours PRN  buPROPion XL (24-Hour) . 300 milliGRAM(s) Oral daily  ceFAZolin   IVPB 2000 milliGRAM(s) IV Intermittent every 8 hours  cyclobenzaprine 5 milliGRAM(s) Oral three times a day PRN  dexAMETHasone     Tablet 4 milliGRAM(s) Oral every 6 hours  diatrizoate meglumine/diatrizoate sodium. 30 milliLiter(s) Oral once  enoxaparin Injectable 40 milliGRAM(s) SubCutaneous every 24 hours  folic acid 1 milliGRAM(s) Oral daily  lactated ringers. 1000 milliLiter(s) IV Continuous <Continuous>  lactulose Syrup 10 Gram(s) Oral two times a day  melatonin 3 milliGRAM(s) Oral at bedtime PRN  methadone    Tablet 135 milliGRAM(s) Oral daily  mirtazapine 30 milliGRAM(s) Oral at bedtime  multivitamin 1 Tablet(s) Oral daily  nicotine - 21 mG/24Hr(s) Patch 1 Patch Transdermal daily  pantoprazole  Injectable 40 milliGRAM(s) IV Push every 12 hours  polyethylene glycol 3350 17 Gram(s) Oral daily  QUEtiapine 200 milliGRAM(s) Oral at bedtime  scopolamine 1 mG/72 Hr(s) Patch 1 Patch Transdermal every 72 hours PRN  senna 2 Tablet(s) Oral at bedtime  thiamine 100 milliGRAM(s) Oral daily      Home Analgesic Regimen:  Methadone 135mg daily    Allergies:  No Known Allergies      Past Medical History:  Vertebral osteomyelitis  Opioid use disorder - on methadone, no illicit opioid use since January 2022  Depression  Anxiety    Past Surgical History:  Denies    Family History:  Denies    Social History:  Tobacco - Quit 2/2022  EtOH - denies  Drugs - most recent IV drug use ~1/10/2022; occasional marijuana      Review of Systems:  General: +sweats/diaphoresis  Eyes: +recent right eye redness  ENT: no rhinorrhea  CV: no chest pain  Resp: no cough, dyspnea  GI: no constipation, diarrhea  : no urinary incontinence, dysuria  Neuro: no focal weakness, numbness   Psych: +anxiety, depression    Physical Exam:  T(C): 36.3 (08-25-22 @ 13:01), Max: 36.9 (08-25-22 @ 05:04)  HR: 92 (08-25-22 @ 13:01) (73 - 92)  BP: 130/75 (08-25-22 @ 13:01) (120/67 - 130/75)  RR: 18 (08-25-22 @ 13:01) (18 - 18)  SpO2: 97% (08-25-22 @ 13:01) (97% - 98%)  Gen: NAD  Eyes: no glasses or scleral icterus  Head: Normocephalic / Atraumatic  CV: no JVD  Lungs: nonlabored breathing  Abdomen: nondistended, soft  : no bee catheter in place  Neuro: AOx3, Cranial nerves intact  Extremities: patient with extensive scarring over both upper extremities  Psych: normal affect      Labs:  CBC  16.94 K/uL<H> [4.80 - 10.80] > 12.5 g/dL<L> [14.0 - 18.0] / 38.0 %<L> [42.0 - 52.0] < 378 K/uL [130 - 400]      BMP  135 mmol/L [135 - 146] | 99 mmol/L [98 - 110] | 28 mg/dL<H> [10 - 20]  4.5 mmol/L [3.5 - 5.0] | 22 mmol/L [17 - 32] | 0.8 mg/dL [0.7 - 1.5]    173 mg/dL<H> [70 - 99]        Imaging Studies:  MRI Thoracic/Lumbar Spine (8/12/2022)  FINDINGS:    There is mild motion artifact which mildly interferes with diagnostic   interpretation.    THORACIC SPINE:    VERTEBRAL BODIES/ALIGNMENT: There is mild dextroscoliotic curvature of   the upper thoracic spine. There is straightening of the spine in sagittal   plane.    There is focal edema and enhancement in the anterior endplates of T11-12,   likely degenerative. There is focal edema involving the left T10-T11   facet joint, also likely degenerative. The remaining vertebral bodies are   unremarkable in signal.    SPINAL CORD: There is no abnormal spinal cord signal or enhancement.    SPINAL CANAL: No significant spinal canal or neural foraminal stenosis.   No intradural or extradural lesions are seen.    INTERVERTEBRAL DISCS: The disc spaces are well-maintained.      LUMBAR SPINE:    VERTEBRAL BODIES/ALIGNMENT:  In comparison to the prior lumbar MRI from 3/14/2022, there has been   interval progression of discitis osteomyelitis at L4-5 with destruction   of the endplate cortices and intervening disc. Also noted is mild   vertebral height losses of L4 on L5 to endplate erosion. Stable chronic   mild anterior wedging of L1.    Since the prior exam, there is significantly increased epidural   enhancement with mass effect extending from the right dorsal epidural   space from L2-3 down to sacral level compatible with phlegmon. The   maximal thickness measures approximately 1 cm at L4-5 and encases the   thecal sac resulting in severe impingement of the thecal sac and nerve   roots at L4-5 level. There is slight leftward deviation of the thecal sac.    There is increased edema and enhancement with cortical erosion affecting   bilateral L4-5 facet joints, left more than right since the prior exam.   There is slightly progressed edema and enhancement involving bilateral   L5-S1 facet joints.  There are increased enhancements involving bilateral L4-L5 and L5-S1   neural foraminal spaces.    There is stable incidental fatty filum.    Stable Modic type II endplate degenerative changes at L2-L3.      PARASPINAL AND RETROPERITONEAL SOFT TISSUES:  There is increased perivertebral phlegmon surrounding the L4-5 vertebral   bodies with increased mass effect resulting widening of the left psoas   and vertebral interval since the prior exam. Slightly increased left   retroperitoneal abscess measuring 4 x 2.8 x 4 cm (TV x AP x CC) medial to   the left psoas  by thin fat plane, previously 4.2 x 2.3 x 2.2   cm. Previously observed right psoas abscess appears smaller measuring   approximately 1 cm with decreased surrounding inflammation, previously   1.5 cm.        IMPRESSION:    THORACIC SPINE:  1. Focal edema and enhancement in the anterior endplates of T11-12,   likely degenerative. Focal edema involving the left T10-T11 facet joint,   also likely degenerative.    LUMBAR SPINE:  Since prior March 14, 2022;  1. Interval progression of discitis osteomyelitis at L4-5 with increased   destruction of the endplate cortices and intervening disc. Associated   mild vertebral height losses of L4 on L5.    2. Significantly increased epidural phlegmon extending from the right   dorsal epidural space from L2-3 down to sacral level with maximal mass   effect at the right L4-5 epidural space resulting in   impingement/compression of the thecal sac and nerve roots.    3. Slightly increased erosion affecting bilateral L4-5 facet joints (left   more than right), and bilateral L5-S1 facet joints. Increased   enhancements involving bilateral L4-L5 and L5-S1 neural foraminal spaces.    4. Increased paravertebral phlegmon surrounding L4-5 with slightly   increased size of the left retroperitoneal abscess. Slightly smaller   right psoas abscess.      Opioid Risk Assessment Tool                                                                         Female       Male  Family History  Alcohol                                                              1                3  Illegal drugs                                                       2                3  Rx drugs                                                            4                4    Personal History   Alcohol                                                              3                3  Illegal drugs                                                       4                4  Rx drugs                                                            5                5    Age between 16—45 years                                1                1  History of preadolescent sexual abuse               3                0    Psychological disease  ADD, OCD, bipolar, schizophrenia                      2                2  Depression                                                       1                1    Total Score                                                      __              5    0 - 3 = low risk for future opioid abuse  4 - 7 = moderate risk for future opioid abuse  8+ = high risk for future opioid abuse

## 2022-08-25 NOTE — PROGRESS NOTE ADULT - NEUROLOGICAL
normal/cranial nerves II-XII intact/sensation intact
normal/cranial nerves II-XII intact/sensation intact

## 2022-08-25 NOTE — CONSULT NOTE ADULT - ASSESSMENT
Patient is a 51 y/o man with history of IVDU, anxiety, depression, and vertebral osteomyelitis diagnosed in January of 2022 who was admitted on 8/10/2022 with worsening low back pain.

## 2022-08-25 NOTE — PROGRESS NOTE ADULT - SUBJECTIVE AND OBJECTIVE BOX
MIKAEL MCDERMOTT  [facility] ([facility])      Patient is a 52y old  Male who presents with a chief complaint of Back pain (25 Aug 2022 08:35)        Interval events:  Patient seen and examined at bedside. No acute events overnight. No current complaints.     -PMHx: IV drug abuse    Vertebral osteomyelitis    MSSA bacteremia      -PSHx:No significant past surgical history            REVIEW OF SYSTEMS:  CONSTITUTIONAL: No fever, weight loss, or fatigue  RESPIRATORY: No cough, wheezing, chills or hemoptysis; No shortness of breath  CARDIOVASCULAR: No chest pain, palpitations, dizziness, or leg swelling  GASTROINTESTINAL: No abdominal or epigastric pain. No nausea, vomiting, or hematemesis; No diarrhea or constipation. No melena or hematochezia.  NEUROLOGICAL: No headaches  LYMPH NODES: No enlarged glands  MUSCULOSKELETAL: No joint pain or swelling; No muscle, back, or extremity pain      T(C): , Max: 36.9 (08-25-22 @ 05:04)  HR: 92 (08-25-22 @ 13:01)  BP: 130/75 (08-25-22 @ 13:01)  RR: 18 (08-25-22 @ 13:01)  SpO2: 97% (08-25-22 @ 13:01)  CAPILLARY BLOOD GLUCOSE          PHYSICAL EXAM:  GEN: ill-appearing, pale   HEENT: PERRL; EOMI; conjunctiva clear; OD with swelling/erythema around orbit (improved), not painful to move eye   CHEST: clear to auscultation bilaterally; no wheezes; no rales; no rhonchi  CARDS: 4/6 systolic murmur  ABD: soft, nt, nd  NEURO: cranial nerves II-XII intact; superficial reflexes intact, mild motor weakness LEs improved; no sensory deficits  LYMPH: No lymphadedenopathy  MSK: no joint swelling; no joint erythema; no joint warmth; b/l calf tenderness (no erythema)     LABS:          12.5  16.94  )-------(378          38.0  N=80.8  L=11.5  MCV=89.2    135|99|28<H>  ------------------<173<H>  4.5|22|0.8  eGFR:--  Ca:8.4<L>            Microbiology:      RADIOLOGY & ADDITIONAL TESTS:  < from: Transesophageal Echocardiogram (08.24.22 @ 15:25) >      Summary:   1. Left ventricular ejection fraction, by visual estimation, is 55 to   60%.   2. Normal global left ventricular systolic function.   3. Normal left atrial size.   4. Normal right atrial size.  5. Structurally normal mitral valve, with normal leaflet excursion.   6. Trace mitral valve regurgitation.   7. Normal trileaflet aortic valve with normal opening.   8. Structurally normal pulmonic valve, with normal leaflet excursion.   9. No evidence of infective endocarditis.    < end of copied text >        Medications:  acetaminophen     Tablet .. 650 milliGRAM(s) Oral every 6 hours PRN  ALPRAZolam 2 milliGRAM(s) Oral three times a day  aluminum hydroxide/magnesium hydroxide/simethicone Suspension 30 milliLiter(s) Oral every 4 hours PRN  buPROPion XL (24-Hour) . 300 milliGRAM(s) Oral daily  ceFAZolin   IVPB 2000 milliGRAM(s) IV Intermittent every 8 hours  cyclobenzaprine 5 milliGRAM(s) Oral three times a day PRN  dexAMETHasone     Tablet 4 milliGRAM(s) Oral every 6 hours  diatrizoate meglumine/diatrizoate sodium. 30 milliLiter(s) Oral once  enoxaparin Injectable 40 milliGRAM(s) SubCutaneous every 24 hours  folic acid 1 milliGRAM(s) Oral daily  lactated ringers. 1000 milliLiter(s) IV Continuous <Continuous>  lactulose Syrup 10 Gram(s) Oral two times a day  melatonin 3 milliGRAM(s) Oral at bedtime PRN  methadone    Tablet 135 milliGRAM(s) Oral daily  mirtazapine 30 milliGRAM(s) Oral at bedtime  multivitamin 1 Tablet(s) Oral daily  nicotine - 21 mG/24Hr(s) Patch 1 Patch Transdermal daily  pantoprazole  Injectable 40 milliGRAM(s) IV Push every 12 hours  polyethylene glycol 3350 17 Gram(s) Oral daily  QUEtiapine 200 milliGRAM(s) Oral at bedtime  scopolamine 1 mG/72 Hr(s) Patch 1 Patch Transdermal every 72 hours PRN  senna 2 Tablet(s) Oral at bedtime  thiamine 100 milliGRAM(s) Oral daily

## 2022-08-25 NOTE — PROGRESS NOTE ADULT - SUBJECTIVE AND OBJECTIVE BOX
BALDEMARMIKAEL  52y, Male    All available historical data reviewed    OVERNIGHT EVENTS:    feels well and has no new complaints  No fevers   ROS:  General: Denies rigors, nightsweats  HEENT: Denies headache, rhinorrhea, sore throat, eye pain  CV: Denies CP, palpitations  PULM: Denies wheezing, hemoptysis  GI: Denies hematemesis, hematochezia, melena  : Denies discharge, hematuria  MSK: Denies arthralgias, myalgias  SKIN: Denies rash, lesions  NEURO: Denies paresthesias, weakness  PSYCH: Denies depression, anxiety    VITALS:  T(F): 98.5, Max: 98.5 (08-25-22 @ 05:04)  HR: 73  BP: 120/67  RR: 18Vital Signs Last 24 Hrs  T(C): 36.9 (25 Aug 2022 05:04), Max: 36.9 (25 Aug 2022 05:04)  T(F): 98.5 (25 Aug 2022 05:04), Max: 98.5 (25 Aug 2022 05:04)  HR: 73 (25 Aug 2022 05:04) (66 - 73)  BP: 120/67 (25 Aug 2022 05:04) (120/67 - 124/77)  BP(mean): --  RR: 18 (25 Aug 2022 05:04) (18 - 18)  SpO2: 98% (25 Aug 2022 05:04) (96% - 98%)        TESTS & MEASUREMENTS:                        12.5   16.94 )-----------( 378      ( 25 Aug 2022 06:36 )             38.0     08-25    135  |  99  |  28<H>  ----------------------------<  173<H>  4.5   |  22  |  0.8    Ca    8.4<L>      25 Aug 2022 06:36  Mg     1.8     08-25    TPro  6.3  /  Alb  3.3<L>  /  TBili  <0.2  /  DBili  x   /  AST  16  /  ALT  9   /  AlkPhos  146<H>  08-25    LIVER FUNCTIONS - ( 25 Aug 2022 06:36 )  Alb: 3.3 g/dL / Pro: 6.3 g/dL / ALK PHOS: 146 U/L / ALT: 9 U/L / AST: 16 U/L / GGT: x                   RADIOLOGY & ADDITIONAL TESTS:  Personal review of radiological diagnostics performed  Echo and EKG results noted when applicable.     MEDICATIONS:  acetaminophen     Tablet .. 650 milliGRAM(s) Oral every 6 hours PRN  ALPRAZolam 2 milliGRAM(s) Oral three times a day  aluminum hydroxide/magnesium hydroxide/simethicone Suspension 30 milliLiter(s) Oral every 4 hours PRN  buPROPion XL (24-Hour) . 300 milliGRAM(s) Oral daily  ceFAZolin   IVPB 2000 milliGRAM(s) IV Intermittent every 8 hours  clindamycin IVPB 900 milliGRAM(s) IV Intermittent every 8 hours  clindamycin IVPB      cyclobenzaprine 5 milliGRAM(s) Oral three times a day PRN  dexAMETHasone     Tablet 4 milliGRAM(s) Oral every 6 hours  diatrizoate meglumine/diatrizoate sodium. 30 milliLiter(s) Oral once  enoxaparin Injectable 40 milliGRAM(s) SubCutaneous every 24 hours  folic acid 1 milliGRAM(s) Oral daily  lactated ringers. 1000 milliLiter(s) IV Continuous <Continuous>  lactulose Syrup 10 Gram(s) Oral two times a day  melatonin 3 milliGRAM(s) Oral at bedtime PRN  methadone    Tablet 135 milliGRAM(s) Oral daily  mirtazapine 30 milliGRAM(s) Oral at bedtime  multivitamin 1 Tablet(s) Oral daily  nicotine - 21 mG/24Hr(s) Patch 1 Patch Transdermal daily  pantoprazole  Injectable 40 milliGRAM(s) IV Push every 12 hours  polyethylene glycol 3350 17 Gram(s) Oral daily  QUEtiapine 200 milliGRAM(s) Oral at bedtime  scopolamine 1 mG/72 Hr(s) Patch 1 Patch Transdermal every 72 hours PRN  senna 2 Tablet(s) Oral at bedtime  thiamine 100 milliGRAM(s) Oral daily      ANTIBIOTICS:  ceFAZolin   IVPB 2000 milliGRAM(s) IV Intermittent every 8 hours  clindamycin IVPB      clindamycin IVPB 900 milliGRAM(s) IV Intermittent every 8 hours

## 2022-08-25 NOTE — PROGRESS NOTE ADULT - ASSESSMENT
· Assessment	  51 yo M w/ pmhx of IVDU (heroin), vertebral osteomyelitis, and MSSA bacteremia presents for worsening back pain. Back pain was increasing over the past couple of weeks and worsening today to the point he was having difficulty ambulating . Complaining of right sided eye swelling/redness since Tuesday 8/9 . He slept on the hard floor ( wanted to stretch himself ) and woke up in the am with redness/swelling. Patient denies any visual changes or eye pain. Patient denies any fever, chills, headache, dizziness. Patient denies chest pain, palpitation, SOB. Patient denies abdominal pain, n/v/d/c.   Patient was recently admitted 03/12-04/06 for vertebral OM/facetitis. PAM was negative and ID recommended cefazolin. Patient had picc line placed for 6wks of antibiotics. Patient states he finished his antibiotics at [facility] but did not follow up with Dr Giraldo. Patient states he is currently not using heroin. He last used 10 months ago.    8/13 CT Lumbar Spine No Cont   Interval progression of discitis-osteomyelitis at L4-5 with increased   endplate lytic changes since the prior lumbarCT from 3/12/2022. The L4-5   disc is not well visualized due to lucencies of the L4-5 endplates.   Relatively spared L4-5 and L5-S1 facet joints without significant lytic   changes.    Redemonstrated paravertebral phlegmon and left retropharyngeal abscess   with air-fluid level.    The epidural phlegmon and the spinal space are not well visualized due to   difference in modality.          IMPRESSION;  NICOLE bacteremia with discitis/OM at L4-5.  Paravertebral phlegmon and left retropharyngeal abscess with air-fluid level.  8/10 BCx NICOLE  8/11,12,13,14,15 BCx NG  8/24 PAM no vegetations  ESR//[id number]    RECOMMENDATIONS;   F/u NS rior to discharge  D/c Clindamycin   Change ancef 2 gm iv q8h ( since 8/16 )-8 weeks starting 8/11  Will need a repeat CT LS spine prior to stopping ABx  Off loading to prevent pressure sores and preventive measures to avoid aspiration   Please do not hesitate to recall ID if any questions arise either through [employer] or through [employer]

## 2022-08-25 NOTE — CONSULT NOTE ADULT - PROBLEM SELECTOR RECOMMENDATION 9
Patient with longstanding history of opioid use disorder, currently treated with methadone for MAT (135mg/day). Likely to have significant opioid tolerance. Moderate to high risk of opioid abuse. Patient scheduled to undergo lumbar laminectomy/decompression on 8/29.    Postoperative pain plan:  1) Hydromorphone PCA 0/0.4/10  2) Continue methadone 135mg daily  3) Change acetaminophen to 1000mg Q8h standing  4) Change cyclobenzaprine to 5mg Q8h standing  5) Start gabapentin 300mg TID

## 2022-08-25 NOTE — PROGRESS NOTE ADULT - RESPIRATORY
normal/clear to auscultation bilaterally/no wheezes/no rales/no rhonchi

## 2022-08-25 NOTE — CONSULT NOTE ADULT - CONSULT REASON
Bilateral thigh pain, low back pain
back pain
CT Orbit w/ IV Cont 8/10: shows Right periorbital (pre-septal) soft tissue swelling consistent with cellulitis. No evidence of abscess. No evidence of post-septal inflammation.
nausea, vomiting
PAM
Right periorbital cellulitis
vertebral OM

## 2022-08-25 NOTE — PROGRESS NOTE ADULT - ASSESSMENT
51 yo M w/ pmhx of IVDU (heroin), vertebral osteomyelitis, and MSSA bacteremia presents for worsening back pain. Patient was found febrile in the ED with elevated WBC count, CT orbit showing Right periorbital cellulitis.     #Recurrence of vertebral OM   #Recent history & post treatment for MSSA bacteremia  #Right periorbital cellulitis  #Sepsis present on admission: leukocytosis, febrile  - CT Orbit w/ IV Cont 8/10: shows Right periorbital (pre-septal) soft tissue swelling consistent with cellulitis. No evidence of abscess. No evidence of post-septal inflammation.  -h/o IR Drainage of Abscess/Biopsy of suspected discitis done 3/16 -fluid  growing NICOLE  -, .8   - BCx negative since 8/12  -MR lumbar/t spine: 1. Interval progression of discitis osteomyelitis at L4-5 with increased   destruction of the endplate cortices and intervening disc. Associated   mild vertebral height losses of L4 on L5.    2. Significantly increased epidural phlegmon extending from the right   dorsal epidural space from L2-3 down to sacral level with maximal mass   effect at the right L4-5 epidural space resulting in   impingement/compression of the thecal sac and nerve roots.    3. Slightly increased erosion affecting bilateral L4-5 facet joints (left   more than right), and bilateral L5-S1 facet joints. Increased   enhancements involving bilateral L4-L5 and L5-S1 neural foraminal spaces.    2. Increased paravertebral phlegmon surrounding L4-5 with slightly   increased size of the left retroperitoneal abscess. Slightly smaller   right psoas abscess.    - ID consult appreciated: d/c clindamycin, continue ancef 2g iv q8h; will need CT before stopping abx  -PAM showing no vegetations; reached out to neuro[facility], they will get back to us for possible surgery (apparently probably on Monday)      #Vomiting/abdominal pain- resolved  #Delirium- resolved  -possibly vomited from nafcillin?  -delirium possibly due to vomiting/not getting methadone, now back at baseline    #Transaminitis- resolved  -U/S equivocal  -trend LFT's     #Normocytic Anemia- improved  - Monitor H+H  -ferritin, b12, folate wnl    #Hx of IVD  #Opioid abuse on Methadone  - Continue methadone 135, and gabapentin    #Anxiety/Depression  - c/w mirtazapine + quetiapine  -c/w xanax     #Active nicotine dependence  - c/w nicotine patch  - c/w buproprion    #Morbid obesity  - diet and lifestyle modification     #Hypomagnesemia  -repleted    #Hyperkalemia- resolved    #Suspected folic acid deficiency  -continue supplement    #DVT PPx- LVX 40mg sq qhs  #GI PPx- None  #Diet- DASH/TLC  #CHG  #Activity- AAT  #Code- FULL    #Progress Note Handoff  Pending (specify):  neurosurgery  Family discussion: Plan of care discussed with patient, aware and agreeable   Disposition: Unknown at this time

## 2022-08-25 NOTE — CONSULT NOTE ADULT - CONSULT REQUESTED DATE/TIME
11-Aug-2022 16:06
10-Aug-2022 17:35
11-Aug-2022 19:46
16-Aug-2022 13:18
25-Aug-2022 11:37
18-Aug-2022 15:23
11-Aug-2022 09:32

## 2022-08-26 PROCEDURE — 93010 ELECTROCARDIOGRAM REPORT: CPT

## 2022-08-26 PROCEDURE — 99233 SBSQ HOSP IP/OBS HIGH 50: CPT

## 2022-08-26 RX ORDER — ALPRAZOLAM 0.25 MG
2 TABLET ORAL THREE TIMES A DAY
Refills: 0 | Status: DISCONTINUED | OUTPATIENT
Start: 2022-08-26 | End: 2022-08-29

## 2022-08-26 RX ORDER — METHADONE HYDROCHLORIDE 40 MG/1
135 TABLET ORAL DAILY
Refills: 0 | Status: DISCONTINUED | OUTPATIENT
Start: 2022-08-27 | End: 2022-08-29

## 2022-08-26 RX ORDER — ALPRAZOLAM 0.25 MG
2 TABLET ORAL THREE TIMES A DAY
Refills: 0 | Status: DISCONTINUED | OUTPATIENT
Start: 2022-08-26 | End: 2022-08-26

## 2022-08-26 RX ADMIN — Medication 100 MILLIGRAM(S): at 13:10

## 2022-08-26 RX ADMIN — ENOXAPARIN SODIUM 40 MILLIGRAM(S): 100 INJECTION SUBCUTANEOUS at 21:02

## 2022-08-26 RX ADMIN — Medication 2 MILLIGRAM(S): at 21:02

## 2022-08-26 RX ADMIN — Medication 2 MILLIGRAM(S): at 06:22

## 2022-08-26 RX ADMIN — Medication 4 MILLIGRAM(S): at 17:56

## 2022-08-26 RX ADMIN — SENNA PLUS 2 TABLET(S): 8.6 TABLET ORAL at 21:03

## 2022-08-26 RX ADMIN — PANTOPRAZOLE SODIUM 40 MILLIGRAM(S): 20 TABLET, DELAYED RELEASE ORAL at 17:56

## 2022-08-26 RX ADMIN — Medication 1 PATCH: at 11:11

## 2022-08-26 RX ADMIN — METHADONE HYDROCHLORIDE 135 MILLIGRAM(S): 40 TABLET ORAL at 11:04

## 2022-08-26 RX ADMIN — Medication 1 TABLET(S): at 11:02

## 2022-08-26 RX ADMIN — Medication 4 MILLIGRAM(S): at 00:06

## 2022-08-26 RX ADMIN — Medication 4 MILLIGRAM(S): at 23:22

## 2022-08-26 RX ADMIN — BUPROPION HYDROCHLORIDE 300 MILLIGRAM(S): 150 TABLET, EXTENDED RELEASE ORAL at 11:02

## 2022-08-26 RX ADMIN — Medication 4 MILLIGRAM(S): at 11:01

## 2022-08-26 RX ADMIN — Medication 100 MILLIGRAM(S): at 06:21

## 2022-08-26 RX ADMIN — Medication 100 MILLIGRAM(S): at 21:04

## 2022-08-26 RX ADMIN — Medication 4 MILLIGRAM(S): at 06:23

## 2022-08-26 RX ADMIN — PANTOPRAZOLE SODIUM 40 MILLIGRAM(S): 20 TABLET, DELAYED RELEASE ORAL at 06:21

## 2022-08-26 RX ADMIN — MIRTAZAPINE 30 MILLIGRAM(S): 45 TABLET, ORALLY DISINTEGRATING ORAL at 21:02

## 2022-08-26 RX ADMIN — QUETIAPINE FUMARATE 200 MILLIGRAM(S): 200 TABLET, FILM COATED ORAL at 21:03

## 2022-08-26 RX ADMIN — Medication 2 MILLIGRAM(S): at 13:10

## 2022-08-26 RX ADMIN — Medication 1 PATCH: at 18:08

## 2022-08-26 RX ADMIN — Medication 1 MILLIGRAM(S): at 11:02

## 2022-08-26 RX ADMIN — Medication 100 MILLIGRAM(S): at 11:04

## 2022-08-26 RX ADMIN — Medication 1 PATCH: at 11:02

## 2022-08-26 NOTE — PROGRESS NOTE ADULT - ASSESSMENT
53 yo M w/ pmhx of IVDU (heroin), vertebral osteomyelitis, and MSSA bacteremia presents for worsening back pain. Patient was found febrile in the ED with elevated WBC count, CT orbit showing Right periorbital cellulitis.     #Recurrence of vertebral OM   #Recent history & post treatment for MSSA bacteremia  #Right periorbital cellulitis  #Sepsis present on admission: leukocytosis, febrile  - CT Orbit w/ IV Cont 8/10: shows Right periorbital (pre-septal) soft tissue swelling consistent with cellulitis. No evidence of abscess. No evidence of post-septal inflammation.  -h/o IR Drainage of Abscess/Biopsy of suspected discitis done 3/16 -fluid  growing NICOLE  -, .8   - BCx negative since 8/12  -MR lumbar/t spine: 1. Interval progression of discitis osteomyelitis at L4-5 with increased   destruction of the endplate cortices and intervening disc. Associated   mild vertebral height losses of L4 on L5.    2. Significantly increased epidural phlegmon extending from the right   dorsal epidural space from L2-3 down to sacral level with maximal mass   effect at the right L4-5 epidural space resulting in   impingement/compression of the thecal sac and nerve roots.    3. Slightly increased erosion affecting bilateral L4-5 facet joints (left   more than right), and bilateral L5-S1 facet joints. Increased   enhancements involving bilateral L4-L5 and L5-S1 neural foraminal spaces.    2. Increased paravertebral phlegmon surrounding L4-5 with slightly   increased size of the left retroperitoneal abscess. Slightly smaller   right psoas abscess.    - ID consult appreciated: continue ancef 2g iv q8h; will need CT before stopping abx  -PAM showing no vegetations; reached out to neurosx, they will get back to us for possible surgery (apparently probably on Monday, do preop labs/NPO Sunday night)       #Vomiting/abdominal pain- resolved  #Delirium- resolved  -possibly vomited from nafcillin?  -delirium possibly due to vomiting/not getting methadone, now back at baseline    #Transaminitis- resolved  -U/S equivocal  -trend LFT's     #Normocytic Anemia- improved  - Monitor H+H  -ferritin, b12, folate wnl    #Hx of IVD  #Opioid abuse on Methadone  - Continue methadone 135, and gabapentin    #Anxiety/Depression  - c/w mirtazapine + quetiapine   -c/w xanax     #Active nicotine dependence  - c/w nicotine patch  - c/w buproprion    #Morbid obesity  - diet and lifestyle modification     #Hypomagnesemia  -repleted    #Hyperkalemia- resolved    #Suspected folic acid deficiency  -continue supplement    #DVT PPx- LVX 40mg sq qhs  #GI PPx- None  #Diet- DASH/TLC  #CHG  #Activity- AAT  #Code- FULL    #Progress Note Handoff  Pending (specify):  neurosurgery  Family discussion: Plan of care discussed with patient, aware and agreeable   Disposition: Unknown at this time

## 2022-08-27 LAB
ALBUMIN SERPL ELPH-MCNC: 3.7 G/DL — SIGNIFICANT CHANGE UP (ref 3.5–5.2)
ALP SERPL-CCNC: 153 U/L — HIGH (ref 30–115)
ALT FLD-CCNC: 13 U/L — SIGNIFICANT CHANGE UP (ref 0–41)
ANION GAP SERPL CALC-SCNC: 16 MMOL/L — HIGH (ref 7–14)
AST SERPL-CCNC: 15 U/L — SIGNIFICANT CHANGE UP (ref 0–41)
BASOPHILS # BLD AUTO: 0.05 K/UL — SIGNIFICANT CHANGE UP (ref 0–0.2)
BASOPHILS NFR BLD AUTO: 0.2 % — SIGNIFICANT CHANGE UP (ref 0–1)
BILIRUB SERPL-MCNC: <0.2 MG/DL — SIGNIFICANT CHANGE UP (ref 0.2–1.2)
BUN SERPL-MCNC: 21 MG/DL — HIGH (ref 10–20)
CALCIUM SERPL-MCNC: 8.5 MG/DL — SIGNIFICANT CHANGE UP (ref 8.5–10.1)
CHLORIDE SERPL-SCNC: 95 MMOL/L — LOW (ref 98–110)
CO2 SERPL-SCNC: 22 MMOL/L — SIGNIFICANT CHANGE UP (ref 17–32)
CREAT SERPL-MCNC: 0.7 MG/DL — SIGNIFICANT CHANGE UP (ref 0.7–1.5)
EGFR: 110 ML/MIN/1.73M2 — SIGNIFICANT CHANGE UP
EOSINOPHIL # BLD AUTO: 0.01 K/UL — SIGNIFICANT CHANGE UP (ref 0–0.7)
EOSINOPHIL NFR BLD AUTO: 0 % — SIGNIFICANT CHANGE UP (ref 0–8)
GLUCOSE SERPL-MCNC: 147 MG/DL — HIGH (ref 70–99)
HCT VFR BLD CALC: 38.1 % — LOW (ref 42–52)
HGB BLD-MCNC: 12.2 G/DL — LOW (ref 14–18)
IMM GRANULOCYTES NFR BLD AUTO: 2.8 % — HIGH (ref 0.1–0.3)
LYMPHOCYTES # BLD AUTO: 1.88 K/UL — SIGNIFICANT CHANGE UP (ref 1.2–3.4)
LYMPHOCYTES # BLD AUTO: 9.1 % — LOW (ref 20.5–51.1)
MCHC RBC-ENTMCNC: 29.1 PG — SIGNIFICANT CHANGE UP (ref 27–31)
MCHC RBC-ENTMCNC: 32 G/DL — SIGNIFICANT CHANGE UP (ref 32–37)
MCV RBC AUTO: 90.9 FL — SIGNIFICANT CHANGE UP (ref 80–94)
MONOCYTES # BLD AUTO: 0.97 K/UL — HIGH (ref 0.1–0.6)
MONOCYTES NFR BLD AUTO: 4.7 % — SIGNIFICANT CHANGE UP (ref 1.7–9.3)
NEUTROPHILS # BLD AUTO: 17.17 K/UL — HIGH (ref 1.4–6.5)
NEUTROPHILS NFR BLD AUTO: 83.2 % — HIGH (ref 42.2–75.2)
NRBC # BLD: 0 /100 WBCS — SIGNIFICANT CHANGE UP (ref 0–0)
PLATELET # BLD AUTO: 406 K/UL — HIGH (ref 130–400)
POTASSIUM SERPL-MCNC: 5.5 MMOL/L — HIGH (ref 3.5–5)
POTASSIUM SERPL-SCNC: 5.5 MMOL/L — HIGH (ref 3.5–5)
PROT SERPL-MCNC: 6.6 G/DL — SIGNIFICANT CHANGE UP (ref 6–8)
RBC # BLD: 4.19 M/UL — LOW (ref 4.7–6.1)
RBC # FLD: 16.3 % — HIGH (ref 11.5–14.5)
SODIUM SERPL-SCNC: 133 MMOL/L — LOW (ref 135–146)
WBC # BLD: 20.66 K/UL — HIGH (ref 4.8–10.8)
WBC # FLD AUTO: 20.66 K/UL — HIGH (ref 4.8–10.8)

## 2022-08-27 PROCEDURE — 99233 SBSQ HOSP IP/OBS HIGH 50: CPT

## 2022-08-27 PROCEDURE — 93010 ELECTROCARDIOGRAM REPORT: CPT

## 2022-08-27 RX ADMIN — Medication 2 MILLIGRAM(S): at 13:11

## 2022-08-27 RX ADMIN — Medication 1 PATCH: at 11:28

## 2022-08-27 RX ADMIN — Medication 1 PATCH: at 11:34

## 2022-08-27 RX ADMIN — LACTULOSE 10 GRAM(S): 10 SOLUTION ORAL at 06:15

## 2022-08-27 RX ADMIN — ENOXAPARIN SODIUM 40 MILLIGRAM(S): 100 INJECTION SUBCUTANEOUS at 21:18

## 2022-08-27 RX ADMIN — Medication 2 MILLIGRAM(S): at 21:17

## 2022-08-27 RX ADMIN — Medication 100 MILLIGRAM(S): at 13:11

## 2022-08-27 RX ADMIN — Medication 100 MILLIGRAM(S): at 21:18

## 2022-08-27 RX ADMIN — METHADONE HYDROCHLORIDE 135 MILLIGRAM(S): 40 TABLET ORAL at 11:18

## 2022-08-27 RX ADMIN — QUETIAPINE FUMARATE 200 MILLIGRAM(S): 200 TABLET, FILM COATED ORAL at 21:17

## 2022-08-27 RX ADMIN — Medication 4 MILLIGRAM(S): at 17:43

## 2022-08-27 RX ADMIN — PANTOPRAZOLE SODIUM 40 MILLIGRAM(S): 20 TABLET, DELAYED RELEASE ORAL at 06:50

## 2022-08-27 RX ADMIN — Medication 2 MILLIGRAM(S): at 06:16

## 2022-08-27 RX ADMIN — MIRTAZAPINE 30 MILLIGRAM(S): 45 TABLET, ORALLY DISINTEGRATING ORAL at 21:17

## 2022-08-27 RX ADMIN — Medication 100 MILLIGRAM(S): at 11:28

## 2022-08-27 RX ADMIN — BUPROPION HYDROCHLORIDE 300 MILLIGRAM(S): 150 TABLET, EXTENDED RELEASE ORAL at 11:28

## 2022-08-27 RX ADMIN — Medication 1 PATCH: at 17:43

## 2022-08-27 RX ADMIN — Medication 100 MILLIGRAM(S): at 06:51

## 2022-08-27 RX ADMIN — Medication 4 MILLIGRAM(S): at 11:28

## 2022-08-27 RX ADMIN — PANTOPRAZOLE SODIUM 40 MILLIGRAM(S): 20 TABLET, DELAYED RELEASE ORAL at 17:43

## 2022-08-27 RX ADMIN — Medication 1 TABLET(S): at 11:28

## 2022-08-27 RX ADMIN — Medication 1 MILLIGRAM(S): at 11:28

## 2022-08-27 RX ADMIN — Medication 4 MILLIGRAM(S): at 06:16

## 2022-08-27 NOTE — PROGRESS NOTE ADULT - SUBJECTIVE AND OBJECTIVE BOX
SUBJECTIVE:    Patient is a 52y old Male who presents with a chief complaint of Back pain (26 Aug 2022 14:35)    Currently admitted to medicine with the primary diagnosis of Fever       Today is hospital day 17d. This morning he is resting comfortably in bed and reports no new issues or overnight events.     ROS:   CONSTITUTIONAL: No weakness, fevers or chills   EYES/ENT: No visual changes; No vertigo or throat pain   NECK: No pain or stiffness   RESPIRATORY: No cough, wheezing, hemoptysis; No shortness of breath   CARDIOVASCULAR: No chest pain or palpitations   GASTROINTESTINAL: No abdominal or epigastric pain. No nausea, vomiting, or hematemesis; No diarrhea or constipation. No melena or hematochezia.  GENITOURINARY: No dysuria, frequency or hematuria  NEUROLOGICAL: No numbness or weakness        PAST MEDICAL & SURGICAL HISTORY  IV drug abuse    Vertebral osteomyelitis    MSSA bacteremia    No significant past surgical history      SOCIAL HISTORY:    ALLERGIES:  No Known Allergies    MEDICATIONS:  STANDING MEDICATIONS  ALPRAZolam 2 milliGRAM(s) Oral three times a day  buPROPion XL (24-Hour) . 300 milliGRAM(s) Oral daily  ceFAZolin   IVPB 2000 milliGRAM(s) IV Intermittent every 8 hours  dexAMETHasone     Tablet 4 milliGRAM(s) Oral every 6 hours  diatrizoate meglumine/diatrizoate sodium. 30 milliLiter(s) Oral once  enoxaparin Injectable 40 milliGRAM(s) SubCutaneous every 24 hours  folic acid 1 milliGRAM(s) Oral daily  lactated ringers. 1000 milliLiter(s) IV Continuous <Continuous>  lactulose Syrup 10 Gram(s) Oral two times a day  methadone    Tablet 135 milliGRAM(s) Oral daily  mirtazapine 30 milliGRAM(s) Oral at bedtime  multivitamin 1 Tablet(s) Oral daily  nicotine - 21 mG/24Hr(s) Patch 1 Patch Transdermal daily  pantoprazole  Injectable 40 milliGRAM(s) IV Push every 12 hours  polyethylene glycol 3350 17 Gram(s) Oral daily  QUEtiapine 200 milliGRAM(s) Oral at bedtime  senna 2 Tablet(s) Oral at bedtime  thiamine 100 milliGRAM(s) Oral daily    PRN MEDICATIONS  acetaminophen     Tablet .. 650 milliGRAM(s) Oral every 6 hours PRN  aluminum hydroxide/magnesium hydroxide/simethicone Suspension 30 milliLiter(s) Oral every 4 hours PRN  cyclobenzaprine 5 milliGRAM(s) Oral three times a day PRN  melatonin 3 milliGRAM(s) Oral at bedtime PRN  scopolamine 1 mG/72 Hr(s) Patch 1 Patch Transdermal every 72 hours PRN    VITALS:   T(F): 97.2  HR: 65  BP: 142/65  RR: 18  SpO2: 92%    LABS:    RADIOLOGY:    PHYSICAL EXAM:  GEN: No acute distress  HEENT: normocephalic, atraumatic, aniceteric  LUNGS: Clear to auscultation bilaterally, no rales/wheezing/ rhonchi  HEART: S1/S2 present. RRR, no murmurs  ABD: Soft, non-tender, non-distended. Bowel sounds present  EXT: no edema  NEURO: AAOX3, normal affect      ASSESSMENT AND PLAN:  51 yo M w/ pmhx of IVDU (heroin), vertebral osteomyelitis, and MSSA bacteremia presents for worsening back pain. Patient was found febrile in the ED with elevated WBC count, CT orbit showing Right periorbital cellulitis.     #Recurrence of vertebral OM   #Recent history & post treatment for MSSA bacteremia  #Right periorbital cellulitis  #Sepsis present on admission: leukocytosis, febrile  - CT Orbit w/ IV Cont 8/10: shows Right periorbital (pre-septal) soft tissue swelling consistent with cellulitis. No evidence of abscess. No evidence of post-septal inflammation.  -h/o IR Drainage of Abscess/Biopsy of suspected discitis done 3/16 -fluid  growing NICOLE  -, .8   - BCx negative since 8/12  -MR lumbar/t spine: 1. Interval progression of discitis osteomyelitis at L4-5 with increased   destruction of the endplate cortices and intervening disc. Associated   mild vertebral height losses of L4 on L5. 2. Significantly increased epidural phlegmon extending from the right   dorsal epidural space from L2-3 down to sacral level with maximal mass   effect at the right L4-5 epidural space resulting in   impingement/compression of the thecal sac and nerve roots. 3. Slightly increased erosion affecting bilateral L4-5 facet joints (left   more than right), and bilateral L5-S1 facet joints. Increased enhancements involving bilateral L4-L5 and L5-S1 neural foraminal spaces.2. Increased paravertebral phlegmon surrounding L4-5 with slightly   increased size of the left retroperitoneal abscess. Slightly smaller right psoas abscess.    - Ancef 2g iv q8h (8 weeks starting 8/11/2022); will need CT before stopping abx as per ID   -PAM showing no vegetations; reached out to neurosx, they will get back to us for possible surgery (apparently probably on Monday, do preop labs/NPO Sunday night)     #Vomiting/abdominal pain- resolved  #Delirium- resolved  -possibly vomited from nafcillin?  -delirium possibly due to vomiting/not getting methadone, now back at baseline    #Transaminitis- resolved  -U/S equivocal  -trend LFT's     #Normocytic Anemia- improved  - Monitor H+H  -ferritin, b12, folate wnl    #Hx of IVD  #Opioid abuse on Methadone  - Continue methadone 135, and gabapentin    #Anxiety/Depression  - c/w mirtazapine + quetiapine   -c/w xanax   #Active nicotine dependence  - c/w nicotine patch  - c/w buproprion    #Morbid obesity  - diet and lifestyle modification     #Hypomagnesemia  -repleted    #Hyperkalemia- resolved    #Suspected folic acid deficiency  -continue supplement    #DVT PPx- LVX 40mg sq qhs  #GI PPx- None  #Diet- DASH/TLC  #CHG  #Activity- AAT  #Code- FULL    #Progress Note Handoff  Pending (specify):  neurosurgery  Family discussion: Plan of care discussed with patient, aware and agreeable   Disposition: Unknown at this time

## 2022-08-27 NOTE — PROGRESS NOTE ADULT - SUBJECTIVE AND OBJECTIVE BOX
Surgery:     L4-5 Laminectomy with Instrumentation and Fusion of L3-S1    HPI  Procedure:  FEVER; BACK PAIN    FEVER; BACK PAIN ...    ^FEVER; BACK PAIN ...    No pertinent family history in first degree relatives    Handoff    MEWS Score    IV drug abuse    Vertebral osteomyelitis    MSSA bacteremia    Delirium due to multiple etiologies    Fever    Heroin use disorder, severe, dependence    Vertebral osteomyelitis    No significant past surgical history    90+    Back pain    Preseptal cellulitis    [email or web address]      acetaminophen     Tablet .. 650 milliGRAM(s) Oral every 6 hours PRN  ALPRAZolam 2 milliGRAM(s) Oral three times a day  aluminum hydroxide/magnesium hydroxide/simethicone Suspension 30 milliLiter(s) Oral every 4 hours PRN  buPROPion XL (24-Hour) . 300 milliGRAM(s) Oral daily  ceFAZolin   IVPB 2000 milliGRAM(s) IV Intermittent every 8 hours  cyclobenzaprine 5 milliGRAM(s) Oral three times a day PRN  dexAMETHasone     Tablet 4 milliGRAM(s) Oral every 6 hours  diatrizoate meglumine/diatrizoate sodium. 30 milliLiter(s) Oral once  enoxaparin Injectable 40 milliGRAM(s) SubCutaneous every 24 hours  folic acid 1 milliGRAM(s) Oral daily  lactated ringers. 1000 milliLiter(s) IV Continuous <Continuous>  lactulose Syrup 10 Gram(s) Oral two times a day  melatonin 3 milliGRAM(s) Oral at bedtime PRN  methadone    Tablet 135 milliGRAM(s) Oral daily  mirtazapine 30 milliGRAM(s) Oral at bedtime  multivitamin 1 Tablet(s) Oral daily  nicotine - 21 mG/24Hr(s) Patch 1 Patch Transdermal daily  pantoprazole  Injectable 40 milliGRAM(s) IV Push every 12 hours  polyethylene glycol 3350 17 Gram(s) Oral daily  QUEtiapine 200 milliGRAM(s) Oral at bedtime  scopolamine 1 mG/72 Hr(s) Patch 1 Patch Transdermal every 72 hours PRN  senna 2 Tablet(s) Oral at bedtime  thiamine 100 milliGRAM(s) Oral daily    No Known Allergies    Type & Screen (in past 72hrs): ordered    CXR: < from: Xray Chest 1 View- PORTABLE-Urgent (Xray Chest 1 View- PORTABLE-Urgent .) (08.16.22 @ 18:02) >  No radiographic evidence of acute cardiopulmonary disease.    < end of copied text >    EKG: < from: 12 Lead ECG (08.27.22 @ 09:00) >  Diagnosis Line Normal sinus rhythm  Normal ECG    < end of copied text >    ECHO: < from: TTE Echo Complete w/o Contrast w/ Doppler (08.11.22 @ 12:02) >  Summary:   1. Left ventricular ejection fraction, by visual estimation, is 55 to   60%.   2. Normal global left ventricular systolic function.   3. Spectral Doppler shows impaired relaxation pattern of left   ventricular myocardial filling (Grade I diastolic dysfunction).   4. No evidence of mitral valve regurgitation.   5. Mild tricuspid regurgitation.    < end of copied text >  < from: Transesophageal Echocardiogram (08.24.22 @ 15:25) >  Summary:   1. Left ventricular ejection fraction, by visual estimation, is 55 to   60%.   2. Normal global left ventricular systolic function.   3. Normal left atrial size.   4. Normal right atrial size.  5. Structurally normal mitral valve, with normal leaflet excursion.   6. Trace mitral valve regurgitation.   7. Normal trileaflet aortic valve with normal opening.   8. Structurally normal pulmonic valve, with normal leaflet excursion.   9. No evidence of infective endocarditis.    < end of copied text >    Medical Clearances:      Last dose of antiplatelet/anticoagulation drug:    Orders: NPO after midnight Sunday for OR Monday            Consent: To be obtained Surgery:     L4-5 Laminectomy with Instrumentation and Fusion of L3-S1    HPI  Procedure:  FEVER; BACK PAIN    FEVER; BACK PAIN ...    ^FEVER; BACK PAIN ...    No pertinent family history in first degree relatives    Handoff    MEWS Score    IV drug abuse    Vertebral osteomyelitis    MSSA bacteremia    Delirium due to multiple etiologies    Fever    Heroin use disorder, severe, dependence    Vertebral osteomyelitis    No significant past surgical history    90+    Back pain    Preseptal cellulitis    [email or web address]      acetaminophen     Tablet .. 650 milliGRAM(s) Oral every 6 hours PRN  ALPRAZolam 2 milliGRAM(s) Oral three times a day  aluminum hydroxide/magnesium hydroxide/simethicone Suspension 30 milliLiter(s) Oral every 4 hours PRN  buPROPion XL (24-Hour) . 300 milliGRAM(s) Oral daily  ceFAZolin   IVPB 2000 milliGRAM(s) IV Intermittent every 8 hours  cyclobenzaprine 5 milliGRAM(s) Oral three times a day PRN  dexAMETHasone     Tablet 4 milliGRAM(s) Oral every 6 hours  diatrizoate meglumine/diatrizoate sodium. 30 milliLiter(s) Oral once  enoxaparin Injectable 40 milliGRAM(s) SubCutaneous every 24 hours  folic acid 1 milliGRAM(s) Oral daily  lactated ringers. 1000 milliLiter(s) IV Continuous <Continuous>  lactulose Syrup 10 Gram(s) Oral two times a day  melatonin 3 milliGRAM(s) Oral at bedtime PRN  methadone    Tablet 135 milliGRAM(s) Oral daily  mirtazapine 30 milliGRAM(s) Oral at bedtime  multivitamin 1 Tablet(s) Oral daily  nicotine - 21 mG/24Hr(s) Patch 1 Patch Transdermal daily  pantoprazole  Injectable 40 milliGRAM(s) IV Push every 12 hours  polyethylene glycol 3350 17 Gram(s) Oral daily  QUEtiapine 200 milliGRAM(s) Oral at bedtime  scopolamine 1 mG/72 Hr(s) Patch 1 Patch Transdermal every 72 hours PRN  senna 2 Tablet(s) Oral at bedtime  thiamine 100 milliGRAM(s) Oral daily    No Known Allergies                          12.1   17.04 )-----------( 355      ( 28 Aug 2022 08:19 )             37.6     08-28    136  |  95<L>  |  21<H>  ----------------------------<  143<H>  4.9   |  29  |  0.6<L>    Ca    8.7      28 Aug 2022 08:19  Mg     2.0     08-28    TPro  6.5  /  Alb  3.7  /  TBili  <0.2  /  DBili  x   /  AST  13  /  ALT  12  /  AlkPhos  145<H>  08-28      Type & Screen (in past 72hrs): done    PT/INR - ( 28 Aug 2022 08:19 )   PT: 10.20 sec;   INR: 0.89 ratio         PTT - ( 28 Aug 2022 08:19 )  PTT:24.5 secCXR: < from: Xray Chest 1 View- PORTABLE-Urgent (Xray Chest 1 View- PORTABLE-Urgent .) (08.16.22 @ 18:02) >  No radiographic evidence of acute cardiopulmonary disease.    < end of copied text >    EKG: < from: 12 Lead ECG (08.27.22 @ 09:00) >  Diagnosis Line Normal sinus rhythm  Normal ECG    < end of copied text >    ECHO: < from: TTE Echo Complete w/o Contrast w/ Doppler (08.11.22 @ 12:02) >  Summary:   1. Left ventricular ejection fraction, by visual estimation, is 55 to   60%.   2. Normal global left ventricular systolic function.   3. Spectral Doppler shows impaired relaxation pattern of left   ventricular myocardial filling (Grade I diastolic dysfunction).   4. No evidence of mitral valve regurgitation.   5. Mild tricuspid regurgitation.    < end of copied text >  < from: Transesophageal Echocardiogram (08.24.22 @ 15:25) >  Summary:   1. Left ventricular ejection fraction, by visual estimation, is 55 to   60%.   2. Normal global left ventricular systolic function.   3. Normal left atrial size.   4. Normal right atrial size.  5. Structurally normal mitral valve, with normal leaflet excursion.   6. Trace mitral valve regurgitation.   7. Normal trileaflet aortic valve with normal opening.   8. Structurally normal pulmonic valve, with normal leaflet excursion.   9. No evidence of infective endocarditis.    < end of copied text >    Medical Clearances:      Last dose of antiplatelet/anticoagulation drug:    Orders: NPO after midnight Sunday for OR Monday            Consent: To be obtained

## 2022-08-28 LAB
ALBUMIN SERPL ELPH-MCNC: 3.7 G/DL — SIGNIFICANT CHANGE UP (ref 3.5–5.2)
ALP SERPL-CCNC: 145 U/L — HIGH (ref 30–115)
ALT FLD-CCNC: 12 U/L — SIGNIFICANT CHANGE UP (ref 0–41)
ANION GAP SERPL CALC-SCNC: 12 MMOL/L — SIGNIFICANT CHANGE UP (ref 7–14)
APTT BLD: 24.5 SEC — LOW (ref 27–39.2)
AST SERPL-CCNC: 13 U/L — SIGNIFICANT CHANGE UP (ref 0–41)
BASOPHILS # BLD AUTO: 0.06 K/UL — SIGNIFICANT CHANGE UP (ref 0–0.2)
BASOPHILS NFR BLD AUTO: 0.4 % — SIGNIFICANT CHANGE UP (ref 0–1)
BILIRUB SERPL-MCNC: <0.2 MG/DL — SIGNIFICANT CHANGE UP (ref 0.2–1.2)
BLD GP AB SCN SERPL QL: SIGNIFICANT CHANGE UP
BUN SERPL-MCNC: 21 MG/DL — HIGH (ref 10–20)
CALCIUM SERPL-MCNC: 8.7 MG/DL — SIGNIFICANT CHANGE UP (ref 8.5–10.1)
CHLORIDE SERPL-SCNC: 95 MMOL/L — LOW (ref 98–110)
CO2 SERPL-SCNC: 29 MMOL/L — SIGNIFICANT CHANGE UP (ref 17–32)
CREAT SERPL-MCNC: 0.6 MG/DL — LOW (ref 0.7–1.5)
EGFR: 115 ML/MIN/1.73M2 — SIGNIFICANT CHANGE UP
EOSINOPHIL # BLD AUTO: 0.04 K/UL — SIGNIFICANT CHANGE UP (ref 0–0.7)
EOSINOPHIL NFR BLD AUTO: 0.2 % — SIGNIFICANT CHANGE UP (ref 0–8)
GLUCOSE BLDC GLUCOMTR-MCNC: 158 MG/DL — HIGH (ref 70–99)
GLUCOSE BLDC GLUCOMTR-MCNC: 169 MG/DL — HIGH (ref 70–99)
GLUCOSE BLDC GLUCOMTR-MCNC: 204 MG/DL — HIGH (ref 70–99)
GLUCOSE SERPL-MCNC: 143 MG/DL — HIGH (ref 70–99)
HCT VFR BLD CALC: 37.6 % — LOW (ref 42–52)
HGB BLD-MCNC: 12.1 G/DL — LOW (ref 14–18)
IMM GRANULOCYTES NFR BLD AUTO: 2.2 % — HIGH (ref 0.1–0.3)
INR BLD: 0.89 RATIO — SIGNIFICANT CHANGE UP (ref 0.65–1.3)
LYMPHOCYTES # BLD AUTO: 14.3 % — LOW (ref 20.5–51.1)
LYMPHOCYTES # BLD AUTO: 2.44 K/UL — SIGNIFICANT CHANGE UP (ref 1.2–3.4)
MAGNESIUM SERPL-MCNC: 2 MG/DL — SIGNIFICANT CHANGE UP (ref 1.8–2.4)
MCHC RBC-ENTMCNC: 29.4 PG — SIGNIFICANT CHANGE UP (ref 27–31)
MCHC RBC-ENTMCNC: 32.2 G/DL — SIGNIFICANT CHANGE UP (ref 32–37)
MCV RBC AUTO: 91.5 FL — SIGNIFICANT CHANGE UP (ref 80–94)
MONOCYTES # BLD AUTO: 1.06 K/UL — HIGH (ref 0.1–0.6)
MONOCYTES NFR BLD AUTO: 6.2 % — SIGNIFICANT CHANGE UP (ref 1.7–9.3)
NEUTROPHILS # BLD AUTO: 13.07 K/UL — HIGH (ref 1.4–6.5)
NEUTROPHILS NFR BLD AUTO: 76.7 % — HIGH (ref 42.2–75.2)
NRBC # BLD: 0 /100 WBCS — SIGNIFICANT CHANGE UP (ref 0–0)
PLATELET # BLD AUTO: 355 K/UL — SIGNIFICANT CHANGE UP (ref 130–400)
POTASSIUM SERPL-MCNC: 4.9 MMOL/L — SIGNIFICANT CHANGE UP (ref 3.5–5)
POTASSIUM SERPL-SCNC: 4.9 MMOL/L — SIGNIFICANT CHANGE UP (ref 3.5–5)
PROT SERPL-MCNC: 6.5 G/DL — SIGNIFICANT CHANGE UP (ref 6–8)
PROTHROM AB SERPL-ACNC: 10.2 SEC — SIGNIFICANT CHANGE UP (ref 9.95–12.87)
RBC # BLD: 4.11 M/UL — LOW (ref 4.7–6.1)
RBC # FLD: 16.4 % — HIGH (ref 11.5–14.5)
SARS-COV-2 RNA SPEC QL NAA+PROBE: SIGNIFICANT CHANGE UP
SODIUM SERPL-SCNC: 136 MMOL/L — SIGNIFICANT CHANGE UP (ref 135–146)
WBC # BLD: 17.04 K/UL — HIGH (ref 4.8–10.8)
WBC # FLD AUTO: 17.04 K/UL — HIGH (ref 4.8–10.8)

## 2022-08-28 PROCEDURE — 99233 SBSQ HOSP IP/OBS HIGH 50: CPT

## 2022-08-28 PROCEDURE — 71045 X-RAY EXAM CHEST 1 VIEW: CPT | Mod: 26

## 2022-08-28 PROCEDURE — 93010 ELECTROCARDIOGRAM REPORT: CPT

## 2022-08-28 RX ADMIN — Medication 4 MILLIGRAM(S): at 05:37

## 2022-08-28 RX ADMIN — PANTOPRAZOLE SODIUM 40 MILLIGRAM(S): 20 TABLET, DELAYED RELEASE ORAL at 17:42

## 2022-08-28 RX ADMIN — BUPROPION HYDROCHLORIDE 300 MILLIGRAM(S): 150 TABLET, EXTENDED RELEASE ORAL at 12:02

## 2022-08-28 RX ADMIN — Medication 2 MILLIGRAM(S): at 13:48

## 2022-08-28 RX ADMIN — Medication 100 MILLIGRAM(S): at 13:49

## 2022-08-28 RX ADMIN — Medication 100 MILLIGRAM(S): at 22:23

## 2022-08-28 RX ADMIN — Medication 1 MILLIGRAM(S): at 12:02

## 2022-08-28 RX ADMIN — MIRTAZAPINE 30 MILLIGRAM(S): 45 TABLET, ORALLY DISINTEGRATING ORAL at 22:19

## 2022-08-28 RX ADMIN — PANTOPRAZOLE SODIUM 40 MILLIGRAM(S): 20 TABLET, DELAYED RELEASE ORAL at 05:27

## 2022-08-28 RX ADMIN — Medication 4 MILLIGRAM(S): at 17:45

## 2022-08-28 RX ADMIN — Medication 100 MILLIGRAM(S): at 05:30

## 2022-08-28 RX ADMIN — Medication 1 PATCH: at 11:30

## 2022-08-28 RX ADMIN — METHADONE HYDROCHLORIDE 135 MILLIGRAM(S): 40 TABLET ORAL at 12:05

## 2022-08-28 RX ADMIN — Medication 1 PATCH: at 12:09

## 2022-08-28 RX ADMIN — Medication 100 MILLIGRAM(S): at 12:02

## 2022-08-28 RX ADMIN — Medication 4 MILLIGRAM(S): at 12:02

## 2022-08-28 RX ADMIN — Medication 2 MILLIGRAM(S): at 05:27

## 2022-08-28 RX ADMIN — Medication 1 TABLET(S): at 12:02

## 2022-08-28 RX ADMIN — QUETIAPINE FUMARATE 200 MILLIGRAM(S): 200 TABLET, FILM COATED ORAL at 22:19

## 2022-08-28 RX ADMIN — Medication 4 MILLIGRAM(S): at 23:47

## 2022-08-28 RX ADMIN — Medication 1 PATCH: at 05:37

## 2022-08-28 RX ADMIN — Medication 4 MILLIGRAM(S): at 05:28

## 2022-08-28 RX ADMIN — SENNA PLUS 2 TABLET(S): 8.6 TABLET ORAL at 22:20

## 2022-08-28 RX ADMIN — Medication 2 MILLIGRAM(S): at 22:19

## 2022-08-28 NOTE — PROGRESS NOTE ADULT - SUBJECTIVE AND OBJECTIVE BOX
SUBJECTIVE:    Patient is a 52y old Male who presents with a chief complaint of Back pain (27 Aug 2022 18:30)    Currently admitted to medicine with the primary diagnosis of Fever       Today is hospital day 18d. This morning he is resting comfortably in bed and reports no new issues or overnight events.     ROS:   CONSTITUTIONAL: No weakness, fevers or chills   EYES/ENT: No visual changes; No vertigo or throat pain   NECK: No pain or stiffness   RESPIRATORY: No cough, wheezing, hemoptysis; No shortness of breath   CARDIOVASCULAR: No chest pain or palpitations   GASTROINTESTINAL: No abdominal or epigastric pain. No nausea, vomiting, or hematemesis; No diarrhea or constipation. No melena or hematochezia.  GENITOURINARY: No dysuria, frequency or hematuria  NEUROLOGICAL: No numbness or weakness        PAST MEDICAL & SURGICAL HISTORY  IV drug abuse    Vertebral osteomyelitis    MSSA bacteremia    No significant past surgical history      SOCIAL HISTORY:    ALLERGIES:  No Known Allergies    MEDICATIONS:  STANDING MEDICATIONS  ALPRAZolam 2 milliGRAM(s) Oral three times a day  buPROPion XL (24-Hour) . 300 milliGRAM(s) Oral daily  ceFAZolin   IVPB 2000 milliGRAM(s) IV Intermittent every 8 hours  dexAMETHasone     Tablet 4 milliGRAM(s) Oral every 6 hours  diatrizoate meglumine/diatrizoate sodium. 30 milliLiter(s) Oral once  enoxaparin Injectable 40 milliGRAM(s) SubCutaneous every 24 hours  folic acid 1 milliGRAM(s) Oral daily  lactated ringers. 1000 milliLiter(s) IV Continuous <Continuous>  lactulose Syrup 10 Gram(s) Oral two times a day  methadone    Tablet 135 milliGRAM(s) Oral daily  mirtazapine 30 milliGRAM(s) Oral at bedtime  multivitamin 1 Tablet(s) Oral daily  nicotine - 21 mG/24Hr(s) Patch 1 Patch Transdermal daily  pantoprazole  Injectable 40 milliGRAM(s) IV Push every 12 hours  polyethylene glycol 3350 17 Gram(s) Oral daily  QUEtiapine 200 milliGRAM(s) Oral at bedtime  senna 2 Tablet(s) Oral at bedtime  thiamine 100 milliGRAM(s) Oral daily    PRN MEDICATIONS  acetaminophen     Tablet .. 650 milliGRAM(s) Oral every 6 hours PRN  aluminum hydroxide/magnesium hydroxide/simethicone Suspension 30 milliLiter(s) Oral every 4 hours PRN  cyclobenzaprine 5 milliGRAM(s) Oral three times a day PRN  melatonin 3 milliGRAM(s) Oral at bedtime PRN  scopolamine 1 mG/72 Hr(s) Patch 1 Patch Transdermal every 72 hours PRN    VITALS:   T(F): 97.3  HR: 71  BP: 126/-  RR: 18  SpO2: 96%    LABS:                          12.1   17.04 )-----------( 355      ( 28 Aug 2022 08:19 )             37.6     08-28    136  |  95<L>  |  21<H>  ----------------------------<  143<H>  4.9   |  29  |  0.6<L>    Ca    8.7      28 Aug 2022 08:19  Mg     2.0     08-28    TPro  6.5  /  Alb  3.7  /  TBili  <0.2  /  DBili  x   /  AST  13  /  ALT  12  /  AlkPhos  145<H>  08-28    PT/INR - ( 28 Aug 2022 08:19 )   PT: 10.20 sec;   INR: 0.89 ratio         PTT - ( 28 Aug 2022 08:19 )  PTT:24.5 sec    RADIOLOGY:  no new today   PHYSICAL EXAM:  GEN: No acute distress, obese   HEENT: normocephalic, atraumatic, aniceteric  LUNGS: Clear to auscultation bilaterally, no rales/wheezing/ rhonchi  HEART: S1/S2 present. RRR, no murmurs  ABD: Soft, non-tender, non-distended. Bowel sounds present  EXT: no edema   NEURO: AAOX3, normal affect      ASSESSMENT AND PLAN:    51 yo M w/ pmhx of IVDU (heroin), vertebral osteomyelitis, and MSSA bacteremia presents for worsening back pain. Patient was found febrile in the ED with elevated WBC count, CT orbit showing Right periorbital cellulitis.     #Recurrence of vertebral OM   #Recent history & post treatment for MSSA bacteremia  #Right periorbital cellulitis  #Sepsis present on admission: leukocytosis, febrile  - CT Orbit w/ IV Cont 8/10: shows Right periorbital (pre-septal) soft tissue swelling consistent with cellulitis. No evidence of abscess. No evidence of post-septal inflammation.  -h/o IR Drainage of Abscess/Biopsy of suspected discitis done 3/16 -fluid  growing NICOLE  -, .8   - BCx negative since 8/12  -MR lumbar/t spine: 1. Interval progression of discitis osteomyelitis at L4-5 with increased   destruction of the endplate cortices and intervening disc. Associated   mild vertebral height losses of L4 on L5. 2. Significantly increased epidural phlegmon extending from the right   dorsal epidural space from L2-3 down to sacral level with maximal mass   effect at the right L4-5 epidural space resulting in   impingement/compression of the thecal sac and nerve roots. 3. Slightly increased erosion affecting bilateral L4-5   facet joints (left   more than right), and bilateral L5-S1 facet joints. Increased enhancements involving bilateral L4-L5 and L5-S1 neural foraminal spaces.2. Increased paravertebral phlegmon surrounding L4-5 with slightly   increased size of the left retroperitoneal abscess. Slightly smaller right psoas abscess.    - Ancef 2g iv q8h (8 weeks starting 8/11/2022); will need CT before stopping abx as per ID   -PAM showing no vegetations  - OR with NeuroSX on Monday 8/29/2022 for L4-L5 laminectomy and L3-S1 fusion   - Patient requesting to speak with neurosx today prior to scheduled sx, neurosx team aware   - Class I RCRI , METS < 4 , 3.9% chance of 30 day risk of death, MI, or cardiac arrest. Patient is moderate risk for intermediate risk procedure     #Vomiting/abdominal pain- resolved  #Delirium- resolved  -possibly vomited from nafcillin?  -delirium possibly due to vomiting/not getting methadone, now back at baseline    #Transaminitis- resolved  -U/S equivocal  -trend LFT's     #Normocytic Anemia- improved  - Monitor H+H  -ferritin, b12, folate wnl    #Hx of IVD  #Opioid abuse on Methadone  - Continue methadone 135, and gabapentin    #Anxiety/Depression  - c/w mirtazapine + quetiapine   -c/w xanax     #Active nicotine dependence  - c/w nicotine patch  - c/w buproprion    #Morbid obesity  - diet and lifestyle modification   #Hypomagnesemia  -repleted    #Hyperkalemia- resolved    #Suspected folic acid deficiency  -continue supplement    # Hyperglycemia, suspected DM , HbA1c 01/2022 6.6 , FS AC QHS, recheck HbA1c , sliding scale if needed     #DVT PPx- LVX 40mg sq qhs  #GI PPx- None  #Diet- DASH/TLC ; NPO after MN  #CHG  #Activity- AAT  #Code- FULL    #Progress Note Handoff  Pending (specify):  neurosurgery OR tomorrow  Family discussion: Plan of care discussed with patient, aware and agreeable   Disposition: Unknown at this time

## 2022-08-29 LAB
GLUCOSE BLDC GLUCOMTR-MCNC: 131 MG/DL — HIGH (ref 70–99)
GLUCOSE BLDC GLUCOMTR-MCNC: 156 MG/DL — HIGH (ref 70–99)
GLUCOSE BLDC GLUCOMTR-MCNC: 169 MG/DL — HIGH (ref 70–99)
HAV IGM SER-ACNC: SIGNIFICANT CHANGE UP
HBV CORE IGM SER-ACNC: SIGNIFICANT CHANGE UP
HBV SURFACE AG SER-ACNC: SIGNIFICANT CHANGE UP
HCV AB S/CO SERPL IA: 3.14 S/CO — HIGH (ref 0–0.99)
HCV AB SERPL-IMP: ABNORMAL
HIV 1+2 AB+HIV1 P24 AG SERPL QL IA: SIGNIFICANT CHANGE UP

## 2022-08-29 PROCEDURE — 22840 INSERT SPINE FIXATION DEVICE: CPT

## 2022-08-29 PROCEDURE — 63267 EXCISE INTRSPINL LESION LMBR: CPT | Mod: 59

## 2022-08-29 PROCEDURE — 93010 ELECTROCARDIOGRAM REPORT: CPT

## 2022-08-29 PROCEDURE — 22853 INSJ BIOMECHANICAL DEVICE: CPT

## 2022-08-29 PROCEDURE — 20937 SP BONE AGRFT MORSEL ADD-ON: CPT

## 2022-08-29 PROCEDURE — 22633 ARTHRD CMBN 1NTRSPC LUMBAR: CPT

## 2022-08-29 PROCEDURE — 99233 SBSQ HOSP IP/OBS HIGH 50: CPT

## 2022-08-29 PROCEDURE — 22214 INCIS 1 VERTEBRAL SEG LUMBAR: CPT

## 2022-08-29 RX ORDER — CEFAZOLIN SODIUM 1 G
2000 VIAL (EA) INJECTION EVERY 8 HOURS
Refills: 0 | Status: DISCONTINUED | OUTPATIENT
Start: 2022-08-29 | End: 2022-09-07

## 2022-08-29 RX ORDER — LANOLIN ALCOHOL/MO/W.PET/CERES
3 CREAM (GRAM) TOPICAL AT BEDTIME
Refills: 0 | Status: DISCONTINUED | OUTPATIENT
Start: 2022-08-29 | End: 2022-09-07

## 2022-08-29 RX ORDER — METHADONE HYDROCHLORIDE 40 MG/1
135 TABLET ORAL DAILY
Refills: 0 | Status: DISCONTINUED | OUTPATIENT
Start: 2022-08-29 | End: 2022-09-05

## 2022-08-29 RX ORDER — SENNA PLUS 8.6 MG/1
2 TABLET ORAL AT BEDTIME
Refills: 0 | Status: DISCONTINUED | OUTPATIENT
Start: 2022-08-29 | End: 2022-09-05

## 2022-08-29 RX ORDER — ONDANSETRON 8 MG/1
4 TABLET, FILM COATED ORAL EVERY 6 HOURS
Refills: 0 | Status: DISCONTINUED | OUTPATIENT
Start: 2022-08-29 | End: 2022-09-07

## 2022-08-29 RX ORDER — HYDROMORPHONE HYDROCHLORIDE 2 MG/ML
0.5 INJECTION INTRAMUSCULAR; INTRAVENOUS; SUBCUTANEOUS
Refills: 0 | Status: DISCONTINUED | OUTPATIENT
Start: 2022-08-29 | End: 2022-08-29

## 2022-08-29 RX ORDER — FOLIC ACID 0.8 MG
1 TABLET ORAL DAILY
Refills: 0 | Status: DISCONTINUED | OUTPATIENT
Start: 2022-08-29 | End: 2022-09-07

## 2022-08-29 RX ORDER — LACTULOSE 10 G/15ML
10 SOLUTION ORAL
Refills: 0 | Status: DISCONTINUED | OUTPATIENT
Start: 2022-08-29 | End: 2022-09-05

## 2022-08-29 RX ORDER — POLYETHYLENE GLYCOL 3350 17 G/17G
17 POWDER, FOR SOLUTION ORAL DAILY
Refills: 0 | Status: DISCONTINUED | OUTPATIENT
Start: 2022-08-29 | End: 2022-09-05

## 2022-08-29 RX ORDER — NALOXONE HYDROCHLORIDE 4 MG/.1ML
0.1 SPRAY NASAL
Refills: 0 | Status: DISCONTINUED | OUTPATIENT
Start: 2022-08-29 | End: 2022-09-07

## 2022-08-29 RX ORDER — CYCLOBENZAPRINE HYDROCHLORIDE 10 MG/1
5 TABLET, FILM COATED ORAL THREE TIMES A DAY
Refills: 0 | Status: DISCONTINUED | OUTPATIENT
Start: 2022-08-29 | End: 2022-08-30

## 2022-08-29 RX ORDER — SODIUM CHLORIDE 9 MG/ML
1000 INJECTION, SOLUTION INTRAVENOUS
Refills: 0 | Status: DISCONTINUED | OUTPATIENT
Start: 2022-08-29 | End: 2022-08-29

## 2022-08-29 RX ORDER — QUETIAPINE FUMARATE 200 MG/1
200 TABLET, FILM COATED ORAL AT BEDTIME
Refills: 0 | Status: DISCONTINUED | OUTPATIENT
Start: 2022-08-29 | End: 2022-09-07

## 2022-08-29 RX ORDER — PANTOPRAZOLE SODIUM 20 MG/1
40 TABLET, DELAYED RELEASE ORAL EVERY 12 HOURS
Refills: 0 | Status: DISCONTINUED | OUTPATIENT
Start: 2022-08-29 | End: 2022-08-30

## 2022-08-29 RX ORDER — ONDANSETRON 8 MG/1
4 TABLET, FILM COATED ORAL ONCE
Refills: 0 | Status: DISCONTINUED | OUTPATIENT
Start: 2022-08-29 | End: 2022-08-29

## 2022-08-29 RX ORDER — HYDROMORPHONE HYDROCHLORIDE 2 MG/ML
30 INJECTION INTRAMUSCULAR; INTRAVENOUS; SUBCUTANEOUS
Refills: 0 | Status: DISCONTINUED | OUTPATIENT
Start: 2022-08-29 | End: 2022-08-31

## 2022-08-29 RX ORDER — HEPARIN SODIUM 5000 [USP'U]/ML
5000 INJECTION INTRAVENOUS; SUBCUTANEOUS EVERY 8 HOURS
Refills: 0 | Status: DISCONTINUED | OUTPATIENT
Start: 2022-08-30 | End: 2022-09-07

## 2022-08-29 RX ORDER — ALPRAZOLAM 0.25 MG
2 TABLET ORAL THREE TIMES A DAY
Refills: 0 | Status: DISCONTINUED | OUTPATIENT
Start: 2022-08-29 | End: 2022-09-05

## 2022-08-29 RX ORDER — BUPROPION HYDROCHLORIDE 150 MG/1
300 TABLET, EXTENDED RELEASE ORAL DAILY
Refills: 0 | Status: DISCONTINUED | OUTPATIENT
Start: 2022-08-29 | End: 2022-09-07

## 2022-08-29 RX ORDER — SODIUM CHLORIDE 9 MG/ML
1000 INJECTION INTRAMUSCULAR; INTRAVENOUS; SUBCUTANEOUS
Refills: 0 | Status: DISCONTINUED | OUTPATIENT
Start: 2022-08-29 | End: 2022-08-31

## 2022-08-29 RX ORDER — MIRTAZAPINE 45 MG/1
30 TABLET, ORALLY DISINTEGRATING ORAL AT BEDTIME
Refills: 0 | Status: DISCONTINUED | OUTPATIENT
Start: 2022-08-29 | End: 2022-09-07

## 2022-08-29 RX ORDER — NICOTINE POLACRILEX 2 MG
1 GUM BUCCAL DAILY
Refills: 0 | Status: DISCONTINUED | OUTPATIENT
Start: 2022-08-29 | End: 2022-09-07

## 2022-08-29 RX ORDER — THIAMINE MONONITRATE (VIT B1) 100 MG
100 TABLET ORAL DAILY
Refills: 0 | Status: DISCONTINUED | OUTPATIENT
Start: 2022-08-29 | End: 2022-09-07

## 2022-08-29 RX ORDER — DEXAMETHASONE 0.5 MG/5ML
4 ELIXIR ORAL EVERY 6 HOURS
Refills: 0 | Status: DISCONTINUED | OUTPATIENT
Start: 2022-08-29 | End: 2022-09-05

## 2022-08-29 RX ORDER — ACETAMINOPHEN 500 MG
650 TABLET ORAL EVERY 6 HOURS
Refills: 0 | Status: DISCONTINUED | OUTPATIENT
Start: 2022-08-29 | End: 2022-08-30

## 2022-08-29 RX ORDER — SCOPALAMINE 1 MG/3D
1 PATCH, EXTENDED RELEASE TRANSDERMAL
Refills: 0 | Status: DISCONTINUED | OUTPATIENT
Start: 2022-08-29 | End: 2022-09-07

## 2022-08-29 RX ADMIN — Medication 4 MILLIGRAM(S): at 06:00

## 2022-08-29 RX ADMIN — SENNA PLUS 2 TABLET(S): 8.6 TABLET ORAL at 21:59

## 2022-08-29 RX ADMIN — Medication 4 MILLIGRAM(S): at 23:12

## 2022-08-29 RX ADMIN — METHADONE HYDROCHLORIDE 135 MILLIGRAM(S): 40 TABLET ORAL at 18:12

## 2022-08-29 RX ADMIN — Medication 1 PATCH: at 14:51

## 2022-08-29 RX ADMIN — Medication 100 MILLIGRAM(S): at 06:02

## 2022-08-29 RX ADMIN — QUETIAPINE FUMARATE 200 MILLIGRAM(S): 200 TABLET, FILM COATED ORAL at 21:57

## 2022-08-29 RX ADMIN — Medication 2 MILLIGRAM(S): at 21:59

## 2022-08-29 RX ADMIN — Medication 2 MILLIGRAM(S): at 06:01

## 2022-08-29 RX ADMIN — MIRTAZAPINE 30 MILLIGRAM(S): 45 TABLET, ORALLY DISINTEGRATING ORAL at 21:58

## 2022-08-29 RX ADMIN — LACTULOSE 10 GRAM(S): 10 SOLUTION ORAL at 06:00

## 2022-08-29 RX ADMIN — PANTOPRAZOLE SODIUM 40 MILLIGRAM(S): 20 TABLET, DELAYED RELEASE ORAL at 05:59

## 2022-08-29 RX ADMIN — HYDROMORPHONE HYDROCHLORIDE 30 MILLILITER(S): 2 INJECTION INTRAMUSCULAR; INTRAVENOUS; SUBCUTANEOUS at 15:50

## 2022-08-29 RX ADMIN — PANTOPRAZOLE SODIUM 40 MILLIGRAM(S): 20 TABLET, DELAYED RELEASE ORAL at 17:41

## 2022-08-29 RX ADMIN — Medication 100 MILLIGRAM(S): at 21:56

## 2022-08-29 RX ADMIN — Medication 4 MILLIGRAM(S): at 17:38

## 2022-08-29 NOTE — PRE-ANESTHESIA EVALUATION ADULT - NSANTHPMHFT_GEN_ALL_CORE
Chart reviewed, Neuro, Cardiology, ID evaluation seen, Labs, EKG seen.
Reviewed
DM- poorly controlled

## 2022-08-29 NOTE — PRE-ANESTHESIA EVALUATION ADULT - NSPROPOSEDPROCEDFT_GEN_ALL_CORE
l4-5 laminectomy
Lumbar four-five laminectomy, instrumentation and fusion of lumbar three sacral one
PAM

## 2022-08-29 NOTE — PROGRESS NOTE ADULT - SUBJECTIVE AND OBJECTIVE BOX
NEUROSURGERY POST OP NOTE:    POD# 0 S/P L4-5 TLIF, wash out, intra op culture for osteodiscitis L4-5    S: back pain    T(C): 36.2 (08-29-22 @ 17:00), Max: 37.3 (08-28-22 @ 22:03)  HR: 60 (08-29-22 @ 17:00) (53 - 68)  BP: 132/75 (08-29-22 @ 17:00) (116/71 - 143/78)  RR: 18 (08-29-22 @ 17:00) (18 - 18)  SpO2: 96% (08-29-22 @ 17:00) (92% - 97%)    08-28-22 @ 07:01  -  08-29-22 @ 07:00  --------------------------------------------------------  IN: 0 mL / OUT: 1525 mL / NET: -1525 mL    08-29-22 @ 07:01  -  08-29-22 @ 17:31  --------------------------------------------------------  IN: 0 mL / OUT: 410 mL / NET: -410 mL    acetaminophen     Tablet .. 650 milliGRAM(s) Oral every 6 hours PRN  ALPRAZolam 2 milliGRAM(s) Oral three times a day  aluminum hydroxide/magnesium hydroxide/simethicone Suspension 30 milliLiter(s) Oral every 4 hours PRN  buPROPion XL (24-Hour) . 300 milliGRAM(s) Oral daily  ceFAZolin   IVPB 2000 milliGRAM(s) IV Intermittent every 8 hours  cyclobenzaprine 5 milliGRAM(s) Oral three times a day PRN  dexAMETHasone     Tablet 4 milliGRAM(s) Oral every 6 hours  folic acid 1 milliGRAM(s) Oral daily  HYDROmorphone  Injectable 0.5 milliGRAM(s) IV Push every 10 minutes PRN  HYDROmorphone PCA (1 mG/mL) 30 milliLiter(s) PCA Continuous PCA Continuous  lactated ringers. 1000 milliLiter(s) IV Continuous <Continuous>  lactated ringers. 1000 milliLiter(s) IV Continuous <Continuous>  lactulose Syrup 10 Gram(s) Oral two times a day  melatonin 3 milliGRAM(s) Oral at bedtime PRN  methadone    Tablet 135 milliGRAM(s) Oral daily  mirtazapine 30 milliGRAM(s) Oral at bedtime  multivitamin 1 Tablet(s) Oral daily  naloxone Injectable 0.1 milliGRAM(s) IV Push every 3 minutes PRN  nicotine - 21 mG/24Hr(s) Patch 1 Patch Transdermal daily  ondansetron Injectable 4 milliGRAM(s) IV Push once PRN  ondansetron Injectable 4 milliGRAM(s) IV Push every 6 hours PRN  pantoprazole  Injectable 40 milliGRAM(s) IV Push every 12 hours  polyethylene glycol 3350 17 Gram(s) Oral daily  QUEtiapine 200 milliGRAM(s) Oral at bedtime  scopolamine 1 mG/72 Hr(s) Patch 1 Patch Transdermal every 72 hours PRN  senna 2 Tablet(s) Oral at bedtime  sodium chloride 0.9%. 1000 milliLiter(s) IV Continuous <Continuous>  thiamine 100 milliGRAM(s) Oral daily    RADIOLOGY:     Exam: alert x3, NAD, cooperative, follows commands, JACQUES, sensation appears intact    WOUND/DRAINS: HV with minimal drainage, no swelling, dressing dry      Assessment: 52yMale s/p L4-5 TLIF      Plan: monitor HV drainage, continue ABX's, PT, OT, OOB, ambulate

## 2022-08-29 NOTE — PROGRESS NOTE ADULT - SUBJECTIVE AND OBJECTIVE BOX
SUBJECTIVE:    Patient is a 52y old Male who presents with a chief complaint of Back pain (28 Aug 2022 16:04)    Currently admitted to medicine with the primary diagnosis of Fever       Today is hospital day 19d.     ROS:     unable to assess , patient in OR      PAST MEDICAL & SURGICAL HISTORY  IV drug abuse    Vertebral osteomyelitis    MSSA bacteremia    No significant past surgical history      SOCIAL HISTORY:    ALLERGIES:  No Known Allergies    MEDICATIONS:  STANDING MEDICATIONS  ALPRAZolam 2 milliGRAM(s) Oral three times a day  buPROPion XL (24-Hour) . 300 milliGRAM(s) Oral daily  ceFAZolin   IVPB 2000 milliGRAM(s) IV Intermittent every 8 hours  dexAMETHasone     Tablet 4 milliGRAM(s) Oral every 6 hours  folic acid 1 milliGRAM(s) Oral daily  HYDROmorphone PCA (1 mG/mL) 30 milliLiter(s) PCA Continuous PCA Continuous  lactated ringers. 1000 milliLiter(s) IV Continuous <Continuous>  lactated ringers. 1000 milliLiter(s) IV Continuous <Continuous>  lactulose Syrup 10 Gram(s) Oral two times a day  methadone    Tablet 135 milliGRAM(s) Oral daily  mirtazapine 30 milliGRAM(s) Oral at bedtime  multivitamin 1 Tablet(s) Oral daily  nicotine - 21 mG/24Hr(s) Patch 1 Patch Transdermal daily  pantoprazole  Injectable 40 milliGRAM(s) IV Push every 12 hours  polyethylene glycol 3350 17 Gram(s) Oral daily  QUEtiapine 200 milliGRAM(s) Oral at bedtime  senna 2 Tablet(s) Oral at bedtime  sodium chloride 0.9%. 1000 milliLiter(s) IV Continuous <Continuous>  thiamine 100 milliGRAM(s) Oral daily    PRN MEDICATIONS  acetaminophen     Tablet .. 650 milliGRAM(s) Oral every 6 hours PRN  aluminum hydroxide/magnesium hydroxide/simethicone Suspension 30 milliLiter(s) Oral every 4 hours PRN  cyclobenzaprine 5 milliGRAM(s) Oral three times a day PRN  HYDROmorphone  Injectable 0.5 milliGRAM(s) IV Push every 10 minutes PRN  melatonin 3 milliGRAM(s) Oral at bedtime PRN  naloxone Injectable 0.1 milliGRAM(s) IV Push every 3 minutes PRN  ondansetron Injectable 4 milliGRAM(s) IV Push once PRN  ondansetron Injectable 4 milliGRAM(s) IV Push every 6 hours PRN  scopolamine 1 mG/72 Hr(s) Patch 1 Patch Transdermal every 72 hours PRN    VITALS:   T(F): 98.1  HR: 67  BP: 123/66  RR: 18  SpO2: 97%    LABS:  Negative for smoking/alcohol/drug use.                         12.1   17.04 )-----------( 355      ( 28 Aug 2022 08:19 )             37.6     08-28    136  |  95<L>  |  21<H>  ----------------------------<  143<H>  4.9   |  29  |  0.6<L>    Ca    8.7      28 Aug 2022 08:19  Mg     2.0     08-28    TPro  6.5  /  Alb  3.7  /  TBili  <0.2  /  DBili  x   /  AST  13  /  ALT  12  /  AlkPhos  145<H>  08-28    PT/INR - ( 28 Aug 2022 08:19 )   PT: 10.20 sec;   INR: 0.89 ratio         PTT - ( 28 Aug 2022 08:19 )  PTT:24.5 sec              RADIOLOGY:    PHYSICAL EXAM:    unable to assess patient in OR      ASSESSMENT AND PLAN:    51 yo M w/ pmhx of IVDU (heroin), vertebral osteomyelitis, and MSSA bacteremia presents for worsening back pain. Patient was found febrile in the ED with elevated WBC count, CT orbit showing Right periorbital cellulitis.     #Recurrence of vertebral OM   #Recent history & post treatment for MSSA bacteremia  #Right periorbital cellulitis  #Sepsis present on admission: leukocytosis, febrile  - CT Orbit w/ IV Cont 8/10: shows Right periorbital (pre-septal) soft tissue swelling consistent with cellulitis. No evidence of abscess. No evidence of post-septal inflammation.  -h/o IR Drainage of Abscess/Biopsy of suspected discitis done 3/16 -fluid  growing NICOLE  -, .8   - BCx negative since 8/12  -MR lumbar/t spine: 1. Interval progression of discitis osteomyelitis at L4-5 with increased   destruction of the endplate cortices and intervening disc. Associated   mild vertebral height losses of L4 on L5. 2. Significantly increased epidural phlegmon extending from the right   dorsal epidural space from L2-3 down to sacral level with maximal mass   effect at the right L4-5 epidural space resulting in   impingement/compression of the thecal sac and nerve roots. 3. Slightly increased erosion affecting bilateral L4-5   facet joints (left   more than right), and bilateral L5-S1 facet joints. Increased enhancements involving bilateral L4-L5 and L5-S1 neural foraminal spaces.2. Increased paravertebral phlegmon surrounding L4-5 with slightly   increased size of the left retroperitoneal abscess. Slightly smaller right psoas abscess.    - Ancef 2g iv q8h (8 weeks starting 8/11/2022); will need CT before stopping abx as per ID   -PAM showing no vegetations  - OR with NeuroSX today  - Patient requesting to speak with neurosx today prior to scheduled sx, neurosx team aware   - Class I RCRI , METS < 4 , 3.9% chance of 30 day risk of death, MI, or cardiac arrest. Patient is moderate risk for intermediate risk procedure   #Vomiting/abdominal pain- resolved  #Delirium- resolved  -possibly vomited from nafcillin?  -delirium possibly due to vomiting/not getting methadone, now back at baseline    #Transaminitis- resolved  -U/S equivocal  -trend LFT's     #Normocytic Anemia- improved  - Monitor H+H  -ferritin, b12, folate wnl    #Hx of IVD  #Opioid abuse on Methadone  - Continue methadone 135, and gabapentin    #Anxiety/Depression  - c/w mirtazapine + quetiapine   -c/w xanax     #Active nicotine dependence  - c/w nicotine patch  - c/w buproprion    #Morbid obesity  - diet and lifestyle modification   #Hypomagnesemia  -repleted    #Hyperkalemia- resolved    #Suspected folic acid deficiency  -continue supplement    # Hyperglycemia, suspected DM , HbA1c 01/2022 6.6 , FS AC QHS, recheck HbA1c , sliding scale if needed     #DVT PPx- LVX 40mg sq qhs  #GI PPx- None  #Diet- DASH/TLC ; NPO after MN  #CHG  #Activity- AAT  #Code- FULL    #Progress Note Handoff  Pending (specify):  neurosurgery OR today   Family discussion: Plan of care discussed with patient, aware and agreeable   Disposition: Unknown at this time

## 2022-08-29 NOTE — CHART NOTE - NSCHARTNOTEFT_GEN_A_CORE
PACU ANESTHESIA ADMISSION NOTE      Procedure: Transforaminal lumbar interbody fusion (TLIF) with laminectomy and fusion of posterior column using instrumentation and iliac crest allograft      Post op diagnosis:  Osteomyelitis of vertebra, lumbar region        ____  Intubated  TV:______       Rate: ______      FiO2: ______    _x___  Patent Airway    _x___  Full return of protective reflexes    _x___  Full recovery from anesthesia / back to baseline     Vitals:   T:    97.4       R:        12          BP:      128/71            Sat:            94       P: 81      Mental Status:  __x__ Awake   _____ Alert   _____ Drowsy   _____ Sedated    Nausea/Vomiting:  __x__ NO  ______Yes,   See Post - Op Orders          Pain Scale (0-10):  _____    Treatment: ____ None    _x___ See Post - Op/PCA Orders    Post - Operative Fluids:   ____ Oral   ____ See Post - Op Orders    Plan: Discharge:   ____Home       __x___Floor     _____Critical Care    _____  Other:_________________    Comments: Procedure completed without anesthetic complication.

## 2022-08-30 DIAGNOSIS — G89.18 OTHER ACUTE POSTPROCEDURAL PAIN: ICD-10-CM

## 2022-08-30 LAB
ALBUMIN SERPL ELPH-MCNC: 3.4 G/DL — LOW (ref 3.5–5.2)
ALP SERPL-CCNC: 152 U/L — HIGH (ref 30–115)
ALT FLD-CCNC: 13 U/L — SIGNIFICANT CHANGE UP (ref 0–41)
ANION GAP SERPL CALC-SCNC: 10 MMOL/L — SIGNIFICANT CHANGE UP (ref 7–14)
AST SERPL-CCNC: 13 U/L — SIGNIFICANT CHANGE UP (ref 0–41)
BASOPHILS # BLD AUTO: 0.03 K/UL — SIGNIFICANT CHANGE UP (ref 0–0.2)
BASOPHILS NFR BLD AUTO: 0.2 % — SIGNIFICANT CHANGE UP (ref 0–1)
BILIRUB SERPL-MCNC: <0.2 MG/DL — SIGNIFICANT CHANGE UP (ref 0.2–1.2)
BUN SERPL-MCNC: 22 MG/DL — HIGH (ref 10–20)
CALCIUM SERPL-MCNC: 8.2 MG/DL — LOW (ref 8.5–10.1)
CHLORIDE SERPL-SCNC: 94 MMOL/L — LOW (ref 98–110)
CO2 SERPL-SCNC: 30 MMOL/L — SIGNIFICANT CHANGE UP (ref 17–32)
CREAT SERPL-MCNC: 0.6 MG/DL — LOW (ref 0.7–1.5)
EGFR: 115 ML/MIN/1.73M2 — SIGNIFICANT CHANGE UP
EOSINOPHIL # BLD AUTO: 0.01 K/UL — SIGNIFICANT CHANGE UP (ref 0–0.7)
EOSINOPHIL NFR BLD AUTO: 0.1 % — SIGNIFICANT CHANGE UP (ref 0–8)
GLUCOSE SERPL-MCNC: 159 MG/DL — HIGH (ref 70–99)
HCT VFR BLD CALC: 37.5 % — LOW (ref 42–52)
HGB BLD-MCNC: 11.9 G/DL — LOW (ref 14–18)
IMM GRANULOCYTES NFR BLD AUTO: 1.9 % — HIGH (ref 0.1–0.3)
LYMPHOCYTES # BLD AUTO: 1.99 K/UL — SIGNIFICANT CHANGE UP (ref 1.2–3.4)
LYMPHOCYTES # BLD AUTO: 11.2 % — LOW (ref 20.5–51.1)
MAGNESIUM SERPL-MCNC: 1.9 MG/DL — SIGNIFICANT CHANGE UP (ref 1.8–2.4)
MCHC RBC-ENTMCNC: 28.9 PG — SIGNIFICANT CHANGE UP (ref 27–31)
MCHC RBC-ENTMCNC: 31.7 G/DL — LOW (ref 32–37)
MCV RBC AUTO: 91 FL — SIGNIFICANT CHANGE UP (ref 80–94)
MONOCYTES # BLD AUTO: 1.58 K/UL — HIGH (ref 0.1–0.6)
MONOCYTES NFR BLD AUTO: 8.9 % — SIGNIFICANT CHANGE UP (ref 1.7–9.3)
NEUTROPHILS # BLD AUTO: 13.76 K/UL — HIGH (ref 1.4–6.5)
NEUTROPHILS NFR BLD AUTO: 77.7 % — HIGH (ref 42.2–75.2)
NRBC # BLD: 0 /100 WBCS — SIGNIFICANT CHANGE UP (ref 0–0)
PLATELET # BLD AUTO: 269 K/UL — SIGNIFICANT CHANGE UP (ref 130–400)
POTASSIUM SERPL-MCNC: 4.7 MMOL/L — SIGNIFICANT CHANGE UP (ref 3.5–5)
POTASSIUM SERPL-SCNC: 4.7 MMOL/L — SIGNIFICANT CHANGE UP (ref 3.5–5)
PROT SERPL-MCNC: 6.1 G/DL — SIGNIFICANT CHANGE UP (ref 6–8)
RBC # BLD: 4.12 M/UL — LOW (ref 4.7–6.1)
RBC # FLD: 17 % — HIGH (ref 11.5–14.5)
SODIUM SERPL-SCNC: 134 MMOL/L — LOW (ref 135–146)
WBC # BLD: 17.7 K/UL — HIGH (ref 4.8–10.8)
WBC # FLD AUTO: 17.7 K/UL — HIGH (ref 4.8–10.8)

## 2022-08-30 PROCEDURE — 93010 ELECTROCARDIOGRAM REPORT: CPT

## 2022-08-30 PROCEDURE — 99233 SBSQ HOSP IP/OBS HIGH 50: CPT

## 2022-08-30 RX ORDER — CYCLOBENZAPRINE HYDROCHLORIDE 10 MG/1
10 TABLET, FILM COATED ORAL THREE TIMES A DAY
Refills: 0 | Status: DISCONTINUED | OUTPATIENT
Start: 2022-08-30 | End: 2022-08-30

## 2022-08-30 RX ORDER — CYCLOBENZAPRINE HYDROCHLORIDE 10 MG/1
10 TABLET, FILM COATED ORAL THREE TIMES A DAY
Refills: 0 | Status: DISCONTINUED | OUTPATIENT
Start: 2022-08-30 | End: 2022-09-07

## 2022-08-30 RX ORDER — PANTOPRAZOLE SODIUM 20 MG/1
40 TABLET, DELAYED RELEASE ORAL
Refills: 0 | Status: DISCONTINUED | OUTPATIENT
Start: 2022-08-30 | End: 2022-09-07

## 2022-08-30 RX ORDER — ACETAMINOPHEN 500 MG
1000 TABLET ORAL EVERY 8 HOURS
Refills: 0 | Status: DISCONTINUED | OUTPATIENT
Start: 2022-08-30 | End: 2022-09-07

## 2022-08-30 RX ADMIN — METHADONE HYDROCHLORIDE 135 MILLIGRAM(S): 40 TABLET ORAL at 09:07

## 2022-08-30 RX ADMIN — Medication 4 MILLIGRAM(S): at 17:29

## 2022-08-30 RX ADMIN — Medication 2 MILLIGRAM(S): at 13:28

## 2022-08-30 RX ADMIN — SENNA PLUS 2 TABLET(S): 8.6 TABLET ORAL at 21:26

## 2022-08-30 RX ADMIN — Medication 4 MILLIGRAM(S): at 11:26

## 2022-08-30 RX ADMIN — Medication 1000 MILLIGRAM(S): at 21:25

## 2022-08-30 RX ADMIN — Medication 2 MILLIGRAM(S): at 05:40

## 2022-08-30 RX ADMIN — Medication 100 MILLIGRAM(S): at 11:26

## 2022-08-30 RX ADMIN — HYDROMORPHONE HYDROCHLORIDE 30 MILLILITER(S): 2 INJECTION INTRAMUSCULAR; INTRAVENOUS; SUBCUTANEOUS at 11:27

## 2022-08-30 RX ADMIN — Medication 2 MILLIGRAM(S): at 21:24

## 2022-08-30 RX ADMIN — LACTULOSE 10 GRAM(S): 10 SOLUTION ORAL at 17:30

## 2022-08-30 RX ADMIN — Medication 100 MILLIGRAM(S): at 13:35

## 2022-08-30 RX ADMIN — Medication 1 MILLIGRAM(S): at 11:27

## 2022-08-30 RX ADMIN — PANTOPRAZOLE SODIUM 40 MILLIGRAM(S): 20 TABLET, DELAYED RELEASE ORAL at 06:27

## 2022-08-30 RX ADMIN — LACTULOSE 10 GRAM(S): 10 SOLUTION ORAL at 05:42

## 2022-08-30 RX ADMIN — HEPARIN SODIUM 5000 UNIT(S): 5000 INJECTION INTRAVENOUS; SUBCUTANEOUS at 21:26

## 2022-08-30 RX ADMIN — HEPARIN SODIUM 5000 UNIT(S): 5000 INJECTION INTRAVENOUS; SUBCUTANEOUS at 15:54

## 2022-08-30 RX ADMIN — Medication 1 TABLET(S): at 11:26

## 2022-08-30 RX ADMIN — Medication 100 MILLIGRAM(S): at 21:31

## 2022-08-30 RX ADMIN — Medication 1 PATCH: at 11:26

## 2022-08-30 RX ADMIN — Medication 100 MILLIGRAM(S): at 05:41

## 2022-08-30 RX ADMIN — MIRTAZAPINE 30 MILLIGRAM(S): 45 TABLET, ORALLY DISINTEGRATING ORAL at 21:25

## 2022-08-30 RX ADMIN — HEPARIN SODIUM 5000 UNIT(S): 5000 INJECTION INTRAVENOUS; SUBCUTANEOUS at 05:41

## 2022-08-30 RX ADMIN — QUETIAPINE FUMARATE 200 MILLIGRAM(S): 200 TABLET, FILM COATED ORAL at 21:26

## 2022-08-30 RX ADMIN — Medication 4 MILLIGRAM(S): at 05:41

## 2022-08-30 RX ADMIN — BUPROPION HYDROCHLORIDE 300 MILLIGRAM(S): 150 TABLET, EXTENDED RELEASE ORAL at 11:26

## 2022-08-30 NOTE — PHYSICAL THERAPY INITIAL EVALUATION ADULT - GENERAL OBSERVATIONS, REHAB EVAL
10:20-10:50; Pt encountered in bed, + PCA pump, + hemovac, + IV, + bee. Agreeable to PT with encouragement.

## 2022-08-30 NOTE — PROGRESS NOTE ADULT - ASSESSMENT
Patient is a 53 y/o man with history of IVDU, anxiety, depression, and vertebral osteomyelitis diagnosed in January of 2022 who was admitted on 8/10/2022 with worsening low back pain and underwent an L4-L5 TLIF and washout with Dr. Deleon on 8/29/2022.

## 2022-08-30 NOTE — OCCUPATIONAL THERAPY INITIAL EVALUATION ADULT - NSACTIVITYREC_GEN_A_OT
Patient requires is max assist/dependent with activities of daily living. OT recommends D/C to subacute rehabilitation facility when medically appropriate. OT recommendations for DME to be determined prior to D/C. Refer to evaluation  for details.

## 2022-08-30 NOTE — OCCUPATIONAL THERAPY INITIAL EVALUATION ADULT - LOWER BODY DRESSING, PREVIOUS LEVEL OF FUNCTION, OT EVAL
As per pt report, independent until last week PTA. At that point, pt avoided wearing socks and underwear, enabling pt to maintain indepedence.

## 2022-08-30 NOTE — PROGRESS NOTE ADULT - PROBLEM SELECTOR PLAN 1
+longstanding history of opioid use disorder, on methadone for MAT. Patient at high risk for opioid abuse.   1) Continue hydromorphone PCA 0/0.4/10 for one additional day  2) Continue methadone 135mg daily  3) Change acetaminophen to 1000mg Q8h standing  4) Change cyclobenzaprine 10mg to TID standing  5) Continue miralax, senna, lactulose  6) Use caution when administering opioids in conjunction with benzodiazepines

## 2022-08-30 NOTE — PROGRESS NOTE ADULT - SUBJECTIVE AND OBJECTIVE BOX
SUBJECTIVE:    Patient is a 52y old Male who presents with a chief complaint of Back pain (30 Aug 2022 16:49)    Currently admitted to medicine with the primary diagnosis of Fever       Today is hospital day 20d. This morning he is resting comfortably in bed and reports no new issues or overnight events.         ROS:   CONSTITUTIONAL: No weakness, fevers or chills   EYES/ENT: No visual changes; No vertigo or throat pain   NECK: No pain or stiffness   RESPIRATORY: No cough, wheezing, hemoptysis; No shortness of breath   CARDIOVASCULAR: No chest pain or palpitations   GASTROINTESTINAL: No abdominal or epigastric pain. No nausea, vomiting, or hematemesis; No diarrhea or constipation. No melena or hematochezia.  GENITOURINARY: No dysuria, frequency or hematuria  NEUROLOGICAL: + back pain         PAST MEDICAL & SURGICAL HISTORY  IV drug abuse    Vertebral osteomyelitis    MSSA bacteremia    No significant past surgical history      SOCIAL HISTORY:    ALLERGIES:  No Known Allergies    MEDICATIONS:  STANDING MEDICATIONS  acetaminophen     Tablet .. 1000 milliGRAM(s) Oral every 8 hours  ALPRAZolam 2 milliGRAM(s) Oral three times a day  buPROPion XL (24-Hour) . 300 milliGRAM(s) Oral daily  ceFAZolin   IVPB 2000 milliGRAM(s) IV Intermittent every 8 hours  cyclobenzaprine 10 milliGRAM(s) Oral three times a day  dexAMETHasone     Tablet 4 milliGRAM(s) Oral every 6 hours  folic acid 1 milliGRAM(s) Oral daily  heparin   Injectable 5000 Unit(s) SubCutaneous every 8 hours  HYDROmorphone PCA (1 mG/mL) 30 milliLiter(s) PCA Continuous PCA Continuous  lactulose Syrup 10 Gram(s) Oral two times a day  methadone    Tablet 135 milliGRAM(s) Oral daily  mirtazapine 30 milliGRAM(s) Oral at bedtime  multivitamin 1 Tablet(s) Oral daily  nicotine - 21 mG/24Hr(s) Patch 1 Patch Transdermal daily  pantoprazole    Tablet 40 milliGRAM(s) Oral before breakfast  polyethylene glycol 3350 17 Gram(s) Oral daily  QUEtiapine 200 milliGRAM(s) Oral at bedtime  senna 2 Tablet(s) Oral at bedtime  sodium chloride 0.9%. 1000 milliLiter(s) IV Continuous <Continuous>  thiamine 100 milliGRAM(s) Oral daily    PRN MEDICATIONS  aluminum hydroxide/magnesium hydroxide/simethicone Suspension 30 milliLiter(s) Oral every 4 hours PRN  melatonin 3 milliGRAM(s) Oral at bedtime PRN  naloxone Injectable 0.1 milliGRAM(s) IV Push every 3 minutes PRN  ondansetron Injectable 4 milliGRAM(s) IV Push every 6 hours PRN  scopolamine 1 mG/72 Hr(s) Patch 1 Patch Transdermal every 72 hours PRN    VITALS:   T(F): 96.9  HR: 84  BP: 135/88  RR: 18  SpO2: 98%    LABS:  Negative for smoking/alcohol/drug use.                         11.9   17.70 )-----------( 269      ( 30 Aug 2022 08:17 )             37.5     08-30    134<L>  |  94<L>  |  22<H>  ----------------------------<  159<H>  4.7   |  30  |  0.6<L>    Ca    8.2<L>      30 Aug 2022 08:17  Mg     1.9     08-30    TPro  6.1  /  Alb  3.4<L>  /  TBili  <0.2  /  DBili  x   /  AST  13  /  ALT  13  /  AlkPhos  152<H>  08-30              Culture - Tissue with Gram Stain (collected 29 Aug 2022 19:38)  Source: .Tissue None  Gram Stain (30 Aug 2022 02:10):    Rare polymorphonuclear leukocytes seen per low power field    Moderate Gram Negative Rods seen per oil power field          RADIOLOGY:    PHYSICAL EXAM:  GEN: No acute distress  HEENT: normocephalic, atraumatic, aniceteric  LUNGS: Clear to auscultation bilaterally, no rales/wheezing/ rhonchi  HEART: S1/S2 present. RRR, no murmurs  ABD: Soft, non-tender, non-distended. Bowel sounds present  EXT: no edema   NEURO: AAOX3, normal affect    wound suction in place       ASSESSMENT AND PLAN:    51 yo M w/ pmhx of IVDU (heroin), vertebral osteomyelitis, and MSSA bacteremia presents for worsening back pain. Patient was found febrile in the ED with elevated WBC count, CT orbit showing Right periorbital cellulitis.     #Recurrence of vertebral OM   #Recent history & post treatment for MSSA bacteremia  #Right periorbital cellulitis  #Sepsis present on admission: leukocytosis, febrile  - CT Orbit w/ IV Cont 8/10: shows Right periorbital (pre-septal) soft tissue swelling consistent with cellulitis. No evidence of abscess. No evidence of post-septal inflammation.  -h/o IR Drainage of Abscess/Biopsy of suspected discitis done 3/16 -fluid  growing NICOLE  -, .8   - BCx negative since 8/12  -MR lumbar/t spine: 1. Interval progression of discitis osteomyelitis at L4-5 with increased   destruction of the endplate cortices and intervening disc. Associated   mild vertebral height losses of L4 on L5. 2. Significantly increased epidural phlegmon extending from the right   dorsal epidural space from L2-3 down to sacral level with maximal mass   effect at the right L4-5 epidural space resulting in   impingement/compression of the thecal sac and nerve roots. 3. Slightly increased erosion affecting bilateral L4-5   facet joints (left   more than right), and bilateral L5-S1 facet joints. Increased enhancements involving bilateral L4-L5 and L5-S1 neural foraminal spaces.2. Increased paravertebral phlegmon surrounding L4-5 with slightly   increased size of the left retroperitoneal abscess. Slightly smaller right psoas abscess.    - Ancef 2g iv q8h (8 weeks starting 8/11/2022); will need CT before stopping abx as per ID    -PAM showing no vegetations  - s/p Transforaminal lumbar interbody fusion (TLIF) with laminectomy and fusion of posterior column using instrumentation and iliac crest allograft on 8/29/2022, POD 1   - Class I RCRI , METS < 4 , 3.9% chance of 30 day risk of death, MI, or cardiac arrest. Patient is moderate risk for intermediate risk procedure   - pain management evaluation of acute post op pain appreciated   - IR for PICC line prior to discharge       #Vomiting/abdominal pain- resolved  #Delirium- resolved  -possibly vomited from nafcillin?  -delirium possibly due to vomiting/not getting methadone, now back at baseline    #Transaminitis- resolved  -U/S equivocal  -trend LFT's     #Normocytic Anemia- improved  - Monitor H+H  -ferritin, b12, folate wnl    #Hx of IVD  #Opioid abuse on Methadone  - Continue methadone 135, and gabapentin    #Anxiety/Depression  - c/w mirtazapine + quetiapine   -c/w xanax     #Active nicotine dependence  - c/w nicotine patch  - c/w buproprion    #Morbid obesity  - diet and lifestyle modification   #Hypomagnesemia    -repleted    #Hyperkalemia- resolved    #Suspected folic acid deficiency  -continue supplement    # Hyperglycemia, suspected DM , HbA1c 01/2022 6.6 , FS AC QHS, recheck HbA1c , sliding scale if needed     #DVT PPx- LVX 40mg sq qhs  #GI PPx- None  #Diet- DASH/TLC  #CHG  #Activity- AAT  #Code- FULL    Family discussion: Plan of care discussed with patient, aware and agreeable   Disposition: subacute rehab   Handoff: pending pain improvement , POD 1

## 2022-08-30 NOTE — OCCUPATIONAL THERAPY INITIAL EVALUATION ADULT - GENERAL OBSERVATIONS, REHAB EVAL
Pt received and left semifowler in bed. + bee, B/L sequentials, + IV lock, + pain medication pump. PT c/o pain L LE from hip radiating to knee 7/10 VAS at rest. Managed by repositioning. No change in pain. RN made aware.

## 2022-08-30 NOTE — PROGRESS NOTE ADULT - SUBJECTIVE AND OBJECTIVE BOX
Hospital Course:   POD# 1s/p L4-5 TLIF, washout, biopsy of osteodiscitis L4-5      Vital Signs Last 24 Hrs  T(C): 36.7 (30 Aug 2022 09:35), Max: 37.2 (30 Aug 2022 00:14)  T(F): 98.1 (30 Aug 2022 09:35), Max: 99 (30 Aug 2022 00:14)  HR: 80 (30 Aug 2022 09:35) (60 - 89)  BP: 137/65 (30 Aug 2022 09:35) (114/64 - 152/85)  BP(mean): --  RR: 18 (30 Aug 2022 09:35) (18 - 18)  SpO2: 95% (30 Aug 2022 09:35) (94% - 96%)    Parameters below as of 30 Aug 2022 09:35  Patient On (Oxygen Delivery Method): room air    I&O's Detail    29 Aug 2022 07:01  -  30 Aug 2022 07:00  --------------------------------------------------------  IN:  Total IN: 0 mL    OUT:    Accordian (mL): 100 mL    Indwelling Catheter - Urethral (mL): 2700 mL  Total OUT: 2800 mL    Total NET: -2800 mL    I&O's Summary    29 Aug 2022 07:01  -  30 Aug 2022 07:00  --------------------------------------------------------  IN: 0 mL / OUT: 2800 mL / NET: -2800 mL    PHYSICAL EXAM:  Neurological:  awake, alert, pain in back on movement, able to bend both knees , DF and PF both ankles with good strength bilateral, can lift both heels off bed with encouragement limited due to pain.    Incision/Wound: dressing dry, small blood stain noted, Hemavac intact 100 cc's  Tissue culture- Moderatefm neg rods, await final culture and sensativity    LABS:                        11.9   17.70 )-----------( 269      ( 30 Aug 2022 08:17 )             37.5     08-30    134<L>  |  94<L>  |  22<H>  ----------------------------<  159<H>  4.7   |  30  |  0.6<L>    Ca    8.2<L>      30 Aug 2022 08:17  Mg     1.9     08-30    TPro  6.1  /  Alb  3.4<L>  /  TBili  <0.2  /  DBili  x   /  AST  13  /  ALT  13  /  AlkPhos  152<H>  08-30    CAPILLARY BLOOD GLUCOSE    POCT Blood Glucose.: 156 mg/dL (29 Aug 2022 21:06)      Drug Levels: [] N/A    CSF Analysis: [] N/A    Allergies    No Known Allergies    Intolerances    MEDICATIONS:  Antibiotics:  ceFAZolin   IVPB 2000 milliGRAM(s) IV Intermittent every 8 hours    Neuro:  acetaminophen     Tablet .. 650 milliGRAM(s) Oral every 6 hours PRN  ALPRAZolam 2 milliGRAM(s) Oral three times a day  buPROPion XL (24-Hour) . 300 milliGRAM(s) Oral daily  cyclobenzaprine 5 milliGRAM(s) Oral three times a day PRN  HYDROmorphone PCA (1 mG/mL) 30 milliLiter(s) PCA Continuous PCA Continuous  melatonin 3 milliGRAM(s) Oral at bedtime PRN  methadone    Tablet 135 milliGRAM(s) Oral daily  mirtazapine 30 milliGRAM(s) Oral at bedtime  ondansetron Injectable 4 milliGRAM(s) IV Push every 6 hours PRN  QUEtiapine 200 milliGRAM(s) Oral at bedtime  scopolamine 1 mG/72 Hr(s) Patch 1 Patch Transdermal every 72 hours PRN    Anticoagulation:  heparin   Injectable 5000 Unit(s) SubCutaneous every 8 hours    OTHER:  aluminum hydroxide/magnesium hydroxide/simethicone Suspension 30 milliLiter(s) Oral every 4 hours PRN  dexAMETHasone     Tablet 4 milliGRAM(s) Oral every 6 hours  lactulose Syrup 10 Gram(s) Oral two times a day  naloxone Injectable 0.1 milliGRAM(s) IV Push every 3 minutes PRN  nicotine - 21 mG/24Hr(s) Patch 1 Patch Transdermal daily  pantoprazole  Injectable 40 milliGRAM(s) IV Push every 12 hours  polyethylene glycol 3350 17 Gram(s) Oral daily  senna 2 Tablet(s) Oral at bedtime    IVF:  folic acid 1 milliGRAM(s) Oral daily  multivitamin 1 Tablet(s) Oral daily  sodium chloride 0.9%. 1000 milliLiter(s) IV Continuous <Continuous>  thiamine 100 milliGRAM(s) Oral daily    CULTURES:    RADIOLOGY & ADDITIONAL TESTS:      ASSESSMENT:  52y Male s/p L4-5 TLIF POD #1    FEVER; BACK PAIN    FEVER; BACK PAIN ...    ^FEVER; BACK PAIN ...    No pertinent family history in first degree relatives    Handoff    MEWS Score    IV drug abuse    Vertebral osteomyelitis    MSSA bacteremia    Osteomyelitis of vertebra, lumbar region    Osteomyelitis of vertebra, lumbar region    Delirium due to multiple etiologies    Fever    Heroin use disorder, severe, dependence    Vertebral osteomyelitis    Transforaminal lumbar interbody fusion (TLIF) with laminectomy and fusion of posterior column using instrumentation and iliac crest allograft    No significant past surgical history    90+    Back pain    Preseptal cellulitis    [email or web address]        PLAN: PT, OOB, no brace needed, await final cultures, ID f/u

## 2022-08-30 NOTE — OCCUPATIONAL THERAPY INITIAL EVALUATION ADULT - FINE MOTOR COORDINATION, RIGHT HAND, FINGER TO NOSE, OT EVAL
SW/CM Discharge Plan  Informed patient is ready for discharge. Patient’s discharge destination is home with spouse, adult children, and OP PT. Patient to be picked up by family. Patient/interested person has been counseled for post hospitalization care.  Patient agrees and understands goals and plan. Initial implementation of the patient’s discharge plan has been arranged, including any devices/equipment needed for discharge. Discharge plan communicated to MD, RN and PETE.   normal performance

## 2022-08-30 NOTE — PHYSICAL THERAPY INITIAL EVALUATION ADULT - PERTINENT HX OF CURRENT PROBLEM, REHAB EVAL
53 yo M w/ pmhx of IVDU (heroin), vertebral osteomyelitis, and MSSA bacteremia presents for worsening back pain. Back pain was increasing over the past couple of weeks and worsening today to the point he was having difficulty ambulating and bearing weight. Pain is sharp, radiating from low back to b/l hips and worse with movement. Patient normally able to ambulate with walker but now having difficulty walking due to pain. He endorses feeling similar to the pain from prior admission. Pt is also complaining of right sided eye swelling/redness since Tuesday. He states, he slept on the floor and woke up in the am with redness/swelling. Patient denies any visual changes or eye pain. Patient denies any fever, chills, headache, dizziness. Patient denies chest pain, palpitation, SOB. Patient denies abdominal pain, n/v/d/c.   Patient was recently admitted 03/12-04/06 for vertebral OM/facetitis. PAM was negative and ID recommended cefazolin. Patient had picc line placed for 6wks of antibiotics. Patient states he finished his antibiotics at [facility] but did not follow up with Dr Giraldo. Patient states he is currently not using heroin. He last used 10 months ago.

## 2022-08-30 NOTE — OCCUPATIONAL THERAPY INITIAL EVALUATION ADULT - PERTINENT HX OF CURRENT PROBLEM, REHAB EVAL
2 yo M w/ pmhx of IVDU (heroin), vertebral osteomyelitis, and MSSA bacteremia presents for worsening back pain. Back pain was increasing over the past couple of weeks and worsening today to the point he was having difficulty ambulating and bearing weight. Pain is sharp, radiating from low back to b/l hips and worse with movement. Patient normally able to ambulate with walker but now having difficulty walking due to pain. He endorses feeling similar to the pain from prior admission. Pt is also complaining of right sided eye swelling/redness since Tuesday. He states, he slept on the floor and woke up in the am with redness/swelling. Patient was found febrile in the ED with elevated WBC count, CT orbit showing Right periorbital cellulitis.

## 2022-08-30 NOTE — OCCUPATIONAL THERAPY INITIAL EVALUATION ADULT - ADL RETRAINING, OT EVAL
In one week, pt will demonstrate mod assist for UB dressing and max assist for LB dressing, with use of AE as needed.

## 2022-08-30 NOTE — OCCUPATIONAL THERAPY INITIAL EVALUATION ADULT - LEVEL OF INDEPENDENCE: BED TO CHAIR, REHAB EVAL
[facility] called 5/2 @ 4:01pm. Stated they are checking on the status of a PA faxed 4/28 for omnipod/papo.     Fax# [phone]  Phone# [phone]
Pt declined due to pain. See above for details./unable to perform

## 2022-08-30 NOTE — PROGRESS NOTE ADULT - SUBJECTIVE AND OBJECTIVE BOX
Pain Medicine Follow Up Visit    Subjective  Patient reports having aching pain in the low back at the surgical site. He notes that pain is in the left low back and is radiating from the greater trochanter region to the left anterior thigh. No focal numbness or weakness. The patient reports that pain is aching and cramping in nature. He does report some relief with his hydromorphone PCA and denies having any side effects.     Current Medication Regimen  acetaminophen     Tablet .. 650 milliGRAM(s) Oral every 6 hours PRN  ALPRAZolam 2 milliGRAM(s) Oral three times a day  aluminum hydroxide/magnesium hydroxide/simethicone Suspension 30 milliLiter(s) Oral every 4 hours PRN  buPROPion XL (24-Hour) . 300 milliGRAM(s) Oral daily  ceFAZolin   IVPB 2000 milliGRAM(s) IV Intermittent every 8 hours  cyclobenzaprine 10 milliGRAM(s) Oral three times a day PRN  dexAMETHasone     Tablet 4 milliGRAM(s) Oral every 6 hours  folic acid 1 milliGRAM(s) Oral daily  heparin   Injectable 5000 Unit(s) SubCutaneous every 8 hours  HYDROmorphone PCA (1 mG/mL) 30 milliLiter(s) PCA Continuous PCA Continuous  lactulose Syrup 10 Gram(s) Oral two times a day  melatonin 3 milliGRAM(s) Oral at bedtime PRN  methadone    Tablet 135 milliGRAM(s) Oral daily  mirtazapine 30 milliGRAM(s) Oral at bedtime  multivitamin 1 Tablet(s) Oral daily  naloxone Injectable 0.1 milliGRAM(s) IV Push every 3 minutes PRN  nicotine - 21 mG/24Hr(s) Patch 1 Patch Transdermal daily  ondansetron Injectable 4 milliGRAM(s) IV Push every 6 hours PRN  pantoprazole    Tablet 40 milliGRAM(s) Oral before breakfast  polyethylene glycol 3350 17 Gram(s) Oral daily  QUEtiapine 200 milliGRAM(s) Oral at bedtime  scopolamine 1 mG/72 Hr(s) Patch 1 Patch Transdermal every 72 hours PRN  senna 2 Tablet(s) Oral at bedtime  sodium chloride 0.9%. 1000 milliLiter(s) IV Continuous <Continuous>  thiamine 100 milliGRAM(s) Oral daily        Allergies  No Known Allergies      Physical Exam  T(C): 36.2 (08-30-22 @ 13:50), Max: 37.2 (08-30-22 @ 00:14)  HR: 89 (08-30-22 @ 13:50) (60 - 89)  BP: 141/79 (08-30-22 @ 13:50) (114/64 - 152/85)  RR: 18 (08-30-22 @ 13:50) (18 - 18)  SpO2: 96% (08-30-22 @ 13:50) (94% - 96%)  Gen: NAD  Eyes: no glasses or scleral icterus  Head: Normocephalic / Atraumatic  CV: no JVD  Lungs: nonlabored breathing  Abdomen: nondistended, soft  Neuro: AOx3, Cranial nerves intact  Psych: normal affect      Labs  CBC  17.7 > 11.9 g/dL / 37.5 % < 269 K/uL        Imaging  CXR (8/28/2022)  Findings:    Support devices: None.    Cardiac/mediastinum/hilum: No significant change    Lung parenchyma/Pleura: Increased right basilar opacities.    Skeleton/soft tissues: No significant change.    Impression:  Increased right basilar opacities.

## 2022-08-30 NOTE — PHYSICAL THERAPY INITIAL EVALUATION ADULT - ADDITIONAL COMMENTS
pt was ambulatory last year, has had progressive difficulty with ambulation since the beginning of the year, RW

## 2022-08-30 NOTE — PHYSICAL THERAPY INITIAL EVALUATION ADULT - PATIENT PROFILE REVIEW, REHAB EVAL
yes Cimetidine Pregnancy And Lactation Text: This medication is Pregnancy Category B and is considered safe during pregnancy. It is also excreted in breast milk and breast feeding isn't recommended.

## 2022-08-31 LAB
ALBUMIN SERPL ELPH-MCNC: 3 G/DL — LOW (ref 3.5–5.2)
ALP SERPL-CCNC: 118 U/L — HIGH (ref 30–115)
ALT FLD-CCNC: 10 U/L — SIGNIFICANT CHANGE UP (ref 0–41)
ANION GAP SERPL CALC-SCNC: 10 MMOL/L — SIGNIFICANT CHANGE UP (ref 7–14)
AST SERPL-CCNC: 11 U/L — SIGNIFICANT CHANGE UP (ref 0–41)
BASOPHILS # BLD AUTO: 0.02 K/UL — SIGNIFICANT CHANGE UP (ref 0–0.2)
BASOPHILS NFR BLD AUTO: 0.1 % — SIGNIFICANT CHANGE UP (ref 0–1)
BILIRUB SERPL-MCNC: <0.2 MG/DL — SIGNIFICANT CHANGE UP (ref 0.2–1.2)
BUN SERPL-MCNC: 21 MG/DL — HIGH (ref 10–20)
CALCIUM SERPL-MCNC: 8.5 MG/DL — SIGNIFICANT CHANGE UP (ref 8.5–10.1)
CHLORIDE SERPL-SCNC: 98 MMOL/L — SIGNIFICANT CHANGE UP (ref 98–110)
CO2 SERPL-SCNC: 29 MMOL/L — SIGNIFICANT CHANGE UP (ref 17–32)
CREAT SERPL-MCNC: 0.6 MG/DL — LOW (ref 0.7–1.5)
EGFR: 115 ML/MIN/1.73M2 — SIGNIFICANT CHANGE UP
EOSINOPHIL # BLD AUTO: 0.01 K/UL — SIGNIFICANT CHANGE UP (ref 0–0.7)
EOSINOPHIL NFR BLD AUTO: 0.1 % — SIGNIFICANT CHANGE UP (ref 0–8)
GLUCOSE BLDC GLUCOMTR-MCNC: 143 MG/DL — HIGH (ref 70–99)
GLUCOSE SERPL-MCNC: 156 MG/DL — HIGH (ref 70–99)
HCT VFR BLD CALC: 33.6 % — LOW (ref 42–52)
HGB BLD-MCNC: 10.8 G/DL — LOW (ref 14–18)
IMM GRANULOCYTES NFR BLD AUTO: 1.8 % — HIGH (ref 0.1–0.3)
LYMPHOCYTES # BLD AUTO: 18.5 % — LOW (ref 20.5–51.1)
LYMPHOCYTES # BLD AUTO: 2.73 K/UL — SIGNIFICANT CHANGE UP (ref 1.2–3.4)
MAGNESIUM SERPL-MCNC: 1.9 MG/DL — SIGNIFICANT CHANGE UP (ref 1.8–2.4)
MCHC RBC-ENTMCNC: 29.7 PG — SIGNIFICANT CHANGE UP (ref 27–31)
MCHC RBC-ENTMCNC: 32.1 G/DL — SIGNIFICANT CHANGE UP (ref 32–37)
MCV RBC AUTO: 92.3 FL — SIGNIFICANT CHANGE UP (ref 80–94)
MONOCYTES # BLD AUTO: 1.02 K/UL — HIGH (ref 0.1–0.6)
MONOCYTES NFR BLD AUTO: 6.9 % — SIGNIFICANT CHANGE UP (ref 1.7–9.3)
NEUTROPHILS # BLD AUTO: 10.67 K/UL — HIGH (ref 1.4–6.5)
NEUTROPHILS NFR BLD AUTO: 72.6 % — SIGNIFICANT CHANGE UP (ref 42.2–75.2)
NRBC # BLD: 0 /100 WBCS — SIGNIFICANT CHANGE UP (ref 0–0)
PLATELET # BLD AUTO: 257 K/UL — SIGNIFICANT CHANGE UP (ref 130–400)
POTASSIUM SERPL-MCNC: 4.6 MMOL/L — SIGNIFICANT CHANGE UP (ref 3.5–5)
POTASSIUM SERPL-SCNC: 4.6 MMOL/L — SIGNIFICANT CHANGE UP (ref 3.5–5)
PROT SERPL-MCNC: 5.4 G/DL — LOW (ref 6–8)
RBC # BLD: 3.64 M/UL — LOW (ref 4.7–6.1)
RBC # FLD: 17 % — HIGH (ref 11.5–14.5)
SODIUM SERPL-SCNC: 137 MMOL/L — SIGNIFICANT CHANGE UP (ref 135–146)
WBC # BLD: 14.72 K/UL — HIGH (ref 4.8–10.8)
WBC # FLD AUTO: 14.72 K/UL — HIGH (ref 4.8–10.8)

## 2022-08-31 PROCEDURE — 99233 SBSQ HOSP IP/OBS HIGH 50: CPT

## 2022-08-31 RX ADMIN — Medication 4 MILLIGRAM(S): at 23:07

## 2022-08-31 RX ADMIN — HEPARIN SODIUM 5000 UNIT(S): 5000 INJECTION INTRAVENOUS; SUBCUTANEOUS at 21:32

## 2022-08-31 RX ADMIN — Medication 100 MILLIGRAM(S): at 13:20

## 2022-08-31 RX ADMIN — Medication 1000 MILLIGRAM(S): at 21:33

## 2022-08-31 RX ADMIN — Medication 4 MILLIGRAM(S): at 05:50

## 2022-08-31 RX ADMIN — LACTULOSE 10 GRAM(S): 10 SOLUTION ORAL at 17:19

## 2022-08-31 RX ADMIN — Medication 100 MILLIGRAM(S): at 05:51

## 2022-08-31 RX ADMIN — BUPROPION HYDROCHLORIDE 300 MILLIGRAM(S): 150 TABLET, EXTENDED RELEASE ORAL at 11:02

## 2022-08-31 RX ADMIN — Medication 1 MILLIGRAM(S): at 11:02

## 2022-08-31 RX ADMIN — SENNA PLUS 2 TABLET(S): 8.6 TABLET ORAL at 21:33

## 2022-08-31 RX ADMIN — Medication 2 MILLIGRAM(S): at 05:50

## 2022-08-31 RX ADMIN — Medication 1000 MILLIGRAM(S): at 13:19

## 2022-08-31 RX ADMIN — Medication 2 MILLIGRAM(S): at 21:35

## 2022-08-31 RX ADMIN — Medication 4 MILLIGRAM(S): at 11:02

## 2022-08-31 RX ADMIN — Medication 1000 MILLIGRAM(S): at 05:50

## 2022-08-31 RX ADMIN — QUETIAPINE FUMARATE 200 MILLIGRAM(S): 200 TABLET, FILM COATED ORAL at 21:33

## 2022-08-31 RX ADMIN — CYCLOBENZAPRINE HYDROCHLORIDE 10 MILLIGRAM(S): 10 TABLET, FILM COATED ORAL at 21:33

## 2022-08-31 RX ADMIN — Medication 4 MILLIGRAM(S): at 00:10

## 2022-08-31 RX ADMIN — Medication 100 MILLIGRAM(S): at 11:02

## 2022-08-31 RX ADMIN — CYCLOBENZAPRINE HYDROCHLORIDE 10 MILLIGRAM(S): 10 TABLET, FILM COATED ORAL at 13:19

## 2022-08-31 RX ADMIN — HEPARIN SODIUM 5000 UNIT(S): 5000 INJECTION INTRAVENOUS; SUBCUTANEOUS at 05:50

## 2022-08-31 RX ADMIN — Medication 2 MILLIGRAM(S): at 13:24

## 2022-08-31 RX ADMIN — MIRTAZAPINE 30 MILLIGRAM(S): 45 TABLET, ORALLY DISINTEGRATING ORAL at 21:33

## 2022-08-31 RX ADMIN — POLYETHYLENE GLYCOL 3350 17 GRAM(S): 17 POWDER, FOR SOLUTION ORAL at 17:20

## 2022-08-31 RX ADMIN — LACTULOSE 10 GRAM(S): 10 SOLUTION ORAL at 05:51

## 2022-08-31 RX ADMIN — Medication 100 MILLIGRAM(S): at 21:32

## 2022-08-31 RX ADMIN — METHADONE HYDROCHLORIDE 135 MILLIGRAM(S): 40 TABLET ORAL at 11:05

## 2022-08-31 RX ADMIN — Medication 1 TABLET(S): at 11:02

## 2022-08-31 RX ADMIN — Medication 1 PATCH: at 19:37

## 2022-08-31 RX ADMIN — Medication 4 MILLIGRAM(S): at 17:19

## 2022-08-31 RX ADMIN — Medication 1 PATCH: at 13:20

## 2022-08-31 RX ADMIN — HEPARIN SODIUM 5000 UNIT(S): 5000 INJECTION INTRAVENOUS; SUBCUTANEOUS at 13:19

## 2022-08-31 RX ADMIN — Medication 1000 MILLIGRAM(S): at 22:27

## 2022-08-31 RX ADMIN — CYCLOBENZAPRINE HYDROCHLORIDE 10 MILLIGRAM(S): 10 TABLET, FILM COATED ORAL at 05:51

## 2022-08-31 RX ADMIN — PANTOPRAZOLE SODIUM 40 MILLIGRAM(S): 20 TABLET, DELAYED RELEASE ORAL at 05:52

## 2022-08-31 NOTE — CHART NOTE - NSCHARTNOTEFT_GEN_A_CORE
Patient was seen for LOS. Patient is currently on DASH/TLC diet. He is tolerating the diet well. Reports PO intake >75% of meals. No other complaints/concerns at this time.   RD to f/u in 7-10 days or PRN.    RD to remain available: Ellie Agrawal, PHYLLIS [id number]

## 2022-08-31 NOTE — CHART NOTE - NSCHARTNOTESELECT_GEN_ALL_CORE
PACU ANESTHESIA ADMISSION NOTE/Event Note
PAM cancelled
RD Limited Note/Event Note
post aristeo procedure note
CT Abd/Pelvis with PO and IV Cont
Event Note
Event Note
PAM
PAM
PAM Cancellation
PAM re-scheduled
Post PAM Procedure Note

## 2022-08-31 NOTE — PROGRESS NOTE ADULT - SUBJECTIVE AND OBJECTIVE BOX
Subjective: 52yMale with a pmhx of FEVER; BACK PAIN    FEVER; BACK PAIN ...    ^FEVER; BACK PAIN ...    No pertinent family history in first degree relatives    Handoff    MEWS Score    IV drug abuse    Vertebral osteomyelitis    MSSA bacteremia    Osteomyelitis of vertebra, lumbar region    Osteomyelitis of vertebra, lumbar region    Delirium due to multiple etiologies    Fever    Heroin use disorder, severe, dependence    Vertebral osteomyelitis    Acute postoperative pain    Transforaminal lumbar interbody fusion (TLIF) with laminectomy and fusion of posterior column using instrumentation and iliac crest allograft    No significant past surgical history    90+    Back pain    Preseptal cellulitis    [email or web address]      Patient is a 52 year-old male POD #2 s/p L4-L5 TLIF with pedicle screws. Patient was seen and examined at bedside on 4C. He is lying in bed A&Ox3 and following all commands. He admits to worsening muscle spasms in the LLE since yesterday as well as persistent diminished sensation to the LLE but otherwise states he is doing well. Denies weakness, headaches at this time.    Allergies    No Known Allergies    Intolerances        Vital Signs Last 24 Hrs  T(C): 36 (31 Aug 2022 04:56), Max: 36.3 (30 Aug 2022 21:00)  T(F): 96.8 (31 Aug 2022 04:56), Max: 97.3 (30 Aug 2022 21:00)  HR: 75 (31 Aug 2022 04:56) (75 - 89)  BP: 128/75 (31 Aug 2022 04:56) (128/75 - 141/79)  BP(mean): --  RR: 18 (31 Aug 2022 04:56) (18 - 18)  SpO2: 94% (31 Aug 2022 04:56) (94% - 98%)      acetaminophen     Tablet .. 1000 milliGRAM(s) Oral every 8 hours  ALPRAZolam 2 milliGRAM(s) Oral three times a day  aluminum hydroxide/magnesium hydroxide/simethicone Suspension 30 milliLiter(s) Oral every 4 hours PRN  buPROPion XL (24-Hour) . 300 milliGRAM(s) Oral daily  ceFAZolin   IVPB 2000 milliGRAM(s) IV Intermittent every 8 hours  cyclobenzaprine 10 milliGRAM(s) Oral three times a day  dexAMETHasone     Tablet 4 milliGRAM(s) Oral every 6 hours  folic acid 1 milliGRAM(s) Oral daily  heparin   Injectable 5000 Unit(s) SubCutaneous every 8 hours  HYDROmorphone PCA (1 mG/mL) 30 milliLiter(s) PCA Continuous PCA Continuous  lactulose Syrup 10 Gram(s) Oral two times a day  melatonin 3 milliGRAM(s) Oral at bedtime PRN  methadone    Tablet 135 milliGRAM(s) Oral daily  mirtazapine 30 milliGRAM(s) Oral at bedtime  multivitamin 1 Tablet(s) Oral daily  naloxone Injectable 0.1 milliGRAM(s) IV Push every 3 minutes PRN  nicotine - 21 mG/24Hr(s) Patch 1 Patch Transdermal daily  ondansetron Injectable 4 milliGRAM(s) IV Push every 6 hours PRN  pantoprazole    Tablet 40 milliGRAM(s) Oral before breakfast  polyethylene glycol 3350 17 Gram(s) Oral daily  QUEtiapine 200 milliGRAM(s) Oral at bedtime  scopolamine 1 mG/72 Hr(s) Patch 1 Patch Transdermal every 72 hours PRN  senna 2 Tablet(s) Oral at bedtime  thiamine 100 milliGRAM(s) Oral daily        08-30-22 @ 07:01  -  08-31-22 @ 07:00  --------------------------------------------------------  IN: 0 mL / OUT: 4395 mL / NET: -4395 mL        REVIEW OF SYSTEMS    [X] A ten-point review of systems was otherwise negative except as noted.  [ ] Due to altered mental status/intubation, subjective information were not able to be obtained from the patient. History was obtained, to the extent possible, from review of the chart and collateral sources of information.      Physical Exam:  General: Lying in bed, following all commands  AAOX3. Verbal function intact  Facial motions symmetric  EOMI  Motor: MAEx4  5/5 strength in b/l UE's   5/5 strength RLE  Decreased movement to LLE secondary to pain  Sensation: intact to touch in all extremities  Wound: Incision clean, dry, and intact without drainage  Drains: Drain intact, draining serosanguineous fluid, O/P: 95cc/24 hrs      CBC Full  -  ( 31 Aug 2022 05:28 )  WBC Count : 14.72 K/uL  RBC Count : 3.64 M/uL  Hemoglobin : 10.8 g/dL  Hematocrit : 33.6 %  Platelet Count - Automated : 257 K/uL  Mean Cell Volume : 92.3 fL  Mean Cell Hemoglobin : 29.7 pg  Mean Cell Hemoglobin Concentration : 32.1 g/dL  Auto Neutrophil # : 10.67 K/uL  Auto Lymphocyte # : 2.73 K/uL  Auto Monocyte # : 1.02 K/uL  Auto Eosinophil # : 0.01 K/uL  Auto Basophil # : 0.02 K/uL  Auto Neutrophil % : 72.6 %  Auto Lymphocyte % : 18.5 %  Auto Monocyte % : 6.9 %  Auto Eosinophil % : 0.1 %  Auto Basophil % : 0.1 %    08-31    137  |  98  |  21<H>  ----------------------------<  156<H>  4.6   |  29  |  0.6<L>    Ca    8.5      31 Aug 2022 05:28  Mg     1.9     08-31    TPro  5.4<L>  /  Alb  3.0<L>  /  TBili  <0.2  /  DBili  x   /  AST  11  /  ALT  10  /  AlkPhos  118<H>  08-31          Assessment/Plan:   52 year-old male POD #2 s/p L4-L5 TLIF with pedicle screws.   -F/U Pain management reccs - d/c PCA pump today  -Continue monitoring and recording HVC outputs  -Consider valium for muscle relaxer  -PT/OT/Rehab  -DVT ppx, SQH  -Encourage incentive spirometry  -F/U Final cultures  -Continue abx, Ancef x 8 weeks  -D/w attending

## 2022-08-31 NOTE — PROGRESS NOTE ADULT - SUBJECTIVE AND OBJECTIVE BOX
SUBJECTIVE:    Patient is a 52y old Male who presents with a chief complaint of Back pain (31 Aug 2022 12:04)    Currently admitted to medicine with the primary diagnosis of Fever       Today is hospital day 21d. This morning he is resting comfortably in bed and reports no new issues or overnight events.     ROS:   CONSTITUTIONAL: No weakness, fevers or chills   EYES/ENT: No visual changes; No vertigo or throat pain   NECK: No pain or stiffness   RESPIRATORY: No cough, wheezing, hemoptysis; No shortness of breath   CARDIOVASCULAR: No chest pain or palpitations   GASTROINTESTINAL: No abdominal or epigastric pain. No nausea, vomiting, or hematemesis; No diarrhea or constipation. No melena or hematochezia.  GENITOURINARY: No dysuria, frequency or hematuria  NEUROLOGICAL: back pain improved         PAST MEDICAL & SURGICAL HISTORY  IV drug abuse    Vertebral osteomyelitis    MSSA bacteremia    No significant past surgical history    ALLERGIES:  No Known Allergies    MEDICATIONS:  STANDING MEDICATIONS  acetaminophen     Tablet .. 1000 milliGRAM(s) Oral every 8 hours  ALPRAZolam 2 milliGRAM(s) Oral three times a day  buPROPion XL (24-Hour) . 300 milliGRAM(s) Oral daily  ceFAZolin   IVPB 2000 milliGRAM(s) IV Intermittent every 8 hours  cyclobenzaprine 10 milliGRAM(s) Oral three times a day  dexAMETHasone     Tablet 4 milliGRAM(s) Oral every 6 hours  folic acid 1 milliGRAM(s) Oral daily  heparin   Injectable 5000 Unit(s) SubCutaneous every 8 hours  lactulose Syrup 10 Gram(s) Oral two times a day  methadone    Tablet 135 milliGRAM(s) Oral daily  mirtazapine 30 milliGRAM(s) Oral at bedtime  multivitamin 1 Tablet(s) Oral daily  nicotine - 21 mG/24Hr(s) Patch 1 Patch Transdermal daily  pantoprazole    Tablet 40 milliGRAM(s) Oral before breakfast  polyethylene glycol 3350 17 Gram(s) Oral daily  QUEtiapine 200 milliGRAM(s) Oral at bedtime  senna 2 Tablet(s) Oral at bedtime  thiamine 100 milliGRAM(s) Oral daily    PRN MEDICATIONS  aluminum hydroxide/magnesium hydroxide/simethicone Suspension 30 milliLiter(s) Oral every 4 hours PRN  melatonin 3 milliGRAM(s) Oral at bedtime PRN  naloxone Injectable 0.1 milliGRAM(s) IV Push every 3 minutes PRN  ondansetron Injectable 4 milliGRAM(s) IV Push every 6 hours PRN  scopolamine 1 mG/72 Hr(s) Patch 1 Patch Transdermal every 72 hours PRN    VITALS:   T(F): 98.7  HR: 78  BP: 140/65  RR: 18  SpO2: 96%    LABS:                          10.8   14.72 )-----------( 257      ( 31 Aug 2022 05:28 )             33.6     08-31    137  |  98  |  21<H>  ----------------------------<  156<H>  4.6   |  29  |  0.6<L>    Ca    8.5      31 Aug 2022 05:28  Mg     1.9     08-31    TPro  5.4<L>  /  Alb  3.0<L>  /  TBili  <0.2  /  DBili  x   /  AST  11  /  ALT  10  /  AlkPhos  118<H>  08-31      Culture - Tissue with Gram Stain (collected 29 Aug 2022 19:38)  Source: .Tissue None  Gram Stain (31 Aug 2022 15:06):    **Please Note**: This is a Corrected Report**    Rare polymorphonuclear leukocytes per low power field    No organisms seen per oil power field    Previously reported as:    Rare polymorphonuclear leukocytes seen per low power field    Moderate Gram NegativeRods seen per oil power field  Preliminary Report (31 Aug 2022 15:07):    No growth to date      RADIOLOGY:    PHYSICAL EXAM:  GEN: No acute distress  HEENT: normocephalic, atraumatic, aniceteric  LUNGS: Clear to auscultation bilaterally, no rales/wheezing/ rhonchi  HEART: S1/S2 present. RRR, no murmurs  ABD: Soft, non-tender, non-distended. Bowel sounds present  EXT: no edema   BACK: wound duction in place   NEURO: AAOX3, normal affect    ASSESSMENT AND PLAN:    53 yo M w/ pmhx of IVDU (heroin), vertebral osteomyelitis, and MSSA bacteremia presents for worsening back pain. Patient was found febrile in the ED with elevated WBC count, CT orbit showing Right periorbital cellulitis.     #Recurrence of vertebral OM   #Recent history & post treatment for MSSA bacteremia  #Right periorbital cellulitis  #Sepsis present on admission: leukocytosis, febrile  - CT Orbit w/ IV Cont 8/10: shows Right periorbital (pre-septal) soft tissue swelling consistent with cellulitis. No evidence of abscess. No evidence of post-septal inflammation.  -h/o IR Drainage of Abscess/Biopsy of suspected discitis done 3/16 -fluid  growing NICOLE  -, .8   - BCx negative since 8/12  -MR lumbar/t spine: 1. Interval progression of discitis osteomyelitis at L4-5 with increased   destruction of the endplate cortices and intervening disc. Associated   mild vertebral height losses of L4 on L5. 2. Significantly increased epidural phlegmon extending from the right   dorsal epidural space from L2-3 down to sacral level with maximal mass   effect at the right L4-5 epidural space resulting in   impingement/compression of the thecal sac and nerve roots. 3. Slightly increased erosion affecting bilateral L4-5   facet joints (left   more than right), and bilateral L5-S1 facet joints. Increased enhancements involving bilateral L4-L5 and L5-S1 neural foraminal spaces.2. Increased paravertebral phlegmon surrounding L4-5 with slightly   increased size of the left retroperitoneal abscess. Slightly smaller right psoas abscess.    - Ancef 2g iv q8h (8 weeks starting 8/11/2022); will need CT before stopping abx as per ID    -PAM showing no vegetations  - s/p Transforaminal lumbar interbody fusion (TLIF) with laminectomy and fusion of posterior column using instrumentation and iliac crest allograft on 8/29/2022, POD 2  - f/u OR cultures, prelim neg, f/u final report   - Class I RCRI , METS < 4 , 3.9% chance of 30 day risk of death, MI, or cardiac arrest. Patient is moderate risk for intermediate risk procedure   - pain management evaluation of acute post op pain appreciated , d/c PCA pump as per pain management   - IR for PICC line   - neurosx managing wound suction   - would not recommend valium with opioids at this time, patient managing pain well on current regimen       #Vomiting/abdominal pain- resolved  #Delirium- resolved  -possibly vomited from nafcillin?  -delirium possibly due to vomiting/not getting methadone, now back at baseline    #Transaminitis- resolved  -U/S equivocal  -trend LFT's     #Normocytic Anemia- improved  - Monitor H+H  -ferritin, b12, folate wnl    #Hx of IVD  #Opioid abuse on Methadone  - Continue methadone 135, and gabapentin    #Anxiety/Depression  - c/w mirtazapine + quetiapine   -c/w xanax     #Active nicotine dependence  - c/w nicotine patch  - c/w buproprion    #Morbid obesity  - diet and lifestyle modification   #Hypomagnesemia    -repleted    #Hyperkalemia- resolved    #Suspected folic acid deficiency  -continue supplement    # Hyperglycemia, suspected DM , HbA1c 01/2022 6.6 , FS AC QHS, recheck HbA1c , sliding scale if needed     #DVT PPx- heparin  #GI PPx- None  #Diet- DASH/TLC  #Activity- AAT  #Code- FULL    Family discussion: Plan of care discussed with patient, aware and agreeable   Disposition: [facility] when medically improved   Handoff: PICC line placement, pain management f/u  SUBJECTIVE:    Patient is a 52y old Male who presents with a chief complaint of Back pain (31 Aug 2022 12:04)    Currently admitted to medicine with the primary diagnosis of Fever       Today is hospital day 21d. This morning he is resting comfortably in bed and reports no new issues or overnight events.     ROS:   CONSTITUTIONAL: No weakness, fevers or chills   EYES/ENT: No visual changes; No vertigo or throat pain   NECK: No pain or stiffness   RESPIRATORY: No cough, wheezing, hemoptysis; No shortness of breath   CARDIOVASCULAR: No chest pain or palpitations   GASTROINTESTINAL: No abdominal or epigastric pain. No nausea, vomiting, or hematemesis; No diarrhea or constipation. No melena or hematochezia.  GENITOURINARY: No dysuria, frequency or hematuria  NEUROLOGICAL: back pain improved         PAST MEDICAL & SURGICAL HISTORY  IV drug abuse    Vertebral osteomyelitis    MSSA bacteremia    No significant past surgical history    ALLERGIES:  No Known Allergies    MEDICATIONS:  STANDING MEDICATIONS  acetaminophen     Tablet .. 1000 milliGRAM(s) Oral every 8 hours  ALPRAZolam 2 milliGRAM(s) Oral three times a day  buPROPion XL (24-Hour) . 300 milliGRAM(s) Oral daily  ceFAZolin   IVPB 2000 milliGRAM(s) IV Intermittent every 8 hours  cyclobenzaprine 10 milliGRAM(s) Oral three times a day  dexAMETHasone     Tablet 4 milliGRAM(s) Oral every 6 hours  folic acid 1 milliGRAM(s) Oral daily  heparin   Injectable 5000 Unit(s) SubCutaneous every 8 hours  lactulose Syrup 10 Gram(s) Oral two times a day  methadone    Tablet 135 milliGRAM(s) Oral daily  mirtazapine 30 milliGRAM(s) Oral at bedtime  multivitamin 1 Tablet(s) Oral daily  nicotine - 21 mG/24Hr(s) Patch 1 Patch Transdermal daily  pantoprazole    Tablet 40 milliGRAM(s) Oral before breakfast  polyethylene glycol 3350 17 Gram(s) Oral daily  QUEtiapine 200 milliGRAM(s) Oral at bedtime  senna 2 Tablet(s) Oral at bedtime  thiamine 100 milliGRAM(s) Oral daily    PRN MEDICATIONS  aluminum hydroxide/magnesium hydroxide/simethicone Suspension 30 milliLiter(s) Oral every 4 hours PRN  melatonin 3 milliGRAM(s) Oral at bedtime PRN  naloxone Injectable 0.1 milliGRAM(s) IV Push every 3 minutes PRN  ondansetron Injectable 4 milliGRAM(s) IV Push every 6 hours PRN  scopolamine 1 mG/72 Hr(s) Patch 1 Patch Transdermal every 72 hours PRN    VITALS:   T(F): 98.7  HR: 78  BP: 140/65  RR: 18  SpO2: 96%    LABS:                          10.8   14.72 )-----------( 257      ( 31 Aug 2022 05:28 )             33.6     08-31    137  |  98  |  21<H>  ----------------------------<  156<H>  4.6   |  29  |  0.6<L>    Ca    8.5      31 Aug 2022 05:28  Mg     1.9     08-31    TPro  5.4<L>  /  Alb  3.0<L>  /  TBili  <0.2  /  DBili  x   /  AST  11  /  ALT  10  /  AlkPhos  118<H>  08-31      Culture - Tissue with Gram Stain (collected 29 Aug 2022 19:38)  Source: .Tissue None  Gram Stain (31 Aug 2022 15:06):    **Please Note**: This is a Corrected Report**    Rare polymorphonuclear leukocytes per low power field    No organisms seen per oil power field    Previously reported as:    Rare polymorphonuclear leukocytes seen per low power field    Moderate Gram NegativeRods seen per oil power field  Preliminary Report (31 Aug 2022 15:07):    No growth to date      RADIOLOGY:    PHYSICAL EXAM:  GEN: No acute distress  HEENT: normocephalic, atraumatic, aniceteric  LUNGS: Clear to auscultation bilaterally, no rales/wheezing/ rhonchi  HEART: S1/S2 present. RRR, no murmurs  ABD: Soft, non-tender, non-distended. Bowel sounds present  EXT: no edema   BACK: wound duction in place   NEURO: AAOX3, normal affect    ASSESSMENT AND PLAN:    51 yo M w/ pmhx of IVDU (heroin), vertebral osteomyelitis, and MSSA bacteremia presents for worsening back pain. Patient was found febrile in the ED with elevated WBC count, CT orbit showing Right periorbital cellulitis.     #Recurrence of vertebral OM   #Recent history & post treatment for MSSA bacteremia  #Right periorbital cellulitis  #Sepsis present on admission: leukocytosis, febrile  - CT Orbit w/ IV Cont 8/10: shows Right periorbital (pre-septal) soft tissue swelling consistent with cellulitis. No evidence of abscess. No evidence of post-septal inflammation.  -h/o IR Drainage of Abscess/Biopsy of suspected discitis done 3/16 -fluid  growing NICOLE  -, .8   - BCx negative since 8/12  -MR lumbar/t spine: 1. Interval progression of discitis osteomyelitis at L4-5 with increased   destruction of the endplate cortices and intervening disc. Associated   mild vertebral height losses of L4 on L5. 2. Significantly increased epidural phlegmon extending from the right   dorsal epidural space from L2-3 down to sacral level with maximal mass   effect at the right L4-5 epidural space resulting in   impingement/compression of the thecal sac and nerve roots. 3. Slightly increased erosion affecting bilateral L4-5   facet joints (left   more than right), and bilateral L5-S1 facet joints. Increased enhancements involving bilateral L4-L5 and L5-S1 neural foraminal spaces.2. Increased paravertebral phlegmon surrounding L4-5 with slightly   increased size of the left retroperitoneal abscess. Slightly smaller right psoas abscess.    - Ancef 2g iv q8h (8 weeks starting 8/11/2022); will need CT before stopping abx as per ID    -PAM showing no vegetations  - s/p Transforaminal lumbar interbody fusion (TLIF) with laminectomy and fusion of posterior column using instrumentation and iliac crest allograft on 8/29/2022, POD 2  - f/u OR cultures, prelim neg, f/u final report   - Class I RCRI , METS < 4 , 3.9% chance of 30 day risk of death, MI, or cardiac arrest. Patient is moderate risk for intermediate risk procedure   - pain management evaluation of acute post op pain appreciated , d/c PCA pump as per pain management   - IR for PICC line   - neurosx managing wound suction   - would not recommend adding valium to opioids at this time, patient managing pain well on current regimen       #Vomiting/abdominal pain- resolved  #Delirium- resolved  -possibly vomited from nafcillin?  -delirium possibly due to vomiting/not getting methadone, now back at baseline    #Transaminitis- resolved  -U/S equivocal  -trend LFT's     #Normocytic Anemia- improved  - Monitor H+H  -ferritin, b12, folate wnl    #Hx of IVD  #Opioid abuse on Methadone  - Continue methadone 135, and gabapentin    #Anxiety/Depression  - c/w mirtazapine + quetiapine   -c/w xanax     #Active nicotine dependence  - c/w nicotine patch  - c/w buproprion    #Morbid obesity  - diet and lifestyle modification   #Hypomagnesemia    -repleted    #Hyperkalemia- resolved    #Suspected folic acid deficiency  -continue supplement    # Hyperglycemia, suspected DM , HbA1c 01/2022 6.6 , FS AC QHS, recheck HbA1c , sliding scale if needed     #DVT PPx- heparin  #GI PPx- None  #Diet- DASH/TLC  #Activity- AAT  #Code- FULL    Family discussion: Plan of care discussed with patient, aware and agreeable   Disposition: [facility] when medically improved   Handoff: PICC line placement, pain management f/u

## 2022-09-01 PROCEDURE — 36573 INSJ PICC RS&I 5 YR+: CPT

## 2022-09-01 PROCEDURE — 99233 SBSQ HOSP IP/OBS HIGH 50: CPT

## 2022-09-01 RX ADMIN — Medication 4 MILLIGRAM(S): at 11:26

## 2022-09-01 RX ADMIN — Medication 4 MILLIGRAM(S): at 17:53

## 2022-09-01 RX ADMIN — LACTULOSE 10 GRAM(S): 10 SOLUTION ORAL at 05:37

## 2022-09-01 RX ADMIN — SENNA PLUS 2 TABLET(S): 8.6 TABLET ORAL at 22:02

## 2022-09-01 RX ADMIN — HEPARIN SODIUM 5000 UNIT(S): 5000 INJECTION INTRAVENOUS; SUBCUTANEOUS at 22:01

## 2022-09-01 RX ADMIN — Medication 100 MILLIGRAM(S): at 05:39

## 2022-09-01 RX ADMIN — Medication 2 MILLIGRAM(S): at 05:37

## 2022-09-01 RX ADMIN — METHADONE HYDROCHLORIDE 135 MILLIGRAM(S): 40 TABLET ORAL at 11:56

## 2022-09-01 RX ADMIN — CYCLOBENZAPRINE HYDROCHLORIDE 10 MILLIGRAM(S): 10 TABLET, FILM COATED ORAL at 14:48

## 2022-09-01 RX ADMIN — PANTOPRAZOLE SODIUM 40 MILLIGRAM(S): 20 TABLET, DELAYED RELEASE ORAL at 05:38

## 2022-09-01 RX ADMIN — CYCLOBENZAPRINE HYDROCHLORIDE 10 MILLIGRAM(S): 10 TABLET, FILM COATED ORAL at 22:02

## 2022-09-01 RX ADMIN — Medication 2 MILLIGRAM(S): at 14:48

## 2022-09-01 RX ADMIN — Medication 4 MILLIGRAM(S): at 23:53

## 2022-09-01 RX ADMIN — HEPARIN SODIUM 5000 UNIT(S): 5000 INJECTION INTRAVENOUS; SUBCUTANEOUS at 05:38

## 2022-09-01 RX ADMIN — Medication 1000 MILLIGRAM(S): at 06:00

## 2022-09-01 RX ADMIN — Medication 4 MILLIGRAM(S): at 05:38

## 2022-09-01 RX ADMIN — POLYETHYLENE GLYCOL 3350 17 GRAM(S): 17 POWDER, FOR SOLUTION ORAL at 17:53

## 2022-09-01 RX ADMIN — Medication 1 PATCH: at 20:00

## 2022-09-01 RX ADMIN — Medication 100 MILLIGRAM(S): at 14:49

## 2022-09-01 RX ADMIN — CYCLOBENZAPRINE HYDROCHLORIDE 10 MILLIGRAM(S): 10 TABLET, FILM COATED ORAL at 05:38

## 2022-09-01 RX ADMIN — Medication 100 MILLIGRAM(S): at 11:26

## 2022-09-01 RX ADMIN — Medication 2 MILLIGRAM(S): at 22:03

## 2022-09-01 RX ADMIN — LACTULOSE 10 GRAM(S): 10 SOLUTION ORAL at 22:01

## 2022-09-01 RX ADMIN — BUPROPION HYDROCHLORIDE 300 MILLIGRAM(S): 150 TABLET, EXTENDED RELEASE ORAL at 11:26

## 2022-09-01 RX ADMIN — Medication 1000 MILLIGRAM(S): at 22:01

## 2022-09-01 RX ADMIN — Medication 1000 MILLIGRAM(S): at 05:38

## 2022-09-01 RX ADMIN — HEPARIN SODIUM 5000 UNIT(S): 5000 INJECTION INTRAVENOUS; SUBCUTANEOUS at 14:51

## 2022-09-01 RX ADMIN — Medication 1 TABLET(S): at 11:26

## 2022-09-01 RX ADMIN — Medication 1 PATCH: at 05:39

## 2022-09-01 RX ADMIN — Medication 1 PATCH: at 13:20

## 2022-09-01 RX ADMIN — QUETIAPINE FUMARATE 200 MILLIGRAM(S): 200 TABLET, FILM COATED ORAL at 22:02

## 2022-09-01 RX ADMIN — Medication 1000 MILLIGRAM(S): at 15:18

## 2022-09-01 RX ADMIN — Medication 100 MILLIGRAM(S): at 22:03

## 2022-09-01 RX ADMIN — MIRTAZAPINE 30 MILLIGRAM(S): 45 TABLET, ORALLY DISINTEGRATING ORAL at 22:02

## 2022-09-01 RX ADMIN — Medication 1 MILLIGRAM(S): at 11:26

## 2022-09-01 RX ADMIN — Medication 1 PATCH: at 11:25

## 2022-09-01 RX ADMIN — Medication 1000 MILLIGRAM(S): at 14:48

## 2022-09-01 NOTE — PROGRESS NOTE ADULT - SUBJECTIVE AND OBJECTIVE BOX
Neurosurgery PA Note    Pt seen and examined. c/o back pain radiating into LLE with associated numbness and tingling, unchanged.     Vital Signs Last 24 Hrs  T(C): 36.3 (01 Sep 2022 05:03), Max: 37.1 (31 Aug 2022 13:43)  T(F): 97.3 (01 Sep 2022 05:03), Max: 98.7 (31 Aug 2022 13:43)  HR: 74 (01 Sep 2022 05:03) (74 - 92)  BP: 136/74 (01 Sep 2022 05:03) (132/86 - 140/65)  BP(mean): --  RR: 18 (01 Sep 2022 05:03) (18 - 18)  SpO2: 94% (01 Sep 2022 05:03) (94% - 96%)    Parameters below as of 01 Sep 2022 05:03  Patient On (Oxygen Delivery Method): room air      PHYSICAL EXAM:  Gen: A&Ox3. NAD  Lumbar incision with sutures C/D/I, no erythema, no induration, no fluctuance, no drainage. HMV intact to suction with minimal serosanginous output, output 5cc/12hr. HMV taken off suction, suture cut and HMV removed without difficulty or resistance. steri strips applied to insertion site and dressing applied  Neuro: RLE HF/KF/KE/DF/PF 5/5. LLE strength limited to pain but with encouraged patient is antigravity and  HF/KF/KE/DF/PF 4/5 . sensation intact.    **Please Note**: This is a Corrected Report**  Rare polymorphonuclear leukocytes per low power field  No organisms seen per oil power field  Previously reported as:  Rare polymorphonuclear leukocytes seen per low power field  Moderate Gram Negative Rods seen per oil power field    ap: 52M hx of GEETA with progression of discitis and osteo at L4-5 now POD # 3 s/p L4-L5 TLIF with pedicle screws     1. prn pain control   2. IV abx per ID for osteo  3. OOB and ambulation  4. PT  5. primary care per primary team    above D/w Dr. Deleon  Neurosurgery PA Note    Pt seen and examined. c/o back pain radiating into LLE with associated numbness and tingling, unchanged.     Vital Signs Last 24 Hrs  T(C): 36.3 (01 Sep 2022 05:03), Max: 37.1 (31 Aug 2022 13:43)  T(F): 97.3 (01 Sep 2022 05:03), Max: 98.7 (31 Aug 2022 13:43)  HR: 74 (01 Sep 2022 05:03) (74 - 92)  BP: 136/74 (01 Sep 2022 05:03) (132/86 - 140/65)  BP(mean): --  RR: 18 (01 Sep 2022 05:03) (18 - 18)  SpO2: 94% (01 Sep 2022 05:03) (94% - 96%)    Parameters below as of 01 Sep 2022 05:03  Patient On (Oxygen Delivery Method): room air      PHYSICAL EXAM:  Gen: A&Ox3. NAD  Lumbar incision with sutures C/D/I, no erythema, no induration, no fluctuance, no drainage. HMV intact to suction with minimal serosanginous output, output 5cc/12hr. HMV taken off suction, suture cut and HMV removed without difficulty or resistance. steri strips applied to insertion site and dressing applied  Neuro: RLE HF/KF/KE/DF/PF 5/5. LLE strength limited to pain but with encouraged patient is antigravity and  HF/KF/KE/DF/PF 4/5 . sensation intact.    **Please Note**: This is a Corrected Report**  Rare polymorphonuclear leukocytes per low power field  No organisms seen per oil power field  Previously reported as:  Rare polymorphonuclear leukocytes seen per low power field  Moderate Gram Negative Rods seen per oil power field    ap: 52M hx of GEETA with progression of discitis and osteo at L4-5 now POD # 3 s/p L4-L5 TLIF with pedicle screws     1. prn pain control   2. IV abx per ID for osteo  3. OOB and ambulation  4. PT  5. No further neurosurgical intervention, reconsult prn. Patient should follow up outpatient  6. primary care per primary team    above D/w Dr. Deleon  Neurosurgery PA Note    Pt seen and examined. c/o back pain radiating into LLE with associated numbness and tingling, unchanged.     Vital Signs Last 24 Hrs  T(C): 36.3 (01 Sep 2022 05:03), Max: 37.1 (31 Aug 2022 13:43)  T(F): 97.3 (01 Sep 2022 05:03), Max: 98.7 (31 Aug 2022 13:43)  HR: 74 (01 Sep 2022 05:03) (74 - 92)  BP: 136/74 (01 Sep 2022 05:03) (132/86 - 140/65)  BP(mean): --  RR: 18 (01 Sep 2022 05:03) (18 - 18)  SpO2: 94% (01 Sep 2022 05:03) (94% - 96%)    Parameters below as of 01 Sep 2022 05:03  Patient On (Oxygen Delivery Method): room air      PHYSICAL EXAM:  Gen: A&Ox3. NAD  Lumbar incision with sutures C/D/I, no erythema, no induration, no fluctuance, no drainage. HMV intact to suction with minimal serosanginous output, output 5cc/12hr. HMV taken off suction, suture cut and HMV removed without difficulty or resistance. steri strips applied to insertion site and dressing applied  Neuro: RLE HF/KF/KE/DF/PF 5/5. LLE strength limited to pain but with encouraged patient is antigravity and  HF/KF/KE/DF/PF 4/5 . sensation intact.    **Please Note**: This is a Corrected Report**  Rare polymorphonuclear leukocytes per low power field  No organisms seen per oil power field  Previously reported as:  Rare polymorphonuclear leukocytes seen per low power field  Moderate Gram Negative Rods seen per oil power field    ap: 52M hx of GEETA with progression of discitis and osteo at L4-5 now POD # 3 s/p L4-L5 TLIF with pedicle screws     1. prn pain control   2. IV abx per ID for osteo  3. OOB and ambulation  4. PT  5. No further neurosurgical intervention, reconsult prn. Patient should follow up outpatient with Dr. Abbott in two weeks from surgery for suture removal.[phone]  6. primary care per primary team    above D/w Dr. Deleon

## 2022-09-01 NOTE — PROVIDER CONTACT NOTE (OTHER) - RECOMMENDATIONS
change diet to NPO except for meds
none
can you please reinsert and new IV via ultrasound?
pt seems to be withdrawing. zofran was given prn as prescribed.
Will f/u with GI regarding PO status

## 2022-09-01 NOTE — PROGRESS NOTE ADULT - ATTENDING COMMENTS
Patient seen at bedside.  Discussed with medical team that includes resident(s), medical student(s), nursing staff, case management.    53 yo M w/ pmhx of IVDU (heroin), vertebral osteomyelitis, and MSSA bacteremia presents for worsening back pain. Patient was found febrile in the ED with elevated WBC count, CT orbit showing Right periorbital cellulitis.     Patient seen at bedside. doing better. on IV antibiotics. pending picc line. needs 8 weeks of antibiotics. s/p Transforaminal lumbar interbody fusion (TLIF) with laminectomy and fusion of posterior column using instrumentation and iliac crest allograft on 8/29/2022, POD 2  - f/u OR cultures, prelim neg, f/u final report . need clearance from neurosurgeyr before discharge. as per neursurgery No further neurosurgical intervention, reconsult prn. Patient should follow up outpatient with Dr. Abbott in two weeks from surgery for suture removal.[phone]. need clarification regarding steroids. will go to rehab Patient seen at bedside.  Discussed with medical team that includes resident(s), medical student(s), nursing staff, case management.    53 yo M w/ pmhx of IVDU (heroin), vertebral osteomyelitis, and MSSA bacteremia presents for worsening back pain. Patient was found febrile in the ED with elevated WBC count, CT orbit showing Right periorbital cellulitis.     Patient seen at bedside. doing better. on IV antibiotics. pending picc line. needs 8 weeks of antibiotics. s/p Transforaminal lumbar interbody fusion (TLIF) with laminectomy and fusion of posterior column using instrumentation and iliac crest allograft on 8/29/2022, POD 2  - f/u OR cultures, prelim neg, f/u final report . need clearance from neurosurgeyr before discharge. as per neursurgery No further neurosurgical intervention, reconsult prn. Patient should follow up outpatient with Dr. Abbott in two weeks from surgery for suture removal.[phone]. need clarification regarding steroids. will go to rehab. also need to clarift regarding methadone dosing in rehab and if they can provide. will follow up with CM tomorrow AM.

## 2022-09-01 NOTE — PROGRESS NOTE ADULT - SUBJECTIVE AND OBJECTIVE BOX
MIKAEL MCDERMOTT 52y Male  MRN#: [id number]   Hospital Day: 22d    HPI:  53 yo M w/ pmhx of IVDU (heroin), vertebral osteomyelitis, and MSSA bacteremia presents for worsening back pain. Back pain was increasing over the past couple of weeks and worsening today to the point he was having difficulty ambulating and bearing weight. Pain is sharp, radiating from low back to b/l hips and worse with movement. Patient normally able to ambulate with walker but now having difficulty walking due to pain. He endorses feeling similar to the pain from prior admission. Pt is also complaining of right sided eye swelling/redness since Tuesday. He states, he slept on the floor and woke up in the am with redness/swelling. Patient denies any visual changes or eye pain. Patient denies any fever, chills, headache, dizziness. Patient denies chest pain, palpitation, SOB. Patient denies abdominal pain, n/v/d/c.   Patient was recently admitted 03/12-04/06 for vertebral OM/facetitis. PAM was negative and ID recommended cefazolin. Patient had picc line placed for 6wks of antibiotics. Patient states he finished his antibiotics at [facility] but did not follow up with Dr Giraldo. Patient states he is currently not using heroin. He last used 10 months ago.   Patient was found febrile in the ED with elevated WBC count, CT orbit showing Right periorbital cellulitis.     ED vitals T(F): 99 (08-10-22 @ 16:02), Max: 100.5 (08-10-22 @ 13:08), HR: 96 (08-10-22 @ 16:02) (96 - 99), BP: 130/61 (08-10-22 @ 16:02) (130/61 - 144/88), RR: 18 (08-10-22 @ 16:02) (18 - 18), SpO2: 95% (08-10-22 @ 16:02) (95% - 99%)    Labs show HB 10.8, HCT 32.7, WBC 15.24, , Lactate 1.0, TPro  6.6  /  Alb  3.4<L>  /  TBili  0.7  /  DBili  x   /  AST  73<H>  /  ALT  46<H>  /  AlkPhos  97  08-10    137  |  96<L>  |  10  ----------------------------<  149<H>  4.1   |  31  |  0.9    Ca    9.1      10 Aug 2022 15:12  Mg     1.7     08-10    CT Orbit w/ IV Cont shows Right periorbital (pre-septal) soft tissue swelling consistent with cellulitis. No evidence of abscess. No evidence of post-septal inflammation.     (10 Aug 2022 20:20)      SUBJECTIVE  Patient is a 52y old Male who presents with a chief complaint of Back pain (31 Aug 2022 17:06)  Currently admitted to medicine with the primary diagnosis of Fever        INTERVAL HPI AND OVERNIGHT EVENTS:  Patient was examined and seen at bedside. This morning he is resting comfortably in bed and reports no issues or overnight events.    OBJECTIVE  PAST MEDICAL & SURGICAL HISTORY  IV drug abuse    Vertebral osteomyelitis    MSSA bacteremia    No significant past surgical history      ALLERGIES:  No Known Allergies    MEDICATIONS:  STANDING MEDICATIONS  acetaminophen     Tablet .. 1000 milliGRAM(s) Oral every 8 hours  ALPRAZolam 2 milliGRAM(s) Oral three times a day  buPROPion XL (24-Hour) . 300 milliGRAM(s) Oral daily  ceFAZolin   IVPB 2000 milliGRAM(s) IV Intermittent every 8 hours  cyclobenzaprine 10 milliGRAM(s) Oral three times a day  dexAMETHasone     Tablet 4 milliGRAM(s) Oral every 6 hours  folic acid 1 milliGRAM(s) Oral daily  heparin   Injectable 5000 Unit(s) SubCutaneous every 8 hours  lactulose Syrup 10 Gram(s) Oral two times a day  methadone    Tablet 135 milliGRAM(s) Oral daily  mirtazapine 30 milliGRAM(s) Oral at bedtime  multivitamin 1 Tablet(s) Oral daily  nicotine - 21 mG/24Hr(s) Patch 1 Patch Transdermal daily  pantoprazole    Tablet 40 milliGRAM(s) Oral before breakfast  polyethylene glycol 3350 17 Gram(s) Oral daily  QUEtiapine 200 milliGRAM(s) Oral at bedtime  senna 2 Tablet(s) Oral at bedtime  thiamine 100 milliGRAM(s) Oral daily    PRN MEDICATIONS  aluminum hydroxide/magnesium hydroxide/simethicone Suspension 30 milliLiter(s) Oral every 4 hours PRN  melatonin 3 milliGRAM(s) Oral at bedtime PRN  naloxone Injectable 0.1 milliGRAM(s) IV Push every 3 minutes PRN  ondansetron Injectable 4 milliGRAM(s) IV Push every 6 hours PRN  scopolamine 1 mG/72 Hr(s) Patch 1 Patch Transdermal every 72 hours PRN      VITAL SIGNS: Last 24 Hours  T(C): 36.3 (01 Sep 2022 05:03), Max: 37.1 (31 Aug 2022 13:43)  T(F): 97.3 (01 Sep 2022 05:03), Max: 98.7 (31 Aug 2022 13:43)  HR: 74 (01 Sep 2022 05:03) (74 - 92)  BP: 136/74 (01 Sep 2022 05:03) (132/86 - 140/65)  BP(mean): --  RR: 18 (01 Sep 2022 05:03) (18 - 18)  SpO2: 94% (01 Sep 2022 05:03) (94% - 96%)    LABS:                        10.8   14.72 )-----------( 257      ( 31 Aug 2022 05:28 )             33.6     08-31    137  |  98  |  21<H>  ----------------------------<  156<H>  4.6   |  29  |  0.6<L>    Ca    8.5      31 Aug 2022 05:28  Mg     1.9     08-31    TPro  5.4<L>  /  Alb  3.0<L>  /  TBili  <0.2  /  DBili  x   /  AST  11  /  ALT  10  /  AlkPhos  118<H>  08-31              Culture - Tissue with Gram Stain (collected 29 Aug 2022 19:38)  Source: .Tissue None  Gram Stain (31 Aug 2022 15:06):    **Please Note**: This is a Corrected Report**    Rare polymorphonuclear leukocytes per low power field    No organisms seen per oil power field    Previously reported as:    Rare polymorphonuclear leukocytes seen per low power field    Moderate Gram NegativeRods seen per oil power field  Preliminary Report (31 Aug 2022 15:07):    No growth to date          RADIOLOGY:  reviewed    PHYSICAL EXAM:  GEN: No acute distress  HEENT: normocephalic, atraumatic, aniceteric  LUNGS: Clear to auscultation bilaterally, no rales/wheezing/ rhonchi  HEART: S1/S2 present. RRR, no murmurs  ABD: Soft, non-tender, non-distended. Bowel sounds present  EXT: no edema   BACK: wound duction in place   NEURO: AAOX3, normal affect      ASSESSMENT & PLAN  53 yo M w/ pmhx of IVDU (heroin), vertebral osteomyelitis, and MSSA bacteremia presents for worsening back pain. Patient was found febrile in the ED with elevated WBC count, CT orbit showing Right periorbital cellulitis.     #Recurrence of vertebral OM   #Recent history & post treatment for MSSA bacteremia  #Right periorbital cellulitis  #Sepsis present on admission: leukocytosis, febrile  - CT Orbit w/ IV Cont 8/10: shows Right periorbital (pre-septal) soft tissue swelling consistent with cellulitis. No evidence of abscess. No evidence of post-septal inflammation.  - h/o IR Drainage of Abscess/Biopsy of suspected discitis done 3/16 -fluid  growing NICOLE  - , .8   - BCx negative since 8/12  - MR lumbar/t spine: 1. Interval progression of discitis osteomyelitis at L4-5 with increased destruction of the endplate cortices and intervening disc. Associated mild vertebral height losses of L4 on L5. 2. Significantly increased epidural phlegmon extending from the right dorsal epidural space from L2-3 down to sacral level with maximal mass effect at the right L4-5 epidural space resulting in impingement/compression of the thecal sac and nerve roots. 3. Slightly increased erosion affecting bilateral L4-5 facet joints (left more than right), and bilateral L5-S1 facet joints. Increased enhancements involving bilateral L4-L5 and L5-S1 neural foraminal spaces.2. Increased paravertebral phlegmon surrounding L4-5 with slightly increased size of the left retroperitoneal abscess. Slightly smaller right psoas abscess.  - Ancef 2g iv q8h (8 weeks starting 8/11/2022); will need CT before stopping abx as per ID   - PAM showing no vegetations  - s/p Transforaminal lumbar interbody fusion (TLIF) with laminectomy and fusion of posterior column using instrumentation and iliac crest allograft on 8/29/2022, POD 2  - f/u OR cultures, prelim neg, f/u final report   - Class I RCRI , METS < 4 , 3.9% chance of 30 day risk of death, MI, or cardiac arrest. Patient is moderate risk for intermediate risk procedure   - pain management evaluation of acute post op pain appreciated , d/c PCA pump as per pain management   - IR for PICC line   - neurosx managing wound suction   - would not recommend adding valium to opioids at this time, patient managing pain well on current regimen     #Vomiting/abdominal pain- resolved  #Delirium- resolved  - possibly vomited from nafcillin?  - delirium possibly due to vomiting/not getting methadone, now back at baseline    #Transaminitis- resolved  - U/S equivocal  - trend LFT's     #Normocytic Anemia- improved  - Monitor H+H  - ferritin, b12, folate wnl    #Hx of IVD  #Opioid abuse on Methadone  - Continue methadone 135, and gabapentin    #Anxiety/Depression  - c/w mirtazapine + quetiapine   - c/w xanax     #Active nicotine dependence  - c/w nicotine patch  - c/w buproprion    #Morbid obesity  - diet and lifestyle modification     #Suspected folic acid deficiency  -continue supplement    # Hyperglycemia, suspected DM , HbA1c 01/2022 6.6 , FS AC QHS, recheck HbA1c , sliding scale if needed     #DVT PPx- heparin  #GI PPx- None  #Diet- DASH/TLC  #Activity- AAT  #Code- FULL    Family discussion: Plan of care discussed with patient, aware and agreeable   Disposition: [facility] when medically improved   Handoff: PICC line placement, pain management f/u

## 2022-09-02 ENCOUNTER — TRANSCRIPTION ENCOUNTER (OUTPATIENT)
Age: 52
End: 2022-09-02

## 2022-09-02 LAB
ANION GAP SERPL CALC-SCNC: 18 MMOL/L — HIGH (ref 7–14)
BUN SERPL-MCNC: 24 MG/DL — HIGH (ref 10–20)
CALCIUM SERPL-MCNC: 8.9 MG/DL — SIGNIFICANT CHANGE UP (ref 8.5–10.1)
CHLORIDE SERPL-SCNC: 94 MMOL/L — LOW (ref 98–110)
CO2 SERPL-SCNC: 26 MMOL/L — SIGNIFICANT CHANGE UP (ref 17–32)
CREAT SERPL-MCNC: 0.6 MG/DL — LOW (ref 0.7–1.5)
EGFR: 115 ML/MIN/1.73M2 — SIGNIFICANT CHANGE UP
GLUCOSE SERPL-MCNC: 200 MG/DL — HIGH (ref 70–99)
HCT VFR BLD CALC: 36.3 % — LOW (ref 42–52)
HCT VFR BLD CALC: 39.5 % — LOW (ref 42–52)
HGB BLD-MCNC: 11.7 G/DL — LOW (ref 14–18)
HGB BLD-MCNC: 12.8 G/DL — LOW (ref 14–18)
MAGNESIUM SERPL-MCNC: 2 MG/DL — SIGNIFICANT CHANGE UP (ref 1.8–2.4)
MCHC RBC-ENTMCNC: 29.4 PG — SIGNIFICANT CHANGE UP (ref 27–31)
MCHC RBC-ENTMCNC: 29.5 PG — SIGNIFICANT CHANGE UP (ref 27–31)
MCHC RBC-ENTMCNC: 32.2 G/DL — SIGNIFICANT CHANGE UP (ref 32–37)
MCHC RBC-ENTMCNC: 32.4 G/DL — SIGNIFICANT CHANGE UP (ref 32–37)
MCV RBC AUTO: 90.6 FL — SIGNIFICANT CHANGE UP (ref 80–94)
MCV RBC AUTO: 91.4 FL — SIGNIFICANT CHANGE UP (ref 80–94)
NRBC # BLD: 0 /100 WBCS — SIGNIFICANT CHANGE UP (ref 0–0)
NRBC # BLD: 0 /100 WBCS — SIGNIFICANT CHANGE UP (ref 0–0)
PLATELET # BLD AUTO: 206 K/UL — SIGNIFICANT CHANGE UP (ref 130–400)
PLATELET # BLD AUTO: 266 K/UL — SIGNIFICANT CHANGE UP (ref 130–400)
POTASSIUM SERPL-MCNC: 4.6 MMOL/L — SIGNIFICANT CHANGE UP (ref 3.5–5)
POTASSIUM SERPL-SCNC: 4.6 MMOL/L — SIGNIFICANT CHANGE UP (ref 3.5–5)
RBC # BLD: 3.97 M/UL — LOW (ref 4.7–6.1)
RBC # BLD: 4.36 M/UL — LOW (ref 4.7–6.1)
RBC # FLD: 17.2 % — HIGH (ref 11.5–14.5)
RBC # FLD: 17.6 % — HIGH (ref 11.5–14.5)
SODIUM SERPL-SCNC: 138 MMOL/L — SIGNIFICANT CHANGE UP (ref 135–146)
WBC # BLD: 13.28 K/UL — HIGH (ref 4.8–10.8)
WBC # BLD: 14.69 K/UL — HIGH (ref 4.8–10.8)
WBC # FLD AUTO: 13.28 K/UL — HIGH (ref 4.8–10.8)
WBC # FLD AUTO: 14.69 K/UL — HIGH (ref 4.8–10.8)

## 2022-09-02 PROCEDURE — 99232 SBSQ HOSP IP/OBS MODERATE 35: CPT

## 2022-09-02 RX ORDER — ALPRAZOLAM 0.25 MG
1 TABLET ORAL
Qty: 0 | Refills: 0 | DISCHARGE
Start: 2022-09-02

## 2022-09-02 RX ORDER — QUETIAPINE FUMARATE 200 MG/1
1 TABLET, FILM COATED ORAL
Qty: 0 | Refills: 0 | DISCHARGE
Start: 2022-09-02

## 2022-09-02 RX ORDER — MIRTAZAPINE 45 MG/1
2 TABLET, ORALLY DISINTEGRATING ORAL
Qty: 0 | Refills: 0 | DISCHARGE

## 2022-09-02 RX ORDER — THIAMINE MONONITRATE (VIT B1) 100 MG
1 TABLET ORAL
Qty: 30 | Refills: 0
Start: 2022-09-02 | End: 2022-10-01

## 2022-09-02 RX ORDER — QUETIAPINE FUMARATE 200 MG/1
0 TABLET, FILM COATED ORAL
Qty: 0 | Refills: 0 | DISCHARGE

## 2022-09-02 RX ORDER — CYCLOBENZAPRINE HYDROCHLORIDE 10 MG/1
1 TABLET, FILM COATED ORAL
Qty: 42 | Refills: 0
Start: 2022-09-02 | End: 2022-09-15

## 2022-09-02 RX ORDER — PANTOPRAZOLE SODIUM 20 MG/1
1 TABLET, DELAYED RELEASE ORAL
Qty: 14 | Refills: 0
Start: 2022-09-02 | End: 2022-09-15

## 2022-09-02 RX ORDER — BUPROPION HYDROCHLORIDE 150 MG/1
1 TABLET, EXTENDED RELEASE ORAL
Qty: 0 | Refills: 0 | DISCHARGE
Start: 2022-09-02

## 2022-09-02 RX ORDER — BUPROPION HYDROCHLORIDE 150 MG/1
300 TABLET, EXTENDED RELEASE ORAL
Qty: 0 | Refills: 0 | DISCHARGE

## 2022-09-02 RX ORDER — MIRTAZAPINE 45 MG/1
1 TABLET, ORALLY DISINTEGRATING ORAL
Qty: 0 | Refills: 0 | DISCHARGE
Start: 2022-09-02

## 2022-09-02 RX ADMIN — Medication 1 PATCH: at 17:35

## 2022-09-02 RX ADMIN — HEPARIN SODIUM 5000 UNIT(S): 5000 INJECTION INTRAVENOUS; SUBCUTANEOUS at 13:32

## 2022-09-02 RX ADMIN — MIRTAZAPINE 30 MILLIGRAM(S): 45 TABLET, ORALLY DISINTEGRATING ORAL at 21:12

## 2022-09-02 RX ADMIN — Medication 1000 MILLIGRAM(S): at 13:34

## 2022-09-02 RX ADMIN — Medication 2 MILLIGRAM(S): at 05:04

## 2022-09-02 RX ADMIN — CYCLOBENZAPRINE HYDROCHLORIDE 10 MILLIGRAM(S): 10 TABLET, FILM COATED ORAL at 13:32

## 2022-09-02 RX ADMIN — Medication 100 MILLIGRAM(S): at 13:34

## 2022-09-02 RX ADMIN — CYCLOBENZAPRINE HYDROCHLORIDE 10 MILLIGRAM(S): 10 TABLET, FILM COATED ORAL at 05:05

## 2022-09-02 RX ADMIN — Medication 2 MILLIGRAM(S): at 21:11

## 2022-09-02 RX ADMIN — Medication 2 MILLIGRAM(S): at 13:32

## 2022-09-02 RX ADMIN — Medication 1000 MILLIGRAM(S): at 21:11

## 2022-09-02 RX ADMIN — Medication 4 MILLIGRAM(S): at 12:29

## 2022-09-02 RX ADMIN — METHADONE HYDROCHLORIDE 135 MILLIGRAM(S): 40 TABLET ORAL at 12:28

## 2022-09-02 RX ADMIN — SENNA PLUS 2 TABLET(S): 8.6 TABLET ORAL at 21:12

## 2022-09-02 RX ADMIN — Medication 1 PATCH: at 12:30

## 2022-09-02 RX ADMIN — Medication 100 MILLIGRAM(S): at 21:14

## 2022-09-02 RX ADMIN — HEPARIN SODIUM 5000 UNIT(S): 5000 INJECTION INTRAVENOUS; SUBCUTANEOUS at 05:05

## 2022-09-02 RX ADMIN — Medication 1 TABLET(S): at 12:29

## 2022-09-02 RX ADMIN — Medication 1 PATCH: at 12:29

## 2022-09-02 RX ADMIN — Medication 4 MILLIGRAM(S): at 17:35

## 2022-09-02 RX ADMIN — Medication 1000 MILLIGRAM(S): at 22:00

## 2022-09-02 RX ADMIN — QUETIAPINE FUMARATE 200 MILLIGRAM(S): 200 TABLET, FILM COATED ORAL at 21:12

## 2022-09-02 RX ADMIN — Medication 100 MILLIGRAM(S): at 05:04

## 2022-09-02 RX ADMIN — HEPARIN SODIUM 5000 UNIT(S): 5000 INJECTION INTRAVENOUS; SUBCUTANEOUS at 21:14

## 2022-09-02 RX ADMIN — CYCLOBENZAPRINE HYDROCHLORIDE 10 MILLIGRAM(S): 10 TABLET, FILM COATED ORAL at 21:12

## 2022-09-02 RX ADMIN — Medication 1000 MILLIGRAM(S): at 05:05

## 2022-09-02 RX ADMIN — Medication 4 MILLIGRAM(S): at 05:05

## 2022-09-02 RX ADMIN — BUPROPION HYDROCHLORIDE 300 MILLIGRAM(S): 150 TABLET, EXTENDED RELEASE ORAL at 12:29

## 2022-09-02 RX ADMIN — PANTOPRAZOLE SODIUM 40 MILLIGRAM(S): 20 TABLET, DELAYED RELEASE ORAL at 05:05

## 2022-09-02 RX ADMIN — Medication 1 PATCH: at 07:55

## 2022-09-02 RX ADMIN — Medication 100 MILLIGRAM(S): at 12:29

## 2022-09-02 RX ADMIN — LACTULOSE 10 GRAM(S): 10 SOLUTION ORAL at 05:05

## 2022-09-02 RX ADMIN — Medication 1 MILLIGRAM(S): at 12:28

## 2022-09-02 NOTE — PROGRESS NOTE ADULT - SUBJECTIVE AND OBJECTIVE BOX
MIKAEL MCDERMOTT 52y Male  MRN#: [id number]   Hospital Day: 23d    HPI:  53 yo M w/ pmhx of IVDU (heroin), vertebral osteomyelitis, and MSSA bacteremia presents for worsening back pain. Back pain was increasing over the past couple of weeks and worsening today to the point he was having difficulty ambulating and bearing weight. Pain is sharp, radiating from low back to b/l hips and worse with movement. Patient normally able to ambulate with walker but now having difficulty walking due to pain. He endorses feeling similar to the pain from prior admission. Pt is also complaining of right sided eye swelling/redness since Tuesday. He states, he slept on the floor and woke up in the am with redness/swelling. Patient denies any visual changes or eye pain. Patient denies any fever, chills, headache, dizziness. Patient denies chest pain, palpitation, SOB. Patient denies abdominal pain, n/v/d/c.   Patient was recently admitted 03/12-04/06 for vertebral OM/facetitis. PAM was negative and ID recommended cefazolin. Patient had picc line placed for 6wks of antibiotics. Patient states he finished his antibiotics at [facility] but did not follow up with Dr Giraldo. Patient states he is currently not using heroin. He last used 10 months ago.   Patient was found febrile in the ED with elevated WBC count, CT orbit showing Right periorbital cellulitis.     ED vitals T(F): 99 (08-10-22 @ 16:02), Max: 100.5 (08-10-22 @ 13:08), HR: 96 (08-10-22 @ 16:02) (96 - 99), BP: 130/61 (08-10-22 @ 16:02) (130/61 - 144/88), RR: 18 (08-10-22 @ 16:02) (18 - 18), SpO2: 95% (08-10-22 @ 16:02) (95% - 99%)    Labs show HB 10.8, HCT 32.7, WBC 15.24, , Lactate 1.0, TPro  6.6  /  Alb  3.4<L>  /  TBili  0.7  /  DBili  x   /  AST  73<H>  /  ALT  46<H>  /  AlkPhos  97  08-10    137  |  96<L>  |  10  ----------------------------<  149<H>  4.1   |  31  |  0.9    Ca    9.1      10 Aug 2022 15:12  Mg     1.7     08-10    CT Orbit w/ IV Cont shows Right periorbital (pre-septal) soft tissue swelling consistent with cellulitis. No evidence of abscess. No evidence of post-septal inflammation.     (10 Aug 2022 20:20)      SUBJECTIVE  Patient is a 52y old Male who presents with a chief complaint of Back pain (01 Sep 2022 10:41)  Currently admitted to medicine with the primary diagnosis of Fever        INTERVAL HPI AND OVERNIGHT EVENTS:  Patient was examined and seen at bedside. This morning he is resting comfortably in bed and reports no issues or overnight events.    OBJECTIVE  PAST MEDICAL & SURGICAL HISTORY  IV drug abuse    Vertebral osteomyelitis    MSSA bacteremia    No significant past surgical history      ALLERGIES:  No Known Allergies    MEDICATIONS:  STANDING MEDICATIONS  acetaminophen     Tablet .. 1000 milliGRAM(s) Oral every 8 hours  ALPRAZolam 2 milliGRAM(s) Oral three times a day  buPROPion XL (24-Hour) . 300 milliGRAM(s) Oral daily  ceFAZolin   IVPB 2000 milliGRAM(s) IV Intermittent every 8 hours  cyclobenzaprine 10 milliGRAM(s) Oral three times a day  dexAMETHasone     Tablet 4 milliGRAM(s) Oral every 6 hours  folic acid 1 milliGRAM(s) Oral daily  heparin   Injectable 5000 Unit(s) SubCutaneous every 8 hours  lactulose Syrup 10 Gram(s) Oral two times a day  methadone    Tablet 135 milliGRAM(s) Oral daily  mirtazapine 30 milliGRAM(s) Oral at bedtime  multivitamin 1 Tablet(s) Oral daily  nicotine - 21 mG/24Hr(s) Patch 1 Patch Transdermal daily  pantoprazole    Tablet 40 milliGRAM(s) Oral before breakfast  polyethylene glycol 3350 17 Gram(s) Oral daily  QUEtiapine 200 milliGRAM(s) Oral at bedtime  senna 2 Tablet(s) Oral at bedtime  thiamine 100 milliGRAM(s) Oral daily    PRN MEDICATIONS  aluminum hydroxide/magnesium hydroxide/simethicone Suspension 30 milliLiter(s) Oral every 4 hours PRN  melatonin 3 milliGRAM(s) Oral at bedtime PRN  naloxone Injectable 0.1 milliGRAM(s) IV Push every 3 minutes PRN  ondansetron Injectable 4 milliGRAM(s) IV Push every 6 hours PRN  scopolamine 1 mG/72 Hr(s) Patch 1 Patch Transdermal every 72 hours PRN      VITAL SIGNS: Last 24 Hours  T(C): 36.1 (02 Sep 2022 05:15), Max: 37.1 (02 Sep 2022 00:31)  T(F): 97 (02 Sep 2022 05:15), Max: 98.8 (02 Sep 2022 00:31)  HR: 89 (02 Sep 2022 05:15) (74 - 93)  BP: 124/72 (02 Sep 2022 05:15) (123/80 - 138/83)  BP(mean): --  RR: 18 (02 Sep 2022 05:15) (18 - 18)  SpO2: 95% (02 Sep 2022 05:15) (95% - 98%)    LABS:                        11.7   13.28 )-----------( 206      ( 02 Sep 2022 07:13 )             36.3                         RADIOLOGY:  reviewed    PHYSICAL EXAM:  GEN: No acute distress  HEENT: normocephalic, atraumatic, aniceteric  LUNGS: Clear to auscultation bilaterally, no rales/wheezing/ rhonchi  HEART: S1/S2 present. RRR, no murmurs  ABD: Soft, non-tender, non-distended. Bowel sounds present  EXT: no edema   BACK: wound duction in place   NEURO: AAOX3, normal affect      ASSESSMENT & PLAN  53 yo M w/ pmhx of IVDU (heroin), vertebral osteomyelitis, and MSSA bacteremia presents for worsening back pain. Patient was found febrile in the ED with elevated WBC count, CT orbit showing Right periorbital cellulitis.     #Recurrence of vertebral OM   #Recent history & post treatment for MSSA bacteremia  #Right periorbital cellulitis  #Sepsis present on admission: leukocytosis, febrile  - CT Orbit w/ IV Cont 8/10: shows Right periorbital (pre-septal) soft tissue swelling consistent with cellulitis. No evidence of abscess. No evidence of post-septal inflammation.  - h/o IR Drainage of Abscess/Biopsy of suspected discitis done 3/16 -fluid  growing NICOLE  - , .8   - BCx negative since 8/12  - MR lumbar/t spine: 1. Interval progression of discitis osteomyelitis at L4-5 with increased destruction of the endplate cortices and intervening disc. Associated mild vertebral height losses of L4 on L5. 2. Significantly increased epidural phlegmon extending from the right dorsal epidural space from L2-3 down to sacral level with maximal mass effect at the right L4-5 epidural space resulting in impingement/compression of the thecal sac and nerve roots. 3. Slightly increased erosion affecting bilateral L4-5 facet joints (left more than right), and bilateral L5-S1 facet joints. Increased enhancements involving bilateral L4-L5 and L5-S1 neural foraminal spaces.2. Increased paravertebral phlegmon surrounding L4-5 with slightly increased size of the left retroperitoneal abscess. Slightly smaller right psoas abscess.  - PAM showing no vegetations  - s/p Transforaminal lumbar interbody fusion (TLIF) with laminectomy and fusion of posterior column using instrumentation and iliac crest allograft on 8/29/2022, POD 2  - Ancef 2g iv q8h (8 weeks starting 8/11/2022); will need CT before stopping abx as per ID   - pain management evaluation of acute post op pain appreciated , d/c PCA pump as per pain management   - s/p picc line placed (09/01)  - per neurosx: No further neurosurgical intervention, reconsult prn. Patient should follow up outpatient with Dr. Abbott in two weeks from surgery for suture removal.[phone]  - would not recommend adding valium to opioids at this time, patient managing pain well on current regimen   - f/u OR cultures, prelim neg, f/u final report     #Vomiting/abdominal pain- resolved  #Delirium- resolved  - possibly vomited from nafcillin?  - delirium possibly due to vomiting/not getting methadone, now back at baseline    #Transaminitis- resolved  - U/S equivocal  - trend LFT's     #Normocytic Anemia- improved  - Monitor H+H  - ferritin, b12, folate wnl    #Hx of IVD  #Opioid abuse on Methadone  - Continue methadone 135, and gabapentin    #Anxiety/Depression  - c/w mirtazapine + quetiapine   - c/w xanax     #Active nicotine dependence  - c/w nicotine patch  - c/w buproprion    #Morbid obesity  - diet and lifestyle modification     #Suspected folic acid deficiency  -continue supplement    #, suspected DM ,   HbA1c 01/2022 6.6 , FS AC QHS,  - o/p follow up with pcp   -, sliding scale if needed     #DVT PPx- heparin  #GI PPx- None  #Diet- DASH/TLC  #Activity- AAT  #Code- FULL    Family discussion: Plan of care discussed with patient, aware and agreeable   Disposition: [facility] when medically improved   Handoff: dispo planning

## 2022-09-02 NOTE — DISCHARGE NOTE PROVIDER - ATTENDING DISCHARGE PHYSICAL EXAMINATION:
51 yo M w/ pmhx of IVDU (heroin), vertebral osteomyelitis, and MSSA bacteremia presents for worsening back pain. Back pain was increasing over the past couple of weeks and worsening today to the point he was having difficulty ambulating and bearing weight. Pain is sharp, radiating from low back to b/l hips and worse with movement. Patient normally able to ambulate with walker but now having difficulty walking due to pain. He endorses feeling similar to the pain from prior admission.     Patient was recently admitted 03/12-04/06 for vertebral OM/facetitis. PAM was negative and ID recommended cefazolin. Patient had picc line placed for 6wks of antibiotics. Patient states he finished his antibiotics at [facility] but did not follow up with Dr Giraldo. Patient states he is currently not using heroin. He last used 10 months ago.   Patient was found febrile in the ED with elevated WBC count, CT orbit showing Right periorbital cellulitis.     treated now medically optimized for discharged to [facility].     CONSTITUTIONAL: No acute distress, well-developed, well-groomed, AAOx3  HEAD: Atraumatic, normocephalic  EYES: EOM intact, PERRLA, conjunctiva and sclera clear  ENT: Supple, no masses,   PULMONARY: Clear to auscultation bilaterally; no wheezes, rales, or rhonchi  CARDIOVASCULAR: Regular rate and rhythm; no murmurs, rubs, or gallops  GASTROINTESTINAL: Soft, non-tender, non-distended; bowel sounds present  MUSCULOSKELETAL: 2+ peripheral pulses; no clubbing, no cyanosis, no edema  NEUROLOGY: non-focal, generalized weakness   SKIN: multiple iv injection site and scars on ext.

## 2022-09-02 NOTE — DISCHARGE NOTE PROVIDER - HOSPITAL COURSE
HPI  53 yo M w/ pmhx of IVDU (heroin), vertebral osteomyelitis, and MSSA bacteremia presents for worsening back pain. Back pain was increasing over the past couple of weeks and worsening today to the point he was having difficulty ambulating and bearing weight. Pain is sharp, radiating from low back to b/l hips and worse with movement. Patient normally able to ambulate with walker but now having difficulty walking due to pain. He endorses feeling similar to the pain from prior admission. Pt is also complaining of right sided eye swelling/redness since Tuesday. He states, he slept on the floor and woke up in the am with redness/swelling. Patient denies any visual changes or eye pain. Patient denies any fever, chills, headache, dizziness. Patient denies chest pain, palpitation, SOB. Patient denies abdominal pain, n/v/d/c.   Patient was recently admitted 03/12-04/06 for vertebral OM/facetitis. PAM was negative and ID recommended cefazolin. Patient had picc line placed for 6wks of antibiotics. Patient states he finished his antibiotics at [facility] but did not follow up with Dr Giraldo. Patient states he is currently not using heroin. He last used 10 months ago.   Patient was found febrile in the ED with elevated WBC count, CT orbit showing Right periorbital cellulitis.     Hospital Course  53 yo M w/ pmhx of IVDU (heroin), vertebral osteomyelitis, and MSSA bacteremia presents for worsening back pain. Patient was found febrile in the ED with elevated WBC count, CT orbit showing Right periorbital cellulitis.     #Recurrence of vertebral OM   #Recent history & post treatment for MSSA bacteremia  #Right periorbital cellulitis  #Sepsis present on admission: leukocytosis, febrile  - CT Orbit w/ IV Cont 8/10: shows Right periorbital (pre-septal) soft tissue swelling consistent with cellulitis. No evidence of abscess. No evidence of post-septal inflammation.  - h/o IR Drainage of Abscess/Biopsy of suspected discitis done 3/16 -fluid  growing NICOLE  - , .8   - BCx negative since 8/12  - MR lumbar/t spine: 1. Interval progression of discitis osteomyelitis at L4-5 with increased destruction of the endplate cortices and intervening disc. Associated mild vertebral height losses of L4 on L5. 2. Significantly increased epidural phlegmon extending from the right dorsal epidural space from L2-3 down to sacral level with maximal mass effect at the right L4-5 epidural space resulting in impingement/compression of the thecal sac and nerve roots. 3. Slightly increased erosion affecting bilateral L4-5 facet joints (left more than right), and bilateral L5-S1 facet joints. Increased enhancements involving bilateral L4-L5 and L5-S1 neural foraminal spaces.2. Increased paravertebral phlegmon surrounding L4-5 with slightly increased size of the left retroperitoneal abscess. Slightly smaller right psoas abscess.  - PAM showing no vegetations  - s/p Transforaminal lumbar interbody fusion (TLIF) with laminectomy and fusion of posterior column using instrumentation and iliac crest allograft on 8/29/2022, POD 2  - Ancef 2g iv q8h (8 weeks starting 8/11/2022); will need CT before stopping abx as per ID   - pain management evaluation of acute post op pain appreciated , d/c PCA pump as per pain management   - s/p picc line placed (09/01)  - per neurosx: No further neurosurgical intervention, reconsult prn. Patient should follow up outpatient with Dr. Abbott in two weeks from surgery for suture removal.[phone]  - would not recommend adding valium to opioids at this time, patient managing pain well on current regimen                HPI  53 yo M w/ pmhx of IVDU (heroin), vertebral osteomyelitis, and MSSA bacteremia presents for worsening back pain. Back pain was increasing over the past couple of weeks and worsening today to the point he was having difficulty ambulating and bearing weight. Pain is sharp, radiating from low back to b/l hips and worse with movement. Patient normally able to ambulate with walker but now having difficulty walking due to pain. He endorses feeling similar to the pain from prior admission. Pt is also complaining of right sided eye swelling/redness since Tuesday. He states, he slept on the floor and woke up in the am with redness/swelling. Patient denies any visual changes or eye pain. Patient denies any fever, chills, headache, dizziness. Patient denies chest pain, palpitation, SOB. Patient denies abdominal pain, n/v/d/c.   Patient was recently admitted 03/12-04/06 for vertebral OM/facetitis. PAM was negative and ID recommended cefazolin. Patient had picc line placed for 6wks of antibiotics. Patient states he finished his antibiotics at [facility] but did not follow up with Dr Giraldo. Patient states he is currently not using heroin. He last used 10 months ago.   Patient was found febrile in the ED with elevated WBC count, CT orbit showing Right periorbital cellulitis.     Hospital Course  53 yo M w/ pmhx of IVDU (heroin), vertebral osteomyelitis, and MSSA bacteremia presents for worsening back pain. Patient was found febrile in the ED with elevated WBC count, CT orbit showing Right periorbital cellulitis.     #Recurrence of vertebral OM   #Recent history & post treatment for MSSA bacteremia  #Right periorbital cellulitis  #Sepsis present on admission: leukocytosis, febrile  - CT Orbit w/ IV Cont 8/10: shows Right periorbital (pre-septal) soft tissue swelling consistent with cellulitis. No evidence of abscess. No evidence of post-septal inflammation.  - h/o IR Drainage of Abscess/Biopsy of suspected discitis done 3/16 -fluid  growing NICOLE  - , .8   - BCx negative since 8/12  - MR lumbar/t spine: 1. Interval progression of discitis osteomyelitis at L4-5 with increased destruction of the endplate cortices and intervening disc. Associated mild vertebral height losses of L4 on L5. 2. Significantly increased epidural phlegmon extending from the right dorsal epidural space from L2-3 down to sacral level with maximal mass effect at the right L4-5 epidural space resulting in impingement/compression of the thecal sac and nerve roots. 3. Slightly increased erosion affecting bilateral L4-5 facet joints (left more than right), and bilateral L5-S1 facet joints. Increased enhancements involving bilateral L4-L5 and L5-S1 neural foraminal spaces.2. Increased paravertebral phlegmon surrounding L4-5 with slightly increased size of the left retroperitoneal abscess. Slightly smaller right psoas abscess.  - PAM showing no vegetations  - s/p Transforaminal lumbar interbody fusion (TLIF) with laminectomy and fusion of posterior column using instrumentation and iliac crest allograft on 8/29/2022, POD 2  - Ancef 2g iv q8h (8 weeks starting 8/11/2022); will need CT LS Spine before stopping abx as per ID   - pain management evaluation of acute post op pain appreciated , d/c PCA pump as per pain management   - s/p picc line placed (09/01)  - per neurosx: No further neurosurgical intervention, reconsult prn. Patient should follow up outpatient with Dr. Abbott in two weeks from surgery for suture removal.[phone]  - would not recommend adding valium to opioids at this time, patient managing pain well on current regimen   - started on PO decadron 4 mg q6H on 8/13/22 --> discussed w/ neurosx, will taper upon d/c; switched to PO decadron 4mg q12H x 7 days --> PO decadron 2 mg q12H x 7 days, then PO decadron 1 mg q12H x 7 days    #Urinary Retention  - CT A/P: No hydro  - Kenney d/c'd 9/5/22

## 2022-09-02 NOTE — DISCHARGE NOTE PROVIDER - CARE PROVIDER_API CALL
Mary Abbott)  Neurosurgery  [address], [address]  [place] [address]  Phone: [phone]  Fax: [phone]  Follow Up Time: 2 weeks    LUCY PETERSEN  Endocrinology, Diabetes and Metabolism  [address] [place] [address]  Phone: [phone]  Fax: [phone]  Follow Up Time: 1 week   Mary Abbott)  Neurosurgery  [address], [address]  [place] [address]  Phone: [phone]  Fax: [phone]  Follow Up Time: 2 weeks    LUCY PETERSEN  Endocrinology, Diabetes and Metabolism  [address] [place] [address]  Phone: [phone]  Fax: [phone]  Follow Up Time: 2 weeks    Milton Soriano (OD)  [facility]  [address] [place] [address]  Phone: [phone]  Fax: [phone]  Follow Up Time: 2 weeks    Alejandro Maciel)  Gastroenterology; Internal Medicine  [address] [place] [address]  Phone: [phone]  Fax: [phone]  Follow Up Time: 2 weeks   Mary Abbott)  Neurosurgery  [address], [address]  [place] [address]  Phone: [phone]  Fax: [phone]  Follow Up Time: 2 weeks    LUCY PETERSEN  Endocrinology, Diabetes and Metabolism  [address] [place] [address]  Phone: [phone]  Fax: [phone]  Follow Up Time: 2 weeks    Milton Soriano (OD)  [facility]  [address]  [place] [address]  Phone: [phone]  Fax: [phone]  Follow Up Time: 2 weeks    Alejandro Maciel)  Gastroenterology; Internal Medicine  [address] [place] [address]  Phone: [phone]  Fax: [phone]  Follow Up Time: 2 weeks    Yaya Salinas)  Infectious Disease; Internal Medicine  [address] [place] [address]  Phone: [phone]  Fax: [phone]  Follow Up Time: 2 weeks

## 2022-09-02 NOTE — DISCHARGE NOTE PROVIDER - NSDCMRMEDTOKEN_GEN_ALL_CORE_FT
ALPRAZolam 2 mg oral tablet: 1 tab(s) orally 3 times a day  buPROPion 300 mg/24 hours (XL) oral tablet, extended release: 1 tab(s) orally once a day  cyclobenzaprine 10 mg oral tablet: 1 tab(s) orally 3 times a day  folic acid 1 mg oral tablet: 1 tab(s) orally once a day  gabapentin 300 mg oral capsule: 1 cap(s) orally 3 times a day  methadone 5 mg oral tablet: 135 milligram(s) orally once a day  mirtazapine 30 mg oral tablet: 1 tab(s) orally once a day (at bedtime)  Multiple Vitamins oral tablet: 1 tab(s) orally once a day  nicotine 21 mg/24 hr transdermal film, extended release: 1 patch transdermal once a day  pantoprazole 40 mg oral delayed release tablet: 1 tab(s) orally once a day (before a meal)  QUEtiapine 200 mg oral tablet: 1 tab(s) orally once a day (at bedtime)  thiamine 100 mg oral tablet: 1 tab(s) orally once a day   ALPRAZolam 2 mg oral tablet: 1 tab(s) orally 3 times a day  buPROPion 300 mg/24 hours (XL) oral tablet, extended release: 1 tab(s) orally once a day  cyclobenzaprine 10 mg oral tablet: 1 tab(s) orally 3 times a day  dexamethasone 1 mg oral tablet: 1 tab(s) orally 2 times a day   9/20 - 9/26  dexamethasone 1 mg oral tablet: 1 tab(s) orally once a day  from 9/27 -10/3   dexamethasone 2 mg oral tablet: 1 tab(s) orally 2 times a day  from 9/13- 9/19  dexamethasone 4 mg oral tablet: 1 tab(s) orally 2 times a day until 9/12  folic acid 1 mg oral tablet: 1 tab(s) orally once a day  gabapentin 300 mg oral capsule: 1 cap(s) orally 3 times a day  methadone 5 mg oral tablet: 135 milligram(s) orally once a day  mirtazapine 30 mg oral tablet: 1 tab(s) orally once a day (at bedtime)  Multiple Vitamins oral tablet: 1 tab(s) orally once a day  nicotine 21 mg/24 hr transdermal film, extended release: 1 patch transdermal once a day  pantoprazole 40 mg oral delayed release tablet: 1 tab(s) orally once a day (before a meal)  QUEtiapine 200 mg oral tablet: 1 tab(s) orally once a day (at bedtime)  thiamine 100 mg oral tablet: 1 tab(s) orally once a day

## 2022-09-02 NOTE — DISCHARGE NOTE PROVIDER - PROVIDER TOKENS
PROVIDER:[TOKEN:[[id number]],FOLLOWUP:[2 weeks]],PROVIDER:[TOKEN:[[id number]],FOLLOWUP:[1 week]] PROVIDER:[TOKEN:[[id number]],FOLLOWUP:[2 weeks]],PROVIDER:[TOKEN:[[id number]],FOLLOWUP:[2 weeks]],PROVIDER:[TOKEN:[[id number]],FOLLOWUP:[2 weeks]],PROVIDER:[TOKEN:[[id number]],FOLLOWUP:[2 weeks]] PROVIDER:[TOKEN:[[id number]],FOLLOWUP:[2 weeks]],PROVIDER:[TOKEN:[[id number]],FOLLOWUP:[2 weeks]],PROVIDER:[TOKEN:[[id number]],FOLLOWUP:[2 weeks]],PROVIDER:[TOKEN:[[id number]],FOLLOWUP:[2 weeks]],PROVIDER:[TOKEN:[[id number]],FOLLOWUP:[2 weeks]]

## 2022-09-02 NOTE — DISCHARGE NOTE PROVIDER - CARE PROVIDERS DIRECT ADDRESSES
,[email or web address],DirectAddress_Unknown ,[email or web address],DirectAddress_Unknown,[email or web address],[email or web address],DirectAddress_Unknown,DirectAddress_Unknown,[email or web address],DirectAddress_Unknown

## 2022-09-02 NOTE — PROGRESS NOTE ADULT - ATTENDING COMMENTS
Patient seen at bedside.  Discussed with medical team that includes resident(s), medical student(s), nursing staff, case management.   doing better. on IV antibiotics. s/p picc line 9/1. needs 8 weeks of antibiotics. s/p Transforaminal lumbar interbody fusion (TLIF) with laminectomy and fusion of posterior column using instrumentation and iliac crest allograft on 8/29/2022, POD 2  - f/u OR cultures, prelim neg, f/u final report. as per neurosurgery No further neurosurgical intervention, reconsult prn. Patient should follow up outpatient with Dr. Abbott in two weeks from surgery for suture removal.[phone]. need clarification regarding steroids from neurosurgery. will go to rehab. as per CM bed will be available on Tuesday. need to clarify from  regarding methadone dose availability on Tuesday at facility.

## 2022-09-02 NOTE — DISCHARGE NOTE PROVIDER - NSDCCPCAREPLAN_GEN_ALL_CORE_FT
PRINCIPAL DISCHARGE DIAGNOSIS  Diagnosis: Fever  Assessment and Plan of Treatment: Sepsis is a serious condition that occurs when the body overreacts to an infection. It is also called systemic inflammatory response syndrome (SIRS) with infection. An infection is usually caused by bacteria that attack the body. The body's defense system normally fights off infection within the affected body part. With sepsis, the body overreacts and causes symptoms to occur throughout the body. This leads to uncontrolled and widespread inflammation and clotting in small blood vessels. Blood flow to different body parts decreases and may lead to organ failure. Sepsis requires immediate treatment.  You were treated in the hospital with IV antibiotics, and will be discharged on antibiotics  Please seek immediate medical attention if you develop fever >100.4 F, feel dizzy, have nausea or vomiting, coughing blood, have sudden trouble breathing or chest pain, severe weakness, or your skin or lips are turning blue.        SECONDARY DISCHARGE DIAGNOSES  Diagnosis: Back pain  Assessment and Plan of Treatment:     Diagnosis: Preseptal cellulitis  Assessment and Plan of Treatment:      PRINCIPAL DISCHARGE DIAGNOSIS  Diagnosis: Vertebral osteomyelitis  Assessment and Plan of Treatment: You were diagnosed with vertebral osteomyelitis. Vertebral osteomyelitis is a bone infection usually caused by bacteria. In the spine, it is often found in the vertebrae, although the infection can spread into the epidural and intervertebral disc spaces. Osteomyelitis is rare and most common in young children and the elderly, but it can occur at any age. You will be discharged on IV antibiotic therapy      SECONDARY DISCHARGE DIAGNOSES  Diagnosis: Back pain  Assessment and Plan of Treatment: Back pain is very common in adults. The cause of back pain is rarely dangerous and the pain often gets better over time. The cause of your back pain may not be known and may include strain of muscles or ligaments, degeneration of the spinal disks, or arthritis. Occasionally the pain may radiate down your leg(s). Over-the-counter medicines to reduce pain and inflammation are often the most helpful. Stretching and remaining active frequently helps the healing process.   SEEK IMMEDIATE MEDICAL CARE IF YOU HAVE ANY OF THE FOLLOWING SYMPTOMS: bowel or bladder control problems, unusual weakness or numbness in your arms or legs, nausea or vomiting, abdominal pain, fever, dizziness/lightheadedness.      Diagnosis: Fever  Assessment and Plan of Treatment: Sepsis is a serious condition that occurs when the body overreacts to an infection. It is also called systemic inflammatory response syndrome (SIRS) with infection. An infection is usually caused by bacteria that attack the body. The body's defense system normally fights off infection within the affected body part. With sepsis, the body overreacts and causes symptoms to occur throughout the body. This leads to uncontrolled and widespread inflammation and clotting in small blood vessels. Blood flow to different body parts decreases and may lead to organ failure. Sepsis requires immediate treatment.  You were treated in the hospital with IV antibiotics, and will be discharged on antibiotics  Please seek immediate medical attention if you develop fever >100.4 F, feel dizzy, have nausea or vomiting, coughing blood, have sudden trouble breathing or chest pain, severe weakness, or your skin or lips are turning blue.      Diagnosis: Preseptal cellulitis  Assessment and Plan of Treatment:

## 2022-09-03 LAB
ALBUMIN SERPL ELPH-MCNC: 3.4 G/DL — LOW (ref 3.5–5.2)
ALP SERPL-CCNC: 98 U/L — SIGNIFICANT CHANGE UP (ref 30–115)
ALT FLD-CCNC: 10 U/L — SIGNIFICANT CHANGE UP (ref 0–41)
ANION GAP SERPL CALC-SCNC: 15 MMOL/L — HIGH (ref 7–14)
AST SERPL-CCNC: 11 U/L — SIGNIFICANT CHANGE UP (ref 0–41)
BASOPHILS # BLD AUTO: 0.02 K/UL — SIGNIFICANT CHANGE UP (ref 0–0.2)
BASOPHILS NFR BLD AUTO: 0.1 % — SIGNIFICANT CHANGE UP (ref 0–1)
BILIRUB SERPL-MCNC: <0.2 MG/DL — SIGNIFICANT CHANGE UP (ref 0.2–1.2)
BUN SERPL-MCNC: 22 MG/DL — HIGH (ref 10–20)
CALCIUM SERPL-MCNC: 8.6 MG/DL — SIGNIFICANT CHANGE UP (ref 8.5–10.1)
CHLORIDE SERPL-SCNC: 94 MMOL/L — LOW (ref 98–110)
CO2 SERPL-SCNC: 25 MMOL/L — SIGNIFICANT CHANGE UP (ref 17–32)
CREAT SERPL-MCNC: 0.6 MG/DL — LOW (ref 0.7–1.5)
EGFR: 115 ML/MIN/1.73M2 — SIGNIFICANT CHANGE UP
EOSINOPHIL # BLD AUTO: 0.01 K/UL — SIGNIFICANT CHANGE UP (ref 0–0.7)
EOSINOPHIL NFR BLD AUTO: 0.1 % — SIGNIFICANT CHANGE UP (ref 0–8)
GLUCOSE SERPL-MCNC: 229 MG/DL — HIGH (ref 70–99)
HCT VFR BLD CALC: 36 % — LOW (ref 42–52)
HGB BLD-MCNC: 11.4 G/DL — LOW (ref 14–18)
IMM GRANULOCYTES NFR BLD AUTO: 2.1 % — HIGH (ref 0.1–0.3)
LYMPHOCYTES # BLD AUTO: 16.2 % — LOW (ref 20.5–51.1)
LYMPHOCYTES # BLD AUTO: 2.27 K/UL — SIGNIFICANT CHANGE UP (ref 1.2–3.4)
MAGNESIUM SERPL-MCNC: 1.8 MG/DL — SIGNIFICANT CHANGE UP (ref 1.8–2.4)
MCHC RBC-ENTMCNC: 28.9 PG — SIGNIFICANT CHANGE UP (ref 27–31)
MCHC RBC-ENTMCNC: 31.7 G/DL — LOW (ref 32–37)
MCV RBC AUTO: 91.1 FL — SIGNIFICANT CHANGE UP (ref 80–94)
MONOCYTES # BLD AUTO: 0.7 K/UL — HIGH (ref 0.1–0.6)
MONOCYTES NFR BLD AUTO: 5 % — SIGNIFICANT CHANGE UP (ref 1.7–9.3)
NEUTROPHILS # BLD AUTO: 10.68 K/UL — HIGH (ref 1.4–6.5)
NEUTROPHILS NFR BLD AUTO: 76.5 % — HIGH (ref 42.2–75.2)
NRBC # BLD: 0 /100 WBCS — SIGNIFICANT CHANGE UP (ref 0–0)
PLATELET # BLD AUTO: 230 K/UL — SIGNIFICANT CHANGE UP (ref 130–400)
POTASSIUM SERPL-MCNC: 4.6 MMOL/L — SIGNIFICANT CHANGE UP (ref 3.5–5)
POTASSIUM SERPL-SCNC: 4.6 MMOL/L — SIGNIFICANT CHANGE UP (ref 3.5–5)
PROT SERPL-MCNC: 6.3 G/DL — SIGNIFICANT CHANGE UP (ref 6–8)
RBC # BLD: 3.95 M/UL — LOW (ref 4.7–6.1)
RBC # FLD: 17.5 % — HIGH (ref 11.5–14.5)
SODIUM SERPL-SCNC: 134 MMOL/L — LOW (ref 135–146)
WBC # BLD: 13.98 K/UL — HIGH (ref 4.8–10.8)
WBC # FLD AUTO: 13.98 K/UL — HIGH (ref 4.8–10.8)

## 2022-09-03 PROCEDURE — 99232 SBSQ HOSP IP/OBS MODERATE 35: CPT

## 2022-09-03 RX ADMIN — LACTULOSE 10 GRAM(S): 10 SOLUTION ORAL at 05:51

## 2022-09-03 RX ADMIN — Medication 4 MILLIGRAM(S): at 17:17

## 2022-09-03 RX ADMIN — PANTOPRAZOLE SODIUM 40 MILLIGRAM(S): 20 TABLET, DELAYED RELEASE ORAL at 05:55

## 2022-09-03 RX ADMIN — Medication 1000 MILLIGRAM(S): at 21:13

## 2022-09-03 RX ADMIN — Medication 100 MILLIGRAM(S): at 21:12

## 2022-09-03 RX ADMIN — Medication 4 MILLIGRAM(S): at 11:59

## 2022-09-03 RX ADMIN — CYCLOBENZAPRINE HYDROCHLORIDE 10 MILLIGRAM(S): 10 TABLET, FILM COATED ORAL at 05:50

## 2022-09-03 RX ADMIN — HEPARIN SODIUM 5000 UNIT(S): 5000 INJECTION INTRAVENOUS; SUBCUTANEOUS at 05:52

## 2022-09-03 RX ADMIN — Medication 1 PATCH: at 11:59

## 2022-09-03 RX ADMIN — Medication 4 MILLIGRAM(S): at 23:51

## 2022-09-03 RX ADMIN — QUETIAPINE FUMARATE 200 MILLIGRAM(S): 200 TABLET, FILM COATED ORAL at 21:12

## 2022-09-03 RX ADMIN — Medication 1 TABLET(S): at 11:58

## 2022-09-03 RX ADMIN — Medication 2 MILLIGRAM(S): at 21:12

## 2022-09-03 RX ADMIN — CYCLOBENZAPRINE HYDROCHLORIDE 10 MILLIGRAM(S): 10 TABLET, FILM COATED ORAL at 13:11

## 2022-09-03 RX ADMIN — Medication 2 MILLIGRAM(S): at 05:51

## 2022-09-03 RX ADMIN — CYCLOBENZAPRINE HYDROCHLORIDE 10 MILLIGRAM(S): 10 TABLET, FILM COATED ORAL at 21:13

## 2022-09-03 RX ADMIN — METHADONE HYDROCHLORIDE 135 MILLIGRAM(S): 40 TABLET ORAL at 12:03

## 2022-09-03 RX ADMIN — Medication 1000 MILLIGRAM(S): at 05:52

## 2022-09-03 RX ADMIN — MIRTAZAPINE 30 MILLIGRAM(S): 45 TABLET, ORALLY DISINTEGRATING ORAL at 21:13

## 2022-09-03 RX ADMIN — Medication 1000 MILLIGRAM(S): at 13:14

## 2022-09-03 RX ADMIN — Medication 100 MILLIGRAM(S): at 05:53

## 2022-09-03 RX ADMIN — Medication 100 MILLIGRAM(S): at 13:16

## 2022-09-03 RX ADMIN — Medication 1 MILLIGRAM(S): at 11:58

## 2022-09-03 RX ADMIN — Medication 4 MILLIGRAM(S): at 00:58

## 2022-09-03 RX ADMIN — Medication 4 MILLIGRAM(S): at 05:50

## 2022-09-03 RX ADMIN — HEPARIN SODIUM 5000 UNIT(S): 5000 INJECTION INTRAVENOUS; SUBCUTANEOUS at 21:16

## 2022-09-03 RX ADMIN — POLYETHYLENE GLYCOL 3350 17 GRAM(S): 17 POWDER, FOR SOLUTION ORAL at 12:00

## 2022-09-03 RX ADMIN — HEPARIN SODIUM 5000 UNIT(S): 5000 INJECTION INTRAVENOUS; SUBCUTANEOUS at 13:12

## 2022-09-03 RX ADMIN — Medication 100 MILLIGRAM(S): at 11:58

## 2022-09-03 RX ADMIN — Medication 2 MILLIGRAM(S): at 13:15

## 2022-09-03 RX ADMIN — BUPROPION HYDROCHLORIDE 300 MILLIGRAM(S): 150 TABLET, EXTENDED RELEASE ORAL at 11:59

## 2022-09-03 NOTE — PROGRESS NOTE ADULT - ASSESSMENT
51 yo M w/ pmhx of IVDU (heroin), vertebral osteomyelitis, and MSSA bacteremia presents for worsening back pain. Patient was found febrile in the ED with elevated WBC count, CT orbit showing Right periorbital cellulitis.     #Recurrence of vertebral OM   #Recent history & post treatment for MSSA bacteremia  #Right periorbital cellulitis  #Sepsis present on admission: leukocytosis, febrile  - CT Orbit w/ IV Cont 8/10: shows Right periorbital (pre-septal) soft tissue swelling consistent with cellulitis. No evidence of abscess. No evidence of post-septal inflammation.  - h/o IR Drainage of Abscess/Biopsy of suspected discitis done 3/16 -fluid  growing NICOLE  - , .8   - BCx negative since 8/12  - MR lumbar/t spine: 1. Interval progression of discitis osteomyelitis at L4-5 with increased destruction of the endplate cortices and intervening disc. Associated mild vertebral height losses of L4 on L5. 2. Significantly increased epidural phlegmon extending from the right dorsal epidural space from L2-3 down to sacral level with maximal mass effect at the right L4-5 epidural space resulting in impingement/compression of the thecal sac and nerve roots. 3. Slightly increased erosion affecting bilateral L4-5 facet joints (left more than right), and bilateral L5-S1 facet joints. Increased enhancements involving bilateral L4-L5 and L5-S1 neural foraminal spaces.2. Increased paravertebral phlegmon surrounding L4-5 with slightly increased size of the left retroperitoneal abscess. Slightly smaller right psoas abscess.  - PAM showing no vegetations  - s/p Transforaminal lumbar interbody fusion (TLIF) with laminectomy and fusion of posterior column using instrumentation and iliac crest allograft on 8/29/2022, POD 2  - Ancef 2g iv q8h (8 weeks starting 8/11/2022); will need CT before stopping abx as per ID   - pain management evaluation of acute post op pain appreciated , d/c PCA pump as per pain management   - s/p picc line placed (09/01)  - per neurosx: No further neurosurgical intervention, reconsult prn. Patient should follow up outpatient with Dr. Abbott in two weeks from surgery for suture removal.[phone]  - would not recommend adding valium to opioids at this time, patient managing pain well on current regimen   - f/u OR cultures, prelim neg, f/u final report     #Vomiting/abdominal pain- resolved  #Delirium- resolved  - possibly vomited from nafcillin?  - delirium possibly due to vomiting/not getting methadone, now back at baseline    #Transaminitis- resolved  - U/S equivocal  - trend LFT's     #Normocytic Anemia- improved  - Monitor H+H  - ferritin, b12, folate wnl    #Hx of IVD  #Opioid abuse on Methadone  - Continue methadone 135, and gabapentin    #Anxiety/Depression  - c/w mirtazapine + quetiapine   - c/w xanax     #Active nicotine dependence  - c/w nicotine patch  - c/w buproprion    #Morbid obesity  - diet and lifestyle modification     #Suspected folic acid deficiency  -continue supplement    #, suspected DM ,   HbA1c 01/2022 6.6 , FS AC QHS,  - o/p follow up with pcp   -, sliding scale if needed     #DVT PPx- heparin  #GI PPx- None  #Diet- DASH/TLC  #Activity- AAT  #Code- FULL    Family discussion: Plan of care discussed with patient, aware and agreeable   Disposition: [facility] when medically improved   Handoff: dispo planning    Rodrigo Sierra MD  Attending Hospitalist

## 2022-09-03 NOTE — PROGRESS NOTE ADULT - SUBJECTIVE AND OBJECTIVE BOX
SUBJECTIVE:    Patient is a 52y old Male who presents with a chief complaint of Back pain (02 Sep 2022 11:40)    Currently admitted to medicine with the primary diagnosis of Fever       Today is hospital day 24d.  pt asking for more opioids    PAST MEDICAL & SURGICAL HISTORY  IV drug abuse    Vertebral osteomyelitis    MSSA bacteremia    No significant past surgical history      SOCIAL HISTORY:  Negative for smoking/alcohol/drug use.     ALLERGIES:  No Known Allergies    MEDICATIONS:  STANDING MEDICATIONS  acetaminophen     Tablet .. 1000 milliGRAM(s) Oral every 8 hours  ALPRAZolam 2 milliGRAM(s) Oral three times a day  buPROPion XL (24-Hour) . 300 milliGRAM(s) Oral daily  ceFAZolin   IVPB 2000 milliGRAM(s) IV Intermittent every 8 hours  cyclobenzaprine 10 milliGRAM(s) Oral three times a day  dexAMETHasone     Tablet 4 milliGRAM(s) Oral every 6 hours  folic acid 1 milliGRAM(s) Oral daily  heparin   Injectable 5000 Unit(s) SubCutaneous every 8 hours  lactulose Syrup 10 Gram(s) Oral two times a day  methadone    Tablet 135 milliGRAM(s) Oral daily  mirtazapine 30 milliGRAM(s) Oral at bedtime  multivitamin 1 Tablet(s) Oral daily  nicotine - 21 mG/24Hr(s) Patch 1 Patch Transdermal daily  pantoprazole    Tablet 40 milliGRAM(s) Oral before breakfast  polyethylene glycol 3350 17 Gram(s) Oral daily  QUEtiapine 200 milliGRAM(s) Oral at bedtime  senna 2 Tablet(s) Oral at bedtime  thiamine 100 milliGRAM(s) Oral daily    PRN MEDICATIONS  aluminum hydroxide/magnesium hydroxide/simethicone Suspension 30 milliLiter(s) Oral every 4 hours PRN  melatonin 3 milliGRAM(s) Oral at bedtime PRN  naloxone Injectable 0.1 milliGRAM(s) IV Push every 3 minutes PRN  ondansetron Injectable 4 milliGRAM(s) IV Push every 6 hours PRN  scopolamine 1 mG/72 Hr(s) Patch 1 Patch Transdermal every 72 hours PRN    VITALS:   T(F): 96.5  HR: 81  BP: 119/67  RR: 18  SpO2: 96%    PHYSICAL EXAM:  GEN: No acute distress  LUNGS: Clear to auscultation bilaterally   HEART: S1/S2 present.     ABD: Soft, non-tender, non-distended. Bowel sounds present         LABS:                        11.4   13.98 )-----------( 230      ( 03 Sep 2022 06:53 )             36.0     09-03    134<L>  |  94<L>  |  22<H>  ----------------------------<  229<H>  4.6   |  25  |  0.6<L>    Ca    8.6      03 Sep 2022 06:53  Mg     1.8     09-03    TPro  6.3  /  Alb  3.4<L>  /  TBili  <0.2  /  DBili  x   /  AST  11  /  ALT  10  /  AlkPhos  98  09-03                      RADIOLOGY:

## 2022-09-04 LAB
ALBUMIN SERPL ELPH-MCNC: 3.3 G/DL — LOW (ref 3.5–5.2)
ALP SERPL-CCNC: 113 U/L — SIGNIFICANT CHANGE UP (ref 30–115)
ALT FLD-CCNC: 11 U/L — SIGNIFICANT CHANGE UP (ref 0–41)
ANION GAP SERPL CALC-SCNC: 13 MMOL/L — SIGNIFICANT CHANGE UP (ref 7–14)
AST SERPL-CCNC: 11 U/L — SIGNIFICANT CHANGE UP (ref 0–41)
BASOPHILS # BLD AUTO: 0.03 K/UL — SIGNIFICANT CHANGE UP (ref 0–0.2)
BASOPHILS NFR BLD AUTO: 0.2 % — SIGNIFICANT CHANGE UP (ref 0–1)
BILIRUB SERPL-MCNC: <0.2 MG/DL — SIGNIFICANT CHANGE UP (ref 0.2–1.2)
BUN SERPL-MCNC: 24 MG/DL — HIGH (ref 10–20)
CALCIUM SERPL-MCNC: 8.6 MG/DL — SIGNIFICANT CHANGE UP (ref 8.5–10.1)
CHLORIDE SERPL-SCNC: 93 MMOL/L — LOW (ref 98–110)
CO2 SERPL-SCNC: 28 MMOL/L — SIGNIFICANT CHANGE UP (ref 17–32)
CREAT SERPL-MCNC: 0.6 MG/DL — LOW (ref 0.7–1.5)
EGFR: 115 ML/MIN/1.73M2 — SIGNIFICANT CHANGE UP
EOSINOPHIL # BLD AUTO: 0.02 K/UL — SIGNIFICANT CHANGE UP (ref 0–0.7)
EOSINOPHIL NFR BLD AUTO: 0.1 % — SIGNIFICANT CHANGE UP (ref 0–8)
GLUCOSE SERPL-MCNC: 161 MG/DL — HIGH (ref 70–99)
HCT VFR BLD CALC: 34.5 % — LOW (ref 42–52)
HGB BLD-MCNC: 11.2 G/DL — LOW (ref 14–18)
IMM GRANULOCYTES NFR BLD AUTO: 2 % — HIGH (ref 0.1–0.3)
LYMPHOCYTES # BLD AUTO: 1.98 K/UL — SIGNIFICANT CHANGE UP (ref 1.2–3.4)
LYMPHOCYTES # BLD AUTO: 13.1 % — LOW (ref 20.5–51.1)
MAGNESIUM SERPL-MCNC: 2 MG/DL — SIGNIFICANT CHANGE UP (ref 1.8–2.4)
MCHC RBC-ENTMCNC: 29.9 PG — SIGNIFICANT CHANGE UP (ref 27–31)
MCHC RBC-ENTMCNC: 32.5 G/DL — SIGNIFICANT CHANGE UP (ref 32–37)
MCV RBC AUTO: 92 FL — SIGNIFICANT CHANGE UP (ref 80–94)
MONOCYTES # BLD AUTO: 1.05 K/UL — HIGH (ref 0.1–0.6)
MONOCYTES NFR BLD AUTO: 6.9 % — SIGNIFICANT CHANGE UP (ref 1.7–9.3)
NEUTROPHILS # BLD AUTO: 11.75 K/UL — HIGH (ref 1.4–6.5)
NEUTROPHILS NFR BLD AUTO: 77.7 % — HIGH (ref 42.2–75.2)
NRBC # BLD: 0 /100 WBCS — SIGNIFICANT CHANGE UP (ref 0–0)
PLATELET # BLD AUTO: 197 K/UL — SIGNIFICANT CHANGE UP (ref 130–400)
POTASSIUM SERPL-MCNC: 5 MMOL/L — SIGNIFICANT CHANGE UP (ref 3.5–5)
POTASSIUM SERPL-SCNC: 5 MMOL/L — SIGNIFICANT CHANGE UP (ref 3.5–5)
PROT SERPL-MCNC: 6.1 G/DL — SIGNIFICANT CHANGE UP (ref 6–8)
RBC # BLD: 3.75 M/UL — LOW (ref 4.7–6.1)
RBC # FLD: 17.9 % — HIGH (ref 11.5–14.5)
SODIUM SERPL-SCNC: 134 MMOL/L — LOW (ref 135–146)
WBC # BLD: 15.14 K/UL — HIGH (ref 4.8–10.8)
WBC # FLD AUTO: 15.14 K/UL — HIGH (ref 4.8–10.8)

## 2022-09-04 PROCEDURE — 99233 SBSQ HOSP IP/OBS HIGH 50: CPT

## 2022-09-04 RX ADMIN — HEPARIN SODIUM 5000 UNIT(S): 5000 INJECTION INTRAVENOUS; SUBCUTANEOUS at 06:02

## 2022-09-04 RX ADMIN — Medication 100 MILLIGRAM(S): at 13:06

## 2022-09-04 RX ADMIN — PANTOPRAZOLE SODIUM 40 MILLIGRAM(S): 20 TABLET, DELAYED RELEASE ORAL at 06:00

## 2022-09-04 RX ADMIN — LACTULOSE 10 GRAM(S): 10 SOLUTION ORAL at 06:02

## 2022-09-04 RX ADMIN — Medication 2 MILLIGRAM(S): at 13:03

## 2022-09-04 RX ADMIN — Medication 1 PATCH: at 07:20

## 2022-09-04 RX ADMIN — Medication 4 MILLIGRAM(S): at 11:10

## 2022-09-04 RX ADMIN — Medication 1000 MILLIGRAM(S): at 13:03

## 2022-09-04 RX ADMIN — Medication 2 MILLIGRAM(S): at 21:32

## 2022-09-04 RX ADMIN — Medication 4 MILLIGRAM(S): at 23:27

## 2022-09-04 RX ADMIN — CYCLOBENZAPRINE HYDROCHLORIDE 10 MILLIGRAM(S): 10 TABLET, FILM COATED ORAL at 06:01

## 2022-09-04 RX ADMIN — Medication 1000 MILLIGRAM(S): at 13:53

## 2022-09-04 RX ADMIN — Medication 1 TABLET(S): at 11:10

## 2022-09-04 RX ADMIN — Medication 100 MILLIGRAM(S): at 11:09

## 2022-09-04 RX ADMIN — Medication 1000 MILLIGRAM(S): at 06:00

## 2022-09-04 RX ADMIN — Medication 2 MILLIGRAM(S): at 06:01

## 2022-09-04 RX ADMIN — HEPARIN SODIUM 5000 UNIT(S): 5000 INJECTION INTRAVENOUS; SUBCUTANEOUS at 21:31

## 2022-09-04 RX ADMIN — METHADONE HYDROCHLORIDE 135 MILLIGRAM(S): 40 TABLET ORAL at 11:03

## 2022-09-04 RX ADMIN — Medication 4 MILLIGRAM(S): at 06:00

## 2022-09-04 RX ADMIN — Medication 1 PATCH: at 11:10

## 2022-09-04 RX ADMIN — Medication 4 MILLIGRAM(S): at 17:08

## 2022-09-04 RX ADMIN — HEPARIN SODIUM 5000 UNIT(S): 5000 INJECTION INTRAVENOUS; SUBCUTANEOUS at 13:04

## 2022-09-04 RX ADMIN — QUETIAPINE FUMARATE 200 MILLIGRAM(S): 200 TABLET, FILM COATED ORAL at 21:32

## 2022-09-04 RX ADMIN — Medication 100 MILLIGRAM(S): at 21:31

## 2022-09-04 RX ADMIN — Medication 1000 MILLIGRAM(S): at 21:32

## 2022-09-04 RX ADMIN — MIRTAZAPINE 30 MILLIGRAM(S): 45 TABLET, ORALLY DISINTEGRATING ORAL at 21:32

## 2022-09-04 RX ADMIN — Medication 100 MILLIGRAM(S): at 06:00

## 2022-09-04 RX ADMIN — Medication 1 MILLIGRAM(S): at 11:10

## 2022-09-04 RX ADMIN — CYCLOBENZAPRINE HYDROCHLORIDE 10 MILLIGRAM(S): 10 TABLET, FILM COATED ORAL at 21:32

## 2022-09-04 RX ADMIN — Medication 1 PATCH: at 11:12

## 2022-09-04 RX ADMIN — CYCLOBENZAPRINE HYDROCHLORIDE 10 MILLIGRAM(S): 10 TABLET, FILM COATED ORAL at 13:04

## 2022-09-04 RX ADMIN — BUPROPION HYDROCHLORIDE 300 MILLIGRAM(S): 150 TABLET, EXTENDED RELEASE ORAL at 11:10

## 2022-09-04 NOTE — PROGRESS NOTE ADULT - SUBJECTIVE AND OBJECTIVE BOX
Progress Note:  Provider Speciality                            Hospitalist      MIKAEL MCDERMOTT MRN-[id number] 52y Male     CHIEF PRESENTING COMPLAINT:  Patient is a 52y old  Male who presents with a chief complaint of Back pain (03 Sep 2022 14:24)        SUBJECTIVE:  Patient was seen and examined at bedside. Reports improvement in  presenting complaint.   No significant overnight events reported.     HISTORY OF PRESENTING ILLNESS:  HPI:  53 yo M w/ pmhx of IVDU (heroin), vertebral osteomyelitis, and MSSA bacteremia presents for worsening back pain. Back pain was increasing over the past couple of weeks and worsening today to the point he was having difficulty ambulating and bearing weight. Pain is sharp, radiating from low back to b/l hips and worse with movement. Patient normally able to ambulate with walker but now having difficulty walking due to pain. He endorses feeling similar to the pain from prior admission. Pt is also complaining of right sided eye swelling/redness since Tuesday. He states, he slept on the floor and woke up in the am with redness/swelling. Patient denies any visual changes or eye pain. Patient denies any fever, chills, headache, dizziness. Patient denies chest pain, palpitation, SOB. Patient denies abdominal pain, n/v/d/c.   Patient was recently admitted 03/12-04/06 for vertebral OM/facetitis. PAM was negative and ID recommended cefazolin. Patient had picc line placed for 6wks of antibiotics. Patient states he finished his antibiotics at [facility] but did not follow up with Dr Giraldo. Patient states he is currently not using heroin. He last used 10 months ago.   Patient was found febrile in the ED with elevated WBC count, CT orbit showing Right periorbital cellulitis.     ED vitals T(F): 99 (08-10-22 @ 16:02), Max: 100.5 (08-10-22 @ 13:08), HR: 96 (08-10-22 @ 16:02) (96 - 99), BP: 130/61 (08-10-22 @ 16:02) (130/61 - 144/88), RR: 18 (08-10-22 @ 16:02) (18 - 18), SpO2: 95% (08-10-22 @ 16:02) (95% - 99%)    Labs show HB 10.8, HCT 32.7, WBC 15.24, , Lactate 1.0, TPro  6.6  /  Alb  3.4<L>  /  TBili  0.7  /  DBili  x   /  AST  73<H>  /  ALT  46<H>  /  AlkPhos  97  08-10    137  |  96<L>  |  10  ----------------------------<  149<H>  4.1   |  31  |  0.9    Ca    9.1      10 Aug 2022 15:12  Mg     1.7     08-10    CT Orbit w/ IV Cont shows Right periorbital (pre-septal) soft tissue swelling consistent with cellulitis. No evidence of abscess. No evidence of post-septal inflammation.     (10 Aug 2022 20:20)        REVIEW OF SYSTEMS:  Patient denies any headache, any vision complaints, runny nose, fever, chills. Denies chest pain, shortness of breath, palpitation. Denies nausea, vomiting, abdominal pain or diarrhoea, Denies dysuria. Denies  localized weakness in any part of the body or numbness.   At least 10 systems were reviewed in ROS. All systems reviewed  are within normal limits except for the complaints as described in Subjective.    PAST MEDICAL & SURGICAL HISTORY:  PAST MEDICAL & SURGICAL HISTORY:  IV drug abuse      Vertebral osteomyelitis      MSSA bacteremia      No significant past surgical history              VITAL SIGNS:  Vital Signs Last 24 Hrs  T(C): 35.7 (04 Sep 2022 13:28), Max: 36.6 (04 Sep 2022 04:30)  T(F): 96.3 (04 Sep 2022 13:28), Max: 97.9 (04 Sep 2022 04:30)  HR: 86 (04 Sep 2022 13:28) (83 - 86)  BP: 134/96 (04 Sep 2022 13:28) (125/82 - 134/96)  BP(mean): --  RR: 18 (04 Sep 2022 13:28) (18 - 18)  SpO2: 95% (04 Sep 2022 13:28) (95% - 98%)    Parameters below as of 04 Sep 2022 13:28  Patient On (Oxygen Delivery Method): room air              PHYSICAL EXAMINATION:  Not in acute distress, morbid obesity  General: No icterus  HEENT:   no JVD.  Heart: S1+S2 audible  Lungs: bilateral  moderate air entry, no wheezing, no crepitations.  Abdomen: Soft, non-tender, non-distended , no  rigidity or guarding.  CNS: Awake alert, CN  grossly intact.  Extremities:  No edema            CONSULTS:  Consultant(s) Notes Reviewed by me.   Care Discussed with Consultants/Other Providers where required.        MEDICATIONS:  MEDICATIONS  (STANDING):  acetaminophen     Tablet .. 1000 milliGRAM(s) Oral every 8 hours  ALPRAZolam 2 milliGRAM(s) Oral three times a day  buPROPion XL (24-Hour) . 300 milliGRAM(s) Oral daily  ceFAZolin   IVPB 2000 milliGRAM(s) IV Intermittent every 8 hours  cyclobenzaprine 10 milliGRAM(s) Oral three times a day  dexAMETHasone     Tablet 4 milliGRAM(s) Oral every 6 hours  folic acid 1 milliGRAM(s) Oral daily  heparin   Injectable 5000 Unit(s) SubCutaneous every 8 hours  lactulose Syrup 10 Gram(s) Oral two times a day  methadone    Tablet 135 milliGRAM(s) Oral daily  mirtazapine 30 milliGRAM(s) Oral at bedtime  multivitamin 1 Tablet(s) Oral daily  nicotine - 21 mG/24Hr(s) Patch 1 Patch Transdermal daily  pantoprazole    Tablet 40 milliGRAM(s) Oral before breakfast  polyethylene glycol 3350 17 Gram(s) Oral daily  QUEtiapine 200 milliGRAM(s) Oral at bedtime  senna 2 Tablet(s) Oral at bedtime  thiamine 100 milliGRAM(s) Oral daily    MEDICATIONS  (PRN):  aluminum hydroxide/magnesium hydroxide/simethicone Suspension 30 milliLiter(s) Oral every 4 hours PRN Dyspepsia  melatonin 3 milliGRAM(s) Oral at bedtime PRN Insomnia  naloxone Injectable 0.1 milliGRAM(s) IV Push every 3 minutes PRN For ANY of the following changes in patient status:  A. RR LESS THAN 10 breaths per minute, B. Oxygen saturation LESS THAN 90%, C. Sedation score of 6  ondansetron Injectable 4 milliGRAM(s) IV Push every 6 hours PRN Nausea  scopolamine 1 mG/72 Hr(s) Patch 1 Patch Transdermal every 72 hours PRN Nausea and/or Vomiting            ASSESSMENT:  53 yo M w/ pmhx of IVDU (heroin), vertebral osteomyelitis, and MSSA bacteremia presents for worsening back pain. Patient was found febrile in the ED with elevated WBC count, CT orbit showing Right periorbital cellulitis.     Recurrent vertebral OM   Right periorbital cellulitis  Recent history & post treatment for MSSA bacteremia  Morbid obesity      Sepsis present on admission: leukocytosis, febrile  , .8 , BCx negative since 8/12  MR lumbar spine consistent with discitis osteomyelitis at L4-5  PAM showing no vegetations  S/p Transforaminal lumbar interbody fusion (TLIF) with laminectomy and fusion of posterior column using instrumentation and iliac crest allograft on 8/29  On Ancef 2g iv q8h (8 weeks starting 8/11/2022); will need CT before stopping abx as per ID pain management evaluation of acute post op pain appreciated , d/c PCA pump as per pain management   s/p picc line placed (09/01)  No further neurosurgical intervention as per neurosx  Hx of IVD-Opioid abuse on Methadone 135 mg  Suspected folic acid deficiency-continue supplement  Patient was seen independently.  Latest vital signs and labs were reviewed.   Consults if any , reviewed.  Will continue home meds unless contraindicated for chronic comorbid condition  DVT prophylaxis as appropriate ordered  Appropriate Activity and diet order placed  Case was discussed with staff in morning rounds for management & disposition.  Handoff: Pt refusing to go to SNF, will need long term IV antibiotics , Will anticipate for 24-48 hrs if remains stable     Total time spent to complete patient's bedside assessment, physical examination, review medical chart including labs & imaging, discuss medical plan of care with housestaff was more than 35 minutes

## 2022-09-05 LAB
ALBUMIN SERPL ELPH-MCNC: 3.5 G/DL — SIGNIFICANT CHANGE UP (ref 3.5–5.2)
ALP SERPL-CCNC: 109 U/L — SIGNIFICANT CHANGE UP (ref 30–115)
ALT FLD-CCNC: 10 U/L — SIGNIFICANT CHANGE UP (ref 0–41)
ANION GAP SERPL CALC-SCNC: 14 MMOL/L — SIGNIFICANT CHANGE UP (ref 7–14)
AST SERPL-CCNC: 8 U/L — SIGNIFICANT CHANGE UP (ref 0–41)
BASOPHILS # BLD AUTO: 0.06 K/UL — SIGNIFICANT CHANGE UP (ref 0–0.2)
BASOPHILS NFR BLD AUTO: 0.5 % — SIGNIFICANT CHANGE UP (ref 0–1)
BILIRUB SERPL-MCNC: 0.2 MG/DL — SIGNIFICANT CHANGE UP (ref 0.2–1.2)
BUN SERPL-MCNC: 24 MG/DL — HIGH (ref 10–20)
CALCIUM SERPL-MCNC: 8.5 MG/DL — SIGNIFICANT CHANGE UP (ref 8.5–10.1)
CHLORIDE SERPL-SCNC: 93 MMOL/L — LOW (ref 98–110)
CO2 SERPL-SCNC: 28 MMOL/L — SIGNIFICANT CHANGE UP (ref 17–32)
CREAT SERPL-MCNC: 0.6 MG/DL — LOW (ref 0.7–1.5)
EGFR: 115 ML/MIN/1.73M2 — SIGNIFICANT CHANGE UP
EOSINOPHIL # BLD AUTO: 0.02 K/UL — SIGNIFICANT CHANGE UP (ref 0–0.7)
EOSINOPHIL NFR BLD AUTO: 0.2 % — SIGNIFICANT CHANGE UP (ref 0–8)
GLUCOSE SERPL-MCNC: 270 MG/DL — HIGH (ref 70–99)
HCT VFR BLD CALC: 34.9 % — LOW (ref 42–52)
HGB BLD-MCNC: 11.2 G/DL — LOW (ref 14–18)
IMM GRANULOCYTES NFR BLD AUTO: 5.4 % — HIGH (ref 0.1–0.3)
LYMPHOCYTES # BLD AUTO: 18.3 % — LOW (ref 20.5–51.1)
LYMPHOCYTES # BLD AUTO: 2.03 K/UL — SIGNIFICANT CHANGE UP (ref 1.2–3.4)
MAGNESIUM SERPL-MCNC: 2 MG/DL — SIGNIFICANT CHANGE UP (ref 1.8–2.4)
MCHC RBC-ENTMCNC: 29.9 PG — SIGNIFICANT CHANGE UP (ref 27–31)
MCHC RBC-ENTMCNC: 32.1 G/DL — SIGNIFICANT CHANGE UP (ref 32–37)
MCV RBC AUTO: 93.3 FL — SIGNIFICANT CHANGE UP (ref 80–94)
MONOCYTES # BLD AUTO: 0.49 K/UL — SIGNIFICANT CHANGE UP (ref 0.1–0.6)
MONOCYTES NFR BLD AUTO: 4.4 % — SIGNIFICANT CHANGE UP (ref 1.7–9.3)
NEUTROPHILS # BLD AUTO: 7.87 K/UL — HIGH (ref 1.4–6.5)
NEUTROPHILS NFR BLD AUTO: 71.2 % — SIGNIFICANT CHANGE UP (ref 42.2–75.2)
NRBC # BLD: 0 /100 WBCS — SIGNIFICANT CHANGE UP (ref 0–0)
PLATELET # BLD AUTO: 194 K/UL — SIGNIFICANT CHANGE UP (ref 130–400)
POTASSIUM SERPL-MCNC: 4.9 MMOL/L — SIGNIFICANT CHANGE UP (ref 3.5–5)
POTASSIUM SERPL-SCNC: 4.9 MMOL/L — SIGNIFICANT CHANGE UP (ref 3.5–5)
PROT SERPL-MCNC: 6 G/DL — SIGNIFICANT CHANGE UP (ref 6–8)
RBC # BLD: 3.74 M/UL — LOW (ref 4.7–6.1)
RBC # FLD: 18.3 % — HIGH (ref 11.5–14.5)
SODIUM SERPL-SCNC: 135 MMOL/L — SIGNIFICANT CHANGE UP (ref 135–146)
WBC # BLD: 11.07 K/UL — HIGH (ref 4.8–10.8)
WBC # FLD AUTO: 11.07 K/UL — HIGH (ref 4.8–10.8)

## 2022-09-05 PROCEDURE — 99233 SBSQ HOSP IP/OBS HIGH 50: CPT

## 2022-09-05 RX ORDER — DEXAMETHASONE 0.5 MG/5ML
4 ELIXIR ORAL EVERY 12 HOURS
Refills: 0 | Status: DISCONTINUED | OUTPATIENT
Start: 2022-09-05 | End: 2022-09-07

## 2022-09-05 RX ORDER — DEXAMETHASONE 0.5 MG/5ML
1 ELIXIR ORAL
Qty: 14 | Refills: 0
Start: 2022-09-05 | End: 2022-09-11

## 2022-09-05 RX ADMIN — Medication 1000 MILLIGRAM(S): at 23:10

## 2022-09-05 RX ADMIN — Medication 2 MILLIGRAM(S): at 22:09

## 2022-09-05 RX ADMIN — Medication 1 TABLET(S): at 11:09

## 2022-09-05 RX ADMIN — Medication 100 MILLIGRAM(S): at 11:09

## 2022-09-05 RX ADMIN — CYCLOBENZAPRINE HYDROCHLORIDE 10 MILLIGRAM(S): 10 TABLET, FILM COATED ORAL at 22:10

## 2022-09-05 RX ADMIN — QUETIAPINE FUMARATE 200 MILLIGRAM(S): 200 TABLET, FILM COATED ORAL at 22:10

## 2022-09-05 RX ADMIN — Medication 2 MILLIGRAM(S): at 05:20

## 2022-09-05 RX ADMIN — Medication 1 MILLIGRAM(S): at 11:09

## 2022-09-05 RX ADMIN — Medication 1000 MILLIGRAM(S): at 13:15

## 2022-09-05 RX ADMIN — Medication 1000 MILLIGRAM(S): at 22:19

## 2022-09-05 RX ADMIN — Medication 1000 MILLIGRAM(S): at 18:12

## 2022-09-05 RX ADMIN — METHADONE HYDROCHLORIDE 135 MILLIGRAM(S): 40 TABLET ORAL at 11:02

## 2022-09-05 RX ADMIN — HEPARIN SODIUM 5000 UNIT(S): 5000 INJECTION INTRAVENOUS; SUBCUTANEOUS at 05:21

## 2022-09-05 RX ADMIN — Medication 1000 MILLIGRAM(S): at 05:21

## 2022-09-05 RX ADMIN — Medication 100 MILLIGRAM(S): at 22:09

## 2022-09-05 RX ADMIN — Medication 2 MILLIGRAM(S): at 13:15

## 2022-09-05 RX ADMIN — Medication 4 MILLIGRAM(S): at 11:09

## 2022-09-05 RX ADMIN — CYCLOBENZAPRINE HYDROCHLORIDE 10 MILLIGRAM(S): 10 TABLET, FILM COATED ORAL at 05:20

## 2022-09-05 RX ADMIN — Medication 100 MILLIGRAM(S): at 05:21

## 2022-09-05 RX ADMIN — Medication 100 MILLIGRAM(S): at 13:16

## 2022-09-05 RX ADMIN — Medication 1 PATCH: at 07:30

## 2022-09-05 RX ADMIN — Medication 4 MILLIGRAM(S): at 05:20

## 2022-09-05 RX ADMIN — PANTOPRAZOLE SODIUM 40 MILLIGRAM(S): 20 TABLET, DELAYED RELEASE ORAL at 05:21

## 2022-09-05 RX ADMIN — BUPROPION HYDROCHLORIDE 300 MILLIGRAM(S): 150 TABLET, EXTENDED RELEASE ORAL at 11:09

## 2022-09-05 RX ADMIN — Medication 1 PATCH: at 11:10

## 2022-09-05 RX ADMIN — HEPARIN SODIUM 5000 UNIT(S): 5000 INJECTION INTRAVENOUS; SUBCUTANEOUS at 13:15

## 2022-09-05 RX ADMIN — CYCLOBENZAPRINE HYDROCHLORIDE 10 MILLIGRAM(S): 10 TABLET, FILM COATED ORAL at 13:14

## 2022-09-05 RX ADMIN — HEPARIN SODIUM 5000 UNIT(S): 5000 INJECTION INTRAVENOUS; SUBCUTANEOUS at 22:09

## 2022-09-05 RX ADMIN — Medication 1 PATCH: at 11:00

## 2022-09-05 RX ADMIN — MIRTAZAPINE 30 MILLIGRAM(S): 45 TABLET, ORALLY DISINTEGRATING ORAL at 22:10

## 2022-09-05 NOTE — PROVIDER CONTACT NOTE (MEDICATION) - ACTION/TREATMENT ORDERED:
MD stated okay to give meds orally, will change diet to PO and will d/c restraint orders
Med held/ Pharmacist to speak with MD
Hold 1800 dose
Provider to reorder medications, RN awaiting orders

## 2022-09-05 NOTE — PROVIDER CONTACT NOTE (OTHER) - ASSESSMENT
Lab was unable to draw blood cultures
[facility]

## 2022-09-05 NOTE — PROVIDER CONTACT NOTE (OTHER) - DATE AND TIME:
13-Aug-2022 09:05
16-Aug-2022 14:30
27-Aug-2022 04:18
29-Aug-2022 00:07
05-Sep-2022 15:00
13-Aug-2022 15:17
14-Aug-2022 22:05
15-Aug-2022 04:30
17-Aug-2022 09:31
01-Sep-2022 15:36
14-Aug-2022 08:00
27-Aug-2022 06:01
12-Aug-2022 15:14
27-Aug-2022 02:35

## 2022-09-05 NOTE — PROVIDER CONTACT NOTE (OTHER) - NAME OF MD/NP/PA/DO NOTIFIED:
MD Tamwa [id number]
MD Walker
MD Walker
Daquan
MD Walker
MD Walker
MD Weller
MD Goldberg
MD Jesus
MD xavier
Md Weller
MD Calderon [phone]
MD Brandon
[phone]

## 2022-09-05 NOTE — PROGRESS NOTE ADULT - SUBJECTIVE AND OBJECTIVE BOX
Progress Note:  Provider Speciality                            Hospitalist      TEEMIKAEL ELIZABETH MRN-[id number] 52y Male     CHIEF PRESENTING COMPLAINT:  Patient is a 52y old  Male who presents with a chief complaint of Back pain (03 Sep 2022 14:24)        SUBJECTIVE:  Patient was seen and examined at bedside.  No new complaints described by patient in morning rounds.   No significant overnight events reported.     HISTORY OF PRESENTING ILLNESS:  HPI:  53 yo M w/ pmhx of IVDU (heroin), vertebral osteomyelitis, and MSSA bacteremia presents for worsening back pain. Back pain was increasing over the past couple of weeks and worsening today to the point he was having difficulty ambulating and bearing weight. Pain is sharp, radiating from low back to b/l hips and worse with movement. Patient normally able to ambulate with walker but now having difficulty walking due to pain. He endorses feeling similar to the pain from prior admission. Pt is also complaining of right sided eye swelling/redness since Tuesday. He states, he slept on the floor and woke up in the am with redness/swelling. Patient denies any visual changes or eye pain. Patient denies any fever, chills, headache, dizziness. Patient denies chest pain, palpitation, SOB. Patient denies abdominal pain, n/v/d/c.   Patient was recently admitted 03/12-04/06 for vertebral OM/facetitis. PAM was negative and ID recommended cefazolin. Patient had picc line placed for 6wks of antibiotics. Patient states he finished his antibiotics at [facility] but did not follow up with Dr Giraldo. Patient states he is currently not using heroin. He last used 10 months ago.   Patient was found febrile in the ED with elevated WBC count, CT orbit showing Right periorbital cellulitis.     ED vitals T(F): 99 (08-10-22 @ 16:02), Max: 100.5 (08-10-22 @ 13:08), HR: 96 (08-10-22 @ 16:02) (96 - 99), BP: 130/61 (08-10-22 @ 16:02) (130/61 - 144/88), RR: 18 (08-10-22 @ 16:02) (18 - 18), SpO2: 95% (08-10-22 @ 16:02) (95% - 99%)    Labs show HB 10.8, HCT 32.7, WBC 15.24, , Lactate 1.0, TPro  6.6  /  Alb  3.4<L>  /  TBili  0.7  /  DBili  x   /  AST  73<H>  /  ALT  46<H>  /  AlkPhos  97  08-10    137  |  96<L>  |  10  ----------------------------<  149<H>  4.1   |  31  |  0.9    Ca    9.1      10 Aug 2022 15:12  Mg     1.7     08-10    CT Orbit w/ IV Cont shows Right periorbital (pre-septal) soft tissue swelling consistent with cellulitis. No evidence of abscess. No evidence of post-septal inflammation.     (10 Aug 2022 20:20)        REVIEW OF SYSTEMS:  Patient denies any headache, any vision complaints, runny nose, fever, chills. Denies chest pain, shortness of breath, palpitation. Denies nausea, vomiting, abdominal pain or diarrhoea, Denies dysuria. Denies  localized weakness in any part of the body or numbness.   At least 10 systems were reviewed in ROS. All systems reviewed  are within normal limits except for the complaints as described in Subjective.    PAST MEDICAL & SURGICAL HISTORY:  PAST MEDICAL & SURGICAL HISTORY:  IV drug abuse      Vertebral osteomyelitis      MSSA bacteremia      No significant past surgical history              VITAL SIGNS:  Vital Signs Last 24 Hrs  T(C): 36.3 (05 Sep 2022 12:54), Max: 36.6 (05 Sep 2022 00:15)  T(F): 97.4 (05 Sep 2022 12:54), Max: 97.8 (05 Sep 2022 00:15)  HR: 88 (05 Sep 2022 12:54) (79 - 88)  BP: 127/77 (05 Sep 2022 12:54) (127/77 - 160/86)  BP(mean): --  RR: 18 (05 Sep 2022 12:54) (18 - 20)  SpO2: 98% (05 Sep 2022 12:54) (96% - 99%)    Parameters below as of 05 Sep 2022 04:45  Patient On (Oxygen Delivery Method): room air                PHYSICAL EXAMINATION:  Not in acute distress, morbid obesity  General: No icterus  HEENT:   no JVD.  Heart: S1+S2 audible  Lungs: bilateral  moderate air entry, no wheezing, no crepitations.  Abdomen: Soft, non-tender, non-distended , no  rigidity or guarding.  CNS: Awake alert, CN  grossly intact.  Extremities:  No edema            CONSULTS:  Consultant(s) Notes Reviewed by me.   Care Discussed with Consultants/Other Providers where required.        MEDICATIONS:  MEDICATIONS  (STANDING):  acetaminophen     Tablet .. 1000 milliGRAM(s) Oral every 8 hours  ALPRAZolam 2 milliGRAM(s) Oral three times a day  buPROPion XL (24-Hour) . 300 milliGRAM(s) Oral daily  ceFAZolin   IVPB 2000 milliGRAM(s) IV Intermittent every 8 hours  cyclobenzaprine 10 milliGRAM(s) Oral three times a day  dexAMETHasone     Tablet 4 milliGRAM(s) Oral every 6 hours  folic acid 1 milliGRAM(s) Oral daily  heparin   Injectable 5000 Unit(s) SubCutaneous every 8 hours  lactulose Syrup 10 Gram(s) Oral two times a day  methadone    Tablet 135 milliGRAM(s) Oral daily  mirtazapine 30 milliGRAM(s) Oral at bedtime  multivitamin 1 Tablet(s) Oral daily  nicotine - 21 mG/24Hr(s) Patch 1 Patch Transdermal daily  pantoprazole    Tablet 40 milliGRAM(s) Oral before breakfast  polyethylene glycol 3350 17 Gram(s) Oral daily  QUEtiapine 200 milliGRAM(s) Oral at bedtime  senna 2 Tablet(s) Oral at bedtime  thiamine 100 milliGRAM(s) Oral daily    MEDICATIONS  (PRN):  aluminum hydroxide/magnesium hydroxide/simethicone Suspension 30 milliLiter(s) Oral every 4 hours PRN Dyspepsia  melatonin 3 milliGRAM(s) Oral at bedtime PRN Insomnia  naloxone Injectable 0.1 milliGRAM(s) IV Push every 3 minutes PRN For ANY of the following changes in patient status:  A. RR LESS THAN 10 breaths per minute, B. Oxygen saturation LESS THAN 90%, C. Sedation score of 6  ondansetron Injectable 4 milliGRAM(s) IV Push every 6 hours PRN Nausea  scopolamine 1 mG/72 Hr(s) Patch 1 Patch Transdermal every 72 hours PRN Nausea and/or Vomiting            ASSESSMENT:  53 yo M w/ pmhx of IVDU (heroin), vertebral osteomyelitis, and MSSA bacteremia presents for worsening back pain. Patient was found febrile in the ED with elevated WBC count, CT orbit showing Right periorbital cellulitis.     Recurrent vertebral OM   Right periorbital cellulitis  Recent history & post treatment for MSSA bacteremia  Morbid obesity      Sepsis present on admission  , .8 , BCx negative since 8/12  MR lumbar spine consistent with discitis osteomyelitis at L4-5  PAM showing no vegetations  S/p Transforaminal lumbar interbody fusion (TLIF) with laminectomy and fusion of posterior column using instrumentation and iliac crest allograft on 8/29  On Ancef 2g iv q8h (8 weeks starting 8/11/2022); will need CT before stopping abx as per ID pain management evaluation of acute post op pain appreciated , d/c PCA pump as per pain management   s/p picc line placed (09/01)  No further neurosurgical intervention as per neurosx  Hx of IVD-Opioid abuse on Methadone 135 mg  Suspected folic acid deficiency-continue supplement  Patient was seen independently.  Latest vital signs and labs were reviewed.   Consults  reviewed.  Will continue home meds unless contraindicated for chronic comorbid condition  DVT prophylaxis as appropriate ordered  Appropriate Activity and diet order placed  Case was discussed with staff in morning rounds for management & disposition.  Handoff: Pt refusing to go to SNF but cannot be discharged to home with VNS due to h/o IVDA,, will need long term IV antibiotics , Will anticipate for tomorrow for SNF  Total time spent to complete patient's bedside assessment, physical examination, review medical chart including labs & imaging, discuss medical plan of care with housestaff was more than 35 minutes         Progress Note:  Provider Speciality                            Hospitalist      TEEMIKAEL ELIZABETH MRN-[id number] 52y Male     CHIEF PRESENTING COMPLAINT:  Patient is a 52y old  Male who presents with a chief complaint of Back pain (03 Sep 2022 14:24)        SUBJECTIVE:  Patient was seen and examined at bedside.  No new complaints described by patient in morning rounds.   No significant overnight events reported.     HISTORY OF PRESENTING ILLNESS:  HPI:  53 yo M w/ pmhx of IVDU (heroin), vertebral osteomyelitis, and MSSA bacteremia presents for worsening back pain. Back pain was increasing over the past couple of weeks and worsening today to the point he was having difficulty ambulating and bearing weight. Pain is sharp, radiating from low back to b/l hips and worse with movement. Patient normally able to ambulate with walker but now having difficulty walking due to pain. He endorses feeling similar to the pain from prior admission. Pt is also complaining of right sided eye swelling/redness since Tuesday. He states, he slept on the floor and woke up in the am with redness/swelling. Patient denies any visual changes or eye pain. Patient denies any fever, chills, headache, dizziness. Patient denies chest pain, palpitation, SOB. Patient denies abdominal pain, n/v/d/c.   Patient was recently admitted 03/12-04/06 for vertebral OM/facetitis. PAM was negative and ID recommended cefazolin. Patient had picc line placed for 6wks of antibiotics. Patient states he finished his antibiotics at [facility] but did not follow up with Dr Giraldo. Patient states he is currently not using heroin. He last used 10 months ago.   Patient was found febrile in the ED with elevated WBC count, CT orbit showing Right periorbital cellulitis.     ED vitals T(F): 99 (08-10-22 @ 16:02), Max: 100.5 (08-10-22 @ 13:08), HR: 96 (08-10-22 @ 16:02) (96 - 99), BP: 130/61 (08-10-22 @ 16:02) (130/61 - 144/88), RR: 18 (08-10-22 @ 16:02) (18 - 18), SpO2: 95% (08-10-22 @ 16:02) (95% - 99%)    Labs show HB 10.8, HCT 32.7, WBC 15.24, , Lactate 1.0, TPro  6.6  /  Alb  3.4<L>  /  TBili  0.7  /  DBili  x   /  AST  73<H>  /  ALT  46<H>  /  AlkPhos  97  08-10    137  |  96<L>  |  10  ----------------------------<  149<H>  4.1   |  31  |  0.9    Ca    9.1      10 Aug 2022 15:12  Mg     1.7     08-10    CT Orbit w/ IV Cont shows Right periorbital (pre-septal) soft tissue swelling consistent with cellulitis. No evidence of abscess. No evidence of post-septal inflammation.     (10 Aug 2022 20:20)        REVIEW OF SYSTEMS:  Patient denies any headache, any vision complaints, runny nose, fever, chills. Denies chest pain, shortness of breath, palpitation. Denies nausea, vomiting, abdominal pain or diarrhoea, Denies dysuria. Denies  localized weakness in any part of the body or numbness.   At least 10 systems were reviewed in ROS. All systems reviewed  are within normal limits except for the complaints as described in Subjective.    PAST MEDICAL & SURGICAL HISTORY:  PAST MEDICAL & SURGICAL HISTORY:  IV drug abuse      Vertebral osteomyelitis      MSSA bacteremia      No significant past surgical history              VITAL SIGNS:  Vital Signs Last 24 Hrs  T(C): 36.3 (05 Sep 2022 12:54), Max: 36.6 (05 Sep 2022 00:15)  T(F): 97.4 (05 Sep 2022 12:54), Max: 97.8 (05 Sep 2022 00:15)  HR: 88 (05 Sep 2022 12:54) (79 - 88)  BP: 127/77 (05 Sep 2022 12:54) (127/77 - 160/86)  BP(mean): --  RR: 18 (05 Sep 2022 12:54) (18 - 20)  SpO2: 98% (05 Sep 2022 12:54) (96% - 99%)    Parameters below as of 05 Sep 2022 04:45  Patient On (Oxygen Delivery Method): room air                PHYSICAL EXAMINATION:  Not in acute distress, morbid obesity  General: No icterus  HEENT:   no JVD.  Heart: S1+S2 audible  Lungs: bilateral  moderate air entry, no wheezing, no crepitations.  Abdomen: Soft, non-tender, non-distended , no  rigidity or guarding.  CNS: Awake alert, CN  grossly intact.  Extremities:  No edema            CONSULTS:  Consultant(s) Notes Reviewed by me.   Care Discussed with Consultants/Other Providers where required.        MEDICATIONS:  MEDICATIONS  (STANDING):  acetaminophen     Tablet .. 1000 milliGRAM(s) Oral every 8 hours  ALPRAZolam 2 milliGRAM(s) Oral three times a day  buPROPion XL (24-Hour) . 300 milliGRAM(s) Oral daily  ceFAZolin   IVPB 2000 milliGRAM(s) IV Intermittent every 8 hours  cyclobenzaprine 10 milliGRAM(s) Oral three times a day  dexAMETHasone     Tablet 4 milliGRAM(s) Oral every 6 hours  folic acid 1 milliGRAM(s) Oral daily  heparin   Injectable 5000 Unit(s) SubCutaneous every 8 hours  lactulose Syrup 10 Gram(s) Oral two times a day  methadone    Tablet 135 milliGRAM(s) Oral daily  mirtazapine 30 milliGRAM(s) Oral at bedtime  multivitamin 1 Tablet(s) Oral daily  nicotine - 21 mG/24Hr(s) Patch 1 Patch Transdermal daily  pantoprazole    Tablet 40 milliGRAM(s) Oral before breakfast  polyethylene glycol 3350 17 Gram(s) Oral daily  QUEtiapine 200 milliGRAM(s) Oral at bedtime  senna 2 Tablet(s) Oral at bedtime  thiamine 100 milliGRAM(s) Oral daily    MEDICATIONS  (PRN):  aluminum hydroxide/magnesium hydroxide/simethicone Suspension 30 milliLiter(s) Oral every 4 hours PRN Dyspepsia  melatonin 3 milliGRAM(s) Oral at bedtime PRN Insomnia  naloxone Injectable 0.1 milliGRAM(s) IV Push every 3 minutes PRN For ANY of the following changes in patient status:  A. RR LESS THAN 10 breaths per minute, B. Oxygen saturation LESS THAN 90%, C. Sedation score of 6  ondansetron Injectable 4 milliGRAM(s) IV Push every 6 hours PRN Nausea  scopolamine 1 mG/72 Hr(s) Patch 1 Patch Transdermal every 72 hours PRN Nausea and/or Vomiting            ASSESSMENT:  53 yo M w/ pmhx of IVDU (heroin), vertebral osteomyelitis, and MSSA bacteremia presents for worsening back pain. Patient was found febrile in the ED with elevated WBC count, CT orbit showing Right periorbital cellulitis.     Recurrent vertebral OM   Right periorbital cellulitis  Recent history & post treatment for MSSA bacteremia  Morbid obesity      Sepsis present on admission  , .8 , BCx negative since 8/12  MR lumbar spine consistent with discitis osteomyelitis at L4-5  PAM showing no vegetations  S/p Transforaminal lumbar interbody fusion (TLIF) with laminectomy and fusion of posterior column using instrumentation and iliac crest allograft on 8/29  On Ancef 2g iv q8h (8 weeks starting 8/11/2022); will need CT before stopping abx as per ID pain management evaluation of acute post op pain appreciated , d/c PCA pump as per pain management   s/p picc line placed (09/01)  No further neurosurgical intervention as per neurosx  Suspected folic acid deficiency-continue supplement  Patient was seen independently.  Latest vital signs and labs were reviewed.   Consults  reviewed.  Will continue home meds unless contraindicated for chronic comorbid condition  DVT prophylaxis as appropriate ordered  Appropriate Activity and diet order placed  Case was discussed with staff in morning rounds for management & disposition.  Handoff: Pt refusing to go to SNF but cannot be discharged to home with VNS due to h/o IVDA,, will need long term IV antibiotics , Will anticipate for tomorrow for SNF  Total time spent to complete patient's bedside assessment, physical examination, review medical chart including labs & imaging, discuss medical plan of care with housestaff was more than 35 minutes

## 2022-09-05 NOTE — PROVIDER CONTACT NOTE (MEDICATION) - ASSESSMENT
Also patient no longer has NGT, does pt need b/l wrist restraints?
Pts decadron changed from every 6 hours to twice a day   Pt got dose at 0600 and 1200

## 2022-09-05 NOTE — PROVIDER CONTACT NOTE (MEDICATION) - SITUATION
pt was scheduled for Xanax, Seroquel, and gabapentin at 1400, pt was off unit for exam until now so pt never received medications, pt is now asking for meds. can stat doses be ordered? also pt's
Pt patient take meds orally and what is pt PO status
Pt due for decadron at 1800
Made  aware as per directions/ capsule can not be opened or crushed and tablet (as per Pharmacist) is not avalaible

## 2022-09-06 LAB
HCT VFR BLD CALC: 37.5 % — LOW (ref 42–52)
HGB BLD-MCNC: 12 G/DL — LOW (ref 14–18)
MCHC RBC-ENTMCNC: 30 PG — SIGNIFICANT CHANGE UP (ref 27–31)
MCHC RBC-ENTMCNC: 32 G/DL — SIGNIFICANT CHANGE UP (ref 32–37)
MCV RBC AUTO: 93.8 FL — SIGNIFICANT CHANGE UP (ref 80–94)
NRBC # BLD: 0 /100 WBCS — SIGNIFICANT CHANGE UP (ref 0–0)
PLATELET # BLD AUTO: 178 K/UL — SIGNIFICANT CHANGE UP (ref 130–400)
RBC # BLD: 4 M/UL — LOW (ref 4.7–6.1)
RBC # FLD: 19.6 % — HIGH (ref 11.5–14.5)
SARS-COV-2 RNA SPEC QL NAA+PROBE: SIGNIFICANT CHANGE UP
WBC # BLD: 14.29 K/UL — HIGH (ref 4.8–10.8)
WBC # FLD AUTO: 14.29 K/UL — HIGH (ref 4.8–10.8)

## 2022-09-06 PROCEDURE — 99239 HOSP IP/OBS DSCHRG MGMT >30: CPT

## 2022-09-06 RX ORDER — METHADONE HYDROCHLORIDE 40 MG/1
135 TABLET ORAL DAILY
Refills: 0 | Status: DISCONTINUED | OUTPATIENT
Start: 2022-09-06 | End: 2022-09-07

## 2022-09-06 RX ORDER — ALPRAZOLAM 0.25 MG
2 TABLET ORAL THREE TIMES A DAY
Refills: 0 | Status: DISCONTINUED | OUTPATIENT
Start: 2022-09-06 | End: 2022-09-07

## 2022-09-06 RX ADMIN — Medication 1000 MILLIGRAM(S): at 21:50

## 2022-09-06 RX ADMIN — Medication 100 MILLIGRAM(S): at 21:48

## 2022-09-06 RX ADMIN — PANTOPRAZOLE SODIUM 40 MILLIGRAM(S): 20 TABLET, DELAYED RELEASE ORAL at 05:46

## 2022-09-06 RX ADMIN — Medication 1000 MILLIGRAM(S): at 05:48

## 2022-09-06 RX ADMIN — BUPROPION HYDROCHLORIDE 300 MILLIGRAM(S): 150 TABLET, EXTENDED RELEASE ORAL at 15:40

## 2022-09-06 RX ADMIN — Medication 1 TABLET(S): at 15:39

## 2022-09-06 RX ADMIN — Medication 2 MILLIGRAM(S): at 21:59

## 2022-09-06 RX ADMIN — QUETIAPINE FUMARATE 200 MILLIGRAM(S): 200 TABLET, FILM COATED ORAL at 21:50

## 2022-09-06 RX ADMIN — Medication 100 MILLIGRAM(S): at 15:39

## 2022-09-06 RX ADMIN — Medication 100 MILLIGRAM(S): at 15:45

## 2022-09-06 RX ADMIN — Medication 1 PATCH: at 06:44

## 2022-09-06 RX ADMIN — Medication 2 MILLIGRAM(S): at 16:33

## 2022-09-06 RX ADMIN — HEPARIN SODIUM 5000 UNIT(S): 5000 INJECTION INTRAVENOUS; SUBCUTANEOUS at 15:41

## 2022-09-06 RX ADMIN — Medication 100 MILLIGRAM(S): at 05:45

## 2022-09-06 RX ADMIN — Medication 1 PATCH: at 15:45

## 2022-09-06 RX ADMIN — CYCLOBENZAPRINE HYDROCHLORIDE 10 MILLIGRAM(S): 10 TABLET, FILM COATED ORAL at 15:40

## 2022-09-06 RX ADMIN — CYCLOBENZAPRINE HYDROCHLORIDE 10 MILLIGRAM(S): 10 TABLET, FILM COATED ORAL at 05:45

## 2022-09-06 RX ADMIN — HEPARIN SODIUM 5000 UNIT(S): 5000 INJECTION INTRAVENOUS; SUBCUTANEOUS at 21:49

## 2022-09-06 RX ADMIN — Medication 1000 MILLIGRAM(S): at 06:46

## 2022-09-06 RX ADMIN — Medication 1 MILLIGRAM(S): at 15:40

## 2022-09-06 RX ADMIN — MIRTAZAPINE 30 MILLIGRAM(S): 45 TABLET, ORALLY DISINTEGRATING ORAL at 21:49

## 2022-09-06 RX ADMIN — HEPARIN SODIUM 5000 UNIT(S): 5000 INJECTION INTRAVENOUS; SUBCUTANEOUS at 05:54

## 2022-09-06 RX ADMIN — METHADONE HYDROCHLORIDE 135 MILLIGRAM(S): 40 TABLET ORAL at 13:06

## 2022-09-06 RX ADMIN — Medication 4 MILLIGRAM(S): at 17:22

## 2022-09-06 RX ADMIN — Medication 4 MILLIGRAM(S): at 05:46

## 2022-09-06 RX ADMIN — CYCLOBENZAPRINE HYDROCHLORIDE 10 MILLIGRAM(S): 10 TABLET, FILM COATED ORAL at 21:49

## 2022-09-06 RX ADMIN — Medication 1000 MILLIGRAM(S): at 15:45

## 2022-09-07 ENCOUNTER — TRANSCRIPTION ENCOUNTER (OUTPATIENT)
Age: 52
End: 2022-09-07

## 2022-09-07 VITALS
OXYGEN SATURATION: 94 % | SYSTOLIC BLOOD PRESSURE: 139 MMHG | RESPIRATION RATE: 18 BRPM | HEART RATE: 92 BPM | TEMPERATURE: 99 F | DIASTOLIC BLOOD PRESSURE: 81 MMHG

## 2022-09-07 LAB
HCT VFR BLD CALC: 37.6 % — LOW (ref 42–52)
HGB BLD-MCNC: 12 G/DL — LOW (ref 14–18)
MCHC RBC-ENTMCNC: 29.7 PG — SIGNIFICANT CHANGE UP (ref 27–31)
MCHC RBC-ENTMCNC: 31.9 G/DL — LOW (ref 32–37)
MCV RBC AUTO: 93.1 FL — SIGNIFICANT CHANGE UP (ref 80–94)
NRBC # BLD: 0 /100 WBCS — SIGNIFICANT CHANGE UP (ref 0–0)
PLATELET # BLD AUTO: 177 K/UL — SIGNIFICANT CHANGE UP (ref 130–400)
RBC # BLD: 4.04 M/UL — LOW (ref 4.7–6.1)
RBC # FLD: 18.9 % — HIGH (ref 11.5–14.5)
WBC # BLD: 11.06 K/UL — HIGH (ref 4.8–10.8)
WBC # FLD AUTO: 11.06 K/UL — HIGH (ref 4.8–10.8)

## 2022-09-07 PROCEDURE — 99232 SBSQ HOSP IP/OBS MODERATE 35: CPT

## 2022-09-07 RX ADMIN — CYCLOBENZAPRINE HYDROCHLORIDE 10 MILLIGRAM(S): 10 TABLET, FILM COATED ORAL at 13:55

## 2022-09-07 RX ADMIN — Medication 1 PATCH: at 11:25

## 2022-09-07 RX ADMIN — Medication 1000 MILLIGRAM(S): at 05:15

## 2022-09-07 RX ADMIN — PANTOPRAZOLE SODIUM 40 MILLIGRAM(S): 20 TABLET, DELAYED RELEASE ORAL at 05:16

## 2022-09-07 RX ADMIN — Medication 1 MILLIGRAM(S): at 11:24

## 2022-09-07 RX ADMIN — Medication 100 MILLIGRAM(S): at 13:54

## 2022-09-07 RX ADMIN — Medication 1000 MILLIGRAM(S): at 13:55

## 2022-09-07 RX ADMIN — METHADONE HYDROCHLORIDE 135 MILLIGRAM(S): 40 TABLET ORAL at 11:26

## 2022-09-07 RX ADMIN — Medication 1 TABLET(S): at 11:24

## 2022-09-07 RX ADMIN — HEPARIN SODIUM 5000 UNIT(S): 5000 INJECTION INTRAVENOUS; SUBCUTANEOUS at 13:54

## 2022-09-07 RX ADMIN — Medication 2 MILLIGRAM(S): at 15:19

## 2022-09-07 RX ADMIN — Medication 4 MILLIGRAM(S): at 05:16

## 2022-09-07 RX ADMIN — CYCLOBENZAPRINE HYDROCHLORIDE 10 MILLIGRAM(S): 10 TABLET, FILM COATED ORAL at 05:16

## 2022-09-07 RX ADMIN — Medication 100 MILLIGRAM(S): at 05:15

## 2022-09-07 RX ADMIN — HEPARIN SODIUM 5000 UNIT(S): 5000 INJECTION INTRAVENOUS; SUBCUTANEOUS at 05:15

## 2022-09-07 RX ADMIN — Medication 100 MILLIGRAM(S): at 11:24

## 2022-09-07 RX ADMIN — BUPROPION HYDROCHLORIDE 300 MILLIGRAM(S): 150 TABLET, EXTENDED RELEASE ORAL at 11:24

## 2022-09-07 NOTE — PROGRESS NOTE ADULT - SUBJECTIVE AND OBJECTIVE BOX
MIKAEL MCDERMOTT  [id number] ([id number])      Patient is a 52y old  Male who presents with a chief complaint of Back pain (05 Sep 2022 13:28)        Interval events:  Patient seen and examined at bedside. No acute events overnight. No current complaints.     -PMHx: IV drug abuse    Vertebral osteomyelitis    MSSA bacteremia      -PSHx:No significant past surgical history            REVIEW OF SYSTEMS:  CONSTITUTIONAL: No fever, weight loss, or fatigue  RESPIRATORY: No cough, wheezing, chills or hemoptysis; No shortness of breath  CARDIOVASCULAR: No chest pain, palpitations, dizziness, or leg swelling  GASTROINTESTINAL: No abdominal or epigastric pain. No nausea, vomiting, or hematemesis; No diarrhea or constipation. No melena or hematochezia.  NEUROLOGICAL: No headaches  LYMPH NODES: No enlarged glands  MUSCULOSKELETAL: No joint pain or swelling; No muscle, back, or extremity pain      T(C): , Max: 37.1 (09-07-22 @ 12:23)  HR: 92 (09-07-22 @ 12:23)  BP: 139/81 (09-07-22 @ 12:23)  RR: 18 (09-07-22 @ 12:23)  SpO2: 94% (09-07-22 @ 12:23)  CAPILLARY BLOOD GLUCOSE          PHYSICAL EXAM:  General: NAD, morbidly obese  HEENT: No icterus, no JVD.  Heart: S1+S2 audible  Lungs: bilateral  moderate air entry, no wheezing, no crepitations.  Abdomen: Soft, non-tender, non-distended , no  rigidity or guarding.  CNS: Awake alert, CN  grossly intact.  Extremities:  No edema            LABS:          12.0  11.06  )-------(177          37.6  N=--  L=--  MCV=93.1              Microbiology:    Culture - Tissue with Gram Stain (collected 08-29-22 @ 19:38)  Source: .Tissue None  Gram Stain (08-31-22 @ 15:06):    **Please Note**: This is a Corrected Report**    Rare polymorphonuclear leukocytes per low power field    No organisms seen per oil power field    Previously reported as:    Rare polymorphonuclear leukocytes seen per low power field    Moderate Gram NegativeRods seen per oil power field  Final Report (09-03-22 @ 14:55):    No growth at 5 days        RADIOLOGY & ADDITIONAL TESTS:        Medications:  acetaminophen     Tablet .. 1000 milliGRAM(s) Oral every 8 hours  ALPRAZolam 2 milliGRAM(s) Oral three times a day PRN  aluminum hydroxide/magnesium hydroxide/simethicone Suspension 30 milliLiter(s) Oral every 4 hours PRN  buPROPion XL (24-Hour) . 300 milliGRAM(s) Oral daily  ceFAZolin   IVPB 2000 milliGRAM(s) IV Intermittent every 8 hours  cyclobenzaprine 10 milliGRAM(s) Oral three times a day  dexAMETHasone     Tablet 4 milliGRAM(s) Oral every 12 hours  folic acid 1 milliGRAM(s) Oral daily  heparin   Injectable 5000 Unit(s) SubCutaneous every 8 hours  melatonin 3 milliGRAM(s) Oral at bedtime PRN  methadone    Tablet 135 milliGRAM(s) Oral daily  mirtazapine 30 milliGRAM(s) Oral at bedtime  multivitamin 1 Tablet(s) Oral daily  naloxone Injectable 0.1 milliGRAM(s) IV Push every 3 minutes PRN  nicotine - 21 mG/24Hr(s) Patch 1 Patch Transdermal daily  ondansetron Injectable 4 milliGRAM(s) IV Push every 6 hours PRN  pantoprazole    Tablet 40 milliGRAM(s) Oral before breakfast  QUEtiapine 200 milliGRAM(s) Oral at bedtime  scopolamine 1 mG/72 Hr(s) Patch 1 Patch Transdermal every 72 hours PRN  thiamine 100 milliGRAM(s) Oral daily

## 2022-09-07 NOTE — PROGRESS NOTE ADULT - TIME BILLING
Total time spent to complete patient's bedside assessment, physical examination, review medical chart including labs & imaging, discuss medical plan of care with housestaff was more than 35 minutes
Counseled patient about diagnostic testing and treatment plan. All questions answered.
Total time spent to complete patient's bedside assessment, physical examination, review medical chart including labs & imaging, discuss medical plan of care with housestaff was more than 35 minutes
Total time spent to complete patient's bedside assessment, physical examination, review medical chart including labs & imaging, discuss medical plan of care with housestaff was more than 35 minutes
Counseled patient/medicine staff about diagnostic testing and treatment plan. All questions answered.
Counseled patient about diagnostic testing and treatment plan. All questions answered.
Total time spent to complete patient's bedside assessment, physical examination, review medical chart including labs & imaging, discuss medical plan of care with housestaff was more than 35 minutes
Counseled patient about diagnostic testing and treatment plan. All questions answered.
Counseled patient about diagnostic testing and treatment plan. All questions answered.

## 2022-09-07 NOTE — PROGRESS NOTE ADULT - PROVIDER SPECIALTY LIST ADULT
Hospitalist
Internal Medicine
Internal Medicine
Neurosurgery
Neurosurgery
Hospitalist
Internal Medicine
Neurosurgery
Neurosurgery
Pain Medicine
Hospitalist
Infectious Disease
Internal Medicine
Internal Medicine
Neurosurgery
Hospitalist
Neurosurgery
Hospitalist
Infectious Disease

## 2022-09-07 NOTE — PROGRESS NOTE ADULT - ASSESSMENT
51 yo M w/ pmhx of IVDU (heroin), vertebral osteomyelitis, and MSSA bacteremia presents for worsening back pain. Patient was found febrile in the ED with elevated WBC count, CT orbit showing Right periorbital cellulitis.     #Recurrence of vertebral OM   #Recent history & post treatment for MSSA bacteremia  #Right periorbital cellulitis  #Sepsis present on admission: leukocytosis, febrile  - CT Orbit w/ IV Cont 8/10: shows Right periorbital (pre-septal) soft tissue swelling consistent with cellulitis. No evidence of abscess. No evidence of post-septal inflammation.  - h/o IR Drainage of Abscess/Biopsy of suspected discitis done 3/16 -fluid  growing NICOLE  - , .8   - BCx negative since 8/12  - MR lumbar/t spine: 1. Interval progression of discitis osteomyelitis at L4-5 with increased destruction of the endplate cortices and intervening disc. Associated mild vertebral height losses of L4 on L5. 2. Significantly increased epidural phlegmon extending from the right dorsal epidural space from L2-3 down to sacral level with maximal mass effect at the right L4-5 epidural space resulting in impingement/compression of the thecal sac and nerve roots. 3. Slightly increased erosion affecting bilateral L4-5 facet joints (left more than right), and bilateral L5-S1 facet joints. Increased enhancements involving bilateral L4-L5 and L5-S1 neural foraminal spaces.2. Increased paravertebral phlegmon surrounding L4-5 with slightly increased size of the left retroperitoneal abscess. Slightly smaller right psoas abscess.  - PAM showing no vegetations  - s/p Transforaminal lumbar interbody fusion (TLIF) with laminectomy and fusion of posterior column using instrumentation and iliac crest allograft on 8/29/2022, POD 2  - Ancef 2g iv q8h (8 weeks starting 8/11/2022); will need CT before stopping abx as per ID   - s/p picc line placed (09/01)  - per neurosx: No further neurosurgical intervention, reconsult prn. Patient should follow up outpatient with Dr. Abbott in two weeks from surgery for suture removal. [phone]    #Vomiting/abdominal pain- resolved  #Delirium- resolved  - possibly vomited from nafcillin?  - delirium possibly due to vomiting/not getting methadone, now back at baseline    #Transaminitis- resolved  - U/S equivocal  - trend LFT's     #Normocytic Anemia- improved  - Monitor H+H  - ferritin, b12, folate wnl    #Hx of IVD  #Opioid abuse on Methadone  - Continue methadone 135, and gabapentin    #Anxiety/Depression  - c/w mirtazapine + quetiapine   - c/w xanax     #Active nicotine dependence  - c/w nicotine patch  - c/w buproprion    #Morbid obesity  - diet and lifestyle modification     #Suspected folic acid deficiency  -continue supplement    #, suspected DM ,   HbA1c 01/2022 6.6 , FS AC QHS,  - o/p follow up with pcp   - sliding scale if needed     #DVT PPx- heparin  #GI PPx- None  #Diet- DASH/TLC  #Activity- AAT  #Code- FULL    Family discussion: Plan of care discussed with patient, aware and agreeable   Disposition: discharge to [facility] today

## 2022-09-07 NOTE — DISCHARGE NOTE NURSING/CASE MANAGEMENT/SOCIAL WORK - NSDCVIVACCINE_GEN_ALL_CORE_FT
Tdap; 15-Baljinder-2022 23:06; Jovanny Singh (STANISLAV); [employer]; [id number] (Exp. Date: 09-Sep-2023); IntraMuscular; Deltoid Left.; 0.5 milliLiter(s); VIS (VIS Published: 09-May-2013, VIS Presented: 15-Baljinder-2022);

## 2022-09-07 NOTE — PROGRESS NOTE ADULT - REASON FOR ADMISSION
Back pain

## 2022-09-07 NOTE — DISCHARGE NOTE NURSING/CASE MANAGEMENT/SOCIAL WORK - PATIENT PORTAL LINK FT
You can access the FollowMyHealth Patient Portal offered by [facility] by registering at the following website: [email or web address]. By joining [employer]’s FollowMyHealth portal, you will also be able to view your health information using other applications (apps) compatible with our system.

## 2022-09-07 NOTE — DISCHARGE NOTE NURSING/CASE MANAGEMENT/SOCIAL WORK - NSDCPEFALRISK_GEN_ALL_CORE
For information on Fall & Injury Prevention, visit: [email or web address] OR  [email or web address] OR  https://www.cdc.gov/steadi/patient.html

## 2022-09-12 NOTE — ED PROCEDURE NOTE - PROCEDURE NAME, MLM
Point of Care Ultrasound Vascular Access O-T Advancement Flap Text: The defect edges were debeveled with a #15 scalpel blade.  Given the location of the defect, shape of the defect and the proximity to free margins an O-T advancement flap was deemed most appropriate.  Using a sterile surgical marker, an appropriate advancement flap was drawn incorporating the defect and placing the expected incisions within the relaxed skin tension lines where possible.    The area thus outlined was incised deep to adipose tissue with a #15 scalpel blade.  The skin margins were undermined to an appropriate distance in all directions utilizing iris scissors.

## 2022-09-13 DIAGNOSIS — S00.11XA CONTUSION OF RIGHT EYELID AND PERIOCULAR AREA, INITIAL ENCOUNTER: ICD-10-CM

## 2022-09-13 DIAGNOSIS — D64.9 ANEMIA, UNSPECIFIED: ICD-10-CM

## 2022-09-13 DIAGNOSIS — G06.1 INTRASPINAL ABSCESS AND GRANULOMA: ICD-10-CM

## 2022-09-13 DIAGNOSIS — F41.1 GENERALIZED ANXIETY DISORDER: ICD-10-CM

## 2022-09-13 DIAGNOSIS — A41.01 SEPSIS DUE TO METHICILLIN SUSCEPTIBLE STAPHYLOCOCCUS AUREUS: ICD-10-CM

## 2022-09-13 DIAGNOSIS — F05 DELIRIUM DUE TO KNOWN PHYSIOLOGICAL CONDITION: ICD-10-CM

## 2022-09-13 DIAGNOSIS — Z78.1 PHYSICAL RESTRAINT STATUS: ICD-10-CM

## 2022-09-13 DIAGNOSIS — M46.46 DISCITIS, UNSPECIFIED, LUMBAR REGION: ICD-10-CM

## 2022-09-13 DIAGNOSIS — F11.23 OPIOID DEPENDENCE WITH WITHDRAWAL: ICD-10-CM

## 2022-09-13 DIAGNOSIS — K68.12 PSOAS MUSCLE ABSCESS: ICD-10-CM

## 2022-09-13 DIAGNOSIS — E11.65 TYPE 2 DIABETES MELLITUS WITH HYPERGLYCEMIA: ICD-10-CM

## 2022-09-13 DIAGNOSIS — E66.01 MORBID (SEVERE) OBESITY DUE TO EXCESS CALORIES: ICD-10-CM

## 2022-09-13 DIAGNOSIS — L03.213 PERIORBITAL CELLULITIS: ICD-10-CM
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
## 2022-09-13 DIAGNOSIS — Z56.0 UNEMPLOYMENT, UNSPECIFIED: ICD-10-CM

## 2022-09-13 DIAGNOSIS — E53.8 DEFICIENCY OF OTHER SPECIFIED B GROUP VITAMINS: ICD-10-CM

## 2022-09-13 DIAGNOSIS — F17.210 NICOTINE DEPENDENCE, CIGARETTES, UNCOMPLICATED: ICD-10-CM

## 2022-09-13 DIAGNOSIS — G95.29 OTHER CORD COMPRESSION: ICD-10-CM

## 2022-09-13 DIAGNOSIS — E83.42 HYPOMAGNESEMIA: ICD-10-CM

## 2022-09-13 DIAGNOSIS — F31.9 BIPOLAR DISORDER, UNSPECIFIED: ICD-10-CM

## 2022-09-13 DIAGNOSIS — R44.3 HALLUCINATIONS, UNSPECIFIED: ICD-10-CM

## 2022-09-13 DIAGNOSIS — F22 DELUSIONAL DISORDERS: ICD-10-CM

## 2022-09-13 DIAGNOSIS — K31.84 GASTROPARESIS: ICD-10-CM

## 2022-09-13 DIAGNOSIS — M46.26 OSTEOMYELITIS OF VERTEBRA, LUMBAR REGION: ICD-10-CM

## 2022-09-13 DIAGNOSIS — K68.19 OTHER RETROPERITONEAL ABSCESS: ICD-10-CM

## 2022-09-13 DIAGNOSIS — R33.8 OTHER RETENTION OF URINE: ICD-10-CM
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
## 2022-09-13 DIAGNOSIS — E87.5 HYPERKALEMIA: ICD-10-CM

## 2022-09-13 RX ORDER — DEXAMETHASONE 0.5 MG/5ML
1 ELIXIR ORAL
Qty: 14 | Refills: 0
Start: 2022-09-13 | End: 2022-09-19

## 2022-09-13 SDOH — ECONOMIC STABILITY - INCOME SECURITY: UNEMPLOYMENT, UNSPECIFIED: Z56.0

## 2022-09-20 RX ORDER — DEXAMETHASONE 0.5 MG/5ML
1 ELIXIR ORAL
Qty: 14 | Refills: 0
Start: 2022-09-20 | End: 2022-09-26

## 2022-09-21 ENCOUNTER — APPOINTMENT (OUTPATIENT)
Dept: OPHTHALMOLOGY | Facility: CLINIC | Age: 52
End: 2022-09-21

## 2022-09-27 ENCOUNTER — APPOINTMENT (OUTPATIENT)
Dept: NEUROSURGERY | Facility: CLINIC | Age: 52
End: 2022-09-27

## 2022-09-27 VITALS — HEIGHT: 73 IN | BODY MASS INDEX: 35.78 KG/M2 | WEIGHT: 270 LBS

## 2022-09-27 DIAGNOSIS — Z86.59 PERSONAL HISTORY OF OTHER MENTAL AND BEHAVIORAL DISORDERS: ICD-10-CM

## 2022-09-27 DIAGNOSIS — Z83.3 FAMILY HISTORY OF DIABETES MELLITUS: ICD-10-CM

## 2022-09-27 DIAGNOSIS — Z87.891 PERSONAL HISTORY OF NICOTINE DEPENDENCE: ICD-10-CM

## 2022-09-27 DIAGNOSIS — Z09 ENCOUNTER FOR FOLLOW-UP EXAMINATION AFTER COMPLETED TREATMENT FOR CONDITIONS OTHER THAN MALIGNANT NEOPLASM: ICD-10-CM

## 2022-09-27 DIAGNOSIS — F11.20 OPIOID DEPENDENCE, UNCOMPLICATED: ICD-10-CM

## 2022-09-27 DIAGNOSIS — Z86.69 PERSONAL HISTORY OF OTHER DISEASES OF THE NERVOUS SYSTEM AND SENSE ORGANS: ICD-10-CM

## 2022-09-27 DIAGNOSIS — Z87.898 PERSONAL HISTORY OF OTHER SPECIFIED CONDITIONS: ICD-10-CM

## 2022-09-27 PROCEDURE — 99024 POSTOP FOLLOW-UP VISIT: CPT

## 2022-09-27 RX ORDER — BUPROPION HYDROCHLORIDE 100 MG/1
TABLET, FILM COATED ORAL
Refills: 0 | Status: ACTIVE | COMMUNITY

## 2022-09-27 RX ORDER — QUETIAPINE 400 MG/1
TABLET, FILM COATED ORAL
Refills: 0 | Status: ACTIVE | COMMUNITY

## 2022-09-27 RX ORDER — MIRTAZAPINE 7.5 MG/1
TABLET, FILM COATED ORAL
Refills: 0 | Status: ACTIVE | COMMUNITY

## 2022-09-27 RX ORDER — ALPRAZOLAM 2 MG/1
TABLET ORAL
Refills: 0 | Status: ACTIVE | COMMUNITY

## 2022-09-27 RX ORDER — DEXAMETHASONE 0.5 MG/5ML
1 ELIXIR ORAL
Qty: 7 | Refills: 0
Start: 2022-09-27 | End: 2022-10-03

## 2022-09-27 RX ORDER — CHROMIUM 200 MCG
TABLET ORAL
Refills: 0 | Status: ACTIVE | COMMUNITY

## 2022-09-27 RX ORDER — PNV NO.95/FERROUS FUM/FOLIC AC 28MG-0.8MG
TABLET ORAL
Refills: 0 | Status: ACTIVE | COMMUNITY

## 2022-09-27 RX ORDER — MULTIVIT-MIN/FOLIC/VIT K/LYCOP 400-300MCG
TABLET ORAL
Refills: 0 | Status: ACTIVE | COMMUNITY

## 2022-09-27 RX ORDER — PANTOPRAZOLE SODIUM 40 MG/1
40 TABLET, DELAYED RELEASE ORAL
Refills: 0 | Status: ACTIVE | COMMUNITY

## 2022-09-28 PROBLEM — Z09 POSTOP CHECK: Status: ACTIVE | Noted: 2022-09-28

## 2022-09-28 NOTE — ASSESSMENT
[FreeTextEntry1] : Mr. Mayfield, is overall doing well with the TLIF surgery. \par I am recommending betadine to the wound with clean dry dressing to be done daily for 2 weeks. \par He will followup in 6 weeks with lumbar xray.

## 2022-09-28 NOTE — HISTORY OF PRESENT ILLNESS
[FreeTextEntry1] : Mr. Mayfield, s/p TLIF on 8/29 for osteomyelitis ( Pmhx of IVDU) and MSSA bacteremia presents today in follow up, overall doing well. Denies back pain. Has intermittent left leg pain which is tolerable. Residing in [facility] rehab, receiving PT 2-3 a week. \par He will be following up with Dr. Yaya Baltazar.\par Staples removed. Surgical site is clean, dry, no drainage.

## 2022-09-28 NOTE — END OF VISIT
[FreeTextEntry3] : ATTENDING ADDENDUM \par Patient seen and examined. Relevant clinical history and diagnostic imaging reviewed independently by me. Assessment and plan per ACP note above. Doing well post-op, wound almost completely healed - staples removed. Mild drainage due to fat necrosis and patient's large body habitus and questionable hygeine. Will do betadyne to incision and daily dressing changes, continue IV abx per medicine. Re-evaluate in several weeks. He is appreciate of care to date and in good spirits. [Time Spent: ___ minutes] : I have spent [unfilled] minutes of time on the encounter.

## 2022-10-11 ENCOUNTER — INPATIENT (INPATIENT)
Facility: HOSPITAL | Age: 52
LOS: 36 days | Discharge: NURSING, REHAB OR LONG-TERM CARE | End: 2022-11-17
Attending: HOSPITALIST | Admitting: HOSPITALIST
Payer: MEDICAID

## 2022-10-11 VITALS
HEART RATE: 108 BPM | OXYGEN SATURATION: 85 % | RESPIRATION RATE: 28 BRPM | SYSTOLIC BLOOD PRESSURE: 105 MMHG | DIASTOLIC BLOOD PRESSURE: 78 MMHG | HEIGHT: 73 IN

## 2022-10-11 LAB
ALBUMIN SERPL ELPH-MCNC: 3 G/DL — LOW (ref 3.5–5.2)
ALP SERPL-CCNC: 94 U/L — SIGNIFICANT CHANGE UP (ref 30–115)
ALT FLD-CCNC: <5 U/L — SIGNIFICANT CHANGE UP (ref 0–41)
ANION GAP SERPL CALC-SCNC: 15 MMOL/L — HIGH (ref 7–14)
AST SERPL-CCNC: 18 U/L — SIGNIFICANT CHANGE UP (ref 0–41)
BASE EXCESS BLDV CALC-SCNC: 3.7 MMOL/L — HIGH (ref -2–3)
BASE EXCESS BLDV CALC-SCNC: 4.9 MMOL/L — HIGH (ref -2–3)
BASOPHILS # BLD AUTO: 0.06 K/UL — SIGNIFICANT CHANGE UP (ref 0–0.2)
BASOPHILS NFR BLD AUTO: 0.3 % — SIGNIFICANT CHANGE UP (ref 0–1)
BILIRUB SERPL-MCNC: 0.5 MG/DL — SIGNIFICANT CHANGE UP (ref 0.2–1.2)
BUN SERPL-MCNC: 10 MG/DL — SIGNIFICANT CHANGE UP (ref 10–20)
CA-I SERPL-SCNC: 1.08 MMOL/L — LOW (ref 1.15–1.33)
CA-I SERPL-SCNC: 1.15 MMOL/L — SIGNIFICANT CHANGE UP (ref 1.15–1.33)
CALCIUM SERPL-MCNC: 8.4 MG/DL — SIGNIFICANT CHANGE UP (ref 8.4–10.5)
CHLORIDE SERPL-SCNC: 101 MMOL/L — SIGNIFICANT CHANGE UP (ref 98–110)
CO2 SERPL-SCNC: 27 MMOL/L — SIGNIFICANT CHANGE UP (ref 17–32)
CREAT SERPL-MCNC: 0.9 MG/DL — SIGNIFICANT CHANGE UP (ref 0.7–1.5)
EGFR: 102 ML/MIN/1.73M2 — SIGNIFICANT CHANGE UP
EOSINOPHIL # BLD AUTO: 0.07 K/UL — SIGNIFICANT CHANGE UP (ref 0–0.7)
EOSINOPHIL NFR BLD AUTO: 0.3 % — SIGNIFICANT CHANGE UP (ref 0–8)
GAS PNL BLDV: 132 MMOL/L — LOW (ref 136–145)
GAS PNL BLDV: 136 MMOL/L — SIGNIFICANT CHANGE UP (ref 136–145)
GAS PNL BLDV: SIGNIFICANT CHANGE UP
GAS PNL BLDV: SIGNIFICANT CHANGE UP
GLUCOSE SERPL-MCNC: 191 MG/DL — HIGH (ref 70–99)
HCO3 BLDV-SCNC: 32 MMOL/L — HIGH (ref 22–29)
HCO3 BLDV-SCNC: 32 MMOL/L — HIGH (ref 22–29)
HCT VFR BLD CALC: 34.6 % — LOW (ref 42–52)
HCT VFR BLDA CALC: 30 % — LOW (ref 39–51)
HCT VFR BLDA CALC: 55 % — HIGH (ref 39–51)
HGB BLD CALC-MCNC: 10 G/DL — LOW (ref 12.6–17.4)
HGB BLD CALC-MCNC: 18.3 G/DL — HIGH (ref 12.6–17.4)
HGB BLD-MCNC: 10.5 G/DL — LOW (ref 14–18)
IMM GRANULOCYTES NFR BLD AUTO: 0.6 % — HIGH (ref 0.1–0.3)
LACTATE BLDV-MCNC: 2.3 MMOL/L — HIGH (ref 0.5–2)
LACTATE BLDV-MCNC: 2.7 MMOL/L — HIGH (ref 0.5–2)
LACTATE SERPL-SCNC: 3 MMOL/L — HIGH (ref 0.7–2)
LYMPHOCYTES # BLD AUTO: 0.56 K/UL — LOW (ref 1.2–3.4)
LYMPHOCYTES # BLD AUTO: 2.7 % — LOW (ref 20.5–51.1)
MCHC RBC-ENTMCNC: 29.1 PG — SIGNIFICANT CHANGE UP (ref 27–31)
MCHC RBC-ENTMCNC: 30.3 G/DL — LOW (ref 32–37)
MCV RBC AUTO: 95.8 FL — HIGH (ref 80–94)
MONOCYTES # BLD AUTO: 1.16 K/UL — HIGH (ref 0.1–0.6)
MONOCYTES NFR BLD AUTO: 5.5 % — SIGNIFICANT CHANGE UP (ref 1.7–9.3)
NEUTROPHILS # BLD AUTO: 18.93 K/UL — HIGH (ref 1.4–6.5)
NEUTROPHILS NFR BLD AUTO: 90.6 % — HIGH (ref 42.2–75.2)
NRBC # BLD: 0 /100 WBCS — SIGNIFICANT CHANGE UP (ref 0–0)
NT-PROBNP SERPL-SCNC: 6763 PG/ML — HIGH (ref 0–300)
PCO2 BLDV: 59 MMHG — HIGH (ref 42–55)
PCO2 BLDV: 59 MMHG — HIGH (ref 42–55)
PH BLDV: 7.34 — SIGNIFICANT CHANGE UP (ref 7.32–7.43)
PH BLDV: 7.34 — SIGNIFICANT CHANGE UP (ref 7.32–7.43)
PLATELET # BLD AUTO: 203 K/UL — SIGNIFICANT CHANGE UP (ref 130–400)
PO2 BLDV: 32 MMHG — SIGNIFICANT CHANGE UP
PO2 BLDV: 43 MMHG — SIGNIFICANT CHANGE UP
POTASSIUM BLDV-SCNC: 4.4 MMOL/L — SIGNIFICANT CHANGE UP (ref 3.5–5.1)
POTASSIUM BLDV-SCNC: 5.1 MMOL/L — SIGNIFICANT CHANGE UP (ref 3.5–5.1)
POTASSIUM SERPL-MCNC: 4.7 MMOL/L — SIGNIFICANT CHANGE UP (ref 3.5–5)
POTASSIUM SERPL-SCNC: 4.7 MMOL/L — SIGNIFICANT CHANGE UP (ref 3.5–5)
PROT SERPL-MCNC: 6.4 G/DL — SIGNIFICANT CHANGE UP (ref 6–8)
RBC # BLD: 3.61 M/UL — LOW (ref 4.7–6.1)
RBC # FLD: 16.7 % — HIGH (ref 11.5–14.5)
SAO2 % BLDV: 49.4 % — SIGNIFICANT CHANGE UP
SAO2 % BLDV: 68.6 % — SIGNIFICANT CHANGE UP
SARS-COV-2 RNA SPEC QL NAA+PROBE: SIGNIFICANT CHANGE UP
SODIUM SERPL-SCNC: 143 MMOL/L — SIGNIFICANT CHANGE UP (ref 135–146)
TROPONIN T SERPL-MCNC: <0.01 NG/ML — SIGNIFICANT CHANGE UP
WBC # BLD: 20.91 K/UL — HIGH (ref 4.8–10.8)
WBC # FLD AUTO: 20.91 K/UL — HIGH (ref 4.8–10.8)

## 2022-10-11 PROCEDURE — 99285 EMERGENCY DEPT VISIT HI MDM: CPT

## 2022-10-11 PROCEDURE — 71045 X-RAY EXAM CHEST 1 VIEW: CPT | Mod: 26

## 2022-10-11 PROCEDURE — 93010 ELECTROCARDIOGRAM REPORT: CPT

## 2022-10-11 RX ORDER — ALPRAZOLAM 0.25 MG
2 TABLET ORAL ONCE
Refills: 0 | Status: DISCONTINUED | OUTPATIENT
Start: 2022-10-11 | End: 2022-10-11

## 2022-10-11 RX ORDER — ONDANSETRON 8 MG/1
4 TABLET, FILM COATED ORAL EVERY 8 HOURS
Refills: 0 | Status: DISCONTINUED | OUTPATIENT
Start: 2022-10-11 | End: 2022-11-17

## 2022-10-11 RX ORDER — FUROSEMIDE 40 MG
40 TABLET ORAL ONCE
Refills: 0 | Status: COMPLETED | OUTPATIENT
Start: 2022-10-11 | End: 2022-10-12

## 2022-10-11 RX ORDER — AZITHROMYCIN 500 MG/1
500 TABLET, FILM COATED ORAL EVERY 24 HOURS
Refills: 0 | Status: DISCONTINUED | OUTPATIENT
Start: 2022-10-11 | End: 2022-10-12

## 2022-10-11 RX ORDER — METHADONE HYDROCHLORIDE 40 MG/1
135 TABLET ORAL DAILY
Refills: 0 | Status: DISCONTINUED | OUTPATIENT
Start: 2022-10-12 | End: 2022-10-18

## 2022-10-11 RX ORDER — LANOLIN ALCOHOL/MO/W.PET/CERES
3 CREAM (GRAM) TOPICAL AT BEDTIME
Refills: 0 | Status: DISCONTINUED | OUTPATIENT
Start: 2022-10-11 | End: 2022-11-17

## 2022-10-11 RX ORDER — BUPROPION HYDROCHLORIDE 150 MG/1
300 TABLET, EXTENDED RELEASE ORAL DAILY
Refills: 0 | Status: DISCONTINUED | OUTPATIENT
Start: 2022-10-11 | End: 2022-11-17

## 2022-10-11 RX ORDER — HEPARIN SODIUM 5000 [USP'U]/ML
5000 INJECTION INTRAVENOUS; SUBCUTANEOUS EVERY 8 HOURS
Refills: 0 | Status: DISCONTINUED | OUTPATIENT
Start: 2022-10-11 | End: 2022-10-12

## 2022-10-11 RX ORDER — PANTOPRAZOLE SODIUM 20 MG/1
40 TABLET, DELAYED RELEASE ORAL
Refills: 0 | Status: DISCONTINUED | OUTPATIENT
Start: 2022-10-11 | End: 2022-11-17

## 2022-10-11 RX ORDER — VANCOMYCIN HCL 1 G
1000 VIAL (EA) INTRAVENOUS ONCE
Refills: 0 | Status: COMPLETED | OUTPATIENT
Start: 2022-10-11 | End: 2022-10-11

## 2022-10-11 RX ORDER — ACETAMINOPHEN 500 MG
975 TABLET ORAL ONCE
Refills: 0 | Status: COMPLETED | OUTPATIENT
Start: 2022-10-11 | End: 2022-10-11

## 2022-10-11 RX ORDER — CEFTRIAXONE 500 MG/1
2000 INJECTION, POWDER, FOR SOLUTION INTRAMUSCULAR; INTRAVENOUS EVERY 24 HOURS
Refills: 0 | Status: DISCONTINUED | OUTPATIENT
Start: 2022-10-11 | End: 2022-10-13

## 2022-10-11 RX ORDER — NICOTINE POLACRILEX 2 MG
1 GUM BUCCAL DAILY
Refills: 0 | Status: DISCONTINUED | OUTPATIENT
Start: 2022-10-11 | End: 2022-11-17

## 2022-10-11 RX ORDER — GABAPENTIN 400 MG/1
300 CAPSULE ORAL THREE TIMES A DAY
Refills: 0 | Status: DISCONTINUED | OUTPATIENT
Start: 2022-10-11 | End: 2022-10-14

## 2022-10-11 RX ORDER — ALPRAZOLAM 0.25 MG
2 TABLET ORAL THREE TIMES A DAY
Refills: 0 | Status: DISCONTINUED | OUTPATIENT
Start: 2022-10-11 | End: 2022-10-14

## 2022-10-11 RX ORDER — THIAMINE MONONITRATE (VIT B1) 100 MG
100 TABLET ORAL DAILY
Refills: 0 | Status: DISCONTINUED | OUTPATIENT
Start: 2022-10-11 | End: 2022-11-17

## 2022-10-11 RX ORDER — FOLIC ACID 0.8 MG
1 TABLET ORAL DAILY
Refills: 0 | Status: DISCONTINUED | OUTPATIENT
Start: 2022-10-11 | End: 2022-11-17

## 2022-10-11 RX ORDER — MIRTAZAPINE 45 MG/1
30 TABLET, ORALLY DISINTEGRATING ORAL AT BEDTIME
Refills: 0 | Status: DISCONTINUED | OUTPATIENT
Start: 2022-10-11 | End: 2022-10-14

## 2022-10-11 RX ORDER — CYCLOBENZAPRINE HYDROCHLORIDE 10 MG/1
10 TABLET, FILM COATED ORAL THREE TIMES A DAY
Refills: 0 | Status: DISCONTINUED | OUTPATIENT
Start: 2022-10-11 | End: 2022-11-17

## 2022-10-11 RX ORDER — SODIUM CHLORIDE 9 MG/ML
500 INJECTION, SOLUTION INTRAVENOUS ONCE
Refills: 0 | Status: COMPLETED | OUTPATIENT
Start: 2022-10-11 | End: 2022-10-11

## 2022-10-11 RX ORDER — CEFEPIME 1 G/1
2000 INJECTION, POWDER, FOR SOLUTION INTRAMUSCULAR; INTRAVENOUS ONCE
Refills: 0 | Status: COMPLETED | OUTPATIENT
Start: 2022-10-11 | End: 2022-10-11

## 2022-10-11 RX ORDER — QUETIAPINE FUMARATE 200 MG/1
200 TABLET, FILM COATED ORAL AT BEDTIME
Refills: 0 | Status: DISCONTINUED | OUTPATIENT
Start: 2022-10-11 | End: 2022-10-14

## 2022-10-11 RX ADMIN — Medication 2 MILLIGRAM(S): at 23:06

## 2022-10-11 RX ADMIN — Medication 975 MILLIGRAM(S): at 22:48

## 2022-10-11 RX ADMIN — SODIUM CHLORIDE 500 MILLILITER(S): 9 INJECTION, SOLUTION INTRAVENOUS at 17:31

## 2022-10-11 RX ADMIN — Medication 250 MILLIGRAM(S): at 22:19

## 2022-10-11 RX ADMIN — CEFEPIME 100 MILLIGRAM(S): 1 INJECTION, POWDER, FOR SOLUTION INTRAMUSCULAR; INTRAVENOUS at 17:01

## 2022-10-11 NOTE — H&P ADULT - ASSESSMENT
53 yo M w/ pmhx of IVDU (heroin), vertebral osteomyelitis, and MSSA bacteremia   presents for hypoxemia     #Hx Recurrent of vertebral OM   #Recent history & post treatment for MSSA bacteremia  #Hx Right periorbital cellulitis  - h/o IR Drainage of Abscess/Biopsy of suspected discitis done 3/16 -fluid  growing NICOLE  - , .8   - PAM showing no vegetations  - s/p Transforaminal lumbar interbody fusion (TLIF) with laminectomy and fusion of posterior column using instrumentation and iliac crest allograft on 8/29/2022  - Ancef 2g iv q8h (8 weeks starting 8/11/2022); will need CT before stopping abx as per ID   - s/p picc line placed (09/01)    #Normocytic Anemia  - Monitor H+H    #Hx of IVDU  #Opioid abuse on Methadone  - c/w methadone 135  - c/w gabapentin    #Anxiety/Depression  - c/w mirtazapine + quetiapine   - c/w xanax     #Active nicotine dependence  - c/w nicotine patch  - c/w buproprion    #Morbid obesity  - diet and lifestyle modification     #Suspected folic acid deficiency  -continue supplement    # suspected DM ,   - HbA1c 01/2022 6.6   - sliding scale if needed     #DVT PPx- heparin  #GI PPx- None  #Diet- DASH/TLC  #Activity- AAT  #Code- FULL   53 yo M w/ pmhx of IVDU (heroin), vertebral osteomyelitis, and MSSA bacteremia   presents for hypoxemia     # Hypoxia - requiring BiPAP  # Obesity   # Possible pneumonia  - WBC 20, febrile , f/u procal, f/u Bcx    - CXR: Increased interstitial and/or vascular markings. Possible right focal upper lung field consolidation.  - f/u CT chest   - f/u AM CXR and ABG   - VBG : pH 7.34  ,HCO3 32 , CO2, 59  - TTE 8/24 : EF 55% no structural abnormalities  - BNP on admission 6.7k , new LE edema : f/u repeat TTE   - Abx : CTX 2g q24 + Azithromycin 500IV daily      # Asymmetrical pupils   # Hx Right periorbital cellulitis  - pupils minimally reactive to light   - R 5mm >L 2mm  - f/u Opthalmology reccs     - no subjective dizziness or blurry vision per patient  - Patient claims no recent drug use besides his prescribed methadone     # Dark tongue lesion    - not painful, not bleeding   - recently quit smoking   - f/u ENT reccs     #Hx Recurrent of vertebral OM   #Recent history & post treatment for MSSA bacteremia  - h/o IR Drainage of Abscess/Biopsy of suspected discitis done 3/16 -fluid  growing NICOLE  - , .8   - PAM showing no vegetations  - s/p Transforaminal lumbar interbody fusion (TLIF) with laminectomy and fusion of posterior column using instrumentation and iliac crest allograft on 8/29/2022  - Ancef 2g iv q8h (8 weeks starting 8/11/2022); will need CT before stopping abx as per ID   - s/p picc line placed (09/01) - complaint with Ancef until day of admission   - follows with Dr. Abbott , consult place for follow up.    #Normocytic Anemia  - Monitor H+H    #Hx of IVDU  #Opioid abuse on Methadone  - c/w methadone 135  - c/w gabapentin    #Anxiety/Depression  - c/w mirtazapine + quetiapine   - c/w xanax     #Active nicotine dependence  - c/w nicotine patch  - c/w buproprion    #Morbid obesity  - diet and lifestyle modification     #Suspected folic acid deficiency  -continue supplement    # suspected DM ,   - HbA1c 01/2022 6.6   - sliding scale if needed     #DVT PPx- heparin  #GI PPx- None  #Diet- DASH/TLC  #Activity- AAT  #Code- FULL   53 yo M w/ pmhx of IVDU (heroin), vertebral osteomyelitis, and MSSA bacteremia   presents for hypoxemia     # Hypoxia - requiring BiPAP  # Obesity   # Possible pneumonia  - WBC 20, febrile , f/u procal, f/u Bcx    - CXR: Increased interstitial and/or vascular markings. Possible right focal upper lung field consolidation.  - f/u CT chest   - f/u AM CXR and ABG   - VBG : pH 7.34  ,HCO3 32 , CO2, 59  - TTE 8/24 : EF 55% no structural abnormalities  - BNP on admission 6.7k , new LE edema : f/u repeat TTE , lasix 40IV x1  - Abx : CTX 2g q24 + Azithromycin 500IV daily      # Asymmetrical pupils   # Hx Right periorbital cellulitis  - pupils minimally reactive to light   - R 5mm >L 2mm  - f/u Opthalmology reccs     - no subjective dizziness or blurry vision per patient  - Patient claims no recent drug use besides his prescribed methadone     # Dark tongue lesion    - not painful, not bleeding   - recently quit smoking   - f/u ENT reccs     #Hx Recurrent of vertebral OM   #Recent history & post treatment for MSSA bacteremia  - h/o IR Drainage of Abscess/Biopsy of suspected discitis done 3/16 -fluid  growing NICOLE  - , .8   - PAM showing no vegetations  - s/p Transforaminal lumbar interbody fusion (TLIF) with laminectomy and fusion of posterior column using instrumentation and iliac crest allograft on 8/29/2022  - Ancef 2g iv q8h (8 weeks starting 8/11/2022); will need CT before stopping abx as per ID   - s/p picc line placed (09/01) - complaint with Ancef until day of admission   - follows with Dr. Abbott , consult place for follow up.    #Normocytic Anemia  - Monitor H+H    #Hx of IVDU  #Opioid abuse on Methadone  - c/w methadone 135  - c/w gabapentin    #Anxiety/Depression  - c/w mirtazapine + quetiapine   - c/w xanax     #Active nicotine dependence  - c/w nicotine patch  - c/w buproprion    #Morbid obesity  - diet and lifestyle modification     #Suspected folic acid deficiency  -continue supplement    # suspected DM ,   - HbA1c 01/2022 6.6   - sliding scale if needed     #DVT PPx- heparin  #GI PPx- None  #Diet- DASH/TLC  #Activity- AAT  #Code- FULL   53 yo M w/ pmhx of IVDU (heroin), vertebral osteomyelitis, and MSSA bacteremia   presents for hypoxemia     # Hypoxia - requiring BiPAP  # Obesity   # Possible pneumonia or PE   - WBC 20, febrile , f/u procal, f/u Bcx    - CXR: Increased interstitial and/or vascular markings. Possible right focal upper lung field consolidation.  - f/u CT chest   - f/u AM CXR and ABG   - VBG : pH 7.34  ,HCO3 32 , CO2, 59  - TTE 8/24 : EF 55% no structural abnormalities  - BNP on admission 6.7k , new LE edema : f/u repeat TTE , lasix 40IV x1  - Abx : CTX 2g q24 + Azithromycin 500IV daily      # Asymmetrical pupils   # Hx Right periorbital cellulitis  - pupils minimally reactive to light   - R 5mm >L 2mm  - f/u Opthalmology reccs     - no subjective dizziness or blurry vision per patient  - Patient claims no recent drug use besides his prescribed methadone     # Dark tongue lesion    - not painful, not bleeding   - recently quit smoking   - f/u ENT reccs     #Hx Recurrent of vertebral OM   #Recent history & post treatment for MSSA bacteremia  - h/o IR Drainage of Abscess/Biopsy of suspected discitis done 3/16 -fluid  growing NICOLE  - , .8   - PAM showing no vegetations  - s/p Transforaminal lumbar interbody fusion (TLIF) with laminectomy and fusion of posterior column using instrumentation and iliac crest allograft on 8/29/2022  - Ancef 2g iv q8h (8 weeks starting 8/11/2022); will need CT before stopping abx as per ID   - s/p picc line placed (09/01) - complaint with Ancef until day of admission   - follows with Dr. Abbott , consult place for follow up.    #Normocytic Anemia  - Monitor H+H    #Hx of IVDU  #Opioid abuse on Methadone  - c/w methadone 135  - c/w gabapentin    #Anxiety/Depression  - c/w mirtazapine + quetiapine   - c/w xanax     #Active nicotine dependence  - c/w nicotine patch  - c/w buproprion    #Morbid obesity  - diet and lifestyle modification     #Suspected folic acid deficiency  -continue supplement    # suspected DM ,   - HbA1c 01/2022 6.6   - sliding scale if needed     #DVT PPx- heparin  #GI PPx- None  #Diet- DASH/TLC  #Activity- AAT  #Code- FULL   53 yo M w/ pmhx of IVDU (heroin), vertebral osteomyelitis, and MSSA bacteremia   presents for hypoxemia     # Hypoxia - requiring BiPAP  # Obesity   # Possible pneumonia or PE   - WBC 20, febrile , f/u procal, f/u Bcx    - CXR: Increased interstitial and/or vascular markings. Possible right focal upper lung field consolidation.  - f/u CT chest   - f/u AM CXR and ABG   - VBG : pH 7.34  ,HCO3 32 , CO2, 59  - TTE 8/24 : EF 55% no structural abnormalities  - BNP on admission 6.7k , new LE edema : f/u repeat TTE , lasix 40IV x1  - Abx : CTX 2g q24 + Azithromycin 500IV daily  - f/u LE duplex    # Asymmetrical pupils   # Hx Right periorbital cellulitis  - pupils minimally reactive to light   - R 5mm >L 2mm  - f/u Opthalmology reccs     - no subjective dizziness or blurry vision per patient  - Patient claims no recent drug use besides his prescribed methadone     # Dark tongue lesion    - not painful, not bleeding   - recently quit smoking   - f/u ENT reccs     #Hx Recurrent of vertebral OM   #Recent history & post treatment for MSSA bacteremia  - h/o IR Drainage of Abscess/Biopsy of suspected discitis done 3/16 -fluid  growing NICOLE  - , .8   - PAM showing no vegetations  - s/p Transforaminal lumbar interbody fusion (TLIF) with laminectomy and fusion of posterior column using instrumentation and iliac crest allograft on 8/29/2022  - Ancef 2g iv q8h (8 weeks starting 8/11/2022); will need CT before stopping abx as per ID   - s/p picc line placed (09/01) - complaint with Ancef until day of admission   - follows with Dr. Abbott , consult place for follow up.    #Normocytic Anemia  - Monitor H+H    #Hx of IVDU  #Opioid abuse on Methadone  - c/w methadone 135  - c/w gabapentin    #Anxiety/Depression  - c/w mirtazapine + quetiapine   - c/w xanax     #Active nicotine dependence  - c/w nicotine patch  - c/w buproprion    #Morbid obesity  - diet and lifestyle modification     #Suspected folic acid deficiency  -continue supplement    # suspected DM ,   - HbA1c 01/2022 6.6   - sliding scale if needed     #DVT PPx- heparin  #GI PPx- None  #Diet- DASH/TLC  #Activity- AAT  #Code- FULL

## 2022-10-11 NOTE — H&P ADULT - NSHPLABSRESULTS_GEN_ALL_CORE
(10-11 @ 12:35)                      10.5  20.91 )-----------( 203                 34.6    Neutrophils = 18.93 (90.6%)  Lymphocytes = 0.56 (2.7%)  Eosinophils = 0.07 (0.3%)  Basophils = 0.06 (0.3%)  Monocytes = 1.16 (5.5%)  Bands = --%    10-11    143  |  101  |  10  ----------------------------<  191<H>  4.7   |  27  |  0.9    Ca    8.4      11 Oct 2022 12:35    TPro  6.4  /  Alb  3.0<L>  /  TBili  0.5  /  DBili  x   /  AST  18  /  ALT  <5  /  AlkPhos  94  10-11      CARDIAC MARKERS ( 11 Oct 2022 12:35 )  Trop <0.01 ng/mL / CK x     / CKMB x           RVP:    Venous Blood Gas:  10-11 @ 16:09  7.34/59/43/32/68.6  VBG Lactate: 2.30  Venous Blood Gas:  10-11 @ 13:07  7.34/59/32/32/49.4  VBG Lactate: 2.70

## 2022-10-11 NOTE — ED PROVIDER NOTE - ATTENDING CONTRIBUTION TO CARE
52M with PMH IVDA, MSSA bacteremia, OM of L4-L5 on Ancef via PICC line, 25pack year prior smoker, on methadone 135mg (last dose yesterday) who was BIBEMS for hypoxia to 70s on RA. EMS reports O2 improved to mid-80s on NRB. Patient denies any complaints including CP, SOB, and denied cough until his mother reminded him that he was coughing a few days ago. He is requesting his Xanax and methadone.     VS noted- O2 91% on NRB, triage note reviewed    Gen - NAD, Head - NCAT, Pharynx - clear, MMM, Heart - RRR, no m/g/r, Lungs - CTAB, no w/c/r, Abdomen - soft, NT, ND, Skin - No rash, Extremities - FROM, no edema, erythema, ecchymosis, brisk cap refill, Neuro - A&O x3, equal strength and sensation, non-focal exam    a/p:  hypoxia, reports cough the last few days  ekg, labs, cxr  sepsis workup  reassess

## 2022-10-11 NOTE — H&P ADULT - NSHPREVIEWOFSYSTEMS_GEN_ALL_CORE
REVIEW OF SYSTEMS:    CONSTITUTIONAL:  No weakness, fevers or chills  EYES/ENT:  No visual changes;  No vertigo or throat pain   NECK:  No pain or stiffness  RESPIRATORY:  No cough, wheezing, hemoptysis; No shortness of breath  CARDIOVASCULAR:  No chest pain or palpitations  GASTROINTESTINAL:  No abdominal or epigastric pain. No nausea, vomiting, or hematemesis; No diarrhea or constipation. No melena or hematochezia.  GENITOURINARY:  No dysuria, frequency or hematuria  NEUROLOGICAL:  No numbness or weakness  SKIN:  No itching, rashes REVIEW OF SYSTEMS:  CONSTITUTIONAL:  No active complaints   EYES/ENT:  No visual changes;  No vertigo or throat pain   NECK:  No pain or stiffness  RESPIRATORY:  + cough a few days ago   CARDIOVASCULAR:  No chest pain or palpitations  GASTROINTESTINAL:  No abdominal or epigastric pain. No nausea, vomiting, or hematemesis; No diarrhea or constipation. No melena or hematochezia.  GENITOURINARY:  No dysuria, frequency or hematuria  NEUROLOGICAL:  No numbness or weakness  SKIN:  multiple lesions from IVDU

## 2022-10-11 NOTE — ED PROVIDER NOTE - PROGRESS NOTE DETAILS
TJY: pt continuing to do well on NRB, SaO2 often dropping while patient is sleeping, also causing him to slouch over. When he is awake he is satting upper 80s, lower 90s and speaking in complete sentences without difficulty. HERNAN: pt d/w ICU fellow Matthew, will start pt on BiPAP for possible CO2 narcosis, will add vancomycin for MRSA coverage and small IVF bolus.

## 2022-10-11 NOTE — H&P ADULT - HISTORY OF PRESENT ILLNESS
53yo male     PMHx of IVDU (heroin), vertebral osteomyelitis s/p debridement, and MSSA bacteremia    Presenting from rehab facility for "low oxygen".     Per patient, he is not experiencing any SOB/HARDWICK or CP and is entirely asymptomatic.      Per EMS, on arrival he was satting in the 70s, they started the patient on 15L via NRB, with improvement of SaO2 to 80s. No other interventions in the field.    In the ED :  BP: 105/78    RR : 28  T : 100.7  O2: 85% on  15 L NRB      WBC : 20   Lac : 3  BNP : 6.7k (no baseline)  VBG : pH 7.34  ,HCO3 32 , CO2, 59  ECG : sinu tachy   CXR: Increased interstitial and/or vascular markings. Possible right focal   upper lung field consolidation. 51yo male     PMHx of IVDU (heroin), vertebral osteomyelitis s/p debridement, and MSSA bacteremia    Presenting from rehab facility for "low oxygen".     Per patient, he is not experiencing any SOB/HARDWICK or CP or pain anywhere on his body and is entirely asymptomatic.  Of note he has recently noticed new LE edema for the past 2 days.     Per EMS, on arrival he was satting in the 70s, they started the patient on 15L via NRB, with improvement of SaO2 to 80s. No other interventions in the field.    In the ED :  BP: 105/78    RR : 28  T : 100.7  O2: 85% on  15 L NRB      WBC : 20   Lac : 3  BNP : 6.7k (no baseline)  VBG : pH 7.34  ,HCO3 32 , CO2, 59  ECG : sinus tachy   CXR: Increased interstitial and/or vascular markings. Possible right focal   upper lung field consolidation.

## 2022-10-11 NOTE — H&P ADULT - NSHPPHYSICALEXAM_GEN_ALL_CORE
GENERAL: NAD, lying in bed comfortably  HEAD:  Atraumatic, Normocephalic  EYES: EOMI, PERRLA, conjunctiva and sclera clear  ENT: Moist mucous membranes  NECK: Supple, No JVD  CHEST/LUNG: Clear to auscultation bilaterally; No rales, rhonchi, wheezing, or rubs. Unlabored respirations  HEART: Regular rate and rhythm; No murmurs, rubs, or gallops  ABDOMEN: Bowel sounds present; Soft, Nontender, Nondistended. No hepatomegally  EXTREMITIES:  2+ Peripheral Pulses, brisk capillary refill. No clubbing, cyanosis, or edema  NERVOUS SYSTEM:  Alert & Oriented X3, speech clear. No deficits   MSK: FROM all 4 extremities, full and equal strength  SKIN: No rashes or lesions GENERAL: NAD, on BiPAP   HEAD:  Atraumatic, Normocephalic  EYES: EOMI, Asymmetrical pupils L>R , minimally reactive to light   ENT: dry mouth dark lesion on tongue   NECK: Supple  CHEST/LUNG: Crackles BL, unlabored respirations   HEART: Regular rate and rhythm;  ABDOMEN:  Soft, Nontender, Nondistended.   EXTREMITIES:  new LE edema 3+ , scars on BL wrists from drug use   NERVOUS SYSTEM:  Alert & Oriented X3, speech clear.

## 2022-10-11 NOTE — ED PROVIDER NOTE - OBJECTIVE STATEMENT
51yo male PMHx of IVDU (heroin), vertebral osteomyelitis s/p debridement, and MSSA bacteremia presenting from rehab facility where he has been since his debridement, sent in for "low oxygen". Per patient, he is not experiencing any SOB/HARDWICK or CP and is entirely asymptomatic. No recent F/C or cough.     Per EMS, on arrival he was satting in the 70s, they started the patient on 15L via NRB, with improvement of SaO2 to 80s. No other interventions in the field.

## 2022-10-11 NOTE — ED PROVIDER NOTE - PHYSICAL EXAMINATION
VITAL SIGNS: I have reviewed nursing notes and confirm.  CONSTITUTIONAL: Obese male frequently slouching over, in NAD.   SKIN: Warm dry, normal skin turgor  HEAD: NCAT  EYES: No conjunctival injection, scleral anicteric  ENT: Moist mucous membranes, normal pharynx with no erythema or exudates  NECK: Supple; full ROM. Nontender. No cervical LAD  CARD: RRR, no murmurs, rubs or gallops  RESP: Clear to ausculation bilaterally.  Mild bilateral rales with good aeration.   ABD: Soft, non-distended, non-tender,   EXT: Full ROM, no bony tenderness, 2+ b/l pitting edema with no calf tenderness. DP/PT intact b/l  NEURO: Normal motor, normal sensory. CN II-XII grossly intact.   PSYCH: Cooperative, appropriate.

## 2022-10-11 NOTE — ED PROVIDER NOTE - CARE PLAN
Principal Discharge DX:	Pneumonia   1 Principal Discharge DX:	Pneumonia  Secondary Diagnosis:	Hypercapnic respiratory failure

## 2022-10-12 LAB
ALBUMIN SERPL ELPH-MCNC: 2.8 G/DL — LOW (ref 3.5–5.2)
ALP SERPL-CCNC: 83 U/L — SIGNIFICANT CHANGE UP (ref 30–115)
ALT FLD-CCNC: <5 U/L — SIGNIFICANT CHANGE UP (ref 0–41)
ANION GAP SERPL CALC-SCNC: 12 MMOL/L — SIGNIFICANT CHANGE UP (ref 7–14)
AST SERPL-CCNC: 10 U/L — SIGNIFICANT CHANGE UP (ref 0–41)
BILIRUB SERPL-MCNC: 0.3 MG/DL — SIGNIFICANT CHANGE UP (ref 0.2–1.2)
BUN SERPL-MCNC: 15 MG/DL — SIGNIFICANT CHANGE UP (ref 10–20)
CALCIUM SERPL-MCNC: 8 MG/DL — LOW (ref 8.4–10.4)
CHLORIDE SERPL-SCNC: 98 MMOL/L — SIGNIFICANT CHANGE UP (ref 98–110)
CO2 SERPL-SCNC: 32 MMOL/L — SIGNIFICANT CHANGE UP (ref 17–32)
CREAT SERPL-MCNC: 0.7 MG/DL — SIGNIFICANT CHANGE UP (ref 0.7–1.5)
EGFR: 110 ML/MIN/1.73M2 — SIGNIFICANT CHANGE UP
GLUCOSE BLDC GLUCOMTR-MCNC: 179 MG/DL — HIGH (ref 70–99)
GLUCOSE SERPL-MCNC: 172 MG/DL — HIGH (ref 70–99)
HCT VFR BLD CALC: 31.2 % — LOW (ref 42–52)
HGB BLD-MCNC: 9.6 G/DL — LOW (ref 14–18)
MAGNESIUM SERPL-MCNC: 1.5 MG/DL — LOW (ref 1.8–2.4)
MCHC RBC-ENTMCNC: 29.5 PG — SIGNIFICANT CHANGE UP (ref 27–31)
MCHC RBC-ENTMCNC: 30.8 G/DL — LOW (ref 32–37)
MCV RBC AUTO: 96 FL — HIGH (ref 80–94)
NRBC # BLD: 0 /100 WBCS — SIGNIFICANT CHANGE UP (ref 0–0)
PLATELET # BLD AUTO: 197 K/UL — SIGNIFICANT CHANGE UP (ref 130–400)
POTASSIUM SERPL-MCNC: 4.3 MMOL/L — SIGNIFICANT CHANGE UP (ref 3.5–5)
POTASSIUM SERPL-SCNC: 4.3 MMOL/L — SIGNIFICANT CHANGE UP (ref 3.5–5)
PROCALCITONIN SERPL-MCNC: 7.63 NG/ML — HIGH (ref 0.02–0.1)
PROT SERPL-MCNC: 6 G/DL — SIGNIFICANT CHANGE UP (ref 6–8)
RBC # BLD: 3.25 M/UL — LOW (ref 4.7–6.1)
RBC # FLD: 16.6 % — HIGH (ref 11.5–14.5)
SODIUM SERPL-SCNC: 142 MMOL/L — SIGNIFICANT CHANGE UP (ref 135–146)
TROPONIN T SERPL-MCNC: <0.01 NG/ML — SIGNIFICANT CHANGE UP
WBC # BLD: 19.84 K/UL — HIGH (ref 4.8–10.8)
WBC # FLD AUTO: 19.84 K/UL — HIGH (ref 4.8–10.8)

## 2022-10-12 PROCEDURE — 93970 EXTREMITY STUDY: CPT | Mod: 26

## 2022-10-12 PROCEDURE — 99291 CRITICAL CARE FIRST HOUR: CPT

## 2022-10-12 PROCEDURE — 71260 CT THORAX DX C+: CPT | Mod: 26

## 2022-10-12 PROCEDURE — 71045 X-RAY EXAM CHEST 1 VIEW: CPT | Mod: 26

## 2022-10-12 RX ORDER — ENOXAPARIN SODIUM 100 MG/ML
140 INJECTION SUBCUTANEOUS EVERY 12 HOURS
Refills: 0 | Status: DISCONTINUED | OUTPATIENT
Start: 2022-10-12 | End: 2022-10-17

## 2022-10-12 RX ORDER — ENOXAPARIN SODIUM 100 MG/ML
80 INJECTION SUBCUTANEOUS EVERY 12 HOURS
Refills: 0 | Status: DISCONTINUED | OUTPATIENT
Start: 2022-10-12 | End: 2022-10-12

## 2022-10-12 RX ORDER — CHLORHEXIDINE GLUCONATE 213 G/1000ML
1 SOLUTION TOPICAL EVERY 12 HOURS
Refills: 0 | Status: COMPLETED | OUTPATIENT
Start: 2022-10-12 | End: 2022-10-13

## 2022-10-12 RX ORDER — ENOXAPARIN SODIUM 100 MG/ML
60 INJECTION SUBCUTANEOUS ONCE
Refills: 0 | Status: DISCONTINUED | OUTPATIENT
Start: 2022-10-12 | End: 2022-10-12

## 2022-10-12 RX ORDER — MAGNESIUM SULFATE 500 MG/ML
2 VIAL (ML) INJECTION ONCE
Refills: 0 | Status: COMPLETED | OUTPATIENT
Start: 2022-10-12 | End: 2022-10-12

## 2022-10-12 RX ORDER — ENOXAPARIN SODIUM 100 MG/ML
140 INJECTION SUBCUTANEOUS EVERY 12 HOURS
Refills: 0 | Status: DISCONTINUED | OUTPATIENT
Start: 2022-10-12 | End: 2022-10-12

## 2022-10-12 RX ORDER — ENOXAPARIN SODIUM 100 MG/ML
60 INJECTION SUBCUTANEOUS ONCE
Refills: 0 | Status: COMPLETED | OUTPATIENT
Start: 2022-10-12 | End: 2022-10-12

## 2022-10-12 RX ADMIN — BUPROPION HYDROCHLORIDE 300 MILLIGRAM(S): 150 TABLET, EXTENDED RELEASE ORAL at 15:25

## 2022-10-12 RX ADMIN — GABAPENTIN 300 MILLIGRAM(S): 400 CAPSULE ORAL at 15:27

## 2022-10-12 RX ADMIN — PANTOPRAZOLE SODIUM 40 MILLIGRAM(S): 20 TABLET, DELAYED RELEASE ORAL at 07:20

## 2022-10-12 RX ADMIN — Medication 2 MILLIGRAM(S): at 05:45

## 2022-10-12 RX ADMIN — Medication 25 GRAM(S): at 11:19

## 2022-10-12 RX ADMIN — AZITHROMYCIN 255 MILLIGRAM(S): 500 TABLET, FILM COATED ORAL at 00:42

## 2022-10-12 RX ADMIN — Medication 40 MILLIGRAM(S): at 03:08

## 2022-10-12 RX ADMIN — Medication 2 MILLIGRAM(S): at 15:33

## 2022-10-12 RX ADMIN — ENOXAPARIN SODIUM 140 MILLIGRAM(S): 100 INJECTION SUBCUTANEOUS at 18:26

## 2022-10-12 RX ADMIN — Medication 1 TABLET(S): at 15:25

## 2022-10-12 RX ADMIN — Medication 1 PATCH: at 23:09

## 2022-10-12 RX ADMIN — Medication 100 MILLIGRAM(S): at 15:28

## 2022-10-12 RX ADMIN — ENOXAPARIN SODIUM 80 MILLIGRAM(S): 100 INJECTION SUBCUTANEOUS at 05:50

## 2022-10-12 RX ADMIN — ENOXAPARIN SODIUM 60 MILLIGRAM(S): 100 INJECTION SUBCUTANEOUS at 11:20

## 2022-10-12 RX ADMIN — Medication 1 PATCH: at 15:26

## 2022-10-12 RX ADMIN — GABAPENTIN 300 MILLIGRAM(S): 400 CAPSULE ORAL at 22:34

## 2022-10-12 RX ADMIN — CEFTRIAXONE 100 MILLIGRAM(S): 500 INJECTION, POWDER, FOR SOLUTION INTRAMUSCULAR; INTRAVENOUS at 03:08

## 2022-10-12 RX ADMIN — METHADONE HYDROCHLORIDE 135 MILLIGRAM(S): 40 TABLET ORAL at 15:34

## 2022-10-12 RX ADMIN — QUETIAPINE FUMARATE 200 MILLIGRAM(S): 200 TABLET, FILM COATED ORAL at 22:34

## 2022-10-12 RX ADMIN — Medication 2 MILLIGRAM(S): at 22:34

## 2022-10-12 RX ADMIN — MIRTAZAPINE 30 MILLIGRAM(S): 45 TABLET, ORALLY DISINTEGRATING ORAL at 22:34

## 2022-10-12 RX ADMIN — Medication 1 MILLIGRAM(S): at 15:27

## 2022-10-12 RX ADMIN — GABAPENTIN 300 MILLIGRAM(S): 400 CAPSULE ORAL at 05:48

## 2022-10-12 NOTE — CHART NOTE - NSCHARTNOTEFT_GEN_A_CORE
Spoke to methadone clinic at [facility] ([phone]). Patient was taking 135mg methadone. since his discharge to nursing home he has been getting methadone from a different clinic [address] and could not reach that clinic.     Patient states that he is currently on methadone 135 confirmed by his original clinic . Please verify dose

## 2022-10-12 NOTE — CONSULT NOTE ADULT - SUBJECTIVE AND OBJECTIVE BOX
Patient is a 52y old  Male who presents with a chief complaint of Hypoxia (11 Oct 2022 19:41)      HPI:  51yo male  PMHx of IVDU (heroin), vertebral osteomyelitis s/p debridement, and MSSA bacteremia    Presenting from rehab facility for "low oxygen".     Per patient, he is not experiencing any SOB/HARDWICK or CP ?? or pain anywhere on his body and is entirely asymptomatic.  Of note he has recently noticed new LE edema for the past 2 days.     Per EMS, on arrival he was satting in the 70s, they started the patient on 15L via NRB, with improvement of SaO2 to 80s. No other interventions in the field.    In the ED :  BP: 105/78    RR : 28  T : 100.7  O2: 85% on  15 L NRB      WBC : 20   Lac : 3  BNP : 6.7k (no baseline)  VBG : pH 7.34  ,HCO3 32 , CO2, 59  ECG : sinus tachy   CXR: Increased interstitial and/or vascular markings. Possible right focal   upper lung field consolidation. (11 Oct 2022 19:41)    Placed on BIPAP than HHFNC, CT angio done/ PERT called, started on ABX    PAST MEDICAL & SURGICAL HISTORY:  IV drug abuse      Vertebral osteomyelitis      MSSA bacteremia      No significant past surgical history          SOCIAL HX:   Smoking +    FAMILY HISTORY:  No pertinent family history in first degree relatives        REVIEW OF SYSTEMS SEE HPI    Allergies    No Known Allergies    Intolerances        ALPRAZolam 2 milliGRAM(s) Oral three times a day  aluminum hydroxide/magnesium hydroxide/simethicone Suspension 30 milliLiter(s) Oral every 4 hours PRN  azithromycin  IVPB 500 milliGRAM(s) IV Intermittent every 24 hours  buPROPion XL (24-Hour) . 300 milliGRAM(s) Oral daily  cefTRIAXone   IVPB 2000 milliGRAM(s) IV Intermittent every 24 hours  cyclobenzaprine 10 milliGRAM(s) Oral three times a day PRN  enoxaparin Injectable 80 milliGRAM(s) SubCutaneous every 12 hours  folic acid 1 milliGRAM(s) Oral daily  gabapentin 300 milliGRAM(s) Oral three times a day  levoFLOXacin IVPB      levoFLOXacin IVPB 750 milliGRAM(s) IV Intermittent every 24 hours  melatonin 3 milliGRAM(s) Oral at bedtime PRN  methadone    Tablet 135 milliGRAM(s) Oral daily  mirtazapine 30 milliGRAM(s) Oral at bedtime  multivitamin 1 Tablet(s) Oral daily  nicotine - 21 mG/24Hr(s) Patch 1 Patch Transdermal daily  ondansetron Injectable 4 milliGRAM(s) IV Push every 8 hours PRN  pantoprazole    Tablet 40 milliGRAM(s) Oral before breakfast  QUEtiapine 200 milliGRAM(s) Oral at bedtime  thiamine 100 milliGRAM(s) Oral daily  : Home Meds:      PHYSICAL EXAM    ICU Vital Signs Last 24 Hrs  T(C): 37.6 (12 Oct 2022 05:53), Max: 38.2 (11 Oct 2022 13:31)  T(F): 99.7 (12 Oct 2022 05:53), Max: 100.7 (11 Oct 2022 13:31)  HR: 99 (12 Oct 2022 05:53) (96 - 108)  BP: 102/64 (12 Oct 2022 05:53) (100/57 - 142/96)  BP(mean): 76 (12 Oct 2022 05:53) (76 - 76)  RR: 19 (12 Oct 2022 05:53) (18 - 28)  SpO2: 99% (12 Oct 2022 05:53) (85% - 99%)    O2 Parameters below as of 12 Oct 2022 05:53  Patient On (Oxygen Delivery Method): nasal cannula, high flow            General: obese, chronically ill looking  Lungs: Bilateral rhonchi  Cardiovascular: Regular  Abdomen: Soft, Positive BS  Extremities: No clubbing, burn scar l wrist  LE swelling        LABS:                          10.5   20.91 )-----------( 203      ( 11 Oct 2022 12:35 )             34.6                                               10-11    143  |  101  |  10  ----------------------------<  191<H>  4.7   |  27  |  0.9    Ca    8.4      11 Oct 2022 12:35    TPro  6.4  /  Alb  3.0<L>  /  TBili  0.5  /  DBili  x   /  AST  18  /  ALT  <5  /  AlkPhos  94  10-11                                                 CARDIAC MARKERS ( 11 Oct 2022 12:35 )  x     / <0.01 ng/mL / x     / x     / x                                                LIVER FUNCTIONS - ( 11 Oct 2022 12:35 )  Alb: 3.0 g/dL / Pro: 6.4 g/dL / ALK PHOS: 94 U/L / ALT: <5 U/L / AST: 18 U/L / GGT: x                                                                                             MEDICATIONS  (STANDING):  ALPRAZolam 2 milliGRAM(s) Oral three times a day  azithromycin  IVPB 500 milliGRAM(s) IV Intermittent every 24 hours  buPROPion XL (24-Hour) . 300 milliGRAM(s) Oral daily  cefTRIAXone   IVPB 2000 milliGRAM(s) IV Intermittent every 24 hours  enoxaparin Injectable 80 milliGRAM(s) SubCutaneous every 12 hours  folic acid 1 milliGRAM(s) Oral daily  gabapentin 300 milliGRAM(s) Oral three times a day  levoFLOXacin IVPB      levoFLOXacin IVPB 750 milliGRAM(s) IV Intermittent every 24 hours  methadone    Tablet 135 milliGRAM(s) Oral daily  mirtazapine 30 milliGRAM(s) Oral at bedtime  multivitamin 1 Tablet(s) Oral daily  nicotine - 21 mG/24Hr(s) Patch 1 Patch Transdermal daily  pantoprazole    Tablet 40 milliGRAM(s) Oral before breakfast  QUEtiapine 200 milliGRAM(s) Oral at bedtime  thiamine 100 milliGRAM(s) Oral daily    MEDICATIONS  (PRN):  aluminum hydroxide/magnesium hydroxide/simethicone Suspension 30 milliLiter(s) Oral every 4 hours PRN Dyspepsia  cyclobenzaprine 10 milliGRAM(s) Oral three times a day PRN Muscle Spasm  melatonin 3 milliGRAM(s) Oral at bedtime PRN Insomnia  ondansetron Injectable 4 milliGRAM(s) IV Push every 8 hours PRN Nausea and/or Vomiting

## 2022-10-12 NOTE — CONSULT NOTE ADULT - ASSESSMENT
IMPRESSION:    Acute hypoxemic resp failure on HHFNC 80% ( declining BIPAP)  PNA/ highly aspiration  PE  hx Epidural Phlegmon, OM  hx Right Psoas Abscess   hx MSSA Bacteremia  HO IVDA, Opioid Abuse on Methadone   obesity/ DIONICIO    PLAN:    CNS: Avoid CNS depressant, op methadone dose    HEENT:  Oral care    PULMONARY:  HOB @ 45 degrees, aspiration precaution, HHFNC 80%, keep Sao2 88 to 92%    CARDIOVASCULAR: FULL AC, echo, CE    GI: GI prophylaxis                                          Feeding per speech and sw[place]    RENAL:  F/u  lytes.  Correct as needed. accurate I/O    INFECTIOUS DISEASE: IV ABX, zosyn/ vanco x1 dc if MRSA swab -, procal, pancx    HEMATOLOGICAL:  DVT prophylaxis. LE doppler    ENDOCRINE:  Follow up FS.  Insulin protocol if needed.  MICU    poor prognosis

## 2022-10-12 NOTE — CHART NOTE - NSCHARTNOTEFT_GEN_A_CORE
Transfer Note    Transfer from: Admission to SDU  Transfer to: Admission to ICU    ED COURSE:    53yo male     PMHx of IVDU (heroin), vertebral osteomyelitis s/p debridement, and MSSA bacteremia    Presenting from rehab facility for "low oxygen".     Per patient, he is not experiencing any SOB/HARDWICK or CP or pain anywhere on his body and is entirely asymptomatic.  Of note he has recently noticed new LE edema for the past 2 days.     Per EMS, on arrival he was satting in the 70s, they started the patient on 15L via NRB, with improvement of SaO2 to 80s. No other interventions in the field.    In the ED :  BP: 105/78    RR : 28  T : 100.7  O2: 85% on  15 L NRB      WBC : 20   Lac : 3  BNP : 6.7k (no baseline)  VBG : pH 7.34  ,HCO3 32 , CO2, 59  ECG : sinus tachy   CXR: Increased interstitial and/or vascular markings. Possible right focal   upper lung field consolidation.    Pt was admitted to SDU for possible PNA    HOSPITAL COURSE:      CTA was done, I was contacted by the radiologist as patient was found to have Bilateral pulmonary emboli with evidence of right ventricular strain. Pulmonary trunk enlargement, compatible with pulmonary hypertension. Numerous bilateral patchy opacities with confluent areas of consolidation predominantly in the posterior lung fields and bilateral perihilar lymphadenopathy, suspicious for pneumonia.     Pt was started on therapeutic Lovenox and Crit Care fellow was contacted.    It was determined to upgrade patients admission from SDU to ICU level care.      For Follow-Up: AM Labs***        ASSESSMENT & PLAN:       53 yo M w/ pmhx of IVDU (heroin), vertebral osteomyelitis, and MSSA bacteremia presents for hypoxemia     #PE on admission  # Hypoxia - requiring BiPAP  # Obesity   # Possible pneumonia  - WBC 20, febrile , f/u procal, f/u Bcx    - CXR: Increased interstitial and/or vascular markings. Possible right focal upper lung field consolidation.  - f/u CT chest   - f/u AM CXR and ABG   - VBG : pH 7.34  ,HCO3 32 , CO2, 59  - TTE 8/24 : EF 55% no structural abnormalities  - BNP on admission 6.7k , new LE edema : f/u repeat TTE , lasix 40IV x1  - Abx : CTX 2g q24 + Azithromycin 500IV daily  - Pt refused BiPAP in ED and was put on HFNC sat well >95%      # Asymmetrical pupils   # Hx Right periorbital cellulitis  - pupils minimally reactive to light   - R 5mm >L 2mm  - f/u Opthalmology reccs     - no subjective dizziness or blurry vision per patient  - Patient claims no recent drug use besides his prescribed methadone     # Dark tongue lesion    - not painful, not bleeding   - recently quit smoking   - f/u ENT reccs     #Hx Recurrent of vertebral OM   #Recent history & post treatment for MSSA bacteremia  - h/o IR Drainage of Abscess/Biopsy of suspected discitis done 3/16 -fluid  growing NICOLE  - , .8   - PAM showing no vegetations  - s/p Transforaminal lumbar interbody fusion (TLIF) with laminectomy and fusion of posterior column using instrumentation and iliac crest allograft on 8/29/2022  - Ancef 2g iv q8h (8 weeks starting 8/11/2022); will need CT before stopping abx as per ID   - s/p picc line placed (09/01) - complaint with Ancef until day of admission   - follows with Dr. Abbott , consult place for follow up.    #Normocytic Anemia  - Monitor H+H    #Hx of IVDU  #Opioid abuse on Methadone  - c/w methadone 135  - c/w gabapentin    #Anxiety/Depression  - c/w mirtazapine + quetiapine   - c/w xanax     #Active nicotine dependence  - c/w nicotine patch  - c/w buproprion    #Morbid obesity  - diet and lifestyle modification     #Suspected folic acid deficiency  -continue supplement    # suspected DM ,   - HbA1c 01/2022 6.6   - sliding scale if needed     #DVT PPx- heparin  #GI PPx- None  #Diet- DASH/TLC  #Activity- AAT  #Code- FULL        Vital Signs Last 24 Hrs  T(C): 37.6 (12 Oct 2022 05:53), Max: 38.2 (11 Oct 2022 13:31)  T(F): 99.7 (12 Oct 2022 05:53), Max: 100.7 (11 Oct 2022 13:31)  HR: 99 (12 Oct 2022 05:53) (96 - 108)  BP: 102/64 (12 Oct 2022 05:53) (100/57 - 142/96)  BP(mean): 76 (12 Oct 2022 05:53) (76 - 76)  RR: 19 (12 Oct 2022 05:53) (18 - 28)  SpO2: 99% (12 Oct 2022 05:53) (85% - 99%)    Parameters below as of 12 Oct 2022 05:53  Patient On (Oxygen Delivery Method): nasal cannula, high flow        MEDICATIONS  (STANDING):  ALPRAZolam 2 milliGRAM(s) Oral three times a day  azithromycin  IVPB 500 milliGRAM(s) IV Intermittent every 24 hours  buPROPion XL (24-Hour) . 300 milliGRAM(s) Oral daily  cefTRIAXone   IVPB 2000 milliGRAM(s) IV Intermittent every 24 hours  enoxaparin Injectable 80 milliGRAM(s) SubCutaneous every 12 hours  folic acid 1 milliGRAM(s) Oral daily  gabapentin 300 milliGRAM(s) Oral three times a day  levoFLOXacin IVPB      levoFLOXacin IVPB 750 milliGRAM(s) IV Intermittent every 24 hours  methadone    Tablet 135 milliGRAM(s) Oral daily  mirtazapine 30 milliGRAM(s) Oral at bedtime  multivitamin 1 Tablet(s) Oral daily  nicotine - 21 mG/24Hr(s) Patch 1 Patch Transdermal daily  pantoprazole    Tablet 40 milliGRAM(s) Oral before breakfast  QUEtiapine 200 milliGRAM(s) Oral at bedtime  thiamine 100 milliGRAM(s) Oral daily    MEDICATIONS  (PRN):  aluminum hydroxide/magnesium hydroxide/simethicone Suspension 30 milliLiter(s) Oral every 4 hours PRN Dyspepsia  cyclobenzaprine 10 milliGRAM(s) Oral three times a day PRN Muscle Spasm  melatonin 3 milliGRAM(s) Oral at bedtime PRN Insomnia  ondansetron Injectable 4 milliGRAM(s) IV Push every 8 hours PRN Nausea and/or Vomiting        LABS                                            10.5                  Neurophils% (auto):   90.6   (10-11 @ 12:35):    20.91)-----------(203          Lymphocytes% (auto):  2.7                                           34.6                   Eosinphils% (auto):   0.3      Manual%: Neutrophils x    ; Lymphocytes x    ; Eosinophils x    ; Bands%: x    ; Blasts x                                    143    |  101    |  10                  Calcium: 8.4   / iCa: x      (10-11 @ 12:35)    ----------------------------<  191       Magnesium: x                                4.7     |  27     |  0.9              Phosphorous: x        TPro  6.4    /  Alb  3.0    /  TBili  0.5    /  DBili  x      /  AST  18     /  ALT  <5     /  AlkPhos  94     11 Oct 2022 12:35 Transfer Note    Transfer from: Admission to SDU  Transfer to: Admission to ICU    ED COURSE:    53yo male     PMHx of IVDU (heroin), vertebral osteomyelitis s/p debridement, and MSSA bacteremia    Presenting from rehab facility for "low oxygen".     Per patient, he is not experiencing any SOB/HARDWICK or CP or pain anywhere on his body and is entirely asymptomatic.  Of note he has recently noticed new LE edema for the past 2 days.     Per EMS, on arrival he was satting in the 70s, they started the patient on 15L via NRB, with improvement of SaO2 to 80s. No other interventions in the field.    In the ED :  BP: 105/78    RR : 28  T : 100.7  O2: 85% on  15 L NRB      WBC : 20   Lac : 3  BNP : 6.7k (no baseline)  VBG : pH 7.34  ,HCO3 32 , CO2, 59  ECG : sinus tachy   CXR: Increased interstitial and/or vascular markings. Possible right focal   upper lung field consolidation.    Pt was admitted to SDU for possible PNA    HOSPITAL COURSE:      CTA was done, I was contacted by the radiologist as patient was found to have bilateral pulmonary emboli with evidence of right ventricular strain. Pulmonary trunk enlargement, compatible with pulmonary hypertension. Numerous bilateral patchy opacities with confluent areas of consolidation predominantly in the posterior lung fields and bilateral perihilar lymphadenopathy, suspicious for pneumonia.     Pt was started on therapeutic Lovenox and Crit Care fellow was contacted.    It was determined to upgrade patients admission from SDU to ICU level care.        For Follow-Up: AM Labs. If pt becomes hemodynamically unstable/hypotensive consider tPA***        ASSESSMENT & PLAN:       51 yo M w/ pmhx of IVDU (heroin), vertebral osteomyelitis, and MSSA bacteremia presents for hypoxemia     #PE on admission  # Hypoxia - requiring BiPAP  # Obesity   # Possible pneumonia  - WBC 20, febrile , f/u procal, f/u Bcx    - CXR: Increased interstitial and/or vascular markings. Possible right focal upper lung field consolidation.  - f/u CT chest   - f/u AM CXR and ABG   - VBG : pH 7.34  ,HCO3 32 , CO2, 59  - TTE 8/24 : EF 55% no structural abnormalities  - BNP on admission 6.7k , new LE edema : f/u repeat TTE , lasix 40IV x1  - Abx : CTX 2g q24 + Azithromycin 500IV daily  - Pt refused BiPAP in ED and was put on HFNC sat well >95%      # Asymmetrical pupils   # Hx Right periorbital cellulitis  - pupils minimally reactive to light   - R 5mm >L 2mm  - f/u Opthalmology reccs     - no subjective dizziness or blurry vision per patient  - Patient claims no recent drug use besides his prescribed methadone     # Dark tongue lesion    - not painful, not bleeding   - recently quit smoking   - f/u ENT reccs     #Hx Recurrent of vertebral OM   #Recent history & post treatment for MSSA bacteremia  - h/o IR Drainage of Abscess/Biopsy of suspected discitis done 3/16 -fluid  growing NICOLE  - , .8   - PAM showing no vegetations  - s/p Transforaminal lumbar interbody fusion (TLIF) with laminectomy and fusion of posterior column using instrumentation and iliac crest allograft on 8/29/2022  - Ancef 2g iv q8h (8 weeks starting 8/11/2022); will need CT before stopping abx as per ID   - s/p picc line placed (09/01) - complaint with Ancef until day of admission   - follows with Dr. Abbott , consult place for follow up.    #Normocytic Anemia  - Monitor H+H    #Hx of IVDU  #Opioid abuse on Methadone  - c/w methadone 135  - c/w gabapentin    #Anxiety/Depression  - c/w mirtazapine + quetiapine   - c/w xanax     #Active nicotine dependence  - c/w nicotine patch  - c/w buproprion    #Morbid obesity  - diet and lifestyle modification     #Suspected folic acid deficiency  -continue supplement    # suspected DM ,   - HbA1c 01/2022 6.6   - sliding scale if needed     #DVT PPx- heparin  #GI PPx- None  #Diet- DASH/TLC  #Activity- AAT  #Code- FULL        Vital Signs Last 24 Hrs  T(C): 37.6 (12 Oct 2022 05:53), Max: 38.2 (11 Oct 2022 13:31)  T(F): 99.7 (12 Oct 2022 05:53), Max: 100.7 (11 Oct 2022 13:31)  HR: 99 (12 Oct 2022 05:53) (96 - 108)  BP: 102/64 (12 Oct 2022 05:53) (100/57 - 142/96)  BP(mean): 76 (12 Oct 2022 05:53) (76 - 76)  RR: 19 (12 Oct 2022 05:53) (18 - 28)  SpO2: 99% (12 Oct 2022 05:53) (85% - 99%)    Parameters below as of 12 Oct 2022 05:53  Patient On (Oxygen Delivery Method): nasal cannula, high flow        MEDICATIONS  (STANDING):  ALPRAZolam 2 milliGRAM(s) Oral three times a day  azithromycin  IVPB 500 milliGRAM(s) IV Intermittent every 24 hours  buPROPion XL (24-Hour) . 300 milliGRAM(s) Oral daily  cefTRIAXone   IVPB 2000 milliGRAM(s) IV Intermittent every 24 hours  enoxaparin Injectable 80 milliGRAM(s) SubCutaneous every 12 hours  folic acid 1 milliGRAM(s) Oral daily  gabapentin 300 milliGRAM(s) Oral three times a day  levoFLOXacin IVPB      levoFLOXacin IVPB 750 milliGRAM(s) IV Intermittent every 24 hours  methadone    Tablet 135 milliGRAM(s) Oral daily  mirtazapine 30 milliGRAM(s) Oral at bedtime  multivitamin 1 Tablet(s) Oral daily  nicotine - 21 mG/24Hr(s) Patch 1 Patch Transdermal daily  pantoprazole    Tablet 40 milliGRAM(s) Oral before breakfast  QUEtiapine 200 milliGRAM(s) Oral at bedtime  thiamine 100 milliGRAM(s) Oral daily    MEDICATIONS  (PRN):  aluminum hydroxide/magnesium hydroxide/simethicone Suspension 30 milliLiter(s) Oral every 4 hours PRN Dyspepsia  cyclobenzaprine 10 milliGRAM(s) Oral three times a day PRN Muscle Spasm  melatonin 3 milliGRAM(s) Oral at bedtime PRN Insomnia  ondansetron Injectable 4 milliGRAM(s) IV Push every 8 hours PRN Nausea and/or Vomiting        LABS                                            10.5                  Neurophils% (auto):   90.6   (10-11 @ 12:35):    20.91)-----------(203          Lymphocytes% (auto):  2.7                                           34.6                   Eosinphils% (auto):   0.3      Manual%: Neutrophils x    ; Lymphocytes x    ; Eosinophils x    ; Bands%: x    ; Blasts x                                    143    |  101    |  10                  Calcium: 8.4   / iCa: x      (10-11 @ 12:35)    ----------------------------<  191       Magnesium: x                                4.7     |  27     |  0.9              Phosphorous: x        TPro  6.4    /  Alb  3.0    /  TBili  0.5    /  DBili  x      /  AST  18     /  ALT  <5     /  AlkPhos  94     11 Oct 2022 12:35

## 2022-10-12 NOTE — CONSULT NOTE ADULT - ASSESSMENT
HPI:   51yo male with PMHx of IVDU (heroin), vertebral osteomyelitis s/p debridement, and MSSA bacteremia presenting from rehab facility for "low oxygen".   Ophthalmology consulted due to asymmetrical pupils (OD - 5 mm, OS - 2mm)       PMH: as above   POH: hx of periorbital cellulitis OD 1-2 years ago   Allergies: none     nVAsc: OD 20/25 OS 20/25  IOP: 12/17  Pupil: Pupils equal and reactive, no APD noted OU  EOMS: full     SLE:  L/L: wnl   C/S: wnl  K: 1+ PEEs OU  Iris: round and reactive  AC: formed OU  Vitreous: wnl    DFE: Tropicamide 1% + Phenyl 2.5% given  Nerve: pink and sharp   C:D ratio: 0.3  Macula: wnl   Vessel: tortuous, AV nicking noted OU  Periphery: unremarkable     A&P:    Transient Anisocoria:  - on arrival to ED, pupils were noted to be OD 5mm, OS 2 mm  - pupils were noted to be "minimally reactive to light"  - patient currently with good VA, no APD, equal and reactive pupils, wnl pressures, and normal EOMS. Exam was only remarkable for tortousity/AV nicking likely 2/2 to hypertensive changes.   - anisocoria on presentation may have been related to metabolic encephalopathy, 2/2 medications or illicit drug usage.   - if anisocoria reoccurs, please re-consult Ophthalmology.    Hypertensive Retinopathy OU  - patient can follow with Ophthalmology for annual dilated exams.   - patient advised risks of uncontrolled BP    Refractive Error OU  - patient requested new prescription glasses   - can follow up with Optometry outpatient for refraction.     Please provide the patient an option to follow up at [facility] days after discharge.     [address] [place] [address].   Phone #[phone]    Please call me ([phone]) with questions.  Darwin Membreno MD - PGY2

## 2022-10-12 NOTE — PATIENT PROFILE ADULT - FALL HARM RISK - HARM RISK INTERVENTIONS

## 2022-10-12 NOTE — PATIENT PROFILE ADULT - NSPROPOAURINARYCATHETER_GEN_A_NUR
History and physical documented and up to date, allergies reviewed, pre-procedure education provided, patient verbalized understanding of procedure and procedural consent signed. no

## 2022-10-13 LAB
ALBUMIN SERPL ELPH-MCNC: 2.4 G/DL — LOW (ref 3.5–5.2)
ALP SERPL-CCNC: 73 U/L — SIGNIFICANT CHANGE UP (ref 30–115)
ALT FLD-CCNC: <5 U/L — SIGNIFICANT CHANGE UP (ref 0–41)
ANION GAP SERPL CALC-SCNC: 8 MMOL/L — SIGNIFICANT CHANGE UP (ref 7–14)
APPEARANCE UR: CLEAR — SIGNIFICANT CHANGE UP
AST SERPL-CCNC: 11 U/L — SIGNIFICANT CHANGE UP (ref 0–41)
BILIRUB SERPL-MCNC: 0.2 MG/DL — SIGNIFICANT CHANGE UP (ref 0.2–1.2)
BILIRUB UR-MCNC: NEGATIVE — SIGNIFICANT CHANGE UP
BUN SERPL-MCNC: 15 MG/DL — SIGNIFICANT CHANGE UP (ref 10–20)
CALCIUM SERPL-MCNC: 7.5 MG/DL — LOW (ref 8.4–10.4)
CHLORIDE SERPL-SCNC: 98 MMOL/L — SIGNIFICANT CHANGE UP (ref 98–110)
CO2 SERPL-SCNC: 30 MMOL/L — SIGNIFICANT CHANGE UP (ref 17–32)
COLOR SPEC: YELLOW — SIGNIFICANT CHANGE UP
CREAT SERPL-MCNC: 0.7 MG/DL — SIGNIFICANT CHANGE UP (ref 0.7–1.5)
DIFF PNL FLD: NEGATIVE — SIGNIFICANT CHANGE UP
EGFR: 110 ML/MIN/1.73M2 — SIGNIFICANT CHANGE UP
GLUCOSE SERPL-MCNC: 147 MG/DL — HIGH (ref 70–99)
GLUCOSE UR QL: NEGATIVE — SIGNIFICANT CHANGE UP
KETONES UR-MCNC: SIGNIFICANT CHANGE UP
LEUKOCYTE ESTERASE UR-ACNC: NEGATIVE — SIGNIFICANT CHANGE UP
MAGNESIUM SERPL-MCNC: 1.9 MG/DL — SIGNIFICANT CHANGE UP (ref 1.8–2.4)
NITRITE UR-MCNC: NEGATIVE — SIGNIFICANT CHANGE UP
PH UR: 6.5 — SIGNIFICANT CHANGE UP (ref 5–8)
POTASSIUM SERPL-MCNC: 4.3 MMOL/L — SIGNIFICANT CHANGE UP (ref 3.5–5)
POTASSIUM SERPL-SCNC: 4.3 MMOL/L — SIGNIFICANT CHANGE UP (ref 3.5–5)
PROT SERPL-MCNC: 5.1 G/DL — LOW (ref 6–8)
PROT UR-MCNC: ABNORMAL
SODIUM SERPL-SCNC: 136 MMOL/L — SIGNIFICANT CHANGE UP (ref 135–146)
SP GR SPEC: 1.04 — HIGH (ref 1.01–1.03)
UROBILINOGEN FLD QL: ABNORMAL

## 2022-10-13 PROCEDURE — 93306 TTE W/DOPPLER COMPLETE: CPT | Mod: 26

## 2022-10-13 PROCEDURE — 99291 CRITICAL CARE FIRST HOUR: CPT

## 2022-10-13 PROCEDURE — 71045 X-RAY EXAM CHEST 1 VIEW: CPT | Mod: 26

## 2022-10-13 RX ORDER — ACETAMINOPHEN 500 MG
975 TABLET ORAL ONCE
Refills: 0 | Status: COMPLETED | OUTPATIENT
Start: 2022-10-13 | End: 2022-10-13

## 2022-10-13 RX ORDER — CEFEPIME 1 G/1
2000 INJECTION, POWDER, FOR SOLUTION INTRAMUSCULAR; INTRAVENOUS EVERY 8 HOURS
Refills: 0 | Status: DISCONTINUED | OUTPATIENT
Start: 2022-10-13 | End: 2022-10-22

## 2022-10-13 RX ORDER — CEFEPIME 1 G/1
2000 INJECTION, POWDER, FOR SOLUTION INTRAMUSCULAR; INTRAVENOUS ONCE
Refills: 0 | Status: COMPLETED | OUTPATIENT
Start: 2022-10-13 | End: 2022-10-13

## 2022-10-13 RX ORDER — CEFEPIME 1 G/1
INJECTION, POWDER, FOR SOLUTION INTRAMUSCULAR; INTRAVENOUS
Refills: 0 | Status: DISCONTINUED | OUTPATIENT
Start: 2022-10-13 | End: 2022-10-22

## 2022-10-13 RX ADMIN — Medication 1 MILLIGRAM(S): at 11:46

## 2022-10-13 RX ADMIN — GABAPENTIN 300 MILLIGRAM(S): 400 CAPSULE ORAL at 23:01

## 2022-10-13 RX ADMIN — Medication 1 TABLET(S): at 11:46

## 2022-10-13 RX ADMIN — BUPROPION HYDROCHLORIDE 300 MILLIGRAM(S): 150 TABLET, EXTENDED RELEASE ORAL at 11:47

## 2022-10-13 RX ADMIN — ENOXAPARIN SODIUM 140 MILLIGRAM(S): 100 INJECTION SUBCUTANEOUS at 17:39

## 2022-10-13 RX ADMIN — CEFTRIAXONE 100 MILLIGRAM(S): 500 INJECTION, POWDER, FOR SOLUTION INTRAMUSCULAR; INTRAVENOUS at 03:50

## 2022-10-13 RX ADMIN — GABAPENTIN 300 MILLIGRAM(S): 400 CAPSULE ORAL at 16:56

## 2022-10-13 RX ADMIN — Medication 1 PATCH: at 07:00

## 2022-10-13 RX ADMIN — MIRTAZAPINE 30 MILLIGRAM(S): 45 TABLET, ORALLY DISINTEGRATING ORAL at 23:02

## 2022-10-13 RX ADMIN — CHLORHEXIDINE GLUCONATE 1 APPLICATION(S): 213 SOLUTION TOPICAL at 17:37

## 2022-10-13 RX ADMIN — CHLORHEXIDINE GLUCONATE 1 APPLICATION(S): 213 SOLUTION TOPICAL at 06:38

## 2022-10-13 RX ADMIN — GABAPENTIN 300 MILLIGRAM(S): 400 CAPSULE ORAL at 06:34

## 2022-10-13 RX ADMIN — Medication 1 PATCH: at 11:47

## 2022-10-13 RX ADMIN — QUETIAPINE FUMARATE 200 MILLIGRAM(S): 200 TABLET, FILM COATED ORAL at 23:03

## 2022-10-13 RX ADMIN — Medication 1 PATCH: at 19:26

## 2022-10-13 RX ADMIN — Medication 975 MILLIGRAM(S): at 05:30

## 2022-10-13 RX ADMIN — CEFEPIME 100 MILLIGRAM(S): 1 INJECTION, POWDER, FOR SOLUTION INTRAMUSCULAR; INTRAVENOUS at 08:55

## 2022-10-13 RX ADMIN — Medication 2 MILLIGRAM(S): at 06:33

## 2022-10-13 RX ADMIN — METHADONE HYDROCHLORIDE 135 MILLIGRAM(S): 40 TABLET ORAL at 11:57

## 2022-10-13 RX ADMIN — CEFEPIME 100 MILLIGRAM(S): 1 INJECTION, POWDER, FOR SOLUTION INTRAMUSCULAR; INTRAVENOUS at 23:01

## 2022-10-13 RX ADMIN — Medication 975 MILLIGRAM(S): at 04:22

## 2022-10-13 RX ADMIN — PANTOPRAZOLE SODIUM 40 MILLIGRAM(S): 20 TABLET, DELAYED RELEASE ORAL at 06:34

## 2022-10-13 RX ADMIN — Medication 1 PATCH: at 15:30

## 2022-10-13 RX ADMIN — CEFEPIME 100 MILLIGRAM(S): 1 INJECTION, POWDER, FOR SOLUTION INTRAMUSCULAR; INTRAVENOUS at 15:20

## 2022-10-13 RX ADMIN — Medication 100 MILLIGRAM(S): at 11:46

## 2022-10-13 RX ADMIN — ENOXAPARIN SODIUM 140 MILLIGRAM(S): 100 INJECTION SUBCUTANEOUS at 06:34

## 2022-10-13 NOTE — CONSULT NOTE ADULT - SUBJECTIVE AND OBJECTIVE BOX
MIKAEL MCDERMOTT  52y, Male  Allergy: No Known Allergies      CHIEF COMPLAINT:   Hypoxia (13 Oct 2022 08:16)      LOS  2d    HPI  HPI:  51yo male PMH IVDU (heroin), vertebral osteomyelitis s/p debridement, and recurrent MSSA bacteremia (8/2022, 1/2022)  Presenting from rehab facility for "low oxygen".     Per patient, he is not experiencing any SOB/HARDWICK or CP or pain anywhere on his body and is entirely asymptomatic.  Of note he has recently noticed new LE edema for the past 2 days.     Per EMS, on arrival he was satting in the 70s, they started the patient on 15L via NRB, with improvement of SaO2 to 80s. No other interventions in the field.    In the ED :  BP: 105/78    RR : 28  T : 100.7  O2: 85% on  15 L NRB      WBC : 20   Lac : 3  BNP : 6.7k (no baseline)  VBG : pH 7.34  ,HCO3 32 , CO2, 59  ECG : sinus tachy   CXR: Increased interstitial and/or vascular markings. Possible right focal   upper lung field consolidation. (11 Oct 2022 19:41)      INFECTIOUS DISEASE HISTORY:  ID consulted for PNA and osteomyelitis   Tm 100.7    Currently ordered for:  cefepime   IVPB      cefepime   IVPB 2000 milliGRAM(s) IV Intermittent once  cefepime   IVPB 2000 milliGRAM(s) IV Intermittent every 8 hours  levoFLOXacin IVPB      levoFLOXacin IVPB 750 milliGRAM(s) IV Intermittent every 24 hours    Seen by ID 8/2022 for NICOLE bacteremia with discitis/OM at L4-5.  CT Paravertebral phlegmon and left retropharyngeal abscess with air-fluid level.  8/10 BCx NICOLE  8/11,12,13,14,15 BCx NG  8/24 PAM no vegetations  PT was d/c with IV ancef 2 gm iv q8h ( since 8/16 )-8 weeks starting 8/11- 10/6    PMH  PAST MEDICAL & SURGICAL HISTORY:  IV drug abuse      Vertebral osteomyelitis      MSSA bacteremia      No significant past surgical history          FAMILY HISTORY  No pertinent family history in first degree relatives        SOCIAL HISTORY  Social History:  Former Heroin user   Smokes 4 cigarets/day (10 Aug 2022 20:20)        ROS  ***    VITALS:  T(F): 98.7, Max: 100.5 (10-13-22 @ 04:00)  HR: 92  BP: 100/66  RR: 26Vital Signs Last 24 Hrs  T(C): 37.1 (13 Oct 2022 08:00), Max: 38.1 (13 Oct 2022 04:00)  T(F): 98.7 (13 Oct 2022 08:00), Max: 100.5 (13 Oct 2022 04:00)  HR: 92 (13 Oct 2022 08:00) (92 - 105)  BP: 100/66 (13 Oct 2022 08:00) (94/56 - 133/61)  BP(mean): 78 (13 Oct 2022 08:00) (38 - 88)  RR: 26 (13 Oct 2022 08:00) (19 - 31)  SpO2: 95% (13 Oct 2022 08:00) (91% - 98%)    Parameters below as of 13 Oct 2022 08:00  Patient On (Oxygen Delivery Method): nasal cannula, high flow  O2 Flow (L/min): 50  O2 Concentration (%): 70    PHYSICAL EXAM:  ***    TESTS & MEASUREMENTS:                        9.6    19.84 )-----------( 197      ( 12 Oct 2022 06:50 )             31.2     10-13    136  |  98  |  15  ----------------------------<  147<H>  4.3   |  30  |  0.7    Ca    7.5<L>      13 Oct 2022 05:04  Mg     1.9     10-13    TPro  5.1<L>  /  Alb  2.4<L>  /  TBili  0.2  /  DBili  x   /  AST  11  /  ALT  <5  /  AlkPhos  73  10-13      LIVER FUNCTIONS - ( 13 Oct 2022 05:04 )  Alb: 2.4 g/dL / Pro: 5.1 g/dL / ALK PHOS: 73 U/L / ALT: <5 U/L / AST: 11 U/L / GGT: x               Culture - Tissue with Gram Stain (collected 08-29-22 @ 19:38)  Source: .Tissue None  Gram Stain (08-31-22 @ 15:06):    **Please Note**: This is a Corrected Report**    Rare polymorphonuclear leukocytes per low power field    No organisms seen per oil power field    Previously reported as:    Rare polymorphonuclear leukocytes seen per low power field    Moderate Gram NegativeRods seen per oil power field  Final Report (09-03-22 @ 14:55):    No growth at 5 days      Culture - Blood (collected 08-11-22 @ 06:38)  Source: .Blood Blood  Gram Stain (08-12-22 @ 10:55):    Growth in aerobic bottle: Gram Positive Cocci in Clusters  Final Report (08-13-22 @ 14:04):    Growth in aerobic bottle: Staphylococcus aureus    See previous culture [id number]    Culture - Blood (collected 08-10-22 @ 15:15)  Source: .Blood Blood  Final Report (08-15-22 @ 23:01):    No Growth Final      Blood Gas Venous - Lactate: 2.30 mmol/L (10-11-22 @ 16:09)  Blood Gas Venous - Lactate: 2.70 mmol/L (10-11-22 @ 13:07)  Lactate, Blood: 3.0 mmol/L (10-11-22 @ 12:35)      INFECTIOUS DISEASES TESTING  Procalcitonin, Serum: 7.63 ng/mL (10-12-22 @ 06:50)        INFLAMMATORY MARKERS      RADIOLOGY & ADDITIONAL TESTS:  I have personally reviewed the last Chest xray  CXR  Xray Chest 1 View- PORTABLE-Urgent:   ACC: [id number] EXAM:  XR CHEST PORTABLE URGENT 1V                          PROCEDURE DATE:  10/11/2022          INTERPRETATION:  Clinical History / Reason for exam: Shortness of breath    Comparison : Chest radiograph dated 8/17/2022.    Technique/Positioning: AP view of the chest.    Findings:    Support devices: None.    Cardiac/mediastinum/hilum: Enlarged cardiac silhouette.    Lung parenchyma/Pleura: Increased interstitial and/or vascular markings.   Possible focal right upper lung field consolidation. No pleural effusion   or pneumothorax.    Skeleton/soft tissues: No interval change.    Impression:    Increased interstitial and/or vascular markings. Possible right focal   upper lung field consolidation.        --- End of Report ---            JAKI MALDONADO MD; Attending Radiologist  This document has been electronically signed. Oct 11 2022  1:27PM (10-11-22 @ 13:10)      CT  CT Chest w/ IV Cont:   ACC: [id number] EXAM:  CT CHEST IC                          PROCEDURE DATE:  10/12/2022          INTERPRETATION:  CLINICAL INDICATION: Hypoxia; right focal upper lung   field consolidation    TECHNIQUE:  CT of the thorax was performed after administration of contrast. Sagittal   and coronal reformats were performed as well as MIP reconstructions.  Intravenous contrast: 80 cc of Omnipaque 350    COMPARISON: CTA chest 8/20/2022.    INTERPRETATION:    PULMONARY EMBOLUS: Bilateral segmental and subsegmental pulmonary emboli   in the left upper, left lower, right upper, right middle, and right lower   lobes, consistent with pulmonary thromboembolism.    AIRWAYS, LUNGS AND PLEURA: Numerous bilateral patchy opacities with   confluent areas of consolidation predominantly in the right lower   posterior lung fields, consistent with pneumonia. The central   tracheobronchial tree is patent. There is no pneumothorax. Redemonstrated   paraseptal emphysematous changes with scattered parenchymal cysts.    MEDIASTINUM: Bilateral enlarged perihilar lymphadenopathy. The visualized   portion of the thyroid gland is unremarkable.    HEART AND VESSELS: There is evidence of right ventricular strain, with   pulmonary trunk enlargement measuring up to 3.6 cm relative to the   ascending thoracic aortic diameter of 3.2 cm.  There is no pericardial   effusion.    UPPER ABDOMEN: Hepatic steatosis.    BONES AND SOFT TISSUES: Unremarkable.      IMPRESSION:      Bilateral pulmonary emboli with evidence of right ventricular strain    Pulmonary trunk enlargement, compatible with pulmonary hypertension.    Numerous bilateral patchy opacities with confluent areas of consolidation   predominantly in the posterior lung fields and bilateral perihilar   lymphadenopathy, suspicious for pneumonia. Follow-up in 6 weeks' time is   recommended.      Dr. Anthony Camacho discussed preliminary findings with MAYLIN FORTE on   10/12/2022 12:46 AM with readback.    --- End of Report ---          ANTHONY CAMACHO MD; ResidentRadiologist  This document has been electronically signed.  KIMBERLY BAPTISTE MD; Attending Radiologist  This document has been electronically signed. Oct 12 2022  1:04AM (10-12-22 @ 00:08)      CARDIOLOGY TESTING  12 Lead ECG:   Ventricular Rate 104 BPM    Atrial Rate 104 BPM    P-R Interval 132 ms    QRS Duration 90 ms    Q-T Interval 356 ms    QTC Calculation(Bazett) 468 ms    P Axis 42 degrees    R Axis 35 degrees    T Axis 35 degrees    Diagnosis Line Sinus tachycardia  Otherwise normal ECG    Confirmed by Gabe Brown ([id number]) on 10/11/2022 5:07:59 PM (10-11-22 @ 13:38)      MEDICATIONS  ALPRAZolam 2 Oral three times a day  buPROPion XL (24-Hour) . 300 Oral daily  cefepime   IVPB     cefepime   IVPB 2000 IV Intermittent once  cefepime   IVPB 2000 IV Intermittent every 8 hours  chlorhexidine 2% Cloths 1 Topical every 12 hours  enoxaparin Injectable 140 SubCutaneous every 12 hours  folic acid 1 Oral daily  gabapentin 300 Oral three times a day  levoFLOXacin IVPB     levoFLOXacin IVPB 750 IV Intermittent every 24 hours  methadone    Tablet 135 Oral daily  mirtazapine 30 Oral at bedtime  multivitamin 1 Oral daily  nicotine - 21 mG/24Hr(s) Patch 1 Transdermal daily  pantoprazole    Tablet 40 Oral before breakfast  QUEtiapine 200 Oral at bedtime  thiamine 100 Oral daily        ANTIBIOTICS:  cefepime   IVPB      cefepime   IVPB 2000 milliGRAM(s) IV Intermittent once  cefepime   IVPB 2000 milliGRAM(s) IV Intermittent every 8 hours  levoFLOXacin IVPB      levoFLOXacin IVPB 750 milliGRAM(s) IV Intermittent every 24 hours      ALLERGIES:  No Known Allergies         MIKAEL MCDERMOTT  52y, Male  Allergy: No Known Allergies      CHIEF COMPLAINT:   Hypoxia (13 Oct 2022 08:16)      LOS  2d    HPI  HPI:  53yo male PMH IVDU (heroin), vertebral osteomyelitis s/p debridement, and recurrent MSSA bacteremia (8/2022, 1/2022)  Presenting from rehab facility for "low oxygen".     Per patient, he is not experiencing any SOB/HARDWICK or CP or pain anywhere on his body and is entirely asymptomatic.  Of note he has recently noticed new LE edema for the past 2 days.     Per EMS, on arrival he was satting in the 70s, they started the patient on 15L via NRB, with improvement of SaO2 to 80s. No other interventions in the field.    In the ED :  BP: 105/78    RR : 28  T : 100.7  O2: 85% on  15 L NRB      WBC : 20   Lac : 3  BNP : 6.7k (no baseline)  VBG : pH 7.34  ,HCO3 32 , CO2, 59  ECG : sinus tachy   CXR: Increased interstitial and/or vascular markings. Possible right focal   upper lung field consolidation. (11 Oct 2022 19:41)      INFECTIOUS DISEASE HISTORY:  ID consulted for PNA and osteomyelitis   Tm 100.7  Found to have bilateral PE, denies rhinorrhea, sore throat, productive cough  Reports continued back pain  has not walked since the surgery  denies recent IVDU  reports adherence to prescribed ABX    Currently ordered for:  cefepime   IVPB      cefepime   IVPB 2000 milliGRAM(s) IV Intermittent once  cefepime   IVPB 2000 milliGRAM(s) IV Intermittent every 8 hours  levoFLOXacin IVPB      levoFLOXacin IVPB 750 milliGRAM(s) IV Intermittent every 24 hours    Seen by ID 8/2022 for NICOLE bacteremia with discitis/OM at L4-5.  CT Paravertebral phlegmon and left retropharyngeal abscess with air-fluid level.  8/10 BCx NICOLE  8/11,12,13,14,15 BCx NG  8/24 PAM no vegetations  PT was d/c with IV ancef 2 gm iv q8h ( since 8/16 )-8 weeks starting 8/11- 10/6    PMH  PAST MEDICAL & SURGICAL HISTORY:  IV drug abuse      Vertebral osteomyelitis      MSSA bacteremia      No significant past surgical history          FAMILY HISTORY  No pertinent family history in first degree relatives        SOCIAL HISTORY  Social History:  Former Heroin user   Smokes 4 cigarets/day (10 Aug 2022 20:20)        ROS  General: Denies rigors, nightsweats  HEENT: Denies headache, rhinorrhea, sore throat, eye pain  CV: Denies CP, palpitations  PULM: Denies wheezing, hemoptysis  GI: Denies hematemesis, hematochezia, melena  : Denies discharge, hematuria  MSK: Denies arthralgias, myalgias  SKIN: Denies rash, lesions  NEURO: Denies paresthesias, weakness  PSYCH: Denies depression, anxiety     VITALS:  T(F): 98.7, Max: 100.5 (10-13-22 @ 04:00)  HR: 92  BP: 100/66  RR: 26Vital Signs Last 24 Hrs  T(C): 37.1 (13 Oct 2022 08:00), Max: 38.1 (13 Oct 2022 04:00)  T(F): 98.7 (13 Oct 2022 08:00), Max: 100.5 (13 Oct 2022 04:00)  HR: 92 (13 Oct 2022 08:00) (92 - 105)  BP: 100/66 (13 Oct 2022 08:00) (94/56 - 133/61)  BP(mean): 78 (13 Oct 2022 08:00) (38 - 88)  RR: 26 (13 Oct 2022 08:00) (19 - 31)  SpO2: 95% (13 Oct 2022 08:00) (91% - 98%)    Parameters below as of 13 Oct 2022 08:00  Patient On (Oxygen Delivery Method): nasal cannula, high flow  O2 Flow (L/min): 50  O2 Concentration (%): 70    PHYSICAL EXAM:  Gen: NAD, resting in bed HFNC, obese  HEENT: Normocephalic, atraumatic  Neck: supple, no lymphadenopathy  CV: Regular rate & regular rhythm  Lungs: decreased BS at bases, no fremitus  Abdomen: Soft, BS present  Ext: Warm, well perfused, contractures UE   Back could not examine  Neuro: non focal, awake, decreased strength bilateral LE  Skin: no rash, no erythema  Lines: no phlebitis     TESTS & MEASUREMENTS:                        9.6    19.84 )-----------( 197      ( 12 Oct 2022 06:50 )             31.2     10-13    136  |  98  |  15  ----------------------------<  147<H>  4.3   |  30  |  0.7    Ca    7.5<L>      13 Oct 2022 05:04  Mg     1.9     10-13    TPro  5.1<L>  /  Alb  2.4<L>  /  TBili  0.2  /  DBili  x   /  AST  11  /  ALT  <5  /  AlkPhos  73  10-13      LIVER FUNCTIONS - ( 13 Oct 2022 05:04 )  Alb: 2.4 g/dL / Pro: 5.1 g/dL / ALK PHOS: 73 U/L / ALT: <5 U/L / AST: 11 U/L / GGT: x               Culture - Tissue with Gram Stain (collected 08-29-22 @ 19:38)  Source: .Tissue None  Gram Stain (08-31-22 @ 15:06):    **Please Note**: This is a Corrected Report**    Rare polymorphonuclear leukocytes per low power field    No organisms seen per oil power field    Previously reported as:    Rare polymorphonuclear leukocytes seen per low power field    Moderate Gram NegativeRods seen per oil power field  Final Report (09-03-22 @ 14:55):    No growth at 5 days      Culture - Blood (collected 08-11-22 @ 06:38)  Source: .Blood Blood  Gram Stain (08-12-22 @ 10:55):    Growth in aerobic bottle: Gram Positive Cocci in Clusters  Final Report (08-13-22 @ 14:04):    Growth in aerobic bottle: Staphylococcus aureus    See previous culture [id number]    Culture - Blood (collected 08-10-22 @ 15:15)  Source: .Blood Blood  Final Report (08-15-22 @ 23:01):    No Growth Final      Blood Gas Venous - Lactate: 2.30 mmol/L (10-11-22 @ 16:09)  Blood Gas Venous - Lactate: 2.70 mmol/L (10-11-22 @ 13:07)  Lactate, Blood: 3.0 mmol/L (10-11-22 @ 12:35)      INFECTIOUS DISEASES TESTING  Procalcitonin, Serum: 7.63 ng/mL (10-12-22 @ 06:50)        INFLAMMATORY MARKERS      RADIOLOGY & ADDITIONAL TESTS:  I have personally reviewed the last Chest xray  CXR  Xray Chest 1 View- PORTABLE-Urgent:   ACC: [id number] EXAM:  XR CHEST PORTABLE URGENT 1V                          PROCEDURE DATE:  10/11/2022          INTERPRETATION:  Clinical History / Reason for exam: Shortness of breath    Comparison : Chest radiograph dated 8/17/2022.    Technique/Positioning: AP view of the chest.    Findings:    Support devices: None.    Cardiac/mediastinum/hilum: Enlarged cardiac silhouette.    Lung parenchyma/Pleura: Increased interstitial and/or vascular markings.   Possible focal right upper lung field consolidation. No pleural effusion   or pneumothorax.    Skeleton/soft tissues: No interval change.    Impression:    Increased interstitial and/or vascular markings. Possible right focal   upper lung field consolidation.        --- End of Report ---            JAKI MALDONADO MD; Attending Radiologist  This document has been electronically signed. Oct 11 2022  1:27PM (10-11-22 @ 13:10)      CT  CT Chest w/ IV Cont:   ACC: [id number] EXAM:  CT CHEST IC                          PROCEDURE DATE:  10/12/2022          INTERPRETATION:  CLINICAL INDICATION: Hypoxia; right focal upper lung   field consolidation    TECHNIQUE:  CT of the thorax was performed after administration of contrast. Sagittal   and coronal reformats were performed as well as MIP reconstructions.  Intravenous contrast: 80 cc of Omnipaque 350    COMPARISON: CTA chest 8/20/2022.    INTERPRETATION:    PULMONARY EMBOLUS: Bilateral segmental and subsegmental pulmonary emboli   in the left upper, left lower, right upper, right middle, and right lower   lobes, consistent with pulmonary thromboembolism.    AIRWAYS, LUNGS AND PLEURA: Numerous bilateral patchy opacities with   confluent areas of consolidation predominantly in the right lower   posterior lung fields, consistent with pneumonia. The central   tracheobronchial tree is patent. There is no pneumothorax. Redemonstrated   paraseptal emphysematous changes with scattered parenchymal cysts.    MEDIASTINUM: Bilateral enlarged perihilar lymphadenopathy. The visualized   portion of the thyroid gland is unremarkable.    HEART AND VESSELS: There is evidence of right ventricular strain, with   pulmonary trunk enlargement measuring up to 3.6 cm relative to the   ascending thoracic aortic diameter of 3.2 cm.  There is no pericardial   effusion.    UPPER ABDOMEN: Hepatic steatosis.    BONES AND SOFT TISSUES: Unremarkable.      IMPRESSION:      Bilateral pulmonary emboli with evidence of right ventricular strain    Pulmonary trunk enlargement, compatible with pulmonary hypertension.    Numerous bilateral patchy opacities with confluent areas of consolidation   predominantly in the posterior lung fields and bilateral perihilar   lymphadenopathy, suspicious for pneumonia. Follow-up in 6 weeks' time is   recommended.      Dr. Anthony Camacho discussed preliminary findings with MAYLIN FORTE on   10/12/2022 12:46 AM with readback.    --- End of Report ---          ANTHONY CAMACHO MD; ResidentRadiologist  This document has been electronically signed.  KIMBERLY BAPTISTE MD; Attending Radiologist  This document has been electronically signed. Oct 12 2022  1:04AM (10-12-22 @ 00:08)      CARDIOLOGY TESTING  12 Lead ECG:   Ventricular Rate 104 BPM    Atrial Rate 104 BPM    P-R Interval 132 ms    QRS Duration 90 ms    Q-T Interval 356 ms    QTC Calculation(Bazett) 468 ms    P Axis 42 degrees    R Axis 35 degrees    T Axis 35 degrees    Diagnosis Line Sinus tachycardia  Otherwise normal ECG    Confirmed by Gabe Brown ([id number]) on 10/11/2022 5:07:59 PM (10-11-22 @ 13:38)      MEDICATIONS  ALPRAZolam 2 Oral three times a day  buPROPion XL (24-Hour) . 300 Oral daily  cefepime   IVPB     cefepime   IVPB 2000 IV Intermittent once  cefepime   IVPB 2000 IV Intermittent every 8 hours  chlorhexidine 2% Cloths 1 Topical every 12 hours  enoxaparin Injectable 140 SubCutaneous every 12 hours  folic acid 1 Oral daily  gabapentin 300 Oral three times a day  levoFLOXacin IVPB     levoFLOXacin IVPB 750 IV Intermittent every 24 hours  methadone    Tablet 135 Oral daily  mirtazapine 30 Oral at bedtime  multivitamin 1 Oral daily  nicotine - 21 mG/24Hr(s) Patch 1 Transdermal daily  pantoprazole    Tablet 40 Oral before breakfast  QUEtiapine 200 Oral at bedtime  thiamine 100 Oral daily        ANTIBIOTICS:  cefepime   IVPB      cefepime   IVPB 2000 milliGRAM(s) IV Intermittent once  cefepime   IVPB 2000 milliGRAM(s) IV Intermittent every 8 hours  levoFLOXacin IVPB      levoFLOXacin IVPB 750 milliGRAM(s) IV Intermittent every 24 hours      ALLERGIES:  No Known Allergies

## 2022-10-13 NOTE — SWALLOW BEDSIDE ASSESSMENT ADULT - CONSISTENCIES ADMINISTERED
10oz total via cup sips, consecutive cups sips and straw sips./thin liquid/pureed/regular solid
8oz total via tsp, cup sips, and straw sips./thin liquid/pureed/regular solid

## 2022-10-13 NOTE — SWALLOW BEDSIDE ASSESSMENT ADULT - SLP PERTINENT HISTORY OF CURRENT PROBLEM
53yo male  PMHx of IVDU (heroin), vertebral osteomyelitis s/p debridement, and MSSA bacteremia. Presenting from rehab facility for "low oxygen". Per patient, he is not experiencing any SOB/HARDWICK or CP ?? or pain anywhere on his body and is entirely asymptomatic.  Of note he has recently noticed new LE edema for the past 2 days. Admitted to ED with 100.7 fever, satting 85% on 15L O2 NRB. Weaned to 80% HFNC and now at 70% HFNC. CXR: R focal UL field consolidation.
51yo male  PMHx of IVDU (heroin), vertebral osteomyelitis s/p debridement, and MSSA bacteremia. Presenting from rehab facility for "low oxygen". Per patient, he is not experiencing any SOB/HARDWICK or CP ?? or pain anywhere on his body and is entirely asymptomatic.  Of note he has recently noticed new LE edema for the past 2 days. Admitted to ED with 100.7 fever, satting 85% on 15L O2 NRB. Weaned to 80% HFNC and now at 70% HFNC. CXR: R focal UL field consolidation.

## 2022-10-13 NOTE — SWALLOW BEDSIDE ASSESSMENT ADULT - SWALLOW EVAL: DIAGNOSIS
Pt presents as essentially functional for regular and thins without overt s/s aspiration.
Pt presents as essentially functional for regular and thins without overt s/s aspiration.

## 2022-10-13 NOTE — PROGRESS NOTE ADULT - ASSESSMENT
IMPRESSION:    Acute hypoxemic resp failure on HHFNC (declining BIPAP)  PNA/ highly aspiration  PE with RV strain - submassive  hx Epidural Phlegmon, OM  hx Right Psoas Abscess   hx MSSA Bacteremia  HO IVDA, Opioid Abuse on Methadone   obesity/ DIONICIO / OHS    PLAN:    CNS: On alprazolam, quetiapine, gabapentin. On MEthadone for history of heroin use.     HEENT:  Oral care    PULMONARY:  HOB @ 45 degrees, aspiration precaution. Wean down Fio2; keep the same flow for now. Remains on HFNC. Recommend BIPAP during sleep for OHS.     CARDIOVASCULAR: Check echocardiogram today. Not on pressors. Not on IVF. EKG for QTc.     GI: GI prophylaxis; oral diet. Aspiration precautions.     RENAL:  Kidney function at baseline. Correct as needed.     INFECTIOUS DISEASE: ID consult. Check nasal MRSA. Blood culture pending. Check UA. Cefepime and Levaquin for now.     HEMATOLOGICAL:  LE doppler: extensive DVT left leg. Vascular eval for the extensive DVT.     ENDOCRINE:  Follow up FS.  Insulin protocol if needed.    Dispo: Keep in the CCU for now.     Prognosis is guarded.      IMPRESSION:    Acute hypoxemic resp failure on HHFNC (declining BIPAP)  PNA/ highly aspiration  PE with RV strain - submassive  hx Epidural Phlegmon, OM  hx Right Psoas Abscess   hx MSSA Bacteremia  HO IVDA, Opioid Abuse on Methadone   obesity/ DIONICIO / OHS    PLAN:    CNS: On alprazolam, quetiapine, gabapentin. On Methadone for history of heroin use.     HEENT:  Oral care    PULMONARY:  HOB @ 45 degrees, aspiration precaution. Wean down Fio2; keep the same flow for now. Remains on HFNC. Recommend BIPAP during sleep for OHS.     CARDIOVASCULAR: Check echocardiogram today. Not on pressors. Not on IVF. EKG for QTc.     GI: GI prophylaxis; oral diet. Aspiration precautions.     RENAL:  Kidney function at baseline. Correct as needed.     INFECTIOUS DISEASE: ID consult. Check nasal MRSA. Blood culture pending. Check UA. Cefepime and Levaquin for now.     HEMATOLOGICAL:  LE doppler: extensive DVT left leg. Vascular eval for the extensive DVT.     ENDOCRINE:  Follow up FS.  Insulin protocol if needed.    Dispo: Keep in the CCU for now.     Prognosis is guarded.

## 2022-10-13 NOTE — SWALLOW BEDSIDE ASSESSMENT ADULT - SWALLOW EVAL: FEEDING ASSISTANCE
d/t weakness/minimal assistance required
reminders to take small bites/sips and eat slowly, given increased O2 requirements/frequent cues/help required

## 2022-10-13 NOTE — PROGRESS NOTE ADULT - ASSESSMENT
IMPRESSION:    Acute hypoxemic resp failure on HHFNC (declining BIPAP)  PNA/ highly aspiration  PE with RV strain - submassive  hx Epidural Phlegmon, OM  hx Right Psoas Abscess   hx MSSA Bacteremia  HO IVDA, Opioid Abuse on Methadone   obesity/ DIONICIO / OHS    PLAN:    CNS: On alprazolam, quetiapine, gabapentin. On Methadone for history of heroin use.     HEENT:  Oral care    PULMONARY:  HOB @ 45 degrees, aspiration precaution. Wean down Fio2; keep the same flow for now. Remains on HFNC. Recommend BIPAP during sleep for OHS.     CARDIOVASCULAR: Check echocardiogram today. Not on pressors. Not on IVF. EKG for QTc.     GI: GI prophylaxis; oral diet. Aspiration precautions.     RENAL:  Kidney function at baseline. Correct as needed.     INFECTIOUS DISEASE: ID consult. Check nasal MRSA. Blood culture pending. Check UA. Cefepime and Levaquin for now.     HEMATOLOGICAL:  LE doppler: extensive DVT left leg. Vascular eval for the extensive DVT.     ENDOCRINE:  Follow up FS.  Insulin protocol if needed.    Dispo: Keep in the CCU for now.     Prognosis is guarded.

## 2022-10-13 NOTE — CONSULT NOTE ADULT - ASSESSMENT
ASSESSMENT  53yo male PMH IVDU (heroin), vertebral osteomyelitis s/p debridement, and recurrent MSSA bacteremia (8/2022, 1/2022)  Presenting from rehab facility for "low oxygen".   Seen by ID 8/2022 for NICOLE bacteremia with discitis/OM at L4-5.  CT Paravertebral phlegmon and left retropharyngeal abscess with air-fluid level.  8/10 BCx NICOLE  8/11,12,13,14,15 BCx NG  8/24 PAM no vegetations  PT was d/c with IV ancef 2 gm iv q8h ( since 8/16 )-8 weeks starting 8/11- 10/6    IMPRESSION  #  #Severe sepsis on admission Pulse>90, Resp Rate>20, WBC>12, lactic acidosis    CT Bilateral pulmonary emboli with evidence of right ventricular strainPulmonary trunk enlargement, compatible with pulmonary hypertension.  Numerous bilateral patchy opacities with confluent areas of consolidation predominantly in the posterior lung fields and bilateral perihilar   lymphadenopathy, suspicious for pneumonia. Follow-up in 6 weeks' time is recommended.    Procalcitonin, Serum: 7.63 ng/mL (10-12-22 @ 06:50)    #Obesity BMI (kg/m2): 41  #Recurrent MSSA bacteremia with discitis   #IVDU  #HCV, RNA UD  Creatinine, Serum: 0.7 (10-13-22 @ 05:04)    Weight (kg): 140.9 (10-12-22 @ 09:30)    RECOMMENDATIONS  This is an incomplete consult note. All final recommendations to follow after interview and examination of the patient. Please follow recommendations noted below.    If any questions, please call or send a message on [employer] Teams  Please continue to update ID with any pertinent new laboratory or radiographic findings  #[id number]   ASSESSMENT  53yo male PMH IVDU (heroin), vertebral osteomyelitis s/p debridement, and recurrent MSSA bacteremia (8/2022, 1/2022)  Presenting from rehab facility for "low oxygen".   Seen by ID 8/2022 for NICOLE bacteremia with discitis/OM at L4-5.  CT Paravertebral phlegmon and left retropharyngeal abscess with air-fluid level.  8/10 BCx NICOLE  8/11,12,13,14,15 BCx NG  8/24 PAM no vegetations  PT was d/c with IV ancef 2 gm iv q8h ( since 8/16 )-8 weeks starting 8/11- 10/6    IMPRESSION  #Rule out Severe sepsis on admission Pulse>90, Resp Rate>20, WBC>12, lactic acidosis    Found to have bilateral PE    Denies productive cough to suggest PNA    Has continued back pain     CT Bilateral pulmonary emboli with evidence of right ventricular strainPulmonary trunk enlargement, compatible with pulmonary hypertension.  Numerous bilateral patchy opacities with confluent areas of consolidation predominantly in the posterior lung fields and bilateral perihilar   lymphadenopathy, suspicious for pneumonia. Follow-up in 6 weeks' time is recommended.    Procalcitonin, Serum: 7.63 ng/mL (10-12-22 @ 06:50)  #Obesity BMI (kg/m2): 41  #Recurrent MSSA bacteremia with discitis   #IVDU  #HCV, RNA UD  Creatinine, Serum: 0.7 (10-13-22 @ 05:04)    Weight (kg): 140.9 (10-12-22 @ 09:30)    RECOMMENDATIONS  - cefepime   IVPB 2000 milliGRAM(s) IV Intermittent every 8 hours  - levoFLOXacin IVPB 750 milliGRAM(s) IV Intermittent every 24 hours  - MRI spine when stable  - f/u BCX  - NSYG     If any questions, please call or send a message on [employer] Teams  Please continue to update ID with any pertinent new laboratory or radiographic findings  #[id number]

## 2022-10-13 NOTE — SWALLOW BEDSIDE ASSESSMENT ADULT - ADDITIONAL RECOMMENDATIONS
Pt remains on HFNC, which puts pt at an increased risk of aspiration. SLP services will f/u to monitor tolerance. Staff instructed to discontinue oral diet if pt shows decline in respiratory status or overt s/s aspiration.
Despite increased O2 requirements from baseline (HFNC 60%), pt does not demonstrate any overt s/s aspiration for Regular diet with thin liquids. Staff instructed to discontinue oral diet if pt shows decline in respiratory status or overt s/s aspiration. d/c from SLP services, reconsult PRN.

## 2022-10-13 NOTE — PROGRESS NOTE ADULT - SUBJECTIVE AND OBJECTIVE BOX
Patient is a 52y old  Male who presents with a chief complaint of Hypoxia    HPI:  53yo male     PMHx of IVDU (heroin), vertebral osteomyelitis s/p debridement, and MSSA bacteremia    Presenting from rehab facility for "low oxygen".     Per patient, he is not experiencing any SOB/HARDWICK or CP or pain anywhere on his body and is entirely asymptomatic.  Of note he has recently noticed new LE edema for the past 2 days.     Per EMS, on arrival he was satting in the 70s, they started the patient on 15L via NRB, with improvement of SaO2 to 80s. No other interventions in the field.    In the ED :  BP: 105/78    RR : 28  T : 100.7  O2: 85% on  15 L NRB      WBC : 20   Lac : 3  BNP : 6.7k (no baseline)  VBG : pH 7.34  ,HCO3 32 , CO2, 59  ECG : sinus tachy   CXR: Increased interstitial and/or vascular markings. Possible right focal   upper lung field consolidation. (11 Oct 2022 19:41)       INTERVAL HPI/OVERNIGHT EVENTS:   No overnight events   Afebrile, hemodynamically stable     Subjective:    ICU Vital Signs Last 24 Hrs  T(C): 37.1 (13 Oct 2022 08:00), Max: 38.1 (13 Oct 2022 04:00)  T(F): 98.7 (13 Oct 2022 08:00), Max: 100.5 (13 Oct 2022 04:00)  HR: 94 (13 Oct 2022 12:00) (91 - 105)  BP: 114/69 (13 Oct 2022 12:00) (94/56 - 127/63)  BP(mean): 87 (13 Oct 2022 12:00) (38 - 89)  ABP: --  ABP(mean): --  RR: 28 (13 Oct 2022 12:00) (19 - 32)  SpO2: 96% (13 Oct 2022 12:00) (91% - 97%)    O2 Parameters below as of 13 Oct 2022 12:00  Patient On (Oxygen Delivery Method): nasal cannula, high flow  O2 Flow (L/min): 50  O2 Concentration (%): 60      I&O's Summary    12 Oct 2022 07:01  -  13 Oct 2022 07:00  --------------------------------------------------------  IN: 1880 mL / OUT: 1200 mL / NET: 680 mL    13 Oct 2022 07:01  -  13 Oct 2022 16:14  --------------------------------------------------------  IN: 600 mL / OUT: 600 mL / NET: 0 mL          Daily       MEDICATIONS  (STANDING):  ALPRAZolam 2 milliGRAM(s) Oral three times a day  buPROPion XL (24-Hour) . 300 milliGRAM(s) Oral daily  cefepime   IVPB      cefepime   IVPB 2000 milliGRAM(s) IV Intermittent every 8 hours  chlorhexidine 2% Cloths 1 Application(s) Topical every 12 hours  enoxaparin Injectable 140 milliGRAM(s) SubCutaneous every 12 hours  folic acid 1 milliGRAM(s) Oral daily  gabapentin 300 milliGRAM(s) Oral three times a day  levoFLOXacin IVPB      levoFLOXacin IVPB 750 milliGRAM(s) IV Intermittent every 24 hours  methadone    Tablet 135 milliGRAM(s) Oral daily  mirtazapine 30 milliGRAM(s) Oral at bedtime  multivitamin 1 Tablet(s) Oral daily  nicotine - 21 mG/24Hr(s) Patch 1 Patch Transdermal daily  pantoprazole    Tablet 40 milliGRAM(s) Oral before breakfast  QUEtiapine 200 milliGRAM(s) Oral at bedtime  thiamine 100 milliGRAM(s) Oral daily    MEDICATIONS  (PRN):  aluminum hydroxide/magnesium hydroxide/simethicone Suspension 30 milliLiter(s) Oral every 4 hours PRN Dyspepsia  cyclobenzaprine 10 milliGRAM(s) Oral three times a day PRN Muscle Spasm  melatonin 3 milliGRAM(s) Oral at bedtime PRN Insomnia  ondansetron Injectable 4 milliGRAM(s) IV Push every 8 hours PRN Nausea and/or Vomiting      PHYSICAL EXAM:  GENERAL:   HEAD:  Atraumatic, Normocephalic  EYES: EOMI, PERRLA, conjunctiva and sclera clear  NECK: Supple, No JVD, Normal thyroid, no enlarged nodes  NERVOUS SYSTEM:  Alert & Awake.   CHEST/LUNG: B/L good air entry; No rales, rhonchi, or wheezing  HEART: S1S2 normal, no S3, Regular rate and rhythm; No murmurs  ABDOMEN: Soft, Nontender, Nondistended; Bowel sounds present  EXTREMITIES:  2+ Peripheral Pulses, No clubbing, cyanosis, or edema  LYMPH: No lymphadenopathy noted  SKIN: No rashes or lesions    LABS:                        9.6    19.84 )-----------( 197      ( 12 Oct 2022 06:50 )             31.2     10-13    136  |  98  |  15  ----------------------------<  147<H>  4.3   |  30  |  0.7    Ca    7.5<L>      13 Oct 2022 05:04  Mg     1.9     10-13    TPro  5.1<L>  /  Alb  2.4<L>  /  TBili  0.2  /  DBili  x   /  AST  11  /  ALT  <5  /  AlkPhos  73  10-13    LIVER FUNCTIONS - ( 13 Oct 2022 05:04 )  Alb: 2.4 g/dL / Pro: 5.1 g/dL / ALK PHOS: 73 U/L / ALT: <5 U/L / AST: 11 U/L / GGT: x             CAPILLARY BLOOD GLUCOSE            CARDIAC MARKERS ( 12 Oct 2022 06:50 )  x     / <0.01 ng/mL / x     / x     / x              Care Discussed with Consultants/Other Providers [ x] YES  [ ] NO

## 2022-10-13 NOTE — PROGRESS NOTE ADULT - SUBJECTIVE AND OBJECTIVE BOX
Patient is a 52y old  Male who presents with a chief complaint of Hypoxia (12 Oct 2022 14:22)        Over Night Events:    Remains on high flow oxygen   Not on pressors         ROS:  See HPI    PHYSICAL EXAM    ICU Vital Signs Last 24 Hrs  T(C): 37.8 (13 Oct 2022 05:00), Max: 38.1 (13 Oct 2022 04:00)  T(F): 100 (13 Oct 2022 05:00), Max: 100.5 (13 Oct 2022 04:00)  HR: 92 (13 Oct 2022 07:00) (92 - 105)  BP: 98/63 (13 Oct 2022 07:00) (94/56 - 133/61)  BP(mean): 76 (13 Oct 2022 07:00) (38 - 88)  ABP: --  ABP(mean): --  RR: 23 (13 Oct 2022 07:00) (19 - 31)  SpO2: 96% (13 Oct 2022 07:00) (91% - 98%)    O2 Parameters below as of 13 Oct 2022 07:00  Patient On (Oxygen Delivery Method): nasal cannula, high flow  O2 Flow (L/min): 50  O2 Concentration (%): 70        General: Comfotable   HEENT: KULDEEP             Lymphatic system: No cervical LN   Lungs: Bilateral BS, equal   Cardiovascular: Regular   Gastrointestinal: Soft, Positive BS  Extremities: No clubbing.  Moves extremities.  Full Range of motion   Skin: Warm, intact  Neurological: No motor or sensory deficit, AAO 3       10-12-22 @ 07:01  -  10-13-22 @ 07:00  --------------------------------------------------------  IN:    IV PiggyBack: 1200 mL    Oral Fluid: 680 mL  Total IN: 1880 mL    OUT:    Voided (mL): 1200 mL  Total OUT: 1200 mL    Total NET: 680 mL          LABS:                            9.6    19.84 )-----------( 197      ( 12 Oct 2022 06:50 )             31.2                                               10-13    136  |  98  |  15  ----------------------------<  147<H>  4.3   |  30  |  0.7    Ca    7.5<L>      13 Oct 2022 05:04  Mg     1.9     10-13    TPro  5.1<L>  /  Alb  2.4<L>  /  TBili  0.2  /  DBili  x   /  AST  11  /  ALT  <5  /  AlkPhos  73  10-13                                                 CARDIAC MARKERS ( 12 Oct 2022 06:50 )  x     / <0.01 ng/mL / x     / x     / x      CARDIAC MARKERS ( 11 Oct 2022 12:35 )  x     / <0.01 ng/mL / x     / x     / x                                                LIVER FUNCTIONS - ( 13 Oct 2022 05:04 )  Alb: 2.4 g/dL / Pro: 5.1 g/dL / ALK PHOS: 73 U/L / ALT: <5 U/L / AST: 11 U/L / GGT: x                                                                                                                                       MEDICATIONS  (STANDING):  ALPRAZolam 2 milliGRAM(s) Oral three times a day  buPROPion XL (24-Hour) . 300 milliGRAM(s) Oral daily  cefTRIAXone   IVPB 2000 milliGRAM(s) IV Intermittent every 24 hours  chlorhexidine 2% Cloths 1 Application(s) Topical every 12 hours  enoxaparin Injectable 140 milliGRAM(s) SubCutaneous every 12 hours  folic acid 1 milliGRAM(s) Oral daily  gabapentin 300 milliGRAM(s) Oral three times a day  levoFLOXacin IVPB      levoFLOXacin IVPB 750 milliGRAM(s) IV Intermittent every 24 hours  methadone    Tablet 135 milliGRAM(s) Oral daily  mirtazapine 30 milliGRAM(s) Oral at bedtime  multivitamin 1 Tablet(s) Oral daily  nicotine - 21 mG/24Hr(s) Patch 1 Patch Transdermal daily  pantoprazole    Tablet 40 milliGRAM(s) Oral before breakfast  QUEtiapine 200 milliGRAM(s) Oral at bedtime  thiamine 100 milliGRAM(s) Oral daily    MEDICATIONS  (PRN):  aluminum hydroxide/magnesium hydroxide/simethicone Suspension 30 milliLiter(s) Oral every 4 hours PRN Dyspepsia  cyclobenzaprine 10 milliGRAM(s) Oral three times a day PRN Muscle Spasm  melatonin 3 milliGRAM(s) Oral at bedtime PRN Insomnia  ondansetron Injectable 4 milliGRAM(s) IV Push every 8 hours PRN Nausea and/or Vomiting      Xrays:                                                                                     ECHO

## 2022-10-13 NOTE — CONSULT NOTE ADULT - SUBJECTIVE AND OBJECTIVE BOX
INTERVENTIONAL RADIOLOGY CONSULT:     Procedure Requested: PE Thrombectomy    HPI:  53yo male   PMHx of IVDU (heroin), vertebral osteomyelitis s/p debridement, and MSSA bacteremia  Presenting from rehab facility for "low oxygen".   Per patient, he is not experiencing any SOB/HARDWICK or CP or pain anywhere on his body and is entirely asymptomatic.  Of note he has recently noticed new LE edema for the past 2 days.   Per EMS, on arrival he was satting in the 70s, they started the patient on 15L via NRB, with improvement of SaO2 to 80s. No other interventions in the field.    In the ED :  BP: 105/78    RR : 28  T : 100.7  O2: 85% on  15 L NRB      WBC : 20   Lac : 3  BNP : 6.7k (no baseline)  VBG : pH 7.34  ,HCO3 32 , CO2, 59  ECG : sinus tachy   CXR: Increased interstitial and/or vascular markings. Possible right focal   upper lung field consolidation. (11 Oct 2022 19:41)      PAST MEDICAL & SURGICAL HISTORY:  IV drug abuse  Vertebral osteomyelitis  MSSA bacteremia  No significant past surgical history      MEDICATIONS  (STANDING):  ALPRAZolam 2 milliGRAM(s) Oral three times a day  buPROPion XL (24-Hour) . 300 milliGRAM(s) Oral daily  cefepime   IVPB      cefepime   IVPB 2000 milliGRAM(s) IV Intermittent every 8 hours  chlorhexidine 2% Cloths 1 Application(s) Topical every 12 hours  enoxaparin Injectable 140 milliGRAM(s) SubCutaneous every 12 hours  folic acid 1 milliGRAM(s) Oral daily  gabapentin 300 milliGRAM(s) Oral three times a day  levoFLOXacin IVPB      levoFLOXacin IVPB 750 milliGRAM(s) IV Intermittent every 24 hours  methadone    Tablet 135 milliGRAM(s) Oral daily  mirtazapine 30 milliGRAM(s) Oral at bedtime  multivitamin 1 Tablet(s) Oral daily  nicotine - 21 mG/24Hr(s) Patch 1 Patch Transdermal daily  pantoprazole    Tablet 40 milliGRAM(s) Oral before breakfast  QUEtiapine 200 milliGRAM(s) Oral at bedtime  thiamine 100 milliGRAM(s) Oral daily    MEDICATIONS  (PRN):  aluminum hydroxide/magnesium hydroxide/simethicone Suspension 30 milliLiter(s) Oral every 4 hours PRN Dyspepsia  cyclobenzaprine 10 milliGRAM(s) Oral three times a day PRN Muscle Spasm  melatonin 3 milliGRAM(s) Oral at bedtime PRN Insomnia  ondansetron Injectable 4 milliGRAM(s) IV Push every 8 hours PRN Nausea and/or Vomiting      Allergies  No Known Allergies    Intolerances      FAMILY HISTORY:  No pertinent family history in first degree relatives        Physical Exam:   Vital Signs Last 24 Hrs  T(C): 37.1 (13 Oct 2022 08:00), Max: 38.1 (13 Oct 2022 04:00)  T(F): 98.7 (13 Oct 2022 08:00), Max: 100.5 (13 Oct 2022 04:00)  HR: 94 (13 Oct 2022 12:00) (91 - 105)  BP: 114/69 (13 Oct 2022 12:00) (94/56 - 127/63)  BP(mean): 87 (13 Oct 2022 12:00) (38 - 89)  RR: 28 (13 Oct 2022 12:00) (19 - 32)  SpO2: 96% (13 Oct 2022 12:00) (91% - 97%)    Parameters below as of 13 Oct 2022 12:00  Patient On (Oxygen Delivery Method): nasal cannula, high flow  O2 Flow (L/min): 50  O2 Concentration (%): 60        Labs:                         9.6    19.84 )-----------( 197      ( 12 Oct 2022 06:50 )             31.2     10-13    136  |  98  |  15  ----------------------------<  147<H>  4.3   |  30  |  0.7    Ca    7.5<L>      13 Oct 2022 05:04  Mg     1.9     10-13    TPro  5.1<L>  /  Alb  2.4<L>  /  TBili  0.2  /  DBili  x   /  AST  11  /  ALT  <5  /  AlkPhos  73  10-13        Pertinent labs:                      9.6    19.84 )-----------( 197      ( 12 Oct 2022 06:50 )             31.2       10-13    136  |  98  |  15  ----------------------------<  147<H>  4.3   |  30  |  0.7    Ca    7.5<L>      13 Oct 2022 05:04  Mg     1.9     10-13    TPro  5.1<L>  /  Alb  2.4<L>  /  TBili  0.2  /  DBili  x   /  AST  11  /  ALT  <5  /  AlkPhos  73  10-13          Radiology & Additional Studies:   Radiology imaging reviewed.       ASSESSMENT/ PLAN:     53 yo M with PMH of IVDU (heroin), vertebral osteomyelitis, and MSSA bacteremia presenting for hypoxemia. Imaging showed PE in addition to lung opacities suggestive of possible infection.    -IR consulted for PE thrombectomy  -Imaging reviewed: Bilateral segmental and subsegmental PEs  -Trops wnl, BNP elevated  -Case discussed with pulmonary fellow  -No IR intervention  -Recommend continuing anticoagulation    Thank you for the courtesy of this consult, please call [phone] with any further questions.

## 2022-10-13 NOTE — SWALLOW BEDSIDE ASSESSMENT ADULT - SLP GENERAL OBSERVATIONS
Pt received awake and alert, asking for food and drink. A&Ox4. Denies difficulty eating/drinking.
Pt received awake and alert, asking for food and drink. A&Ox4. Mother came to bedside USP through evaluation.

## 2022-10-14 LAB
ALBUMIN SERPL ELPH-MCNC: 2.7 G/DL — LOW (ref 3.5–5.2)
ALP SERPL-CCNC: 94 U/L — SIGNIFICANT CHANGE UP (ref 30–115)
ALT FLD-CCNC: <5 U/L — SIGNIFICANT CHANGE UP (ref 0–41)
ANION GAP SERPL CALC-SCNC: 11 MMOL/L — SIGNIFICANT CHANGE UP (ref 7–14)
AST SERPL-CCNC: 19 U/L — SIGNIFICANT CHANGE UP (ref 0–41)
BASOPHILS # BLD AUTO: 0.03 K/UL — SIGNIFICANT CHANGE UP (ref 0–0.2)
BASOPHILS NFR BLD AUTO: 0.3 % — SIGNIFICANT CHANGE UP (ref 0–1)
BILIRUB SERPL-MCNC: 0.3 MG/DL — SIGNIFICANT CHANGE UP (ref 0.2–1.2)
BUN SERPL-MCNC: 12 MG/DL — SIGNIFICANT CHANGE UP (ref 10–20)
CALCIUM SERPL-MCNC: 8.3 MG/DL — LOW (ref 8.4–10.5)
CHLORIDE SERPL-SCNC: 97 MMOL/L — LOW (ref 98–110)
CO2 SERPL-SCNC: 28 MMOL/L — SIGNIFICANT CHANGE UP (ref 17–32)
CREAT SERPL-MCNC: 0.7 MG/DL — SIGNIFICANT CHANGE UP (ref 0.7–1.5)
EGFR: 110 ML/MIN/1.73M2 — SIGNIFICANT CHANGE UP
EOSINOPHIL # BLD AUTO: 0.2 K/UL — SIGNIFICANT CHANGE UP (ref 0–0.7)
EOSINOPHIL NFR BLD AUTO: 1.7 % — SIGNIFICANT CHANGE UP (ref 0–8)
GLUCOSE SERPL-MCNC: 157 MG/DL — HIGH (ref 70–99)
HCT VFR BLD CALC: 31.7 % — LOW (ref 42–52)
HGB BLD-MCNC: 9.6 G/DL — LOW (ref 14–18)
IMM GRANULOCYTES NFR BLD AUTO: 1 % — HIGH (ref 0.1–0.3)
LYMPHOCYTES # BLD AUTO: 1.91 K/UL — SIGNIFICANT CHANGE UP (ref 1.2–3.4)
LYMPHOCYTES # BLD AUTO: 16.5 % — LOW (ref 20.5–51.1)
MAGNESIUM SERPL-MCNC: 1.9 MG/DL — SIGNIFICANT CHANGE UP (ref 1.8–2.4)
MCHC RBC-ENTMCNC: 28.6 PG — SIGNIFICANT CHANGE UP (ref 27–31)
MCHC RBC-ENTMCNC: 30.3 G/DL — LOW (ref 32–37)
MCV RBC AUTO: 94.3 FL — HIGH (ref 80–94)
MONOCYTES # BLD AUTO: 0.88 K/UL — HIGH (ref 0.1–0.6)
MONOCYTES NFR BLD AUTO: 7.6 % — SIGNIFICANT CHANGE UP (ref 1.7–9.3)
NEUTROPHILS # BLD AUTO: 8.42 K/UL — HIGH (ref 1.4–6.5)
NEUTROPHILS NFR BLD AUTO: 72.9 % — SIGNIFICANT CHANGE UP (ref 42.2–75.2)
NRBC # BLD: 0 /100 WBCS — SIGNIFICANT CHANGE UP (ref 0–0)
PLATELET # BLD AUTO: 160 K/UL — SIGNIFICANT CHANGE UP (ref 130–400)
POTASSIUM SERPL-MCNC: 4.7 MMOL/L — SIGNIFICANT CHANGE UP (ref 3.5–5)
POTASSIUM SERPL-SCNC: 4.7 MMOL/L — SIGNIFICANT CHANGE UP (ref 3.5–5)
PROT SERPL-MCNC: 6.3 G/DL — SIGNIFICANT CHANGE UP (ref 6–8)
RBC # BLD: 3.36 M/UL — LOW (ref 4.7–6.1)
RBC # FLD: 16.2 % — HIGH (ref 11.5–14.5)
SODIUM SERPL-SCNC: 136 MMOL/L — SIGNIFICANT CHANGE UP (ref 135–146)
WBC # BLD: 11.55 K/UL — HIGH (ref 4.8–10.8)
WBC # FLD AUTO: 11.55 K/UL — HIGH (ref 4.8–10.8)

## 2022-10-14 PROCEDURE — 99221 1ST HOSP IP/OBS SF/LOW 40: CPT

## 2022-10-14 PROCEDURE — 93010 ELECTROCARDIOGRAM REPORT: CPT

## 2022-10-14 PROCEDURE — 71045 X-RAY EXAM CHEST 1 VIEW: CPT | Mod: 26

## 2022-10-14 PROCEDURE — 99233 SBSQ HOSP IP/OBS HIGH 50: CPT

## 2022-10-14 RX ORDER — HALOPERIDOL DECANOATE 100 MG/ML
5 INJECTION INTRAMUSCULAR EVERY 8 HOURS
Refills: 0 | Status: DISCONTINUED | OUTPATIENT
Start: 2022-10-14 | End: 2022-11-14

## 2022-10-14 RX ORDER — ACETAMINOPHEN 500 MG
650 TABLET ORAL EVERY 6 HOURS
Refills: 0 | Status: DISCONTINUED | OUTPATIENT
Start: 2022-10-14 | End: 2022-11-13

## 2022-10-14 RX ORDER — ALPRAZOLAM 0.25 MG
2 TABLET ORAL AT BEDTIME
Refills: 0 | Status: DISCONTINUED | OUTPATIENT
Start: 2022-10-14 | End: 2022-10-17

## 2022-10-14 RX ORDER — ALPRAZOLAM 0.25 MG
1 TABLET ORAL
Refills: 0 | Status: DISCONTINUED | OUTPATIENT
Start: 2022-10-14 | End: 2022-10-17

## 2022-10-14 RX ADMIN — Medication 1 PATCH: at 06:52

## 2022-10-14 RX ADMIN — BUPROPION HYDROCHLORIDE 300 MILLIGRAM(S): 150 TABLET, EXTENDED RELEASE ORAL at 11:30

## 2022-10-14 RX ADMIN — ENOXAPARIN SODIUM 140 MILLIGRAM(S): 100 INJECTION SUBCUTANEOUS at 18:34

## 2022-10-14 RX ADMIN — CEFEPIME 100 MILLIGRAM(S): 1 INJECTION, POWDER, FOR SOLUTION INTRAMUSCULAR; INTRAVENOUS at 06:23

## 2022-10-14 RX ADMIN — CEFEPIME 100 MILLIGRAM(S): 1 INJECTION, POWDER, FOR SOLUTION INTRAMUSCULAR; INTRAVENOUS at 14:36

## 2022-10-14 RX ADMIN — PANTOPRAZOLE SODIUM 40 MILLIGRAM(S): 20 TABLET, DELAYED RELEASE ORAL at 06:07

## 2022-10-14 RX ADMIN — GABAPENTIN 300 MILLIGRAM(S): 400 CAPSULE ORAL at 06:06

## 2022-10-14 RX ADMIN — Medication 1 PATCH: at 18:39

## 2022-10-14 RX ADMIN — Medication 1 MILLIGRAM(S): at 11:30

## 2022-10-14 RX ADMIN — Medication 1 MILLIGRAM(S): at 14:36

## 2022-10-14 RX ADMIN — Medication 2 MILLIGRAM(S): at 06:10

## 2022-10-14 RX ADMIN — Medication 1 PATCH: at 11:26

## 2022-10-14 RX ADMIN — Medication 650 MILLIGRAM(S): at 06:04

## 2022-10-14 RX ADMIN — Medication 100 MILLIGRAM(S): at 11:30

## 2022-10-14 RX ADMIN — ENOXAPARIN SODIUM 140 MILLIGRAM(S): 100 INJECTION SUBCUTANEOUS at 06:06

## 2022-10-14 RX ADMIN — Medication 650 MILLIGRAM(S): at 06:53

## 2022-10-14 RX ADMIN — Medication 1 PATCH: at 11:30

## 2022-10-14 RX ADMIN — CEFEPIME 100 MILLIGRAM(S): 1 INJECTION, POWDER, FOR SOLUTION INTRAMUSCULAR; INTRAVENOUS at 21:12

## 2022-10-14 RX ADMIN — METHADONE HYDROCHLORIDE 135 MILLIGRAM(S): 40 TABLET ORAL at 11:36

## 2022-10-14 RX ADMIN — Medication 2 MILLIGRAM(S): at 21:12

## 2022-10-14 RX ADMIN — Medication 1 TABLET(S): at 11:31

## 2022-10-14 NOTE — BH CONSULTATION LIAISON ASSESSMENT NOTE - SUMMARY
52 year old male with PMHx of vertebral osteomyelitis, and MSSA bacteremia and previous psychiatric history of polysubstance abuse and IVDU on methadone,  admitted to the hospital for hypoxia, currently being treated for Acute hypoxemic respiratory  failure on  high flow, PNA, PE with RV strain - submassive,  Psychiatry was consulted for  medication evaluation.      On evaluation, patient is observed to be in delirium and associated lethargy, due to multiple medical complications further exacerbated by medications. He is currently on multiple medications including methadone. He is not exhibiting  any psychosis or related paranoia.    For now with recommend      Impression  Delirium, multifactorial  Heroin use disorder on Methadone 135mg po daily    Plan  -On going delirium work up as per primary team  Hold gabapentin 300mg po three times a day, mirtazapine 30mg po hs and Quetiapine 200mg po hs   Lower  xanax 2mg po three times a day : to xanax 1mg po am, 1mg po afternoon and two times at night.  Continue Methadone 135mg po daily, Wellbutrin Xl 300mg po q 24hrs  -For agitation, please utilize haldol 5mg po/IM every 8hrs prn   If delirium worsens, reconsult, otherwise, psychiatry will follow  on 10/17/22 for further medication reevaluation

## 2022-10-14 NOTE — PROGRESS NOTE ADULT - ASSESSMENT
IMPRESSION:    Acute hypoxemic resp failure on HHFNC (declining BIPAP)  PNA/ highly aspiration  PE with RV strain - submassive  hx Epidural Phlegmon, OM  hx Right Psoas Abscess   hx MSSA Bacteremia  HO IVDA, Opioid Abuse on Methadone   obesity/ DIONICIO / OHS    PLAN:    CNS: Quetiapine, Gabapentin, and Bupropion held per psych. Xanax dose reduced. Haldol 5mg q8h prn.    HEENT:  Oral care    PULMONARY:  HOB @ 45 degrees, aspiration precaution. Wean down Fio2; Now on 40LPM and 50%. Remains on HFNC. Recommend BIPAP during sleep for OHS.     CARDIOVASCULAR: Echocardiogram: No RV strain. Not on pressors. Not on IVF. QTc 450.    GI: GI prophylaxis; oral diet. Aspiration precautions.     RENAL:  Kidney function at baseline. Correct as needed.     INFECTIOUS DISEASE: Midline (NOT PICC) discontinued. ID following. Blood cultures negative. UA negative,. MRSA pending. Keep the same abx for now. MR Spine once stable.      HEMATOLOGICAL:  LE doppler: extensive DVT left leg. Vascular eval for the extensive DVT.     ENDOCRINE:  Follow up FS.  Insulin protocol if needed.    Dispo: Keep in the CCU for now.     Prognosis is guarded.

## 2022-10-14 NOTE — PROGRESS NOTE ADULT - SUBJECTIVE AND OBJECTIVE BOX
Patient is a 52y old  Male who presents with a chief complaint of Hypoxia (14 Oct 2022 14:47)    HPI:  53yo male     PMHx of IVDU (heroin), vertebral osteomyelitis s/p debridement, and MSSA bacteremia    Presenting from rehab facility for "low oxygen".     Per patient, he is not experiencing any SOB/HARDWICK or CP or pain anywhere on his body and is entirely asymptomatic.  Of note he has recently noticed new LE edema for the past 2 days.     Per EMS, on arrival he was satting in the 70s, they started the patient on 15L via NRB, with improvement of SaO2 to 80s. No other interventions in the field.    In the ED :  BP: 105/78    RR : 28  T : 100.7  O2: 85% on  15 L NRB      WBC : 20   Lac : 3  BNP : 6.7k (no baseline)  VBG : pH 7.34  ,HCO3 32 , CO2, 59  ECG : sinus tachy   CXR: Increased interstitial and/or vascular markings. Possible right focal   upper lung field consolidation. (11 Oct 2022 19:41)       INTERVAL HPI/OVERNIGHT EVENTS:   No overnight events   Afebrile, hemodynamically stable     Subjective:    ICU Vital Signs Last 24 Hrs  T(C): 37.1 (14 Oct 2022 12:00), Max: 38.1 (14 Oct 2022 00:00)  T(F): 98.8 (14 Oct 2022 12:00), Max: 100.6 (14 Oct 2022 00:00)  HR: 84 (14 Oct 2022 12:00) (84 - 100)  BP: 106/67 (14 Oct 2022 12:00) (95/70 - 134/82)  BP(mean): 81 (14 Oct 2022 12:00) (74 - 103)  ABP: --  ABP(mean): --  RR: 18 (14 Oct 2022 12:00) (15 - 32)  SpO2: 97% (14 Oct 2022 12:00) (90% - 99%)    O2 Parameters below as of 14 Oct 2022 12:00  Patient On (Oxygen Delivery Method): nasal cannula, high flow  O2 Flow (L/min): 50  O2 Concentration (%): 40      I&O's Summary    13 Oct 2022 07:01  -  14 Oct 2022 07:00  --------------------------------------------------------  IN: 750 mL / OUT: 1251 mL / NET: -501 mL          Daily         MEDICATIONS  (STANDING):  ALPRAZolam 1 milliGRAM(s) Oral <User Schedule>  ALPRAZolam 2 milliGRAM(s) Oral at bedtime  buPROPion XL (24-Hour) . 300 milliGRAM(s) Oral daily  cefepime   IVPB      cefepime   IVPB 2000 milliGRAM(s) IV Intermittent every 8 hours  enoxaparin Injectable 140 milliGRAM(s) SubCutaneous every 12 hours  folic acid 1 milliGRAM(s) Oral daily  levoFLOXacin IVPB      levoFLOXacin IVPB 750 milliGRAM(s) IV Intermittent every 24 hours  methadone    Tablet 135 milliGRAM(s) Oral daily  multivitamin 1 Tablet(s) Oral daily  nicotine - 21 mG/24Hr(s) Patch 1 Patch Transdermal daily  pantoprazole    Tablet 40 milliGRAM(s) Oral before breakfast  thiamine 100 milliGRAM(s) Oral daily    MEDICATIONS  (PRN):  acetaminophen     Tablet .. 650 milliGRAM(s) Oral every 6 hours PRN Temp greater or equal to 38C (100.4F)  aluminum hydroxide/magnesium hydroxide/simethicone Suspension 30 milliLiter(s) Oral every 4 hours PRN Dyspepsia  cyclobenzaprine 10 milliGRAM(s) Oral three times a day PRN Muscle Spasm  haloperidol    Injectable 5 milliGRAM(s) IntraMuscular every 8 hours PRN Agitation  melatonin 3 milliGRAM(s) Oral at bedtime PRN Insomnia  ondansetron Injectable 4 milliGRAM(s) IV Push every 8 hours PRN Nausea and/or Vomiting      PHYSICAL EXAM:  GENERAL:   HEAD:  Atraumatic, Normocephalic  EYES: EOMI, PERRLA, conjunctiva and sclera clear  NECK: Supple, No JVD, Normal thyroid, no enlarged nodes  NERVOUS SYSTEM:  Alert & Awake.   CHEST/LUNG: B/L good air entry; No rales, rhonchi, or wheezing  HEART: S1S2 normal, no S3, Regular rate and rhythm; No murmurs  ABDOMEN: Soft, Nontender, Nondistended; Bowel sounds present  EXTREMITIES:  2+ Peripheral Pulses, No clubbing, cyanosis, or edema  LYMPH: No lymphadenopathy noted  SKIN: No rashes or lesions    LABS:                        9.6    11.55 )-----------( 160      ( 14 Oct 2022 05:27 )             31.7     10-14    136  |  97<L>  |  12  ----------------------------<  157<H>  4.7   |  28  |  0.7    Ca    8.3<L>      14 Oct 2022 05:27  Mg     1.9     10-14    TPro  6.3  /  Alb  2.7<L>  /  TBili  0.3  /  DBili  x   /  AST  19  /  ALT  <5  /  AlkPhos  94  10-14    LIVER FUNCTIONS - ( 14 Oct 2022 05:27 )  Alb: 2.7 g/dL / Pro: 6.3 g/dL / ALK PHOS: 94 U/L / ALT: <5 U/L / AST: 19 U/L / GGT: x             CAPILLARY BLOOD GLUCOSE                Urinalysis Basic - ( 13 Oct 2022 18:19 )    Color: Yellow / Appearance: Clear / S.039 / pH: x  Gluc: x / Ketone: Trace  / Bili: Negative / Urobili: 3 mg/dL   Blood: x / Protein: 100 mg/dL / Nitrite: Negative   Leuk Esterase: Negative / RBC: 2 /HPF / WBC 1 /HPF   Sq Epi: x / Non Sq Epi: 1 /HPF / Bacteria: Negative          Care Discussed with Consultants/Other Providers [ x] YES  [ ] NO

## 2022-10-14 NOTE — BH CONSULTATION LIAISON ASSESSMENT NOTE - MSE OPTIONS
Health Maintenance Due   Topic Date Due   • DTaP/Tdap/Td Vaccine (1 - Tdap) 07/12/1966   • Colorectal Cancer Screening-Colonoscopy  07/12/1997   • Medicare Wellness 65+  07/12/2012   • Breast Cancer Screening  12/02/2013   • Shingles Vaccine (2 of 2) 12/21/2018       Patient is due for topics as listed above but is not proceeding with Immunization(s) Dtap/Tdap/Td and Shingles, Colorectal Cancer Screening: Colonoscopy, Mammogram and MWV (Medicare Wellness Visit) at this time. Education provided for Immunization(s) Dtap/Tdap/Td and Shingles, Colorectal Cancer Screening: Colonoscopy, Mammogram and MWV (Medicare Wellness Visit). Patient comes in for follow up             Structured MSE

## 2022-10-14 NOTE — PROGRESS NOTE ADULT - SUBJECTIVE AND OBJECTIVE BOX
MIKAEL MCDERMOTT  52y, Male  Allergy: No Known Allergies      LOS  3d    CHIEF COMPLAINT: Hypoxia (14 Oct 2022 08:27)      INTERVAL EVENTS/HPI  - fever   - T(F): , Max: 100.6 (10-14-22 @ 00:00)  - Denies any worsening symptoms  - Tolerating medication  - WBC Count: 11.55 (10-14-22 @ 05:27) downtrending   WBC Count: 19.84 (10-12-22 @ 06:50)     - Creatinine, Serum: 0.7 (10-14-22 @ 05:27)  Creatinine, Serum: 0.7 (10-13-22 @ 05:04)       ROS  General: Denies rigors, nightsweats  HEENT: Denies headache, rhinorrhea, sore throat, eye pain  CV: Denies CP, palpitations  PULM: Denies wheezing, hemoptysis  GI: Denies hematemesis, hematochezia, melena  : Denies discharge, hematuria  MSK: Denies arthralgias, myalgias  SKIN: Denies rash, lesions  NEURO: Denies paresthesias, weakness  PSYCH: Denies depression, anxiety    VITALS:  T(F): 98.8, Max: 100.6 (10-14-22 @ 00:00)  HR: 84  BP: 106/67  RR: 18Vital Signs Last 24 Hrs  T(C): 37.1 (14 Oct 2022 12:00), Max: 38.1 (14 Oct 2022 00:00)  T(F): 98.8 (14 Oct 2022 12:00), Max: 100.6 (14 Oct 2022 00:00)  HR: 84 (14 Oct 2022 12:00) (84 - 100)  BP: 106/67 (14 Oct 2022 12:00) (95/70 - 134/82)  BP(mean): 81 (14 Oct 2022 12:00) (74 - 103)  RR: 18 (14 Oct 2022 12:00) (15 - 32)  SpO2: 97% (14 Oct 2022 12:00) (90% - 99%)    Parameters below as of 14 Oct 2022 12:00  Patient On (Oxygen Delivery Method): nasal cannula, high flow  O2 Flow (L/min): 50  O2 Concentration (%): 40    PHYSICAL EXAM:  Gen: NAD, resting in bed obese HFNC  HEENT: Normocephalic, atraumatic  Neck: supple, no lymphadenopathy  CV: Regular rate & regular rhythm  Lungs: decreased BS at bases, no fremitus  Abdomen: Soft, BS present  Ext: Warm, well perfused, contractures  Neuro: non focal, awake  Skin: no rash, no erythema  Lines: no phlebitis    FH: Non-contributory  Social Hx: Non-contributory    TESTS & MEASUREMENTS:                        9.6    11.55 )-----------( 160      ( 14 Oct 2022 05:27 )             31.7     10-14    136  |  97<L>  |  12  ----------------------------<  157<H>  4.7   |  28  |  0.7    Ca    8.3<L>      14 Oct 2022 05:27  Mg     1.9     10-14    TPro  6.3  /  Alb  2.7<L>  /  TBili  0.3  /  DBili  x   /  AST  19  /  ALT  <5  /  AlkPhos  94  10-14      LIVER FUNCTIONS - ( 14 Oct 2022 05:27 )  Alb: 2.7 g/dL / Pro: 6.3 g/dL / ALK PHOS: 94 U/L / ALT: <5 U/L / AST: 19 U/L / GGT: x           Urinalysis Basic - ( 13 Oct 2022 18:19 )    Color: Yellow / Appearance: Clear / S.039 / pH: x  Gluc: x / Ketone: Trace  / Bili: Negative / Urobili: 3 mg/dL   Blood: x / Protein: 100 mg/dL / Nitrite: Negative   Leuk Esterase: Negative / RBC: 2 /HPF / WBC 1 /HPF   Sq Epi: x / Non Sq Epi: 1 /HPF / Bacteria: Negative        Culture - Blood (collected 10-12-22 @ 06:50)  Source: .Blood Blood  Preliminary Report (10-13-22 @ 18:01):    No growth to date.        Blood Gas Venous - Lactate: 2.30 mmol/L (10-11-22 @ 16:09)  Blood Gas Venous - Lactate: 2.70 mmol/L (10-11-22 @ 13:07)  Lactate, Blood: 3.0 mmol/L (10-11-22 @ 12:35)      INFECTIOUS DISEASES TESTING  Procalcitonin, Serum: 7.63 (10-12-22 @ 06:50)  COVID-19 PCR: NotDetec (10-11-22 @ 12:35)  COVID-19 PCR: NotDetec (22 @ 10:55)  HIV-1/2 Combo Result: Nonreact (22 @ 08:19)  COVID-19 PCR: NotDetec (22 @ 07:52)  COVID-19 PCR: NotDetec (22 @ 15:36)  COVID-19 PCR: NotDetec (22 @ 13:56)  Procalcitonin, Serum: 0.05 (22 @ 13:28)  COVID-19 PCR: NotDetec (22 @ 16:45)  COVID-19 PCR: NotDetec (22 @ 19:33)  MRSA PCR Result.: Negative (22 @ 05:00)  Rapid RVP Result: NotDetec (08-10-22 @ 15:03)  COVID-19 PCR: NotDetec (22 @ 12:23)  COVID-19 PCR: NotDetec (22 @ 10:49)  COVID-19 PCR: NotDetec (22 @ 18:31)  COVID-19 PCR: NotDetec (22 @ 06:30)  COVID-19 PCR: NotDetec (22 @ 06:10)  HIV-1/2 Combo Result: Nonreact (22 @ 12:10)  COVID-19 PCR: NotDetec (22 @ 20:19)  COVID-19 PCR: NotDetec (22 @ 09:10)  COVID-19 PCR: NotDetec (22 @ 11:21)  COVID-19 PCR: NotDetec (22 @ 18:45)  HIV-1/2 Combo Result: Nonreact (22 @ 04:30)  Procalcitonin, Serum: 0.13 (22 @ 16:00)  COVID-19 PCR: NotDetec (01-15-22 @ 23:32)      INFLAMMATORY MARKERS  Sedimentation Rate, Erythrocyte: 125 mm/Hr (22 @ 06:38)  C-Reactive Protein, Serum: 330.8 mg/L (22 @ 06:38)      RADIOLOGY & ADDITIONAL TESTS:  I have personally reviewed the last available Chest xray  CXR      CT  CT Chest w/ IV Cont:   ACC: [id number] EXAM:  CT CHEST IC                          PROCEDURE DATE:  10/12/2022          INTERPRETATION:  CLINICAL INDICATION: Hypoxia; right focal upper lung   field consolidation    TECHNIQUE:  CT of the thorax was performed after administration of contrast. Sagittal   and coronal reformats were performed as well as MIP reconstructions.  Intravenous contrast: 80 cc of Omnipaque 350    COMPARISON: CTA chest 2022.    INTERPRETATION:    PULMONARY EMBOLUS: Bilateral segmental and subsegmental pulmonary emboli   in the left upper, left lower, right upper, right middle, and right lower   lobes, consistent with pulmonary thromboembolism.    AIRWAYS, LUNGS AND PLEURA: Numerous bilateral patchy opacities with   confluent areas of consolidation predominantly in the right lower   posterior lung fields, consistent with pneumonia. The central   tracheobronchial tree is patent. There is no pneumothorax. Redemonstrated   paraseptal emphysematous changes with scattered parenchymal cysts.    MEDIASTINUM: Bilateral enlarged perihilar lymphadenopathy. The visualized   portion of the thyroid gland is unremarkable.    HEART AND VESSELS: There is evidence of right ventricular strain, with   pulmonary trunk enlargement measuring up to 3.6 cm relative to the   ascending thoracic aortic diameter of 3.2 cm.  There is no pericardial   effusion.    UPPER ABDOMEN: Hepatic steatosis.    BONES AND SOFT TISSUES: Unremarkable.      IMPRESSION:      Bilateral pulmonary emboli with evidence of right ventricular strain    Pulmonary trunk enlargement, compatible with pulmonary hypertension.    Numerous bilateral patchy opacities with confluent areas of consolidation   predominantly in the posterior lung fields and bilateral perihilar   lymphadenopathy, suspicious for pneumonia. Follow-up in 6 weeks' time is   recommended.      Dr. Anthony Camacho discussed preliminary findings with MAYLIN FORTE on   10/12/2022 12:46 AM with readback.    --- End of Report ---          ANTHONY CAMACHO MD; ResidentRadiologist  This document has been electronically signed.  KIMBERLY BAPTISTE MD; Attending Radiologist  This document has been electronically signed. Oct 12 2022  1:04AM (10-12-22 @ 00:08)      CARDIOLOGY TESTING  12 Lead ECG:   Systolic  mmHg    Diastolic BP 66 mmHg    Ventricular Rate 85 BPM    Atrial Rate 85 BPM    P-R Interval 142 ms    QRS Duration 96 ms    Q-T Interval 382 ms    QTC Calculation(Bazett) 454 ms    P Axis 33 degrees    R Axis 31 degrees    T Axis 31 degrees    Diagnosis Line Normal sinus rhythm  Normal ECG    Confirmed by yogi faith ([id number]) on 10/14/2022 2:12:04 PM (10-14-22 @ 11:50)  12 Lead ECG:   Ventricular Rate 104 BPM    Atrial Rate 104 BPM    P-R Interval 132 ms    QRS Duration 90 ms    Q-T Interval 356 ms    QTC Calculation(Bazett) 468 ms    P Axis 42 degrees    R Axis 35 degrees    T Axis 35 degrees    Diagnosis Line Sinus tachycardia  Otherwise normal ECG    Confirmed by Gabe Brown ([id number]) on 10/11/2022 5:07:59 PM (10-11-22 @ 13:38)      MEDICATIONS  ALPRAZolam 1 Oral <User Schedule>  ALPRAZolam 2 Oral at bedtime  buPROPion XL (24-Hour) . 300 Oral daily  cefepime   IVPB     cefepime   IVPB 2000 IV Intermittent every 8 hours  enoxaparin Injectable 140 SubCutaneous every 12 hours  folic acid 1 Oral daily  levoFLOXacin IVPB     levoFLOXacin IVPB 750 IV Intermittent every 24 hours  methadone    Tablet 135 Oral daily  multivitamin 1 Oral daily  nicotine - 21 mG/24Hr(s) Patch 1 Transdermal daily  pantoprazole    Tablet 40 Oral before breakfast  thiamine 100 Oral daily      WEIGHT  Weight (kg): 140.9 (10-12-22 @ 09:30)  Creatinine, Serum: 0.7 mg/dL (10-14-22 @ 05:27)      ANTIBIOTICS:  cefepime   IVPB      cefepime   IVPB 2000 milliGRAM(s) IV Intermittent every 8 hours  levoFLOXacin IVPB      levoFLOXacin IVPB 750 milliGRAM(s) IV Intermittent every 24 hours      All available historical records have been reviewed

## 2022-10-14 NOTE — PROGRESS NOTE ADULT - ASSESSMENT
IMPRESSION:    Acute hypoxemic resp failure on HHFNC (declining BIPAP)  PNA/ highly aspiration  PE with RV strain - submassive  hx Epidural Phlegmon, OM  hx Right Psoas Abscess   hx MSSA Bacteremia  HO IVDA, Opioid Abuse on Methadone   obesity/ DIONICIO / OHS    PLAN:    CNS: On alprazolam, quetiapine, gabapentin. On Methadone for history of heroin use. Psych evaluation.     HEENT:  Oral care    PULMONARY:  HOB @ 45 degrees, aspiration precaution. Wean down Fio2; Now on 40LPM and 50%. Remains on HFNC. Recommend BIPAP during sleep for OHS.     CARDIOVASCULAR: Echocardiogram: No RV strain. Not on pressors. Not on IVF. EKG for QTc.    GI: GI prophylaxis; oral diet. Aspiration precautions.     RENAL:  Kidney function at baseline. Correct as needed.     INFECTIOUS DISEASE: ID following. Blood cultures negative. MRSA pending. Keep the same abx for now.     HEMATOLOGICAL:  LE doppler: extensive DVT left leg. Vascular eval for the extensive DVT.     ENDOCRINE:  Follow up FS.  Insulin protocol if needed.    Dispo: Keep in the CCU for now.     Prognosis is guarded.

## 2022-10-14 NOTE — PROGRESS NOTE ADULT - ASSESSMENT
ASSESSMENT  51yo male PMH IVDU (heroin), vertebral osteomyelitis s/p debridement, and recurrent MSSA bacteremia (8/2022, 1/2022)  Presenting from rehab facility for "low oxygen".   Seen by ID 8/2022 for NICOLE bacteremia with discitis/OM at L4-5.  CT Paravertebral phlegmon and left retropharyngeal abscess with air-fluid level.  8/10 BCx NICOLE  8/11,12,13,14,15 BCx NG  8/24 PAM no vegetations  PT was d/c with IV ancef 2 gm iv q8h ( since 8/16 )-8 weeks starting 8/11- 10/6    IMPRESSION  #Rule out Severe sepsis on admission Pulse>90, Resp Rate>20, WBC>12, lactic acidosis    Found to have bilateral PE    10/12 BCX NGTD     Denies productive cough to suggest PNA    Has continued back pain     CT Bilateral pulmonary emboli with evidence of right ventricular strainPulmonary trunk enlargement, compatible with pulmonary hypertension.  Numerous bilateral patchy opacities with confluent areas of consolidation predominantly in the posterior lung fields and bilateral perihilar   lymphadenopathy, suspicious for pneumonia. Follow-up in 6 weeks' time is recommended.    Procalcitonin, Serum: 7.63 ng/mL (10-12-22 @ 06:50)  #Obesity BMI (kg/m2): 41  #Recurrent MSSA bacteremia with discitis   #IVDU  #HCV, RNA UD  Creatinine, Serum: 0.7 (10-13-22 @ 05:04)    Weight (kg): 140.9 (10-12-22 @ 09:30)    RECOMMENDATIONS  - cefepime   IVPB 2000 milliGRAM(s) IV Intermittent every 8 hours  - levoFLOXacin IVPB 750 milliGRAM(s) IV Intermittent every 24 hours  - MRI spine when stable  - NSYG     If any questions, please call or send a message on [employer] Teams  Please continue to update ID with any pertinent new laboratory or radiographic findings  #[id number]

## 2022-10-14 NOTE — BH CONSULTATION LIAISON ASSESSMENT NOTE - NSBHCHARTREVIEWVS_PSY_A_CORE FT
Vital Signs Last 24 Hrs  T(C): 37.2 (14 Oct 2022 08:00), Max: 38.1 (14 Oct 2022 00:00)  T(F): 98.9 (14 Oct 2022 08:00), Max: 100.6 (14 Oct 2022 00:00)  HR: 84 (14 Oct 2022 09:00) (84 - 100)  BP: 99/60 (14 Oct 2022 09:00) (95/70 - 137/77)  BP(mean): 74 (14 Oct 2022 09:00) (74 - 103)  RR: 17 (14 Oct 2022 09:00) (15 - 32)  SpO2: 94% (14 Oct 2022 09:00) (90% - 99%)    Parameters below as of 14 Oct 2022 09:00  Patient On (Oxygen Delivery Method): nasal cannula w/ humidification  O2 Flow (L/min): 50  O2 Concentration (%): 40

## 2022-10-14 NOTE — BH CONSULTATION LIAISON ASSESSMENT NOTE - OTHER
Psychomotor retardation related to being lethargic Fluctuates Affected by delirium Concerns concerns

## 2022-10-14 NOTE — PROGRESS NOTE ADULT - SUBJECTIVE AND OBJECTIVE BOX
53 yo M with PMH of IVDU (heroin), vertebral osteomyelitis, and MSSA bacteremia presenting for hypoxemia. Imaging showed PE in addition to lung opacities suggestive of possible infection. Patient additionally has left leg DVT. IR initially consulted for PE thrombectomy, with no IR intervention.    -IR reconsulted for left leg DVT thrombectomy  -Lower extremity venous doppler 10/12/22: extensive acute DVT of the left lower extremity  -Will plan to perform LLE DVT thrombectomy early next week as schedule permits  -Patient will need negative Covid test within 5 days of procedure    Thank you for the courtesy of this consult, please call [phone] with any further questions.

## 2022-10-14 NOTE — BH CONSULTATION LIAISON ASSESSMENT NOTE - CURRENT MEDICATION
MEDICATIONS  (STANDING):  ALPRAZolam 2 milliGRAM(s) Oral three times a day  buPROPion XL (24-Hour) . 300 milliGRAM(s) Oral daily  cefepime   IVPB      cefepime   IVPB 2000 milliGRAM(s) IV Intermittent every 8 hours  enoxaparin Injectable 140 milliGRAM(s) SubCutaneous every 12 hours  folic acid 1 milliGRAM(s) Oral daily  gabapentin 300 milliGRAM(s) Oral three times a day  levoFLOXacin IVPB      levoFLOXacin IVPB 750 milliGRAM(s) IV Intermittent every 24 hours  methadone    Tablet 135 milliGRAM(s) Oral daily  mirtazapine 30 milliGRAM(s) Oral at bedtime  multivitamin 1 Tablet(s) Oral daily  nicotine - 21 mG/24Hr(s) Patch 1 Patch Transdermal daily  pantoprazole    Tablet 40 milliGRAM(s) Oral before breakfast  QUEtiapine 200 milliGRAM(s) Oral at bedtime  thiamine 100 milliGRAM(s) Oral daily    MEDICATIONS  (PRN):  acetaminophen     Tablet .. 650 milliGRAM(s) Oral every 6 hours PRN Temp greater or equal to 38C (100.4F)  aluminum hydroxide/magnesium hydroxide/simethicone Suspension 30 milliLiter(s) Oral every 4 hours PRN Dyspepsia  cyclobenzaprine 10 milliGRAM(s) Oral three times a day PRN Muscle Spasm  melatonin 3 milliGRAM(s) Oral at bedtime PRN Insomnia  ondansetron Injectable 4 milliGRAM(s) IV Push every 8 hours PRN Nausea and/or Vomiting

## 2022-10-14 NOTE — BH CONSULTATION LIAISON ASSESSMENT NOTE - NSBHCONSULTFOLLOWAFTERCARE_PSY_A_CORE FT
Patient will continue follow up with Methadone maintenance at [facility] MMTP program at [address] in [place] ([phone]).

## 2022-10-14 NOTE — BH CONSULTATION LIAISON ASSESSMENT NOTE - HPI (INCLUDE ILLNESS QUALITY, SEVERITY, DURATION, TIMING, CONTEXT, MODIFYING FACTORS, ASSOCIATED SIGNS AND SYMPTOMS)
52 year old male with PMHx of vertebral osteomyelitis, and MSSA bacteremia and previous psychiatric history of polysubstance abuse and IVDU on methadone,  admitted to the hospital for hypoxia, currently being treated for Acute hypoxemic resp failure on  high flow, PNA, PE with RV strain - submassive,  Psychiatry was consulted for  medication evaluation.     On evaluation, patient appears to be lethargic, but awake, alert, and oriented to situation, despite experiencing difficulty with concentration. He indicates he currently lives at [facility] and because of the difficulty breathing he was brought to the hospital. He is able to name all of his psychotropic medications, with difficulties providing the dose. He confirms he takes all of his medications as prescribed with no adverse effect reported. Mood is described as concerns but devoid of suicidal or homicidal ideation. He is no expressing any feeling hopelessness or worthlessness. He denies recent substance. This patient has history of delirium that often presents with psychosis, at this time he has no psychosis.      Reached out to pt's Mother ([phone]) for collateral     [facility] [phone]  I spoke with nurse practitioner Jorge who confirmed the following medications from [place]  xanax 2mg po three times a day  gabapentin 300mg po three times a day  Methadone 135mg po daily  Mirtazapine 30mg po hs  Quetiapine 200mg po hs  Wellbutrin Xl 300mg po q 24hrs    [employer] checked #: [id number]    0/04/2022	10/04/2022	alprazolam 2 mg tablet	30	10	Fadi Montgomery MD	[id number]	[place]	[facility]  09/23/2022	09/24/2022	alprazolam 2 mg tablet	30	10	Aubrie Mata	[id number]	[place]	[facility]  09/13/2022	09/14/2022	alprazolam 2 mg tablet	30	10	Fadi Montgomery MD	[id number]	[employer]    Pt receives 135mg of Methadone maintenance at [facility] MMTP program at [address] [place] [address] in [place] ([phone])

## 2022-10-14 NOTE — BH CONSULTATION LIAISON ASSESSMENT NOTE - OTHER PAST PSYCHIATRIC HISTORY (INCLUDE DETAILS REGARDING ONSET, COURSE OF ILLNESS, INPATIENT/OUTPATIENT TREATMENT)
History of heroin use disorder and in treatment with a methadone program.   Pt receives 135mg of Methadone maintenance at [facility] MMTP program at  [address]  in [place] ([phone])    (however he has been at [facility] [phone]    Was previously on  seroquel 200mg po three times a day.

## 2022-10-15 LAB
ALBUMIN SERPL ELPH-MCNC: 2.4 G/DL — LOW (ref 3.5–5.2)
ALP SERPL-CCNC: 89 U/L — SIGNIFICANT CHANGE UP (ref 30–115)
ALT FLD-CCNC: <5 U/L — SIGNIFICANT CHANGE UP (ref 0–41)
ANION GAP SERPL CALC-SCNC: 12 MMOL/L — SIGNIFICANT CHANGE UP (ref 7–14)
AST SERPL-CCNC: 14 U/L — SIGNIFICANT CHANGE UP (ref 0–41)
BASOPHILS # BLD AUTO: 0.04 K/UL — SIGNIFICANT CHANGE UP (ref 0–0.2)
BASOPHILS NFR BLD AUTO: 0.5 % — SIGNIFICANT CHANGE UP (ref 0–1)
BILIRUB SERPL-MCNC: 0.2 MG/DL — SIGNIFICANT CHANGE UP (ref 0.2–1.2)
BUN SERPL-MCNC: 11 MG/DL — SIGNIFICANT CHANGE UP (ref 10–20)
CALCIUM SERPL-MCNC: 8.4 MG/DL — SIGNIFICANT CHANGE UP (ref 8.4–10.4)
CHLORIDE SERPL-SCNC: 96 MMOL/L — LOW (ref 98–110)
CO2 SERPL-SCNC: 29 MMOL/L — SIGNIFICANT CHANGE UP (ref 17–32)
CREAT SERPL-MCNC: 0.6 MG/DL — LOW (ref 0.7–1.5)
EGFR: 115 ML/MIN/1.73M2 — SIGNIFICANT CHANGE UP
EOSINOPHIL # BLD AUTO: 0.33 K/UL — SIGNIFICANT CHANGE UP (ref 0–0.7)
EOSINOPHIL NFR BLD AUTO: 3.9 % — SIGNIFICANT CHANGE UP (ref 0–8)
GLUCOSE SERPL-MCNC: 167 MG/DL — HIGH (ref 70–99)
HCT VFR BLD CALC: 29.4 % — LOW (ref 42–52)
HGB BLD-MCNC: 8.6 G/DL — LOW (ref 14–18)
IMM GRANULOCYTES NFR BLD AUTO: 1.1 % — HIGH (ref 0.1–0.3)
LYMPHOCYTES # BLD AUTO: 1.39 K/UL — SIGNIFICANT CHANGE UP (ref 1.2–3.4)
LYMPHOCYTES # BLD AUTO: 16.6 % — LOW (ref 20.5–51.1)
MAGNESIUM SERPL-MCNC: 1.7 MG/DL — LOW (ref 1.8–2.4)
MCHC RBC-ENTMCNC: 27.8 PG — SIGNIFICANT CHANGE UP (ref 27–31)
MCHC RBC-ENTMCNC: 29.3 G/DL — LOW (ref 32–37)
MCV RBC AUTO: 95.1 FL — HIGH (ref 80–94)
MONOCYTES # BLD AUTO: 0.73 K/UL — HIGH (ref 0.1–0.6)
MONOCYTES NFR BLD AUTO: 8.7 % — SIGNIFICANT CHANGE UP (ref 1.7–9.3)
NEUTROPHILS # BLD AUTO: 5.81 K/UL — SIGNIFICANT CHANGE UP (ref 1.4–6.5)
NEUTROPHILS NFR BLD AUTO: 69.2 % — SIGNIFICANT CHANGE UP (ref 42.2–75.2)
NRBC # BLD: 0 /100 WBCS — SIGNIFICANT CHANGE UP (ref 0–0)
PLATELET # BLD AUTO: 141 K/UL — SIGNIFICANT CHANGE UP (ref 130–400)
POTASSIUM SERPL-MCNC: 4.5 MMOL/L — SIGNIFICANT CHANGE UP (ref 3.5–5)
POTASSIUM SERPL-SCNC: 4.5 MMOL/L — SIGNIFICANT CHANGE UP (ref 3.5–5)
PROT SERPL-MCNC: 5.8 G/DL — LOW (ref 6–8)
RBC # BLD: 3.09 M/UL — LOW (ref 4.7–6.1)
RBC # FLD: 16.2 % — HIGH (ref 11.5–14.5)
SODIUM SERPL-SCNC: 137 MMOL/L — SIGNIFICANT CHANGE UP (ref 135–146)
WBC # BLD: 8.39 K/UL — SIGNIFICANT CHANGE UP (ref 4.8–10.8)
WBC # FLD AUTO: 8.39 K/UL — SIGNIFICANT CHANGE UP (ref 4.8–10.8)

## 2022-10-15 PROCEDURE — 71045 X-RAY EXAM CHEST 1 VIEW: CPT | Mod: 26

## 2022-10-15 PROCEDURE — 99233 SBSQ HOSP IP/OBS HIGH 50: CPT

## 2022-10-15 RX ORDER — MAGNESIUM SULFATE 500 MG/ML
2 VIAL (ML) INJECTION ONCE
Refills: 0 | Status: COMPLETED | OUTPATIENT
Start: 2022-10-15 | End: 2022-10-15

## 2022-10-15 RX ADMIN — CEFEPIME 100 MILLIGRAM(S): 1 INJECTION, POWDER, FOR SOLUTION INTRAMUSCULAR; INTRAVENOUS at 21:36

## 2022-10-15 RX ADMIN — Medication 1 MILLIGRAM(S): at 05:17

## 2022-10-15 RX ADMIN — Medication 1 PATCH: at 12:52

## 2022-10-15 RX ADMIN — CEFEPIME 100 MILLIGRAM(S): 1 INJECTION, POWDER, FOR SOLUTION INTRAMUSCULAR; INTRAVENOUS at 05:16

## 2022-10-15 RX ADMIN — METHADONE HYDROCHLORIDE 135 MILLIGRAM(S): 40 TABLET ORAL at 12:51

## 2022-10-15 RX ADMIN — Medication 1 PATCH: at 12:51

## 2022-10-15 RX ADMIN — PANTOPRAZOLE SODIUM 40 MILLIGRAM(S): 20 TABLET, DELAYED RELEASE ORAL at 08:13

## 2022-10-15 RX ADMIN — CEFEPIME 100 MILLIGRAM(S): 1 INJECTION, POWDER, FOR SOLUTION INTRAMUSCULAR; INTRAVENOUS at 14:53

## 2022-10-15 RX ADMIN — BUPROPION HYDROCHLORIDE 300 MILLIGRAM(S): 150 TABLET, EXTENDED RELEASE ORAL at 12:48

## 2022-10-15 RX ADMIN — Medication 1 PATCH: at 08:13

## 2022-10-15 RX ADMIN — Medication 2 MILLIGRAM(S): at 21:36

## 2022-10-15 RX ADMIN — Medication 1 PATCH: at 18:08

## 2022-10-15 RX ADMIN — Medication 100 MILLIGRAM(S): at 12:47

## 2022-10-15 RX ADMIN — ENOXAPARIN SODIUM 140 MILLIGRAM(S): 100 INJECTION SUBCUTANEOUS at 05:17

## 2022-10-15 RX ADMIN — Medication 1 TABLET(S): at 12:48

## 2022-10-15 RX ADMIN — Medication 25 GRAM(S): at 09:22

## 2022-10-15 RX ADMIN — Medication 1 MILLIGRAM(S): at 12:48

## 2022-10-15 RX ADMIN — ENOXAPARIN SODIUM 140 MILLIGRAM(S): 100 INJECTION SUBCUTANEOUS at 17:47

## 2022-10-15 RX ADMIN — Medication 650 MILLIGRAM(S): at 21:58

## 2022-10-15 NOTE — PROGRESS NOTE ADULT - SUBJECTIVE AND OBJECTIVE BOX
Patient is a 52y old  Male who presents with a chief complaint of Hypoxia (14 Oct 2022 14:47)    HPI:  51yo male     PMHx of IVDU (heroin), vertebral osteomyelitis s/p debridement, and MSSA bacteremia    Presenting from rehab facility for "low oxygen".     Per patient, he is not experiencing any SOB/HARDWICK or CP or pain anywhere on his body and is entirely asymptomatic.  Of note he has recently noticed new LE edema for the past 2 days.     Per EMS, on arrival he was satting in the 70s, they started the patient on 15L via NRB, with improvement of SaO2 to 80s. No other interventions in the field.    In the ED :  BP: 105/78    RR : 28  T : 100.7  O2: 85% on  15 L NRB      WBC : 20   Lac : 3  BNP : 6.7k (no baseline)  VBG : pH 7.34  ,HCO3 32 , CO2, 59  ECG : sinus tachy   CXR: Increased interstitial and/or vascular markings. Possible right focal   upper lung field consolidation. (11 Oct 2022 19:41)       INTERVAL HPI/OVERNIGHT EVENTS:   No overnight events   Afebrile, hemodynamically stable     Subjective:    ICU Vital Signs Last 24 Hrs  T(C): 37.1 (14 Oct 2022 12:00), Max: 38.1 (14 Oct 2022 00:00)  T(F): 98.8 (14 Oct 2022 12:00), Max: 100.6 (14 Oct 2022 00:00)  HR: 84 (14 Oct 2022 12:00) (84 - 100)  BP: 106/67 (14 Oct 2022 12:00) (95/70 - 134/82)  BP(mean): 81 (14 Oct 2022 12:00) (74 - 103)  ABP: --  ABP(mean): --  RR: 18 (14 Oct 2022 12:00) (15 - 32)  SpO2: 97% (14 Oct 2022 12:00) (90% - 99%)    O2 Parameters below as of 14 Oct 2022 12:00  Patient On (Oxygen Delivery Method): nasal cannula, high flow  O2 Flow (L/min): 50  O2 Concentration (%): 40      I&O's Summary    13 Oct 2022 07:01  -  14 Oct 2022 07:00  --------------------------------------------------------  IN: 750 mL / OUT: 1251 mL / NET: -501 mL          Daily         MEDICATIONS  (STANDING):  ALPRAZolam 1 milliGRAM(s) Oral <User Schedule>  ALPRAZolam 2 milliGRAM(s) Oral at bedtime  buPROPion XL (24-Hour) . 300 milliGRAM(s) Oral daily  cefepime   IVPB      cefepime   IVPB 2000 milliGRAM(s) IV Intermittent every 8 hours  enoxaparin Injectable 140 milliGRAM(s) SubCutaneous every 12 hours  folic acid 1 milliGRAM(s) Oral daily  levoFLOXacin IVPB      levoFLOXacin IVPB 750 milliGRAM(s) IV Intermittent every 24 hours  methadone    Tablet 135 milliGRAM(s) Oral daily  multivitamin 1 Tablet(s) Oral daily  nicotine - 21 mG/24Hr(s) Patch 1 Patch Transdermal daily  pantoprazole    Tablet 40 milliGRAM(s) Oral before breakfast  thiamine 100 milliGRAM(s) Oral daily    MEDICATIONS  (PRN):  acetaminophen     Tablet .. 650 milliGRAM(s) Oral every 6 hours PRN Temp greater or equal to 38C (100.4F)  aluminum hydroxide/magnesium hydroxide/simethicone Suspension 30 milliLiter(s) Oral every 4 hours PRN Dyspepsia  cyclobenzaprine 10 milliGRAM(s) Oral three times a day PRN Muscle Spasm  haloperidol    Injectable 5 milliGRAM(s) IntraMuscular every 8 hours PRN Agitation  melatonin 3 milliGRAM(s) Oral at bedtime PRN Insomnia  ondansetron Injectable 4 milliGRAM(s) IV Push every 8 hours PRN Nausea and/or Vomiting      PHYSICAL EXAM:  GENERAL:   HEAD:  Atraumatic, Normocephalic  EYES: EOMI, PERRLA, conjunctiva and sclera clear  NECK: Supple, No JVD, Normal thyroid, no enlarged nodes  NERVOUS SYSTEM:  Alert & Awake.   CHEST/LUNG: B/L good air entry; No rales, rhonchi, or wheezing  HEART: S1S2 normal, no S3, Regular rate and rhythm; No murmurs  ABDOMEN: Soft, Nontender, Nondistended; Bowel sounds present  EXTREMITIES:  2+ Peripheral Pulses, No clubbing, cyanosis, or edema  LYMPH: No lymphadenopathy noted  SKIN: No rashes or lesions    LABS:                        9.6    11.55 )-----------( 160      ( 14 Oct 2022 05:27 )             31.7     10-14    136  |  97<L>  |  12  ----------------------------<  157<H>  4.7   |  28  |  0.7    Ca    8.3<L>      14 Oct 2022 05:27  Mg     1.9     10-14    TPro  6.3  /  Alb  2.7<L>  /  TBili  0.3  /  DBili  x   /  AST  19  /  ALT  <5  /  AlkPhos  94  10-14    LIVER FUNCTIONS - ( 14 Oct 2022 05:27 )  Alb: 2.7 g/dL / Pro: 6.3 g/dL / ALK PHOS: 94 U/L / ALT: <5 U/L / AST: 19 U/L / GGT: x             CAPILLARY BLOOD GLUCOSE                Urinalysis Basic - ( 13 Oct 2022 18:19 )    Color: Yellow / Appearance: Clear / S.039 / pH: x  Gluc: x / Ketone: Trace  / Bili: Negative / Urobili: 3 mg/dL   Blood: x / Protein: 100 mg/dL / Nitrite: Negative   Leuk Esterase: Negative / RBC: 2 /HPF / WBC 1 /HPF   Sq Epi: x / Non Sq Epi: 1 /HPF / Bacteria: Negative          Care Discussed with Consultants/Other Providers [ x] YES  [ ] NO

## 2022-10-15 NOTE — PROGRESS NOTE ADULT - SUBJECTIVE AND OBJECTIVE BOX
Patient is a 52y old  Male who presents with a chief complaint of Hypoxia (15 Oct 2022 07:16)        HPI:  53yo male     PMHx of IVDU (heroin), vertebral osteomyelitis s/p debridement, and MSSA bacteremia    Presenting from rehab facility for "low oxygen".     Per patient, he is not experiencing any SOB/HARDWICK or CP or pain anywhere on his body and is entirely asymptomatic.  Of note he has recently noticed new LE edema for the past 2 days.     Per EMS, on arrival he was satting in the 70s, they started the patient on 15L via NRB, with improvement of SaO2 to 80s. No other interventions in the field.    In the ED :  BP: 105/78    RR : 28  T : 100.7  O2: 85% on  15 L NRB      WBC : 20   Lac : 3  BNP : 6.7k (no baseline)  VBG : pH 7.34  ,HCO3 32 , CO2, 59  ECG : sinus tachy   CXR: Increased interstitial and/or vascular markings. Possible right focal   upper lung field consolidation. (11 Oct 2022 19:41)      Pt evaluated on rounds.  I reviewed the radiology tests and hospital record.    I reviewed previous notes on this patient.    Interval Events: No overnight events.      CAM:    SAT:    SBT:      REVIEW OF SYSTEMS:   see HPI      OBJECTIVE:  ICU Vital Signs Last 24 Hrs  T(C): 37.2 (15 Oct 2022 04:00), Max: 37.7 (14 Oct 2022 16:00)  T(F): 98.9 (15 Oct 2022 04:00), Max: 99.8 (14 Oct 2022 16:00)  HR: 84 (15 Oct 2022 07:00) (80 - 92)  BP: 104/62 (15 Oct 2022 07:00) (88/52 - 122/74)  BP(mean): 77 (15 Oct 2022 07:00) (64 - 97)  ABP: --  ABP(mean): --  RR: 22 (15 Oct 2022 07:00) (13 - 24)  SpO2: 96% (15 Oct 2022 07:00) (87% - 98%)    O2 Parameters below as of 15 Oct 2022 07:00  Patient On (Oxygen Delivery Method): nasal cannula, high flow              10-14 @ 07:01  -  10-15 @ 07:00  --------------------------------------------------------  IN: 640 mL / OUT: 1050 mL / NET: -410 mL      CAPILLARY BLOOD GLUCOSE            PHYSICAL EXAM:       · ENMT:   Airway patent,   Nasal mucosa clear.  Mouth with normal mucosa.   No thrush    · EYES:   Clear bilaterally,   pupils equal,   round and reactive to light.    · CARDIAC:   Normal rate,   regular rhythm.    Heart sounds S1, S2.   No murmurs, no rubs or gallops on auscultation  no edema        CAROTID:   normal systolic impulse  no bruits    · RESPIRATORY:   rales  normal chest expansion  no retractions or use of accessory muscles  percussion of chest demonstrates no hyperresonance or dullness    · GASTROINTESTINAL:  Abdomen soft,   non-tender,   + BS  liver/spleen not palpable    · MUSCULOSKELETAL:   no clubbing, cyanosis      · SKIN:   Skin normal color for race,   warm, dry   No evidence of rash.        · HEME LYMPH:   no splenomegaly.  No cervical  lymphadenopathy.  no inguinal lymphadenopathy    HOSPITAL MEDICATIONS:  MEDICATIONS  (STANDING):  ALPRAZolam 1 milliGRAM(s) Oral <User Schedule>  ALPRAZolam 2 milliGRAM(s) Oral at bedtime  buPROPion XL (24-Hour) . 300 milliGRAM(s) Oral daily  cefepime   IVPB      cefepime   IVPB 2000 milliGRAM(s) IV Intermittent every 8 hours  enoxaparin Injectable 140 milliGRAM(s) SubCutaneous every 12 hours  folic acid 1 milliGRAM(s) Oral daily  levoFLOXacin IVPB      levoFLOXacin IVPB 750 milliGRAM(s) IV Intermittent every 24 hours  methadone    Tablet 135 milliGRAM(s) Oral daily  multivitamin 1 Tablet(s) Oral daily  nicotine - 21 mG/24Hr(s) Patch 1 Patch Transdermal daily  pantoprazole    Tablet 40 milliGRAM(s) Oral before breakfast  thiamine 100 milliGRAM(s) Oral daily    MEDICATIONS  (PRN):  acetaminophen     Tablet .. 650 milliGRAM(s) Oral every 6 hours PRN Temp greater or equal to 38C (100.4F)  aluminum hydroxide/magnesium hydroxide/simethicone Suspension 30 milliLiter(s) Oral every 4 hours PRN Dyspepsia  cyclobenzaprine 10 milliGRAM(s) Oral three times a day PRN Muscle Spasm  haloperidol    Injectable 5 milliGRAM(s) IntraMuscular every 8 hours PRN Agitation  melatonin 3 milliGRAM(s) Oral at bedtime PRN Insomnia  ondansetron Injectable 4 milliGRAM(s) IV Push every 8 hours PRN Nausea and/or Vomiting    lactated ringers Bolus:   500 milliLiter(s), IV Bolus, once, infuse over 60 Minute(s), Stop After 1 Doses      LABS:                        8.6    8.39  )-----------( 141      ( 15 Oct 2022 06:20 )             29.4     10-15    137  |  96<L>  |  11  ----------------------------<  167<H>  4.5   |  29  |  0.6<L>    Ca    8.4      15 Oct 2022 06:20  Mg     1.7     10-15    TPro  5.8<L>  /  Alb  2.4<L>  /  TBili  0.2  /  DBili  x   /  AST  14  /  ALT  <5  /  AlkPhos  89  10-15      Urinalysis Basic - ( 13 Oct 2022 18:19 )    Color: Yellow / Appearance: Clear / S.039 / pH: x  Gluc: x / Ketone: Trace  / Bili: Negative / Urobili: 3 mg/dL   Blood: x / Protein: 100 mg/dL / Nitrite: Negative   Leuk Esterase: Negative / RBC: 2 /HPF / WBC 1 /HPF   Sq Epi: x / Non Sq Epi: 1 /HPF / Bacteria: Negative        COVID-19 PCR: NotDetec (11 Oct 2022 12:35)  COVID-19 PCR: NotDetec (06 Sep 2022 10:55)  COVID-19 PCR: NotDetec (28 Aug 2022 07:52)  COVID-19 PCR: NotDetec (22 Aug 2022 15:36)  COVID-19 PCR: NotDetec (18 Aug 2022 13:56)  COVID-19 PCR: NotDetec (14 Aug 2022 16:45)  COVID-19 PCR: NotDetec (12 Aug 2022 19:33)  SARS-CoV-2: NotDetec (10 Aug 2022 15:03)            RADIOLOGY: Today I personally interpreted the latest CXR and other pertinent films.               Patient is a 52y old  Male who presents with a chief complaint of Hypoxia (15 Oct 2022 07:16)        HPI:  53yo male     PMHx of IVDU (heroin), vertebral osteomyelitis s/p debridement, and MSSA bacteremia    Presenting from rehab facility for "low oxygen".     Per patient, he is not experiencing any SOB/HARDWICK or CP or pain anywhere on his body and is entirely asymptomatic.  Of note he has recently noticed new LE edema for the past 2 days.     Per EMS, on arrival he was satting in the 70s, they started the patient on 15L via NRB, with improvement of SaO2 to 80s. No other interventions in the field.    In the ED :  BP: 105/78    RR : 28  T : 100.7  O2: 85% on  15 L NRB      WBC : 20   Lac : 3  BNP : 6.7k (no baseline)  VBG : pH 7.34  ,HCO3 32 , CO2, 59  ECG : sinus tachy   CXR: Increased interstitial and/or vascular markings. Possible right focal   upper lung field consolidation. (11 Oct 2022 19:41)      Pt evaluated on rounds.  I reviewed the radiology tests and hospital record.    I reviewed previous notes on this patient.    Interval Events: No overnight events.      REVIEW OF SYSTEMS:   see HPI      OBJECTIVE:  ICU Vital Signs Last 24 Hrs  T(C): 37.2 (15 Oct 2022 04:00), Max: 37.7 (14 Oct 2022 16:00)  T(F): 98.9 (15 Oct 2022 04:00), Max: 99.8 (14 Oct 2022 16:00)  HR: 84 (15 Oct 2022 07:00) (80 - 92)  BP: 104/62 (15 Oct 2022 07:00) (88/52 - 122/74)  BP(mean): 77 (15 Oct 2022 07:00) (64 - 97)  RR: 22 (15 Oct 2022 07:00) (13 - 24)  SpO2: 96% (15 Oct 2022 07:00) (87% - 98%)    O2 Parameters below as of 15 Oct 2022 07:00  Patient On (Oxygen Delivery Method): nasal cannula, high flow              10-14 @ 07:01  -  10-15 @ 07:00  --------------------------------------------------------  IN: 640 mL / OUT: 1050 mL / NET: -410 mL      CAPILLARY BLOOD GLUCOSE            PHYSICAL EXAM:       · ENMT:   Airway patent,   Nasal mucosa clear.  Mouth with normal mucosa.   No thrush    · EYES:   Clear bilaterally,   pupils equal,   round and reactive to light.    · CARDIAC:   Normal rate,   regular rhythm.    Heart sounds S1, S2.   No murmurs, no rubs or gallops on auscultation  no edema        CAROTID:   normal systolic impulse  no bruits    · RESPIRATORY:   rales  normal chest expansion  no retractions or use of accessory muscles  percussion of chest demonstrates no hyperresonance or dullness    · GASTROINTESTINAL:  Abdomen soft,   non-tender,   + BS  liver/spleen not palpable    · MUSCULOSKELETAL:   no clubbing, cyanosis      · SKIN:   Skin normal color for race,   warm, dry   No evidence of rash.        · HEME LYMPH:   no splenomegaly.  No cervical  lymphadenopathy.  no inguinal lymphadenopathy    HOSPITAL MEDICATIONS:  MEDICATIONS  (STANDING):  ALPRAZolam 1 milliGRAM(s) Oral <User Schedule>  ALPRAZolam 2 milliGRAM(s) Oral at bedtime  buPROPion XL (24-Hour) . 300 milliGRAM(s) Oral daily  cefepime   IVPB      cefepime   IVPB 2000 milliGRAM(s) IV Intermittent every 8 hours  enoxaparin Injectable 140 milliGRAM(s) SubCutaneous every 12 hours  folic acid 1 milliGRAM(s) Oral daily  levoFLOXacin IVPB      levoFLOXacin IVPB 750 milliGRAM(s) IV Intermittent every 24 hours  methadone    Tablet 135 milliGRAM(s) Oral daily  multivitamin 1 Tablet(s) Oral daily  nicotine - 21 mG/24Hr(s) Patch 1 Patch Transdermal daily  pantoprazole    Tablet 40 milliGRAM(s) Oral before breakfast  thiamine 100 milliGRAM(s) Oral daily    MEDICATIONS  (PRN):  acetaminophen     Tablet .. 650 milliGRAM(s) Oral every 6 hours PRN Temp greater or equal to 38C (100.4F)  aluminum hydroxide/magnesium hydroxide/simethicone Suspension 30 milliLiter(s) Oral every 4 hours PRN Dyspepsia  cyclobenzaprine 10 milliGRAM(s) Oral three times a day PRN Muscle Spasm  haloperidol    Injectable 5 milliGRAM(s) IntraMuscular every 8 hours PRN Agitation  melatonin 3 milliGRAM(s) Oral at bedtime PRN Insomnia  ondansetron Injectable 4 milliGRAM(s) IV Push every 8 hours PRN Nausea and/or Vomiting    lactated ringers Bolus:   500 milliLiter(s), IV Bolus, once, infuse over 60 Minute(s), Stop After 1 Doses      LABS:                        8.6    8.39  )-----------( 141      ( 15 Oct 2022 06:20 )             29.4     10-15    137  |  96<L>  |  11  ----------------------------<  167<H>  4.5   |  29  |  0.6<L>    Ca    8.4      15 Oct 2022 06:20  Mg     1.7     10-15    TPro  5.8<L>  /  Alb  2.4<L>  /  TBili  0.2  /  DBili  x   /  AST  14  /  ALT  <5  /  AlkPhos  89  10-15      Urinalysis Basic - ( 13 Oct 2022 18:19 )    Color: Yellow / Appearance: Clear / S.039 / pH: x  Gluc: x / Ketone: Trace  / Bili: Negative / Urobili: 3 mg/dL   Blood: x / Protein: 100 mg/dL / Nitrite: Negative   Leuk Esterase: Negative / RBC: 2 /HPF / WBC 1 /HPF   Sq Epi: x / Non Sq Epi: 1 /HPF / Bacteria: Negative        COVID-19 PCR: NotDetec (11 Oct 2022 12:35)  COVID-19 PCR: NotDetec (06 Sep 2022 10:55)  COVID-19 PCR: NotDetec (28 Aug 2022 07:52)  COVID-19 PCR: NotDetec (22 Aug 2022 15:36)  COVID-19 PCR: NotDetec (18 Aug 2022 13:56)  COVID-19 PCR: NotDetec (14 Aug 2022 16:45)  COVID-19 PCR: NotDetec (12 Aug 2022 19:33)  SARS-CoV-2: NotDetec (10 Aug 2022 15:03)            RADIOLOGY: Today I personally interpreted the latest CXR and other pertinent films.

## 2022-10-15 NOTE — PROGRESS NOTE ADULT - ASSESSMENT
IMPRESSION:    Acute hypoxemic resp failure on HHFNC (declining BIPAP)  PNA/ highly aspiration  PE with RV strain - submassive  hx Epidural Phlegmon, OM  hx Right Psoas Abscess   hx MSSA Bacteremia  HO IVDA, Opioid Abuse on Methadone   obesity/ DIONICIO / OHS    PLAN:    CNS: On alprazolam, quetiapine, gabapentin. On Methadone for history of heroin use. Psych evaluation.     HEENT:  Oral care    PULMONARY:  HOB @ 45 degrees, aspiration precaution.   Wean down Fio2;   Now on 40LPM and 50%.   Remains on HFNC.   Recommend BIPAP during sleep for OHS.     CARDIOVASCULAR: Echocardiogram: No RV strain. Not on pressors. Not on IVF. EKG for QTc.    GI: GI prophylaxis; oral diet. Aspiration precautions.     RENAL:  Kidney function at baseline. Correct as needed.     INFECTIOUS DISEASE: ID following. Blood cultures negative.     Keep the same abx for now.     HEMATOLOGICAL:  LE doppler: extensive DVT left leg.   Vascular eval for the extensive DVT.   IR for thrombectomy next week  cont LOvenox    ENDOCRINE:  Follow up FS.  Insulin protocol if needed.    Dispo: Keep in the CCU     Prognosis is guarded.

## 2022-10-16 LAB
ALBUMIN SERPL ELPH-MCNC: 2.7 G/DL — LOW (ref 3.5–5.2)
ALP SERPL-CCNC: 88 U/L — SIGNIFICANT CHANGE UP (ref 30–115)
ALT FLD-CCNC: <5 U/L — SIGNIFICANT CHANGE UP (ref 0–41)
ANION GAP SERPL CALC-SCNC: 10 MMOL/L — SIGNIFICANT CHANGE UP (ref 7–14)
ANION GAP SERPL CALC-SCNC: 5 MMOL/L — LOW (ref 7–14)
APTT BLD: 36.7 SEC — SIGNIFICANT CHANGE UP (ref 27–39.2)
AST SERPL-CCNC: 13 U/L — SIGNIFICANT CHANGE UP (ref 0–41)
BASOPHILS # BLD AUTO: 0.04 K/UL — SIGNIFICANT CHANGE UP (ref 0–0.2)
BASOPHILS NFR BLD AUTO: 0.4 % — SIGNIFICANT CHANGE UP (ref 0–1)
BILIRUB SERPL-MCNC: 0.2 MG/DL — SIGNIFICANT CHANGE UP (ref 0.2–1.2)
BLD GP AB SCN SERPL QL: SIGNIFICANT CHANGE UP
BUN SERPL-MCNC: 9 MG/DL — LOW (ref 10–20)
BUN SERPL-MCNC: 9 MG/DL — LOW (ref 10–20)
CALCIUM SERPL-MCNC: 8.3 MG/DL — LOW (ref 8.4–10.5)
CALCIUM SERPL-MCNC: 8.4 MG/DL — SIGNIFICANT CHANGE UP (ref 8.4–10.5)
CHLORIDE SERPL-SCNC: 100 MMOL/L — SIGNIFICANT CHANGE UP (ref 98–110)
CHLORIDE SERPL-SCNC: 97 MMOL/L — LOW (ref 98–110)
CO2 SERPL-SCNC: 34 MMOL/L — HIGH (ref 17–32)
CO2 SERPL-SCNC: 36 MMOL/L — HIGH (ref 17–32)
CREAT SERPL-MCNC: 0.5 MG/DL — LOW (ref 0.7–1.5)
CREAT SERPL-MCNC: <0.5 MG/DL — LOW (ref 0.7–1.5)
EGFR: 122 ML/MIN/1.73M2 — SIGNIFICANT CHANGE UP
EGFR: 130 ML/MIN/1.73M2 — SIGNIFICANT CHANGE UP
EOSINOPHIL # BLD AUTO: 0.29 K/UL — SIGNIFICANT CHANGE UP (ref 0–0.7)
EOSINOPHIL NFR BLD AUTO: 2.6 % — SIGNIFICANT CHANGE UP (ref 0–8)
GLUCOSE BLDC GLUCOMTR-MCNC: 151 MG/DL — HIGH (ref 70–99)
GLUCOSE SERPL-MCNC: 114 MG/DL — HIGH (ref 70–99)
GLUCOSE SERPL-MCNC: 144 MG/DL — HIGH (ref 70–99)
HCT VFR BLD CALC: 30.1 % — LOW (ref 42–52)
HCT VFR BLD CALC: 30.2 % — LOW (ref 42–52)
HGB BLD-MCNC: 8.9 G/DL — LOW (ref 14–18)
HGB BLD-MCNC: 9 G/DL — LOW (ref 14–18)
IMM GRANULOCYTES NFR BLD AUTO: 1.7 % — HIGH (ref 0.1–0.3)
INR BLD: 1.3 RATIO — SIGNIFICANT CHANGE UP (ref 0.65–1.3)
LYMPHOCYTES # BLD AUTO: 1.61 K/UL — SIGNIFICANT CHANGE UP (ref 1.2–3.4)
LYMPHOCYTES # BLD AUTO: 14.5 % — LOW (ref 20.5–51.1)
MAGNESIUM SERPL-MCNC: 1.8 MG/DL — SIGNIFICANT CHANGE UP (ref 1.8–2.4)
MAGNESIUM SERPL-MCNC: 1.8 MG/DL — SIGNIFICANT CHANGE UP (ref 1.8–2.4)
MCHC RBC-ENTMCNC: 28.3 PG — SIGNIFICANT CHANGE UP (ref 27–31)
MCHC RBC-ENTMCNC: 28.3 PG — SIGNIFICANT CHANGE UP (ref 27–31)
MCHC RBC-ENTMCNC: 29.6 G/DL — LOW (ref 32–37)
MCHC RBC-ENTMCNC: 29.8 G/DL — LOW (ref 32–37)
MCV RBC AUTO: 95 FL — HIGH (ref 80–94)
MCV RBC AUTO: 95.6 FL — HIGH (ref 80–94)
MONOCYTES # BLD AUTO: 0.97 K/UL — HIGH (ref 0.1–0.6)
MONOCYTES NFR BLD AUTO: 8.7 % — SIGNIFICANT CHANGE UP (ref 1.7–9.3)
NEUTROPHILS # BLD AUTO: 8.01 K/UL — HIGH (ref 1.4–6.5)
NEUTROPHILS NFR BLD AUTO: 72.1 % — SIGNIFICANT CHANGE UP (ref 42.2–75.2)
NRBC # BLD: 0 /100 WBCS — SIGNIFICANT CHANGE UP (ref 0–0)
NRBC # BLD: 0 /100 WBCS — SIGNIFICANT CHANGE UP (ref 0–0)
PLATELET # BLD AUTO: 237 K/UL — SIGNIFICANT CHANGE UP (ref 130–400)
PLATELET # BLD AUTO: 325 K/UL — SIGNIFICANT CHANGE UP (ref 130–400)
POTASSIUM SERPL-MCNC: 4.5 MMOL/L — SIGNIFICANT CHANGE UP (ref 3.5–5)
POTASSIUM SERPL-MCNC: 4.8 MMOL/L — SIGNIFICANT CHANGE UP (ref 3.5–5)
POTASSIUM SERPL-SCNC: 4.5 MMOL/L — SIGNIFICANT CHANGE UP (ref 3.5–5)
POTASSIUM SERPL-SCNC: 4.8 MMOL/L — SIGNIFICANT CHANGE UP (ref 3.5–5)
PROT SERPL-MCNC: 5.8 G/DL — LOW (ref 6–8)
PROTHROM AB SERPL-ACNC: 14.9 SEC — HIGH (ref 9.95–12.87)
RBC # BLD: 3.15 M/UL — LOW (ref 4.7–6.1)
RBC # BLD: 3.18 M/UL — LOW (ref 4.7–6.1)
RBC # FLD: 16 % — HIGH (ref 11.5–14.5)
RBC # FLD: 16.2 % — HIGH (ref 11.5–14.5)
SARS-COV-2 RNA SPEC QL NAA+PROBE: SIGNIFICANT CHANGE UP
SODIUM SERPL-SCNC: 138 MMOL/L — SIGNIFICANT CHANGE UP (ref 135–146)
SODIUM SERPL-SCNC: 144 MMOL/L — SIGNIFICANT CHANGE UP (ref 135–146)
WBC # BLD: 11.11 K/UL — HIGH (ref 4.8–10.8)
WBC # BLD: 11.75 K/UL — HIGH (ref 4.8–10.8)
WBC # FLD AUTO: 11.11 K/UL — HIGH (ref 4.8–10.8)
WBC # FLD AUTO: 11.75 K/UL — HIGH (ref 4.8–10.8)

## 2022-10-16 PROCEDURE — 93010 ELECTROCARDIOGRAM REPORT: CPT

## 2022-10-16 PROCEDURE — 71045 X-RAY EXAM CHEST 1 VIEW: CPT | Mod: 26

## 2022-10-16 PROCEDURE — 99233 SBSQ HOSP IP/OBS HIGH 50: CPT

## 2022-10-16 RX ORDER — ALPRAZOLAM 0.25 MG
1 TABLET ORAL ONCE
Refills: 0 | Status: DISCONTINUED | OUTPATIENT
Start: 2022-10-16 | End: 2022-10-16

## 2022-10-16 RX ADMIN — Medication 1 PATCH: at 18:41

## 2022-10-16 RX ADMIN — Medication 1 PATCH: at 12:51

## 2022-10-16 RX ADMIN — ENOXAPARIN SODIUM 140 MILLIGRAM(S): 100 INJECTION SUBCUTANEOUS at 17:33

## 2022-10-16 RX ADMIN — ENOXAPARIN SODIUM 140 MILLIGRAM(S): 100 INJECTION SUBCUTANEOUS at 05:48

## 2022-10-16 RX ADMIN — Medication 1 PATCH: at 12:50

## 2022-10-16 RX ADMIN — Medication 100 MILLIGRAM(S): at 12:51

## 2022-10-16 RX ADMIN — Medication 1 MILLIGRAM(S): at 05:48

## 2022-10-16 RX ADMIN — Medication 1 PATCH: at 09:10

## 2022-10-16 RX ADMIN — CEFEPIME 100 MILLIGRAM(S): 1 INJECTION, POWDER, FOR SOLUTION INTRAMUSCULAR; INTRAVENOUS at 05:49

## 2022-10-16 RX ADMIN — Medication 1 TABLET(S): at 12:51

## 2022-10-16 RX ADMIN — CEFEPIME 100 MILLIGRAM(S): 1 INJECTION, POWDER, FOR SOLUTION INTRAMUSCULAR; INTRAVENOUS at 14:33

## 2022-10-16 RX ADMIN — Medication 2 MILLIGRAM(S): at 21:49

## 2022-10-16 RX ADMIN — BUPROPION HYDROCHLORIDE 300 MILLIGRAM(S): 150 TABLET, EXTENDED RELEASE ORAL at 12:51

## 2022-10-16 RX ADMIN — CEFEPIME 100 MILLIGRAM(S): 1 INJECTION, POWDER, FOR SOLUTION INTRAMUSCULAR; INTRAVENOUS at 21:57

## 2022-10-16 RX ADMIN — Medication 1 MILLIGRAM(S): at 14:33

## 2022-10-16 RX ADMIN — Medication 1 MILLIGRAM(S): at 12:51

## 2022-10-16 RX ADMIN — PANTOPRAZOLE SODIUM 40 MILLIGRAM(S): 20 TABLET, DELAYED RELEASE ORAL at 08:55

## 2022-10-16 RX ADMIN — METHADONE HYDROCHLORIDE 135 MILLIGRAM(S): 40 TABLET ORAL at 12:52

## 2022-10-16 NOTE — PROGRESS NOTE ADULT - SUBJECTIVE AND OBJECTIVE BOX
Patient is a 52y old  Male who presents with a chief complaint of Hypoxia (16 Oct 2022 08:41)    HPI:  51yo male     PMHx of IVDU (heroin), vertebral osteomyelitis s/p debridement, and MSSA bacteremia    Presenting from rehab facility for "low oxygen".     Per patient, he is not experiencing any SOB/HARDWICK or CP or pain anywhere on his body and is entirely asymptomatic.  Of note he has recently noticed new LE edema for the past 2 days.     Per EMS, on arrival he was satting in the 70s, they started the patient on 15L via NRB, with improvement of SaO2 to 80s. No other interventions in the field.    In the ED :  BP: 105/78    RR : 28  T : 100.7  O2: 85% on  15 L NRB      WBC : 20   Lac : 3  BNP : 6.7k (no baseline)  VBG : pH 7.34  ,HCO3 32 , CO2, 59  ECG : sinus tachy   CXR: Increased interstitial and/or vascular markings. Possible right focal   upper lung field consolidation. (11 Oct 2022 19:41)       INTERVAL HPI/OVERNIGHT EVENTS:   No overnight events   Afebrile, hemodynamically stable     Subjective:    ICU Vital Signs Last 24 Hrs  T(C): 37.2 (16 Oct 2022 08:00), Max: 38 (15 Oct 2022 21:00)  T(F): 98.9 (16 Oct 2022 08:00), Max: 100.4 (15 Oct 2022 21:00)  HR: 83 (16 Oct 2022 11:00) (80 - 95)  BP: 97/54 (16 Oct 2022 11:00) (97/54 - 119/68)  BP(mean): 69 (16 Oct 2022 11:00) (69 - 90)  ABP: --  ABP(mean): --  RR: 23 (16 Oct 2022 11:00) (20 - 29)  SpO2: 95% (16 Oct 2022 11:00) (90% - 96%)    O2 Parameters below as of 16 Oct 2022 11:00  Patient On (Oxygen Delivery Method): nasal cannula, high flow  O2 Flow (L/min): 40  O2 Concentration (%): 60      I&O's Summary    15 Oct 2022 07:01  -  16 Oct 2022 07:00  --------------------------------------------------------  IN: 980 mL / OUT: 1150 mL / NET: -170 mL    16 Oct 2022 07:01  -  16 Oct 2022 11:54  --------------------------------------------------------  IN: 250 mL / OUT: 600 mL / NET: -350 mL          Daily     Daily     EKG/Telemetry Events:    MEDICATIONS  (STANDING):  ALPRAZolam 1 milliGRAM(s) Oral <User Schedule>  ALPRAZolam 2 milliGRAM(s) Oral at bedtime  buPROPion XL (24-Hour) . 300 milliGRAM(s) Oral daily  cefepime   IVPB      cefepime   IVPB 2000 milliGRAM(s) IV Intermittent every 8 hours  enoxaparin Injectable 140 milliGRAM(s) SubCutaneous every 12 hours  folic acid 1 milliGRAM(s) Oral daily  levoFLOXacin IVPB      levoFLOXacin IVPB 750 milliGRAM(s) IV Intermittent every 24 hours  methadone    Tablet 135 milliGRAM(s) Oral daily  multivitamin 1 Tablet(s) Oral daily  nicotine - 21 mG/24Hr(s) Patch 1 Patch Transdermal daily  pantoprazole    Tablet 40 milliGRAM(s) Oral before breakfast  thiamine 100 milliGRAM(s) Oral daily    MEDICATIONS  (PRN):  acetaminophen     Tablet .. 650 milliGRAM(s) Oral every 6 hours PRN Temp greater or equal to 38C (100.4F)  aluminum hydroxide/magnesium hydroxide/simethicone Suspension 30 milliLiter(s) Oral every 4 hours PRN Dyspepsia  cyclobenzaprine 10 milliGRAM(s) Oral three times a day PRN Muscle Spasm  haloperidol    Injectable 5 milliGRAM(s) IntraMuscular every 8 hours PRN Agitation  melatonin 3 milliGRAM(s) Oral at bedtime PRN Insomnia  ondansetron Injectable 4 milliGRAM(s) IV Push every 8 hours PRN Nausea and/or Vomiting      PHYSICAL EXAM:  GENERAL: obese male sleeping comfortably in nad  HEAD:  Atraumatic, Normocephalic  EYES: conjunctiva and sclera clear  NERVOUS SYSTEM:  Alert & Awake, moving all extremities  CHEST/LUNG: O2 sat >95% on HFNC.  B/L good air entry; No rales, rhonchi, or wheezing  HEART: S1S2 normal, no S3, Regular rate and rhythm;   ABDOMEN: obese, soft, ntnd  EXTREMITIES:  2+ Peripheral Pulses,  SKIN: warm, dry    LABS:                        8.9    11.11 )-----------( 237      ( 16 Oct 2022 05:12 )             30.1     10-16    138  |  97<L>  |  9<L>  ----------------------------<  144<H>  4.8   |  36<H>  |  0.5<L>    Ca    8.4      16 Oct 2022 05:12  Mg     1.8     10-16    TPro  5.8<L>  /  Alb  2.7<L>  /  TBili  0.2  /  DBili  x   /  AST  13  /  ALT  <5  /  AlkPhos  88  10-16    LIVER FUNCTIONS - ( 16 Oct 2022 05:12 )  Alb: 2.7 g/dL / Pro: 5.8 g/dL / ALK PHOS: 88 U/L / ALT: <5 U/L / AST: 13 U/L / GGT: x             CAPILLARY BLOOD GLUCOSE      POCT Blood Glucose.: 151 mg/dL (16 Oct 2022 02:06)      Care Discussed with Consultants/Other Providers [ x] YES  [ ] NO

## 2022-10-16 NOTE — PROGRESS NOTE ADULT - ASSESSMENT
IMPRESSION:    Acute hypoxemic resp failure on HFNC (declining BIPAP)  PNA/ highly aspiration  PE with RV strain - submassive  hx Epidural Phlegmon, OM  hx Right Psoas Abscess   hx MSSA Bacteremia  HO IVDA, Opioid Abuse on Methadone   obesity/ DIONICIO / OHS    PLAN:    CNS: Quetiapine, Gabapentin, and Bupropion held per psych. Xanax dose reduced. Haldol 5mg q8h prn.    HEENT:  Oral care    PULMONARY:  HOB @ 45 degrees, aspiration precaution. Wean down Fio2; Now on 40LPM and 50%. Remains on HFNC. Recommend BIPAP during sleep for OHS.     CARDIOVASCULAR: Echocardiogram: No RV strain. Not on pressors. Not on IVF. QTc 450.    GI: GI prophylaxis; oral diet. Aspiration precautions.     RENAL:  Kidney function at baseline. Correct as needed.     INFECTIOUS DISEASE: Midline (NOT PICC) discontinued. ID following. Blood cultures negative. UA negative,. MRSA pending. Keep the same abx for now. MR Spine once stable.      HEMATOLOGICAL:  LE doppler: extensive DVT left leg. Vascular eval for the extensive DVT. pending IR thrombectomy early this week.     ENDOCRINE:  Follow up FS.  Insulin protocol if needed.    Dispo: Keep in the CCU for now.     Prognosis is guarded.

## 2022-10-16 NOTE — PHYSICAL THERAPY INITIAL EVALUATION ADULT - PERTINENT HX OF CURRENT PROBLEM, REHAB EVAL
PT initial evaluation attempted however pt remains sgnificantly weak with increased O2 requirement via HFNC, unable to fully participate with therapy . PT plan of care discussed with pt., verbalized he will work with PT once his condition improves. Will cont to f/u as appropriate.     As per pt., he has been ambulating using a rollator  with stand by assist in the short term rehabilitation facility.

## 2022-10-16 NOTE — PROGRESS NOTE ADULT - ASSESSMENT
IMPRESSION:    Acute hypoxemic resp failure on HHFNC (declining BIPAP)  PNA/ highly aspiration  PE with RV strain - submassive  hx Epidural Phlegmon, OM  hx Right Psoas Abscess   hx MSSA Bacteremia  HO IVDA, Opioid Abuse on Methadone   obesity/ DIONICIO / OHS    PLAN:    CNS: On alprazolam, quetiapine, gabapentin. On Methadone for history of heroin use.      HEENT:  Oral care    PULMONARY:  HOB @ 45 degrees, aspiration precaution.   Wean down Fio2;  Now on 40LPM and 50%.   Remains on HFNC.  Recommend BIPAP during sleep for OHS.     CARDIOVASCULAR: Echocardiogram: No RV strain. Not on pressors. Not on IVF. EKG for QTc.    GI: GI prophylaxis; oral diet. Aspiration precautions.     RENAL:  Kidney function at baseline. Correct as needed.     INFECTIOUS DISEASE: ID following. Blood cultures negative.     Keep the same abx for now.     HEMATOLOGICAL:  LE doppler: extensive DVT left leg.   Vascular eval for the extensive DVT.   IR for thrombectomy next week  cont LOvenox    ENDOCRINE:  Follow up FS.  Insulin protocol if needed.    Dispo: Keep in the CCU     Prognosis is guarded.

## 2022-10-16 NOTE — PROGRESS NOTE ADULT - SUBJECTIVE AND OBJECTIVE BOX
Patient is a 52y old  Male who presents with a chief complaint of Hypoxia (15 Oct 2022 07:58)        HPI:  53yo male     PMHx of IVDU (heroin), vertebral osteomyelitis s/p debridement, and MSSA bacteremia    Presenting from rehab facility for "low oxygen".     Per patient, he is not experiencing any SOB/HARDWICK or CP or pain anywhere on his body and is entirely asymptomatic.  Of note he has recently noticed new LE edema for the past 2 days.     Per EMS, on arrival he was satting in the 70s, they started the patient on 15L via NRB, with improvement of SaO2 to 80s. No other interventions in the field.    In the ED :  BP: 105/78    RR : 28  T : 100.7  O2: 85% on  15 L NRB      WBC : 20   Lac : 3  BNP : 6.7k (no baseline)  VBG : pH 7.34  ,HCO3 32 , CO2, 59  ECG : sinus tachy   CXR: Increased interstitial and/or vascular markings. Possible right focal   upper lung field consolidation. (11 Oct 2022 19:41)      Pt evaluated on rounds.  I reviewed the radiology tests and hospital record.    I reviewed previous notes on this patient.    Interval Events: No overnight events.      CAM:    SAT:    SBT:      REVIEW OF SYSTEMS:   see HPI      OBJECTIVE:  ICU Vital Signs Last 24 Hrs  T(C): 36.3 (16 Oct 2022 04:00), Max: 38 (15 Oct 2022 21:00)  T(F): 97.3 (16 Oct 2022 04:00), Max: 100.4 (15 Oct 2022 21:00)  HR: 84 (16 Oct 2022 08:00) (81 - 95)  BP: 108/66 (16 Oct 2022 07:00) (97/54 - 128/73)  BP(mean): 81 (16 Oct 2022 07:00) (69 - 92)  ABP: --  ABP(mean): --  RR: 24 (16 Oct 2022 08:00) (19 - 29)  SpO2: 95% (16 Oct 2022 08:00) (90% - 96%)    O2 Parameters below as of 16 Oct 2022 08:00  Patient On (Oxygen Delivery Method): nasal cannula, high flow  O2 Flow (L/min): 40  O2 Concentration (%): 60          10-15 @ 07:01  -  10-16 @ 07:00  --------------------------------------------------------  IN: 980 mL / OUT: 1150 mL / NET: -170 mL      CAPILLARY BLOOD GLUCOSE      POCT Blood Glucose.: 151 mg/dL (16 Oct 2022 02:06)        PHYSICAL EXAM:       · ENMT:   Airway patent,   Nasal mucosa clear.  Mouth with normal mucosa.   No thrush    · EYES:   Clear bilaterally,   pupils equal,   round and reactive to light.    · CARDIAC:   Normal rate,   regular rhythm.    Heart sounds S1, S2.   No murmurs, no rubs or gallops on auscultation  no edema        CAROTID:   normal systolic impulse  no bruits    · RESPIRATORY:   rales  normal chest expansion  no retractions or use of accessory muscles  percussion of chest demonstrates no hyperresonance or dullness    · GASTROINTESTINAL:  Abdomen soft,   non-tender,   + BS  liver/spleen not palpable    · MUSCULOSKELETAL:   no clubbing, cyanosis      · SKIN:   Skin normal color for race,   warm, dry   No evidence of rash.        · HEME LYMPH:   no splenomegaly.  No cervical  lymphadenopathy.  no inguinal lymphadenopathy    HOSPITAL MEDICATIONS:  MEDICATIONS  (STANDING):  ALPRAZolam 1 milliGRAM(s) Oral <User Schedule>  ALPRAZolam 2 milliGRAM(s) Oral at bedtime  buPROPion XL (24-Hour) . 300 milliGRAM(s) Oral daily  cefepime   IVPB      cefepime   IVPB 2000 milliGRAM(s) IV Intermittent every 8 hours  enoxaparin Injectable 140 milliGRAM(s) SubCutaneous every 12 hours  folic acid 1 milliGRAM(s) Oral daily  levoFLOXacin IVPB      levoFLOXacin IVPB 750 milliGRAM(s) IV Intermittent every 24 hours  methadone    Tablet 135 milliGRAM(s) Oral daily  multivitamin 1 Tablet(s) Oral daily  nicotine - 21 mG/24Hr(s) Patch 1 Patch Transdermal daily  pantoprazole    Tablet 40 milliGRAM(s) Oral before breakfast  thiamine 100 milliGRAM(s) Oral daily    MEDICATIONS  (PRN):  acetaminophen     Tablet .. 650 milliGRAM(s) Oral every 6 hours PRN Temp greater or equal to 38C (100.4F)  aluminum hydroxide/magnesium hydroxide/simethicone Suspension 30 milliLiter(s) Oral every 4 hours PRN Dyspepsia  cyclobenzaprine 10 milliGRAM(s) Oral three times a day PRN Muscle Spasm  haloperidol    Injectable 5 milliGRAM(s) IntraMuscular every 8 hours PRN Agitation  melatonin 3 milliGRAM(s) Oral at bedtime PRN Insomnia  ondansetron Injectable 4 milliGRAM(s) IV Push every 8 hours PRN Nausea and/or Vomiting    lactated ringers Bolus:   500 milliLiter(s), IV Bolus, once, infuse over 60 Minute(s), Stop After 1 Doses      LABS:                        8.9    11.11 )-----------( 237      ( 16 Oct 2022 05:12 )             30.1     10-16    138  |  97<L>  |  9<L>  ----------------------------<  144<H>  4.8   |  36<H>  |  0.5<L>    Ca    8.4      16 Oct 2022 05:12  Mg     1.8     10-16    TPro  5.8<L>  /  Alb  2.7<L>  /  TBili  0.2  /  DBili  x   /  AST  13  /  ALT  <5  /  AlkPhos  88  10-16      COVID-19 PCR: NotDetec (11 Oct 2022 12:35)  COVID-19 PCR: NotDetec (06 Sep 2022 10:55)  COVID-19 PCR: NotDetec (28 Aug 2022 07:52)  COVID-19 PCR: NotDetec (22 Aug 2022 15:36)  COVID-19 PCR: NotDetec (18 Aug 2022 13:56)  COVID-19 PCR: NotDetec (14 Aug 2022 16:45)  COVID-19 PCR: NotDetec (12 Aug 2022 19:33)  SARS-CoV-2: NotDetec (10 Aug 2022 15:03)            RADIOLOGY: Today I personally interpreted the latest CXR and other pertinent films.

## 2022-10-17 LAB
ALBUMIN SERPL ELPH-MCNC: 2.4 G/DL — LOW (ref 3.5–5.2)
ALP SERPL-CCNC: 86 U/L — SIGNIFICANT CHANGE UP (ref 30–115)
ALT FLD-CCNC: 7 U/L — SIGNIFICANT CHANGE UP (ref 0–41)
ANION GAP SERPL CALC-SCNC: 11 MMOL/L — SIGNIFICANT CHANGE UP (ref 7–14)
APTT BLD: 32.5 SEC — SIGNIFICANT CHANGE UP (ref 27–39.2)
AST SERPL-CCNC: 19 U/L — SIGNIFICANT CHANGE UP (ref 0–41)
BASOPHILS # BLD AUTO: 0.06 K/UL — SIGNIFICANT CHANGE UP (ref 0–0.2)
BASOPHILS NFR BLD AUTO: 0.6 % — SIGNIFICANT CHANGE UP (ref 0–1)
BILIRUB SERPL-MCNC: 0.2 MG/DL — SIGNIFICANT CHANGE UP (ref 0.2–1.2)
BUN SERPL-MCNC: 10 MG/DL — SIGNIFICANT CHANGE UP (ref 10–20)
CALCIUM SERPL-MCNC: 8.7 MG/DL — SIGNIFICANT CHANGE UP (ref 8.4–10.5)
CHLORIDE SERPL-SCNC: 97 MMOL/L — LOW (ref 98–110)
CO2 SERPL-SCNC: 32 MMOL/L — SIGNIFICANT CHANGE UP (ref 17–32)
CREAT SERPL-MCNC: 0.5 MG/DL — LOW (ref 0.7–1.5)
CULTURE RESULTS: SIGNIFICANT CHANGE UP
EGFR: 122 ML/MIN/1.73M2 — SIGNIFICANT CHANGE UP
EOSINOPHIL # BLD AUTO: 0.23 K/UL — SIGNIFICANT CHANGE UP (ref 0–0.7)
EOSINOPHIL NFR BLD AUTO: 2.2 % — SIGNIFICANT CHANGE UP (ref 0–8)
GLUCOSE SERPL-MCNC: 133 MG/DL — HIGH (ref 70–99)
HCT VFR BLD CALC: 29.3 % — LOW (ref 42–52)
HGB BLD-MCNC: 9 G/DL — LOW (ref 14–18)
IMM GRANULOCYTES NFR BLD AUTO: 2.9 % — HIGH (ref 0.1–0.3)
LYMPHOCYTES # BLD AUTO: 1.49 K/UL — SIGNIFICANT CHANGE UP (ref 1.2–3.4)
LYMPHOCYTES # BLD AUTO: 14.1 % — LOW (ref 20.5–51.1)
MAGNESIUM SERPL-MCNC: 1.7 MG/DL — LOW (ref 1.8–2.4)
MCHC RBC-ENTMCNC: 28.6 PG — SIGNIFICANT CHANGE UP (ref 27–31)
MCHC RBC-ENTMCNC: 30.7 G/DL — LOW (ref 32–37)
MCV RBC AUTO: 93 FL — SIGNIFICANT CHANGE UP (ref 80–94)
MONOCYTES # BLD AUTO: 0.98 K/UL — HIGH (ref 0.1–0.6)
MONOCYTES NFR BLD AUTO: 9.3 % — SIGNIFICANT CHANGE UP (ref 1.7–9.3)
NEUTROPHILS # BLD AUTO: 7.52 K/UL — HIGH (ref 1.4–6.5)
NEUTROPHILS NFR BLD AUTO: 70.9 % — SIGNIFICANT CHANGE UP (ref 42.2–75.2)
NRBC # BLD: 0 /100 WBCS — SIGNIFICANT CHANGE UP (ref 0–0)
PLATELET # BLD AUTO: 317 K/UL — SIGNIFICANT CHANGE UP (ref 130–400)
POTASSIUM SERPL-MCNC: 4.9 MMOL/L — SIGNIFICANT CHANGE UP (ref 3.5–5)
POTASSIUM SERPL-SCNC: 4.9 MMOL/L — SIGNIFICANT CHANGE UP (ref 3.5–5)
PROT SERPL-MCNC: 5.8 G/DL — LOW (ref 6–8)
RBC # BLD: 3.15 M/UL — LOW (ref 4.7–6.1)
RBC # FLD: 16.2 % — HIGH (ref 11.5–14.5)
SODIUM SERPL-SCNC: 140 MMOL/L — SIGNIFICANT CHANGE UP (ref 135–146)
SPECIMEN SOURCE: SIGNIFICANT CHANGE UP
WBC # BLD: 10.59 K/UL — SIGNIFICANT CHANGE UP (ref 4.8–10.8)
WBC # FLD AUTO: 10.59 K/UL — SIGNIFICANT CHANGE UP (ref 4.8–10.8)

## 2022-10-17 PROCEDURE — 71045 X-RAY EXAM CHEST 1 VIEW: CPT | Mod: 26

## 2022-10-17 PROCEDURE — 99233 SBSQ HOSP IP/OBS HIGH 50: CPT

## 2022-10-17 PROCEDURE — 99291 CRITICAL CARE FIRST HOUR: CPT | Mod: GC

## 2022-10-17 PROCEDURE — ZZZZZ: CPT

## 2022-10-17 RX ORDER — ALPRAZOLAM 0.25 MG
1 TABLET ORAL EVERY 8 HOURS
Refills: 0 | Status: DISCONTINUED | OUTPATIENT
Start: 2022-10-17 | End: 2022-10-21

## 2022-10-17 RX ORDER — HEPARIN SODIUM 5000 [USP'U]/ML
10000 INJECTION INTRAVENOUS; SUBCUTANEOUS EVERY 6 HOURS
Refills: 0 | Status: DISCONTINUED | OUTPATIENT
Start: 2022-10-17 | End: 2022-10-18

## 2022-10-17 RX ORDER — HEPARIN SODIUM 5000 [USP'U]/ML
10000 INJECTION INTRAVENOUS; SUBCUTANEOUS ONCE
Refills: 0 | Status: DISCONTINUED | OUTPATIENT
Start: 2022-10-17 | End: 2022-10-17

## 2022-10-17 RX ORDER — HEPARIN SODIUM 5000 [USP'U]/ML
INJECTION INTRAVENOUS; SUBCUTANEOUS
Qty: 25000 | Refills: 0 | Status: DISCONTINUED | OUTPATIENT
Start: 2022-10-17 | End: 2022-10-18

## 2022-10-17 RX ORDER — HEPARIN SODIUM 5000 [USP'U]/ML
5000 INJECTION INTRAVENOUS; SUBCUTANEOUS EVERY 6 HOURS
Refills: 0 | Status: DISCONTINUED | OUTPATIENT
Start: 2022-10-17 | End: 2022-10-18

## 2022-10-17 RX ORDER — MAGNESIUM SULFATE 500 MG/ML
2 VIAL (ML) INJECTION ONCE
Refills: 0 | Status: COMPLETED | OUTPATIENT
Start: 2022-10-17 | End: 2022-10-17

## 2022-10-17 RX ADMIN — BUPROPION HYDROCHLORIDE 300 MILLIGRAM(S): 150 TABLET, EXTENDED RELEASE ORAL at 11:56

## 2022-10-17 RX ADMIN — CEFEPIME 100 MILLIGRAM(S): 1 INJECTION, POWDER, FOR SOLUTION INTRAMUSCULAR; INTRAVENOUS at 21:07

## 2022-10-17 RX ADMIN — METHADONE HYDROCHLORIDE 135 MILLIGRAM(S): 40 TABLET ORAL at 11:56

## 2022-10-17 RX ADMIN — Medication 1 PATCH: at 12:04

## 2022-10-17 RX ADMIN — Medication 1 MILLIGRAM(S): at 06:03

## 2022-10-17 RX ADMIN — Medication 1 PATCH: at 07:16

## 2022-10-17 RX ADMIN — CEFEPIME 100 MILLIGRAM(S): 1 INJECTION, POWDER, FOR SOLUTION INTRAMUSCULAR; INTRAVENOUS at 06:03

## 2022-10-17 RX ADMIN — CEFEPIME 100 MILLIGRAM(S): 1 INJECTION, POWDER, FOR SOLUTION INTRAMUSCULAR; INTRAVENOUS at 15:03

## 2022-10-17 RX ADMIN — Medication 100 MILLIGRAM(S): at 11:56

## 2022-10-17 RX ADMIN — Medication 1 MILLIGRAM(S): at 11:57

## 2022-10-17 RX ADMIN — Medication 1 MILLIGRAM(S): at 21:18

## 2022-10-17 RX ADMIN — Medication 25 GRAM(S): at 09:59

## 2022-10-17 RX ADMIN — Medication 1 MILLIGRAM(S): at 13:44

## 2022-10-17 RX ADMIN — Medication 1 PATCH: at 11:57

## 2022-10-17 RX ADMIN — Medication 1 TABLET(S): at 11:56

## 2022-10-17 RX ADMIN — HEPARIN SODIUM 2400 UNIT(S)/HR: 5000 INJECTION INTRAVENOUS; SUBCUTANEOUS at 21:01

## 2022-10-17 NOTE — PROGRESS NOTE ADULT - SUBJECTIVE AND OBJECTIVE BOX
Patient is a 52y old  Male who presents with a chief complaint of Hypoxia (16 Oct 2022 11:54)        Over Night Events:    plan for DVT extraction w/ IR today    ROS:     All ROS are negative except HPI       PHYSICAL EXAM    ICU Vital Signs Last 24 Hrs  T(C): 36.6 (17 Oct 2022 08:00), Max: 37.2 (16 Oct 2022 16:00)  T(F): 97.9 (17 Oct 2022 08:00), Max: 99 (16 Oct 2022 16:00)  HR: 78 (17 Oct 2022 08:00) (71 - 87)  BP: 81/49 (17 Oct 2022 07:00) (74/43 - 135/58)  BP(mean): 60 (17 Oct 2022 07:00) (54 - 86)  ABP: --  ABP(mean): --  RR: 23 (17 Oct 2022 08:00) (12 - 49)  SpO2: 96% (17 Oct 2022 08:00) (87% - 97%)    O2 Parameters below as of 17 Oct 2022 08:00  Patient On (Oxygen Delivery Method): nasal cannula, high flow  O2 Flow (L/min): 4  O2 Concentration (%): 50        Constitutional: no acute distress, well nourished well developed  Neuro: moving all 4 limbs spontaneously, no facial droop or dysarthria  HEENT: NCAT, anicteric  Neck: no visible lymphadenopathy or goiter  Pulm: no respiratory distress. clear to auscultation bilaterally  Cardiac: extremities appear pink and well-perfused.  regular rhythm and rate, no murmur detected  Abdomen: non-distended  Extremities: no peripheral edema      10-16-22 @ 07:01  -  10-17-22 @ 07:00  --------------------------------------------------------  IN:    IV PiggyBack: 300 mL    Oral Fluid: 730 mL  Total IN: 1030 mL    OUT:    Voided (mL): 1400 mL  Total OUT: 1400 mL    Total NET: -370 mL          LABS:                            9.0    10.59 )-----------( 317      ( 17 Oct 2022 05:39 )             29.3                                               10-17    140  |  97<L>  |  10  ----------------------------<  133<H>  4.9   |  32  |  0.5<L>    Ca    8.7      17 Oct 2022 05:39  Mg     1.7     10-17    TPro  5.8<L>  /  Alb  2.4<L>  /  TBili  0.2  /  DBili  x   /  AST  19  /  ALT  7   /  AlkPhos  86  10-17      PT/INR - ( 16 Oct 2022 22:53 )   PT: 14.90 sec;   INR: 1.30 ratio         PTT - ( 16 Oct 2022 22:53 )  PTT:36.7 sec                                                                                     LIVER FUNCTIONS - ( 17 Oct 2022 05:39 )  Alb: 2.4 g/dL / Pro: 5.8 g/dL / ALK PHOS: 86 U/L / ALT: 7 U/L / AST: 19 U/L / GGT: x                                                                                                                                       MEDICATIONS  (STANDING):  ALPRAZolam 1 milliGRAM(s) Oral <User Schedule>  ALPRAZolam 2 milliGRAM(s) Oral at bedtime  buPROPion XL (24-Hour) . 300 milliGRAM(s) Oral daily  cefepime   IVPB      cefepime   IVPB 2000 milliGRAM(s) IV Intermittent every 8 hours  enoxaparin Injectable 140 milliGRAM(s) SubCutaneous every 12 hours  folic acid 1 milliGRAM(s) Oral daily  levoFLOXacin IVPB      levoFLOXacin IVPB 750 milliGRAM(s) IV Intermittent every 24 hours  methadone    Tablet 135 milliGRAM(s) Oral daily  multivitamin 1 Tablet(s) Oral daily  nicotine - 21 mG/24Hr(s) Patch 1 Patch Transdermal daily  pantoprazole    Tablet 40 milliGRAM(s) Oral before breakfast  thiamine 100 milliGRAM(s) Oral daily    MEDICATIONS  (PRN):  acetaminophen     Tablet .. 650 milliGRAM(s) Oral every 6 hours PRN Temp greater or equal to 38C (100.4F)  aluminum hydroxide/magnesium hydroxide/simethicone Suspension 30 milliLiter(s) Oral every 4 hours PRN Dyspepsia  cyclobenzaprine 10 milliGRAM(s) Oral three times a day PRN Muscle Spasm  haloperidol    Injectable 5 milliGRAM(s) IntraMuscular every 8 hours PRN Agitation  melatonin 3 milliGRAM(s) Oral at bedtime PRN Insomnia  ondansetron Injectable 4 milliGRAM(s) IV Push every 8 hours PRN Nausea and/or Vomiting      New X-rays reviewed:       ECHO reviewed    CXR interpreted by me:  10/17 compared with 10/16 images and reads reviewed, by my read CXR with right-sided dense infiltrates

## 2022-10-17 NOTE — PROGRESS NOTE ADULT - ASSESSMENT
IMPRESSION:    Acute hypoxemic resp failure on HFNC (declining BIPAP)  PNA/ highly aspiration  PE with RV strain - submassive  hx Epidural Phlegmon, OM  hx Right Psoas Abscess   hx MSSA Bacteremia  HO IVDA, Opioid Abuse on Methadone   obesity/ DIONICIO / OHS    PLAN:    CNS: Quetiapine, Gabapentin, and Bupropion held per psych. Xanax dose reduced. Haldol 5mg q8h prn.    HEENT:  Oral care    PULMONARY:  HOB @ 45 degrees, aspiration precaution. Wean down Fio2; Now on 50LPM and 60%. Remains on HFNC. Recommend CPAP during sleep for OHS.     CARDIOVASCULAR: Echocardiogram: No RV strain. Not on pressors. Not on IVF. QTc 450.    GI: GI prophylaxis; oral diet. Aspiration precautions.     RENAL:  Kidney function at baseline. Correct as needed.     INFECTIOUS DISEASE: Midline (NOT PICC) discontinued. ID following. Blood cultures negative. UA negative,. MRSA pending. Keep the same abx for now. MR Spine.      HEMATOLOGICAL:  LE doppler: extensive DVT left leg. Vascular eval for the extensive DVT. pending IR thrombectomy today.     ENDOCRINE:  Follow up FS.  Insulin protocol if needed.    Dispo: Keep in the CCU for now.     Prognosis is guarded.

## 2022-10-17 NOTE — PROGRESS NOTE ADULT - SUBJECTIVE AND OBJECTIVE BOX
MIKAEL MCDERMOTT  52y, Male  Allergy: No Known Allergies      LOS  6d    CHIEF COMPLAINT: Hypoxia (17 Oct 2022 14:08)      INTERVAL EVENTS/HPI  - No acute events overnight, CXR worsening R opacities   - T(F): , Max: 99 (10-16-22 @ 16:00)  - Denies any worsening symptoms  - Tolerating medication  - WBC Count: 10.59 (10-17-22 @ 05:39)  WBC Count: 11.75 (10-16-22 @ 22:53)     - Creatinine, Serum: 0.5 (10-17-22 @ 05:39)  Creatinine, Serum: <0.5 (10-16-22 @ 22:53)       ROS  General: Denies rigors, nightsweats  HEENT: Denies headache, rhinorrhea, sore throat, eye pain  CV: Denies CP, palpitations  PULM: Denies wheezing, hemoptysis  GI: Denies hematemesis, hematochezia, melena  : Denies discharge, hematuria  MSK: Denies arthralgias, myalgias  SKIN: Denies rash, lesions  NEURO: Denies paresthesias, weakness  PSYCH: Denies depression, anxiety    VITALS:  T(F): 97, Max: 99 (10-16-22 @ 16:00)  HR: 79  BP: 121/74  RR: 24Vital Signs Last 24 Hrs  T(C): 36.1 (17 Oct 2022 12:00), Max: 37.2 (16 Oct 2022 16:00)  T(F): 97 (17 Oct 2022 12:00), Max: 99 (16 Oct 2022 16:00)  HR: 79 (17 Oct 2022 12:00) (71 - 87)  BP: 121/74 (17 Oct 2022 12:00) (74/43 - 135/58)  BP(mean): 91 (17 Oct 2022 12:00) (54 - 91)  RR: 24 (17 Oct 2022 12:00) (12 - 49)  SpO2: 96% (17 Oct 2022 12:00) (87% - 97%)    Parameters below as of 17 Oct 2022 12:00  Patient On (Oxygen Delivery Method): nasal cannula, high flow  O2 Flow (L/min): 50  O2 Concentration (%): 50    PHYSICAL EXAM:  Gen: NAD, resting in bed chronically ill appearing HFNC, slow to respond. lethargic  HEENT: Normocephalic, atraumatic  Neck: supple, no lymphadenopathy  CV: Regular rate & regular rhythm  Lungs: decreased BS at bases, no fremitus  Abdomen: Soft, BS present  Ext: Warm, well perfused  Neuro: non focal, awake  Skin: no rash, no erythema  Lines: no phlebitis    FH: Non-contributory  Social Hx: Non-contributory    TESTS & MEASUREMENTS:                        9.0    10.59 )-----------( 317      ( 17 Oct 2022 05:39 )             29.3     10-17    140  |  97<L>  |  10  ----------------------------<  133<H>  4.9   |  32  |  0.5<L>    Ca    8.7      17 Oct 2022 05:39  Mg     1.7     10-17    TPro  5.8<L>  /  Alb  2.4<L>  /  TBili  0.2  /  DBili  x   /  AST  19  /  ALT  7   /  AlkPhos  86  10-17      LIVER FUNCTIONS - ( 17 Oct 2022 05:39 )  Alb: 2.4 g/dL / Pro: 5.8 g/dL / ALK PHOS: 86 U/L / ALT: 7 U/L / AST: 19 U/L / GGT: x               Culture - Blood (collected 10-12-22 @ 06:50)  Source: .Blood Blood  Preliminary Report (10-13-22 @ 18:01):    No growth to date.            INFECTIOUS DISEASES TESTING  COVID-19 PCR: NotDetec (10-16-22 @ 18:26)  Procalcitonin, Serum: 7.63 (10-12-22 @ 06:50)  COVID-19 PCR: NotDetec (10-11-22 @ 12:35)  COVID-19 PCR: NotDetec (09-06-22 @ 10:55)  HIV-1/2 Combo Result: Nonreact (08-28-22 @ 08:19)  COVID-19 PCR: NotDetec (08-28-22 @ 07:52)  COVID-19 PCR: NotDetec (08-22-22 @ 15:36)  COVID-19 PCR: NotDetec (08-18-22 @ 13:56)  Procalcitonin, Serum: 0.05 (08-17-22 @ 13:28)  COVID-19 PCR: NotDetec (08-14-22 @ 16:45)  COVID-19 PCR: NotDetec (08-12-22 @ 19:33)  MRSA PCR Result.: Negative (08-12-22 @ 05:00)  Rapid RVP Result: NotDetec (08-10-22 @ 15:03)  COVID-19 PCR: NotDetec (04-03-22 @ 12:23)  COVID-19 PCR: NotDetec (03-30-22 @ 10:49)  COVID-19 PCR: NotDetec (03-27-22 @ 18:31)  COVID-19 PCR: NotDetec (03-25-22 @ 06:30)  COVID-19 PCR: NotDetec (03-19-22 @ 06:10)  HIV-1/2 Combo Result: Nonreact (03-16-22 @ 12:10)  COVID-19 PCR: NotDetec (03-12-22 @ 20:19)  COVID-19 PCR: NotDetec (01-31-22 @ 09:10)  COVID-19 PCR: NotDetec (01-25-22 @ 11:21)  COVID-19 PCR: NotDetec (01-18-22 @ 18:45)  HIV-1/2 Combo Result: Nonreact (01-18-22 @ 04:30)  Procalcitonin, Serum: 0.13 (01-16-22 @ 16:00)  COVID-19 PCR: NotDetec (01-15-22 @ 23:32)      INFLAMMATORY MARKERS  Sedimentation Rate, Erythrocyte: 125 mm/Hr (08-11-22 @ 06:38)  C-Reactive Protein, Serum: 330.8 mg/L (08-11-22 @ 06:38)      RADIOLOGY & ADDITIONAL TESTS:  I have personally reviewed the last available Chest xray  CXR      CT      CARDIOLOGY TESTING  12 Lead ECG:   Ventricular Rate 81 BPM    Atrial Rate 81 BPM    P-R Interval 132 ms    QRS Duration 104 ms    Q-T Interval 394 ms    QTC Calculation(Bazett) 457 ms    P Axis 40 degrees    R Axis 43 degrees    T Axis 5 degrees    Diagnosis Line Normal sinus rhythm  Normal ECG    Confirmed by Royzman, Victor Hugo ([id number]) on 10/17/2022 9:17:37 AM (10-16-22 @ 05:39)  12 Lead ECG:   Systolic  mmHg    Diastolic BP 66 mmHg    Ventricular Rate 85 BPM    Atrial Rate 85 BPM    P-R Interval 142 ms    QRS Duration 96 ms    Q-T Interval 382 ms    QTC Calculation(Bazett) 454 ms    P Axis 33 degrees    R Axis 31 degrees    T Axis 31 degrees    Diagnosis Line Normal sinus rhythm  Normal ECG    Confirmed by yogi faith ([id number]) on 10/14/2022 2:12:04 PM (10-14-22 @ 11:50)      MEDICATIONS  ALPRAZolam 1 Oral <User Schedule>  ALPRAZolam 2 Oral at bedtime  buPROPion XL (24-Hour) . 300 Oral daily  cefepime   IVPB     cefepime   IVPB 2000 IV Intermittent every 8 hours  folic acid 1 Oral daily  levoFLOXacin IVPB     levoFLOXacin IVPB 750 IV Intermittent every 24 hours  methadone    Tablet 135 Oral daily  multivitamin 1 Oral daily  nicotine - 21 mG/24Hr(s) Patch 1 Transdermal daily  pantoprazole    Tablet 40 Oral before breakfast  thiamine 100 Oral daily      WEIGHT  Weight (kg): 140.9 (10-12-22 @ 09:30)  Creatinine, Serum: 0.5 mg/dL (10-17-22 @ 05:39)  Creatinine, Serum: <0.5 mg/dL (10-16-22 @ 22:53)      ANTIBIOTICS:  cefepime   IVPB      cefepime   IVPB 2000 milliGRAM(s) IV Intermittent every 8 hours  levoFLOXacin IVPB      levoFLOXacin IVPB 750 milliGRAM(s) IV Intermittent every 24 hours      All available historical records have been reviewed

## 2022-10-17 NOTE — PROGRESS NOTE ADULT - SUBJECTIVE AND OBJECTIVE BOX
MIKAEL MCDERMOTT 52y Male  MRN#: [id number]     Hospital Day: 6d    Pt is currently admitted with the primary diagnosis of submassive PE and LLE DVT    Overnight events   -No major overnight events                                          ----------------------------------------------------------  OBJECTIVE  PAST MEDICAL & SURGICAL HISTORY  IV drug abuse    Vertebral osteomyelitis    MSSA bacteremia    No significant past surgical history                                              -----------------------------------------------------------  ALLERGIES:  No Known Allergies                                            ------------------------------------------------------------    HOME MEDICATIONS  Home Medications:  ALPRAZolam 2 mg oral tablet: 1 tab(s) orally 3 times a day (02 Sep 2022 11:47)  buPROPion 300 mg/24 hours (XL) oral tablet, extended release: 1 tab(s) orally once a day (02 Sep 2022 11:47)  folic acid 1 mg oral tablet: 1 tab(s) orally once a day (12 Mar 2022 21:41)  gabapentin 300 mg oral capsule: 1 cap(s) orally 3 times a day (12 Mar 2022 21:41)  methadone 5 mg oral tablet: 135 milligram(s) orally once a day (29 Mar 2022 11:35)  mirtazapine 30 mg oral tablet: 1 tab(s) orally once a day (at bedtime) (02 Sep 2022 11:47)  Multiple Vitamins oral tablet: 1 tab(s) orally once a day (12 Mar 2022 21:41)  nicotine 21 mg/24 hr transdermal film, extended release: 1 patch transdermal once a day (12 Mar 2022 21:41)  QUEtiapine 200 mg oral tablet: 1 tab(s) orally once a day (at bedtime) (02 Sep 2022 11:47)                           MEDICATIONS:  STANDING MEDICATIONS  ALPRAZolam 1 milliGRAM(s) Oral <User Schedule>  ALPRAZolam 2 milliGRAM(s) Oral at bedtime  buPROPion XL (24-Hour) . 300 milliGRAM(s) Oral daily  cefepime   IVPB      cefepime   IVPB 2000 milliGRAM(s) IV Intermittent every 8 hours  enoxaparin Injectable 140 milliGRAM(s) SubCutaneous every 12 hours  folic acid 1 milliGRAM(s) Oral daily  levoFLOXacin IVPB      levoFLOXacin IVPB 750 milliGRAM(s) IV Intermittent every 24 hours  methadone    Tablet 135 milliGRAM(s) Oral daily  multivitamin 1 Tablet(s) Oral daily  nicotine - 21 mG/24Hr(s) Patch 1 Patch Transdermal daily  pantoprazole    Tablet 40 milliGRAM(s) Oral before breakfast  thiamine 100 milliGRAM(s) Oral daily    PRN MEDICATIONS  acetaminophen     Tablet .. 650 milliGRAM(s) Oral every 6 hours PRN  aluminum hydroxide/magnesium hydroxide/simethicone Suspension 30 milliLiter(s) Oral every 4 hours PRN  cyclobenzaprine 10 milliGRAM(s) Oral three times a day PRN  haloperidol    Injectable 5 milliGRAM(s) IntraMuscular every 8 hours PRN  melatonin 3 milliGRAM(s) Oral at bedtime PRN  ondansetron Injectable 4 milliGRAM(s) IV Push every 8 hours PRN                                            ------------------------------------------------------------  VITAL SIGNS: Last 24 Hours  T(C): 36.1 (17 Oct 2022 12:00), Max: 37.2 (16 Oct 2022 16:00)  T(F): 97 (17 Oct 2022 12:00), Max: 99 (16 Oct 2022 16:00)  HR: 79 (17 Oct 2022 12:00) (71 - 87)  BP: 121/74 (17 Oct 2022 12:00) (74/43 - 135/58)  BP(mean): 91 (17 Oct 2022 12:00) (54 - 91)  RR: 24 (17 Oct 2022 12:00) (12 - 49)  SpO2: 96% (17 Oct 2022 12:00) (87% - 97%)      10-16-22 @ 07:01  -  10-17-22 @ 07:00  --------------------------------------------------------  IN: 1030 mL / OUT: 1400 mL / NET: -370 mL    10-17-22 @ 07:01  -  10-17-22 @ 13:59  --------------------------------------------------------  IN: 170 mL / OUT: 75 mL / NET: 95 mL                                             --------------------------------------------------------------  LABS:                        9.0    10.59 )-----------( 317      ( 17 Oct 2022 05:39 )             29.3     10-17    140  |  97<L>  |  10  ----------------------------<  133<H>  4.9   |  32  |  0.5<L>    Ca    8.7      17 Oct 2022 05:39  Mg     1.7     10-17    TPro  5.8<L>  /  Alb  2.4<L>  /  TBili  0.2  /  DBili  x   /  AST  19  /  ALT  7   /  AlkPhos  86  10-17    PT/INR - ( 16 Oct 2022 22:53 )   PT: 14.90 sec;   INR: 1.30 ratio         PTT - ( 16 Oct 2022 22:53 )  PTT:36.7 sec                                                            --------------------------------------------------------------    PHYSICAL EXAM:  GENERAL: Awake, alert, oriented to person, place, time, situation. Overweight, laying in bed appearing in no acute distress  HEENT: No FNDs, atraumatic, normocephalic  LUNGS: Diffuse crackles, hypoventilatory.  HEART: S1/S2. No heaves or thrills  ABD: Distended but soft  EXT/NEURO: Contractures overlying bilateral dorsal wrists. Minimal ROM of bilateral wrists and fingers. Otherwise strength, sensation, ROM grossly intact.  SKIN: No edema      PLAN:    Acute hypoxemic resp failure on HFNC (declining BIPAP)  PNA/ highly aspiration  PE with RV strain - submassive  hx Epidural Phlegmon, OM  hx Right Psoas Abscess   hx MSSA Bacteremia  HO IVDA, Opioid Abuse on Methadone   obesity/ DIONICIO / OHS    #Acute Submassive PE  -CT on admission with bilateral submassive PEs + concern for RV strain  -TTE: EF 55%, G1DD, mild TVR, no right heart strain  -VA duplx LE vein: The right common femoral, great saphenous, femoral, popliteal and small saphenous veins were visualized bilaterally with no evidence of deep enous thrombosis  -c/w full AC  -f/u IR for DVT embolectomy  (likely this week)  # DVT prophylaxis     # GI prophylaxis     # Diet     # Activity Score (AM-PAC)    #Progress Note Handoff  Pending (specify):  Family discussion:   Disposition:        MIKAEL MCDERMOTT 52y Male  MRN#: [id number]     Hospital Day: 6d    Pt is currently admitted with the primary diagnosis of submassive PE and LLE DVT    Overnight events   -No major overnight events                                          ----------------------------------------------------------  OBJECTIVE  PAST MEDICAL & SURGICAL HISTORY  IV drug abuse    Vertebral osteomyelitis    MSSA bacteremia    No significant past surgical history                                              -----------------------------------------------------------  ALLERGIES:  No Known Allergies                                            ------------------------------------------------------------    HOME MEDICATIONS  Home Medications:  ALPRAZolam 2 mg oral tablet: 1 tab(s) orally 3 times a day (02 Sep 2022 11:47)  buPROPion 300 mg/24 hours (XL) oral tablet, extended release: 1 tab(s) orally once a day (02 Sep 2022 11:47)  folic acid 1 mg oral tablet: 1 tab(s) orally once a day (12 Mar 2022 21:41)  gabapentin 300 mg oral capsule: 1 cap(s) orally 3 times a day (12 Mar 2022 21:41)  methadone 5 mg oral tablet: 135 milligram(s) orally once a day (29 Mar 2022 11:35)  mirtazapine 30 mg oral tablet: 1 tab(s) orally once a day (at bedtime) (02 Sep 2022 11:47)  Multiple Vitamins oral tablet: 1 tab(s) orally once a day (12 Mar 2022 21:41)  nicotine 21 mg/24 hr transdermal film, extended release: 1 patch transdermal once a day (12 Mar 2022 21:41)  QUEtiapine 200 mg oral tablet: 1 tab(s) orally once a day (at bedtime) (02 Sep 2022 11:47)                           MEDICATIONS:  STANDING MEDICATIONS  ALPRAZolam 1 milliGRAM(s) Oral <User Schedule>  ALPRAZolam 2 milliGRAM(s) Oral at bedtime  buPROPion XL (24-Hour) . 300 milliGRAM(s) Oral daily  cefepime   IVPB      cefepime   IVPB 2000 milliGRAM(s) IV Intermittent every 8 hours  enoxaparin Injectable 140 milliGRAM(s) SubCutaneous every 12 hours  folic acid 1 milliGRAM(s) Oral daily  levoFLOXacin IVPB      levoFLOXacin IVPB 750 milliGRAM(s) IV Intermittent every 24 hours  methadone    Tablet 135 milliGRAM(s) Oral daily  multivitamin 1 Tablet(s) Oral daily  nicotine - 21 mG/24Hr(s) Patch 1 Patch Transdermal daily  pantoprazole    Tablet 40 milliGRAM(s) Oral before breakfast  thiamine 100 milliGRAM(s) Oral daily    PRN MEDICATIONS  acetaminophen     Tablet .. 650 milliGRAM(s) Oral every 6 hours PRN  aluminum hydroxide/magnesium hydroxide/simethicone Suspension 30 milliLiter(s) Oral every 4 hours PRN  cyclobenzaprine 10 milliGRAM(s) Oral three times a day PRN  haloperidol    Injectable 5 milliGRAM(s) IntraMuscular every 8 hours PRN  melatonin 3 milliGRAM(s) Oral at bedtime PRN  ondansetron Injectable 4 milliGRAM(s) IV Push every 8 hours PRN                                            ------------------------------------------------------------  VITAL SIGNS: Last 24 Hours  T(C): 36.1 (17 Oct 2022 12:00), Max: 37.2 (16 Oct 2022 16:00)  T(F): 97 (17 Oct 2022 12:00), Max: 99 (16 Oct 2022 16:00)  HR: 79 (17 Oct 2022 12:00) (71 - 87)  BP: 121/74 (17 Oct 2022 12:00) (74/43 - 135/58)  BP(mean): 91 (17 Oct 2022 12:00) (54 - 91)  RR: 24 (17 Oct 2022 12:00) (12 - 49)  SpO2: 96% (17 Oct 2022 12:00) (87% - 97%)      10-16-22 @ 07:01  -  10-17-22 @ 07:00  --------------------------------------------------------  IN: 1030 mL / OUT: 1400 mL / NET: -370 mL    10-17-22 @ 07:01  -  10-17-22 @ 13:59  --------------------------------------------------------  IN: 170 mL / OUT: 75 mL / NET: 95 mL                                             --------------------------------------------------------------  LABS:                        9.0    10.59 )-----------( 317      ( 17 Oct 2022 05:39 )             29.3     10-17    140  |  97<L>  |  10  ----------------------------<  133<H>  4.9   |  32  |  0.5<L>    Ca    8.7      17 Oct 2022 05:39  Mg     1.7     10-17    TPro  5.8<L>  /  Alb  2.4<L>  /  TBili  0.2  /  DBili  x   /  AST  19  /  ALT  7   /  AlkPhos  86  10-17    PT/INR - ( 16 Oct 2022 22:53 )   PT: 14.90 sec;   INR: 1.30 ratio         PTT - ( 16 Oct 2022 22:53 )  PTT:36.7 sec                                                            --------------------------------------------------------------    PHYSICAL EXAM:  GENERAL: Awake, alert, oriented to person, place, time, situation. Overweight, laying in bed appearing in no acute distress  HEENT: No FNDs, atraumatic, normocephalic  LUNGS: Diffuse crackles, hypoventilatory.  HEART: S1/S2. No heaves or thrills  ABD: Distended but soft  EXT/NEURO: Contractures overlying bilateral dorsal wrists. Minimal ROM of bilateral wrists and fingers. Otherwise strength, sensation, ROM grossly intact.  SKIN: No edema      PLAN:    hx Epidural Phlegmon, OM  hx Right Psoas Abscess   hx MSSA Bacteremia  HO IVDA, Opioid Abuse on Methadone   obesity/ DIONICIO / OHS      #Acute Submassive PE  -CT on admission with bilateral submassive PEs + concern for RV strain  -TTE: EF 55%, G1DD, mild TVR, no right heart strain  -VA duplx LE vein: The right common femoral, great saphenous, femoral, popliteal and small saphenous veins were visualized bilaterally with no evidence of deep enous thrombosis  -c/w full AC  -f/u IR for DVT embolectomy (10/18/22)    #Acute hypoxemic respiratory failure  #Pneumonia  #Hx DIONICIO/OHS non-compliant with BIPAP or home CPAP  -currently on HFNC, attempting to wean  -encourage BIPAP o/n in place of CPAP    # DVT prophylaxis   -therapeutic lovenox    # GI prophylaxis   -protonix    # Diet     # Activity Score (AM-PAC)    #Progress Note Handoff  Pending (specify):  Family discussion:   Disposition:        MIKAEL MCDERMOTT 52y Male  MRN#: [id number]     Hospital Day: 6d    Pt is currently admitted with the primary diagnosis of submassive PE and LLE DVT    Overnight events   -No major overnight events                                          ----------------------------------------------------------  OBJECTIVE  PAST MEDICAL & SURGICAL HISTORY  IV drug abuse    Vertebral osteomyelitis    MSSA bacteremia    No significant past surgical history                                              -----------------------------------------------------------  ALLERGIES:  No Known Allergies                                            ------------------------------------------------------------    HOME MEDICATIONS  Home Medications:  ALPRAZolam 2 mg oral tablet: 1 tab(s) orally 3 times a day (02 Sep 2022 11:47)  buPROPion 300 mg/24 hours (XL) oral tablet, extended release: 1 tab(s) orally once a day (02 Sep 2022 11:47)  folic acid 1 mg oral tablet: 1 tab(s) orally once a day (12 Mar 2022 21:41)  gabapentin 300 mg oral capsule: 1 cap(s) orally 3 times a day (12 Mar 2022 21:41)  methadone 5 mg oral tablet: 135 milligram(s) orally once a day (29 Mar 2022 11:35)  mirtazapine 30 mg oral tablet: 1 tab(s) orally once a day (at bedtime) (02 Sep 2022 11:47)  Multiple Vitamins oral tablet: 1 tab(s) orally once a day (12 Mar 2022 21:41)  nicotine 21 mg/24 hr transdermal film, extended release: 1 patch transdermal once a day (12 Mar 2022 21:41)  QUEtiapine 200 mg oral tablet: 1 tab(s) orally once a day (at bedtime) (02 Sep 2022 11:47)                           MEDICATIONS:  STANDING MEDICATIONS  ALPRAZolam 1 milliGRAM(s) Oral <User Schedule>  ALPRAZolam 2 milliGRAM(s) Oral at bedtime  buPROPion XL (24-Hour) . 300 milliGRAM(s) Oral daily  cefepime   IVPB      cefepime   IVPB 2000 milliGRAM(s) IV Intermittent every 8 hours  enoxaparin Injectable 140 milliGRAM(s) SubCutaneous every 12 hours  folic acid 1 milliGRAM(s) Oral daily  levoFLOXacin IVPB      levoFLOXacin IVPB 750 milliGRAM(s) IV Intermittent every 24 hours  methadone    Tablet 135 milliGRAM(s) Oral daily  multivitamin 1 Tablet(s) Oral daily  nicotine - 21 mG/24Hr(s) Patch 1 Patch Transdermal daily  pantoprazole    Tablet 40 milliGRAM(s) Oral before breakfast  thiamine 100 milliGRAM(s) Oral daily    PRN MEDICATIONS  acetaminophen     Tablet .. 650 milliGRAM(s) Oral every 6 hours PRN  aluminum hydroxide/magnesium hydroxide/simethicone Suspension 30 milliLiter(s) Oral every 4 hours PRN  cyclobenzaprine 10 milliGRAM(s) Oral three times a day PRN  haloperidol    Injectable 5 milliGRAM(s) IntraMuscular every 8 hours PRN  melatonin 3 milliGRAM(s) Oral at bedtime PRN  ondansetron Injectable 4 milliGRAM(s) IV Push every 8 hours PRN                                            ------------------------------------------------------------  VITAL SIGNS: Last 24 Hours  T(C): 36.1 (17 Oct 2022 12:00), Max: 37.2 (16 Oct 2022 16:00)  T(F): 97 (17 Oct 2022 12:00), Max: 99 (16 Oct 2022 16:00)  HR: 79 (17 Oct 2022 12:00) (71 - 87)  BP: 121/74 (17 Oct 2022 12:00) (74/43 - 135/58)  BP(mean): 91 (17 Oct 2022 12:00) (54 - 91)  RR: 24 (17 Oct 2022 12:00) (12 - 49)  SpO2: 96% (17 Oct 2022 12:00) (87% - 97%)      10-16-22 @ 07:01  -  10-17-22 @ 07:00  --------------------------------------------------------  IN: 1030 mL / OUT: 1400 mL / NET: -370 mL    10-17-22 @ 07:01  -  10-17-22 @ 13:59  --------------------------------------------------------  IN: 170 mL / OUT: 75 mL / NET: 95 mL                                             --------------------------------------------------------------  LABS:                        9.0    10.59 )-----------( 317      ( 17 Oct 2022 05:39 )             29.3     10-17    140  |  97<L>  |  10  ----------------------------<  133<H>  4.9   |  32  |  0.5<L>    Ca    8.7      17 Oct 2022 05:39  Mg     1.7     10-17    TPro  5.8<L>  /  Alb  2.4<L>  /  TBili  0.2  /  DBili  x   /  AST  19  /  ALT  7   /  AlkPhos  86  10-17    PT/INR - ( 16 Oct 2022 22:53 )   PT: 14.90 sec;   INR: 1.30 ratio         PTT - ( 16 Oct 2022 22:53 )  PTT:36.7 sec                                                            --------------------------------------------------------------    PHYSICAL EXAM:  GENERAL: Awake, alert, oriented to person, place, time, situation. Overweight, laying in bed appearing in no acute distress  HEENT: No FNDs, atraumatic, normocephalic  LUNGS: Diffuse crackles, hypoventilatory.  HEART: S1/S2. No heaves or thrills  ABD: Distended but soft  EXT/NEURO: Contractures overlying bilateral dorsal wrists. Minimal ROM of bilateral wrists and fingers. Otherwise strength, sensation, ROM grossly intact.  SKIN: No edema      PLAN:    hx Epidural Phlegmon, OM  hx Right Psoas Abscess   hx MSSA Bacteremia  HO IVDA, Opioid Abuse on Methadone   obesity/ DIONICIO / OHS      #Acute Submassive PE  -CT on admission with bilateral submassive PEs + concern for RV strain  -TTE: EF 55%, G1DD, mild TVR, no right heart strain  -VA duplx LE vein: The right common femoral, great saphenous, femoral, popliteal and small saphenous veins were visualized bilaterally with no evidence of deep enous thrombosis  -c/w full AC  -f/u IR for DVT embolectomy (10/18/22)    #Acute hypoxemic respiratory failure  #Pneumonia  #Hx DIONICIO/OHS non-compliant with BIPAP or home CPAP  -currently on HFNC, attempting to wean  -encourage BIPAP o/n in place of CPAP  -c/w cefepime/levo for now  -can    #Hx MSSA bacteremia  Hx Epidural phlegmon/OM  #Hx R Psoas Abscess  -  # DVT prophylaxis   -therapeutic lovenox    # GI prophylaxis   -protonix    # Diet     # Activity Score (AM-PAC)    #Progress Note Handoff  Pending (specify):  Family discussion:   Disposition:        MIKAEL MCDERMOTT 52y Male  MRN#: [id number]     Hospital Day: 6d    Pt is currently admitted with the primary diagnosis of submassive PE and LLE DVT    Overnight events   -No major overnight events                                          ----------------------------------------------------------  OBJECTIVE  PAST MEDICAL & SURGICAL HISTORY  IV drug abuse    Vertebral osteomyelitis    MSSA bacteremia    No significant past surgical history                                              -----------------------------------------------------------  ALLERGIES:  No Known Allergies                                            ------------------------------------------------------------    HOME MEDICATIONS  Home Medications:  ALPRAZolam 2 mg oral tablet: 1 tab(s) orally 3 times a day (02 Sep 2022 11:47)  buPROPion 300 mg/24 hours (XL) oral tablet, extended release: 1 tab(s) orally once a day (02 Sep 2022 11:47)  folic acid 1 mg oral tablet: 1 tab(s) orally once a day (12 Mar 2022 21:41)  gabapentin 300 mg oral capsule: 1 cap(s) orally 3 times a day (12 Mar 2022 21:41)  methadone 5 mg oral tablet: 135 milligram(s) orally once a day (29 Mar 2022 11:35)  mirtazapine 30 mg oral tablet: 1 tab(s) orally once a day (at bedtime) (02 Sep 2022 11:47)  Multiple Vitamins oral tablet: 1 tab(s) orally once a day (12 Mar 2022 21:41)  nicotine 21 mg/24 hr transdermal film, extended release: 1 patch transdermal once a day (12 Mar 2022 21:41)  QUEtiapine 200 mg oral tablet: 1 tab(s) orally once a day (at bedtime) (02 Sep 2022 11:47)                           MEDICATIONS:  STANDING MEDICATIONS  ALPRAZolam 1 milliGRAM(s) Oral <User Schedule>  ALPRAZolam 2 milliGRAM(s) Oral at bedtime  buPROPion XL (24-Hour) . 300 milliGRAM(s) Oral daily  cefepime   IVPB      cefepime   IVPB 2000 milliGRAM(s) IV Intermittent every 8 hours  enoxaparin Injectable 140 milliGRAM(s) SubCutaneous every 12 hours  folic acid 1 milliGRAM(s) Oral daily  levoFLOXacin IVPB      levoFLOXacin IVPB 750 milliGRAM(s) IV Intermittent every 24 hours  methadone    Tablet 135 milliGRAM(s) Oral daily  multivitamin 1 Tablet(s) Oral daily  nicotine - 21 mG/24Hr(s) Patch 1 Patch Transdermal daily  pantoprazole    Tablet 40 milliGRAM(s) Oral before breakfast  thiamine 100 milliGRAM(s) Oral daily    PRN MEDICATIONS  acetaminophen     Tablet .. 650 milliGRAM(s) Oral every 6 hours PRN  aluminum hydroxide/magnesium hydroxide/simethicone Suspension 30 milliLiter(s) Oral every 4 hours PRN  cyclobenzaprine 10 milliGRAM(s) Oral three times a day PRN  haloperidol    Injectable 5 milliGRAM(s) IntraMuscular every 8 hours PRN  melatonin 3 milliGRAM(s) Oral at bedtime PRN  ondansetron Injectable 4 milliGRAM(s) IV Push every 8 hours PRN                                            ------------------------------------------------------------  VITAL SIGNS: Last 24 Hours  T(C): 36.1 (17 Oct 2022 12:00), Max: 37.2 (16 Oct 2022 16:00)  T(F): 97 (17 Oct 2022 12:00), Max: 99 (16 Oct 2022 16:00)  HR: 79 (17 Oct 2022 12:00) (71 - 87)  BP: 121/74 (17 Oct 2022 12:00) (74/43 - 135/58)  BP(mean): 91 (17 Oct 2022 12:00) (54 - 91)  RR: 24 (17 Oct 2022 12:00) (12 - 49)  SpO2: 96% (17 Oct 2022 12:00) (87% - 97%)      10-16-22 @ 07:01  -  10-17-22 @ 07:00  --------------------------------------------------------  IN: 1030 mL / OUT: 1400 mL / NET: -370 mL    10-17-22 @ 07:01  -  10-17-22 @ 13:59  --------------------------------------------------------  IN: 170 mL / OUT: 75 mL / NET: 95 mL                                             --------------------------------------------------------------  LABS:                        9.0    10.59 )-----------( 317      ( 17 Oct 2022 05:39 )             29.3     10-17    140  |  97<L>  |  10  ----------------------------<  133<H>  4.9   |  32  |  0.5<L>    Ca    8.7      17 Oct 2022 05:39  Mg     1.7     10-17    TPro  5.8<L>  /  Alb  2.4<L>  /  TBili  0.2  /  DBili  x   /  AST  19  /  ALT  7   /  AlkPhos  86  10-17    PT/INR - ( 16 Oct 2022 22:53 )   PT: 14.90 sec;   INR: 1.30 ratio         PTT - ( 16 Oct 2022 22:53 )  PTT:36.7 sec                                                            --------------------------------------------------------------    PHYSICAL EXAM:  GENERAL: Awake, alert, oriented to person, place, time, situation. Overweight, laying in bed appearing in no acute distress  HEENT: No FNDs, atraumatic, normocephalic  LUNGS: Diffuse crackles, hypoventilatory.  HEART: S1/S2. No heaves or thrills  ABD: Distended but soft  EXT/NEURO: Contractures overlying bilateral dorsal wrists. Minimal ROM of bilateral wrists and fingers. Otherwise strength, sensation, ROM grossly intact.  SKIN: No edema      PLAN:  #Acute Submassive PE  -CT on admission with bilateral submassive PEs + concern for RV strain  -TTE: EF 55%, G1DD, mild TVR, no right heart strain  -VA duplx LE vein: The right common femoral, great saphenous, femoral, popliteal and small saphenous veins were visualized bilaterally with no evidence of deep enous thrombosis  -c/w full AC  -f/u IR for DVT embolectomy (10/18/22)    #Acute hypoxemic respiratory failure  #Pneumonia  #Hx DIONICIO/OHS non-compliant with BIPAP or home CPAP  -currently on HFNC, attempting to wean  -encourage BIPAP o/n in place of CPAP  -c/w cefepime/levo for now  -can dc levo if legionella negative    #Hx MSSA bacteremia  #Hx Epidural phlegmon/OM  #Hx R Psoas Abscess  -re-eval per ID, rec MR spine to r/o another source of infection  -f/u if MR okay per NSG give recent fusion    #HO IVDA, Opioid Abuse on Methadone   #Agitation  -followed by psych  -c/w lowering of benzo (xanax) dose, pt on 6mg per day at home    # DVT prophylaxis   -therapeutic lovenox    # GI prophylaxis   -protonix    # Diet     # Activity Score (AM-PAC)    #Progress Note Handoff  Pending (specify):  Family discussion:   Disposition:

## 2022-10-17 NOTE — PROGRESS NOTE ADULT - SUBJECTIVE AND OBJECTIVE BOX
Interventional Radiology Follow- Up Note    53 yo M with PMH of IVDU (heroin), vertebral osteomyelitis, and MSSA bacteremia presenting for hypoxemia. Imaging showed PE in addition to lung opacities suggestive of possible infection. Patient additionally has left leg DVT. IR initially consulted for PE thrombectomy, with no IR intervention.    Vitals: T(F): 97 (10-17-22 @ 12:00), Max: 99 (10-16-22 @ 16:00)  HR: 79 (10-17-22 @ 12:00) (71 - 87)  BP: 121/74 (10-17-22 @ 12:00) (74/43 - 135/58)  RR: 24 (10-17-22 @ 12:00) (12 - 49)  SpO2: 96% (10-17-22 @ 12:00) (87% - 97%)  Wt(kg): --    LABS:                        9.0    10.59 )-----------( 317      ( 17 Oct 2022 05:39 )             29.3     10-17    140  |  97<L>  |  10  ----------------------------<  133<H>  4.9   |  32  |  0.5<L>    Ca    8.7      17 Oct 2022 05:39  Mg     1.7     10-17    TPro  5.8<L>  /  Alb  2.4<L>  /  TBili  0.2  /  DBili  x   /  AST  19  /  ALT  7   /  AlkPhos  86  10-17    PT/INR - ( 16 Oct 2022 22:53 )   PT: 14.90 sec;   INR: 1.30 ratio         PTT - ( 16 Oct 2022 22:53 )  PTT:36.7 sec      Impression: 52y Male admitted with PNEUMONIA; HYPERCAPNIC RESPIRATORY FAILURE    53 yo M with PMH of IVDU (heroin), vertebral osteomyelitis, and MSSA bacteremia presenting for hypoxemia. Imaging showed PE in addition to lung opacities suggestive of possible infection. Patient additionally has left leg DVT. IR initially consulted for PE thrombectomy, with no IR intervention. IR reconsulted for left leg DVT thrombectomy. Lower extremity venous doppler completed 10/12 confirming extensive acute DVT of left lower extremity.   - plan for LLE DVT thrombectomy tomorrow 10/18  - keep NPO after midnight tonight  - hold 2 doses of Lovenox prior to procedure - tonight and tomorrow mornings dose   - COVID negative as of 10/16 - ok for procedure  - labs WNL      Please call Interventional Radiology [phone] with any questions, concerns, or issues regarding above.

## 2022-10-17 NOTE — PROGRESS NOTE ADULT - ASSESSMENT
ASSESSMENT  51yo male PMH IVDU (heroin), vertebral osteomyelitis s/p debridement, and recurrent MSSA bacteremia (8/2022, 1/2022)  Presenting from rehab facility for "low oxygen".   Seen by ID 8/2022 for NICOLE bacteremia with discitis/OM at L4-5.  CT Paravertebral phlegmon and left retropharyngeal abscess with air-fluid level.  8/10 BCx NICOLE  8/11,12,13,14,15 BCx NG  8/24 PAM no vegetations  PT was d/c with IV ancef 2 gm iv q8h ( since 8/16 )-8 weeks starting 8/11- 10/6    IMPRESSION  #Rule out Severe sepsis on admission Pulse>90, Resp Rate>20, WBC>12, lactic acidosis    Found to have bilateral PE    10/12 BCX NGTD     Denies productive cough to suggest PNA    Has continued back pain     CT Bilateral pulmonary emboli with evidence of right ventricular strainPulmonary trunk enlargement, compatible with pulmonary hypertension.  Numerous bilateral patchy opacities with confluent areas of consolidation predominantly in the posterior lung fields and bilateral perihilar   lymphadenopathy, suspicious for pneumonia. Follow-up in 6 weeks' time is recommended.    Procalcitonin, Serum: 7.63 ng/mL (10-12-22 @ 06:50)  #Obesity BMI (kg/m2): 41  #Recurrent MSSA bacteremia with discitis   #IVDU  #HCV, RNA UD  Creatinine, Serum: 0.7 (10-13-22 @ 05:04)    Weight (kg): 140.9 (10-12-22 @ 09:30)    RECOMMENDATIONS  - cefepime   IVPB 2000 milliGRAM(s) IV Intermittent every 8 hours, empiric 7-10 days pending clinical course, then would continue Cefazolin 2g q8h IV until MRI results  - levoFLOXacin IVPB 750 milliGRAM(s) IV Intermittent every 24 hours, D/C if legionella NEGATIVE   - MRI spine when stable  - NSYG     If any questions, please call or send a message on [facility] Teams  Please continue to update ID with any pertinent new laboratory or radiographic findings  #[id number]

## 2022-10-17 NOTE — BH CONSULTATION LIAISON PROGRESS NOTE - OTHER
Concerns Affected by delirium Fluctuates concerns Psychomotor retardation related to being lethargic

## 2022-10-18 LAB
ALBUMIN SERPL ELPH-MCNC: 2.7 G/DL — LOW (ref 3.5–5.2)
ALP SERPL-CCNC: 89 U/L — SIGNIFICANT CHANGE UP (ref 30–115)
ALT FLD-CCNC: 7 U/L — SIGNIFICANT CHANGE UP (ref 0–41)
ANION GAP SERPL CALC-SCNC: 5 MMOL/L — LOW (ref 7–14)
APTT BLD: 28.2 SEC — SIGNIFICANT CHANGE UP (ref 27–39.2)
APTT BLD: 29.3 SEC — SIGNIFICANT CHANGE UP (ref 27–39.2)
APTT BLD: 62.7 SEC — HIGH (ref 27–39.2)
AST SERPL-CCNC: 16 U/L — SIGNIFICANT CHANGE UP (ref 0–41)
BASOPHILS # BLD AUTO: 0.03 K/UL — SIGNIFICANT CHANGE UP (ref 0–0.2)
BASOPHILS NFR BLD AUTO: 0.4 % — SIGNIFICANT CHANGE UP (ref 0–1)
BILIRUB SERPL-MCNC: 0.2 MG/DL — SIGNIFICANT CHANGE UP (ref 0.2–1.2)
BUN SERPL-MCNC: 10 MG/DL — SIGNIFICANT CHANGE UP (ref 10–20)
CALCIUM SERPL-MCNC: 8.3 MG/DL — LOW (ref 8.4–10.5)
CHLORIDE SERPL-SCNC: 94 MMOL/L — LOW (ref 98–110)
CO2 SERPL-SCNC: 38 MMOL/L — HIGH (ref 17–32)
CREAT SERPL-MCNC: <0.5 MG/DL — LOW (ref 0.7–1.5)
EGFR: 130 ML/MIN/1.73M2 — SIGNIFICANT CHANGE UP
EOSINOPHIL # BLD AUTO: 0.23 K/UL — SIGNIFICANT CHANGE UP (ref 0–0.7)
EOSINOPHIL NFR BLD AUTO: 2.8 % — SIGNIFICANT CHANGE UP (ref 0–8)
GLUCOSE SERPL-MCNC: 165 MG/DL — HIGH (ref 70–99)
HCT VFR BLD CALC: 30.1 % — LOW (ref 42–52)
HGB BLD-MCNC: 9.1 G/DL — LOW (ref 14–18)
IMM GRANULOCYTES NFR BLD AUTO: 3.4 % — HIGH (ref 0.1–0.3)
LEGIONELLA AG UR QL: NEGATIVE — SIGNIFICANT CHANGE UP
LYMPHOCYTES # BLD AUTO: 1.34 K/UL — SIGNIFICANT CHANGE UP (ref 1.2–3.4)
LYMPHOCYTES # BLD AUTO: 16.2 % — LOW (ref 20.5–51.1)
MAGNESIUM SERPL-MCNC: 1.9 MG/DL — SIGNIFICANT CHANGE UP (ref 1.8–2.4)
MCHC RBC-ENTMCNC: 28.4 PG — SIGNIFICANT CHANGE UP (ref 27–31)
MCHC RBC-ENTMCNC: 30.2 G/DL — LOW (ref 32–37)
MCV RBC AUTO: 94.1 FL — HIGH (ref 80–94)
MONOCYTES # BLD AUTO: 0.7 K/UL — HIGH (ref 0.1–0.6)
MONOCYTES NFR BLD AUTO: 8.4 % — SIGNIFICANT CHANGE UP (ref 1.7–9.3)
NEUTROPHILS # BLD AUTO: 5.71 K/UL — SIGNIFICANT CHANGE UP (ref 1.4–6.5)
NEUTROPHILS NFR BLD AUTO: 68.8 % — SIGNIFICANT CHANGE UP (ref 42.2–75.2)
NRBC # BLD: 0 /100 WBCS — SIGNIFICANT CHANGE UP (ref 0–0)
PLATELET # BLD AUTO: 355 K/UL — SIGNIFICANT CHANGE UP (ref 130–400)
POTASSIUM SERPL-MCNC: 4.6 MMOL/L — SIGNIFICANT CHANGE UP (ref 3.5–5)
POTASSIUM SERPL-SCNC: 4.6 MMOL/L — SIGNIFICANT CHANGE UP (ref 3.5–5)
PROT SERPL-MCNC: 6.1 G/DL — SIGNIFICANT CHANGE UP (ref 6–8)
RBC # BLD: 3.2 M/UL — LOW (ref 4.7–6.1)
RBC # FLD: 16 % — HIGH (ref 11.5–14.5)
SODIUM SERPL-SCNC: 137 MMOL/L — SIGNIFICANT CHANGE UP (ref 135–146)
WBC # BLD: 8.29 K/UL — SIGNIFICANT CHANGE UP (ref 4.8–10.8)
WBC # FLD AUTO: 8.29 K/UL — SIGNIFICANT CHANGE UP (ref 4.8–10.8)

## 2022-10-18 PROCEDURE — 36012 PLACE CATHETER IN VEIN: CPT

## 2022-10-18 PROCEDURE — 37187 VENOUS MECH THROMBECTOMY: CPT

## 2022-10-18 PROCEDURE — 71045 X-RAY EXAM CHEST 1 VIEW: CPT | Mod: 26

## 2022-10-18 PROCEDURE — 99291 CRITICAL CARE FIRST HOUR: CPT | Mod: GC

## 2022-10-18 PROCEDURE — 76937 US GUIDE VASCULAR ACCESS: CPT | Mod: 26

## 2022-10-18 RX ORDER — HEPARIN SODIUM 5000 [USP'U]/ML
5000 INJECTION INTRAVENOUS; SUBCUTANEOUS EVERY 6 HOURS
Refills: 0 | Status: DISCONTINUED | OUTPATIENT
Start: 2022-10-18 | End: 2022-10-19

## 2022-10-18 RX ORDER — HEPARIN SODIUM 5000 [USP'U]/ML
10000 INJECTION INTRAVENOUS; SUBCUTANEOUS ONCE
Refills: 0 | Status: DISCONTINUED | OUTPATIENT
Start: 2022-10-18 | End: 2022-10-19

## 2022-10-18 RX ORDER — HEPARIN SODIUM 5000 [USP'U]/ML
10000 INJECTION INTRAVENOUS; SUBCUTANEOUS EVERY 6 HOURS
Refills: 0 | Status: DISCONTINUED | OUTPATIENT
Start: 2022-10-18 | End: 2022-10-19

## 2022-10-18 RX ORDER — HEPARIN SODIUM 5000 [USP'U]/ML
2900 INJECTION INTRAVENOUS; SUBCUTANEOUS
Qty: 25000 | Refills: 0 | Status: DISCONTINUED | OUTPATIENT
Start: 2022-10-18 | End: 2022-10-19

## 2022-10-18 RX ADMIN — Medication 650 MILLIGRAM(S): at 01:04

## 2022-10-18 RX ADMIN — METHADONE HYDROCHLORIDE 135 MILLIGRAM(S): 40 TABLET ORAL at 12:03

## 2022-10-18 RX ADMIN — Medication 1 PATCH: at 08:02

## 2022-10-18 RX ADMIN — Medication 1 MILLIGRAM(S): at 04:00

## 2022-10-18 RX ADMIN — HEPARIN SODIUM 10000 UNIT(S): 5000 INJECTION INTRAVENOUS; SUBCUTANEOUS at 04:58

## 2022-10-18 RX ADMIN — Medication 1 MILLIGRAM(S): at 20:44

## 2022-10-18 RX ADMIN — CEFEPIME 100 MILLIGRAM(S): 1 INJECTION, POWDER, FOR SOLUTION INTRAMUSCULAR; INTRAVENOUS at 06:45

## 2022-10-18 RX ADMIN — HEPARIN SODIUM 2900 UNIT(S)/HR: 5000 INJECTION INTRAVENOUS; SUBCUTANEOUS at 04:47

## 2022-10-18 RX ADMIN — Medication 1 PATCH: at 01:04

## 2022-10-18 RX ADMIN — Medication 1 MILLIGRAM(S): at 12:03

## 2022-10-18 RX ADMIN — HEPARIN SODIUM 2900 UNIT(S)/HR: 5000 INJECTION INTRAVENOUS; SUBCUTANEOUS at 20:00

## 2022-10-18 RX ADMIN — Medication 975 MILLIGRAM(S): at 01:03

## 2022-10-18 RX ADMIN — CEFEPIME 100 MILLIGRAM(S): 1 INJECTION, POWDER, FOR SOLUTION INTRAMUSCULAR; INTRAVENOUS at 21:12

## 2022-10-18 NOTE — PROGRESS NOTE ADULT - SUBJECTIVE AND OBJECTIVE BOX
Patient is a 52y old  Male who presents with a chief complaint of Hypoxia (17 Oct 2022 14:31)        Over Night Events: no overnight events. Currently NPO for LLE thrombectomy with IR. Patient refused CPAP overnight        ROS:     All ROS are negative except HPI       PHYSICAL EXAM    ICU Vital Signs Last 24 Hrs  T(C): 36.9 (18 Oct 2022 04:00), Max: 36.9 (18 Oct 2022 04:00)  T(F): 98.5 (18 Oct 2022 04:00), Max: 98.5 (18 Oct 2022 04:00)  HR: 76 (18 Oct 2022 07:00) (72 - 81)  BP: 98/57 (18 Oct 2022 07:00) (98/57 - 121/74)  BP(mean): 74 (18 Oct 2022 07:00) (72 - 91)  ABP: --  ABP(mean): --  RR: 19 (18 Oct 2022 07:00) (13 - 26)  SpO2: 96% (18 Oct 2022 07:00) (92% - 97%)    O2 Parameters below as of 18 Oct 2022 07:00  Patient On (Oxygen Delivery Method): nasal cannula, high flow  O2 Flow (L/min): 50  O2 Concentration (%): 60        Constitutional: no acute distress, obese, on high flow nasal cannula  Neuro: moving all 4 limbs spontaneously, no facial droop or dysarthria  HEENT: NCAT, anicteric  Neck: no visible lymphadenopathy or goiter  Pulm: decreased breath sounds with crackles  Cardiac: extremities appear pink and well-perfused.  regular rhythm and rate, no murmur detected  Abdomen: non-distended  Extremities: bilateral lower extremity edema up to knees; contracted wrists      10-17-22 @ 07:01  -  10-18-22 @ 07:00  --------------------------------------------------------  IN:    IV PiggyBack: 111 mL    IV PiggyBack: 250 mL    Oral Fluid: 320 mL  Total IN: 681 mL    OUT:    Voided (mL): 75 mL  Total OUT: 75 mL    Total NET: 606 mL      10-18-22 @ 07:01  -  10-18-22 @ 08:25  --------------------------------------------------------  IN:    IV PiggyBack: 58 mL  Total IN: 58 mL    OUT:  Total OUT: 0 mL    Total NET: 58 mL          LABS:                            9.1    8.29  )-----------( 355      ( 18 Oct 2022 03:55 )             30.1                                               10-18    137  |  94<L>  |  10  ----------------------------<  165<H>  4.6   |  38<H>  |  <0.5<L>    Ca    8.3<L>      18 Oct 2022 03:55  Mg     1.9     10-18    TPro  6.1  /  Alb  2.7<L>  /  TBili  0.2  /  DBili  x   /  AST  16  /  ALT  7   /  AlkPhos  89  10-18      PT/INR - ( 16 Oct 2022 22:53 )   PT: 14.90 sec;   INR: 1.30 ratio         PTT - ( 18 Oct 2022 03:55 )  PTT:29.3 sec                                                                                     LIVER FUNCTIONS - ( 18 Oct 2022 03:55 )  Alb: 2.7 g/dL / Pro: 6.1 g/dL / ALK PHOS: 89 U/L / ALT: 7 U/L / AST: 16 U/L / GGT: x                                                                                                                                       MEDICATIONS  (STANDING):  ALPRAZolam 1 milliGRAM(s) Oral every 8 hours  buPROPion XL (24-Hour) . 300 milliGRAM(s) Oral daily  cefepime   IVPB      cefepime   IVPB 2000 milliGRAM(s) IV Intermittent every 8 hours  folic acid 1 milliGRAM(s) Oral daily  heparin  Infusion.  Unit(s)/Hr (24 mL/Hr) IV Continuous <Continuous>  levoFLOXacin IVPB      levoFLOXacin IVPB 750 milliGRAM(s) IV Intermittent every 24 hours  methadone    Tablet 135 milliGRAM(s) Oral daily  multivitamin 1 Tablet(s) Oral daily  nicotine - 21 mG/24Hr(s) Patch 1 Patch Transdermal daily  pantoprazole    Tablet 40 milliGRAM(s) Oral before breakfast  thiamine 100 milliGRAM(s) Oral daily    MEDICATIONS  (PRN):  acetaminophen     Tablet .. 650 milliGRAM(s) Oral every 6 hours PRN Temp greater or equal to 38C (100.4F)  aluminum hydroxide/magnesium hydroxide/simethicone Suspension 30 milliLiter(s) Oral every 4 hours PRN Dyspepsia  cyclobenzaprine 10 milliGRAM(s) Oral three times a day PRN Muscle Spasm  haloperidol    Injectable 5 milliGRAM(s) IntraMuscular every 8 hours PRN Agitation  heparin   Injectable 31665 Unit(s) IV Push every 6 hours PRN For aPTT less than 40  heparin   Injectable 5000 Unit(s) IV Push every 6 hours PRN For aPTT between 40 - 57  melatonin 3 milliGRAM(s) Oral at bedtime PRN Insomnia  ondansetron Injectable 4 milliGRAM(s) IV Push every 8 hours PRN Nausea and/or Vomiting      New X-rays reviewed:       ECHO reviewed    CXR interpreted by me:       Patient is a 52y old  Male who presents with a chief complaint of Hypoxia (17 Oct 2022 14:31)        Over Night Events: no overnight events. Currently NPO for LLE thrombectomy with IR. Patient refused CPAP overnight        ROS:     All ROS are negative except HPI       PHYSICAL EXAM    ICU Vital Signs Last 24 Hrs  T(C): 36.9 (18 Oct 2022 04:00), Max: 36.9 (18 Oct 2022 04:00)  T(F): 98.5 (18 Oct 2022 04:00), Max: 98.5 (18 Oct 2022 04:00)  HR: 76 (18 Oct 2022 07:00) (72 - 81)  BP: 98/57 (18 Oct 2022 07:00) (98/57 - 121/74)  BP(mean): 74 (18 Oct 2022 07:00) (72 - 91)  ABP: --  ABP(mean): --  RR: 19 (18 Oct 2022 07:00) (13 - 26)  SpO2: 96% (18 Oct 2022 07:00) (92% - 97%)    O2 Parameters below as of 18 Oct 2022 07:00  Patient On (Oxygen Delivery Method): nasal cannula, high flow  O2 Flow (L/min): 50  O2 Concentration (%): 60        Constitutional: no acute distress, obese, on high flow nasal cannula  Neuro: moving all 4 limbs spontaneously, no facial droop or dysarthria  HEENT: NCAT, anicteric  Neck: no visible lymphadenopathy or goiter  Pulm: decreased breath sounds with crackles  Cardiac: extremities appear pink and well-perfused.  regular rhythm and rate, no murmur detected  Abdomen: non-distended  Extremities: bilateral lower extremity edema up to knees; contracted wrists      10-17-22 @ 07:01  -  10-18-22 @ 07:00  --------------------------------------------------------  IN:    IV PiggyBack: 111 mL    IV PiggyBack: 250 mL    Oral Fluid: 320 mL  Total IN: 681 mL    OUT:    Voided (mL): 75 mL  Total OUT: 75 mL    Total NET: 606 mL      10-18-22 @ 07:01  -  10-18-22 @ 08:25  --------------------------------------------------------  IN:    IV PiggyBack: 58 mL  Total IN: 58 mL    OUT:  Total OUT: 0 mL    Total NET: 58 mL          LABS:                            9.1    8.29  )-----------( 355      ( 18 Oct 2022 03:55 )             30.1                                               10-18    137  |  94<L>  |  10  ----------------------------<  165<H>  4.6   |  38<H>  |  <0.5<L>    Ca    8.3<L>      18 Oct 2022 03:55  Mg     1.9     10-18    TPro  6.1  /  Alb  2.7<L>  /  TBili  0.2  /  DBili  x   /  AST  16  /  ALT  7   /  AlkPhos  89  10-18      PT/INR - ( 16 Oct 2022 22:53 )   PT: 14.90 sec;   INR: 1.30 ratio         PTT - ( 18 Oct 2022 03:55 )  PTT:29.3 sec                                                                                     LIVER FUNCTIONS - ( 18 Oct 2022 03:55 )  Alb: 2.7 g/dL / Pro: 6.1 g/dL / ALK PHOS: 89 U/L / ALT: 7 U/L / AST: 16 U/L / GGT: x                                                                                                                                       MEDICATIONS  (STANDING):  ALPRAZolam 1 milliGRAM(s) Oral every 8 hours  buPROPion XL (24-Hour) . 300 milliGRAM(s) Oral daily  cefepime   IVPB      cefepime   IVPB 2000 milliGRAM(s) IV Intermittent every 8 hours  folic acid 1 milliGRAM(s) Oral daily  heparin  Infusion.  Unit(s)/Hr (24 mL/Hr) IV Continuous <Continuous>  levoFLOXacin IVPB      levoFLOXacin IVPB 750 milliGRAM(s) IV Intermittent every 24 hours  methadone    Tablet 135 milliGRAM(s) Oral daily  multivitamin 1 Tablet(s) Oral daily  nicotine - 21 mG/24Hr(s) Patch 1 Patch Transdermal daily  pantoprazole    Tablet 40 milliGRAM(s) Oral before breakfast  thiamine 100 milliGRAM(s) Oral daily    MEDICATIONS  (PRN):  acetaminophen     Tablet .. 650 milliGRAM(s) Oral every 6 hours PRN Temp greater or equal to 38C (100.4F)  aluminum hydroxide/magnesium hydroxide/simethicone Suspension 30 milliLiter(s) Oral every 4 hours PRN Dyspepsia  cyclobenzaprine 10 milliGRAM(s) Oral three times a day PRN Muscle Spasm  haloperidol    Injectable 5 milliGRAM(s) IntraMuscular every 8 hours PRN Agitation  heparin   Injectable 44562 Unit(s) IV Push every 6 hours PRN For aPTT less than 40  heparin   Injectable 5000 Unit(s) IV Push every 6 hours PRN For aPTT between 40 - 57  melatonin 3 milliGRAM(s) Oral at bedtime PRN Insomnia  ondansetron Injectable 4 milliGRAM(s) IV Push every 8 hours PRN Nausea and/or Vomiting      New X-rays reviewed:       ECHO reviewed    CXR interpreted by me:  10/18 images and reads compared with 10/17, by my read with ongoing dense right-sided infiltrate, unchanged from yesterday

## 2022-10-18 NOTE — PRE-ANESTHESIA EVALUATION ADULT - NSANTHTIREDRD_ENT_A_CORE
[facility] Call Center    Phone Message    May a detailed message be left on voicemail: yes    Reason for Call: Other: Pt wondering if Dr. Dong had a chance to look at her MRI yet.      Action Taken: Message routed to:  [facility] ([facility]): Orthopedics     Yes

## 2022-10-18 NOTE — PROGRESS NOTE ADULT - ASSESSMENT
IMPRESSION:    Acute hypoxemic resp failure on HFNC (declining BIPAP)  PNA/ highly aspiration  PE with RV strain - submassive  hx Epidural Phlegmon, OM  hx Right Psoas Abscess   hx MSSA Bacteremia  HO IVDA, Opioid Abuse on Methadone   obesity/ DIONICIO / OHS    PLAN:    CNS: Quetiapine, Gabapentin, and Bupropion held per psych. Xanax dose reduced. Haldol 5mg q8h prn.    HEENT:  Oral care    PULMONARY:  HOB @ 45 degrees, aspiration precaution. Wean down Fio2; Now on 50LPM and 60%. Remains on HFNC. Recommend CPAP during sleep for OHS.     CARDIOVASCULAR: Echocardiogram: No RV strain. Not on pressors. Not on IVF. QTc 450 on 10/16.    GI: GI prophylaxis; oral diet. Aspiration precautions.     RENAL:  Kidney function at baseline. Correct as needed.     INFECTIOUS DISEASE: ID following. Blood cultures negative. UA negative,. MRSA pending. Keep the same abx for now. MR Spine.     HEMATOLOGICAL:  LE doppler: extensive DVT left leg. Vascular eval for the extensive DVT. pending IR thrombectomy today.     ENDOCRINE:  Follow up FS.  Insulin protocol if needed.    Dispo: Keep in the CCU for now.     Prognosis is guarded.

## 2022-10-18 NOTE — DIETITIAN INITIAL EVALUATION ADULT - PERTINENT MEDS FT
MEDICATIONS  (STANDING):  ALPRAZolam 1 milliGRAM(s) Oral every 8 hours  buPROPion XL (24-Hour) . 300 milliGRAM(s) Oral daily  cefepime   IVPB      cefepime   IVPB 2000 milliGRAM(s) IV Intermittent every 8 hours  folic acid 1 milliGRAM(s) Oral daily  heparin   Injectable 69693 Unit(s) IV Push once  heparin  Infusion. 2900 Unit(s)/Hr (29 mL/Hr) IV Continuous <Continuous>  levoFLOXacin IVPB      levoFLOXacin IVPB 750 milliGRAM(s) IV Intermittent every 24 hours  multivitamin 1 Tablet(s) Oral daily  nicotine - 21 mG/24Hr(s) Patch 1 Patch Transdermal daily  pantoprazole    Tablet 40 milliGRAM(s) Oral before breakfast  thiamine 100 milliGRAM(s) Oral daily    MEDICATIONS  (PRN):  acetaminophen     Tablet .. 650 milliGRAM(s) Oral every 6 hours PRN Temp greater or equal to 38C (100.4F)  aluminum hydroxide/magnesium hydroxide/simethicone Suspension 30 milliLiter(s) Oral every 4 hours PRN Dyspepsia  cyclobenzaprine 10 milliGRAM(s) Oral three times a day PRN Muscle Spasm  haloperidol    Injectable 5 milliGRAM(s) IntraMuscular every 8 hours PRN Agitation  heparin   Injectable 25596 Unit(s) IV Push every 6 hours PRN For aPTT less than 40  heparin   Injectable 5000 Unit(s) IV Push every 6 hours PRN For aPTT between 40 - 57  melatonin 3 milliGRAM(s) Oral at bedtime PRN Insomnia  ondansetron Injectable 4 milliGRAM(s) IV Push every 8 hours PRN Nausea and/or Vomiting

## 2022-10-18 NOTE — DIETITIAN INITIAL EVALUATION ADULT - OTHER INFO
Pertinent Medical Information: p/w hypoxia. Acute Submassive PE noted. Acute hypoxemic respiratory failure - currently on HFNC, attempting to wean daily as per progress notes. Currently NPO for LLE thrombectomy with IR.

## 2022-10-18 NOTE — DIETITIAN INITIAL EVALUATION ADULT - NSFNSGIASSESSMENTFT_GEN_A_CORE
Last BM 10/18. No nausea/vomiting/diarrhea/constipation reported.  Current wt notably higher than reported UBW. Will assess nutrient needs using IBW as BMI >40 + edema noted.

## 2022-10-18 NOTE — PROGRESS NOTE ADULT - SUBJECTIVE AND OBJECTIVE BOX
INTERVENTIONAL RADIOLOGY BRIEF-OPERATIVE NOTE    Procedure:   1.  Inferior Vena Cavography  2.  Pelvic Venography    3.  Left lower extremity Venography  4.  Left popliteal and femoral venous thrombectomies    Pre-Op Diagnosis:  LLE venous thrombosis    Post-Op Diagnosis:  Same    Attending:  Rosita (IR) / Susan (Anaesthesia)  Resident:  Ashtyn    Anesthesia (type):  [ ] General Anesthesia  [ ] Sedation  [ ] Spinal Anesthesia  [X] Local/Regional    Total Face-to-Face Procedure Time:  3 hours, 18 minutes    Contrast:  Visipaque, 100 cc iv     Heparin:  14,500 units iv (in divided doses)    Blood Loss:  80 cc    Condition:   [ ] Critical  [ ] Serious  [ ] Fair   [X] Good    Findings/Follow up Plan of Care:  Thrombus extending from below-knee left popliteal vein to superficial left femoral vein.  Pelvic veins and IVC widely patent.  Thrombectomy performed via left popliteal vein approach.  Diseased portions of vein treated with 8 mm x 40 mm angioplasty, with fair anatomic result.  By procedure end, there was significant clearing of thrombus-- acute, sub-acute and chronic-- with flow largely restored, centrally; some residual disease remains, however.  Patient tolerated the procedure pretty well, without incident.  Purse-string suture placed at left popliteal dermatotomy-- IR to D/C this suture tomorrow.  Please maintain left leg immobilizer for now.   Findings d/w patient and his mother immediately post-procedure, with understanding.    Specimens Removed:  None    Implants:  None    Complications:  None immediate.    Disposition:  Back to floor; resume anticoagulation as per primary team.  HoB < 30 degrees; please keep left leg straight with immobilizer for tonight.  IR to D/C left popliteal suture tomorrow.      Please call Interventional Radiology [phone] with any questions, concerns, or issues.

## 2022-10-18 NOTE — DIETITIAN INITIAL EVALUATION ADULT - ORAL INTAKE PTA/DIET HISTORY
Fair appetite & po intake PTP. Follows a regular diet PTP. Consumes 2-3 meals/day. Takes MVI. No other supplements reported. NKFA. No dietary restrictions reported. No known h/o unintentional wt loss. UBW reported to be 122.7 kg. No signs of muscle wasting/subcutaneous fat loss. No food preferences/dietary restrictions reported.

## 2022-10-18 NOTE — DIETITIAN INITIAL EVALUATION ADULT - ADD RECOMMEND
Recommendation:  (1) Consult endocrinology services to evaluate (HgbA1C 6.6%, ? h/o DM)  (2) Order Prosource Gelatein Plus once daily (160 kcal, 20 g protein)  (3) Order Ensure Compact once daily (220 kcal, 9 g protein)  (4) Order MVI q24hrs.  (5) Order 250 mg Vit C q12hrs.

## 2022-10-18 NOTE — DIETITIAN INITIAL EVALUATION ADULT - NS FNS DIET ORDER
Receives DASH/TLC diet. Reports reduced appetite at this time; consuming 50% of meals at this time. Per 10/13 SLP eval: "Pt presents as essentially functional for regular and thins without overt s/s aspiration." Regular and thins noted.

## 2022-10-18 NOTE — DIETITIAN INITIAL EVALUATION ADULT - PERTINENT LABORATORY DATA
10-18    137  |  94<L>  |  10  ----------------------------<  165<H>  4.6   |  38<H>  |  <0.5<L>    Ca    8.3<L>      18 Oct 2022 03:55  Mg     1.9     10-18    TPro  6.1  /  Alb  2.7<L>  /  TBili  0.2  /  DBili  x   /  AST  16  /  ALT  7   /  AlkPhos  89  10-18  A1C with Estimated Average Glucose Result: 6.6 % (01-16-22 @ 04:30)

## 2022-10-18 NOTE — DIETITIAN INITIAL EVALUATION ADULT - NUTRITIONGOAL OUTCOME1
Pt to demonstrate improved tolerance to diet order, with at least 75% po intake over next 6 days. Pt at moderate nutrition risk.

## 2022-10-18 NOTE — PHYSICAL THERAPY INITIAL EVALUATION ADULT - SPECIFY REASON(S)
Pt is on bedrest post thrombectomy. PT will f/u as appropriate. Pt is in IR and is on bedrest post thrombectomy. PT will f/u as appropriate.

## 2022-10-18 NOTE — DIETITIAN INITIAL EVALUATION ADULT - NUTRITIONGOAL OUTCOME2
Goals as above.  Monitor: Diet order, nutrition focused physical findings, body composition, po intake, BM, skin.

## 2022-10-19 LAB
ALBUMIN SERPL ELPH-MCNC: 2.5 G/DL — LOW (ref 3.5–5.2)
ALP SERPL-CCNC: 84 U/L — SIGNIFICANT CHANGE UP (ref 30–115)
ALT FLD-CCNC: 8 U/L — SIGNIFICANT CHANGE UP (ref 0–41)
ANION GAP SERPL CALC-SCNC: 7 MMOL/L — SIGNIFICANT CHANGE UP (ref 7–14)
APTT BLD: 26.6 SEC — LOW (ref 27–39.2)
APTT BLD: 52.5 SEC — HIGH (ref 27–39.2)
AST SERPL-CCNC: 15 U/L — SIGNIFICANT CHANGE UP (ref 0–41)
BASOPHILS # BLD AUTO: 0.02 K/UL — SIGNIFICANT CHANGE UP (ref 0–0.2)
BASOPHILS NFR BLD AUTO: 0.2 % — SIGNIFICANT CHANGE UP (ref 0–1)
BILIRUB SERPL-MCNC: 0.3 MG/DL — SIGNIFICANT CHANGE UP (ref 0.2–1.2)
BUN SERPL-MCNC: 7 MG/DL — LOW (ref 10–20)
CALCIUM SERPL-MCNC: 8.5 MG/DL — SIGNIFICANT CHANGE UP (ref 8.4–10.5)
CHLORIDE SERPL-SCNC: 91 MMOL/L — LOW (ref 98–110)
CO2 SERPL-SCNC: 34 MMOL/L — HIGH (ref 17–32)
CREAT SERPL-MCNC: <0.5 MG/DL — LOW (ref 0.7–1.5)
EGFR: 130 ML/MIN/1.73M2 — SIGNIFICANT CHANGE UP
EOSINOPHIL # BLD AUTO: 0.16 K/UL — SIGNIFICANT CHANGE UP (ref 0–0.7)
EOSINOPHIL NFR BLD AUTO: 1.9 % — SIGNIFICANT CHANGE UP (ref 0–8)
GLUCOSE SERPL-MCNC: 140 MG/DL — HIGH (ref 70–99)
HCT VFR BLD CALC: 29 % — LOW (ref 42–52)
HCT VFR BLD CALC: 29.3 % — LOW (ref 42–52)
HGB BLD-MCNC: 8.8 G/DL — LOW (ref 14–18)
HGB BLD-MCNC: 9 G/DL — LOW (ref 14–18)
IMM GRANULOCYTES NFR BLD AUTO: 1.8 % — HIGH (ref 0.1–0.3)
LYMPHOCYTES # BLD AUTO: 1.47 K/UL — SIGNIFICANT CHANGE UP (ref 1.2–3.4)
LYMPHOCYTES # BLD AUTO: 17.3 % — LOW (ref 20.5–51.1)
MAGNESIUM SERPL-MCNC: 1.7 MG/DL — LOW (ref 1.8–2.4)
MCHC RBC-ENTMCNC: 28 PG — SIGNIFICANT CHANGE UP (ref 27–31)
MCHC RBC-ENTMCNC: 28.2 PG — SIGNIFICANT CHANGE UP (ref 27–31)
MCHC RBC-ENTMCNC: 30.3 G/DL — LOW (ref 32–37)
MCHC RBC-ENTMCNC: 30.7 G/DL — LOW (ref 32–37)
MCV RBC AUTO: 91.8 FL — SIGNIFICANT CHANGE UP (ref 80–94)
MCV RBC AUTO: 92.4 FL — SIGNIFICANT CHANGE UP (ref 80–94)
MONOCYTES # BLD AUTO: 0.77 K/UL — HIGH (ref 0.1–0.6)
MONOCYTES NFR BLD AUTO: 9 % — SIGNIFICANT CHANGE UP (ref 1.7–9.3)
NEUTROPHILS # BLD AUTO: 5.94 K/UL — SIGNIFICANT CHANGE UP (ref 1.4–6.5)
NEUTROPHILS NFR BLD AUTO: 69.8 % — SIGNIFICANT CHANGE UP (ref 42.2–75.2)
NRBC # BLD: 0 /100 WBCS — SIGNIFICANT CHANGE UP (ref 0–0)
NRBC # BLD: 0 /100 WBCS — SIGNIFICANT CHANGE UP (ref 0–0)
PLATELET # BLD AUTO: 185 K/UL — SIGNIFICANT CHANGE UP (ref 130–400)
PLATELET # BLD AUTO: 333 K/UL — SIGNIFICANT CHANGE UP (ref 130–400)
POTASSIUM SERPL-MCNC: 4 MMOL/L — SIGNIFICANT CHANGE UP (ref 3.5–5)
POTASSIUM SERPL-SCNC: 4 MMOL/L — SIGNIFICANT CHANGE UP (ref 3.5–5)
PROT SERPL-MCNC: 6.1 G/DL — SIGNIFICANT CHANGE UP (ref 6–8)
RBC # BLD: 3.14 M/UL — LOW (ref 4.7–6.1)
RBC # BLD: 3.19 M/UL — LOW (ref 4.7–6.1)
RBC # FLD: 16.2 % — HIGH (ref 11.5–14.5)
RBC # FLD: 16.2 % — HIGH (ref 11.5–14.5)
SODIUM SERPL-SCNC: 132 MMOL/L — LOW (ref 135–146)
WBC # BLD: 6.78 K/UL — SIGNIFICANT CHANGE UP (ref 4.8–10.8)
WBC # BLD: 8.51 K/UL — SIGNIFICANT CHANGE UP (ref 4.8–10.8)
WBC # FLD AUTO: 6.78 K/UL — SIGNIFICANT CHANGE UP (ref 4.8–10.8)
WBC # FLD AUTO: 8.51 K/UL — SIGNIFICANT CHANGE UP (ref 4.8–10.8)

## 2022-10-19 PROCEDURE — 99291 CRITICAL CARE FIRST HOUR: CPT

## 2022-10-19 PROCEDURE — 99233 SBSQ HOSP IP/OBS HIGH 50: CPT

## 2022-10-19 PROCEDURE — 93010 ELECTROCARDIOGRAM REPORT: CPT

## 2022-10-19 PROCEDURE — 71045 X-RAY EXAM CHEST 1 VIEW: CPT | Mod: 26

## 2022-10-19 RX ORDER — MAGNESIUM SULFATE 500 MG/ML
2 VIAL (ML) INJECTION ONCE
Refills: 0 | Status: COMPLETED | OUTPATIENT
Start: 2022-10-19 | End: 2022-10-19

## 2022-10-19 RX ORDER — METHADONE HYDROCHLORIDE 40 MG/1
135 TABLET ORAL
Refills: 0 | Status: DISCONTINUED | OUTPATIENT
Start: 2022-10-19 | End: 2022-10-26

## 2022-10-19 RX ORDER — ENOXAPARIN SODIUM 100 MG/ML
140 INJECTION SUBCUTANEOUS EVERY 12 HOURS
Refills: 0 | Status: DISCONTINUED | OUTPATIENT
Start: 2022-10-19 | End: 2022-10-26

## 2022-10-19 RX ADMIN — CEFEPIME 100 MILLIGRAM(S): 1 INJECTION, POWDER, FOR SOLUTION INTRAMUSCULAR; INTRAVENOUS at 14:24

## 2022-10-19 RX ADMIN — HEPARIN SODIUM 3400 UNIT(S)/HR: 5000 INJECTION INTRAVENOUS; SUBCUTANEOUS at 03:06

## 2022-10-19 RX ADMIN — Medication 1 TABLET(S): at 12:57

## 2022-10-19 RX ADMIN — Medication 1 MILLIGRAM(S): at 05:21

## 2022-10-19 RX ADMIN — Medication 1 MILLIGRAM(S): at 12:58

## 2022-10-19 RX ADMIN — Medication 100 MILLIGRAM(S): at 12:58

## 2022-10-19 RX ADMIN — Medication 1 MILLIGRAM(S): at 21:06

## 2022-10-19 RX ADMIN — Medication 1 PATCH: at 12:58

## 2022-10-19 RX ADMIN — METHADONE HYDROCHLORIDE 135 MILLIGRAM(S): 40 TABLET ORAL at 14:24

## 2022-10-19 RX ADMIN — PANTOPRAZOLE SODIUM 40 MILLIGRAM(S): 20 TABLET, DELAYED RELEASE ORAL at 06:37

## 2022-10-19 RX ADMIN — CEFEPIME 100 MILLIGRAM(S): 1 INJECTION, POWDER, FOR SOLUTION INTRAMUSCULAR; INTRAVENOUS at 05:19

## 2022-10-19 RX ADMIN — ENOXAPARIN SODIUM 140 MILLIGRAM(S): 100 INJECTION SUBCUTANEOUS at 14:43

## 2022-10-19 RX ADMIN — CEFEPIME 100 MILLIGRAM(S): 1 INJECTION, POWDER, FOR SOLUTION INTRAMUSCULAR; INTRAVENOUS at 21:06

## 2022-10-19 RX ADMIN — BUPROPION HYDROCHLORIDE 300 MILLIGRAM(S): 150 TABLET, EXTENDED RELEASE ORAL at 12:58

## 2022-10-19 RX ADMIN — Medication 25 GRAM(S): at 13:07

## 2022-10-19 RX ADMIN — Medication 1 PATCH: at 18:10

## 2022-10-19 NOTE — PROGRESS NOTE ADULT - ASSESSMENT
ASSESSMENT  51yo male PMH IVDU (heroin), vertebral osteomyelitis s/p debridement, and recurrent MSSA bacteremia (8/2022, 1/2022)  Presenting from rehab facility for "low oxygen".   Seen by ID 8/2022 for NICOLE bacteremia with discitis/OM at L4-5.  CT Paravertebral phlegmon and left retropharyngeal abscess with air-fluid level.  8/10 BCx NICOLE  8/11,12,13,14,15 BCx NG  8/24 PAM no vegetations  PT was d/c with IV ancef 2 gm iv q8h ( since 8/16 )-8 weeks starting 8/11- 10/6    IMPRESSION  #Rule out Severe sepsis on admission Pulse>90, Resp Rate>20, WBC>12, lactic acidosis    Found to have bilateral PE  s/p Procedure:   1.  Inferior Vena Cavography  2.  Pelvic Venography    3.  Left lower extremity Venography  4.  Left popliteal and femoral venous thrombectomies    10/12 BCX NGTD     Denies productive cough to suggest PNA    Has continued back pain     CT Bilateral pulmonary emboli with evidence of right ventricular strainPulmonary trunk enlargement, compatible with pulmonary hypertension.  Numerous bilateral patchy opacities with confluent areas of consolidation predominantly in the posterior lung fields and bilateral perihilar   lymphadenopathy, suspicious for pneumonia. Follow-up in 6 weeks' time is recommended.    Procalcitonin, Serum: 7.63 ng/mL (10-12-22 @ 06:50)  #Obesity BMI (kg/m2): 41  #Recurrent MSSA bacteremia with discitis   #IVDU  #HCV, RNA UD  Creatinine, Serum: 0.7 (10-13-22 @ 05:04)    Weight (kg): 140.9 (10-12-22 @ 09:30)    RECOMMENDATIONS  - D/C levaquin  - cefepime   IVPB 2000 milliGRAM(s) IV Intermittent every 8 hours 10/13, end 10/22, then would continue Cefazolin 2g q8h IV to target staph until MRI results  - MRI spine when stable  - NSYG     If any questions, please call or send a message on [facility] Teams  Please continue to update ID with any pertinent new laboratory or radiographic findings  #[id number]

## 2022-10-19 NOTE — ADVANCED PRACTICE NURSE CONSULT - RECOMMEDATIONS
1. Stage 2 left buttock pressure injury- Cleanse wound bed w/ normal saline, gently pat dry.  Apply thin layer of Coloplast Triad hydrophilic ointment to absorb wound exudate and provide protective layer. Cover w/ dry gauze dressing, and foam Allevyn dressing. Apply Triad & change dressing q12h and prn for strike-through drainage or soiling. If top layer of Triad soiled, gently remove w/ perineal cleanser and re-apply ointment. Do not scrub off as this may cause further skin breakdown. Do not apply foam Allevyn dressing directly over Triad (use gauze in between) as ointment gets absorbed into dressing as opposed to being in direct contact w/ wound bed.  -Assess skin/wound qshift, report changes to primary provider.    Additional recs:   -Continue to instruct/encourage & assist patient as needed with turning/positioning patient from side-to-side q2h while in bed, q1h when/if OOB chair, or in accordance w/ pt's plan of care. Utilize pillows and/or z-cholo fluidized positioner to assist w/ turning/positioning. When/if OOB chair, utilize pillows or chair cushion to offload pressure.   -Offload heels from bed surface with soft pillow under calfs or by applying offloading boots to BLEs.   -Continue applying Coloplast Santi Protect moisture barrier cream to buttock and perineal area daily and prn after each incontinent episode.    -Continue utilizing one underpad underneath patient to wick away excess moisture from skin surface & contain  any soiling/ incontinence episodes; change pad when saturated/soiled.   -Continue nutrition consult for optimal wound healing & nutritional status.      Plan of Care: Primary RN Carly at bedside & made aware of above recs. Spoke w/  covering/primary CCU MD in regards to above. No further needs/recs from [facility] service at this time. Staff RN to perform routine skin/wound assessment and manage wound care. Questions or concerns or if wound worsens and reconsult needed, please contact [facility], [phone].

## 2022-10-19 NOTE — PROGRESS NOTE ADULT - SUBJECTIVE AND OBJECTIVE BOX
MIKAEL MCDERMOTT  52y, Male  Allergy: No Known Allergies      LOS  8d    CHIEF COMPLAINT: PNEUMONIA; HYPERCAPNIC RESPIRATORY FAILURE     (18 Oct 2022 23:15)      INTERVAL EVENTS/HPI  - No acute events overnight, s/p IR intervention   - T(F): , Max: 99.1 (10-18-22 @ 11:49)  - Tolerating medication  - WBC Count: 8.51 (10-19-22 @ 05:12)  WBC Count: 6.78 (10-19-22 @ 02:25)     - Creatinine, Serum: <0.5 (10-19-22 @ 05:12)  Creatinine, Serum: <0.5 (10-18-22 @ 03:55)       ROS  ***    VITALS:  T(F): 98.8, Max: 99.1 (10-18-22 @ 11:49)  HR: 79  BP: 104/60  RR: 22Vital Signs Last 24 Hrs  T(C): 37.1 (19 Oct 2022 04:00), Max: 37.3 (18 Oct 2022 11:49)  T(F): 98.8 (19 Oct 2022 04:00), Max: 99.1 (18 Oct 2022 11:49)  HR: 79 (19 Oct 2022 06:00) (75 - 93)  BP: 104/60 (19 Oct 2022 06:00) (81/46 - 133/76)  BP(mean): 74 (19 Oct 2022 06:00) (57 - 93)  RR: 22 (19 Oct 2022 06:00) (10 - 29)  SpO2: 75% (19 Oct 2022 06:00) (75% - 100%)    Parameters below as of 19 Oct 2022 06:00  Patient On (Oxygen Delivery Method): nasal cannula, high flow  O2 Flow (L/min): 50  O2 Concentration (%): 60    PHYSICAL EXAM:  ***    FH: Non-contributory  Social Hx: Non-contributory    TESTS & MEASUREMENTS:                        8.8    8.51  )-----------( 333      ( 19 Oct 2022 05:12 )             29.0     10-19    132<L>  |  91<L>  |  7<L>  ----------------------------<  140<H>  4.0   |  34<H>  |  <0.5<L>    Ca    8.5      19 Oct 2022 05:12  Mg     1.7     10-19    TPro  6.1  /  Alb  2.5<L>  /  TBili  0.3  /  DBili  x   /  AST  15  /  ALT  8   /  AlkPhos  84  10-19      LIVER FUNCTIONS - ( 19 Oct 2022 05:12 )  Alb: 2.5 g/dL / Pro: 6.1 g/dL / ALK PHOS: 84 U/L / ALT: 8 U/L / AST: 15 U/L / GGT: x               Culture - Blood (collected 10-12-22 @ 06:50)  Source: .Blood Blood  Final Report (10-17-22 @ 18:00):    No Growth Final            INFECTIOUS DISEASES TESTING  Legionella Antigen, Urine: Negative (10-18-22 @ 08:20)  COVID-19 PCR: NotDetec (10-16-22 @ 18:26)  Procalcitonin, Serum: 7.63 (10-12-22 @ 06:50)  COVID-19 PCR: NotDetec (10-11-22 @ 12:35)  COVID-19 PCR: NotDetec (09-06-22 @ 10:55)  HIV-1/2 Combo Result: Nonreact (08-28-22 @ 08:19)  COVID-19 PCR: NotDetec (08-28-22 @ 07:52)  COVID-19 PCR: NotDetec (08-22-22 @ 15:36)  COVID-19 PCR: NotDetec (08-18-22 @ 13:56)  Procalcitonin, Serum: 0.05 (08-17-22 @ 13:28)  COVID-19 PCR: NotDetec (08-14-22 @ 16:45)  COVID-19 PCR: NotDetec (08-12-22 @ 19:33)  MRSA PCR Result.: Negative (08-12-22 @ 05:00)  Rapid RVP Result: NotDetec (08-10-22 @ 15:03)  COVID-19 PCR: NotDetec (04-03-22 @ 12:23)  COVID-19 PCR: NotDetec (03-30-22 @ 10:49)  COVID-19 PCR: NotDetec (03-27-22 @ 18:31)  COVID-19 PCR: NotDetec (03-25-22 @ 06:30)  COVID-19 PCR: NotDetec (03-19-22 @ 06:10)  HIV-1/2 Combo Result: Nonreact (03-16-22 @ 12:10)  COVID-19 PCR: NotDetec (03-12-22 @ 20:19)  COVID-19 PCR: NotDetec (01-31-22 @ 09:10)  COVID-19 PCR: NotDetec (01-25-22 @ 11:21)  COVID-19 PCR: NotDetec (01-18-22 @ 18:45)  HIV-1/2 Combo Result: Nonreact (01-18-22 @ 04:30)  Procalcitonin, Serum: 0.13 (01-16-22 @ 16:00)  COVID-19 PCR: NotDetec (01-15-22 @ 23:32)  strept    INFLAMMATORY MARKERS  Sedimentation Rate, Erythrocyte: 125 mm/Hr (08-11-22 @ 06:38)  C-Reactive Protein, Serum: 330.8 mg/L (08-11-22 @ 06:38)      RADIOLOGY & ADDITIONAL TESTS:  I have personally reviewed the last available Chest xray  CXR      CT      CARDIOLOGY TESTING  12 Lead ECG:   Ventricular Rate 80 BPM    Atrial Rate 80 BPM    P-R Interval 144 ms    QRS Duration 94 ms    Q-T Interval 388 ms    QTC Calculation(Bazett) 447 ms    P Axis 46 degrees    R Axis 52 degrees    T Axis 56 degrees    Diagnosis Line Normal sinus rhythm  Normal ECG    Confirmed by CHECO PALOMINO MD ([id number]) on 10/19/2022 6:48:12 AM (10-19-22 @ 05:47)  12 Lead ECG:   Ventricular Rate 81 BPM    Atrial Rate 81 BPM    P-R Interval 132 ms    QRS Duration 104 ms    Q-T Interval 394 ms    QTC Calculation(Bazett) 457 ms    P Axis 40 degrees    R Axis 43 degrees    T Axis 5 degrees    Diagnosis Line Normal sinus rhythm  Normal ECG    Confirmed by Victor Hugo Marquis ([id number]) on 10/17/2022 9:17:37 AM (10-16-22 @ 05:39)      MEDICATIONS  ALPRAZolam 1 Oral every 8 hours  buPROPion XL (24-Hour) . 300 Oral daily  cefepime   IVPB     cefepime   IVPB 2000 IV Intermittent every 8 hours  folic acid 1 Oral daily  heparin   Injectable 42083 IV Push once  heparin  Infusion. 2900 IV Continuous <Continuous>  levoFLOXacin IVPB     levoFLOXacin IVPB 750 IV Intermittent every 24 hours  magnesium sulfate  IVPB 2 IV Intermittent once  multivitamin 1 Oral daily  nicotine - 21 mG/24Hr(s) Patch 1 Transdermal daily  pantoprazole    Tablet 40 Oral before breakfast  thiamine 100 Oral daily      WEIGHT  Weight (kg): 140.9 (10-18-22 @ 12:25)  Creatinine, Serum: <0.5 mg/dL (10-19-22 @ 05:12)      ANTIBIOTICS:  cefepime   IVPB      cefepime   IVPB 2000 milliGRAM(s) IV Intermittent every 8 hours  levoFLOXacin IVPB      levoFLOXacin IVPB 750 milliGRAM(s) IV Intermittent every 24 hours      All available historical records have been reviewed       MIKAEL MCDERMOTT  52y, Male  Allergy: No Known Allergies      LOS  8d    CHIEF COMPLAINT: PNEUMONIA; HYPERCAPNIC RESPIRATORY FAILURE     (18 Oct 2022 23:15)      INTERVAL EVENTS/HPI  - No acute events overnight, s/p IR intervention   - T(F): , Max: 99.1 (10-18-22 @ 11:49)  - Tolerating medication  - WBC Count: 8.51 (10-19-22 @ 05:12)  WBC Count: 6.78 (10-19-22 @ 02:25)     - Creatinine, Serum: <0.5 (10-19-22 @ 05:12)  Creatinine, Serum: <0.5 (10-18-22 @ 03:55)       ROS  General: Denies rigors, nightsweats  HEENT: Denies headache, rhinorrhea, sore throat, eye pain  CV: Denies CP, palpitations  PULM: Denies wheezing, hemoptysis  GI: Denies hematemesis, hematochezia, melena  : Denies discharge, hematuria  MSK: Denies arthralgias, myalgias  SKIN: Denies rash, lesions  NEURO: Denies paresthesias, weakness  PSYCH: Denies depression, anxiety     VITALS:  T(F): 98.8, Max: 99.1 (10-18-22 @ 11:49)  HR: 79  BP: 104/60  RR: 22Vital Signs Last 24 Hrs  T(C): 37.1 (19 Oct 2022 04:00), Max: 37.3 (18 Oct 2022 11:49)  T(F): 98.8 (19 Oct 2022 04:00), Max: 99.1 (18 Oct 2022 11:49)  HR: 79 (19 Oct 2022 06:00) (75 - 93)  BP: 104/60 (19 Oct 2022 06:00) (81/46 - 133/76)  BP(mean): 74 (19 Oct 2022 06:00) (57 - 93)  RR: 22 (19 Oct 2022 06:00) (10 - 29)  SpO2: 75% (19 Oct 2022 06:00) (75% - 100%)    Parameters below as of 19 Oct 2022 06:00  Patient On (Oxygen Delivery Method): nasal cannula, high flow  O2 Flow (L/min): 50  O2 Concentration (%): 60    PHYSICAL EXAM:  Gen: NAD, resting in bed NC  HEENT: Normocephalic, atraumatic  Neck: supple, no lymphadenopathy  CV: Regular rate & regular rhythm  Lungs: decreased BS at bases, no fremitus  Abdomen: Soft, BS present  Ext: Warm, well perfused  Neuro: non focal, awake  Skin: no rash, no erythema  Lines: no phlebitis     FH: Non-contributory  Social Hx: Non-contributory    TESTS & MEASUREMENTS:                        8.8    8.51  )-----------( 333      ( 19 Oct 2022 05:12 )             29.0     10-19    132<L>  |  91<L>  |  7<L>  ----------------------------<  140<H>  4.0   |  34<H>  |  <0.5<L>    Ca    8.5      19 Oct 2022 05:12  Mg     1.7     10-19    TPro  6.1  /  Alb  2.5<L>  /  TBili  0.3  /  DBili  x   /  AST  15  /  ALT  8   /  AlkPhos  84  10-19      LIVER FUNCTIONS - ( 19 Oct 2022 05:12 )  Alb: 2.5 g/dL / Pro: 6.1 g/dL / ALK PHOS: 84 U/L / ALT: 8 U/L / AST: 15 U/L / GGT: x               Culture - Blood (collected 10-12-22 @ 06:50)  Source: .Blood Blood  Final Report (10-17-22 @ 18:00):    No Growth Final            INFECTIOUS DISEASES TESTING  Legionella Antigen, Urine: Negative (10-18-22 @ 08:20)  COVID-19 PCR: NotDetec (10-16-22 @ 18:26)  Procalcitonin, Serum: 7.63 (10-12-22 @ 06:50)  COVID-19 PCR: NotDetec (10-11-22 @ 12:35)  COVID-19 PCR: NotDetec (09-06-22 @ 10:55)  HIV-1/2 Combo Result: Nonreact (08-28-22 @ 08:19)  COVID-19 PCR: NotDetec (08-28-22 @ 07:52)  COVID-19 PCR: NotDetec (08-22-22 @ 15:36)  COVID-19 PCR: NotDetec (08-18-22 @ 13:56)  Procalcitonin, Serum: 0.05 (08-17-22 @ 13:28)  COVID-19 PCR: NotDetec (08-14-22 @ 16:45)  COVID-19 PCR: NotDetec (08-12-22 @ 19:33)  MRSA PCR Result.: Negative (08-12-22 @ 05:00)  Rapid RVP Result: NotDetec (08-10-22 @ 15:03)  COVID-19 PCR: NotDetec (04-03-22 @ 12:23)  COVID-19 PCR: NotDetec (03-30-22 @ 10:49)  COVID-19 PCR: NotDetec (03-27-22 @ 18:31)  COVID-19 PCR: NotDetec (03-25-22 @ 06:30)  COVID-19 PCR: NotDetec (03-19-22 @ 06:10)  HIV-1/2 Combo Result: Nonreact (03-16-22 @ 12:10)  COVID-19 PCR: NotDetec (03-12-22 @ 20:19)  COVID-19 PCR: NotDetec (01-31-22 @ 09:10)  COVID-19 PCR: NotDetec (01-25-22 @ 11:21)  COVID-19 PCR: NotDetec (01-18-22 @ 18:45)  HIV-1/2 Combo Result: Nonreact (01-18-22 @ 04:30)  Procalcitonin, Serum: 0.13 (01-16-22 @ 16:00)  COVID-19 PCR: NotDetec (01-15-22 @ 23:32)  strept    INFLAMMATORY MARKERS  Sedimentation Rate, Erythrocyte: 125 mm/Hr (08-11-22 @ 06:38)  C-Reactive Protein, Serum: 330.8 mg/L (08-11-22 @ 06:38)      RADIOLOGY & ADDITIONAL TESTS:  I have personally reviewed the last available Chest xray  CXR      CT      CARDIOLOGY TESTING  12 Lead ECG:   Ventricular Rate 80 BPM    Atrial Rate 80 BPM    P-R Interval 144 ms    QRS Duration 94 ms    Q-T Interval 388 ms    QTC Calculation(Bazett) 447 ms    P Axis 46 degrees    R Axis 52 degrees    T Axis 56 degrees    Diagnosis Line Normal sinus rhythm  Normal ECG    Confirmed by CHECO PALOMINO MD ([id number]) on 10/19/2022 6:48:12 AM (10-19-22 @ 05:47)  12 Lead ECG:   Ventricular Rate 81 BPM    Atrial Rate 81 BPM    P-R Interval 132 ms    QRS Duration 104 ms    Q-T Interval 394 ms    QTC Calculation(Bazett) 457 ms    P Axis 40 degrees    R Axis 43 degrees    T Axis 5 degrees    Diagnosis Line Normal sinus rhythm  Normal ECG    Confirmed by Victor Hugo Marquis ([id number]) on 10/17/2022 9:17:37 AM (10-16-22 @ 05:39)      MEDICATIONS  ALPRAZolam 1 Oral every 8 hours  buPROPion XL (24-Hour) . 300 Oral daily  cefepime   IVPB     cefepime   IVPB 2000 IV Intermittent every 8 hours  folic acid 1 Oral daily  heparin   Injectable 85759 IV Push once  heparin  Infusion. 2900 IV Continuous <Continuous>  levoFLOXacin IVPB     levoFLOXacin IVPB 750 IV Intermittent every 24 hours  magnesium sulfate  IVPB 2 IV Intermittent once  multivitamin 1 Oral daily  nicotine - 21 mG/24Hr(s) Patch 1 Transdermal daily  pantoprazole    Tablet 40 Oral before breakfast  thiamine 100 Oral daily      WEIGHT  Weight (kg): 140.9 (10-18-22 @ 12:25)  Creatinine, Serum: <0.5 mg/dL (10-19-22 @ 05:12)      ANTIBIOTICS:  cefepime   IVPB      cefepime   IVPB 2000 milliGRAM(s) IV Intermittent every 8 hours  levoFLOXacin IVPB      levoFLOXacin IVPB 750 milliGRAM(s) IV Intermittent every 24 hours      All available historical records have been reviewed

## 2022-10-19 NOTE — PROGRESS NOTE ADULT - ASSESSMENT
IMPRESSION:    Acute hypoxemic resp failure on HFNC (declining BIPAP)  PNA/ highly aspiration  PE with RV strain - submassive  LLE DVT - s/p thrombectomy 10/19  hx Epidural Phlegmon, OM  hx Right Psoas Abscess   hx MSSA Bacteremia  HO IVDA, Opioid Abuse on Methadone   obesity/ DIONICIO / OHS    PLAN:    CNS: Quetiapine, Gabapentin, and Bupropion held per psych. Xanax dose reduced to 1mg q8h, continue slow taper. Haldol 5mg q8h prn.  MRI of spine for source evaluation of sepsis.  Neurosurgery eval pending MRI.    HEENT:  Oral care    PULMONARY:  HOB @ 45 degrees, aspiration precaution. Wean down Fio2; Now on 50LPM and 40%. Remains on HFNC. Recommend CPAP during sleep for OHS (patient refuses)    CARDIOVASCULAR: Echocardiogram: No RV strain. Not on pressors. Not on IVF. QTc 450 on 10/16.    GI: GI prophylaxis; oral diet. Aspiration precautions.     RENAL:  Kidney function at baseline. Correct as needed. Mild hyponatremia    INFECTIOUS DISEASE: ID following. Blood cultures negative. UA negative,. MRSA pending. Discontinue levofloxacin, continue cefepime.  Abx plan per ID. MR Spine for source eval once O2 stable.     HEMATOLOGICAL:  LE doppler: extensive DVT left leg. Vascular eval for the extensive DVT. IR thrombectomy done 10/18.    ENDOCRINE:  Follow up FS.  Insulin protocol if needed.    Dispo: SDU today

## 2022-10-19 NOTE — PROGRESS NOTE ADULT - SUBJECTIVE AND OBJECTIVE BOX
MIKAEL MCDERMOTT 52y Male  MRN#: [id number]     Hospital Day: 8d    Pt is currently admitted with the primary diagnosis of submassive PE    Overnight events   -No major overnight events                                          ----------------------------------------------------------  OBJECTIVE  PAST MEDICAL & SURGICAL HISTORY  IV drug abuse    Vertebral osteomyelitis    MSSA bacteremia    No significant past surgical history                                              -----------------------------------------------------------  ALLERGIES:  No Known Allergies                                            ------------------------------------------------------------    HOME MEDICATIONS  Home Medications:  ALPRAZolam 2 mg oral tablet: 1 tab(s) orally 3 times a day (02 Sep 2022 11:47)  buPROPion 300 mg/24 hours (XL) oral tablet, extended release: 1 tab(s) orally once a day (02 Sep 2022 11:47)  folic acid 1 mg oral tablet: 1 tab(s) orally once a day (12 Mar 2022 21:41)  gabapentin 300 mg oral capsule: 1 cap(s) orally 3 times a day (12 Mar 2022 21:41)  methadone 5 mg oral tablet: 135 milligram(s) orally once a day (29 Mar 2022 11:35)  mirtazapine 30 mg oral tablet: 1 tab(s) orally once a day (at bedtime) (02 Sep 2022 11:47)  Multiple Vitamins oral tablet: 1 tab(s) orally once a day (12 Mar 2022 21:41)  nicotine 21 mg/24 hr transdermal film, extended release: 1 patch transdermal once a day (12 Mar 2022 21:41)  QUEtiapine 200 mg oral tablet: 1 tab(s) orally once a day (at bedtime) (02 Sep 2022 11:47)                           MEDICATIONS:  STANDING MEDICATIONS  ALPRAZolam 1 milliGRAM(s) Oral every 8 hours  buPROPion XL (24-Hour) . 300 milliGRAM(s) Oral daily  cefepime   IVPB      cefepime   IVPB 2000 milliGRAM(s) IV Intermittent every 8 hours  folic acid 1 milliGRAM(s) Oral daily  heparin   Injectable 11460 Unit(s) IV Push once  heparin  Infusion. 2900 Unit(s)/Hr IV Continuous <Continuous>  magnesium sulfate  IVPB 2 Gram(s) IV Intermittent once  multivitamin 1 Tablet(s) Oral daily  nicotine - 21 mG/24Hr(s) Patch 1 Patch Transdermal daily  pantoprazole    Tablet 40 milliGRAM(s) Oral before breakfast  thiamine 100 milliGRAM(s) Oral daily    PRN MEDICATIONS  acetaminophen     Tablet .. 650 milliGRAM(s) Oral every 6 hours PRN  aluminum hydroxide/magnesium hydroxide/simethicone Suspension 30 milliLiter(s) Oral every 4 hours PRN  cyclobenzaprine 10 milliGRAM(s) Oral three times a day PRN  haloperidol    Injectable 5 milliGRAM(s) IntraMuscular every 8 hours PRN  heparin   Injectable 21027 Unit(s) IV Push every 6 hours PRN  heparin   Injectable 5000 Unit(s) IV Push every 6 hours PRN  melatonin 3 milliGRAM(s) Oral at bedtime PRN  ondansetron Injectable 4 milliGRAM(s) IV Push every 8 hours PRN                                            ------------------------------------------------------------  VITAL SIGNS: Last 24 Hours  T(C): 37.1 (19 Oct 2022 04:00), Max: 37.3 (18 Oct 2022 11:49)  T(F): 98.8 (19 Oct 2022 04:00), Max: 99.1 (18 Oct 2022 11:49)  HR: 81 (19 Oct 2022 08:00) (75 - 93)  BP: 100/60 (19 Oct 2022 08:00) (81/46 - 133/76)  BP(mean): 75 (19 Oct 2022 08:00) (57 - 93)  RR: 12 (19 Oct 2022 08:00) (10 - 29)  SpO2: 97% (19 Oct 2022 08:00) (75% - 100%)      10-18-22 @ 07:01  -  10-19-22 @ 07:00  --------------------------------------------------------  IN: 618 mL / OUT: 875 mL / NET: -257 mL    10-19-22 @ 07:01  -  10-19-22 @ 09:01  --------------------------------------------------------  IN: 0 mL / OUT: 120 mL / NET: -120 mL                                             --------------------------------------------------------------  LABS:                        8.8    8.51  )-----------( 333      ( 19 Oct 2022 05:12 )             29.0     10-19    132<L>  |  91<L>  |  7<L>  ----------------------------<  140<H>  4.0   |  34<H>  |  <0.5<L>    Ca    8.5      19 Oct 2022 05:12  Mg     1.7     10-19    TPro  6.1  /  Alb  2.5<L>  /  TBili  0.3  /  DBili  x   /  AST  15  /  ALT  8   /  AlkPhos  84  10-19    PTT - ( 19 Oct 2022 02:25 )  PTT:26.6 sec                                                            --------------------------------------------------------------    PHYSICAL EXAM:  GENERAL: Awake, alert, oriented to person, place, time, situation. Overweight, laying in bed appearing in no acute distress  HEENT: No FNDs, atraumatic, normocephalic  LUNGS: Hypoventilation  HEART: S1/S2. No heaves or thrills  ABD: Distended but soft  EXT/NEURO: Contractures overlying bilateral dorsal wrists and fingers. Minimal ROM of bilateral wrists and fingers. Otherwise strength, sensation, ROM grossly intact.  SKIN: Scar tissue of bilateral forearms. No edema    PLAN:  #Acute Submassive PE  -CT on admission with bilateral submassive PEs + concern for RV strain  -TTE: EF 55%, G1DD, mild TVR, no right heart strain  -VA duplx LE vein: Acute thrombus within the left common femoral, femoral, deep femoral, popliteal, gastrocnemius, posterior tibial and anterior tibial veins.  -s/p LLE DVT embolectomy 10/18  -c/w hep gtt for now, f/u IR for lovenox    #Acute hypoxemic respiratory failure  #Pneumonia  #Hx DIONICIO/OHS non-compliant with BIPAP or home CPAP  -currently on HFNC, attempting to wean daily  -encourage BIPAP o/n in place of CPAP  -legionella (-) (dc'd levo 10/19)  -c/w cefepime for now  -can dc levo if legionella negative    #Hx MSSA bacteremia  #Hx Epidural phlegmon/OM  #Hx R Psoas Abscess  -re-eval per ID, rec MR spine to r/o another source of infection  -Per NSG, pt able to receive MR even with recent spinal fusion  -MR w&w/o CTL spine once pt off HFNC    #HO IVDA, Opioid Abuse on Methadone   #Agitation  -followed by psych  -c/w methadone  -c/w lowering of benzo (xanax) dose, pt on 6mg per day at home    # DVT prophylaxis   -therapeutic lovenox    # GI prophylaxis   -protonix    # Diet   -DASH/TLC  -1:1 given upper extremity contractures    #Progress Note Handoff  Pending (specify): AC, weaning O2, down titration of benzos  Family discussion:

## 2022-10-19 NOTE — PROGRESS NOTE ADULT - SUBJECTIVE AND OBJECTIVE BOX
Patient is a 52y old  Male who presents with a chief complaint of Hypoxia (19 Oct 2022 07:24)        Over Night Events:    s/p thrombectomy last night  no acute overnight events  still on 60%/50L    ROS:     All ROS are negative except HPI       PHYSICAL EXAM    ICU Vital Signs Last 24 Hrs  T(C): 37.1 (19 Oct 2022 04:00), Max: 37.3 (18 Oct 2022 11:49)  T(F): 98.8 (19 Oct 2022 04:00), Max: 99.1 (18 Oct 2022 11:49)  HR: 79 (19 Oct 2022 06:00) (75 - 93)  BP: 104/60 (19 Oct 2022 06:00) (81/46 - 133/76)  BP(mean): 74 (19 Oct 2022 06:00) (57 - 93)  ABP: --  ABP(mean): --  RR: 22 (19 Oct 2022 06:00) (10 - 29)  SpO2: 75% (19 Oct 2022 06:00) (75% - 100%)    O2 Parameters below as of 19 Oct 2022 06:00  Patient On (Oxygen Delivery Method): nasal cannula, high flow  O2 Flow (L/min): 50  O2 Concentration (%): 60        GENERAL: Awake, alert, oriented to person, place, time, situation. Well-nourished, laying in bed appearing in no acute distress  HEENT: No FNDs, atraumatic, normocephalic  LUNGS: Clear to auscultation bilaterally  HEART: S1/S2. No heaves or thrills  ABD: Soft, non-tender, non-distended.  EXT/NEURO: Strength, sensation and ROM grossly intact.  SKIN: No edema      10-18-22 @ 07:01  -  10-19-22 @ 07:00  --------------------------------------------------------  IN:    IV PiggyBack: 518 mL    Oral Fluid: 100 mL  Total IN: 618 mL    OUT:    Voided (mL): 875 mL  Total OUT: 875 mL    Total NET: -257 mL          LABS:                            8.8    8.51  )-----------( 333      ( 19 Oct 2022 05:12 )             29.0                                               10-19    132<L>  |  91<L>  |  7<L>  ----------------------------<  140<H>  4.0   |  34<H>  |  <0.5<L>    Ca    8.5      19 Oct 2022 05:12  Mg     1.7     10-19    TPro  6.1  /  Alb  2.5<L>  /  TBili  0.3  /  DBili  x   /  AST  15  /  ALT  8   /  AlkPhos  84  10-19      PTT - ( 19 Oct 2022 02:25 )  PTT:26.6 sec                                                                                     LIVER FUNCTIONS - ( 19 Oct 2022 05:12 )  Alb: 2.5 g/dL / Pro: 6.1 g/dL / ALK PHOS: 84 U/L / ALT: 8 U/L / AST: 15 U/L / GGT: x                                                                                                                                       MEDICATIONS  (STANDING):  ALPRAZolam 1 milliGRAM(s) Oral every 8 hours  buPROPion XL (24-Hour) . 300 milliGRAM(s) Oral daily  cefepime   IVPB      cefepime   IVPB 2000 milliGRAM(s) IV Intermittent every 8 hours  folic acid 1 milliGRAM(s) Oral daily  heparin   Injectable 51903 Unit(s) IV Push once  heparin  Infusion. 2900 Unit(s)/Hr (29 mL/Hr) IV Continuous <Continuous>  levoFLOXacin IVPB      levoFLOXacin IVPB 750 milliGRAM(s) IV Intermittent every 24 hours  magnesium sulfate  IVPB 2 Gram(s) IV Intermittent once  multivitamin 1 Tablet(s) Oral daily  nicotine - 21 mG/24Hr(s) Patch 1 Patch Transdermal daily  pantoprazole    Tablet 40 milliGRAM(s) Oral before breakfast  thiamine 100 milliGRAM(s) Oral daily    MEDICATIONS  (PRN):  acetaminophen     Tablet .. 650 milliGRAM(s) Oral every 6 hours PRN Temp greater or equal to 38C (100.4F)  aluminum hydroxide/magnesium hydroxide/simethicone Suspension 30 milliLiter(s) Oral every 4 hours PRN Dyspepsia  cyclobenzaprine 10 milliGRAM(s) Oral three times a day PRN Muscle Spasm  haloperidol    Injectable 5 milliGRAM(s) IntraMuscular every 8 hours PRN Agitation  heparin   Injectable 42603 Unit(s) IV Push every 6 hours PRN For aPTT less than 40  heparin   Injectable 5000 Unit(s) IV Push every 6 hours PRN For aPTT between 40 - 57  melatonin 3 milliGRAM(s) Oral at bedtime PRN Insomnia  ondansetron Injectable 4 milliGRAM(s) IV Push every 8 hours PRN Nausea and/or Vomiting      New X-rays reviewed:       ECHO reviewed    CXR interpreted by me:  10/19 compared with 10/18, images and read reviewed by me, by my read stable infiltrate of the right lung

## 2022-10-19 NOTE — CHART NOTE - NSCHARTNOTEFT_GEN_A_CORE
HPI:    53yo male     PMHx of IVDU (heroin), vertebral osteomyelitis s/p debridement, and MSSA bacteremia    Presenting from rehab facility for "low oxygen".     Per patient, he is not experiencing any SOB/HARDWICK or CP or pain anywhere on his body and is entirely asymptomatic.  Of note he has recently noticed new LE edema for the past 2 days.     Per EMS, on arrival he was satting in the 70s, they started the patient on 15L via NRB, with improvement of SaO2 to 80s. No other interventions in the field.    In the ED :  BP: 105/78    RR : 28  T : 100.7  O2: 85% on  15 L NRB      WBC : 20   Lac : 3  BNP : 6.7k (no baseline)  VBG : pH 7.34  ,HCO3 32 , CO2, 59  ECG : sinus tachy   CXR: Increased interstitial and/or vascular markings. Possible right focal   upper lung field consolidation. HPI:    51yo male     PMHx of IVDU (heroin), vertebral osteomyelitis s/p debridement, and MSSA bacteremia    Presenting from rehab facility for "low oxygen".     Per patient, he is not experiencing any SOB/HARDWICK or CP or pain anywhere on his body and is entirely asymptomatic.  Of note he has recently noticed new LE edema for the past 2 days.     Per EMS, on arrival he was satting in the 70s, they started the patient on 15L via NRB, with improvement of SaO2 to 80s. No other interventions in the field.    In the ED :  BP: 105/78    RR : 28  T : 100.7  O2: 85% on  15 L NRB      WBC : 20   Lac : 3  BNP : 6.7k (no baseline)  VBG : pH 7.34  ,HCO3 32 , CO2, 59  ECG : sinus tachy   CXR: Increased interstitial and/or vascular markings. Possible right focal   upper lung field consolidation.    Pt was admitted to SDU for possible PNA    SDU Course:    CTA was done, I was contacted by the radiologist as patient was found to have bilateral pulmonary emboli with evidence of right ventricular strain. Pulmonary trunk enlargement, compatible with pulmonary hypertension. Numerous bilateral patchy opacities with confluent areas of consolidation predominantly in the posterior lung fields and bilateral perihilar lymphadenopathy, suspicious for pneumonia.     Pt was started on therapeutic Lovenox and Crit Care fellow was contacted.    It was determined to upgrade patients admission from SDU to ICU level care.    CCU COurse:    Pt had echo performed without concern for R heart strain. IR stated no indication for pulmonary arterial embolectomy. VA duplex revealed significant LLE clot burden including proximal and distal DVTs. IR reconsulted and performed LLE Embolectomy 10/18/22.    Findings/Follow up Plan of Care:  Thrombus extending from below-knee left popliteal vein to superficial left femoral vein.  Pelvic veins and IVC widely patent.  Thrombectomy performed via left popliteal vein approach.  Diseased portions of vein treated with 8 mm x 40 mm angioplasty, with fair anatomic result.  By procedure end, there was significant clearing of thrombus-- acute, sub-acute and chronic-- with flow largely restored, centrally; some residual disease remains, however.  Patient tolerated the procedure pretty well, without incident.  Purse-string suture placed at left popliteal dermatotomy-- IR to D/C this suture tomorrow.  Please maintain left leg immobilizer for now.   Findings d/w patient and his mother immediately post-procedure, with understanding.    Specimens Removed:  None    Implants:  None    Complications:  None immediate.    Disposition:  Back to floor; resume anticoagulation as per primary team.  HoB < 30 degrees; please keep left leg straight with immobilizer for tonight.  IR to D/C left popliteal suture tomorrow.    Pt continues to require HFNC, attempted NC 10/19 and pt began to desat. Pt has refused BIPAP overnight (known hx DIONICIO), but stated he will attempt 10/19 overnight. Pt changed from hep gtt to therapeutic lovenox per sheba with IR    FOR FOLLOW UP:  1. Wean O2. Encourage BIPAP, likely to alleviate somnolence and help remove HFNC  2. Monitoring post-op. Pt currently POD#1 LLE thrombectomy, consider repeat CTA chest if pt develops chest pain or worsening SOB  3. MRI- pt requires MRI C/T/L spine w&w/o contrast to assess spinal phlegmon and interval changes.

## 2022-10-19 NOTE — PROGRESS NOTE ADULT - CRITICAL CARE ATTENDING COMMENT
This patient is critically ill due to the following:  * Multiple organ failure requiring complex decision-making, and there is a high probability of imminent or life-threatening deterioration in the patient’s condition  * The patient required frequent reassessments and monitoring to ensure response to interventions and therapies.    Critical care time includes time spent evaluating and treating the patient's acute illness as well as time spent reviewing labs, radiology,  and discussing the case with a multidisciplinary team in an effort to prevent further life threatening deterioration or end organ damage. This time is independent of any procedures performed.

## 2022-10-19 NOTE — ADVANCED PRACTICE NURSE CONSULT - ASSESSMENT
51yo male  with PMHx of IVDU (heroin), vertebral osteomyelitis s/p debridement, and MSSA bacteremia, Presenting from rehab facility (10/11) for "low oxygen".  Per patient, he is not experiencing any SOB/HARDWICK or CP or pain anywhere on his body and is entirely  asymptomatic. Of note he has recently noticed new LE edema for the past 2 days.  Per EMS, on arrival he was satting in the 70s, they started the patient on 15L via NRB, with improvement of SaO2 to 80s. No other interventions in the field.  In the ED : BP: 105/78  RR : 28 T : 100.7 O2: 85% on  15 L NRB ; WBC : 20  Lac : 3 BNP : 6.7k (no baseline) VBG : pH 7.34  ,HCO3 32 , CO2, 59 ECG : sinus tachy  CXR: Increased interstitial and/or vascular markings. Possible right focal  upper lung field consolidation.  Currently admitted to critical care with the primary diagnosis of submassive PE, today is hospital day-8d' in CCU, being managed for Acute Submassive PE; Acute hypoxemic respiratory failure; Pneumonia; Hx DIONICIO/OHS non-compliant with BIPAP or home CPAP; Hx MSSA bacteremia; Hx Epidural phlegmon/OM; Hx R Psoas Abscess; HO IVDA, Opioid Abuse on Methadone   Agitation.    Received patient in CCU, laying in bed, turned to right side, HOB elevated 30 degrees. Pt awake, A&Ox3, made aware of purpose of WOCN visit, agreeable to consult. Patient able to turn independently to right side for skin assessment. Foam Allevyn dressings to mid spine & left buttock in place, dressings removed.  White macerated skin to mid upper spine.     Type of wound: Stage 2 pressure injury; POA   Location: left buttock   Wound measurements: 1cm x 1cm x 0.2cm  Tunneling/Undermining: none  Wound bed: pink tissue   Wound edges: attached, macerated  Periwound: intact  Wound exudate: none  Wound odor: none  Induration, erythema, crepitus, warmth: none  Wound pain: denies & no s/s pain present on assessment     Patient  currently bedbound, able to turn/position in bed independently, reports continent of urine and stool. Ordered for regular diet, probably inadequate intake as per reported Gino score.

## 2022-10-20 DIAGNOSIS — F19.94 OTHER PSYCHOACTIVE SUBSTANCE USE, UNSPECIFIED WITH PSYCHOACTIVE SUBSTANCE-INDUCED MOOD DISORDER: ICD-10-CM

## 2022-10-20 LAB
ALBUMIN SERPL ELPH-MCNC: 2.8 G/DL — LOW (ref 3.5–5.2)
ALP SERPL-CCNC: 80 U/L — SIGNIFICANT CHANGE UP (ref 30–115)
ALT FLD-CCNC: 9 U/L — SIGNIFICANT CHANGE UP (ref 0–41)
ANION GAP SERPL CALC-SCNC: 6 MMOL/L — LOW (ref 7–14)
AST SERPL-CCNC: 14 U/L — SIGNIFICANT CHANGE UP (ref 0–41)
BASOPHILS # BLD AUTO: 0.04 K/UL — SIGNIFICANT CHANGE UP (ref 0–0.2)
BASOPHILS NFR BLD AUTO: 0.5 % — SIGNIFICANT CHANGE UP (ref 0–1)
BILIRUB SERPL-MCNC: 0.2 MG/DL — SIGNIFICANT CHANGE UP (ref 0.2–1.2)
BUN SERPL-MCNC: 8 MG/DL — LOW (ref 10–20)
CALCIUM SERPL-MCNC: 8.3 MG/DL — LOW (ref 8.4–10.5)
CHLORIDE SERPL-SCNC: 95 MMOL/L — LOW (ref 98–110)
CO2 SERPL-SCNC: 36 MMOL/L — HIGH (ref 17–32)
CREAT SERPL-MCNC: 0.6 MG/DL — LOW (ref 0.7–1.5)
EGFR: 115 ML/MIN/1.73M2 — SIGNIFICANT CHANGE UP
EOSINOPHIL # BLD AUTO: 0.3 K/UL — SIGNIFICANT CHANGE UP (ref 0–0.7)
EOSINOPHIL NFR BLD AUTO: 3.4 % — SIGNIFICANT CHANGE UP (ref 0–8)
GLUCOSE SERPL-MCNC: 143 MG/DL — HIGH (ref 70–99)
HCT VFR BLD CALC: 29.1 % — LOW (ref 42–52)
HGB BLD-MCNC: 8.8 G/DL — LOW (ref 14–18)
IMM GRANULOCYTES NFR BLD AUTO: 2.5 % — HIGH (ref 0.1–0.3)
LYMPHOCYTES # BLD AUTO: 1.87 K/UL — SIGNIFICANT CHANGE UP (ref 1.2–3.4)
LYMPHOCYTES # BLD AUTO: 21.4 % — SIGNIFICANT CHANGE UP (ref 20.5–51.1)
MAGNESIUM SERPL-MCNC: 1.9 MG/DL — SIGNIFICANT CHANGE UP (ref 1.8–2.4)
MCHC RBC-ENTMCNC: 27.9 PG — SIGNIFICANT CHANGE UP (ref 27–31)
MCHC RBC-ENTMCNC: 30.2 G/DL — LOW (ref 32–37)
MCV RBC AUTO: 92.4 FL — SIGNIFICANT CHANGE UP (ref 80–94)
MONOCYTES # BLD AUTO: 1.02 K/UL — HIGH (ref 0.1–0.6)
MONOCYTES NFR BLD AUTO: 11.7 % — HIGH (ref 1.7–9.3)
NEUTROPHILS # BLD AUTO: 5.27 K/UL — SIGNIFICANT CHANGE UP (ref 1.4–6.5)
NEUTROPHILS NFR BLD AUTO: 60.5 % — SIGNIFICANT CHANGE UP (ref 42.2–75.2)
NRBC # BLD: 0 /100 WBCS — SIGNIFICANT CHANGE UP (ref 0–0)
PLATELET # BLD AUTO: 369 K/UL — SIGNIFICANT CHANGE UP (ref 130–400)
POTASSIUM SERPL-MCNC: 4.3 MMOL/L — SIGNIFICANT CHANGE UP (ref 3.5–5)
POTASSIUM SERPL-SCNC: 4.3 MMOL/L — SIGNIFICANT CHANGE UP (ref 3.5–5)
PROT SERPL-MCNC: 5.9 G/DL — LOW (ref 6–8)
RAPID RVP RESULT: SIGNIFICANT CHANGE UP
RBC # BLD: 3.15 M/UL — LOW (ref 4.7–6.1)
RBC # FLD: 16.5 % — HIGH (ref 11.5–14.5)
SARS-COV-2 RNA SPEC QL NAA+PROBE: SIGNIFICANT CHANGE UP
SODIUM SERPL-SCNC: 137 MMOL/L — SIGNIFICANT CHANGE UP (ref 135–146)
WBC # BLD: 8.72 K/UL — SIGNIFICANT CHANGE UP (ref 4.8–10.8)
WBC # FLD AUTO: 8.72 K/UL — SIGNIFICANT CHANGE UP (ref 4.8–10.8)

## 2022-10-20 PROCEDURE — 93010 ELECTROCARDIOGRAM REPORT: CPT

## 2022-10-20 PROCEDURE — 71045 X-RAY EXAM CHEST 1 VIEW: CPT | Mod: 26

## 2022-10-20 PROCEDURE — 99233 SBSQ HOSP IP/OBS HIGH 50: CPT

## 2022-10-20 RX ADMIN — CEFEPIME 100 MILLIGRAM(S): 1 INJECTION, POWDER, FOR SOLUTION INTRAMUSCULAR; INTRAVENOUS at 13:07

## 2022-10-20 RX ADMIN — ENOXAPARIN SODIUM 140 MILLIGRAM(S): 100 INJECTION SUBCUTANEOUS at 06:27

## 2022-10-20 RX ADMIN — Medication 1 TABLET(S): at 12:02

## 2022-10-20 RX ADMIN — Medication 1 PATCH: at 07:00

## 2022-10-20 RX ADMIN — Medication 1 MILLIGRAM(S): at 06:41

## 2022-10-20 RX ADMIN — Medication 100 MILLIGRAM(S): at 11:59

## 2022-10-20 RX ADMIN — Medication 1 MILLIGRAM(S): at 12:02

## 2022-10-20 RX ADMIN — Medication 1 PATCH: at 19:00

## 2022-10-20 RX ADMIN — BUPROPION HYDROCHLORIDE 300 MILLIGRAM(S): 150 TABLET, EXTENDED RELEASE ORAL at 12:04

## 2022-10-20 RX ADMIN — CEFEPIME 100 MILLIGRAM(S): 1 INJECTION, POWDER, FOR SOLUTION INTRAMUSCULAR; INTRAVENOUS at 06:27

## 2022-10-20 RX ADMIN — Medication 1 MILLIGRAM(S): at 20:20

## 2022-10-20 RX ADMIN — METHADONE HYDROCHLORIDE 135 MILLIGRAM(S): 40 TABLET ORAL at 13:05

## 2022-10-20 RX ADMIN — ENOXAPARIN SODIUM 140 MILLIGRAM(S): 100 INJECTION SUBCUTANEOUS at 19:01

## 2022-10-20 RX ADMIN — Medication 1 PATCH: at 12:30

## 2022-10-20 RX ADMIN — PANTOPRAZOLE SODIUM 40 MILLIGRAM(S): 20 TABLET, DELAYED RELEASE ORAL at 06:29

## 2022-10-20 RX ADMIN — CEFEPIME 100 MILLIGRAM(S): 1 INJECTION, POWDER, FOR SOLUTION INTRAMUSCULAR; INTRAVENOUS at 21:24

## 2022-10-20 RX ADMIN — Medication 1 PATCH: at 12:04

## 2022-10-20 NOTE — BH CONSULTATION LIAISON PROGRESS NOTE - NSBHCHARTREVIEWINVESTIGATE_PSY_A_CORE FT
< from: 12 Lead ECG (10.16.22 @ 05:39) >    Ventricular Rate 81 BPM    Atrial Rate 81 BPM    P-R Interval 132 ms    QRS Duration 104 ms    Q-T Interval 394 ms    QTC Calculation(Bazett) 457 ms    P Axis 40 degrees    R Axis 43 degrees    T Axis 5 degrees    Diagnosis Line Normal sinus rhythm  Normal ECG    Confirmed by Victor Hugo Marquis ([id number]) on 10/17/2022 9:17:37 AM    < end of copied text >      
< from: 12 Lead ECG (10.20.22 @ 08:05) >    Ventricular Rate 85 BPM    Atrial Rate 85 BPM    P-R Interval 140 ms    QRS Duration 92 ms    Q-T Interval 364 ms    QTC Calculation(Bazett) 433 ms    P Axis 30 degrees    R Axis 24 degrees    T Axis 33 degrees    Diagnosis Line *** Poor data quality, interpretation may be adversely affected  Normal sinus rhythm  Normal ECG    Confirmed by Victor Hugo Marquis ([id number]) on 10/20/2022 9:23:43 AM    < end of copied text >    
< from: 12 Lead ECG (10.19.22 @ 05:47) >      Ventricular Rate 80 BPM    Atrial Rate 80 BPM    P-R Interval 144 ms    QRS Duration 94 ms    Q-T Interval 388 ms    QTC Calculation(Bazett) 447 ms    P Axis 46 degrees    R Axis 52 degrees    T Axis 56 degrees    Diagnosis Line Normal sinus rhythm  Normal ECG    Confirmed by CHECO PALOMINO MD ([id number]) on 10/19/2022 6:48:12 AM    < end of copied text >

## 2022-10-20 NOTE — PROGRESS NOTE ADULT - SUBJECTIVE AND OBJECTIVE BOX
MIKAEL MCDERMOTT  52y Male    CHIEF COMPLAINT:    Patient is a 52y old  Male who presents with a chief complaint of Hypoxia (20 Oct 2022 09:05)    INTERVAL HPI/OVERNIGHT EVENTS:    Patient seen and examined. No acute events overnight. Downgraded from CCU, clinically better, on NC     ROS: All other systems are negative.    Vital Signs:    T(F): 98.7 (10-20-22 @ 11:55), Max: 99.2 (10-19-22 @ 23:26)  HR: 79 (10-20-22 @ 11:55) (65 - 86)  BP: 104/62 (10-20-22 @ 11:55) (72/40 - 118/58)  RR: 18 (10-20-22 @ 11:55) (17 - 33)  SpO2: 98% (10-20-22 @ 11:55) (83% - 100%)    19 Oct 2022 07:01  -  20 Oct 2022 07:00  --------------------------------------------------------  IN: 1010 mL / OUT: 1320 mL / NET: -310 mL    Daily Weight in k.7 (20 Oct 2022 07:00)    PHYSICAL EXAM:    GENERAL:  NAD  SKIN: No rashes or lesions  HEENT: Atraumatic. Normocephalic.    NECK: Supple, No JVD.   PULMONARY: decreased breath sounds B/L. No wheezing.   CVS: Normal S1, S2. Rate and Rhythm are regular   ABDOMEN/GI: Soft, Nontender, Nondistended   MSK:  No clubbing or cyanosis   NEUROLOGIC: Moves all extremities   PSYCH: Awake and alert, answers questions appropriately     Consultant(s) Notes Reviewed:  [x ] YES  [ ] NO  Care Discussed with Consultants/Other Providers [ x] YES  [ ] NO    LABS:                        8.8    8.72  )-----------( 369      ( 20 Oct 2022 06:17 )             29.1    137  |  95<L>  |  8<L>  ----------------------------<  143<H>  4.3   |  36<H>  |  0.6<L>    Ca    8.3<L>      20 Oct 2022 06:17  Mg     1.9     10-20    TPro  5.9<L>  /  Alb  2.8<L>  /  TBili  0.2  /  DBili  x   /  AST  14  /  ALT  9   /  AlkPhos  80  10-20    PTT - ( 19 Oct 2022 12:17 )  PTT:52.5 sec    RADIOLOGY & ADDITIONAL TESTS:  Imaging or report Personally Reviewed:  [x] YES  [ ] NO  EKG reviewed: [x] YES  [ ] NO    Medications:  Standing  ALPRAZolam 1 milliGRAM(s) Oral every 8 hours  buPROPion XL (24-Hour) . 300 milliGRAM(s) Oral daily  cefepime   IVPB      cefepime   IVPB 2000 milliGRAM(s) IV Intermittent every 8 hours  enoxaparin Injectable 140 milliGRAM(s) SubCutaneous every 12 hours  folic acid 1 milliGRAM(s) Oral daily  methadone    Tablet 135 milliGRAM(s) Oral <User Schedule>  multivitamin 1 Tablet(s) Oral daily  nicotine - 21 mG/24Hr(s) Patch 1 Patch Transdermal daily  pantoprazole    Tablet 40 milliGRAM(s) Oral before breakfast  thiamine 100 milliGRAM(s) Oral daily    PRN Meds  acetaminophen     Tablet .. 650 milliGRAM(s) Oral every 6 hours PRN  aluminum hydroxide/magnesium hydroxide/simethicone Suspension 30 milliLiter(s) Oral every 4 hours PRN  cyclobenzaprine 10 milliGRAM(s) Oral three times a day PRN  haloperidol    Injectable 5 milliGRAM(s) IntraMuscular every 8 hours PRN  melatonin 3 milliGRAM(s) Oral at bedtime PRN  ondansetron Injectable 4 milliGRAM(s) IV Push every 8 hours PRN

## 2022-10-20 NOTE — BH CONSULTATION LIAISON PROGRESS NOTE - NSBHCHARTREVIEWLAB_PSY_A_CORE FT
8.8    8.51  )-----------( 333      ( 19 Oct 2022 05:12 )             29.0   10-19    132<L>  |  91<L>  |  7<L>  ----------------------------<  140<H>  4.0   |  34<H>  |  <0.5<L>    Ca    8.5      19 Oct 2022 05:12  Mg     1.7     10-19    TPro  6.1  /  Alb  2.5<L>  /  TBili  0.3  /  DBili  x   /  AST  15  /  ALT  8   /  AlkPhos  84  10-19          
                      8.8    8.51  )-----------( 333      ( 19 Oct 2022 05:12 )             29.0   10-19    132<L>  |  91<L>  |  7<L>  ----------------------------<  140<H>  4.0   |  34<H>  |  <0.5<L>    Ca    8.5      19 Oct 2022 05:12  Mg     1.7     10-19    TPro  6.1  /  Alb  2.5<L>  /  TBili  0.3  /  DBili  x   /  AST  15  /  ALT  8   /  AlkPhos  84  10-19          
                               9.0    10.59 )-----------( 317      ( 17 Oct 2022 05:39 )             29.3

## 2022-10-20 NOTE — BH CONSULTATION LIAISON PROGRESS NOTE - NSBHADMITCOORDWITH_PSY_A_CORE
medical staff/outpatient provider/social work/family/Caregiver

## 2022-10-20 NOTE — BH CONSULTATION LIAISON PROGRESS NOTE - NSBHPSYCHOLCOGORIENT_PSY_A_CORE
Not fully oriented...
Oriented to time, place, person, situation
Oriented to time, place, person, situation

## 2022-10-20 NOTE — BH CONSULTATION LIAISON PROGRESS NOTE - NSBHADMITCOUNSEL_PSY_A_CORE
diagnostic results/impressions and/or recommended studies/risks and benefits of treatment options/instructions for management, treatment and follow up/importance of adherence to chosen treatment/risk factor reduction/client/family/caregiver education

## 2022-10-20 NOTE — PROGRESS NOTE ADULT - SUBJECTIVE AND OBJECTIVE BOX
MIKAEL MCDERMOTT 52y Male  MRN#: [id number]   Hospital Day: 9d    SUBJECTIVE  Patient is a 52y old Male who presents with a chief complaint of Hypoxia (20 Oct 2022 14:08)  Currently admitted to medicine with the primary diagnosis of Pneumonia      INTERVAL HPI AND OVERNIGHT EVENTS:  Patient was examined and seen at bedside. This morning he is resting comfortably in bed and reports no issues or overnight events. This morning patient was eating breakfast with the HFNC off. Patient was saturating 79% with no symptoms. Patient was encourage to keep the nasal canula on.     REVIEW OF SYMPTOMS:  Denies all.     OBJECTIVE  PAST MEDICAL & SURGICAL HISTORY  IV drug abuse    Vertebral osteomyelitis    MSSA bacteremia    No significant past surgical history      ALLERGIES:  No Known Allergies    MEDICATIONS:  STANDING MEDICATIONS  ALPRAZolam 1 milliGRAM(s) Oral every 8 hours  buPROPion XL (24-Hour) . 300 milliGRAM(s) Oral daily  cefepime   IVPB 2000 milliGRAM(s) IV Intermittent every 8 hours  cefepime   IVPB      enoxaparin Injectable 140 milliGRAM(s) SubCutaneous every 12 hours  folic acid 1 milliGRAM(s) Oral daily  methadone    Tablet 135 milliGRAM(s) Oral <User Schedule>  multivitamin 1 Tablet(s) Oral daily  nicotine - 21 mG/24Hr(s) Patch 1 Patch Transdermal daily  pantoprazole    Tablet 40 milliGRAM(s) Oral before breakfast  thiamine 100 milliGRAM(s) Oral daily    PRN MEDICATIONS  acetaminophen     Tablet .. 650 milliGRAM(s) Oral every 6 hours PRN  aluminum hydroxide/magnesium hydroxide/simethicone Suspension 30 milliLiter(s) Oral every 4 hours PRN  cyclobenzaprine 10 milliGRAM(s) Oral three times a day PRN  haloperidol    Injectable 5 milliGRAM(s) IntraMuscular every 8 hours PRN  melatonin 3 milliGRAM(s) Oral at bedtime PRN  ondansetron Injectable 4 milliGRAM(s) IV Push every 8 hours PRN      VITAL SIGNS: Last 24 Hours  T(C): 37.1 (20 Oct 2022 11:55), Max: 37.3 (19 Oct 2022 23:26)  T(F): 98.7 (20 Oct 2022 11:55), Max: 99.2 (19 Oct 2022 23:26)  HR: 79 (20 Oct 2022 11:55) (65 - 86)  BP: 104/62 (20 Oct 2022 11:55) (72/40 - 118/58)  BP(mean): 77 (20 Oct 2022 11:55) (50 - 79)  RR: 18 (20 Oct 2022 11:55) (17 - 33)  SpO2: 98% (20 Oct 2022 11:55) (83% - 100%)    LABS:                        8.8    8.72  )-----------( 369      ( 20 Oct 2022 06:17 )             29.1     10-20    137  |  95<L>  |  8<L>  ----------------------------<  143<H>  4.3   |  36<H>  |  0.6<L>    Ca    8.3<L>      20 Oct 2022 06:17  Mg     1.9     10-20    TPro  5.9<L>  /  Alb  2.8<L>  /  TBili  0.2  /  DBili  x   /  AST  14  /  ALT  9   /  AlkPhos  80  10-20    PTT - ( 19 Oct 2022 12:17 )  PTT:52.5 sec      PHYSICAL EXAM:  CONSTITUTIONAL: No acute distress, well-developed, well-groomed, AAOx3  HEAD: Atraumatic, normocephalic  EYES: EOM intact, PERRLA, conjunctiva and sclera clear  ENT: High flow nasal cannula in place; moist mucous membranes  PULMONARY: Course breath sounds through out  CARDIOVASCULAR: Regular rate and rhythm  GASTROINTESTINAL: Soft, non-tender, non-distended; bowel sounds present  MUSCULOSKELETAL: 2+ peripheral pulses; +2 lower extremity edema  SKIN: No rashes or lesions; warm and dry    ASSESSMENT & PLAN:  53 yo M w/ pmhx of IVDU (heroin), vertebral osteomyelitis, and MSSA bacteremia presents for hypoxemia.     #Acute Submassive PE  -CT on admission with bilateral submassive PEs + concern for RV strain  -TTE: EF 55%, G1DD, mild TVR, no right heart strain  -VA duplx LE vein: Acute thrombus within the left common femoral, femoral, deep femoral, popliteal, gastrocnemius, posterior tibial and anterior tibial veins.  -s/p LLE DVT embolectomy 10/18    Plan:  - 140mg of Lovenox subcutaneous BID     #Acute hypoxemic respiratory failure  #Pneumonia  #Hx DIONICIO/OHS non-compliant with BIPAP or home CPAP  -currently on HFNC, attempting to wean daily  -encourage BIPAP o/n in place of CPAP  -legionella (-) (dc'd levo 10/19)    Plan:   -c/w cefepime for until 10/22  - then change to Cefazolin 2g q8h IV to target staph until MRI results and resolution of abscess    #Hx MSSA bacteremia  #Hx Epidural phlegmon/OM  #Hx R Psoas Abscess  -re-eval per ID, rec MR spine to r/o another source of infection  -Per NSG, pt able to receive MR even with recent spinal fusion  -MR w&w/o CTL spine once pt off HFNC    Plan:  - Patient went for MRI but patient cannot go though MRI with the high flow nasal canula on   - Once patient is off HFNC patient can go for MRI     #HO IVDA, Opioid Abuse on Methadone   #Agitation  -followed by psych    Plan:   -c/w methadone  -c/w lowering of benzo (xanax) dose, pt on 6mg per day at home      #Misc  - DVT Prophylaxis: Therapeutic lovenox  - GI Prophylaxis: Protonix  - Diet: Regular diet  - Activity: Out of bed as tolerated   - IV Fluids: None  - Code Status: Full code    Dispo: Home when medically stable

## 2022-10-20 NOTE — PROGRESS NOTE ADULT - ASSESSMENT
PMHx of IVDU (heroin), vertebral osteomyelitis s/p debridement, and MSSA bacteremia, presenting from rehab facility for "low oxygen" and new onset LE edema. In the ED was found to be hypoxic, found to have b/l PEs with radiographic evidence of R heart strain and admitted to ICU, found to have LE DVTs s/p LLE embolectomy 10/18, now downgraded to SDU     Acute hypoxemic respiratory failure  Acute Submassive PE  Suspected superimposed PNA  DIONICIO/OHS, noncompliant with NIV  -CT on admission with bilateral submassive PEs + concern for RV strain  -TTE: EF 55%, G1DD, mild TVR, no right heart strain  -VA duplx LE vein: Acute thrombus within the left common femoral, femoral, deep femoral, popliteal, gastrocnemius, posterior tibial and anterior tibial veins, s/p LLE embolectomy 10/18  - now on Lovenox, therapeutic  - Procal 7, blood cx 10/12 - no growth  - chest xray today with worsening effusion   - continue with Cefepime end 10/22, then  Cefazolin 2g q8h IV  - PT     Hx MSSA bacteremia  Hx Epidural phlegmon/OM  Hx R Psoas Abscess  -Per NSG, pt able to receive MR even with recent spinal fusion  -MR w&w/o CTL spine  - abx as above  - f/u neurosurgery once imaging complete     HO IVDA, Opioid Abuse on Methadone   Agitation  - improving, followed by psych: can resume Quentiapine 200 mg PO HS  - Continue with Xanax 1 mg po am, 1mg po afternoon and two times at night - total daily dose of 4mg  Continue Wellbutrin Xl 300mg po q 24hrs  Continue home dose Methadone 135mg po daily---> QTc 433 today 10/20  -For agitation, haldol 5mg po/IM every 8hrs prn    #Progress Note Handoff  Pending (specify): AC, weaning O2, monitor mental status, MRI, neurosx f/u, cw IV abx

## 2022-10-20 NOTE — PROGRESS NOTE ADULT - SUBJECTIVE AND OBJECTIVE BOX
MIKAEL MCDERMOTT  52y, Male  Allergy: No Known Allergies      LOS  9d    CHIEF COMPLAINT: Hypoxia (20 Oct 2022 12:04)      INTERVAL EVENTS/HPI  - No acute events overnight HFNC  - T(F): , Max: 99.2 (10-19-22 @ 23:26)  - Denies any worsening symptoms  - Tolerating medication  - WBC Count: 8.72 (10-20-22 @ 06:17)  WBC Count: 8.51 (10-19-22 @ 05:12)  - Creatinine, Serum: 0.6 (10-20-22 @ 06:17)  Creatinine, Serum: <0.5 (10-19-22 @ 05:12)       ROS  General: Denies rigors, nightsweats  HEENT: Denies headache, rhinorrhea, sore throat, eye pain  CV: Denies CP, palpitations  PULM: Denies wheezing, hemoptysis  GI: Denies hematemesis, hematochezia, melena  : Denies discharge, hematuria  MSK: Denies arthralgias, myalgias  SKIN: Denies rash, lesions  NEURO: Denies paresthesias, weakness  PSYCH: Denies depression, anxiety    VITALS:  T(F): 98.7, Max: 99.2 (10-19-22 @ 23:26)  HR: 79  BP: 104/62  RR: 18Vital Signs Last 24 Hrs  T(C): 37.1 (20 Oct 2022 11:55), Max: 37.3 (19 Oct 2022 23:26)  T(F): 98.7 (20 Oct 2022 11:55), Max: 99.2 (19 Oct 2022 23:26)  HR: 79 (20 Oct 2022 11:55) (65 - 86)  BP: 104/62 (20 Oct 2022 11:55) (72/40 - 118/58)  BP(mean): 77 (20 Oct 2022 11:55) (50 - 84)  RR: 18 (20 Oct 2022 11:55) (17 - 33)  SpO2: 98% (20 Oct 2022 11:55) (83% - 100%)    Parameters below as of 20 Oct 2022 11:55  Patient On (Oxygen Delivery Method): nasal cannula, high flow        PHYSICAL EXAM:  Gen: NAD, resting in bed obese HFNC  HEENT: Normocephalic, atraumatic  Neck: supple, no lymphadenopathy  CV: Regular rate & regular rhythm  Lungs: decreased BS at bases, no fremitus  Abdomen: Soft, BS present  Ext: Warm, well perfused  Neuro: non focal, awake  Skin: no rash, no erythema  Lines: no phlebitis    FH: Non-contributory  Social Hx: Non-contributory    TESTS & MEASUREMENTS:                        8.8    8.72  )-----------( 369      ( 20 Oct 2022 06:17 )             29.1     10-20    137  |  95<L>  |  8<L>  ----------------------------<  143<H>  4.3   |  36<H>  |  0.6<L>    Ca    8.3<L>      20 Oct 2022 06:17  Mg     1.9     10-20    TPro  5.9<L>  /  Alb  2.8<L>  /  TBili  0.2  /  DBili  x   /  AST  14  /  ALT  9   /  AlkPhos  80  10-20      LIVER FUNCTIONS - ( 20 Oct 2022 06:17 )  Alb: 2.8 g/dL / Pro: 5.9 g/dL / ALK PHOS: 80 U/L / ALT: 9 U/L / AST: 14 U/L / GGT: x               Culture - Blood (collected 10-12-22 @ 06:50)  Source: .Blood Blood  Final Report (10-17-22 @ 18:00):    No Growth Final            INFECTIOUS DISEASES TESTING  Rapid RVP Result: NotDetec (10-19-22 @ 14:10)  Legionella Antigen, Urine: Negative (10-18-22 @ 08:20)  COVID-19 PCR: NotDetec (10-16-22 @ 18:26)  Procalcitonin, Serum: 7.63 (10-12-22 @ 06:50)  COVID-19 PCR: NotDetec (10-11-22 @ 12:35)  COVID-19 PCR: NotDetec (09-06-22 @ 10:55)  HIV-1/2 Combo Result: Nonreact (08-28-22 @ 08:19)  COVID-19 PCR: NotDetec (08-28-22 @ 07:52)  COVID-19 PCR: NotDetec (08-22-22 @ 15:36)  COVID-19 PCR: NotDetec (08-18-22 @ 13:56)  Procalcitonin, Serum: 0.05 (08-17-22 @ 13:28)  COVID-19 PCR: NotDetec (08-14-22 @ 16:45)  COVID-19 PCR: NotDetec (08-12-22 @ 19:33)  MRSA PCR Result.: Negative (08-12-22 @ 05:00)  Rapid RVP Result: NotDetec (08-10-22 @ 15:03)  COVID-19 PCR: NotDetec (04-03-22 @ 12:23)  COVID-19 PCR: NotDetec (03-30-22 @ 10:49)  COVID-19 PCR: NotDetec (03-27-22 @ 18:31)  COVID-19 PCR: NotDetec (03-25-22 @ 06:30)  COVID-19 PCR: NotDetec (03-19-22 @ 06:10)  HIV-1/2 Combo Result: Nonreact (03-16-22 @ 12:10)  COVID-19 PCR: NotDetec (03-12-22 @ 20:19)  COVID-19 PCR: NotDetec (01-31-22 @ 09:10)  COVID-19 PCR: NotDetec (01-25-22 @ 11:21)  COVID-19 PCR: NotDetec (01-18-22 @ 18:45)  HIV-1/2 Combo Result: Nonreact (01-18-22 @ 04:30)  Procalcitonin, Serum: 0.13 (01-16-22 @ 16:00)  COVID-19 PCR: NotDetec (01-15-22 @ 23:32)      INFLAMMATORY MARKERS  Sedimentation Rate, Erythrocyte: 125 mm/Hr (08-11-22 @ 06:38)  C-Reactive Protein, Serum: 330.8 mg/L (08-11-22 @ 06:38)      RADIOLOGY & ADDITIONAL TESTS:  I have personally reviewed the last available Chest xray  CXR      CT      CARDIOLOGY TESTING  12 Lead ECG:   Ventricular Rate 85 BPM    Atrial Rate 85 BPM    P-R Interval 140 ms    QRS Duration 92 ms    Q-T Interval 364 ms    QTC Calculation(Bazett) 433 ms    P Axis 30 degrees    R Axis 24 degrees    T Axis 33 degrees    Diagnosis Line *** Poor data quality, interpretation may be adversely affected  Normal sinus rhythm  Normal ECG    Confirmed by Victor Hugo Marquis ([id number]) on 10/20/2022 9:23:43 AM (10-20-22 @ 08:05)  12 Lead ECG:   Ventricular Rate 80 BPM    Atrial Rate 80 BPM    P-R Interval 144 ms    QRS Duration 94 ms    Q-T Interval 388 ms    QTC Calculation(Bazett) 447 ms    P Axis 46 degrees    R Axis 52 degrees    T Axis 56 degrees    Diagnosis Line Normal sinus rhythm  Normal ECG    Confirmed by CHECO PALOMINO MD ([id number]) on 10/19/2022 6:48:12 AM (10-19-22 @ 05:47)      MEDICATIONS  ALPRAZolam 1 Oral every 8 hours  buPROPion XL (24-Hour) . 300 Oral daily  cefepime   IVPB 2000 IV Intermittent every 8 hours  cefepime   IVPB     enoxaparin Injectable 140 SubCutaneous every 12 hours  folic acid 1 Oral daily  methadone    Tablet 135 Oral <User Schedule>  multivitamin 1 Oral daily  nicotine - 21 mG/24Hr(s) Patch 1 Transdermal daily  pantoprazole    Tablet 40 Oral before breakfast  thiamine 100 Oral daily      WEIGHT  Weight (kg): 140.9 (10-18-22 @ 12:25)  Creatinine, Serum: 0.6 mg/dL (10-20-22 @ 06:17)      ANTIBIOTICS:  cefepime   IVPB 2000 milliGRAM(s) IV Intermittent every 8 hours  cefepime   IVPB          All available historical records have been reviewed

## 2022-10-20 NOTE — BH CONSULTATION LIAISON PROGRESS NOTE - NSBHASSESSMENTFT_PSY_ALL_CORE
52 year old male with PMHx of vertebral osteomyelitis, and MSSA bacteremia and previous psychiatric history of polysubstance abuse and IVDU on methadone,  admitted to the hospital for hypoxia, currently being treated for Acute hypoxemic respiratory  failure on  high flow, PNA, PE with RV strain - submassive,  Psychiatry was consulted for  medication evaluation.    There has been some improvement of mental status, as noted by his ability to have some conversation, however, he is still in delirium, which appear to be  due to multiple medical complications further exacerbated by medications.      Impression  Delirium, multifactorial  Heroin use disorder on Methadone 135mg po daily    Plan  -On going delirium work up as per primary team  Continue holding gabapentin 300mg po three times a day, mirtazapine 30mg po hs and Quetiapine 200mg po hs   Continue with xanax 1mg po am, 1mg po afternoon and two times at night (this is lower than home dose of 6mg po total daily dose)  Continue Methadone 135mg po daily, Wellbutrin Xl 300mg po q 24hrs  -For agitation, please utilize haldol 5mg po/IM every 8hrs prn   If delirium worsens, reconsult, otherwise, psychiatry will follow  on 10/17/22 for further medication reevaluation  
52 year old male with PMHx of vertebral osteomyelitis, and MSSA bacteremia and previous psychiatric history of polysubstance abuse and IVDU on methadone,  admitted to the hospital for hypoxia, currently being treated for Acute hypoxemic respiratory  failure on  high flow, PNA, PE with RV strain - submassive,  Psychiatry was consulted for  medication evaluation. Mirtazapine, Seroquel and Gabapentin are all on hold for now.  This morning mental status is markedly improved. There has been no withdrawal symptoms or exacerbation of mood symptoms while off the medications above. Since patient is still on high flow and risk of delirium remains a concern, for now we recommend to continue holding Mirtazapine, Seroquel and Gabapentin.         Impression  Delirium, multifactorial, marked improvement  Heroin use disorder on Methadone 135mg po daily    Plan  -On going delirium work up as per primary team  Continue holding gabapentin 300mg po three times a day, mirtazapine 30mg po hs and Quetiapine 200mg po hs for now.    Continue with xanax 1mg po am, 1mg po afternoon and two times at night (this is lower than home dose of 6mg po total daily dose)  Continue Methadone 135mg po daily, Wellbutrin Xl 300mg po q 24hrs  -For agitation, please utilize haldol 5mg po/IM every 8hrs prn   If delirium worsens, reconsult, otherwise,   We will continue to follow (on  10/24) while patient is here in the hospital for further medication reevaluation  
52 year old male with PMHx of vertebral osteomyelitis, and MSSA bacteremia and previous psychiatric history of polysubstance abuse and IVDU on methadone,  history of reported mood disorder, admitted to the hospital for hypoxia, currently being treated for Acute hypoxemic respiratory  failure on  high flow, PNA, PE with RV strain - submassive,  Psychiatry was consulted for  medication evaluation. Mirtazapine, Seroquel and Gabapentin are all on hold for now.    Delirium is markedly improved and patient is not exhibiting any manic or psychotic symptoms. However, seroquel is prescribed for underlying mood symptoms, and patient has been stable psychiatrically on this medication. We will restart only seroquel 200mg po hs (this is home dose that was recently lowered in the outpatient setting. We will not restart Mirtazapine, and Gabapentin patient is made aware and he is in agreement.          Impression  Delirium, multifactorial,  resolved.   Heroin use disorder on Methadone 135mg po daily    Plan  -On going delirium work up as per primary team    We will not restart gabapentin 300mg po three times a day or mirtazapine 30mg po hs   -Consider restarting Quetiapine 200mg po hs for now. Patient is to be discharged on this dose.    Continue with xanax 1mg po am, 1mg po afternoon and two times at night (this is lower than home dose of 6mg po total daily dose). Patient is  to be  discharged on the total 4mg po dose.   Continue Wellbutrin Xl 300mg po q 24hrs- Patient is to be discharged on this dose.    Continue home dose Methadone 135mg po daily  -For agitation, please utilize haldol 5mg po/IM every 8hrs prn  There is no psychiatric contraindication to discharge this patient, once he is medically cleared.  Patient will continue follow up with his psychiatrist and at the Methadone maintenance at [facility] program at [address] in [place] ([phone]).

## 2022-10-20 NOTE — BH CONSULTATION LIAISON PROGRESS NOTE - NSICDXBHSECONDARYDX_PSY_ALL_CORE
Heroin use disorder, severe, dependence   F11.20  

## 2022-10-20 NOTE — BH CONSULTATION LIAISON PROGRESS NOTE - NSICDXBHPRIMARYDX_PSY_ALL_CORE
Delirium due to multiple etiologies   F05  

## 2022-10-20 NOTE — BH CONSULTATION LIAISON PROGRESS NOTE - NSBHCONSULTFOLLOWAFTERCARE_PSY_A_CORE FT
Patient will continue follow up with Methadone maintenance at [facility] program at [address] in [place] ([phone]).  
Patient will continue follow up with his psychiatrist and at the Methadone maintenance at [facility] program at    [address] in [place] ([phone]).  
Patient will continue follow up with Methadone maintenance at [facility] program at [address] in [place] ([phone]).

## 2022-10-20 NOTE — PROGRESS NOTE ADULT - ASSESSMENT
ASSESSMENT  51yo male PMH IVDU (heroin), vertebral osteomyelitis s/p debridement, and recurrent MSSA bacteremia (8/2022, 1/2022)  Presenting from rehab facility for "low oxygen".   Seen by ID 8/2022 for NICOLE bacteremia with discitis/OM at L4-5.  CT Paravertebral phlegmon and left retropharyngeal abscess with air-fluid level.  8/10 BCx NICOLE  8/11,12,13,14,15 BCx NG  8/24 PAM no vegetations  PT was d/c with IV ancef 2 gm iv q8h ( since 8/16 )-8 weeks starting 8/11- 10/6    IMPRESSION  #Possible Severe sepsis on admission Pulse>90, Resp Rate>20, WBC>12, lactic acidosis    Found to have bilateral PE, Possible GNR PNA    Rapid RVP Result: NotDetec (10-19-22 @ 14:10)  s/p Procedure:   1.  Inferior Vena Cavography  2.  Pelvic Venography    3.  Left lower extremity Venography  4.  Left popliteal and femoral venous thrombectomies    10/12 BCX NGTD     Denies productive cough to suggest PNA    Has continued back pain     CT Bilateral pulmonary emboli with evidence of right ventricular strainPulmonary trunk enlargement, compatible with pulmonary hypertension.  Numerous bilateral patchy opacities with confluent areas of consolidation predominantly in the posterior lung fields and bilateral perihilar   lymphadenopathy, suspicious for pneumonia. Follow-up in 6 weeks' time is recommended.    Procalcitonin, Serum: 7.63 ng/mL (10-12-22 @ 06:50)  #Obesity BMI (kg/m2): 41  #Recurrent MSSA bacteremia with discitis   #IVDU  #HCV, RNA UD  Creatinine, Serum: 0.7 (10-13-22 @ 05:04)    Weight (kg): 140.9 (10-12-22 @ 09:30)    RECOMMENDATIONS  - cefepime   IVPB 2000 milliGRAM(s) IV Intermittent every 8 hours 10/13-, end 10/22, then would continue Cefazolin 2g q8h IV to target staph until MRI results. If no evidence of worsening infection, no further antibiotics needed  - MRI spine when stable  - NSYG     If any questions, please call or send a message on [employer] Teams  Please continue to update ID with any pertinent new laboratory or radiographic findings  #[id number]

## 2022-10-20 NOTE — PROGRESS NOTE ADULT - ASSESSMENT
IMPRESSION:    Acute hypoxemic resp failure on HFNC (declining BIPAP)  PNA likely aspiration  PE with RV strain - submassive  LLE DVT - s/p thrombectomy 10/19  hx Epidural Phlegmon, OM  hx Right Psoas Abscess   hx MSSA Bacteremia  HO IVDA, Opioid Abuse on Methadone   DOINICIO OHS refusing NIV     PLAN:    CNS: FU psych. Xanax taper per psychj.  Awaiting MRI of spine.  Neurosurgery eval pending MRI.    HEENT:  Oral care    PULMONARY:  HOB @ 45 degrees, aspiration precaution. Wean down Fio2 as tolerated.      CARDIOVASCULAR: Avoid overload      GI: GI prophylaxis; oral diet. Aspiration precautions.     RENAL:  Kidney function at baseline. Correct as needed.     INFECTIOUS DISEASE: ID following.  ABX per ID     HEMATOLOGICAL:  Continue DVT therapy.      ENDOCRINE:  Follow up FS.  Insulin protocol if needed.    Dispo: SDU today

## 2022-10-20 NOTE — BH CONSULTATION LIAISON PROGRESS NOTE - NSBHFUPINTERVALHXFT_PSY_A_CORE
Previously seen by psychiatry service for medication reevaluation.  He still on high flow O2  Fully awake, alert, oriented to place, person and situation. Despite the holding of  Seroquel, Gabapentin mirtazapine, while wellbutrin and methadone are are continued there is no adverse effect noted or reported. This morning he is observed to be very determined to have a good understanding of what happened to him and how he ends up in the hospital. he does not have full recollection of the course of events other initial difficulty breathing prior to arrival. His mood is very calm and cooperative. Otherwise there is no acute psychiatric symptoms noted or reported. No thought of dying, no psychosis elicited. 
Previously seen by psychiatry service for medication reevaluation, due to oversedation. Seroquel, Gabapentin are on hold. Xanax is lowered to 4mg po total daily dose. Methadone and Wellbutrin are continued.  Follow up done this morning. Patient reported no adverse effect with medications. He is not reporting any withdrawal symptoms. He denies feeling more anxious despite being on Xanax total 4mg po daily dose. He is observed to still be on High flow and but less lethargic,  and can participate in interview, with ability to answer appropriately. There is no exacerbation of mood symptoms. No feeling of hopelessness or worthlessness. He is not endorsing any suicidal ideation. 
There has been no abnormal behavior or agitated episode. He still of off Mirtazapine, Seroquel and Gabapentin. The dose of Xanax was also lowered .  On follow up today, mental status is continuously improving. He also mentions he slept well last night, despite being off of these medications. Furthermore, there is no exacerbation of anxious mood, despite the lowering of the dose of xanax and the presence of  medical condition. He is presenting with a bright affect, he is not exhibiting any depressed or psychotic symptoms. Furthermore, he is very hopeful about his future. 
no

## 2022-10-20 NOTE — PROGRESS NOTE ADULT - SUBJECTIVE AND OBJECTIVE BOX
Patient is a 52y old  Male who presents with a chief complaint of Hypoxia (19 Oct 2022 09:00)        Over Night Events:  Remains critically ill on HFNCO2.  Continue to refuse BiPAP during sleep.          ROS:     All ROS are negative except HPI         PHYSICAL EXAM    ICU Vital Signs Last 24 Hrs  T(C): 36.9 (20 Oct 2022 07:00), Max: 37.3 (19 Oct 2022 23:26)  T(F): 98.4 (20 Oct 2022 07:00), Max: 99.2 (19 Oct 2022 23:26)  HR: 73 (20 Oct 2022 07:00) (65 - 86)  BP: 90/51 (20 Oct 2022 07:00) (72/40 - 124/62)  BP(mean): 65 (20 Oct 2022 07:00) (50 - 90)  ABP: --  ABP(mean): --  RR: 18 (20 Oct 2022 07:00) (17 - 33)  SpO2: 91% (20 Oct 2022 07:00) (83% - 100%)    O2 Parameters below as of 20 Oct 2022 07:00  Patient On (Oxygen Delivery Method): nasal cannula, high flow            CONSTITUTIONAL:  Well nourished.  IN NAD    ENT:   Airway patent,   Mouth with normal mucosa.     EYES:   Pupils equal,   Round and reactive to light.    CARDIAC:   Normal rate,   Regular rhythm.        RESPIRATORY:   No wheezing  Bilateral BS  Normal chest expansion  Not tachypneic,  No use of accessory muscles    GASTROINTESTINAL:  Abdomen soft,   Non-tender,   No guarding,   + BS    MUSCULOSKELETAL:   Range of motion is not limited,  No clubbing, cyanosis    NEUROLOGICAL:   Alert and oriented   No motor  deficits.    SKIN:   Skin normal color for race,   No evidence of rash.    PSYCHIATRIC:   No apparent risk to self or others.      10-19-22 @ 07:01  -  10-20-22 @ 07:00  --------------------------------------------------------  IN:    IV PiggyBack: 50 mL    Oral Fluid: 960 mL  Total IN: 1010 mL    OUT:    Voided (mL): 1320 mL  Total OUT: 1320 mL    Total NET: -310 mL          LABS:                            8.8    8.72  )-----------( 369      ( 20 Oct 2022 06:17 )             29.1                                               10-20    137  |  95<L>  |  8<L>  ----------------------------<  143<H>  4.3   |  36<H>  |  0.6<L>    Ca    8.3<L>      20 Oct 2022 06:17  Mg     1.9     10-20    TPro  5.9<L>  /  Alb  2.8<L>  /  TBili  0.2  /  DBili  x   /  AST  14  /  ALT  9   /  AlkPhos  80  10-20      PTT - ( 19 Oct 2022 12:17 )  PTT:52.5 sec                                                                                     LIVER FUNCTIONS - ( 20 Oct 2022 06:17 )  Alb: 2.8 g/dL / Pro: 5.9 g/dL / ALK PHOS: 80 U/L / ALT: 9 U/L / AST: 14 U/L / GGT: x                                                                                                                                       MEDICATIONS  (STANDING):  ALPRAZolam 1 milliGRAM(s) Oral every 8 hours  buPROPion XL (24-Hour) . 300 milliGRAM(s) Oral daily  cefepime   IVPB 2000 milliGRAM(s) IV Intermittent every 8 hours  cefepime   IVPB      enoxaparin Injectable 140 milliGRAM(s) SubCutaneous every 12 hours  folic acid 1 milliGRAM(s) Oral daily  methadone    Tablet 135 milliGRAM(s) Oral <User Schedule>  multivitamin 1 Tablet(s) Oral daily  nicotine - 21 mG/24Hr(s) Patch 1 Patch Transdermal daily  pantoprazole    Tablet 40 milliGRAM(s) Oral before breakfast  thiamine 100 milliGRAM(s) Oral daily    MEDICATIONS  (PRN):  acetaminophen     Tablet .. 650 milliGRAM(s) Oral every 6 hours PRN Temp greater or equal to 38C (100.4F)  aluminum hydroxide/magnesium hydroxide/simethicone Suspension 30 milliLiter(s) Oral every 4 hours PRN Dyspepsia  cyclobenzaprine 10 milliGRAM(s) Oral three times a day PRN Muscle Spasm  haloperidol    Injectable 5 milliGRAM(s) IntraMuscular every 8 hours PRN Agitation  melatonin 3 milliGRAM(s) Oral at bedtime PRN Insomnia  ondansetron Injectable 4 milliGRAM(s) IV Push every 8 hours PRN Nausea and/or Vomiting      New X-rays reviewed:                                                                                  ECHO

## 2022-10-20 NOTE — BH CONSULTATION LIAISON PROGRESS NOTE - CURRENT MEDICATION
MEDICATIONS  (STANDING):  ALPRAZolam 1 milliGRAM(s) Oral every 8 hours  buPROPion XL (24-Hour) . 300 milliGRAM(s) Oral daily  cefepime   IVPB      cefepime   IVPB 2000 milliGRAM(s) IV Intermittent every 8 hours  enoxaparin Injectable 140 milliGRAM(s) SubCutaneous every 12 hours  folic acid 1 milliGRAM(s) Oral daily  methadone    Tablet 135 milliGRAM(s) Oral <User Schedule>  multivitamin 1 Tablet(s) Oral daily  nicotine - 21 mG/24Hr(s) Patch 1 Patch Transdermal daily  pantoprazole    Tablet 40 milliGRAM(s) Oral before breakfast  thiamine 100 milliGRAM(s) Oral daily    MEDICATIONS  (PRN):  acetaminophen     Tablet .. 650 milliGRAM(s) Oral every 6 hours PRN Temp greater or equal to 38C (100.4F)  aluminum hydroxide/magnesium hydroxide/simethicone Suspension 30 milliLiter(s) Oral every 4 hours PRN Dyspepsia  cyclobenzaprine 10 milliGRAM(s) Oral three times a day PRN Muscle Spasm  haloperidol    Injectable 5 milliGRAM(s) IntraMuscular every 8 hours PRN Agitation  melatonin 3 milliGRAM(s) Oral at bedtime PRN Insomnia  ondansetron Injectable 4 milliGRAM(s) IV Push every 8 hours PRN Nausea and/or Vomiting  
MEDICATIONS  (STANDING):  ALPRAZolam 1 milliGRAM(s) Oral every 8 hours  buPROPion XL (24-Hour) . 300 milliGRAM(s) Oral daily  cefepime   IVPB 2000 milliGRAM(s) IV Intermittent every 8 hours  cefepime   IVPB      folic acid 1 milliGRAM(s) Oral daily  heparin   Injectable 23408 Unit(s) IV Push once  heparin  Infusion. 2900 Unit(s)/Hr (29 mL/Hr) IV Continuous <Continuous>  magnesium sulfate  IVPB 2 Gram(s) IV Intermittent once  methadone    Tablet 135 milliGRAM(s) Oral <User Schedule>  multivitamin 1 Tablet(s) Oral daily  nicotine - 21 mG/24Hr(s) Patch 1 Patch Transdermal daily  pantoprazole    Tablet 40 milliGRAM(s) Oral before breakfast  thiamine 100 milliGRAM(s) Oral daily    MEDICATIONS  (PRN):  acetaminophen     Tablet .. 650 milliGRAM(s) Oral every 6 hours PRN Temp greater or equal to 38C (100.4F)  aluminum hydroxide/magnesium hydroxide/simethicone Suspension 30 milliLiter(s) Oral every 4 hours PRN Dyspepsia  cyclobenzaprine 10 milliGRAM(s) Oral three times a day PRN Muscle Spasm  haloperidol    Injectable 5 milliGRAM(s) IntraMuscular every 8 hours PRN Agitation  heparin   Injectable 46299 Unit(s) IV Push every 6 hours PRN For aPTT less than 40  heparin   Injectable 5000 Unit(s) IV Push every 6 hours PRN For aPTT between 40 - 57  melatonin 3 milliGRAM(s) Oral at bedtime PRN Insomnia  ondansetron Injectable 4 milliGRAM(s) IV Push every 8 hours PRN Nausea and/or Vomiting  
MEDICATIONS  (STANDING):  ALPRAZolam 1 milliGRAM(s) Oral <User Schedule>  ALPRAZolam 2 milliGRAM(s) Oral at bedtime  buPROPion XL (24-Hour) . 300 milliGRAM(s) Oral daily  cefepime   IVPB      cefepime   IVPB 2000 milliGRAM(s) IV Intermittent every 8 hours  enoxaparin Injectable 140 milliGRAM(s) SubCutaneous every 12 hours  folic acid 1 milliGRAM(s) Oral daily  levoFLOXacin IVPB      levoFLOXacin IVPB 750 milliGRAM(s) IV Intermittent every 24 hours  methadone    Tablet 135 milliGRAM(s) Oral daily  multivitamin 1 Tablet(s) Oral daily  nicotine - 21 mG/24Hr(s) Patch 1 Patch Transdermal daily  pantoprazole    Tablet 40 milliGRAM(s) Oral before breakfast  thiamine 100 milliGRAM(s) Oral daily    MEDICATIONS  (PRN):  acetaminophen     Tablet .. 650 milliGRAM(s) Oral every 6 hours PRN Temp greater or equal to 38C (100.4F)  aluminum hydroxide/magnesium hydroxide/simethicone Suspension 30 milliLiter(s) Oral every 4 hours PRN Dyspepsia  cyclobenzaprine 10 milliGRAM(s) Oral three times a day PRN Muscle Spasm  haloperidol    Injectable 5 milliGRAM(s) IntraMuscular every 8 hours PRN Agitation  melatonin 3 milliGRAM(s) Oral at bedtime PRN Insomnia  ondansetron Injectable 4 milliGRAM(s) IV Push every 8 hours PRN Nausea and/or Vomiting

## 2022-10-21 LAB
ALBUMIN SERPL ELPH-MCNC: 2.7 G/DL — LOW (ref 3.5–5.2)
ALP SERPL-CCNC: 83 U/L — SIGNIFICANT CHANGE UP (ref 30–115)
ALT FLD-CCNC: 9 U/L — SIGNIFICANT CHANGE UP (ref 0–41)
ANION GAP SERPL CALC-SCNC: 10 MMOL/L — SIGNIFICANT CHANGE UP (ref 7–14)
AST SERPL-CCNC: 14 U/L — SIGNIFICANT CHANGE UP (ref 0–41)
BASOPHILS # BLD AUTO: 0.05 K/UL — SIGNIFICANT CHANGE UP (ref 0–0.2)
BASOPHILS NFR BLD AUTO: 0.7 % — SIGNIFICANT CHANGE UP (ref 0–1)
BILIRUB SERPL-MCNC: 0.3 MG/DL — SIGNIFICANT CHANGE UP (ref 0.2–1.2)
BUN SERPL-MCNC: 9 MG/DL — LOW (ref 10–20)
CALCIUM SERPL-MCNC: 8.6 MG/DL — SIGNIFICANT CHANGE UP (ref 8.4–10.5)
CHLORIDE SERPL-SCNC: 98 MMOL/L — SIGNIFICANT CHANGE UP (ref 98–110)
CO2 SERPL-SCNC: 32 MMOL/L — SIGNIFICANT CHANGE UP (ref 17–32)
CREAT SERPL-MCNC: 0.5 MG/DL — LOW (ref 0.7–1.5)
EGFR: 122 ML/MIN/1.73M2 — SIGNIFICANT CHANGE UP
EOSINOPHIL # BLD AUTO: 0.27 K/UL — SIGNIFICANT CHANGE UP (ref 0–0.7)
EOSINOPHIL NFR BLD AUTO: 3.6 % — SIGNIFICANT CHANGE UP (ref 0–8)
GLUCOSE SERPL-MCNC: 170 MG/DL — HIGH (ref 70–99)
HCT VFR BLD CALC: 29 % — LOW (ref 42–52)
HGB BLD-MCNC: 8.9 G/DL — LOW (ref 14–18)
IMM GRANULOCYTES NFR BLD AUTO: 2.8 % — HIGH (ref 0.1–0.3)
LYMPHOCYTES # BLD AUTO: 1.57 K/UL — SIGNIFICANT CHANGE UP (ref 1.2–3.4)
LYMPHOCYTES # BLD AUTO: 20.7 % — SIGNIFICANT CHANGE UP (ref 20.5–51.1)
MAGNESIUM SERPL-MCNC: 1.8 MG/DL — SIGNIFICANT CHANGE UP (ref 1.8–2.4)
MCHC RBC-ENTMCNC: 28 PG — SIGNIFICANT CHANGE UP (ref 27–31)
MCHC RBC-ENTMCNC: 30.7 G/DL — LOW (ref 32–37)
MCV RBC AUTO: 91.2 FL — SIGNIFICANT CHANGE UP (ref 80–94)
MONOCYTES # BLD AUTO: 0.8 K/UL — HIGH (ref 0.1–0.6)
MONOCYTES NFR BLD AUTO: 10.6 % — HIGH (ref 1.7–9.3)
NEUTROPHILS # BLD AUTO: 4.68 K/UL — SIGNIFICANT CHANGE UP (ref 1.4–6.5)
NEUTROPHILS NFR BLD AUTO: 61.6 % — SIGNIFICANT CHANGE UP (ref 42.2–75.2)
NRBC # BLD: 0 /100 WBCS — SIGNIFICANT CHANGE UP (ref 0–0)
PLATELET # BLD AUTO: 361 K/UL — SIGNIFICANT CHANGE UP (ref 130–400)
POTASSIUM SERPL-MCNC: 4.8 MMOL/L — SIGNIFICANT CHANGE UP (ref 3.5–5)
POTASSIUM SERPL-SCNC: 4.8 MMOL/L — SIGNIFICANT CHANGE UP (ref 3.5–5)
PROT SERPL-MCNC: 6.3 G/DL — SIGNIFICANT CHANGE UP (ref 6–8)
RBC # BLD: 3.18 M/UL — LOW (ref 4.7–6.1)
RBC # FLD: 16.3 % — HIGH (ref 11.5–14.5)
SODIUM SERPL-SCNC: 140 MMOL/L — SIGNIFICANT CHANGE UP (ref 135–146)
WBC # BLD: 7.58 K/UL — SIGNIFICANT CHANGE UP (ref 4.8–10.8)
WBC # FLD AUTO: 7.58 K/UL — SIGNIFICANT CHANGE UP (ref 4.8–10.8)

## 2022-10-21 PROCEDURE — 99233 SBSQ HOSP IP/OBS HIGH 50: CPT

## 2022-10-21 RX ORDER — QUETIAPINE FUMARATE 200 MG/1
200 TABLET, FILM COATED ORAL AT BEDTIME
Refills: 0 | Status: DISCONTINUED | OUTPATIENT
Start: 2022-10-21 | End: 2022-11-17

## 2022-10-21 RX ORDER — FUROSEMIDE 40 MG
40 TABLET ORAL ONCE
Refills: 0 | Status: COMPLETED | OUTPATIENT
Start: 2022-10-21 | End: 2022-10-21

## 2022-10-21 RX ORDER — ALPRAZOLAM 0.25 MG
2 TABLET ORAL AT BEDTIME
Refills: 0 | Status: DISCONTINUED | OUTPATIENT
Start: 2022-10-21 | End: 2022-10-28

## 2022-10-21 RX ORDER — ALPRAZOLAM 0.25 MG
1 TABLET ORAL
Refills: 0 | Status: DISCONTINUED | OUTPATIENT
Start: 2022-10-21 | End: 2022-10-28

## 2022-10-21 RX ORDER — CHLORHEXIDINE GLUCONATE 213 G/1000ML
1 SOLUTION TOPICAL DAILY
Refills: 0 | Status: DISCONTINUED | OUTPATIENT
Start: 2022-10-21 | End: 2022-11-17

## 2022-10-21 RX ADMIN — CEFEPIME 100 MILLIGRAM(S): 1 INJECTION, POWDER, FOR SOLUTION INTRAMUSCULAR; INTRAVENOUS at 06:24

## 2022-10-21 RX ADMIN — Medication 1 PATCH: at 11:18

## 2022-10-21 RX ADMIN — CEFEPIME 100 MILLIGRAM(S): 1 INJECTION, POWDER, FOR SOLUTION INTRAMUSCULAR; INTRAVENOUS at 21:32

## 2022-10-21 RX ADMIN — QUETIAPINE FUMARATE 200 MILLIGRAM(S): 200 TABLET, FILM COATED ORAL at 21:21

## 2022-10-21 RX ADMIN — Medication 1 PATCH: at 06:45

## 2022-10-21 RX ADMIN — Medication 100 MILLIGRAM(S): at 11:18

## 2022-10-21 RX ADMIN — Medication 2 MILLIGRAM(S): at 21:32

## 2022-10-21 RX ADMIN — BUPROPION HYDROCHLORIDE 300 MILLIGRAM(S): 150 TABLET, EXTENDED RELEASE ORAL at 11:18

## 2022-10-21 RX ADMIN — CEFEPIME 100 MILLIGRAM(S): 1 INJECTION, POWDER, FOR SOLUTION INTRAMUSCULAR; INTRAVENOUS at 14:28

## 2022-10-21 RX ADMIN — Medication 1 MILLIGRAM(S): at 04:29

## 2022-10-21 RX ADMIN — METHADONE HYDROCHLORIDE 135 MILLIGRAM(S): 40 TABLET ORAL at 11:23

## 2022-10-21 RX ADMIN — ENOXAPARIN SODIUM 140 MILLIGRAM(S): 100 INJECTION SUBCUTANEOUS at 05:35

## 2022-10-21 RX ADMIN — Medication 1 MILLIGRAM(S): at 11:18

## 2022-10-21 RX ADMIN — ENOXAPARIN SODIUM 140 MILLIGRAM(S): 100 INJECTION SUBCUTANEOUS at 17:26

## 2022-10-21 RX ADMIN — Medication 1 TABLET(S): at 11:19

## 2022-10-21 RX ADMIN — Medication 40 MILLIGRAM(S): at 11:17

## 2022-10-21 RX ADMIN — CHLORHEXIDINE GLUCONATE 1 APPLICATION(S): 213 SOLUTION TOPICAL at 11:17

## 2022-10-21 RX ADMIN — PANTOPRAZOLE SODIUM 40 MILLIGRAM(S): 20 TABLET, DELAYED RELEASE ORAL at 06:52

## 2022-10-21 NOTE — PROGRESS NOTE ADULT - ASSESSMENT
IMPRESSION:    Acute hypoxemic resp failure on HFNC  PNA likely aspiration  PE with RV strain - submassive  LLE DVT - s/p thrombectomy 10/19  hx Epidural Phlegmon, OM  hx Right Psoas Abscess   hx MSSA Bacteremia  HO IVDA, Opioid Abuse on Methadone   DIONICIO OHS refusing NIV     PLAN:    CNS: FU psych. Xanax taper per psych  Awaiting MRI of spine.  Neurosurgery eval pending MRI.    HEENT:  Oral care    PULMONARY:  HOB @ 45 degrees, aspiration precaution. Wean down Fio2 as tolerated.  SAt 88-92.      CARDIOVASCULAR: Avoid overload.  Negative balance as tolerated     GI: GI prophylaxis; oral diet. Aspiration precautions.     RENAL:  Kidney function at baseline. Correct as needed.     INFECTIOUS DISEASE: ID following.  ABX per ID     HEMATOLOGICAL:  Continue DVT therapy.      ENDOCRINE:  Follow up FS.  Insulin protocol if needed.    Dispo: SDU today

## 2022-10-21 NOTE — PROGRESS NOTE ADULT - SUBJECTIVE AND OBJECTIVE BOX
MIKAEL MCDERMOTT 52y Male  MRN#: [id number]   Hospital Day: 10d    SUBJECTIVE  Patient is a 52y old Male who presents with a chief complaint of Hypoxia (20 Oct 2022 14:08)  Currently admitted to medicine with the primary diagnosis of Pneumonia      INTERVAL HPI AND OVERNIGHT EVENTS:  Patient was examined and seen at bedside. This morning he is resting comfortably in bed and reports no issues or overnight events. Over night patient was amendable to wearing the BiPAP.      REVIEW OF SYMPTOMS:  Denies all.     OBJECTIVE  PAST MEDICAL & SURGICAL HISTORY  IV drug abuse    Vertebral osteomyelitis    MSSA bacteremia    No significant past surgical history      ALLERGIES:  No Known Allergies    MEDICATIONS:  STANDING MEDICATIONS  ALPRAZolam 1 milliGRAM(s) Oral every 8 hours  buPROPion XL (24-Hour) . 300 milliGRAM(s) Oral daily  cefepime   IVPB 2000 milliGRAM(s) IV Intermittent every 8 hours  cefepime   IVPB      enoxaparin Injectable 140 milliGRAM(s) SubCutaneous every 12 hours  folic acid 1 milliGRAM(s) Oral daily  methadone    Tablet 135 milliGRAM(s) Oral <User Schedule>  multivitamin 1 Tablet(s) Oral daily  nicotine - 21 mG/24Hr(s) Patch 1 Patch Transdermal daily  pantoprazole    Tablet 40 milliGRAM(s) Oral before breakfast  thiamine 100 milliGRAM(s) Oral daily    PRN MEDICATIONS  acetaminophen     Tablet .. 650 milliGRAM(s) Oral every 6 hours PRN  aluminum hydroxide/magnesium hydroxide/simethicone Suspension 30 milliLiter(s) Oral every 4 hours PRN  cyclobenzaprine 10 milliGRAM(s) Oral three times a day PRN  haloperidol    Injectable 5 milliGRAM(s) IntraMuscular every 8 hours PRN  melatonin 3 milliGRAM(s) Oral at bedtime PRN  ondansetron Injectable 4 milliGRAM(s) IV Push every 8 hours PRN      VITAL SIGNS: Last 24 Hours  T(C): 37.1 (20 Oct 2022 11:55), Max: 37.3 (19 Oct 2022 23:26)  T(F): 98.7 (20 Oct 2022 11:55), Max: 99.2 (19 Oct 2022 23:26)  HR: 79 (20 Oct 2022 11:55) (65 - 86)  BP: 104/62 (20 Oct 2022 11:55) (72/40 - 118/58)  BP(mean): 77 (20 Oct 2022 11:55) (50 - 79)  RR: 18 (20 Oct 2022 11:55) (17 - 33)  SpO2: 98% (20 Oct 2022 11:55) (83% - 100%)    LABS:                        8.8    8.72  )-----------( 369      ( 20 Oct 2022 06:17 )             29.1     10-20    137  |  95<L>  |  8<L>  ----------------------------<  143<H>  4.3   |  36<H>  |  0.6<L>    Ca    8.3<L>      20 Oct 2022 06:17  Mg     1.9     10-20    TPro  5.9<L>  /  Alb  2.8<L>  /  TBili  0.2  /  DBili  x   /  AST  14  /  ALT  9   /  AlkPhos  80  10-20    PTT - ( 19 Oct 2022 12:17 )  PTT:52.5 sec      PHYSICAL EXAM:  CONSTITUTIONAL: No acute distress, well-developed, well-groomed, AAOx3  HEAD: Atraumatic, normocephalic  EYES: EOM intact, PERRLA, conjunctiva and sclera clear  ENT: High flow nasal cannula in place; moist mucous membranes  PULMONARY: Course breath sounds through out  CARDIOVASCULAR: Regular rate and rhythm  GASTROINTESTINAL: Soft, non-tender, non-distended; bowel sounds present  MUSCULOSKELETAL: 2+ peripheral pulses; +2 lower extremity edema  SKIN: No rashes or lesions; warm and dry    ASSESSMENT & PLAN:  53 yo M w/ pmhx of IVDU (heroin), vertebral osteomyelitis, and MSSA bacteremia presents for hypoxemia.     #Acute Submassive PE  -CT on admission with bilateral submassive PEs + concern for RV strain  -TTE: EF 55%, G1DD, mild TVR, no right heart strain  -VA duplx LE vein: Acute thrombus within the left common femoral, femoral, deep femoral, popliteal, gastrocnemius, posterior tibial and anterior tibial veins.  -s/p LLE DVT embolectomy 10/18    Plan:  - 140mg of Lovenox subcutaneous BID     #Acute hypoxemic respiratory failure  #Pneumonia  #Hx DIONICIO/OHS non-compliant with BIPAP or home CPAP  -currently on HFNC, attempting to wean daily  -encourage BIPAP o/n in place of CPAP  -legionella (-) (dc'd levo 10/19)    Plan:   -c/w cefepime for until 10/22  - then change to Cefazolin 2g q8h IV to target staph until MRI results and resolution of abscess    #Hx MSSA bacteremia  #Hx Epidural phlegmon/OM  #Hx R Psoas Abscess  -re-eval per ID, rec MR spine to r/o another source of infection  -Per NSG, pt able to receive MR even with recent spinal fusion  -MR w&w/o CTL spine once pt off HFNC    Plan:  - Patient went for MRI but patient cannot go though MRI with the high flow nasal canula on   - Once patient is off HFNC patient can go for MRI     #HO IVDA, Opioid Abuse on Methadone   #Agitation  -followed by psych    Plan:   -c/w methadone  -c/w lowering of benzo (xanax) dose, pt on 6mg per day at home      #Misc  - DVT Prophylaxis: Therapeutic lovenox  - GI Prophylaxis: Protonix  - Diet: Regular diet  - Activity: Out of bed as tolerated   - IV Fluids: None  - Code Status: Full code    Dispo: Home when medically stable

## 2022-10-21 NOTE — PROGRESS NOTE ADULT - SUBJECTIVE AND OBJECTIVE BOX
Patient is a 52y old  Male who presents with a chief complaint of Hypoxia (20 Oct 2022 16:03)        Over Night Events:  Off pressors.  On HFNCO2 50 liters 40%.          ROS:     All ROS are negative except HPI         PHYSICAL EXAM    ICU Vital Signs Last 24 Hrs  T(C): 36 (21 Oct 2022 04:00), Max: 37.1 (20 Oct 2022 11:55)  T(F): 96.8 (21 Oct 2022 04:00), Max: 98.7 (20 Oct 2022 11:55)  HR: 97 (21 Oct 2022 04:00) (72 - 97)  BP: 95/55 (21 Oct 2022 04:00) (95/55 - 122/56)  BP(mean): 72 (21 Oct 2022 04:00) (72 - 80)  ABP: --  ABP(mean): --  RR: 18 (21 Oct 2022 04:00) (18 - 20)  SpO2: 97% (21 Oct 2022 04:00) (94% - 99%)    O2 Parameters below as of 21 Oct 2022 00:27  Patient On (Oxygen Delivery Method): nasal cannula, high flow            CONSTITUTIONAL:  Well nourished.  NAD    ENT:   Airway patent,   Mouth with normal mucosa.       EYES:   Pupils equal,   Round and reactive to light.    CARDIAC:   Normal rate,   Regular rhythm.    No edema      RESPIRATORY:   No wheezing  Bilateral BS  Normal chest expansion  Not tachypneic,  No use of accessory muscles    GASTROINTESTINAL:  Abdomen soft,   Non-tender,   No guarding,   + BS    MUSCULOSKELETAL:   Range of motion is not limited,  No clubbing, cyanosis    NEUROLOGICAL:   Alert and oriented   No motor  deficits.    SKIN:   Skin normal color for race,   No evidence of rash.    PSYCHIATRIC:   No apparent risk to self or others.      10-20-22 @ 07:01  -  10-21-22 @ 07:00  --------------------------------------------------------  IN:  Total IN: 0 mL    OUT:    Voided (mL): 200 mL  Total OUT: 200 mL    Total NET: -200 mL          LABS:                            8.8    8.72  )-----------( 369      ( 20 Oct 2022 06:17 )             29.1                                               10-20    137  |  95<L>  |  8<L>  ----------------------------<  143<H>  4.3   |  36<H>  |  0.6<L>    Ca    8.3<L>      20 Oct 2022 06:17  Mg     1.9     10-20    TPro  5.9<L>  /  Alb  2.8<L>  /  TBili  0.2  /  DBili  x   /  AST  14  /  ALT  9   /  AlkPhos  80  10-20      PTT - ( 19 Oct 2022 12:17 )  PTT:52.5 sec                                                                                     LIVER FUNCTIONS - ( 20 Oct 2022 06:17 )  Alb: 2.8 g/dL / Pro: 5.9 g/dL / ALK PHOS: 80 U/L / ALT: 9 U/L / AST: 14 U/L / GGT: x                                                                                                                                       MEDICATIONS  (STANDING):  ALPRAZolam 1 milliGRAM(s) Oral <User Schedule>  ALPRAZolam 2 milliGRAM(s) Oral at bedtime  buPROPion XL (24-Hour) . 300 milliGRAM(s) Oral daily  cefepime   IVPB      cefepime   IVPB 2000 milliGRAM(s) IV Intermittent every 8 hours  chlorhexidine 2% Cloths 1 Application(s) Topical daily  enoxaparin Injectable 140 milliGRAM(s) SubCutaneous every 12 hours  folic acid 1 milliGRAM(s) Oral daily  methadone    Tablet 135 milliGRAM(s) Oral <User Schedule>  multivitamin 1 Tablet(s) Oral daily  nicotine - 21 mG/24Hr(s) Patch 1 Patch Transdermal daily  pantoprazole    Tablet 40 milliGRAM(s) Oral before breakfast  QUEtiapine 200 milliGRAM(s) Oral at bedtime  thiamine 100 milliGRAM(s) Oral daily    MEDICATIONS  (PRN):  acetaminophen     Tablet .. 650 milliGRAM(s) Oral every 6 hours PRN Temp greater or equal to 38C (100.4F)  aluminum hydroxide/magnesium hydroxide/simethicone Suspension 30 milliLiter(s) Oral every 4 hours PRN Dyspepsia  cyclobenzaprine 10 milliGRAM(s) Oral three times a day PRN Muscle Spasm  haloperidol    Injectable 5 milliGRAM(s) IntraMuscular every 8 hours PRN Agitation  melatonin 3 milliGRAM(s) Oral at bedtime PRN Insomnia  ondansetron Injectable 4 milliGRAM(s) IV Push every 8 hours PRN Nausea and/or Vomiting      New X-rays reviewed:                                                                                  ECHO    CXR interpreted by me:

## 2022-10-21 NOTE — PROGRESS NOTE ADULT - ASSESSMENT
PMHx of IVDU (heroin), vertebral osteomyelitis s/p debridement, and MSSA bacteremia, presenting from rehab facility for "low oxygen" and new onset LE edema. In the ED was found to be hypoxic, found to have b/l PEs with radiographic evidence of R heart strain and admitted to ICU, found to have LE DVTs s/p LLE embolectomy 10/18, now downgraded to SDU     Acute hypoxemic respiratory failure  Acute Submassive PE  Suspected superimposed PNA  DIONICIO/OHS, noncompliant with NIV  -CT on admission with bilateral submassive PEs + concern for RV strain  -TTE: EF 55%, G1DD, mild TVR, no right heart strain  -VA duplx LE vein: Acute thrombus within the left common femoral, femoral, deep femoral, popliteal, gastrocnemius, posterior tibial and anterior tibial veins, s/p LLE embolectomy 10/18  - now on Lovenox, therapeutic  - Procal 7, blood cx 10/12 - no growth  - on HFNC 40/40, trial of NC  - repeat chest xray tomorrow   - continue with Cefepime end 10/22, then  Cefazolin 2g q8h IV  - PT     Hx MSSA bacteremia  Hx Epidural phlegmon/OM  Hx R Psoas Abscess  -Per NSG, pt able to receive MR even with recent spinal fusion  -MR w&w/o CTL spine pending   - abx as above  - f/u neurosurgery once imaging complete     HO IVDA, Opioid Abuse on Methadone   Agitation  - improving, followed by psych: can resume Quentiapine 200 mg PO HS  - Continue with Xanax 1 mg po am, 1mg po afternoon and two times at night - total daily dose of 4mg  Continue Wellbutrin Xl 300mg po q 24hrs  Continue home dose Methadone 135mg po daily---> QTc 433 today 10/20  -For agitation, haldol 5mg po/IM every 8hrs prn    #Progress Note Handoff  Pending (specify): AC, weaning O2, monitor mental status, MRI, neurosx f/u, cw IV abx

## 2022-10-21 NOTE — PROGRESS NOTE ADULT - SUBJECTIVE AND OBJECTIVE BOX
MIKAEL MCDERMOTT  52y Male    CHIEF COMPLAINT:    Patient is a 52y old  Male who presents with a chief complaint of Hypoxia (21 Oct 2022 11:12)    INTERVAL HPI/OVERNIGHT EVENTS:    Patient seen and examined. No acute events overnight. Tolerated bipap overnight, remains on HFNC     ROS: All other systems are negative.    Vital Signs:    T(F): 97.2 (10-21-22 @ 12:00), Max: 97.2 (10-21-22 @ 12:00)  HR: 79 (10-21-22 @ 12:00) (72 - 97)  BP: 93/58 (10-21-22 @ 12:00) (93/58 - 122/56)  RR: 20 (10-21-22 @ 12:00) (18 - 20)  SpO2: 94% (10-21-22 @ 12:00) (94% - 99%)    20 Oct 2022 07:01  -  21 Oct 2022 07:00  --------------------------------------------------------  IN: 0 mL / OUT: 200 mL / NET: -200 mL    21 Oct 2022 07:01  -  21 Oct 2022 15:51  --------------------------------------------------------  IN: 400 mL / OUT: 600 mL / NET: -200 mL    Daily Weight in k.4 (21 Oct 2022 00:27)    PHYSICAL EXAM:    GENERAL:  NAD obese  SKIN: No rashes or lesions, scars on arms   HEENT: Atraumatic. Normocephalic.    NECK: Supple, No JVD. No lymphadenopathy.  PULMONARY: coarse breath sounds  B/L. No wheezing.   CVS: Normal S1, S2. Rate and Rhythm are regular.   ABDOMEN/GI: Soft, Nontender, Nondistended   MSK:  No clubbing or cyanosis   NEUROLOGIC: moves all extremities   PSYCH: Awake and alert     Consultant(s) Notes Reviewed:  [x ] YES  [ ] NO  Care Discussed with Consultants/Other Providers [ x] YES  [ ] NO    LABS:                        8.9    7.58  )-----------( 361      ( 21 Oct 2022 08:51 )             29.0     140  |  98  |  9<L>  ----------------------------<  170<H>  4.8   |  32  |  0.5<L>    Ca    8.6      21 Oct 2022 08:51  Mg     1.8     10-    TPro  6.3  /  Alb  2.7<L>  /  TBili  0.3  /  DBili  x   /  AST  14  /  ALT  9   /  AlkPhos  83  10-21    RADIOLOGY & ADDITIONAL TESTS:  Imaging or report Personally Reviewed:  [x] YES  [ ] NO  EKG reviewed: [x] YES  [ ] NO    Medications:  Standing  ALPRAZolam 1 milliGRAM(s) Oral <User Schedule>  ALPRAZolam 2 milliGRAM(s) Oral at bedtime  buPROPion XL (24-Hour) . 300 milliGRAM(s) Oral daily  cefepime   IVPB      cefepime   IVPB 2000 milliGRAM(s) IV Intermittent every 8 hours  chlorhexidine 2% Cloths 1 Application(s) Topical daily  enoxaparin Injectable 140 milliGRAM(s) SubCutaneous every 12 hours  folic acid 1 milliGRAM(s) Oral daily  methadone    Tablet 135 milliGRAM(s) Oral <User Schedule>  multivitamin 1 Tablet(s) Oral daily  nicotine - 21 mG/24Hr(s) Patch 1 Patch Transdermal daily  pantoprazole    Tablet 40 milliGRAM(s) Oral before breakfast  QUEtiapine 200 milliGRAM(s) Oral at bedtime  thiamine 100 milliGRAM(s) Oral daily    PRN Meds  acetaminophen     Tablet .. 650 milliGRAM(s) Oral every 6 hours PRN  aluminum hydroxide/magnesium hydroxide/simethicone Suspension 30 milliLiter(s) Oral every 4 hours PRN  cyclobenzaprine 10 milliGRAM(s) Oral three times a day PRN  haloperidol    Injectable 5 milliGRAM(s) IntraMuscular every 8 hours PRN  melatonin 3 milliGRAM(s) Oral at bedtime PRN  ondansetron Injectable 4 milliGRAM(s) IV Push every 8 hours PRN

## 2022-10-21 NOTE — ED ADULT TRIAGE NOTE - PATIENT ON (OXYGEN DELIVERY METHOD)
Dr. Yang saw pt in consult when she was hospitalized at [facility] in May 2022.     Informed Kendal PSR that Dr. Yang will take over pt's care, however we need to get medical records from Dr. Rubin's office prior to scheduling appointment because we don't want any lapse in treatment. Kendal will f/u.   Message sent to Dr. Yang.     mask, nonrebreather

## 2022-10-22 LAB
ALBUMIN SERPL ELPH-MCNC: 2.6 G/DL — LOW (ref 3.5–5.2)
ALP SERPL-CCNC: 84 U/L — SIGNIFICANT CHANGE UP (ref 30–115)
ALT FLD-CCNC: 10 U/L — SIGNIFICANT CHANGE UP (ref 0–41)
ANION GAP SERPL CALC-SCNC: 10 MMOL/L — SIGNIFICANT CHANGE UP (ref 7–14)
AST SERPL-CCNC: 19 U/L — SIGNIFICANT CHANGE UP (ref 0–41)
BASOPHILS # BLD AUTO: 0.03 K/UL — SIGNIFICANT CHANGE UP (ref 0–0.2)
BASOPHILS NFR BLD AUTO: 0.5 % — SIGNIFICANT CHANGE UP (ref 0–1)
BILIRUB SERPL-MCNC: <0.2 MG/DL — SIGNIFICANT CHANGE UP (ref 0.2–1.2)
BUN SERPL-MCNC: 12 MG/DL — SIGNIFICANT CHANGE UP (ref 10–20)
CALCIUM SERPL-MCNC: 8.8 MG/DL — SIGNIFICANT CHANGE UP (ref 8.4–10.5)
CHLORIDE SERPL-SCNC: 97 MMOL/L — LOW (ref 98–110)
CO2 SERPL-SCNC: 32 MMOL/L — SIGNIFICANT CHANGE UP (ref 17–32)
CREAT SERPL-MCNC: 0.6 MG/DL — LOW (ref 0.7–1.5)
EGFR: 115 ML/MIN/1.73M2 — SIGNIFICANT CHANGE UP
EOSINOPHIL # BLD AUTO: 0.24 K/UL — SIGNIFICANT CHANGE UP (ref 0–0.7)
EOSINOPHIL NFR BLD AUTO: 3.6 % — SIGNIFICANT CHANGE UP (ref 0–8)
GLUCOSE SERPL-MCNC: 143 MG/DL — HIGH (ref 70–99)
HCT VFR BLD CALC: 32.7 % — LOW (ref 42–52)
HGB BLD-MCNC: 10 G/DL — LOW (ref 14–18)
IMM GRANULOCYTES NFR BLD AUTO: 2.1 % — HIGH (ref 0.1–0.3)
LYMPHOCYTES # BLD AUTO: 1.41 K/UL — SIGNIFICANT CHANGE UP (ref 1.2–3.4)
LYMPHOCYTES # BLD AUTO: 21.3 % — SIGNIFICANT CHANGE UP (ref 20.5–51.1)
MAGNESIUM SERPL-MCNC: 1.8 MG/DL — SIGNIFICANT CHANGE UP (ref 1.8–2.4)
MCHC RBC-ENTMCNC: 28.1 PG — SIGNIFICANT CHANGE UP (ref 27–31)
MCHC RBC-ENTMCNC: 30.6 G/DL — LOW (ref 32–37)
MCV RBC AUTO: 91.9 FL — SIGNIFICANT CHANGE UP (ref 80–94)
MONOCYTES # BLD AUTO: 0.78 K/UL — HIGH (ref 0.1–0.6)
MONOCYTES NFR BLD AUTO: 11.8 % — HIGH (ref 1.7–9.3)
NEUTROPHILS # BLD AUTO: 4.01 K/UL — SIGNIFICANT CHANGE UP (ref 1.4–6.5)
NEUTROPHILS NFR BLD AUTO: 60.7 % — SIGNIFICANT CHANGE UP (ref 42.2–75.2)
NRBC # BLD: 0 /100 WBCS — SIGNIFICANT CHANGE UP (ref 0–0)
PLATELET # BLD AUTO: 273 K/UL — SIGNIFICANT CHANGE UP (ref 130–400)
POTASSIUM SERPL-MCNC: 4.6 MMOL/L — SIGNIFICANT CHANGE UP (ref 3.5–5)
POTASSIUM SERPL-SCNC: 4.6 MMOL/L — SIGNIFICANT CHANGE UP (ref 3.5–5)
PROT SERPL-MCNC: 6.4 G/DL — SIGNIFICANT CHANGE UP (ref 6–8)
RBC # BLD: 3.56 M/UL — LOW (ref 4.7–6.1)
RBC # FLD: 15.9 % — HIGH (ref 11.5–14.5)
SODIUM SERPL-SCNC: 139 MMOL/L — SIGNIFICANT CHANGE UP (ref 135–146)
WBC # BLD: 6.61 K/UL — SIGNIFICANT CHANGE UP (ref 4.8–10.8)
WBC # FLD AUTO: 6.61 K/UL — SIGNIFICANT CHANGE UP (ref 4.8–10.8)

## 2022-10-22 PROCEDURE — 72158 MRI LUMBAR SPINE W/O & W/DYE: CPT | Mod: 26

## 2022-10-22 PROCEDURE — 99233 SBSQ HOSP IP/OBS HIGH 50: CPT

## 2022-10-22 PROCEDURE — 72156 MRI NECK SPINE W/O & W/DYE: CPT | Mod: 26

## 2022-10-22 PROCEDURE — 72157 MRI CHEST SPINE W/O & W/DYE: CPT | Mod: 26

## 2022-10-22 RX ORDER — CEFAZOLIN SODIUM 1 G
2000 VIAL (EA) INJECTION EVERY 8 HOURS
Refills: 0 | Status: ACTIVE | OUTPATIENT
Start: 2022-10-22 | End: 2023-09-20

## 2022-10-22 RX ADMIN — PANTOPRAZOLE SODIUM 40 MILLIGRAM(S): 20 TABLET, DELAYED RELEASE ORAL at 07:05

## 2022-10-22 RX ADMIN — Medication 1 PATCH: at 19:00

## 2022-10-22 RX ADMIN — ENOXAPARIN SODIUM 140 MILLIGRAM(S): 100 INJECTION SUBCUTANEOUS at 18:10

## 2022-10-22 RX ADMIN — Medication 1 MILLIGRAM(S): at 14:53

## 2022-10-22 RX ADMIN — Medication 100 MILLIGRAM(S): at 15:12

## 2022-10-22 RX ADMIN — QUETIAPINE FUMARATE 200 MILLIGRAM(S): 200 TABLET, FILM COATED ORAL at 21:56

## 2022-10-22 RX ADMIN — BUPROPION HYDROCHLORIDE 300 MILLIGRAM(S): 150 TABLET, EXTENDED RELEASE ORAL at 14:55

## 2022-10-22 RX ADMIN — CEFEPIME 100 MILLIGRAM(S): 1 INJECTION, POWDER, FOR SOLUTION INTRAMUSCULAR; INTRAVENOUS at 05:20

## 2022-10-22 RX ADMIN — Medication 1 MILLIGRAM(S): at 14:55

## 2022-10-22 RX ADMIN — CHLORHEXIDINE GLUCONATE 1 APPLICATION(S): 213 SOLUTION TOPICAL at 14:53

## 2022-10-22 RX ADMIN — Medication 1 MILLIGRAM(S): at 06:17

## 2022-10-22 RX ADMIN — Medication 1 TABLET(S): at 14:55

## 2022-10-22 RX ADMIN — METHADONE HYDROCHLORIDE 135 MILLIGRAM(S): 40 TABLET ORAL at 14:54

## 2022-10-22 RX ADMIN — Medication 100 MILLIGRAM(S): at 21:56

## 2022-10-22 RX ADMIN — Medication 1 PATCH: at 11:01

## 2022-10-22 RX ADMIN — ENOXAPARIN SODIUM 140 MILLIGRAM(S): 100 INJECTION SUBCUTANEOUS at 05:20

## 2022-10-22 RX ADMIN — Medication 100 MILLIGRAM(S): at 14:55

## 2022-10-22 RX ADMIN — Medication 1 PATCH: at 14:56

## 2022-10-22 RX ADMIN — Medication 2 MILLIGRAM(S): at 21:56

## 2022-10-22 NOTE — PROGRESS NOTE ADULT - SUBJECTIVE AND OBJECTIVE BOX
Patient is a 52y old  Male who presents with a chief complaint of Hypoxia (21 Oct 2022 15:51)        Over Night Events:    On oxygen   Appears comfortable   No fevers         ROS:  See HPI    PHYSICAL EXAM    ICU Vital Signs Last 24 Hrs  T(C): 36.8 (22 Oct 2022 04:00), Max: 36.8 (22 Oct 2022 04:00)  T(F): 98.2 (22 Oct 2022 04:00), Max: 98.2 (22 Oct 2022 04:00)  HR: 91 (22 Oct 2022 04:00) (75 - 91)  BP: 92/54 (22 Oct 2022 04:00) (92/54 - 102/68)  BP(mean): 67 (22 Oct 2022 04:00) (67 - 80)  ABP: --  ABP(mean): --  RR: 20 (22 Oct 2022 04:00) (20 - 20)  SpO2: 91% (22 Oct 2022 04:00) (91% - 99%)    O2 Parameters below as of 22 Oct 2022 04:00  Patient On (Oxygen Delivery Method): mask, nonrebreather  O2 Flow (L/min): 15          General: NAD  HEENT: KULDEEP             Lymphatic system: No cervical LN   Lungs: Bilateral BS, clear anteriorly   Cardiovascular: Regular   Gastrointestinal: Soft, Positive BS  Extremities: No clubbing.  Moves extremities.  Full Range of motion   Skin: Warm, intact  Neurological: No motor or sensory deficit       10-21-22 @ 07:01  -  10-22-22 @ 07:00  --------------------------------------------------------  IN:    Oral Fluid: 600 mL  Total IN: 600 mL    OUT:    Voided (mL): 1575 mL  Total OUT: 1575 mL    Total NET: -975 mL          LABS:                            10.0   6.61  )-----------( 273      ( 22 Oct 2022 07:39 )             32.7                                               10-22    139  |  97<L>  |  12  ----------------------------<  143<H>  4.6   |  32  |  0.6<L>    Ca    8.8      22 Oct 2022 07:39  Mg     1.8     10-22    TPro  6.4  /  Alb  2.6<L>  /  TBili  <0.2  /  DBili  x   /  AST  19  /  ALT  10  /  AlkPhos  84  10-22                                                                                           LIVER FUNCTIONS - ( 22 Oct 2022 07:39 )  Alb: 2.6 g/dL / Pro: 6.4 g/dL / ALK PHOS: 84 U/L / ALT: 10 U/L / AST: 19 U/L / GGT: x                                                                                                                                       MEDICATIONS  (STANDING):  ALPRAZolam 1 milliGRAM(s) Oral <User Schedule>  ALPRAZolam 2 milliGRAM(s) Oral at bedtime  buPROPion XL (24-Hour) . 300 milliGRAM(s) Oral daily  ceFAZolin   IVPB 2000 milliGRAM(s) IV Intermittent every 8 hours  chlorhexidine 2% Cloths 1 Application(s) Topical daily  enoxaparin Injectable 140 milliGRAM(s) SubCutaneous every 12 hours  folic acid 1 milliGRAM(s) Oral daily  methadone    Tablet 135 milliGRAM(s) Oral <User Schedule>  multivitamin 1 Tablet(s) Oral daily  nicotine - 21 mG/24Hr(s) Patch 1 Patch Transdermal daily  pantoprazole    Tablet 40 milliGRAM(s) Oral before breakfast  QUEtiapine 200 milliGRAM(s) Oral at bedtime  thiamine 100 milliGRAM(s) Oral daily    MEDICATIONS  (PRN):  acetaminophen     Tablet .. 650 milliGRAM(s) Oral every 6 hours PRN Temp greater or equal to 38C (100.4F)  aluminum hydroxide/magnesium hydroxide/simethicone Suspension 30 milliLiter(s) Oral every 4 hours PRN Dyspepsia  cyclobenzaprine 10 milliGRAM(s) Oral three times a day PRN Muscle Spasm  haloperidol    Injectable 5 milliGRAM(s) IntraMuscular every 8 hours PRN Agitation  melatonin 3 milliGRAM(s) Oral at bedtime PRN Insomnia  ondansetron Injectable 4 milliGRAM(s) IV Push every 8 hours PRN Nausea and/or Vomiting      Xrays: increasing right basilar opacity effusion                                                                                     ECHO

## 2022-10-22 NOTE — PROGRESS NOTE ADULT - SUBJECTIVE AND OBJECTIVE BOX
MIKAEL MCDERMOTT 52y Male  MRN#: [id number]   Hospital Day: 11d    SUBJECTIVE  Patient is a 52y old Male who presents with a chief complaint of Hypoxia (21 Oct 2022 15:51)  Currently admitted to medicine with the primary diagnosis of Pneumonia      INTERVAL HPI AND OVERNIGHT EVENTS:  Patient was examined and seen at bedside. This morning he is resting comfortably in bed and reports no issues or overnight events.    REVIEW OF SYMPTOMS:  Denies.     OBJECTIVE  PAST MEDICAL & SURGICAL HISTORY  IV drug abuse    Vertebral osteomyelitis    MSSA bacteremia    No significant past surgical history      ALLERGIES:  No Known Allergies    MEDICATIONS:  STANDING MEDICATIONS  ALPRAZolam 1 milliGRAM(s) Oral <User Schedule>  ALPRAZolam 2 milliGRAM(s) Oral at bedtime  buPROPion XL (24-Hour) . 300 milliGRAM(s) Oral daily  ceFAZolin   IVPB 2000 milliGRAM(s) IV Intermittent every 8 hours  chlorhexidine 2% Cloths 1 Application(s) Topical daily  enoxaparin Injectable 140 milliGRAM(s) SubCutaneous every 12 hours  folic acid 1 milliGRAM(s) Oral daily  methadone    Tablet 135 milliGRAM(s) Oral <User Schedule>  multivitamin 1 Tablet(s) Oral daily  nicotine - 21 mG/24Hr(s) Patch 1 Patch Transdermal daily  pantoprazole    Tablet 40 milliGRAM(s) Oral before breakfast  QUEtiapine 200 milliGRAM(s) Oral at bedtime  thiamine 100 milliGRAM(s) Oral daily    PRN MEDICATIONS  acetaminophen     Tablet .. 650 milliGRAM(s) Oral every 6 hours PRN  aluminum hydroxide/magnesium hydroxide/simethicone Suspension 30 milliLiter(s) Oral every 4 hours PRN  cyclobenzaprine 10 milliGRAM(s) Oral three times a day PRN  haloperidol    Injectable 5 milliGRAM(s) IntraMuscular every 8 hours PRN  melatonin 3 milliGRAM(s) Oral at bedtime PRN  ondansetron Injectable 4 milliGRAM(s) IV Push every 8 hours PRN      VITAL SIGNS: Last 24 Hours  T(C): 36.8 (22 Oct 2022 04:00), Max: 36.8 (22 Oct 2022 04:00)  T(F): 98.2 (22 Oct 2022 04:00), Max: 98.2 (22 Oct 2022 04:00)  HR: 91 (22 Oct 2022 04:00) (75 - 91)  BP: 92/54 (22 Oct 2022 04:00) (92/54 - 102/68)  BP(mean): 67 (22 Oct 2022 04:00) (67 - 80)  RR: 20 (22 Oct 2022 04:00) (20 - 20)  SpO2: 91% (22 Oct 2022 04:00) (91% - 99%)    LABS:                        10.0   6.61  )-----------( 273      ( 22 Oct 2022 07:39 )             32.7     10-22    139  |  97<L>  |  12  ----------------------------<  143<H>  4.6   |  32  |  0.6<L>    Ca    8.8      22 Oct 2022 07:39  Mg     1.8     10-22    TPro  6.4  /  Alb  2.6<L>  /  TBili  <0.2  /  DBili  x   /  AST  19  /  ALT  10  /  AlkPhos  84  10-22        PHYSICAL EXAM:  CONSTITUTIONAL: No acute distress, well-developed, well-groomed, AAOx3  HEAD: Atraumatic, normocephalic  EYES: EOM intact, PERRLA, conjunctiva and sclera clear  ENT: Nasal cannula in place; moist mucous membranes  PULMONARY: Course breath sounds through out  CARDIOVASCULAR: Regular rate and rhythm  GASTROINTESTINAL: Soft, non-tender, non-distended; bowel sounds present  MUSCULOSKELETAL: 2+ peripheral pulses; +2 lower extremity edema  SKIN: No rashes or lesions; warm and dry    ASSESSMENT & PLAN:  51 yo M w/ pmhx of IVDU (heroin), vertebral osteomyelitis, and MSSA bacteremia presents for hypoxemia.     #Acute Submassive PE  -CT on admission with bilateral submassive PEs + concern for RV strain  -TTE: EF 55%, G1DD, mild TVR, no right heart strain  -VA duplx LE vein: Acute thrombus within the left common femoral, femoral, deep femoral, popliteal, gastrocnemius, posterior tibial and anterior tibial veins.  -s/p LLE DVT embolectomy 10/18    Plan:  - 140mg of Lovenox subcutaneous BID     #Acute hypoxemic respiratory failure  #Pneumonia  #Hx DIONICIO/OHS non-compliant with BIPAP or home CPAP  -currently on HFNC, attempting to wean daily  -encourage BIPAP o/n in place of CPAP  -legionella (-) (dc'd levo 10/19)    Plan:   -c/w cefepime for until 10/22  - then change to Cefazolin 2g q8h IV to target staph until MRI results and resolution of abscess    #Hx MSSA bacteremia  #Hx Epidural phlegmon/OM  #Hx R Psoas Abscess  -re-eval per ID, rec MR spine to r/o another source of infection  -Per NSG, pt able to receive MR even with recent spinal fusion  -MR w&w/o CTL spine once pt off HFNC    Plan:  - Patient went for MRI but patient cannot go though MRI with the high flow nasal canula on   - Once patient is off HFNC patient can go for MRI     #HO IVDA, Opioid Abuse on Methadone   #Agitation  -followed by psych    Plan:   -c/w methadone  -c/w lowering of benzo (xanax) dose, pt on 6mg per day at home      #Misc  - DVT Prophylaxis: Therapeutic lovenox  - GI Prophylaxis: Protonix  - Diet: Regular diet  - Activity: Out of bed as tolerated   - IV Fluids: None MIKAEL MCDERMOTT 52y Male  MRN#: [id number]   Hospital Day: 11d    SUBJECTIVE  Patient is a 52y old Male who presents with a chief complaint of Hypoxia (21 Oct 2022 15:51)  Currently admitted to medicine with the primary diagnosis of Pneumonia      INTERVAL HPI AND OVERNIGHT EVENTS:  Patient was examined and seen at bedside. This morning he is resting comfortably in bed and reports no issues or overnight events.    REVIEW OF SYMPTOMS:  Denies.     OBJECTIVE  PAST MEDICAL & SURGICAL HISTORY  IV drug abuse    Vertebral osteomyelitis    MSSA bacteremia    No significant past surgical history      ALLERGIES:  No Known Allergies    MEDICATIONS:  STANDING MEDICATIONS  ALPRAZolam 1 milliGRAM(s) Oral <User Schedule>  ALPRAZolam 2 milliGRAM(s) Oral at bedtime  buPROPion XL (24-Hour) . 300 milliGRAM(s) Oral daily  ceFAZolin   IVPB 2000 milliGRAM(s) IV Intermittent every 8 hours  chlorhexidine 2% Cloths 1 Application(s) Topical daily  enoxaparin Injectable 140 milliGRAM(s) SubCutaneous every 12 hours  folic acid 1 milliGRAM(s) Oral daily  methadone    Tablet 135 milliGRAM(s) Oral <User Schedule>  multivitamin 1 Tablet(s) Oral daily  nicotine - 21 mG/24Hr(s) Patch 1 Patch Transdermal daily  pantoprazole    Tablet 40 milliGRAM(s) Oral before breakfast  QUEtiapine 200 milliGRAM(s) Oral at bedtime  thiamine 100 milliGRAM(s) Oral daily    PRN MEDICATIONS  acetaminophen     Tablet .. 650 milliGRAM(s) Oral every 6 hours PRN  aluminum hydroxide/magnesium hydroxide/simethicone Suspension 30 milliLiter(s) Oral every 4 hours PRN  cyclobenzaprine 10 milliGRAM(s) Oral three times a day PRN  haloperidol    Injectable 5 milliGRAM(s) IntraMuscular every 8 hours PRN  melatonin 3 milliGRAM(s) Oral at bedtime PRN  ondansetron Injectable 4 milliGRAM(s) IV Push every 8 hours PRN      VITAL SIGNS: Last 24 Hours  T(C): 36.8 (22 Oct 2022 04:00), Max: 36.8 (22 Oct 2022 04:00)  T(F): 98.2 (22 Oct 2022 04:00), Max: 98.2 (22 Oct 2022 04:00)  HR: 91 (22 Oct 2022 04:00) (75 - 91)  BP: 92/54 (22 Oct 2022 04:00) (92/54 - 102/68)  BP(mean): 67 (22 Oct 2022 04:00) (67 - 80)  RR: 20 (22 Oct 2022 04:00) (20 - 20)  SpO2: 91% (22 Oct 2022 04:00) (91% - 99%)    LABS:                        10.0   6.61  )-----------( 273      ( 22 Oct 2022 07:39 )             32.7     10-22    139  |  97<L>  |  12  ----------------------------<  143<H>  4.6   |  32  |  0.6<L>    Ca    8.8      22 Oct 2022 07:39  Mg     1.8     10-22    TPro  6.4  /  Alb  2.6<L>  /  TBili  <0.2  /  DBili  x   /  AST  19  /  ALT  10  /  AlkPhos  84  10-22        PHYSICAL EXAM:  CONSTITUTIONAL: No acute distress, well-developed, well-groomed, AAOx3  HEAD: Atraumatic, normocephalic  EYES: EOM intact, PERRLA, conjunctiva and sclera clear  ENT: Nasal cannula in place; moist mucous membranes  PULMONARY: Course breath sounds through out  CARDIOVASCULAR: Regular rate and rhythm  GASTROINTESTINAL: Soft, non-tender, non-distended; bowel sounds present  MUSCULOSKELETAL: 2+ peripheral pulses; +2 lower extremity edema  SKIN: No rashes or lesions; warm and dry    ASSESSMENT & PLAN:  53 yo M w/ pmhx of IVDU (heroin), vertebral osteomyelitis, and MSSA bacteremia presents for hypoxemia.     #Acute Submassive PE  -CT on admission with bilateral submassive PEs + concern for RV strain  -TTE: EF 55%, G1DD, mild TVR, no right heart strain  -VA duplx LE vein: Acute thrombus within the left common femoral, femoral, deep femoral, popliteal, gastrocnemius, posterior tibial and anterior tibial veins.  -s/p LLE DVT embolectomy 10/18    Plan:  - 140mg of Lovenox subcutaneous BID     #Acute hypoxemic respiratory failure  #Pneumonia  #Hx DIONICIO/OHS non-compliant with BIPAP or home CPAP  -currently on HFNC, attempting to wean daily  -encourage BIPAP o/n in place of CPAP  -legionella (-) (dc'd levo 10/19)    Plan:   -c/w cefepime for until 10/22- Finished   - then change to Cefazolin 2g q8h IV to target staph until MRI results and resolution of abscess    #Hx MSSA bacteremia  #Hx Epidural phlegmon/OM  #Hx R Psoas Abscess  -re-eval per ID, rec MR spine to r/o another source of infection  -Per NSG, pt able to receive MR even with recent spinal fusion  -MR w&w/o CTL spine once pt off HFNC    Plan:  - Patient went for MRI but patient cannot go though MRI with the high flow nasal canula on   - Once patient is off HFNC patient can go for MRI- possibly today     #HO IVDA, Opioid Abuse on Methadone   #Agitation  -followed by psych    Plan:   -c/w methadone  -c/w lowering of benzo (xanax) dose, pt on 6mg per day at home      #Misc  - DVT Prophylaxis: Therapeutic lovenox  - GI Prophylaxis: Protonix  - Diet: Regular diet  - Activity: Out of bed as tolerated   - IV Fluids: None

## 2022-10-22 NOTE — PROGRESS NOTE ADULT - ASSESSMENT
PMHx of IVDU (heroin), vertebral osteomyelitis s/p debridement, and MSSA bacteremia, presenting from rehab facility for "low oxygen" and new onset LE edema. In the ED was found to be hypoxic, found to have b/l PEs with radiographic evidence of R heart strain and admitted to ICU, found to have LE DVTs s/p LLE embolectomy 10/18, now downgraded to SDU     Acute hypoxemic respiratory failure  Acute Submassive PE  Suspected superimposed PNA  DIONICIO/OHS, noncompliant with NIV  -CT on admission with bilateral submassive PEs + concern for RV strain  -TTE: EF 55%, G1DD, mild TVR, no right heart strain  -VA duplx LE vein: Acute thrombus within the left common femoral, femoral, deep femoral, popliteal, gastrocnemius, posterior tibial and anterior tibial veins, s/p LLE embolectomy 10/18  - now on Lovenox, therapeutic  - Procal 7, blood cx 10/12 - no growth  - tolerating NC today  - repeat chest xray tomorrow   - continue with Cefepime, last dose today, then  Cefazolin 2g q8h IV  - PT     Hx MSSA bacteremia  Hx Epidural phlegmon/OM  Hx R Psoas Abscess  -Per NSG, pt able to receive MR even with recent spinal fusion  -MR w&w/o CTL spine pending   - abx as above  - f/u neurosurgery once imaging complete     HO IVDA, Opioid Abuse on Methadone   Agitation  - improving, followed by psych: can resume Quentiapine 200 mg PO HS  - Continue with Xanax 1 mg po am, 1mg po afternoon and two times at night - total daily dose of 4mg  Continue Wellbutrin Xl 300mg po q 24hrs  Continue home dose Methadone 135mg po daily---> QTc 433 today 10/20  -For agitation, haldol 5mg po/IM every 8hrs prn    #Progress Note Handoff  Pending (specify): AC, weaning O2, monitor mental status, MRI, neurosx f/u, cw IV abx

## 2022-10-22 NOTE — PROGRESS NOTE ADULT - SUBJECTIVE AND OBJECTIVE BOX
MIKAEL MCDERMOTT  52y Male    CHIEF COMPLAINT:    Patient is a 52y old  Male who presents with a chief complaint of Hypoxia (22 Oct 2022 10:51)    INTERVAL HPI/OVERNIGHT EVENTS:    Patient seen and examined. No acute events overnight. s/p HFNC, comfortable on NC today      ROS: All other systems are negative.    Vital Signs:    T(F): 98.2 (10-22-22 @ 04:00), Max: 98.2 (10-22-22 @ 04:00)  HR: 91 (10-22-22 @ 04:00) (75 - 91)  BP: 92/54 (10-22-22 @ 04:00) (92/54 - 102/68)  RR: 20 (10-22-22 @ 04:00) (20 - 20)  SpO2: 91% (10-22-22 @ 04:00) (91% - 99%)    21 Oct 2022 07:01  -  22 Oct 2022 07:00  --------------------------------------------------------  IN: 600 mL / OUT: 1575 mL / NET: -975 mL    22 Oct 2022 07:01  -  22 Oct 2022 15:49  --------------------------------------------------------  IN: 0 mL / OUT: 750 mL / NET: -750 mL    PHYSICAL EXAM:    GENERAL:  NAD obese  SKIN: No rashes or lesions  HEENT: Atraumatic. Normocephalic.    NECK: Supple, No JVD.   PULMONARY:decreased breath sounds B/L. No wheezing.    CVS: Normal S1, S2. Rate and Rhythm are regular   ABDOMEN/GI: Soft, Nontender, Nondistended   MSK:  No clubbing or cyanosis   NEUROLOGIC: moves all extremities   PSYCH: Alert & oriented x 3, normal affect    Consultant(s) Notes Reviewed:  [x ] YES  [ ] NO  Care Discussed with Consultants/Other Providers [ x] YES  [ ] NO    LABS:                        10.0   6.61  )-----------( 273      ( 22 Oct 2022 07:39 )             32.7     139  |  97<L>  |  12  ----------------------------<  143<H>  4.6   |  32  |  0.6<L>    Ca    8.8      22 Oct 2022 07:39  Mg     1.8     10-22    TPro  6.4  /  Alb  2.6<L>  /  TBili  <0.2  /  DBili  x   /  AST  19  /  ALT  10  /  AlkPhos  84  10-22    RADIOLOGY & ADDITIONAL TESTS:  Imaging or report Personally Reviewed:  [x] YES  [ ] NO  EKG reviewed: [x] YES  [ ] NO    Medications:  Standing  ALPRAZolam 1 milliGRAM(s) Oral <User Schedule>  ALPRAZolam 2 milliGRAM(s) Oral at bedtime  buPROPion XL (24-Hour) . 300 milliGRAM(s) Oral daily  ceFAZolin   IVPB 2000 milliGRAM(s) IV Intermittent every 8 hours  chlorhexidine 2% Cloths 1 Application(s) Topical daily  enoxaparin Injectable 140 milliGRAM(s) SubCutaneous every 12 hours  folic acid 1 milliGRAM(s) Oral daily  methadone    Tablet 135 milliGRAM(s) Oral <User Schedule>  multivitamin 1 Tablet(s) Oral daily  nicotine - 21 mG/24Hr(s) Patch 1 Patch Transdermal daily  pantoprazole    Tablet 40 milliGRAM(s) Oral before breakfast  QUEtiapine 200 milliGRAM(s) Oral at bedtime  thiamine 100 milliGRAM(s) Oral daily    PRN Meds  acetaminophen     Tablet .. 650 milliGRAM(s) Oral every 6 hours PRN  aluminum hydroxide/magnesium hydroxide/simethicone Suspension 30 milliLiter(s) Oral every 4 hours PRN  cyclobenzaprine 10 milliGRAM(s) Oral three times a day PRN  haloperidol    Injectable 5 milliGRAM(s) IntraMuscular every 8 hours PRN  melatonin 3 milliGRAM(s) Oral at bedtime PRN  ondansetron Injectable 4 milliGRAM(s) IV Push every 8 hours PRN

## 2022-10-22 NOTE — PROGRESS NOTE ADULT - ASSESSMENT
IMPRESSION:    Acute hypoxemic resp failure on HFNC  PNA likely aspiration  PE with RV strain - submassive  LLE DVT - s/p thrombectomy 10/19  hx Epidural Phlegmon, OM  hx Right Psoas Abscess   hx MSSA Bacteremia  HO IVDA, Opioid Abuse on Methadone   DIONICIO OHS refusing NIV     PLAN:    CNS: FU psych. Xanax taper per psych  MRI lumbar spine pending. Neurosurgery following.     HEENT:  Oral care    PULMONARY:  HOB @ 45 degrees, aspiration precaution. Continue to wean down Fio2 as tolerated; BIPAP as needed and at night.  Sat 88-92. CXR noted with worsening right basilar opacity effusion. Repeat CXR tomorrow.     CARDIOVASCULAR: Avoid overload.  Negative balance as tolerated     GI: GI prophylaxis; oral diet. Aspiration precautions.     RENAL:  Kidney function at baseline. Correct as needed.     INFECTIOUS DISEASE: ID following.  ABX per ID: Curretnly on cefazolin. Last blood culture negative.     HEMATOLOGICAL:  Continue DVT therapy: Lovenox therapeutic dose.     ENDOCRINE:  Follow up FS.  Insulin protocol if needed.    Dispo: Keep in SDU for now.

## 2022-10-23 LAB
ALBUMIN SERPL ELPH-MCNC: 3.1 G/DL — LOW (ref 3.5–5.2)
ALP SERPL-CCNC: 86 U/L — SIGNIFICANT CHANGE UP (ref 30–115)
ALT FLD-CCNC: 13 U/L — SIGNIFICANT CHANGE UP (ref 0–41)
ANION GAP SERPL CALC-SCNC: 11 MMOL/L — SIGNIFICANT CHANGE UP (ref 7–14)
AST SERPL-CCNC: 19 U/L — SIGNIFICANT CHANGE UP (ref 0–41)
BASOPHILS # BLD AUTO: 0.04 K/UL — SIGNIFICANT CHANGE UP (ref 0–0.2)
BASOPHILS NFR BLD AUTO: 0.5 % — SIGNIFICANT CHANGE UP (ref 0–1)
BILIRUB SERPL-MCNC: 0.2 MG/DL — SIGNIFICANT CHANGE UP (ref 0.2–1.2)
BUN SERPL-MCNC: 12 MG/DL — SIGNIFICANT CHANGE UP (ref 10–20)
CALCIUM SERPL-MCNC: 8.9 MG/DL — SIGNIFICANT CHANGE UP (ref 8.4–10.5)
CHLORIDE SERPL-SCNC: 94 MMOL/L — LOW (ref 98–110)
CO2 SERPL-SCNC: 32 MMOL/L — SIGNIFICANT CHANGE UP (ref 17–32)
CREAT SERPL-MCNC: 0.7 MG/DL — SIGNIFICANT CHANGE UP (ref 0.7–1.5)
EGFR: 110 ML/MIN/1.73M2 — SIGNIFICANT CHANGE UP
EOSINOPHIL # BLD AUTO: 0.26 K/UL — SIGNIFICANT CHANGE UP (ref 0–0.7)
EOSINOPHIL NFR BLD AUTO: 3.5 % — SIGNIFICANT CHANGE UP (ref 0–8)
GLUCOSE SERPL-MCNC: 120 MG/DL — HIGH (ref 70–99)
HCT VFR BLD CALC: 32.8 % — LOW (ref 42–52)
HGB BLD-MCNC: 10.1 G/DL — LOW (ref 14–18)
IMM GRANULOCYTES NFR BLD AUTO: 1.5 % — HIGH (ref 0.1–0.3)
LYMPHOCYTES # BLD AUTO: 2.01 K/UL — SIGNIFICANT CHANGE UP (ref 1.2–3.4)
LYMPHOCYTES # BLD AUTO: 27 % — SIGNIFICANT CHANGE UP (ref 20.5–51.1)
MAGNESIUM SERPL-MCNC: 1.7 MG/DL — LOW (ref 1.8–2.4)
MCHC RBC-ENTMCNC: 27.7 PG — SIGNIFICANT CHANGE UP (ref 27–31)
MCHC RBC-ENTMCNC: 30.8 G/DL — LOW (ref 32–37)
MCV RBC AUTO: 90.1 FL — SIGNIFICANT CHANGE UP (ref 80–94)
MONOCYTES # BLD AUTO: 0.71 K/UL — HIGH (ref 0.1–0.6)
MONOCYTES NFR BLD AUTO: 9.5 % — HIGH (ref 1.7–9.3)
NEUTROPHILS # BLD AUTO: 4.32 K/UL — SIGNIFICANT CHANGE UP (ref 1.4–6.5)
NEUTROPHILS NFR BLD AUTO: 58 % — SIGNIFICANT CHANGE UP (ref 42.2–75.2)
NRBC # BLD: 0 /100 WBCS — SIGNIFICANT CHANGE UP (ref 0–0)
PLATELET # BLD AUTO: 402 K/UL — HIGH (ref 130–400)
POTASSIUM SERPL-MCNC: 4.5 MMOL/L — SIGNIFICANT CHANGE UP (ref 3.5–5)
POTASSIUM SERPL-SCNC: 4.5 MMOL/L — SIGNIFICANT CHANGE UP (ref 3.5–5)
PROT SERPL-MCNC: 6.8 G/DL — SIGNIFICANT CHANGE UP (ref 6–8)
RBC # BLD: 3.64 M/UL — LOW (ref 4.7–6.1)
RBC # FLD: 16.2 % — HIGH (ref 11.5–14.5)
SODIUM SERPL-SCNC: 137 MMOL/L — SIGNIFICANT CHANGE UP (ref 135–146)
WBC # BLD: 7.45 K/UL — SIGNIFICANT CHANGE UP (ref 4.8–10.8)
WBC # FLD AUTO: 7.45 K/UL — SIGNIFICANT CHANGE UP (ref 4.8–10.8)

## 2022-10-23 PROCEDURE — 99233 SBSQ HOSP IP/OBS HIGH 50: CPT

## 2022-10-23 PROCEDURE — 71045 X-RAY EXAM CHEST 1 VIEW: CPT | Mod: 26

## 2022-10-23 RX ADMIN — Medication 100 MILLIGRAM(S): at 16:28

## 2022-10-23 RX ADMIN — Medication 1 PATCH: at 12:25

## 2022-10-23 RX ADMIN — Medication 100 MILLIGRAM(S): at 21:55

## 2022-10-23 RX ADMIN — Medication 1 MILLIGRAM(S): at 12:34

## 2022-10-23 RX ADMIN — QUETIAPINE FUMARATE 200 MILLIGRAM(S): 200 TABLET, FILM COATED ORAL at 21:55

## 2022-10-23 RX ADMIN — Medication 1 TABLET(S): at 12:34

## 2022-10-23 RX ADMIN — Medication 1 PATCH: at 06:10

## 2022-10-23 RX ADMIN — Medication 1 MILLIGRAM(S): at 05:54

## 2022-10-23 RX ADMIN — BUPROPION HYDROCHLORIDE 300 MILLIGRAM(S): 150 TABLET, EXTENDED RELEASE ORAL at 12:34

## 2022-10-23 RX ADMIN — METHADONE HYDROCHLORIDE 135 MILLIGRAM(S): 40 TABLET ORAL at 12:35

## 2022-10-23 RX ADMIN — ENOXAPARIN SODIUM 140 MILLIGRAM(S): 100 INJECTION SUBCUTANEOUS at 18:03

## 2022-10-23 RX ADMIN — PANTOPRAZOLE SODIUM 40 MILLIGRAM(S): 20 TABLET, DELAYED RELEASE ORAL at 06:05

## 2022-10-23 RX ADMIN — ENOXAPARIN SODIUM 140 MILLIGRAM(S): 100 INJECTION SUBCUTANEOUS at 05:55

## 2022-10-23 RX ADMIN — CHLORHEXIDINE GLUCONATE 1 APPLICATION(S): 213 SOLUTION TOPICAL at 12:39

## 2022-10-23 RX ADMIN — Medication 1 MILLIGRAM(S): at 16:28

## 2022-10-23 RX ADMIN — Medication 1 PATCH: at 12:35

## 2022-10-23 RX ADMIN — Medication 2 MILLIGRAM(S): at 21:54

## 2022-10-23 RX ADMIN — Medication 100 MILLIGRAM(S): at 05:54

## 2022-10-23 RX ADMIN — Medication 100 MILLIGRAM(S): at 12:34

## 2022-10-23 NOTE — PROGRESS NOTE ADULT - ASSESSMENT
PMHx of IVDU (heroin), vertebral osteomyelitis s/p debridement, and MSSA bacteremia, presenting from rehab facility for "low oxygen" and new onset LE edema. In the ED was found to be hypoxic, found to have b/l PEs with radiographic evidence of R heart strain and admitted to ICU, found to have LE DVTs s/p LLE embolectomy 10/18, now downgraded to SDU     Acute hypoxemic respiratory failure - resolved  Acute Submassive PE  Suspected superimposed PNA  DIONICIO/OHS, noncompliant with NIV  -CT on admission with bilateral submassive PEs + concern for RV strain  -TTE: EF 55%, G1DD, mild TVR, no right heart strain  -VA duplx LE vein: Acute thrombus within the left common femoral, femoral, deep femoral, popliteal, gastrocnemius, posterior tibial and anterior tibial veins, s/p LLE embolectomy 10/18  - now on Lovenox, therapeutic. Can switch to eliquis on dc   - Procal 7, blood cx 10/12 - no growth  - tolerating NC today  - repeat chest xray tomorrow   - s/p Cefepime, now on  Cefazolin 2g q8h IV  - PT     Hx MSSA bacteremia  Hx Epidural phlegmon/OM  Hx R Psoas Abscess  -Per NSG, pt able to receive MR even with recent spinal fusion  -MR w&w/o CTL spine noted  - abx as above  - f/u neurosurgery and ID     HO IVDA, Opioid Abuse on Methadone   Agitation  - improving, followed by psych: can resume Quentiapine 200 mg PO HS  - Continue with Xanax 1 mg po am, 1mg po afternoon and two times at night - total daily dose of 4mg  Continue Wellbutrin Xl 300mg po q 24hrs  Continue home dose Methadone 135mg po daily---> QTc 433 today 10/20  -For agitation, haldol 5mg po/IM every 8hrs prn    #Progress Note Handoff  Pending (specify): AC, weaning O2, monitor mental status, neurosx/ID f/u, cw IV abx

## 2022-10-23 NOTE — PROGRESS NOTE ADULT - SUBJECTIVE AND OBJECTIVE BOX
Patient is a 52y old  Male who presents with a chief complaint of Hypoxia (22 Oct 2022 15:48)        Over Night Events:    No events   On minimal O2         ROS:  See HPI    PHYSICAL EXAM    ICU Vital Signs Last 24 Hrs  T(C): 36.2 (23 Oct 2022 08:25), Max: 36.7 (22 Oct 2022 20:00)  T(F): 97.2 (23 Oct 2022 08:25), Max: 98.1 (22 Oct 2022 20:00)  HR: 83 (23 Oct 2022 08:25) (82 - 86)  BP: 98/67 (23 Oct 2022 08:25) (94/60 - 113/63)  BP(mean): 75 (23 Oct 2022 08:25) (75 - 81)  ABP: --  ABP(mean): --  RR: 18 (23 Oct 2022 08:25) (18 - 20)  SpO2: 98% (23 Oct 2022 08:25) (95% - 98%)    O2 Parameters below as of 23 Oct 2022 08:25  Patient On (Oxygen Delivery Method): nasal cannula            General: NAD   HEENT: KULDEEP             Lymphatic system: No cervical LN   Lungs: Bilateral BS, clear   Cardiovascular: Regular   Gastrointestinal: Soft, Positive BS  Extremities: No clubbing.  Moves extremities.  Full Range of motion   Skin: Warm, intact  Neurological: No motor or sensory deficit       10-22-22 @ 07:01  -  10-23-22 @ 07:00  --------------------------------------------------------  IN:    Oral Fluid: 240 mL  Total IN: 240 mL    OUT:    Voided (mL): 1375 mL  Total OUT: 1375 mL    Total NET: -1135 mL          LABS:                            10.1   7.45  )-----------( 402      ( 23 Oct 2022 08:05 )             32.8                                               10-23    137  |  94<L>  |  12  ----------------------------<  120<H>  4.5   |  32  |  0.7    Ca    8.9      23 Oct 2022 08:05  Mg     1.7     10-23    TPro  6.8  /  Alb  3.1<L>  /  TBili  0.2  /  DBili  x   /  AST  19  /  ALT  13  /  AlkPhos  86  10-23                                                                                           LIVER FUNCTIONS - ( 23 Oct 2022 08:05 )  Alb: 3.1 g/dL / Pro: 6.8 g/dL / ALK PHOS: 86 U/L / ALT: 13 U/L / AST: 19 U/L / GGT: x                                                                                                                                       MEDICATIONS  (STANDING):  ALPRAZolam 1 milliGRAM(s) Oral <User Schedule>  ALPRAZolam 2 milliGRAM(s) Oral at bedtime  buPROPion XL (24-Hour) . 300 milliGRAM(s) Oral daily  ceFAZolin   IVPB 2000 milliGRAM(s) IV Intermittent every 8 hours  chlorhexidine 2% Cloths 1 Application(s) Topical daily  enoxaparin Injectable 140 milliGRAM(s) SubCutaneous every 12 hours  folic acid 1 milliGRAM(s) Oral daily  methadone    Tablet 135 milliGRAM(s) Oral <User Schedule>  multivitamin 1 Tablet(s) Oral daily  nicotine - 21 mG/24Hr(s) Patch 1 Patch Transdermal daily  pantoprazole    Tablet 40 milliGRAM(s) Oral before breakfast  QUEtiapine 200 milliGRAM(s) Oral at bedtime  thiamine 100 milliGRAM(s) Oral daily    MEDICATIONS  (PRN):  acetaminophen     Tablet .. 650 milliGRAM(s) Oral every 6 hours PRN Temp greater or equal to 38C (100.4F)  aluminum hydroxide/magnesium hydroxide/simethicone Suspension 30 milliLiter(s) Oral every 4 hours PRN Dyspepsia  cyclobenzaprine 10 milliGRAM(s) Oral three times a day PRN Muscle Spasm  haloperidol    Injectable 5 milliGRAM(s) IntraMuscular every 8 hours PRN Agitation  melatonin 3 milliGRAM(s) Oral at bedtime PRN Insomnia  ondansetron Injectable 4 milliGRAM(s) IV Push every 8 hours PRN Nausea and/or Vomiting      Xrays:                                                                                     ECHO

## 2022-10-23 NOTE — PROGRESS NOTE ADULT - ASSESSMENT
IMPRESSION:    Acute hypoxemic resp failure on HFNC  PNA likely aspiration  PE with RV strain - submassive  LLE DVT - s/p thrombectomy 10/19  hx Epidural Phlegmon, OM  hx Right Psoas Abscess   hx MSSA Bacteremia  HO IVDA, Opioid Abuse on Methadone   DIONICIO OHS refusing NIV     PLAN:    CNS: FU psych. Xanax taper per psych.  MRI lumbar spine pending official read. Neurosurgery following.     HEENT:  Oral care    PULMONARY:  HOB @ 45 degrees, aspiration precaution. Continue to wean down Fio2 as tolerated; BIPAP as needed and at night.  Goal sat 88-92. CXR with improving right basilar infiltrate.     CARDIOVASCULAR: Avoid overload.  Negative balance as tolerated.     GI: GI prophylaxis; oral diet. Aspiration precautions.     RENAL:  Kidney function at baseline. Correct electrolytes as needed.     INFECTIOUS DISEASE: ID following.  ABX per ID: Curretnly on cefazolin. Last blood culture negative.     HEMATOLOGICAL:  Continue DVT therapy: Lovenox therapeutic dose.     ENDOCRINE:  Follow up FS.  Insulin protocol if needed.    Dispo: Transfer to medical floor.

## 2022-10-23 NOTE — PROGRESS NOTE ADULT - SUBJECTIVE AND OBJECTIVE BOX
MIKAEL MCDERMOTT  52y Male    CHIEF COMPLAINT:    Patient is a 52y old  Male who presents with a chief complaint of Hypoxia (23 Oct 2022 10:24)    INTERVAL HPI/OVERNIGHT EVENTS:    Patient seen and examined. No acute events overnight. Tolerating NC     ROS: All other systems are negative.    Vital Signs:    T(F): 97.1 (10-23-22 @ 12:11), Max: 98.1 (10-22-22 @ 20:00)  HR: 91 (10-23-22 @ 12:11) (82 - 91)  BP: 105/66 (10-23-22 @ 12:11) (94/60 - 113/63)  RR: 20 (10-23-22 @ 12:11) (18 - 20)  SpO2: 98% (10-23-22 @ 12:11) (95% - 98%)    22 Oct 2022 07:01  -  23 Oct 2022 07:00  --------------------------------------------------------  IN: 240 mL / OUT: 1375 mL / NET: -1135 mL    Daily Weight in k.7 (23 Oct 2022 08:25)    PHYSICAL EXAM:    GENERAL:  NAD obese  SKIN: No rashes or lesions  HEENT: Atraumatic. Normocephalic.    NECK: Supple, No JVD   PULMONARY: decreased breath sounds B/L. No wheezing   CVS: Normal S1, S2. Rate and Rhythm are regular   ABDOMEN/GI: Soft, Nontender, Nondistended   MSK:  No clubbing or cyanosis, scars on arms  NEUROLOGIC: Moves all extremities   PSYCH: Alert & oriented x 3     Consultant(s) Notes Reviewed:  [x ] YES  [ ] NO  Care Discussed with Consultants/Other Providers [ x] YES  [ ] NO    LABS:                        10.1   7.45  )-----------( 402      ( 23 Oct 2022 08:05 )             32.8     137  |  94<L>  |  12  ----------------------------<  120<H>  4.5   |  32  |  0.7    Ca    8.9      23 Oct 2022 08:05  Mg     1.7     10-23    TPro  6.8  /  Alb  3.1<L>  /  TBili  0.2  /  DBili  x   /  AST  19  /  ALT  13  /  AlkPhos  86  10-23    RADIOLOGY & ADDITIONAL TESTS:  Imaging or report Personally Reviewed:  [x] YES  [ ] NO  EKG reviewed: [x] YES  [ ] NO    Medications:  Standing  ALPRAZolam 1 milliGRAM(s) Oral <User Schedule>  ALPRAZolam 2 milliGRAM(s) Oral at bedtime  buPROPion XL (24-Hour) . 300 milliGRAM(s) Oral daily  ceFAZolin   IVPB 2000 milliGRAM(s) IV Intermittent every 8 hours  chlorhexidine 2% Cloths 1 Application(s) Topical daily  enoxaparin Injectable 140 milliGRAM(s) SubCutaneous every 12 hours  folic acid 1 milliGRAM(s) Oral daily  methadone    Tablet 135 milliGRAM(s) Oral <User Schedule>  multivitamin 1 Tablet(s) Oral daily  nicotine - 21 mG/24Hr(s) Patch 1 Patch Transdermal daily  pantoprazole    Tablet 40 milliGRAM(s) Oral before breakfast  QUEtiapine 200 milliGRAM(s) Oral at bedtime  thiamine 100 milliGRAM(s) Oral daily    PRN Meds  acetaminophen     Tablet .. 650 milliGRAM(s) Oral every 6 hours PRN  aluminum hydroxide/magnesium hydroxide/simethicone Suspension 30 milliLiter(s) Oral every 4 hours PRN  cyclobenzaprine 10 milliGRAM(s) Oral three times a day PRN  haloperidol    Injectable 5 milliGRAM(s) IntraMuscular every 8 hours PRN  melatonin 3 milliGRAM(s) Oral at bedtime PRN  ondansetron Injectable 4 milliGRAM(s) IV Push every 8 hours PRN

## 2022-10-24 LAB
ALBUMIN SERPL ELPH-MCNC: 3.1 G/DL — LOW (ref 3.5–5.2)
ALP SERPL-CCNC: 96 U/L — SIGNIFICANT CHANGE UP (ref 30–115)
ALT FLD-CCNC: 13 U/L — SIGNIFICANT CHANGE UP (ref 0–41)
ANION GAP SERPL CALC-SCNC: 10 MMOL/L — SIGNIFICANT CHANGE UP (ref 7–14)
AST SERPL-CCNC: 18 U/L — SIGNIFICANT CHANGE UP (ref 0–41)
BASOPHILS # BLD AUTO: 0.04 K/UL — SIGNIFICANT CHANGE UP (ref 0–0.2)
BASOPHILS NFR BLD AUTO: 0.6 % — SIGNIFICANT CHANGE UP (ref 0–1)
BILIRUB SERPL-MCNC: <0.2 MG/DL — SIGNIFICANT CHANGE UP (ref 0.2–1.2)
BUN SERPL-MCNC: 12 MG/DL — SIGNIFICANT CHANGE UP (ref 10–20)
CALCIUM SERPL-MCNC: 9.1 MG/DL — SIGNIFICANT CHANGE UP (ref 8.4–10.5)
CHLORIDE SERPL-SCNC: 95 MMOL/L — LOW (ref 98–110)
CO2 SERPL-SCNC: 31 MMOL/L — SIGNIFICANT CHANGE UP (ref 17–32)
CREAT SERPL-MCNC: 0.7 MG/DL — SIGNIFICANT CHANGE UP (ref 0.7–1.5)
EGFR: 110 ML/MIN/1.73M2 — SIGNIFICANT CHANGE UP
EOSINOPHIL # BLD AUTO: 0.29 K/UL — SIGNIFICANT CHANGE UP (ref 0–0.7)
EOSINOPHIL NFR BLD AUTO: 4.2 % — SIGNIFICANT CHANGE UP (ref 0–8)
GLUCOSE BLDC GLUCOMTR-MCNC: 137 MG/DL — HIGH (ref 70–99)
GLUCOSE BLDC GLUCOMTR-MCNC: 174 MG/DL — HIGH (ref 70–99)
GLUCOSE BLDC GLUCOMTR-MCNC: 188 MG/DL — HIGH (ref 70–99)
GLUCOSE SERPL-MCNC: 132 MG/DL — HIGH (ref 70–99)
HCT VFR BLD CALC: 33.5 % — LOW (ref 42–52)
HGB BLD-MCNC: 10.1 G/DL — LOW (ref 14–18)
IMM GRANULOCYTES NFR BLD AUTO: 1.7 % — HIGH (ref 0.1–0.3)
LYMPHOCYTES # BLD AUTO: 2.09 K/UL — SIGNIFICANT CHANGE UP (ref 1.2–3.4)
LYMPHOCYTES # BLD AUTO: 30.2 % — SIGNIFICANT CHANGE UP (ref 20.5–51.1)
MAGNESIUM SERPL-MCNC: 1.7 MG/DL — LOW (ref 1.8–2.4)
MCHC RBC-ENTMCNC: 27.5 PG — SIGNIFICANT CHANGE UP (ref 27–31)
MCHC RBC-ENTMCNC: 30.1 G/DL — LOW (ref 32–37)
MCV RBC AUTO: 91.3 FL — SIGNIFICANT CHANGE UP (ref 80–94)
MONOCYTES # BLD AUTO: 0.61 K/UL — HIGH (ref 0.1–0.6)
MONOCYTES NFR BLD AUTO: 8.8 % — SIGNIFICANT CHANGE UP (ref 1.7–9.3)
NEUTROPHILS # BLD AUTO: 3.77 K/UL — SIGNIFICANT CHANGE UP (ref 1.4–6.5)
NEUTROPHILS NFR BLD AUTO: 54.5 % — SIGNIFICANT CHANGE UP (ref 42.2–75.2)
NRBC # BLD: 0 /100 WBCS — SIGNIFICANT CHANGE UP (ref 0–0)
PLATELET # BLD AUTO: 380 K/UL — SIGNIFICANT CHANGE UP (ref 130–400)
POTASSIUM SERPL-MCNC: 4.5 MMOL/L — SIGNIFICANT CHANGE UP (ref 3.5–5)
POTASSIUM SERPL-SCNC: 4.5 MMOL/L — SIGNIFICANT CHANGE UP (ref 3.5–5)
PROT SERPL-MCNC: 7 G/DL — SIGNIFICANT CHANGE UP (ref 6–8)
RBC # BLD: 3.67 M/UL — LOW (ref 4.7–6.1)
RBC # FLD: 16.1 % — HIGH (ref 11.5–14.5)
SODIUM SERPL-SCNC: 136 MMOL/L — SIGNIFICANT CHANGE UP (ref 135–146)
WBC # BLD: 6.92 K/UL — SIGNIFICANT CHANGE UP (ref 4.8–10.8)
WBC # FLD AUTO: 6.92 K/UL — SIGNIFICANT CHANGE UP (ref 4.8–10.8)

## 2022-10-24 PROCEDURE — 99233 SBSQ HOSP IP/OBS HIGH 50: CPT

## 2022-10-24 RX ORDER — MAGNESIUM OXIDE 400 MG ORAL TABLET 241.3 MG
400 TABLET ORAL ONCE
Refills: 0 | Status: DISCONTINUED | OUTPATIENT
Start: 2022-10-24 | End: 2022-11-17

## 2022-10-24 RX ADMIN — ENOXAPARIN SODIUM 140 MILLIGRAM(S): 100 INJECTION SUBCUTANEOUS at 05:19

## 2022-10-24 RX ADMIN — Medication 100 MILLIGRAM(S): at 11:20

## 2022-10-24 RX ADMIN — METHADONE HYDROCHLORIDE 135 MILLIGRAM(S): 40 TABLET ORAL at 11:20

## 2022-10-24 RX ADMIN — QUETIAPINE FUMARATE 200 MILLIGRAM(S): 200 TABLET, FILM COATED ORAL at 21:32

## 2022-10-24 RX ADMIN — Medication 1 MILLIGRAM(S): at 13:01

## 2022-10-24 RX ADMIN — Medication 1 PATCH: at 11:21

## 2022-10-24 RX ADMIN — Medication 1 TABLET(S): at 11:20

## 2022-10-24 RX ADMIN — Medication 1 MILLIGRAM(S): at 11:22

## 2022-10-24 RX ADMIN — Medication 100 MILLIGRAM(S): at 13:01

## 2022-10-24 RX ADMIN — Medication 1 MILLIGRAM(S): at 05:22

## 2022-10-24 RX ADMIN — ENOXAPARIN SODIUM 140 MILLIGRAM(S): 100 INJECTION SUBCUTANEOUS at 17:39

## 2022-10-24 RX ADMIN — Medication 2 MILLIGRAM(S): at 21:31

## 2022-10-24 RX ADMIN — BUPROPION HYDROCHLORIDE 300 MILLIGRAM(S): 150 TABLET, EXTENDED RELEASE ORAL at 11:20

## 2022-10-24 RX ADMIN — Medication 1 PATCH: at 11:44

## 2022-10-24 RX ADMIN — Medication 100 MILLIGRAM(S): at 05:23

## 2022-10-24 RX ADMIN — CHLORHEXIDINE GLUCONATE 1 APPLICATION(S): 213 SOLUTION TOPICAL at 11:21

## 2022-10-24 RX ADMIN — PANTOPRAZOLE SODIUM 40 MILLIGRAM(S): 20 TABLET, DELAYED RELEASE ORAL at 05:22

## 2022-10-24 RX ADMIN — Medication 100 MILLIGRAM(S): at 21:31

## 2022-10-24 NOTE — PHYSICAL THERAPY INITIAL EVALUATION ADULT - PERTINENT HX OF CURRENT PROBLEM, REHAB EVAL
51yo male     PMHx of IVDU (heroin), vertebral osteomyelitis s/p debridement, and MSSA bacteremia. Presenting from rehab facility for "low oxygen". Per patient, he is not experiencing any SOB/HARDWICK or CP or pain anywhere on his body and is entirely asymptomatic.  Of note he has recently noticed new LE edema for the past 2 days. Per EMS, on arrival he was satting in the 70s, they started the patient on 15L via NRB, with improvement of SaO2 to 80s. 51yo male with PMHx of IVDU (heroin), vertebral osteomyelitis s/p debridement, and MSSA bacteremia. Presenting from rehab facility for "low oxygen". Per patient, he is not experiencing any SOB/HARDWICK or CP or pain anywhere on his body and is entirely asymptomatic.  Of note he has recently noticed new LE edema for the past 2 days. Per EMS, on arrival he was satting in the 70s, they started the patient on 15L via NRB, with improvement of SaO2 to 80s.

## 2022-10-24 NOTE — PROGRESS NOTE ADULT - ASSESSMENT
ASSESSMENT  53yo male PMH IVDU (heroin), vertebral osteomyelitis s/p debridement, and recurrent MSSA bacteremia (8/2022, 1/2022)  Presenting from rehab facility for "low oxygen".   Seen by ID 8/2022 for NICOLE bacteremia with discitis/OM at L4-5.  CT Paravertebral phlegmon and left retropharyngeal abscess with air-fluid level.  8/10 BCx NICOLE  8/11,12,13,14,15 BCx NG  8/24 PAM no vegetations  PT was d/c with IV ancef 2 gm iv q8h ( since 8/16 )-8 weeks starting 8/11- 10/6    IMPRESSION  #Possible Severe sepsis on admission Pulse>90, Resp Rate>20, WBC>12, lactic acidosis    Found to have bilateral PE, Possible GNR PNA    Rapid RVP Result: NotDetec (10-19-22 @ 14:10)  s/p Procedure:   1.  Inferior Vena Cavography  2.  Pelvic Venography    3.  Left lower extremity Venography  4.  Left popliteal and femoral venous thrombectomies    10/12 BCX NGTD     Denies productive cough to suggest PNA    Has continued back pain     CT Bilateral pulmonary emboli with evidence of right ventricular strainPulmonary trunk enlargement, compatible with pulmonary hypertension.  Numerous bilateral patchy opacities with confluent areas of consolidation predominantly in the posterior lung fields and bilateral perihilar   lymphadenopathy, suspicious for pneumonia. Follow-up in 6 weeks' time is recommended.    Procalcitonin, Serum: 7.63 ng/mL (10-12-22 @ 06:50)  #Obesity BMI (kg/m2): 41  #Recurrent MSSA bacteremia with discitis   < from: MR Lumbar Spine w/wo IV Cont (10.22.22 @ 20:40) >  1.  Surgical hardware at L4 and L5.  2.  Abnormal signaland enhancement in the anterior and lateral paraspinal soft tissues at L4 and L5 likely representing phlegmon.  3.  Phlegmon and/or postoperative changes at L4 and L5 within the spinal canal.  4.  L2-L3; annular disc bulge, degenerative changes, and bilateral foraminal narrowing.  5.  L3-L4; central spinal stenosis and bilateral foraminal narrowing.  6.  L4-L5; limited evaluation, postoperative changes and/or phlegmon, and bilateral foraminal narrowing.  #IVDU  #HCV, RNA UD  Creatinine, Serum: 0.7 (10-13-22 @ 05:04)    Weight (kg): 140.9 (10-12-22 @ 09:30)    RECOMMENDATIONS  - ESR, CRP  - Neurosurgery eval  - Cefazolin 2g q8h IV, 14 weeks is 11/17; MRI with Abnormal signal and enhancement in the anterior and lateral paraspinal soft tissues at L4 and L5 likely representing phlegmon.  f/u ESR, CRP, likely [facility]     If any questions, please call or send a message on [employer] Teams  Please continue to update ID with any pertinent new laboratory or radiographic findings  #[id number]   ASSESSMENT  53yo male PMH IVDU (heroin), vertebral osteomyelitis s/p debridement, and recurrent MSSA bacteremia (8/2022, 1/2022)  Presenting from rehab facility for "low oxygen".   Seen by ID 8/2022 for NICOLE bacteremia with discitis/OM at L4-5.  CT Paravertebral phlegmon and left retropharyngeal abscess with air-fluid level.  8/10 BCx NICOLE  8/11,12,13,14,15 BCx NG  8/24 PAM no vegetations  PT was d/c with IV ancef 2 gm iv q8h ( since 8/16 )-8 weeks starting 8/11- 10/6    IMPRESSION  #Possible Severe sepsis on admission Pulse>90, Resp Rate>20, WBC>12, lactic acidosis    Found to have bilateral PE, Possible GNR PNA    Rapid RVP Result: NotDetec (10-19-22 @ 14:10)  s/p Procedure:   1.  Inferior Vena Cavography  2.  Pelvic Venography    3.  Left lower extremity Venography  4.  Left popliteal and femoral venous thrombectomies    10/12 BCX NGTD     Denies productive cough to suggest PNA    Has continued back pain     CT Bilateral pulmonary emboli with evidence of right ventricular strainPulmonary trunk enlargement, compatible with pulmonary hypertension.  Numerous bilateral patchy opacities with confluent areas of consolidation predominantly in the posterior lung fields and bilateral perihilar   lymphadenopathy, suspicious for pneumonia. Follow-up in 6 weeks' time is recommended.    Procalcitonin, Serum: 7.63 ng/mL (10-12-22 @ 06:50)  #Obesity BMI (kg/m2): 41  #Recurrent MSSA bacteremia with discitis   < from: MR Lumbar Spine w/wo IV Cont (10.22.22 @ 20:40) >  1.  Surgical hardware at L4 and L5.  2.  Abnormal signaland enhancement in the anterior and lateral paraspinal soft tissues at L4 and L5 likely representing phlegmon.  3.  Phlegmon and/or postoperative changes at L4 and L5 within the spinal canal.  4.  L2-L3; annular disc bulge, degenerative changes, and bilateral foraminal narrowing.  5.  L3-L4; central spinal stenosis and bilateral foraminal narrowing.  6.  L4-L5; limited evaluation, postoperative changes and/or phlegmon, and bilateral foraminal narrowing.  #IVDU  #HCV, RNA UD  Creatinine, Serum: 0.7 (10-13-22 @ 05:04)    Weight (kg): 140.9 (10-12-22 @ 09:30)    RECOMMENDATIONS  - ESR, CRP (difficult to interpret as concomitant medical issues PE, PNA, etc)  - Neurosurgery eval  - Cefazolin 2g q8h IV, on week 11 which should be a sufficient course. MRI with Abnormal signal and enhancement in the anterior and lateral paraspinal soft tissues at L4 and L5 likely representing phlegmon. Pt declining D/C with midline facility. Understands and is able to repeat back risks including worsening infection, paralysis, sepsis and death. Continue IV while in-house, on D/C PO Cefadroxil 1g q24h IV (if not available PO keflex 500mg four times daily) x 3 more weeks 11/17-    If any questions, please call or send a message on [employer] Teams  Please continue to update ID with any pertinent new laboratory or radiographic findings  #[id number]

## 2022-10-24 NOTE — PHYSICAL THERAPY INITIAL EVALUATION ADULT - ADDITIONAL COMMENTS
Prior to past recent hospitalization pt reports he was independent with amb and ADL's. Pt reports he was order a Rollator and w/c.

## 2022-10-24 NOTE — CONSULT NOTE ADULT - SUBJECTIVE AND OBJECTIVE BOX
Neurosurgery Consultation Note    HPI  52 year old male PMHx of IVDU (heroin), vertebral osteomyelitis s/p debridement, and MSSA bacteremia, presenting from rehab facility for "low oxygen".     Per patient, he is not experiencing any SOB/HARDWICK or CP or pain anywhere on his body and is entirely asymptomatic.  Of note he has recently noticed new LE edema for the past 2 days.     Per EMS, on arrival he was satting in the 70s, they started the patient on 15L via NRB, with improvement of SaO2 to 80s. No other interventions in the field.    ---------------------------  Pt seen and examined at the bedside.  Pt course c/b bilateral pulmonary emboli, require ICU admission, extensive acute DVT of the left lower extremity s/p LLE DVT thrombectomy.  At present time the pt is resting in bed in NAD and stable.  Pt is with out any current complaints, stable neurological exam, JACQUES x4 with good strength, SILT. Surgical wound CDI        Subjective: 52yMale with a pmhx of PNEUMONIA; HYPERCAPNIC RESPIRATORY FAILURE    ^PNEUMONIA; HYPERCAPNIC RESPIRATORY FAILURE    No pertinent family history in first degree relatives    Handoff    MEWS Score    IV drug abuse    Vertebral osteomyelitis    MSSA bacteremia    Pneumonia    Substance induced mood disorder    No significant past surgical history    LOW OXYGEN    33    Hypercapnic respiratory failure    [email or web address]      s/p     Allergies    No Known Allergies    Intolerances        Vital Signs Last 24 Hrs  T(C): 36.7 (24 Oct 2022 14:00), Max: 36.9 (23 Oct 2022 20:00)  T(F): 98 (24 Oct 2022 14:00), Max: 98.4 (23 Oct 2022 20:00)  HR: 82 (24 Oct 2022 14:00) (82 - 95)  BP: 102/50 (24 Oct 2022 14:00) (95/50 - 118/74)  BP(mean): 83 (23 Oct 2022 20:00) (83 - 83)  RR: 20 (24 Oct 2022 14:00) (18 - 20)  SpO2: 98% (24 Oct 2022 04:48) (92% - 98%)      acetaminophen     Tablet .. 650 milliGRAM(s) Oral every 6 hours PRN  ALPRAZolam 2 milliGRAM(s) Oral at bedtime  ALPRAZolam 1 milliGRAM(s) Oral <User Schedule>  aluminum hydroxide/magnesium hydroxide/simethicone Suspension 30 milliLiter(s) Oral every 4 hours PRN  buPROPion XL (24-Hour) . 300 milliGRAM(s) Oral daily  ceFAZolin   IVPB 2000 milliGRAM(s) IV Intermittent every 8 hours  chlorhexidine 2% Cloths 1 Application(s) Topical daily  cyclobenzaprine 10 milliGRAM(s) Oral three times a day PRN  enoxaparin Injectable 140 milliGRAM(s) SubCutaneous every 12 hours  folic acid 1 milliGRAM(s) Oral daily  haloperidol    Injectable 5 milliGRAM(s) IntraMuscular every 8 hours PRN  melatonin 3 milliGRAM(s) Oral at bedtime PRN  methadone    Tablet 135 milliGRAM(s) Oral <User Schedule>  multivitamin 1 Tablet(s) Oral daily  nicotine - 21 mG/24Hr(s) Patch 1 Patch Transdermal daily  ondansetron Injectable 4 milliGRAM(s) IV Push every 8 hours PRN  pantoprazole    Tablet 40 milliGRAM(s) Oral before breakfast  QUEtiapine 200 milliGRAM(s) Oral at bedtime  thiamine 100 milliGRAM(s) Oral daily        10-23-22 @ 07:01  -  10-24-22 @ 07:00  --------------------------------------------------------  IN: 0 mL / OUT: 1000 mL / NET: -1000 mL        REVIEW OF SYSTEMS    [ x] A ten-point review of systems was otherwise negative except as noted.  [ ] Due to altered mental status/intubation, subjective information were not able to be obtained from the patient. History was obtained, to the extent possible, from review of the chart and collateral sources of information.      Exam:  No C/T/L spine TTP  General: Lying in bed, following all commands  AAOX3. Verbal function intact  Facial motions symmetric  EOMI  Motor: MAEx4  5/5 strength in b/l UE's   5/5 b/l LE  Sensation: SILT  Wound: Incision clean, dry, and intact without drainage        CBC Full  -  ( 24 Oct 2022 08:43 )  WBC Count : 6.92 K/uL  RBC Count : 3.67 M/uL  Hemoglobin : 10.1 g/dL  Hematocrit : 33.5 %  Platelet Count - Automated : 380 K/uL  Mean Cell Volume : 91.3 fL  Mean Cell Hemoglobin : 27.5 pg  Mean Cell Hemoglobin Concentration : 30.1 g/dL  Auto Neutrophil # : 3.77 K/uL  Auto Lymphocyte # : 2.09 K/uL  Auto Monocyte # : 0.61 K/uL  Auto Eosinophil # : 0.29 K/uL  Auto Basophil # : 0.04 K/uL  Auto Neutrophil % : 54.5 %  Auto Lymphocyte % : 30.2 %  Auto Monocyte % : 8.8 %  Auto Eosinophil % : 4.2 %  Auto Basophil % : 0.6 %    10-24    136  |  95<L>  |  12  ----------------------------<  132<H>  4.5   |  31  |  0.7    Ca    9.1      24 Oct 2022 08:43  Mg     1.7     10-24    TPro  7.0  /  Alb  3.1<L>  /  TBili  <0.2  /  DBili  x   /  AST  18  /  ALT  13  /  AlkPhos  96  10-24            Wound:  < from: MR Cervical Spine w/wo IV Cont (10.22.22 @ 20:40) >  IMPRESSION:    1.  Study limited by motion artifact.    2.  No enhancing lesions.    3.  Straightening of the normal cervical lordosis may be secondary to   patient positioning or muscle spasm.    4.  C4-C5 and C5-C6; disc osteophyte complexes with compression of the   thecal sac.    5.  C6-C7; disc osteophyte complex with compression of the thecal sac,   degenerative changes, and bilateral foraminal narrowing.    --- End of Report ---            MARYA JULIEN MD; Attending Radiologist  This document has been electronically signed. Oct 23 2022  1:07PM    < end of copied text >  < from: MR Thoracic Spine w/wo IV Cont (10.22.22 @ 20:40) >    IMPRESSION:    In comparison with the prior MRI of the thoracic spine dated August 12, 2022:    Current examination is limited by motion artifact.    Interval development of a right pleural effusion which may be loculated   and a right basilar opacity.    No enhancing lesions.    --- End of Report ---            MARYA JULIEN MD; Attending Radiologist  This document has been electronically signed. Oct 23 2022  1:07PM    < end of copied text >  < from: MR Lumbar Spine w/wo IV Cont (10.22.22 @ 20:40) >  IMPRESSION:    In comparison with the prior MRI of the lumbar spine dated August 12, 2022:    There is been interval surgical intervention. The previously described   discitis/osteomyelitis at L4-L5, epidural soft tissue, and paraspinal   abscess has markedly resolved.    1.  Surgical hardware at L4 and L5.    2.  Abnormal signaland enhancement in the anterior and lateral   paraspinal soft tissues at L4 and L5 likely representing phlegmon.    3.  Phlegmon and/or postoperative changes at L4 and L5 within the spinal   canal.    4.  L2-L3; annular disc bulge, degenerative changes, and bilateral   foraminal narrowing.    5.  L3-L4; central spinal stenosis and bilateral foraminal narrowing.    6.  L4-L5; limited evaluation, postoperative changes and/or phlegmon, and   bilateral foraminal narrowing.    7.  L5-S1; annular disc bulge, degenerative changes, and bilateral   foraminal narrowing.    8.  Degenerative changes involving the sacroiliac joints.    9.  Fatty infiltration of the dorsal paraspinal muscles.    --- End of Report ---            MARYA JULIEN MD; Attending Radiologist  This document has been electronically signed. Oct 23 2022  1:07PM    < end of copied text >        Assessment/Plan:  This is a 52 year old male PMH IVDU (heroin), vertebral osteomyelitis s/p TLIF and debridement, and recurrent MSSA bacteremia (8/2022, 1/2022), found with b/l PE and extensive acute DVT of the Left LE       Images reviewed  No acute Neurosurgical Intervention  Stable neurological exam  abx per ID; Cefazolin 2g q8h IV, on week 11 which should be a sufficient course. Pt declining D/C with midline facility. Understands and is able to repeat back risks including worsening infection, paralysis, sepsis and death. Continue IV while in-house, on D/C PO Cefadroxil 1g q24h IV (if not available PO keflex 500mg four times daily) x 3 more weeks  will discuss with attending   Management per Medicine team

## 2022-10-24 NOTE — PROGRESS NOTE ADULT - SUBJECTIVE AND OBJECTIVE BOX
MIKAEL MCDERMOTT  52y, Male  Allergy: No Known Allergies      LOS  13d    CHIEF COMPLAINT: Hypoxia (23 Oct 2022 15:45)      INTERVAL EVENTS/HPI  - No acute events overnight  - T(F): , Max: 98.4 (10-23-22 @ 17:07)  - Tolerating medication  - WBC Count: 7.45 (10-23-22 @ 08:05)  WBC Count: 6.61 (10-22-22 @ 07:39)     - Creatinine, Serum: 0.7 (10-23-22 @ 08:05)       ROS  ***    VITALS:  T(F): 97.6, Max: 98.4 (10-23-22 @ 17:07)  HR: 95  BP: 95/50  RR: 20Vital Signs Last 24 Hrs  T(C): 36.4 (24 Oct 2022 04:48), Max: 36.9 (23 Oct 2022 17:07)  T(F): 97.6 (24 Oct 2022 04:48), Max: 98.4 (23 Oct 2022 17:07)  HR: 95 (24 Oct 2022 04:48) (83 - 95)  BP: 95/50 (24 Oct 2022 04:48) (95/50 - 118/74)  BP(mean): 83 (23 Oct 2022 20:00) (80 - 83)  RR: 20 (24 Oct 2022 04:48) (18 - 20)  SpO2: 98% (24 Oct 2022 04:48) (92% - 98%)    Parameters below as of 24 Oct 2022 04:48  Patient On (Oxygen Delivery Method): room air        PHYSICAL EXAM:  ***    FH: Non-contributory  Social Hx: Non-contributory    TESTS & MEASUREMENTS:                        10.1   7.45  )-----------( 402      ( 23 Oct 2022 08:05 )             32.8     10-23    137  |  94<L>  |  12  ----------------------------<  120<H>  4.5   |  32  |  0.7    Ca    8.9      23 Oct 2022 08:05  Mg     1.7     10-23    TPro  6.8  /  Alb  3.1<L>  /  TBili  0.2  /  DBili  x   /  AST  19  /  ALT  13  /  AlkPhos  86  10-23      LIVER FUNCTIONS - ( 23 Oct 2022 08:05 )  Alb: 3.1 g/dL / Pro: 6.8 g/dL / ALK PHOS: 86 U/L / ALT: 13 U/L / AST: 19 U/L / GGT: x               Culture - Blood (collected 10-12-22 @ 06:50)  Source: .Blood Blood  Final Report (10-17-22 @ 18:00):    No Growth Final            INFECTIOUS DISEASES TESTING  Rapid RVP Result: NotDetec (10-19-22 @ 14:10)  Legionella Antigen, Urine: Negative (10-18-22 @ 08:20)  COVID-19 PCR: NotDetec (10-16-22 @ 18:26)  Procalcitonin, Serum: 7.63 (10-12-22 @ 06:50)  COVID-19 PCR: NotDetec (10-11-22 @ 12:35)  COVID-19 PCR: NotDetec (09-06-22 @ 10:55)  HIV-1/2 Combo Result: Nonreact (08-28-22 @ 08:19)  COVID-19 PCR: NotDetec (08-28-22 @ 07:52)  COVID-19 PCR: NotDetec (08-22-22 @ 15:36)  COVID-19 PCR: NotDetec (08-18-22 @ 13:56)  Procalcitonin, Serum: 0.05 (08-17-22 @ 13:28)  COVID-19 PCR: NotDetec (08-14-22 @ 16:45)  COVID-19 PCR: NotDetec (08-12-22 @ 19:33)  MRSA PCR Result.: Negative (08-12-22 @ 05:00)  Rapid RVP Result: NotDetec (08-10-22 @ 15:03)  COVID-19 PCR: NotDetec (04-03-22 @ 12:23)  COVID-19 PCR: NotDetec (03-30-22 @ 10:49)  COVID-19 PCR: NotDetec (03-27-22 @ 18:31)  COVID-19 PCR: NotDetec (03-25-22 @ 06:30)  COVID-19 PCR: NotDetec (03-19-22 @ 06:10)  HIV-1/2 Combo Result: Nonreact (03-16-22 @ 12:10)  COVID-19 PCR: NotDetec (03-12-22 @ 20:19)  COVID-19 PCR: NotDetec (01-31-22 @ 09:10)  COVID-19 PCR: NotDetec (01-25-22 @ 11:21)  COVID-19 PCR: NotDetec (01-18-22 @ 18:45)  HIV-1/2 Combo Result: Nonreact (01-18-22 @ 04:30)  Procalcitonin, Serum: 0.13 (01-16-22 @ 16:00)  COVID-19 PCR: NotDetec (01-15-22 @ 23:32)  strept    INFLAMMATORY MARKERS  Sedimentation Rate, Erythrocyte: 125 mm/Hr (08-11-22 @ 06:38)  C-Reactive Protein, Serum: 330.8 mg/L (08-11-22 @ 06:38)      RADIOLOGY & ADDITIONAL TESTS:  I have personally reviewed the last available Chest xray  CXR      CT      CARDIOLOGY TESTING  12 Lead ECG:   Ventricular Rate 85 BPM    Atrial Rate 85 BPM    P-R Interval 140 ms    QRS Duration 92 ms    Q-T Interval 364 ms    QTC Calculation(Bazett) 433 ms    P Axis 30 degrees    R Axis 24 degrees    T Axis 33 degrees    Diagnosis Line *** Poor data quality, interpretation may be adversely affected  Normal sinus rhythm  Normal ECG    Confirmed by Victor Hugo Marquis ([id number]) on 10/20/2022 9:23:43 AM (10-20-22 @ 08:05)  12 Lead ECG:   Ventricular Rate 80 BPM    Atrial Rate 80 BPM    P-R Interval 144 ms    QRS Duration 94 ms    Q-T Interval 388 ms    QTC Calculation(Bazett) 447 ms    P Axis 46 degrees    R Axis 52 degrees    T Axis 56 degrees    Diagnosis Line Normal sinus rhythm  Normal ECG    Confirmed by CHECO PALOMINO MD ([id number]) on 10/19/2022 6:48:12 AM (10-19-22 @ 05:47)      MEDICATIONS  ALPRAZolam 1 Oral <User Schedule>  ALPRAZolam 2 Oral at bedtime  buPROPion XL (24-Hour) . 300 Oral daily  ceFAZolin   IVPB 2000 IV Intermittent every 8 hours  chlorhexidine 2% Cloths 1 Topical daily  enoxaparin Injectable 140 SubCutaneous every 12 hours  folic acid 1 Oral daily  methadone    Tablet 135 Oral <User Schedule>  multivitamin 1 Oral daily  nicotine - 21 mG/24Hr(s) Patch 1 Transdermal daily  pantoprazole    Tablet 40 Oral before breakfast  QUEtiapine 200 Oral at bedtime  thiamine 100 Oral daily      WEIGHT  Weight (kg): 140.9 (10-18-22 @ 12:25)      ANTIBIOTICS:  ceFAZolin   IVPB 2000 milliGRAM(s) IV Intermittent every 8 hours      All available historical records have been reviewed       MIKAEL MCDERMOTT  52y, Male  Allergy: No Known Allergies      LOS  13d    CHIEF COMPLAINT: Hypoxia (23 Oct 2022 15:45)      INTERVAL EVENTS/HPI  - No acute events overnight  - T(F): , Max: 98.4 (10-23-22 @ 17:07)  - Tolerating medication  - WBC Count: 7.45 (10-23-22 @ 08:05)  WBC Count: 6.61 (10-22-22 @ 07:39)     - Creatinine, Serum: 0.7 (10-23-22 @ 08:05)       ROS  General: Denies rigors, nightsweats  HEENT: Denies headache, rhinorrhea, sore throat, eye pain  CV: Denies CP, palpitations  PULM: Denies wheezing, hemoptysis  GI: Denies hematemesis, hematochezia, melena  : Denies discharge, hematuria  MSK: Denies arthralgias, myalgias  SKIN: Denies rash, lesions  NEURO: Denies paresthesias, weakness  PSYCH: Denies depression, anxiety     VITALS:  T(F): 97.6, Max: 98.4 (10-23-22 @ 17:07)  HR: 95  BP: 95/50  RR: 20Vital Signs Last 24 Hrs  T(C): 36.4 (24 Oct 2022 04:48), Max: 36.9 (23 Oct 2022 17:07)  T(F): 97.6 (24 Oct 2022 04:48), Max: 98.4 (23 Oct 2022 17:07)  HR: 95 (24 Oct 2022 04:48) (83 - 95)  BP: 95/50 (24 Oct 2022 04:48) (95/50 - 118/74)  BP(mean): 83 (23 Oct 2022 20:00) (80 - 83)  RR: 20 (24 Oct 2022 04:48) (18 - 20)  SpO2: 98% (24 Oct 2022 04:48) (92% - 98%)    Parameters below as of 24 Oct 2022 04:48  Patient On (Oxygen Delivery Method): room air        PHYSICAL EXAM:  Gen: NAD, resting in bed  HEENT: Normocephalic, atraumatic  Neck: supple, no lymphadenopathy  CV: Regular rate & regular rhythm  Lungs: decreased BS at bases, no fremitus  Abdomen: Soft, BS present  Ext: Warm, well perfused  Neuro: non focal, awake  Skin: no rash, no erythema  Lines: no phlebitis     FH: Non-contributory  Social Hx: Non-contributory    TESTS & MEASUREMENTS:                        10.1   7.45  )-----------( 402      ( 23 Oct 2022 08:05 )             32.8     10-23    137  |  94<L>  |  12  ----------------------------<  120<H>  4.5   |  32  |  0.7    Ca    8.9      23 Oct 2022 08:05  Mg     1.7     10-23    TPro  6.8  /  Alb  3.1<L>  /  TBili  0.2  /  DBili  x   /  AST  19  /  ALT  13  /  AlkPhos  86  10-23      LIVER FUNCTIONS - ( 23 Oct 2022 08:05 )  Alb: 3.1 g/dL / Pro: 6.8 g/dL / ALK PHOS: 86 U/L / ALT: 13 U/L / AST: 19 U/L / GGT: x               Culture - Blood (collected 10-12-22 @ 06:50)  Source: .Blood Blood  Final Report (10-17-22 @ 18:00):    No Growth Final            INFECTIOUS DISEASES TESTING  Rapid RVP Result: NotDetec (10-19-22 @ 14:10)  Legionella Antigen, Urine: Negative (10-18-22 @ 08:20)  COVID-19 PCR: NotDetec (10-16-22 @ 18:26)  Procalcitonin, Serum: 7.63 (10-12-22 @ 06:50)  COVID-19 PCR: NotDetec (10-11-22 @ 12:35)  COVID-19 PCR: NotDetec (09-06-22 @ 10:55)  HIV-1/2 Combo Result: Nonreact (08-28-22 @ 08:19)  COVID-19 PCR: NotDetec (08-28-22 @ 07:52)  COVID-19 PCR: NotDetec (08-22-22 @ 15:36)  COVID-19 PCR: NotDetec (08-18-22 @ 13:56)  Procalcitonin, Serum: 0.05 (08-17-22 @ 13:28)  COVID-19 PCR: NotDetec (08-14-22 @ 16:45)  COVID-19 PCR: NotDetec (08-12-22 @ 19:33)  MRSA PCR Result.: Negative (08-12-22 @ 05:00)  Rapid RVP Result: NotDetec (08-10-22 @ 15:03)  COVID-19 PCR: NotDetec (04-03-22 @ 12:23)  COVID-19 PCR: NotDetec (03-30-22 @ 10:49)  COVID-19 PCR: NotDetec (03-27-22 @ 18:31)  COVID-19 PCR: NotDetec (03-25-22 @ 06:30)  COVID-19 PCR: NotDetec (03-19-22 @ 06:10)  HIV-1/2 Combo Result: Nonreact (03-16-22 @ 12:10)  COVID-19 PCR: NotDetec (03-12-22 @ 20:19)  COVID-19 PCR: NotDetec (01-31-22 @ 09:10)  COVID-19 PCR: NotDetec (01-25-22 @ 11:21)  COVID-19 PCR: NotDetec (01-18-22 @ 18:45)  HIV-1/2 Combo Result: Nonreact (01-18-22 @ 04:30)  Procalcitonin, Serum: 0.13 (01-16-22 @ 16:00)  COVID-19 PCR: NotDetec (01-15-22 @ 23:32)  strept    INFLAMMATORY MARKERS  Sedimentation Rate, Erythrocyte: 125 mm/Hr (08-11-22 @ 06:38)  C-Reactive Protein, Serum: 330.8 mg/L (08-11-22 @ 06:38)      RADIOLOGY & ADDITIONAL TESTS:  I have personally reviewed the last available Chest xray  CXR      CT      CARDIOLOGY TESTING  12 Lead ECG:   Ventricular Rate 85 BPM    Atrial Rate 85 BPM    P-R Interval 140 ms    QRS Duration 92 ms    Q-T Interval 364 ms    QTC Calculation(Bazett) 433 ms    P Axis 30 degrees    R Axis 24 degrees    T Axis 33 degrees    Diagnosis Line *** Poor data quality, interpretation may be adversely affected  Normal sinus rhythm  Normal ECG    Confirmed by Victor Hugo Marquis ([id number]) on 10/20/2022 9:23:43 AM (10-20-22 @ 08:05)  12 Lead ECG:   Ventricular Rate 80 BPM    Atrial Rate 80 BPM    P-R Interval 144 ms    QRS Duration 94 ms    Q-T Interval 388 ms    QTC Calculation(Bazett) 447 ms    P Axis 46 degrees    R Axis 52 degrees    T Axis 56 degrees    Diagnosis Line Normal sinus rhythm  Normal ECG    Confirmed by CHECO PALOMINO MD ([id number]) on 10/19/2022 6:48:12 AM (10-19-22 @ 05:47)      MEDICATIONS  ALPRAZolam 1 Oral <User Schedule>  ALPRAZolam 2 Oral at bedtime  buPROPion XL (24-Hour) . 300 Oral daily  ceFAZolin   IVPB 2000 IV Intermittent every 8 hours  chlorhexidine 2% Cloths 1 Topical daily  enoxaparin Injectable 140 SubCutaneous every 12 hours  folic acid 1 Oral daily  methadone    Tablet 135 Oral <User Schedule>  multivitamin 1 Oral daily  nicotine - 21 mG/24Hr(s) Patch 1 Transdermal daily  pantoprazole    Tablet 40 Oral before breakfast  QUEtiapine 200 Oral at bedtime  thiamine 100 Oral daily      WEIGHT  Weight (kg): 140.9 (10-18-22 @ 12:25)      ANTIBIOTICS:  ceFAZolin   IVPB 2000 milliGRAM(s) IV Intermittent every 8 hours      All available historical records have been reviewed

## 2022-10-24 NOTE — PROGRESS NOTE ADULT - ASSESSMENT
PMHx of IVDU (heroin), vertebral osteomyelitis s/p debridement, and MSSA bacteremia, presenting from rehab facility for "low oxygen" and new onset LE edema. In the ED was found to be hypoxic, found to have b/l PEs with radiographic evidence of R heart strain and admitted to ICU, found to have LE DVTs s/p LLE embolectomy 10/18, now downgraded to SDU     Acute hypoxemic respiratory failure - resolved  Acute Submassive PE  Suspected superimposed PNA  DIONICIO/OHS, noncompliant with NIV  -CT on admission with bilateral submassive PEs + concern for RV strain  -TTE: EF 55%, G1DD, mild TVR, no right heart strain  -VA duplx LE vein: Acute thrombus within the left common femoral, femoral, deep femoral, popliteal, gastrocnemius, posterior tibial and anterior tibial veins, s/p LLE embolectomy 10/18  - now on Lovenox, therapeutic. Can switch to eliquis on dc   - Procal 7, blood cx 10/12 - no growth  - tolerating NC today  - repeat chest xray   - s/p Cefepime, now on  Cefazolin 2g q8h IV  - PT     Hx MSSA bacteremia  Hx Epidural phlegmon/OM  Hx R Psoas Abscess  -Per NSG, pt able to receive MR even with recent spinal fusion  -MR w&w/o CTL spine noted  - abx as above  - f/u neurosurgery and ID     HO IVDA, Opioid Abuse on Methadone   Agitation  - improving, followed by psych: can resume Quentiapine 200 mg PO HS  - Continue with Xanax 1 mg po am, 1mg po afternoon and two times at night - total daily dose of 4mg  Continue Wellbutrin Xl 300mg po q 24hrs  Continue home dose Methadone 135mg po daily---> QTc 433 today 10/20  -For agitation, haldol 5mg po/IM every 8hrs prn    #Progress Note Handoff  Pending (specify): AC, weaning O2, monitor mental status, neurosx/ID f/u, cw IV abx

## 2022-10-24 NOTE — PHYSICAL THERAPY INITIAL EVALUATION ADULT - GENERAL OBSERVATIONS, REHAB EVAL
15:10-15:35. Pt encountered semifowler in bed in NAD, + O2 off by pt, but was on 3 lpm NC. SPO2 taken at rest on RA 91-92%, on 3 L NC 93%. Pt was agreeable to PT.

## 2022-10-24 NOTE — PHYSICAL THERAPY INITIAL EVALUATION ADULT - RANGE OF MOTION EXAMINATION, REHAB EVAL
B UE's: shoulder flex WFL, wrist signifcantly tight contracted in ext B/L . B LE's grossly WFL AAROM.

## 2022-10-24 NOTE — PHYSICAL THERAPY INITIAL EVALUATION ADULT - LIVES WITH, PROFILE
Pt lives in apt on 2nd floor, + elevator. Prior to this admission pt was in SNF but doesn't want to go back there./alone

## 2022-10-24 NOTE — PROGRESS NOTE ADULT - SUBJECTIVE AND OBJECTIVE BOX
MIKAEL MCDERMOTT 52y Male  MRN#: [id number]   Hospital Day: 13d    SUBJECTIVE  Patient is a 52y old Male who presents with a chief complaint of Hypoxia (24 Oct 2022 17:45)  Currently admitted to medicine with the primary diagnosis of Pneumonia      INTERVAL HPI AND OVERNIGHT EVENTS:  Patient was examined and seen at bedside. This morning he is resting comfortably in bed and reports no issues or overnight events.    OBJECTIVE  PAST MEDICAL & SURGICAL HISTORY  IV drug abuse    Vertebral osteomyelitis    MSSA bacteremia    No significant past surgical history      ALLERGIES:  No Known Allergies    MEDICATIONS:  STANDING MEDICATIONS  ALPRAZolam 1 milliGRAM(s) Oral <User Schedule>  ALPRAZolam 2 milliGRAM(s) Oral at bedtime  buPROPion XL (24-Hour) . 300 milliGRAM(s) Oral daily  ceFAZolin   IVPB 2000 milliGRAM(s) IV Intermittent every 8 hours  chlorhexidine 2% Cloths 1 Application(s) Topical daily  enoxaparin Injectable 140 milliGRAM(s) SubCutaneous every 12 hours  folic acid 1 milliGRAM(s) Oral daily  methadone    Tablet 135 milliGRAM(s) Oral <User Schedule>  multivitamin 1 Tablet(s) Oral daily  nicotine - 21 mG/24Hr(s) Patch 1 Patch Transdermal daily  pantoprazole    Tablet 40 milliGRAM(s) Oral before breakfast  QUEtiapine 200 milliGRAM(s) Oral at bedtime  thiamine 100 milliGRAM(s) Oral daily    PRN MEDICATIONS  acetaminophen     Tablet .. 650 milliGRAM(s) Oral every 6 hours PRN  aluminum hydroxide/magnesium hydroxide/simethicone Suspension 30 milliLiter(s) Oral every 4 hours PRN  cyclobenzaprine 10 milliGRAM(s) Oral three times a day PRN  haloperidol    Injectable 5 milliGRAM(s) IntraMuscular every 8 hours PRN  melatonin 3 milliGRAM(s) Oral at bedtime PRN  ondansetron Injectable 4 milliGRAM(s) IV Push every 8 hours PRN      VITAL SIGNS: Last 24 Hours  T(C): 36.7 (24 Oct 2022 14:00), Max: 36.9 (23 Oct 2022 20:00)  T(F): 98 (24 Oct 2022 14:00), Max: 98.4 (23 Oct 2022 20:00)  HR: 82 (24 Oct 2022 14:00) (82 - 95)  BP: 102/50 (24 Oct 2022 14:00) (95/50 - 118/74)  BP(mean): 83 (23 Oct 2022 20:00) (83 - 83)  RR: 20 (24 Oct 2022 14:00) (18 - 20)  SpO2: 98% (24 Oct 2022 04:48) (92% - 98%)    LABS:                        10.1   6.92  )-----------( 380      ( 24 Oct 2022 08:43 )             33.5     10-24    136  |  95<L>  |  12  ----------------------------<  132<H>  4.5   |  31  |  0.7    Ca    9.1      24 Oct 2022 08:43  Mg     1.7     10-24    TPro  7.0  /  Alb  3.1<L>  /  TBili  <0.2  /  DBili  x   /  AST  18  /  ALT  13  /  AlkPhos  96  10-24      RADIOLOGY:  < from: Xray Chest 1 View- PORTABLE-Routine (Xray Chest 1 View- PORTABLE-Routine in AM.) (10.23.22 @ 06:18) >    Impression:  Low lung volume/lordotic view without definite evidence of focal   consolidation pleural effusion or pneumothorax.    < end of copied text >  < from: MR Cervical Spine w/wo IV Cont (10.22.22 @ 20:40) >  MPRESSION:    1.  Study limited by motion artifact.    2.  No enhancing lesions.    3.  Straightening of the normal cervical lordosis may be secondary to   patient positioning or muscle spasm.    4.  C4-C5 and C5-C6; disc osteophyte complexes with compression of the   thecal sac.    5.  C6-C7; disc osteophyte complex with compression of the thecal sac,   degenerative changes, and bilateral foraminal narrowing.      < end of copied text >  < from: MR Thoracic Spine w/wo IV Cont (10.22.22 @ 20:40) >  PRESSION:    In comparison with the prior MRI of the thoracic spine dated August 12, 2022:    Current examination is limited by motion artifact.    Interval development of a right pleural effusion which may be loculated   and a right basilar opacity.    No enhancing lesions.      < end of copied text >  < from: Xray Chest 1 View- PORTABLE-Routine (Xray Chest 1 View- PORTABLE-Routine in AM.) (10.20.22 @ 06:43) >  Impression:    Worsening right-sided effusion. Support devices as described    < end of copied text >      PHYSICAL EXAM:  CONSTITUTIONAL: No acute distress, AAOx3  HEAD: Atraumatic, normocephalic  EYES: EOM intact, PERRLA, conjunctiva and sclera clear  ENT: moist mucous membranes  PULMONARY: Clear to auscultation bilaterally  CARDIOVASCULAR: Regular rate and rhythm  GASTROINTESTINAL: Soft, non-tender, non-distended; bowel sounds present  MUSCULOSKELETAL: no edema  NEUROLOGY: non-focal  SKIN: warm and dry    ASSESSMENT and PLAN    Patient is a 52y old Male PMHx of IVDU (heroin), vertebral osteomyelitis s/p debridement, and MSSA bacteremia, who presents from a rehab facility with a chief complaint of Hypoxia and new onset LE edema. In the ED was, found to have b/l PEs with radiographic evidence of R heart strain and admitted to ICU, found to have LE DVTs s/p LLE embolectomy 10/18, now downgraded to SDU     #Acute hypoxemic respiratory failure / Acute Submassive PE  - resolved  - Suspected superimposed PNA  - DIONICIO/OHS, noncompliant with NIV  - TTE: EF 55%, G1DD, mild TVR, no right heart strain  -VA duplx LE vein: Acute thrombus within the left common femoral, femoral, deep femoral, popliteal, gastrocnemius, posterior tibial and anterior tibial veins, s/p LLE embolectomy 10/18  - c/w Lovenox, therapeutic.   - Will switch to eliquis on dc   - s/p Cefepime, now on  Cefazolin 2g q8h IV  - PT evaluated and recommends sub-acut rehab or home PT on DC    # Recurrent MSSA bacteremia with Discitis   - Recent Spinal fusion  - Abnormal L4-L5 signal suggestive of  Epidural phlegmon/OM  - No acute surgical intervention needed per Neurosurgery   - C/w Cefazolin  - Continue IV while in-house, on D/C PO Cefadroxil 1g q24h IV (if not available PO keflex 500mg four times daily) x 3 more weeks     #HO IVDA, Opioid Abuse on Methadone   - Agitation  - improving, followed by psych: can resume Quetiapine 200 mg PO HS  - Continue with Xanax 1 mg po am, 1mg po afternoon and two times at night - total daily dose of 4mg  - Continue Wellbutrin Xl 300mg po q 24hrs  - Continue home dose Methadone 135mg po daily---> QTc 433 today 10/20  -For agitation, haldol 5mg po/IM every 8hrs prn    #Disposition  - Sub-acute Rehab/ Home with PT

## 2022-10-24 NOTE — PHYSICAL THERAPY INITIAL EVALUATION ADULT - AMBULATION SKILLS, REHAB EVAL
prior to recent hospitization. Prior to this admission amb with RW with standby assist and w/c follow/independent

## 2022-10-24 NOTE — PROGRESS NOTE ADULT - SUBJECTIVE AND OBJECTIVE BOX
MIKAEL MCDERMOTT  52y Male    CHIEF COMPLAINT:    Patient is a 52y old  Male who presents with a chief complaint of Hypoxia (24 Oct 2022 10:12)    INTERVAL HPI/OVERNIGHT EVENTS:    Patient seen and examined. No acute events overnight. No active complaints, comfortble on NC, intermittently on RA     ROS: All other systems are negative.    Vital Signs:    T(F): 97.6 (10-24-22 @ 04:48), Max: 98.4 (10-23-22 @ 17:07)  HR: 95 (10-24-22 @ 04:48) (83 - 95)  BP: 95/50 (10-24-22 @ 04:48) (95/50 - 118/74)  RR: 20 (10-24-22 @ 04:48) (18 - 20)  SpO2: 98% (10-24-22 @ 04:48) (92% - 98%)    23 Oct 2022 07:01  -  24 Oct 2022 07:00  --------------------------------------------------------  IN: 0 mL / OUT: 1000 mL / NET: -1000 mL    POCT Blood Glucose.: 174 mg/dL (24 Oct 2022 11:22)  POCT Blood Glucose.: 137 mg/dL (24 Oct 2022 07:34)    PHYSICAL EXAM:    GENERAL:  NAD obese  SKIN: No rashes or lesions  HEENT: Atraumatic. Normocephalic.   NECK: Supple, No JVD.    PULMONARY: CTA B/L. No wheezing.    CVS: Normal S1, S2. Rate and Rhythm are regular   ABDOMEN/GI: Soft, Nontender, Nondistended   MSK:  No clubbing or cyanosis. multiple scars on UE   NEUROLOGIC: moves all extremities   PSYCH: Alert & oriented x 3, normal affect    Consultant(s) Notes Reviewed:  [x ] YES  [ ] NO  Care Discussed with Consultants/Other Providers [ x] YES  [ ] NO    LABS:                        10.1   6.92  )-----------( 380      ( 24 Oct 2022 08:43 )             33.5     136  |  95<L>  |  12  ----------------------------<  132<H>  4.5   |  31  |  0.7    Ca    9.1      24 Oct 2022 08:43  Mg     1.7     10-24    TPro  7.0  /  Alb  3.1<L>  /  TBili  <0.2  /  DBili  x   /  AST  18  /  ALT  13  /  AlkPhos  96  10-24    RADIOLOGY & ADDITIONAL TESTS:  Imaging or report Personally Reviewed:  [x] YES  [ ] NO  EKG reviewed: [x] YES  [ ] NO    Medications:  Standing  ALPRAZolam 1 milliGRAM(s) Oral <User Schedule>  ALPRAZolam 2 milliGRAM(s) Oral at bedtime  buPROPion XL (24-Hour) . 300 milliGRAM(s) Oral daily  ceFAZolin   IVPB 2000 milliGRAM(s) IV Intermittent every 8 hours  chlorhexidine 2% Cloths 1 Application(s) Topical daily  enoxaparin Injectable 140 milliGRAM(s) SubCutaneous every 12 hours  folic acid 1 milliGRAM(s) Oral daily  methadone    Tablet 135 milliGRAM(s) Oral <User Schedule>  multivitamin 1 Tablet(s) Oral daily  nicotine - 21 mG/24Hr(s) Patch 1 Patch Transdermal daily  pantoprazole    Tablet 40 milliGRAM(s) Oral before breakfast  QUEtiapine 200 milliGRAM(s) Oral at bedtime  thiamine 100 milliGRAM(s) Oral daily    PRN Meds  acetaminophen     Tablet .. 650 milliGRAM(s) Oral every 6 hours PRN  aluminum hydroxide/magnesium hydroxide/simethicone Suspension 30 milliLiter(s) Oral every 4 hours PRN  cyclobenzaprine 10 milliGRAM(s) Oral three times a day PRN  haloperidol    Injectable 5 milliGRAM(s) IntraMuscular every 8 hours PRN  melatonin 3 milliGRAM(s) Oral at bedtime PRN  ondansetron Injectable 4 milliGRAM(s) IV Push every 8 hours PRN

## 2022-10-25 ENCOUNTER — TRANSCRIPTION ENCOUNTER (OUTPATIENT)
Age: 52
End: 2022-10-25

## 2022-10-25 LAB
ALBUMIN SERPL ELPH-MCNC: 3 G/DL — LOW (ref 3.5–5.2)
ALP SERPL-CCNC: 90 U/L — SIGNIFICANT CHANGE UP (ref 30–115)
ALT FLD-CCNC: 13 U/L — SIGNIFICANT CHANGE UP (ref 0–41)
ANION GAP SERPL CALC-SCNC: 11 MMOL/L — SIGNIFICANT CHANGE UP (ref 7–14)
AST SERPL-CCNC: 18 U/L — SIGNIFICANT CHANGE UP (ref 0–41)
BASOPHILS # BLD AUTO: 0.04 K/UL — SIGNIFICANT CHANGE UP (ref 0–0.2)
BASOPHILS NFR BLD AUTO: 0.6 % — SIGNIFICANT CHANGE UP (ref 0–1)
BILIRUB SERPL-MCNC: <0.2 MG/DL — SIGNIFICANT CHANGE UP (ref 0.2–1.2)
BUN SERPL-MCNC: 11 MG/DL — SIGNIFICANT CHANGE UP (ref 10–20)
CALCIUM SERPL-MCNC: 8.8 MG/DL — SIGNIFICANT CHANGE UP (ref 8.4–10.4)
CHLORIDE SERPL-SCNC: 97 MMOL/L — LOW (ref 98–110)
CO2 SERPL-SCNC: 29 MMOL/L — SIGNIFICANT CHANGE UP (ref 17–32)
CREAT SERPL-MCNC: 0.7 MG/DL — SIGNIFICANT CHANGE UP (ref 0.7–1.5)
CRP SERPL-MCNC: 17.2 MG/L — HIGH
EGFR: 110 ML/MIN/1.73M2 — SIGNIFICANT CHANGE UP
EOSINOPHIL # BLD AUTO: 0.34 K/UL — SIGNIFICANT CHANGE UP (ref 0–0.7)
EOSINOPHIL NFR BLD AUTO: 4.8 % — SIGNIFICANT CHANGE UP (ref 0–8)
ERYTHROCYTE [SEDIMENTATION RATE] IN BLOOD: 108 MM/HR — HIGH (ref 0–10)
GLUCOSE BLDC GLUCOMTR-MCNC: 137 MG/DL — HIGH (ref 70–99)
GLUCOSE BLDC GLUCOMTR-MCNC: 180 MG/DL — HIGH (ref 70–99)
GLUCOSE SERPL-MCNC: 123 MG/DL — HIGH (ref 70–99)
HCT VFR BLD CALC: 32.1 % — LOW (ref 42–52)
HGB BLD-MCNC: 10 G/DL — LOW (ref 14–18)
IMM GRANULOCYTES NFR BLD AUTO: 1.3 % — HIGH (ref 0.1–0.3)
LYMPHOCYTES # BLD AUTO: 2.13 K/UL — SIGNIFICANT CHANGE UP (ref 1.2–3.4)
LYMPHOCYTES # BLD AUTO: 30 % — SIGNIFICANT CHANGE UP (ref 20.5–51.1)
MAGNESIUM SERPL-MCNC: 1.6 MG/DL — LOW (ref 1.8–2.4)
MCHC RBC-ENTMCNC: 28.1 PG — SIGNIFICANT CHANGE UP (ref 27–31)
MCHC RBC-ENTMCNC: 31.2 G/DL — LOW (ref 32–37)
MCV RBC AUTO: 90.2 FL — SIGNIFICANT CHANGE UP (ref 80–94)
MONOCYTES # BLD AUTO: 0.76 K/UL — HIGH (ref 0.1–0.6)
MONOCYTES NFR BLD AUTO: 10.7 % — HIGH (ref 1.7–9.3)
NEUTROPHILS # BLD AUTO: 3.75 K/UL — SIGNIFICANT CHANGE UP (ref 1.4–6.5)
NEUTROPHILS NFR BLD AUTO: 52.6 % — SIGNIFICANT CHANGE UP (ref 42.2–75.2)
NRBC # BLD: 0 /100 WBCS — SIGNIFICANT CHANGE UP (ref 0–0)
PLATELET # BLD AUTO: 368 K/UL — SIGNIFICANT CHANGE UP (ref 130–400)
POTASSIUM SERPL-MCNC: 4.4 MMOL/L — SIGNIFICANT CHANGE UP (ref 3.5–5)
POTASSIUM SERPL-SCNC: 4.4 MMOL/L — SIGNIFICANT CHANGE UP (ref 3.5–5)
PROT SERPL-MCNC: 6.6 G/DL — SIGNIFICANT CHANGE UP (ref 6–8)
RBC # BLD: 3.56 M/UL — LOW (ref 4.7–6.1)
RBC # FLD: 16.4 % — HIGH (ref 11.5–14.5)
SODIUM SERPL-SCNC: 137 MMOL/L — SIGNIFICANT CHANGE UP (ref 135–146)
WBC # BLD: 7.11 K/UL — SIGNIFICANT CHANGE UP (ref 4.8–10.8)
WBC # FLD AUTO: 7.11 K/UL — SIGNIFICANT CHANGE UP (ref 4.8–10.8)

## 2022-10-25 PROCEDURE — 99239 HOSP IP/OBS DSCHRG MGMT >30: CPT

## 2022-10-25 RX ORDER — APIXABAN 2.5 MG/1
1 TABLET, FILM COATED ORAL
Qty: 60 | Refills: 1
Start: 2022-10-25 | End: 2022-12-23

## 2022-10-25 RX ORDER — ACETAMINOPHEN 500 MG
2 TABLET ORAL
Qty: 0 | Refills: 0 | DISCHARGE
Start: 2022-10-25

## 2022-10-25 RX ORDER — MAGNESIUM SULFATE 500 MG/ML
2 VIAL (ML) INJECTION ONCE
Refills: 0 | Status: COMPLETED | OUTPATIENT
Start: 2022-10-25 | End: 2022-10-25

## 2022-10-25 RX ADMIN — PANTOPRAZOLE SODIUM 40 MILLIGRAM(S): 20 TABLET, DELAYED RELEASE ORAL at 05:31

## 2022-10-25 RX ADMIN — QUETIAPINE FUMARATE 200 MILLIGRAM(S): 200 TABLET, FILM COATED ORAL at 21:16

## 2022-10-25 RX ADMIN — Medication 100 MILLIGRAM(S): at 13:04

## 2022-10-25 RX ADMIN — Medication 2 MILLIGRAM(S): at 21:16

## 2022-10-25 RX ADMIN — Medication 1 MILLIGRAM(S): at 05:31

## 2022-10-25 RX ADMIN — Medication 1 PATCH: at 07:42

## 2022-10-25 RX ADMIN — Medication 1 PATCH: at 12:06

## 2022-10-25 RX ADMIN — BUPROPION HYDROCHLORIDE 300 MILLIGRAM(S): 150 TABLET, EXTENDED RELEASE ORAL at 12:06

## 2022-10-25 RX ADMIN — METHADONE HYDROCHLORIDE 135 MILLIGRAM(S): 40 TABLET ORAL at 12:05

## 2022-10-25 RX ADMIN — Medication 1 MILLIGRAM(S): at 13:04

## 2022-10-25 RX ADMIN — ENOXAPARIN SODIUM 140 MILLIGRAM(S): 100 INJECTION SUBCUTANEOUS at 05:31

## 2022-10-25 RX ADMIN — Medication 1 PATCH: at 19:54

## 2022-10-25 RX ADMIN — Medication 1 TABLET(S): at 12:06

## 2022-10-25 RX ADMIN — CHLORHEXIDINE GLUCONATE 1 APPLICATION(S): 213 SOLUTION TOPICAL at 12:07

## 2022-10-25 RX ADMIN — Medication 100 MILLIGRAM(S): at 12:06

## 2022-10-25 RX ADMIN — ENOXAPARIN SODIUM 140 MILLIGRAM(S): 100 INJECTION SUBCUTANEOUS at 17:45

## 2022-10-25 RX ADMIN — Medication 25 GRAM(S): at 09:27

## 2022-10-25 RX ADMIN — Medication 1 MILLIGRAM(S): at 12:06

## 2022-10-25 RX ADMIN — Medication 100 MILLIGRAM(S): at 05:34

## 2022-10-25 NOTE — DISCHARGE NOTE PROVIDER - NSDCMRMEDTOKEN_GEN_ALL_CORE_FT
ALPRAZolam 2 mg oral tablet: 1 tab(s) orally 3 times a day  buPROPion 300 mg/24 hours (XL) oral tablet, extended release: 1 tab(s) orally once a day  cyclobenzaprine 10 mg oral tablet: 1 tab(s) orally 3 times a day  dexamethasone 1 mg oral tablet: 1 tab(s) orally 2 times a day   9/20 - 9/26  dexamethasone 1 mg oral tablet: 1 tab(s) orally once a day  from 9/27 -10/3   dexamethasone 2 mg oral tablet: 1 tab(s) orally 2 times a day  from 9/13- 9/19  dexamethasone 4 mg oral tablet: 1 tab(s) orally 2 times a day until 9/12  folic acid 1 mg oral tablet: 1 tab(s) orally once a day  gabapentin 300 mg oral capsule: 1 cap(s) orally 3 times a day  methadone 5 mg oral tablet: 135 milligram(s) orally once a day  mirtazapine 30 mg oral tablet: 1 tab(s) orally once a day (at bedtime)  Multiple Vitamins oral tablet: 1 tab(s) orally once a day  nicotine 21 mg/24 hr transdermal film, extended release: 1 patch transdermal once a day  pantoprazole 40 mg oral delayed release tablet: 1 tab(s) orally once a day (before a meal)  QUEtiapine 200 mg oral tablet: 1 tab(s) orally once a day (at bedtime)  thiamine 100 mg oral tablet: 1 tab(s) orally once a day   acetaminophen 325 mg oral tablet: 2 tab(s) orally every 6 hours, As needed, Temp greater or equal to 38C (100.4F)  ALPRAZolam 2 mg oral tablet: 1 tab(s) orally 3 times a day  buPROPion 300 mg/24 hours (XL) oral tablet, extended release: 1 tab(s) orally once a day  cefadroxil 1000 mg oral tablet: 1 tab(s) orally once a day   cyclobenzaprine 10 mg oral tablet: 1 tab(s) orally 3 times a day  Eliquis 5 mg oral tablet: 2  tabs twice daily for seven days, then decrease dose to 1 tablet twice daily going forward  folic acid 1 mg oral tablet: 1 tab(s) orally once a day  methadone 5 mg oral tablet: 135 milligram(s) orally once a day  mirtazapine 30 mg oral tablet: 1 tab(s) orally once a day (at bedtime)  Multiple Vitamins oral tablet: 1 tab(s) orally once a day  nicotine 21 mg/24 hr transdermal film, extended release: 1 patch transdermal once a day  pantoprazole 40 mg oral delayed release tablet: 1 tab(s) orally once a day (before a meal)  QUEtiapine 200 mg oral tablet: 1 tab(s) orally once a day (at bedtime)  thiamine 100 mg oral tablet: 1 tab(s) orally once a day   acetaminophen 325 mg oral tablet: 2 tab(s) orally every 6 hours, As needed, Temp greater or equal to 38C (100.4F)  ALPRAZolam 2 mg oral tablet: 1 tab(s) orally 3 times a day  apixaban 5 mg oral tablet: 2 tab(s) orally every 12 hours until 11/2/2022 then decrease dose to 5 mg every 12 hours   buPROPion 300 mg/24 hours (XL) oral tablet, extended release: 1 tab(s) orally once a day  cyclobenzaprine 10 mg oral tablet: 1 tab(s) orally 3 times a day  folic acid 1 mg oral tablet: 1 tab(s) orally once a day  methadone 5 mg oral tablet: 135 milligram(s) orally once a day  mirtazapine 30 mg oral tablet: 1 tab(s) orally once a day (at bedtime)  Multiple Vitamins oral tablet: 1 tab(s) orally once a day  nicotine 21 mg/24 hr transdermal film, extended release: 1 patch transdermal once a day  pantoprazole 40 mg oral delayed release tablet: 1 tab(s) orally once a day (before a meal)  QUEtiapine 200 mg oral tablet: 1 tab(s) orally once a day (at bedtime)  thiamine 100 mg oral tablet: 1 tab(s) orally once a day   acetaminophen 325 mg oral tablet: 2 tab(s) orally every 6 hours, As needed, Temp greater or equal to 38C (100.4F)  ALPRAZolam 2 mg oral tablet: 1 tab(s) orally 3 times a day  apixaban 5 mg oral tablet: 1 tab(s) orally every 12 hours  buPROPion 300 mg/24 hours (XL) oral tablet, extended release: 1 tab(s) orally once a day  cephalexin 500 mg oral capsule: 1 cap(s) orally 4 times a day  cyclobenzaprine 10 mg oral tablet: 1 tab(s) orally 3 times a day  folic acid 1 mg oral tablet: 1 tab(s) orally once a day  methadone 5 mg oral tablet: 135 milligram(s) orally once a day  mirtazapine 30 mg oral tablet: 1 tab(s) orally once a day (at bedtime)  Multiple Vitamins oral tablet: 1 tab(s) orally once a day  nicotine 21 mg/24 hr transdermal film, extended release: 1 patch transdermal once a day  pantoprazole 40 mg oral delayed release tablet: 1 tab(s) orally once a day (before a meal)  QUEtiapine 200 mg oral tablet: 1 tab(s) orally once a day (at bedtime)  thiamine 100 mg oral tablet: 1 tab(s) orally once a day   acetaminophen 325 mg oral tablet: 2 tab(s) orally every 6 hours, As needed, Temp greater or equal to 38C (100.4F)  ALPRAZolam 2 mg oral tablet: 1 tab(s) orally 3 times a day  apixaban 5 mg oral tablet: 1 tab(s) orally every 12 hours  buPROPion 300 mg/24 hours (XL) oral tablet, extended release: 1 tab(s) orally once a day  cephalexin 500 mg oral capsule: 1 cap(s) orally 4 times a day  cyclobenzaprine 10 mg oral tablet: 1 tab(s) orally 3 times a day  folic acid 1 mg oral tablet: 1 tab(s) orally once a day  magnesium oxide 400 mg oral tablet: 1 tab(s) orally 3 times a day   methadone 5 mg oral tablet: 135 milligram(s) orally once a day  mirtazapine 30 mg oral tablet: 1 tab(s) orally once a day (at bedtime)  Multiple Vitamins oral tablet: 1 tab(s) orally once a day  nicotine 21 mg/24 hr transdermal film, extended release: 1 patch transdermal once a day  pantoprazole 40 mg oral delayed release tablet: 1 tab(s) orally once a day (before a meal)  QUEtiapine 200 mg oral tablet: 1 tab(s) orally once a day (at bedtime)  thiamine 100 mg oral tablet: 1 tab(s) orally once a day   acetaminophen 325 mg oral tablet: 2 tab(s) orally every 6 hours, As needed, Temp greater or equal to 38C (100.4F)  ALPRAZolam 1 mg oral tablet: 1 tab(s) orally 2 times a day (am and 1400)  ALPRAZolam 2 mg oral tablet: 1 tab(s) orally once a day (at bedtime)  apixaban 5 mg oral tablet: 1 tab(s) orally every 12 hours  cephalexin 500 mg oral capsule: 1 cap(s) orally 4 times a day  lactobacillus acidophilus oral capsule: 1 tab(s) orally once a day  magnesium oxide 400 mg oral tablet: 1 tab(s) orally 3 times a day   metFORMIN 500 mg oral tablet: 1 tab(s) orally 2 times a day

## 2022-10-25 NOTE — DISCHARGE NOTE NURSING/CASE MANAGEMENT/SOCIAL WORK - NSDCVIVACCINE_GEN_ALL_CORE_FT
Tdap; 15-Baljinder-2022 23:06; Jovanny Singh (STANISLAV); [employer]; [id number] (Exp. Date: 09-Sep-2023); IntraMuscular; Deltoid Left.; 0.5 milliLiter(s); VIS (VIS Published: 09-May-2013, VIS Presented: 15-Baljinder-2022);

## 2022-10-25 NOTE — DISCHARGE NOTE PROVIDER - CARE PROVIDER_API CALL
LUCY PETERSEN  Endocrinology, Diabetes and Metabolism  [facility] [place] [address]  Phone: [phone]  Fax: [phone]  Follow Up Time: 1 week    Brian Deleon)  Surgery  Neurosurgery  [address] [place] [address], [address]  [place] [address]  Phone: [phone]  Fax: [phone]  Follow Up Time: 1 week   LUCY PETERSEN  Endocrinology, Diabetes and Metabolism  [address] [place] [address]  Phone: [phone]  Fax: [phone]  Follow Up Time: 1 week    Brian Deleon)  Surgery  Neurosurgery  [address] [place] [address], [address]  [place] [address]  Phone: [phone]  Fax: [phone]  Follow Up Time: 1 week    John Giraldo)  Infectious Disease; Internal Medicine  [address] [place] [address]  Phone: [phone]  Fax: [phone]  Follow Up Time:    LUCY PETERSEN  Endocrinology, Diabetes and Metabolism  [address] [place] [address]  Phone: [phone]  Fax: [phone]  Follow Up Time: 1 week    Brian Deleon)  Surgery  Neurosurgery  [address] [place] [address], [address]  [place] [address]  Phone: [phone]  Fax: [phone]  Follow Up Time: 1 week    John Giraldo)  Infectious Disease; Internal Medicine  [address] [place] [address]  Phone: [phone]  Fax: [phone]  Follow Up Time:     PCP,   Phone: (   )    -  Fax: (   )    -  Follow Up Time: 1 week

## 2022-10-25 NOTE — PROGRESS NOTE ADULT - ASSESSMENT
52 year old male with PMH of  IVDU (heroin), vertebral osteomyelitis s/p debridement, and MSSA bacteremia, presenting from rehab facility for "low oxygen" and new onset LE edema. In the ED was found to be hypoxic, found to have b/l PEs with radiographic evidence of R heart strain and admitted to ICU, found to have LE DVTs s/p LLE embolectomy 10/18.   A/p:   Acute hypoxemic respiratory failure - resolved  Acute Submassive PE  Suspected superimposed PNA  DIONICIO/OHS, noncompliant with NIV  CT on admission with bilateral submassive PEs + concern for RV strain  Echo showed LVEF 55%, G1DD, mild TVR, no right heart strain  Venous duplex showed: Acute thrombus within the left common femoral, femoral, deep femoral, popliteal, gastrocnemius, posterior tibial and anterior tibial veins, s/p LLE embolectomy 10/18  s/p Lovenox, therapeutic. Can switch to Eliquis 10mg BID for 7 days then 5mg BID for 3-6 months.   Procal 7, blood cx 10/12 - no growth  s/p Cefepime,    History of recent MSSA bacteremia due to cervical discitis.   Epidural phlegmon/OM  R Psoas Abscess  Seen by neurosurgery, no need for further intervention.   Repeated cervical and thoracic spine MRI showed no enhanced lesion.   ID recommended IV cefazoline but patient is refusing discharge to STR, patient is not safe to be discharge on IV antibiotics given history of IV drug abuse. ID recommended Cefadroxil 1g PO daily to complete 3 more weeks.     HO IVDA, Opioid Abuse on Methadone   Agitation  - improving, followed by psych: can resume Quentiapine 200 mg PO HS  Continue Wellbutrin Xl 300mg po q 24hrs  Continue home dose Methadone 135mg po daily---> QTc 433 today 10/20    Plan: for discharge home with oral antibiotics today, patient is refusing  STR for IV antibiotics.

## 2022-10-25 NOTE — DISCHARGE NOTE PROVIDER - HOSPITAL COURSE
53 yo M w/ pmhx of IVDU (heroin), vertebral osteomyelitis, and MSSA bacteremia presents for hypoxemia. CTA patient was found to have bilateral pulmonary emboli with evidence of right ventricular strain. Pulmonary trunk enlargement, compatible with pulmonary hypertension. Numerous bilateral patchy opacities with confluent areas of consolidation predominantly in the posterior lung fields and bilateral perihilar lymphadenopathy, suspicious for pneumonia.      Thrombus extending from below-knee left popliteal vein to superficial left femoral vein.  Pelvic veins and IVC widely patent.  Thrombectomy performed via left popliteal vein approach.  Diseased portions of vein treated with 8 mm x 40 mm angioplasty, with fair anatomic result.  Recurrent MSSA bacteremia with Discitis following Recent Spinal fusion. Abnormal L4-L5 signal suggestive of  Epidural phlegmon/OM. No acute surgical intervention needed per Neurosurgery. on D/C PO Cefadroxil 1g q24h IV x 3 more weeks.         53 yo M w/ pmhx of IVDU (heroin), vertebral osteomyelitis, and MSSA bacteremia presents for hypoxemia. CTA patient was found to have bilateral pulmonary emboli with evidence of right ventricular strain. Pulmonary trunk enlargement, compatible with pulmonary hypertension. Numerous bilateral patchy opacities with confluent areas of consolidation predominantly in the posterior lung fields and bilateral perihilar lymphadenopathy, suspicious for pneumonia.   Thrombus extending from below-knee left popliteal vein to superficial left femoral vein.  Pelvic veins and IVC widely patent.  Thrombectomy performed via left popliteal vein approach.  Diseased portions of vein treated with 8 mm x 40 mm angioplasty, with fair anatomic result.  Recurrent MSSA bacteremia with Discitis following Recent Spinal fusion. Abnormal L4-L5 signal suggestive of  Epidural phlegmon/OM. No acute surgical intervention needed per Neurosurgery. on D/C PO Cefadroxil 1g q24h IV x 3 more weeks.  hic evidence of R heart strain and admitted to ICU, found to have LE DVTs s/p LLE embolectomy 10/18, now downgraded to SDU     PHYSICAL EXAM:  GENERAL: NAD, well-developed.  HEAD:  Atraumatic, Normocephalic.  EYES: EOMI, PERRLA, conjunctiva and sclera clear.  NECK: Supple, No JVD.  CHEST/LUNG: Clear to auscultation bilaterally; No wheeze.  HEART: Regular rate and rhythm; S1 S2.   ABDOMEN: Soft, Nontender, Nondistended; Bowel sounds present.  EXTREMITIES:  2+ Peripheral Pulses, No clubbing, cyanosis, or edema.  PSYCH: AAOx3.  NEUROLOGY: non-focal.  SKIN: No rashes or lesions.  A/p:   Acute hypoxemic respiratory failure - resolved  Acute Submassive PE  Suspected superimposed PNA  DIONICIO/OHS, noncompliant with NIV  CT on admission with bilateral submassive PEs + concern for RV strain  Echo showed LVEF 55%, G1DD, mild TVR, no right heart strain  Venous duplex showed: Acute thrombus within the left common femoral, femoral, deep femoral, popliteal, gastrocnemius, posterior tibial and anterior tibial veins, s/p LLE embolectomy 10/18  s/p Lovenox, therapeutic. Can switch to Eliquis 10mg BID for 7 days then 5mg BID for 3-6 months.   Procal 7, blood cx 10/12 - no growth  s/p Cefepime,    History of recent MSSA bacteremia due to cervical discitis.   Epidural phlegmon/OM  R Psoas Abscess  Seen by neurosurgery, no need for further intervention.   Repeated cervical and thoracic spine MRI showed no enhanced lesion.   ID recommended IV cefazoline but patient is refusing discharge to STR, patient is not safe to be discharge on IV antibiotics given history of IV drug abuse. ID recommended Cefadroxil 1g PO daily to complete 3 more weeks.     HO IVDA, Opioid Abuse on Methadone   Agitation  - improving, followed by psych: can resume Quentiapine 200 mg PO HS  Continue Wellbutrin Xl 300mg po q 24hrs  Continue home dose Methadone 135mg po daily---> QTc 433 today 10/20           51 yo M w/ pmhx of IVDU (heroin), vertebral osteomyelitis, and MSSA bacteremia presents for hypoxemia. CTA patient was found to have bilateral pulmonary emboli with evidence of right ventricular strain. Pulmonary trunk enlargement, compatible with pulmonary hypertension. Numerous bilateral patchy opacities with confluent areas of consolidation predominantly in the posterior lung fields and bilateral perihilar lymphadenopathy, suspicious for pneumonia.   Thrombus extending from below-knee left popliteal vein to superficial left femoral vein.  Pelvic veins and IVC widely patent.  Thrombectomy performed via left popliteal vein approach.  Diseased portions of vein treated with 8 mm x 40 mm angioplasty, with fair anatomic result.  Recurrent MSSA bacteremia with Discitis following Recent Spinal fusion. Abnormal L4-L5 signal suggestive of  Epidural phlegmon/OM. No acute surgical intervention needed per Neurosurgery. on D/C PO Cefadroxil 1g q24h IV x 3 more weeks.  hic evidence of R heart strain and admitted to ICU, found to have LE DVTs s/p LLE embolectomy 10/18, now downgraded to SDU     Patient is a 52y old Male PMHx of IVDU (heroin), vertebral osteomyelitis s/p debridement, and MSSA bacteremia, who presents from a rehab facility with a chief complaint of Hypoxia and new onset LE edema. In the ED was, found to have b/l PEs with radiographic evidence of R heart strain and admitted to ICU, found to have LE DVTs s/p LLE embolectomy 10/18. Currently on IV antibiotics, pending discharge authorization.     #Acute hypoxemic respiratory failure / Acute Submassive PE  - resolved  - Suspected superimposed PNA  - DIONICIO/OHS, noncompliant with NIV  - TTE: EF 55%, G1DD, mild TVR, no right heart strain  -VA duplx LE vein: Acute thrombus within the left common femoral, femoral, deep femoral, popliteal, gastrocnemius, posterior tibial and anterior tibial veins, s/p LLE embolectomy 10/18  - Therapeutic Lovenox, transitioned to Eliquis.   - s/p Cefepime, now on  Cefazolin 2g q8h IV  - PT evaluated and recommends sub-acut rehab or home PT on DC    #Recurrent MSSA bacteremia with Discitis   - Recent Spinal fusion  - Abnormal L4-L5 signal on MRI suggestive of  Epidural phlegmon/OM  - No acute surgical intervention needed per Neurosurgery   - C/w Cefazolin for 3 weeks (11/17) per ID  - Sterile 18 gauge powerline Mideline for subacute rehab procedure team consult placed  - Outpatient Neurosurgery referral      #HO IVDA, Opioid Abuse on Methadone   - improving, followed by psych: can resume Quetiapine 200 mg PO HS  - Continue with Xanax 1 mg po am, 1mg po afternoon and two times at night - total daily dose of 4mg  - Continue Wellbutrin Xl 300mg po q 24hrs  - Continue home dose Methadone 135mg po daily---> QTc 433 today 10/20  - For agitation, haldol 5mg po/IM every 8hrs prn    #Disposition  - Sub-acute Rehab, pending authorization    #MISC  - No need for daily CBC/BMP, just check daily mag and replenish as needed.             51 yo M w/ pmhx of IVDU (heroin), vertebral osteomyelitis, and MSSA bacteremia presents for hypoxemia. CTA patient was found to have bilateral pulmonary emboli with evidence of right ventricular strain. Pulmonary trunk enlargement, compatible with pulmonary hypertension. Numerous bilateral patchy opacities with confluent areas of consolidation predominantly in the posterior lung fields and bilateral perihilar lymphadenopathy, suspicious for pneumonia.   Thrombus extending from below-knee left popliteal vein to superficial left femoral vein.  Pelvic veins and IVC widely patent.  Thrombectomy performed via left popliteal vein approach.  Diseased portions of vein treated with 8 mm x 40 mm angioplasty, with fair anatomic result.  Recurrent MSSA bacteremia with Discitis following Recent Spinal fusion. Abnormal L4-L5 signal suggestive of  Epidural phlegmon/OM. No acute surgical intervention needed per Neurosurgery. on D/C PO Cefadroxil 1g q24h IV x 3 more weeks.  hic evidence of R heart strain and admitted to ICU, found to have LE DVTs s/p LLE embolectomy 10/18, now downgraded to SDU     Patient is a 52y old Male PMHx of IVDU (heroin), vertebral osteomyelitis s/p debridement, and MSSA bacteremia, who presents from a rehab facility with a chief complaint of Hypoxia and new onset LE edema. In the ED was, found to have b/l PEs with radiographic evidence of R heart strain and admitted to ICU, found to have LE DVTs s/p LLE embolectomy 10/18. Currently on IV antibiotics, pending discharge authorization.     #Acute hypoxemic respiratory failure / Acute Submassive PE  - resolved  - Suspected superimposed PNA  - DIONICIO/OHS, noncompliant with NIV  - TTE: EF 55%, G1DD, mild TVR, no right heart strain  -VA duplx LE vein: Acute thrombus within the left common femoral, femoral, deep femoral, popliteal, gastrocnemius, posterior tibial and anterior tibial veins, s/p LLE embolectomy 10/18  - Therapeutic Lovenox, transitioned to Eliquis.   - s/p Cefepime, and Cefazolin 2g q8h IV fr 4 weeks >>> now on keflex 500mg q6 PO  - PT evaluated and recommends sub-acut rehab or home PT on DC    #Recurrent MSSA bacteremia with Discitis   - Recent Spinal fusion  - Abnormal L4-L5 signal on MRI suggestive of  Epidural phlegmon/OM  - No acute surgical intervention needed per Neurosurgery   - C/w Cefazolin for 3 weeks (11/17) per ID  - Sterile 18 gauge powerline Mideline for subacute rehab procedure team consult placed  - Outpatient Neurosurgery referral      #JOSE BAILEY, Opioid Abuse on Methadone   - improving, followed by psych: can resume Quetiapine 200 mg PO HS  - Continue with Xanax 1 mg po am, 1mg po afternoon and two times at night - total daily dose of 4mg  - Continue Wellbutrin Xl 300mg po q 24hrs  - Continue home dose Methadone 135mg po daily---> QTc 433 today 10/20  - For agitation, haldol 5mg po/IM every 8hrs prn    #Disposition  - Sub-acute Rehab, pending authorization    #MISC  - No need for daily CBC/BMP, just check daily mag and replenish as needed.             53 yo M w/ pmhx of IVDU (heroin), vertebral osteomyelitis, and MSSA bacteremia presents for hypoxemia. CTA patient was found to have bilateral pulmonary emboli with evidence of right ventricular strain. Pulmonary trunk enlargement, compatible with pulmonary hypertension. Numerous bilateral patchy opacities with confluent areas of consolidation predominantly in the posterior lung fields and bilateral perihilar lymphadenopathy, suspicious for pneumonia.   Thrombus extending from below-knee left popliteal vein to superficial left femoral vein.  Pelvic veins and IVC widely patent.  Thrombectomy performed via left popliteal vein approach.  Diseased portions of vein treated with 8 mm x 40 mm angioplasty, with fair anatomic result.  Recurrent MSSA bacteremia with Discitis following Recent Spinal fusion. Abnormal L4-L5 signal suggestive of  Epidural phlegmon/OM. No acute surgical intervention needed per Neurosurgery. on D/C PO Cefadroxil 1g q24h IV x 3 more weeks.  hic evidence of R heart strain and admitted to ICU, found to have LE DVTs s/p LLE embolectomy 10/18, now downgraded to SDU     Patient is a 52y old Male PMHx of IVDU (heroin), vertebral osteomyelitis s/p debridement, and MSSA bacteremia, who presents from a rehab facility with a chief complaint of Hypoxia and new onset LE edema. In the ED was, found to have b/l PEs with radiographic evidence of R heart strain and admitted to ICU, found to have LE DVTs s/p LLE embolectomy 10/18. Currently on IV antibiotics, pending discharge authorization.     #New onset DM  - pt was consistently having high FS >> much improved now after sliding scale  - last hba1c was in jan 2022 6.6, now 6.8  - c/w sliding scale, monitor FS  - start metformin 500mg BID after dc    #Acute hypoxemic respiratory failure / Acute Submassive PE  - resolved  - Suspected superimposed PNA  - DIONICIO/OHS, noncompliant with NIV  - TTE: EF 55%, G1DD, mild TVR, no right heart strain  -VA duplx LE vein: Acute thrombus within the left common femoral, femoral, deep femoral, popliteal, gastrocnemius, posterior tibial and anterior tibial veins, s/p LLE embolectomy 10/18  - Therapeutic Lovenox, transitioned to Eliquis.   - PT evaluated and recommends sub-acut rehab or home PT on DC    #Recurrent MSSA bacteremia with Discitis   - Recent Spinal fusion  - Abnormal L4-L5 signal on MRI suggestive of  Epidural phlegmon/OM  - No acute surgical intervention needed per Neurosurgery   - s/p Cefepime and Cefazolin 2g for 4 weeks >> after consulting with ID switched to Keflex 500mg q6 PO on 11/08 PM  - Sterile 18 gauge powerline Mideline was placed bc of IV Abx >> will remove before DC as pt dont need IV abx anymore  - Outpatient Neurosurgery referral      #HO IVDA, Opioid Abuse on Methadone   - improving, followed by psych: can resume Quetiapine 200 mg PO HS  - Continue with Xanax 1 mg po am, 1mg po afternoon and 2mg at night - total daily dose of 4mg  - Continue Wellbutrin Xl 300mg po q 24hrs  - Continue home dose Methadone 135mg po daily  - For agitation, haldol 5mg po/IM every 8hrs prn    #Disposition  - Sub-acute Rehab, pending authorization    #MISC  - No need for daily CBC/BMP, just check mag every other day and replenish as needed.             51 yo M w/ pmhx of IVDU (heroin), vertebral osteomyelitis, and MSSA bacteremia presents for hypoxemia. CTA patient was found to have bilateral pulmonary emboli with evidence of right ventricular strain. Pulmonary trunk enlargement, compatible with pulmonary hypertension. Numerous bilateral patchy opacities with confluent areas of consolidation predominantly in the posterior lung fields and bilateral perihilar lymphadenopathy, suspicious for pneumonia.   Thrombus extending from below-knee left popliteal vein to superficial left femoral vein.  Pelvic veins and IVC widely patent.  Thrombectomy performed via left popliteal vein approach.  Diseased portions of vein treated with 8 mm x 40 mm angioplasty, with fair anatomic result.  Recurrent MSSA bacteremia with Discitis following Recent Spinal fusion. Abnormal L4-L5 signal suggestive of  Epidural phlegmon/OM. No acute surgical intervention needed per Neurosurgery. on D/C PO Cefadroxil 1g q24h IV x 3 more weeks.  hic evidence of R heart strain and admitted to ICU, found to have LE DVTs s/p LLE embolectomy 10/18, now downgraded to SDU     Patient is a 52y old Male PMHx of IVDU (heroin), vertebral osteomyelitis s/p debridement, and MSSA bacteremia, who presents from a rehab facility with a chief complaint of Hypoxia and new onset LE edema. In the ED was, found to have b/l PEs with radiographic evidence of R heart strain and admitted to ICU, found to have LE DVTs s/p LLE embolectomy 10/18. Currently on IV antibiotics, pending discharge authorization.     #New onset DM  - pt was consistently having high FS >> much improved now after sliding scale  - last hba1c was in jan 2022 6.6, now 6.8  - c/w sliding scale, monitor FS  - start metformin 500mg BID after dc    #Acute hypoxemic respiratory failure / Acute Submassive PE  - resolved  - Suspected superimposed PNA  - DIONICIO/OHS, noncompliant with NIV  - TTE: EF 55%, G1DD, mild TVR, no right heart strain  -VA duplx LE vein: Acute thrombus within the left common femoral, femoral, deep femoral, popliteal, gastrocnemius, posterior tibial and anterior tibial veins, s/p LLE embolectomy 10/18  - Therapeutic Lovenox, transitioned to Eliquis.   - PT evaluated and recommends sub-acut rehab or home PT on DC    #Recurrent MSSA bacteremia with Discitis   - Recent Spinal fusion  - Abnormal L4-L5 signal on MRI suggestive of  Epidural phlegmon/OM  - No acute surgical intervention needed per Neurosurgery   - s/p Cefepime and Cefazolin 2g for 4 weeks >> after consulting with ID switched to Keflex 500mg q6 PO on 11/08 PM  - Sterile 18 gauge powerline Mideline was placed bc of IV Abx >> will remove before DC as pt dont need IV abx anymore  - Outpatient Neurosurgery referral      #HO IVDA, Opioid Abuse on Methadone   - improving, followed by psych: can resume Quetiapine 200 mg PO HS  - Continue with Xanax 1 mg po am, 1mg po afternoon and 2mg at night - total daily dose of 4mg  - Continue Wellbutrin Xl 300mg po q 24hrs  - Continue home dose Methadone 135mg po daily  - For agitation, haldol 5mg po/IM every 8hrs prn    #Disposition  - Sub-acute Rehab, pending authorization    #MISC  - No need for daily CBC/BMP, just check mag every other day and replenish as needed.            Attending Note:  Patient seen and examined independently. I personally had a face-to-face encounter with the patient, examined the patient myself, personally reviewed labs & Radiology images,  and reviewed the plan of care with the housestaff. Agree with resident's note but my note supersedes that of the resident in the matters hereby listed.   D/c to Nursing home  Meds per rec

## 2022-10-25 NOTE — DISCHARGE NOTE NURSING/CASE MANAGEMENT/SOCIAL WORK - PATIENT PORTAL LINK FT
You can access the FollowMyHealth Patient Portal offered by [facility] by registering at the following website: [email or web address]. By joining [facility]’s FollowMyHealth portal, you will also be able to view your health information using other applications (apps) compatible with our system.

## 2022-10-25 NOTE — DISCHARGE NOTE PROVIDER - PROVIDER TOKENS
PROVIDER:[TOKEN:[[id number]],FOLLOWUP:[1 week]],PROVIDER:[TOKEN:[[id number]],FOLLOWUP:[1 week]] PROVIDER:[TOKEN:[[id number]],FOLLOWUP:[1 week]],PROVIDER:[TOKEN:[[id number]],FOLLOWUP:[1 week]],PROVIDER:[TOKEN:[[id number]]] PROVIDER:[TOKEN:[[id number]],FOLLOWUP:[1 week]],PROVIDER:[TOKEN:[[id number]],FOLLOWUP:[1 week]],PROVIDER:[TOKEN:[[id number]]],FREE:[LAST:[PCP],PHONE:[(   )    -],FAX:[(   )    -],FOLLOWUP:[1 week]]

## 2022-10-25 NOTE — DISCHARGE NOTE PROVIDER - NSDCCPCAREPLAN_GEN_ALL_CORE_FT
PRINCIPAL DISCHARGE DIAGNOSIS  Diagnosis: Pneumonia  Assessment and Plan of Treatment:   Please take your medications as directed. Don’t skip doses. Follow up with your primary care physician within 3 days. Continue taking your antibiotics as directed until they are all gone—even if you start to feel better. This will prevent the pneumonia from  Coughing up mucus is normal. Don’t use medicines to suppress your cough unless your cough is dry, painful, or interferes with your sleep. Get plenty of rest until your fever, shortness of breath, and chest pain go away. Plan to get a flu shot every year. Ask your primary care doctor about pneumonia vaccines.  Seek immediate medical attention if you experience chest pain, trouble breathing, blue lips or fingernails, fever of 100.4°F  (38°C) or higher, yellow, green, bloody, or smelly sputum, more than normal mucus production, vomiting or diarrhea.        SECONDARY DISCHARGE DIAGNOSES  Diagnosis: Hypercapnic respiratory failure  Assessment and Plan of Treatment:      PRINCIPAL DISCHARGE DIAGNOSIS  Diagnosis: Pneumonia  Assessment and Plan of Treatment: Please take your medications as directed. Don’t skip doses. Follow up with your primary care physician within 3 days. Continue taking your antibiotics as directed until they are all gone—even if you start to feel better. This will prevent the pneumonia from  Coughing up mucus is normal. Don’t use medicines to suppress your cough unless your cough is dry, painful, or interferes with your sleep. Get plenty of rest until your fever, shortness of breath, and chest pain go away. Plan to get a flu shot every year. Ask your primary care doctor about pneumonia vaccines.  You were also found to have pulmnary emboli, which are blood clots in the lungs. The treatment of this will be anticoagulation (Eliquis). Please take the medication as prescribed 10 mg twice daily for one week, then decrease the dose to 5 mg twice daily. This medication is usually continued for 3-6 months. Please follow up with your primary care doctor. If you have any episodes of bleeding, please report to the ED for evaluation.   Seek immediate medical attention if you experience chest pain, trouble breathing, blue lips or fingernails, fever of 100.4°F  (38°C) or higher, yellow, green, bloody, or smelly sputum, more than normal mucus production, vomiting or diarrhea.        SECONDARY DISCHARGE DIAGNOSES  Diagnosis: Hypercapnic respiratory failure  Assessment and Plan of Treatment:      PRINCIPAL DISCHARGE DIAGNOSIS  Diagnosis: Pneumonia  Assessment and Plan of Treatment: Please take your medications as directed. Don’t skip doses. Follow up with your primary care physician within 3 days. Continue taking your antibiotics as directed until they are all gone—even if you start to feel better. This will prevent the pneumonia from  Coughing up mucus is normal. Don’t use medicines to suppress your cough unless your cough is dry, painful, or interferes with your sleep. Get plenty of rest until your fever, shortness of breath, and chest pain go away. Plan to get a flu shot every year. Ask your primary care doctor about pneumonia vaccines.  You were also found to have pulmnary emboli, which are blood clots in the lungs. The treatment of this will be anticoagulation (Eliquis). Please take the medication as prescribed 10 mg twice daily for one week, then decrease the dose to 5 mg twice daily. This medication is usually continued for 3-6 months. Please follow up with your primary care doctor. If you have any episodes of bleeding, please report to the ED for evaluation.   Seek immediate medical attention if you experience chest pain, trouble breathing, blue lips or fingernails, fever of 100.4°F  (38°C) or higher, yellow, green, bloody, or smelly sputum, more than normal mucus production, vomiting or diarrhea.        SECONDARY DISCHARGE DIAGNOSES  Diagnosis: Pulmonary embolism  Assessment and Plan of Treatment: During this hospitalization, you were diagnosed with a pulmonary embolsim. In your case, you have a  deep vein thrombosis (DVT) which is a blood clot in a large vein deep in a leg, arm, or elsewhere in the body. The clot can separate from the vein, travel to the lungs and cut off blood flow. This is a pulmonary embolism (PE). Pulmonary embolism is very serious. Both the prevention and the treatment are similar for DVT and PE.   To help prevent more blood clots from forming, please see your primary care doctor within one week of discharge, and please take your medicines exactly as instructed. Don’t skip doses. You have been prescribed a medication to thin the blood, so that more clots do not form.  Have all lab tests as recommended. This is very important when you take medicines to prevent blood clots. Make sure you stay active and walk for at least 30 minutes every day. When sitting for long periods of time, move your knees, ankles, feet, and toes.    If you smoke, get help to quit. Stay at a healthy weight. Try to exercise at least 30 minutes on most days. Before starting an exercise program, talk with your primary care provider. When traveling by car, make frequent stops to get up and move around. On long airplane rides, get up and move around when possible. If you can’t get up, wiggle your toes, move your ankles and tighten your calves to keep your blood moving.  Seek immediate medical care if you have pain, swelling, and redness in your leg, arm, or other body area. These symptoms may mean another blood clot. Also call your healthcare provider if you have signs and symptoms of bleeding, like blood in your urine, bleeding with bowel movements, or bleeding from the nose, gums, a cut, or vagina. Call 911 if you have symptoms of a blood clot in the lungs including: Chest pain, trouble breathing, coughing blood, fast heartbeat, heavy or uncontrolled bleeding.       PRINCIPAL DISCHARGE DIAGNOSIS  Diagnosis: Pneumonia  Assessment and Plan of Treatment: Please take your medications as directed. Don’t skip doses. Follow up with your primary care physician within 3 days. Continue taking your antibiotics as directed until they are all gone—even if you start to feel better. This will prevent the pneumonia from  Coughing up mucus is normal. Don’t use medicines to suppress your cough unless your cough is dry, painful, or interferes with your sleep. Get plenty of rest until your fever, shortness of breath, and chest pain go away. Plan to get a flu shot every year. Ask your primary care doctor about pneumonia vaccines.  You were also found to have pulmnary emboli, which are blood clots in the lungs. The treatment of this will be anticoagulation (Eliquis). Please take the medication as prescribed 10 mg twice daily for one week, then decrease the dose to 5 mg twice daily. This medication is usually continued for 3-6 months. Please follow up with your primary care doctor. If you have any episodes of bleeding, please report to the ED for evaluation.   Seek immediate medical attention if you experience chest pain, trouble breathing, blue lips or fingernails, fever of 100.4°F  (38°C) or higher, yellow, green, bloody, or smelly sputum, more than normal mucus production, vomiting or diarrhea.  Please note your IV antibiotics was completed on 11/08, and then you were started on oral antibiotics 4 times aday which you need take for now and see infection doctor dr. Giraldo to decide on stoping the medication. You also need MRI that needs to be done, it can happen before dischrge here in the hospital, otherwise you need to get it outpatient. Please follow up with Infectious Disease specialist, Dr. John Giraldo following the completion of your antibiotics for Telehealth ([facility]).  [address] [place] [address]  [phone]        SECONDARY DISCHARGE DIAGNOSES  Diagnosis: Pulmonary embolism  Assessment and Plan of Treatment: During this hospitalization, you were diagnosed with a pulmonary embolsim. In your case, you have a  deep vein thrombosis (DVT) which is a blood clot in a large vein deep in a leg, arm, or elsewhere in the body. The clot can separate from the vein, travel to the lungs and cut off blood flow. This is a pulmonary embolism (PE). Pulmonary embolism is very serious. Both the prevention and the treatment are similar for DVT and PE.   To help prevent more blood clots from forming, please see your primary care doctor within one week of discharge, and please take your medicines exactly as instructed. Don’t skip doses. You have been prescribed a medication to thin the blood, so that more clots do not form.  Have all lab tests as recommended. This is very important when you take medicines to prevent blood clots. Make sure you stay active and walk for at least 30 minutes every day. When sitting for long periods of time, move your knees, ankles, feet, and toes.    If you smoke, get help to quit. Stay at a healthy weight. Try to exercise at least 30 minutes on most days. Before starting an exercise program, talk with your primary care provider. When traveling by car, make frequent stops to get up and move around. On long airplane rides, get up and move around when possible. If you can’t get up, wiggle your toes, move your ankles and tighten your calves to keep your blood moving.  Seek immediate medical care if you have pain, swelling, and redness in your leg, arm, or other body area. These symptoms may mean another blood clot. Also call your healthcare provider if you have signs and symptoms of bleeding, like blood in your urine, bleeding with bowel movements, or bleeding from the nose, gums, a cut, or vagina. Call 911 if you have symptoms of a blood clot in the lungs including: Chest pain, trouble breathing, coughing blood, fast heartbeat, heavy or uncontrolled bleeding.       PRINCIPAL DISCHARGE DIAGNOSIS  Diagnosis: Osteomyelitis of vertebra, sacral and sacrococcygeal region  Assessment and Plan of Treatment: You came in fo shortness of breath. You were found to have clots in your lungs and leg. PLease see below for more info on that.  You were also found to have infeciton in your spine which is chronic. You were started on 4 weeks of IV antibiotics. And now you are moved to oral antibiotics. Please keep taking  Keflex 500mg 4 times a day for now until you see infection doctor. Follow up with Infection doctor Dr. John Giraldo in 1 week and schedule an MRI to assess infection, which will also determine the duration of your antibiotics use. Please also get your blood test every week including CBC, CMP, ESR, CRP, and magnesium.      SECONDARY DISCHARGE DIAGNOSES  Diagnosis: Pulmonary embolism  Assessment and Plan of Treatment: During this hospitalization, you were diagnosed with a pulmonary embolsim. In your case, you have a  deep vein thrombosis (DVT) which is a blood clot in a large vein deep in a leg, arm, or elsewhere in the body. The clot can separate from the vein, travel to the lungs and cut off blood flow. This is a pulmonary embolism (PE). Pulmonary embolism is very serious. Both the prevention and the treatment are similar for DVT and PE.   To help prevent more blood clots from forming, please see your primary care doctor within one week of discharge, and please take your medicines exactly as instructed. Don’t skip doses. You have been prescribed a medication to thin the blood, so that more clots do not form.  Have all lab tests as recommended. This is very important when you take medicines to prevent blood clots. Make sure you stay active and walk for at least 30 minutes every day. When sitting for long periods of time, move your knees, ankles, feet, and toes.    If you smoke, get help to quit. Stay at a healthy weight. Try to exercise at least 30 minutes on most days. Before starting an exercise program, talk with your primary care provider. When traveling by car, make frequent stops to get up and move around. On long airplane rides, get up and move around when possible. If you can’t get up, wiggle your toes, move your ankles and tighten your calves to keep your blood moving.  Seek immediate medical care if you have pain, swelling, and redness in your leg, arm, or other body area. These symptoms may mean another blood clot. Also call your healthcare provider if you have signs and symptoms of bleeding, like blood in your urine, bleeding with bowel movements, or bleeding from the nose, gums, a cut, or vagina. Call 911 if you have symptoms of a blood clot in the lungs including: Chest pain, trouble breathing, coughing blood, fast heartbeat, heavy or uncontrolled bleeding.      Diagnosis: Osteomyelitis of vertebra, sacral and sacrococcygeal region  Assessment and Plan of Treatment: Please take Keflex 500mg 4 times a day. Follow up with Infection doctor Dr. John Giraldo in 1 week and schedule an MRI. Please also get your blood test every week including CBC, CMP, ESR, CRP, and magnesium.    Diagnosis: DVT, lower extremity  Assessment and Plan of Treatment: You were found to have clot in you legs. Procodure was done to take the clot out. Please keep taking blood thinner now as prescibed.     PRINCIPAL DISCHARGE DIAGNOSIS  Diagnosis: Osteomyelitis of vertebra, sacral and sacrococcygeal region  Assessment and Plan of Treatment: You came in for shortness of breath. You were found to have clots in your lungs and leg. Please see below for more info on that.  You were also found to have infection in your spine which is chronic. You were started on 4 weeks of IV antibiotics. And now you are moved to oral antibiotics. Please keep taking  Keflex 500mg 4 times a day for now until you see infection doctor. Follow up with Infection doctor Dr. John Giraldo in 1 week and schedule an MRI to assess infection, which will also determine the duration of your antibiotics use. Please also get your blood test every week including CBC, CMP, ESR, CRP, and magnesium.      SECONDARY DISCHARGE DIAGNOSES  Diagnosis: Pulmonary embolism  Assessment and Plan of Treatment: During this hospitalization, you were diagnosed with a pulmonary embolsim. In your case, you have a  deep vein thrombosis (DVT) which is a blood clot in a large vein deep in a leg, arm, or elsewhere in the body. The clot can separate from the vein, travel to the lungs and cut off blood flow. This is a pulmonary embolism (PE). Pulmonary embolism is very serious. Both the prevention and the treatment are similar for DVT and PE.   To help prevent more blood clots from forming, please see your primary care doctor within one week of discharge, and please take your medicines exactly as instructed. Don’t skip doses. You have been prescribed a medication to thin the blood, so that more clots do not form.  Have all lab tests as recommended. This is very important when you take medicines to prevent blood clots. Make sure you stay active and walk for at least 30 minutes every day. When sitting for long periods of time, move your knees, ankles, feet, and toes.    If you smoke, get help to quit. Stay at a healthy weight. Try to exercise at least 30 minutes on most days. Before starting an exercise program, talk with your primary care provider. When traveling by car, make frequent stops to get up and move around. On long airplane rides, get up and move around when possible. If you can’t get up, wiggle your toes, move your ankles and tighten your calves to keep your blood moving.  Seek immediate medical care if you have pain, swelling, and redness in your leg, arm, or other body area. These symptoms may mean another blood clot. Also call your healthcare provider if you have signs and symptoms of bleeding, like blood in your urine, bleeding with bowel movements, or bleeding from the nose, gums, a cut, or vagina. Call 911 if you have symptoms of a blood clot in the lungs including: Chest pain, trouble breathing, coughing blood, fast heartbeat, heavy or uncontrolled bleeding.      Diagnosis: Osteomyelitis of vertebra, sacral and sacrococcygeal region  Assessment and Plan of Treatment: Please take Keflex 500mg 4 times a day. Follow up with Infection doctor Dr. John Giraldo in 1 week and schedule an MRI. Please also get your blood test every week including CBC, CMP, ESR, CRP, and magnesium.    Diagnosis: DVT, lower extremity  Assessment and Plan of Treatment: You were found to have clot in you legs. Procodure was done to take the clot out. Please keep taking blood thinner now as prescibed.

## 2022-10-25 NOTE — DISCHARGE NOTE PROVIDER - ATTENDING ATTESTATION STATEMENT
I have personally seen and examined the patient. I have collaborated with and supervised the Posterior Auricular Interpolation Flap Text: A decision was made to reconstruct the defect utilizing an interpolation axial flap and a staged reconstruction.  A telfa template was made of the defect.  This telfa template was then used to outline the posterior auricular interpolation flap.  The donor area for the pedicle flap was then injected with anesthesia.  The flap was excised through the skin and subcutaneous tissue down to the layer of the underlying musculature.  The pedicle flap was carefully excised within this deep plane to maintain its blood supply.  The edges of the donor site were undermined.   The donor site was closed in a primary fashion.  The pedicle was then rotated into position and sutured.  Once the tube was sutured into place, adequate blood supply was confirmed with blanching and refill.  The pedicle was then wrapped with xeroform gauze and dressed appropriately with a telfa and gauze bandage to ensure continued blood supply and protect the attached pedicle.

## 2022-10-25 NOTE — DISCHARGE NOTE PROVIDER - NSDCFUSCHEDAPPT_GEN_ALL_CORE_FT
Demetris Mcfarlane  [facility]  OPHTHALM  [address]ba  Scheduled Appointment: 11/01/2022    Alejandro Maciel  [facility]  GASTRO Doc Off [address]  Scheduled Appointment: 01/20/2023     Alejandro Maciel  [facility]  GASTRO Doc Off [address]  Scheduled Appointment: 01/20/2023

## 2022-10-25 NOTE — DISCHARGE NOTE NURSING/CASE MANAGEMENT/SOCIAL WORK - NSDCPEFALRISK_GEN_ALL_CORE
For information on Fall & Injury Prevention, visit: [email or web address] OR  [email or web address] OR  https://www.cdc.gov/steadi/patient.html

## 2022-10-25 NOTE — DISCHARGE NOTE PROVIDER - NPI NUMBER (FOR SYSADMIN USE ONLY) :
[[id number] [[id number]],[[id number]] [[id number]],[UNKNOWN]

## 2022-10-25 NOTE — CHART NOTE - NSCHARTNOTEFT_GEN_A_CORE
Registered Dietitian Follow-Up     Patient Profile Reviewed                           Yes [X]   No []     Nutrition History Previously Obtained        Yes [X]  No []       Pertinent Information: previous RD recommendations not in place, pt reports excellent appetite and intake, 100% of meals, much improved from previous assessment, pt with no requests or concerns at this time.      Pertinent Medical Interventions:  Admitted with acute hypoxemic respiratory failure/acute submassive PE- resolved  Recurrent MSSA bacteremia with discitis  H/O IVDA, opioid abuse on methadone    Diet order:   Diet, Regular (10-19-22 @ 07:52) [Active]    Anthropometrics:  73 in, 185.4 cm  140.9 kg, 310 lbs  BMI: 41  IBW: 184 lbs, 84 kg    Daily Weight in k.7 (10-23), Weight in k.4 (10-21), Weight in k.7 (10-20), Weight in k.3 (10-20), Weight in k.4 (10-19), Weight in k (10-19)  % Weight Change    MEDICATIONS  (STANDING):  ALPRAZolam 2 milliGRAM(s) Oral at bedtime  buPROPion XL (24-Hour) . 300 milliGRAM(s) Oral daily  ceFAZolin   IVPB 2000 milliGRAM(s) IV Intermittent every 8 hours  enoxaparin Injectable 140 milliGRAM(s) SubCutaneous every 12 hours  folic acid 1 milliGRAM(s) Oral daily  magnesium oxide 400 milliGRAM(s) Oral once  methadone    Tablet 135 milliGRAM(s) Oral <User Schedule>  multivitamin 1 Tablet(s) Oral daily  nicotine - 21 mG/24Hr(s) Patch 1 Patch Transdermal daily  pantoprazole    Tablet 40 milliGRAM(s) Oral before breakfast  QUEtiapine 200 milliGRAM(s) Oral at bedtime  thiamine 100 milliGRAM(s) Oral daily    MEDICATIONS  (PRN):  aluminum hydroxide/magnesium hydroxide/simethicone Suspension 30 milliLiter(s) Oral every 4 hours PRN Dyspepsia  cyclobenzaprine 10 milliGRAM(s) Oral three times a day PRN Muscle Spasm  haloperidol    Injectable 5 milliGRAM(s) IntraMuscular every 8 hours PRN Agitation  melatonin 3 milliGRAM(s) Oral at bedtime PRN Insomnia  ondansetron Injectable 4 milliGRAM(s) IV Push every 8 hours PRN Nausea and/or Vomiting    Pertinent Labs: 10-25 @ 07:40: Na 137, BUN 11, Cr 0.7, <H>, K+ 4.4, Phos --, Mg 1.6<L>, Alk Phos 90, ALT/SGPT 13, AST/SGOT 18, HbA1c --    Finger Sticks:  POCT Blood Glucose.: 180 mg/dL (10-25 @ 11:07)  POCT Blood Glucose.: 137 mg/dL (10-25 @ 07:38)  POCT Blood Glucose.: 188 mg/dL (10-24 @ 16:29)    Physical Findings:  - Appearance: alert, oriented  - GI function: LBM 10/21  - Tubes: n/a  - Oral/Mouth cavity: tolerates regular texture  - Skin: surgical site to lower back, L buttock stage 2 PU     Nutrition Requirements:  Weight Used: IBW 83.6 kg     Estimated Energy Needs: 8781-0184 kcal/day  Estimated Protein Needs: 117-142 g/day  Estimated Fluid Needs: 1672 mL/day     Nutrient Intake: 100% of meals at this time     1) [X] Previous Nutrition Diagnosis: Inadequate oral intake            [] Ongoing          [X] Resolved    2) [X] Previous Nutrition Diagnosis: Increased Nutrient Needs            [X] Ongoing          [] Resolved     Nutrition Intervention: meals and snacks, coordination of care     Goal/Expected Outcome: with at least 75% po intake over next 7-10 days     Indicator/Monitoring: diet order, PO intake, NFPF, skin status, weight, labs, meds    Recommendation: pt is at low nutrition risk, f/u within 7-10 days

## 2022-10-25 NOTE — PROGRESS NOTE ADULT - SUBJECTIVE AND OBJECTIVE BOX
MIKAEL MCDERMOTT  52y  Male      Patient is a 52y old  Male who presents with a chief complaint of Hypoxia (25 Oct 2022 15:06)      INTERVAL HPI/OVERNIGHT EVENTS:  He feels ok, no fever.   Vital Signs Last 24 Hrs  T(C): 36 (25 Oct 2022 20:37), Max: 36.5 (25 Oct 2022 14:00)  T(F): 96.8 (25 Oct 2022 20:37), Max: 97.7 (25 Oct 2022 14:00)  HR: 76 (25 Oct 2022 20:37) (76 - 87)  BP: 116/67 (25 Oct 2022 20:37) (100/56 - 116/67)  BP(mean): --  RR: 20 (25 Oct 2022 20:37) (20 - 20)  SpO2: 95% (25 Oct 2022 20:37) (94% - 95%)    Parameters below as of 25 Oct 2022 04:50  Patient On (Oxygen Delivery Method): nasal cannula                Consultant(s) Notes Reviewed:  [x ] YES  [ ] NO          MEDICATIONS  (STANDING):  ALPRAZolam 1 milliGRAM(s) Oral <User Schedule>  ALPRAZolam 2 milliGRAM(s) Oral at bedtime  buPROPion XL (24-Hour) . 300 milliGRAM(s) Oral daily  ceFAZolin   IVPB 2000 milliGRAM(s) IV Intermittent every 8 hours  chlorhexidine 2% Cloths 1 Application(s) Topical daily  enoxaparin Injectable 140 milliGRAM(s) SubCutaneous every 12 hours  folic acid 1 milliGRAM(s) Oral daily  magnesium oxide 400 milliGRAM(s) Oral once  methadone    Tablet 135 milliGRAM(s) Oral <User Schedule>  multivitamin 1 Tablet(s) Oral daily  nicotine - 21 mG/24Hr(s) Patch 1 Patch Transdermal daily  pantoprazole    Tablet 40 milliGRAM(s) Oral before breakfast  QUEtiapine 200 milliGRAM(s) Oral at bedtime  thiamine 100 milliGRAM(s) Oral daily    MEDICATIONS  (PRN):  acetaminophen     Tablet .. 650 milliGRAM(s) Oral every 6 hours PRN Temp greater or equal to 38C (100.4F)  aluminum hydroxide/magnesium hydroxide/simethicone Suspension 30 milliLiter(s) Oral every 4 hours PRN Dyspepsia  cyclobenzaprine 10 milliGRAM(s) Oral three times a day PRN Muscle Spasm  haloperidol    Injectable 5 milliGRAM(s) IntraMuscular every 8 hours PRN Agitation  melatonin 3 milliGRAM(s) Oral at bedtime PRN Insomnia  ondansetron Injectable 4 milliGRAM(s) IV Push every 8 hours PRN Nausea and/or Vomiting      LABS                          10.0   7.11  )-----------( 368      ( 25 Oct 2022 07:40 )             32.1     10-25    137  |  97<L>  |  11  ----------------------------<  123<H>  4.4   |  29  |  0.7    Ca    8.8      25 Oct 2022 07:40  Mg     1.6     10-25    TPro  6.6  /  Alb  3.0<L>  /  TBili  <0.2  /  DBili  x   /  AST  18  /  ALT  13  /  AlkPhos  90  10-25          Lactate Trend        CAPILLARY BLOOD GLUCOSE      POCT Blood Glucose.: 180 mg/dL (25 Oct 2022 11:07)        RADIOLOGY & ADDITIONAL TESTS:    Imaging Personally Reviewed:  [ ] YES  [ ] NO    HEALTH ISSUES - PROBLEM Dx:  Substance induced mood disorder            PHYSICAL EXAM:  GENERAL: NAD, well-developed.  HEAD:  Atraumatic, Normocephalic.  EYES: EOMI, PERRLA, conjunctiva and sclera clear.  NECK: Supple, No JVD.  CHEST/LUNG: Clear to auscultation bilaterally; No wheeze.  HEART: Regular rate and rhythm; S1 S2.   ABDOMEN: Soft, Nontender, Nondistended; Bowel sounds present.  EXTREMITIES:  2+ Peripheral Pulses, No clubbing, cyanosis, or edema.  PSYCH: AAOx3.  NEUROLOGY: non-focal.  SKIN: No rashes or lesions.

## 2022-10-26 LAB
ALBUMIN SERPL ELPH-MCNC: 3 G/DL — LOW (ref 3.5–5.2)
ALP SERPL-CCNC: 101 U/L — SIGNIFICANT CHANGE UP (ref 30–115)
ALT FLD-CCNC: 15 U/L — SIGNIFICANT CHANGE UP (ref 0–41)
ANION GAP SERPL CALC-SCNC: 13 MMOL/L — SIGNIFICANT CHANGE UP (ref 7–14)
AST SERPL-CCNC: 19 U/L — SIGNIFICANT CHANGE UP (ref 0–41)
BASOPHILS # BLD AUTO: 0.06 K/UL — SIGNIFICANT CHANGE UP (ref 0–0.2)
BASOPHILS NFR BLD AUTO: 0.9 % — SIGNIFICANT CHANGE UP (ref 0–1)
BILIRUB SERPL-MCNC: <0.2 MG/DL — SIGNIFICANT CHANGE UP (ref 0.2–1.2)
BUN SERPL-MCNC: 11 MG/DL — SIGNIFICANT CHANGE UP (ref 10–20)
CALCIUM SERPL-MCNC: 9.1 MG/DL — SIGNIFICANT CHANGE UP (ref 8.4–10.5)
CHLORIDE SERPL-SCNC: 102 MMOL/L — SIGNIFICANT CHANGE UP (ref 98–110)
CO2 SERPL-SCNC: 29 MMOL/L — SIGNIFICANT CHANGE UP (ref 17–32)
CREAT SERPL-MCNC: 0.7 MG/DL — SIGNIFICANT CHANGE UP (ref 0.7–1.5)
EGFR: 110 ML/MIN/1.73M2 — SIGNIFICANT CHANGE UP
EOSINOPHIL # BLD AUTO: 0.49 K/UL — SIGNIFICANT CHANGE UP (ref 0–0.7)
EOSINOPHIL NFR BLD AUTO: 7 % — SIGNIFICANT CHANGE UP (ref 0–8)
GLUCOSE BLDC GLUCOMTR-MCNC: 133 MG/DL — HIGH (ref 70–99)
GLUCOSE BLDC GLUCOMTR-MCNC: 140 MG/DL — HIGH (ref 70–99)
GLUCOSE BLDC GLUCOMTR-MCNC: 154 MG/DL — HIGH (ref 70–99)
GLUCOSE BLDC GLUCOMTR-MCNC: 224 MG/DL — HIGH (ref 70–99)
GLUCOSE SERPL-MCNC: 121 MG/DL — HIGH (ref 70–99)
HCT VFR BLD CALC: 33.7 % — LOW (ref 42–52)
HGB BLD-MCNC: 10.3 G/DL — LOW (ref 14–18)
IMM GRANULOCYTES NFR BLD AUTO: 1.3 % — HIGH (ref 0.1–0.3)
LYMPHOCYTES # BLD AUTO: 2.42 K/UL — SIGNIFICANT CHANGE UP (ref 1.2–3.4)
LYMPHOCYTES # BLD AUTO: 34.7 % — SIGNIFICANT CHANGE UP (ref 20.5–51.1)
MAGNESIUM SERPL-MCNC: 1.7 MG/DL — LOW (ref 1.8–2.4)
MCHC RBC-ENTMCNC: 27.8 PG — SIGNIFICANT CHANGE UP (ref 27–31)
MCHC RBC-ENTMCNC: 30.6 G/DL — LOW (ref 32–37)
MCV RBC AUTO: 91.1 FL — SIGNIFICANT CHANGE UP (ref 80–94)
MONOCYTES # BLD AUTO: 0.7 K/UL — HIGH (ref 0.1–0.6)
MONOCYTES NFR BLD AUTO: 10 % — HIGH (ref 1.7–9.3)
NEUTROPHILS # BLD AUTO: 3.21 K/UL — SIGNIFICANT CHANGE UP (ref 1.4–6.5)
NEUTROPHILS NFR BLD AUTO: 46.1 % — SIGNIFICANT CHANGE UP (ref 42.2–75.2)
NRBC # BLD: 0 /100 WBCS — SIGNIFICANT CHANGE UP (ref 0–0)
PLATELET # BLD AUTO: 376 K/UL — SIGNIFICANT CHANGE UP (ref 130–400)
POTASSIUM SERPL-MCNC: 4.5 MMOL/L — SIGNIFICANT CHANGE UP (ref 3.5–5)
POTASSIUM SERPL-SCNC: 4.5 MMOL/L — SIGNIFICANT CHANGE UP (ref 3.5–5)
PROT SERPL-MCNC: 6.6 G/DL — SIGNIFICANT CHANGE UP (ref 6–8)
RBC # BLD: 3.7 M/UL — LOW (ref 4.7–6.1)
RBC # FLD: 16.2 % — HIGH (ref 11.5–14.5)
SODIUM SERPL-SCNC: 144 MMOL/L — SIGNIFICANT CHANGE UP (ref 135–146)
WBC # BLD: 6.97 K/UL — SIGNIFICANT CHANGE UP (ref 4.8–10.8)
WBC # FLD AUTO: 6.97 K/UL — SIGNIFICANT CHANGE UP (ref 4.8–10.8)

## 2022-10-26 PROCEDURE — 99233 SBSQ HOSP IP/OBS HIGH 50: CPT

## 2022-10-26 RX ORDER — MAGNESIUM SULFATE 500 MG/ML
2 VIAL (ML) INJECTION ONCE
Refills: 0 | Status: COMPLETED | OUTPATIENT
Start: 2022-10-26 | End: 2022-10-26

## 2022-10-26 RX ORDER — APIXABAN 2.5 MG/1
10 TABLET, FILM COATED ORAL EVERY 12 HOURS
Refills: 0 | Status: DISCONTINUED | OUTPATIENT
Start: 2022-10-26 | End: 2022-11-01

## 2022-10-26 RX ADMIN — ENOXAPARIN SODIUM 140 MILLIGRAM(S): 100 INJECTION SUBCUTANEOUS at 05:11

## 2022-10-26 RX ADMIN — Medication 1 MILLIGRAM(S): at 13:49

## 2022-10-26 RX ADMIN — BUPROPION HYDROCHLORIDE 300 MILLIGRAM(S): 150 TABLET, EXTENDED RELEASE ORAL at 11:36

## 2022-10-26 RX ADMIN — Medication 1 PATCH: at 19:17

## 2022-10-26 RX ADMIN — Medication 1 PATCH: at 11:34

## 2022-10-26 RX ADMIN — Medication 25 GRAM(S): at 15:50

## 2022-10-26 RX ADMIN — Medication 1 MILLIGRAM(S): at 05:13

## 2022-10-26 RX ADMIN — Medication 100 MILLIGRAM(S): at 11:35

## 2022-10-26 RX ADMIN — Medication 1 TABLET(S): at 11:36

## 2022-10-26 RX ADMIN — Medication 100 MILLIGRAM(S): at 15:48

## 2022-10-26 RX ADMIN — PANTOPRAZOLE SODIUM 40 MILLIGRAM(S): 20 TABLET, DELAYED RELEASE ORAL at 05:11

## 2022-10-26 RX ADMIN — Medication 2 MILLIGRAM(S): at 21:07

## 2022-10-26 RX ADMIN — APIXABAN 10 MILLIGRAM(S): 2.5 TABLET, FILM COATED ORAL at 17:11

## 2022-10-26 RX ADMIN — Medication 1 MILLIGRAM(S): at 11:36

## 2022-10-26 RX ADMIN — CHLORHEXIDINE GLUCONATE 1 APPLICATION(S): 213 SOLUTION TOPICAL at 11:42

## 2022-10-26 RX ADMIN — QUETIAPINE FUMARATE 200 MILLIGRAM(S): 200 TABLET, FILM COATED ORAL at 21:07

## 2022-10-26 RX ADMIN — METHADONE HYDROCHLORIDE 135 MILLIGRAM(S): 40 TABLET ORAL at 11:40

## 2022-10-26 RX ADMIN — Medication 100 MILLIGRAM(S): at 21:11

## 2022-10-26 NOTE — PROGRESS NOTE ADULT - SUBJECTIVE AND OBJECTIVE BOX
MIKAEL MCDERMOTT  [facility]    Patient was evaluated and examined  by bedside, no active complains      REVIEW OF SYSTEMS:  please see pertinent positives mentioned above, all other 12 ROS negative      T(C): , Max: 36.5 (10-25-22 @ 14:00)  HR: 80 (10-26-22 @ 05:00)  BP: 111/59 (10-26-22 @ 05:00)  RR: 20 (10-26-22 @ 08:30)  SpO2: 98% (10-26-22 @ 08:30)  CAPILLARY BLOOD GLUCOSE      POCT Blood Glucose.: 224 mg/dL (26 Oct 2022 11:17)  POCT Blood Glucose.: 140 mg/dL (26 Oct 2022 07:32)      PHYSICAL EXAM:  General: NAD, AAOX3, patient is laying comfortably in bed, obese  HEENT: AT, NC, Supple, NO JVD, NO CB  Lungs: CTA B/L, no wheezing, no rhonchi  CVS: normal S1, S2, RRR, NO M/G/R  Abdomen: soft, bowel sounds present, non-tender, non-distended  Extremities: chronic distal upper ext contractures, left lower ext. small pitting edema present, no clubbing, no cyanosis, positive peripheral pulses b/l  Neuro: no acute focal neurological deficits, difficulty with ambulation  Skin: no rash, no ecchymosis      LAB  CBC  Date: 10-26-22 @ 07:56  Mean cell Rvswdycweb60.8  Mean cell Hemoglobin Conc30.6  Mean cell Volum 91.1  Platelet count-Automate 376  RBC Count 3.70  Red Cell Distrib Width16.2  WBC Count6.97  % Albumin, Urine--  Hematocrit 33.7  Hemoglobin 10.3  CBC  Date: 10-25-22 @ 07:40  Mean cell Hiqwjgrvir29.1  Mean cell Hemoglobin Conc31.2  Mean cell Volum 90.2  Platelet count-Automate 368  RBC Count 3.56  Red Cell Distrib Width16.4  WBC Count7.11  % Albumin, Urine--  Hematocrit 32.1  Hemoglobin 10.0  CBC  Date: 10-24-22 @ 08:43  Mean cell Asnrmxydko77.5  Mean cell Hemoglobin Conc30.1  Mean cell Volum 91.3  Platelet count-Automate 380  RBC Count 3.67  Red Cell Distrib Width16.1  WBC Count6.92  % Albumin, Urine--  Hematocrit 33.5  Hemoglobin 10.1  CBC  Date: 10-23-22 @ 08:05  Mean cell Yifjrcxggr87.7  Mean cell Hemoglobin Conc30.8  Mean cell Volum 90.1  Platelet count-Automate 402  RBC Count 3.64  Red Cell Distrib Width16.2  WBC Count7.45  % Albumin, Urine--  Hematocrit 32.8  Hemoglobin 10.1  CBC  Date: 10-22-22 @ 07:39  Mean cell Xvdgrtlbmq51.1  Mean cell Hemoglobin Conc30.6  Mean cell Volum 91.9  Platelet count-Automate 273  RBC Count 3.56  Red Cell Distrib Width15.9  WBC Count6.61  % Albumin, Urine--  Hematocrit 32.7  Hemoglobin 10.0  CBC  Date: 10-21-22 @ 08:51  Mean cell Lnleletqie15.0  Mean cell Hemoglobin Conc30.7  Mean cell Volum 91.2  Platelet count-Automate 361  RBC Count 3.18  Red Cell Distrib Width16.3  WBC Count7.58  % Albumin, Urine--  Hematocrit 29.0  Hemoglobin 8.9  CBC  Date: 10-20-22 @ 06:17  Mean cell Otpejtbljy95.9  Mean cell Hemoglobin Conc30.2  Mean cell Volum 92.4  Platelet count-Automate 369  RBC Count 3.15  Red Cell Distrib Width16.5  WBC Count8.72  % Albumin, Urine--  Hematocrit 29.1  Hemoglobin 8.8    [facility] [place]  10-26-22 @ 07:56  Blood Gas Arterial-Calcium,Ionized--  Blood Urea Nitrogen, Serum 11 mg/dL [10 - 20]  Carbon Dioxide, Serum29 mmol/L [17 - 32]  Chloride, Kyrko694 mmol/L [98 - 110]  Creatinie, Serum0.7 mg/dL [0.7 - 1.5]  Glucose, Qqsdp657 mg/dL<H> [70 - 99]  Potassium, Serum4.5 mmol/L [3.5 - 5.0]  Sodium, Serum 144 mmol/L [135 - 146]  [facility] [place]  10-25-22 @ 07:40  Blood Gas Arterial-Calcium,Ionized--  Blood Urea Nitrogen, Serum 11 mg/dL [10 - 20]  Carbon Dioxide, Serum29 mmol/L [17 - 32]  Chloride, Serum97 mmol/L<L> [98 - 110]  Creatinie, Serum0.7 mg/dL [0.7 - 1.5]  Glucose, Jeuth328 mg/dL<H> [70 - 99]  Potassium, Serum4.4 mmol/L [3.5 - 5.0]  Sodium, Serum 137 mmol/L [135 - 146]  BMP  10-24-22 @ 08:43  Blood Gas Arterial-Calcium,Ionized--  Blood Urea Nitrogen, Serum 12 mg/dL [10 - 20]  Carbon Dioxide, Serum31 mmol/L [17 - 32]  Chloride, Serum95 mmol/L<L> [98 - 110]  Creatinie, Serum0.7 mg/dL [0.7 - 1.5]  Glucose, Zmafp338 mg/dL<H> [70 - 99]  Potassium, Serum4.5 mmol/L [3.5 - 5.0]  Sodium, Serum 136 mmol/L [135 - 146]  BMP  10-23-22 @ 08:05  Blood Gas Arterial-Calcium,Ionized--  Blood Urea Nitrogen, Serum 12 mg/dL [10 - 20]  Carbon Dioxide, Serum32 mmol/L [17 - 32]  Chloride, Serum94 mmol/L<L> [98 - 110]  Creatinie, Serum0.7 mg/dL [0.7 - 1.5]  Glucose, Wxznz245 mg/dL<H> [70 - 99]  Potassium, Serum4.5 mmol/L [3.5 - 5.0]  Sodium, Serum 137 mmol/L [135 - 146]  BMP  10-22-22 @ 07:39  Blood Gas Arterial-Calcium,Ionized--  Blood Urea Nitrogen, Serum 12 mg/dL [10 - 20]  Carbon Dioxide, Serum32 mmol/L [17 - 32]  Chloride, Serum97 mmol/L<L> [98 - 110]  Creatinie, Serum0.6 mg/dL<L> [0.7 - 1.5]  Glucose, Wzfag715 mg/dL<H> [70 - 99]  Potassium, Serum4.6 mmol/L [3.5 - 5.0]  Sodium, Serum 139 mmol/L [135 - 146]              Microbiology:    Culture - Blood (collected 10-12-22 @ 06:50)  Source: .Blood Blood  Final Report (10-17-22 @ 18:00):    No Growth Final        Medications:  acetaminophen     Tablet .. 650 milliGRAM(s) Oral every 6 hours PRN  ALPRAZolam 1 milliGRAM(s) Oral <User Schedule>  ALPRAZolam 2 milliGRAM(s) Oral at bedtime  aluminum hydroxide/magnesium hydroxide/simethicone Suspension 30 milliLiter(s) Oral every 4 hours PRN  apixaban 10 milliGRAM(s) Oral every 12 hours  buPROPion XL (24-Hour) . 300 milliGRAM(s) Oral daily  ceFAZolin   IVPB 2000 milliGRAM(s) IV Intermittent every 8 hours  chlorhexidine 2% Cloths 1 Application(s) Topical daily  cyclobenzaprine 10 milliGRAM(s) Oral three times a day PRN  folic acid 1 milliGRAM(s) Oral daily  haloperidol    Injectable 5 milliGRAM(s) IntraMuscular every 8 hours PRN  magnesium oxide 400 milliGRAM(s) Oral once  magnesium sulfate  IVPB 2 Gram(s) IV Intermittent once  melatonin 3 milliGRAM(s) Oral at bedtime PRN  multivitamin 1 Tablet(s) Oral daily  nicotine - 21 mG/24Hr(s) Patch 1 Patch Transdermal daily  ondansetron Injectable 4 milliGRAM(s) IV Push every 8 hours PRN  pantoprazole    Tablet 40 milliGRAM(s) Oral before breakfast  QUEtiapine 200 milliGRAM(s) Oral at bedtime  thiamine 100 milliGRAM(s) Oral daily        Assessment and Plan:  Patient is a 51 y/o male with PMH of IVDU (heroin), vertebral osteomyelitis s/p debridement, and MSSA bacteremia, presenting from rehab facility for "low oxygen" and new onset LE edema. In the ED was found to be hypoxic, found to have b/l PEs with radiographic evidence of R heart strain and admitted to ICU, found to have LE DVTs s/p LLE embolectomy 10/18,  downgraded to SDU and currently downgraded to medical unit.     Acute hypoxemic respiratory failure - resolved  Acute Submassive PE  Suspected superimposed PNA  DIONICIO/OHS, noncompliant with NIV  -CT on admission with bilateral submassive PEs + concern for RV strain  -TTE: EF 55%, G1DD, mild TVR, no right heart strain  -VA duplx LE vein: Acute thrombus within the left common femoral, femoral, deep femoral, popliteal, gastrocnemius, posterior tibial and anterior tibial veins, s/p LLE embolectomy 10/18  - initially started on therapeutic Lovenox, on 10/26 transitioned to PO Eliquis tx.   -blood cx 10/12 - no growth  - ID on board s/p Cefepime, now on  Cefazolin 2g q8h IV      Hx MSSA bacteremia  Hx Epidural phlegmon/OM  Hx R Psoas Abscess  -Per NSG, pt able to receive MR even with recent spinal fusion  -MR w&w/o CTL spine noted  - abx as above  - f/u neurosurgery and ID     HO IVDA, Opioid Abuse on Methadone   Agitation  - improving, followed by psych:  resumed on  Quetiapine 200 mg PO HS  - Continue with Xanax 1 mg po am, 1mg po afternoon and two times at night - total daily dose of 4mg  Continue Wellbutrin Xl 300mg po q 24hrs  -For agitation, haldol 5mg po/IM every 8hrs prn    Hypomagnesemia- supplemented    DVT proph- patient is on Eliquis    #Progress Note Handoff: continue pt. on IV abx. x 4 weeks, placement to STR, monitor electrolytes, PT tx. , outpt. drug rehab tx.   Family discussion: medical plan of tx. d/w pt. by bedside Disposition: STR once bed is available    Total time spent to complete patient's bedside assessment, review medical chart, discuss medical plan of care with covering medical team was more than 35 minutes with >50% of time spent face to face with patient, discussion with patient/family and/or coordination of care

## 2022-10-26 NOTE — PROGRESS NOTE ADULT - SUBJECTIVE AND OBJECTIVE BOX
MIKAEL MCDERMOTT  52y, Male  Allergy: No Known Allergies      LOS  15d    CHIEF COMPLAINT: Hypoxia (26 Oct 2022 13:24)      INTERVAL EVENTS/HPI  - No acute events overnight  - T(F): , Max: 97.3 (10-26-22 @ 14:00)  - Denies any worsening symptoms  - Tolerating medication  - WBC Count: 6.97 (10-26-22 @ 07:56)  WBC Count: 7.11 (10-25-22 @ 07:40)     - Creatinine, Serum: 0.7 (10-26-22 @ 07:56)  Creatinine, Serum: 0.7 (10-25-22 @ 07:40)       ROS  General: Denies rigors, nightsweats  HEENT: Denies headache, rhinorrhea, sore throat, eye pain  CV: Denies CP, palpitations  PULM: Denies wheezing, hemoptysis  GI: Denies hematemesis, hematochezia, melena  : Denies discharge, hematuria  MSK: Denies arthralgias, myalgias  SKIN: Denies rash, lesions  NEURO: Denies paresthesias, weakness  PSYCH: Denies depression, anxiety    VITALS:  T(F): 97.3, Max: 97.3 (10-26-22 @ 14:00)  HR: 75  BP: 120/63  RR: 19Vital Signs Last 24 Hrs  T(C): 36.3 (26 Oct 2022 14:00), Max: 36.3 (26 Oct 2022 14:00)  T(F): 97.3 (26 Oct 2022 14:00), Max: 97.3 (26 Oct 2022 14:00)  HR: 75 (26 Oct 2022 14:00) (75 - 80)  BP: 120/63 (26 Oct 2022 14:00) (111/59 - 120/63)  BP(mean): --  RR: 19 (26 Oct 2022 14:00) (19 - 20)  SpO2: 92% (26 Oct 2022 14:00) (92% - 100%)    Parameters below as of 26 Oct 2022 08:30  Patient On (Oxygen Delivery Method): room air        PHYSICAL EXAM:  Gen: NAD, resting in bed  HEENT: Normocephalic, atraumatic  Neck: supple, no lymphadenopathy  CV: Regular rate & regular rhythm  Lungs: decreased BS at bases, no fremitus  Abdomen: Soft, BS present  Ext: Warm, well perfused  Neuro: non focal, awake  Skin: no rash, no erythema  Lines: no phlebitis    FH: Non-contributory  Social Hx: Non-contributory    TESTS & MEASUREMENTS:                        10.3   6.97  )-----------( 376      ( 26 Oct 2022 07:56 )             33.7     10-26    144  |  102  |  11  ----------------------------<  121<H>  4.5   |  29  |  0.7    Ca    9.1      26 Oct 2022 07:56  Mg     1.7     10-26    TPro  6.6  /  Alb  3.0<L>  /  TBili  <0.2  /  DBili  x   /  AST  19  /  ALT  15  /  AlkPhos  101  10-26      LIVER FUNCTIONS - ( 26 Oct 2022 07:56 )  Alb: 3.0 g/dL / Pro: 6.6 g/dL / ALK PHOS: 101 U/L / ALT: 15 U/L / AST: 19 U/L / GGT: x               Culture - Blood (collected 10-12-22 @ 06:50)  Source: .Blood Blood  Final Report (10-17-22 @ 18:00):    No Growth Final            INFECTIOUS DISEASES TESTING  Rapid RVP Result: NotDetec (10-19-22 @ 14:10)  Legionella Antigen, Urine: Negative (10-18-22 @ 08:20)  COVID-19 PCR: NotDetec (10-16-22 @ 18:26)  Procalcitonin, Serum: 7.63 (10-12-22 @ 06:50)  COVID-19 PCR: NotDetec (10-11-22 @ 12:35)  COVID-19 PCR: NotDetec (09-06-22 @ 10:55)  HIV-1/2 Combo Result: Nonreact (08-28-22 @ 08:19)  COVID-19 PCR: NotDetec (08-28-22 @ 07:52)  COVID-19 PCR: NotDetec (08-22-22 @ 15:36)  COVID-19 PCR: NotDetec (08-18-22 @ 13:56)  Procalcitonin, Serum: 0.05 (08-17-22 @ 13:28)  COVID-19 PCR: NotDetec (08-14-22 @ 16:45)  COVID-19 PCR: NotDetec (08-12-22 @ 19:33)  MRSA PCR Result.: Negative (08-12-22 @ 05:00)  Rapid RVP Result: NotDetec (08-10-22 @ 15:03)  COVID-19 PCR: NotDetec (04-03-22 @ 12:23)  COVID-19 PCR: NotDetec (03-30-22 @ 10:49)  COVID-19 PCR: NotDetec (03-27-22 @ 18:31)  COVID-19 PCR: NotDetec (03-25-22 @ 06:30)  COVID-19 PCR: NotDetec (03-19-22 @ 06:10)  HIV-1/2 Combo Result: Nonreact (03-16-22 @ 12:10)  COVID-19 PCR: NotDetec (03-12-22 @ 20:19)  COVID-19 PCR: NotDetec (01-31-22 @ 09:10)  COVID-19 PCR: NotDetec (01-25-22 @ 11:21)  COVID-19 PCR: NotDetec (01-18-22 @ 18:45)  HIV-1/2 Combo Result: Nonreact (01-18-22 @ 04:30)  Procalcitonin, Serum: 0.13 (01-16-22 @ 16:00)  COVID-19 PCR: NotDetec (01-15-22 @ 23:32)      INFLAMMATORY MARKERS  Sedimentation Rate, Erythrocyte: 108 mm/Hr (10-25-22 @ 07:40)  C-Reactive Protein, Serum: 17.2 mg/L (10-25-22 @ 07:40)  Sedimentation Rate, Erythrocyte: 125 mm/Hr (08-11-22 @ 06:38)  C-Reactive Protein, Serum: 330.8 mg/L (08-11-22 @ 06:38)      RADIOLOGY & ADDITIONAL TESTS:  I have personally reviewed the last available Chest xray  CXR      CT      CARDIOLOGY TESTING  12 Lead ECG:   Ventricular Rate 85 BPM    Atrial Rate 85 BPM    P-R Interval 140 ms    QRS Duration 92 ms    Q-T Interval 364 ms    QTC Calculation(Bazett) 433 ms    P Axis 30 degrees    R Axis 24 degrees    T Axis 33 degrees    Diagnosis Line *** Poor data quality, interpretation may be adversely affected  Normal sinus rhythm  Normal ECG    Confirmed by Victor Hugo Marquis ([id number]) on 10/20/2022 9:23:43 AM (10-20-22 @ 08:05)  12 Lead ECG:   Ventricular Rate 80 BPM    Atrial Rate 80 BPM    P-R Interval 144 ms    QRS Duration 94 ms    Q-T Interval 388 ms    QTC Calculation(Bazett) 447 ms    P Axis 46 degrees    R Axis 52 degrees    T Axis 56 degrees    Diagnosis Line Normal sinus rhythm  Normal ECG    Confirmed by CHECO PALOMINO MD ([id number]) on 10/19/2022 6:48:12 AM (10-19-22 @ 05:47)      MEDICATIONS  ALPRAZolam 1 Oral <User Schedule>  ALPRAZolam 2 Oral at bedtime  apixaban 10 Oral every 12 hours  buPROPion XL (24-Hour) . 300 Oral daily  ceFAZolin   IVPB 2000 IV Intermittent every 8 hours  chlorhexidine 2% Cloths 1 Topical daily  folic acid 1 Oral daily  magnesium oxide 400 Oral once  magnesium sulfate  IVPB 2 IV Intermittent once  multivitamin 1 Oral daily  nicotine - 21 mG/24Hr(s) Patch 1 Transdermal daily  pantoprazole    Tablet 40 Oral before breakfast  QUEtiapine 200 Oral at bedtime  thiamine 100 Oral daily      WEIGHT  Weight (kg): 140.9 (10-18-22 @ 12:25)  Creatinine, Serum: 0.7 mg/dL (10-26-22 @ 07:56)      ANTIBIOTICS:  ceFAZolin   IVPB 2000 milliGRAM(s) IV Intermittent every 8 hours      All available historical records have been reviewed

## 2022-10-26 NOTE — PROGRESS NOTE ADULT - ASSESSMENT
ASSESSMENT  53yo male PMH IVDU (heroin), vertebral osteomyelitis s/p debridement, and recurrent MSSA bacteremia (8/2022, 1/2022)  Presenting from rehab facility for "low oxygen".   Seen by ID 8/2022 for NICOLE bacteremia with discitis/OM at L4-5.  CT Paravertebral phlegmon and left retropharyngeal abscess with air-fluid level.  8/10 BCx NICOLE  8/11,12,13,14,15 BCx NG  8/24 PAM no vegetations  PT was d/c with IV ancef 2 gm iv q8h ( since 8/16 )-8 weeks starting 8/11- 10/6    IMPRESSION  #Possible Severe sepsis on admission Pulse>90, Resp Rate>20, WBC>12, lactic acidosis    Found to have bilateral PE, Possible GNR PNA    Rapid RVP Result: NotDetec (10-19-22 @ 14:10)  s/p Procedure:   1.  Inferior Vena Cavography  2.  Pelvic Venography    3.  Left lower extremity Venography  4.  Left popliteal and femoral venous thrombectomies    10/12 BCX NGTD     Denies productive cough to suggest PNA    Has continued back pain     CT Bilateral pulmonary emboli with evidence of right ventricular strainPulmonary trunk enlargement, compatible with pulmonary hypertension.  Numerous bilateral patchy opacities with confluent areas of consolidation predominantly in the posterior lung fields and bilateral perihilar   lymphadenopathy, suspicious for pneumonia. Follow-up in 6 weeks' time is recommended.    Procalcitonin, Serum: 7.63 ng/mL (10-12-22 @ 06:50)  #Obesity BMI (kg/m2): 41  #Recurrent MSSA bacteremia with discitis   < from: MR Lumbar Spine w/wo IV Cont (10.22.22 @ 20:40) >  1.  Surgical hardware at L4 and L5.  2.  Abnormal signaland enhancement in the anterior and lateral paraspinal soft tissues at L4 and L5 likely representing phlegmon.  3.  Phlegmon and/or postoperative changes at L4 and L5 within the spinal canal.  4.  L2-L3; annular disc bulge, degenerative changes, and bilateral foraminal narrowing.  5.  L3-L4; central spinal stenosis and bilateral foraminal narrowing.  6.  L4-L5; limited evaluation, postoperative changes and/or phlegmon, and bilateral foraminal narrowing.  #IVDU  #HCV, RNA UD  Creatinine, Serum: 0.7 (10-13-22 @ 05:04)    Weight (kg): 140.9 (10-12-22 @ 09:30)    RECOMMENDATIONS  - Neurosurgery eval  - Cefazolin 2g q8h IV, on week 11 however MRI with Abnormal signal and enhancement in the anterior and lateral paraspinal soft tissues at L4 and L5 likely representing phlegmon.   - Pt now agreeable to rehab- for duration Cefazolin 2g q8h IV via midline (up to 4 weeks) then PO Cefadroxil 1g q24h --  3 more weeks is 11/17-  - Avoid rifampin as on methadone/xanax and little utility at this point in the treatment course  - Weekly CBC, CMP, ESR/CRP  - ID follow-up with Dr. John Lange for Telehealth. We will call the patient between 10:30-6:30      [address]       [phone]       Fax [phone]     If any questions, please call or send a message on [employer] Teams  Please continue to update ID with any pertinent new laboratory or radiographic findings  #[id number]

## 2022-10-26 NOTE — PROGRESS NOTE ADULT - SUBJECTIVE AND OBJECTIVE BOX
MIKAEL MCDERMOTT 52y Male  MRN#: [id number]   Hospital Day: 15d    SUBJECTIVE  Patient is a 52y old Male who presents with a chief complaint of Hypoxia (26 Oct 2022 14:26)  Currently admitted to medicine with the primary diagnosis of Pneumonia      INTERVAL HPI AND OVERNIGHT EVENTS:  Patient was examined and seen at bedside. This morning he is resting comfortably in bed and reports no issues or overnight events.    OBJECTIVE  PAST MEDICAL & SURGICAL HISTORY  IV drug abuse    Vertebral osteomyelitis    MSSA bacteremia    No significant past surgical history      ALLERGIES:  No Known Allergies    MEDICATIONS:  STANDING MEDICATIONS  ALPRAZolam 1 milliGRAM(s) Oral <User Schedule>  ALPRAZolam 2 milliGRAM(s) Oral at bedtime  apixaban 10 milliGRAM(s) Oral every 12 hours  buPROPion XL (24-Hour) . 300 milliGRAM(s) Oral daily  ceFAZolin   IVPB 2000 milliGRAM(s) IV Intermittent every 8 hours  chlorhexidine 2% Cloths 1 Application(s) Topical daily  folic acid 1 milliGRAM(s) Oral daily  magnesium oxide 400 milliGRAM(s) Oral once  magnesium sulfate  IVPB 2 Gram(s) IV Intermittent once  multivitamin 1 Tablet(s) Oral daily  nicotine - 21 mG/24Hr(s) Patch 1 Patch Transdermal daily  pantoprazole    Tablet 40 milliGRAM(s) Oral before breakfast  QUEtiapine 200 milliGRAM(s) Oral at bedtime  thiamine 100 milliGRAM(s) Oral daily    PRN MEDICATIONS  acetaminophen     Tablet .. 650 milliGRAM(s) Oral every 6 hours PRN  aluminum hydroxide/magnesium hydroxide/simethicone Suspension 30 milliLiter(s) Oral every 4 hours PRN  cyclobenzaprine 10 milliGRAM(s) Oral three times a day PRN  haloperidol    Injectable 5 milliGRAM(s) IntraMuscular every 8 hours PRN  melatonin 3 milliGRAM(s) Oral at bedtime PRN  ondansetron Injectable 4 milliGRAM(s) IV Push every 8 hours PRN      VITAL SIGNS: Last 24 Hours  T(C): 36.3 (26 Oct 2022 14:00), Max: 36.3 (26 Oct 2022 14:00)  T(F): 97.3 (26 Oct 2022 14:00), Max: 97.3 (26 Oct 2022 14:00)  HR: 75 (26 Oct 2022 14:00) (75 - 80)  BP: 120/63 (26 Oct 2022 14:00) (111/59 - 120/63)  BP(mean): --  RR: 19 (26 Oct 2022 14:00) (19 - 20)  SpO2: 92% (26 Oct 2022 14:00) (92% - 100%)    LABS:                        10.3   6.97  )-----------( 376      ( 26 Oct 2022 07:56 )             33.7     10-26    144  |  102  |  11  ----------------------------<  121<H>  4.5   |  29  |  0.7    Ca    9.1      26 Oct 2022 07:56  Mg     1.7     10-26    TPro  6.6  /  Alb  3.0<L>  /  TBili  <0.2  /  DBili  x   /  AST  19  /  ALT  15  /  AlkPhos  101  10-26      RADIOLOGY:  < from: MR Lumbar Spine w/wo IV Cont (10.22.22 @ 20:40) >  IMPRESSION:    In comparison with the prior MRI of the lumbar spine dated August 12, 2022:    There is been interval surgical intervention. The previously described   discitis/osteomyelitis at L4-L5, epidural soft tissue, and paraspinal   abscess has markedly resolved.    1.  Surgical hardware at L4 and L5.    2.  Abnormal signaland enhancement in the anterior and lateral   paraspinal soft tissues at L4 and L5 likely representing phlegmon.    3.  Phlegmon and/or postoperative changes at L4 and L5 within the spinal   canal.    4.  L2-L3; annular disc bulge, degenerative changes, and bilateral   foraminal narrowing.    5.  L3-L4; central spinal stenosis and bilateral foraminal narrowing.    6.  L4-L5; limited evaluation, postoperative changes and/or phlegmon, and   bilateral foraminal narrowing.    7.  L5-S1; annular disc bulge, degenerative changes, and bilateral   foraminal narrowing.    < end of copied text >        PHYSICAL EXAM:  CONSTITUTIONAL: No acute distress, AAOx3. Morbidly obese  HEAD: Atraumatic, normocephalic  EYES: EOM intact, PERRLA, conjunctiva and sclera clear  ENT: moist mucous membranes  PULMONARY: Clear to auscultation bilaterally  CARDIOVASCULAR: Regular rate and rhythm  GASTROINTESTINAL: Soft, non-tender, non-distended; bowel sounds present  MUSCULOSKELETAL: no edema  NEUROLOGY: non-focal  SKIN: warm and dry        ASSESSMENT and PLAN    Patient is a 52y old Male PMHx of IVDU (heroin), vertebral osteomyelitis s/p debridement, and MSSA bacteremia, who presents from a rehab facility with a chief complaint of Hypoxia and new onset LE edema. In the ED was, found to have b/l PEs with radiographic evidence of R heart strain and admitted to ICU, found to have LE DVTs s/p LLE embolectomy 10/18, now downgraded to SDU     #Acute hypoxemic respiratory failure / Acute Submassive PE  - resolved  - Suspected superimposed PNA  - DIONICIO/OHS, noncompliant with NIV  - TTE: EF 55%, G1DD, mild TVR, no right heart strain  -VA duplx LE vein: Acute thrombus within the left common femoral, femoral, deep femoral, popliteal, gastrocnemius, posterior tibial and anterior tibial veins, s/p LLE embolectomy 10/18  - Therapeutic Lovenox, transitioned to Eliquis.   - s/p Cefepime, now on  Cefazolin 2g q8h IV  - PT evaluated and recommends sub-acut rehab or home PT on DC    # Recurrent MSSA bacteremia with Discitis   - Recent Spinal fusion  - Abnormal L4-L5 signal on MRI suggestive of  Epidural phlegmon/OM  - No acute surgical intervention needed per Neurosurgery   - C/w Cefazolin for 3 weeks (11/17) per ID  - Sterile 18 gauge powerline Mideline placed to continue IV antibiotics at subacute rehab.   - Outpatient Neurosurgery referral      #HO IVDA, Opioid Abuse on Methadone   - improving, followed by psych: can resume Quetiapine 200 mg PO HS  - Continue with Xanax 1 mg po am, 1mg po afternoon and two times at night - total daily dose of 4mg  - Continue Wellbutrin Xl 300mg po q 24hrs  - Continue home dose Methadone 135mg po daily---> QTc 433 today 10/20  - For agitation, haldol 5mg po/IM every 8hrs prn    #Disposition  - Sub-acute Rehab    #MISC  - Mag replenished.   - Pt discharged yesterday after refusing services. Changed his mind and requested subacute rehab placement.

## 2022-10-27 LAB
ANION GAP SERPL CALC-SCNC: 14 MMOL/L — SIGNIFICANT CHANGE UP (ref 7–14)
BASOPHILS # BLD AUTO: 0.05 K/UL — SIGNIFICANT CHANGE UP (ref 0–0.2)
BASOPHILS NFR BLD AUTO: 0.7 % — SIGNIFICANT CHANGE UP (ref 0–1)
BUN SERPL-MCNC: 12 MG/DL — SIGNIFICANT CHANGE UP (ref 10–20)
CALCIUM SERPL-MCNC: 8.9 MG/DL — SIGNIFICANT CHANGE UP (ref 8.4–10.5)
CHLORIDE SERPL-SCNC: 100 MMOL/L — SIGNIFICANT CHANGE UP (ref 98–110)
CO2 SERPL-SCNC: 25 MMOL/L — SIGNIFICANT CHANGE UP (ref 17–32)
CREAT SERPL-MCNC: 0.8 MG/DL — SIGNIFICANT CHANGE UP (ref 0.7–1.5)
EGFR: 106 ML/MIN/1.73M2 — SIGNIFICANT CHANGE UP
EOSINOPHIL # BLD AUTO: 0.31 K/UL — SIGNIFICANT CHANGE UP (ref 0–0.7)
EOSINOPHIL NFR BLD AUTO: 4.6 % — SIGNIFICANT CHANGE UP (ref 0–8)
GLUCOSE BLDC GLUCOMTR-MCNC: 133 MG/DL — HIGH (ref 70–99)
GLUCOSE BLDC GLUCOMTR-MCNC: 144 MG/DL — HIGH (ref 70–99)
GLUCOSE BLDC GLUCOMTR-MCNC: 166 MG/DL — HIGH (ref 70–99)
GLUCOSE BLDC GLUCOMTR-MCNC: 201 MG/DL — HIGH (ref 70–99)
GLUCOSE SERPL-MCNC: 135 MG/DL — HIGH (ref 70–99)
HCT VFR BLD CALC: 34.3 % — LOW (ref 42–52)
HGB BLD-MCNC: 10.5 G/DL — LOW (ref 14–18)
IMM GRANULOCYTES NFR BLD AUTO: 0.9 % — HIGH (ref 0.1–0.3)
LYMPHOCYTES # BLD AUTO: 2.03 K/UL — SIGNIFICANT CHANGE UP (ref 1.2–3.4)
LYMPHOCYTES # BLD AUTO: 29.9 % — SIGNIFICANT CHANGE UP (ref 20.5–51.1)
MAGNESIUM SERPL-MCNC: 1.7 MG/DL — LOW (ref 1.8–2.4)
MCHC RBC-ENTMCNC: 27.9 PG — SIGNIFICANT CHANGE UP (ref 27–31)
MCHC RBC-ENTMCNC: 30.6 G/DL — LOW (ref 32–37)
MCV RBC AUTO: 91 FL — SIGNIFICANT CHANGE UP (ref 80–94)
MONOCYTES # BLD AUTO: 0.64 K/UL — HIGH (ref 0.1–0.6)
MONOCYTES NFR BLD AUTO: 9.4 % — HIGH (ref 1.7–9.3)
NEUTROPHILS # BLD AUTO: 3.7 K/UL — SIGNIFICANT CHANGE UP (ref 1.4–6.5)
NEUTROPHILS NFR BLD AUTO: 54.5 % — SIGNIFICANT CHANGE UP (ref 42.2–75.2)
NRBC # BLD: 0 /100 WBCS — SIGNIFICANT CHANGE UP (ref 0–0)
PLATELET # BLD AUTO: 413 K/UL — HIGH (ref 130–400)
POTASSIUM SERPL-MCNC: 4.7 MMOL/L — SIGNIFICANT CHANGE UP (ref 3.5–5)
POTASSIUM SERPL-SCNC: 4.7 MMOL/L — SIGNIFICANT CHANGE UP (ref 3.5–5)
RBC # BLD: 3.77 M/UL — LOW (ref 4.7–6.1)
RBC # FLD: 16.3 % — HIGH (ref 11.5–14.5)
SODIUM SERPL-SCNC: 139 MMOL/L — SIGNIFICANT CHANGE UP (ref 135–146)
WBC # BLD: 6.79 K/UL — SIGNIFICANT CHANGE UP (ref 4.8–10.8)
WBC # FLD AUTO: 6.79 K/UL — SIGNIFICANT CHANGE UP (ref 4.8–10.8)

## 2022-10-27 PROCEDURE — 99233 SBSQ HOSP IP/OBS HIGH 50: CPT

## 2022-10-27 RX ORDER — MAGNESIUM SULFATE 500 MG/ML
2 VIAL (ML) INJECTION ONCE
Refills: 0 | Status: COMPLETED | OUTPATIENT
Start: 2022-10-27 | End: 2022-10-27

## 2022-10-27 RX ORDER — METHADONE HYDROCHLORIDE 40 MG/1
135 TABLET ORAL
Refills: 0 | Status: DISCONTINUED | OUTPATIENT
Start: 2022-10-27 | End: 2022-11-03

## 2022-10-27 RX ORDER — KETOROLAC TROMETHAMINE 30 MG/ML
15 SYRINGE (ML) INJECTION ONCE
Refills: 0 | Status: DISCONTINUED | OUTPATIENT
Start: 2022-10-27 | End: 2022-10-27

## 2022-10-27 RX ADMIN — Medication 100 MILLIGRAM(S): at 12:24

## 2022-10-27 RX ADMIN — Medication 1 PATCH: at 12:24

## 2022-10-27 RX ADMIN — Medication 1 TABLET(S): at 12:20

## 2022-10-27 RX ADMIN — Medication 100 MILLIGRAM(S): at 13:18

## 2022-10-27 RX ADMIN — APIXABAN 10 MILLIGRAM(S): 2.5 TABLET, FILM COATED ORAL at 05:22

## 2022-10-27 RX ADMIN — METHADONE HYDROCHLORIDE 135 MILLIGRAM(S): 40 TABLET ORAL at 12:19

## 2022-10-27 RX ADMIN — Medication 2 MILLIGRAM(S): at 21:09

## 2022-10-27 RX ADMIN — PANTOPRAZOLE SODIUM 40 MILLIGRAM(S): 20 TABLET, DELAYED RELEASE ORAL at 05:22

## 2022-10-27 RX ADMIN — APIXABAN 10 MILLIGRAM(S): 2.5 TABLET, FILM COATED ORAL at 17:07

## 2022-10-27 RX ADMIN — QUETIAPINE FUMARATE 200 MILLIGRAM(S): 200 TABLET, FILM COATED ORAL at 21:10

## 2022-10-27 RX ADMIN — Medication 1 MILLIGRAM(S): at 05:24

## 2022-10-27 RX ADMIN — BUPROPION HYDROCHLORIDE 300 MILLIGRAM(S): 150 TABLET, EXTENDED RELEASE ORAL at 12:24

## 2022-10-27 RX ADMIN — Medication 1 PATCH: at 08:15

## 2022-10-27 RX ADMIN — Medication 1 MILLIGRAM(S): at 13:17

## 2022-10-27 RX ADMIN — Medication 100 MILLIGRAM(S): at 05:22

## 2022-10-27 RX ADMIN — Medication 1 PATCH: at 19:12

## 2022-10-27 RX ADMIN — CHLORHEXIDINE GLUCONATE 1 APPLICATION(S): 213 SOLUTION TOPICAL at 12:26

## 2022-10-27 RX ADMIN — Medication 1 MILLIGRAM(S): at 12:20

## 2022-10-27 RX ADMIN — Medication 100 MILLIGRAM(S): at 21:11

## 2022-10-27 NOTE — PROGRESS NOTE ADULT - SUBJECTIVE AND OBJECTIVE BOX
MIKAEL MCDERMOTT  [facility] T2-3A 022 A ([facility] T2-3A)    Patient was evaluated and examined  by bedside, no active complains       REVIEW OF SYSTEMS:  please see pertinent positives mentioned above, all other 12 ROS negative      T(C): , Max: 36.5 (10-26-22 @ 17:00)  HR: 80 (10-27-22 @ 07:30)  BP: 100/60 (10-27-22 @ 07:30)  RR: 18 (10-27-22 @ 07:30)  SpO2: 94% (10-27-22 @ 07:30)  CAPILLARY BLOOD GLUCOSE      POCT Blood Glucose.: 201 mg/dL (27 Oct 2022 11:17)  POCT Blood Glucose.: 133 mg/dL (27 Oct 2022 08:00)  POCT Blood Glucose.: 133 mg/dL (26 Oct 2022 20:43)  POCT Blood Glucose.: 154 mg/dL (26 Oct 2022 16:55)      PHYSICAL EXAM:  General: NAD, AAOX3, patient is laying comfortably in bed, obese  HEENT: AT, NC, Supple, NO JVD, NO CB  Lungs: CTA B/L, no wheezing, no rhonchi  CVS: normal S1, S2, RRR, NO M/G/R  Abdomen: soft, bowel sounds present, non-tender, non-distended  Extremities: chronic distal upper ext contractures, left lower ext. small pitting edema present, no clubbing, no cyanosis, positive peripheral pulses b/l  Neuro: no acute focal neurological deficits, difficulty with ambulation  Skin: no rash, no ecchymosis        LAB  CBC  Date: 10-26-22 @ 07:56  Mean cell Qofzfsxiqw37.8  Mean cell Hemoglobin Conc30.6  Mean cell Volum 91.1  Platelet count-Automate 376  RBC Count 3.70  Red Cell Distrib Width16.2  WBC Count6.97  % Albumin, Urine--  Hematocrit 33.7  Hemoglobin 10.3  CBC  Date: 10-25-22 @ 07:40  Mean cell Crggbxxdgp10.1  Mean cell Hemoglobin Conc31.2  Mean cell Volum 90.2  Platelet count-Automate 368  RBC Count 3.56  Red Cell Distrib Width16.4  WBC Count7.11  % Albumin, Urine--  Hematocrit 32.1  Hemoglobin 10.0  CBC  Date: 10-24-22 @ 08:43  Mean cell Fglqcckgcb64.5  Mean cell Hemoglobin Conc30.1  Mean cell Volum 91.3  Platelet count-Automate 380  RBC Count 3.67  Red Cell Distrib Width16.1  WBC Count6.92  % Albumin, Urine--  Hematocrit 33.5  Hemoglobin 10.1  CBC  Date: 10-23-22 @ 08:05  Mean cell Pgcccqgbvs08.7  Mean cell Hemoglobin Conc30.8  Mean cell Volum 90.1  Platelet count-Automate 402  RBC Count 3.64  Red Cell Distrib Width16.2  WBC Count7.45  % Albumin, Urine--  Hematocrit 32.8  Hemoglobin 10.1  CBC  Date: 10-22-22 @ 07:39  Mean cell Kxyawrwbtb40.1  Mean cell Hemoglobin Conc30.6  Mean cell Volum 91.9  Platelet count-Automate 273  RBC Count 3.56  Red Cell Distrib Width15.9  WBC Count6.61  % Albumin, Urine--  Hematocrit 32.7  Hemoglobin 10.0  CBC  Date: 10-21-22 @ 08:51  Mean cell Bvzpeufuve17.0  Mean cell Hemoglobin Conc30.7  Mean cell Volum 91.2  Platelet count-Automate 361  RBC Count 3.18  Red Cell Distrib Width16.3  WBC Count7.58  % Albumin, Urine--  Hematocrit 29.0  Hemoglobin 8.9    [facility]  10-26-22 @ 07:56  Blood Gas Arterial-Calcium,Ionized--  Blood Urea Nitrogen, Serum 11 mg/dL [10 - 20]  Carbon Dioxide, Serum29 mmol/L [17 - 32]  Chloride, Zcoig029 mmol/L [98 - 110]  Creatinie, Serum0.7 mg/dL [0.7 - 1.5]  Glucose, Vuuir451 mg/dL<H> [70 - 99]  Potassium, Serum4.5 mmol/L [3.5 - 5.0]  Sodium, Serum 144 mmol/L [135 - 146]  [facility]  10-25-22 @ 07:40  Blood Gas Arterial-Calcium,Ionized--  Blood Urea Nitrogen, Serum 11 mg/dL [10 - 20]  Carbon Dioxide, Serum29 mmol/L [17 - 32]  Chloride, Serum97 mmol/L<L> [98 - 110]  Creatinie, Serum0.7 mg/dL [0.7 - 1.5]  Glucose, Leekd602 mg/dL<H> [70 - 99]  Potassium, Serum4.4 mmol/L [3.5 - 5.0]  Sodium, Serum 137 mmol/L [135 - 146]  [facility]  10-24-22 @ 08:43  Blood Gas Arterial-Calcium,Ionized--  Blood Urea Nitrogen, Serum 12 mg/dL [10 - 20]  Carbon Dioxide, Serum31 mmol/L [17 - 32]  Chloride, Serum95 mmol/L<L> [98 - 110]  Creatinie, Serum0.7 mg/dL [0.7 - 1.5]  Glucose, Bsdyf301 mg/dL<H> [70 - 99]  Potassium, Serum4.5 mmol/L [3.5 - 5.0]  Sodium, Serum 136 mmol/L [135 - 146]  [facility]  10-23-22 @ 08:05  Blood Gas Arterial-Calcium,Ionized--  Blood Urea Nitrogen, Serum 12 mg/dL [10 - 20]  Carbon Dioxide, Serum32 mmol/L [17 - 32]  Chloride, Serum94 mmol/L<L> [98 - 110]  Creatinie, Serum0.7 mg/dL [0.7 - 1.5]  Glucose, Tvrjj521 mg/dL<H> [70 - 99]  Potassium, Serum4.5 mmol/L [3.5 - 5.0]  Sodium, Serum 137 mmol/L [135 - 146]              Microbiology:    Culture - Blood (collected 10-12-22 @ 06:50)  Source: .Blood Blood  Final Report (10-17-22 @ 18:00):    No Growth Final      Medications:  acetaminophen     Tablet .. 650 milliGRAM(s) Oral every 6 hours PRN  ALPRAZolam 1 milliGRAM(s) Oral <User Schedule>  ALPRAZolam 2 milliGRAM(s) Oral at bedtime  aluminum hydroxide/magnesium hydroxide/simethicone Suspension 30 milliLiter(s) Oral every 4 hours PRN  apixaban 10 milliGRAM(s) Oral every 12 hours  buPROPion XL (24-Hour) . 300 milliGRAM(s) Oral daily  ceFAZolin   IVPB 2000 milliGRAM(s) IV Intermittent every 8 hours  chlorhexidine 2% Cloths 1 Application(s) Topical daily  cyclobenzaprine 10 milliGRAM(s) Oral three times a day PRN  folic acid 1 milliGRAM(s) Oral daily  haloperidol    Injectable 5 milliGRAM(s) IntraMuscular every 8 hours PRN  magnesium oxide 400 milliGRAM(s) Oral once  melatonin 3 milliGRAM(s) Oral at bedtime PRN  methadone    Tablet 135 milliGRAM(s) Oral <User Schedule>  multivitamin 1 Tablet(s) Oral daily  nicotine - 21 mG/24Hr(s) Patch 1 Patch Transdermal daily  ondansetron Injectable 4 milliGRAM(s) IV Push every 8 hours PRN  pantoprazole    Tablet 40 milliGRAM(s) Oral before breakfast  QUEtiapine 200 milliGRAM(s) Oral at bedtime  thiamine 100 milliGRAM(s) Oral daily        Assessment and Plan:  Patient is a 53 y/o male with PMH of IVDU (heroin), vertebral osteomyelitis s/p debridement, and MSSA bacteremia, presenting from rehab facility for "low oxygen" and new onset LE edema. In the ED was found to be hypoxic, found to have b/l PEs with radiographic evidence of R heart strain and admitted to ICU, found to have LE DVTs s/p LLE embolectomy 10/18,  downgraded to SDU and currently downgraded to medical unit.     Acute hypoxemic respiratory failure - resolved  Acute Submassive PE  Suspected superimposed PNA  DIONICIO/OHS, noncompliant with NIV  -CT on admission with bilateral submassive PEs + concern for RV strain  -TTE: EF 55%, G1DD, mild TVR, no right heart strain  -VA duplx LE vein: Acute thrombus within the left common femoral, femoral, deep femoral, popliteal, gastrocnemius, posterior tibial and anterior tibial veins, s/p LLE embolectomy 10/18  - initially started on therapeutic Lovenox, on 10/26 transitioned to PO Eliquis tx.   -blood cx 10/12 - no growth  - ID on board s/p Cefepime, now on  Cefazolin 2g q8h IV, monitor outpatient BMP, LFT, CK, ESR, CRP      Hx MSSA bacteremia  Hx Epidural phlegmon/OM  Hx R Psoas Abscess  -Per NSG, pt able to receive MR even with recent spinal fusion  -MR w&w/o CTL spine noted  - abx as above  - f/u outpatient neurosurgery and ID     HO IVDA, Opioid Abuse on Methadone   Agitation  - improving, followed by psych:  resumed on  Quetiapine 200 mg PO HS  - Continue with Xanax 1 mg po am, 1mg po afternoon and two times at night - total daily dose of 4mg  Continue Wellbutrin Xl 300mg po q 24hrs  -For agitation, haldol 5mg po/IM every 8hrs prn    Hypomagnesemia- supplemented    DVT proph- patient is on Eliquis    #Progress Note Handoff: continue pt. on IV abx. x 4 weeks, placement to STR, monitor electrolytes, PT tx. , outpt. drug rehab tx.   Family discussion: medical plan of tx. d/w pt. by bedside Disposition: STR once bed is available    Total time spent to complete patient's bedside assessment, review medical chart, discuss medical plan of care with covering medical team was more than 35 minutes with >50% of time spent face to face with patient, discussion with patient/family and/or coordination of care     MIKAEL MCDERMOTT  [facility] T2-3A 022 A ([facility] T2-3A)    Patient was evaluated and examined  by bedside, no active complains       REVIEW OF SYSTEMS:  please see pertinent positives mentioned above, all other 12 ROS negative      T(C): , Max: 36.5 (10-26-22 @ 17:00)  HR: 80 (10-27-22 @ 07:30)  BP: 100/60 (10-27-22 @ 07:30)  RR: 18 (10-27-22 @ 07:30)  SpO2: 94% (10-27-22 @ 07:30)  CAPILLARY BLOOD GLUCOSE      POCT Blood Glucose.: 201 mg/dL (27 Oct 2022 11:17)  POCT Blood Glucose.: 133 mg/dL (27 Oct 2022 08:00)  POCT Blood Glucose.: 133 mg/dL (26 Oct 2022 20:43)  POCT Blood Glucose.: 154 mg/dL (26 Oct 2022 16:55)      PHYSICAL EXAM:  General: NAD, AAOX3, patient is laying comfortably in bed, obese  HEENT: AT, NC, Supple, NO JVD, NO CB  Lungs: CTA B/L, no wheezing, no rhonchi  CVS: normal S1, S2, RRR, NO M/G/R  Abdomen: soft, bowel sounds present, non-tender, non-distended  Extremities: chronic distal upper ext contractures, left lower ext. small pitting edema present, no clubbing, no cyanosis, positive peripheral pulses b/l  Neuro: no acute focal neurological deficits, difficulty with ambulation  Skin: no rash, no ecchymosis        LAB  CBC  Date: 10-26-22 @ 07:56  Mean cell Jwbycdnwww35.8  Mean cell Hemoglobin Conc30.6  Mean cell Volum 91.1  Platelet count-Automate 376  RBC Count 3.70  Red Cell Distrib Width16.2  WBC Count6.97  % Albumin, Urine--  Hematocrit 33.7  Hemoglobin 10.3  CBC  Date: 10-25-22 @ 07:40  Mean cell Qtrxiquahe48.1  Mean cell Hemoglobin Conc31.2  Mean cell Volum 90.2  Platelet count-Automate 368  RBC Count 3.56  Red Cell Distrib Width16.4  WBC Count7.11  % Albumin, Urine--  Hematocrit 32.1  Hemoglobin 10.0  CBC  Date: 10-24-22 @ 08:43  Mean cell Xyjckjibpu31.5  Mean cell Hemoglobin Conc30.1  Mean cell Volum 91.3  Platelet count-Automate 380  RBC Count 3.67  Red Cell Distrib Width16.1  WBC Count6.92  % Albumin, Urine--  Hematocrit 33.5  Hemoglobin 10.1  CBC  Date: 10-23-22 @ 08:05  Mean cell Gdkuaeytjl44.7  Mean cell Hemoglobin Conc30.8  Mean cell Volum 90.1  Platelet count-Automate 402  RBC Count 3.64  Red Cell Distrib Width16.2  WBC Count7.45  % Albumin, Urine--  Hematocrit 32.8  Hemoglobin 10.1  CBC  Date: 10-22-22 @ 07:39  Mean cell Uicretpepv69.1  Mean cell Hemoglobin Conc30.6  Mean cell Volum 91.9  Platelet count-Automate 273  RBC Count 3.56  Red Cell Distrib Width15.9  WBC Count6.61  % Albumin, Urine--  Hematocrit 32.7  Hemoglobin 10.0  CBC  Date: 10-21-22 @ 08:51  Mean cell Lclpcododp38.0  Mean cell Hemoglobin Conc30.7  Mean cell Volum 91.2  Platelet count-Automate 361  RBC Count 3.18  Red Cell Distrib Width16.3  WBC Count7.58  % Albumin, Urine--  Hematocrit 29.0  Hemoglobin 8.9    [facility]  10-26-22 @ 07:56  Blood Gas Arterial-Calcium,Ionized--  Blood Urea Nitrogen, Serum 11 mg/dL [10 - 20]  Carbon Dioxide, Serum29 mmol/L [17 - 32]  Chloride, Cymwl778 mmol/L [98 - 110]  Creatinie, Serum0.7 mg/dL [0.7 - 1.5]  Glucose, Ujwjm042 mg/dL<H> [70 - 99]  Potassium, Serum4.5 mmol/L [3.5 - 5.0]  Sodium, Serum 144 mmol/L [135 - 146]  [facility]  10-25-22 @ 07:40  Blood Gas Arterial-Calcium,Ionized--  Blood Urea Nitrogen, Serum 11 mg/dL [10 - 20]  Carbon Dioxide, Serum29 mmol/L [17 - 32]  Chloride, Serum97 mmol/L<L> [98 - 110]  Creatinie, Serum0.7 mg/dL [0.7 - 1.5]  Glucose, Hsxlv692 mg/dL<H> [70 - 99]  Potassium, Serum4.4 mmol/L [3.5 - 5.0]  Sodium, Serum 137 mmol/L [135 - 146]  [facility]  10-24-22 @ 08:43  Blood Gas Arterial-Calcium,Ionized--  Blood Urea Nitrogen, Serum 12 mg/dL [10 - 20]  Carbon Dioxide, Serum31 mmol/L [17 - 32]  Chloride, Serum95 mmol/L<L> [98 - 110]  Creatinie, Serum0.7 mg/dL [0.7 - 1.5]  Glucose, Wazdy853 mg/dL<H> [70 - 99]  Potassium, Serum4.5 mmol/L [3.5 - 5.0]  Sodium, Serum 136 mmol/L [135 - 146]  [facility]  10-23-22 @ 08:05  Blood Gas Arterial-Calcium,Ionized--  Blood Urea Nitrogen, Serum 12 mg/dL [10 - 20]  Carbon Dioxide, Serum32 mmol/L [17 - 32]  Chloride, Serum94 mmol/L<L> [98 - 110]  Creatinie, Serum0.7 mg/dL [0.7 - 1.5]  Glucose, Wktnf059 mg/dL<H> [70 - 99]  Potassium, Serum4.5 mmol/L [3.5 - 5.0]  Sodium, Serum 137 mmol/L [135 - 146]              Microbiology:    Culture - Blood (collected 10-12-22 @ 06:50)  Source: .Blood Blood  Final Report (10-17-22 @ 18:00):    No Growth Final      Medications:  acetaminophen     Tablet .. 650 milliGRAM(s) Oral every 6 hours PRN  ALPRAZolam 1 milliGRAM(s) Oral <User Schedule>  ALPRAZolam 2 milliGRAM(s) Oral at bedtime  aluminum hydroxide/magnesium hydroxide/simethicone Suspension 30 milliLiter(s) Oral every 4 hours PRN  apixaban 10 milliGRAM(s) Oral every 12 hours  buPROPion XL (24-Hour) . 300 milliGRAM(s) Oral daily  ceFAZolin   IVPB 2000 milliGRAM(s) IV Intermittent every 8 hours  chlorhexidine 2% Cloths 1 Application(s) Topical daily  cyclobenzaprine 10 milliGRAM(s) Oral three times a day PRN  folic acid 1 milliGRAM(s) Oral daily  haloperidol    Injectable 5 milliGRAM(s) IntraMuscular every 8 hours PRN  magnesium oxide 400 milliGRAM(s) Oral once  melatonin 3 milliGRAM(s) Oral at bedtime PRN  methadone    Tablet 135 milliGRAM(s) Oral <User Schedule>  multivitamin 1 Tablet(s) Oral daily  nicotine - 21 mG/24Hr(s) Patch 1 Patch Transdermal daily  ondansetron Injectable 4 milliGRAM(s) IV Push every 8 hours PRN  pantoprazole    Tablet 40 milliGRAM(s) Oral before breakfast  QUEtiapine 200 milliGRAM(s) Oral at bedtime  thiamine 100 milliGRAM(s) Oral daily        Assessment and Plan:  Patient is a 53 y/o male with PMH of IVDU (heroin), vertebral osteomyelitis s/p debridement, and MSSA bacteremia, presenting from rehab facility for "low oxygen" and new onset LE edema. In the ED was found to be hypoxic, found to have b/l PEs with radiographic evidence of R heart strain and admitted to ICU, found to have LE DVTs s/p LLE embolectomy 10/18,  downgraded to SDU and currently downgraded to medical unit.     Acute hypoxemic respiratory failure - resolved  Acute Submassive PE  Suspected superimposed PNA  DIONICIO/OHS, noncompliant with NIV  -CT on admission with bilateral submassive PEs + concern for RV strain  -TTE: EF 55%, G1DD, mild TVR, no right heart strain  -VA duplx LE vein: Acute thrombus within the left common femoral, femoral, deep femoral, popliteal, gastrocnemius, posterior tibial and anterior tibial veins, s/p LLE embolectomy 10/18  - initially started on therapeutic Lovenox, on 10/26 transitioned to PO Eliquis tx.   -blood cx 10/12 - no growth  - ID on board s/p Cefepime, now on  Cefazolin 2g q8h IV, monitor outpatient BMP, LFT, CK, ESR, CRP      Hx MSSA bacteremia  Hx Epidural phlegmon/OM  Hx R Psoas Abscess  -Per NSG, pt able to receive MR even with recent spinal fusion  -MR w&w/o CTL spine noted  - abx as above  - f/u outpatient neurosurgery and ID     HO IVDA, Opioid Abuse on Methadone   Agitation    - improving, followed by psych:  resumed on  Quetiapine 200 mg PO HS  -continue Methadone 135 mg po once daily   - Continue with Xanax 1 mg po am, 1mg po afternoon and two times at night - total daily dose of 4mg  Continue Wellbutrin Xl 300mg po q 24hrs  -For agitation, haldol 5mg po/IM every 8hrs prn    Hypomagnesemia- supplemented    DVT proph- patient is on Eliquis    #Progress Note Handoff: continue pt. on IV abx. x 4 weeks, placement to STR, monitor electrolytes, PT tx. , outpt. drug rehab tx.   Family discussion: medical plan of tx. d/w pt. by bedside Disposition: STR once bed is available    Total time spent to complete patient's bedside assessment, review medical chart, discuss medical plan of care with covering medical team was more than 35 minutes with >50% of time spent face to face with patient, discussion with patient/family and/or coordination of care

## 2022-10-27 NOTE — PROGRESS NOTE ADULT - SUBJECTIVE AND OBJECTIVE BOX
MIKAEL MCDERMOTT 52y Male  MRN#: [id number]   Hospital Day: 16d    SUBJECTIVE  Patient is a 52y old Male who presents with a chief complaint of Hypoxia (27 Oct 2022 11:58)  Currently admitted to medicine with the primary diagnosis of Pneumonia      INTERVAL HPI AND OVERNIGHT EVENTS:  Patient was examined and seen at bedside. This morning he is resting comfortably in bed and reports no issues or overnight events.    OBJECTIVE  PAST MEDICAL & SURGICAL HISTORY  IV drug abuse    Vertebral osteomyelitis    MSSA bacteremia    No significant past surgical history      ALLERGIES:  No Known Allergies    MEDICATIONS:  STANDING MEDICATIONS  ALPRAZolam 1 milliGRAM(s) Oral <User Schedule>  ALPRAZolam 2 milliGRAM(s) Oral at bedtime  apixaban 10 milliGRAM(s) Oral every 12 hours  buPROPion XL (24-Hour) . 300 milliGRAM(s) Oral daily  ceFAZolin   IVPB 2000 milliGRAM(s) IV Intermittent every 8 hours  chlorhexidine 2% Cloths 1 Application(s) Topical daily  folic acid 1 milliGRAM(s) Oral daily  magnesium oxide 400 milliGRAM(s) Oral once  methadone    Tablet 135 milliGRAM(s) Oral <User Schedule>  multivitamin 1 Tablet(s) Oral daily  nicotine - 21 mG/24Hr(s) Patch 1 Patch Transdermal daily  pantoprazole    Tablet 40 milliGRAM(s) Oral before breakfast  QUEtiapine 200 milliGRAM(s) Oral at bedtime  thiamine 100 milliGRAM(s) Oral daily    PRN MEDICATIONS  acetaminophen     Tablet .. 650 milliGRAM(s) Oral every 6 hours PRN  aluminum hydroxide/magnesium hydroxide/simethicone Suspension 30 milliLiter(s) Oral every 4 hours PRN  cyclobenzaprine 10 milliGRAM(s) Oral three times a day PRN  haloperidol    Injectable 5 milliGRAM(s) IntraMuscular every 8 hours PRN  melatonin 3 milliGRAM(s) Oral at bedtime PRN  ondansetron Injectable 4 milliGRAM(s) IV Push every 8 hours PRN      VITAL SIGNS: Last 24 Hours  T(C): 37.1 (27 Oct 2022 13:00), Max: 37.1 (27 Oct 2022 13:00)  T(F): 98.8 (27 Oct 2022 13:00), Max: 98.8 (27 Oct 2022 13:00)  HR: 81 (27 Oct 2022 13:00) (71 - 82)  BP: 115/84 (27 Oct 2022 13:00) (97/58 - 124/63)  BP(mean): --  RR: 18 (27 Oct 2022 07:30) (18 - 20)  SpO2: 94% (27 Oct 2022 07:30) (93% - 94%)    LABS:                        10.3   6.97  )-----------( 376      ( 26 Oct 2022 07:56 )             33.7     10-26    144  |  102  |  11  ----------------------------<  121<H>  4.5   |  29  |  0.7    Ca    9.1      26 Oct 2022 07:56  Mg     1.7     10-26    TPro  6.6  /  Alb  3.0<L>  /  TBili  <0.2  /  DBili  x   /  AST  19  /  ALT  15  /  AlkPhos  101  10-26    RADIOLOGY:    < from: Xray Chest 1 View- PORTABLE-Routine (Xray Chest 1 View- PORTABLE-Routine in AM.) (10.23.22 @ 06:18) >    Impression:  Low lung volume/lordotic view without definite evidence of focal   consolidation pleural effusion or pneumothorax.    < end of copied text >  < from: MR Cervical Spine w/wo IV Cont (10.22.22 @ 20:40) >  MPRESSION:    1.  Study limited by motion artifact.    2.  No enhancing lesions.    3.  Straightening of the normal cervical lordosis may be secondary to   patient positioning or muscle spasm.    4.  C4-C5 and C5-C6; disc osteophyte complexes with compression of the   thecal sac.    5.  C6-C7; disc osteophyte complex with compression of the thecal sac,   degenerative changes, and bilateral foraminal narrowing.    < end of copied text >  < from: MR Thoracic Spine w/wo IV Cont (10.22.22 @ 20:40) >  IMPRESSION:    In comparison with the prior MRI of the thoracic spine dated August 12, 2022:    Current examination is limited by motion artifact.    Interval development of a right pleural effusion which may be loculated   and a right basilar opacity.    No enhancing lesions.    < end of copied text >  < from: MR Lumbar Spine w/wo IV Cont (10.22.22 @ 20:40) >  IMPRESSION:    In comparison with the prior MRI of the lumbar spine dated August 12, 2022:    There is been interval surgical intervention. The previously described   discitis/osteomyelitis at L4-L5, epidural soft tissue, and paraspinal   abscess has markedly resolved.    1.  Surgical hardware at L4 and L5.    2.  Abnormal signaland enhancement in the anterior and lateral   paraspinal soft tissues at L4 and L5 likely representing phlegmon.    3.  Phlegmon and/or postoperative changes at L4 and L5 within the spinal   canal.    4.  L2-L3; annular disc bulge, degenerative changes, and bilateral   foraminal narrowing.    5.  L3-L4; central spinal stenosis and bilateral foraminal narrowing.    6.  L4-L5; limited evaluation, postoperative changes and/or phlegmon, and   bilateral foraminal narrowing.    7.  L5-S1; annular disc bulge, degenerative changes, and bilateral   foraminal narrowing.    8.  Degenerative changes involving the sacroiliac joints.      < end of copied text >      PHYSICAL EXAM:  CONSTITUTIONAL: No acute distress, morbidly obese.  AAOx3  HEAD: Atraumatic, normocephalic  EYES: EOM intact, PERRLA, conjunctiva and sclera clear  ENT: moist mucous membranes  PULMONARY: Clear to auscultation bilaterally  CARDIOVASCULAR: Regular rate and rhythm  GASTROINTESTINAL: Soft, non-tender, non-distended; bowel sounds present  MUSCULOSKELETAL: no edema  NEUROLOGY: non-focal  SKIN: warm and dry      ASSESSMENT and PLAN    Patient is a 52y old Male PMHx of IVDU (heroin), vertebral osteomyelitis s/p debridement, and MSSA bacteremia, who presents from a rehab facility with a chief complaint of Hypoxia and new onset LE edema. In the ED was, found to have b/l PEs with radiographic evidence of R heart strain and admitted to ICU, found to have LE DVTs s/p LLE embolectomy 10/18, now downgraded to SDU     #Acute hypoxemic respiratory failure / Acute Submassive PE  - resolved  - Suspected superimposed PNA  - DIONICIO/OHS, noncompliant with NIV  - TTE: EF 55%, G1DD, mild TVR, no right heart strain  -VA duplx LE vein: Acute thrombus within the left common femoral, femoral, deep femoral, popliteal, gastrocnemius, posterior tibial and anterior tibial veins, s/p LLE embolectomy 10/18  - Therapeutic Lovenox, transitioned to Eliquis.   - s/p Cefepime, now on  Cefazolin 2g q8h IV  - PT evaluated and recommends sub-acut rehab or home PT on DC    # Recurrent MSSA bacteremia with Discitis   - Recent Spinal fusion  - Abnormal L4-L5 signal on MRI suggestive of  Epidural phlegmon/OM  - No acute surgical intervention needed per Neurosurgery   - C/w Cefazolin for 3 weeks (11/17) per ID  - Sterile 18 gauge powerline Mideline placed to continue IV antibiotics at subacute rehab.   - Outpatient Neurosurgery referral      #HO IVDA, Opioid Abuse on Methadone   - improving, followed by psych: can resume Quetiapine 200 mg PO HS  - Continue with Xanax 1 mg po am, 1mg po afternoon and two times at night - total daily dose of 4mg  - Continue Wellbutrin Xl 300mg po q 24hrs  - Continue home dose Methadone 135mg po daily---> QTc 433 today 10/20  - For agitation, haldol 5mg po/IM every 8hrs prn    #Disposition  - Sub-acute Rehab, pending authorization    #MISC  - f/u 8PM BMP. Replete Mag as needed.                              MIKAEL MCDERMOTT 52y Male  MRN#: [id number]   Hospital Day: 16d    SUBJECTIVE  Patient is a 52y old Male who presents with a chief complaint of Hypoxia (27 Oct 2022 11:58)  Currently admitted to medicine with the primary diagnosis of Pneumonia      INTERVAL HPI AND OVERNIGHT EVENTS:  Patient o2 sat dropped to 87% overnight. Started on NC, weaning off. examined and seen at bedside.  OBJECTIVE  PAST MEDICAL & SURGICAL HISTORY  IV drug abuse    Vertebral osteomyelitis    MSSA bacteremia    No significant past surgical history      ALLERGIES:  No Known Allergies    MEDICATIONS:  STANDING MEDICATIONS  ALPRAZolam 1 milliGRAM(s) Oral <User Schedule>  ALPRAZolam 2 milliGRAM(s) Oral at bedtime  apixaban 10 milliGRAM(s) Oral every 12 hours  buPROPion XL (24-Hour) . 300 milliGRAM(s) Oral daily  ceFAZolin   IVPB 2000 milliGRAM(s) IV Intermittent every 8 hours  chlorhexidine 2% Cloths 1 Application(s) Topical daily  folic acid 1 milliGRAM(s) Oral daily  magnesium oxide 400 milliGRAM(s) Oral once  methadone    Tablet 135 milliGRAM(s) Oral <User Schedule>  multivitamin 1 Tablet(s) Oral daily  nicotine - 21 mG/24Hr(s) Patch 1 Patch Transdermal daily  pantoprazole    Tablet 40 milliGRAM(s) Oral before breakfast  QUEtiapine 200 milliGRAM(s) Oral at bedtime  thiamine 100 milliGRAM(s) Oral daily    PRN MEDICATIONS  acetaminophen     Tablet .. 650 milliGRAM(s) Oral every 6 hours PRN  aluminum hydroxide/magnesium hydroxide/simethicone Suspension 30 milliLiter(s) Oral every 4 hours PRN  cyclobenzaprine 10 milliGRAM(s) Oral three times a day PRN  haloperidol    Injectable 5 milliGRAM(s) IntraMuscular every 8 hours PRN  melatonin 3 milliGRAM(s) Oral at bedtime PRN  ondansetron Injectable 4 milliGRAM(s) IV Push every 8 hours PRN      VITAL SIGNS: Last 24 Hours  T(C): 37.1 (27 Oct 2022 13:00), Max: 37.1 (27 Oct 2022 13:00)  T(F): 98.8 (27 Oct 2022 13:00), Max: 98.8 (27 Oct 2022 13:00)  HR: 81 (27 Oct 2022 13:00) (71 - 82)  BP: 115/84 (27 Oct 2022 13:00) (97/58 - 124/63)  BP(mean): --  RR: 18 (27 Oct 2022 07:30) (18 - 20)  SpO2: 94% (27 Oct 2022 07:30) (93% - 94%)    LABS:                        10.3   6.97  )-----------( 376      ( 26 Oct 2022 07:56 )             33.7     10-26    144  |  102  |  11  ----------------------------<  121<H>  4.5   |  29  |  0.7    Ca    9.1      26 Oct 2022 07:56  Mg     1.7     10-26    TPro  6.6  /  Alb  3.0<L>  /  TBili  <0.2  /  DBili  x   /  AST  19  /  ALT  15  /  AlkPhos  101  10-26    RADIOLOGY:    < from: Xray Chest 1 View- PORTABLE-Routine (Xray Chest 1 View- PORTABLE-Routine in AM.) (10.23.22 @ 06:18) >    Impression:  Low lung volume/lordotic view without definite evidence of focal   consolidation pleural effusion or pneumothorax.    < end of copied text >  < from: MR Cervical Spine w/wo IV Cont (10.22.22 @ 20:40) >  MPRESSION:    1.  Study limited by motion artifact.    2.  No enhancing lesions.    3.  Straightening of the normal cervical lordosis may be secondary to   patient positioning or muscle spasm.    4.  C4-C5 and C5-C6; disc osteophyte complexes with compression of the   thecal sac.    5.  C6-C7; disc osteophyte complex with compression of the thecal sac,   degenerative changes, and bilateral foraminal narrowing.    < end of copied text >  < from: MR Thoracic Spine w/wo IV Cont (10.22.22 @ 20:40) >  IMPRESSION:    In comparison with the prior MRI of the thoracic spine dated August 12, 2022:    Current examination is limited by motion artifact.    Interval development of a right pleural effusion which may be loculated   and a right basilar opacity.    No enhancing lesions.    < end of copied text >  < from: MR Lumbar Spine w/wo IV Cont (10.22.22 @ 20:40) >  IMPRESSION:    In comparison with the prior MRI of the lumbar spine dated August 12, 2022:    There is been interval surgical intervention. The previously described   discitis/osteomyelitis at L4-L5, epidural soft tissue, and paraspinal   abscess has markedly resolved.    1.  Surgical hardware at L4 and L5.    2.  Abnormal signaland enhancement in the anterior and lateral   paraspinal soft tissues at L4 and L5 likely representing phlegmon.    3.  Phlegmon and/or postoperative changes at L4 and L5 within the spinal   canal.    4.  L2-L3; annular disc bulge, degenerative changes, and bilateral   foraminal narrowing.    5.  L3-L4; central spinal stenosis and bilateral foraminal narrowing.    6.  L4-L5; limited evaluation, postoperative changes and/or phlegmon, and   bilateral foraminal narrowing.    7.  L5-S1; annular disc bulge, degenerative changes, and bilateral   foraminal narrowing.    8.  Degenerative changes involving the sacroiliac joints.      < end of copied text >      PHYSICAL EXAM:  CONSTITUTIONAL: No acute distress, morbidly obese.  AAOx3  HEAD: Atraumatic, normocephalic  EYES: EOM intact, PERRLA, conjunctiva and sclera clear  ENT: moist mucous membranes  PULMONARY: Clear to auscultation bilaterally  CARDIOVASCULAR: Regular rate and rhythm  GASTROINTESTINAL: Soft, non-tender, non-distended; bowel sounds present  MUSCULOSKELETAL: no edema  NEUROLOGY: non-focal  SKIN: warm and dry      ASSESSMENT and PLAN    Patient is a 52y old Male PMHx of IVDU (heroin), vertebral osteomyelitis s/p debridement, and MSSA bacteremia, who presents from a rehab facility with a chief complaint of Hypoxia and new onset LE edema. In the ED was, found to have b/l PEs with radiographic evidence of R heart strain and admitted to ICU, found to have LE DVTs s/p LLE embolectomy 10/18, now downgraded to SDU     #Acute hypoxemic respiratory failure / Acute Submassive PE  - resolved  - Suspected superimposed PNA  - DIONICIO/OHS, noncompliant with NIV  - TTE: EF 55%, G1DD, mild TVR, no right heart strain  -VA duplx LE vein: Acute thrombus within the left common femoral, femoral, deep femoral, popliteal, gastrocnemius, posterior tibial and anterior tibial veins, s/p LLE embolectomy 10/18  - Therapeutic Lovenox, transitioned to Eliquis.   - s/p Cefepime, now on  Cefazolin 2g q8h IV  - PT evaluated and recommends sub-acut rehab or home PT on DC    # Recurrent MSSA bacteremia with Discitis   - Recent Spinal fusion  - Abnormal L4-L5 signal on MRI suggestive of  Epidural phlegmon/OM  - No acute surgical intervention needed per Neurosurgery   - C/w Cefazolin for 3 weeks (11/17) per ID  - Sterile 18 gauge powerline Mideline placed to continue IV antibiotics at subacute rehab.   - Outpatient Neurosurgery referral      #HO IVDA, Opioid Abuse on Methadone   - improving, followed by psych: can resume Quetiapine 200 mg PO HS  - Continue with Xanax 1 mg po am, 1mg po afternoon and two times at night - total daily dose of 4mg  - Continue Wellbutrin Xl 300mg po q 24hrs  - Continue home dose Methadone 135mg po daily---> QTc 433 today 10/20  - For agitation, haldol 5mg po/IM every 8hrs prn    #Disposition  - Sub-acute Rehab, pending authorization    #MISC  - f/u 8PM BMP. Replete Mag as needed.

## 2022-10-28 LAB
ANION GAP SERPL CALC-SCNC: 14 MMOL/L — SIGNIFICANT CHANGE UP (ref 7–14)
BASOPHILS # BLD AUTO: 0.05 K/UL — SIGNIFICANT CHANGE UP (ref 0–0.2)
BASOPHILS NFR BLD AUTO: 0.8 % — SIGNIFICANT CHANGE UP (ref 0–1)
BUN SERPL-MCNC: 12 MG/DL — SIGNIFICANT CHANGE UP (ref 10–20)
CALCIUM SERPL-MCNC: 9 MG/DL — SIGNIFICANT CHANGE UP (ref 8.4–10.5)
CHLORIDE SERPL-SCNC: 101 MMOL/L — SIGNIFICANT CHANGE UP (ref 98–110)
CO2 SERPL-SCNC: 28 MMOL/L — SIGNIFICANT CHANGE UP (ref 17–32)
CREAT SERPL-MCNC: 0.7 MG/DL — SIGNIFICANT CHANGE UP (ref 0.7–1.5)
EGFR: 110 ML/MIN/1.73M2 — SIGNIFICANT CHANGE UP
EOSINOPHIL # BLD AUTO: 0.31 K/UL — SIGNIFICANT CHANGE UP (ref 0–0.7)
EOSINOPHIL NFR BLD AUTO: 4.8 % — SIGNIFICANT CHANGE UP (ref 0–8)
GLUCOSE BLDC GLUCOMTR-MCNC: 149 MG/DL — HIGH (ref 70–99)
GLUCOSE BLDC GLUCOMTR-MCNC: 153 MG/DL — HIGH (ref 70–99)
GLUCOSE BLDC GLUCOMTR-MCNC: 165 MG/DL — HIGH (ref 70–99)
GLUCOSE BLDC GLUCOMTR-MCNC: 229 MG/DL — HIGH (ref 70–99)
GLUCOSE SERPL-MCNC: 108 MG/DL — HIGH (ref 70–99)
HCT VFR BLD CALC: 34.1 % — LOW (ref 42–52)
HGB BLD-MCNC: 10.2 G/DL — LOW (ref 14–18)
IMM GRANULOCYTES NFR BLD AUTO: 0.8 % — HIGH (ref 0.1–0.3)
LYMPHOCYTES # BLD AUTO: 2.07 K/UL — SIGNIFICANT CHANGE UP (ref 1.2–3.4)
LYMPHOCYTES # BLD AUTO: 32.3 % — SIGNIFICANT CHANGE UP (ref 20.5–51.1)
MAGNESIUM SERPL-MCNC: 1.9 MG/DL — SIGNIFICANT CHANGE UP (ref 1.8–2.4)
MCHC RBC-ENTMCNC: 27.6 PG — SIGNIFICANT CHANGE UP (ref 27–31)
MCHC RBC-ENTMCNC: 29.9 G/DL — LOW (ref 32–37)
MCV RBC AUTO: 92.4 FL — SIGNIFICANT CHANGE UP (ref 80–94)
MONOCYTES # BLD AUTO: 0.66 K/UL — HIGH (ref 0.1–0.6)
MONOCYTES NFR BLD AUTO: 10.3 % — HIGH (ref 1.7–9.3)
NEUTROPHILS # BLD AUTO: 3.27 K/UL — SIGNIFICANT CHANGE UP (ref 1.4–6.5)
NEUTROPHILS NFR BLD AUTO: 51 % — SIGNIFICANT CHANGE UP (ref 42.2–75.2)
NRBC # BLD: 0 /100 WBCS — SIGNIFICANT CHANGE UP (ref 0–0)
PLATELET # BLD AUTO: 396 K/UL — SIGNIFICANT CHANGE UP (ref 130–400)
POTASSIUM SERPL-MCNC: 4.4 MMOL/L — SIGNIFICANT CHANGE UP (ref 3.5–5)
POTASSIUM SERPL-SCNC: 4.4 MMOL/L — SIGNIFICANT CHANGE UP (ref 3.5–5)
RBC # BLD: 3.69 M/UL — LOW (ref 4.7–6.1)
RBC # FLD: 16.5 % — HIGH (ref 11.5–14.5)
SODIUM SERPL-SCNC: 143 MMOL/L — SIGNIFICANT CHANGE UP (ref 135–146)
WBC # BLD: 6.41 K/UL — SIGNIFICANT CHANGE UP (ref 4.8–10.8)
WBC # FLD AUTO: 6.41 K/UL — SIGNIFICANT CHANGE UP (ref 4.8–10.8)

## 2022-10-28 PROCEDURE — 99232 SBSQ HOSP IP/OBS MODERATE 35: CPT

## 2022-10-28 RX ORDER — MAGNESIUM SULFATE 500 MG/ML
2 VIAL (ML) INJECTION ONCE
Refills: 0 | Status: DISCONTINUED | OUTPATIENT
Start: 2022-10-28 | End: 2022-10-28

## 2022-10-28 RX ORDER — CEFAZOLIN SODIUM 1 G
2000 VIAL (EA) INJECTION
Qty: 0 | Refills: 0 | DISCHARGE
Start: 2022-10-28

## 2022-10-28 RX ORDER — APIXABAN 2.5 MG/1
2 TABLET, FILM COATED ORAL
Qty: 0 | Refills: 0 | DISCHARGE
Start: 2022-10-28

## 2022-10-28 RX ADMIN — Medication 100 MILLIGRAM(S): at 05:36

## 2022-10-28 RX ADMIN — Medication 1 MILLIGRAM(S): at 14:03

## 2022-10-28 RX ADMIN — PANTOPRAZOLE SODIUM 40 MILLIGRAM(S): 20 TABLET, DELAYED RELEASE ORAL at 05:38

## 2022-10-28 RX ADMIN — Medication 25 GRAM(S): at 01:04

## 2022-10-28 RX ADMIN — Medication 1 PATCH: at 09:01

## 2022-10-28 RX ADMIN — Medication 1 MILLIGRAM(S): at 11:48

## 2022-10-28 RX ADMIN — Medication 2 MILLIGRAM(S): at 21:32

## 2022-10-28 RX ADMIN — Medication 1 MILLIGRAM(S): at 05:37

## 2022-10-28 RX ADMIN — Medication 100 MILLIGRAM(S): at 14:03

## 2022-10-28 RX ADMIN — APIXABAN 10 MILLIGRAM(S): 2.5 TABLET, FILM COATED ORAL at 05:35

## 2022-10-28 RX ADMIN — Medication 1 PATCH: at 14:00

## 2022-10-28 RX ADMIN — CHLORHEXIDINE GLUCONATE 1 APPLICATION(S): 213 SOLUTION TOPICAL at 11:48

## 2022-10-28 RX ADMIN — APIXABAN 10 MILLIGRAM(S): 2.5 TABLET, FILM COATED ORAL at 17:09

## 2022-10-28 RX ADMIN — Medication 1 TABLET(S): at 11:47

## 2022-10-28 RX ADMIN — QUETIAPINE FUMARATE 200 MILLIGRAM(S): 200 TABLET, FILM COATED ORAL at 21:35

## 2022-10-28 RX ADMIN — Medication 100 MILLIGRAM(S): at 21:30

## 2022-10-28 RX ADMIN — Medication 100 MILLIGRAM(S): at 11:47

## 2022-10-28 RX ADMIN — BUPROPION HYDROCHLORIDE 300 MILLIGRAM(S): 150 TABLET, EXTENDED RELEASE ORAL at 11:47

## 2022-10-28 RX ADMIN — Medication 1 PATCH: at 11:48

## 2022-10-28 RX ADMIN — Medication 1 PATCH: at 19:38

## 2022-10-28 RX ADMIN — METHADONE HYDROCHLORIDE 135 MILLIGRAM(S): 40 TABLET ORAL at 11:52

## 2022-10-28 NOTE — PROGRESS NOTE ADULT - SUBJECTIVE AND OBJECTIVE BOX
MIKAEL MCDERMOTT 52y Male  MRN#: [id number]   Hospital Day: 17d    SUBJECTIVE  Patient is a 52y old Male who presents with a chief complaint of Hypoxia (27 Oct 2022 16:11)  Currently admitted to medicine with the primary diagnosis of Pneumonia      INTERVAL HPI AND OVERNIGHT EVENTS:  Patient was examined and seen at bedside. This morning he is resting comfortably in bed and reports no issues or overnight events.    OBJECTIVE  PAST MEDICAL & SURGICAL HISTORY  IV drug abuse    Vertebral osteomyelitis    MSSA bacteremia    No significant past surgical history      ALLERGIES:  No Known Allergies    MEDICATIONS:  STANDING MEDICATIONS  ALPRAZolam 1 milliGRAM(s) Oral <User Schedule>  ALPRAZolam 2 milliGRAM(s) Oral at bedtime  apixaban 10 milliGRAM(s) Oral every 12 hours  buPROPion XL (24-Hour) . 300 milliGRAM(s) Oral daily  ceFAZolin   IVPB 2000 milliGRAM(s) IV Intermittent every 8 hours  chlorhexidine 2% Cloths 1 Application(s) Topical daily  folic acid 1 milliGRAM(s) Oral daily  magnesium oxide 400 milliGRAM(s) Oral once  methadone    Tablet 135 milliGRAM(s) Oral <User Schedule>  multivitamin 1 Tablet(s) Oral daily  nicotine - 21 mG/24Hr(s) Patch 1 Patch Transdermal daily  pantoprazole    Tablet 40 milliGRAM(s) Oral before breakfast  QUEtiapine 200 milliGRAM(s) Oral at bedtime  thiamine 100 milliGRAM(s) Oral daily    PRN MEDICATIONS  acetaminophen     Tablet .. 650 milliGRAM(s) Oral every 6 hours PRN  aluminum hydroxide/magnesium hydroxide/simethicone Suspension 30 milliLiter(s) Oral every 4 hours PRN  cyclobenzaprine 10 milliGRAM(s) Oral three times a day PRN  haloperidol    Injectable 5 milliGRAM(s) IntraMuscular every 8 hours PRN  melatonin 3 milliGRAM(s) Oral at bedtime PRN  ondansetron Injectable 4 milliGRAM(s) IV Push every 8 hours PRN      VITAL SIGNS: Last 24 Hours  T(C): 35.7 (28 Oct 2022 05:27), Max: 37 (27 Oct 2022 21:11)  T(F): 96.3 (28 Oct 2022 05:27), Max: 98.6 (27 Oct 2022 21:11)  HR: 75 (28 Oct 2022 05:27) (75 - 89)  BP: 108/74 (28 Oct 2022 05:27) (108/74 - 119/69)  BP(mean): --  RR: 18 (28 Oct 2022 07:35) (18 - 20)  SpO2: 95% (28 Oct 2022 07:35) (91% - 96%)    LABS:                        10.2   6.41  )-----------( 396      ( 28 Oct 2022 07:56 )             34.1     10-28    143  |  101  |  12  ----------------------------<  108<H>  4.4   |  28  |  0.7    Ca    9.0      28 Oct 2022 07:56  Mg     1.9     10-28        RADIOLOGY:  < from: MR Cervical Spine w/wo IV Cont (10.22.22 @ 20:40) >  IMPRESSION:    1.  Study limited by motion artifact.    2.  No enhancing lesions.    3.  Straightening of the normal cervical lordosis may be secondary to   patient positioning or muscle spasm.    4.  C4-C5 and C5-C6; disc osteophyte complexes with compression of the   thecal sac.    5.  C6-C7; disc osteophyte complex with compression of the thecal sac,   degenerative changes, and bilateral foraminal narrowing.    < end of copied text >        PHYSICAL EXAM:  CONSTITUTIONAL: No acute distress. Morbidly obese. AAOx3  HEAD: Atraumatic, normocephalic  EYES: EOM intact, PERRLA, conjunctiva and sclera clear  ENT: moist mucous membranes  PULMONARY: Clear to auscultation bilaterally  CARDIOVASCULAR: Regular rate and rhythm  GASTROINTESTINAL: Soft, non-tender, non-distended; bowel sounds present  MUSCULOSKELETAL: no edema  NEUROLOGY: non-focal  SKIN: warm and dry      ASSESSMENT and PLAN    Patient is a 52y old Male PMHx of IVDU (heroin), vertebral osteomyelitis s/p debridement, and MSSA bacteremia, who presents from a rehab facility with a chief complaint of Hypoxia and new onset LE edema. In the ED was, found to have b/l PEs with radiographic evidence of R heart strain and admitted to ICU, found to have LE DVTs s/p LLE embolectomy 10/18. Currently on IV antibiotics, pending discharge authorization.     #Acute hypoxemic respiratory failure / Acute Submassive PE  - resolved  - Suspected superimposed PNA  - DIONICIO/OHS, noncompliant with NIV  - TTE: EF 55%, G1DD, mild TVR, no right heart strain  -VA duplx LE vein: Acute thrombus within the left common femoral, femoral, deep femoral, popliteal, gastrocnemius, posterior tibial and anterior tibial veins, s/p LLE embolectomy 10/18  - Therapeutic Lovenox, transitioned to Eliquis.   - s/p Cefepime, now on  Cefazolin 2g q8h IV  - PT evaluated and recommends sub-acut rehab or home PT on DC    # Recurrent MSSA bacteremia with Discitis   - Recent Spinal fusion  - Abnormal L4-L5 signal on MRI suggestive of  Epidural phlegmon/OM  - No acute surgical intervention needed per Neurosurgery   - C/w Cefazolin for 3 weeks (11/17) per ID  - Sterile 18 gauge powerline Mideline placed to continue IV antibiotics at subacute rehab.   - Outpatient Neurosurgery referral      #HO IVDA, Opioid Abuse on Methadone   - improving, followed by psych: can resume Quetiapine 200 mg PO HS  - Continue with Xanax 1 mg po am, 1mg po afternoon and two times at night - total daily dose of 4mg  - Continue Wellbutrin Xl 300mg po q 24hrs  - Continue home dose Methadone 135mg po daily---> QTc 433 today 10/20  - For agitation, haldol 5mg po/IM every 8hrs prn    #Disposition  - Sub-acute Rehab, pending authorization    #MISC  - No need for daily CBC/BMP, just check daily mag and replenish as needed.

## 2022-10-28 NOTE — PROGRESS NOTE ADULT - SUBJECTIVE AND OBJECTIVE BOX
MIKAEL MCDERMOTT  [facility]      Patient was evaluated and examined  by bedside, no active complains       REVIEW OF SYSTEMS:  please see pertinent positives mentioned above, all other 12 ROS negative      T(C): , Max: 37 (10-27-22 @ 21:11)  HR: 75 (10-28-22 @ 05:27)  BP: 108/74 (10-28-22 @ 05:27)  RR: 18 (10-28-22 @ 07:35)  SpO2: 95% (10-28-22 @ 07:35)  CAPILLARY BLOOD GLUCOSE      POCT Blood Glucose.: 229 mg/dL (28 Oct 2022 11:43)  POCT Blood Glucose.: 153 mg/dL (28 Oct 2022 08:39)  POCT Blood Glucose.: 144 mg/dL (27 Oct 2022 21:46)  POCT Blood Glucose.: 166 mg/dL (27 Oct 2022 16:19)      PHYSICAL EXAM:  General: NAD, AAOX3, patient is laying comfortably in bed, obese  HEENT: AT, NC, Supple, NO JVD, NO CB  Lungs: CTA B/L, no wheezing, no rhonchi  CVS: normal S1, S2, RRR, NO M/G/R  Abdomen: soft, bowel sounds present, non-tender, non-distended  Extremities: chronic distal upper ext contractures, left lower ext. small pitting edema present, no clubbing, no cyanosis, positive peripheral pulses b/l  Neuro: no acute focal neurological deficits, difficulty with ambulation  Skin: no rash, no ecchymosis        LAB  CBC  Date: 10-28-22 @ 07:56  Mean cell Ajyhmdndnv53.6  Mean cell Hemoglobin Conc29.9  Mean cell Volum 92.4  Platelet count-Automate 396  RBC Count 3.69  Red Cell Distrib Width16.5  WBC Count6.41  % Albumin, Urine--  Hematocrit 34.1  Hemoglobin 10.2  CBC  Date: 10-27-22 @ 22:40  Mean cell Cogpdaligb33.9  Mean cell Hemoglobin Conc30.6  Mean cell Volum 91.0  Platelet count-Automate 413  RBC Count 3.77  Red Cell Distrib Width16.3  WBC Count6.79  % Albumin, Urine--  Hematocrit 34.3  Hemoglobin 10.5  CBC  Date: 10-26-22 @ 07:56  Mean cell Hgbqktwkkm79.8  Mean cell Hemoglobin Conc30.6  Mean cell Volum 91.1  Platelet count-Automate 376  RBC Count 3.70  Red Cell Distrib Width16.2  WBC Count6.97  % Albumin, Urine--  Hematocrit 33.7  Hemoglobin 10.3  CBC  Date: 10-25-22 @ 07:40  Mean cell Lunadxamwt58.1  Mean cell Hemoglobin Conc31.2  Mean cell Volum 90.2  Platelet count-Automate 368  RBC Count 3.56  Red Cell Distrib Width16.4  WBC Count7.11  % Albumin, Urine--  Hematocrit 32.1  Hemoglobin 10.0  CBC  Date: 10-24-22 @ 08:43  Mean cell Brhzktkljd89.5  Mean cell Hemoglobin Conc30.1  Mean cell Volum 91.3  Platelet count-Automate 380  RBC Count 3.67  Red Cell Distrib Width16.1  WBC Count6.92  % Albumin, Urine--  Hematocrit 33.5  Hemoglobin 10.1  CBC  Date: 10-23-22 @ 08:05  Mean cell Ytjbndnkao73.7  Mean cell Hemoglobin Conc30.8  Mean cell Volum 90.1  Platelet count-Automate 402  RBC Count 3.64  Red Cell Distrib Width16.2  WBC Count7.45  % Albumin, Urine--  Hematocrit 32.8  Hemoglobin 10.1  CBC  Date: 10-22-22 @ 07:39  Mean cell Swqkozhqcw47.1  Mean cell Hemoglobin Conc30.6  Mean cell Volum 91.9  Platelet count-Automate 273  RBC Count 3.56  Red Cell Distrib Width15.9  WBC Count6.61  % Albumin, Urine--  Hematocrit 32.7  Hemoglobin 10.0    [facility]  10-28-22 @ 07:56  Blood Gas Arterial-Calcium,Ionized--  Blood Urea Nitrogen, Serum 12 mg/dL [10 - 20]  Carbon Dioxide, Serum28 mmol/L [17 - 32]  Chloride, Auxcu422 mmol/L [98 - 110]  Creatinie, Serum0.7 mg/dL [0.7 - 1.5]  Glucose, Ybepc251 mg/dL<H> [70 - 99]  Potassium, Serum4.4 mmol/L [3.5 - 5.0]  Sodium, Serum 143 mmol/L [135 - 146]  [facility]  10-27-22 @ 22:40  Blood Gas Arterial-Calcium,Ionized--  Blood Urea Nitrogen, Serum 12 mg/dL [10 - 20]  Carbon Dioxide, Serum25 mmol/L [17 - 32]  Chloride, Neywr173 mmol/L [98 - 110]  Creatinie, Serum0.8 mg/dL [0.7 - 1.5]  Glucose, Irsgp517 mg/dL<H> [70 - 99]  Potassium, Serum4.7 mmol/L [3.5 - 5.0] [Slighty Hemolyzed use with Caution]  Sodium, Serum 139 mmol/L [135 - 146]  BMP  10-26-22 @ 07:56  Blood Gas Arterial-Calcium,Ionized--  Blood Urea Nitrogen, Serum 11 mg/dL [10 - 20]  Carbon Dioxide, Serum29 mmol/L [17 - 32]  Chloride, Axqsd362 mmol/L [98 - 110]  Creatinie, Serum0.7 mg/dL [0.7 - 1.5]  Glucose, Enjqv040 mg/dL<H> [70 - 99]  Potassium, Serum4.5 mmol/L [3.5 - 5.0]  Sodium, Serum 144 mmol/L [135 - 146]  BMP  10-25-22 @ 07:40  Blood Gas Arterial-Calcium,Ionized--  Blood Urea Nitrogen, Serum 11 mg/dL [10 - 20]  Carbon Dioxide, Serum29 mmol/L [17 - 32]  Chloride, Serum97 mmol/L<L> [98 - 110]  Creatinie, Serum0.7 mg/dL [0.7 - 1.5]  Glucose, Sxanw289 mg/dL<H> [70 - 99]  Potassium, Serum4.4 mmol/L [3.5 - 5.0]  Sodium, Serum 137 mmol/L [135 - 146]  [facility]  10-24-22 @ 08:43  Blood Gas Arterial-Calcium,Ionized--  Blood Urea Nitrogen, Serum 12 mg/dL [10 - 20]  Carbon Dioxide, Serum31 mmol/L [17 - 32]  Chloride, Serum95 mmol/L<L> [98 - 110]  Creatinie, Serum0.7 mg/dL [0.7 - 1.5]  Glucose, Kgatm062 mg/dL<H> [70 - 99]  Potassium, Serum4.5 mmol/L [3.5 - 5.0]  Sodium, Serum 136 mmol/L [135 - 146]              Microbiology:    Culture - Blood (collected 10-12-22 @ 06:50)  Source: .Blood Blood  Final Report (10-17-22 @ 18:00):    No Growth Final        Medications:  acetaminophen     Tablet .. 650 milliGRAM(s) Oral every 6 hours PRN  ALPRAZolam 1 milliGRAM(s) Oral <User Schedule>  ALPRAZolam 2 milliGRAM(s) Oral at bedtime  aluminum hydroxide/magnesium hydroxide/simethicone Suspension 30 milliLiter(s) Oral every 4 hours PRN  apixaban 10 milliGRAM(s) Oral every 12 hours  buPROPion XL (24-Hour) . 300 milliGRAM(s) Oral daily  ceFAZolin   IVPB 2000 milliGRAM(s) IV Intermittent every 8 hours  chlorhexidine 2% Cloths 1 Application(s) Topical daily  cyclobenzaprine 10 milliGRAM(s) Oral three times a day PRN  folic acid 1 milliGRAM(s) Oral daily  haloperidol    Injectable 5 milliGRAM(s) IntraMuscular every 8 hours PRN  magnesium oxide 400 milliGRAM(s) Oral once  melatonin 3 milliGRAM(s) Oral at bedtime PRN  methadone    Tablet 135 milliGRAM(s) Oral <User Schedule>  multivitamin 1 Tablet(s) Oral daily  nicotine - 21 mG/24Hr(s) Patch 1 Patch Transdermal daily  ondansetron Injectable 4 milliGRAM(s) IV Push every 8 hours PRN  pantoprazole    Tablet 40 milliGRAM(s) Oral before breakfast  QUEtiapine 200 milliGRAM(s) Oral at bedtime  thiamine 100 milliGRAM(s) Oral daily        Assessment and Plan:  Patient is a 51 y/o male with PMH of IVDU (heroin), vertebral osteomyelitis s/p debridement, and MSSA bacteremia, presenting from rehab facility for "low oxygen" and new onset LE edema. In the ED was found to be hypoxic, found to have b/l PEs with radiographic evidence of R heart strain and admitted to ICU, found to have LE DVTs s/p LLE embolectomy 10/18,  downgraded to SDU and currently downgraded to medical unit.     Acute hypoxemic respiratory failure - resolved  Acute Submassive PE  Suspected superimposed PNA  DIONICIO/OHS, noncompliant with NIV  -CT on admission with bilateral submassive PEs + concern for RV strain  -TTE: EF 55%, G1DD, mild TVR, no right heart strain  -VA duplx LE vein: Acute thrombus within the left common femoral, femoral, deep femoral, popliteal, gastrocnemius, posterior tibial and anterior tibial veins, s/p LLE embolectomy 10/18  - initially started on therapeutic Lovenox, on 10/26 transitioned to PO Eliquis tx.   -blood cx 10/12 - no growth  - ID on board s/p Cefepime, now on  Cefazolin 2g q8h IV, monitor outpatient BMP, LFT, CK, ESR, CRP      Hx MSSA bacteremia  Hx Epidural phlegmon/OM  Hx R Psoas Abscess  -Per NSG, pt able to receive MR even with recent spinal fusion  -MR w&w/o CTL spine noted  - abx as above  - f/u outpatient neurosurgery and ID     HO IVDA, Opioid Abuse on Methadone   Agitation    - improving, followed by psych:  resumed on  Quetiapine 200 mg PO HS  -continue Methadone 135 mg po once daily   - Continue with Xanax 1 mg po am, 1mg po afternoon and two times at night - total daily dose of 4mg  Continue Wellbutrin Xl 300mg po q 24hrs  -For agitation, haldol 5mg po/IM every 8hrs prn    Hypomagnesemia- supplemented    DVT proph- patient is on Eliquis    #Progress Note Handoff: continue pt. on IV abx. x 4 weeks, placement to STR, monitor electrolytes, PT tx. , outpt. drug rehab tx.   Family discussion: medical plan of tx. d/w pt. by bedside Disposition: STR once bed is available    Total time spent to complete patient's bedside assessment, review medical chart, discuss medical plan of care with covering medical team was more than 35 minutes with >50% of time spent face to face with patient, discussion with patient/family and/or coordination of care

## 2022-10-29 LAB
ANION GAP SERPL CALC-SCNC: 14 MMOL/L — SIGNIFICANT CHANGE UP (ref 7–14)
BASOPHILS # BLD AUTO: 0.05 K/UL — SIGNIFICANT CHANGE UP (ref 0–0.2)
BASOPHILS NFR BLD AUTO: 0.8 % — SIGNIFICANT CHANGE UP (ref 0–1)
BUN SERPL-MCNC: 11 MG/DL — SIGNIFICANT CHANGE UP (ref 10–20)
CALCIUM SERPL-MCNC: 9.3 MG/DL — SIGNIFICANT CHANGE UP (ref 8.4–10.5)
CHLORIDE SERPL-SCNC: 98 MMOL/L — SIGNIFICANT CHANGE UP (ref 98–110)
CO2 SERPL-SCNC: 28 MMOL/L — SIGNIFICANT CHANGE UP (ref 17–32)
CREAT SERPL-MCNC: 0.7 MG/DL — SIGNIFICANT CHANGE UP (ref 0.7–1.5)
EGFR: 110 ML/MIN/1.73M2 — SIGNIFICANT CHANGE UP
EOSINOPHIL # BLD AUTO: 0.39 K/UL — SIGNIFICANT CHANGE UP (ref 0–0.7)
EOSINOPHIL NFR BLD AUTO: 5.9 % — SIGNIFICANT CHANGE UP (ref 0–8)
GLUCOSE BLDC GLUCOMTR-MCNC: 126 MG/DL — HIGH (ref 70–99)
GLUCOSE BLDC GLUCOMTR-MCNC: 128 MG/DL — HIGH (ref 70–99)
GLUCOSE BLDC GLUCOMTR-MCNC: 150 MG/DL — HIGH (ref 70–99)
GLUCOSE BLDC GLUCOMTR-MCNC: 310 MG/DL — HIGH (ref 70–99)
GLUCOSE SERPL-MCNC: 104 MG/DL — HIGH (ref 70–99)
HCT VFR BLD CALC: 36.7 % — LOW (ref 42–52)
HGB BLD-MCNC: 11.1 G/DL — LOW (ref 14–18)
IMM GRANULOCYTES NFR BLD AUTO: 0.6 % — HIGH (ref 0.1–0.3)
LYMPHOCYTES # BLD AUTO: 2.37 K/UL — SIGNIFICANT CHANGE UP (ref 1.2–3.4)
LYMPHOCYTES # BLD AUTO: 36.1 % — SIGNIFICANT CHANGE UP (ref 20.5–51.1)
MAGNESIUM SERPL-MCNC: 1.7 MG/DL — LOW (ref 1.8–2.4)
MCHC RBC-ENTMCNC: 27.9 PG — SIGNIFICANT CHANGE UP (ref 27–31)
MCHC RBC-ENTMCNC: 30.2 G/DL — LOW (ref 32–37)
MCV RBC AUTO: 92.2 FL — SIGNIFICANT CHANGE UP (ref 80–94)
MONOCYTES # BLD AUTO: 0.69 K/UL — HIGH (ref 0.1–0.6)
MONOCYTES NFR BLD AUTO: 10.5 % — HIGH (ref 1.7–9.3)
NEUTROPHILS # BLD AUTO: 3.03 K/UL — SIGNIFICANT CHANGE UP (ref 1.4–6.5)
NEUTROPHILS NFR BLD AUTO: 46.1 % — SIGNIFICANT CHANGE UP (ref 42.2–75.2)
NRBC # BLD: 0 /100 WBCS — SIGNIFICANT CHANGE UP (ref 0–0)
PLATELET # BLD AUTO: 417 K/UL — HIGH (ref 130–400)
POTASSIUM SERPL-MCNC: 4.4 MMOL/L — SIGNIFICANT CHANGE UP (ref 3.5–5)
POTASSIUM SERPL-SCNC: 4.4 MMOL/L — SIGNIFICANT CHANGE UP (ref 3.5–5)
RBC # BLD: 3.98 M/UL — LOW (ref 4.7–6.1)
RBC # FLD: 16.4 % — HIGH (ref 11.5–14.5)
SODIUM SERPL-SCNC: 140 MMOL/L — SIGNIFICANT CHANGE UP (ref 135–146)
WBC # BLD: 6.57 K/UL — SIGNIFICANT CHANGE UP (ref 4.8–10.8)
WBC # FLD AUTO: 6.57 K/UL — SIGNIFICANT CHANGE UP (ref 4.8–10.8)

## 2022-10-29 PROCEDURE — 99232 SBSQ HOSP IP/OBS MODERATE 35: CPT

## 2022-10-29 RX ORDER — ALPRAZOLAM 0.25 MG
1 TABLET ORAL ONCE
Refills: 0 | Status: DISCONTINUED | OUTPATIENT
Start: 2022-10-29 | End: 2022-10-29

## 2022-10-29 RX ORDER — ALPRAZOLAM 0.25 MG
1 TABLET ORAL
Refills: 0 | Status: DISCONTINUED | OUTPATIENT
Start: 2022-10-29 | End: 2022-11-05

## 2022-10-29 RX ORDER — ALPRAZOLAM 0.25 MG
2 TABLET ORAL AT BEDTIME
Refills: 0 | Status: DISCONTINUED | OUTPATIENT
Start: 2022-10-29 | End: 2022-11-05

## 2022-10-29 RX ORDER — MAGNESIUM SULFATE 500 MG/ML
2 VIAL (ML) INJECTION ONCE
Refills: 0 | Status: COMPLETED | OUTPATIENT
Start: 2022-10-29 | End: 2022-10-29

## 2022-10-29 RX ADMIN — APIXABAN 10 MILLIGRAM(S): 2.5 TABLET, FILM COATED ORAL at 18:09

## 2022-10-29 RX ADMIN — Medication 1 MILLIGRAM(S): at 16:06

## 2022-10-29 RX ADMIN — Medication 100 MILLIGRAM(S): at 11:45

## 2022-10-29 RX ADMIN — QUETIAPINE FUMARATE 200 MILLIGRAM(S): 200 TABLET, FILM COATED ORAL at 22:40

## 2022-10-29 RX ADMIN — Medication 25 GRAM(S): at 11:43

## 2022-10-29 RX ADMIN — BUPROPION HYDROCHLORIDE 300 MILLIGRAM(S): 150 TABLET, EXTENDED RELEASE ORAL at 11:44

## 2022-10-29 RX ADMIN — Medication 1 MILLIGRAM(S): at 11:44

## 2022-10-29 RX ADMIN — APIXABAN 10 MILLIGRAM(S): 2.5 TABLET, FILM COATED ORAL at 05:19

## 2022-10-29 RX ADMIN — CHLORHEXIDINE GLUCONATE 1 APPLICATION(S): 213 SOLUTION TOPICAL at 11:46

## 2022-10-29 RX ADMIN — METHADONE HYDROCHLORIDE 135 MILLIGRAM(S): 40 TABLET ORAL at 11:51

## 2022-10-29 RX ADMIN — Medication 2 MILLIGRAM(S): at 22:40

## 2022-10-29 RX ADMIN — Medication 100 MILLIGRAM(S): at 05:19

## 2022-10-29 RX ADMIN — Medication 1 TABLET(S): at 11:44

## 2022-10-29 RX ADMIN — Medication 1 PATCH: at 11:48

## 2022-10-29 RX ADMIN — Medication 100 MILLIGRAM(S): at 14:58

## 2022-10-29 RX ADMIN — Medication 100 MILLIGRAM(S): at 22:40

## 2022-10-29 RX ADMIN — PANTOPRAZOLE SODIUM 40 MILLIGRAM(S): 20 TABLET, DELAYED RELEASE ORAL at 05:19

## 2022-10-29 RX ADMIN — Medication 1 PATCH: at 22:51

## 2022-10-29 NOTE — PROGRESS NOTE ADULT - SUBJECTIVE AND OBJECTIVE BOX
MIKAEL MCDERMOTT 52y Male  MRN#: [id number]   Hospital Day: 18d    SUBJECTIVE  Patient is a 52y old Male who presents with a chief complaint of Hypoxia (28 Oct 2022 13:17)  Currently admitted to medicine with the primary diagnosis of Pneumonia      INTERVAL HPI AND OVERNIGHT EVENTS:  Patient was examined and seen at bedside. This morning he is resting comfortably in bed and reports no issues or overnight events.    OBJECTIVE  PAST MEDICAL & SURGICAL HISTORY  IV drug abuse    Vertebral osteomyelitis    MSSA bacteremia    No significant past surgical history      ALLERGIES:  No Known Allergies    MEDICATIONS:  STANDING MEDICATIONS  apixaban 10 milliGRAM(s) Oral every 12 hours  buPROPion XL (24-Hour) . 300 milliGRAM(s) Oral daily  ceFAZolin   IVPB 2000 milliGRAM(s) IV Intermittent every 8 hours  chlorhexidine 2% Cloths 1 Application(s) Topical daily  folic acid 1 milliGRAM(s) Oral daily  magnesium oxide 400 milliGRAM(s) Oral once  magnesium sulfate  IVPB 2 Gram(s) IV Intermittent once  methadone    Tablet 135 milliGRAM(s) Oral <User Schedule>  multivitamin 1 Tablet(s) Oral daily  nicotine - 21 mG/24Hr(s) Patch 1 Patch Transdermal daily  pantoprazole    Tablet 40 milliGRAM(s) Oral before breakfast  QUEtiapine 200 milliGRAM(s) Oral at bedtime  thiamine 100 milliGRAM(s) Oral daily    PRN MEDICATIONS  acetaminophen     Tablet .. 650 milliGRAM(s) Oral every 6 hours PRN  aluminum hydroxide/magnesium hydroxide/simethicone Suspension 30 milliLiter(s) Oral every 4 hours PRN  cyclobenzaprine 10 milliGRAM(s) Oral three times a day PRN  haloperidol    Injectable 5 milliGRAM(s) IntraMuscular every 8 hours PRN  melatonin 3 milliGRAM(s) Oral at bedtime PRN  ondansetron Injectable 4 milliGRAM(s) IV Push every 8 hours PRN      VITAL SIGNS: Last 24 Hours  T(C): 36.7 (29 Oct 2022 05:00), Max: 36.8 (28 Oct 2022 21:20)  T(F): 98 (29 Oct 2022 05:00), Max: 98.3 (28 Oct 2022 21:20)  HR: 72 (29 Oct 2022 05:00) (72 - 80)  BP: 97/66 (29 Oct 2022 05:00) (94/57 - 113/58)  BP(mean): --  RR: 19 (29 Oct 2022 05:00) (18 - 19)  SpO2: 95% (28 Oct 2022 21:20) (95% - 95%)    LABS:                        11.1   6.57  )-----------( 417      ( 29 Oct 2022 08:01 )             36.7     10-29    140  |  98  |  11  ----------------------------<  104<H>  4.4   |  28  |  0.7    Ca    9.3      29 Oct 2022 08:01  Mg     1.7     10-29    RADIOLOGY:  < from: Xray Chest 1 View- PORTABLE-Routine (Xray Chest 1 View- PORTABLE-Routine in AM.) (10.23.22 @ 06:18) >  Impression:  Low lung volume/lordotic view without definite evidence of focal   consolidation pleural effusion or pneumothorax.    < end of copied text >  < from: MR Thoracic Spine w/wo IV Cont (10.22.22 @ 20:40) >  IMPRESSION:    In comparison with the prior MRI of the thoracic spine dated August 12, 2022:    Current examination is limited by motion artifact.    Interval development of a right pleural effusion which may be loculated   and a right basilar opacity.    No enhancing lesions.    < end of copied text >  < from: MR Cervical Spine w/wo IV Cont (10.22.22 @ 20:40) >  IMPRESSION:    1.  Study limited by motion artifact.    2.  No enhancing lesions.    3.  Straightening of the normal cervical lordosis may be secondary to   patient positioning or muscle spasm.    4.  C4-C5 and C5-C6; disc osteophyte complexes with compression of the   thecal sac.    5.  C6-C7; disc osteophyte complex with compression of the thecal sac,   degenerative changes, and bilateral foraminal narrowing.    < end of copied text >        PHYSICAL EXAM:  CONSTITUTIONAL: No acute distress, AAOx3  HEAD: Atraumatic, normocephalic  EYES: EOM intact, PERRLA, conjunctiva and sclera clear  ENT: moist mucous membranes  PULMONARY: Clear to auscultation bilaterally  CARDIOVASCULAR: Regular rate and rhythm  GASTROINTESTINAL: Soft, non-tender, non-distended; bowel sounds present  MUSCULOSKELETAL: no edema  NEUROLOGY: non-focal  SKIN: warm and dry     MIKAEL MCDERMOTT 52y Male  MRN#: [id number]   Hospital Day: 18d    SUBJECTIVE  Patient is a 52y old Male who presents with a chief complaint of Hypoxia (28 Oct 2022 13:17)  Currently admitted to medicine with the primary diagnosis of Pneumonia      INTERVAL HPI AND OVERNIGHT EVENTS:  Patient was examined and seen at bedside. This morning he is resting comfortably in bed and reports no issues or overnight events.    OBJECTIVE  PAST MEDICAL & SURGICAL HISTORY  IV drug abuse    Vertebral osteomyelitis    MSSA bacteremia    No significant past surgical history      ALLERGIES:  No Known Allergies    MEDICATIONS:  STANDING MEDICATIONS  apixaban 10 milliGRAM(s) Oral every 12 hours  buPROPion XL (24-Hour) . 300 milliGRAM(s) Oral daily  ceFAZolin   IVPB 2000 milliGRAM(s) IV Intermittent every 8 hours  chlorhexidine 2% Cloths 1 Application(s) Topical daily  folic acid 1 milliGRAM(s) Oral daily  magnesium oxide 400 milliGRAM(s) Oral once  magnesium sulfate  IVPB 2 Gram(s) IV Intermittent once  methadone    Tablet 135 milliGRAM(s) Oral <User Schedule>  multivitamin 1 Tablet(s) Oral daily  nicotine - 21 mG/24Hr(s) Patch 1 Patch Transdermal daily  pantoprazole    Tablet 40 milliGRAM(s) Oral before breakfast  QUEtiapine 200 milliGRAM(s) Oral at bedtime  thiamine 100 milliGRAM(s) Oral daily    PRN MEDICATIONS  acetaminophen     Tablet .. 650 milliGRAM(s) Oral every 6 hours PRN  aluminum hydroxide/magnesium hydroxide/simethicone Suspension 30 milliLiter(s) Oral every 4 hours PRN  cyclobenzaprine 10 milliGRAM(s) Oral three times a day PRN  haloperidol    Injectable 5 milliGRAM(s) IntraMuscular every 8 hours PRN  melatonin 3 milliGRAM(s) Oral at bedtime PRN  ondansetron Injectable 4 milliGRAM(s) IV Push every 8 hours PRN      VITAL SIGNS: Last 24 Hours  T(C): 36.7 (29 Oct 2022 05:00), Max: 36.8 (28 Oct 2022 21:20)  T(F): 98 (29 Oct 2022 05:00), Max: 98.3 (28 Oct 2022 21:20)  HR: 72 (29 Oct 2022 05:00) (72 - 80)  BP: 97/66 (29 Oct 2022 05:00) (94/57 - 113/58)  BP(mean): --  RR: 19 (29 Oct 2022 05:00) (18 - 19)  SpO2: 95% (28 Oct 2022 21:20) (95% - 95%)    LABS:                        11.1   6.57  )-----------( 417      ( 29 Oct 2022 08:01 )             36.7     10-29    140  |  98  |  11  ----------------------------<  104<H>  4.4   |  28  |  0.7    Ca    9.3      29 Oct 2022 08:01  Mg     1.7     10-29    RADIOLOGY:  < from: Xray Chest 1 View- PORTABLE-Routine (Xray Chest 1 View- PORTABLE-Routine in AM.) (10.23.22 @ 06:18) >  Impression:  Low lung volume/lordotic view without definite evidence of focal   consolidation pleural effusion or pneumothorax.    < end of copied text >  < from: MR Thoracic Spine w/wo IV Cont (10.22.22 @ 20:40) >  IMPRESSION:    In comparison with the prior MRI of the thoracic spine dated August 12, 2022:    Current examination is limited by motion artifact.    Interval development of a right pleural effusion which may be loculated   and a right basilar opacity.    No enhancing lesions.    < end of copied text >  < from: MR Cervical Spine w/wo IV Cont (10.22.22 @ 20:40) >  IMPRESSION:    1.  Study limited by motion artifact.    2.  No enhancing lesions.    3.  Straightening of the normal cervical lordosis may be secondary to   patient positioning or muscle spasm.    4.  C4-C5 and C5-C6; disc osteophyte complexes with compression of the   thecal sac.    5.  C6-C7; disc osteophyte complex with compression of the thecal sac,   degenerative changes, and bilateral foraminal narrowing.    < end of copied text >        PHYSICAL EXAM:  CONSTITUTIONAL: No acute distress, AAOx3  HEAD: Atraumatic, normocephalic  EYES: EOM intact, PERRLA, conjunctiva and sclera clear  ENT: moist mucous membranes  PULMONARY: Clear to auscultation bilaterally  CARDIOVASCULAR: Regular rate and rhythm  GASTROINTESTINAL: Soft, non-tender, non-distended; bowel sounds present  MUSCULOSKELETAL: no edema  NEUROLOGY: non-focal  SKIN: warm and dry    ASSESSMENT and PLAN    Patient is a 52y old Male PMHx of IVDU (heroin), vertebral osteomyelitis s/p debridement, and MSSA bacteremia, who presents from a rehab facility with a chief complaint of Hypoxia and new onset LE edema. In the ED was, found to have b/l PEs with radiographic evidence of R heart strain and admitted to ICU, found to have LE DVTs s/p LLE embolectomy 10/18. Currently on IV antibiotics, pending discharge authorization.     #Acute hypoxemic respiratory failure / Acute Submassive PE  - resolved  - Suspected superimposed PNA  - DIONICIO/OHS, noncompliant with NIV  - TTE: EF 55%, G1DD, mild TVR, no right heart strain  -VA duplx LE vein: Acute thrombus within the left common femoral, femoral, deep femoral, popliteal, gastrocnemius, posterior tibial and anterior tibial veins, s/p LLE embolectomy 10/18  - Therapeutic Lovenox, transitioned to Eliquis.   - s/p Cefepime, now on  Cefazolin 2g q8h IV  - PT evaluated and recommends sub-acut rehab or home PT on DC    # Recurrent MSSA bacteremia with Discitis   - Recent Spinal fusion  - Abnormal L4-L5 signal on MRI suggestive of  Epidural phlegmon/OM  - No acute surgical intervention needed per Neurosurgery   - C/w Cefazolin for 3 weeks (11/17) per ID  - Sterile 18 gauge powerline Mideline placed to continue IV antibiotics at subacute rehab.   - Outpatient Neurosurgery referral      #HO IVDA, Opioid Abuse on Methadone   - improving, followed by psych: can resume Quetiapine 200 mg PO HS  - Continue with Xanax 1 mg po am, 1mg po afternoon and two times at night - total daily dose of 4mg  - Continue Wellbutrin Xl 300mg po q 24hrs  - Continue home dose Methadone 135mg po daily---> QTc 433 today 10/20  - For agitation, haldol 5mg po/IM every 8hrs prn    #Disposition  - Sub-acute Rehab, pending authorization    #MISC  - No need for daily CBC/BMP, just check daily mag and replenish as needed.  - Mag replenished today.

## 2022-10-30 LAB
ANION GAP SERPL CALC-SCNC: 12 MMOL/L — SIGNIFICANT CHANGE UP (ref 7–14)
BUN SERPL-MCNC: 12 MG/DL — SIGNIFICANT CHANGE UP (ref 10–20)
CALCIUM SERPL-MCNC: 9.1 MG/DL — SIGNIFICANT CHANGE UP (ref 8.4–10.5)
CHLORIDE SERPL-SCNC: 103 MMOL/L — SIGNIFICANT CHANGE UP (ref 98–110)
CO2 SERPL-SCNC: 30 MMOL/L — SIGNIFICANT CHANGE UP (ref 17–32)
CREAT SERPL-MCNC: 0.7 MG/DL — SIGNIFICANT CHANGE UP (ref 0.7–1.5)
EGFR: 110 ML/MIN/1.73M2 — SIGNIFICANT CHANGE UP
GLUCOSE BLDC GLUCOMTR-MCNC: 162 MG/DL — HIGH (ref 70–99)
GLUCOSE BLDC GLUCOMTR-MCNC: 164 MG/DL — HIGH (ref 70–99)
GLUCOSE BLDC GLUCOMTR-MCNC: 180 MG/DL — HIGH (ref 70–99)
GLUCOSE BLDC GLUCOMTR-MCNC: 185 MG/DL — HIGH (ref 70–99)
GLUCOSE SERPL-MCNC: 116 MG/DL — HIGH (ref 70–99)
MAGNESIUM SERPL-MCNC: 1.7 MG/DL — LOW (ref 1.8–2.4)
POTASSIUM SERPL-MCNC: 4.6 MMOL/L — SIGNIFICANT CHANGE UP (ref 3.5–5)
POTASSIUM SERPL-SCNC: 4.6 MMOL/L — SIGNIFICANT CHANGE UP (ref 3.5–5)
SODIUM SERPL-SCNC: 145 MMOL/L — SIGNIFICANT CHANGE UP (ref 135–146)

## 2022-10-30 PROCEDURE — 99232 SBSQ HOSP IP/OBS MODERATE 35: CPT

## 2022-10-30 RX ORDER — MAGNESIUM OXIDE 400 MG ORAL TABLET 241.3 MG
400 TABLET ORAL EVERY 6 HOURS
Refills: 0 | Status: COMPLETED | OUTPATIENT
Start: 2022-10-30 | End: 2022-10-31

## 2022-10-30 RX ADMIN — Medication 100 MILLIGRAM(S): at 13:49

## 2022-10-30 RX ADMIN — Medication 1 MILLIGRAM(S): at 13:46

## 2022-10-30 RX ADMIN — MAGNESIUM OXIDE 400 MG ORAL TABLET 400 MILLIGRAM(S): 241.3 TABLET ORAL at 23:04

## 2022-10-30 RX ADMIN — CHLORHEXIDINE GLUCONATE 1 APPLICATION(S): 213 SOLUTION TOPICAL at 12:25

## 2022-10-30 RX ADMIN — Medication 100 MILLIGRAM(S): at 22:07

## 2022-10-30 RX ADMIN — BUPROPION HYDROCHLORIDE 300 MILLIGRAM(S): 150 TABLET, EXTENDED RELEASE ORAL at 12:15

## 2022-10-30 RX ADMIN — Medication 1 PATCH: at 19:21

## 2022-10-30 RX ADMIN — APIXABAN 10 MILLIGRAM(S): 2.5 TABLET, FILM COATED ORAL at 17:08

## 2022-10-30 RX ADMIN — APIXABAN 10 MILLIGRAM(S): 2.5 TABLET, FILM COATED ORAL at 06:05

## 2022-10-30 RX ADMIN — Medication 1 PATCH: at 12:26

## 2022-10-30 RX ADMIN — QUETIAPINE FUMARATE 200 MILLIGRAM(S): 200 TABLET, FILM COATED ORAL at 22:04

## 2022-10-30 RX ADMIN — Medication 100 MILLIGRAM(S): at 12:15

## 2022-10-30 RX ADMIN — Medication 2 MILLIGRAM(S): at 22:03

## 2022-10-30 RX ADMIN — Medication 1 MILLIGRAM(S): at 12:15

## 2022-10-30 RX ADMIN — MAGNESIUM OXIDE 400 MG ORAL TABLET 400 MILLIGRAM(S): 241.3 TABLET ORAL at 17:08

## 2022-10-30 RX ADMIN — Medication 100 MILLIGRAM(S): at 06:04

## 2022-10-30 RX ADMIN — PANTOPRAZOLE SODIUM 40 MILLIGRAM(S): 20 TABLET, DELAYED RELEASE ORAL at 06:05

## 2022-10-30 RX ADMIN — METHADONE HYDROCHLORIDE 135 MILLIGRAM(S): 40 TABLET ORAL at 12:14

## 2022-10-30 RX ADMIN — Medication 1 MILLIGRAM(S): at 06:04

## 2022-10-30 RX ADMIN — Medication 1 TABLET(S): at 12:15

## 2022-10-30 NOTE — PROGRESS NOTE ADULT - SUBJECTIVE AND OBJECTIVE BOX
MIKAEL MCDERMOTT  [facility] T2-3A 022 A ([facility] T[facility])      Patient was evaluated and examined  by bedside, no active complains         REVIEW OF SYSTEMS:  please see pertinent positives mentioned above, all other 12 ROS negative      T(C): , Max: 36.6 (10-30-22 @ 05:12)  HR: 80 (10-30-22 @ 05:12)  BP: 112/56 (10-30-22 @ 05:12)  RR: 18 (10-30-22 @ 05:12)  SpO2: 93% (10-30-22 @ 05:12)  CAPILLARY BLOOD GLUCOSE      POCT Blood Glucose.: 185 mg/dL (30 Oct 2022 12:02)  POCT Blood Glucose.: 164 mg/dL (30 Oct 2022 07:36)  POCT Blood Glucose.: 128 mg/dL (29 Oct 2022 22:22)  POCT Blood Glucose.: 150 mg/dL (29 Oct 2022 16:15)      PHYSICAL EXAM:  General: NAD, AAOX3, patient is laying comfortably in bed, obese  HEENT: AT, NC, Supple, NO JVD, NO CB  Lungs: CTA B/L, no wheezing, no rhonchi  CVS: normal S1, S2, RRR, NO M/G/R  Abdomen: soft, bowel sounds present, non-tender, non-distended  Extremities: chronic distal upper ext contractures, left lower ext. small pitting edema present, no clubbing, no cyanosis, positive peripheral pulses b/l  Neuro: no acute focal neurological deficits, difficulty with ambulation  Skin: no rash, no ecchymosis        LAB  CBC  Date: 10-29-22 @ 08:01  Mean cell Svfleafmkj38.9  Mean cell Hemoglobin Conc30.2  Mean cell Volum 92.2  Platelet count-Automate 417  RBC Count 3.98  Red Cell Distrib Width16.4  WBC Count6.57  % Albumin, Urine--  Hematocrit 36.7  Hemoglobin 11.1  CBC  Date: 10-28-22 @ 07:56  Mean cell Totckczdny23.6  Mean cell Hemoglobin Conc29.9  Mean cell Volum 92.4  Platelet count-Automate 396  RBC Count 3.69  Red Cell Distrib Width16.5  WBC Count6.41  % Albumin, Urine--  Hematocrit 34.1  Hemoglobin 10.2  CBC  Date: 10-27-22 @ 22:40  Mean cell Vwwhmphygo59.9  Mean cell Hemoglobin Conc30.6  Mean cell Volum 91.0  Platelet count-Automate 413  RBC Count 3.77  Red Cell Distrib Width16.3  WBC Count6.79  % Albumin, Urine--  Hematocrit 34.3  Hemoglobin 10.5          BMP  10-30-22 @ 06:00  Blood Gas Arterial-Calcium,Ionized--  Blood Urea Nitrogen, Serum 12 mg/dL [10 - 20]  Carbon Dioxide, Serum30 mmol/L [17 - 32]  Chloride, Tulsm596 mmol/L [98 - 110]  Creatinie, Serum0.7 mg/dL [0.7 - 1.5]  Glucose, Qbytg717 mg/dL<H> [70 - 99]  Potassium, Serum4.6 mmol/L [3.5 - 5.0]  Sodium, Serum 145 mmol/L [135 - 146]  BMP  10-29-22 @ 08:01  Blood Gas Arterial-Calcium,Ionized--  Blood Urea Nitrogen, Serum 11 mg/dL [10 - 20]  Carbon Dioxide, Serum28 mmol/L [17 - 32]  Chloride, Serum98 mmol/L [98 - 110]  Creatinie, Serum0.7 mg/dL [0.7 - 1.5]  Glucose, Bdfkt478 mg/dL<H> [70 - 99]  Potassium, Serum4.4 mmol/L [3.5 - 5.0]  Sodium, Serum 140 mmol/L [135 - 146]        Microbiology:    Culture - Blood (collected 10-12-22 @ 06:50)  Source: .Blood Blood  Final Report (10-17-22 @ 18:00):    No Growth Final      Medications:  acetaminophen     Tablet .. 650 milliGRAM(s) Oral every 6 hours PRN  ALPRAZolam 1 milliGRAM(s) Oral <User Schedule>  ALPRAZolam 2 milliGRAM(s) Oral at bedtime  aluminum hydroxide/magnesium hydroxide/simethicone Suspension 30 milliLiter(s) Oral every 4 hours PRN  apixaban 10 milliGRAM(s) Oral every 12 hours  buPROPion XL (24-Hour) . 300 milliGRAM(s) Oral daily  ceFAZolin   IVPB 2000 milliGRAM(s) IV Intermittent every 8 hours  chlorhexidine 2% Cloths 1 Application(s) Topical daily  cyclobenzaprine 10 milliGRAM(s) Oral three times a day PRN  folic acid 1 milliGRAM(s) Oral daily  haloperidol    Injectable 5 milliGRAM(s) IntraMuscular every 8 hours PRN  magnesium oxide 400 milliGRAM(s) Oral once  magnesium oxide 400 milliGRAM(s) Oral every 6 hours  melatonin 3 milliGRAM(s) Oral at bedtime PRN  methadone    Tablet 135 milliGRAM(s) Oral <User Schedule>  multivitamin 1 Tablet(s) Oral daily  nicotine - 21 mG/24Hr(s) Patch 1 Patch Transdermal daily  ondansetron Injectable 4 milliGRAM(s) IV Push every 8 hours PRN  pantoprazole    Tablet 40 milliGRAM(s) Oral before breakfast  QUEtiapine 200 milliGRAM(s) Oral at bedtime  thiamine 100 milliGRAM(s) Oral daily        Assessment and Plan:  Patient is a 53 y/o male with PMH of IVDU (heroin), vertebral osteomyelitis s/p debridement, and MSSA bacteremia, presenting from rehab facility for "low oxygen" and new onset LE edema. In the ED was found to be hypoxic, found to have b/l PEs with radiographic evidence of R heart strain and admitted to ICU, found to have LE DVTs s/p LLE embolectomy 10/18,  downgraded to SDU and currently downgraded to medical unit.     Acute hypoxemic respiratory failure - resolved  Acute Submassive PE  Suspected superimposed PNA  DIONICIO/OHS, noncompliant with NIV  -CT on admission with bilateral submassive PEs + concern for RV strain  -TTE: EF 55%, G1DD, mild TVR, no right heart strain  -VA duplx LE vein: Acute thrombus within the left common femoral, femoral, deep femoral, popliteal, gastrocnemius, posterior tibial and anterior tibial veins, s/p LLE embolectomy 10/18  - initially started on therapeutic Lovenox, on 10/26 transitioned to PO Eliquis tx.   -blood cx 10/12 - no growth  - ID on board s/p Cefepime, now on  Cefazolin 2g q8h IV, monitor outpatient BMP, LFT, CK, ESR, CRP      Hx MSSA bacteremia  Hx Epidural phlegmon/OM  Hx R Psoas Abscess  -Per NSG, pt able to receive MR even with recent spinal fusion  -MR w&w/o CTL spine noted  - abx as above  - f/u outpatient neurosurgery and ID     HO IVDA, Opioid Abuse on Methadone   Agitation    - improving, followed by psych:  resumed on  Quetiapine 200 mg PO HS  -continue Methadone 135 mg po once daily   - Continue with Xanax 1 mg po am, 1mg po afternoon and two times at night - total daily dose of 4mg  Continue Wellbutrin Xl 300mg po q 24hrs  -For agitation, haldol 5mg po/IM every 8hrs prn    Hypomagnesemia- supplemented    DVT proph- patient is on Eliquis    #Progress Note Handoff: continue pt. on IV abx. x 6 weeks, placement to STR, monitor electrolytes, PT tx. , outpt. drug rehab tx.   Family discussion: medical plan of tx. d/w pt. by bedside Disposition: STR once bed is available    Total time spent to complete patient's bedside assessment, review medical chart, discuss medical plan of care with covering medical team was more than 25 minutes with >50% of time spent face to face with patient, discussion with patient/family and/or coordination of care

## 2022-10-31 LAB
GLUCOSE BLDC GLUCOMTR-MCNC: 115 MG/DL — HIGH (ref 70–99)
GLUCOSE BLDC GLUCOMTR-MCNC: 143 MG/DL — HIGH (ref 70–99)
GLUCOSE BLDC GLUCOMTR-MCNC: 192 MG/DL — HIGH (ref 70–99)
MAGNESIUM SERPL-MCNC: 1.5 MG/DL — LOW (ref 1.8–2.4)

## 2022-10-31 PROCEDURE — 99232 SBSQ HOSP IP/OBS MODERATE 35: CPT

## 2022-10-31 RX ORDER — MAGNESIUM SULFATE 500 MG/ML
2 VIAL (ML) INJECTION ONCE
Refills: 0 | Status: COMPLETED | OUTPATIENT
Start: 2022-10-31 | End: 2022-10-31

## 2022-10-31 RX ORDER — MAGNESIUM OXIDE 400 MG ORAL TABLET 241.3 MG
400 TABLET ORAL
Refills: 0 | Status: DISCONTINUED | OUTPATIENT
Start: 2022-10-31 | End: 2022-11-04

## 2022-10-31 RX ADMIN — Medication 2 MILLIGRAM(S): at 21:09

## 2022-10-31 RX ADMIN — PANTOPRAZOLE SODIUM 40 MILLIGRAM(S): 20 TABLET, DELAYED RELEASE ORAL at 05:43

## 2022-10-31 RX ADMIN — MAGNESIUM OXIDE 400 MG ORAL TABLET 400 MILLIGRAM(S): 241.3 TABLET ORAL at 05:43

## 2022-10-31 RX ADMIN — QUETIAPINE FUMARATE 200 MILLIGRAM(S): 200 TABLET, FILM COATED ORAL at 21:09

## 2022-10-31 RX ADMIN — Medication 1 TABLET(S): at 11:30

## 2022-10-31 RX ADMIN — Medication 1 MILLIGRAM(S): at 14:09

## 2022-10-31 RX ADMIN — APIXABAN 10 MILLIGRAM(S): 2.5 TABLET, FILM COATED ORAL at 05:43

## 2022-10-31 RX ADMIN — BUPROPION HYDROCHLORIDE 300 MILLIGRAM(S): 150 TABLET, EXTENDED RELEASE ORAL at 11:31

## 2022-10-31 RX ADMIN — Medication 100 MILLIGRAM(S): at 11:31

## 2022-10-31 RX ADMIN — Medication 1 MILLIGRAM(S): at 11:30

## 2022-10-31 RX ADMIN — Medication 25 GRAM(S): at 11:37

## 2022-10-31 RX ADMIN — Medication 100 MILLIGRAM(S): at 05:42

## 2022-10-31 RX ADMIN — Medication 1 PATCH: at 18:55

## 2022-10-31 RX ADMIN — CHLORHEXIDINE GLUCONATE 1 APPLICATION(S): 213 SOLUTION TOPICAL at 11:31

## 2022-10-31 RX ADMIN — Medication 100 MILLIGRAM(S): at 21:11

## 2022-10-31 RX ADMIN — APIXABAN 10 MILLIGRAM(S): 2.5 TABLET, FILM COATED ORAL at 18:10

## 2022-10-31 RX ADMIN — MAGNESIUM OXIDE 400 MG ORAL TABLET 400 MILLIGRAM(S): 241.3 TABLET ORAL at 18:11

## 2022-10-31 RX ADMIN — Medication 1 PATCH: at 17:07

## 2022-10-31 RX ADMIN — Medication 1 MILLIGRAM(S): at 05:45

## 2022-10-31 RX ADMIN — Medication 1 PATCH: at 11:30

## 2022-10-31 RX ADMIN — METHADONE HYDROCHLORIDE 135 MILLIGRAM(S): 40 TABLET ORAL at 11:37

## 2022-10-31 RX ADMIN — MAGNESIUM OXIDE 400 MG ORAL TABLET 400 MILLIGRAM(S): 241.3 TABLET ORAL at 11:31

## 2022-10-31 RX ADMIN — Medication 100 MILLIGRAM(S): at 14:09

## 2022-10-31 NOTE — PROGRESS NOTE ADULT - SUBJECTIVE AND OBJECTIVE BOX
MIKAEL MCDERMOTT 52y Male  MRN#: [id number]   Hospital Day: 20d    SUBJECTIVE  Patient is a 52y old Male who presents with a chief complaint of Hypoxia (31 Oct 2022 13:12)  Currently admitted to medicine with the primary diagnosis of Pneumonia      INTERVAL HPI AND OVERNIGHT EVENTS:  Patient was examined and seen at bedside. This morning he is resting comfortably in bed and reports no issues or overnight events.    OBJECTIVE  PAST MEDICAL & SURGICAL HISTORY  IV drug abuse    Vertebral osteomyelitis    MSSA bacteremia    No significant past surgical history      ALLERGIES:  No Known Allergies    MEDICATIONS:  STANDING MEDICATIONS  ALPRAZolam 1 milliGRAM(s) Oral <User Schedule>  ALPRAZolam 2 milliGRAM(s) Oral at bedtime  apixaban 10 milliGRAM(s) Oral every 12 hours  buPROPion XL (24-Hour) . 300 milliGRAM(s) Oral daily  ceFAZolin   IVPB 2000 milliGRAM(s) IV Intermittent every 8 hours  chlorhexidine 2% Cloths 1 Application(s) Topical daily  folic acid 1 milliGRAM(s) Oral daily  magnesium oxide 400 milliGRAM(s) Oral once  methadone    Tablet 135 milliGRAM(s) Oral <User Schedule>  multivitamin 1 Tablet(s) Oral daily  nicotine - 21 mG/24Hr(s) Patch 1 Patch Transdermal daily  pantoprazole    Tablet 40 milliGRAM(s) Oral before breakfast  QUEtiapine 200 milliGRAM(s) Oral at bedtime  thiamine 100 milliGRAM(s) Oral daily    PRN MEDICATIONS  acetaminophen     Tablet .. 650 milliGRAM(s) Oral every 6 hours PRN  aluminum hydroxide/magnesium hydroxide/simethicone Suspension 30 milliLiter(s) Oral every 4 hours PRN  cyclobenzaprine 10 milliGRAM(s) Oral three times a day PRN  haloperidol    Injectable 5 milliGRAM(s) IntraMuscular every 8 hours PRN  melatonin 3 milliGRAM(s) Oral at bedtime PRN  ondansetron Injectable 4 milliGRAM(s) IV Push every 8 hours PRN      VITAL SIGNS: Last 24 Hours  T(C): 36.1 (31 Oct 2022 05:00), Max: 37.8 (30 Oct 2022 14:50)  T(F): 97 (31 Oct 2022 05:00), Max: 100.1 (30 Oct 2022 14:50)  HR: 78 (31 Oct 2022 05:00) (78 - 86)  BP: 97/55 (31 Oct 2022 05:00) (93/62 - 121/62)  BP(mean): --  RR: 18 (31 Oct 2022 05:00) (18 - 18)  SpO2: 95% (31 Oct 2022 05:00) (92% - 95%)    LABS:    10-30    145  |  103  |  12  ----------------------------<  116<H>  4.6   |  30  |  0.7    Ca    9.1      30 Oct 2022 06:00  Mg     1.5     10-31        RADIOLOGY:  < from: Xray Chest 1 View- PORTABLE-Routine (Xray Chest 1 View- PORTABLE-Routine in AM.) (10.23.22 @ 06:18) >    Impression:  Low lung volume/lordotic view without definite evidence of focal   consolidation pleural effusion or pneumothorax.    < end of copied text >  < from: MR Cervical Spine w/wo IV Cont (10.22.22 @ 20:40) >  MPRESSION:    1.  Study limited by motion artifact.    2.  No enhancing lesions.    3.  Straightening of the normal cervical lordosis may be secondary to   patient positioning or muscle spasm.    4.  C4-C5 and C5-C6; disc osteophyte complexes with compression of the   thecal sac.    5.  C6-C7; disc osteophyte complex with compression of the thecal sac,   degenerative changes, and bilateral foraminal narrowing.      < end of copied text >      PHYSICAL EXAM:  CONSTITUTIONAL: No acute distress, morbidly obese .AAOx3  HEAD: Atraumatic, normocephalic  EYES: EOM intact, PERRLA, conjunctiva and sclera clear  ENT: moist mucous membranes  PULMONARY: Clear to auscultation bilaterally  CARDIOVASCULAR: Regular rate and rhythm  GASTROINTESTINAL: Soft, non-tender, non-distended; bowel sounds present  MUSCULOSKELETAL: no edema  NEUROLOGY: non-focal  SKIN: warm and dry    ASSESSMENT and PLAN    Patient is a 52y old Male PMHx of IVDU (heroin), vertebral osteomyelitis s/p debridement, and MSSA bacteremia, who presents from a rehab facility with a chief complaint of Hypoxia and new onset LE edema. In the ED was, found to have b/l PEs with radiographic evidence of R heart strain and admitted to ICU, found to have LE DVTs s/p LLE embolectomy 10/18. Currently on IV antibiotics, pending discharge authorization.     #Acute hypoxemic respiratory failure / Acute Submassive PE  - resolved  - Suspected superimposed PNA  - DIONICIO/OHS, noncompliant with NIV  - TTE: EF 55%, G1DD, mild TVR, no right heart strain  -VA duplx LE vein: Acute thrombus within the left common femoral, femoral, deep femoral, popliteal, gastrocnemius, posterior tibial and anterior tibial veins, s/p LLE embolectomy 10/18  - Therapeutic Lovenox, transitioned to Eliquis.   - s/p Cefepime, now on  Cefazolin 2g q8h IV  - PT evaluated and recommends sub-acut rehab or home PT on DC    # Recurrent MSSA bacteremia with Discitis   - Recent Spinal fusion  - Abnormal L4-L5 signal on MRI suggestive of  Epidural phlegmon/OM  - No acute surgical intervention needed per Neurosurgery   - C/w Cefazolin for 3 weeks (11/17) per ID  - Sterile 18 gauge powerline Mideline placed to continue IV antibiotics at subacute rehab.   - Outpatient Neurosurgery referral      #HO DARLINDA, Opioid Abuse on Methadone   - improving, followed by psych: can resume Quetiapine 200 mg PO HS  - Continue with Xanax 1 mg po am, 1mg po afternoon and two times at night - total daily dose of 4mg  - Continue Wellbutrin Xl 300mg po q 24hrs  - Continue home dose Methadone 135mg po daily---> QTc 433 today 10/20  - For agitation, haldol 5mg po/IM every 8hrs prn    #Disposition  - Sub-acute Rehab, pending authorization    #MISC  - No need for daily CBC/BMP, just check daily mag and replenish as needed.  - Mag replenished today.

## 2022-10-31 NOTE — PROGRESS NOTE ADULT - SUBJECTIVE AND OBJECTIVE BOX
MIKAEL MCDERMOTT  [facility] T2-3A 022 A ([facility] T2-3A)      Patient was evaluated and examined  by bedside, no active complains         REVIEW OF SYSTEMS:  please see pertinent positives mentioned above, all other 12 ROS negative      T(C): , Max: 37.8 (10-30-22 @ 14:50)  HR: 78 (10-31-22 @ 05:00)  BP: 97/55 (10-31-22 @ 05:00)  RR: 18 (10-31-22 @ 05:00)  SpO2: 95% (10-31-22 @ 05:00)  CAPILLARY BLOOD GLUCOSE      POCT Blood Glucose.: 192 mg/dL (31 Oct 2022 11:13)  POCT Blood Glucose.: 115 mg/dL (31 Oct 2022 07:52)  POCT Blood Glucose.: 162 mg/dL (30 Oct 2022 22:05)  POCT Blood Glucose.: 180 mg/dL (30 Oct 2022 16:29)      PHYSICAL EXAM:  General: NAD, AAOX3, patient is laying comfortably in bed, obese  HEENT: AT, NC, Supple, NO JVD, NO CB  Lungs: CTA B/L, no wheezing, no rhonchi  CVS: normal S1, S2, RRR, NO M/G/R  Abdomen: soft, bowel sounds present, non-tender, non-distended  Extremities: chronic distal upper ext contractures, left lower ext. small pitting edema present, no clubbing, no cyanosis, positive peripheral pulses b/l  Neuro: no acute focal neurological deficits, difficulty with ambulation  Skin: no rash, no ecchymosis      LAB  CBC  Date: 10-29-22 @ 08:01  Mean cell Lcuphwkpto21.9  Mean cell Hemoglobin Conc30.2  Mean cell Volum 92.2  Platelet count-Automate 417  RBC Count 3.98  Red Cell Distrib Width16.4  WBC Count6.57  % Albumin, Urine--  Hematocrit 36.7  Hemoglobin 11.1  CBC  Date: 10-28-22 @ 07:56  Mean cell Nrahqivtud17.6  Mean cell Hemoglobin Conc29.9  Mean cell Volum 92.4  Platelet count-Automate 396  RBC Count 3.69  Red Cell Distrib Width16.5  WBC Count6.41  % Albumin, Urine--  Hematocrit 34.1  Hemoglobin 10.2  CBC  Date: 10-27-22 @ 22:40  Mean cell Dpicyapdrx21.9  Mean cell Hemoglobin Conc30.6  Mean cell Volum 91.0  Platelet count-Automate 413  RBC Count 3.77  Red Cell Distrib Width16.3  WBC Count6.79  % Albumin, Urine--  Hematocrit 34.3  Hemoglobin 10.5  CBC  Date: 10-26-22 @ 07:56  Mean cell Fozuxehkpw90.8  Mean cell Hemoglobin Conc30.6  Mean cell Volum 91.1  Platelet count-Automate 376  RBC Count 3.70  Red Cell Distrib Width16.2  WBC Count6.97  % Albumin, Urine--  Hematocrit 33.7  Hemoglobin 10.3  CBC  Date: 10-25-22 @ 07:40  Mean cell Efmcuumkkl13.1  Mean cell Hemoglobin Conc31.2  Mean cell Volum 90.2  Platelet count-Automate 368  RBC Count 3.56  Red Cell Distrib Width16.4  WBC Count7.11  % Albumin, Urine--  Hematocrit 32.1  Hemoglobin 10.0    [facility]  10-30-22 @ 06:00  Blood Gas Arterial-Calcium,Ionized--  Blood Urea Nitrogen, Serum 12 mg/dL [10 - 20]  Carbon Dioxide, Serum30 mmol/L [17 - 32]  Chloride, Gvfix087 mmol/L [98 - 110]  Creatinie, Serum0.7 mg/dL [0.7 - 1.5]  Glucose, Kxqqf296 mg/dL<H> [70 - 99]  Potassium, Serum4.6 mmol/L [3.5 - 5.0]  Sodium, Serum 145 mmol/L [135 - 146]  [facility]  10-29-22 @ 08:01  Blood Gas Arterial-Calcium,Ionized--  Blood Urea Nitrogen, Serum 11 mg/dL [10 - 20]  Carbon Dioxide, Serum28 mmol/L [17 - 32]  Chloride, Serum98 mmol/L [98 - 110]  Creatinie, Serum0.7 mg/dL [0.7 - 1.5]  Glucose, Jjdol363 mg/dL<H> [70 - 99]  Potassium, Serum4.4 mmol/L [3.5 - 5.0]  Sodium, Serum 140 mmol/L [135 - 146]  [facility]  10-28-22 @ 07:56  Blood Gas Arterial-Calcium,Ionized--  Blood Urea Nitrogen, Serum 12 mg/dL [10 - 20]  Carbon Dioxide, Serum28 mmol/L [17 - 32]  Chloride, Jtupu172 mmol/L [98 - 110]  Creatinie, Serum0.7 mg/dL [0.7 - 1.5]  Glucose, Bjbbr932 mg/dL<H> [70 - 99]  Potassium, Serum4.4 mmol/L [3.5 - 5.0]  Sodium, Serum 143 mmol/L [135 - 146]  BMP  10-27-22 @ 22:40  Blood Gas Arterial-Calcium,Ionized--  Blood Urea Nitrogen, Serum 12 mg/dL [10 - 20]  Carbon Dioxide, Serum25 mmol/L [17 - 32]  Chloride, Xsldu230 mmol/L [98 - 110]  Creatinie, Serum0.8 mg/dL [0.7 - 1.5]  Glucose, Qqczh856 mg/dL<H> [70 - 99]  Potassium, Serum4.7 mmol/L [3.5 - 5.0] [Slighty Hemolyzed use with Caution]  Sodium, Serum 139 mmol/L [135 - 146]              Microbiology:    Culture - Blood (collected 10-12-22 @ 06:50)  Source: .Blood Blood  Final Report (10-17-22 @ 18:00):    No Growth Final        Medications:  acetaminophen     Tablet .. 650 milliGRAM(s) Oral every 6 hours PRN  ALPRAZolam 1 milliGRAM(s) Oral <User Schedule>  ALPRAZolam 2 milliGRAM(s) Oral at bedtime  aluminum hydroxide/magnesium hydroxide/simethicone Suspension 30 milliLiter(s) Oral every 4 hours PRN  apixaban 10 milliGRAM(s) Oral every 12 hours  buPROPion XL (24-Hour) . 300 milliGRAM(s) Oral daily  ceFAZolin   IVPB 2000 milliGRAM(s) IV Intermittent every 8 hours  chlorhexidine 2% Cloths 1 Application(s) Topical daily  cyclobenzaprine 10 milliGRAM(s) Oral three times a day PRN  folic acid 1 milliGRAM(s) Oral daily  haloperidol    Injectable 5 milliGRAM(s) IntraMuscular every 8 hours PRN  magnesium oxide 400 milliGRAM(s) Oral once  melatonin 3 milliGRAM(s) Oral at bedtime PRN  methadone    Tablet 135 milliGRAM(s) Oral <User Schedule>  multivitamin 1 Tablet(s) Oral daily  nicotine - 21 mG/24Hr(s) Patch 1 Patch Transdermal daily  ondansetron Injectable 4 milliGRAM(s) IV Push every 8 hours PRN  pantoprazole    Tablet 40 milliGRAM(s) Oral before breakfast  QUEtiapine 200 milliGRAM(s) Oral at bedtime  thiamine 100 milliGRAM(s) Oral daily        Assessment and Plan:  Patient is a 53 y/o male with PMH of IVDU (heroin), vertebral osteomyelitis s/p debridement, and MSSA bacteremia, presenting from rehab facility for "low oxygen" and new onset LE edema. In the ED was found to be hypoxic, found to have b/l PEs with radiographic evidence of R heart strain and admitted to ICU, found to have LE DVTs s/p LLE embolectomy 10/18,  downgraded to SDU and currently downgraded to medical unit.     Acute hypoxemic respiratory failure - resolved  Acute Submassive PE  Suspected superimposed PNA  DIONICIO/OHS, noncompliant with NIV  -CT on admission with bilateral submassive PEs + concern for RV strain  -TTE: EF 55%, G1DD, mild TVR, no right heart strain  -VA duplx LE vein: Acute thrombus within the left common femoral, femoral, deep femoral, popliteal, gastrocnemius, posterior tibial and anterior tibial veins, s/p LLE embolectomy 10/18  - initially started on therapeutic Lovenox, on 10/26 transitioned to PO Eliquis tx.   -blood cx 10/12 - no growth  - ID on board s/p Cefepime, now on  Cefazolin 2g q8h IV, monitor outpatient BMP, LFT, CK, ESR, CRP      Hx MSSA bacteremia  Hx Epidural phlegmon/OM  Hx R Psoas Abscess  -Per NSG, pt able to receive MR even with recent spinal fusion  -MR w&w/o CTL spine noted  - abx as above  - f/u outpatient neurosurgery and ID     HO IVDA, Opioid Abuse on Methadone   Agitation    - improving, followed by psych:  resumed on  Quetiapine 200 mg PO HS  -continue Methadone 135 mg po once daily   - Continue with Xanax 1 mg po am, 1mg po afternoon and two times at night - total daily dose of 4mg  Continue Wellbutrin Xl 300mg po q 24hrs  -For agitation, haldol 5mg po/IM every 8hrs prn    Hypomagnesemia- supplemented    DVT proph- patient is on Eliquis    #Progress Note Handoff: continue pt. on IV abx. x 6 weeks, placement to STR, monitor electrolytes, PT tx. , outpt. drug rehab tx.   Family discussion: medical plan of tx. d/w pt. by bedside Disposition: STR once bed is available    Total time spent to complete patient's bedside assessment, review medical chart, discuss medical plan of care with covering medical team was more than 25 minutes with >50% of time spent face to face with patient, discussion with patient/family and/or coordination of care

## 2022-11-01 ENCOUNTER — APPOINTMENT (OUTPATIENT)
Dept: OPHTHALMOLOGY | Facility: CLINIC | Age: 52
End: 2022-11-01

## 2022-11-01 LAB
GLUCOSE BLDC GLUCOMTR-MCNC: 118 MG/DL — HIGH (ref 70–99)
GLUCOSE BLDC GLUCOMTR-MCNC: 161 MG/DL — HIGH (ref 70–99)
GLUCOSE BLDC GLUCOMTR-MCNC: 164 MG/DL — HIGH (ref 70–99)
MAGNESIUM SERPL-MCNC: 1.7 MG/DL — LOW (ref 1.8–2.4)

## 2022-11-01 PROCEDURE — 99232 SBSQ HOSP IP/OBS MODERATE 35: CPT

## 2022-11-01 RX ORDER — APIXABAN 2.5 MG/1
5 TABLET, FILM COATED ORAL EVERY 12 HOURS
Refills: 0 | Status: DISCONTINUED | OUTPATIENT
Start: 2022-11-02 | End: 2022-11-17

## 2022-11-01 RX ORDER — APIXABAN 2.5 MG/1
10 TABLET, FILM COATED ORAL ONCE
Refills: 0 | Status: COMPLETED | OUTPATIENT
Start: 2022-11-01 | End: 2022-11-01

## 2022-11-01 RX ADMIN — Medication 100 MILLIGRAM(S): at 13:57

## 2022-11-01 RX ADMIN — Medication 100 MILLIGRAM(S): at 05:28

## 2022-11-01 RX ADMIN — Medication 1 PATCH: at 13:56

## 2022-11-01 RX ADMIN — Medication 1 MILLIGRAM(S): at 14:00

## 2022-11-01 RX ADMIN — APIXABAN 10 MILLIGRAM(S): 2.5 TABLET, FILM COATED ORAL at 05:09

## 2022-11-01 RX ADMIN — Medication 1 MILLIGRAM(S): at 05:09

## 2022-11-01 RX ADMIN — Medication 100 MILLIGRAM(S): at 17:01

## 2022-11-01 RX ADMIN — BUPROPION HYDROCHLORIDE 300 MILLIGRAM(S): 150 TABLET, EXTENDED RELEASE ORAL at 13:57

## 2022-11-01 RX ADMIN — QUETIAPINE FUMARATE 200 MILLIGRAM(S): 200 TABLET, FILM COATED ORAL at 21:05

## 2022-11-01 RX ADMIN — Medication 100 MILLIGRAM(S): at 21:06

## 2022-11-01 RX ADMIN — Medication 1 TABLET(S): at 13:57

## 2022-11-01 RX ADMIN — PANTOPRAZOLE SODIUM 40 MILLIGRAM(S): 20 TABLET, DELAYED RELEASE ORAL at 05:09

## 2022-11-01 RX ADMIN — APIXABAN 10 MILLIGRAM(S): 2.5 TABLET, FILM COATED ORAL at 21:05

## 2022-11-01 RX ADMIN — CHLORHEXIDINE GLUCONATE 1 APPLICATION(S): 213 SOLUTION TOPICAL at 15:27

## 2022-11-01 RX ADMIN — METHADONE HYDROCHLORIDE 135 MILLIGRAM(S): 40 TABLET ORAL at 14:02

## 2022-11-01 RX ADMIN — MAGNESIUM OXIDE 400 MG ORAL TABLET 400 MILLIGRAM(S): 241.3 TABLET ORAL at 17:03

## 2022-11-01 RX ADMIN — Medication 1 PATCH: at 12:15

## 2022-11-01 RX ADMIN — Medication 1 PATCH: at 19:34

## 2022-11-01 RX ADMIN — Medication 2 MILLIGRAM(S): at 21:05

## 2022-11-01 RX ADMIN — Medication 1 MILLIGRAM(S): at 13:58

## 2022-11-01 RX ADMIN — Medication 1 PATCH: at 07:52

## 2022-11-01 NOTE — PROGRESS NOTE ADULT - ASSESSMENT
a/p:  Acute hypoxemic respiratory failure - resolved  Acute Submassive PE with suspected initial Right heart strain  LLE DVT s/p embolization (10/18)  Suspected superimposed PNA  DIONICIO/OHS, noncompliant with NIV  -CT on admission with bilateral submassive PE + concern for RV strain  -TTE: EF 55%, G1DD, mild TVR, no right heart strain  -VA duplx LE vein: Acute thrombus within the left common femoral, femoral, deep femoral, popliteal, gastrocnemius, posterior tibial and anterior tibial veins  -s/p LLE embolectomy 10/18  -continue AC---transitioned to oral NOAC (currently on eliquis 10 mg bid day #7 of 7 and then transition to 5 mg bid eliquis starting tomorrow 11/2)  -patient says he thinks he is supposed to be on chronic long low dose prophylactic ABX ???  -f/u ID recommendations  - cont Cefazolin 2g q8h IV x 6 weeks  -monitor wbc and fever curve  -monitor outpatient BMP, LFT, CK, ESR, CRP  -outpatient sleep study for DIONICIO    #Hx MSSA bacteremia  #Hx Epidural phlegmon/OM  #Hx R Psoas Abscess  -neurosurgery f/u---plan for outaptient f/u  -continue iv abx x 6 weeks minimum---f/u with ID plan for monitoring outpatient labs and abx duration      #HO IVDA, Opioid Abuse on Methadone   #Agitation  - improving, followed by psych:  resumed on  Quetiapine 200 mg PO HS  -continue Methadone 135 mg po once daily   - Continue with Xanax 1 mg po am, 1mg po afternoon and two times at night - total daily dose of 4mg  -Continue Wellbutrin Xl 300mg po q 24hrs  -For agitation, haldol 5mg po/IM every 8hrs prn  -polysubstance use counseling for outpatient services    #Magnesium deficiency  -Mg 1.7 today--suppl Mg and monitor level  -plan for dc on oral mg     DVT/GI ppx  guarded prognosis    FULL CODE    #Progress Note Handoff  Pending (specify):  pending auth and STR placement  Family discussion: d/w patient himself at length  Disposition: SNF__x_    Total time spent to complete patient's bedside assessment, review medical chart, discuss medical plan of care with covering medical team was more than 25 minutes with >50% of time spent face to face with patient, discussion with patient/family and/or coordination of care

## 2022-11-01 NOTE — PROGRESS NOTE ADULT - SUBJECTIVE AND OBJECTIVE BOX
Patient is a 52y old  Male who presents with a chief complaint of Hypoxia (31 Oct 2022 13:30)    HPI:  53yo male     PMHx of IVDU (heroin), vertebral osteomyelitis s/p debridement, and MSSA bacteremia    Presenting from rehab facility for "low oxygen".     Per patient, he is not experiencing any SOB/HARDWICK or CP or pain anywhere on his body and is entirely asymptomatic.  Of note he has recently noticed new LE edema for the past 2 days.     Per EMS, on arrival he was satting in the 70s, they started the patient on 15L via NRB, with improvement of SaO2 to 80s. No other interventions in the field.   (11 Oct 2022 19:41)    PAST MEDICAL & SURGICAL HISTORY:  IV drug abuse  Vertebral osteomyelitis  MSSA bacteremia    patient seen and examined independently on morning rounds for the first time today, chart reviewed and discussed with the medicine resident and on interdisciplinary rounds.    no overnight events--says he feels tired today but otherwise no complaints- awaiting bed and auth for STR placement-     hospital Day #21    Vital Signs Last 24 Hrs  T(C): 37 (01 Nov 2022 05:00), Max: 37 (01 Nov 2022 05:00)  T(F): 98.6 (01 Nov 2022 05:00), Max: 98.6 (01 Nov 2022 05:00)  HR: 85 (01 Nov 2022 05:00) (80 - 85)  BP: 100/61 (01 Nov 2022 05:00) (100/61 - 120/68)  BP(mean): --  RR: 19 (01 Nov 2022 05:00) (18 - 19)  SpO2: 93% (01 Nov 2022 05:00) (93% - 95%)    Parameters below as of 01 Nov 2022 05:00  Patient On (Oxygen Delivery Method): nasal cannula      PE:  GEN-NAD, AAOx3  PULM- Clear to auscultation bilaterally, fair air entry  CVS- +s1/s2 RRR  GI- soft NT ND +bs, no rebound, no guarding  EXT-trace LE edema      Mg     1.7     11-01        MEDICATIONS  (STANDING):  ALPRAZolam 1 milliGRAM(s) Oral <User Schedule>  ALPRAZolam 2 milliGRAM(s) Oral at bedtime  apixaban 10 milliGRAM(s) Oral once  buPROPion XL (24-Hour) . 300 milliGRAM(s) Oral daily  ceFAZolin   IVPB 2000 milliGRAM(s) IV Intermittent every 8 hours  chlorhexidine 2% Cloths 1 Application(s) Topical daily  folic acid 1 milliGRAM(s) Oral daily  magnesium oxide 400 milliGRAM(s) Oral once  magnesium oxide 400 milliGRAM(s) Oral with dinner  methadone    Tablet 135 milliGRAM(s) Oral <User Schedule>  multivitamin 1 Tablet(s) Oral daily  nicotine - 21 mG/24Hr(s) Patch 1 Patch Transdermal daily  pantoprazole    Tablet 40 milliGRAM(s) Oral before breakfast  QUEtiapine 200 milliGRAM(s) Oral at bedtime  thiamine 100 milliGRAM(s) Oral daily

## 2022-11-02 LAB
GLUCOSE BLDC GLUCOMTR-MCNC: 124 MG/DL — HIGH (ref 70–99)
GLUCOSE BLDC GLUCOMTR-MCNC: 163 MG/DL — HIGH (ref 70–99)
GLUCOSE BLDC GLUCOMTR-MCNC: 178 MG/DL — HIGH (ref 70–99)
GLUCOSE BLDC GLUCOMTR-MCNC: 96 MG/DL — SIGNIFICANT CHANGE UP (ref 70–99)
MAGNESIUM SERPL-MCNC: 1.6 MG/DL — LOW (ref 1.8–2.4)

## 2022-11-02 PROCEDURE — 99232 SBSQ HOSP IP/OBS MODERATE 35: CPT

## 2022-11-02 RX ORDER — MAGNESIUM SULFATE 500 MG/ML
2 VIAL (ML) INJECTION ONCE
Refills: 0 | Status: COMPLETED | OUTPATIENT
Start: 2022-11-02 | End: 2022-11-02

## 2022-11-02 RX ADMIN — MAGNESIUM OXIDE 400 MG ORAL TABLET 400 MILLIGRAM(S): 241.3 TABLET ORAL at 17:42

## 2022-11-02 RX ADMIN — Medication 100 MILLIGRAM(S): at 21:00

## 2022-11-02 RX ADMIN — Medication 1 PATCH: at 11:36

## 2022-11-02 RX ADMIN — Medication 1 TABLET(S): at 11:35

## 2022-11-02 RX ADMIN — PANTOPRAZOLE SODIUM 40 MILLIGRAM(S): 20 TABLET, DELAYED RELEASE ORAL at 05:07

## 2022-11-02 RX ADMIN — APIXABAN 5 MILLIGRAM(S): 2.5 TABLET, FILM COATED ORAL at 05:07

## 2022-11-02 RX ADMIN — Medication 25 GRAM(S): at 10:33

## 2022-11-02 RX ADMIN — Medication 1 PATCH: at 06:48

## 2022-11-02 RX ADMIN — Medication 100 MILLIGRAM(S): at 05:07

## 2022-11-02 RX ADMIN — Medication 1 MILLIGRAM(S): at 13:53

## 2022-11-02 RX ADMIN — Medication 1 MILLIGRAM(S): at 11:35

## 2022-11-02 RX ADMIN — Medication 2 MILLIGRAM(S): at 21:00

## 2022-11-02 RX ADMIN — Medication 1 MILLIGRAM(S): at 05:06

## 2022-11-02 RX ADMIN — CHLORHEXIDINE GLUCONATE 1 APPLICATION(S): 213 SOLUTION TOPICAL at 12:32

## 2022-11-02 RX ADMIN — APIXABAN 5 MILLIGRAM(S): 2.5 TABLET, FILM COATED ORAL at 17:42

## 2022-11-02 RX ADMIN — METHADONE HYDROCHLORIDE 135 MILLIGRAM(S): 40 TABLET ORAL at 11:40

## 2022-11-02 RX ADMIN — BUPROPION HYDROCHLORIDE 300 MILLIGRAM(S): 150 TABLET, EXTENDED RELEASE ORAL at 11:35

## 2022-11-02 RX ADMIN — Medication 25 GRAM(S): at 12:39

## 2022-11-02 RX ADMIN — QUETIAPINE FUMARATE 200 MILLIGRAM(S): 200 TABLET, FILM COATED ORAL at 21:00

## 2022-11-02 RX ADMIN — Medication 100 MILLIGRAM(S): at 13:53

## 2022-11-02 RX ADMIN — Medication 100 MILLIGRAM(S): at 11:35

## 2022-11-02 NOTE — PROGRESS NOTE ADULT - SUBJECTIVE AND OBJECTIVE BOX
S: no new events/complaints      All other pertinent ROS negative.      11-01-22 @ 07:01  -  11-02-22 @ 07:00  --------------------------------------------------------  IN: 0 mL / OUT: 600 mL / NET: -600 mL    11-02-22 @ 07:01  -  11-02-22 @ 19:37  --------------------------------------------------------  IN: 450 mL / OUT: 800 mL / NET: -350 mL      Vital Signs Last 24 Hrs  T(C): 36.7 (02 Nov 2022 11:40), Max: 37.2 (02 Nov 2022 04:59)  T(F): 98 (02 Nov 2022 11:40), Max: 98.9 (02 Nov 2022 04:59)  HR: 79 (02 Nov 2022 11:40) (79 - 93)  BP: 101/58 (02 Nov 2022 11:40) (101/52 - 106/63)  BP(mean): --  RR: 18 (02 Nov 2022 11:40) (18 - 19)  SpO2: 94% (02 Nov 2022 04:59) (92% - 94%)    Parameters below as of 02 Nov 2022 04:59  Patient On (Oxygen Delivery Method): nasal cannula      PHYSICAL EXAM:    Constitutional: NAD, sleepy but arousable.   HEENT: PERR, EOMI, Normal Hearing, MMM  Neck: Soft and supple, No LAD, No JVD  Respiratory: Breath sounds are clear bilaterally, No wheezing, rales or rhonchi  Cardiovascular: S1 and S2, regular rate and rhythm, no Murmurs, gallops or rubs  Gastrointestinal: Bowel Sounds present, soft, nontender, nondistended, no guarding, no rebound  Extremities: No peripheral edema      MEDICATIONS:  MEDICATIONS  (STANDING):  ALPRAZolam 1 milliGRAM(s) Oral <User Schedule>  ALPRAZolam 2 milliGRAM(s) Oral at bedtime  apixaban 5 milliGRAM(s) Oral every 12 hours  buPROPion XL (24-Hour) . 300 milliGRAM(s) Oral daily  ceFAZolin   IVPB 2000 milliGRAM(s) IV Intermittent every 8 hours  chlorhexidine 2% Cloths 1 Application(s) Topical daily  folic acid 1 milliGRAM(s) Oral daily  magnesium oxide 400 milliGRAM(s) Oral with dinner  magnesium oxide 400 milliGRAM(s) Oral once  methadone    Tablet 135 milliGRAM(s) Oral <User Schedule>  multivitamin 1 Tablet(s) Oral daily  nicotine - 21 mG/24Hr(s) Patch 1 Patch Transdermal daily  pantoprazole    Tablet 40 milliGRAM(s) Oral before breakfast  QUEtiapine 200 milliGRAM(s) Oral at bedtime  thiamine 100 milliGRAM(s) Oral daily      LABS: All Labs Reviewed:      Mg     1.6     11-02            Blood Culture:     Radiology: reviewed

## 2022-11-02 NOTE — PROGRESS NOTE ADULT - SUBJECTIVE AND OBJECTIVE BOX
MIKAEL MCDERMOTT 52y Male  MRN#: [id number]   Hospital Day: 23d    SUBJECTIVE  Patient is a 52y old Male who presents with a chief complaint of Hypoxia (02 Nov 2022 19:36)  Currently admitted to medicine with the primary diagnosis of Pneumonia      INTERVAL HPI AND OVERNIGHT EVENTS:  Patient was examined and seen at bedside. This morning he is resting comfortably in bed and reports no issues or overnight events.    OBJECTIVE  PAST MEDICAL & SURGICAL HISTORY  IV drug abuse    Vertebral osteomyelitis    MSSA bacteremia    No significant past surgical history      ALLERGIES:  No Known Allergies    MEDICATIONS:  STANDING MEDICATIONS  ALPRAZolam 1 milliGRAM(s) Oral <User Schedule>  ALPRAZolam 2 milliGRAM(s) Oral at bedtime  apixaban 5 milliGRAM(s) Oral every 12 hours  buPROPion XL (24-Hour) . 300 milliGRAM(s) Oral daily  ceFAZolin   IVPB 2000 milliGRAM(s) IV Intermittent every 8 hours  chlorhexidine 2% Cloths 1 Application(s) Topical daily  folic acid 1 milliGRAM(s) Oral daily  magnesium oxide 400 milliGRAM(s) Oral once  magnesium oxide 400 milliGRAM(s) Oral with dinner  methadone    Tablet 135 milliGRAM(s) Oral <User Schedule>  multivitamin 1 Tablet(s) Oral daily  nicotine - 21 mG/24Hr(s) Patch 1 Patch Transdermal daily  pantoprazole    Tablet 40 milliGRAM(s) Oral before breakfast  QUEtiapine 200 milliGRAM(s) Oral at bedtime  thiamine 100 milliGRAM(s) Oral daily    PRN MEDICATIONS  acetaminophen     Tablet .. 650 milliGRAM(s) Oral every 6 hours PRN  aluminum hydroxide/magnesium hydroxide/simethicone Suspension 30 milliLiter(s) Oral every 4 hours PRN  cyclobenzaprine 10 milliGRAM(s) Oral three times a day PRN  haloperidol    Injectable 5 milliGRAM(s) IntraMuscular every 8 hours PRN  melatonin 3 milliGRAM(s) Oral at bedtime PRN  ondansetron Injectable 4 milliGRAM(s) IV Push every 8 hours PRN      VITAL SIGNS: Last 24 Hours  T(C): 36.2 (03 Nov 2022 05:32), Max: 36.7 (02 Nov 2022 11:40)  T(F): 97.1 (03 Nov 2022 05:32), Max: 98 (02 Nov 2022 11:40)  HR: 75 (03 Nov 2022 05:32) (75 - 79)  BP: 113/78 (03 Nov 2022 05:32) (101/58 - 113/78)  BP(mean): --  RR: 20 (03 Nov 2022 05:32) (18 - 20)  SpO2: 94% (03 Nov 2022 05:32) (94% - 94%)    LABS:      Mg     1.6     11-02    RADIOLOGY:  < from: Xray Chest 1 View- PORTABLE-Routine (Xray Chest 1 View- PORTABLE-Routine in AM.) (10.23.22 @ 06:18) >  Impression:  Low lung volume/lordotic view without definite evidence of focal   consolidation pleural effusion or pneumothorax.          < end of copied text >  < from: MR Cervical Spine w/wo IV Cont (10.22.22 @ 20:40) >  IMPRESSION:    1.  Study limited by motion artifact.    2.  No enhancing lesions.    3.  Straightening of the normal cervical lordosis may be secondary to   patient positioning or muscle spasm.    4.  C4-C5 and C5-C6; disc osteophyte complexes with compression of the   thecal sac.    5.  C6-C7; disc osteophyte complex with compression of the thecal sac,   degenerative changes, and bilateral foraminal narrowing.    < end of copied text >  < from: MR Cervical Spine w/wo IV Cont (10.22.22 @ 20:40) >  IMPRESSION:    1.  Study limited by motion artifact.    2.  No enhancing lesions.    3.  Straightening of the normal cervical lordosis may be secondary to   patient positioning or muscle spasm.    4.  C4-C5 and C5-C6; disc osteophyte complexes with compression of the   thecal sac.    5.  C6-C7; disc osteophyte complex with compression of the thecal sac,   degenerative changes, and bilateral foraminal narrowing.    < end of copied text >      PHYSICAL EXAM:  CONSTITUTIONAL: No acute distress, AAOx3  HEAD: Atraumatic, normocephalic  EYES: EOM intact, PERRLA, conjunctiva and sclera clear  ENT: moist mucous membranes  PULMONARY: Clear to auscultation bilaterally  CARDIOVASCULAR: Regular rate and rhythm  GASTROINTESTINAL: Soft, non-tender, non-distended; bowel sounds present  MUSCULOSKELETAL: no edema  NEUROLOGY: non-focal  SKIN: warm and dry

## 2022-11-02 NOTE — PROGRESS NOTE ADULT - ASSESSMENT
52y old Male PMHx of IVDU (heroin), vertebral osteomyelitis s/p debridement, and MSSA bacteremia, who presents from a rehab facility with a chief complaint of Hypoxia and new onset LE edema. In the ED was, found to have b/l PEs with radiographic evidence of R heart strain and admitted to ICU, found to have LE DVTs s/p LLE embolectomy 10/18. Currently on IV antibiotics, pending discharge authorization.     Acute hypoxemic respiratory failure - resolved  Acute Submassive PE with suspected initial Right heart strain  LLE DVT s/p embolization (10/18)  Suspected superimposed PNA  DIONICIO/OHS, noncompliant with NIV  -CT on admission with bilateral submassive PE + concern for RV strain  -TTE: EF 55%, G1DD, mild TVR, no right heart strain  -VA duplx LE vein: Acute thrombus within the left common femoral, femoral, deep femoral, popliteal, gastrocnemius, posterior tibial and anterior tibial veins  -s/p LLE embolectomy 10/18  -continue AC---transitioned to oral NOAC (currently on eliquis 10 mg bid day #7 of 7 and then transitioned to 5 mg bid eliquis starting 11/2)  -patient says he thinks he is supposed to be on chronic long low dose prophylactic ABX ???  -f/u ID recommendations  - cont Cefazolin 2g q8h IV x 6 weeks (end date 11/17)  -monitor wbc and fever curve  -monitor outpatient BMP, LFT, CK, ESR, CRP  -outpatient sleep study for DIONICIO    #Hx MSSA bacteremia  #Hx Epidural phlegmon/OM  #Hx R Psoas Abscess  -neurosurgery f/u---plan for outaptient f/u  -continue iv abx x 6 weeks minimum---f/u with ID plan for monitoring outpatient labs and abx duration      #HO IVDA, Opioid Abuse on Methadone   #Agitation  - improving, followed by psych:  resumed on  Quetiapine 200 mg PO HS  -continue Methadone 135 mg po once daily   - Continue with Xanax 1 mg po am, 1mg po afternoon and two times at night - total daily dose of 4mg  -Continue Wellbutrin Xl 300mg po q 24hrs  -For agitation, haldol 5mg po/IM every 8hrs prn  -polysubstance use counseling for outpatient services    #Magnesium deficiency  -Mg 1.7 today--suppl Mg and monitor level  -plan for dc on oral mg     DVT/GI ppx  guarded prognosis    FULL CODE    #Progress Note Handoff  Pending (specify):  pending auth and STR placement  Family discussion: d/w patient himself at length  Disposition: SNF__x_

## 2022-11-03 LAB
GLUCOSE BLDC GLUCOMTR-MCNC: 111 MG/DL — HIGH (ref 70–99)
GLUCOSE BLDC GLUCOMTR-MCNC: 111 MG/DL — HIGH (ref 70–99)
GLUCOSE BLDC GLUCOMTR-MCNC: 129 MG/DL — HIGH (ref 70–99)
GLUCOSE BLDC GLUCOMTR-MCNC: 209 MG/DL — HIGH (ref 70–99)
MAGNESIUM SERPL-MCNC: 1.6 MG/DL — LOW (ref 1.8–2.4)

## 2022-11-03 PROCEDURE — 99231 SBSQ HOSP IP/OBS SF/LOW 25: CPT

## 2022-11-03 RX ADMIN — MAGNESIUM OXIDE 400 MG ORAL TABLET 400 MILLIGRAM(S): 241.3 TABLET ORAL at 18:18

## 2022-11-03 RX ADMIN — BUPROPION HYDROCHLORIDE 300 MILLIGRAM(S): 150 TABLET, EXTENDED RELEASE ORAL at 12:38

## 2022-11-03 RX ADMIN — CHLORHEXIDINE GLUCONATE 1 APPLICATION(S): 213 SOLUTION TOPICAL at 12:46

## 2022-11-03 RX ADMIN — PANTOPRAZOLE SODIUM 40 MILLIGRAM(S): 20 TABLET, DELAYED RELEASE ORAL at 06:26

## 2022-11-03 RX ADMIN — Medication 1 MILLIGRAM(S): at 12:42

## 2022-11-03 RX ADMIN — APIXABAN 5 MILLIGRAM(S): 2.5 TABLET, FILM COATED ORAL at 05:02

## 2022-11-03 RX ADMIN — METHADONE HYDROCHLORIDE 135 MILLIGRAM(S): 40 TABLET ORAL at 12:38

## 2022-11-03 RX ADMIN — QUETIAPINE FUMARATE 200 MILLIGRAM(S): 200 TABLET, FILM COATED ORAL at 21:35

## 2022-11-03 RX ADMIN — Medication 1 PATCH: at 19:48

## 2022-11-03 RX ADMIN — Medication 1 MILLIGRAM(S): at 18:24

## 2022-11-03 RX ADMIN — Medication 100 MILLIGRAM(S): at 21:34

## 2022-11-03 RX ADMIN — Medication 1 PATCH: at 12:39

## 2022-11-03 RX ADMIN — APIXABAN 5 MILLIGRAM(S): 2.5 TABLET, FILM COATED ORAL at 18:18

## 2022-11-03 RX ADMIN — Medication 100 MILLIGRAM(S): at 05:02

## 2022-11-03 RX ADMIN — Medication 1 TABLET(S): at 12:38

## 2022-11-03 RX ADMIN — Medication 2 MILLIGRAM(S): at 21:34

## 2022-11-03 RX ADMIN — Medication 1 MILLIGRAM(S): at 05:02

## 2022-11-03 NOTE — PROGRESS NOTE ADULT - SUBJECTIVE AND OBJECTIVE BOX
MIKAEL MCDERMOTT 52y Male  MRN#: [id number]   Hospital Day: 23d    SUBJECTIVE  Patient is a 52y old Male who presents with a chief complaint of Hypoxia (02 Nov 2022 19:36)  Currently admitted to medicine with the primary diagnosis of Pneumonia      INTERVAL HPI AND OVERNIGHT EVENTS:  Patient was examined and seen at bedside. This morning he is resting comfortably in bed and reports no issues or overnight events.    OBJECTIVE  PAST MEDICAL & SURGICAL HISTORY  IV drug abuse    Vertebral osteomyelitis    MSSA bacteremia    No significant past surgical history      ALLERGIES:  No Known Allergies    MEDICATIONS:  STANDING MEDICATIONS  ALPRAZolam 1 milliGRAM(s) Oral <User Schedule>  ALPRAZolam 2 milliGRAM(s) Oral at bedtime  apixaban 5 milliGRAM(s) Oral every 12 hours  buPROPion XL (24-Hour) . 300 milliGRAM(s) Oral daily  ceFAZolin   IVPB 2000 milliGRAM(s) IV Intermittent every 8 hours  chlorhexidine 2% Cloths 1 Application(s) Topical daily  folic acid 1 milliGRAM(s) Oral daily  magnesium oxide 400 milliGRAM(s) Oral once  magnesium oxide 400 milliGRAM(s) Oral with dinner  methadone    Tablet 135 milliGRAM(s) Oral <User Schedule>  multivitamin 1 Tablet(s) Oral daily  nicotine - 21 mG/24Hr(s) Patch 1 Patch Transdermal daily  pantoprazole    Tablet 40 milliGRAM(s) Oral before breakfast  QUEtiapine 200 milliGRAM(s) Oral at bedtime  thiamine 100 milliGRAM(s) Oral daily    PRN MEDICATIONS  acetaminophen     Tablet .. 650 milliGRAM(s) Oral every 6 hours PRN  aluminum hydroxide/magnesium hydroxide/simethicone Suspension 30 milliLiter(s) Oral every 4 hours PRN  cyclobenzaprine 10 milliGRAM(s) Oral three times a day PRN  haloperidol    Injectable 5 milliGRAM(s) IntraMuscular every 8 hours PRN  melatonin 3 milliGRAM(s) Oral at bedtime PRN  ondansetron Injectable 4 milliGRAM(s) IV Push every 8 hours PRN      VITAL SIGNS: Last 24 Hours  T(C): 36.2 (03 Nov 2022 05:32), Max: 36.7 (02 Nov 2022 11:40)  T(F): 97.1 (03 Nov 2022 05:32), Max: 98 (02 Nov 2022 11:40)  HR: 75 (03 Nov 2022 05:32) (75 - 79)  BP: 113/78 (03 Nov 2022 05:32) (101/58 - 113/78)  BP(mean): --  RR: 20 (03 Nov 2022 05:32) (18 - 20)  SpO2: 94% (03 Nov 2022 05:32) (94% - 94%)    LABS:      Mg     1.6     11-02      RADIOLOGY:    < from: Xray Chest 1 View- PORTABLE-Routine (Xray Chest 1 View- PORTABLE-Routine in AM.) (10.23.22 @ 06:18) >    Impression:  Low lung volume/lordotic view without definite evidence of focal   consolidation pleural effusion or pneumothorax.      < end of copied text >  < from: MR Thoracic Spine w/wo IV Cont (10.22.22 @ 20:40) >  IMPRESSION:    In comparison with the prior MRI of the thoracic spine dated August 12, 2022:    Current examination is limited by motion artifact.    Interval development of a right pleural effusion which may be loculated   and a right basilar opacity.    No enhancing lesions.    < end of copied text >  < from: MR Lumbar Spine w/wo IV Cont (10.22.22 @ 20:40) >  IMPRESSION:    In comparison with the prior MRI of the lumbar spine dated August 12, 2022:    There is been interval surgical intervention. The previously described   discitis/osteomyelitis at L4-L5, epidural soft tissue, and paraspinal   abscess has markedly resolved.    1.  Surgical hardware at L4 and L5.    2.  Abnormal signaland enhancement in the anterior and lateral   paraspinal soft tissues at L4 and L5 likely representing phlegmon.    3.  Phlegmon and/or postoperative changes at L4 and L5 within the spinal   canal.    4.  L2-L3; annular disc bulge, degenerative changes, and bilateral   foraminal narrowing.    5.  L3-L4; central spinal stenosis and bilateral foraminal narrowing.    6.  L4-L5; limited evaluation, postoperative changes and/or phlegmon, and   bilateral foraminal narrowing.    7.  L5-S1; annular disc bulge, degenerative changes, and bilateral   foraminal narrowing.    8.  Degenerative changes involving the sacroiliac joints.    9.  Fatty infiltration of the dorsal paraspinal muscles.    < end of copied text >        PHYSICAL EXAM:  CONSTITUTIONAL: No acute distress, morbidly obese AAOx3  HEAD: Atraumatic, normocephalic  EYES: EOM intact, PERRLA, conjunctiva and sclera clear  ENT: moist mucous membranes  PULMONARY: Clear to auscultation bilaterally  CARDIOVASCULAR: Regular rate and rhythm  GASTROINTESTINAL: Soft, non-tender, non-distended; bowel sounds present  MUSCULOSKELETAL: no edema  NEUROLOGY: non-focal  SKIN: warm and dry      ASSESSMENT and PLAN    Patient is a 52y old Male PMHx of IVDU (heroin), vertebral osteomyelitis s/p debridement, and MSSA bacteremia, who presents from a rehab facility with a chief complaint of Hypoxia and new onset LE edema. In the ED was, found to have b/l PEs with radiographic evidence of R heart strain and admitted to ICU, found to have LE DVTs s/p LLE embolectomy 10/18. Currently on IV antibiotics, pending discharge authorization.     #Acute hypoxemic respiratory failure / Acute Submassive PE  - resolved  - Suspected superimposed PNA  - DIONICIO/OHS, noncompliant with NIV  - TTE: EF 55%, G1DD, mild TVR, no right heart strain  -VA duplx LE vein: Acute thrombus within the left common femoral, femoral, deep femoral, popliteal, gastrocnemius, posterior tibial and anterior tibial veins, s/p LLE embolectomy 10/18  - Therapeutic Lovenox, transitioned to Eliquis.   - s/p Cefepime, now on  Cefazolin 2g q8h IV  - PT evaluated and recommends sub-acut rehab or home PT on DC    # Recurrent MSSA bacteremia with Discitis   - Recent Spinal fusion  - Abnormal L4-L5 signal on MRI suggestive of  Epidural phlegmon/OM  - No acute surgical intervention needed per Neurosurgery   - C/w Cefazolin for 3 weeks (11/17) per ID  - Sterile 18 gauge powerline Mideline placed to continue IV antibiotics at subacute rehab.   - Outpatient Neurosurgery referral      #HO IVDA, Opioid Abuse on Methadone   - improving, followed by psych: can resume Quetiapine 200 mg PO HS  - Continue with Xanax 1 mg po am, 1mg po afternoon and two times at night - total daily dose of 4mg  - Continue Wellbutrin Xl 300mg po q 24hrs  - Continue home dose Methadone 135mg po daily---> QTc 433 today 10/20  - For agitation, haldol 5mg po/IM every 8hrs prn    #Disposition  - Sub-acute Rehab, pending authorization    #MISC  - No need for daily CBC/BMP, just check daily mag and replenish as needed.  - Pt receiving PT, reports feeling stronger when standing up.

## 2022-11-04 LAB
GLUCOSE BLDC GLUCOMTR-MCNC: 111 MG/DL — HIGH (ref 70–99)
GLUCOSE BLDC GLUCOMTR-MCNC: 135 MG/DL — HIGH (ref 70–99)
GLUCOSE BLDC GLUCOMTR-MCNC: 157 MG/DL — HIGH (ref 70–99)
MAGNESIUM SERPL-MCNC: 1.6 MG/DL — LOW (ref 1.8–2.4)

## 2022-11-04 PROCEDURE — 99231 SBSQ HOSP IP/OBS SF/LOW 25: CPT

## 2022-11-04 RX ORDER — MAGNESIUM OXIDE 400 MG ORAL TABLET 241.3 MG
400 TABLET ORAL
Refills: 0 | Status: DISCONTINUED | OUTPATIENT
Start: 2022-11-04 | End: 2022-11-17

## 2022-11-04 RX ORDER — METHADONE HYDROCHLORIDE 40 MG/1
135 TABLET ORAL DAILY
Refills: 0 | Status: DISCONTINUED | OUTPATIENT
Start: 2022-11-04 | End: 2022-11-11

## 2022-11-04 RX ADMIN — BUPROPION HYDROCHLORIDE 300 MILLIGRAM(S): 150 TABLET, EXTENDED RELEASE ORAL at 11:56

## 2022-11-04 RX ADMIN — Medication 2 MILLIGRAM(S): at 21:41

## 2022-11-04 RX ADMIN — CHLORHEXIDINE GLUCONATE 1 APPLICATION(S): 213 SOLUTION TOPICAL at 11:56

## 2022-11-04 RX ADMIN — MAGNESIUM OXIDE 400 MG ORAL TABLET 400 MILLIGRAM(S): 241.3 TABLET ORAL at 18:20

## 2022-11-04 RX ADMIN — APIXABAN 5 MILLIGRAM(S): 2.5 TABLET, FILM COATED ORAL at 05:44

## 2022-11-04 RX ADMIN — Medication 1 MILLIGRAM(S): at 14:07

## 2022-11-04 RX ADMIN — Medication 1 TABLET(S): at 11:57

## 2022-11-04 RX ADMIN — METHADONE HYDROCHLORIDE 135 MILLIGRAM(S): 40 TABLET ORAL at 14:50

## 2022-11-04 RX ADMIN — Medication 1 PATCH: at 12:08

## 2022-11-04 RX ADMIN — Medication 1 PATCH: at 11:57

## 2022-11-04 RX ADMIN — Medication 1 PATCH: at 21:34

## 2022-11-04 RX ADMIN — Medication 1 MILLIGRAM(S): at 05:44

## 2022-11-04 RX ADMIN — QUETIAPINE FUMARATE 200 MILLIGRAM(S): 200 TABLET, FILM COATED ORAL at 21:41

## 2022-11-04 RX ADMIN — Medication 1 MILLIGRAM(S): at 11:56

## 2022-11-04 RX ADMIN — Medication 100 MILLIGRAM(S): at 11:56

## 2022-11-04 RX ADMIN — MAGNESIUM OXIDE 400 MG ORAL TABLET 400 MILLIGRAM(S): 241.3 TABLET ORAL at 11:56

## 2022-11-04 RX ADMIN — APIXABAN 5 MILLIGRAM(S): 2.5 TABLET, FILM COATED ORAL at 18:14

## 2022-11-04 RX ADMIN — Medication 100 MILLIGRAM(S): at 05:44

## 2022-11-04 RX ADMIN — Medication 100 MILLIGRAM(S): at 14:50

## 2022-11-04 RX ADMIN — MAGNESIUM OXIDE 400 MG ORAL TABLET 400 MILLIGRAM(S): 241.3 TABLET ORAL at 09:23

## 2022-11-04 RX ADMIN — PANTOPRAZOLE SODIUM 40 MILLIGRAM(S): 20 TABLET, DELAYED RELEASE ORAL at 05:44

## 2022-11-04 RX ADMIN — Medication 100 MILLIGRAM(S): at 21:41

## 2022-11-04 NOTE — PROGRESS NOTE ADULT - SUBJECTIVE AND OBJECTIVE BOX
MIKAEL MCDERMOTT  52y, Male  Allergy: No Known Allergies      LOS  24d    CHIEF COMPLAINT: Hypoxia (04 Nov 2022 07:56)      INTERVAL EVENTS/HPI  - No acute events overnight  - T(F): , Max: 98.2 (11-04-22 @ 05:00)  - Denies any worsening symptoms  - Tolerating medication  -    -      ROS  General: Denies rigors, nightsweats  HEENT: Denies headache, rhinorrhea, sore throat, eye pain  CV: Denies CP, palpitations  PULM: Denies wheezing, hemoptysis  GI: Denies hematemesis, hematochezia, melena  : Denies discharge, hematuria  MSK: Denies arthralgias, myalgias  SKIN: Denies rash, lesions  NEURO: Denies paresthesias, weakness  PSYCH: Denies depression, anxiety    VITALS:  T(F): 96.7, Max: 98.2 (11-04-22 @ 05:00)  HR: 84  BP: 129/64  RR: 18Vital Signs Last 24 Hrs  T(C): 35.9 (04 Nov 2022 13:46), Max: 36.8 (04 Nov 2022 05:00)  T(F): 96.7 (04 Nov 2022 13:46), Max: 98.2 (04 Nov 2022 05:00)  HR: 84 (04 Nov 2022 13:46) (84 - 86)  BP: 129/64 (04 Nov 2022 13:46) (111/59 - 129/64)  BP(mean): --  RR: 18 (04 Nov 2022 13:46) (18 - 18)  SpO2: 100% (03 Nov 2022 21:52) (100% - 100%)        PHYSICAL EXAM:  Gen: NAD, resting in bed  HEENT: Normocephalic, atraumatic  Neck: supple, no lymphadenopathy  CV: Regular rate & regular rhythm  Lungs: decreased BS at bases, no fremitus  Abdomen: Soft, BS present  Ext: Warm, well perfused  Neuro: non focal, awake  Skin: no rash, no erythema  Lines: no phlebitis    FH: Non-contributory  Social Hx: Non-contributory    TESTS & MEASUREMENTS:      Mg     1.6     11-04              Culture - Blood (collected 10-12-22 @ 06:50)  Source: .Blood Blood  Final Report (10-17-22 @ 18:00):    No Growth Final            INFECTIOUS DISEASES TESTING  COVID-19 PCR: NotDetec (10-30-22 @ 15:45)  Rapid RVP Result: NotDetec (10-19-22 @ 14:10)  Legionella Antigen, Urine: Negative (10-18-22 @ 08:20)  COVID-19 PCR: NotDetec (10-16-22 @ 18:26)  Procalcitonin, Serum: 7.63 (10-12-22 @ 06:50)  COVID-19 PCR: NotDetec (10-11-22 @ 12:35)  COVID-19 PCR: NotDetec (09-06-22 @ 10:55)  HIV-1/2 Combo Result: Nonreact (08-28-22 @ 08:19)  COVID-19 PCR: NotDetec (08-28-22 @ 07:52)  COVID-19 PCR: NotDetec (08-22-22 @ 15:36)  COVID-19 PCR: NotDetec (08-18-22 @ 13:56)  Procalcitonin, Serum: 0.05 (08-17-22 @ 13:28)  COVID-19 PCR: NotDetec (08-14-22 @ 16:45)  COVID-19 PCR: NotDetec (08-12-22 @ 19:33)  MRSA PCR Result.: Negative (08-12-22 @ 05:00)  Rapid RVP Result: NotDetec (08-10-22 @ 15:03)  COVID-19 PCR: NotDetec (04-03-22 @ 12:23)  COVID-19 PCR: NotDetec (03-30-22 @ 10:49)  COVID-19 PCR: NotDetec (03-27-22 @ 18:31)  COVID-19 PCR: NotDetec (03-25-22 @ 06:30)  COVID-19 PCR: NotDetec (03-19-22 @ 06:10)  HIV-1/2 Combo Result: Nonreact (03-16-22 @ 12:10)  COVID-19 PCR: NotDetec (03-12-22 @ 20:19)  COVID-19 PCR: NotDetec (01-31-22 @ 09:10)  COVID-19 PCR: NotDetec (01-25-22 @ 11:21)  COVID-19 PCR: NotDetec (01-18-22 @ 18:45)  HIV-1/2 Combo Result: Nonreact (01-18-22 @ 04:30)  Procalcitonin, Serum: 0.13 (01-16-22 @ 16:00)  COVID-19 PCR: NotDetec (01-15-22 @ 23:32)      INFLAMMATORY MARKERS  Sedimentation Rate, Erythrocyte: 108 mm/Hr (10-25-22 @ 07:40)  C-Reactive Protein, Serum: 17.2 mg/L (10-25-22 @ 07:40)  Sedimentation Rate, Erythrocyte: 125 mm/Hr (08-11-22 @ 06:38)  C-Reactive Protein, Serum: 330.8 mg/L (08-11-22 @ 06:38)      RADIOLOGY & ADDITIONAL TESTS:  I have personally reviewed the last available Chest xray  CXR      CT      CARDIOLOGY TESTING      MEDICATIONS  ALPRAZolam 1 Oral <User Schedule>  ALPRAZolam 2 Oral at bedtime  apixaban 5 Oral every 12 hours  buPROPion XL (24-Hour) . 300 Oral daily  ceFAZolin   IVPB 2000 IV Intermittent every 8 hours  chlorhexidine 2% Cloths 1 Topical daily  folic acid 1 Oral daily  magnesium oxide 400 Oral once  magnesium oxide 400 Oral three times a day with meals  methadone    Tablet 135 Oral daily  multivitamin 1 Oral daily  nicotine - 21 mG/24Hr(s) Patch 1 Transdermal daily  pantoprazole    Tablet 40 Oral before breakfast  QUEtiapine 200 Oral at bedtime  thiamine 100 Oral daily      WEIGHT  Weight (kg): 140.9 (10-18-22 @ 12:25)      ANTIBIOTICS:  ceFAZolin   IVPB 2000 milliGRAM(s) IV Intermittent every 8 hours      All available historical records have been reviewed

## 2022-11-04 NOTE — PROGRESS NOTE ADULT - SUBJECTIVE AND OBJECTIVE BOX
MIKAEL MCDERMOTT 52y Male  MRN#: [id number]   Hospital Day: 24d    SUBJECTIVE  Patient is a 52y old Male who presents with a chief complaint of Hypoxia (03 Nov 2022 09:48)  Currently admitted to medicine with the primary diagnosis of Pneumonia      INTERVAL HPI AND OVERNIGHT EVENTS:  Patient was examined and seen at bedside. This morning he is resting comfortably in bed and reports no issues or overnight events.    OBJECTIVE  PAST MEDICAL & SURGICAL HISTORY  IV drug abuse    Vertebral osteomyelitis    MSSA bacteremia    No significant past surgical history      ALLERGIES:  No Known Allergies    MEDICATIONS:  STANDING MEDICATIONS  ALPRAZolam 1 milliGRAM(s) Oral <User Schedule>  ALPRAZolam 2 milliGRAM(s) Oral at bedtime  apixaban 5 milliGRAM(s) Oral every 12 hours  buPROPion XL (24-Hour) . 300 milliGRAM(s) Oral daily  ceFAZolin   IVPB 2000 milliGRAM(s) IV Intermittent every 8 hours  chlorhexidine 2% Cloths 1 Application(s) Topical daily  folic acid 1 milliGRAM(s) Oral daily  magnesium oxide 400 milliGRAM(s) Oral once  magnesium oxide 400 milliGRAM(s) Oral three times a day with meals  multivitamin 1 Tablet(s) Oral daily  nicotine - 21 mG/24Hr(s) Patch 1 Patch Transdermal daily  pantoprazole    Tablet 40 milliGRAM(s) Oral before breakfast  QUEtiapine 200 milliGRAM(s) Oral at bedtime  thiamine 100 milliGRAM(s) Oral daily    PRN MEDICATIONS  acetaminophen     Tablet .. 650 milliGRAM(s) Oral every 6 hours PRN  aluminum hydroxide/magnesium hydroxide/simethicone Suspension 30 milliLiter(s) Oral every 4 hours PRN  cyclobenzaprine 10 milliGRAM(s) Oral three times a day PRN  haloperidol    Injectable 5 milliGRAM(s) IntraMuscular every 8 hours PRN  melatonin 3 milliGRAM(s) Oral at bedtime PRN  ondansetron Injectable 4 milliGRAM(s) IV Push every 8 hours PRN      VITAL SIGNS: Last 24 Hours  T(C): 36.8 (04 Nov 2022 05:00), Max: 36.8 (04 Nov 2022 05:00)  T(F): 98.2 (04 Nov 2022 05:00), Max: 98.2 (04 Nov 2022 05:00)  HR: 86 (04 Nov 2022 05:00) (84 - 88)  BP: 111/59 (04 Nov 2022 05:00) (111/59 - 121/54)  BP(mean): --  RR: 18 (04 Nov 2022 05:00) (18 - 18)  SpO2: 100% (03 Nov 2022 21:52) (100% - 100%)    LABS:      Mg     1.6     11-03      RADIOLOGY:  < from: Xray Chest 1 View- PORTABLE-Routine (Xray Chest 1 View- PORTABLE-Routine in AM.) (10.23.22 @ 06:18) >  Impression:  Low lung volume/lordotic view without definite evidence of focal   consolidation pleural effusion or pneumothorax.    < end of copied text >  < from: MR Cervical Spine w/wo IV Cont (10.22.22 @ 20:40) >  IMPRESSION:    1.  Study limited by motion artifact.    2.  No enhancing lesions.    3.  Straightening of the normal cervical lordosis may be secondary to   patient positioning or muscle spasm.    4.  C4-C5 and C5-C6; disc osteophyte complexes with compression of the   thecal sac.    5.  C6-C7; disc osteophyte complex with compression of the thecal sac,   degenerative changes, and bilateral foraminal narrowing.    < end of copied text >  < from: MR Thoracic Spine w/wo IV Cont (10.22.22 @ 20:40) >  MPRESSION:    In comparison with the prior MRI of the thoracic spine dated August 12, 2022:    Current examination is limited by motion artifact.    Interval development of a right pleural effusion which may be loculated   and a right basilar opacity.    No enhancing lesions.    < end of copied text >      PHYSICAL EXAM:  CONSTITUTIONAL: No acute distress, AAOx3  HEAD: Atraumatic, normocephalic  EYES: EOM intact, PERRLA, conjunctiva and sclera clear  ENT: moist mucous membranes  PULMONARY: Clear to auscultation bilaterally  CARDIOVASCULAR: Regular rate and rhythm  GASTROINTESTINAL: Soft, non-tender, non-distended; bowel sounds present  MUSCULOSKELETAL: no edema  NEUROLOGY: non-focal  SKIN: warm and dry      ASSESSMENT and PLAN    Patient is a 52y old Male PMHx of IVDU (heroin), vertebral osteomyelitis s/p debridement, and MSSA bacteremia, who presents from a rehab facility with a chief complaint of Hypoxia and new onset LE edema. In the ED was, found to have b/l PEs with radiographic evidence of R heart strain and admitted to ICU, found to have LE DVTs s/p LLE embolectomy 10/18. Currently on IV antibiotics, pending discharge authorization.     #Acute hypoxemic respiratory failure / Acute Submassive PE  - resolved  - Suspected superimposed PNA  - DIONICIO/OHS, noncompliant with NIV  - TTE: EF 55%, G1DD, mild TVR, no right heart strain  -VA duplx LE vein: Acute thrombus within the left common femoral, femoral, deep femoral, popliteal, gastrocnemius, posterior tibial and anterior tibial veins, s/p LLE embolectomy 10/18  - Therapeutic Lovenox, transitioned to Eliquis.   - s/p Cefepime, now on  Cefazolin 2g q8h IV  - PT evaluated and recommends sub-acut rehab or home PT on DC    # Recurrent MSSA bacteremia with Discitis   - Recent Spinal fusion  - Abnormal L4-L5 signal on MRI suggestive of  Epidural phlegmon/OM  - No acute surgical intervention needed per Neurosurgery   - C/w Cefazolin for 3 weeks (11/17) per ID  - Sterile 18 gauge powerline Mideline placed to continue IV antibiotics at subacute rehab.   - Outpatient Neurosurgery referral      #HO IVDA, Opioid Abuse on Methadone   - improving, followed by psych: can resume Quetiapine 200 mg PO HS  - Continue with Xanax 1 mg po am, 1mg po afternoon and two times at night - total daily dose of 4mg  - Continue Wellbutrin Xl 300mg po q 24hrs  - Continue home dose Methadone 135mg po daily---> QTc 433 today 10/20  - For agitation, haldol 5mg po/IM every 8hrs prn    #Disposition  - Sub-acute Rehab, pending authorization    #MISC  - No need for daily CBC/BMP, just check daily mag and replenish as needed.

## 2022-11-04 NOTE — PROGRESS NOTE ADULT - ASSESSMENT
ASSESSMENT  53yo male PMH IVDU (heroin), vertebral osteomyelitis s/p debridement, and recurrent MSSA bacteremia (8/2022, 1/2022)  Presenting from rehab facility for "low oxygen".   Seen by ID 8/2022 for NICOLE bacteremia with discitis/OM at L4-5.  CT Paravertebral phlegmon and left retropharyngeal abscess with air-fluid level.  8/10 BCx NICOLE  8/11,12,13,14,15 BCx NG  8/24 PAM no vegetations  PT was d/c with IV ancef 2 gm iv q8h ( since 8/16 )-8 weeks starting 8/11- 10/6    IMPRESSION  #Possible Severe sepsis on admission Pulse>90, Resp Rate>20, WBC>12, lactic acidosis    Found to have bilateral PE, Possible GNR PNA    Rapid RVP Result: NotDetec (10-19-22 @ 14:10)  s/p Procedure:   1.  Inferior Vena Cavography  2.  Pelvic Venography    3.  Left lower extremity Venography  4.  Left popliteal and femoral venous thrombectomies    10/12 BCX NGTD     Denies productive cough to suggest PNA    Has continued back pain     CT Bilateral pulmonary emboli with evidence of right ventricular strainPulmonary trunk enlargement, compatible with pulmonary hypertension.  Numerous bilateral patchy opacities with confluent areas of consolidation predominantly in the posterior lung fields and bilateral perihilar   lymphadenopathy, suspicious for pneumonia. Follow-up in 6 weeks' time is recommended.    Procalcitonin, Serum: 7.63 ng/mL (10-12-22 @ 06:50)  #Obesity BMI (kg/m2): 41  #Recurrent MSSA bacteremia with discitis   < from: MR Lumbar Spine w/wo IV Cont (10.22.22 @ 20:40) >  1.  Surgical hardware at L4 and L5.  2.  Abnormal signaland enhancement in the anterior and lateral paraspinal soft tissues at L4 and L5 likely representing phlegmon.  3.  Phlegmon and/or postoperative changes at L4 and L5 within the spinal canal.  4.  L2-L3; annular disc bulge, degenerative changes, and bilateral foraminal narrowing.  5.  L3-L4; central spinal stenosis and bilateral foraminal narrowing.  6.  L4-L5; limited evaluation, postoperative changes and/or phlegmon, and bilateral foraminal narrowing.  #IVDU  #HCV, RNA UD  Creatinine, Serum: 0.7 (10-13-22 @ 05:04)    Weight (kg): 140.9 (10-12-22 @ 09:30)    RECOMMENDATIONS  - Neurosurgery eval appreciated  - Cefazolin 2g q8h IV, repeat MRI with Abnormal signal and enhancement in the anterior and lateral paraspinal soft tissues at L4 and L5 likely representing phlegmon.   - Would continue IV Cefazolin 2g q8h IV while in a facility, (up to 4 weeks) then PO Cefadroxil 1g q24h with repeat MRI to determine final course--  3 more weeks is 11/17-, 4 is 11/24  - Avoid rifampin as on methadone/xanax and little utility at this point in the treatment course  - Weekly CBC, CMP, ESR/CRP  - ID follow-up with Dr. John Lange for Telehealth. We will call the patient between 10:30-6:30      [address]       [phone]       Fax [phone]     If any questions, please call or send a message on [facility] Teams  Please continue to update ID with any pertinent new laboratory or radiographic findings  #[id number]

## 2022-11-04 NOTE — PROGRESS NOTE ADULT - TIME BILLING
I have personally seen and examined this patient.  I have reviewed all pertinent clinical information and reviewed all relevant imaging and diagnostic studies personally.   If possible, I counseled the patient about diagnostic testing and treatment plan.   I discussed my recommendations with the primary team.

## 2022-11-05 LAB — MAGNESIUM SERPL-MCNC: 1.5 MG/DL — LOW (ref 1.8–2.4)

## 2022-11-05 PROCEDURE — 99231 SBSQ HOSP IP/OBS SF/LOW 25: CPT

## 2022-11-05 RX ADMIN — Medication 2 MILLIGRAM(S): at 22:13

## 2022-11-05 RX ADMIN — APIXABAN 5 MILLIGRAM(S): 2.5 TABLET, FILM COATED ORAL at 18:17

## 2022-11-05 RX ADMIN — Medication 100 MILLIGRAM(S): at 22:13

## 2022-11-05 RX ADMIN — PANTOPRAZOLE SODIUM 40 MILLIGRAM(S): 20 TABLET, DELAYED RELEASE ORAL at 05:53

## 2022-11-05 RX ADMIN — Medication 1 PATCH: at 11:55

## 2022-11-05 RX ADMIN — MAGNESIUM OXIDE 400 MG ORAL TABLET 400 MILLIGRAM(S): 241.3 TABLET ORAL at 18:17

## 2022-11-05 RX ADMIN — Medication 1 PATCH: at 12:09

## 2022-11-05 RX ADMIN — BUPROPION HYDROCHLORIDE 300 MILLIGRAM(S): 150 TABLET, EXTENDED RELEASE ORAL at 12:07

## 2022-11-05 RX ADMIN — QUETIAPINE FUMARATE 200 MILLIGRAM(S): 200 TABLET, FILM COATED ORAL at 22:13

## 2022-11-05 RX ADMIN — Medication 1 MILLIGRAM(S): at 12:08

## 2022-11-05 RX ADMIN — Medication 1 MILLIGRAM(S): at 13:22

## 2022-11-05 RX ADMIN — CHLORHEXIDINE GLUCONATE 1 APPLICATION(S): 213 SOLUTION TOPICAL at 12:07

## 2022-11-05 RX ADMIN — Medication 1 MILLIGRAM(S): at 05:53

## 2022-11-05 RX ADMIN — APIXABAN 5 MILLIGRAM(S): 2.5 TABLET, FILM COATED ORAL at 05:53

## 2022-11-05 RX ADMIN — METHADONE HYDROCHLORIDE 135 MILLIGRAM(S): 40 TABLET ORAL at 12:12

## 2022-11-05 RX ADMIN — Medication 1 PATCH: at 07:24

## 2022-11-05 RX ADMIN — Medication 100 MILLIGRAM(S): at 12:55

## 2022-11-05 RX ADMIN — Medication 1 TABLET(S): at 12:08

## 2022-11-05 RX ADMIN — MAGNESIUM OXIDE 400 MG ORAL TABLET 400 MILLIGRAM(S): 241.3 TABLET ORAL at 12:08

## 2022-11-05 NOTE — PROGRESS NOTE ADULT - ASSESSMENT
52y old Male PMHx of IVDU (heroin), vertebral osteomyelitis s/p debridement, and MSSA bacteremia, who presents from a rehab facility with a chief complaint of Hypoxia and new onset LE edema. In the ED was, found to have b/l PEs with radiographic evidence of R heart strain and admitted to ICU, found to have LE DVTs s/p LLE embolectomy 10/18. Currently on IV antibiotics, pending discharge authorization.     Acute hypoxemic respiratory failure - resolved  Acute Submassive PE with suspected initial Right heart strain  LLE DVT s/p embolization (10/18)  Suspected superimposed PNA  DIONICIO/OHS, noncompliant with NIV  -CT on admission with bilateral submassive PE + concern for RV strain  -TTE: EF 55%, G1DD, mild TVR, no right heart strain  -VA duplx LE vein: Acute thrombus within the left common femoral, femoral, deep femoral, popliteal, gastrocnemius, posterior tibial and anterior tibial veins  -s/p LLE embolectomy 10/18  -continue AC---transitioned to oral NOAC (currently on eliquis 10 mg bid day #7 of 7 and then transitioned to 5 mg bid eliquis starting 11/2)  -patient says he thinks he is supposed to be on chronic long low dose prophylactic ABX ???  -f/u ID recommendations  - cont Cefazolin 2g q8h IV x 6 weeks (end date 11/17)  -monitor wbc and fever curve  -monitor outpatient BMP, LFT, CK, ESR, CRP  -outpatient sleep study for DIONICIO        #Hx MSSA bacteremia  #Hx Epidural phlegmon/OM  #Hx R Psoas Abscess  -neurosurgery f/u---plan for outaptient f/u  -continue iv abx x 6 weeks minimum---f/u with ID plan for monitoring outpatient labs and abx duration  Per ID:   - Cefazolin 2g q8h IV, repeat MRI with Abnormal signal and enhancement in the anterior and lateral paraspinal soft tissues at L4 and L5 likely representing phlegmon.   - Would continue IV Cefazolin 2g q8h IV while in a facility, (up to 4 weeks) then PO Cefadroxil 1g q24h with repeat MRI to determine final course--  3 more weeks is 11/17-, 4 is 11/24  - Avoid rifampin as on methadone/xanax and little utility at this point in the treatment course  - Weekly CBC, CMP, ESR/CRP  - ID follow-up with Dr. John Lange for Telehealth. They will call the patient between 10:30-6:30        #HO IVDA, Opioid Abuse on Methadone   #Agitation  - improving, followed by psych:  resumed on  Quetiapine 200 mg PO HS  -continue Methadone 135 mg po once daily   - Continue with Xanax 1 mg po am, 1mg po afternoon and two times at night - total daily dose of 4mg  -Continue Wellbutrin Xl 300mg po q 24hrs  -For agitation, haldol 5mg po/IM every 8hrs prn  -polysubstance use counseling for outpatient services    #Magnesium deficiency  -Mg 1.7 today--suppl Mg and monitor level  -plan for dc on oral mg     DVT/GI ppx  guarded prognosis    FULL CODE    #Progress Note Handoff  Pending (specify):  pending auth and STR placement  Family discussion: d/w patient himself at length  Disposition: SNF__x_ .   Procedure team for Midline

## 2022-11-05 NOTE — PROGRESS NOTE ADULT - SUBJECTIVE AND OBJECTIVE BOX
S: Lost midline access      All other pertinent ROS negative.      Vital Signs Last 24 Hrs  T(C): 35.8 (05 Nov 2022 13:05), Max: 36.7 (04 Nov 2022 21:26)  T(F): 96.4 (05 Nov 2022 13:05), Max: 98 (04 Nov 2022 21:26)  HR: 83 (05 Nov 2022 13:05) (75 - 85)  BP: 95/67 (05 Nov 2022 13:05) (95/67 - 107/86)  BP(mean): --  RR: 18 (05 Nov 2022 13:05) (18 - 18)  SpO2: 94% (05 Nov 2022 13:05) (92% - 94%)      PHYSICAL EXAM:    Constitutional: NAD, awake and alert  HEENT: PERR, EOMI, Normal Hearing, MMM  Neck: Soft and supple, No LAD, No JVD  Respiratory: Breath sounds are clear bilaterally, No wheezing, rales or rhonchi  Cardiovascular: S1 and S2, regular rate and rhythm, no Murmurs, gallops or rubs  Gastrointestinal: Bowel Sounds present, soft, nontender, nondistended, no guarding, no rebound  Extremities: No peripheral edema. fibrosis noted.       MEDICATIONS:  MEDICATIONS  (STANDING):  ALPRAZolam 2 milliGRAM(s) Oral at bedtime  apixaban 5 milliGRAM(s) Oral every 12 hours  buPROPion XL (24-Hour) . 300 milliGRAM(s) Oral daily  ceFAZolin   IVPB 2000 milliGRAM(s) IV Intermittent every 8 hours  chlorhexidine 2% Cloths 1 Application(s) Topical daily  folic acid 1 milliGRAM(s) Oral daily  magnesium oxide 400 milliGRAM(s) Oral once  magnesium oxide 400 milliGRAM(s) Oral three times a day with meals  methadone    Tablet 135 milliGRAM(s) Oral daily  multivitamin 1 Tablet(s) Oral daily  nicotine - 21 mG/24Hr(s) Patch 1 Patch Transdermal daily  pantoprazole    Tablet 40 milliGRAM(s) Oral before breakfast  QUEtiapine 200 milliGRAM(s) Oral at bedtime  thiamine 100 milliGRAM(s) Oral daily      LABS: All Labs Reviewed:      Mg     1.5     11-05            Blood Culture:     Radiology: reviewed

## 2022-11-06 LAB
CK SERPL-CCNC: 31 U/L — SIGNIFICANT CHANGE UP (ref 0–225)
ERYTHROCYTE [SEDIMENTATION RATE] IN BLOOD: 42 MM/HR — HIGH (ref 0–10)
GLUCOSE BLDC GLUCOMTR-MCNC: 129 MG/DL — HIGH (ref 70–99)
GLUCOSE BLDC GLUCOMTR-MCNC: 179 MG/DL — HIGH (ref 70–99)
GLUCOSE BLDC GLUCOMTR-MCNC: 197 MG/DL — HIGH (ref 70–99)
MAGNESIUM SERPL-MCNC: 1.5 MG/DL — LOW (ref 1.8–2.4)

## 2022-11-06 PROCEDURE — 99233 SBSQ HOSP IP/OBS HIGH 50: CPT

## 2022-11-06 RX ORDER — ALPRAZOLAM 0.25 MG
2 TABLET ORAL AT BEDTIME
Refills: 0 | Status: DISCONTINUED | OUTPATIENT
Start: 2022-11-06 | End: 2022-11-13

## 2022-11-06 RX ORDER — ALPRAZOLAM 0.25 MG
1 TABLET ORAL
Refills: 0 | Status: DISCONTINUED | OUTPATIENT
Start: 2022-11-06 | End: 2022-11-13

## 2022-11-06 RX ADMIN — APIXABAN 5 MILLIGRAM(S): 2.5 TABLET, FILM COATED ORAL at 06:40

## 2022-11-06 RX ADMIN — Medication 100 MILLIGRAM(S): at 13:17

## 2022-11-06 RX ADMIN — MAGNESIUM OXIDE 400 MG ORAL TABLET 400 MILLIGRAM(S): 241.3 TABLET ORAL at 07:59

## 2022-11-06 RX ADMIN — METHADONE HYDROCHLORIDE 135 MILLIGRAM(S): 40 TABLET ORAL at 12:30

## 2022-11-06 RX ADMIN — Medication 1 PATCH: at 12:30

## 2022-11-06 RX ADMIN — Medication 1 MILLIGRAM(S): at 13:16

## 2022-11-06 RX ADMIN — MAGNESIUM OXIDE 400 MG ORAL TABLET 400 MILLIGRAM(S): 241.3 TABLET ORAL at 12:29

## 2022-11-06 RX ADMIN — Medication 1 MILLIGRAM(S): at 12:28

## 2022-11-06 RX ADMIN — Medication 1 TABLET(S): at 12:29

## 2022-11-06 RX ADMIN — Medication 100 MILLIGRAM(S): at 22:04

## 2022-11-06 RX ADMIN — Medication 1 PATCH: at 07:06

## 2022-11-06 RX ADMIN — PANTOPRAZOLE SODIUM 40 MILLIGRAM(S): 20 TABLET, DELAYED RELEASE ORAL at 06:40

## 2022-11-06 RX ADMIN — Medication 100 MILLIGRAM(S): at 12:29

## 2022-11-06 RX ADMIN — QUETIAPINE FUMARATE 200 MILLIGRAM(S): 200 TABLET, FILM COATED ORAL at 22:04

## 2022-11-06 RX ADMIN — Medication 2 MILLIGRAM(S): at 22:04

## 2022-11-06 RX ADMIN — BUPROPION HYDROCHLORIDE 300 MILLIGRAM(S): 150 TABLET, EXTENDED RELEASE ORAL at 12:28

## 2022-11-06 RX ADMIN — Medication 1 PATCH: at 19:44

## 2022-11-06 RX ADMIN — CHLORHEXIDINE GLUCONATE 1 APPLICATION(S): 213 SOLUTION TOPICAL at 12:28

## 2022-11-06 RX ADMIN — Medication 100 MILLIGRAM(S): at 06:41

## 2022-11-06 RX ADMIN — Medication 1 PATCH: at 12:58

## 2022-11-06 RX ADMIN — APIXABAN 5 MILLIGRAM(S): 2.5 TABLET, FILM COATED ORAL at 18:05

## 2022-11-06 RX ADMIN — MAGNESIUM OXIDE 400 MG ORAL TABLET 400 MILLIGRAM(S): 241.3 TABLET ORAL at 18:05

## 2022-11-06 NOTE — PROGRESS NOTE ADULT - SUBJECTIVE AND OBJECTIVE BOX
S: No new events/complaints      All other pertinent ROS negative.      Vital Signs Last 24 Hrs  T(C): 36.7 (06 Nov 2022 12:01), Max: 36.7 (06 Nov 2022 12:01)  T(F): 98 (06 Nov 2022 12:01), Max: 98 (06 Nov 2022 12:01)  HR: 78 (06 Nov 2022 12:01) (78 - 80)  BP: 110/62 (06 Nov 2022 12:01) (101/58 - 110/72)  BP(mean): --  RR: 18 (06 Nov 2022 12:01) (18 - 18)  SpO2: 95% (06 Nov 2022 12:01) (92% - 95%)    Parameters below as of 06 Nov 2022 12:01  Patient On (Oxygen Delivery Method): room air      PHYSICAL EXAM:    Constitutional: NAD, awake and alert  HEENT: PERR, EOMI, Normal Hearing, MMM  Neck: Soft and supple, No LAD, No JVD  Respiratory: Breath sounds are clear bilaterally, No wheezing, rales or rhonchi  Cardiovascular: S1 and S2, regular rate and rhythm, no Murmurs, gallops or rubs  Gastrointestinal: Bowel Sounds present, soft, nontender, nondistended, no guarding, no rebound  Extremities: No peripheral edema      MEDICATIONS:  MEDICATIONS  (STANDING):  ALPRAZolam 1 milliGRAM(s) Oral <User Schedule>  ALPRAZolam 2 milliGRAM(s) Oral at bedtime  apixaban 5 milliGRAM(s) Oral every 12 hours  buPROPion XL (24-Hour) . 300 milliGRAM(s) Oral daily  ceFAZolin   IVPB 2000 milliGRAM(s) IV Intermittent every 8 hours  chlorhexidine 2% Cloths 1 Application(s) Topical daily  folic acid 1 milliGRAM(s) Oral daily  magnesium oxide 400 milliGRAM(s) Oral once  magnesium oxide 400 milliGRAM(s) Oral three times a day with meals  methadone    Tablet 135 milliGRAM(s) Oral daily  multivitamin 1 Tablet(s) Oral daily  nicotine - 21 mG/24Hr(s) Patch 1 Patch Transdermal daily  pantoprazole    Tablet 40 milliGRAM(s) Oral before breakfast  QUEtiapine 200 milliGRAM(s) Oral at bedtime  thiamine 100 milliGRAM(s) Oral daily      LABS: All Labs Reviewed:      Mg     1.5     11-06        CARDIAC MARKERS ( 06 Nov 2022 12:29 )  x     / x     / 31 U/L / x     / x          Blood Culture:     Radiology: reviewed

## 2022-11-06 NOTE — PROGRESS NOTE ADULT - ASSESSMENT
52y old Male PMHx of IVDU (heroin), vertebral osteomyelitis s/p debridement, and MSSA bacteremia, who presents from a rehab facility with a chief complaint of Hypoxia and new onset LE edema. In the ED was, found to have b/l PEs with radiographic evidence of R heart strain and admitted to ICU, found to have LE DVTs s/p LLE embolectomy 10/18. Currently on IV antibiotics, pending discharge authorization.     Acute hypoxemic respiratory failure - resolved  Acute Submassive PE with suspected initial Right heart strain  LLE DVT s/p embolization (10/18)  Suspected superimposed PNA  DIONICIO/OHS, noncompliant with NIV  -CT on admission with bilateral submassive PE + concern for RV strain  -TTE: EF 55%, G1DD, mild TVR, no right heart strain  -VA duplx LE vein: Acute thrombus within the left common femoral, femoral, deep femoral, popliteal, gastrocnemius, posterior tibial and anterior tibial veins  -s/p LLE embolectomy 10/18  -continue AC---transitioned to oral NOAC (currently on eliquis 10 mg bid day #7 of 7 and then transitioned to 5 mg bid eliquis starting 11/2)  -patient says he thinks he is supposed to be on chronic long low dose prophylactic ABX ???  -f/u ID recommendations  - cont Cefazolin 2g q8h IV x 6 weeks (end date 11/17)  -monitor wbc and fever curve  -monitor outpatient BMP, LFT, CK, ESR, CRP  -outpatient sleep study for DIONICIO        #Hx MSSA bacteremia  #Hx Epidural phlegmon/OM  #Hx R Psoas Abscess  -neurosurgery f/u---plan for outaptient f/u  -continue iv abx x 6 weeks minimum---f/u with ID plan for monitoring outpatient labs and abx duration  Per ID:   - Cefazolin 2g q8h IV, repeat MRI with Abnormal signal and enhancement in the anterior and lateral paraspinal soft tissues at L4 and L5 likely representing phlegmon.   - Would continue IV Cefazolin 2g q8h IV while in a facility, (up to 4 weeks) then PO Cefadroxil 1g q24h with repeat MRI to determine final course--  3 more weeks is 11/17-, 4 is 11/24  - Avoid rifampin as on methadone/xanax and little utility at this point in the treatment course  - Weekly CBC, CMP, ESR/CRP  - ID follow-up with Dr. John Lange for Telehealth. They will call the patient between 10:30-6:30        #HO IVDA, Opioid Abuse on Methadone   #Agitation  - improving, followed by psych:  resumed on  Quetiapine 200 mg PO HS  -continue Methadone 135 mg po once daily   - Continue with Xanax 1 mg po am, 1mg po afternoon and two times at night - total daily dose of 4mg  -Continue Wellbutrin Xl 300mg po q 24hrs  -For agitation, haldol 5mg po/IM every 8hrs prn  -polysubstance use counseling for outpatient services    #Magnesium deficiency  -Mg 1.7 today--suppl Mg and monitor level  -plan for dc on oral mg     DVT/GI ppx  guarded prognosis    FULL CODE    #Progress Note Handoff  Pending (specify):  pending auth and STR placement  Family discussion: d/w patient himself at length  Disposition: SNF__x_ .   Procedure team for Midline (has regular IV for now)

## 2022-11-07 LAB
GLUCOSE BLDC GLUCOMTR-MCNC: 117 MG/DL — HIGH (ref 70–99)
GLUCOSE BLDC GLUCOMTR-MCNC: 151 MG/DL — HIGH (ref 70–99)
GLUCOSE BLDC GLUCOMTR-MCNC: 185 MG/DL — HIGH (ref 70–99)

## 2022-11-07 PROCEDURE — 36000 PLACE NEEDLE IN VEIN: CPT

## 2022-11-07 PROCEDURE — 99231 SBSQ HOSP IP/OBS SF/LOW 25: CPT

## 2022-11-07 PROCEDURE — 36556 INSERT NON-TUNNEL CV CATH: CPT

## 2022-11-07 PROCEDURE — 76937 US GUIDE VASCULAR ACCESS: CPT | Mod: 26

## 2022-11-07 RX ORDER — MAGNESIUM SULFATE 500 MG/ML
2 VIAL (ML) INJECTION ONCE
Refills: 0 | Status: COMPLETED | OUTPATIENT
Start: 2022-11-07 | End: 2022-11-07

## 2022-11-07 RX ADMIN — Medication 1 PATCH: at 07:11

## 2022-11-07 RX ADMIN — Medication 1 TABLET(S): at 11:23

## 2022-11-07 RX ADMIN — MAGNESIUM OXIDE 400 MG ORAL TABLET 400 MILLIGRAM(S): 241.3 TABLET ORAL at 13:04

## 2022-11-07 RX ADMIN — Medication 1 MILLIGRAM(S): at 13:09

## 2022-11-07 RX ADMIN — Medication 100 MILLIGRAM(S): at 05:46

## 2022-11-07 RX ADMIN — CHLORHEXIDINE GLUCONATE 1 APPLICATION(S): 213 SOLUTION TOPICAL at 11:23

## 2022-11-07 RX ADMIN — Medication 100 MILLIGRAM(S): at 15:54

## 2022-11-07 RX ADMIN — Medication 1 PATCH: at 11:00

## 2022-11-07 RX ADMIN — Medication 1 MILLIGRAM(S): at 11:24

## 2022-11-07 RX ADMIN — MAGNESIUM OXIDE 400 MG ORAL TABLET 400 MILLIGRAM(S): 241.3 TABLET ORAL at 18:09

## 2022-11-07 RX ADMIN — Medication 25 GRAM(S): at 13:08

## 2022-11-07 RX ADMIN — Medication 2 MILLIGRAM(S): at 21:45

## 2022-11-07 RX ADMIN — Medication 100 MILLIGRAM(S): at 11:24

## 2022-11-07 RX ADMIN — QUETIAPINE FUMARATE 200 MILLIGRAM(S): 200 TABLET, FILM COATED ORAL at 21:45

## 2022-11-07 RX ADMIN — APIXABAN 5 MILLIGRAM(S): 2.5 TABLET, FILM COATED ORAL at 05:47

## 2022-11-07 RX ADMIN — Medication 1 PATCH: at 11:24

## 2022-11-07 RX ADMIN — APIXABAN 5 MILLIGRAM(S): 2.5 TABLET, FILM COATED ORAL at 18:09

## 2022-11-07 RX ADMIN — PANTOPRAZOLE SODIUM 40 MILLIGRAM(S): 20 TABLET, DELAYED RELEASE ORAL at 05:47

## 2022-11-07 RX ADMIN — Medication 100 MILLIGRAM(S): at 21:44

## 2022-11-07 RX ADMIN — Medication 1 PATCH: at 19:00

## 2022-11-07 RX ADMIN — Medication 1 MILLIGRAM(S): at 06:00

## 2022-11-07 RX ADMIN — METHADONE HYDROCHLORIDE 135 MILLIGRAM(S): 40 TABLET ORAL at 11:27

## 2022-11-07 RX ADMIN — BUPROPION HYDROCHLORIDE 300 MILLIGRAM(S): 150 TABLET, EXTENDED RELEASE ORAL at 11:23

## 2022-11-07 RX ADMIN — MAGNESIUM OXIDE 400 MG ORAL TABLET 400 MILLIGRAM(S): 241.3 TABLET ORAL at 08:18

## 2022-11-07 NOTE — PROGRESS NOTE ADULT - ASSESSMENT
Patient is a 52y old Male PMHx of IVDU (heroin), vertebral osteomyelitis s/p debridement, and MSSA bacteremia, who presents from a rehab facility with a chief complaint of Hypoxia and new onset LE edema. In the ED was, found to have b/l PEs with radiographic evidence of R heart strain and admitted to ICU, found to have LE DVTs s/p LLE embolectomy 10/18. Currently on IV antibiotics, pending discharge authorization.     #Acute hypoxemic respiratory failure / Acute Submassive PE  - resolved  - Suspected superimposed PNA  - DIONICIO/OHS, noncompliant with NIV  - TTE: EF 55%, G1DD, mild TVR, no right heart strain  -VA duplx LE vein: Acute thrombus within the left common femoral, femoral, deep femoral, popliteal, gastrocnemius, posterior tibial and anterior tibial veins, s/p LLE embolectomy 10/18  - Therapeutic Lovenox, transitioned to Eliquis.   - s/p Cefepime, now on  Cefazolin 2g q8h IV  - PT evaluated and recommends sub-acut rehab or home PT on DC    #Recurrent MSSA bacteremia with Discitis   - Recent Spinal fusion  - Abnormal L4-L5 signal on MRI suggestive of  Epidural phlegmon/OM  - No acute surgical intervention needed per Neurosurgery   - C/w Cefazolin for 3 weeks (11/17) per ID  - Sterile 18 gauge powerline Mideline for subacute rehab procedure team consult placed  - Outpatient Neurosurgery referral      #HO IVDA, Opioid Abuse on Methadone   - improving, followed by psych: can resume Quetiapine 200 mg PO HS  - Continue with Xanax 1 mg po am, 1mg po afternoon and two times at night - total daily dose of 4mg  - Continue Wellbutrin Xl 300mg po q 24hrs  - Continue home dose Methadone 135mg po daily---> QTc 433 today 10/20  - For agitation, haldol 5mg po/IM every 8hrs prn    #Disposition  - Sub-acute Rehab, pending authorization    #MISC  - No need for daily CBC/BMP, just check daily mag and replenish as needed.    Handoff pending  1) pending placement

## 2022-11-07 NOTE — PROGRESS NOTE ADULT - SUBJECTIVE AND OBJECTIVE BOX
MIKAEL MCDERMOTT 52y Male  MRN#: [id number]   Hospital Day: 27d    SUBJECTIVE  Patient is a 52y old Male who presents with a chief complaint of Hypoxia (06 Nov 2022 17:45)  Currently admitted to medicine with the primary diagnosis of Pneumonia      INTERVAL HPI AND OVERNIGHT EVENTS:  Patient was examined and seen at bedside. This morning he is resting comfortably in bed. No issues or overnight events.    OBJECTIVE  PAST MEDICAL & SURGICAL HISTORY  IV drug abuse    Vertebral osteomyelitis    MSSA bacteremia    No significant past surgical history      ALLERGIES:  No Known Allergies    MEDICATIONS:  STANDING MEDICATIONS  ALPRAZolam 1 milliGRAM(s) Oral <User Schedule>  ALPRAZolam 2 milliGRAM(s) Oral at bedtime  apixaban 5 milliGRAM(s) Oral every 12 hours  buPROPion XL (24-Hour) . 300 milliGRAM(s) Oral daily  ceFAZolin   IVPB 2000 milliGRAM(s) IV Intermittent every 8 hours  chlorhexidine 2% Cloths 1 Application(s) Topical daily  folic acid 1 milliGRAM(s) Oral daily  magnesium oxide 400 milliGRAM(s) Oral once  magnesium oxide 400 milliGRAM(s) Oral three times a day with meals  methadone    Tablet 135 milliGRAM(s) Oral daily  multivitamin 1 Tablet(s) Oral daily  nicotine - 21 mG/24Hr(s) Patch 1 Patch Transdermal daily  pantoprazole    Tablet 40 milliGRAM(s) Oral before breakfast  QUEtiapine 200 milliGRAM(s) Oral at bedtime  thiamine 100 milliGRAM(s) Oral daily    PRN MEDICATIONS  acetaminophen     Tablet .. 650 milliGRAM(s) Oral every 6 hours PRN  aluminum hydroxide/magnesium hydroxide/simethicone Suspension 30 milliLiter(s) Oral every 4 hours PRN  cyclobenzaprine 10 milliGRAM(s) Oral three times a day PRN  haloperidol    Injectable 5 milliGRAM(s) IntraMuscular every 8 hours PRN  melatonin 3 milliGRAM(s) Oral at bedtime PRN  ondansetron Injectable 4 milliGRAM(s) IV Push every 8 hours PRN      VITAL SIGNS: Last 24 Hours  T(C): 36.6 (07 Nov 2022 05:06), Max: 36.7 (06 Nov 2022 12:01)  T(F): 97.9 (07 Nov 2022 05:06), Max: 98 (06 Nov 2022 12:01)  HR: 85 (07 Nov 2022 05:06) (75 - 85)  BP: 119/58 (07 Nov 2022 05:06) (110/62 - 119/58)  BP(mean): --  RR: 19 (07 Nov 2022 05:06) (18 - 19)  SpO2: 90% (07 Nov 2022 05:06) (90% - 97%)    LABS:      Mg     1.5     11-06      Sedimentation Rate, Erythrocyte: 42 mm/Hr *H* (11-06-22 @ 12:29)  Creatine Kinase, Serum: 31 U/L (11-06-22 @ 12:29)      CARDIAC MARKERS ( 06 Nov 2022 12:29 )  x     / x     / 31 U/L / x     / x          RADIOLOGY:  < from: Xray Chest 1 View- PORTABLE-Routine (Xray Chest 1 View- PORTABLE-Routine in AM.) (10.23.22 @ 06:18) >  Impression:  Low lung volume/lordotic view without definite evidence of focal   consolidation pleural effusion or pneumothorax.    < end of copied text >  < from: MR Cervical Spine w/wo IV Cont (10.22.22 @ 20:40) >  IMPRESSION:    1.  Study limited by motion artifact.    2.  No enhancing lesions.    3.  Straightening of the normal cervical lordosis may be secondary to   patient positioning or muscle spasm.    4.  C4-C5 and C5-C6; disc osteophyte complexes with compression of the   thecal sac.    5.  C6-C7; disc osteophyte complex with compression of the thecal sac,   degenerative changes, and bilateral foraminal narrowing.    < end of copied text >  < from: MR Thoracic Spine w/wo IV Cont (10.22.22 @ 20:40) >  MPRESSION:    In comparison with the prior MRI of the thoracic spine dated August 12, 2022:    Current examination is limited by motion artifact.    Interval development of a right pleural effusion which may be loculated   and a right basilar opacity.    No enhancing lesions.    < end of copied text >    PHYSICAL EXAM:  CONSTITUTIONAL: No acute distress, AAOx3  HEAD: Atraumatic, normocephalic  EYES: EOM intact, PERRLA, conjunctiva and sclera clear  ENT: moist mucous membranes  PULMONARY: Clear to auscultation bilaterally  CARDIOVASCULAR: Regular rate and rhythm  GASTROINTESTINAL: Soft, non-tender, non-distended; bowel sounds present  MUSCULOSKELETAL: no edema  NEUROLOGY: non-focal  SKIN: warm and dry

## 2022-11-08 LAB
GLUCOSE BLDC GLUCOMTR-MCNC: 104 MG/DL — HIGH (ref 70–99)
GLUCOSE BLDC GLUCOMTR-MCNC: 139 MG/DL — HIGH (ref 70–99)
GLUCOSE BLDC GLUCOMTR-MCNC: 192 MG/DL — HIGH (ref 70–99)
MAGNESIUM SERPL-MCNC: 1.6 MG/DL — LOW (ref 1.8–2.4)

## 2022-11-08 PROCEDURE — 99232 SBSQ HOSP IP/OBS MODERATE 35: CPT

## 2022-11-08 RX ORDER — MAGNESIUM SULFATE 500 MG/ML
2 VIAL (ML) INJECTION
Refills: 0 | Status: COMPLETED | OUTPATIENT
Start: 2022-11-08 | End: 2022-11-08

## 2022-11-08 RX ORDER — APIXABAN 2.5 MG/1
1 TABLET, FILM COATED ORAL
Qty: 0 | Refills: 0 | DISCHARGE
Start: 2022-11-08

## 2022-11-08 RX ORDER — CEPHALEXIN 500 MG
1 CAPSULE ORAL
Qty: 120 | Refills: 0
Start: 2022-11-08 | End: 2022-12-07

## 2022-11-08 RX ORDER — CEPHALEXIN 500 MG
500 CAPSULE ORAL
Refills: 0 | Status: DISCONTINUED | OUTPATIENT
Start: 2022-11-08 | End: 2022-11-17

## 2022-11-08 RX ADMIN — Medication 500 MILLIGRAM(S): at 17:46

## 2022-11-08 RX ADMIN — Medication 1 MILLIGRAM(S): at 05:51

## 2022-11-08 RX ADMIN — APIXABAN 5 MILLIGRAM(S): 2.5 TABLET, FILM COATED ORAL at 05:47

## 2022-11-08 RX ADMIN — MAGNESIUM OXIDE 400 MG ORAL TABLET 400 MILLIGRAM(S): 241.3 TABLET ORAL at 09:05

## 2022-11-08 RX ADMIN — MAGNESIUM OXIDE 400 MG ORAL TABLET 400 MILLIGRAM(S): 241.3 TABLET ORAL at 17:45

## 2022-11-08 RX ADMIN — Medication 25 GRAM(S): at 11:39

## 2022-11-08 RX ADMIN — APIXABAN 5 MILLIGRAM(S): 2.5 TABLET, FILM COATED ORAL at 17:45

## 2022-11-08 RX ADMIN — Medication 1 PATCH: at 19:00

## 2022-11-08 RX ADMIN — Medication 25 GRAM(S): at 09:04

## 2022-11-08 RX ADMIN — Medication 1 PATCH: at 11:30

## 2022-11-08 RX ADMIN — Medication 2 MILLIGRAM(S): at 21:20

## 2022-11-08 RX ADMIN — PANTOPRAZOLE SODIUM 40 MILLIGRAM(S): 20 TABLET, DELAYED RELEASE ORAL at 05:47

## 2022-11-08 RX ADMIN — Medication 100 MILLIGRAM(S): at 05:46

## 2022-11-08 RX ADMIN — QUETIAPINE FUMARATE 200 MILLIGRAM(S): 200 TABLET, FILM COATED ORAL at 21:20

## 2022-11-08 NOTE — PROGRESS NOTE ADULT - SUBJECTIVE AND OBJECTIVE BOX
MIKAEL MCDERMOTT 52y Male  MRN#: [id number]   Hospital Day: 28d    SUBJECTIVE  Patient is a 52y old Male who presents with a chief complaint of Hypoxia (07 Nov 2022 11:19)  Currently admitted to medicine with the primary diagnosis of Pneumonia      INTERVAL HPI AND OVERNIGHT EVENTS:  Patient was examined and seen at bedside. This morning he is resting comfortably in bed. No issues or overnight events.    OBJECTIVE  PAST MEDICAL & SURGICAL HISTORY  IV drug abuse    Vertebral osteomyelitis    MSSA bacteremia    No significant past surgical history      ALLERGIES:  No Known Allergies    MEDICATIONS:  STANDING MEDICATIONS  ALPRAZolam 1 milliGRAM(s) Oral <User Schedule>  ALPRAZolam 2 milliGRAM(s) Oral at bedtime  apixaban 5 milliGRAM(s) Oral every 12 hours  buPROPion XL (24-Hour) . 300 milliGRAM(s) Oral daily  ceFAZolin   IVPB 2000 milliGRAM(s) IV Intermittent every 8 hours  chlorhexidine 2% Cloths 1 Application(s) Topical daily  folic acid 1 milliGRAM(s) Oral daily  magnesium oxide 400 milliGRAM(s) Oral once  magnesium oxide 400 milliGRAM(s) Oral three times a day with meals  methadone    Tablet 135 milliGRAM(s) Oral daily  multivitamin 1 Tablet(s) Oral daily  nicotine - 21 mG/24Hr(s) Patch 1 Patch Transdermal daily  pantoprazole    Tablet 40 milliGRAM(s) Oral before breakfast  QUEtiapine 200 milliGRAM(s) Oral at bedtime  thiamine 100 milliGRAM(s) Oral daily    PRN MEDICATIONS  acetaminophen     Tablet .. 650 milliGRAM(s) Oral every 6 hours PRN  aluminum hydroxide/magnesium hydroxide/simethicone Suspension 30 milliLiter(s) Oral every 4 hours PRN  cyclobenzaprine 10 milliGRAM(s) Oral three times a day PRN  haloperidol    Injectable 5 milliGRAM(s) IntraMuscular every 8 hours PRN  melatonin 3 milliGRAM(s) Oral at bedtime PRN  ondansetron Injectable 4 milliGRAM(s) IV Push every 8 hours PRN      VITAL SIGNS: Last 24 Hours  T(C): 36.6 (07 Nov 2022 20:41), Max: 36.6 (07 Nov 2022 20:41)  T(F): 97.8 (07 Nov 2022 20:41), Max: 97.8 (07 Nov 2022 20:41)  HR: 85 (08 Nov 2022 06:00) (78 - 85)  BP: 98/58 (08 Nov 2022 06:00) (98/58 - 112/64)  BP(mean): --  RR: 18 (07 Nov 2022 20:41) (18 - 18)  SpO2: 97% (07 Nov 2022 20:41) (88% - 97%)    LABS:      Mg     1.6     11-08                CARDIAC MARKERS ( 06 Nov 2022 12:29 )  x     / x     / 31 U/L / x     / x          RADIOLOGY:    < from: Xray Chest 1 View- PORTABLE-Routine (Xray Chest 1 View- PORTABLE-Routine in AM.) (10.23.22 @ 06:18) >  Impression:  Low lung volume/lordotic view without definite evidence of focal   consolidation pleural effusion or pneumothorax.    < end of copied text >  < from: MR Cervical Spine w/wo IV Cont (10.22.22 @ 20:40) >  IMPRESSION:    1.  Study limited by motion artifact.    2.  No enhancing lesions.    3.  Straightening of the normal cervical lordosis may be secondary to   patient positioning or muscle spasm.    4.  C4-C5 and C5-C6; disc osteophyte complexes with compression of the   thecal sac.    5.  C6-C7; disc osteophyte complex with compression of the thecal sac,   degenerative changes, and bilateral foraminal narrowing.    < end of copied text >  < from: MR Thoracic Spine w/wo IV Cont (10.22.22 @ 20:40) >  MPRESSION:    In comparison with the prior MRI of the thoracic spine dated August 12, 2022:    Current examination is limited by motion artifact.    Interval development of a right pleural effusion which may be loculated   and a right basilar opacity.    No enhancing lesions.    < end of copied text >    PHYSICAL EXAM:  CONSTITUTIONAL: No acute distress, AAOx3  HEAD: Atraumatic, normocephalic  EYES: EOM intact, PERRLA, conjunctiva and sclera clear  ENT: moist mucous membranes  PULMONARY: Clear to auscultation bilaterally  CARDIOVASCULAR: Regular rate and rhythm  GASTROINTESTINAL: Soft, non-tender, non-distended; bowel sounds present  MUSCULOSKELETAL: no edema  NEUROLOGY: non-focal  SKIN: warm and dry

## 2022-11-09 LAB
GLUCOSE BLDC GLUCOMTR-MCNC: 108 MG/DL — HIGH (ref 70–99)
GLUCOSE BLDC GLUCOMTR-MCNC: 168 MG/DL — HIGH (ref 70–99)
GLUCOSE BLDC GLUCOMTR-MCNC: 175 MG/DL — HIGH (ref 70–99)
GLUCOSE BLDC GLUCOMTR-MCNC: 201 MG/DL — HIGH (ref 70–99)

## 2022-11-09 PROCEDURE — 99233 SBSQ HOSP IP/OBS HIGH 50: CPT

## 2022-11-09 RX ORDER — MAGNESIUM OXIDE 400 MG ORAL TABLET 241.3 MG
1 TABLET ORAL
Qty: 180 | Refills: 2
Start: 2022-11-09 | End: 2023-05-07

## 2022-11-09 RX ADMIN — Medication 500 MILLIGRAM(S): at 23:07

## 2022-11-09 RX ADMIN — QUETIAPINE FUMARATE 200 MILLIGRAM(S): 200 TABLET, FILM COATED ORAL at 21:36

## 2022-11-09 RX ADMIN — MAGNESIUM OXIDE 400 MG ORAL TABLET 400 MILLIGRAM(S): 241.3 TABLET ORAL at 17:43

## 2022-11-09 RX ADMIN — Medication 1 PATCH: at 11:11

## 2022-11-09 RX ADMIN — Medication 1 MILLIGRAM(S): at 14:33

## 2022-11-09 RX ADMIN — Medication 1 MILLIGRAM(S): at 11:11

## 2022-11-09 RX ADMIN — MAGNESIUM OXIDE 400 MG ORAL TABLET 400 MILLIGRAM(S): 241.3 TABLET ORAL at 09:16

## 2022-11-09 RX ADMIN — Medication 500 MILLIGRAM(S): at 17:43

## 2022-11-09 RX ADMIN — Medication 500 MILLIGRAM(S): at 11:11

## 2022-11-09 RX ADMIN — BUPROPION HYDROCHLORIDE 300 MILLIGRAM(S): 150 TABLET, EXTENDED RELEASE ORAL at 11:11

## 2022-11-09 RX ADMIN — Medication 500 MILLIGRAM(S): at 00:01

## 2022-11-09 RX ADMIN — CHLORHEXIDINE GLUCONATE 1 APPLICATION(S): 213 SOLUTION TOPICAL at 11:10

## 2022-11-09 RX ADMIN — Medication 1 PATCH: at 19:27

## 2022-11-09 RX ADMIN — Medication 1 TABLET(S): at 11:11

## 2022-11-09 RX ADMIN — METHADONE HYDROCHLORIDE 135 MILLIGRAM(S): 40 TABLET ORAL at 11:18

## 2022-11-09 RX ADMIN — APIXABAN 5 MILLIGRAM(S): 2.5 TABLET, FILM COATED ORAL at 05:37

## 2022-11-09 RX ADMIN — APIXABAN 5 MILLIGRAM(S): 2.5 TABLET, FILM COATED ORAL at 17:44

## 2022-11-09 RX ADMIN — Medication 500 MILLIGRAM(S): at 05:37

## 2022-11-09 RX ADMIN — Medication 2 MILLIGRAM(S): at 21:36

## 2022-11-09 RX ADMIN — Medication 100 MILLIGRAM(S): at 11:11

## 2022-11-09 RX ADMIN — PANTOPRAZOLE SODIUM 40 MILLIGRAM(S): 20 TABLET, DELAYED RELEASE ORAL at 05:42

## 2022-11-09 RX ADMIN — Medication 1 MILLIGRAM(S): at 05:37

## 2022-11-09 RX ADMIN — MAGNESIUM OXIDE 400 MG ORAL TABLET 400 MILLIGRAM(S): 241.3 TABLET ORAL at 14:03

## 2022-11-09 NOTE — PROGRESS NOTE ADULT - SUBJECTIVE AND OBJECTIVE BOX
MIKAEL MCDERMOTT 52y Male  MRN#: [id number]   Hospital Day: 29d    SUBJECTIVE  Patient is a 52y old Male who presents with a chief complaint of Hypoxia (08 Nov 2022 11:55)  Currently admitted to medicine with the primary diagnosis of Pneumonia      INTERVAL HPI AND OVERNIGHT EVENTS:  Patient was examined and seen at bedside. This morning he is resting comfortably in bed. No issues or overnight events.    OBJECTIVE  PAST MEDICAL & SURGICAL HISTORY  IV drug abuse    Vertebral osteomyelitis    MSSA bacteremia    No significant past surgical history      ALLERGIES:  No Known Allergies    MEDICATIONS:  STANDING MEDICATIONS  ALPRAZolam 1 milliGRAM(s) Oral <User Schedule>  ALPRAZolam 2 milliGRAM(s) Oral at bedtime  apixaban 5 milliGRAM(s) Oral every 12 hours  buPROPion XL (24-Hour) . 300 milliGRAM(s) Oral daily  cephalexin 500 milliGRAM(s) Oral four times a day  chlorhexidine 2% Cloths 1 Application(s) Topical daily  folic acid 1 milliGRAM(s) Oral daily  magnesium oxide 400 milliGRAM(s) Oral once  magnesium oxide 400 milliGRAM(s) Oral three times a day with meals  methadone    Tablet 135 milliGRAM(s) Oral daily  multivitamin 1 Tablet(s) Oral daily  nicotine - 21 mG/24Hr(s) Patch 1 Patch Transdermal daily  pantoprazole    Tablet 40 milliGRAM(s) Oral before breakfast  QUEtiapine 200 milliGRAM(s) Oral at bedtime  thiamine 100 milliGRAM(s) Oral daily    PRN MEDICATIONS  acetaminophen     Tablet .. 650 milliGRAM(s) Oral every 6 hours PRN  aluminum hydroxide/magnesium hydroxide/simethicone Suspension 30 milliLiter(s) Oral every 4 hours PRN  cyclobenzaprine 10 milliGRAM(s) Oral three times a day PRN  haloperidol    Injectable 5 milliGRAM(s) IntraMuscular every 8 hours PRN  melatonin 3 milliGRAM(s) Oral at bedtime PRN  ondansetron Injectable 4 milliGRAM(s) IV Push every 8 hours PRN      VITAL SIGNS: Last 24 Hours  T(C): 36.2 (09 Nov 2022 05:39), Max: 36.9 (08 Nov 2022 20:11)  T(F): 97.1 (09 Nov 2022 05:39), Max: 98.4 (08 Nov 2022 20:11)  HR: 76 (09 Nov 2022 05:39) (76 - 86)  BP: 111/71 (09 Nov 2022 05:39) (94/55 - 111/71)  BP(mean): --  RR: 18 (09 Nov 2022 05:39) (18 - 19)  SpO2: 97% (08 Nov 2022 20:11) (97% - 97%)    LABS:      Mg     1.6     11-08                    RADIOLOGY:      < from: Xray Chest 1 View- PORTABLE-Routine (Xray Chest 1 View- PORTABLE-Routine in AM.) (10.23.22 @ 06:18) >  Impression:  Low lung volume/lordotic view without definite evidence of focal   consolidation pleural effusion or pneumothorax.    < end of copied text >  < from: MR Cervical Spine w/wo IV Cont (10.22.22 @ 20:40) >  IMPRESSION:    1.  Study limited by motion artifact.    2.  No enhancing lesions.    3.  Straightening of the normal cervical lordosis may be secondary to   patient positioning or muscle spasm.    4.  C4-C5 and C5-C6; disc osteophyte complexes with compression of the   thecal sac.    5.  C6-C7; disc osteophyte complex with compression of the thecal sac,   degenerative changes, and bilateral foraminal narrowing.    < end of copied text >  < from: MR Thoracic Spine w/wo IV Cont (10.22.22 @ 20:40) >  MPRESSION:    In comparison with the prior MRI of the thoracic spine dated August 12, 2022:    Current examination is limited by motion artifact.    Interval development of a right pleural effusion which may be loculated   and a right basilar opacity.    No enhancing lesions.    < end of copied text >    PHYSICAL EXAM:  CONSTITUTIONAL: No acute distress, AAOx3  HEAD: Atraumatic, normocephalic  EYES: EOM intact, PERRLA, conjunctiva and sclera clear  ENT: moist mucous membranes  PULMONARY: Clear to auscultation bilaterally  CARDIOVASCULAR: Regular rate and rhythm  GASTROINTESTINAL: Soft, non-tender, non-distended; bowel sounds present  MUSCULOSKELETAL: no edema  NEUROLOGY: non-focal  SKIN: warm and dry

## 2022-11-09 NOTE — PROGRESS NOTE ADULT - ASSESSMENT
Patient is a 52y old Male PMHx of IVDU (heroin), vertebral osteomyelitis s/p debridement, and MSSA bacteremia, who presents from a rehab facility with a chief complaint of Hypoxia and new onset LE edema. In the ED was, found to have b/l PEs with radiographic evidence of R heart strain and admitted to ICU, found to have LE DVTs s/p LLE embolectomy 10/18. Currently on IV antibiotics, pending discharge authorization.     #Acute hypoxemic respiratory failure / Acute Submassive PE  - resolved  - Suspected superimposed PNA  - DIONICIO/OHS, noncompliant with NIV  - TTE: EF 55%, G1DD, mild TVR, no right heart strain  -VA duplx LE vein: Acute thrombus within the left common femoral, femoral, deep femoral, popliteal, gastrocnemius, posterior tibial and anterior tibial veins, s/p LLE embolectomy 10/18  - Therapeutic Lovenox, transitioned to Eliquis.   - s/p Cefepime and Cefazolin 2g for 4 weeks >> after consulting with ID switched to Keflex 500mg q6 PO on 11/08 PM  - PT evaluated and recommends sub-acut rehab or home PT on DC    #Recurrent MSSA bacteremia with Discitis   - Recent Spinal fusion  - Abnormal L4-L5 signal on MRI suggestive of  Epidural phlegmon/OM  - No acute surgical intervention needed per Neurosurgery   - C/w Cefazolin for 3 weeks (11/17) per ID  - Sterile 18 gauge powerline Mideline for subacute rehab procedure team consult placed  - Outpatient Neurosurgery referral      #HO IVDA, Opioid Abuse on Methadone   - improving, followed by psych: can resume Quetiapine 200 mg PO HS  - Continue with Xanax 1 mg po am, 1mg po afternoon and two times at night - total daily dose of 4mg  - Continue Wellbutrin Xl 300mg po q 24hrs  - Continue home dose Methadone 135mg po daily---> QTc 433 today 10/20  - For agitation, haldol 5mg po/IM every 8hrs prn    #Disposition  - Sub-acute Rehab, pending authorization    #MISC  - No need for daily CBC/BMP, just check daily mag and replenish as needed.    Handoff pending  1) pending placement

## 2022-11-10 LAB
GLUCOSE BLDC GLUCOMTR-MCNC: 114 MG/DL — HIGH (ref 70–99)
GLUCOSE BLDC GLUCOMTR-MCNC: 148 MG/DL — HIGH (ref 70–99)
GLUCOSE BLDC GLUCOMTR-MCNC: 182 MG/DL — HIGH (ref 70–99)

## 2022-11-10 PROCEDURE — 99232 SBSQ HOSP IP/OBS MODERATE 35: CPT

## 2022-11-10 RX ORDER — SENNA PLUS 8.6 MG/1
1 TABLET ORAL
Refills: 0 | Status: DISCONTINUED | OUTPATIENT
Start: 2022-11-10 | End: 2022-11-17

## 2022-11-10 RX ORDER — POLYETHYLENE GLYCOL 3350 17 G/17G
17 POWDER, FOR SOLUTION ORAL DAILY
Refills: 0 | Status: DISCONTINUED | OUTPATIENT
Start: 2022-11-10 | End: 2022-11-17

## 2022-11-10 RX ADMIN — CHLORHEXIDINE GLUCONATE 1 APPLICATION(S): 213 SOLUTION TOPICAL at 11:38

## 2022-11-10 RX ADMIN — SENNA PLUS 1 TABLET(S): 8.6 TABLET ORAL at 21:25

## 2022-11-10 RX ADMIN — MAGNESIUM OXIDE 400 MG ORAL TABLET 400 MILLIGRAM(S): 241.3 TABLET ORAL at 17:33

## 2022-11-10 RX ADMIN — MAGNESIUM OXIDE 400 MG ORAL TABLET 400 MILLIGRAM(S): 241.3 TABLET ORAL at 07:46

## 2022-11-10 RX ADMIN — BUPROPION HYDROCHLORIDE 300 MILLIGRAM(S): 150 TABLET, EXTENDED RELEASE ORAL at 11:32

## 2022-11-10 RX ADMIN — Medication 2 MILLIGRAM(S): at 21:24

## 2022-11-10 RX ADMIN — Medication 1 MILLIGRAM(S): at 14:38

## 2022-11-10 RX ADMIN — Medication 500 MILLIGRAM(S): at 17:31

## 2022-11-10 RX ADMIN — APIXABAN 5 MILLIGRAM(S): 2.5 TABLET, FILM COATED ORAL at 17:31

## 2022-11-10 RX ADMIN — Medication 1 PATCH: at 11:32

## 2022-11-10 RX ADMIN — PANTOPRAZOLE SODIUM 40 MILLIGRAM(S): 20 TABLET, DELAYED RELEASE ORAL at 06:26

## 2022-11-10 RX ADMIN — METHADONE HYDROCHLORIDE 135 MILLIGRAM(S): 40 TABLET ORAL at 11:36

## 2022-11-10 RX ADMIN — Medication 1 TABLET(S): at 11:37

## 2022-11-10 RX ADMIN — Medication 500 MILLIGRAM(S): at 23:03

## 2022-11-10 RX ADMIN — SENNA PLUS 1 TABLET(S): 8.6 TABLET ORAL at 15:20

## 2022-11-10 RX ADMIN — Medication 1 MILLIGRAM(S): at 06:25

## 2022-11-10 RX ADMIN — APIXABAN 5 MILLIGRAM(S): 2.5 TABLET, FILM COATED ORAL at 06:25

## 2022-11-10 RX ADMIN — Medication 1 PATCH: at 06:22

## 2022-11-10 RX ADMIN — MAGNESIUM OXIDE 400 MG ORAL TABLET 400 MILLIGRAM(S): 241.3 TABLET ORAL at 11:37

## 2022-11-10 RX ADMIN — POLYETHYLENE GLYCOL 3350 17 GRAM(S): 17 POWDER, FOR SOLUTION ORAL at 15:20

## 2022-11-10 RX ADMIN — QUETIAPINE FUMARATE 200 MILLIGRAM(S): 200 TABLET, FILM COATED ORAL at 21:25

## 2022-11-10 RX ADMIN — Medication 500 MILLIGRAM(S): at 06:26

## 2022-11-10 RX ADMIN — Medication 1 MILLIGRAM(S): at 11:32

## 2022-11-10 RX ADMIN — Medication 100 MILLIGRAM(S): at 11:32

## 2022-11-10 RX ADMIN — Medication 500 MILLIGRAM(S): at 11:32

## 2022-11-10 NOTE — CHART NOTE - NSCHARTNOTEFT_GEN_A_CORE
Registered Dietitian Follow-Up  Patient Profile Reviewed                           Yes [x]   No []  Nutrition History Previously Obtained        Yes [x]  No []      Pertinent Medical Interventions:  Patient is a 52y old Male PMHx of IVDU (heroin), vertebral osteomyelitis s/p debridement, and MSSA bacteremia, who presents from a rehab facility with a chief complaint of Hypoxia and new onset LE edema. In the ED was, found to have b/l PEs with radiographic evidence of R heart strain and admitted to ICU, found to have LE DVTs s/p LLE embolectomy 10/18. Currently on IV antibiotics, pending discharge authorization.     Nutrition Interval History:   Patient with good appetite/PO intake eating adequately during admission, consuming % of meal trays on average   **Patient meeting % of estimated energy needs in-house     Diet order:   Diet, Regular (10-19-22 @ 07:52) [Active]    Anthropometrics:  185.4cm  140.9kg  41.0kg/m2    OTHER WEIGHTS:   138.7 (10-23), Weight in k.4 (10-21), Weight in k.7 (10-20), Weight in k.3 (10-20), Weight in k.4 (10-19), Weight in k (10-19)  IBW: 83.6kg    MEDICATIONS  (STANDING):  ALPRAZolam 1 milliGRAM(s) Oral <User Schedule>  ALPRAZolam 2 milliGRAM(s) Oral at bedtime  apixaban 5 milliGRAM(s) Oral every 12 hours  buPROPion XL (24-Hour) . 300 milliGRAM(s) Oral daily  cephalexin 500 milliGRAM(s) Oral four times a day  chlorhexidine 2% Cloths 1 Application(s) Topical daily  folic acid 1 milliGRAM(s) Oral daily  magnesium oxide 400 milliGRAM(s) Oral once  magnesium oxide 400 milliGRAM(s) Oral three times a day with meals  methadone    Tablet 135 milliGRAM(s) Oral daily  multivitamin 1 Tablet(s) Oral daily  nicotine - 21 mG/24Hr(s) Patch 1 Patch Transdermal daily  pantoprazole    Tablet 40 milliGRAM(s) Oral before breakfast  QUEtiapine 200 milliGRAM(s) Oral at bedtime  thiamine 100 milliGRAM(s) Oral daily    MEDICATIONS  (PRN):  acetaminophen     Tablet .. 650 milliGRAM(s) Oral every 6 hours PRN Temp greater or equal to 38C (100.4F)  aluminum hydroxide/magnesium hydroxide/simethicone Suspension 30 milliLiter(s) Oral every 4 hours PRN Dyspepsia  cyclobenzaprine 10 milliGRAM(s) Oral three times a day PRN Muscle Spasm  haloperidol    Injectable 5 milliGRAM(s) IntraMuscular every 8 hours PRN Agitation  melatonin 3 milliGRAM(s) Oral at bedtime PRN Insomnia  ondansetron Injectable 4 milliGRAM(s) IV Push every 8 hours PRN Nausea and/or Vomiting    Finger Sticks:  POCT Blood Glucose.: 182 mg/dL (11-10 @ 11:39)  POCT Blood Glucose.: 114 mg/dL (11-10 @ 07:24)  POCT Blood Glucose.: 175 mg/dL ( @ 21:04)  POCT Blood Glucose.: 168 mg/dL ( @ 16:31)    Physical Findings:  - Appearance: A&Ox4  - GI function: Last BM documented 10/23, no documented Bm since  - Tubes: n/a  - Oral/Mouth cavity: WDL  - Skin: L buttock stage 2 PU POA per WOC 10/19  - Edema: left arm; right arm 2+      Nutrition Requirements: with consideration for age, weight, BMI  Weight Used: IBW 83.6 kg     Estimated Energy Needs: 4366-0277 kcal/day (30-35kcal/kg of IBW)  Estimated Protein Needs: 117-142 g/day (1.4-1.7g/kg of IBW)  Estimated Fluid Needs: 1672 mL/day (20mL/kg of IBW)     1) [X] Previous Nutrition Diagnosis:  Increased Nutrient Needs            [X] Ongoing          [] Resolved    Nutrition Intervention: Meals and Snacks, Vitamin Supplement, Nutrition Related Medication, Coordination of Care  Indicator/Monitoring:  Monitor diet order, energy intake, food and nutrient intake, body composition, weight    Recommendations:  - CONTINUE: Regular Diet  - Consider zinc sulfate 220mg/daily x10-14d, fifi 2x/daily, vit C 500mg BID for wound healing    Pending placement/ discharge   Saleem Dye, #[id number] or via TEAMS  Patient is at LOW Risk, follow up x 10 days

## 2022-11-10 NOTE — PROGRESS NOTE ADULT - SUBJECTIVE AND OBJECTIVE BOX
BALDEMARMIKAEL  52y  [facility] [address]      Patient is a 52y old  Male who presents with a chief complaint of Hypoxia (10 Nov 2022 10:19)      INTERVAL HPI/OVERNIGHT EVENTS:  Patient feels well. he reported passing bm but with straining.   laxative will be added it   no overnight event  no other complains noted       REVIEW OF SYSTEMS:        FAMILY HISTORY:  No pertinent family history in first degree relatives      T(C): 36.7 (11-10-22 @ 05:24), Max: 37 (11-09-22 @ 19:50)  HR: 82 (11-10-22 @ 07:45) (73 - 85)  BP: 100/60 (11-10-22 @ 07:45) (100/54 - 109/59)  RR: 18 (11-10-22 @ 07:45) (18 - 19)  SpO2: 95% (11-10-22 @ 07:45) (90% - 95%)  Wt(kg): --Vital Signs Last 24 Hrs  T(C): 36.7 (10 Nov 2022 05:24), Max: 37 (09 Nov 2022 19:50)  T(F): 98 (10 Nov 2022 05:24), Max: 98.6 (09 Nov 2022 19:50)  HR: 82 (10 Nov 2022 07:45) (73 - 85)  BP: 100/60 (10 Nov 2022 07:45) (100/54 - 109/59)  BP(mean): --  RR: 18 (10 Nov 2022 07:45) (18 - 19)  SpO2: 95% (10 Nov 2022 07:45) (90% - 95%)    Parameters below as of 10 Nov 2022 07:45  Patient On (Oxygen Delivery Method): nasal cannula  O2 Flow (L/min): 2      PHYSICAL EXAM:  GENERAL: NAD, well-groomed, well-developed  NERVOUS SYSTEM:  Alert & Oriented X3,  PULM: Clear to auscultation bilaterally  CARDIAC: Regular rate and rhythm;  GI: Soft, Nontender, Nondistended; Bowel sounds present  EXTREMITIES:  2+ Peripheral Pulses,    Consultant(s) Notes Reviewed:  [x ] YES  [ ] NO  Care Discussed with Consultants/Other Providers [ x] YES  [ ] NO    LABS:                    acetaminophen     Tablet .. 650 milliGRAM(s) Oral every 6 hours PRN  ALPRAZolam 1 milliGRAM(s) Oral <User Schedule>  ALPRAZolam 2 milliGRAM(s) Oral at bedtime  aluminum hydroxide/magnesium hydroxide/simethicone Suspension 30 milliLiter(s) Oral every 4 hours PRN  apixaban 5 milliGRAM(s) Oral every 12 hours  buPROPion XL (24-Hour) . 300 milliGRAM(s) Oral daily  cephalexin 500 milliGRAM(s) Oral four times a day  chlorhexidine 2% Cloths 1 Application(s) Topical daily  cyclobenzaprine 10 milliGRAM(s) Oral three times a day PRN  folic acid 1 milliGRAM(s) Oral daily  haloperidol    Injectable 5 milliGRAM(s) IntraMuscular every 8 hours PRN  magnesium oxide 400 milliGRAM(s) Oral once  magnesium oxide 400 milliGRAM(s) Oral three times a day with meals  melatonin 3 milliGRAM(s) Oral at bedtime PRN  methadone    Tablet 135 milliGRAM(s) Oral daily  multivitamin 1 Tablet(s) Oral daily  nicotine - 21 mG/24Hr(s) Patch 1 Patch Transdermal daily  ondansetron Injectable 4 milliGRAM(s) IV Push every 8 hours PRN  pantoprazole    Tablet 40 milliGRAM(s) Oral before breakfast  QUEtiapine 200 milliGRAM(s) Oral at bedtime  thiamine 100 milliGRAM(s) Oral daily    Patient is a 52y old Male PMHx of IVDU (heroin), vertebral osteomyelitis s/p debridement, and MSSA bacteremia, who presents from a rehab facility with a chief complaint of Hypoxia and new onset LE edema. In the ED was, found to have b/l PEs with radiographic evidence of R heart strain and admitted to ICU, found to have LE DVTs s/p LLE embolectomy 10/18. Currently on IV antibiotics, pending discharge authorization.     1. Acute hypoxemic respiratory failure  secondary to Acute Submassive PE (BNP elevated ) with superimpose pneumonia   - Admit to medicine   - DIONICIO/OHS, noncompliant with NIV  - TTE: EF 55%, G1DD, mild TVR, no right heart strain  -VA duplx LE vein: Acute thrombus within the left common femoral, femoral, deep femoral, popliteal, gastrocnemius, posterior tibial and anterior tibial veins, s/p LLE embolectomy 10/18  - Was on lovenox that was switch to eliquis   - PT evaluated and recommends sub-acut rehab or home PT on DC    2. Recurrent MSSA bacteremia with Discitis .  Recent Spinal fusion  - Abnormal L4-L5 signal on MRI suggestive of  Epidural phlegmon/OM  - No acute surgical intervention needed per Neurosurgery   - s/p Cefepime and Cefazolin 2g for 4 weeks >> after consulting with ID switched to Keflex 500mg q6 PO on 11/08 PM  - Sterile 18 gauge powerline Mideline was placed bc of IV Abx >> will remove before DC as pt dont need IV abx anymore  - Outpatient Neurosurgery referral      3. HO IVDA, Opioid Abuse on Methadone   - improving, followed by psych: can resume Quetiapine 200 mg PO HS  - Continue with Xanax 1 mg po am, 1mg po afternoon and two times at night - total daily dose of 4mg  - Continue Wellbutrin Xl 300mg po q 24hrs  - Continue home dose Methadone 135mg po daily---> QTc 433 today 10/20  - For agitation, haldol 5mg po/IM every 8hrs prn    4. Opioid induce constipation  - Cont laxative     #Disposition  - Sub-acute Rehab, pending authorization    #MISC  - No need for daily CBC/BMP, just check mag every other day and replenish as needed.    Handoff pending  1) pending placement  2) remove midline before DC    Patient is medically clear awaiting auth       Case Discussed with House Staff   30  minutes spent on total encounter; more than 50% of the visit was spent counseling and/or coordinating care by the attending physician.   [employer] [phone]

## 2022-11-10 NOTE — PROGRESS NOTE ADULT - ASSESSMENT
Patient is a 52y old Male PMHx of IVDU (heroin), vertebral osteomyelitis s/p debridement, and MSSA bacteremia, who presents from a rehab facility with a chief complaint of Hypoxia and new onset LE edema. In the ED was, found to have b/l PEs with radiographic evidence of R heart strain and admitted to ICU, found to have LE DVTs s/p LLE embolectomy 10/18. Currently on IV antibiotics, pending discharge authorization.     #Acute hypoxemic respiratory failure / Acute Submassive PE  - resolved  - Suspected superimposed PNA  - DIONICIO/OHS, noncompliant with NIV  - TTE: EF 55%, G1DD, mild TVR, no right heart strain  -VA duplx LE vein: Acute thrombus within the left common femoral, femoral, deep femoral, popliteal, gastrocnemius, posterior tibial and anterior tibial veins, s/p LLE embolectomy 10/18  - Therapeutic Lovenox, transitioned to Eliquis.   - PT evaluated and recommends sub-acut rehab or home PT on DC    #Recurrent MSSA bacteremia with Discitis   - Recent Spinal fusion  - Abnormal L4-L5 signal on MRI suggestive of  Epidural phlegmon/OM  - No acute surgical intervention needed per Neurosurgery   - s/p Cefepime and Cefazolin 2g for 4 weeks >> after consulting with ID switched to Keflex 500mg q6 PO on 11/08 PM  - Sterile 18 gauge powerline Mideline was placed bc of IV Abx >> will remove before DC as pt dont need IV abx anymore  - Outpatient Neurosurgery referral      #HO IVDA, Opioid Abuse on Methadone   - improving, followed by psych: can resume Quetiapine 200 mg PO HS  - Continue with Xanax 1 mg po am, 1mg po afternoon and two times at night - total daily dose of 4mg  - Continue Wellbutrin Xl 300mg po q 24hrs  - Continue home dose Methadone 135mg po daily---> QTc 433 today 10/20  - For agitation, haldol 5mg po/IM every 8hrs prn    #Disposition  - Sub-acute Rehab, pending authorization    #MISC  - No need for daily CBC/BMP, just check mag every other day and replenish as needed.    Handoff pending  1) pending placement Patient is a 52y old Male PMHx of IVDU (heroin), vertebral osteomyelitis s/p debridement, and MSSA bacteremia, who presents from a rehab facility with a chief complaint of Hypoxia and new onset LE edema. In the ED was, found to have b/l PEs with radiographic evidence of R heart strain and admitted to ICU, found to have LE DVTs s/p LLE embolectomy 10/18. Currently on IV antibiotics, pending discharge authorization.     #Acute hypoxemic respiratory failure / Acute Submassive PE  - resolved  - Suspected superimposed PNA  - DIONICIO/OHS, noncompliant with NIV  - TTE: EF 55%, G1DD, mild TVR, no right heart strain  -VA duplx LE vein: Acute thrombus within the left common femoral, femoral, deep femoral, popliteal, gastrocnemius, posterior tibial and anterior tibial veins, s/p LLE embolectomy 10/18  - Therapeutic Lovenox, transitioned to Eliquis.   - PT evaluated and recommends sub-acut rehab or home PT on DC    #Recurrent MSSA bacteremia with Discitis   - Recent Spinal fusion  - Abnormal L4-L5 signal on MRI suggestive of  Epidural phlegmon/OM  - No acute surgical intervention needed per Neurosurgery   - s/p Cefepime and Cefazolin 2g for 4 weeks >> after consulting with ID switched to Keflex 500mg q6 PO on 11/08 PM  - Sterile 18 gauge powerline Mideline was placed bc of IV Abx >> will remove before DC as pt dont need IV abx anymore  - Outpatient Neurosurgery referral      #HO IVDA, Opioid Abuse on Methadone   - improving, followed by psych: can resume Quetiapine 200 mg PO HS  - Continue with Xanax 1 mg po am, 1mg po afternoon and two times at night - total daily dose of 4mg  - Continue Wellbutrin Xl 300mg po q 24hrs  - Continue home dose Methadone 135mg po daily---> QTc 433 today 10/20  - For agitation, haldol 5mg po/IM every 8hrs prn    #Disposition  - Sub-acute Rehab, pending authorization    #MISC  - No need for daily CBC/BMP, just check mag every other day and replenish as needed.    Handoff pending  1) pending placement  2) remove midline before DC

## 2022-11-10 NOTE — PROGRESS NOTE ADULT - SUBJECTIVE AND OBJECTIVE BOX
MIKAEL MCDERMOTT 52y Male  MRN#: [id number]   Hospital Day: 30d    SUBJECTIVE  Patient is a 52y old Male who presents with a chief complaint of Hypoxia (09 Nov 2022 09:30)  Currently admitted to medicine with the primary diagnosis of Osteomyelitis of vertebra, sacral and sacrococcygeal region      INTERVAL HPI AND OVERNIGHT EVENTS:  Patient was examined and seen at bedside. This morning he is resting comfortably in bed. No issues or overnight events.    OBJECTIVE  PAST MEDICAL & SURGICAL HISTORY  IV drug abuse    Vertebral osteomyelitis    MSSA bacteremia    No significant past surgical history      ALLERGIES:  No Known Allergies    MEDICATIONS:  STANDING MEDICATIONS  ALPRAZolam 1 milliGRAM(s) Oral <User Schedule>  ALPRAZolam 2 milliGRAM(s) Oral at bedtime  apixaban 5 milliGRAM(s) Oral every 12 hours  buPROPion XL (24-Hour) . 300 milliGRAM(s) Oral daily  cephalexin 500 milliGRAM(s) Oral four times a day  chlorhexidine 2% Cloths 1 Application(s) Topical daily  folic acid 1 milliGRAM(s) Oral daily  magnesium oxide 400 milliGRAM(s) Oral once  magnesium oxide 400 milliGRAM(s) Oral three times a day with meals  methadone    Tablet 135 milliGRAM(s) Oral daily  multivitamin 1 Tablet(s) Oral daily  nicotine - 21 mG/24Hr(s) Patch 1 Patch Transdermal daily  pantoprazole    Tablet 40 milliGRAM(s) Oral before breakfast  QUEtiapine 200 milliGRAM(s) Oral at bedtime  thiamine 100 milliGRAM(s) Oral daily    PRN MEDICATIONS  acetaminophen     Tablet .. 650 milliGRAM(s) Oral every 6 hours PRN  aluminum hydroxide/magnesium hydroxide/simethicone Suspension 30 milliLiter(s) Oral every 4 hours PRN  cyclobenzaprine 10 milliGRAM(s) Oral three times a day PRN  haloperidol    Injectable 5 milliGRAM(s) IntraMuscular every 8 hours PRN  melatonin 3 milliGRAM(s) Oral at bedtime PRN  ondansetron Injectable 4 milliGRAM(s) IV Push every 8 hours PRN      VITAL SIGNS: Last 24 Hours  T(C): 36.7 (10 Nov 2022 05:24), Max: 37 (09 Nov 2022 19:50)  T(F): 98 (10 Nov 2022 05:24), Max: 98.6 (09 Nov 2022 19:50)  HR: 82 (10 Nov 2022 07:45) (73 - 85)  BP: 100/60 (10 Nov 2022 07:45) (100/54 - 109/59)  BP(mean): --  RR: 18 (10 Nov 2022 07:45) (18 - 19)  SpO2: 95% (10 Nov 2022 07:45) (90% - 95%)    LABS:          WBC:    Hgb:    Na:    K:    BUN/Cr/eGFR:    Lactate:  Lactate, Blood: 3.0 mmol/L *H* (10-11-22 @ 12:35)  Lactate, Blood: 1.1 mmol/L (08-17-22 @ 07:05)  Lactate, Blood: 1.4 mmol/L (08-15-22 @ 10:20)  Lactate, Blood: 1.0 mmol/L (08-10-22 @ 15:12)  Lactate, Blood: 0.9 mmol/L (03-12-22 @ 12:12)    Procal:  Procalcitonin, Serum: 7.63 ng/mL *H* (10-12-22 @ 06:50)  Procalcitonin, Serum: 0.05 ng/mL (08-17-22 @ 13:28)  Procalcitonin, Serum: 0.13 ng/mL *H* (01-16-22 @ 16:00)    CK:  Creatine Kinase, Serum: 31 U/L (11-06-22 @ 12:29)  Creatine Kinase, Serum: 97 U/L (08-17-22 @ 13:28)  Creatine Kinase, Serum: 63 U/L (01-15-22 @ 20:00)    Troponin:    Dimer:  D-Dimer Assay, Quantitative: 934 ng/mL DDU *H* (08-17-22 @ 13:28)    TSH:    Cultures:                RADIOLOGY:      < from: Xray Chest 1 View- PORTABLE-Routine (Xray Chest 1 View- PORTABLE-Routine in AM.) (10.23.22 @ 06:18) >  Impression:  Low lung volume/lordotic view without definite evidence of focal   consolidation pleural effusion or pneumothorax.    < end of copied text >  < from: MR Cervical Spine w/wo IV Cont (10.22.22 @ 20:40) >  IMPRESSION:    1.  Study limited by motion artifact.    2.  No enhancing lesions.    3.  Straightening of the normal cervical lordosis may be secondary to   patient positioning or muscle spasm.    4.  C4-C5 and C5-C6; disc osteophyte complexes with compression of the   thecal sac.    5.  C6-C7; disc osteophyte complex with compression of the thecal sac,   degenerative changes, and bilateral foraminal narrowing.    < end of copied text >  < from: MR Thoracic Spine w/wo IV Cont (10.22.22 @ 20:40) >  MPRESSION:    In comparison with the prior MRI of the thoracic spine dated August 12, 2022:    Current examination is limited by motion artifact.    Interval development of a right pleural effusion which may be loculated   and a right basilar opacity.    No enhancing lesions.    < end of copied text >    PHYSICAL EXAM:  CONSTITUTIONAL: No acute distress, AAOx3  HEAD: Atraumatic, normocephalic  EYES: EOM intact, PERRLA, conjunctiva and sclera clear  ENT: moist mucous membranes  PULMONARY: Clear to auscultation bilaterally  CARDIOVASCULAR: Regular rate and rhythm  GASTROINTESTINAL: Soft, non-tender, non-distended; bowel sounds present  MUSCULOSKELETAL: no edema  NEUROLOGY: non-focal  SKIN: warm and dry

## 2022-11-11 LAB
GLUCOSE BLDC GLUCOMTR-MCNC: 108 MG/DL — HIGH (ref 70–99)
GLUCOSE BLDC GLUCOMTR-MCNC: 155 MG/DL — HIGH (ref 70–99)
GLUCOSE BLDC GLUCOMTR-MCNC: 173 MG/DL — HIGH (ref 70–99)

## 2022-11-11 PROCEDURE — 99232 SBSQ HOSP IP/OBS MODERATE 35: CPT

## 2022-11-11 RX ORDER — LACTOBACILLUS ACIDOPHILUS 100MM CELL
1 CAPSULE ORAL EVERY 8 HOURS
Refills: 0 | Status: DISCONTINUED | OUTPATIENT
Start: 2022-11-11 | End: 2022-11-17

## 2022-11-11 RX ADMIN — Medication 1 TABLET(S): at 21:11

## 2022-11-11 RX ADMIN — Medication 1 PATCH: at 11:30

## 2022-11-11 RX ADMIN — APIXABAN 5 MILLIGRAM(S): 2.5 TABLET, FILM COATED ORAL at 05:12

## 2022-11-11 RX ADMIN — Medication 1 PATCH: at 11:37

## 2022-11-11 RX ADMIN — MAGNESIUM OXIDE 400 MG ORAL TABLET 400 MILLIGRAM(S): 241.3 TABLET ORAL at 07:46

## 2022-11-11 RX ADMIN — Medication 2 MILLIGRAM(S): at 21:10

## 2022-11-11 RX ADMIN — Medication 1 TABLET(S): at 11:36

## 2022-11-11 RX ADMIN — METHADONE HYDROCHLORIDE 135 MILLIGRAM(S): 40 TABLET ORAL at 11:43

## 2022-11-11 RX ADMIN — SENNA PLUS 1 TABLET(S): 8.6 TABLET ORAL at 05:12

## 2022-11-11 RX ADMIN — Medication 500 MILLIGRAM(S): at 05:12

## 2022-11-11 RX ADMIN — CHLORHEXIDINE GLUCONATE 1 APPLICATION(S): 213 SOLUTION TOPICAL at 11:35

## 2022-11-11 RX ADMIN — QUETIAPINE FUMARATE 200 MILLIGRAM(S): 200 TABLET, FILM COATED ORAL at 21:11

## 2022-11-11 RX ADMIN — BUPROPION HYDROCHLORIDE 300 MILLIGRAM(S): 150 TABLET, EXTENDED RELEASE ORAL at 11:36

## 2022-11-11 RX ADMIN — Medication 500 MILLIGRAM(S): at 17:58

## 2022-11-11 RX ADMIN — Medication 1 MILLIGRAM(S): at 13:49

## 2022-11-11 RX ADMIN — Medication 100 MILLIGRAM(S): at 11:43

## 2022-11-11 RX ADMIN — MAGNESIUM OXIDE 400 MG ORAL TABLET 400 MILLIGRAM(S): 241.3 TABLET ORAL at 11:36

## 2022-11-11 RX ADMIN — Medication 1 MILLIGRAM(S): at 05:15

## 2022-11-11 RX ADMIN — APIXABAN 5 MILLIGRAM(S): 2.5 TABLET, FILM COATED ORAL at 17:58

## 2022-11-11 RX ADMIN — Medication 1 TABLET(S): at 11:37

## 2022-11-11 RX ADMIN — Medication 500 MILLIGRAM(S): at 11:37

## 2022-11-11 RX ADMIN — MAGNESIUM OXIDE 400 MG ORAL TABLET 400 MILLIGRAM(S): 241.3 TABLET ORAL at 17:58

## 2022-11-11 RX ADMIN — SENNA PLUS 1 TABLET(S): 8.6 TABLET ORAL at 17:58

## 2022-11-11 RX ADMIN — Medication 1 MILLIGRAM(S): at 11:36

## 2022-11-11 RX ADMIN — POLYETHYLENE GLYCOL 3350 17 GRAM(S): 17 POWDER, FOR SOLUTION ORAL at 11:37

## 2022-11-11 RX ADMIN — Medication 1 TABLET(S): at 13:49

## 2022-11-11 RX ADMIN — PANTOPRAZOLE SODIUM 40 MILLIGRAM(S): 20 TABLET, DELAYED RELEASE ORAL at 05:12

## 2022-11-11 NOTE — PROGRESS NOTE ADULT - SUBJECTIVE AND OBJECTIVE BOX
MIKAEL MCDERMOTT 52y Male  MRN#: [id number]   Hospital Day: 31d    SUBJECTIVE  Patient is a 52y old Male who presents with a chief complaint of Hypoxia (11 Nov 2022 13:50)  Currently admitted to medicine with the primary diagnosis of Osteomyelitis of vertebra, sacral and sacrococcygeal region      INTERVAL HPI AND OVERNIGHT EVENTS:  Patient was examined and seen at bedside. This morning he is resting comfortably in bed. No issues or overnight events.    OBJECTIVE  PAST MEDICAL & SURGICAL HISTORY  IV drug abuse    Vertebral osteomyelitis    MSSA bacteremia    No significant past surgical history      ALLERGIES:  No Known Allergies    MEDICATIONS:  STANDING MEDICATIONS  ALPRAZolam 1 milliGRAM(s) Oral <User Schedule>  ALPRAZolam 2 milliGRAM(s) Oral at bedtime  apixaban 5 milliGRAM(s) Oral every 12 hours  buPROPion XL (24-Hour) . 300 milliGRAM(s) Oral daily  cephalexin 500 milliGRAM(s) Oral four times a day  chlorhexidine 2% Cloths 1 Application(s) Topical daily  folic acid 1 milliGRAM(s) Oral daily  lactobacillus acidophilus 1 Tablet(s) Oral every 8 hours  magnesium oxide 400 milliGRAM(s) Oral three times a day with meals  magnesium oxide 400 milliGRAM(s) Oral once  multivitamin 1 Tablet(s) Oral daily  nicotine - 21 mG/24Hr(s) Patch 1 Patch Transdermal daily  pantoprazole    Tablet 40 milliGRAM(s) Oral before breakfast  polyethylene glycol 3350 17 Gram(s) Oral daily  QUEtiapine 200 milliGRAM(s) Oral at bedtime  senna 1 Tablet(s) Oral two times a day  thiamine 100 milliGRAM(s) Oral daily    PRN MEDICATIONS  acetaminophen     Tablet .. 650 milliGRAM(s) Oral every 6 hours PRN  aluminum hydroxide/magnesium hydroxide/simethicone Suspension 30 milliLiter(s) Oral every 4 hours PRN  cyclobenzaprine 10 milliGRAM(s) Oral three times a day PRN  haloperidol    Injectable 5 milliGRAM(s) IntraMuscular every 8 hours PRN  melatonin 3 milliGRAM(s) Oral at bedtime PRN  ondansetron Injectable 4 milliGRAM(s) IV Push every 8 hours PRN      VITAL SIGNS: Last 24 Hours  T(C): 36.4 (11 Nov 2022 13:00), Max: 37 (10 Nov 2022 20:20)  T(F): 97.5 (11 Nov 2022 13:00), Max: 98.6 (10 Nov 2022 20:20)  HR: 78 (11 Nov 2022 13:00) (73 - 80)  BP: 107/73 (11 Nov 2022 13:00) (103/57 - 107/73)  BP(mean): --  RR: 18 (11 Nov 2022 13:00) (18 - 18)  SpO2: 95% (11 Nov 2022 13:00) (95% - 96%)    LABS:                        RADIOLOGY:      < from: Xray Chest 1 View- PORTABLE-Routine (Xray Chest 1 View- PORTABLE-Routine in AM.) (10.23.22 @ 06:18) >  Impression:  Low lung volume/lordotic view without definite evidence of focal   consolidation pleural effusion or pneumothorax.    < end of copied text >  < from: MR Cervical Spine w/wo IV Cont (10.22.22 @ 20:40) >  IMPRESSION:    1.  Study limited by motion artifact.    2.  No enhancing lesions.    3.  Straightening of the normal cervical lordosis may be secondary to   patient positioning or muscle spasm.    4.  C4-C5 and C5-C6; disc osteophyte complexes with compression of the   thecal sac.    5.  C6-C7; disc osteophyte complex with compression of the thecal sac,   degenerative changes, and bilateral foraminal narrowing.    < end of copied text >  < from: MR Thoracic Spine w/wo IV Cont (10.22.22 @ 20:40) >  MPRESSION:    In comparison with the prior MRI of the thoracic spine dated August 12, 2022:    Current examination is limited by motion artifact.    Interval development of a right pleural effusion which may be loculated   and a right basilar opacity.    No enhancing lesions.    < end of copied text >    PHYSICAL EXAM:  CONSTITUTIONAL: No acute distress, AAOx3  HEAD: Atraumatic, normocephalic  EYES: EOM intact, PERRLA, conjunctiva and sclera clear  ENT: moist mucous membranes  PULMONARY: Clear to auscultation bilaterally  CARDIOVASCULAR: Regular rate and rhythm  GASTROINTESTINAL: Soft, non-tender, non-distended; bowel sounds present  MUSCULOSKELETAL: no edema  NEUROLOGY: non-focal  SKIN: warm and dry

## 2022-11-11 NOTE — PROGRESS NOTE ADULT - SUBJECTIVE AND OBJECTIVE BOX
MIKAEL MCDERMOTT  [facility] T2-3A 022 A ([facility] T2-[facility])      Patient was evaluated and examined  by bedside, no active complains         REVIEW OF SYSTEMS:  please see pertinent positives mentioned above, all other 12 ROS negative      T(C): , Max: 37 (11-10-22 @ 20:20)  HR: 80 (11-11-22 @ 05:35)  BP: 103/57 (11-11-22 @ 05:35)  RR: 18 (11-11-22 @ 05:35)  SpO2: 95% (11-11-22 @ 05:35)  CAPILLARY BLOOD GLUCOSE      POCT Blood Glucose.: 173 mg/dL (11 Nov 2022 12:07)  POCT Blood Glucose.: 108 mg/dL (11 Nov 2022 07:31)  POCT Blood Glucose.: 148 mg/dL (10 Nov 2022 16:25)      PHYSICAL EXAM:  General: NAD, AAOX3, patient is laying comfortably in bed, obese  HEENT: AT, NC, Supple, NO JVD, NO CB  Lungs: CTA B/L, no wheezing, no rhonchi  CVS: normal S1, S2, RRR, NO M/G/R  Abdomen: soft, bowel sounds present, non-tender, non-distended  Extremities: no edema, no clubbing, no cyanosis, positive peripheral pulses b/l  Neuro: no acute focal neurological deficits, generalized body weakness, unsteady gait  Skin: no rash, no ecchymosis, old skin scars present on b/l upper ext.        Medications:  acetaminophen     Tablet .. 650 milliGRAM(s) Oral every 6 hours PRN  ALPRAZolam 1 milliGRAM(s) Oral <User Schedule>  ALPRAZolam 2 milliGRAM(s) Oral at bedtime  aluminum hydroxide/magnesium hydroxide/simethicone Suspension 30 milliLiter(s) Oral every 4 hours PRN  apixaban 5 milliGRAM(s) Oral every 12 hours  buPROPion XL (24-Hour) . 300 milliGRAM(s) Oral daily  cephalexin 500 milliGRAM(s) Oral four times a day  chlorhexidine 2% Cloths 1 Application(s) Topical daily  cyclobenzaprine 10 milliGRAM(s) Oral three times a day PRN  folic acid 1 milliGRAM(s) Oral daily  haloperidol    Injectable 5 milliGRAM(s) IntraMuscular every 8 hours PRN  lactobacillus acidophilus 1 Tablet(s) Oral every 8 hours  magnesium oxide 400 milliGRAM(s) Oral three times a day with meals  magnesium oxide 400 milliGRAM(s) Oral once  melatonin 3 milliGRAM(s) Oral at bedtime PRN  multivitamin 1 Tablet(s) Oral daily  nicotine - 21 mG/24Hr(s) Patch 1 Patch Transdermal daily  ondansetron Injectable 4 milliGRAM(s) IV Push every 8 hours PRN  pantoprazole    Tablet 40 milliGRAM(s) Oral before breakfast  polyethylene glycol 3350 17 Gram(s) Oral daily  QUEtiapine 200 milliGRAM(s) Oral at bedtime  senna 1 Tablet(s) Oral two times a day  thiamine 100 milliGRAM(s) Oral daily        Assessment and Plan:  52y old Male PMHx of IVDU (heroin), vertebral osteomyelitis s/p debridement, and MSSA bacteremia, who presents from a rehab facility with a chief complaint of Hypoxia and new onset LE edema. In the ED was, found to have b/l PEs with radiographic evidence of R heart strain and admitted to ICU, found to have LE DVTs s/p LLE embolectomy 10/18. Currently on IV antibiotics, pending discharge authorization.     Acute hypoxemic respiratory failure - resolved  Acute Submassive PE with suspected initial Right heart strain  LLE DVT - -s/p LLE embolectomy 10/18  DIONICIO/OHS, noncompliant with NIV  -CT on admission with bilateral submassive PE + concern for RV strain  -TTE: EF 55%, G1DD, mild TVR, no right heart strain  -VA duplx LE vein: Acute thrombus within the left common femoral, femoral, deep femoral, popliteal, gastrocnemius, posterior tibial and anterior tibial veins  -continue AC---transitioned to oral NOAC -Eliquis   -outpatient sleep study for DIONICIO with Pulmonary specialist f/up, weight loss tx.   - patient was on IV  Cefazolin 2g q8h IV , from  11/8: OK to now change to PO Keflex. outpatient MRI to determine final duration.   Please d/c midline prior to d/c       #Hx MSSA bacteremia  #Hx Epidural phlegmon/OM  #Hx R Psoas Abscess  -neurosurgery f/u---plan for outpatient f/u  Per ID:   - Cefazolin 2g q8h IV, repeat MRI with Abnormal signal and enhancement in the anterior and lateral paraspinal soft tissues at L4 and L5 likely representing phlegmon.   - Would continue IV Cefazolin 2g q8h IV while in a facility, (up to 4 weeks) then PO Cefadroxil 1g q24h with repeat MRI to determine final course--  3 more weeks is 11/17-, 4 is 11/24  - Avoid rifampin as on methadone/xanax and little utility at this point in the treatment course  - Weekly CBC, CMP, ESR/CRP  - ID follow-up with Dr. John Lange for Telehealth. They will call the patient between 10:30-6:30    #HO IVDA, Opioid Abuse on Methadone   #Agitation  - improving, followed by psych:  resumed on  Quetiapine 200 mg PO HS  -continue Methadone 135 mg po once daily   - Continue with Xanax 1 mg po am, 1mg po afternoon and two times at night - total daily dose of 4mg  -Continue Wellbutrin Xl 300mg po q 24hrs  -For agitation, haldol 5mg po/IM every 8hrs prn  -polysubstance use counseling for outpatient services    #Magnesium deficiency  -plan for dc on oral mg     DVT/GI ppx  guarded prognosis    FULL CODE    #Progress Note Handoff: Patient is medically stable for d/c to STR once bed is available   Family discussion: medical plan of care d/w pt. by bedside Disposition: Pending Bed at [facility]     Total time spent to complete patient's bedside assessment, review medical chart, discuss medical plan of care with covering medical team was more than 25 minutes with >50% of time spent face to face with patient, discussion with patient/family and/or coordination of care

## 2022-11-11 NOTE — PROGRESS NOTE ADULT - ASSESSMENT
Patient is a 52y old Male PMHx of IVDU (heroin), vertebral osteomyelitis s/p debridement, and MSSA bacteremia, who presents from a rehab facility with a chief complaint of Hypoxia and new onset LE edema. In the ED was, found to have b/l PEs with radiographic evidence of R heart strain and admitted to ICU, found to have LE DVTs s/p LLE embolectomy 10/18. Currently on IV antibiotics, pending discharge authorization.     #Acute hypoxemic respiratory failure / Acute Submassive PE  - resolved  - Suspected superimposed PNA  - DIONICIO/OHS, noncompliant with NIV  - TTE: EF 55%, G1DD, mild TVR, no right heart strain  -VA duplx LE vein: Acute thrombus within the left common femoral, femoral, deep femoral, popliteal, gastrocnemius, posterior tibial and anterior tibial veins, s/p LLE embolectomy 10/18  - Therapeutic Lovenox, transitioned to Eliquis.   - PT evaluated and recommends sub-acut rehab or home PT on DC    #Recurrent MSSA bacteremia with Discitis   - Recent Spinal fusion  - Abnormal L4-L5 signal on MRI suggestive of  Epidural phlegmon/OM  - No acute surgical intervention needed per Neurosurgery   - s/p Cefepime and Cefazolin 2g for 4 weeks >> after consulting with ID switched to Keflex 500mg q6 PO on 11/08 PM  - Sterile 18 gauge powerline Mideline was placed bc of IV Abx >> will remove before DC as pt dont need IV abx anymore  - Outpatient Neurosurgery referral      #HO IVDA, Opioid Abuse on Methadone   - improving, followed by psych: can resume Quetiapine 200 mg PO HS  - Continue with Xanax 1 mg po am, 1mg po afternoon and two times at night - total daily dose of 4mg  - Continue Wellbutrin Xl 300mg po q 24hrs  - Continue home dose Methadone 135mg po daily---> QTc 433 today 10/20  - For agitation, haldol 5mg po/IM every 8hrs prn    #Disposition  - Sub-acute Rehab, pending authorization    #MISC  - No need for daily CBC/BMP, just check mag every other day and replenish as needed.    Handoff pending  1) pending placement and may go home if pt can ambulate well  2) remove midline before DC

## 2022-11-12 LAB
GLUCOSE BLDC GLUCOMTR-MCNC: 121 MG/DL — HIGH (ref 70–99)
GLUCOSE BLDC GLUCOMTR-MCNC: 140 MG/DL — HIGH (ref 70–99)
GLUCOSE BLDC GLUCOMTR-MCNC: 158 MG/DL — HIGH (ref 70–99)
GLUCOSE BLDC GLUCOMTR-MCNC: 178 MG/DL — HIGH (ref 70–99)
GLUCOSE BLDC GLUCOMTR-MCNC: 230 MG/DL — HIGH (ref 70–99)

## 2022-11-12 PROCEDURE — 99231 SBSQ HOSP IP/OBS SF/LOW 25: CPT

## 2022-11-12 RX ORDER — INSULIN LISPRO 100/ML
2 VIAL (ML) SUBCUTANEOUS ONCE
Refills: 0 | Status: COMPLETED | OUTPATIENT
Start: 2022-11-12 | End: 2022-11-12

## 2022-11-12 RX ORDER — METHADONE HYDROCHLORIDE 40 MG/1
135 TABLET ORAL ONCE
Refills: 0 | Status: DISCONTINUED | OUTPATIENT
Start: 2022-11-12 | End: 2022-11-12

## 2022-11-12 RX ADMIN — METHADONE HYDROCHLORIDE 135 MILLIGRAM(S): 40 TABLET ORAL at 13:39

## 2022-11-12 RX ADMIN — Medication 2 MILLIGRAM(S): at 21:16

## 2022-11-12 RX ADMIN — Medication 1 PATCH: at 18:13

## 2022-11-12 RX ADMIN — Medication 500 MILLIGRAM(S): at 00:07

## 2022-11-12 RX ADMIN — Medication 500 MILLIGRAM(S): at 23:03

## 2022-11-12 RX ADMIN — Medication 1 TABLET(S): at 13:53

## 2022-11-12 RX ADMIN — PANTOPRAZOLE SODIUM 40 MILLIGRAM(S): 20 TABLET, DELAYED RELEASE ORAL at 05:10

## 2022-11-12 RX ADMIN — SENNA PLUS 1 TABLET(S): 8.6 TABLET ORAL at 17:18

## 2022-11-12 RX ADMIN — Medication 1 MILLIGRAM(S): at 05:10

## 2022-11-12 RX ADMIN — BUPROPION HYDROCHLORIDE 300 MILLIGRAM(S): 150 TABLET, EXTENDED RELEASE ORAL at 12:09

## 2022-11-12 RX ADMIN — Medication 1 MILLIGRAM(S): at 12:09

## 2022-11-12 RX ADMIN — Medication 1 PATCH: at 12:11

## 2022-11-12 RX ADMIN — Medication 1 MILLIGRAM(S): at 13:54

## 2022-11-12 RX ADMIN — Medication 1 TABLET(S): at 21:16

## 2022-11-12 RX ADMIN — MAGNESIUM OXIDE 400 MG ORAL TABLET 400 MILLIGRAM(S): 241.3 TABLET ORAL at 17:18

## 2022-11-12 RX ADMIN — Medication 500 MILLIGRAM(S): at 12:09

## 2022-11-12 RX ADMIN — POLYETHYLENE GLYCOL 3350 17 GRAM(S): 17 POWDER, FOR SOLUTION ORAL at 12:11

## 2022-11-12 RX ADMIN — CHLORHEXIDINE GLUCONATE 1 APPLICATION(S): 213 SOLUTION TOPICAL at 12:12

## 2022-11-12 RX ADMIN — Medication 2 UNIT(S): at 22:12

## 2022-11-12 RX ADMIN — Medication 500 MILLIGRAM(S): at 17:18

## 2022-11-12 RX ADMIN — Medication 1 TABLET(S): at 05:10

## 2022-11-12 RX ADMIN — Medication 1 TABLET(S): at 12:11

## 2022-11-12 RX ADMIN — Medication 100 MILLIGRAM(S): at 12:25

## 2022-11-12 RX ADMIN — APIXABAN 5 MILLIGRAM(S): 2.5 TABLET, FILM COATED ORAL at 17:18

## 2022-11-12 RX ADMIN — Medication 500 MILLIGRAM(S): at 05:10

## 2022-11-12 RX ADMIN — QUETIAPINE FUMARATE 200 MILLIGRAM(S): 200 TABLET, FILM COATED ORAL at 21:16

## 2022-11-12 RX ADMIN — APIXABAN 5 MILLIGRAM(S): 2.5 TABLET, FILM COATED ORAL at 05:10

## 2022-11-12 RX ADMIN — SENNA PLUS 1 TABLET(S): 8.6 TABLET ORAL at 05:11

## 2022-11-12 RX ADMIN — MAGNESIUM OXIDE 400 MG ORAL TABLET 400 MILLIGRAM(S): 241.3 TABLET ORAL at 12:08

## 2022-11-12 RX ADMIN — MAGNESIUM OXIDE 400 MG ORAL TABLET 400 MILLIGRAM(S): 241.3 TABLET ORAL at 07:46

## 2022-11-12 NOTE — PROGRESS NOTE ADULT - SUBJECTIVE AND OBJECTIVE BOX
MIKAEL MCDERMOTT  [facility] T2-3A 022 A ([facility] T2-3A)      Patient was evaluated and examined  by bedside, no active complains       REVIEW OF SYSTEMS:  please see pertinent positives mentioned above, all other 12 ROS negative      T(C): , Max: 36.9 (11-11-22 @ 20:14)  HR: 77 (11-12-22 @ 05:00)  BP: 100/52 (11-12-22 @ 05:00)  RR: 18 (11-12-22 @ 05:00)  SpO2: 97% (11-12-22 @ 05:00)  CAPILLARY BLOOD GLUCOSE      POCT Blood Glucose.: 121 mg/dL (12 Nov 2022 07:15)  POCT Blood Glucose.: 155 mg/dL (11 Nov 2022 16:30)  POCT Blood Glucose.: 173 mg/dL (11 Nov 2022 12:07)      PHYSICAL EXAM:  General: NAD, AAOX3, patient is laying comfortably in bed, obese  HEENT: AT, NC, Supple, NO JVD, NO CB  Lungs: CTA B/L, no wheezing, no rhonchi  CVS: normal S1, S2, RRR, NO M/G/R  Abdomen: soft, bowel sounds present, non-tender, non-distended  Extremities: no edema, no clubbing, no cyanosis, positive peripheral pulses b/l  Neuro: no acute focal neurological deficits, generalized body weakness, unsteady gait  Skin: no rash, no ecchymosis, old skin scars present on b/l upper ext.          Medications:  acetaminophen     Tablet .. 650 milliGRAM(s) Oral every 6 hours PRN  ALPRAZolam 1 milliGRAM(s) Oral <User Schedule>  ALPRAZolam 2 milliGRAM(s) Oral at bedtime  aluminum hydroxide/magnesium hydroxide/simethicone Suspension 30 milliLiter(s) Oral every 4 hours PRN  apixaban 5 milliGRAM(s) Oral every 12 hours  buPROPion XL (24-Hour) . 300 milliGRAM(s) Oral daily  cephalexin 500 milliGRAM(s) Oral four times a day  chlorhexidine 2% Cloths 1 Application(s) Topical daily  cyclobenzaprine 10 milliGRAM(s) Oral three times a day PRN  folic acid 1 milliGRAM(s) Oral daily  haloperidol    Injectable 5 milliGRAM(s) IntraMuscular every 8 hours PRN  lactobacillus acidophilus 1 Tablet(s) Oral every 8 hours  magnesium oxide 400 milliGRAM(s) Oral three times a day with meals  magnesium oxide 400 milliGRAM(s) Oral once  melatonin 3 milliGRAM(s) Oral at bedtime PRN  multivitamin 1 Tablet(s) Oral daily  nicotine - 21 mG/24Hr(s) Patch 1 Patch Transdermal daily  ondansetron Injectable 4 milliGRAM(s) IV Push every 8 hours PRN  pantoprazole    Tablet 40 milliGRAM(s) Oral before breakfast  polyethylene glycol 3350 17 Gram(s) Oral daily  QUEtiapine 200 milliGRAM(s) Oral at bedtime  senna 1 Tablet(s) Oral two times a day  thiamine 100 milliGRAM(s) Oral daily        Assessment and Plan:  52y old Male PMHx of IVDU (heroin), vertebral osteomyelitis s/p debridement, and MSSA bacteremia, who presents from a rehab facility with a chief complaint of Hypoxia and new onset LE edema. In the ED was, found to have b/l PEs with radiographic evidence of R heart strain and admitted to ICU, found to have LE DVTs s/p LLE embolectomy 10/18. Currently on IV antibiotics, pending discharge authorization.     Acute hypoxemic respiratory failure - resolved  Acute Submassive PE with suspected initial Right heart strain  LLE DVT - -s/p LLE embolectomy 10/18  DIONICIO/OHS, noncompliant with NIV  -CT on admission with bilateral submassive PE + concern for RV strain  -TTE: EF 55%, G1DD, mild TVR, no right heart strain  -VA duplx LE vein: Acute thrombus within the left common femoral, femoral, deep femoral, popliteal, gastrocnemius, posterior tibial and anterior tibial veins  -continue AC---transitioned to oral NOAC -Eliquis   -outpatient sleep study for DIONICIO with Pulmonary specialist f/up, weight loss tx.   - patient was on IV  Cefazolin 2g q8h IV , from  11/8: OK to now change to PO Keflex. outpatient MRI to determine final duration.   Please d/c midline prior to d/c       #Hx MSSA bacteremia  #Hx Epidural phlegmon/OM  #Hx R Psoas Abscess  -neurosurgery f/u---plan for outpatient f/u  Per ID:   - Cefazolin 2g q8h IV, repeat MRI with Abnormal signal and enhancement in the anterior and lateral paraspinal soft tissues at L4 and L5 likely representing phlegmon.   - Would continue IV Cefazolin 2g q8h IV while in a facility, (up to 4 weeks) then PO Cefadroxil 1g q24h with repeat MRI to determine final course--  3 more weeks is 11/17-, 4 is 11/24  - Avoid rifampin as on methadone/xanax and little utility at this point in the treatment course  - Weekly CBC, CMP, ESR/CRP  - ID follow-up with Dr. John Lange for Telehealth. They will call the patient between 10:30-6:30    #HO IVDA, Opioid Abuse on Methadone   #Agitation  - improving, followed by psych:  resumed on  Quetiapine 200 mg PO HS  -continue Methadone 135 mg po once daily   - Continue with Xanax 1 mg po am, 1mg po afternoon and two times at night - total daily dose of 4mg  -Continue Wellbutrin Xl 300mg po q 24hrs  -For agitation, haldol 5mg po/IM every 8hrs prn  -polysubstance use counseling for outpatient services    #Magnesium deficiency  -plan for dc on oral mg     DVT/GI ppx  guarded prognosis    FULL CODE    #Progress Note Handoff: Patient is medically stable for d/c to [facility] once bed is available   Family discussion: medical plan of care d/w pt. by bedside Disposition: Pending Bed at [facility]     Total time spent to complete patient's bedside assessment, review medical chart, discuss medical plan of care with covering medical team was more than 25 minutes with >50% of time spent face to face with patient, discussion with patient/family and/or coordination of care

## 2022-11-13 LAB
GLUCOSE BLDC GLUCOMTR-MCNC: 132 MG/DL — HIGH (ref 70–99)
GLUCOSE BLDC GLUCOMTR-MCNC: 164 MG/DL — HIGH (ref 70–99)
GLUCOSE BLDC GLUCOMTR-MCNC: 181 MG/DL — HIGH (ref 70–99)
GLUCOSE BLDC GLUCOMTR-MCNC: 253 MG/DL — HIGH (ref 70–99)
HCT VFR BLD CALC: 35.3 % — LOW (ref 42–52)
HGB BLD-MCNC: 10.9 G/DL — LOW (ref 14–18)
MCHC RBC-ENTMCNC: 27.7 PG — SIGNIFICANT CHANGE UP (ref 27–31)
MCHC RBC-ENTMCNC: 30.9 G/DL — LOW (ref 32–37)
MCV RBC AUTO: 89.8 FL — SIGNIFICANT CHANGE UP (ref 80–94)
NRBC # BLD: 0 /100 WBCS — SIGNIFICANT CHANGE UP (ref 0–0)
PLATELET # BLD AUTO: 222 K/UL — SIGNIFICANT CHANGE UP (ref 130–400)
RBC # BLD: 3.93 M/UL — LOW (ref 4.7–6.1)
RBC # FLD: 15.8 % — HIGH (ref 11.5–14.5)
WBC # BLD: 6.63 K/UL — SIGNIFICANT CHANGE UP (ref 4.8–10.8)
WBC # FLD AUTO: 6.63 K/UL — SIGNIFICANT CHANGE UP (ref 4.8–10.8)

## 2022-11-13 PROCEDURE — 99232 SBSQ HOSP IP/OBS MODERATE 35: CPT

## 2022-11-13 RX ORDER — GUAIFENESIN/DEXTROMETHORPHAN 600MG-30MG
10 TABLET, EXTENDED RELEASE 12 HR ORAL EVERY 6 HOURS
Refills: 0 | Status: DISCONTINUED | OUTPATIENT
Start: 2022-11-13 | End: 2022-11-17

## 2022-11-13 RX ORDER — FUROSEMIDE 40 MG
20 TABLET ORAL DAILY
Refills: 0 | Status: COMPLETED | OUTPATIENT
Start: 2022-11-13 | End: 2022-11-16

## 2022-11-13 RX ORDER — INSULIN LISPRO 100/ML
2 VIAL (ML) SUBCUTANEOUS ONCE
Refills: 0 | Status: COMPLETED | OUTPATIENT
Start: 2022-11-13 | End: 2022-11-13

## 2022-11-13 RX ORDER — ACETAMINOPHEN 500 MG
650 TABLET ORAL EVERY 6 HOURS
Refills: 0 | Status: DISCONTINUED | OUTPATIENT
Start: 2022-11-13 | End: 2022-11-17

## 2022-11-13 RX ORDER — METHADONE HYDROCHLORIDE 40 MG/1
135 TABLET ORAL DAILY
Refills: 0 | Status: DISCONTINUED | OUTPATIENT
Start: 2022-11-13 | End: 2022-11-17

## 2022-11-13 RX ADMIN — Medication 1 PATCH: at 12:00

## 2022-11-13 RX ADMIN — MAGNESIUM OXIDE 400 MG ORAL TABLET 400 MILLIGRAM(S): 241.3 TABLET ORAL at 11:46

## 2022-11-13 RX ADMIN — Medication 1 PATCH: at 11:26

## 2022-11-13 RX ADMIN — BUPROPION HYDROCHLORIDE 300 MILLIGRAM(S): 150 TABLET, EXTENDED RELEASE ORAL at 11:23

## 2022-11-13 RX ADMIN — Medication 1 TABLET(S): at 11:25

## 2022-11-13 RX ADMIN — Medication 650 MILLIGRAM(S): at 08:01

## 2022-11-13 RX ADMIN — Medication 1 PATCH: at 18:01

## 2022-11-13 RX ADMIN — CHLORHEXIDINE GLUCONATE 1 APPLICATION(S): 213 SOLUTION TOPICAL at 11:24

## 2022-11-13 RX ADMIN — Medication 10 MILLILITER(S): at 13:09

## 2022-11-13 RX ADMIN — Medication 100 MILLIGRAM(S): at 11:25

## 2022-11-13 RX ADMIN — Medication 1 TABLET(S): at 05:41

## 2022-11-13 RX ADMIN — Medication 650 MILLIGRAM(S): at 08:31

## 2022-11-13 RX ADMIN — Medication 1 MILLIGRAM(S): at 05:42

## 2022-11-13 RX ADMIN — Medication 20 MILLIGRAM(S): at 11:22

## 2022-11-13 RX ADMIN — APIXABAN 5 MILLIGRAM(S): 2.5 TABLET, FILM COATED ORAL at 05:41

## 2022-11-13 RX ADMIN — Medication 2 MILLIGRAM(S): at 21:39

## 2022-11-13 RX ADMIN — Medication 500 MILLIGRAM(S): at 17:36

## 2022-11-13 RX ADMIN — PANTOPRAZOLE SODIUM 40 MILLIGRAM(S): 20 TABLET, DELAYED RELEASE ORAL at 05:41

## 2022-11-13 RX ADMIN — Medication 1 TABLET(S): at 21:39

## 2022-11-13 RX ADMIN — Medication 1 MILLIGRAM(S): at 11:24

## 2022-11-13 RX ADMIN — SENNA PLUS 1 TABLET(S): 8.6 TABLET ORAL at 05:41

## 2022-11-13 RX ADMIN — Medication 2 UNIT(S): at 17:35

## 2022-11-13 RX ADMIN — SENNA PLUS 1 TABLET(S): 8.6 TABLET ORAL at 17:37

## 2022-11-13 RX ADMIN — APIXABAN 5 MILLIGRAM(S): 2.5 TABLET, FILM COATED ORAL at 17:36

## 2022-11-13 RX ADMIN — Medication 1 PATCH: at 05:43

## 2022-11-13 RX ADMIN — Medication 1 TABLET(S): at 13:09

## 2022-11-13 RX ADMIN — POLYETHYLENE GLYCOL 3350 17 GRAM(S): 17 POWDER, FOR SOLUTION ORAL at 11:25

## 2022-11-13 RX ADMIN — Medication 500 MILLIGRAM(S): at 05:41

## 2022-11-13 RX ADMIN — MAGNESIUM OXIDE 400 MG ORAL TABLET 400 MILLIGRAM(S): 241.3 TABLET ORAL at 07:40

## 2022-11-13 RX ADMIN — METHADONE HYDROCHLORIDE 135 MILLIGRAM(S): 40 TABLET ORAL at 11:23

## 2022-11-13 RX ADMIN — Medication 1 MILLIGRAM(S): at 13:09

## 2022-11-13 RX ADMIN — MAGNESIUM OXIDE 400 MG ORAL TABLET 400 MILLIGRAM(S): 241.3 TABLET ORAL at 17:36

## 2022-11-13 RX ADMIN — Medication 500 MILLIGRAM(S): at 23:20

## 2022-11-13 RX ADMIN — QUETIAPINE FUMARATE 200 MILLIGRAM(S): 200 TABLET, FILM COATED ORAL at 21:39

## 2022-11-13 RX ADMIN — Medication 500 MILLIGRAM(S): at 11:24

## 2022-11-13 NOTE — PROGRESS NOTE ADULT - SUBJECTIVE AND OBJECTIVE BOX
MIKAEL MCDERMOTT  [facility] T2-3A 022 A ([facility] T2-3A)      Patient was evaluated and examined  by bedside, c/o mild cough and legs swelling        REVIEW OF SYSTEMS:  please see pertinent positives mentioned above, all other 12 ROS negative      T(C): , Max: 36.7 (11-12-22 @ 12:00)  HR: 81 (11-13-22 @ 05:00)  BP: 110/64 (11-13-22 @ 05:00)  RR: 18 (11-13-22 @ 05:00)  SpO2: 95% (11-13-22 @ 08:00)  CAPILLARY BLOOD GLUCOSE      POCT Blood Glucose.: 132 mg/dL (13 Nov 2022 07:14)  POCT Blood Glucose.: 178 mg/dL (12 Nov 2022 23:00)  POCT Blood Glucose.: 230 mg/dL (12 Nov 2022 21:11)  POCT Blood Glucose.: 158 mg/dL (12 Nov 2022 16:25)  POCT Blood Glucose.: 140 mg/dL (12 Nov 2022 11:43)      PHYSICAL EXAM:  General: NAD, AAOX3, patient is laying comfortably in bed, obese  HEENT: AT, NC, Supple, NO JVD, NO CB  Lungs: CTA B/L, no wheezing, no rhonchi  CVS: normal S1, S2, RRR, NO M/G/R  Abdomen: soft, bowel sounds present, non-tender, non-distended  Extremities: plus 1 pitting b/l lower ext. edema, no clubbing, no cyanosis, positive peripheral pulses b/l  Neuro: no acute focal neurological deficits, generalized body weakness, unsteady gait  Skin: no rash, no ecchymosis, old skin scars present on b/l upper ext.        Medications:  acetaminophen     Tablet .. 650 milliGRAM(s) Oral every 6 hours PRN  ALPRAZolam 1 milliGRAM(s) Oral <User Schedule>  ALPRAZolam 2 milliGRAM(s) Oral at bedtime  aluminum hydroxide/magnesium hydroxide/simethicone Suspension 30 milliLiter(s) Oral every 4 hours PRN  apixaban 5 milliGRAM(s) Oral every 12 hours  buPROPion XL (24-Hour) . 300 milliGRAM(s) Oral daily  cephalexin 500 milliGRAM(s) Oral four times a day  chlorhexidine 2% Cloths 1 Application(s) Topical daily  cyclobenzaprine 10 milliGRAM(s) Oral three times a day PRN  folic acid 1 milliGRAM(s) Oral daily  furosemide    Tablet 20 milliGRAM(s) Oral daily  guaifenesin/dextromethorphan Oral Liquid 10 milliLiter(s) Oral every 6 hours PRN  haloperidol    Injectable 5 milliGRAM(s) IntraMuscular every 8 hours PRN  lactobacillus acidophilus 1 Tablet(s) Oral every 8 hours  magnesium oxide 400 milliGRAM(s) Oral once  magnesium oxide 400 milliGRAM(s) Oral three times a day with meals  melatonin 3 milliGRAM(s) Oral at bedtime PRN  methadone    Tablet 135 milliGRAM(s) Oral daily  multivitamin 1 Tablet(s) Oral daily  nicotine - 21 mG/24Hr(s) Patch 1 Patch Transdermal daily  ondansetron Injectable 4 milliGRAM(s) IV Push every 8 hours PRN  pantoprazole    Tablet 40 milliGRAM(s) Oral before breakfast  polyethylene glycol 3350 17 Gram(s) Oral daily  QUEtiapine 200 milliGRAM(s) Oral at bedtime  senna 1 Tablet(s) Oral two times a day  thiamine 100 milliGRAM(s) Oral daily        Assessment and Plan:  52y old Male PMHx of IVDU (heroin), vertebral osteomyelitis s/p debridement, and MSSA bacteremia, who presents from a rehab facility with a chief complaint of Hypoxia and new onset LE edema. In the ED was, found to have b/l PEs with radiographic evidence of R heart strain and admitted to ICU, found to have LE DVTs s/p LLE embolectomy 10/18. Currently on IV antibiotics, pending discharge authorization.     Acute hypoxemic respiratory failure - resolved  Acute Submassive PE with suspected initial Right heart strain  LLE DVT - -s/p LLE embolectomy 10/18  DIONICIO/OHS, noncompliant with NIV  -CT on admission with bilateral submassive PE + concern for RV strain  -TTE: EF 55%, G1DD, mild TVR, no right heart strain  -VA duplx LE vein: Acute thrombus within the left common femoral, femoral, deep femoral, popliteal, gastrocnemius, posterior tibial and anterior tibial veins  -continue AC---transitioned to oral NOAC -Eliquis   -outpatient sleep study for DIONICIO with Pulmonary specialist f/up, weight loss tx.   - patient was on IV  Cefazolin 2g q8h IV , from  11/8: OK to now change to PO Keflex. outpatient MRI to determine final duration.   Please d/c midline prior to d/c     # Mild cough- started on antitussive tx.    # Mild legs edema- dependent due to poor ambulatory status, will give 3 days of lasix 20 mg po once daily       #Hx MSSA bacteremia  #Hx Epidural phlegmon/OM  #Hx R Psoas Abscess  -neurosurgery f/u---plan for outpatient f/u  Per ID:   - Cefazolin 2g q8h IV, repeat MRI with Abnormal signal and enhancement in the anterior and lateral paraspinal soft tissues at L4 and L5 likely representing phlegmon.   - Would continue IV Cefazolin 2g q8h IV while in a facility, (up to 4 weeks) then PO Cefadroxil 1g q24h with repeat MRI to determine final course--  3 more weeks is 11/17-, 4 is 11/24  - Avoid rifampin as on methadone/xanax and little utility at this point in the treatment course  - Weekly CBC, CMP, ESR/CRP  - ID follow-up with Dr. John Lange for Telehealth. They will call the patient between 10:30-6:30    #HO IVDA, Opioid Abuse on Methadone   #Agitation  - improving, followed by psych:  resumed on  Quetiapine 200 mg PO HS  -continue Methadone 135 mg po once daily   - Continue with Xanax 1 mg po am, 1mg po afternoon and two times at night - total daily dose of 4mg  -Continue Wellbutrin Xl 300mg po q 24hrs  -For agitation, haldol 5mg po/IM every 8hrs prn  -polysubstance use counseling for outpatient services    #Magnesium deficiency  -plan for dc on oral mg     DVT/GI ppx  guarded prognosis    FULL CODE    #Progress Note Handoff: repeat CBC, BMP, give lasix x 3 days, Patient is medically stable for d/c to [facility] once bed is available   Family discussion: medical plan of care d/w pt. by bedside Disposition: Pending Bed at [facility]     Total time spent to complete patient's bedside assessment, review medical chart, discuss medical plan of care with covering medical team was more than 25 minutes with >50% of time spent face to face with patient, discussion with patient/family and/or coordination of care

## 2022-11-14 LAB
A1C WITH ESTIMATED AVERAGE GLUCOSE RESULT: 6.8 % — HIGH (ref 4–5.6)
ANION GAP SERPL CALC-SCNC: 13 MMOL/L — SIGNIFICANT CHANGE UP (ref 7–14)
BUN SERPL-MCNC: 11 MG/DL — SIGNIFICANT CHANGE UP (ref 10–20)
CALCIUM SERPL-MCNC: 8.7 MG/DL — SIGNIFICANT CHANGE UP (ref 8.4–10.5)
CHLORIDE SERPL-SCNC: 105 MMOL/L — SIGNIFICANT CHANGE UP (ref 98–110)
CO2 SERPL-SCNC: 29 MMOL/L — SIGNIFICANT CHANGE UP (ref 17–32)
CREAT SERPL-MCNC: 0.7 MG/DL — SIGNIFICANT CHANGE UP (ref 0.7–1.5)
EGFR: 110 ML/MIN/1.73M2 — SIGNIFICANT CHANGE UP
ESTIMATED AVERAGE GLUCOSE: 148 MG/DL — HIGH (ref 68–114)
GLUCOSE BLDC GLUCOMTR-MCNC: 165 MG/DL — HIGH (ref 70–99)
GLUCOSE BLDC GLUCOMTR-MCNC: 167 MG/DL — HIGH (ref 70–99)
GLUCOSE BLDC GLUCOMTR-MCNC: 217 MG/DL — HIGH (ref 70–99)
GLUCOSE BLDC GLUCOMTR-MCNC: 86 MG/DL — SIGNIFICANT CHANGE UP (ref 70–99)
GLUCOSE SERPL-MCNC: 133 MG/DL — HIGH (ref 70–99)
MAGNESIUM SERPL-MCNC: 1.6 MG/DL — LOW (ref 1.8–2.4)
POTASSIUM SERPL-MCNC: 4.6 MMOL/L — SIGNIFICANT CHANGE UP (ref 3.5–5)
POTASSIUM SERPL-SCNC: 4.6 MMOL/L — SIGNIFICANT CHANGE UP (ref 3.5–5)
SODIUM SERPL-SCNC: 147 MMOL/L — HIGH (ref 135–146)

## 2022-11-14 PROCEDURE — 99231 SBSQ HOSP IP/OBS SF/LOW 25: CPT

## 2022-11-14 RX ORDER — MAGNESIUM SULFATE 500 MG/ML
2 VIAL (ML) INJECTION ONCE
Refills: 0 | Status: COMPLETED | OUTPATIENT
Start: 2022-11-14 | End: 2022-11-14

## 2022-11-14 RX ORDER — GLUCAGON INJECTION, SOLUTION 0.5 MG/.1ML
1 INJECTION, SOLUTION SUBCUTANEOUS ONCE
Refills: 0 | Status: DISCONTINUED | OUTPATIENT
Start: 2022-11-14 | End: 2022-11-17

## 2022-11-14 RX ORDER — ALPRAZOLAM 0.25 MG
1 TABLET ORAL
Refills: 0 | Status: DISCONTINUED | OUTPATIENT
Start: 2022-11-14 | End: 2022-11-14

## 2022-11-14 RX ORDER — DEXTROSE 50 % IN WATER 50 %
15 SYRINGE (ML) INTRAVENOUS ONCE
Refills: 0 | Status: DISCONTINUED | OUTPATIENT
Start: 2022-11-14 | End: 2022-11-17

## 2022-11-14 RX ORDER — SODIUM CHLORIDE 9 MG/ML
1000 INJECTION, SOLUTION INTRAVENOUS
Refills: 0 | Status: DISCONTINUED | OUTPATIENT
Start: 2022-11-14 | End: 2022-11-17

## 2022-11-14 RX ORDER — DEXTROSE 50 % IN WATER 50 %
12.5 SYRINGE (ML) INTRAVENOUS ONCE
Refills: 0 | Status: DISCONTINUED | OUTPATIENT
Start: 2022-11-14 | End: 2022-11-17

## 2022-11-14 RX ORDER — ALPRAZOLAM 0.25 MG
1 TABLET ORAL
Refills: 0 | Status: DISCONTINUED | OUTPATIENT
Start: 2022-11-14 | End: 2022-11-17

## 2022-11-14 RX ORDER — DEXTROSE 50 % IN WATER 50 %
25 SYRINGE (ML) INTRAVENOUS ONCE
Refills: 0 | Status: DISCONTINUED | OUTPATIENT
Start: 2022-11-14 | End: 2022-11-17

## 2022-11-14 RX ORDER — ALPRAZOLAM 0.25 MG
1 TABLET ORAL ONCE
Refills: 0 | Status: DISCONTINUED | OUTPATIENT
Start: 2022-11-14 | End: 2022-11-14

## 2022-11-14 RX ORDER — INSULIN LISPRO 100/ML
VIAL (ML) SUBCUTANEOUS
Refills: 0 | Status: DISCONTINUED | OUTPATIENT
Start: 2022-11-14 | End: 2022-11-17

## 2022-11-14 RX ADMIN — BUPROPION HYDROCHLORIDE 300 MILLIGRAM(S): 150 TABLET, EXTENDED RELEASE ORAL at 12:14

## 2022-11-14 RX ADMIN — APIXABAN 5 MILLIGRAM(S): 2.5 TABLET, FILM COATED ORAL at 17:01

## 2022-11-14 RX ADMIN — Medication 500 MILLIGRAM(S): at 06:17

## 2022-11-14 RX ADMIN — Medication 1: at 17:00

## 2022-11-14 RX ADMIN — Medication 25 GRAM(S): at 08:14

## 2022-11-14 RX ADMIN — Medication 500 MILLIGRAM(S): at 17:01

## 2022-11-14 RX ADMIN — POLYETHYLENE GLYCOL 3350 17 GRAM(S): 17 POWDER, FOR SOLUTION ORAL at 12:15

## 2022-11-14 RX ADMIN — METHADONE HYDROCHLORIDE 135 MILLIGRAM(S): 40 TABLET ORAL at 12:13

## 2022-11-14 RX ADMIN — SENNA PLUS 1 TABLET(S): 8.6 TABLET ORAL at 06:17

## 2022-11-14 RX ADMIN — MAGNESIUM OXIDE 400 MG ORAL TABLET 400 MILLIGRAM(S): 241.3 TABLET ORAL at 12:13

## 2022-11-14 RX ADMIN — Medication 1 MILLIGRAM(S): at 06:57

## 2022-11-14 RX ADMIN — Medication 20 MILLIGRAM(S): at 06:17

## 2022-11-14 RX ADMIN — APIXABAN 5 MILLIGRAM(S): 2.5 TABLET, FILM COATED ORAL at 06:17

## 2022-11-14 RX ADMIN — Medication 1 TABLET(S): at 21:32

## 2022-11-14 RX ADMIN — Medication 1 MILLIGRAM(S): at 13:24

## 2022-11-14 RX ADMIN — SENNA PLUS 1 TABLET(S): 8.6 TABLET ORAL at 17:01

## 2022-11-14 RX ADMIN — PANTOPRAZOLE SODIUM 40 MILLIGRAM(S): 20 TABLET, DELAYED RELEASE ORAL at 06:17

## 2022-11-14 RX ADMIN — MAGNESIUM OXIDE 400 MG ORAL TABLET 400 MILLIGRAM(S): 241.3 TABLET ORAL at 17:01

## 2022-11-14 RX ADMIN — QUETIAPINE FUMARATE 200 MILLIGRAM(S): 200 TABLET, FILM COATED ORAL at 21:34

## 2022-11-14 RX ADMIN — Medication 100 MILLIGRAM(S): at 12:13

## 2022-11-14 RX ADMIN — MAGNESIUM OXIDE 400 MG ORAL TABLET 400 MILLIGRAM(S): 241.3 TABLET ORAL at 08:13

## 2022-11-14 RX ADMIN — Medication 1 MILLIGRAM(S): at 12:14

## 2022-11-14 RX ADMIN — Medication 1 PATCH: at 06:18

## 2022-11-14 RX ADMIN — Medication 1 PATCH: at 12:14

## 2022-11-14 RX ADMIN — Medication 500 MILLIGRAM(S): at 12:17

## 2022-11-14 RX ADMIN — Medication 1 PATCH: at 12:15

## 2022-11-14 RX ADMIN — Medication 1 TABLET(S): at 06:17

## 2022-11-14 RX ADMIN — Medication 1 TABLET(S): at 13:24

## 2022-11-14 RX ADMIN — Medication 1 TABLET(S): at 12:14

## 2022-11-14 RX ADMIN — CHLORHEXIDINE GLUCONATE 1 APPLICATION(S): 213 SOLUTION TOPICAL at 12:17

## 2022-11-14 RX ADMIN — Medication 2: at 12:17

## 2022-11-14 NOTE — PROGRESS NOTE ADULT - ASSESSMENT
Patient is a 52y old Male PMHx of IVDU (heroin), vertebral osteomyelitis s/p debridement, and MSSA bacteremia, who presents from a rehab facility with a chief complaint of Hypoxia and new onset LE edema. In the ED was, found to have b/l PEs with radiographic evidence of R heart strain and admitted to ICU, found to have LE DVTs s/p LLE embolectomy 10/18. Currently on IV antibiotics, pending discharge authorization.     #New onset DM  - pt consistently having high FS  - last hba1c was in jan 2022 6.6, repeat f/u  - started sliding scale, monitor FS    #Acute hypoxemic respiratory failure / Acute Submassive PE  - resolved  - Suspected superimposed PNA  - DIONICIO/OHS, noncompliant with NIV  - TTE: EF 55%, G1DD, mild TVR, no right heart strain  -VA duplx LE vein: Acute thrombus within the left common femoral, femoral, deep femoral, popliteal, gastrocnemius, posterior tibial and anterior tibial veins, s/p LLE embolectomy 10/18  - Therapeutic Lovenox, transitioned to Eliquis.   - PT evaluated and recommends sub-acut rehab or home PT on DC    #Recurrent MSSA bacteremia with Discitis   - Recent Spinal fusion  - Abnormal L4-L5 signal on MRI suggestive of  Epidural phlegmon/OM  - No acute surgical intervention needed per Neurosurgery   - s/p Cefepime and Cefazolin 2g for 4 weeks >> after consulting with ID switched to Keflex 500mg q6 PO on 11/08 PM  - Sterile 18 gauge powerline Mideline was placed bc of IV Abx >> will remove before DC as pt dont need IV abx anymore  - Outpatient Neurosurgery referral      #HO IVDA, Opioid Abuse on Methadone   - improving, followed by psych: can resume Quetiapine 200 mg PO HS  - Continue with Xanax 1 mg po am, 1mg po afternoon and two times at night - total daily dose of 4mg  - Continue Wellbutrin Xl 300mg po q 24hrs  - Continue home dose Methadone 135mg po daily  - For agitation, haldol 5mg po/IM every 8hrs prn    #Disposition  - Sub-acute Rehab, pending authorization    #MISC  - No need for daily CBC/BMP, just check mag every other day and replenish as needed.    Handoff pending  1) pending placement   2) remove midline before DC

## 2022-11-14 NOTE — PROGRESS NOTE ADULT - SUBJECTIVE AND OBJECTIVE BOX
MIKAEL MCDERMOTT 52y Male  MRN#: [id number]   Hospital Day: 34d    SUBJECTIVE  Patient is a 52y old Male who presents with a chief complaint of Hypoxia (13 Nov 2022 10:59)  Currently admitted to medicine with the primary diagnosis of Osteomyelitis of vertebra, sacral and sacrococcygeal region      INTERVAL HPI AND OVERNIGHT EVENTS:  Patient was examined and seen at bedside. This morning he is resting comfortably in bed. Pt FS was high yesterday evening and Lispro was given. f/u hba1c. pt is likely diabetic. No other issues or overnight events.    OBJECTIVE  PAST MEDICAL & SURGICAL HISTORY  IV drug abuse    Vertebral osteomyelitis    MSSA bacteremia    No significant past surgical history      ALLERGIES:  No Known Allergies    MEDICATIONS:  STANDING MEDICATIONS  apixaban 5 milliGRAM(s) Oral every 12 hours  buPROPion XL (24-Hour) . 300 milliGRAM(s) Oral daily  cephalexin 500 milliGRAM(s) Oral four times a day  chlorhexidine 2% Cloths 1 Application(s) Topical daily  dextrose 5%. 1000 milliLiter(s) IV Continuous <Continuous>  dextrose 5%. 1000 milliLiter(s) IV Continuous <Continuous>  dextrose 50% Injectable 25 Gram(s) IV Push once  dextrose 50% Injectable 12.5 Gram(s) IV Push once  dextrose 50% Injectable 25 Gram(s) IV Push once  folic acid 1 milliGRAM(s) Oral daily  furosemide    Tablet 20 milliGRAM(s) Oral daily  glucagon  Injectable 1 milliGRAM(s) IntraMuscular once  insulin lispro (ADMELOG) corrective regimen sliding scale   SubCutaneous three times a day before meals  lactobacillus acidophilus 1 Tablet(s) Oral every 8 hours  magnesium oxide 400 milliGRAM(s) Oral once  magnesium oxide 400 milliGRAM(s) Oral three times a day with meals  methadone    Tablet 135 milliGRAM(s) Oral daily  multivitamin 1 Tablet(s) Oral daily  nicotine - 21 mG/24Hr(s) Patch 1 Patch Transdermal daily  pantoprazole    Tablet 40 milliGRAM(s) Oral before breakfast  polyethylene glycol 3350 17 Gram(s) Oral daily  QUEtiapine 200 milliGRAM(s) Oral at bedtime  senna 1 Tablet(s) Oral two times a day  thiamine 100 milliGRAM(s) Oral daily    PRN MEDICATIONS  acetaminophen     Tablet .. 650 milliGRAM(s) Oral every 6 hours PRN  aluminum hydroxide/magnesium hydroxide/simethicone Suspension 30 milliLiter(s) Oral every 4 hours PRN  cyclobenzaprine 10 milliGRAM(s) Oral three times a day PRN  dextrose Oral Gel 15 Gram(s) Oral once PRN  guaifenesin/dextromethorphan Oral Liquid 10 milliLiter(s) Oral every 6 hours PRN  haloperidol    Injectable 5 milliGRAM(s) IntraMuscular every 8 hours PRN  melatonin 3 milliGRAM(s) Oral at bedtime PRN  ondansetron Injectable 4 milliGRAM(s) IV Push every 8 hours PRN      VITAL SIGNS: Last 24 Hours  T(C): 35.6 (14 Nov 2022 05:00), Max: 36.2 (13 Nov 2022 14:00)  T(F): 96 (14 Nov 2022 05:00), Max: 97.1 (13 Nov 2022 14:00)  HR: 63 (14 Nov 2022 05:00) (63 - 79)  BP: 109/64 (14 Nov 2022 05:00) (99/55 - 110/61)  BP(mean): --  RR: 18 (14 Nov 2022 05:00) (18 - 19)  SpO2: --    LABS:                        10.9   6.63  )-----------( 222      ( 13 Nov 2022 22:40 )             35.3     11-13    147<H>  |  105  |  11  ----------------------------<  133<H>  4.6   |  29  |  0.7    Ca    8.7      13 Nov 2022 22:40  Mg     1.6     11-13                    RADIOLOGY:    < from: Xray Chest 1 View- PORTABLE-Routine (Xray Chest 1 View- PORTABLE-Routine in AM.) (10.23.22 @ 06:18) >  Impression:  Low lung volume/lordotic view without definite evidence of focal   consolidation pleural effusion or pneumothorax.    < end of copied text >  < from: MR Cervical Spine w/wo IV Cont (10.22.22 @ 20:40) >  IMPRESSION:    1.  Study limited by motion artifact.    2.  No enhancing lesions.    3.  Straightening of the normal cervical lordosis may be secondary to   patient positioning or muscle spasm.    4.  C4-C5 and C5-C6; disc osteophyte complexes with compression of the   thecal sac.    5.  C6-C7; disc osteophyte complex with compression of the thecal sac,   degenerative changes, and bilateral foraminal narrowing.    < end of copied text >  < from: MR Thoracic Spine w/wo IV Cont (10.22.22 @ 20:40) >  MPRESSION:    In comparison with the prior MRI of the thoracic spine dated August 12, 2022:    Current examination is limited by motion artifact.    Interval development of a right pleural effusion which may be loculated   and a right basilar opacity.    No enhancing lesions.    < end of copied text >    PHYSICAL EXAM:  CONSTITUTIONAL: No acute distress, AAOx3  HEAD: Atraumatic, normocephalic  EYES: EOM intact, PERRLA, conjunctiva and sclera clear  ENT: moist mucous membranes  PULMONARY: Clear to auscultation bilaterally  CARDIOVASCULAR: Regular rate and rhythm  GASTROINTESTINAL: Soft, non-tender, non-distended; bowel sounds present  MUSCULOSKELETAL: 1+ edema in LE  NEUROLOGY: non-focal  SKIN: warm and dry

## 2022-11-15 LAB
ALBUMIN SERPL ELPH-MCNC: 3.4 G/DL — LOW (ref 3.5–5.2)
ALP SERPL-CCNC: 109 U/L — SIGNIFICANT CHANGE UP (ref 30–115)
ALT FLD-CCNC: 14 U/L — SIGNIFICANT CHANGE UP (ref 0–41)
ANION GAP SERPL CALC-SCNC: 11 MMOL/L — SIGNIFICANT CHANGE UP (ref 7–14)
AST SERPL-CCNC: 22 U/L — SIGNIFICANT CHANGE UP (ref 0–41)
BILIRUB SERPL-MCNC: 0.2 MG/DL — SIGNIFICANT CHANGE UP (ref 0.2–1.2)
BUN SERPL-MCNC: 12 MG/DL — SIGNIFICANT CHANGE UP (ref 10–20)
CALCIUM SERPL-MCNC: 9 MG/DL — SIGNIFICANT CHANGE UP (ref 8.4–10.5)
CHLORIDE SERPL-SCNC: 98 MMOL/L — SIGNIFICANT CHANGE UP (ref 98–110)
CO2 SERPL-SCNC: 31 MMOL/L — SIGNIFICANT CHANGE UP (ref 17–32)
CREAT SERPL-MCNC: 0.7 MG/DL — SIGNIFICANT CHANGE UP (ref 0.7–1.5)
EGFR: 110 ML/MIN/1.73M2 — SIGNIFICANT CHANGE UP
GLUCOSE BLDC GLUCOMTR-MCNC: 102 MG/DL — HIGH (ref 70–99)
GLUCOSE BLDC GLUCOMTR-MCNC: 139 MG/DL — HIGH (ref 70–99)
GLUCOSE BLDC GLUCOMTR-MCNC: 144 MG/DL — HIGH (ref 70–99)
GLUCOSE BLDC GLUCOMTR-MCNC: 212 MG/DL — HIGH (ref 70–99)
GLUCOSE SERPL-MCNC: 118 MG/DL — HIGH (ref 70–99)
MAGNESIUM SERPL-MCNC: 1.6 MG/DL — LOW (ref 1.8–2.4)
POTASSIUM SERPL-MCNC: 3.7 MMOL/L — SIGNIFICANT CHANGE UP (ref 3.5–5)
POTASSIUM SERPL-SCNC: 3.7 MMOL/L — SIGNIFICANT CHANGE UP (ref 3.5–5)
PROT SERPL-MCNC: 6.4 G/DL — SIGNIFICANT CHANGE UP (ref 6–8)
SODIUM SERPL-SCNC: 140 MMOL/L — SIGNIFICANT CHANGE UP (ref 135–146)

## 2022-11-15 PROCEDURE — 99231 SBSQ HOSP IP/OBS SF/LOW 25: CPT

## 2022-11-15 RX ORDER — MAGNESIUM SULFATE 500 MG/ML
2 VIAL (ML) INJECTION ONCE
Refills: 0 | Status: COMPLETED | OUTPATIENT
Start: 2022-11-15 | End: 2022-11-15

## 2022-11-15 RX ORDER — ALPRAZOLAM 0.25 MG
2 TABLET ORAL AT BEDTIME
Refills: 0 | Status: DISCONTINUED | OUTPATIENT
Start: 2022-11-15 | End: 2022-11-17

## 2022-11-15 RX ADMIN — Medication 1 TABLET(S): at 12:57

## 2022-11-15 RX ADMIN — Medication 1 TABLET(S): at 21:59

## 2022-11-15 RX ADMIN — MAGNESIUM OXIDE 400 MG ORAL TABLET 400 MILLIGRAM(S): 241.3 TABLET ORAL at 08:48

## 2022-11-15 RX ADMIN — Medication 1 MILLIGRAM(S): at 00:04

## 2022-11-15 RX ADMIN — Medication 25 GRAM(S): at 17:18

## 2022-11-15 RX ADMIN — Medication 500 MILLIGRAM(S): at 12:57

## 2022-11-15 RX ADMIN — SENNA PLUS 1 TABLET(S): 8.6 TABLET ORAL at 17:19

## 2022-11-15 RX ADMIN — MAGNESIUM OXIDE 400 MG ORAL TABLET 400 MILLIGRAM(S): 241.3 TABLET ORAL at 17:19

## 2022-11-15 RX ADMIN — Medication 1 MILLIGRAM(S): at 13:03

## 2022-11-15 RX ADMIN — Medication 1 TABLET(S): at 13:00

## 2022-11-15 RX ADMIN — Medication 1 MILLIGRAM(S): at 05:26

## 2022-11-15 RX ADMIN — Medication 1 PATCH: at 08:48

## 2022-11-15 RX ADMIN — Medication 20 MILLIGRAM(S): at 05:28

## 2022-11-15 RX ADMIN — BUPROPION HYDROCHLORIDE 300 MILLIGRAM(S): 150 TABLET, EXTENDED RELEASE ORAL at 12:57

## 2022-11-15 RX ADMIN — Medication 500 MILLIGRAM(S): at 05:28

## 2022-11-15 RX ADMIN — POLYETHYLENE GLYCOL 3350 17 GRAM(S): 17 POWDER, FOR SOLUTION ORAL at 12:59

## 2022-11-15 RX ADMIN — Medication 25 GRAM(S): at 15:17

## 2022-11-15 RX ADMIN — CHLORHEXIDINE GLUCONATE 1 APPLICATION(S): 213 SOLUTION TOPICAL at 12:58

## 2022-11-15 RX ADMIN — Medication 500 MILLIGRAM(S): at 17:19

## 2022-11-15 RX ADMIN — Medication 1 PATCH: at 12:43

## 2022-11-15 RX ADMIN — QUETIAPINE FUMARATE 200 MILLIGRAM(S): 200 TABLET, FILM COATED ORAL at 21:59

## 2022-11-15 RX ADMIN — APIXABAN 5 MILLIGRAM(S): 2.5 TABLET, FILM COATED ORAL at 05:27

## 2022-11-15 RX ADMIN — PANTOPRAZOLE SODIUM 40 MILLIGRAM(S): 20 TABLET, DELAYED RELEASE ORAL at 06:56

## 2022-11-15 RX ADMIN — Medication 100 MILLIGRAM(S): at 12:57

## 2022-11-15 RX ADMIN — SENNA PLUS 1 TABLET(S): 8.6 TABLET ORAL at 05:27

## 2022-11-15 RX ADMIN — METHADONE HYDROCHLORIDE 135 MILLIGRAM(S): 40 TABLET ORAL at 13:04

## 2022-11-15 RX ADMIN — Medication 500 MILLIGRAM(S): at 00:04

## 2022-11-15 RX ADMIN — Medication 1 MILLIGRAM(S): at 12:57

## 2022-11-15 RX ADMIN — Medication 1 PATCH: at 12:58

## 2022-11-15 RX ADMIN — MAGNESIUM OXIDE 400 MG ORAL TABLET 400 MILLIGRAM(S): 241.3 TABLET ORAL at 12:57

## 2022-11-15 RX ADMIN — APIXABAN 5 MILLIGRAM(S): 2.5 TABLET, FILM COATED ORAL at 17:19

## 2022-11-15 RX ADMIN — Medication 1 TABLET(S): at 05:27

## 2022-11-15 RX ADMIN — Medication 2 MILLIGRAM(S): at 21:59

## 2022-11-15 NOTE — PROGRESS NOTE ADULT - SUBJECTIVE AND OBJECTIVE BOX
MIKAEL MCDERMOTT 52y Male  MRN#: [id number]   Hospital Day: 35d    SUBJECTIVE  Patient is a 52y old Male who presents with a chief complaint of Hypoxia (14 Nov 2022 10:11)  Currently admitted to medicine with the primary diagnosis of Osteomyelitis of vertebra, sacral and sacrococcygeal region      INTERVAL HPI AND OVERNIGHT EVENTS:  Patient was examined and seen at bedside. This morning he is resting comfortably in bed. No issues or overnight events.    OBJECTIVE  PAST MEDICAL & SURGICAL HISTORY  IV drug abuse    Vertebral osteomyelitis    MSSA bacteremia    No significant past surgical history      ALLERGIES:  No Known Allergies    MEDICATIONS:  STANDING MEDICATIONS  ALPRAZolam 1 milliGRAM(s) Oral <User Schedule>  ALPRAZolam 2 milliGRAM(s) Oral at bedtime  apixaban 5 milliGRAM(s) Oral every 12 hours  buPROPion XL (24-Hour) . 300 milliGRAM(s) Oral daily  cephalexin 500 milliGRAM(s) Oral four times a day  chlorhexidine 2% Cloths 1 Application(s) Topical daily  dextrose 5%. 1000 milliLiter(s) IV Continuous <Continuous>  dextrose 5%. 1000 milliLiter(s) IV Continuous <Continuous>  dextrose 50% Injectable 25 Gram(s) IV Push once  dextrose 50% Injectable 12.5 Gram(s) IV Push once  dextrose 50% Injectable 25 Gram(s) IV Push once  folic acid 1 milliGRAM(s) Oral daily  furosemide    Tablet 20 milliGRAM(s) Oral daily  glucagon  Injectable 1 milliGRAM(s) IntraMuscular once  insulin lispro (ADMELOG) corrective regimen sliding scale   SubCutaneous three times a day before meals  lactobacillus acidophilus 1 Tablet(s) Oral every 8 hours  magnesium oxide 400 milliGRAM(s) Oral once  magnesium oxide 400 milliGRAM(s) Oral three times a day with meals  methadone    Tablet 135 milliGRAM(s) Oral daily  multivitamin 1 Tablet(s) Oral daily  nicotine - 21 mG/24Hr(s) Patch 1 Patch Transdermal daily  pantoprazole    Tablet 40 milliGRAM(s) Oral before breakfast  polyethylene glycol 3350 17 Gram(s) Oral daily  QUEtiapine 200 milliGRAM(s) Oral at bedtime  senna 1 Tablet(s) Oral two times a day  thiamine 100 milliGRAM(s) Oral daily    PRN MEDICATIONS  acetaminophen     Tablet .. 650 milliGRAM(s) Oral every 6 hours PRN  aluminum hydroxide/magnesium hydroxide/simethicone Suspension 30 milliLiter(s) Oral every 4 hours PRN  cyclobenzaprine 10 milliGRAM(s) Oral three times a day PRN  dextrose Oral Gel 15 Gram(s) Oral once PRN  guaifenesin/dextromethorphan Oral Liquid 10 milliLiter(s) Oral every 6 hours PRN  melatonin 3 milliGRAM(s) Oral at bedtime PRN  ondansetron Injectable 4 milliGRAM(s) IV Push every 8 hours PRN      VITAL SIGNS: Last 24 Hours  T(C): 36.3 (15 Nov 2022 04:58), Max: 36.6 (14 Nov 2022 13:00)  T(F): 97.4 (15 Nov 2022 04:58), Max: 97.8 (14 Nov 2022 13:00)  HR: 69 (15 Nov 2022 04:58) (69 - 76)  BP: 104/56 (15 Nov 2022 04:58) (104/56 - 110/63)  BP(mean): --  RR: 18 (15 Nov 2022 08:00) (18 - 20)  SpO2: 95% (15 Nov 2022 08:00) (95% - 96%)    LABS:                        10.9   6.63  )-----------( 222      ( 13 Nov 2022 22:40 )             35.3     11-13    147<H>  |  105  |  11  ----------------------------<  133<H>  4.6   |  29  |  0.7    Ca    8.7      13 Nov 2022 22:40  Mg     1.6     11-13                    RADIOLOGY:    < from: Xray Chest 1 View- PORTABLE-Routine (Xray Chest 1 View- PORTABLE-Routine in AM.) (10.23.22 @ 06:18) >  Impression:  Low lung volume/lordotic view without definite evidence of focal   consolidation pleural effusion or pneumothorax.    < end of copied text >  < from: MR Cervical Spine w/wo IV Cont (10.22.22 @ 20:40) >  IMPRESSION:    1.  Study limited by motion artifact.    2.  No enhancing lesions.    3.  Straightening of the normal cervical lordosis may be secondary to   patient positioning or muscle spasm.    4.  C4-C5 and C5-C6; disc osteophyte complexes with compression of the   thecal sac.    5.  C6-C7; disc osteophyte complex with compression of the thecal sac,   degenerative changes, and bilateral foraminal narrowing.    < end of copied text >  < from: MR Thoracic Spine w/wo IV Cont (10.22.22 @ 20:40) >  MPRESSION:    In comparison with the prior MRI of the thoracic spine dated August 12, 2022:    Current examination is limited by motion artifact.    Interval development of a right pleural effusion which may be loculated   and a right basilar opacity.    No enhancing lesions.    < end of copied text >    PHYSICAL EXAM:  CONSTITUTIONAL: No acute distress, AAOx3  HEAD: Atraumatic, normocephalic  EYES: EOM intact, PERRLA, conjunctiva and sclera clear  ENT: moist mucous membranes  PULMONARY: Clear to auscultation bilaterally  CARDIOVASCULAR: Regular rate and rhythm  GASTROINTESTINAL: Soft, non-tender, non-distended; bowel sounds present  MUSCULOSKELETAL: 1+ edema in LE  NEUROLOGY: non-focal  SKIN: warm and dry

## 2022-11-15 NOTE — PROGRESS NOTE ADULT - ASSESSMENT
Patient is a 52y old Male PMHx of IVDU (heroin), vertebral osteomyelitis s/p debridement, and MSSA bacteremia, who presents from a rehab facility with a chief complaint of Hypoxia and new onset LE edema. In the ED was, found to have b/l PEs with radiographic evidence of R heart strain and admitted to ICU, found to have LE DVTs s/p LLE embolectomy 10/18. Currently on IV antibiotics, pending discharge authorization.     #New onset DM  - pt was consistently having high FS  - last hba1c was in jan 2022 6.6, now 6.8  - c/w sliding scale, monitor FS    #Acute hypoxemic respiratory failure / Acute Submassive PE  - resolved  - Suspected superimposed PNA  - DIONICIO/OHS, noncompliant with NIV  - TTE: EF 55%, G1DD, mild TVR, no right heart strain  -VA duplx LE vein: Acute thrombus within the left common femoral, femoral, deep femoral, popliteal, gastrocnemius, posterior tibial and anterior tibial veins, s/p LLE embolectomy 10/18  - Therapeutic Lovenox, transitioned to Eliquis.   - PT evaluated and recommends sub-acut rehab or home PT on DC    #Recurrent MSSA bacteremia with Discitis   - Recent Spinal fusion  - Abnormal L4-L5 signal on MRI suggestive of  Epidural phlegmon/OM  - No acute surgical intervention needed per Neurosurgery   - s/p Cefepime and Cefazolin 2g for 4 weeks >> after consulting with ID switched to Keflex 500mg q6 PO on 11/08 PM  - Sterile 18 gauge powerline Mideline was placed bc of IV Abx >> will remove before DC as pt dont need IV abx anymore  - Outpatient Neurosurgery referral      #HO IVDA, Opioid Abuse on Methadone   - improving, followed by psych: can resume Quetiapine 200 mg PO HS  - Continue with Xanax 1 mg po am, 1mg po afternoon and 2mg at night - total daily dose of 4mg  - Continue Wellbutrin Xl 300mg po q 24hrs  - Continue home dose Methadone 135mg po daily  - For agitation, haldol 5mg po/IM every 8hrs prn    #Disposition  - Sub-acute Rehab, pending authorization    #MISC  - No need for daily CBC/BMP, just check mag every other day and replenish as needed.    Handoff pending  1) pending placement   2) remove midline before DC

## 2022-11-16 LAB
GLUCOSE BLDC GLUCOMTR-MCNC: 100 MG/DL — HIGH (ref 70–99)
GLUCOSE BLDC GLUCOMTR-MCNC: 155 MG/DL — HIGH (ref 70–99)
GLUCOSE BLDC GLUCOMTR-MCNC: 157 MG/DL — HIGH (ref 70–99)
GLUCOSE BLDC GLUCOMTR-MCNC: 179 MG/DL — HIGH (ref 70–99)

## 2022-11-16 PROCEDURE — 99233 SBSQ HOSP IP/OBS HIGH 50: CPT

## 2022-11-16 RX ORDER — ALPRAZOLAM 0.25 MG
1 TABLET ORAL
Qty: 0 | Refills: 0 | DISCHARGE
Start: 2022-11-16

## 2022-11-16 RX ORDER — METFORMIN HYDROCHLORIDE 850 MG/1
1 TABLET ORAL
Qty: 60 | Refills: 0
Start: 2022-11-16 | End: 2022-12-15

## 2022-11-16 RX ORDER — LACTOBACILLUS ACIDOPHILUS 100MM CELL
1 CAPSULE ORAL
Qty: 0 | Refills: 0 | DISCHARGE
Start: 2022-11-16

## 2022-11-16 RX ORDER — LACTOBACILLUS ACIDOPHILUS 100MM CELL
0 CAPSULE ORAL
Qty: 0 | Refills: 0 | DISCHARGE
Start: 2022-11-16

## 2022-11-16 RX ADMIN — QUETIAPINE FUMARATE 200 MILLIGRAM(S): 200 TABLET, FILM COATED ORAL at 21:16

## 2022-11-16 RX ADMIN — Medication 1 TABLET(S): at 21:15

## 2022-11-16 RX ADMIN — PANTOPRAZOLE SODIUM 40 MILLIGRAM(S): 20 TABLET, DELAYED RELEASE ORAL at 06:01

## 2022-11-16 RX ADMIN — MAGNESIUM OXIDE 400 MG ORAL TABLET 400 MILLIGRAM(S): 241.3 TABLET ORAL at 17:19

## 2022-11-16 RX ADMIN — METHADONE HYDROCHLORIDE 135 MILLIGRAM(S): 40 TABLET ORAL at 11:05

## 2022-11-16 RX ADMIN — Medication 100 MILLIGRAM(S): at 11:05

## 2022-11-16 RX ADMIN — Medication 1 MILLIGRAM(S): at 11:04

## 2022-11-16 RX ADMIN — CHLORHEXIDINE GLUCONATE 1 APPLICATION(S): 213 SOLUTION TOPICAL at 11:07

## 2022-11-16 RX ADMIN — Medication 1: at 11:41

## 2022-11-16 RX ADMIN — Medication 1 MILLIGRAM(S): at 15:58

## 2022-11-16 RX ADMIN — Medication 500 MILLIGRAM(S): at 00:46

## 2022-11-16 RX ADMIN — Medication 1 TABLET(S): at 11:04

## 2022-11-16 RX ADMIN — POLYETHYLENE GLYCOL 3350 17 GRAM(S): 17 POWDER, FOR SOLUTION ORAL at 11:12

## 2022-11-16 RX ADMIN — Medication 500 MILLIGRAM(S): at 11:05

## 2022-11-16 RX ADMIN — Medication 2 MILLIGRAM(S): at 21:15

## 2022-11-16 RX ADMIN — APIXABAN 5 MILLIGRAM(S): 2.5 TABLET, FILM COATED ORAL at 06:02

## 2022-11-16 RX ADMIN — APIXABAN 5 MILLIGRAM(S): 2.5 TABLET, FILM COATED ORAL at 17:19

## 2022-11-16 RX ADMIN — Medication 1 TABLET(S): at 15:57

## 2022-11-16 RX ADMIN — BUPROPION HYDROCHLORIDE 300 MILLIGRAM(S): 150 TABLET, EXTENDED RELEASE ORAL at 11:05

## 2022-11-16 RX ADMIN — Medication 1 MILLIGRAM(S): at 06:01

## 2022-11-16 RX ADMIN — Medication 20 MILLIGRAM(S): at 06:01

## 2022-11-16 RX ADMIN — Medication 1 PATCH: at 11:06

## 2022-11-16 RX ADMIN — MAGNESIUM OXIDE 400 MG ORAL TABLET 400 MILLIGRAM(S): 241.3 TABLET ORAL at 07:48

## 2022-11-16 RX ADMIN — SENNA PLUS 1 TABLET(S): 8.6 TABLET ORAL at 06:02

## 2022-11-16 RX ADMIN — Medication 500 MILLIGRAM(S): at 06:02

## 2022-11-16 RX ADMIN — MAGNESIUM OXIDE 400 MG ORAL TABLET 400 MILLIGRAM(S): 241.3 TABLET ORAL at 11:41

## 2022-11-16 RX ADMIN — SENNA PLUS 1 TABLET(S): 8.6 TABLET ORAL at 17:48

## 2022-11-16 RX ADMIN — Medication 500 MILLIGRAM(S): at 23:22

## 2022-11-16 RX ADMIN — Medication 1 TABLET(S): at 06:01

## 2022-11-16 NOTE — PROGRESS NOTE ADULT - ASSESSMENT
Patient is a 52y old Male PMHx of IVDU (heroin), vertebral osteomyelitis s/p debridement, and MSSA bacteremia, who presents from a rehab facility with a chief complaint of Hypoxia and new onset LE edema. In the ED was, found to have b/l PEs with radiographic evidence of R heart strain and admitted to ICU, found to have LE DVTs s/p LLE embolectomy 10/18. Currently on IV antibiotics, pending discharge authorization.     #New onset DM  - pt was consistently having high FS >> much improved now after sliding scale  - last hba1c was in jan 2022 6.6, now 6.8  - c/w sliding scale, monitor FS  - pt needs to be on metformin 250mg after DC    #Acute hypoxemic respiratory failure / Acute Submassive PE  - resolved  - Suspected superimposed PNA  - DIONICIO/OHS, noncompliant with NIV  - TTE: EF 55%, G1DD, mild TVR, no right heart strain  -VA duplx LE vein: Acute thrombus within the left common femoral, femoral, deep femoral, popliteal, gastrocnemius, posterior tibial and anterior tibial veins, s/p LLE embolectomy 10/18  - Therapeutic Lovenox, transitioned to Eliquis.   - PT evaluated and recommends sub-acut rehab or home PT on DC    #Recurrent MSSA bacteremia with Discitis   - Recent Spinal fusion  - Abnormal L4-L5 signal on MRI suggestive of  Epidural phlegmon/OM  - No acute surgical intervention needed per Neurosurgery   - s/p Cefepime and Cefazolin 2g for 4 weeks >> after consulting with ID switched to Keflex 500mg q6 PO on 11/08 PM  - Sterile 18 gauge powerline Mideline was placed bc of IV Abx >> will remove before DC as pt dont need IV abx anymore  - Outpatient Neurosurgery referral      #HO IVDA, Opioid Abuse on Methadone   - improving, followed by psych: can resume Quetiapine 200 mg PO HS  - Continue with Xanax 1 mg po am, 1mg po afternoon and 2mg at night - total daily dose of 4mg  - Continue Wellbutrin Xl 300mg po q 24hrs  - Continue home dose Methadone 135mg po daily  - For agitation, haldol 5mg po/IM every 8hrs prn    #Disposition  - Sub-acute Rehab, pending authorization    #MISC  - No need for daily CBC/BMP, just check mag every other day and replenish as needed.    Handoff pending  1) pending placement   2) remove midline before DC  3) Monitor BP (low BP)

## 2022-11-16 NOTE — PROGRESS NOTE ADULT - SUBJECTIVE AND OBJECTIVE BOX
MIKAEL MCDERMOTT 52y Male  MRN#: [id number]   Hospital Day: 36d    SUBJECTIVE  Patient is a 52y old Male who presents with a chief complaint of Hypoxia (15 Nov 2022 11:27)  Currently admitted to medicine with the primary diagnosis of Osteomyelitis of vertebra, sacral and sacrococcygeal region      INTERVAL HPI AND OVERNIGHT EVENTS:  Patient was examined and seen at bedside. This morning he is resting comfortably in bed. No issues or overnight events.    OBJECTIVE  PAST MEDICAL & SURGICAL HISTORY  IV drug abuse    Vertebral osteomyelitis    MSSA bacteremia    No significant past surgical history      ALLERGIES:  No Known Allergies    MEDICATIONS:  STANDING MEDICATIONS  ALPRAZolam 1 milliGRAM(s) Oral <User Schedule>  ALPRAZolam 2 milliGRAM(s) Oral at bedtime  apixaban 5 milliGRAM(s) Oral every 12 hours  buPROPion XL (24-Hour) . 300 milliGRAM(s) Oral daily  cephalexin 500 milliGRAM(s) Oral four times a day  chlorhexidine 2% Cloths 1 Application(s) Topical daily  dextrose 5%. 1000 milliLiter(s) IV Continuous <Continuous>  dextrose 5%. 1000 milliLiter(s) IV Continuous <Continuous>  dextrose 50% Injectable 25 Gram(s) IV Push once  dextrose 50% Injectable 12.5 Gram(s) IV Push once  dextrose 50% Injectable 25 Gram(s) IV Push once  folic acid 1 milliGRAM(s) Oral daily  glucagon  Injectable 1 milliGRAM(s) IntraMuscular once  insulin lispro (ADMELOG) corrective regimen sliding scale   SubCutaneous three times a day before meals  lactobacillus acidophilus 1 Tablet(s) Oral every 8 hours  magnesium oxide 400 milliGRAM(s) Oral once  magnesium oxide 400 milliGRAM(s) Oral three times a day with meals  methadone    Tablet 135 milliGRAM(s) Oral daily  multivitamin 1 Tablet(s) Oral daily  nicotine - 21 mG/24Hr(s) Patch 1 Patch Transdermal daily  pantoprazole    Tablet 40 milliGRAM(s) Oral before breakfast  polyethylene glycol 3350 17 Gram(s) Oral daily  QUEtiapine 200 milliGRAM(s) Oral at bedtime  senna 1 Tablet(s) Oral two times a day  thiamine 100 milliGRAM(s) Oral daily    PRN MEDICATIONS  acetaminophen     Tablet .. 650 milliGRAM(s) Oral every 6 hours PRN  aluminum hydroxide/magnesium hydroxide/simethicone Suspension 30 milliLiter(s) Oral every 4 hours PRN  cyclobenzaprine 10 milliGRAM(s) Oral three times a day PRN  dextrose Oral Gel 15 Gram(s) Oral once PRN  guaifenesin/dextromethorphan Oral Liquid 10 milliLiter(s) Oral every 6 hours PRN  melatonin 3 milliGRAM(s) Oral at bedtime PRN  ondansetron Injectable 4 milliGRAM(s) IV Push every 8 hours PRN      VITAL SIGNS: Last 24 Hours  T(C): 37 (16 Nov 2022 04:22), Max: 37 (16 Nov 2022 04:22)  T(F): 98.6 (16 Nov 2022 04:22), Max: 98.6 (16 Nov 2022 04:22)  HR: 79 (16 Nov 2022 04:22) (79 - 89)  BP: 109/56 (16 Nov 2022 04:22) (92/53 - 109/56)  BP(mean): --  RR: 20 (16 Nov 2022 04:22) (17 - 20)  SpO2: 95% (16 Nov 2022 04:22) (95% - 97%)    LABS:    11-15    140  |  98  |  12  ----------------------------<  118<H>  3.7   |  31  |  0.7    Ca    9.0      15 Nov 2022 11:36  Mg     1.6     11-15    TPro  6.4  /  Alb  3.4<L>  /  TBili  0.2  /  DBili  x   /  AST  22  /  ALT  14  /  AlkPhos  109  11-15                  RADIOLOGY:    < from: Xray Chest 1 View- PORTABLE-Routine (Xray Chest 1 View- PORTABLE-Routine in AM.) (10.23.22 @ 06:18) >  Impression:  Low lung volume/lordotic view without definite evidence of focal   consolidation pleural effusion or pneumothorax.    < end of copied text >  < from: MR Cervical Spine w/wo IV Cont (10.22.22 @ 20:40) >  IMPRESSION:    1.  Study limited by motion artifact.    2.  No enhancing lesions.    3.  Straightening of the normal cervical lordosis may be secondary to   patient positioning or muscle spasm.    4.  C4-C5 and C5-C6; disc osteophyte complexes with compression of the   thecal sac.    5.  C6-C7; disc osteophyte complex with compression of the thecal sac,   degenerative changes, and bilateral foraminal narrowing.    < end of copied text >  < from: MR Thoracic Spine w/wo IV Cont (10.22.22 @ 20:40) >  MPRESSION:    In comparison with the prior MRI of the thoracic spine dated August 12, 2022:    Current examination is limited by motion artifact.    Interval development of a right pleural effusion which may be loculated   and a right basilar opacity.    No enhancing lesions.    < end of copied text >    PHYSICAL EXAM:  CONSTITUTIONAL: No acute distress, AAOx3  HEAD: Atraumatic, normocephalic  EYES: EOM intact, PERRLA, conjunctiva and sclera clear  ENT: moist mucous membranes  PULMONARY: Clear to auscultation bilaterally  CARDIOVASCULAR: Regular rate and rhythm  GASTROINTESTINAL: Soft, non-tender, non-distended; bowel sounds present  MUSCULOSKELETAL: 1+ edema in LE  NEUROLOGY: non-focal  SKIN: warm and dry

## 2022-11-17 VITALS
DIASTOLIC BLOOD PRESSURE: 63 MMHG | RESPIRATION RATE: 18 BRPM | SYSTOLIC BLOOD PRESSURE: 136 MMHG | OXYGEN SATURATION: 96 % | TEMPERATURE: 96 F | HEART RATE: 82 BPM

## 2022-11-17 LAB
ANION GAP SERPL CALC-SCNC: 11 MMOL/L — SIGNIFICANT CHANGE UP (ref 7–14)
BUN SERPL-MCNC: 13 MG/DL — SIGNIFICANT CHANGE UP (ref 10–20)
CALCIUM SERPL-MCNC: 9 MG/DL — SIGNIFICANT CHANGE UP (ref 8.4–10.5)
CHLORIDE SERPL-SCNC: 101 MMOL/L — SIGNIFICANT CHANGE UP (ref 98–110)
CO2 SERPL-SCNC: 31 MMOL/L — SIGNIFICANT CHANGE UP (ref 17–32)
CREAT SERPL-MCNC: 0.8 MG/DL — SIGNIFICANT CHANGE UP (ref 0.7–1.5)
EGFR: 106 ML/MIN/1.73M2 — SIGNIFICANT CHANGE UP
GLUCOSE BLDC GLUCOMTR-MCNC: 165 MG/DL — HIGH (ref 70–99)
GLUCOSE BLDC GLUCOMTR-MCNC: 91 MG/DL — SIGNIFICANT CHANGE UP (ref 70–99)
GLUCOSE SERPL-MCNC: 135 MG/DL — HIGH (ref 70–99)
HCT VFR BLD CALC: 38 % — LOW (ref 42–52)
HGB BLD-MCNC: 11.6 G/DL — LOW (ref 14–18)
MAGNESIUM SERPL-MCNC: 1.8 MG/DL — SIGNIFICANT CHANGE UP (ref 1.8–2.4)
MCHC RBC-ENTMCNC: 28.1 PG — SIGNIFICANT CHANGE UP (ref 27–31)
MCHC RBC-ENTMCNC: 30.5 G/DL — LOW (ref 32–37)
MCV RBC AUTO: 92 FL — SIGNIFICANT CHANGE UP (ref 80–94)
NRBC # BLD: 0 /100 WBCS — SIGNIFICANT CHANGE UP (ref 0–0)
PLATELET # BLD AUTO: 245 K/UL — SIGNIFICANT CHANGE UP (ref 130–400)
POTASSIUM SERPL-MCNC: 3.9 MMOL/L — SIGNIFICANT CHANGE UP (ref 3.5–5)
POTASSIUM SERPL-SCNC: 3.9 MMOL/L — SIGNIFICANT CHANGE UP (ref 3.5–5)
RBC # BLD: 4.13 M/UL — LOW (ref 4.7–6.1)
RBC # FLD: 15.4 % — HIGH (ref 11.5–14.5)
SODIUM SERPL-SCNC: 143 MMOL/L — SIGNIFICANT CHANGE UP (ref 135–146)
WBC # BLD: 5.48 K/UL — SIGNIFICANT CHANGE UP (ref 4.8–10.8)
WBC # FLD AUTO: 5.48 K/UL — SIGNIFICANT CHANGE UP (ref 4.8–10.8)

## 2022-11-17 PROCEDURE — 99231 SBSQ HOSP IP/OBS SF/LOW 25: CPT

## 2022-11-17 RX ADMIN — Medication 1 MILLIGRAM(S): at 11:05

## 2022-11-17 RX ADMIN — Medication 1 TABLET(S): at 13:04

## 2022-11-17 RX ADMIN — Medication 1 MILLIGRAM(S): at 05:17

## 2022-11-17 RX ADMIN — BUPROPION HYDROCHLORIDE 300 MILLIGRAM(S): 150 TABLET, EXTENDED RELEASE ORAL at 11:05

## 2022-11-17 RX ADMIN — Medication 1 PATCH: at 11:04

## 2022-11-17 RX ADMIN — CHLORHEXIDINE GLUCONATE 1 APPLICATION(S): 213 SOLUTION TOPICAL at 11:06

## 2022-11-17 RX ADMIN — APIXABAN 5 MILLIGRAM(S): 2.5 TABLET, FILM COATED ORAL at 05:14

## 2022-11-17 RX ADMIN — Medication 100 MILLIGRAM(S): at 11:03

## 2022-11-17 RX ADMIN — Medication 1 PATCH: at 01:55

## 2022-11-17 RX ADMIN — Medication 1 TABLET(S): at 05:14

## 2022-11-17 RX ADMIN — Medication 500 MILLIGRAM(S): at 11:05

## 2022-11-17 RX ADMIN — Medication 1 MILLIGRAM(S): at 13:04

## 2022-11-17 RX ADMIN — MAGNESIUM OXIDE 400 MG ORAL TABLET 400 MILLIGRAM(S): 241.3 TABLET ORAL at 08:15

## 2022-11-17 RX ADMIN — SENNA PLUS 1 TABLET(S): 8.6 TABLET ORAL at 05:13

## 2022-11-17 RX ADMIN — Medication 500 MILLIGRAM(S): at 05:14

## 2022-11-17 RX ADMIN — Medication 1: at 12:10

## 2022-11-17 RX ADMIN — PANTOPRAZOLE SODIUM 40 MILLIGRAM(S): 20 TABLET, DELAYED RELEASE ORAL at 05:13

## 2022-11-17 RX ADMIN — METHADONE HYDROCHLORIDE 135 MILLIGRAM(S): 40 TABLET ORAL at 12:35

## 2022-11-17 RX ADMIN — MAGNESIUM OXIDE 400 MG ORAL TABLET 400 MILLIGRAM(S): 241.3 TABLET ORAL at 12:11

## 2022-11-17 RX ADMIN — Medication 1 TABLET(S): at 11:04

## 2022-11-17 RX ADMIN — POLYETHYLENE GLYCOL 3350 17 GRAM(S): 17 POWDER, FOR SOLUTION ORAL at 11:04

## 2022-11-17 NOTE — PROGRESS NOTE ADULT - SUBJECTIVE AND OBJECTIVE BOX
MIKAEL MCDERMOTT 52y Male  MRN#: [id number]   Hospital Day: 37d    SUBJECTIVE  Patient is a 52y old Male who presents with a chief complaint of Hypoxia (16 Nov 2022 10:55)  Currently admitted to medicine with the primary diagnosis of Osteomyelitis of vertebra, sacral and sacrococcygeal region      INTERVAL HPI AND OVERNIGHT EVENTS:  Patient was examined and seen at bedside. This morning he is resting comfortably in bed. No issues or overnight events.    OBJECTIVE  PAST MEDICAL & SURGICAL HISTORY  IV drug abuse    Vertebral osteomyelitis    MSSA bacteremia    No significant past surgical history      ALLERGIES:  No Known Allergies    MEDICATIONS:  STANDING MEDICATIONS  ALPRAZolam 1 milliGRAM(s) Oral <User Schedule>  ALPRAZolam 2 milliGRAM(s) Oral at bedtime  apixaban 5 milliGRAM(s) Oral every 12 hours  buPROPion XL (24-Hour) . 300 milliGRAM(s) Oral daily  cephalexin 500 milliGRAM(s) Oral four times a day  chlorhexidine 2% Cloths 1 Application(s) Topical daily  dextrose 5%. 1000 milliLiter(s) IV Continuous <Continuous>  dextrose 5%. 1000 milliLiter(s) IV Continuous <Continuous>  dextrose 50% Injectable 25 Gram(s) IV Push once  dextrose 50% Injectable 12.5 Gram(s) IV Push once  dextrose 50% Injectable 25 Gram(s) IV Push once  folic acid 1 milliGRAM(s) Oral daily  glucagon  Injectable 1 milliGRAM(s) IntraMuscular once  insulin lispro (ADMELOG) corrective regimen sliding scale   SubCutaneous three times a day before meals  lactobacillus acidophilus 1 Tablet(s) Oral every 8 hours  magnesium oxide 400 milliGRAM(s) Oral once  magnesium oxide 400 milliGRAM(s) Oral three times a day with meals  methadone    Tablet 135 milliGRAM(s) Oral daily  multivitamin 1 Tablet(s) Oral daily  nicotine - 21 mG/24Hr(s) Patch 1 Patch Transdermal daily  pantoprazole    Tablet 40 milliGRAM(s) Oral before breakfast  polyethylene glycol 3350 17 Gram(s) Oral daily  QUEtiapine 200 milliGRAM(s) Oral at bedtime  senna 1 Tablet(s) Oral two times a day  thiamine 100 milliGRAM(s) Oral daily    PRN MEDICATIONS  acetaminophen     Tablet .. 650 milliGRAM(s) Oral every 6 hours PRN  aluminum hydroxide/magnesium hydroxide/simethicone Suspension 30 milliLiter(s) Oral every 4 hours PRN  cyclobenzaprine 10 milliGRAM(s) Oral three times a day PRN  dextrose Oral Gel 15 Gram(s) Oral once PRN  guaifenesin/dextromethorphan Oral Liquid 10 milliLiter(s) Oral every 6 hours PRN  melatonin 3 milliGRAM(s) Oral at bedtime PRN  ondansetron Injectable 4 milliGRAM(s) IV Push every 8 hours PRN      VITAL SIGNS: Last 24 Hours  T(C): 36.6 (17 Nov 2022 04:26), Max: 36.9 (16 Nov 2022 20:15)  T(F): 97.9 (17 Nov 2022 04:26), Max: 98.5 (16 Nov 2022 20:15)  HR: 78 (17 Nov 2022 04:26) (78 - 91)  BP: 111/64 (17 Nov 2022 04:26) (111/64 - 124/76)  BP(mean): --  RR: 19 (17 Nov 2022 04:26) (17 - 19)  SpO2: 97% (17 Nov 2022 04:26) (97% - 97%)    LABS:    11-15    140  |  98  |  12  ----------------------------<  118<H>  3.7   |  31  |  0.7    Ca    9.0      15 Nov 2022 11:36  Mg     1.6     11-15    TPro  6.4  /  Alb  3.4<L>  /  TBili  0.2  /  DBili  x   /  AST  22  /  ALT  14  /  AlkPhos  109  11-15                  RADIOLOGY:    < from: Xray Chest 1 View- PORTABLE-Routine (Xray Chest 1 View- PORTABLE-Routine in AM.) (10.23.22 @ 06:18) >  Impression:  Low lung volume/lordotic view without definite evidence of focal   consolidation pleural effusion or pneumothorax.    < end of copied text >  < from: MR Cervical Spine w/wo IV Cont (10.22.22 @ 20:40) >  IMPRESSION:    1.  Study limited by motion artifact.    2.  No enhancing lesions.    3.  Straightening of the normal cervical lordosis may be secondary to   patient positioning or muscle spasm.    4.  C4-C5 and C5-C6; disc osteophyte complexes with compression of the   thecal sac.    5.  C6-C7; disc osteophyte complex with compression of the thecal sac,   degenerative changes, and bilateral foraminal narrowing.    < end of copied text >  < from: MR Thoracic Spine w/wo IV Cont (10.22.22 @ 20:40) >  MPRESSION:    In comparison with the prior MRI of the thoracic spine dated August 12, 2022:    Current examination is limited by motion artifact.    Interval development of a right pleural effusion which may be loculated   and a right basilar opacity.    No enhancing lesions.    < end of copied text >    PHYSICAL EXAM:  CONSTITUTIONAL: No acute distress, AAOx3  HEAD: Atraumatic, normocephalic  EYES: EOM intact, PERRLA, conjunctiva and sclera clear  ENT: moist mucous membranes  PULMONARY: Clear to auscultation bilaterally  CARDIOVASCULAR: Regular rate and rhythm  GASTROINTESTINAL: Soft, non-tender, non-distended; bowel sounds present  MUSCULOSKELETAL: 1+ edema in LE  NEUROLOGY: non-focal  SKIN: warm and dry

## 2022-11-17 NOTE — PROGRESS NOTE ADULT - ATTENDING COMMENTS
52y old Male PMHx of IVDU (heroin), vertebral osteomyelitis s/p debridement, and MSSA bacteremia, who presents from a rehab facility with a chief complaint of Hypoxia and new onset LE edema. In the ED was, found to have b/l PEs with radiographic evidence of R heart strain and admitted to ICU, found to have LE DVTs s/p LLE embolectomy 10/18. Currently on IV antibiotics, pending discharge authorization.     Acute hypoxemic respiratory failure - resolved  Acute Submassive PE with suspected initial Right heart strain  LLE DVT s/p embolization (10/18)  Suspected superimposed PNA  DIONICIO/OHS, noncompliant with NIV  -CT on admission with bilateral submassive PE + concern for RV strain  -TTE: EF 55%, G1DD, mild TVR, no right heart strain  -VA duplx LE vein: Acute thrombus within the left common femoral, femoral, deep femoral, popliteal, gastrocnemius, posterior tibial and anterior tibial veins  -s/p LLE embolectomy 10/18  -continue AC---transitioned to oral NOAC (currently on eliquis 10 mg bid day #7 of 7 and then transitioned to 5 mg bid eliquis starting 11/2)  -patient says he thinks he is supposed to be on chronic long low dose prophylactic ABX ???  -f/u ID recommendations  - cont Cefazolin 2g q8h IV x 6 weeks (end date 11/17)  -monitor wbc and fever curve  -monitor outpatient BMP, LFT, CK, ESR, CRP  -outpatient sleep study for DIONICIO    #Hx MSSA bacteremia  #Hx Epidural phlegmon/OM  #Hx R Psoas Abscess  -neurosurgery f/u---plan for outaptient f/u  -continue iv abx x 6 weeks minimum---f/u with ID plan for monitoring outpatient labs and abx duration      #HO IVDA, Opioid Abuse on Methadone   #Agitation  - improving, followed by psych:  resumed on  Quetiapine 200 mg PO HS  -continue Methadone 135 mg po once daily   - Continue with Xanax 1 mg po am, 1mg po afternoon and two times at night - total daily dose of 4mg  -Continue Wellbutrin Xl 300mg po q 24hrs  -For agitation, haldol 5mg po/IM every 8hrs prn  -polysubstance use counseling for outpatient services    #Magnesium deficiency  -Mg 1.7 today--suppl Mg and monitor level  -plan for dc on oral mg     DVT/GI ppx  guarded prognosis    FULL CODE    #Progress Note Handoff  Pending (specify):  pending auth and STR placement  Family discussion: d/w patient himself at length  Disposition: SNF__x_
52y old Male PMHx of IVDU (heroin), vertebral osteomyelitis s/p debridement, and MSSA bacteremia, who presents from a rehab facility with a chief complaint of Hypoxia and new onset LE edema. In the ED was, found to have b/l PEs with radiographic evidence of R heart strain and admitted to ICU, found to have LE DVTs s/p LLE embolectomy 10/18. Currently on IV antibiotics, pending discharge authorization.     Acute hypoxemic respiratory failure - resolved  Acute Submassive PE with suspected initial Right heart strain  LLE DVT s/p embolization (10/18)  Suspected superimposed PNA  DIONICIO/OHS, noncompliant with NIV  -CT on admission with bilateral submassive PE + concern for RV strain  -TTE: EF 55%, G1DD, mild TVR, no right heart strain  -VA duplx LE vein: Acute thrombus within the left common femoral, femoral, deep femoral, popliteal, gastrocnemius, posterior tibial and anterior tibial veins  -s/p LLE embolectomy 10/18  -continue AC---transitioned to oral NOAC (currently on eliquis 10 mg bid day #7 of 7 and then transitioned to 5 mg bid eliquis starting 11/2)  -patient says he thinks he is supposed to be on chronic long low dose prophylactic ABX ???  -f/u ID recommendations  - cont Cefazolin 2g q8h IV x 6 weeks (end date 11/17)  -monitor wbc and fever curve  -monitor outpatient BMP, LFT, CK, ESR, CRP  -outpatient sleep study for DIONICIO    #Hx MSSA bacteremia  #Hx Epidural phlegmon/OM  #Hx R Psoas Abscess  -neurosurgery f/u---plan for outaptient f/u  -continue iv abx x 6 weeks minimum---f/u with ID plan for monitoring outpatient labs and abx duration      #HO IVDA, Opioid Abuse on Methadone   #Agitation  - improving, followed by psych:  resumed on  Quetiapine 200 mg PO HS  -continue Methadone 135 mg po once daily   - Continue with Xanax 1 mg po am, 1mg po afternoon and two times at night - total daily dose of 4mg  -Continue Wellbutrin Xl 300mg po q 24hrs  -For agitation, haldol 5mg po/IM every 8hrs prn  -polysubstance use counseling for outpatient services    #Magnesium deficiency  -Mg 1.7 today--suppl Mg and monitor level  -plan for dc on oral mg     DVT/GI ppx  guarded prognosis    FULL CODE    #Progress Note Handoff  Pending (specify):  pending auth and STR placement  Family discussion: d/w patient himself at length  Disposition: SNF__x_ .
52y old Male PMHx of IVDU (heroin), vertebral osteomyelitis s/p debridement, and MSSA bacteremia, who presents from a rehab facility with a chief complaint of Hypoxia and new onset LE edema. In the ED was, found to have b/l PEs with radiographic evidence of R heart strain and admitted to ICU, found to have LE DVTs s/p LLE embolectomy 10/18. Currently on IV antibiotics for hx of MSSA Bacteremia.     #Acute hypoxemic respiratory failure - resolved  #Acute Submassive PE  #LLE DVT s/p thrombectomy    #Suspected superimposed GNR PNA  #DIONICIO/OHS, noncompliant with NIV  -CT on admission with bilateral submassive PE + concern for RV strain  -TTE: EF 55%, G1DD, mild TVR, no right heart strain  -VA duplx LE vein: Acute thrombus within the left common femoral, femoral, deep femoral, popliteal, gastrocnemius, posterior tibial and anterior tibial veins  -IR consulted, no need for Embolectomy of PE, s/p LLE thrombectomy  10/18  -continue AC---transitioned to oral NOAC   -outpatient sleep study for DIONICIO    #Possible Severe sepsis on admission Pulse>90, Resp Rate>20, WBC>12, lactic acidosis  Found to have bilateral PE, Possible GNR PNA    # Hx of vertebral osteomyelitis s/p TLIF and debridement, and recurrent MSSA bacteremia (8/2022, 1/2022)  #Hx R Psoas Abscess  -neurosurgery f/u---no acute intervention, OP follow up   - ID following:   Cefazolin 2g q8h IV, repeat MRI with Abnormal signal and enhancement in the anterior and lateral paraspinal soft tissues at L4 and L5 likely representing phlegmon.   - Would continue IV Cefazolin 2g q8h IV while in a facility, (up to 4 weeks) then PO Cefadroxil 1g q24h with repeat MRI to determine final course--  3 more weeks is 11/17-, 4 is 11/24  - Avoid rifampin as on methadone/xanax and little utility at this point in the treatment course  --per d/w ID on 11/8: OK to now change to PO Keflex.     #HO IVDA, Opioid Abuse on Methadone   #Agitation  - improving, followed by psych:  resumed on  Quetiapine 200 mg PO HS  -continue Methadone 135 mg po once daily   - Continue with Xanax 1 mg po am, 1mg po afternoon and two times at night - total daily dose of 4mg  -Continue Wellbutrin Xl 300mg po q 24hrs  -For agitation, haldol 5mg po/IM every 8hrs prn  -polysubstance use counseling for outpatient services    #Persistent Magnesium deficiency  -plan for dc on oral mg     DVT/GI ppx  guarded prognosis    FULL CODE    #Progress Note Handoff  Pending (specify):  has authorization, pending bed availability for [facility]  Disposition: SNF__x_ .       Total time spent to complete patient's bedside assessment, review medical chart, discuss medical plan of care with covering medical team was more than 35 minutes  with >50% of time spent face to face with patient, discussion with patient/family and/or coordination of care      Patty Alves DO
52y old Male PMHx of IVDU (heroin), vertebral osteomyelitis s/p debridement, and MSSA bacteremia, who presents from a rehab facility with a chief complaint of Hypoxia and new onset LE edema. In the ED was, found to have b/l PEs with radiographic evidence of R heart strain and admitted to ICU, found to have LE DVTs s/p LLE embolectomy 10/18. Currently on IV antibiotics, pending discharge authorization.     Acute hypoxemic respiratory failure - resolved  Acute Submassive PE with suspected initial Right heart strain  LLE DVT s/p embolization (10/18)  Suspected superimposed PNA  DIONICIO/OHS, noncompliant with NIV  -CT on admission with bilateral submassive PE + concern for RV strain  -TTE: EF 55%, G1DD, mild TVR, no right heart strain  -VA duplx LE vein: Acute thrombus within the left common femoral, femoral, deep femoral, popliteal, gastrocnemius, posterior tibial and anterior tibial veins  -s/p LLE embolectomy 10/18  -continue AC---transitioned to oral NOAC (currently on eliquis 10 mg bid day #7 of 7 and then transitioned to 5 mg bid eliquis starting 11/2)  -patient says he thinks he is supposed to be on chronic long low dose prophylactic ABX ???  -f/u ID recommendations  - cont Cefazolin 2g q8h IV x 6 weeks (end date 11/17)  -monitor wbc and fever curve  -monitor outpatient BMP, LFT, CK, ESR, CRP  -outpatient sleep study for DIONICIO        #Hx MSSA bacteremia  #Hx Epidural phlegmon/OM  #Hx R Psoas Abscess  -neurosurgery f/u---plan for outaptient f/u  -continue iv abx x 6 weeks minimum---f/u with ID plan for monitoring outpatient labs and abx duration  Per ID:   - Cefazolin 2g q8h IV, repeat MRI with Abnormal signal and enhancement in the anterior and lateral paraspinal soft tissues at L4 and L5 likely representing phlegmon.   - Would continue IV Cefazolin 2g q8h IV while in a facility, (up to 4 weeks) then PO Cefadroxil 1g q24h with repeat MRI to determine final course--  3 more weeks is 11/17-, 4 is 11/24  - Avoid rifampin as on methadone/xanax and little utility at this point in the treatment course  - Weekly CBC, CMP, ESR/CRP  - ID follow-up with Dr. John Lange for Telehealth. They will call the patient between 10:30-6:30        #HO IVDA, Opioid Abuse on Methadone   #Agitation  - improving, followed by psych:  resumed on  Quetiapine 200 mg PO HS  -continue Methadone 135 mg po once daily   - Continue with Xanax 1 mg po am, 1mg po afternoon and two times at night - total daily dose of 4mg  -Continue Wellbutrin Xl 300mg po q 24hrs  -For agitation, haldol 5mg po/IM every 8hrs prn  -polysubstance use counseling for outpatient services    #Magnesium deficiency  -Mg 1.7 today--suppl Mg and monitor level  -plan for dc on oral mg     DVT/GI ppx  guarded prognosis    FULL CODE    #Progress Note Handoff  Pending (specify):  pending auth and STR placement  Family discussion: d/w patient himself at length  Disposition: SNF__x_ .   Procedure team did Midline (11/7)
Patient is a 51 y/o male with PMH of IVDU (heroin), vertebral osteomyelitis s/p debridement, and MSSA bacteremia, presenting from rehab facility for "low oxygen" and new onset LE edema. In the ED was found to be hypoxic, found to have b/l PEs with radiographic evidence of R heart strain and admitted to ICU, found to have LE DVTs s/p LLE embolectomy 10/18,  downgraded to SDU and currently downgraded to medical unit.     Acute hypoxemic respiratory failure - resolved  Acute Submassive PE  Suspected superimposed PNA  DIONICIO/OHS, noncompliant with NIV  -CT on admission with bilateral submassive PEs + concern for RV strain  -TTE: EF 55%, G1DD, mild TVR, no right heart strain  -VA duplx LE vein: Acute thrombus within the left common femoral, femoral, deep femoral, popliteal, gastrocnemius, posterior tibial and anterior tibial veins, s/p LLE embolectomy 10/18  - initially started on therapeutic Lovenox, on 10/26 transitioned to PO Eliquis tx.   -blood cx 10/12 - no growth  - ID on board s/p Cefepime, now on  Cefazolin 2g q8h IV, monitor outpatient BMP, LFT, CK, ESR, CRP      Hx MSSA bacteremia  Hx Epidural phlegmon/OM  Hx R Psoas Abscess  -Per NSG, pt able to receive MR even with recent spinal fusion  -MR w&w/o CTL spine noted  - abx as above  - f/u outpatient neurosurgery and ID     HO IVDA, Opioid Abuse on Methadone   Agitation    - improving, followed by psych:  resumed on  Quetiapine 200 mg PO HS  -continue Methadone 135 mg po once daily   - Continue with Xanax 1 mg po am, 1mg po afternoon and two times at night - total daily dose of 4mg  Continue Wellbutrin Xl 300mg po q 24hrs  -For agitation, haldol 5mg po/IM every 8hrs prn    Hypomagnesemia- supplemented    DVT proph- patient is on Eliquis    #Progress Note Handoff: continue pt. on IV abx. x 6 weeks, placement to STR, monitor electrolytes, PT tx. ,  Family discussion: medical plan of tx. d/w pt. by bedside Disposition: STR once bed is available    Total time spent to complete patient's bedside assessment, review medical chart, discuss medical plan of care with covering medical team was more than 25 minutes with >50% of time spent face to face with patient, discussion with patient/family and/or coordination of care
Patient is a 52 y old Male PMHx of IVDU (heroin), vertebral osteomyelitis s/p debridement, and MSSA bacteremia, who presents from a rehab facility with a chief complaint of Hypoxia and new onset LE edema. In the ED was, found to have b/l PEs with radiographic evidence of R heart strain and admitted to ICU, found to have LE DVTs s/p LLE embolectomy 10/18. Currently on PO antibiotics, pending bed availability in [facility].    Acute hypoxemic respiratory failure - resolved  Acute Submassive PE with suspected initial Right heart strain  LLE DVT - -s/p LLE embolectomy 10/18  DIONICIO/OHS, noncompliant with NIV  -CT on admission with bilateral submassive PE + concern for RV strain  -TTE: EF 55%, G1DD, mild TVR, no right heart strain  -VA duplx LE vein: Acute thrombus within the left common femoral, femoral, deep femoral, popliteal, gastrocnemius, posterior tibial and anterior tibial veins  -continue AC---transitioned to oral NOAC -Eliquis   -outpatient sleep study for DIONICIO with Pulmonary specialist f/up, weight loss tx.   - patient was on IV  Cefazolin 2g q8h IV , from  11/8:  changed to PO Keflex. outpatient MRI to determine final duration.   Please d/c midline prior to d/c     # Mild cough- started on antitussive tx.    # Mild legs edema- dependent due to poor ambulatory status, s/p 3 days of lasix 20 mg po once daily       #Hx MSSA bacteremia  #Hx Epidural phlegmon/OM  #Hx R Psoas Abscess  -neurosurgery f/u---plan for outpatient f/u  Per ID:   - Cefazolin 2g q8h IV, repeat MRI with Abnormal signal and enhancement in the anterior and lateral paraspinal soft tissues at L4 and L5 likely representing phlegmon.   - Would continue IV Cefazolin 2g q8h IV while in a facility, (up to 4 weeks) then PO Cefadroxil 1g q24h with repeat MRI to determine final course--  3 more weeks is 11/17-, 4 is 11/24  - Avoid rifampin as on methadone/xanax and little utility at this point in the treatment course  - Weekly CBC, CMP, ESR/CRP  - ID follow-up with Dr. John Lange for Telehealth. They will call the patient between 10:30-6:30    #JOSE BAILEY, Opioid Abuse on Methadone   #Agitation  - improving, followed by psych:  resumed on  Quetiapine 200 mg PO HS  -continue Methadone 135 mg po once daily   - Continue with Xanax 1 mg po twice daily and 2mg po qhs- total of 4 mg once daily  -Continue Wellbutrin Xl 300mg po q 24hrs  -polysubstance use counseling for outpatient services    #Magnesium deficiency- supplemented   -plan for dc on oral mg     # Physical deconditioning /ambulatory dysfunction- patient remains weak, was able to participate with PT today, needs to people assistance due to unsteady gait.     DVT/GI ppx  guarded prognosis    FULL CODE    #Progress Note Handoff: Patient is medically stable for d/c to STR once bed is available   Family discussion: medical plan of care d/w pt. by bedside Disposition: Pending Bed at [facility]     Total time spent to complete patient's bedside assessment, review medical chart, discuss medical plan of care with covering medical team was more than 25 minutes with >50% of time spent face to face with patient, discussion with patient/family and/or coordination of care .
52y old Male PMHx of IVDU (heroin), vertebral osteomyelitis s/p debridement, and MSSA bacteremia, who presents from a rehab facility with a chief complaint of Hypoxia and new onset LE edema. In the ED was, found to have b/l PEs with radiographic evidence of R heart strain and admitted to ICU, found to have LE DVTs s/p LLE embolectomy 10/18. Currently on IV antibiotics, pending discharge authorization.     Acute hypoxemic respiratory failure - resolved  Acute Submassive PE with suspected initial Right heart strain  LLE DVT s/p embolization (10/18)  Suspected superimposed PNA  DIONICIO/OHS, noncompliant with NIV  -CT on admission with bilateral submassive PE + concern for RV strain  -TTE: EF 55%, G1DD, mild TVR, no right heart strain  -VA duplx LE vein: Acute thrombus within the left common femoral, femoral, deep femoral, popliteal, gastrocnemius, posterior tibial and anterior tibial veins  -s/p LLE embolectomy 10/18  -continue AC---transitioned to oral NOAC (currently on eliquis 10 mg bid day #7 of 7 and then transitioned to 5 mg bid eliquis starting 11/2)  -patient says he thinks he is supposed to be on chronic long low dose prophylactic ABX ???  -f/u ID recommendations  -monitor wbc and fever curve  -monitor outpatient BMP, LFT, CK, ESR, CRP  -outpatient sleep study for DIONICIO    - cont Cefazolin 2g q8h IV (Total ? 6 weeks)   per d/w ID on 11/8: OK to now change to PO Keflex. outpatient MRI to determine final duration.     Please d/c midline prior to d/c       #Hx MSSA bacteremia  #Hx Epidural phlegmon/OM  #Hx R Psoas Abscess  -neurosurgery f/u---plan for outaptient f/u  -continue iv abx x 6 weeks minimum---f/u with ID plan for monitoring outpatient labs and abx duration  Per ID:   - Cefazolin 2g q8h IV, repeat MRI with Abnormal signal and enhancement in the anterior and lateral paraspinal soft tissues at L4 and L5 likely representing phlegmon.   - Would continue IV Cefazolin 2g q8h IV while in a facility, (up to 4 weeks) then PO Cefadroxil 1g q24h with repeat MRI to determine final course--  3 more weeks is 11/17-, 4 is 11/24  - Avoid rifampin as on methadone/xanax and little utility at this point in the treatment course  - Weekly CBC, CMP, ESR/CRP  - ID follow-up with Dr. John Lange for Telehealth. They will call the patient between 10:30-6:30    per d/w ID on 11/8: OK to now change to PO Keflex. outpatient MRI to determine final duration.     #HO IVDA, Opioid Abuse on Methadone   #Agitation  - improving, followed by psych:  resumed on  Quetiapine 200 mg PO HS  -continue Methadone 135 mg po once daily   - Continue with Xanax 1 mg po am, 1mg po afternoon and two times at night - total daily dose of 4mg  -Continue Wellbutrin Xl 300mg po q 24hrs  -For agitation, haldol 5mg po/IM every 8hrs prn  -polysubstance use counseling for outpatient services    #Magnesium deficiency  -Mg 1.7 today--suppl Mg and monitor level  -plan for dc on oral mg     DVT/GI ppx  guarded prognosis    FULL CODE    #Progress Note Handoff  Pending (specify):  pending auth and STR placement  Family discussion: d/w patient himself at length  Disposition: SNF__x_ .     Pending Bed at [facility]
Patient is a 52 y old Male PMHx of IVDU (heroin), vertebral osteomyelitis s/p debridement, and MSSA bacteremia, who presents from a rehab facility with a chief complaint of Hypoxia and new onset LE edema. In the ED was, found to have b/l PEs with radiographic evidence of R heart strain and admitted to ICU, found to have LE DVTs s/p LLE embolectomy 10/18. Currently on PO antibiotics, pending bed availability in [facility].    Acute hypoxemic respiratory failure - resolved  Acute Submassive PE with suspected initial Right heart strain  LLE DVT - -s/p LLE embolectomy 10/18  DIONICIO/OHS, noncompliant with NIV  -CT on admission with bilateral submassive PE + concern for RV strain  -TTE: EF 55%, G1DD, mild TVR, no right heart strain  -VA duplx LE vein: Acute thrombus within the left common femoral, femoral, deep femoral, popliteal, gastrocnemius, posterior tibial and anterior tibial veins  -continue AC---transitioned to oral NOAC -Eliquis   -outpatient sleep study for DIONICIO with Pulmonary specialist f/up, weight loss tx.   - patient was on IV  Cefazolin 2g q8h IV , from  11/8: OK to now change to PO Keflex. outpatient MRI to determine final duration.   Please d/c midline prior to d/c     # Mild cough- started on antitussive tx.    # Mild legs edema- dependent due to poor ambulatory status, will give 3 days of lasix 20 mg po once daily       #Hx MSSA bacteremia  #Hx Epidural phlegmon/OM  #Hx R Psoas Abscess  -neurosurgery f/u---plan for outpatient f/u  Per ID:   - Cefazolin 2g q8h IV, repeat MRI with Abnormal signal and enhancement in the anterior and lateral paraspinal soft tissues at L4 and L5 likely representing phlegmon.   - Would continue IV Cefazolin 2g q8h IV while in a facility, (up to 4 weeks) then PO Cefadroxil 1g q24h with repeat MRI to determine final course--  3 more weeks is 11/17-, 4 is 11/24  - Avoid rifampin as on methadone/xanax and little utility at this point in the treatment course  - Weekly CBC, CMP, ESR/CRP  - ID follow-up with Dr. John Lange for Telehealth. They will call the patient between 10:30-6:30    #JOSE BAILEY, Opioid Abuse on Methadone   #Agitation  - improving, followed by psych:  resumed on  Quetiapine 200 mg PO HS  -continue Methadone 135 mg po once daily   - Continue with Xanax 1 mg po twice daily   -Continue Wellbutrin Xl 300mg po q 24hrs  -polysubstance use counseling for outpatient services    #Magnesium deficiency- supplemented   -plan for dc on oral mg     DVT/GI ppx  guarded prognosis    FULL CODE    #Progress Note Handoff: supplement potassium, po lasix x 2 more days , Patient is medically stable for d/c to STR once bed is available   Family discussion: medical plan of care d/w pt. by bedside Disposition: Pending Bed at [facility]     Total time spent to complete patient's bedside assessment, review medical chart, discuss medical plan of care with covering medical team was more than 25 minutes with >50% of time spent face to face with patient, discussion with patient/family and/or coordination of care
discharged yesterday. left today d/t transnportation delay
Mr Mayfield is our 52 year old gentleman with atypical pneumonia currently hypoxic requiring HFNC.  He has extensive lower extremity DVT, he is going for thrombectomy later today.  At this time his respiratory parameters are not significantly improving, continue to monitor in ICU.  I agree with the fellow note, with the exceptions listed in my attestation above.  The remainder of impression and plan per fellow note.

## 2022-11-17 NOTE — PROGRESS NOTE ADULT - REASON FOR ADMISSION
Hypoxia

## 2022-11-17 NOTE — PROGRESS NOTE ADULT - ASSESSMENT
Patient is a 52y old Male PMHx of IVDU (heroin), vertebral osteomyelitis s/p debridement, and MSSA bacteremia, who presents from a rehab facility with a chief complaint of Hypoxia and new onset LE edema. In the ED was, found to have b/l PEs with radiographic evidence of R heart strain and admitted to ICU, found to have LE DVTs s/p LLE embolectomy 10/18. Currently on IV antibiotics, pending discharge authorization.     #New onset DM  - pt was consistently having high FS >> much improved now after sliding scale  - last hba1c was in jan 2022 6.6, now 6.8  - c/w sliding scale, monitor FS  - pt needs to be on metformin 250mg after DC    #Acute hypoxemic respiratory failure / Acute Submassive PE  - resolved  - Suspected superimposed PNA  - DIONICIO/OHS, noncompliant with NIV  - TTE: EF 55%, G1DD, mild TVR, no right heart strain  -VA duplx LE vein: Acute thrombus within the left common femoral, femoral, deep femoral, popliteal, gastrocnemius, posterior tibial and anterior tibial veins, s/p LLE embolectomy 10/18  - Therapeutic Lovenox, transitioned to Eliquis.   - PT evaluated and recommends sub-acut rehab or home PT on DC    #Recurrent MSSA bacteremia with Discitis   - Recent Spinal fusion  - Abnormal L4-L5 signal on MRI suggestive of  Epidural phlegmon/OM  - No acute surgical intervention needed per Neurosurgery   - s/p Cefepime and Cefazolin 2g for 4 weeks >> after consulting with ID switched to Keflex 500mg q6 PO on 11/08 PM  - Sterile 18 gauge powerline Mideline was placed bc of IV Abx >> will remove before DC as pt dont need IV abx anymore  - Outpatient Neurosurgery referral      #HO IVDA, Opioid Abuse on Methadone   - improving, followed by psych: can resume Quetiapine 200 mg PO HS  - Continue with Xanax 1 mg po am, 1mg po afternoon and 2mg at night - total daily dose of 4mg  - Continue Wellbutrin Xl 300mg po q 24hrs  - Continue home dose Methadone 135mg po daily  - For agitation, haldol 5mg po/IM every 8hrs prn    #Disposition  - Sub-acute Rehab, pending authorization    #MISC  - No need for daily CBC/BMP, just check mag every other day and replenish as needed.    Handoff pending  1) was scheduled for dc yesterday but transport problems, pt will be dc today

## 2022-11-17 NOTE — PROGRESS NOTE ADULT - PROVIDER SPECIALTY LIST ADULT
CCU
Critical Care
Critical Care
Hospitalist
Hospitalist
Infectious Disease
Internal Medicine
CCU
CCU
Hospitalist
Infectious Disease
Internal Medicine
Intervent Radiology
Intervent Radiology
CCU
Critical Care
Hospitalist
Infectious Disease
Internal Medicine
Intervent Radiology
Pulmonology
Pulmonology
CCU
Critical Care
Hospitalist
Hospitalist
Infectious Disease
Critical Care
Infectious Disease
Critical Care

## 2022-11-18 NOTE — CDI QUERY NOTE - NSCDIOTHERTXTBX_GEN_ALL_CORE_HH
CLINICAL INDICATORS    10/11 H&P Adult: Possible pneumonia … recent history and post treatment for MSSA bacteremia    10/14, 10/17, 10/19, 10/24, 10/26 Progress Note Adult-Infectious Disease Attending: Possible Severe sepsis on admission Pulse>90, Resp Rate>20, WBC>12, lactic acidosis; … Possible GNR PNA    11/8 Discharge Note Provider: … Suspected superimposed PNA … Principal discharge diagnosis: Osteomyelitis of vertebra, sacral and sacrococcygeal region…     Labs: (10/11) WBC 20.91, Neut 18.93     Radiology:   10/11 Chest X-ray: Increased interstitial and/or vascular markings. Possible right focal upper lung field consolidation.    Vital Signs:   10/11 ED Provider Adult Flow Sheet: (1218) , Respiration rate: 28, SpO2 85%; 15L Nonrebreather … (1331) Temp 100.7, Respiratory rate: 24; /57; 15L Nonrebreather, SpO2 91%    Orders:   10/12: Blood culture    10/11: LR 500mL IVB x 1; Vancomycin 1G IV x 1 (ind: concern for MRSA PNA); Levoquin 750mg IVx1, then Q24H (ind: HCAP); Rocephin 2G IVP Q24H (ind: PNA); Cefepime 2G IVx1 (ind: HCAP); Azithromycin 500mg IVP Q24H (ind: PNA) ; (10/13) Cefepime 2G IV Q8H (ind: PNA); (10/22) Ancef 2G IVP Q8H (ind: Spine OM); (11/8) Keflex 500mg PO QID (ind: Sepsis)           Based on your professional judgment and the clinical indicators, please clarify if the Infectious Disease consult diagnosis of severe sepsis on admission can be further specified as:    - Severe sepsis on admission was evaluated and ruled in    - Severe sepsis on admission was evaluated and ruled out     - Severe sepsis on admission was evaluated and unable to be ruled out     - Other (please specify):    - Clinically unable to determine      Thank you,  Verona MEIER, RN, BSN  [phone]

## 2022-11-19 ENCOUNTER — INPATIENT (INPATIENT)
Facility: HOSPITAL | Age: 52
LOS: 1 days | Discharge: HOME | End: 2022-11-21
Attending: STUDENT IN AN ORGANIZED HEALTH CARE EDUCATION/TRAINING PROGRAM | Admitting: STUDENT IN AN ORGANIZED HEALTH CARE EDUCATION/TRAINING PROGRAM
Payer: MEDICAID

## 2022-11-19 VITALS
HEIGHT: 73 IN | HEART RATE: 91 BPM | SYSTOLIC BLOOD PRESSURE: 148 MMHG | OXYGEN SATURATION: 96 % | RESPIRATION RATE: 16 BRPM | DIASTOLIC BLOOD PRESSURE: 88 MMHG | TEMPERATURE: 98 F

## 2022-11-19 LAB
ALBUMIN SERPL ELPH-MCNC: 3.5 G/DL — SIGNIFICANT CHANGE UP (ref 3.5–5.2)
ALP SERPL-CCNC: 96 U/L — SIGNIFICANT CHANGE UP (ref 30–115)
ALT FLD-CCNC: 16 U/L — SIGNIFICANT CHANGE UP (ref 0–41)
ANION GAP SERPL CALC-SCNC: 8 MMOL/L — SIGNIFICANT CHANGE UP (ref 7–14)
AST SERPL-CCNC: 22 U/L — SIGNIFICANT CHANGE UP (ref 0–41)
BASOPHILS # BLD AUTO: 0.02 K/UL — SIGNIFICANT CHANGE UP (ref 0–0.2)
BASOPHILS NFR BLD AUTO: 0.3 % — SIGNIFICANT CHANGE UP (ref 0–1)
BILIRUB SERPL-MCNC: 0.4 MG/DL — SIGNIFICANT CHANGE UP (ref 0.2–1.2)
BUN SERPL-MCNC: 8 MG/DL — LOW (ref 10–20)
CALCIUM SERPL-MCNC: 9.3 MG/DL — SIGNIFICANT CHANGE UP (ref 8.4–10.4)
CHLORIDE SERPL-SCNC: 101 MMOL/L — SIGNIFICANT CHANGE UP (ref 98–110)
CO2 SERPL-SCNC: 30 MMOL/L — SIGNIFICANT CHANGE UP (ref 17–32)
CREAT SERPL-MCNC: 0.7 MG/DL — SIGNIFICANT CHANGE UP (ref 0.7–1.5)
EGFR: 110 ML/MIN/1.73M2 — SIGNIFICANT CHANGE UP
EOSINOPHIL # BLD AUTO: 0.34 K/UL — SIGNIFICANT CHANGE UP (ref 0–0.7)
EOSINOPHIL NFR BLD AUTO: 5.5 % — SIGNIFICANT CHANGE UP (ref 0–8)
GLUCOSE SERPL-MCNC: 113 MG/DL — HIGH (ref 70–99)
HCT VFR BLD CALC: 35.7 % — LOW (ref 42–52)
HGB BLD-MCNC: 11.2 G/DL — LOW (ref 14–18)
IMM GRANULOCYTES NFR BLD AUTO: 0.3 % — SIGNIFICANT CHANGE UP (ref 0.1–0.3)
LYMPHOCYTES # BLD AUTO: 1.89 K/UL — SIGNIFICANT CHANGE UP (ref 1.2–3.4)
LYMPHOCYTES # BLD AUTO: 30.4 % — SIGNIFICANT CHANGE UP (ref 20.5–51.1)
MCHC RBC-ENTMCNC: 28 PG — SIGNIFICANT CHANGE UP (ref 27–31)
MCHC RBC-ENTMCNC: 31.4 G/DL — LOW (ref 32–37)
MCV RBC AUTO: 89.3 FL — SIGNIFICANT CHANGE UP (ref 80–94)
MONOCYTES # BLD AUTO: 0.53 K/UL — SIGNIFICANT CHANGE UP (ref 0.1–0.6)
MONOCYTES NFR BLD AUTO: 8.5 % — SIGNIFICANT CHANGE UP (ref 1.7–9.3)
NEUTROPHILS # BLD AUTO: 3.41 K/UL — SIGNIFICANT CHANGE UP (ref 1.4–6.5)
NEUTROPHILS NFR BLD AUTO: 55 % — SIGNIFICANT CHANGE UP (ref 42.2–75.2)
NRBC # BLD: 0 /100 WBCS — SIGNIFICANT CHANGE UP (ref 0–0)
NT-PROBNP SERPL-SCNC: 245 PG/ML — SIGNIFICANT CHANGE UP (ref 0–300)
PLATELET # BLD AUTO: 277 K/UL — SIGNIFICANT CHANGE UP (ref 130–400)
POTASSIUM SERPL-MCNC: 4.1 MMOL/L — SIGNIFICANT CHANGE UP (ref 3.5–5)
POTASSIUM SERPL-SCNC: 4.1 MMOL/L — SIGNIFICANT CHANGE UP (ref 3.5–5)
PROT SERPL-MCNC: 6.6 G/DL — SIGNIFICANT CHANGE UP (ref 6–8)
RBC # BLD: 4 M/UL — LOW (ref 4.7–6.1)
RBC # FLD: 15.2 % — HIGH (ref 11.5–14.5)
SARS-COV-2 RNA SPEC QL NAA+PROBE: SIGNIFICANT CHANGE UP
SODIUM SERPL-SCNC: 139 MMOL/L — SIGNIFICANT CHANGE UP (ref 135–146)
TROPONIN T SERPL-MCNC: <0.01 NG/ML — SIGNIFICANT CHANGE UP
WBC # BLD: 6.21 K/UL — SIGNIFICANT CHANGE UP (ref 4.8–10.8)
WBC # FLD AUTO: 6.21 K/UL — SIGNIFICANT CHANGE UP (ref 4.8–10.8)

## 2022-11-19 PROCEDURE — 93010 ELECTROCARDIOGRAM REPORT: CPT

## 2022-11-19 PROCEDURE — 71045 X-RAY EXAM CHEST 1 VIEW: CPT | Mod: 26

## 2022-11-19 PROCEDURE — 99232 SBSQ HOSP IP/OBS MODERATE 35: CPT | Mod: GC

## 2022-11-19 PROCEDURE — 99285 EMERGENCY DEPT VISIT HI MDM: CPT

## 2022-11-19 PROCEDURE — 71275 CT ANGIOGRAPHY CHEST: CPT | Mod: 26,MA

## 2022-11-19 RX ORDER — METHADONE HYDROCHLORIDE 40 MG/1
135 TABLET ORAL ONCE
Refills: 0 | Status: DISCONTINUED | OUTPATIENT
Start: 2022-11-19 | End: 2022-11-19

## 2022-11-19 RX ORDER — METHADONE HYDROCHLORIDE 40 MG/1
40 TABLET ORAL ONCE
Refills: 0 | Status: DISCONTINUED | OUTPATIENT
Start: 2022-11-20 | End: 2022-11-20

## 2022-11-19 RX ORDER — LACTOBACILLUS ACIDOPHILUS 100MM CELL
1 CAPSULE ORAL DAILY
Refills: 0 | Status: DISCONTINUED | OUTPATIENT
Start: 2022-11-19 | End: 2022-11-21

## 2022-11-19 RX ORDER — ALPRAZOLAM 0.25 MG
1 TABLET ORAL DAILY
Refills: 0 | Status: DISCONTINUED | OUTPATIENT
Start: 2022-11-20 | End: 2022-11-20

## 2022-11-19 RX ORDER — CEPHALEXIN 500 MG
500 CAPSULE ORAL
Refills: 0 | Status: DISCONTINUED | OUTPATIENT
Start: 2022-11-19 | End: 2022-11-21

## 2022-11-19 RX ORDER — ALPRAZOLAM 0.25 MG
2 TABLET ORAL DAILY
Refills: 0 | Status: DISCONTINUED | OUTPATIENT
Start: 2022-11-19 | End: 2022-11-20

## 2022-11-19 RX ORDER — BUPROPION HYDROCHLORIDE 150 MG/1
300 TABLET, EXTENDED RELEASE ORAL DAILY
Refills: 0 | Status: DISCONTINUED | OUTPATIENT
Start: 2022-11-19 | End: 2022-11-21

## 2022-11-19 RX ORDER — ALPRAZOLAM 0.25 MG
1 TABLET ORAL DAILY
Refills: 0 | Status: DISCONTINUED | OUTPATIENT
Start: 2022-11-19 | End: 2022-11-19

## 2022-11-19 RX ORDER — METHADONE HYDROCHLORIDE 40 MG/1
125 TABLET ORAL ONCE
Refills: 0 | Status: DISCONTINUED | OUTPATIENT
Start: 2022-11-19 | End: 2022-11-19

## 2022-11-19 RX ORDER — MAGNESIUM OXIDE 400 MG ORAL TABLET 241.3 MG
400 TABLET ORAL
Refills: 0 | Status: DISCONTINUED | OUTPATIENT
Start: 2022-11-19 | End: 2022-11-21

## 2022-11-19 RX ORDER — ALPRAZOLAM 0.25 MG
2 TABLET ORAL THREE TIMES A DAY
Refills: 0 | Status: DISCONTINUED | OUTPATIENT
Start: 2022-11-19 | End: 2022-11-19

## 2022-11-19 RX ORDER — APIXABAN 2.5 MG/1
5 TABLET, FILM COATED ORAL EVERY 12 HOURS
Refills: 0 | Status: DISCONTINUED | OUTPATIENT
Start: 2022-11-20 | End: 2022-11-21

## 2022-11-19 RX ORDER — ACETAMINOPHEN 500 MG
650 TABLET ORAL EVERY 6 HOURS
Refills: 0 | Status: DISCONTINUED | OUTPATIENT
Start: 2022-11-19 | End: 2022-11-21

## 2022-11-19 RX ORDER — ALPRAZOLAM 0.25 MG
2 TABLET ORAL ONCE
Refills: 0 | Status: DISCONTINUED | OUTPATIENT
Start: 2022-11-19 | End: 2022-11-19

## 2022-11-19 RX ADMIN — Medication 2 MILLIGRAM(S): at 23:01

## 2022-11-19 RX ADMIN — METHADONE HYDROCHLORIDE 125 MILLIGRAM(S): 40 TABLET ORAL at 14:46

## 2022-11-19 RX ADMIN — Medication 2 MILLIGRAM(S): at 14:46

## 2022-11-19 NOTE — ED PROVIDER NOTE - NS ED ROS FT
Constitutional: (-) fever (+) withdrawal  Eyes/ENT: (-) blurry vision, (-) epistaxis  Cardiovascular: (-) chest pain, (-) syncope  Respiratory: (-) cough, (-) shortness of breath (+) hypoxia  Gastrointestinal: (+) nausea (-) vomiting, (-) diarrhea  Musculoskeletal: (-) neck pain, (+) back pain, (-) joint pain  Integumentary: (-) rash, (-) edema  Neurological: (+) headache, (-) altered mental status (+) tremors  Psychiatric: (-) hallucinations  Allergic/Immunologic: (-) pruritus

## 2022-11-19 NOTE — ED PROVIDER NOTE - PROGRESS NOTE DETAILS
on EMR review, last internal med note says pt is supposed to continue his home methadone dose of 135mg daily.    pt says the rehab center is unable to get his meds even if med req is fixed today till monday

## 2022-11-19 NOTE — ED ADULT NURSE NOTE - OBJECTIVE STATEMENT
pt states he was discharged to nursing home and they will not give him his medications - xanax and methadone

## 2022-11-19 NOTE — ED PROVIDER NOTE - PHYSICAL EXAMINATION
Physical Exam    Constitutional: No acute distress.   Eyes: Conjunctiva pink, Sclera clear, PERRLA, EOMI.  ENT: No sinus tenderness. No nasal discharge. No oropharyngeal erythema, edema, or exudates. Uvula midline.   Cardiovascular: Regular rate, regular rhythm. No noted murmurs rubs or gallops.  Respiratory: unlabored respiratory effort, clear to auscultation bilaterally no wheezing, rales or rhonchi  Gastrointestinal: Normal bowel sounds. soft, non distended, non-tender abdomen.   Musculoskeletal: supple neck. Lumbar spine with overlying soft tissue swelling, no fluctuance or open wounds. No midline tenderness.   Extremities: Scarring of extremities particularly arms and wrists from previous IVDA. Pulses intact.   Integumentary: warm, dry, no rash  Neurologic: awake, alert, cranial nerves II-XII grossly intact, extremities’ motor and sensory functions grossly intact  Psychiatric: appropriate mood, appropriate affect

## 2022-11-19 NOTE — ED ADULT TRIAGE NOTE - CHIEF COMPLAINT QUOTE
Patient BIBA from nursing home. Patient did not get xanax or methadone as nursing home does not have orders for these meds. Patient hypoxic to 89% in triage on RA. Patient BIBA from nursing home. Patient did not get xanax or methadone as nursing home does not have orders for these meds. Patient hypoxic to 89% in triage on RA, denies SOB.

## 2022-11-19 NOTE — H&P ADULT - NSHPREVIEWOFSYSTEMS_GEN_ALL_CORE
REVIEW OF SYSTEMS:    CONSTITUTIONAL: No weakness, fevers or chills  EYES/ENT: No visual changes;  No vertigo or throat pain   NECK: No pain or stiffness  RESPIRATORY: No cough, wheezing, hemoptysis; No shortness of breath  CARDIOVASCULAR: No chest pain or palpitations  GASTROINTESTINAL: No abdominal or epigastric pain. No nausea, vomiting, or hematemesis; No diarrhea or constipation. No melena or hematochezia.  GENITOURINARY: No dysuria, frequency or hematuria  NEUROLOGICAL: No numbness or weakness  SKIN: No itching, rashes REVIEW OF SYSTEMS:    CONSTITUTIONAL: weakness  EYES/ENT: No visual changes;  No vertigo or throat pain   NECK: No pain or stiffness  RESPIRATORY: No cough, wheezing, hemoptysis; No shortness of breath  CARDIOVASCULAR: No chest pain or palpitations  GASTROINTESTINAL: No abdominal or epigastric pain. No nausea, vomiting, or hematemesis; No constipation. No melena or hematochezia. No diarreha  GENITOURINARY: No dysuria, frequency or hematuria  NEUROLOGICAL: No numbness or weakness  SKIN: No itching, rashes

## 2022-11-19 NOTE — H&P ADULT - HISTORY OF PRESENT ILLNESS
52y old Male with a history of IVDU (heroin) not currently using, vertebral osteomyelitis s/p debridement, and MSSA bacteremia, b/l PEs with radiographic evidence of R heart strain and admitted to ICU, found to have LE DVTs s/p LLE embolectomy 10/18 presents to the ED with withdrawal and hypoxia. Patient states that he takes Methadone 135mg daily and Xanax 2mg TID however since being discharged to [facility] for rehabilitation 2 days ago he has not received his Xanax or Methadone. At this time he is experiencing dizziness, fatigue, headache and nausea. He was also noted in rehabilitation to have oxygen saturation of 86% on room at. Denies shortness of breath, cough and fever at this time. Has been compliant with Eliquis.  52y old Male with a history of IVDU (heroin) not currently using, vertebral osteomyelitis s/p debridement, and MSSA bacteremia, b/l PEs with radiographic evidence of R heart strain and admitted to ICU, found to have LE DVTs s/p LLE embolectomy 10/18 presents to the ED with withdrawal and hypoxia. Patient states that he takes Methadone 135mg daily and Xanax 2mg TID however since being discharged to [facility] for rehabilitation 2 days ago he has not received his Xanax or Methadone. At this time his withdrawal symptoms include dizziness fatigue, headache, nausea, shaking, sneezing, diarrhea and insomnia. He was also noted in rehabilitation to have oxygen saturation of 86% on room at. Denies shortness of breath, cough, sick contacts and fever at this time. Has been compliant with Eliquis. His CT angio chest showed no new PE, and resolving opacities from his prior pneumonia. He was initially on 3 L NC on presentation but currently was on room air saturating normally without any respiratory distress.     ED vitals:  · BP Systolic	148 mm Hg  · BP Diastolic	88 mm Hg  · Heart Rate	91 /min  · Respiration Rate (breaths/min)	16 /min  · Temp (F)	97.8 Degrees F  · Temp (C)	36.6 Degrees C  · Temp site	oral  · SpO2 (%)	96 %  · O2 Delivery/Oxygen Delivery Method	nasal cannula  · Oxygen Therapy Flow (L/min)	3 L/min

## 2022-11-19 NOTE — ED PROVIDER NOTE - OBJECTIVE STATEMENT
52y old Male with a history of IVDU (heroin) not currently using, vertebral osteomyelitis s/p debridement, and MSSA bacteremia, b/l PEs with radiographic evidence of R heart strain and admitted to ICU, found to have LE DVTs s/p LLE embolectomy 10/18 presents to the ED with withdrawal and hypoxia. Patient states that he takes Methadone 135mg daily and Xanax 2mg daily however since being discharged to [facility] for rehabilitation 2 days ago he has not received his Xanax or Methadone. At this time he is experiencing dizziness, fatigue, headache and nausea. He was also noted in rehabilitation to have oxygen saturation of 86% on room at. Denies shortness of breath, cough and fever at this time. Has been compliant with Eliquis. 52y old Male with a history of IVDU (heroin) not currently using, vertebral osteomyelitis s/p debridement, and MSSA bacteremia, b/l PEs with radiographic evidence of R heart strain and admitted to ICU, found to have LE DVTs s/p LLE embolectomy 10/18 presents to the ED with withdrawal and hypoxia. Patient states that he takes Methadone 135mg daily and Xanax 2mg TID however since being discharged to [facility] for rehabilitation 2 days ago he has not received his Xanax or Methadone. At this time he is experiencing dizziness, fatigue, headache and nausea. He was also noted in rehabilitation to have oxygen saturation of 86% on room at. Denies shortness of breath, cough and fever at this time. Has been compliant with Eliquis.

## 2022-11-19 NOTE — H&P ADULT - ASSESSMENT
#withdrawl from methadone      #Vertebral osteomyelitis       #PE      52y old Male with a history of IVDU (heroin) not currently using, vertebral osteomyelitis s/p debridement, and MSSA bacteremia, b/l PEs with radiographic evidence of R heart strain and admitted to ICU, found to have LE DVTs s/p LLE embolectomy 10/18 presents to the ED with withdrawal and hypoxia. Patient states that he takes Methadone 135mg daily and Xanax 2mg TID however since being discharged to [facility] for rehabilitation 2 days ago he has not received his Xanax or Methadone. At this time his withdrawal symptoms include dizziness fatigue, headache, nausea, shaking, sneezing, diarrhea and insomnia. He was also noted in rehabilitation to have oxygen saturation of 86% on room at. Denies shortness of breath, cough, sick contacts and fever at this time. Has been compliant with Eliquis. His CT angio chest showed no new PE, and resolving opacities from his prior pneumonia. He was initially on 3 L NC on presentation but currently was on room air saturating normally without any respiratory distress.       #withdrawl from methadone  #IVDU on methadone  - restart methadone 135 mg PO QD  - tylenol PRN for mild-moderate pain  - as per last inpatient medicine documentation will resume the following medications  - Continue with Xanax 1 mg po am, 1mg po afternoon and 2mg at night - total daily dose of 4mg  - Continue Wellbutrin Xl 300mg po q 24hrs  - Quetiapine 200 mg PO QHS      #Recurrent MSSA bacteremia with Discitis   - Recent Spinal fusion  - as per ID Keflex 500mg q6 PO for 4 weeks ending 11/24  - Outpatient Neurosurgery F/U      #H/O PE  - C/W eliquis  - monitor O2 sats  - CT angio as note above      #H/O DM  - Monitor FS before meals and at bedtime  - keep 140-180  - start SSI if FS >180        DVT PPX: Eliquis  Diet: DASH carb consistent   Dispo: acute    52y old Male with a history of IVDU (heroin) not currently using, vertebral osteomyelitis s/p debridement, and MSSA bacteremia, b/l PEs with radiographic evidence of R heart strain and admitted to ICU, found to have LE DVTs s/p LLE embolectomy 10/18 presents to the ED with withdrawal and hypoxia. Patient states that he takes Methadone 135mg daily and Xanax 2mg TID however since being discharged to [facility] for rehabilitation 2 days ago he has not received his Xanax or Methadone. At this time his withdrawal symptoms include dizziness fatigue, headache, nausea, shaking, sneezing, diarrhea and insomnia. He was also noted in rehabilitation to have oxygen saturation of 86% on room at. Denies shortness of breath, cough, sick contacts and fever at this time. Has been compliant with Eliquis. His CT angio chest showed no new PE, and resolving opacities from his prior pneumonia. He was initially on 3 L NC on presentation but currently was on room air saturating normally without any respiratory distress.       #withdrawl from methadone  #IVDU on methadone  - restart methadone 135 mg PO QD monitor QTC   - [facility] number ([phone])  - verify dose on Monday when clinic opens  - tylenol PRN for mild-moderate pain  - as per last inpatient medicine documentation will resume the following medications  - Continue with Xanax 1 mg po am, 1mg po afternoon and 2mg at night - total daily dose of 4mg  - Continue Wellbutrin Xl 300mg po q 24hrs  - Quetiapine 200 mg PO QHS      #Recurrent MSSA bacteremia with Discitis   - Recent Spinal fusion  - as per ID Keflex 500mg q6 PO for 4 weeks ending 11/24  - Outpatient Neurosurgery F/U      #H/O PE  - C/W eliquis  - monitor O2 sats  - CT angio as note above      #H/O DM  - Monitor FS before meals and at bedtime  - keep 140-180  - start SSI if FS >180        DVT PPX: Eliquis  Diet: DASH carb consistent   Dispo: acute    52y old Male with a history of IVDU (heroin) not currently using, vertebral osteomyelitis s/p debridement, and MSSA bacteremia, b/l PEs with radiographic evidence of R heart strain and admitted to ICU, found to have LE DVTs s/p LLE embolectomy 10/18 presents to the ED with withdrawal and hypoxia. Patient states that he takes Methadone 135mg daily and Xanax 2mg TID however since being discharged to [facility] for rehabilitation 2 days ago he has not received his Xanax or Methadone. At this time his withdrawal symptoms include dizziness fatigue, headache, nausea, shaking, sneezing, diarrhea and insomnia. He was also noted in rehabilitation to have oxygen saturation of 86% on room at. Denies shortness of breath, cough, sick contacts and fever at this time. Has been compliant with Eliquis. His CT angio chest showed no new PE, and resolving opacities from his prior pneumonia. He was initially on 3 L NC on presentation but currently was on room air saturating normally without any respiratory distress.       #withdrawl from methadone  #IVDU on methadone  - restart methadone 135 mg PO QD monitor QTC   - [facility] number ([phone])  - verify dose on Monday when clinic opens  - tylenol PRN for mild-moderate pain  - as per last inpatient medicine documentation will resume the following medications  - Continue with Xanax 1 mg po am, 1mg po afternoon and 2mg at night - total daily dose of 4mg  - Continue Wellbutrin Xl 300mg po q 24hrs  - will not restart Quetiapine 200 mg PO QHS as with methadone can severely prolong QTC and patient does not remember taking it      #Recurrent MSSA bacteremia with Discitis   - Recent Spinal fusion  - as per ID Keflex 500mg q6 PO for 4 weeks ending 11/24  - Outpatient Neurosurgery F/U      #H/O PE  - C/W eliquis  - monitor O2 sats  - CT angio as note above      #H/O DM  - Monitor FS before meals and at bedtime  - keep 140-180  - start SSI if FS >180        DVT PPX: Eliquis  Diet: DASH carb consistent   Dispo: acute    52y old Male with a history of IVDU (heroin) not currently using, vertebral osteomyelitis s/p debridement, and MSSA bacteremia, b/l PEs with radiographic evidence of R heart strain and admitted to ICU, found to have LE DVTs s/p LLE embolectomy 10/18 presents to the ED with withdrawal and hypoxia. Patient states that he takes Methadone 135mg daily and Xanax 2mg TID however since being discharged to [facility] for rehabilitation 2 days ago he has not received his Xanax or Methadone. At this time his withdrawal symptoms include dizziness fatigue, headache, nausea, shaking, sneezing, diarrhea and insomnia. He was also noted in rehabilitation to have oxygen saturation of 86% on room at. Denies shortness of breath, cough, sick contacts and fever at this time. Has been compliant with Eliquis. His CT angio chest showed no new PE, and resolving opacities from his prior pneumonia. He was initially on 3 L NC on presentation but currently was on room air saturating normally without any respiratory distress.       #withdrawl from methadone  #IVDU on methadone  - wanted to restart methadone 135 mg PO QD monitor QTC but as per hospital policy cant dispense more than 40 PO QD without verification  - [facility] number ([phone])  - verify dose on Monday when clinic opens  - tylenol PRN for mild-moderate pain  - as per last inpatient medicine documentation will resume the following medications  - Continue with Xanax 1 mg po am, 1mg po afternoon and 2mg at night - total daily dose of 4mg  - Continue Wellbutrin Xl 300mg po q 24hrs  - will not restart Quetiapine 200 mg PO QHS as with methadone can severely prolong QTC and patient does not remember taking it      #Recurrent MSSA bacteremia with Discitis   - Recent Spinal fusion  - as per ID Keflex 500mg q6 PO for 4 weeks ending 11/24  - Outpatient Neurosurgery F/U      #H/O PE  - C/W eliquis  - monitor O2 sats  - CT angio as note above      #H/O DM  - Monitor FS before meals and at bedtime  - keep 140-180  - start SSI if FS >180        DVT PPX: Eliquis  Diet: DASH carb consistent   Dispo: acute

## 2022-11-19 NOTE — ED ADULT NURSE NOTE - CHIEF COMPLAINT QUOTE
Patient BIBA from nursing home. Patient did not get xanax or methadone as nursing home does not have orders for these meds. Patient hypoxic to 89% in triage on RA, denies SOB.

## 2022-11-19 NOTE — ED PROVIDER NOTE - COVID-19 RESULT DATE/TIME
------------------------- NURSING NOTES AND VITALS REVIEWED ---------------------------   The nursing notes within the ED encounter and vital signs as below have been reviewed. Pulse 148   Temp 100.6 °F (38.1 °C) (Axillary)   Resp 26   Wt 17 lb 14 oz (8.108 kg)   SpO2 97%   Oxygen Saturation Interpretation: Normal      ---------------------------------------------------PHYSICAL EXAM--------------------------------------      Constitutional/General: Alert and oriented x3, well appearing, non toxic in NAD  Head: Normocephalic and atraumatic  Eyes: PERRL, EOMI  Ears TM without erythema no inflammation of the canal.  Positive rhinorrhea  Mouth: Oropharynx clear, handling secretions, no trismus no erythema the pharynx no tonsillar swelling or exudate uvula is midline mucous membranes are moist.  Neck: Supple, full ROM,   Pulmonary: Lungs scattered coarseness bilaterally there is no retractions good aeration. . Not in respiratory distress  Cardiovascular:  Regular rate and rhythm, no murmurs, gallops, or rubs. 2+ distal pulses  Abdomen: Soft, non tender, non distended,   Extremities: Moves all extremities x 4. Warm and well perfused  Skin: warm and dry without rash  Neurologic: GCS 15,  Psych: Normal Affect      ------------------------------ ED COURSE/MEDICAL DECISION MAKING----------------------  Medications   acetaminophen (TYLENOL) suspension 121.6 mg (121.6 mg Oral Given 1/31/20 0131)         ED COURSE:    cxr No acute findings       Medical Decision Making:     Patient came in with complaint of runny nose congestion cough over the last couple weeks with fever over the last 3 days. Influenza was positive. Tamiflu was not started at this time due to the fact patient's had respiratory symptoms over the last couple weeks. Symptomatic treatment with Tylenol Motrin keep well-hydrated suctioning as needed. Counseling:    The emergency provider has spoken with the Mother  and discussed todays results, in addition to providing specific details for the plan of care and counseling regarding the diagnosis and prognosis. Questions are answered at this time and they are agreeable with the plan.      --------------------------------- IMPRESSION AND DISPOSITION ---------------------------------    IMPRESSION  1. Influenza with respiratory manifestation other than pneumonia        DISPOSITION  Disposition: Discharge to home  Patient condition is good      NOTE: This report was transcribed using voice recognition software. Every effort was made to ensure accuracy; however, inadvertent computerized transcription errors may be present     Shannan Shanks [facility]  01/31/20 0216    ATTENDING PROVIDER ATTESTATION:     Supervising Physician, on-site, available for consultation, non-participatory in the evaluation or care of this patient.          [id number] [facility],   01/31/20 [id number] 11-Nov-2022 12:44

## 2022-11-19 NOTE — H&P ADULT - ATTENDING COMMENTS
HPI:   52y old Male with a history of IVDU (heroin) not currently using, vertebral osteomyelitis s/p debridement, and MSSA bacteremia, b/l PEs with radiographic evidence of R heart strain and admitted to ICU, found to have LE DVTs s/p LLE embolectomy 10/18 presents to the ED with withdrawal and hypoxia. Patient states that he takes Methadone 135mg daily and Xanax 2mg TID however since being discharged to [facility] for rehabilitation 2 days ago he has not received his Xanax or Methadone. At this time his withdrawal symptoms include dizziness fatigue, headache, nausea, shaking, sneezing, diarrhea and insomnia. He was also noted in rehabilitation to have oxygen saturation of 86% on room at. Denies shortness of breath, cough, sick contacts and fever at this time. Has been compliant with Eliquis. His CT angio chest showed no new PE, and resolving opacities from his prior pneumonia. He was initially on 3 L NC on presentation but currently was on room air saturating normally without any respiratory distress.     ED vitals:  · BP Systolic	148 mm Hg  · BP Diastolic	88 mm Hg  · Heart Rate	91 /min  · Respiration Rate (breaths/min)	16 /min  · Temp (F)	97.8 Degrees F  · Temp (C)	36.6 Degrees C  · Temp site	oral  · SpO2 (%)	96 %  · O2 Delivery/Oxygen Delivery Method	nasal cannula  · Oxygen Therapy Flow (L/min)	3 L/min   (19 Nov 2022 20:14)    REVIEW OF SYSTEMS: see cc/HPI   CONSTITUTIONAL: No weakness, fevers or chills  EYES/ENT: No visual changes;  No vertigo or throat pain   NECK: No pain or stiffness  RESPIRATORY: No cough, wheezing, hemoptysis; No shortness of breath  CARDIOVASCULAR: No chest pain or palpitations  GASTROINTESTINAL: No abdominal or epigastric pain. No nausea, vomiting, or hematemesis; No diarrhea or constipation. No melena or hematochezia.  GENITOURINARY: No dysuria, frequency or hematuria  NEUROLOGICAL: No numbness or weakness  SKIN: No itching, rashes    Physical Exam:   General: WN/WD NAD  Neurology: A&Ox3, nonfocal, follows commands  Eyes: PERRLA/ EOMI  ENT/Neck: Neck supple, trachea midline, No JVD  Respiratory: CTA B/L, No wheezing, rales, rhonchi  CV: Normal rate regular rhythm, S1S2, no murmurs, rubs or gallops  Abdominal: Soft, NT, ND +BS,   Extremities: No edema, + peripheral pulses  Skin: No Rashes, Hematoma, Ecchymosis  Incisions:   Tubes: HPI:   52y old Male with a history of IVDU (heroin) not currently using, vertebral osteomyelitis s/p debridement, and MSSA bacteremia, b/l PEs with radiographic evidence of R heart strain and admitted to ICU, found to have LE DVTs s/p LLE embolectomy 10/18 presents to the ED with withdrawal and hypoxia. Patient states that he takes Methadone 135mg daily and Xanax 2mg TID however since being discharged to [facility] for rehabilitation 2 days ago he has not received his Xanax or Methadone. At this time his withdrawal symptoms include dizziness fatigue, headache, nausea, shaking, sneezing, diarrhea and insomnia. He was also noted in rehabilitation to have oxygen saturation of 86% on room at. Denies shortness of breath, cough, sick contacts and fever at this time. Has been compliant with Eliquis. His CT angio chest showed no new PE, and resolving opacities from his prior pneumonia. He was initially on 3 L NC on presentation but currently was on room air saturating normally without any respiratory distress.     ED vitals:  · BP Systolic	148 mm Hg  · BP Diastolic	88 mm Hg  · Heart Rate	91 /min  · Respiration Rate (breaths/min)	16 /min  · Temp (F)	97.8 Degrees F  · Temp (C)	36.6 Degrees C  · Temp site	oral  · SpO2 (%)	96 %  · O2 Delivery/Oxygen Delivery Method	nasal cannula  · Oxygen Therapy Flow (L/min)	3 L/min   (19 Nov 2022 20:14)    REVIEW OF SYSTEMS: see cc/HPI   CONSTITUTIONAL: No weakness, fevers or chills  EYES/ENT: No visual changes;  No vertigo or throat pain   NECK: No pain or stiffness  RESPIRATORY: No cough, wheezing, hemoptysis; No shortness of breath  CARDIOVASCULAR: No chest pain or palpitations  GASTROINTESTINAL: No abdominal or epigastric pain. No nausea, vomiting, or hematemesis; No diarrhea or constipation. No melena or hematochezia.  GENITOURINARY: No dysuria, frequency or hematuria  NEUROLOGICAL: No numbness or weakness  SKIN: No itching, rashes    Physical Exam:   General: WN/WD NAD  Neurology: A&Ox3, nonfocal, follows commands  Eyes: PERRLA/ EOMI  ENT/Neck: Neck supple, trachea midline, No JVD  Respiratory: CTA B/L, No wheezing, rales, rhonchi  CV: Normal rate regular rhythm, S1S2, no murmurs, rubs or gallops  Abdominal: Soft, NT, ND +BS,   Extremities: No edema, + peripheral pulses  Skin: No Rashes, Hematoma, Ecchymosis    A/p  Opioid withdrawal - patient unable to get his usual dose of Methadone at the Rehab center   H/o Opioid use disorder   -c/w emergency dosing of Methadone until clinic available to verify dose     Discitis   s/p Spinal fusion   -c/w Keflex     Anxiety   -Xanax / Wellbutrin     H/o PE   -Eliquis     DM type II   -Lantus / Lispro AC w/ F/S monitoring and ISS     PT / Rehab eval

## 2022-11-19 NOTE — PATIENT PROFILE ADULT - NUMBER OF YRS
20 Oral Minoxidil Counseling- I discussed with the patient the risks of oral minoxidil including but not limited to shortness of breath, swelling of the feet or ankles, dizziness, lightheadedness, unwanted hair growth and allergic reaction.  The patient verbalized understanding of the proper use and possible adverse effects of oral minoxidil.  All of the patient's questions and concerns were addressed.

## 2022-11-19 NOTE — ED ADULT TRIAGE NOTE - DOMESTIC TRAVEL HIGH RISK QUESTION
Health Maintenance Due   Topic Date Due   • DTaP/Tdap/Td Vaccine (1 - Tdap) 09/07/1966   • Shingles Vaccine (1 of 2) 09/07/1997   • Colonoscopy Risk  07/15/2019   • Medicare Wellness Visit  01/21/2021   • Lung Cancer Screening  02/28/2021       Patient is due for the topics as listed above and wishes to discuss with provider. Patient informed of above information.      Patient Care Team:  Vargas Lawler MD as PCP - General (Internal Medicine)  Ros Tolbert MD (Gastroenterology)  Tom Issa DO (General Surgery)  Korina Leary as RN Care Coordinator (Registered Nurse)         No

## 2022-11-19 NOTE — ED PROVIDER NOTE - ATTENDING APP SHARED VISIT CONTRIBUTION OF CARE
53 yo m with pmh of ivda, recent vertebral osteomyelitis s/p surgery, LLE dvt with embolectomy, submassive PE with R heart strain, on methadone program, anxiety, sent by rehab for reestablishment of methadone treatment and for hypoxia.  STR reports pt was 89% on RA.  pt is currently on eliquis.  was discharged from hospital 2 days ago.  pt says the rehab has not been giving him his methadone and xanax because was not listed as his dc meds.  pt says he feels like he is withdrawing from methadone.  no cp or sob.  exam: nad, ncat, perrl, eomi, mmm, rrr, ctab, abd soft, nt, nd aox3, imp: pt with recent dvt/pe, sent to rehab with inaccurate med req, spo2 here on RA is 93-94%.  will recheck for new pe

## 2022-11-19 NOTE — ED PROVIDER NOTE - CLINICAL SUMMARY MEDICAL DECISION MAKING FREE TEXT BOX
Pt needing reestablishment of methadone treatment at his STR, appears erroneously dropped from his med list on discharge.  no pe on cta today.

## 2022-11-19 NOTE — PATIENT PROFILE ADULT - FALL HARM RISK - HARM RISK INTERVENTIONS

## 2022-11-19 NOTE — H&P ADULT - NSHPLABSRESULTS_GEN_ALL_CORE
11.2   6.21  )-----------( 277      ( 19 Nov 2022 15:53 )             35.7       11-19    139  |  101  |  8<L>  ----------------------------<  113<H>  4.1   |  30  |  0.7    Ca    9.3      19 Nov 2022 15:53    TPro  6.6  /  Alb  3.5  /  TBili  0.4  /  DBili  x   /  AST  22  /  ALT  16  /  AlkPhos  96  11-19            Lactate Trend      CARDIAC MARKERS ( 19 Nov 2022 15:53 )  x     / <0.01 ng/mL / x     / x     / x            CAPILLARY BLOOD GLUCOSE      IMPRESSION:    No new or acute pulmonary embolus. Limited evaluation of many of the   segmental and subsegmental branches.  Most of the previously noted bilateral pulmonary emboli have resolved.   Limited evaluation of the segmental and subsegmental branches for small   residual emboli.    Bilateral patchy and confluent opacities have also essentially resolved.   Residual opacities at the right lung base are linearly oriented, suspect  atelectasis.    --- End of Report ---          SHIRA IBARRA MD; Resident Radiologist  This document has been electronically signed.  LYLA PINTO MD; Attending Radiologist  This document has been electronically signed. Nov 19 2022  6:54PM

## 2022-11-19 NOTE — H&P ADULT - NSHPPHYSICALEXAM_GEN_ALL_CORE
PHYSICAL EXAM:  GENERAL: NAD, lying in bed comfortably  HEAD:  Atraumatic, Normocephalic  EYES: EOMI, PERRLA, conjunctiva and sclera clear  ENT: Moist mucous membranes  NECK: Supple, No JVD  CHEST/LUNG: Clear to auscultation bilaterally; No rales, rhonchi, wheezing, or rubs. Unlabored respirations  HEART: Regular rate and rhythm; No murmurs, rubs, or gallops  ABDOMEN: Bowel sounds present; Soft, Nontender, Nondistended. No hepatomegally  EXTREMITIES:  2+ Peripheral Pulses, brisk capillary refill. No clubbing, cyanosis, or edema  NERVOUS SYSTEM:  Alert & Oriented X3, speech clear. No deficits   MSK: FROM all 4 extremities, full and equal strength  SKIN: No rashes or lesions PHYSICAL EXAM:  GENERAL: NAD, lying in bed comfortably  HEAD:  Atraumatic, Normocephalic  EYES: EOMI, PERRLA, conjunctiva and sclera clear  ENT: Moist mucous membranes  NECK: Supple, No JVD  CHEST/LUNG: Clear to auscultation bilaterally; No rales, rhonchi, wheezing, or rubs. Unlabored respirations  HEART: Regular rate and rhythm; No murmurs, rubs, or gallops  ABDOMEN: Bowel sounds present; Soft, Nontender, Nondistended. No hepatomegally  EXTREMITIES:  No clubbing, cyanosis, +1 edema, Contractures in UE  NERVOUS SYSTEM:  Alert & Oriented X3, speech clear. No deficits   MSK: FROM all 4 extremities, full and equal strength  SKIN: Small 3 cm hard nodularity noticed on the lumbar spine with skin breakdown, no tenderness on palpation

## 2022-11-20 LAB
ALBUMIN SERPL ELPH-MCNC: 3.4 G/DL — LOW (ref 3.5–5.2)
ALP SERPL-CCNC: 105 U/L — SIGNIFICANT CHANGE UP (ref 30–115)
ALT FLD-CCNC: 14 U/L — SIGNIFICANT CHANGE UP (ref 0–41)
ANION GAP SERPL CALC-SCNC: 12 MMOL/L — SIGNIFICANT CHANGE UP (ref 7–14)
AST SERPL-CCNC: 17 U/L — SIGNIFICANT CHANGE UP (ref 0–41)
BILIRUB SERPL-MCNC: 0.3 MG/DL — SIGNIFICANT CHANGE UP (ref 0.2–1.2)
BUN SERPL-MCNC: 8 MG/DL — LOW (ref 10–20)
CALCIUM SERPL-MCNC: 8.8 MG/DL — SIGNIFICANT CHANGE UP (ref 8.4–10.5)
CHLORIDE SERPL-SCNC: 104 MMOL/L — SIGNIFICANT CHANGE UP (ref 98–110)
CO2 SERPL-SCNC: 30 MMOL/L — SIGNIFICANT CHANGE UP (ref 17–32)
CREAT SERPL-MCNC: 0.8 MG/DL — SIGNIFICANT CHANGE UP (ref 0.7–1.5)
EGFR: 106 ML/MIN/1.73M2 — SIGNIFICANT CHANGE UP
GLUCOSE SERPL-MCNC: 113 MG/DL — HIGH (ref 70–99)
HCT VFR BLD CALC: 34.6 % — LOW (ref 42–52)
HGB BLD-MCNC: 10.9 G/DL — LOW (ref 14–18)
MAGNESIUM SERPL-MCNC: 1.8 MG/DL — SIGNIFICANT CHANGE UP (ref 1.8–2.4)
MCHC RBC-ENTMCNC: 28.2 PG — SIGNIFICANT CHANGE UP (ref 27–31)
MCHC RBC-ENTMCNC: 31.5 G/DL — LOW (ref 32–37)
MCV RBC AUTO: 89.4 FL — SIGNIFICANT CHANGE UP (ref 80–94)
NRBC # BLD: 0 /100 WBCS — SIGNIFICANT CHANGE UP (ref 0–0)
PLATELET # BLD AUTO: 291 K/UL — SIGNIFICANT CHANGE UP (ref 130–400)
POTASSIUM SERPL-MCNC: 3.9 MMOL/L — SIGNIFICANT CHANGE UP (ref 3.5–5)
POTASSIUM SERPL-SCNC: 3.9 MMOL/L — SIGNIFICANT CHANGE UP (ref 3.5–5)
PROT SERPL-MCNC: 6.2 G/DL — SIGNIFICANT CHANGE UP (ref 6–8)
RBC # BLD: 3.87 M/UL — LOW (ref 4.7–6.1)
RBC # FLD: 15.6 % — HIGH (ref 11.5–14.5)
SODIUM SERPL-SCNC: 146 MMOL/L — SIGNIFICANT CHANGE UP (ref 135–146)
WBC # BLD: 6.77 K/UL — SIGNIFICANT CHANGE UP (ref 4.8–10.8)
WBC # FLD AUTO: 6.77 K/UL — SIGNIFICANT CHANGE UP (ref 4.8–10.8)

## 2022-11-20 RX ORDER — ALPRAZOLAM 0.25 MG
2 TABLET ORAL AT BEDTIME
Refills: 0 | Status: DISCONTINUED | OUTPATIENT
Start: 2022-11-20 | End: 2022-11-21

## 2022-11-20 RX ORDER — FOLIC ACID 0.8 MG
1 TABLET ORAL DAILY
Refills: 0 | Status: DISCONTINUED | OUTPATIENT
Start: 2022-11-20 | End: 2022-11-21

## 2022-11-20 RX ORDER — ALPRAZOLAM 0.25 MG
2 TABLET ORAL ONCE
Refills: 0 | Status: DISCONTINUED | OUTPATIENT
Start: 2022-11-20 | End: 2022-11-20

## 2022-11-20 RX ORDER — ALPRAZOLAM 0.25 MG
1 TABLET ORAL
Refills: 0 | Status: DISCONTINUED | OUTPATIENT
Start: 2022-11-20 | End: 2022-11-21

## 2022-11-20 RX ORDER — QUETIAPINE FUMARATE 200 MG/1
200 TABLET, FILM COATED ORAL AT BEDTIME
Refills: 0 | Status: DISCONTINUED | OUTPATIENT
Start: 2022-11-20 | End: 2022-11-21

## 2022-11-20 RX ORDER — METHADONE HYDROCHLORIDE 40 MG/1
135 TABLET ORAL DAILY
Refills: 0 | Status: DISCONTINUED | OUTPATIENT
Start: 2022-11-20 | End: 2022-11-20

## 2022-11-20 RX ORDER — METHADONE HYDROCHLORIDE 40 MG/1
135 TABLET ORAL ONCE
Refills: 0 | Status: DISCONTINUED | OUTPATIENT
Start: 2022-11-20 | End: 2022-11-20

## 2022-11-20 RX ORDER — METHADONE HYDROCHLORIDE 40 MG/1
135 TABLET ORAL DAILY
Refills: 0 | Status: DISCONTINUED | OUTPATIENT
Start: 2022-11-20 | End: 2022-11-21

## 2022-11-20 RX ORDER — SENNA PLUS 8.6 MG/1
2 TABLET ORAL AT BEDTIME
Refills: 0 | Status: DISCONTINUED | OUTPATIENT
Start: 2022-11-20 | End: 2022-11-21

## 2022-11-20 RX ORDER — THIAMINE MONONITRATE (VIT B1) 100 MG
100 TABLET ORAL DAILY
Refills: 0 | Status: DISCONTINUED | OUTPATIENT
Start: 2022-11-20 | End: 2022-11-21

## 2022-11-20 RX ADMIN — Medication 500 MILLIGRAM(S): at 05:28

## 2022-11-20 RX ADMIN — Medication 2 MILLIGRAM(S): at 22:01

## 2022-11-20 RX ADMIN — Medication 2 MILLIGRAM(S): at 12:24

## 2022-11-20 RX ADMIN — Medication 1 TABLET(S): at 12:26

## 2022-11-20 RX ADMIN — MAGNESIUM OXIDE 400 MG ORAL TABLET 400 MILLIGRAM(S): 241.3 TABLET ORAL at 08:23

## 2022-11-20 RX ADMIN — BUPROPION HYDROCHLORIDE 300 MILLIGRAM(S): 150 TABLET, EXTENDED RELEASE ORAL at 12:25

## 2022-11-20 RX ADMIN — Medication 500 MILLIGRAM(S): at 23:48

## 2022-11-20 RX ADMIN — Medication 500 MILLIGRAM(S): at 01:07

## 2022-11-20 RX ADMIN — Medication 500 MILLIGRAM(S): at 12:26

## 2022-11-20 RX ADMIN — APIXABAN 5 MILLIGRAM(S): 2.5 TABLET, FILM COATED ORAL at 17:27

## 2022-11-20 RX ADMIN — MAGNESIUM OXIDE 400 MG ORAL TABLET 400 MILLIGRAM(S): 241.3 TABLET ORAL at 17:27

## 2022-11-20 RX ADMIN — MAGNESIUM OXIDE 400 MG ORAL TABLET 400 MILLIGRAM(S): 241.3 TABLET ORAL at 12:26

## 2022-11-20 RX ADMIN — Medication 500 MILLIGRAM(S): at 17:27

## 2022-11-20 RX ADMIN — APIXABAN 5 MILLIGRAM(S): 2.5 TABLET, FILM COATED ORAL at 05:29

## 2022-11-20 RX ADMIN — Medication 2 MILLIGRAM(S): at 17:27

## 2022-11-20 RX ADMIN — METHADONE HYDROCHLORIDE 40 MILLIGRAM(S): 40 TABLET ORAL at 12:25

## 2022-11-20 RX ADMIN — SENNA PLUS 2 TABLET(S): 8.6 TABLET ORAL at 22:01

## 2022-11-20 RX ADMIN — QUETIAPINE FUMARATE 200 MILLIGRAM(S): 200 TABLET, FILM COATED ORAL at 22:02

## 2022-11-20 NOTE — PROGRESS NOTE ADULT - ASSESSMENT
52y old Male with a history of IVDU (heroin) not currently using, vertebral osteomyelitis s/p debridement, and MSSA bacteremia, b/l PEs with radiographic evidence of R heart strain and admitted to ICU, found to have LE DVTs s/p LLE embolectomy 10/18 presents to the ED with withdrawal and hypoxia. Patient states that he takes Methadone 135mg daily and Xanax 2mg TID however since being discharged to [facility] for rehabilitation 2 days ago he has not received his Xanax or Methadone.     #withdrawal from methadone  #IVDU on methadone  - continue with Methadone 40 PO QD without verification  - [facility] number ([id number])  - verify dose on Monday when clinic opens  - tylenol PRN for mild-moderate pain  - as per last inpatient medicine documentation continue the following medications  - Continue with Xanax 1 mg po am, 1mg po afternoon and 2mg at night - total daily dose of 4mg  - Continue Wellbutrin Xl 300mg po q 24hrs  - will not restart Quetiapine 200 mg PO QHS as with methadone can severely prolong QTC and patient does not remember taking it      #Recurrent MSSA bacteremia with Discitis   - Recent Spinal fusion  - as per ID Keflex 500mg q6 PO for 4 weeks ending 11/24  - Outpatient Neurosurgery F/U      #H/O PE  - C/W eliquis  - monitor O2 sats  - CT angio as note above      #H/O DM  - Monitor FS before meals and at bedtime  - keep 140-180  - sliding scale if FS>180      #MISC:  -DVT PPX: Eliquis  -Diet: DASH carb consistent   -Dispo: acute

## 2022-11-20 NOTE — PROGRESS NOTE ADULT - SUBJECTIVE AND OBJECTIVE BOX
Permissive HTN while being ruled out for CVA.  However, blood pressure remains in the normal range and not hypertensive  Continue to hold all medications for now  Patient may need medication adjustment   MIKAEL MCDERMOTT 52y Male  MRN#: [id number]     Hospital Day: 1d    Pt is currently admitted with the primary diagnosis of  WITHDRAWAL COMPLAINT        SUBJECTIVE     Overnight events  None    Subjective complaints  Pt was evaluated this am. Patient denied any active complaints and per patient his symptoms are improving                                            ----------------------------------------------------------  OBJECTIVE  PAST MEDICAL & SURGICAL HISTORY  IV drug abuse    Vertebral osteomyelitis    MSSA bacteremia    No significant past surgical history                                              -----------------------------------------------------------  ALLERGIES:  No Known Allergies                                            ------------------------------------------------------------    HOME MEDICATIONS  Home Medications:  acetaminophen 325 mg oral tablet: 2 tab(s) orally every 6 hours, As needed, Temp greater or equal to 38C (100.4F) (25 Oct 2022 15:30)  ALPRAZolam 1 mg oral tablet: 1 tab(s) orally 2 times a day (am and 1400) (16 Nov 2022 14:47)  ALPRAZolam 2 mg oral tablet: 1 tab(s) orally once a day (at bedtime) (16 Nov 2022 14:47)  apixaban 5 mg oral tablet: 1 tab(s) orally every 12 hours (08 Nov 2022 15:39)  lactobacillus acidophilus oral capsule: 1 tab(s) orally once a day (16 Nov 2022 14:49)  methadone: 135 milligram(s) orally once a day (19 Nov 2022 20:42)                           MEDICATIONS:  STANDING MEDICATIONS  ALPRAZolam 2 milliGRAM(s) Oral daily  ALPRAZolam 1 milliGRAM(s) Oral daily  ALPRAZolam 1 milliGRAM(s) Oral daily  apixaban 5 milliGRAM(s) Oral every 12 hours  buPROPion XL (24-Hour) . 300 milliGRAM(s) Oral daily  cephalexin 500 milliGRAM(s) Oral four times a day  lactobacillus acidophilus 1 Tablet(s) Oral daily  magnesium oxide 400 milliGRAM(s) Oral three times a day with meals  methadone    Tablet 40 milliGRAM(s) Oral once    PRN MEDICATIONS  acetaminophen     Tablet .. 650 milliGRAM(s) Oral every 6 hours PRN                                            ------------------------------------------------------------  VITAL SIGNS: Last 24 Hours  T(C): 36.5 (20 Nov 2022 00:00), Max: 36.6 (19 Nov 2022 12:54)  T(F): 97.7 (20 Nov 2022 00:00), Max: 97.8 (19 Nov 2022 12:54)  HR: 92 (20 Nov 2022 00:00) (83 - 92)  BP: 116/65 (20 Nov 2022 00:00) (116/65 - 148/88)  BP(mean): --  RR: 18 (20 Nov 2022 00:00) (16 - 20)  SpO2: 96% (19 Nov 2022 22:36) (96% - 96%)                                             --------------------------------------------------------------  LABS:                        11.2   6.21  )-----------( 277      ( 19 Nov 2022 15:53 )             35.7     11-19    139  |  101  |  8<L>  ----------------------------<  113<H>  4.1   |  30  |  0.7    Ca    9.3      19 Nov 2022 15:53    TPro  6.6  /  Alb  3.5  /  TBili  0.4  /  DBili  x   /  AST  22  /  ALT  16  /  AlkPhos  96  11-19          Troponin T, Serum: <0.01 ng/mL (11-19-22 @ 15:53)          CARDIAC MARKERS ( 19 Nov 2022 15:53 )  x     / <0.01 ng/mL / x     / x     / x                                                  -------------------------------------------------------------  RADIOLOGY:  IMPRESSION:    No new or acute pulmonary embolus. Limited evaluation of many of the   segmental and subsegmental branches.  Most of the previously noted bilateral pulmonary emboli have resolved.   Limited evaluation of the segmental and subsegmental branches for small   residual emboli.    Bilateral patchy and confluent opacities have also essentially resolved.   Residual opacities at the right lung base are linearly oriented, suspect  atelectasis.                                            --------------------------------------------------------------    PHYSICAL EXAM:  GENERAL: NAD, lying in bed comfortably  HEAD:  Atraumatic, Normocephalic  EYES: EOMI, PERRLA, conjunctiva and sclera clear  ENT: Moist mucous membranes  NECK: Supple, No JVD  CHEST/LUNG: Clear to auscultation bilaterally; No rales, rhonchi, wheezing, or rubs. Unlabored respirations  HEART: Regular rate and rhythm; No murmurs, rubs, or gallops  ABDOMEN: Bowel sounds present; Soft, Nontender, Nondistended. No hepatomegally  EXTREMITIES:  No clubbing, cyanosis, +1 edema, Contractures in UE  NERVOUS SYSTEM:  Alert & Oriented X3, speech clear. No deficits   MSK: FROM all 4 extremities, full and equal strength  SKIN: Small 3 cm hard nodularity noticed on the lumbar spine with skin breakdown, no tenderness on palpation                                         --------------------------------------------------------------

## 2022-11-20 NOTE — CHART NOTE - NSCHARTNOTEFT_GEN_A_CORE
Patient was evaluated by bedside, reports feeling better today.   -patient to be continued on all previous  medications that he was tx. during last inpatient stay :  -Methadone 135 mg po once daily  -Xanax 1 mg at 8am, 1 mg at 2 pm, 2 mg at bedtime: total of 4 mg per day  - Wellbutrin 300 mg po once daily   - Seroquel 200 mg po once daily at bedtime    For recently tx. MSSA bacteremia and infection patient to complete PO Keflex tx. with PO acidophilus tx.    start d/c planning ? back to [facility] Patient was evaluated by bedside, reports feeling better today.   -patient to be continued on all previous  medications that he was tx. during last inpatient stay :  -Methadone 135 mg po once daily ( last dose patient received when he was d/c from [facility] hospital on 11/17th)- and during entire previous inpatient stay patient was on daily methadone tx. - patient has not attended methadone clinic since he was d/c from [facility] to [facility] on 11/17 and back to [facility] on 11/19)  -Xanax 1 mg at 8am, 1 mg at 2 pm, 2 mg at bedtime: total of 4 mg per day  - Wellbutrin 300 mg po once daily   - Seroquel 200 mg po once daily at bedtime    For recently tx. MSSA bacteremia and infection patient to complete PO Keflex tx. with PO acidophilus tx.    start d/c planning ? back to [facility]

## 2022-11-21 ENCOUNTER — TRANSCRIPTION ENCOUNTER (OUTPATIENT)
Age: 52
End: 2022-11-21

## 2022-11-21 VITALS
RESPIRATION RATE: 18 BRPM | TEMPERATURE: 98 F | HEART RATE: 107 BPM | SYSTOLIC BLOOD PRESSURE: 126 MMHG | DIASTOLIC BLOOD PRESSURE: 66 MMHG

## 2022-11-21 LAB
GLUCOSE BLDC GLUCOMTR-MCNC: 136 MG/DL — HIGH (ref 70–99)
GLUCOSE BLDC GLUCOMTR-MCNC: 142 MG/DL — HIGH (ref 70–99)

## 2022-11-21 PROCEDURE — 99221 1ST HOSP IP/OBS SF/LOW 40: CPT

## 2022-11-21 PROCEDURE — 99239 HOSP IP/OBS DSCHRG MGMT >30: CPT

## 2022-11-21 RX ORDER — QUETIAPINE FUMARATE 200 MG/1
1 TABLET, FILM COATED ORAL
Qty: 0 | Refills: 0 | DISCHARGE
Start: 2022-11-21

## 2022-11-21 RX ORDER — BUPROPION HYDROCHLORIDE 150 MG/1
1 TABLET, EXTENDED RELEASE ORAL
Qty: 30 | Refills: 0
Start: 2022-11-21 | End: 2022-12-20

## 2022-11-21 RX ORDER — POLYETHYLENE GLYCOL 3350 17 G/17G
17 POWDER, FOR SOLUTION ORAL DAILY
Refills: 0 | Status: DISCONTINUED | OUTPATIENT
Start: 2022-11-21 | End: 2022-11-21

## 2022-11-21 RX ADMIN — Medication 500 MILLIGRAM(S): at 11:59

## 2022-11-21 RX ADMIN — APIXABAN 5 MILLIGRAM(S): 2.5 TABLET, FILM COATED ORAL at 05:46

## 2022-11-21 RX ADMIN — MAGNESIUM OXIDE 400 MG ORAL TABLET 400 MILLIGRAM(S): 241.3 TABLET ORAL at 08:48

## 2022-11-21 RX ADMIN — METHADONE HYDROCHLORIDE 135 MILLIGRAM(S): 40 TABLET ORAL at 12:01

## 2022-11-21 RX ADMIN — Medication 1 MILLIGRAM(S): at 13:42

## 2022-11-21 RX ADMIN — Medication 1 MILLIGRAM(S): at 08:47

## 2022-11-21 RX ADMIN — Medication 500 MILLIGRAM(S): at 05:46

## 2022-11-21 RX ADMIN — Medication 100 MILLIGRAM(S): at 12:01

## 2022-11-21 RX ADMIN — Medication 1 TABLET(S): at 12:00

## 2022-11-21 RX ADMIN — BUPROPION HYDROCHLORIDE 300 MILLIGRAM(S): 150 TABLET, EXTENDED RELEASE ORAL at 11:59

## 2022-11-21 RX ADMIN — POLYETHYLENE GLYCOL 3350 17 GRAM(S): 17 POWDER, FOR SOLUTION ORAL at 12:04

## 2022-11-21 RX ADMIN — Medication 1 MILLIGRAM(S): at 12:00

## 2022-11-21 RX ADMIN — MAGNESIUM OXIDE 400 MG ORAL TABLET 400 MILLIGRAM(S): 241.3 TABLET ORAL at 11:59

## 2022-11-21 NOTE — DISCHARGE NOTE NURSING/CASE MANAGEMENT/SOCIAL WORK - NSDCVIVACCINE_GEN_ALL_CORE_FT
Tdap; 15-Baljinder-2022 23:06; Jovanny Singh (STANISLAV); [employer]; [id number] (Exp. Date: 09-Sep-2023); IntraMuscular; Deltoid Left.; 0.5 milliLiter(s); VIS (VIS Published: 09-May-2013, VIS Presented: 15-Baljinder-2022);

## 2022-11-21 NOTE — CONSULT NOTE ADULT - SUBJECTIVE AND OBJECTIVE BOX
HPI:          NOTE IN PROGRESS, PLAN FINAL    Patient is a      Patient is ____ motivated to remain abstinent from _______ use and plans to engage supportive service recommendations, including follow-up for medications, IOP, connection to outpatient counseling, and referral to AA/12-step meetings.     Patient denies SI, HI, and auditory/visual hallucinations at this encounter.    Family History  Non-contributory except as described in HPI     Review of Systems  Negative except as described in HPI    Vitals    Vital Signs Last 24 Hrs  T(C): 36.6 (21 Nov 2022 08:00), Max: 36.6 (21 Nov 2022 00:02)  T(F): 97.9 (21 Nov 2022 08:00), Max: 97.9 (21 Nov 2022 08:00)  HR: 92 (21 Nov 2022 08:00) (82 - 109)  BP: 124/61 (21 Nov 2022 08:00) (118/67 - 133/94)  BP(mean): --  RR: 19 (21 Nov 2022 08:00) (18 - 19)  SpO2: 93% (21 Nov 2022 00:02) (93% - 94%)    Parameters below as of 21 Nov 2022 00:02  Patient On (Oxygen Delivery Method): room air    Labs     AST 17  ALT 14  Cr 0.8    Mental Status Exam     Appearance: Patient is a male, hospital attire and fair hygiene, AAOx4  Behavior: Appropriate during processing, calm, cooperative and respectful  Speech: Regular rate and rhythm, normal volume and normal tone  Mood: "doing better now"  Affect: Euthymic  Thought Process: Goal directed, linear and logical  Thought Content: No elicit delusions, obsessions, or phobias and denies suicidal and homicidal ideations  Perceptions: Not responding to any internal stimulus  Cognitive Functioning: Alert  Insight: Good  Judgement: Good  Reliability: Good    Recent and remote memory: Intact  Attention span and concentration: Intact  Language: Intact  Fund of knowledge: Intact    Gait/Station: Patient in bed at time of encounter, cannot assess  Strength: Normal strength in all extremities     -------------------------------------------------------     Plan:     1. Opioid Use Disorder, Mild, on Maintenance Therapy  Methadone 135 mg daily at [facility]. Patient denies illicit use since 2014.  - continue methadone 135 mg daily, previously verified by another team    2. Benzodiazepine Dependence  Patient reports taper from October 2022 from alprazolam 6 mg to 4 mg. Denies counterfeit/illicit use.  - continue alprazolam as ordered      -----        Sirisha Scherer MD, MS, MPH  Addiction Medicine & Preventive Medicine/Public Health Physician  Please contact by [employer] Teams at any time for questions/concerns.    Greater than 50% of time spent in face-to-face interaction with patient and/or family and/or coordination of care: 55 minutes

## 2022-11-21 NOTE — DISCHARGE NOTE PROVIDER - NSDCMRMEDTOKEN_GEN_ALL_CORE_FT
acetaminophen 325 mg oral tablet: 2 tab(s) orally every 6 hours, As needed, Temp greater or equal to 38C (100.4F)  ALPRAZolam 1 mg oral tablet: 1 tab(s) orally 2 times a day (am and 1400)  ALPRAZolam 2 mg oral tablet: 1 tab(s) orally once a day (at bedtime)  apixaban 5 mg oral tablet: 1 tab(s) orally every 12 hours  cephalexin 500 mg oral capsule: 1 cap(s) orally 4 times a day  lactobacillus acidophilus oral capsule: 1 tab(s) orally once a day  magnesium oxide 400 mg oral tablet: 1 tab(s) orally 3 times a day   metFORMIN 500 mg oral tablet: 1 tab(s) orally 2 times a day   methadone: 135 milligram(s) orally once a day   acetaminophen 325 mg oral tablet: 2 tab(s) orally every 6 hours, As needed, Temp greater or equal to 38C (100.4F)  ALPRAZolam 1 mg oral tablet: 1 tab(s) orally 2 times a day (am and 1400)  ALPRAZolam 2 mg oral tablet: 1 tab(s) orally once a day (at bedtime)  apixaban 5 mg oral tablet: 1 tab(s) orally every 12 hours  buPROPion 300 mg/24 hours (XL) oral tablet, extended release: 1 tab(s) orally once a day  cephalexin 500 mg oral capsule: 1 cap(s) orally 4 times a day  lactobacillus acidophilus oral capsule: 1 tab(s) orally once a day  magnesium oxide 400 mg oral tablet: 1 tab(s) orally 3 times a day   metFORMIN 500 mg oral tablet: 1 tab(s) orally 2 times a day   methadone: 135 milligram(s) orally once a day  QUEtiapine 200 mg oral tablet: 1 tab(s) orally once a day (at bedtime)

## 2022-11-21 NOTE — CONSULT NOTE ADULT - SUBJECTIVE AND OBJECTIVE BOX
HPI:        NOTE IN PROGRESS, PLAN FINAL    Patient is a      Patient is ____ motivated to remain abstinent from _______ use and plans to engage supportive service recommendations, including follow-up for medications, IOP, connection to outpatient counseling, and referral to AA/12-step meetings.     Patient denies SI, HI, and auditory/visual hallucinations at this encounter.    Family History  Non-contributory except as described in HPI     Review of Systems  Negative except as described in HPI    Vitals    Vital Signs Last 24 Hrs  T(C): 36.6 (21 Nov 2022 08:00), Max: 36.6 (21 Nov 2022 00:02)  T(F): 97.9 (21 Nov 2022 08:00), Max: 97.9 (21 Nov 2022 08:00)  HR: 92 (21 Nov 2022 08:00) (82 - 109)  BP: 124/61 (21 Nov 2022 08:00) (118/67 - 133/94)  BP(mean): --  RR: 19 (21 Nov 2022 08:00) (18 - 19)  SpO2: 93% (21 Nov 2022 00:02) (93% - 94%)    Parameters below as of 21 Nov 2022 00:02  Patient On (Oxygen Delivery Method): room air    Labs     AST 17  ALT 14  Cr 0.8    Mental Status Exam     Appearance: Patient is a male, hospital attire and amado, AAOx4  Behavior: Appropriate during processing, calm, cooperative and respectful  Speech: Regular rate and rhythm, normal volume and normal tone  Mood: "doing better now"  Affect: Euthymic  Thought Process: Goal directed, linear and logical  Thought Content: No elicit delusions, obsessions, or phobias and denies suicidal and homicidal ideations  Perceptions: Not responding to any internal stimulus  Cognitive Functioning: Alert  Insight: Good  Judgement: Good  Reliability: Good    Recent and remote memory: Intact  Attention span and concentration: Intact  Language: Intact  Fund of knowledge: Intact    Gait/Station: Patient in bed at time of encounter, cannot assess  Strength: Normal strength in all extremities     -------------------------------------------------------     Plan:     1. Opioid Use Disorder, Mild, In Sustained Remission  MMTP, methadone 135 mg daily from [facility][note 3].  - continue methadone 135 mg daily, verified by another team previously    2. Benzodiazepine Dependence  Dose reduction from alprazolam 6 mg to 4 mg daily as of October 2022  - continue alprazolam as ordered    3. Tobacco Use Disorder  Patient recently stopped tobacco use, weaning nicotine patch slowly over time.  - continue nicotine patch, but please remove nightly and replace daily        -----        Sirisha Scherer MD, MS, MPH  Addiction Medicine & Preventive Medicine/Public Health Physician  Please contact by [employer] Teams at any time for questions/concerns.    Greater than 50% of time spent in face-to-face interaction with patient and/or family and/or coordination of care: 55 minutes   HPI:        NOTE IN PROGRESS, PLAN FINAL    Patient is a      Patient is ____ motivated to remain abstinent from _______ use and plans to engage supportive service recommendations, including follow-up for medications, IOP, connection to outpatient counseling, and referral to AA/12-step meetings.     Patient denies SI, HI, and auditory/visual hallucinations at this encounter.    Family History  Non-contributory except as described in HPI     Review of Systems  Negative except as described in HPI    Vitals    Vital Signs Last 24 Hrs  T(C): 36.6 (21 Nov 2022 08:00), Max: 36.6 (21 Nov 2022 00:02)  T(F): 97.9 (21 Nov 2022 08:00), Max: 97.9 (21 Nov 2022 08:00)  HR: 92 (21 Nov 2022 08:00) (82 - 109)  BP: 124/61 (21 Nov 2022 08:00) (118/67 - 133/94)  BP(mean): --  RR: 19 (21 Nov 2022 08:00) (18 - 19)  SpO2: 93% (21 Nov 2022 00:02) (93% - 94%)    Parameters below as of 21 Nov 2022 00:02  Patient On (Oxygen Delivery Method): room air    Labs     AST 17  ALT 14  Cr 0.8    Mental Status Exam     Appearance: Patient is a male, hospital attire and amado, AAOx4  Behavior: Appropriate during processing, calm, cooperative and respectful  Speech: Regular rate and rhythm, normal volume and normal tone  Mood: "doing better now"  Affect: Euthymic  Thought Process: Goal directed, linear and logical  Thought Content: No elicit delusions, obsessions, or phobias and denies suicidal and homicidal ideations  Perceptions: Not responding to any internal stimulus  Cognitive Functioning: Alert  Insight: Good  Judgement: Good  Reliability: Good    Recent and remote memory: Intact  Attention span and concentration: Intact  Language: Intact  Fund of knowledge: Intact    Gait/Station: Patient in bed at time of encounter, cannot assess  Strength: Normal strength in all extremities     -------------------------------------------------------     Plan:     1. Opioid Use Disorder, Mild, In Sustained Remission  MMTP, methadone 135 mg daily from [facility]. Patient denies illicit use since ~2014.  - continue methadone 135 mg daily, verified by another team previously    2. Benzodiazepine Dependence  Dose reduction from alprazolam 6 mg to 4 mg daily as of October 2022  - continue alprazolam as ordered    3. Tobacco Use Disorder  Patient recently stopped tobacco use, weaning nicotine patch slowly over time.  - continue nicotine patch, but please remove nightly and replace daily        -----        Sirisha Scherer MD, MS, MPH  Addiction Medicine & Preventive Medicine/Public Health Physician  Please contact by Microsoft Teams at any time for questions/concerns.    Greater than 50% of time spent in face-to-face interaction with patient and/or family and/or coordination of care: 55 minutes   HPI:      Patient is a 53 y/o male with remote history opioid use disorder (in sustained remission, on maintenance therapy [methadone]), hx vertebral osteomyelitis with MSSA bacteremia s/p debridement, hx bilateral PEs with radiographic evidence of R heart strain and admitted to ICU on previous admissions found to have LE DVTs s/p LLE embolectomy 10/18, now admitted 11/19/22 for opioid withdrawal and hypoxia per chart review after recent discharge to SNF.    Patient recently discharged to [facility] after having been discharged from this hospital after prolonged hospitalization beginning 10/2022. Patient reports that he was not receiving alprazolam and methadone dose at [facility] as a result of miscommunication per previous discharge documentation re: discontinuation of medications. Patient reports that he has already made contact with his methadone clinic ([facility] in [facility]) to plan continuation of treatment upon discharge, with patient also having support in coordination from his mother. He states that he feels that the care provided to him at [facility] was negligent, as he had requested transport to the hospital due to SOB and been deferred until he was able to have  for his transfer back to the hospital. He reports having missed methadone on 2 days, Thursday 11/17 & Friday 11/18 due to miscommunication, and has already re-initiated home dose of methadone 135 mg daily, denying opioid withdrawal symptoms at this encounter. Patient denies illicit opioid use since ~2014. He states that in early-mid October 2022 of last hospitalization, alprazolam dose had been reduced from 6 mg to 4 mg daily. Patient reports occasional anxiety/tremulousness between doses but notes that it has significantly improved since the changes was made 4+ weeks ago. Patient affirms understanding of long-term and acute risks of benzodiazepine use, especially in combination with opioids like methadone, and would like to continue current alprazolam dose and consider additional dose reductions slowly under medical supervision in the future. He reports that he has recently stopped using tobacco and is now decreasing nicotine patch accordingly with decreases in cravings, with healthy pride in his progress. He notes that he has been having vivid dreams at night and that his current nicotine patch has been in place 3 days, and affirms understanding of education that patches should be removed before bed and replaced with a fresh patch in a new location the next morning.     Patient denies SI, HI, and auditory/visual hallucinations at this encounter.    Family History  Non-contributory except as described in HPI     Review of Systems  Negative except as described in HPI    Vitals    Vital Signs Last 24 Hrs  T(C): 36.6 (21 Nov 2022 08:00), Max: 36.6 (21 Nov 2022 00:02)  T(F): 97.9 (21 Nov 2022 08:00), Max: 97.9 (21 Nov 2022 08:00)  HR: 92 (21 Nov 2022 08:00) (82 - 109)  BP: 124/61 (21 Nov 2022 08:00) (118/67 - 133/94)  BP(mean): --  RR: 19 (21 Nov 2022 08:00) (18 - 19)  SpO2: 93% (21 Nov 2022 00:02) (93% - 94%)    Parameters below as of 21 Nov 2022 00:02  Patient On (Oxygen Delivery Method): room air    Labs     AST 17  ALT 14  Cr 0.8    Mental Status Exam     Appearance: Patient is a male, hospital attire and amado, AAOx4  Behavior: Appropriate during processing, calm, cooperative and respectful  Speech: Regular rate and rhythm, normal volume and normal tone  Mood: "doing better now"  Affect: Euthymic  Thought Process: Goal directed, linear and logical  Thought Content: No elicit delusions, obsessions, or phobias and denies suicidal and homicidal ideations  Perceptions: Not responding to any internal stimulus  Cognitive Functioning: Alert  Insight: Good  Judgement: Good  Reliability: Good    Recent and remote memory: Intact  Attention span and concentration: Intact  Language: Intact  Fund of knowledge: Intact    Gait/Station: Patient in chair at time of encounter, cannot assess  Strength: Reduced strength in lower extremities     -------------------------------------------------------     Plan:     1. Opioid Use Disorder, Mild, In Sustained Remission  MMTP, methadone 135 mg daily from [facility]. Patient denies illicit use since ~2014.  - continue methadone 135 mg daily, verified by another team previously    2. Benzodiazepine Dependence  Dose reduction from alprazolam 6 mg to 4 mg daily as of October 2022.  - continue alprazolam as ordered    3. Tobacco Use Disorder  Patient recently stopped tobacco use, weaning nicotine patch slowly over time.  - continue nicotine patch, but please remove nightly and replace with new patch daily        -----        Sirisha Scherer MD, MS, MPH  Addiction Medicine & Preventive Medicine/Public Health Physician  Please contact by [employer] Teams at any time for questions/concerns.    Greater than 50% of time spent in face-to-face interaction with patient and/or family and/or coordination of care: 55 minutes

## 2022-11-21 NOTE — DISCHARGE NOTE PROVIDER - NSDCFUSCHEDAPPT_GEN_ALL_CORE_FT
Brian Deleon  [facility]  NEUROSURG [address]  Scheduled Appointment: 11/29/2022    Alejandro Maciel  [facility]  GASTRO Doc Off [id number]  Scheduled Appointment: 01/20/2023

## 2022-11-21 NOTE — DISCHARGE NOTE PROVIDER - HOSPITAL COURSE
Hospital Course:    52y old Male with a history of IVDU (heroin) not currently using, vertebral osteomyelitis s/p debridement, and MSSA bacteremia, b/l PEs with radiographic evidence of R heart strain and admitted to ICU, found to have LE DVTs s/p LLE embolectomy 10/18 presents to the ED with withdrawal and hypoxia. Patient states that he takes Methadone 135mg daily and Xanax 2mg TID however since being discharged to [facility] for rehabilitation 2 days ago he has not received his Xanax or Methadone. At this time his withdrawal symptoms include dizziness fatigue, headache, nausea, shaking, sneezing, diarrhea and insomnia. He was also noted in rehabilitation to have oxygen saturation of 86% on room at. Denies shortness of breath, cough, sick contacts and fever at this time. Has been compliant with Eliquis. His CT angio chest showed no new PE, and resolving opacities from his prior pneumonia. He was initially on 3 L NC on presentation but currently was on room air saturating normally without any respiratory distress.     # Methadone withdrawal  # IVDU on methadone  - Tylenol PRN for mild-moderate pain  - as per last inpatient medicine documentation continue the following medications  - Continue with Xanax 1 mg po am, 1mg po afternoon and 2mg at night - total daily dose of 4mg  - Continue Wellbutrin Xl 300mg po q 24hrs  - Continue Quetiapine 200mg at bedtime   - Continue Methadone 135mg daily  - f/u Addiction consult     #Recurrent MSSA bacteremia with Discitis   - Recent Spinal fusion  - as per ID Keflex 500mg q6 PO for 4 weeks ending 11/24  - Outpatient Neurosurgery F/U    #H/O PE  - c/w Eliquis  - CT angio chest showed no new PE, and resolving opacities from his prior pneumonia    #H/O DM  - Monitor FS before meals and at bedtime  - keep 140-180  - sliding scale if FS>180    Patient is stable and ready for discharge      Hospital Course:    52y old Male with a history of IVDU (heroin) not currently using, vertebral osteomyelitis s/p debridement, and MSSA bacteremia, b/l PEs with radiographic evidence of R heart strain and admitted to ICU, found to have LE DVTs s/p LLE embolectomy 10/18 presents to the ED with withdrawal and hypoxia. Patient states that he takes Methadone 135mg daily and Xanax 2mg TID however since being discharged to [facility] for rehabilitation 2 days ago he has not received his Xanax or Methadone. At this time his withdrawal symptoms include dizziness fatigue, headache, nausea, shaking, sneezing, diarrhea and insomnia. He was also noted in rehabilitation to have oxygen saturation of 86% on room at. Denies shortness of breath, cough, sick contacts and fever at this time. Has been compliant with Eliquis. His CT angio chest showed no new PE, and resolving opacities from his prior pneumonia. He was initially on 3 L NC on presentation but currently was on room air saturating normally without any respiratory distress.     # Methadone withdrawal  # IVDU on methadone  - Tylenol PRN for mild-moderate pain  Addiction Medicine recs:   - Continue with Xanax 1 mg po am, 1mg po afternoon and 2mg at night - total daily dose of 4mg  - Continue Wellbutrin Xl 300mg po q 24hrs  - Continue Quetiapine 200mg at bedtime   - Continue Methadone 135mg daily    #Recurrent MSSA bacteremia with Discitis   - Recent Spinal fusion  - as per ID Keflex 500mg q6 PO for 4 weeks ending 11/24  - Outpatient Neurosurgery F/U    #H/O PE  - c/w Eliquis  - CT angio chest showed no new PE, and resolving opacities from his prior pneumonia    #H/O DM  - Monitor FS before meals and at bedtime  - keep 140-180  - sliding scale if FS>180    Patient is stable and ready for discharge to home     Hospital Course:    52y old Male with a history of IVDU (heroin) not currently using, vertebral osteomyelitis s/p debridement, and MSSA bacteremia, b/l PEs with radiographic evidence of R heart strain and admitted to ICU, found to have LE DVTs s/p LLE embolectomy 10/18 presents to the ED with withdrawal and hypoxia. Patient states that he takes Methadone 135mg daily and Xanax 2mg TID however since being discharged to [facility] for rehabilitation 2 days ago he has not received his Xanax or Methadone. At this time his withdrawal symptoms include dizziness fatigue, headache, nausea, shaking, sneezing, diarrhea and insomnia. He was also noted in rehabilitation to have oxygen saturation of 86% on room at. Denies shortness of breath, cough, sick contacts and fever at this time. Has been compliant with Eliquis. His CT angio chest showed no new PE, and resolving opacities from his prior pneumonia. He was initially on 3 L NC on presentation but currently was on room air saturating normally without any respiratory distress.     1. Methadone withdrawal. hx of IVDU on methadone  - Tylenol PRN for mild-moderate pain  Addiction Medicine consult:   - Continue with Xanax 1 mg po am, 1mg po afternoon and 2mg at night - total daily dose of 4mg  - Continue Wellbutrin Xl 300mg po q 24hrs  - Continue Quetiapine 200mg at bedtime   - Continue Methadone 135mg daily    2. Recurrent MSSA bacteremia with Discitis   - Recent Spinal fusion  - as per ID Keflex 500mg q6 PO for 4 weeks ending 11/24  - Outpatient Neurosurgery F/U    3. H/O PE  - c/w Eliquis  - CT angio chest showed no new PE, and resolving opacities from his prior pneumonia    4. H/O DM  - Monitor FS before meals and at bedtime  - keep 140-180  - sliding scale if FS>180    Patient is stable and ready for discharge to home

## 2022-11-21 NOTE — DISCHARGE NOTE PROVIDER - CARE PROVIDER_API CALL
LUCY PETERSEN  Endocrinology, Diabetes and Metabolism  [facility] [place] [address]  Phone: [phone]  Fax: [phone]  Follow Up Time: 1 week    Brian Deleon)  Surgery  Neurosurgery  [address] [place] [address], [address]  [place] [address]  Phone: [phone]  Fax: [phone]  Follow Up Time: 1 week

## 2022-11-21 NOTE — DISCHARGE NOTE NURSING/CASE MANAGEMENT/SOCIAL WORK - NSDCPEELIQUISFU_GEN_ALL_CORE
Group Topic: [facility] RAMOS Education    Date: 12/2/2021  Start Time: 1115  End Time: 1150  Facilitators: Cari Torres LCSW    Focus: Resilience in recovery- provided education on resilience and the model of resilience including competence, confidence, connection, character, contribution, coping, and control; individuals shared one area of growth they want to focus on to become more resilient. Number in attendance: 9      Goals: Problem solving skills: To assist clients in reducing their overall stress levels by improving their capacity to solve the problems they face in their life  Method: Group  Attendance: Present  Mood/Affect: Appropriate  Behavior/Socialization: Appropriate to group  Participation: Active  Overall Patient Response to Group: Appropriate to topic  Individual Response to Group: Patient listened intently during education and shared his thoughts related to the group discussion.    Ability to Apply Content to Group: [address], [place], APSW  12/2/2021 Go for blood tests as directed. Your doctor will do lab tests at regular visits to monitor the effects of this medicine. Please follow up with your doctor and keep your health care provider appointments.

## 2022-11-21 NOTE — CONSULT NOTE ADULT - CONSULT REASON
opioid use disorder (on maintenance therapy, methadone), benzodiazepine dependence
opioid use disorder (on maintenance therapy, methadone), benzodiazepine dependence

## 2022-11-21 NOTE — DISCHARGE NOTE PROVIDER - NSDCCPCAREPLAN_GEN_ALL_CORE_FT
PRINCIPAL DISCHARGE DIAGNOSIS  Diagnosis: Methadone withdrawal  Assessment and Plan of Treatment: You came into the ED from [facility] with complaints of dizziness, fatigue, headache, nausea, shaking, sneezing, diarrhea and insomnia after not receiving your Methadone dose for 2 days in [facility].      SECONDARY DISCHARGE DIAGNOSES  Diagnosis: MSSA bacteremia  Assessment and Plan of Treatment: Continue Keflex until 11/24. Follow-up with Neurosurgery when you leave the hospital.     PRINCIPAL DISCHARGE DIAGNOSIS  Diagnosis: Methadone withdrawal  Assessment and Plan of Treatment: You came into the ED from [facility] with complaints of dizziness, fatigue, headache, nausea, shaking, sneezing, diarrhea and insomnia after not receiving your Methadone dose for 2 days in [facility]. Continue with your daily 135mg Methadone dose.      SECONDARY DISCHARGE DIAGNOSES  Diagnosis: MSSA bacteremia  Assessment and Plan of Treatment: Continue Keflex until 11/24. Follow-up with Neurosurgery when you leave the hospital.

## 2022-11-21 NOTE — DISCHARGE NOTE PROVIDER - PROVIDER TOKENS
PROVIDER:[TOKEN:[[id number]],FOLLOWUP:[1 week]],PROVIDER:[TOKEN:[[id number]],FOLLOWUP:[1 week]]

## 2022-11-21 NOTE — DISCHARGE NOTE PROVIDER - ATTENDING DISCHARGE PHYSICAL EXAMINATION:
VITALS:   T(C): 36.6 (11-21-22 @ 08:00), Max: 36.6 (11-21-22 @ 00:02)  HR: 92 (11-21-22 @ 08:00) (82 - 109)  BP: 124/61 (11-21-22 @ 08:00) (118/67 - 133/94)  RR: 19 (11-21-22 @ 08:00) (18 - 19)  SpO2: 93% (11-21-22 @ 00:02) (93% - 94%)    GENERAL: NAD, lying in bed comfortably  HEART: Regular rate and rhythm,  LUNGS: Unlabored respirations.  Clear to auscultation bilaterally,   ABDOMEN: Soft, nontender, nondistended, +BS  EXTREMITIES: 2+ peripheral pulses bilaterally.

## 2022-11-22 NOTE — CHART NOTE - NSCHARTNOTEFT_GEN_A_CORE
CDI Clarification:  ON his recent admission,   Severe sepsis on admission was evaluated and ruled in

## 2022-11-22 NOTE — CHART NOTE - NSCHARTNOTESELECT_GEN_ALL_CORE
Event Note
Event Note
Methadone/Event Note
RD Follow-up/Event Note
Transfer Note
Upgrade/Transfer Note

## 2022-11-23 NOTE — PROGRESS NOTE ADULT - ATTENDING SUPERVISION STATEMENT
Resident
Fellow
Topical Clindamycin Counseling: Patient counseled that this medication may cause skin irritation or allergic reactions.  In the event of skin irritation, the patient was advised to reduce the amount of the drug applied or use it less frequently.   The patient verbalized understanding of the proper use and possible adverse effects of clindamycin.  All of the patient's questions and concerns were addressed.

## 2022-11-25 DIAGNOSIS — F13.20 SEDATIVE, HYPNOTIC OR ANXIOLYTIC DEPENDENCE, UNCOMPLICATED: ICD-10-CM

## 2022-11-25 DIAGNOSIS — B95.61 METHICILLIN SUSCEPTIBLE STAPHYLOCOCCUS AUREUS INFECTION AS THE CAUSE OF DISEASES CLASSIFIED ELSEWHERE: ICD-10-CM

## 2022-11-25 DIAGNOSIS — Z86.711 PERSONAL HISTORY OF PULMONARY EMBOLISM: ICD-10-CM

## 2022-11-25 DIAGNOSIS — Z79.84 LONG TERM (CURRENT) USE OF ORAL HYPOGLYCEMIC DRUGS: ICD-10-CM

## 2022-11-25 DIAGNOSIS — F11.13 OPIOID ABUSE WITH WITHDRAWAL: ICD-10-CM

## 2022-11-25 DIAGNOSIS — Z20.822 CONTACT WITH AND (SUSPECTED) EXPOSURE TO COVID-19: ICD-10-CM

## 2022-11-25 DIAGNOSIS — Z23 ENCOUNTER FOR IMMUNIZATION: ICD-10-CM

## 2022-11-25 DIAGNOSIS — E11.9 TYPE 2 DIABETES MELLITUS WITHOUT COMPLICATIONS: ICD-10-CM

## 2022-11-25 DIAGNOSIS — R78.81 BACTEREMIA: ICD-10-CM

## 2022-11-25 DIAGNOSIS — Z86.718 PERSONAL HISTORY OF OTHER VENOUS THROMBOSIS AND EMBOLISM: ICD-10-CM

## 2022-11-25 DIAGNOSIS — F41.9 ANXIETY DISORDER, UNSPECIFIED: ICD-10-CM

## 2022-11-28 DIAGNOSIS — F17.200 NICOTINE DEPENDENCE, UNSPECIFIED, UNCOMPLICATED: ICD-10-CM

## 2022-11-28 DIAGNOSIS — A41.9 SEPSIS, UNSPECIFIED ORGANISM: ICD-10-CM

## 2022-11-28 DIAGNOSIS — I26.99 OTHER PULMONARY EMBOLISM WITHOUT ACUTE COR PULMONALE: ICD-10-CM

## 2022-11-28 DIAGNOSIS — E66.2 MORBID (SEVERE) OBESITY WITH ALVEOLAR HYPOVENTILATION: ICD-10-CM

## 2022-11-28 DIAGNOSIS — F11.10 OPIOID ABUSE, UNCOMPLICATED: ICD-10-CM

## 2022-11-28 DIAGNOSIS — Z28.311 PARTIALLY VACCINATED FOR COVID-19: ICD-10-CM

## 2022-11-28 DIAGNOSIS — H35.033 HYPERTENSIVE RETINOPATHY, BILATERAL: ICD-10-CM

## 2022-11-28 DIAGNOSIS — J18.9 PNEUMONIA, UNSPECIFIED ORGANISM: ICD-10-CM

## 2022-11-28 DIAGNOSIS — B95.61 METHICILLIN SUSCEPTIBLE STAPHYLOCOCCUS AUREUS INFECTION AS THE CAUSE OF DISEASES CLASSIFIED ELSEWHERE: ICD-10-CM

## 2022-11-28 DIAGNOSIS — D64.9 ANEMIA, UNSPECIFIED: ICD-10-CM

## 2022-11-28 DIAGNOSIS — Z99.89 DEPENDENCE ON OTHER ENABLING MACHINES AND DEVICES: ICD-10-CM

## 2022-11-28 DIAGNOSIS — E87.20 ACIDOSIS, UNSPECIFIED: ICD-10-CM

## 2022-11-28 DIAGNOSIS — F41.9 ANXIETY DISORDER, UNSPECIFIED: ICD-10-CM

## 2022-11-28 DIAGNOSIS — R65.20 SEVERE SEPSIS WITHOUT SEPTIC SHOCK: ICD-10-CM

## 2022-11-28 DIAGNOSIS — H57.02 ANISOCORIA: ICD-10-CM

## 2022-11-28 DIAGNOSIS — M46.40 DISCITIS, UNSPECIFIED, SITE UNSPECIFIED: ICD-10-CM

## 2022-11-28 DIAGNOSIS — E83.42 HYPOMAGNESEMIA: ICD-10-CM

## 2022-11-28 DIAGNOSIS — E11.69 TYPE 2 DIABETES MELLITUS WITH OTHER SPECIFIED COMPLICATION: ICD-10-CM

## 2022-11-28 DIAGNOSIS — J96.01 ACUTE RESPIRATORY FAILURE WITH HYPOXIA: ICD-10-CM

## 2022-11-28 DIAGNOSIS — I82.412 ACUTE EMBOLISM AND THROMBOSIS OF LEFT FEMORAL VEIN: ICD-10-CM

## 2022-11-28 DIAGNOSIS — F32.A DEPRESSION, UNSPECIFIED: ICD-10-CM

## 2022-11-29 ENCOUNTER — APPOINTMENT (OUTPATIENT)
Dept: NEUROSURGERY | Facility: CLINIC | Age: 52
End: 2022-11-29

## 2022-11-29 VITALS — BODY MASS INDEX: 35.78 KG/M2 | WEIGHT: 270 LBS | HEIGHT: 73 IN

## 2022-11-29 DIAGNOSIS — M86.9 OSTEOMYELITIS, UNSPECIFIED: ICD-10-CM

## 2022-11-29 PROCEDURE — 99213 OFFICE O/P EST LOW 20 MIN: CPT

## 2022-11-30 PROBLEM — M86.9: Status: ACTIVE | Noted: 2022-09-27

## 2022-12-02 NOTE — ASSESSMENT
[FreeTextEntry1] : Mr. MCDERMOTT is doing well. He will follow up in 3 months with repeat xrays lumbar spine. I have recommended an external bone fusion stimulator to promote fusion. He is a former smoker. This will be ordered for him. He is aware the rep will contact him.

## 2022-12-02 NOTE — END OF VISIT
[FreeTextEntry3] : ATTENDING ATTESTATION\par Mr Tran presents for follow-up visit at approximately 3-month pierce after his L4-5 TLIF.  From a lumbar spine standpoint he is doing quite well with minimal complaints and his wound is well-healed.  He does not have recent imaging for review.  He was recently hospitalized for pulmonary emboli and has subsequently undergone treatment.  We will get new x-rays and see him again at approximately the 6-month pierce after surgery [Time Spent: ___ minutes] : I have spent [unfilled] minutes of time on the encounter.

## 2022-12-27 NOTE — DISCHARGE NOTE PROVIDER - NSDCADMDATE_GEN_ALL_CORE_FT
Total Knee  Discharge Instructions  Dr. PIERO Mullins” Ninfa II  [phone]    INCISION CARE  Wash your hands prior to dressing changes  CHARLES Wound VAC: Postoperatively you had a CHARLES Wound Vac placed on the incision. This was placed under sterile conditions in the operating room. It remains in place for 7 days postoperatively. After 7 days, the entire dressing must be removed, including all of the sticky adhesive. The dressing and battery pack provide gentle suction to the incision and provide several benefits over a traditional dressing:  It maintains the sterile environment of the OR and reduces the risk of infection  The suction removes unwanted buildup of blood/hematoma under the skin to reduce swelling  The suction also promotes fresh blood supply to the skin and soft tissue to speed up healing  The postoperative scar is reduced in size  Showering is permitted immediately after surgery, but the battery pack must be protected or removed during the shower.   After 7 days the CHARLES Wound Vac is removed. If there is no drainage, no dressing is required. If there is some scant drainage a dry bandage can be applied and changed daily until seen in the office or until the drainage stops.   No creams or ointments to the incisions until 4 weeks post op.  Do not touch or pick at the incision  Check incision every day and notify surgeon immediately if any of the following signs or symptoms are seen:  Increase in redness  Increase in swelling around the incision and of the entire extremity  Increase in pain  NEW drainage or oozing from the incision  Pulling apart of the edges of the incision  Increase in overall body temperature (greater than 100.4 degrees)  Zip-Line: your incision was closed with a state of the art device.   Is a non-invasive and easy to use wound closure device that replaces sutures, staples and glue for surgical incisions  It minimizes scarring and eliminating “railroad” marks that come with staples or  10-Aug-2022 17:34 sutures  It makes removal as atraumatic as peeling off a bandage  Can be removed at home or by a physical therapist or nurse at 14 days postoperatively    ACTIVITIES  Exercises:  Physical therapy will begin immediately while in the hospital. Patients going to a nursing home will get therapy as part of their care at the SNU/SNF facility. Patients going home may also have a therapist come to the house to help them mobilize until they can safely get to an outpatient therapy facility.  Elevate the affected leg most of the day during the first week post operatively. Caution must be taken to avoid pillow placement directly under the heel of the leg, as this can cause pressure ulcers even with a soft pillow. All pillows and blankets should be placed underneath of the thigh and calf so that the heel is free-floating.  Use cold packs for 20-30 minutes approximately 5 times per day.  You should perform the daily stretching and strengthening exercises as taught by the therapist as often as possible. This can be done many times a day.  Full weight bearing is allowed after surgery. It will be sore/painful to put weight on the leg, but this will help the bone to heal and prevent complications such as pneumonia, bed sores and blood clots. Mobilization is vital to the recovery process.  Activities of Daily Living:  No tub baths, hot tubs, or swimming pools for 4 weeks.  May shower and let water run over the incision immediately after surgery. The battery pack of the CHARLES Wound Vac must be protected or removed while in the shower. After the CHARLES is removed 7 days after surgery showering is permitted as long as there is no drainage from the wound.     Restrictions  Weight: It is ok to allow full weight bearing after surgery. Weight on the leg actually quickens the recovery process. While it will be sore/painful to put weight on the leg, it is safe to do so. Hip replacement after hip fracture has a much slower recover process. It can  take months to heal fully from a hip fracture and patients even make some slow benefits up to a year afterwards.   Driving: Many patients have questions about when it is safe to return to driving. The answer is that this is extremely variable. It depends on the extent of the surgery, as well as how quickly you heal. Certainly left leg surgeries make returning to driving easier while right leg surgeries require more extensive rehabilitation before driving can be safe. Until you can press down on the brake hard, and are off of all narcotics, driving is not permitted. Your surgeon cannot “clear” you to return to driving, only you can make the decision when you feel it is safe.    Medications  Anticoagulants: After upper extremity surgery most patients do not require an anticoagulant unless you have another injury that will be keeping you from mobilizing. Lower extremity surgery typically does require use of an anticoagulation medicine.   IF YOU HAD LOWER EXTREMITY SURGERY AND ARE NOT DISCHARGED HOME WITH ANY ANTICOAGULANT MEDICINE YOU SHOULD TAKE ASPIRIN 325mg DAILY FOR 30 DAYS POSTOPERATIVELY.  If you are discharged home with an anticoagulant such as Aspirin, Xarelto, Eliquis, Coumadin, or Lovenox, follow these simple instructions:   Notify surgeon immediately if any patel bleeding is noted in the urine, stool, emesis, or from the nose or the incision. Blood in the stool will often appear as black rather than red. Blood in urine may appear as pink. Blood in emesis may appear as brown/black like coffee grounds.  You will need to apply pressure for longer periods of time to any cuts or abrasions to stop bleeding  Avoid alcohol while taking anticoagulants  Most anticoagulants are to be taken for 30 days postoperatively. After this time, you may stop using them unless instructed otherwise.   If you were already taking an anticoagulant (commonly Aspirin, Coumadin, or Plavix) you will likely be resuming your normal dose  postoperatively and will be continuing that medication at the discretion of the prescribing physician.  Stool Softeners: You will be at greater risk of constipation after surgery due to being less mobile and the pain medications.  Take stool softeners as needed. Over the counter Colace 100 mg 1-2 capsules twice daily can be taken.  If stools become too loose or too frequent, please decreases the dosage or stop the stool softener.  If constipation occurs despite use of stool softeners, you are to continue the stool softeners and add a laxative (Milk of Magnesia 1 ounce daily as needed)  Drink plenty of fluids, and eat fruits and vegetables during your recovery time. Getting up and mobilizing will help the bowels to recover their regular function, as will weaning off of all narcotics when the pain becomes tolerable.  Pain Medications: Utilized after surgery are narcotics. This is some general information about these medications.  CLASSIFICATION: Pain medications are called Opioids and are narcotics  LEGALITIES: It is illegal to share narcotics with others  DRIVING: it is illegal to drive while under the influence of narcotics. Doing so is a DUI.  POTENTIAL SIDE EFFECTS: nausea, vomiting, itching, dizziness, drowsiness, dry mouth, constipation, and difficulty urinating.  POTENTIAL ADVERSE EFFECTS:  Opioid tolerance can develop with use of pain medications and this simply means that it requires more and more of the medication to control pain. However, this is seen more in patients that use opioids for longer periods of time.  Opioid dependence can develop with use of Opioids. People with opioid dependence will experience withdrawal symptoms upon cessation of the medication.  Opioid addiction can develop with use of Opioids. The incidence of this is very unlikely in patients who take the medications as ordered and stop the medications as instructed.  Opioid overdose can be dangerous, but is unlikely when the medication  is taken as ordered and stopped when ordered. It is important not to mix opioids with alcohol as this can lead to over sedation and respiratory difficulty.  DOSAGE:  After the initial surgical pain begins to resolve, you may begin to decrease the pain medication. By the end of a few weeks, you should be off of pain medications.  Refills will not be given by the office during evening hours, on weekends, or after 6 weeks post-op. You are responsible for weaning off of pain medication. You can increase the time between narcotic pills, taking one every 4 then 6 then 8 hours and so on.  To seek refills on pain medications during the initial 6-week post-operative period, you must call the office to request the refill. The office will then notify you when to  the prescription. DO NOT wait until you are out of the medication to request a refill. Prescriptions will not be filled over the weekend and depending on the schedule, it may take a couple days for the prescription to be available. Someone will have to pick the prescription in person at the office.    FOLLOW-UP VISITS  You will need to follow up in the office with your surgeon in 3 weeks, or as instructed elsewhere in your discharge paperwork. Please call this number [phone] to schedule this appointment. If you are going to an SNF/SNU facility, they will arrange for you to follow up in the office.  If you have any concerns or suspected complications prior to your follow up visit, please call the office. Do not wait until your appointment time if you suspect complications. These will need to be addressed in the office promptly.      Ant Ocasio II, MD  Orthopaedic Surgery  [facility]

## 2023-01-01 NOTE — PROGRESS NOTE ADULT - ATTENDING COMMENTS
Declined by Shahana Navarro MD on 2023 11:20 AM. Result is not mine. Please reassign to the correct provider.     Normal  metabolic screen.  Please forward results to patient's PCP at [facility].    Shahana Navarro MD   52yo M w/ pmhx of IVDU (heroin), vertebral osteomyelitis, and MSSA bacteremia presents for worsening back pain    Spinal OM, with epidural abscess/phlegmon---Recently completed 6wks of antibiotics which ended on 3/1.  MR Lumbar Spine 03/18 significant for  left L5-S1 facetitis with associated multifocal  abscess formation directly adjacent to the left facet joint, Ventral epidural phlegmon overlying the L5 vertebral body causing significant compression of the descending nerve roots.  ID noted-chest findings and involvement of facet joints, would rule out TB with AFB sputum x 1 negative so far , quantiferon gold is negative , repeat HIV screen negative & pulmonary evaluation suggested no new intervention    cefazolin 2g q 8 hours for now--  Follow-up blood cx are negative  will follow results of IR biopsy done 3/16---staph aureus-- continue cefazolin for now for 4 more week as per ID-- will alex PICC line incase req longer course of ABX    Opioid abuse on Methadone  RUL Nodular Opacities  Recent history & post treatment for MSSA bacteremia  Hx of IVDU  Active nicotine dependence  morbid obesity    PICC line monday and DC to SNF-- case management made aware            I

## 2023-01-20 ENCOUNTER — APPOINTMENT (OUTPATIENT)
Dept: GASTROENTEROLOGY | Facility: CLINIC | Age: 53
End: 2023-01-20

## 2023-01-31 ENCOUNTER — APPOINTMENT (OUTPATIENT)
Dept: NEUROSURGERY | Facility: CLINIC | Age: 53
End: 2023-01-31
Payer: MEDICAID

## 2023-01-31 VITALS — HEIGHT: 73 IN | WEIGHT: 315 LBS | BODY MASS INDEX: 41.75 KG/M2

## 2023-01-31 PROCEDURE — 99214 OFFICE O/P EST MOD 30 MIN: CPT

## 2023-02-02 NOTE — REASON FOR VISIT
[Follow-Up: _____] : a [unfilled] follow-up visit [Parent] : parent [FreeTextEntry1] : Mr Mayfield presents for 5-month follow-up visit after his L4-5 TLIF for severe osteomyelitis/discitis.  He is doing quite well from the spine perspective.  Ambulating well with a walker, which is better than his previous baseline of paraplegic.  He recently was hospitalized for a bout of C. difficile which has subsequently improved.  He does not have recent imaging for review.  His wound is well-healed. \par \par We will obtain a new lumbar x-ray and external bone stimulator and see him again in several months.

## 2023-02-14 ENCOUNTER — RESULT REVIEW (OUTPATIENT)
Age: 53
End: 2023-02-14

## 2023-02-14 ENCOUNTER — OUTPATIENT (OUTPATIENT)
Dept: OUTPATIENT SERVICES | Facility: HOSPITAL | Age: 53
LOS: 1 days | End: 2023-02-14
Payer: MEDICAID

## 2023-02-14 DIAGNOSIS — M43.16 SPONDYLOLISTHESIS, LUMBAR REGION: ICD-10-CM

## 2023-02-14 PROCEDURE — 72110 X-RAY EXAM L-2 SPINE 4/>VWS: CPT

## 2023-02-14 PROCEDURE — 72110 X-RAY EXAM L-2 SPINE 4/>VWS: CPT | Mod: 26

## 2023-02-15 DIAGNOSIS — M43.16 SPONDYLOLISTHESIS, LUMBAR REGION: ICD-10-CM

## 2023-02-22 NOTE — PROGRESS NOTE ADULT - SUBJECTIVE AND OBJECTIVE BOX
Received patient sleeping on bed; BP on the low side. Drained ostomy. Dressing in place; clean, dry and intact. BP for close monitoring. Maintained a calm and comfortable environment. Perfect served Dr. Danette Hooper to inform that patient is having episode of low blood pressure; On the upper arm, it reads 70/48 while on the lower arm, it reads 90/58 mmhg. She is also drowsy but easily arousable. Melatonin and Inderal were not given due to contraindication. Awaiting response. MIKAEL MCDERMOTT 52y Male  MRN#: [id number]     Hospital Day: 7d    Pt is currently admitted with the primary diagnosis of submassive PE    Overnight events   -No major overnight events                                          ----------------------------------------------------------  OBJECTIVE  PAST MEDICAL & SURGICAL HISTORY  IV drug abuse    Vertebral osteomyelitis    MSSA bacteremia    No significant past surgical history                                              -----------------------------------------------------------  ALLERGIES:  No Known Allergies                                            ------------------------------------------------------------    HOME MEDICATIONS  Home Medications:  ALPRAZolam 2 mg oral tablet: 1 tab(s) orally 3 times a day (02 Sep 2022 11:47)  buPROPion 300 mg/24 hours (XL) oral tablet, extended release: 1 tab(s) orally once a day (02 Sep 2022 11:47)  folic acid 1 mg oral tablet: 1 tab(s) orally once a day (12 Mar 2022 21:41)  gabapentin 300 mg oral capsule: 1 cap(s) orally 3 times a day (12 Mar 2022 21:41)  methadone 5 mg oral tablet: 135 milligram(s) orally once a day (29 Mar 2022 11:35)  mirtazapine 30 mg oral tablet: 1 tab(s) orally once a day (at bedtime) (02 Sep 2022 11:47)  Multiple Vitamins oral tablet: 1 tab(s) orally once a day (12 Mar 2022 21:41)  nicotine 21 mg/24 hr transdermal film, extended release: 1 patch transdermal once a day (12 Mar 2022 21:41)  QUEtiapine 200 mg oral tablet: 1 tab(s) orally once a day (at bedtime) (02 Sep 2022 11:47)                           MEDICATIONS:  STANDING MEDICATIONS  ALPRAZolam 1 milliGRAM(s) Oral every 8 hours  buPROPion XL (24-Hour) . 300 milliGRAM(s) Oral daily  cefepime   IVPB      cefepime   IVPB 2000 milliGRAM(s) IV Intermittent every 8 hours  folic acid 1 milliGRAM(s) Oral daily  heparin  Infusion.  Unit(s)/Hr IV Continuous <Continuous>  levoFLOXacin IVPB      levoFLOXacin IVPB 750 milliGRAM(s) IV Intermittent every 24 hours  multivitamin 1 Tablet(s) Oral daily  nicotine - 21 mG/24Hr(s) Patch 1 Patch Transdermal daily  pantoprazole    Tablet 40 milliGRAM(s) Oral before breakfast  thiamine 100 milliGRAM(s) Oral daily    PRN MEDICATIONS  acetaminophen     Tablet .. 650 milliGRAM(s) Oral every 6 hours PRN  aluminum hydroxide/magnesium hydroxide/simethicone Suspension 30 milliLiter(s) Oral every 4 hours PRN  cyclobenzaprine 10 milliGRAM(s) Oral three times a day PRN  haloperidol    Injectable 5 milliGRAM(s) IntraMuscular every 8 hours PRN  heparin   Injectable 19960 Unit(s) IV Push every 6 hours PRN  heparin   Injectable 5000 Unit(s) IV Push every 6 hours PRN  melatonin 3 milliGRAM(s) Oral at bedtime PRN  ondansetron Injectable 4 milliGRAM(s) IV Push every 8 hours PRN                                            ------------------------------------------------------------  VITAL SIGNS: Last 24 Hours  T(C): 37.3 (18 Oct 2022 12:25), Max: 37.3 (18 Oct 2022 11:49)  T(F): 99.1 (18 Oct 2022 11:49), Max: 99.1 (18 Oct 2022 11:49)  HR: 81 (18 Oct 2022 12:25) (72 - 81)  BP: 133/76 (18 Oct 2022 12:25) (98/57 - 133/76)  BP(mean): 88 (18 Oct 2022 12:25) (71 - 88)  RR: 21 (18 Oct 2022 12:25) (10 - 24)  SpO2: 93% (18 Oct 2022 12:25) (93% - 97%)      10-17-22 @ 07:01  -  10-18-22 @ 07:00  --------------------------------------------------------  IN: 681 mL / OUT: 75 mL / NET: 606 mL    10-18-22 @ 07:01  -  10-18-22 @ 14:13  --------------------------------------------------------  IN: 58 mL / OUT: 375 mL / NET: -317 mL                                             --------------------------------------------------------------  LABS:                        9.1    8.29  )-----------( 355      ( 18 Oct 2022 03:55 )             30.1     10-18    137  |  94<L>  |  10  ----------------------------<  165<H>  4.6   |  38<H>  |  <0.5<L>    Ca    8.3<L>      18 Oct 2022 03:55  Mg     1.9     10-18    TPro  6.1  /  Alb  2.7<L>  /  TBili  0.2  /  DBili  x   /  AST  16  /  ALT  7   /  AlkPhos  89  10-18    PT/INR - ( 16 Oct 2022 22:53 )   PT: 14.90 sec;   INR: 1.30 ratio         PTT - ( 18 Oct 2022 10:40 )  PTT:62.7 sec                                                            --------------------------------------------------------------    PHYSICAL EXAM:  GENERAL: Awake, alert, oriented to person, place, time, situation. Well-nourished, laying in bed appearing in no acute distress  HEENT: No FNDs, atraumatic, normocephalic  LUNGS: Clear to auscultation bilaterally  HEART: S1/S2. No heaves or thrills  ABD: Soft, non-tender, non-distended.  EXT/NEURO: Strength, sensation and ROM grossly intact.  SKIN: No edema      PLAN:  GENERAL: Awake, alert, oriented to person, place, time, situation. Overweight, laying in bed appearing in no acute distress  HEENT: No FNDs, atraumatic, normocephalic  LUNGS: Hypoventilation  HEART: S1/S2. No heaves or thrills  ABD: Distended but soft  EXT/NEURO: Contractures overlying bilateral dorsal wrists and fingers. Minimal ROM of bilateral wrists and fingers. Otherwise strength, sensation, ROM grossly intact.  SKIN: Scar tissue of bilateral forearms. No edema    PLAN:  #Acute Submassive PE  -CT on admission with bilateral submassive PEs + concern for RV strain  -TTE: EF 55%, G1DD, mild TVR, no right heart strain  -VA duplx LE vein: Acute thrombus within the left common femoral, femoral, deep femoral, popliteal, gastrocnemius, posterior tibial and anterior tibial veins.  -hep gtt prior to embolectomy  -f/u IR for DVT embolectomy (10/18/22)  -f/u AC post embolectomy per IR recs    #Acute hypoxemic respiratory failure  #Pneumonia  #Hx DIONICIO/OHS non-compliant with BIPAP or home CPAP  -currently on HFNC, attempting to wean daily  -encourage BIPAP o/n in place of CPAP  -c/w cefepime/levo for now  -can dc levo if legionella negative    #Hx MSSA bacteremia  #Hx Epidural phlegmon/OM  #Hx R Psoas Abscess  -re-eval per ID, rec MR spine to r/o another source of infection  -Per NSG, pt able to receive MR even with recent spinal fusion  -MR w&w/o CTL spine once pt off HFNC    #HO IVDA, Opioid Abuse on Methadone   #Agitation  -followed by psych  -c/w lowering of benzo (xanax) dose, pt on 6mg per day at home    # DVT prophylaxis   -therapeutic lovenox    # GI prophylaxis   -protonix    # Diet   -DASH/TLC  -1:1 given upper extremity contractures    #Progress Note Handoff  Pending (specify): Embolectomy, AC, weaning O2, down titration of benzos  Family discussion:

## 2023-05-02 ENCOUNTER — APPOINTMENT (OUTPATIENT)
Dept: NEUROSURGERY | Facility: CLINIC | Age: 53
End: 2023-05-02

## 2023-05-03 NOTE — PHYSICAL THERAPY INITIAL EVALUATION ADULT - LEVEL OF CONSCIOUSNESS, REHAB EVAL
"AFTER SOME CHART REVIEW I MADE SERVICE RECOVERY CALL  TO PATIENT  IDENTIFIED OFFICE, SELF, AND ABBY  PATIENT CONFUSED AS TO WHY I WAS CALLING  I ADVISED PATIENT THAT TEAM MEMBER SHARED HER ENCOUNTER WITH ME  PATIENT THEN STATED \"NOTHING, I HAVE AN APPOINTMENT TODAY AT 440PM, IT'S ALL GOOD  I WAS JUST MAD BECAUSE I CALLED AND NO ONE CALLS ME BACK SO I HAD TO COME IN PERSON JUST TO MAKE AN APPOINTMENT\" I WENT THROUGH MESSAGES LEFT FROM HER TO US AND OUR RESPONSES TO HER LEFT ON HER VM  SEEMS LIKE IT WAS JUST A CONNECTION ISSUES BETWEEN BOTH PARTIES  PATIENT AGREED, STATED SHE WILL BE IN TODAY FOR HER VISIT  I ADVISED HER TO CONTACT ME DIRECTLY, PROVIDED NAME AND NUMBER, SHOULD THERE BE ANY CONNECTION ISSUES   PHONE CALL ENDED    " alert

## 2023-06-06 NOTE — BH CONSULTATION LIAISON ASSESSMENT NOTE - NSBHMSEKNOWHOW_PSY_ALL_CORE
If you receive a survey via e-mail or mail, please take the time to complete and return as your feedback is very helpful to our practice.     ########################################    If your neurological testing is not going to be scheduled today our Pre-Service Department will contact you Victor Manuel schedule within one to two days. If you would like to call and schedule when it is convenient for you or if you need to reschedule your appointment please call the Pre-Service Department at [phone].    Your tests will be reviewed by the Benefits Department and if there are any concerns regarding prior authorization or financial obligation they will contact you. We recommend you still contact your insurance company to see if the test, provider, and location of service are covered, and if so, will there be any out of pocket expenses.     If you should have any concerns regarding the financial obligation of the tests please contact our Financial Advocates at [phone] and they will be happy to assist you.                 Thank you for letting us care for you today.       #######################################    In order to make sure we are meeting our patients' needs, we require a 36 hour notice from you prior to picking up your medications. Attached is a table that will help you determine when your prescriptions will be ready for pick-up.                      If the refill is requested between when the clinic opens and 12 pm on  You can  your prescription at the pharmacy after 6 PM on   Monday Tuesday Tuesday Wednesday Wednesday Thursday Thursday Friday Friday Monday     If the refill is requested between 12 pm and after the clinic closes on You can  your prescription at the pharmacy after 12 PM on   Monday Wednesday Tuesday Thursday Wednesday Friday Thursday Monday Friday Tuesday      
Current Events

## 2023-06-15 ENCOUNTER — APPOINTMENT (OUTPATIENT)
Dept: NEUROSURGERY | Facility: CLINIC | Age: 53
End: 2023-06-15
Payer: MEDICAID

## 2023-06-15 VITALS — BODY MASS INDEX: 41.75 KG/M2 | WEIGHT: 315 LBS | HEIGHT: 73 IN

## 2023-06-15 DIAGNOSIS — M43.16 SPONDYLOLISTHESIS, LUMBAR REGION: ICD-10-CM

## 2023-06-15 PROCEDURE — 99214 OFFICE O/P EST MOD 30 MIN: CPT

## 2023-06-22 NOTE — HISTORY OF PRESENT ILLNESS
[FreeTextEntry1] : Mr Mayfield presents for a follow-up visit after his L4-5 TLIF for severe osteomyelitis/discitis. He is doing quite well from the spine perspective. Ambulating well with a walker, which is better than his previous baseline of paraplegic.

## 2023-06-22 NOTE — END OF VISIT
[FreeTextEntry3] : Xrays demonstrate solid fusion of L4-5 without evidence of hardware failure. Stable L4/5 loss of VB height secondary to infection. [Time Spent: ___ minutes] : I have spent [unfilled] minutes of time on the encounter.

## 2023-06-30 NOTE — PROCEDURE NOTE - NSANATOMICLLOCATION_GEN_A_CORE
left Itraconazole Counseling:  I discussed with the patient the risks of itraconazole including but not limited to liver damage, nausea/vomiting, neuropathy, and severe allergy.  The patient understands that this medication is best absorbed when taken with acidic beverages such as non-diet cola or ginger ale.  The patient understands that monitoring is required including baseline LFTs and repeat LFTs at intervals.  The patient understands that they are to contact us or the primary physician if concerning signs are noted.

## 2023-07-05 NOTE — PRE-ANESTHESIA EVALUATION ADULT - NSANTHINDVINFOSD_GEN_ALL_CORE
Refill Routing Note   Medication(s) are not appropriate for processing by [facility] for the following reason(s):      Medication outside of protocol    [facility] action(s):  Route Care Due:  None identified          Appointments  past 12m or future 3m with PCP    Date Provider   Last Visit   4/4/2023 Luciana Herman MD   Next Visit   Visit date not found Luciana Herman MD   ED visits in past 90 days: 0        Note composed:3:09 PM 07/05/2023          
patient

## 2023-08-08 NOTE — ED ADULT NURSE NOTE - NSHOSCREENINGQ1_ED_ALL_ED
[place] [id number]  Oncology Progress Note  Camden Pacheco, 1956, [id number]  8/8/2023    Assessment/Plan:   1. Neoplasm of ampulla of Vater; 2. 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 This is a 28-year-old female who is presently undergoing survivorship observation after being treated for the above. Patient is doing quite well. Some mild discomfort with certain foods but otherwise doing well. Patient is eating a well-rounded diet continues to follow-up with imaging and laboratory testing as requested. Patient has subsequent appointment with surgical oncology later this week however I did review with the patient CT scan results. Medical oncology will continue to see the patient back every 6 months.  - CBC and differential; Future  - Comprehensive metabolic panel; Future  - Cancer antigen 19-9; Future    3. BRCA positive  History of hysterectomy with oophorectomy.  Patient continues to undergo screening mammography.  - CBC and differential; Future  - Comprehensive metabolic panel; Future  - Cancer antigen 19-9; Future    The patient is scheduled for follow-up in approximately 6 months. Patient voiced agreement and understanding to the above. Patient knows to call the Hematology/Oncology office with any questions and concerns regarding the above. Goals and Barriers:    Current Goal:   Prolong Survival from Cancer. Barriers: None. Patient's Capacity to Self Care:  Patient able to self care. Lelo Belle PA-C  Medical Oncology/Hematology  [facility]  ______________________________________________________________________________________________________________    Subjective     Chief Complaint   Patient presents with   • Follow-up       History of present illness/Cancer History:   Oncology History   History of malignant neoplasm of ampulla of Vater   3/15/2021 Initial Diagnosis    Neoplasm of ampulla of Vater     4/26/2021 Surgery    B. Celiac lymph node:     - Single lymph node; negative for malignancy (0/1). C.  Whipple resection:     - Invasive poorly differentiated mucinous adenocarcinoma. - Tumor arises in the background of an adenoma with high grade dysplasia. - Lymphatic and venous channel invasion by tumor present. - Metastatic carcinoma present in one of twenty lymph nodes (1/20). - All margins are negative for tumor.      4/26/2021 -  Cancer Staged    Staging form: Ampulla of Vater, AJCC 8th Edition  - Pathologic stage from 4/26/2021: Stage IIIA (pT3b, pN1, cM0) - Signed by ANDRES Sierra on 6/9/2023  Total positive nodes: 1  Total nodes examined: 22  Histologic grade (G): G3  Histologic grading system: 3 grade system  Residual tumor (R): R0 - None       6/9/2021 - 10/1/2021 Chemotherapy    Cycles 1-6  6/2021 through 8/20/21:  FOLFOX    Cycles 7 -8:  9/15/2021- 10/12/2021  Leucovorin 400mg/m2, IV, Day 1  5-Fu 400 mg/m2, IV , Day 1   5-Fu 1200 mg/m2, IV, CI x 46 hours  Cycle length = 2 weeks    palonosetron (ALOXI), 0.25 mg, Intravenous, Once, 5 of 5 cycles  Administration: 0.25 mg (7/21/2021), 0.25 mg (8/4/2021), 0.25 mg (8/18/2021), 0.25 mg (9/15/2021), 0.25 mg (9/29/2021)  fluorouracil (ADRUCIL), 400 mg/m2 = 605 mg, Intravenous, Once, 8 of 8 cycles  Dose modification: 340 mg/m2 (85 % of original dose 400 mg/m2, Cycle 6, Reason: Dose Not Tolerated)  Administration: 605 mg (6/9/2021), 605 mg (6/23/2021), 605 mg (7/7/2021), 605 mg (7/21/2021), 605 mg (8/4/2021), 515 mg (8/18/2021), 610 mg (9/15/2021), 610 mg (9/29/2021)  fosaprepitant (EMEND) IVPB, 150 mg, Intravenous, Once, 6 of 6 cycles  Administration: 150 mg (7/7/2021), 150 mg (7/21/2021), 150 mg (8/4/2021), 150 mg (8/18/2021), 150 mg (9/15/2021), 150 mg (9/29/2021)  leucovorin calcium IVPB, 604 mg, Intravenous, Once, 8 of 8 cycles  Dose modification: 340 mg/m2 (85 % of original dose 400 mg/m2, Cycle 6, Reason: Dose Not Tolerated)  Administration: 600 mg (6/9/2021), 600 mg (6/23/2021), 600 mg (7/7/2021), 600 mg (7/21/2021), 600 mg (8/4/2021), 517 mg (8/18/2021), 600 mg (9/15/2021), 600 mg (9/29/2021)  oxaliplatin (ELOXATIN) chemo infusion, 85 mg/m2 = 128.35 mg, Intravenous, Once, 6 of 6 cycles  Dose modification: 72.25 mg/m2 (85 % of original dose 85 mg/m2, Cycle 6, Reason: Dose Not Tolerated)  Administration: 128.35 mg (6/9/2021), 128.35 mg (6/23/2021), 128.35 mg (7/7/2021), 128.35 mg (7/21/2021), 128.35 mg (8/4/2021), 109.82 mg (8/18/2021)  fluorouracil (ADRUCIL) ambulatory infusion Soln, 1,200 mg/m2/day = 3,625 mg, Intravenous, Over 46 hours, 8 of 8 cycles     10/1/2021 Genetic Testing    Results revealed patient has the following mutation(s):  Positive for a BRCA2 pathogenic variant      10/26/2021 -  Radiation         10/26/2021 -  Chemotherapy    Xeloda 825 mg/m2 BID  BSA = 1.59  Calculated to 1300 mg BID with rounding. 10/28/2021 - 12/2/2021 Radiation    Treatments:  Course: C1    Plan ID Energy Fractions Dose per Fraction (cGy) Dose Correction (cGy) Total Dose Delivered (cGy) Elapsed Days   Abdomen 6X 25 / 25 195 0 4,875 35      Treatment Dates:  10/28/2021 - 12/2/2021.                Lab Results   Component Value Date    WBC 7.76 07/24/2023    HGB 14.2 07/24/2023    HCT 42.6 07/24/2023    MCV 94 07/24/2023     07/24/2023     Lab Results   Component Value Date    SODIUM 136 07/24/2023    K 4.4 07/24/2023     07/24/2023    CO2 29 07/24/2023    AGAP 7 07/24/2023    BUN 21 08/01/2023    CREATININE 0.74 08/01/2023    GLUC 103 07/24/2023    GLUF 87 05/05/2023    CALCIUM 9.7 07/24/2023    AST 21 07/24/2023    ALT 22 07/24/2023    ALKPHOS 117 (H) 07/24/2023    TP 7.4 07/24/2023    TBILI 0.32 07/24/2023    EGFR 84 08/01/2023       Interval history: Generally patient is feeling okay. Patient was a bit nervous coming in here. Blood pressure issues are presently a concern. Patient is following with cardiology at the end of the week. Review of Systems   Constitutional: Negative for appetite change, fatigue, fever and unexpected weight change. HENT: Negative for nosebleeds. Respiratory: Negative for cough, choking and shortness of breath. Negative hemoptysis. Cardiovascular: Negative for chest pain, palpitations and leg swelling. Gastrointestinal: Negative. Negative for abdominal distention, abdominal pain, anal bleeding, blood in stool, constipation, diarrhea, nausea and vomiting. Endocrine: Negative. Negative for cold intolerance. Genitourinary: Negative. Negative for hematuria, menstrual problem, vaginal bleeding, vaginal discharge and vaginal pain. Musculoskeletal: Negative. Negative for arthralgias, myalgias, neck pain and neck stiffness. Skin: Negative. Negative for color change, pallor and rash. Allergic/Immunologic: Negative. Negative for immunocompromised state. Neurological: Negative. Negative for weakness and headaches. Hematological: Negative for adenopathy. Does not bruise/bleed easily. All other systems reviewed and are negative.       Current Outpatient Medications:   •  acetaminophen (TYLENOL) 500 mg tablet, Take 1,000 mg by mouth, Disp: , Rfl:   •  benzonatate (TESSALON PERLES) 100 mg capsule, Take 1 capsule (100 mg total) by mouth 3 (three) times a day as needed for cough, Disp: 20 capsule, Rfl: 0  •  Multiple Vitamin (multivitamin) tablet, Take 1 tablet by mouth daily, Disp: , Rfl:   •  Medical ID Plate MISC, Use once for 1 dose Severely and permanently limited in the ability to walk because of an arthritic, neurological, or orthopedic condition: or cannot walk two hundred feet without stopping to rest and meets requirements for disability license plate, Disp: 1 each, Rfl: 0  •  triamcinolone (KENALOG) 0.1 % ointment, Apply topically 2 (two) times a day for 7 days Avoid prolonged continuous use (>10 days), Disp: 80 g, Rfl: 0  Allergies   Allergen Reactions   • Penicillins Rash   • Tetracycline Rash     Objective   /90 (BP Location: Right arm, Patient Position: Sitting, Cuff Size: Large)   Pulse 96   Temp 98 °F (36.7 °C) (Temporal)   Resp 17   Ht 5' 2" (1.575 m)   Wt 79.2 kg (174 lb 8 oz)   SpO2 96%   BMI 31.92 kg/m²   Wt Readings from Last 6 Encounters:   08/08/23 79.2 kg (174 lb 8 oz)   08/02/23 79.2 kg (174 lb 9.6 oz)   06/08/23 78.9 kg (174 lb)   04/07/23 75.8 kg (167 lb)   02/17/23 73.5 kg (162 lb)   02/10/23 74.8 kg (165 lb)       Physical Exam  Constitutional:       General: She is not in acute distress. Appearance: She is well-developed. HENT:      Head: Normocephalic and atraumatic. Mouth/Throat:      Pharynx: No oropharyngeal exudate. Eyes:      General: No scleral icterus. Pupils: Pupils are equal, round, and reactive to light. Cardiovascular:      Rate and Rhythm: Normal rate and regular rhythm. Heart sounds: No murmur heard. Pulmonary:      Effort: Pulmonary effort is normal. No respiratory distress. Breath sounds: Normal breath sounds. Abdominal:      General: Bowel sounds are normal. There is no distension. Palpations: Abdomen is soft. Tenderness: There is no abdominal tenderness. Musculoskeletal:         General: Normal range of motion. Cervical back: Normal range of motion. Lymphadenopathy:      Cervical: No cervical adenopathy. Skin:     General: Skin is warm. Coloration: Skin is not pale. Findings: No rash. Neurological:      Mental Status: She is alert and oriented to person, place, and time. Cranial Nerves: No cranial nerve deficit. Psychiatric:         Thought Content:  Thought content normal.         Pertinent Laboratory Results and Imaging Review:  Appointment on 08/01/2023   Component Date Value Ref Range Status   • CA 19-9 08/01/2023 <2  0 - 35 U/mL Final    Roche [employer]ostics Electrochemiluminescence Immunoassay (ECLIA)  Values obtained with different assay methods or kits cannot be  used interchangeably. Results cannot be interpreted as absolute  evidence of the presence or absence of malignant disease.    • BUN 08/01/2023 21  5 - 25 mg/dL Final   • Creatinine 08/01/2023 0.74  0.60 - 1.30 mg/dL Final    Standardized to IDMS reference method   • eGFR 08/01/2023 84  ml/min/1.73sq m Final   Admission on 07/24/2023, Discharged on 07/24/2023   Component Date Value Ref Range Status   • Protime 07/24/2023 12.8  11.6 - 14.5 seconds Final   • INR 07/24/2023 0.95  0.84 - 1.19 Final   • PTT 07/24/2023 23  23 - 37 seconds Final    Therapeutic Heparin Range =  60-90 seconds   • WBC 07/24/2023 7.76  4.31 - 10.16 Thousand/uL Final   • RBC 07/24/2023 4.52  3.81 - 5.12 Million/uL Final   • Hemoglobin 07/24/2023 14.2  11.5 - 15.4 g/dL Final   • Hematocrit 07/24/2023 42.6  34.8 - 46.1 % Final   • MCV 07/24/2023 94  82 - 98 fL Final   • MCH 07/24/2023 31.4  26.8 - 34.3 pg Final   • MCHC 07/24/2023 33.3  31.4 - 37.4 g/dL Final   • RDW 07/24/2023 12.7  11.6 - 15.1 % Final   • MPV 07/24/2023 9.3  8.9 - 12.7 fL Final   • Platelets 01/33/3230 300  149 - 390 Thousands/uL Final   • nRBC 07/24/2023 0  /100 WBCs Final   • Neutrophils Relative 07/24/2023 74  43 - 75 % Final   • Immat GRANS % 07/24/2023 0  0 - 2 % Final   • Lymphocytes Relative 07/24/2023 19  14 - 44 % Final   • Monocytes Relative 07/24/2023 5  4 - 12 % Final   • Eosinophils Relative 07/24/2023 1  0 - 6 % Final   • Basophils Relative 07/24/2023 1  0 - 1 % Final   • Neutrophils Absolute 07/24/2023 5.76  1.85 - 7.62 Thousands/µL Final   • Immature Grans Absolute 07/24/2023 0.02  0.00 - 0.20 Thousand/uL Final   • Lymphocytes Absolute 07/24/2023 1.44  0.60 - 4.47 Thousands/µL Final   • Monocytes Absolute 07/24/2023 0. 41  0.17 - 1.22 Thousand/µL Final   • Eosinophils Absolute 07/24/2023 0.07  0.00 - 0.61 Thousand/µL Final   • Basophils Absolute 07/24/2023 0.06  0.00 - 0.10 Thousands/µL Final   • Sodium 07/24/2023 136  135 - 147 mmol/L Final   • Potassium 07/24/2023 4.4  3.5 - 5.3 mmol/L Final   • Chloride 07/24/2023 100  96 - 108 mmol/L Final   • CO2 07/24/2023 29  21 - 32 mmol/L Final   • ANION GAP 07/24/2023 7  mmol/L Final   • BUN 07/24/2023 24  5 - 25 mg/dL Final   • Creatinine 07/24/2023 0.64  0.60 - 1.30 mg/dL Final    Standardized to IDMS reference method   • Glucose 07/24/2023 103  65 - 140 mg/dL Final    If the patient is fasting, the ADA then defines impaired fasting glucose as > 100 mg/dL and diabetes as > or equal to 123 mg/dL. • Calcium 07/24/2023 9.7  8.4 - 10.2 mg/dL Final   • AST 07/24/2023 21  13 - 39 U/L Final   • ALT 07/24/2023 22  7 - 52 U/L Final    Specimen collection should occur prior to Sulfasalazine administration due to the potential for falsely depressed results. • Alkaline Phosphatase 07/24/2023 117 (H)  34 - 104 U/L Final   • Total Protein 07/24/2023 7.4  6.4 - 8.4 g/dL Final   • Albumin 07/24/2023 4.2  3.5 - 5.0 g/dL Final   • Total Bilirubin 07/24/2023 0.32  0.20 - 1.00 mg/dL Final    Use of this assay is not recommended for patients undergoing treatment with eltrombopag due to the potential for falsely elevated results. N-acetyl-p-benzoquinone imine (metabolite of Acetaminophen) will generate erroneously low results in samples for patients that have taken an overdose of Acetaminophen. • eGFR 07/24/2023 92  ml/min/1.73sq m Final   • hs TnI 0hr 07/24/2023 6  "Refer to ACS Flowchart"- see link ng/L Final    Comment:                                              Initial (time 0) result  If >=50 ng/L, Myocardial injury suggested ;  Type of myocardial injury and treatment strategy  to be determined. If 5-49 ng/L, a delta result at 2 hours and or 4 hours will be needed to further evaluate.   If <4 ng/L, and chest pain has been >3 hours since onset, patient may qualify for discharge based on the HEART score in the ED. If <5 ng/L and <3hours since onset of chest pain, a delta result at 2 hours will be needed to further evaluate. HS Troponin 99th Percentile URL of a Health Population=12 ng/L with a 95% Confidence Interval of 8-18 ng/L. Second Troponin (time 2 hours)  If calculated delta >= 20 ng/L,  Myocardial injury suggested ; Type of myocardial injury and treatment strategy to be determined. If 5-49 ng/L and the calculated delta is 5-19 ng/L, consult medical service for evaluation. Continue evaluation for ischemia on ecg and other possible etiology and repeat hs troponin at 4 hours. If delta                            is <5 ng/L at 2 hours, consider discharge based on risk stratification via the HEART score (if in ED), or MARGIE risk score in IP/Observation. HS Troponin 99th Percentile URL of a Health Population=12 ng/L with a 95% Confidence Interval of 8-18 ng/L. • hs TnI 2hr 07/24/2023 11  "Refer to ACS Flowchart"- see link ng/L Final    Comment:                                              Initial (time 0) result  If >=50 ng/L, Myocardial injury suggested ;  Type of myocardial injury and treatment strategy  to be determined. If 5-49 ng/L, a delta result at 2 hours and or 4 hours will be needed to further evaluate. If <4 ng/L, and chest pain has been >3 hours since onset, patient may qualify for discharge based on the HEART score in the ED. If <5 ng/L and <3hours since onset of chest pain, a delta result at 2 hours will be needed to further evaluate. HS Troponin 99th Percentile URL of a Health Population=12 ng/L with a 95% Confidence Interval of 8-18 ng/L. Second Troponin (time 2 hours)  If calculated delta >= 20 ng/L,  Myocardial injury suggested ; Type of myocardial injury and treatment strategy to be determined.   If 5-49 ng/L and the calculated delta is 5-19 ng/L, consult medical service for evaluation. Continue evaluation for ischemia on ecg and other possible etiology and repeat hs troponin at 4 hours. If delta                            is <5 ng/L at 2 hours, consider discharge based on risk stratification via the HEART score (if in ED), or MARGIE risk score in IP/Observation. HS Troponin 99th Percentile URL of a Health Population=12 ng/L with a 95% Confidence Interval of 8-18 ng/L. • Delta 2hr hsTnI 07/24/2023 5  <20 ng/L Final   • hs TnI 4hr 07/24/2023 14  "Refer to ACS Flowchart"- see link ng/L Final    Comment:                                              Initial (time 0) result  If >=50 ng/L, Myocardial injury suggested ;  Type of myocardial injury and treatment strategy  to be determined. If 5-49 ng/L, a delta result at 2 hours and or 4 hours will be needed to further evaluate. If <4 ng/L, and chest pain has been >3 hours since onset, patient may qualify for discharge based on the HEART score in the ED. If <5 ng/L and <3hours since onset of chest pain, a delta result at 2 hours will be needed to further evaluate. HS Troponin 99th Percentile URL of a Health Population=12 ng/L with a 95% Confidence Interval of 8-18 ng/L. Second Troponin (time 2 hours)  If calculated delta >= 20 ng/L,  Myocardial injury suggested ; Type of myocardial injury and treatment strategy to be determined. If 5-49 ng/L and the calculated delta is 5-19 ng/L, consult medical service for evaluation. Continue evaluation for ischemia on ecg and other possible etiology and repeat hs troponin at 4 hours. If delta                            is <5 ng/L at 2 hours, consider discharge based on risk stratification via the HEART score (if in ED), or MARGIE risk score in IP/Observation. HS Troponin 99th Percentile URL of a Health Population=12 ng/L with a 95% Confidence Interval of 8-18 ng/L.    • Delta 4hr hsTnI 07/24/2023 8  <20 ng/L Final   • Ventricular Rate 07/24/2023 79  BPM Final   • Atrial Rate 07/24/2023 79  BPM Final   • OH Interval 07/24/2023 184  ms Final   • QRSD Interval 07/24/2023 136  ms Final   • QT Interval 07/24/2023 400  ms Final   • QTC Interval 07/24/2023 458  ms Final   • P Axis 07/24/2023 71  degrees Final   • QRS Axis 07/24/2023 -72  degrees Final   • T Wave Axis 07/24/2023 76  degrees Final   • Ventricular Rate 07/24/2023 71  BPM Final   • Atrial Rate 07/24/2023 71  BPM Final   • OH Interval 07/24/2023 180  ms Final   • QRSD Interval 07/24/2023 136  ms Final   • QT Interval 07/24/2023 428  ms Final   • QTC Interval 07/24/2023 465  ms Final   • P Axis 07/24/2023 78  degrees Final   • QRS Axis 07/24/2023 -67  degrees Final   • T Wave Axis 07/24/2023 58  degrees Final     US thyroid  Narrative: THYROID ULTRASOUND    INDICATION:    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 COMPARISON: Thyroid ultrasound 6/6/2022    TECHNIQUE:   Ultrasound of the thyroid was performed with a high frequency linear transducer in transverse and sagittal planes including volumetric imaging sweeps as well as traditional still imaging technique. FINDINGS:  Thyroid parenchyma is diffusely heterogeneous in echotexture. Right lobe: 4.7 x 1.8 x 2.5 cm. Volume 10.2 mL  Left lobe:  3.2 x 1.3 x 1.3 cm. Volume 2.6 mL  Isthmus: 0.4  cm. Nodule #1. Image 16. RIGHT upper pole nodule measuring 2.9 x 1.6 x 2.2 cm. Unchanged from prior. COMPOSITION:  2 points, solid or almost completely solid . ECHOGENICITY:  1 point, hyperechoic or isoechoic. SHAPE:  0 points, wider-than-tall. MARGIN: 0 points, smooth. ECHOGENIC FOCI:  0 points, none or large comet-tail artifacts. TI-RADS Classification: TR 3 (3 points), FNA if >2.5 cm. Follow if >1.5 cm. This nodule has had a previous nonmalignant biopsy. It was Melrose category 3 and Afirma negative. As it is stable, no further sampling recommended at this time.   Further   surveillance at 2 year intervals could be considered to confirm continued stability for a period of greater than 5 years. Nodule #2. Image 53. LEFT midgland nodule measuring 1.6 x 1.1 x 0.9 cm. Given differences in measuring technique, no significant change from prior. COMPOSITION: 2 points, solid or almost completely solid. ECHOGENICITY: 2 points, hypoechoic. SHAPE: 3 points, taller-than-wide. MARGIN: 0 points, smooth. ECHOGENIC FOCI: 0 points, none or large comet-tail artifacts. TI-RADS Classification: TR 5 (7 or > points), Highly suspicious. FNA if > 1 cm. Follow if > 0.5 cm. This nodule has had a previous benign biopsy. As it is stable, no further sampling recommended at this time. Further surveillance at 2 year intervals   could be considered to confirm continued stability for a period of greater than 5 years. There are additional nodules of lesser size and/or TI-RADS score. These do not necessitate additional evaluation based on ACR criteria. Impression: Stable thyroid nodules as above. Recommend 2-year follow-up ultrasound. Reference: ACR Thyroid Imaging, Reporting and Data System (TI-RADS): White Paper of the [employer]. J AM Jesus Radiol 0540;72:518-041. (additional recommendations based on American Thyroid Association 2015 guidelines.)    This examination demonstrates a finding requiring imaging follow-up and was logged as such in Sequence Design. Workstation performed: [id number]  CT chest abdomen pelvis w contrast  Narrative: CT CHEST, ABDOMEN AND PELVIS WITH IV CONTRAST    INDICATION:   Z85.09: Personal history of malignant neoplasm of other digestive organs.  75-year-old female with history of cancer of the ampulla of Vater. Status post Whipple procedure. Follow-up. COMPARISON: CTs of the chest, abdomen and pelvis from October 15, 2021, July 12, 2022 and February 2, 2023. CTA of the chest from July 24, 2023. TECHNIQUE: CT examination of the chest, abdomen and pelvis was performed.   Scanning through the abdomen was performed in arterial, venous and delayed phases according a protocol specifically designed to evaluate upper abdominal viscera. Multiplanar 2D   reformatted images were created from the source data. This examination, like all CT scans performed in the [facility], was performed utilizing techniques to minimize radiation dose exposure, including the use of iterative reconstruction and automated exposure control. Radiation dose length   product (DLP) for this visit:  1350.95 mGy-cm    IV Contrast:  100 mL of iohexol (OMNIPAQUE)  Enteric Contrast: Enteric contrast was administered. FINDINGS:    CHEST    LUNGS: Subsegmental atelectasis in the bases of both lower lobes. Lungs otherwise clear. PLEURA:  Unremarkable. HEART/GREAT VESSELS: Heart is unremarkable for patient's age. No thoracic aortic aneurysm. MEDIASTINUM AND WYATT: No lymphadenopathy or mass. Esophagus unremarkable. Trachea and main stem bronchi normal.    CHEST WALL AND LOWER NECK: No lymphadenopathy or mass. Stable nodule in the right thyroid lobe, previously investigated with sonography and FNA. ABDOMEN    LIVER/BILIARY TREE: Postoperative pneumobilia. Otherwise unremarkable. GALLBLADDER: Post cholecystectomy. SPLEEN:  Unremarkable. PANCREAS: Post Whipple procedure. No evidence of local tumor recurrence. ADRENAL GLANDS:  Unremarkable. KIDNEYS/URETERS: Small bilateral renal cysts, unchanged. No calculus, hydronephrosis or suspicious mass. 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 Patent gastrojejunostomy. Mild sigmoid diverticulosis without evidence of diverticulitis. Colon otherwise unremarkable. APPENDIX: Normal.    ABDOMINOPELVIC CAVITY: No lymphadenopathy. No ascites or discrete fluid collection. No extraluminal gas. VESSELS:  Unremarkable for patient's age. PELVIS    REPRODUCTIVE ORGANS: Post hysterectomy. URINARY BLADDER:  Unremarkable.     ABDOMINAL WALL/INGUINAL REGIONS: Small supraumbilical hernia containing a short segment of transverse colon without evidence of incarceration or obstruction    OSSEOUS STRUCTURES: Degenerative disc disease in the lumbar and thoracic spine. No evidence of fracture or destructive lesion. Impression: Status post Whipple procedure. No evidence of tumor recurrence in the chest, abdomen, pelvis or included portions of the skeleton. Workstation performed: [id number]      The following historical data was reviewed:  Past Medical History:   Diagnosis Date   • BRCA1 positive    • BRCA2 positive    • Cancer ([address])     pancreatic   • History of chemotherapy     pancreatic cancer   • History of radiation therapy    • Pancreatic carcinoma ([address])      Past Surgical History:   Procedure Laterality Date   • ABLATION SOFT TISSUE N/A 4/26/2021    Procedure: INTRAOPERATIVE ULTRASOUND, ABLATION,SOFT TISSUE PANCREAS;  Surgeon: Brandy Brito MD;  Location: [facility];  Service: Surgical Oncology   • CHOLECYSTECTOMY N/A 4/26/2021    Procedure: CHOLECYSTECTOMY;  Surgeon: Brandy Brito MD;  [facility];  Service: Surgical Oncology   • FL GUIDED CENTRAL VENOUS ACCESS DEVICE INSERTION  6/2/2021   • HYSTERECTOMY     • LAPAROTOMY N/A 4/26/2021    Procedure: LAPAROTOMY EXPLORATORY;  Surgeon: Brandy Brito MD;  [facility];  Service: Surgical Oncology   • OOPHORECTOMY     • SINUS SURGERY     • TUNNELED VENOUS PORT PLACEMENT Left 6/2/2021    Procedure: INSERTION VENOUS PORT (PORT-A-CATH); Surgeon: Brandy Brito MD;  [facility];  Service: Surgical Oncology   • US GUIDED THYROID BIOPSY  7/13/2022   • WHIPPLE PROCEDURE/PANCREATICO-DUODENECTOMY N/A 4/26/2021    Procedure:  WHIPPLE PROCEDURE/PANCREATICO-DUODENECTOMY; PYLORIC PRESERVING;  Surgeon: Brandy Brito MD;  [facility];  Service: Surgical Oncology     Social History     Socioeconomic History   • Marital status:      Spouse name: None   • Number of children: 3   • Years of education: None   • Highest education level: None   Occupational History   • 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 1-2 cigg some days 2022. Vaping Use   • Vaping Use: Never used   Substance and Sexual Activity   • Alcohol use: Never   • Drug use: Never   • Sexual activity: Not Currently   Other Topics Concern   • None   Social History Narrative   • None     Social Determinants of Health     Financial Resource Strain: Medium Risk (10/13/2022)    Overall Financial Resource Strain (CARDIA)    • Difficulty of Paying Living Expenses: Somewhat hard   Food Insecurity: No Food Insecurity (2022)    Hunger Vital Sign    • Worried About Running Out of Food in the Last Year: Never true    • Ran Out of Food in the Last Year: Never true   Transportation Needs: No Transportation Needs (10/13/2022)    PRAPARE - Transportation    • Lack of Transportation (Medical): No    • Lack of Transportation (Non-Medical):  No   Physical Activity: Insufficiently Active (2022)    Exercise Vital Sign    • Days of Exercise per Week: 4 days    • Minutes of Exercise per Session: 20 min   Stress: No Stress Concern Present (2022)    [address] [place] [address]    • Feeling of Stress : Not at all   Social Connections: Socially Isolated (2022)    Social Connection and Isolation Panel [NHANES]    • Frequency of Communication with Friends and Family: More than three times a week    • Frequency of Social Gatherings with Friends and Family: More than three times a week    • Attends [employer] Services: Never    • Active Member of Clubs or Organizations: No    • Attends Club or Organization Meetings: Never    • Marital Status:    Intimate Partner Violence: Not At Risk (6/1/2022)    Humiliation, Afraid, Rape, and Kick questionnaire    • Fear of Current or Ex-Partner: No    • Emotionally Abused: No    • Physically Abused: No    • Sexually Abused: No   Housing Stability: Unknown (6/1/2022)    Housing Stability Vital Sign    • Unable to Pay for Housing in the Last Year: No    • Number of [address] in the Last Year: Not on file    • Unstable Housing in the Last Year: No     Family History   Problem Relation Age of Onset   • Ulcerative colitis Mother    • Prostate cancer Father    • Melanoma Sister    • Heart disease Brother    • Prostate cancer Brother    • No Known Problems Brother    • No Known Problems Maternal Uncle    • No Known Problems Paternal Uncle        Please note: This report has been generated by a voice recognition software system. Therefore there may be syntax, spelling, and/or grammatical errors. Please call if you have any questions. No

## 2023-10-19 ENCOUNTER — INPATIENT (INPATIENT)
Facility: HOSPITAL | Age: 53
LOS: 3 days | Discharge: HOME | DRG: 383 | End: 2023-10-23
Attending: STUDENT IN AN ORGANIZED HEALTH CARE EDUCATION/TRAINING PROGRAM | Admitting: INTERNAL MEDICINE
Payer: MEDICAID

## 2023-10-19 VITALS
SYSTOLIC BLOOD PRESSURE: 167 MMHG | TEMPERATURE: 98 F | RESPIRATION RATE: 17 BRPM | DIASTOLIC BLOOD PRESSURE: 98 MMHG | OXYGEN SATURATION: 97 % | WEIGHT: 283.07 LBS | HEART RATE: 86 BPM

## 2023-10-19 DIAGNOSIS — L03.90 CELLULITIS, UNSPECIFIED: ICD-10-CM

## 2023-10-19 LAB
ANION GAP SERPL CALC-SCNC: 11 MMOL/L — SIGNIFICANT CHANGE UP (ref 7–14)
ANION GAP SERPL CALC-SCNC: 11 MMOL/L — SIGNIFICANT CHANGE UP (ref 7–14)
BASOPHILS # BLD AUTO: 0.02 K/UL — SIGNIFICANT CHANGE UP (ref 0–0.2)
BASOPHILS # BLD AUTO: 0.02 K/UL — SIGNIFICANT CHANGE UP (ref 0–0.2)
BASOPHILS NFR BLD AUTO: 0.3 % — SIGNIFICANT CHANGE UP (ref 0–1)
BASOPHILS NFR BLD AUTO: 0.3 % — SIGNIFICANT CHANGE UP (ref 0–1)
BUN SERPL-MCNC: 10 MG/DL — SIGNIFICANT CHANGE UP (ref 10–20)
BUN SERPL-MCNC: 10 MG/DL — SIGNIFICANT CHANGE UP (ref 10–20)
CALCIUM SERPL-MCNC: 9 MG/DL — SIGNIFICANT CHANGE UP (ref 8.4–10.5)
CALCIUM SERPL-MCNC: 9 MG/DL — SIGNIFICANT CHANGE UP (ref 8.4–10.5)
CHLORIDE SERPL-SCNC: 103 MMOL/L — SIGNIFICANT CHANGE UP (ref 98–110)
CHLORIDE SERPL-SCNC: 103 MMOL/L — SIGNIFICANT CHANGE UP (ref 98–110)
CO2 SERPL-SCNC: 28 MMOL/L — SIGNIFICANT CHANGE UP (ref 17–32)
CO2 SERPL-SCNC: 28 MMOL/L — SIGNIFICANT CHANGE UP (ref 17–32)
CREAT SERPL-MCNC: 0.9 MG/DL — SIGNIFICANT CHANGE UP (ref 0.7–1.5)
CREAT SERPL-MCNC: 0.9 MG/DL — SIGNIFICANT CHANGE UP (ref 0.7–1.5)
EGFR: 101 ML/MIN/1.73M2 — SIGNIFICANT CHANGE UP
EGFR: 101 ML/MIN/1.73M2 — SIGNIFICANT CHANGE UP
EOSINOPHIL # BLD AUTO: 0.16 K/UL — SIGNIFICANT CHANGE UP (ref 0–0.7)
EOSINOPHIL # BLD AUTO: 0.16 K/UL — SIGNIFICANT CHANGE UP (ref 0–0.7)
EOSINOPHIL NFR BLD AUTO: 2.3 % — SIGNIFICANT CHANGE UP (ref 0–8)
EOSINOPHIL NFR BLD AUTO: 2.3 % — SIGNIFICANT CHANGE UP (ref 0–8)
GLUCOSE BLDC GLUCOMTR-MCNC: 116 MG/DL — HIGH (ref 70–99)
GLUCOSE BLDC GLUCOMTR-MCNC: 116 MG/DL — HIGH (ref 70–99)
GLUCOSE SERPL-MCNC: 119 MG/DL — HIGH (ref 70–99)
GLUCOSE SERPL-MCNC: 119 MG/DL — HIGH (ref 70–99)
HCT VFR BLD CALC: 39.6 % — LOW (ref 42–52)
HCT VFR BLD CALC: 39.6 % — LOW (ref 42–52)
HGB BLD-MCNC: 12.7 G/DL — LOW (ref 14–18)
HGB BLD-MCNC: 12.7 G/DL — LOW (ref 14–18)
IMM GRANULOCYTES NFR BLD AUTO: 0.1 % — SIGNIFICANT CHANGE UP (ref 0.1–0.3)
IMM GRANULOCYTES NFR BLD AUTO: 0.1 % — SIGNIFICANT CHANGE UP (ref 0.1–0.3)
LACTATE SERPL-SCNC: 1.7 MMOL/L — SIGNIFICANT CHANGE UP (ref 0.7–2)
LACTATE SERPL-SCNC: 1.7 MMOL/L — SIGNIFICANT CHANGE UP (ref 0.7–2)
LYMPHOCYTES # BLD AUTO: 1.81 K/UL — SIGNIFICANT CHANGE UP (ref 1.2–3.4)
LYMPHOCYTES # BLD AUTO: 1.81 K/UL — SIGNIFICANT CHANGE UP (ref 1.2–3.4)
LYMPHOCYTES # BLD AUTO: 26 % — SIGNIFICANT CHANGE UP (ref 20.5–51.1)
LYMPHOCYTES # BLD AUTO: 26 % — SIGNIFICANT CHANGE UP (ref 20.5–51.1)
MCHC RBC-ENTMCNC: 29.1 PG — SIGNIFICANT CHANGE UP (ref 27–31)
MCHC RBC-ENTMCNC: 29.1 PG — SIGNIFICANT CHANGE UP (ref 27–31)
MCHC RBC-ENTMCNC: 32.1 G/DL — SIGNIFICANT CHANGE UP (ref 32–37)
MCHC RBC-ENTMCNC: 32.1 G/DL — SIGNIFICANT CHANGE UP (ref 32–37)
MCV RBC AUTO: 90.8 FL — SIGNIFICANT CHANGE UP (ref 80–94)
MCV RBC AUTO: 90.8 FL — SIGNIFICANT CHANGE UP (ref 80–94)
MONOCYTES # BLD AUTO: 0.75 K/UL — HIGH (ref 0.1–0.6)
MONOCYTES # BLD AUTO: 0.75 K/UL — HIGH (ref 0.1–0.6)
MONOCYTES NFR BLD AUTO: 10.8 % — HIGH (ref 1.7–9.3)
MONOCYTES NFR BLD AUTO: 10.8 % — HIGH (ref 1.7–9.3)
NEUTROPHILS # BLD AUTO: 4.2 K/UL — SIGNIFICANT CHANGE UP (ref 1.4–6.5)
NEUTROPHILS # BLD AUTO: 4.2 K/UL — SIGNIFICANT CHANGE UP (ref 1.4–6.5)
NEUTROPHILS NFR BLD AUTO: 60.5 % — SIGNIFICANT CHANGE UP (ref 42.2–75.2)
NEUTROPHILS NFR BLD AUTO: 60.5 % — SIGNIFICANT CHANGE UP (ref 42.2–75.2)
NRBC # BLD: 0 /100 WBCS — SIGNIFICANT CHANGE UP (ref 0–0)
NRBC # BLD: 0 /100 WBCS — SIGNIFICANT CHANGE UP (ref 0–0)
PLATELET # BLD AUTO: 281 K/UL — SIGNIFICANT CHANGE UP (ref 130–400)
PLATELET # BLD AUTO: 281 K/UL — SIGNIFICANT CHANGE UP (ref 130–400)
PMV BLD: 9.8 FL — SIGNIFICANT CHANGE UP (ref 7.4–10.4)
PMV BLD: 9.8 FL — SIGNIFICANT CHANGE UP (ref 7.4–10.4)
POTASSIUM SERPL-MCNC: 4.3 MMOL/L — SIGNIFICANT CHANGE UP (ref 3.5–5)
POTASSIUM SERPL-MCNC: 4.3 MMOL/L — SIGNIFICANT CHANGE UP (ref 3.5–5)
POTASSIUM SERPL-SCNC: 4.3 MMOL/L — SIGNIFICANT CHANGE UP (ref 3.5–5)
POTASSIUM SERPL-SCNC: 4.3 MMOL/L — SIGNIFICANT CHANGE UP (ref 3.5–5)
RBC # BLD: 4.36 M/UL — LOW (ref 4.7–6.1)
RBC # BLD: 4.36 M/UL — LOW (ref 4.7–6.1)
RBC # FLD: 13.2 % — SIGNIFICANT CHANGE UP (ref 11.5–14.5)
RBC # FLD: 13.2 % — SIGNIFICANT CHANGE UP (ref 11.5–14.5)
SODIUM SERPL-SCNC: 142 MMOL/L — SIGNIFICANT CHANGE UP (ref 135–146)
SODIUM SERPL-SCNC: 142 MMOL/L — SIGNIFICANT CHANGE UP (ref 135–146)
WBC # BLD: 6.95 K/UL — SIGNIFICANT CHANGE UP (ref 4.8–10.8)
WBC # BLD: 6.95 K/UL — SIGNIFICANT CHANGE UP (ref 4.8–10.8)
WBC # FLD AUTO: 6.95 K/UL — SIGNIFICANT CHANGE UP (ref 4.8–10.8)
WBC # FLD AUTO: 6.95 K/UL — SIGNIFICANT CHANGE UP (ref 4.8–10.8)

## 2023-10-19 PROCEDURE — 99223 1ST HOSP IP/OBS HIGH 75: CPT

## 2023-10-19 PROCEDURE — 83036 HEMOGLOBIN GLYCOSYLATED A1C: CPT

## 2023-10-19 PROCEDURE — 87186 SC STD MICRODIL/AGAR DIL: CPT

## 2023-10-19 PROCEDURE — 86850 RBC ANTIBODY SCREEN: CPT

## 2023-10-19 PROCEDURE — 73110 X-RAY EXAM OF WRIST: CPT | Mod: 26,50

## 2023-10-19 PROCEDURE — 80048 BASIC METABOLIC PNL TOTAL CA: CPT

## 2023-10-19 PROCEDURE — 87070 CULTURE OTHR SPECIMN AEROBIC: CPT

## 2023-10-19 PROCEDURE — 87205 SMEAR GRAM STAIN: CPT

## 2023-10-19 PROCEDURE — 85027 COMPLETE CBC AUTOMATED: CPT

## 2023-10-19 PROCEDURE — 86900 BLOOD TYPING SEROLOGIC ABO: CPT

## 2023-10-19 PROCEDURE — 99221 1ST HOSP IP/OBS SF/LOW 40: CPT

## 2023-10-19 PROCEDURE — 87075 CULTR BACTERIA EXCEPT BLOOD: CPT

## 2023-10-19 PROCEDURE — 85025 COMPLETE CBC W/AUTO DIFF WBC: CPT

## 2023-10-19 PROCEDURE — 80053 COMPREHEN METABOLIC PANEL: CPT

## 2023-10-19 PROCEDURE — 80202 ASSAY OF VANCOMYCIN: CPT

## 2023-10-19 PROCEDURE — 99285 EMERGENCY DEPT VISIT HI MDM: CPT

## 2023-10-19 PROCEDURE — 36415 COLL VENOUS BLD VENIPUNCTURE: CPT

## 2023-10-19 PROCEDURE — 87077 CULTURE AEROBIC IDENTIFY: CPT

## 2023-10-19 PROCEDURE — 82962 GLUCOSE BLOOD TEST: CPT

## 2023-10-19 PROCEDURE — 86901 BLOOD TYPING SEROLOGIC RH(D): CPT

## 2023-10-19 PROCEDURE — 93970 EXTREMITY STUDY: CPT

## 2023-10-19 PROCEDURE — 83735 ASSAY OF MAGNESIUM: CPT

## 2023-10-19 RX ORDER — BUPROPION HYDROCHLORIDE 150 MG/1
300 TABLET, EXTENDED RELEASE ORAL DAILY
Refills: 0 | Status: DISCONTINUED | OUTPATIENT
Start: 2023-10-19 | End: 2023-10-23

## 2023-10-19 RX ORDER — ALPRAZOLAM 0.25 MG
2 TABLET ORAL ONCE
Refills: 0 | Status: DISCONTINUED | OUTPATIENT
Start: 2023-10-19 | End: 2023-10-19

## 2023-10-19 RX ORDER — ENOXAPARIN SODIUM 100 MG/ML
40 INJECTION SUBCUTANEOUS EVERY 24 HOURS
Refills: 0 | Status: DISCONTINUED | OUTPATIENT
Start: 2023-10-19 | End: 2023-10-23

## 2023-10-19 RX ORDER — SODIUM CHLORIDE 9 MG/ML
1000 INJECTION, SOLUTION INTRAVENOUS ONCE
Refills: 0 | Status: COMPLETED | OUTPATIENT
Start: 2023-10-19 | End: 2023-10-19

## 2023-10-19 RX ORDER — METHADONE HYDROCHLORIDE 40 MG/1
125 TABLET ORAL
Refills: 0 | DISCHARGE

## 2023-10-19 RX ORDER — INSULIN LISPRO 100/ML
VIAL (ML) SUBCUTANEOUS
Refills: 0 | Status: DISCONTINUED | OUTPATIENT
Start: 2023-10-19 | End: 2023-10-23

## 2023-10-19 RX ORDER — ACETAMINOPHEN 500 MG
650 TABLET ORAL EVERY 6 HOURS
Refills: 0 | Status: DISCONTINUED | OUTPATIENT
Start: 2023-10-19 | End: 2023-10-23

## 2023-10-19 RX ORDER — LANOLIN ALCOHOL/MO/W.PET/CERES
3 CREAM (GRAM) TOPICAL AT BEDTIME
Refills: 0 | Status: DISCONTINUED | OUTPATIENT
Start: 2023-10-19 | End: 2023-10-23

## 2023-10-19 RX ORDER — METRONIDAZOLE 500 MG
500 TABLET ORAL EVERY 8 HOURS
Refills: 0 | Status: DISCONTINUED | OUTPATIENT
Start: 2023-10-19 | End: 2023-10-20

## 2023-10-19 RX ORDER — DEXTROSE 50 % IN WATER 50 %
25 SYRINGE (ML) INTRAVENOUS ONCE
Refills: 0 | Status: DISCONTINUED | OUTPATIENT
Start: 2023-10-19 | End: 2023-10-23

## 2023-10-19 RX ORDER — LACTOBACILLUS ACIDOPHILUS 100MM CELL
1 CAPSULE ORAL DAILY
Refills: 0 | Status: DISCONTINUED | OUTPATIENT
Start: 2023-10-19 | End: 2023-10-23

## 2023-10-19 RX ORDER — ALPRAZOLAM 0.25 MG
2 TABLET ORAL THREE TIMES A DAY
Refills: 0 | Status: DISCONTINUED | OUTPATIENT
Start: 2023-10-19 | End: 2023-10-23

## 2023-10-19 RX ORDER — SODIUM CHLORIDE 9 MG/ML
1000 INJECTION, SOLUTION INTRAVENOUS
Refills: 0 | Status: DISCONTINUED | OUTPATIENT
Start: 2023-10-19 | End: 2023-10-23

## 2023-10-19 RX ORDER — ONDANSETRON 8 MG/1
4 TABLET, FILM COATED ORAL EVERY 8 HOURS
Refills: 0 | Status: DISCONTINUED | OUTPATIENT
Start: 2023-10-19 | End: 2023-10-23

## 2023-10-19 RX ORDER — DEXTROSE 50 % IN WATER 50 %
12.5 SYRINGE (ML) INTRAVENOUS ONCE
Refills: 0 | Status: DISCONTINUED | OUTPATIENT
Start: 2023-10-19 | End: 2023-10-23

## 2023-10-19 RX ORDER — CEFEPIME 1 G/1
2000 INJECTION, POWDER, FOR SOLUTION INTRAMUSCULAR; INTRAVENOUS EVERY 8 HOURS
Refills: 0 | Status: DISCONTINUED | OUTPATIENT
Start: 2023-10-19 | End: 2023-10-20

## 2023-10-19 RX ORDER — ALPRAZOLAM 0.25 MG
1 TABLET ORAL
Refills: 0 | DISCHARGE

## 2023-10-19 RX ORDER — VANCOMYCIN HCL 1 G
1000 VIAL (EA) INTRAVENOUS ONCE
Refills: 0 | Status: COMPLETED | OUTPATIENT
Start: 2023-10-19 | End: 2023-10-19

## 2023-10-19 RX ORDER — METHADONE HYDROCHLORIDE 40 MG/1
40 TABLET ORAL DAILY
Refills: 0 | Status: DISCONTINUED | OUTPATIENT
Start: 2023-10-19 | End: 2023-10-20

## 2023-10-19 RX ORDER — VANCOMYCIN HCL 1 G
2000 VIAL (EA) INTRAVENOUS EVERY 12 HOURS
Refills: 0 | Status: DISCONTINUED | OUTPATIENT
Start: 2023-10-19 | End: 2023-10-20

## 2023-10-19 RX ORDER — MIRTAZAPINE 45 MG/1
1 TABLET, ORALLY DISINTEGRATING ORAL
Refills: 0 | DISCHARGE

## 2023-10-19 RX ORDER — GLUCAGON INJECTION, SOLUTION 0.5 MG/.1ML
1 INJECTION, SOLUTION SUBCUTANEOUS ONCE
Refills: 0 | Status: DISCONTINUED | OUTPATIENT
Start: 2023-10-19 | End: 2023-10-23

## 2023-10-19 RX ORDER — DEXTROSE 50 % IN WATER 50 %
15 SYRINGE (ML) INTRAVENOUS ONCE
Refills: 0 | Status: DISCONTINUED | OUTPATIENT
Start: 2023-10-19 | End: 2023-10-23

## 2023-10-19 RX ORDER — MIRTAZAPINE 45 MG/1
30 TABLET, ORALLY DISINTEGRATING ORAL AT BEDTIME
Refills: 0 | Status: DISCONTINUED | OUTPATIENT
Start: 2023-10-19 | End: 2023-10-23

## 2023-10-19 RX ADMIN — CEFEPIME 100 MILLIGRAM(S): 1 INJECTION, POWDER, FOR SOLUTION INTRAMUSCULAR; INTRAVENOUS at 22:37

## 2023-10-19 RX ADMIN — SODIUM CHLORIDE 1000 MILLILITER(S): 9 INJECTION, SOLUTION INTRAVENOUS at 17:15

## 2023-10-19 RX ADMIN — MIRTAZAPINE 30 MILLIGRAM(S): 45 TABLET, ORALLY DISINTEGRATING ORAL at 22:37

## 2023-10-19 RX ADMIN — Medication 125 MILLIGRAM(S): at 17:30

## 2023-10-19 RX ADMIN — Medication 100 MILLIGRAM(S): at 23:15

## 2023-10-19 RX ADMIN — Medication 2 MILLIGRAM(S): at 17:30

## 2023-10-19 RX ADMIN — Medication 1 APPLICATION(S): at 17:45

## 2023-10-19 NOTE — H&P ADULT - NSHPPHYSICALEXAM_GEN_ALL_CORE
PHYSICAL EXAM:  GENERAL: NAD, lying in bed comfortably  HEAD:  Atraumatic, Normocephalic  NECK: Supple, No JVD  CHEST/LUNG: Clear to auscultation bilaterally; No rales, rhonchi, wheezing, or rubs. Unlabored respirations  HEART: Regular rate and rhythm; No murmurs, rubs, or gallops  ABDOMEN: Bowel sounds present; Soft, Nontender, Nondistended. No hepatomegaly  EXTREMITIES:  2+ Peripheral Pulses, brisk capillary refill. No clubbing, cyanosis, or edema  NERVOUS SYSTEM:  Alert & Oriented X3, speech clear. No deficits   MSK: FROM all 4 extremities, full and equal strength  SKIN: b/l wrist to dorsal aspect with open wounds ~5x3cm with adherent yellow exudate, dry scabs. no malodor, no active bleeding, no surrounding erythema or edema. severe contraction notes with scaring.

## 2023-10-19 NOTE — CONSULT NOTE ADULT - ASSESSMENT
b/l chronic wrist wounds h/o IVDU    IV abx  medical management  local wound care twice daily, wash with soap and water, silvadene, adaptic, perla  will follow, attending to see in AM  f/u wound culture b/l chronic wrist wounds h/o IVDU    IV abx  medical management  local wound care twice daily, wash with soap and water, silvadene, adaptic, perla  wound benefit from non urgent debridement if no significant improvement noted over the weekend, will tentatively schedule for mon 10/23  f/u wound culture x2

## 2023-10-19 NOTE — ED ADULT NURSE REASSESSMENT NOTE - NS ED NURSE REASSESS COMMENT FT1
Attempted to give Report again, Covering RN from 4A refused report because the bed remains Dirty. Charge RN Lakshmi was made aware.

## 2023-10-19 NOTE — ED PROVIDER NOTE - CLINICAL SUMMARY MEDICAL DECISION MAKING FREE TEXT BOX
52 year old Male with a past medical history of IVDU (heroin) not currently using, vertebral osteomyelitis s/p debridement,  MSSA bacteremia, b/l PEs and LE DVTs s/p LLE embolectomy in October 2022 and DMII presented to the ED with open wounds to b/l wrists. Patient states that he takes Methadone 125mg daily. He states he has had chronic wounds with contracture on his wrist for at least a few years however over the past few weeks to a month they have been opening and draining with skin falling off, and worsening scarring around the wrists which no longer allows the patient to flex his wrist. He has been putting bacitracin and nonstick bandage however it would remove the scab formation each time. He denies any systemic fever, cough, sore throat, chills, nausea, vomiting, chest pain, shortness of breath, abdominal pain, urinary complaints. He is not on any course of antibiotics at this time.  Pt received 1 L LR bolus and vancomycin in the ED. B/l wrist x-rays were ordered. Burn was consulted for his wounds.  ADMIT for IV Abx.

## 2023-10-19 NOTE — ED ADULT NURSE REASSESSMENT NOTE - NS ED NURSE REASSESS COMMENT FT1
Attempted to give Report on patient, 4A unit refusing to take report at this time until bed is clean. Charge RN aware. Attempted to give Report on patient, [facility] unit refusing to take report at this time until bed is clean. Will reattempt to give report.

## 2023-10-19 NOTE — ED PROVIDER NOTE - PROGRESS NOTE DETAILS
KA - Burn consult placed. Informed of request to see patient bedside. Burn requested picture of wound. Recommend admission to medicine for IV antibiotics and will see patient after admission.

## 2023-10-19 NOTE — ED PROVIDER NOTE - PHYSICAL EXAMINATION
As Follows:  CONST: Well appearing in NAD  EYES: PERRL, EOMI, Sclera and conjunctiva clear.   CARD: No murmurs, rubs, or gallops; Normal rate and rhythm  RESP: BS Equal B/L, No wheezes, rhonchi or rales. No distress or accessory breathing  GI: Soft, non-tender, non-distended.  MS: Skin ulcerations to bilateral extensor distal wrists at the base of the hand. Distal swelling from the ulcerations to the palms and fingers. Mild moist drainage at site of ulcerations on both wrists. Normal ROM in all extremities.  SKIN: Notable swelling, erythema, scar tissue formation at site of ulcerations with skin falling off with removal of gauze bandage. Otherwise warm, dry, no acute rashes. MMM  NEURO: A&Ox3, No focal deficits. Strength and sensation intact. Steady gait

## 2023-10-19 NOTE — H&P ADULT - ASSESSMENT
Mr Mayfield is a 52 year old Male with a past medical history of IVDU (heroin) not currently using, vertebral osteomyelitis s/p debridement,  MSSA bacteremia, b/l PEs and LE DVTs s/p LLE embolectomy in October 2022 and DMII presented to the ED with withdrawal and c/o open wounds to b/l wrists. Patient states that he takes Methadone 125mg daily. He states he has had chronic wounds with contracture on his wrist for at least a few years however over the past few weeks to a month they have been opening and draining with skin falling off, and worsening scarring around the wrists which no longer allows the patient to flex his wrist. He has been putting bacitracin and nonstick bandage however it would remove the scab formation each time. He denies any systemic fever, cough, sore throat, chills, nausea, vomiting, chest pain, shortness of breath, abdominal pain, urinary complaints. He is not on any course of antibiotics at this time.     #b/l chronic wrist wounds h/o IVDU  - IV abx  - local wound care twice daily, wash with soap and water, silvadene, adaptic, perla  - f/u Burn and ID  - f/u wound culture      #Withdrawal from methadone  #IVDU on methadone  - continue with Methadone 40 PO QD without verification  - [facility] number ([id number])  - verify dose in AM  - tylenol PRN for mild-moderate pain  - as per last inpatient medicine documentation continue the following medications  - Continue with Xanax 1 mg po am, 1mg po afternoon and 2mg at night - total daily dose of 4mg  - Continue Wellbutrin Xl 300mg po q 24hrs    #H/O PE  - C/W eliquis  - monitor O2 sats  - CT angio as note above    #DM type II  - Monitor FS before meals and at bedtime  - keep 140-180  - start SSI if FS >180    #Misc  - DVT Prophylaxis:  - GI Prophylaxis:  - Diet:  - Activity:  - IV Fluids:  - Code Status:   Mr Mayfield is a 52 year old Male with a past medical history of IVDU (heroin) not currently using, vertebral osteomyelitis s/p debridement, MSSA bacteremia, b/l PEs and LE DVTs s/p LLE embolectomy in October 2022 and DMII presented to the ED with open wounds to b/l wrists. Patient states that he takes Methadone 125mg daily. He states he has had chronic wounds with contracture on his wrist for at least a few years however over the past few weeks to a month they have been opening and draining with skin falling off, and worsening scarring around the wrists which no longer allows the patient to flex his wrist. He has been putting bacitracin and nonstick bandage however it would remove the scab formation each time. He denies any systemic fever, cough, sore throat, chills, nausea, vomiting, chest pain, shortness of breath, abdominal pain, urinary complaints. He is not on any course of antibiotics at this time.     #b/l chronic wrist wounds h/o IVDU  - No sepsis POA  - IV abx (c/w Vancomycin, Cefepime and   - local wound care twice daily, wash with soap and water, silvadene, adaptic, perla  - f/u Burn and ID  - f/u wound culture    #Hx IVDU on methadone  - not in active withdrawl  - continue with Methadone 40 PO QD without verification  - [facility] ([id number])  - verify dose in AM  - tylenol PRN for mild-moderate pain  - as per last inpatient medicine documentation continue the following medications  - Continue with home Xanax 2mg TID prn  - Continue Wellbutrin Xl 300mg po q 24hrs  - Continue Mirtazepine 30mg qhs     #H/O PE in Oct 2022  #H/O LE DVTs s/p LLE embolectomy  - Pt states he was discharged with Eliquis and only completed 2 month course  - Pt currently asymptomatic and VS stable; monitor O2 sats  - f/u LE duplex    #DM type II  - Monitor FS before meals and at bedtime  - keep 140-180  - start SSI if FS >180    #Misc  - DVT Prophylaxis:  - GI Prophylaxis:  - Diet:  - Activity:  - IV Fluids:  - Code Status:   Mr Mayfield is a 52 year old Male with a past medical history of IVDU (heroin) not currently using, vertebral osteomyelitis s/p debridement, MSSA bacteremia, b/l PEs and LE DVTs s/p LLE embolectomy in October 2022 and DMII presented to the ED with open wounds to b/l wrists. Patient states that he takes Methadone 125mg daily. He states he has had chronic wounds with contracture on his wrist for at least a few years however over the past few weeks to a month they have been opening and draining with skin falling off, and worsening scarring around the wrists which no longer allows the patient to flex his wrist. He has been putting bacitracin and nonstick bandage however it would remove the scab formation each time. He denies any systemic fever, cough, sore throat, chills, nausea, vomiting, chest pain, shortness of breath, abdominal pain, urinary complaints. He is not on any course of antibiotics at this time.     #b/l chronic wrist wounds h/o IVDU  - No sepsis POA  - IV abx (c/w Vancomycin, Cefepime and Flagyl)  - local wound care twice daily, wash with soap and water, silvadene, adaptic, perla  - f/u Burn and ID  - f/u wound culture    #Hx IVDU on methadone  - not in active withdrawl  - continue with Methadone 40 PO QD without verification  - [facility] ([id number])  - verify dose in AM  - tylenol PRN for mild-moderate pain  - as per last inpatient medicine documentation continue the following medications  - Continue with home Xanax 2mg TID prn  - Continue Wellbutrin Xl 300mg po q 24hrs  - Continue Mirtazepine 30mg qhs     #H/O PE in Oct 2022  #H/O LE DVTs s/p LLE embolectomy  - Pt states he was discharged with Eliquis and only completed 2 month course  - Pt currently asymptomatic and VS stable; monitor O2 sats  - f/u LE duplex    #DM type II  - Monitor FS before meals and at bedtime  - keep 140-180  - start SSI if FS >180    #Misc  - DVT Prophylaxis: Lovenox  - Diet: Carb consistwnt  - Activity: IAT  - IV Fluids: None needed  - Code Status: Full

## 2023-10-19 NOTE — ED PROVIDER NOTE - OBJECTIVE STATEMENT
Patient is a 53-year-old male with past medical history of past IV drug abuse, past vertebral osteomyelitis, past pulmonary embolisms no longer on anticoagulation.  He presents today for bilateral wrist pain, swelling, infection, ulceration that has originally been a chronic issue shortly after his last admission in November 2022 but noted to be worsening over the past 1 to 2 weeks with increased moist drainage, skin falling off, and worsening scarring around the wrists which no longer allowed the patient to flex his wrist.  He denies any systemic fever, cough, sore throat, chills, nausea, vomiting, chest pain, shortness of breath, abdominal pain, urinary complaints.  He is not on any course of antibiotics at this time.  He does not follow with a specialist for his hand or wrist.  He follows with outpatient psychiatry for psychiatric medications.

## 2023-10-19 NOTE — CONSULT NOTE ADULT - SUBJECTIVE AND OBJECTIVE BOX
52y old Male with a history of IVDU (heroin) not currently using, vertebral osteomyelitis s/p debridement, and MSSA bacteremia, b/l PEs with radiographic evidence of R heart strain and admitted to ICU, found to have LE DVTs s/p LLE embolectomy 10/18 presents to the ED with withdrawal and hypoxia. Patient states that he takes Methadone 125mg daily. presents to ED with c/o open wounds to b/l wrists. He states he has had chronic wounds with contracture on his wrist for at least a few years however over the past few weeks to a month they have been opening and draining. He has been putting bacitracin and nonstick bandage however it would remove the scab formation each time.     ICU Vital Signs Last 24 Hrs  T(C): 36.7 (19 Oct 2023 15:01), Max: 36.7 (19 Oct 2023 15:01)  T(F): 98.1 (19 Oct 2023 15:01), Max: 98.1 (19 Oct 2023 15:01)  HR: 86 (19 Oct 2023 15:01) (86 - 86)  BP: 167/98 (19 Oct 2023 15:01) (167/98 - 167/98)  RR: 17 (19 Oct 2023 15:01) (17 - 17)  SpO2: 97% (19 Oct 2023 15:01) (97% - 97%)    O2 Parameters below as of 19 Oct 2023 15:01  Patient On (Oxygen Delivery Method): room air                          12.7   6.95  )-----------( 281      ( 19 Oct 2023 16:58 )             39.6     PHYSICAL EXAM:  GENERAL: well built, well nourished, NAD, laying in bed comfortably  HEAD:  Atraumatic, Normocephalic  EYES: EOMI, PERRLA, conjunctiva and sclera clear  NECK: Supple, No JVD  CHEST/LUNG:  B/L good air entry, no tachypnea/increased WOB/retractions. no cardiopulmonary compromise  EXTREMITIES:  2+ Peripheral Pulses, No clubbing, cyanosis, or edema  NEUROLOGY: AAOx3, non-focal,  moves all four extremities  SKIN: b/l wrist to dorsal aspect with open wounds approx 5x3cm with adherent yellow exudate, dry scabs. no malodor, no active bleeding, no surrounding erythema or edema. severe contraction notes with scaring.     wound culture taken  dressing applied         52y old Male with a history of IVDU (heroin) not currently using, vertebral osteomyelitis s/p debridement, and MSSA bacteremia, b/l PEs with radiographic evidence of R heart strain and admitted to ICU, found to have LE DVTs s/p LLE embolectomy 10/18 presents to the ED with withdrawal and hypoxia. Patient states that he takes Methadone 125mg daily. presents to ED with c/o open wounds to b/l wrists. He states he has had chronic wounds with contracture on his wrist for at least a few years however over the past few weeks to a month they have been opening and draining. He has been putting bacitracin and nonstick bandage however it would remove the scab formation each time.     ICU Vital Signs Last 24 Hrs  T(C): 36.7 (19 Oct 2023 15:01), Max: 36.7 (19 Oct 2023 15:01)  T(F): 98.1 (19 Oct 2023 15:01), Max: 98.1 (19 Oct 2023 15:01)  HR: 86 (19 Oct 2023 15:01) (86 - 86)  BP: 167/98 (19 Oct 2023 15:01) (167/98 - 167/98)  RR: 17 (19 Oct 2023 15:01) (17 - 17)  SpO2: 97% (19 Oct 2023 15:01) (97% - 97%)    O2 Parameters below as of 19 Oct 2023 15:01  Patient On (Oxygen Delivery Method): room air                          12.7   6.95  )-----------( 281      ( 19 Oct 2023 16:58 )             39.6     PHYSICAL EXAM:  GENERAL: well built, well nourished, NAD, laying in bed comfortably  HEAD:  Atraumatic, Normocephalic  EYES: EOMI, PERRLA, conjunctiva and sclera clear  NECK: Supple, No JVD  CHEST/LUNG:  B/L good air entry, no tachypnea/increased WOB/retractions. no cardiopulmonary compromise  EXTREMITIES:  2+ Peripheral Pulses, No clubbing, cyanosis, or edema  NEUROLOGY: AAOx3, non-focal,  moves all four extremities  SKIN: b/l wrist to dorsal aspect with open wounds approx 5x3cm with adherent yellow exudate, dry scabs. no malodor, no active bleeding, no surrounding erythema or edema. severe contraction noted with scaring and limited ROM.      wound culture taken to b/l wrist  dressing applied

## 2023-10-19 NOTE — H&P ADULT - HISTORY OF PRESENT ILLNESS
Mr Mayfield is a 52 year old Male with a past medical history of IVDU (heroin) not currently using, vertebral osteomyelitis s/p debridement, and MSSA bacteremia, b/l PEs and LE DVTs s/p LLE embolectomy in October 2022 presents to the ED with withdrawal and c/o open wounds to b/l wrists. Patient states that he takes Methadone 125mg daily. He states he has had chronic wounds with contracture on his wrist for at least a few years however over the past few weeks to a month they have been opening and draining with skin falling off, and worsening scarring around the wrists which no longer allows the patient to flex his wrist. He has been putting bacitracin and nonstick bandage however it would remove the scab formation each time. He denies any systemic fever, cough, sore throat, chills, nausea, vomiting, chest pain, shortness of breath, abdominal pain, urinary complaints. He is not on any course of antibiotics at this time.     Vital Signs Last 12 Hrs  T(F): 98.3 (10-19-23 @ 20:05), Max: 98.3 (10-19-23 @ 20:05)  HR: 73 (10-19-23 @ 20:05) (73 - 86)  BP: 144/79 (10-19-23 @ 20:05) (144/79 - 167/98)  RR: 17 (10-19-23 @ 20:05) (17 - 17)  SpO2: 97% (10-19-23 @ 20:05) (97% - 97%)    Pt received 1 L LR bolus and vancomycin in the ED. B/l wrist x-rays were ordered. Burn was consulted for his wounds.    Mr Mayfield is a 52 year old Male with a past medical history of IVDU (heroin) not currently using, vertebral osteomyelitis s/p debridement,  MSSA bacteremia, b/l PEs and LE DVTs s/p LLE embolectomy in October 2022 and DMII presented to the ED with open wounds to b/l wrists. Patient states that he takes Methadone 125mg daily. He states he has had chronic wounds with contracture on his wrist for at least a few years however over the past few weeks to a month they have been opening and draining with skin falling off, and worsening scarring around the wrists which no longer allows the patient to flex his wrist. He has been putting bacitracin and nonstick bandage however it would remove the scab formation each time. He denies any systemic fever, cough, sore throat, chills, nausea, vomiting, chest pain, shortness of breath, abdominal pain, urinary complaints. He is not on any course of antibiotics at this time.     Vital Signs Last 12 Hrs  T(F): 98.3 (10-19-23 @ 20:05), Max: 98.3 (10-19-23 @ 20:05)  HR: 73 (10-19-23 @ 20:05) (73 - 86)  BP: 144/79 (10-19-23 @ 20:05) (144/79 - 167/98)  RR: 17 (10-19-23 @ 20:05) (17 - 17)  SpO2: 97% (10-19-23 @ 20:05) (97% - 97%)    Pt received 1 L LR bolus and vancomycin in the ED. B/l wrist x-rays were ordered. Burn was consulted for his wounds.

## 2023-10-19 NOTE — H&P ADULT - ATTENDING COMMENTS
HPI:  Mr Mayfield is a 52 year old Male with a past medical history of IVDU (heroin) not currently using, vertebral osteomyelitis s/p debridement,  MSSA bacteremia, b/l PEs and LE DVTs s/p LLE embolectomy in October 2022 and DMII presented to the ED with open wounds to b/l wrists. Patient states that he takes Methadone 125mg daily. He states he has had chronic wounds with contracture on his wrist for at least a few years however over the past few weeks to a month they have been opening and draining with skin falling off, and worsening scarring around the wrists which no longer allows the patient to flex his wrist. He has been putting bacitracin and nonstick bandage however it would remove the scab formation each time. He denies any systemic fever, cough, sore throat, chills, nausea, vomiting, chest pain, shortness of breath, abdominal pain, urinary complaints. He is not on any course of antibiotics at this time.     Vital Signs Last 12 Hrs  T(F): 98.3 (10-19-23 @ 20:05), Max: 98.3 (10-19-23 @ 20:05)  HR: 73 (10-19-23 @ 20:05) (73 - 86)  BP: 144/79 (10-19-23 @ 20:05) (144/79 - 167/98)  RR: 17 (10-19-23 @ 20:05) (17 - 17)  SpO2: 97% (10-19-23 @ 20:05) (97% - 97%)    Pt received 1 L LR bolus and vancomycin in the ED. B/l wrist x-rays were ordered. Burn was consulted for his wounds.    (19 Oct 2023 20:28)  REVIEW OF SYSTEMS: see cc/HPI   CONSTITUTIONAL: No weakness, fevers or chills  EYES/ENT: No visual changes;  No vertigo or throat pain   NECK: No pain or stiffness  RESPIRATORY: No cough, wheezing, hemoptysis; No shortness of breath  CARDIOVASCULAR: No chest pain or palpitations  GASTROINTESTINAL: No abdominal or epigastric pain. No nausea, vomiting, or hematemesis; No diarrhea or constipation. No melena or hematochezia.  GENITOURINARY: No dysuria, frequency or hematuria  NEUROLOGICAL: No numbness or weakness  SKIN: No itching, rashes    Physical Exam:  General: WN/WD NAD  Neurology: A&Ox3, nonfocal, follows commands  Eyes: PERRLA/ EOMI  ENT/Neck: Neck supple, trachea midline, No JVD  Respiratory: CTA B/L, No wheezing, rales, rhonchi  CV: Normal rate regular rhythm, S1S2, no murmurs, rubs or gallops  Abdominal: Soft, NT, ND +BS,   Extremities: No edema, + peripheral pulses  Skin: No Rashes, Hematoma, Ecchymosis    A/p  Bilateral wrist lesions     H/o IVDU on methadone   c/w OP regimen / Verify dose at clinic     H/o PE   H/o DVT   -agree w/ repeating venous duplex     DM type II  -Lantus / Lispro AC w/ F/s monitoring and ISS PRN     DVT prophylaxis HPI:  Mr Mayfield is a 52 year old Male with a past medical history of IVDU (heroin) not currently using, vertebral osteomyelitis s/p debridement,  MSSA bacteremia, b/l PEs and LE DVTs s/p LLE embolectomy in October 2022 and DMII presented to the ED with open wounds to b/l wrists. Patient states that he takes Methadone 125mg daily. He states he has had chronic wounds with contracture on his wrist for at least a few years however over the past few weeks to a month they have been opening and draining with skin falling off, and worsening scarring around the wrists which no longer allows the patient to flex his wrist. He has been putting bacitracin and nonstick bandage however it would remove the scab formation each time. He denies any systemic fever, cough, sore throat, chills, nausea, vomiting, chest pain, shortness of breath, abdominal pain, urinary complaints. He is not on any course of antibiotics at this time.     Vital Signs Last 12 Hrs  T(F): 98.3 (10-19-23 @ 20:05), Max: 98.3 (10-19-23 @ 20:05)  HR: 73 (10-19-23 @ 20:05) (73 - 86)  BP: 144/79 (10-19-23 @ 20:05) (144/79 - 167/98)  RR: 17 (10-19-23 @ 20:05) (17 - 17)  SpO2: 97% (10-19-23 @ 20:05) (97% - 97%)    Pt received 1 L LR bolus and vancomycin in the ED. B/l wrist x-rays were ordered. Burn was consulted for his wounds.    (19 Oct 2023 20:28)  REVIEW OF SYSTEMS: see cc/HPI   CONSTITUTIONAL: No weakness, fevers or chills  EYES/ENT: No visual changes;  No vertigo or throat pain   NECK: No pain or stiffness  RESPIRATORY: No cough, wheezing, hemoptysis; No shortness of breath  CARDIOVASCULAR: No chest pain or palpitations  GASTROINTESTINAL: No abdominal or epigastric pain. No nausea, vomiting, or hematemesis; No diarrhea or constipation. No melena or hematochezia.  GENITOURINARY: No dysuria, frequency or hematuria  NEUROLOGICAL: No numbness or weakness  SKIN: No itching, rashes, multiple bilateral distal forearm scars w/ bilateral ulcerations /open wounds     Physical Exam:  General: WN/WD NAD  Neurology: A&Ox3, nonfocal, follows commands  Eyes: PERRLA/ EOMI  ENT/Neck: Neck supple, trachea midline, No JVD  Respiratory: CTA B/L, No wheezing, rales, rhonchi  CV: Normal rate regular rhythm, S1S2, no murmurs, rubs or gallops  Abdominal: Soft, NT, ND +BS,   Extremities: No edema, + peripheral pulses, multiple bilateral distal forearm scars w/ bilateral ulcerations /open wounds, Bilateral wrist contracture deformities - restricted ROM and restricted ability to ue UEs/ hands.   Skin: No Rashes, Hematoma, Ecchymosis, see above    A/p  Bilateral wrist lesions and bilateral wrist contractures   -local skin care   -Burn team consult   -IV abx   -check blood Cx   -ID eval     H/o IVDU on methadone   c/w OP regimen / Verify dose at clinic     H/o PE   H/o DVT   -agree w/ repeating venous duplex     DM type II  -Lantus / Lispro AC w/ F/s monitoring and ISS PRN     DVT prophylaxis    PATIENT SEEN by ATTENDING 10/19/23 ( note revised 10/20)

## 2023-10-20 LAB
A1C WITH ESTIMATED AVERAGE GLUCOSE RESULT: 6.5 % — HIGH (ref 4–5.6)
A1C WITH ESTIMATED AVERAGE GLUCOSE RESULT: 6.5 % — HIGH (ref 4–5.6)
ALBUMIN SERPL ELPH-MCNC: 3.5 G/DL — SIGNIFICANT CHANGE UP (ref 3.5–5.2)
ALBUMIN SERPL ELPH-MCNC: 3.5 G/DL — SIGNIFICANT CHANGE UP (ref 3.5–5.2)
ALP SERPL-CCNC: 105 U/L — SIGNIFICANT CHANGE UP (ref 30–115)
ALP SERPL-CCNC: 105 U/L — SIGNIFICANT CHANGE UP (ref 30–115)
ALT FLD-CCNC: 9 U/L — SIGNIFICANT CHANGE UP (ref 0–41)
ALT FLD-CCNC: 9 U/L — SIGNIFICANT CHANGE UP (ref 0–41)
ANION GAP SERPL CALC-SCNC: 8 MMOL/L — SIGNIFICANT CHANGE UP (ref 7–14)
ANION GAP SERPL CALC-SCNC: 8 MMOL/L — SIGNIFICANT CHANGE UP (ref 7–14)
AST SERPL-CCNC: 9 U/L — SIGNIFICANT CHANGE UP (ref 0–41)
AST SERPL-CCNC: 9 U/L — SIGNIFICANT CHANGE UP (ref 0–41)
BASOPHILS # BLD AUTO: 0.01 K/UL — SIGNIFICANT CHANGE UP (ref 0–0.2)
BASOPHILS # BLD AUTO: 0.01 K/UL — SIGNIFICANT CHANGE UP (ref 0–0.2)
BASOPHILS NFR BLD AUTO: 0.2 % — SIGNIFICANT CHANGE UP (ref 0–1)
BASOPHILS NFR BLD AUTO: 0.2 % — SIGNIFICANT CHANGE UP (ref 0–1)
BILIRUB SERPL-MCNC: 0.2 MG/DL — SIGNIFICANT CHANGE UP (ref 0.2–1.2)
BILIRUB SERPL-MCNC: 0.2 MG/DL — SIGNIFICANT CHANGE UP (ref 0.2–1.2)
BUN SERPL-MCNC: 11 MG/DL — SIGNIFICANT CHANGE UP (ref 10–20)
BUN SERPL-MCNC: 11 MG/DL — SIGNIFICANT CHANGE UP (ref 10–20)
CALCIUM SERPL-MCNC: 8.9 MG/DL — SIGNIFICANT CHANGE UP (ref 8.4–10.4)
CALCIUM SERPL-MCNC: 8.9 MG/DL — SIGNIFICANT CHANGE UP (ref 8.4–10.4)
CHLORIDE SERPL-SCNC: 104 MMOL/L — SIGNIFICANT CHANGE UP (ref 98–110)
CHLORIDE SERPL-SCNC: 104 MMOL/L — SIGNIFICANT CHANGE UP (ref 98–110)
CO2 SERPL-SCNC: 26 MMOL/L — SIGNIFICANT CHANGE UP (ref 17–32)
CO2 SERPL-SCNC: 26 MMOL/L — SIGNIFICANT CHANGE UP (ref 17–32)
CREAT SERPL-MCNC: 0.9 MG/DL — SIGNIFICANT CHANGE UP (ref 0.7–1.5)
CREAT SERPL-MCNC: 0.9 MG/DL — SIGNIFICANT CHANGE UP (ref 0.7–1.5)
EGFR: 101 ML/MIN/1.73M2 — SIGNIFICANT CHANGE UP
EGFR: 101 ML/MIN/1.73M2 — SIGNIFICANT CHANGE UP
EOSINOPHIL # BLD AUTO: 0.21 K/UL — SIGNIFICANT CHANGE UP (ref 0–0.7)
EOSINOPHIL # BLD AUTO: 0.21 K/UL — SIGNIFICANT CHANGE UP (ref 0–0.7)
EOSINOPHIL NFR BLD AUTO: 3.2 % — SIGNIFICANT CHANGE UP (ref 0–8)
EOSINOPHIL NFR BLD AUTO: 3.2 % — SIGNIFICANT CHANGE UP (ref 0–8)
ESTIMATED AVERAGE GLUCOSE: 140 MG/DL — HIGH (ref 68–114)
ESTIMATED AVERAGE GLUCOSE: 140 MG/DL — HIGH (ref 68–114)
GLUCOSE BLDC GLUCOMTR-MCNC: 124 MG/DL — HIGH (ref 70–99)
GLUCOSE BLDC GLUCOMTR-MCNC: 124 MG/DL — HIGH (ref 70–99)
GLUCOSE BLDC GLUCOMTR-MCNC: 136 MG/DL — HIGH (ref 70–99)
GLUCOSE BLDC GLUCOMTR-MCNC: 136 MG/DL — HIGH (ref 70–99)
GLUCOSE BLDC GLUCOMTR-MCNC: 174 MG/DL — HIGH (ref 70–99)
GLUCOSE BLDC GLUCOMTR-MCNC: 174 MG/DL — HIGH (ref 70–99)
GLUCOSE BLDC GLUCOMTR-MCNC: 183 MG/DL — HIGH (ref 70–99)
GLUCOSE BLDC GLUCOMTR-MCNC: 183 MG/DL — HIGH (ref 70–99)
GLUCOSE SERPL-MCNC: 160 MG/DL — HIGH (ref 70–99)
GLUCOSE SERPL-MCNC: 160 MG/DL — HIGH (ref 70–99)
GRAM STN FLD: SIGNIFICANT CHANGE UP
GRAM STN FLD: SIGNIFICANT CHANGE UP
HCT VFR BLD CALC: 37.6 % — LOW (ref 42–52)
HCT VFR BLD CALC: 37.6 % — LOW (ref 42–52)
HGB BLD-MCNC: 12.2 G/DL — LOW (ref 14–18)
HGB BLD-MCNC: 12.2 G/DL — LOW (ref 14–18)
IMM GRANULOCYTES NFR BLD AUTO: 0.3 % — SIGNIFICANT CHANGE UP (ref 0.1–0.3)
IMM GRANULOCYTES NFR BLD AUTO: 0.3 % — SIGNIFICANT CHANGE UP (ref 0.1–0.3)
LYMPHOCYTES # BLD AUTO: 1.97 K/UL — SIGNIFICANT CHANGE UP (ref 1.2–3.4)
LYMPHOCYTES # BLD AUTO: 1.97 K/UL — SIGNIFICANT CHANGE UP (ref 1.2–3.4)
LYMPHOCYTES # BLD AUTO: 30.2 % — SIGNIFICANT CHANGE UP (ref 20.5–51.1)
LYMPHOCYTES # BLD AUTO: 30.2 % — SIGNIFICANT CHANGE UP (ref 20.5–51.1)
MCHC RBC-ENTMCNC: 29.3 PG — SIGNIFICANT CHANGE UP (ref 27–31)
MCHC RBC-ENTMCNC: 29.3 PG — SIGNIFICANT CHANGE UP (ref 27–31)
MCHC RBC-ENTMCNC: 32.4 G/DL — SIGNIFICANT CHANGE UP (ref 32–37)
MCHC RBC-ENTMCNC: 32.4 G/DL — SIGNIFICANT CHANGE UP (ref 32–37)
MCV RBC AUTO: 90.2 FL — SIGNIFICANT CHANGE UP (ref 80–94)
MCV RBC AUTO: 90.2 FL — SIGNIFICANT CHANGE UP (ref 80–94)
MONOCYTES # BLD AUTO: 0.5 K/UL — SIGNIFICANT CHANGE UP (ref 0.1–0.6)
MONOCYTES # BLD AUTO: 0.5 K/UL — SIGNIFICANT CHANGE UP (ref 0.1–0.6)
MONOCYTES NFR BLD AUTO: 7.7 % — SIGNIFICANT CHANGE UP (ref 1.7–9.3)
MONOCYTES NFR BLD AUTO: 7.7 % — SIGNIFICANT CHANGE UP (ref 1.7–9.3)
NEUTROPHILS # BLD AUTO: 3.82 K/UL — SIGNIFICANT CHANGE UP (ref 1.4–6.5)
NEUTROPHILS # BLD AUTO: 3.82 K/UL — SIGNIFICANT CHANGE UP (ref 1.4–6.5)
NEUTROPHILS NFR BLD AUTO: 58.4 % — SIGNIFICANT CHANGE UP (ref 42.2–75.2)
NEUTROPHILS NFR BLD AUTO: 58.4 % — SIGNIFICANT CHANGE UP (ref 42.2–75.2)
NRBC # BLD: 0 /100 WBCS — SIGNIFICANT CHANGE UP (ref 0–0)
NRBC # BLD: 0 /100 WBCS — SIGNIFICANT CHANGE UP (ref 0–0)
PLATELET # BLD AUTO: 228 K/UL — SIGNIFICANT CHANGE UP (ref 130–400)
PLATELET # BLD AUTO: 228 K/UL — SIGNIFICANT CHANGE UP (ref 130–400)
PMV BLD: 9.7 FL — SIGNIFICANT CHANGE UP (ref 7.4–10.4)
PMV BLD: 9.7 FL — SIGNIFICANT CHANGE UP (ref 7.4–10.4)
POTASSIUM SERPL-MCNC: 4.4 MMOL/L — SIGNIFICANT CHANGE UP (ref 3.5–5)
POTASSIUM SERPL-MCNC: 4.4 MMOL/L — SIGNIFICANT CHANGE UP (ref 3.5–5)
POTASSIUM SERPL-SCNC: 4.4 MMOL/L — SIGNIFICANT CHANGE UP (ref 3.5–5)
POTASSIUM SERPL-SCNC: 4.4 MMOL/L — SIGNIFICANT CHANGE UP (ref 3.5–5)
PROT SERPL-MCNC: 6.9 G/DL — SIGNIFICANT CHANGE UP (ref 6–8)
PROT SERPL-MCNC: 6.9 G/DL — SIGNIFICANT CHANGE UP (ref 6–8)
RBC # BLD: 4.17 M/UL — LOW (ref 4.7–6.1)
RBC # BLD: 4.17 M/UL — LOW (ref 4.7–6.1)
RBC # FLD: 13.4 % — SIGNIFICANT CHANGE UP (ref 11.5–14.5)
RBC # FLD: 13.4 % — SIGNIFICANT CHANGE UP (ref 11.5–14.5)
SODIUM SERPL-SCNC: 138 MMOL/L — SIGNIFICANT CHANGE UP (ref 135–146)
SODIUM SERPL-SCNC: 138 MMOL/L — SIGNIFICANT CHANGE UP (ref 135–146)
SPECIMEN SOURCE: SIGNIFICANT CHANGE UP
SPECIMEN SOURCE: SIGNIFICANT CHANGE UP
WBC # BLD: 6.53 K/UL — SIGNIFICANT CHANGE UP (ref 4.8–10.8)
WBC # BLD: 6.53 K/UL — SIGNIFICANT CHANGE UP (ref 4.8–10.8)
WBC # FLD AUTO: 6.53 K/UL — SIGNIFICANT CHANGE UP (ref 4.8–10.8)
WBC # FLD AUTO: 6.53 K/UL — SIGNIFICANT CHANGE UP (ref 4.8–10.8)

## 2023-10-20 PROCEDURE — 99232 SBSQ HOSP IP/OBS MODERATE 35: CPT

## 2023-10-20 PROCEDURE — 93970 EXTREMITY STUDY: CPT | Mod: 26

## 2023-10-20 RX ORDER — CHLORHEXIDINE GLUCONATE 213 G/1000ML
1 SOLUTION TOPICAL
Refills: 0 | Status: DISCONTINUED | OUTPATIENT
Start: 2023-10-20 | End: 2023-10-23

## 2023-10-20 RX ORDER — NICOTINE POLACRILEX 2 MG
1 GUM BUCCAL DAILY
Refills: 0 | Status: DISCONTINUED | OUTPATIENT
Start: 2023-10-20 | End: 2023-10-23

## 2023-10-20 RX ORDER — METHADONE HYDROCHLORIDE 40 MG/1
125 TABLET ORAL DAILY
Refills: 0 | Status: DISCONTINUED | OUTPATIENT
Start: 2023-10-20 | End: 2023-10-23

## 2023-10-20 RX ORDER — AMPICILLIN SODIUM AND SULBACTAM SODIUM 250; 125 MG/ML; MG/ML
3 INJECTION, POWDER, FOR SUSPENSION INTRAMUSCULAR; INTRAVENOUS EVERY 6 HOURS
Refills: 0 | Status: DISCONTINUED | OUTPATIENT
Start: 2023-10-20 | End: 2023-10-23

## 2023-10-20 RX ORDER — VANCOMYCIN HCL 1 G
1500 VIAL (EA) INTRAVENOUS EVERY 12 HOURS
Refills: 0 | Status: DISCONTINUED | OUTPATIENT
Start: 2023-10-20 | End: 2023-10-21

## 2023-10-20 RX ADMIN — Medication 2 MILLIGRAM(S): at 05:41

## 2023-10-20 RX ADMIN — Medication 300 MILLIGRAM(S): at 17:16

## 2023-10-20 RX ADMIN — AMPICILLIN SODIUM AND SULBACTAM SODIUM 200 GRAM(S): 250; 125 INJECTION, POWDER, FOR SUSPENSION INTRAMUSCULAR; INTRAVENOUS at 17:12

## 2023-10-20 RX ADMIN — Medication 2 MILLIGRAM(S): at 13:31

## 2023-10-20 RX ADMIN — Medication 1 TABLET(S): at 11:07

## 2023-10-20 RX ADMIN — METHADONE HYDROCHLORIDE 125 MILLIGRAM(S): 40 TABLET ORAL at 11:44

## 2023-10-20 RX ADMIN — Medication 100 MILLIGRAM(S): at 05:43

## 2023-10-20 RX ADMIN — MIRTAZAPINE 30 MILLIGRAM(S): 45 TABLET, ORALLY DISINTEGRATING ORAL at 21:08

## 2023-10-20 RX ADMIN — Medication 1 APPLICATION(S): at 05:43

## 2023-10-20 RX ADMIN — Medication 1 APPLICATION(S): at 17:16

## 2023-10-20 RX ADMIN — Medication 1: at 08:18

## 2023-10-20 RX ADMIN — BUPROPION HYDROCHLORIDE 300 MILLIGRAM(S): 150 TABLET, EXTENDED RELEASE ORAL at 11:45

## 2023-10-20 RX ADMIN — CHLORHEXIDINE GLUCONATE 1 APPLICATION(S): 213 SOLUTION TOPICAL at 17:16

## 2023-10-20 RX ADMIN — CEFEPIME 100 MILLIGRAM(S): 1 INJECTION, POWDER, FOR SOLUTION INTRAMUSCULAR; INTRAVENOUS at 05:39

## 2023-10-20 RX ADMIN — Medication 1: at 17:15

## 2023-10-20 RX ADMIN — Medication 2 MILLIGRAM(S): at 21:07

## 2023-10-20 RX ADMIN — AMPICILLIN SODIUM AND SULBACTAM SODIUM 200 GRAM(S): 250; 125 INJECTION, POWDER, FOR SUSPENSION INTRAMUSCULAR; INTRAVENOUS at 11:11

## 2023-10-20 RX ADMIN — Medication 250 MILLIGRAM(S): at 05:43

## 2023-10-20 RX ADMIN — ENOXAPARIN SODIUM 40 MILLIGRAM(S): 100 INJECTION SUBCUTANEOUS at 05:35

## 2023-10-20 NOTE — PATIENT PROFILE ADULT - FUNCTIONAL ASSESSMENT - BASIC MOBILITY 5.
PAST MEDICAL HISTORY:  Anxiety     Back pain     Bipolar disorder     PTSD (post-traumatic stress disorder) 4 = No assist / stand by assistance

## 2023-10-20 NOTE — PATIENT PROFILE ADULT - FALL HARM RISK - HARM RISK INTERVENTIONS

## 2023-10-20 NOTE — CHART NOTE - NSCHARTNOTEFT_GEN_A_CORE
Patient is on methadone at home. I called [facility] at [phone] (transferred to [facility], as they keep methadone records for this clinic). Patient signed HIPAA release form, which I faxed to them in order to receive the methadone dose.   Pharmacist Darwin Garcia informed me that patient is on home methadone dose of 125mg daily, and that he had enough supply until 10/25/23. Patient does not have any methadone here in the hospital. Will order 125mg daily for patient to continue while inpatient. Patient is on methadone at home. I called [facility] at [phone] (I was transferred on the phone to [facility], as they keep methadone records for this clinic). Patient signed HIPAA release form, which I faxed to them in order to receive the methadone dose.   Pharmacist Darwin Garcia confirmed with me that patient is on home methadone dose of 125mg daily, and that he had enough supply until 10/25/23. Patient does not have any methadone here in the hospital. Will order 125mg daily for patient to continue while inpatient.

## 2023-10-20 NOTE — CONSULT NOTE ADULT - SUBJECTIVE AND OBJECTIVE BOX
GENERAL SURGERY CONSULT NOTE    Patient: MIKAEL MCDERMOTT , 53y ([birth year])Male   MRN: [id number]  Location: [facility] 4A (Back) 026 C  Visit: 10-19-23 Inpatient  Date: 10-20-23 @ 12:00    HPI:  Mr Mcdermott is a 52 year old Male with a past medical history of IVDU (heroin) not currently using, vertebral osteomyelitis s/p debridement,  MSSA bacteremia, b/l PEs and LE DVTs s/p LLE embolectomy in October 2022 and DMII presented to the ED with open wounds to b/l wrists.  Patient states that he takes Methadone 125mg daily. He states he has had chronic wounds with contracture on his wrists for years however over the past few weeks to a month they have been opening and draining with skin falling off, and worsening scarring around the wrists. Reports he had very minimal ROM, however cannot move them at all anymore.    Upon further questioning, patient reports these contractures and wounds developed because of injecting heroin at b/l wrist sites.     Patient currently being managed with Silvadene cream, Vancomycin and Ampicillin/Sulbactam. Dr. Arevalo, from Burn surgery team, planning to take patient to OR for debridement of wounds on Monday 10/30.         PAST MEDICAL & SURGICAL HISTORY:  IV drug abuse  Vertebral osteomyelitis  MSSA bacteremia    No significant past surgical history          Home Medications:  acetaminophen 325 mg oral tablet: 2 tab(s) orally every 6 hours, As needed, Temp greater or equal to 38C (100.4F) (25 Oct 2022 15:30)  apixaban 5 mg oral tablet: 1 tab(s) orally every 12 hours (08 Nov 2022 15:39)  lactobacillus acidophilus oral capsule: 1 tab(s) orally once a day (16 Nov 2022 14:49)  methadone: 125 milligram(s) orally once a day (19 Oct 2023 21:01)  mirtazapine 30 mg oral tablet: 1 tab(s) orally once a day (at bedtime) (19 Oct 2023 21:03)  QUEtiapine 200 mg oral tablet: 1 tab(s) orally once a day (at bedtime) (21 Nov 2022 15:05)  Xanax 2 mg oral tablet: 1 tab(s) orally 3 times a day as needed for  anxiety (19 Oct 2023 21:02)        VITALS:  T(F): 97.4 (10-20-23 @ 04:00), Max: 98.3 (10-19-23 @ 20:05)  HR: 61 (10-20-23 @ 04:00) (61 - 86)  BP: 178/73 (10-20-23 @ 04:00) (144/79 - 178/73)  RR: 18 (10-20-23 @ 04:00) (17 - 18)  SpO2: 97% (10-19-23 @ 20:05) (97% - 97%)    PHYSICAL EXAM:  General: NAD, laying in bed  HEENT: NCAT, EOMI  Chest: appropriate respiratory effort to RA  Ext: Dorsal areas of b/l wrists with open wounds to Zone VII, silvadene covering wound bed. Extensor tendons in contraction. Erythema around wound sites, no crepitus or drainage appreciated. Patient with complete limitation of ROM due to contraction secondary to fibrous tissue and wounds.     MEDICATIONS  (STANDING):  ampicillin/sulbactam  IVPB 3 Gram(s) IV Intermittent every 6 hours  buPROPion XL (24-Hour) . 300 milliGRAM(s) Oral daily  dextrose 5%. 1000 milliLiter(s) (50 mL/Hr) IV Continuous <Continuous>  dextrose 5%. 1000 milliLiter(s) (100 mL/Hr) IV Continuous <Continuous>  dextrose 50% Injectable 12.5 Gram(s) IV Push once  dextrose 50% Injectable 25 Gram(s) IV Push once  dextrose 50% Injectable 25 Gram(s) IV Push once  enoxaparin Injectable 40 milliGRAM(s) SubCutaneous every 24 hours  glucagon  Injectable 1 milliGRAM(s) IntraMuscular once  insulin lispro (ADMELOG) corrective regimen sliding scale   SubCutaneous three times a day before meals  lactobacillus acidophilus 1 Tablet(s) Oral daily  methadone    Tablet 125 milliGRAM(s) Oral daily  mirtazapine 30 milliGRAM(s) Oral at bedtime  silver sulfADIAZINE 1% Cream 1 Application(s) Topical two times a day  vancomycin  IVPB 1500 milliGRAM(s) IV Intermittent every 12 hours    MEDICATIONS  (PRN):  acetaminophen     Tablet .. 650 milliGRAM(s) Oral every 6 hours PRN Moderate Pain (4 - 6)  ALPRAZolam 2 milliGRAM(s) Oral three times a day PRN for anxiety  aluminum hydroxide/magnesium hydroxide/simethicone Suspension 30 milliLiter(s) Oral every 4 hours PRN Dyspepsia  dextrose Oral Gel 15 Gram(s) Oral once PRN Blood Glucose LESS THAN 70 milliGRAM(s)/deciliter  melatonin 3 milliGRAM(s) Oral at bedtime PRN Insomnia  ondansetron Injectable 4 milliGRAM(s) IV Push every 8 hours PRN Nausea and/or Vomiting      LAB/STUDIES:                        12.2   6.53  )-----------( 228      ( 20 Oct 2023 08:24 )             37.6     10-20    138  |  104  |  11  ----------------------------<  160<H>  4.4   |  26  |  0.9    Ca    8.9      20 Oct 2023 08:24    TPro  6.9  /  Alb  3.5  /  TBili  0.2  /  DBili  x   /  AST  9   /  ALT  9   /  AlkPhos  105  10-20      LIVER FUNCTIONS - ( 20 Oct 2023 08:24 )  Alb: 3.5 g/dL / Pro: 6.9 g/dL / ALK PHOS: 105 U/L / ALT: 9 U/L / AST: 9 U/L / GGT: x           Urinalysis Basic - ( 20 Oct 2023 08:24 )    Color: x / Appearance: x / SG: x / pH: x  Gluc: 160 mg/dL / Ketone: x  / Bili: x / Urobili: x   Blood: x / Protein: x / Nitrite: x   Leuk Esterase: x / RBC: x / WBC x   Sq Epi: x / Non Sq Epi: x / Bacteria: x                  Culture - Joint (collected 19 Oct 2023 18:00)  Source: Wrist right wrist  Gram Stain (20 Oct 2023 02:28):    polymorphonuclear leukocytes seen    Gram Positive Cocci in Clusters seen    by cytocentrifuge      IMAGING:  X-ray 3-view b/l wrists per radiology report 10/19:   "IMPRESSION:  Chronic bilateral wrist dorsal subluxation. Left dorsal wrist soft tissue   ulceration with suggestion of subcutaneous emphysema.  Diffuse bilateral soft tissue swelling."      ACCESS DEVICES:  [x] Peripheral IV    ASSESSMENT:  Mr Mcdermott is a 52 year old Male with a past medical history of IVDU (heroin) not currently using, vertebral osteomyelitis s/p debridement,  MSSA bacteremia, b/l PEs and LE DVTs s/p LLE embolectomy in October 2022 and DMII presented to the ED with open wounds to b/l wrists.  Patient states that he takes Methadone 125mg daily. He states he has had chronic wounds with contracture on his wrists for years however over the past few weeks to a month they have been opening and draining with skin falling off, and worsening scarring around the wrists. Reports he had very minimal ROM, however cannot move them at all anymore.    Upon further questioning, patient reports these contractures and wounds developed because of injecting heroin at b/l wrist sites.     Dorsal areas of b/l wrists with open wounds to Zone VII b/l, silvadene covering wound beds. Extensor tendons in contraction b/l; Patient with complete limitation of ROM due to contraction secondary to fibrous tissue and wounds.  Erythema around wound sites, no crepitus or drainage appreciated.     Patient currently being managed with Silvadene cream, Vancomycin and Ampicillin/Sulbactam. Dr. Arevalo, from Burn surgery team, planning to take patient to OR for debridement of wounds on Monday 10/30.     PLAN:  As per Dr. Shepherd's instructions:  - Recommend outpatient follow up after all soft tissue is healed.  - Rest of care per primary team  - Follow up ID Recs  - Follow up Dr. Arevalo/Burn Team's recs and plan    Above plan as instructed by Attending Surgeon Dr. Shepherd.    10-20-23 @ 12:00   GENERAL SURGERY CONSULT NOTE    Patient: MIKAEL MCDERMOTT , 53y ([birth year])Male   MRN: [id number]  Location: [facility] 4A (Back) 026 C  Visit: 10-19-23 Inpatient  Date: 10-20-23 @ 12:00    HPI:  Mr Mcdermott is a 52 year old Male with a past medical history of IVDU (heroin) not currently using, vertebral osteomyelitis s/p debridement,  MSSA bacteremia, b/l PEs and LE DVTs s/p LLE embolectomy in October 2022 and DMII presented to the ED with open wounds to b/l wrists.  Patient states that he takes Methadone 125mg daily. He states he has had chronic wounds with contracture on his wrists for years however over the past few weeks to a month they have been opening and draining with skin falling off, and worsening scarring around the wrists. Reports he had very minimal ROM, however cannot move them at all anymore.    Upon further questioning, patient reports these contractures and wounds developed because of injecting heroin at b/l wrist sites.     Patient currently being managed with Silvadene cream, Vancomycin and Ampicillin/Sulbactam. Dr. Arevalo, from Burn surgery team, planning to take patient to OR for debridement of wounds on Monday 10/30.         PAST MEDICAL & SURGICAL HISTORY:  IV drug abuse  Vertebral osteomyelitis  MSSA bacteremia    No significant past surgical history          Home Medications:  acetaminophen 325 mg oral tablet: 2 tab(s) orally every 6 hours, As needed, Temp greater or equal to 38C (100.4F) (25 Oct 2022 15:30)  apixaban 5 mg oral tablet: 1 tab(s) orally every 12 hours (08 Nov 2022 15:39)  lactobacillus acidophilus oral capsule: 1 tab(s) orally once a day (16 Nov 2022 14:49)  methadone: 125 milligram(s) orally once a day (19 Oct 2023 21:01)  mirtazapine 30 mg oral tablet: 1 tab(s) orally once a day (at bedtime) (19 Oct 2023 21:03)  QUEtiapine 200 mg oral tablet: 1 tab(s) orally once a day (at bedtime) (21 Nov 2022 15:05)  Xanax 2 mg oral tablet: 1 tab(s) orally 3 times a day as needed for  anxiety (19 Oct 2023 21:02)        VITALS:  T(F): 97.4 (10-20-23 @ 04:00), Max: 98.3 (10-19-23 @ 20:05)  HR: 61 (10-20-23 @ 04:00) (61 - 86)  BP: 178/73 (10-20-23 @ 04:00) (144/79 - 178/73)  RR: 18 (10-20-23 @ 04:00) (17 - 18)  SpO2: 97% (10-19-23 @ 20:05) (97% - 97%)    PHYSICAL EXAM:  General: NAD, laying in bed  HEENT: NCAT, EOMI  Chest: appropriate respiratory effort to RA  Ext: Dorsal areas of b/l wrists with open wounds to Zone VII, silvadene covering wound bed. Extensor tendons in contraction. Erythema around wound sites, no crepitus or drainage appreciated. Patient with complete limitation of ROM due to contraction secondary to fibrous tissue and wounds.     MEDICATIONS  (STANDING):  ampicillin/sulbactam  IVPB 3 Gram(s) IV Intermittent every 6 hours  buPROPion XL (24-Hour) . 300 milliGRAM(s) Oral daily  dextrose 5%. 1000 milliLiter(s) (50 mL/Hr) IV Continuous <Continuous>  dextrose 5%. 1000 milliLiter(s) (100 mL/Hr) IV Continuous <Continuous>  dextrose 50% Injectable 12.5 Gram(s) IV Push once  dextrose 50% Injectable 25 Gram(s) IV Push once  dextrose 50% Injectable 25 Gram(s) IV Push once  enoxaparin Injectable 40 milliGRAM(s) SubCutaneous every 24 hours  glucagon  Injectable 1 milliGRAM(s) IntraMuscular once  insulin lispro (ADMELOG) corrective regimen sliding scale   SubCutaneous three times a day before meals  lactobacillus acidophilus 1 Tablet(s) Oral daily  methadone    Tablet 125 milliGRAM(s) Oral daily  mirtazapine 30 milliGRAM(s) Oral at bedtime  silver sulfADIAZINE 1% Cream 1 Application(s) Topical two times a day  vancomycin  IVPB 1500 milliGRAM(s) IV Intermittent every 12 hours    MEDICATIONS  (PRN):  acetaminophen     Tablet .. 650 milliGRAM(s) Oral every 6 hours PRN Moderate Pain (4 - 6)  ALPRAZolam 2 milliGRAM(s) Oral three times a day PRN for anxiety  aluminum hydroxide/magnesium hydroxide/simethicone Suspension 30 milliLiter(s) Oral every 4 hours PRN Dyspepsia  dextrose Oral Gel 15 Gram(s) Oral once PRN Blood Glucose LESS THAN 70 milliGRAM(s)/deciliter  melatonin 3 milliGRAM(s) Oral at bedtime PRN Insomnia  ondansetron Injectable 4 milliGRAM(s) IV Push every 8 hours PRN Nausea and/or Vomiting      LAB/STUDIES:                        12.2   6.53  )-----------( 228      ( 20 Oct 2023 08:24 )             37.6     10-20    138  |  104  |  11  ----------------------------<  160<H>  4.4   |  26  |  0.9    Ca    8.9      20 Oct 2023 08:24    TPro  6.9  /  Alb  3.5  /  TBili  0.2  /  DBili  x   /  AST  9   /  ALT  9   /  AlkPhos  105  10-20      LIVER FUNCTIONS - ( 20 Oct 2023 08:24 )  Alb: 3.5 g/dL / Pro: 6.9 g/dL / ALK PHOS: 105 U/L / ALT: 9 U/L / AST: 9 U/L / GGT: x           Urinalysis Basic - ( 20 Oct 2023 08:24 )    Color: x / Appearance: x / SG: x / pH: x  Gluc: 160 mg/dL / Ketone: x  / Bili: x / Urobili: x   Blood: x / Protein: x / Nitrite: x   Leuk Esterase: x / RBC: x / WBC x   Sq Epi: x / Non Sq Epi: x / Bacteria: x                  Culture - Joint (collected 19 Oct 2023 18:00)  Source: Wrist right wrist  Gram Stain (20 Oct 2023 02:28):    polymorphonuclear leukocytes seen    Gram Positive Cocci in Clusters seen    by cytocentrifuge      IMAGING:  X-ray 3-view b/l wrists per radiology report 10/19:   "IMPRESSION:  Chronic bilateral wrist dorsal subluxation. Left dorsal wrist soft tissue   ulceration with suggestion of subcutaneous emphysema.  Diffuse bilateral soft tissue swelling."      ACCESS DEVICES:  [x] Peripheral IV

## 2023-10-20 NOTE — CONSULT NOTE ADULT - ASSESSMENT
ASSESSMENT:  Mr Mayfield is a 52 year old Male with a past medical history of IVDU (heroin) not currently using, vertebral osteomyelitis s/p debridement,  MSSA bacteremia, b/l PEs and LE DVTs s/p LLE embolectomy in October 2022 and DMII presented to the ED with open wounds to b/l wrists.  Patient states that he takes Methadone 125mg daily. He states he has had chronic wounds with contracture on his wrists for years however over the past few weeks to a month they have been opening and draining with skin falling off, and worsening scarring around the wrists. Reports he had very minimal ROM, however cannot move them at all anymore.    Upon further questioning, patient reports these contractures and wounds developed because of injecting heroin at b/l wrist sites.     Dorsal areas of b/l wrists with open wounds to Zone VII b/l, silvadene covering wound beds. Extensor tendons in contraction b/l; Patient with complete limitation of ROM due to contraction secondary to fibrous tissue and wounds.  Erythema around wound sites, no crepitus or drainage appreciated.     Patient currently being managed with Silvadene cream, Vancomycin and Ampicillin/Sulbactam. Dr. Arevalo, from Burn surgery team, planning to take patient to OR for debridement of wounds on Monday 10/30.     PLAN:  As per Dr. Shepherd's instructions:  - Recommend outpatient follow up after all soft tissue is healed.  - Rest of care per primary team  - Follow up ID Recs  - Follow up Dr. Arevalo/Burn Team's recs and plan    Above plan as instructed by Attending Surgeon Dr. Shepherd.   ASSESSMENT:  Mr Mayfield is a 52 year old Male with a past medical history of IVDU (heroin) not currently using, vertebral osteomyelitis s/p debridement,  MSSA bacteremia, b/l PEs and LE DVTs s/p LLE embolectomy in October 2022 and DMII presented to the ED with open wounds to b/l wrists.  Patient states that he takes Methadone 125mg daily. He states he has had chronic wounds with contracture on his wrists for years however over the past few weeks to a month they have been opening and draining with skin falling off, and worsening scarring around the wrists. Reports he had very minimal ROM, however cannot move them at all anymore.    Upon further questioning, patient reports these contractures and wounds developed because of injecting heroin at b/l wrist sites.     Dorsal areas of b/l wrists with open wounds to Zone VII b/l, silvadene covering wound beds. Extensor tendons in contraction b/l; Patient with complete limitation of ROM due to contraction secondary to fibrous tissue and wounds.  Erythema around wound sites, no crepitus or drainage appreciated.     Patient currently being managed with Silvadene cream, Vancomycin and Ampicillin/Sulbactam. Dr. Arevalo, from Burn surgery team, planning to take patient to OR for debridement of wounds on Monday 10/30.     Images sent to Dr. Shepherd, reviewed by Dr. Shepherd.    PLAN:  As per Dr. Shepherd's instructions:  - Recommend outpatient follow up after all soft tissue is healed.  - Rest of care per primary team  - Follow up ID Recs  - Follow up Dr. Arevalo/Burn Team's recs and plan    Above plan as instructed by Attending Surgeon Dr. Shepherd.

## 2023-10-20 NOTE — CONSULT NOTE ADULT - CONSULT REASON
Contracture of bilateral wrists, consult for "possible tendon lengthening procedure".
cellulitis
chronic b/l wrist wounds

## 2023-10-20 NOTE — PROGRESS NOTE ADULT - ASSESSMENT
Mr Mayfield is a 52 year old Male with a past medical history of IVDU (heroin) not currently using, vertebral osteomyelitis s/p debridement, MSSA bacteremia, b/l PEs and LE DVTs s/p LLE embolectomy in October 2022 and DMII presented to the ED with open wounds to b/l wrists. Patient states that he takes Methadone 125mg daily. He states he has had chronic wounds with contracture on his wrist for at least a few years however over the past few weeks to a month they have been opening and draining with skin falling off, and worsening scarring around the wrists which no longer allows the patient to flex his wrist. He has been putting bacitracin and nonstick bandage however it would remove the scab formation each time. He denies any systemic fever, cough, sore throat, chills, nausea, vomiting, chest pain, shortness of breath, abdominal pain, urinary complaints. He is not on any course of antibiotics at this time.     #B/l chronic wrist wounds   - No sepsis POA  - Local wound care twice daily, wash with soap and water, silvadene, adaptic, perla  - Continue with vancomycin and unasyn as per ID  - Per Hand Plastic Surgery: recommend outpatient follow up after all soft tissue is healed  - F/u Burn. Possible debridement Monday    #Hx IVDU on methadone  - Not in active withdrawal   - Confirmed home dose of Methadone 125mg     #H/O PE in Oct 2022  #H/O LE DVTs s/p LLE embolectomy  - Pt states he was discharged with Eliquis and only completed 2 month course  - Pt currently asymptomatic and VS stable; monitor O2 sats  - F/u LE duplex    #DM type II  - Monitor FS before meals and at bedtime  - keep 140-180  - start SSI if FS >180    #Misc  - DVT Prophylaxis: Lovenox   Mr Mayfield is a 52 year old Male with a past medical history of IVDU (heroin) not currently using, vertebral osteomyelitis s/p debridement, MSSA bacteremia, b/l PEs and LE DVTs s/p LLE embolectomy in October 2022 and DMII presented to the ED with open wounds to b/l wrists. Patient states that he takes Methadone 125mg daily. He states he has had chronic wounds with contracture on his wrist for at least a few years however over the past few weeks to a month they have been opening and draining with skin falling off, and worsening scarring around the wrists which no longer allows the patient to flex his wrist. He has been putting bacitracin and nonstick bandage however it would remove the scab formation each time. He denies any systemic fever, cough, sore throat, chills, nausea, vomiting, chest pain, shortness of breath, abdominal pain, urinary complaints. He is not on any course of antibiotics at this time.     #B/l chronic wrist wounds   - No sepsis POA  - Local wound care twice daily, wash with soap and water, silvadene, adaptic, perla  - Continue with vancomycin and unasyn as per ID  - Per Hand Plastic Surgery: recommend outpatient follow up after all soft tissue is healed  - F/u Burn. Possible debridement Monday    #Hx IVDU on methadone  - Not in active withdrawal   - Confirmed home dose of Methadone 125mg     #H/O PE in Oct 2022  #H/O LE DVTs s/p LLE embolectomy  - Pt states he was discharged with Eliquis and only completed 2 month course  - Pt currently asymptomatic and VS stable; monitor O2 sats  - F/u LE duplex    #DM type II  - Monitor FS before meals and at bedtime  - Keep 140-180  - Start SSI if FS >180    #Misc  - DVT Prophylaxis: Lovenox

## 2023-10-20 NOTE — CONSULT NOTE ADULT - ASSESSMENT
52 year old Male with a past medical history of IVDU (heroin) not currently using, vertebral osteomyelitis s/p debridement,  MSSA bacteremia, b/l PEs and LE DVTs s/p LLE embolectomy in October 2022 and DMII presented to the ED with open wounds to b/l wrists. Patient states that he takes Methadone 125mg daily. He states he has had chronic wounds with contracture on his wrist for at least a few years however over the past few weeks to a month they have been opening and draining with skin falling off, and worsening scarring around the wrists which no longer allows the patient to flex his wrist. He has been putting bacitracin and nonstick bandage however it would remove the scab formation each time. He denies any systemic fever, cough, sore throat, chills, nausea, vomiting, chest pain, shortness of breath, abdominal pain, urinary complaints. He is not on any course of antibiotics at this time.     IMPRESSION/RECOMMENDATIONS  52 year old Male with a past medical history of IVDU (heroin) not currently using, vertebral osteomyelitis s/p debridement,  MSSA bacteremia, b/l PEs and LE DVTs s/p LLE embolectomy in October 2022 and DMII presented to the ED with open wounds to b/l wrists. Patient states that he takes Methadone 125mg daily. He states he has had chronic wounds with contracture on his wrist for at least a few years however over the past few weeks to a month they have been opening and draining with skin falling off, and worsening scarring around the wrists which no longer allows the patient to flex his wrist. He has been putting bacitracin and nonstick bandage however it would remove the scab formation each time. He denies any systemic fever, cough, sore throat, chills, nausea, vomiting, chest pain, shortness of breath, abdominal pain, urinary complaints. He is not on any course of antibiotics at this time.     IMPRESSION/RECOMMENDATIONS   Chronic wounds wrists dorsum b/l with possible localized cellulitis and superficial necrosis  No OM  Contractions of the hands secondary to localized fibrosing changes    -Sx evaluation for a procedure to relieve the hyperextension (  if possible )  -local debridement ?  -Vancomycin 1500 mg iv q12h  -Unasyn 3 gm iv q6h  -d/c other ABx

## 2023-10-20 NOTE — CONSULT NOTE ADULT - SUBJECTIVE AND OBJECTIVE BOX
MIKAEL MCDERMOTT  53y, Male  Allergy: No Known Allergies      All historical available data reviewed.    HPI:  Mr Mcdermott is a 52 year old Male with a past medical history of IVDU (heroin) not currently using, vertebral osteomyelitis s/p debridement,  MSSA bacteremia, b/l PEs and LE DVTs s/p LLE embolectomy in October 2022 and DMII presented to the ED with open wounds to b/l wrists. Patient states that he takes Methadone 125mg daily. He states he has had chronic wounds with contracture on his wrist for at least a few years however over the past few weeks to a month they have been opening and draining with skin falling off, and worsening scarring around the wrists which no longer allows the patient to flex his wrist. He has been putting bacitracin and nonstick bandage however it would remove the scab formation each time. He denies any systemic fever, cough, sore throat, chills, nausea, vomiting, chest pain, shortness of breath, abdominal pain, urinary complaints. He is not on any course of antibiotics at this time.     Vital Signs Last 12 Hrs  T(F): 98.3 (10-19-23 @ 20:05), Max: 98.3 (10-19-23 @ 20:05)  HR: 73 (10-19-23 @ 20:05) (73 - 86)  BP: 144/79 (10-19-23 @ 20:05) (144/79 - 167/98)  RR: 17 (10-19-23 @ 20:05) (17 - 17)  SpO2: 97% (10-19-23 @ 20:05) (97% - 97%)    Pt received 1 L LR bolus and vancomycin in the ED. B/l wrist x-rays were ordered. Burn was consulted for his wounds.    (19 Oct 2023 20:28)    FAMILY HISTORY:  No pertinent family history in first degree relatives      PAST MEDICAL & SURGICAL HISTORY:  IV drug abuse      Vertebral osteomyelitis      MSSA bacteremia      No significant past surgical history            VITALS:  T(F): 97.4, Max: 98.3 (10-19-23 @ 20:05)  HR: 61  BP: 178/73  RR: 18Vital Signs Last 24 Hrs  T(C): 36.3 (20 Oct 2023 04:00), Max: 36.8 (19 Oct 2023 20:05)  T(F): 97.4 (20 Oct 2023 04:00), Max: 98.3 (19 Oct 2023 20:05)  HR: 61 (20 Oct 2023 04:00) (61 - 86)  BP: 178/73 (20 Oct 2023 04:00) (144/79 - 178/73)  BP(mean): --  RR: 18 (20 Oct 2023 04:00) (17 - 18)  SpO2: 97% (19 Oct 2023 20:05) (97% - 97%)    Parameters below as of 19 Oct 2023 20:05  Patient On (Oxygen Delivery Method): room air        TESTS & MEASUREMENTS:                        12.7   6.95  )-----------( 281      ( 19 Oct 2023 16:58 )             39.6     10-19    142  |  103  |  10  ----------------------------<  119<H>  4.3   |  28  |  0.9    Ca    9.0      19 Oct 2023 16:58          Culture - Joint (collected 10-19-23 @ 18:00)  Source: Wrist right wrist  Gram Stain (10-20-23 @ 02:28):    polymorphonuclear leukocytes seen    Gram Positive Cocci in Clusters seen    by cytocentrifuge      Urinalysis Basic - ( 19 Oct 2023 16:58 )    Color: x / Appearance: x / SG: x / pH: x  Gluc: 119 mg/dL / Ketone: x  / Bili: x / Urobili: x   Blood: x / Protein: x / Nitrite: x   Leuk Esterase: x / RBC: x / WBC x   Sq Epi: x / Non Sq Epi: x / Bacteria: x          RADIOLOGY & ADDITIONAL TESTS:  Personal review of radiological diagnostics performed  Echo and EKG results noted when applicable.     MEDICATIONS:  acetaminophen     Tablet .. 650 milliGRAM(s) Oral every 6 hours PRN  ALPRAZolam 2 milliGRAM(s) Oral three times a day PRN  aluminum hydroxide/magnesium hydroxide/simethicone Suspension 30 milliLiter(s) Oral every 4 hours PRN  buPROPion XL (24-Hour) . 300 milliGRAM(s) Oral daily  cefepime   IVPB 2000 milliGRAM(s) IV Intermittent every 8 hours  dextrose 5%. 1000 milliLiter(s) IV Continuous <Continuous>  dextrose 5%. 1000 milliLiter(s) IV Continuous <Continuous>  dextrose 50% Injectable 12.5 Gram(s) IV Push once  dextrose 50% Injectable 25 Gram(s) IV Push once  dextrose 50% Injectable 25 Gram(s) IV Push once  dextrose Oral Gel 15 Gram(s) Oral once PRN  enoxaparin Injectable 40 milliGRAM(s) SubCutaneous every 24 hours  glucagon  Injectable 1 milliGRAM(s) IntraMuscular once  insulin lispro (ADMELOG) corrective regimen sliding scale   SubCutaneous three times a day before meals  lactobacillus acidophilus 1 Tablet(s) Oral daily  melatonin 3 milliGRAM(s) Oral at bedtime PRN  methadone    Tablet 40 milliGRAM(s) Oral daily  metroNIDAZOLE  IVPB 500 milliGRAM(s) IV Intermittent every 8 hours  mirtazapine 30 milliGRAM(s) Oral at bedtime  ondansetron Injectable 4 milliGRAM(s) IV Push every 8 hours PRN  silver sulfADIAZINE 1% Cream 1 Application(s) Topical two times a day  vancomycin  IVPB 2000 milliGRAM(s) IV Intermittent every 12 hours      ANTIBIOTICS:  cefepime   IVPB 2000 milliGRAM(s) IV Intermittent every 8 hours  metroNIDAZOLE  IVPB 500 milliGRAM(s) IV Intermittent every 8 hours  vancomycin  IVPB 2000 milliGRAM(s) IV Intermittent every 12 hours

## 2023-10-20 NOTE — PROGRESS NOTE ADULT - ASSESSMENT
52 year old Male with PMH of DM2, IVDU (heroin) on Methadone program, vertebral osteomyelitis, PEs and LE DVTs s/p LLE embolectomypresented to the ED with open wounds to b/l wrists. He states he has had chronic wounds with contracture on his wrist for at least a few years however over the past few weeks wound were open and draining with skin falling off,    bBilateral chronic wrists wounds  Seen burn, possible need for debridement.   ID recommended Vancomycin and Unasyn, check Vancomycin trogh before 4th dose   Per Hand Plastic Surgery: recommend outpatient follow up after all soft tissue is healed    History of PE and LE DVT:   Not on anticoagulation.  Check venous duplex.     History of IVDU on methadone  Continue Methadone 125mg     DM type II  - Monitor FS before meals and at bedtime  - Keep 140-180  - Start SSI if FS >180    #Misc  - DVT Prophylaxis: Lovenox

## 2023-10-21 LAB
-  AMIKACIN: SIGNIFICANT CHANGE UP
-  AMIKACIN: SIGNIFICANT CHANGE UP
-  AMOXICILLIN/CLAVULANIC ACID: SIGNIFICANT CHANGE UP
-  AMOXICILLIN/CLAVULANIC ACID: SIGNIFICANT CHANGE UP
-  AMPICILLIN/SULBACTAM: SIGNIFICANT CHANGE UP
-  AMPICILLIN: SIGNIFICANT CHANGE UP
-  AMPICILLIN: SIGNIFICANT CHANGE UP
-  AZTREONAM: SIGNIFICANT CHANGE UP
-  AZTREONAM: SIGNIFICANT CHANGE UP
-  CEFAZOLIN: SIGNIFICANT CHANGE UP
-  CEFEPIME: SIGNIFICANT CHANGE UP
-  CEFEPIME: SIGNIFICANT CHANGE UP
-  CEFOTAXIME: SIGNIFICANT CHANGE UP
-  CEFOTAXIME: SIGNIFICANT CHANGE UP
-  CEFOXITIN: SIGNIFICANT CHANGE UP
-  CEFOXITIN: SIGNIFICANT CHANGE UP
-  CEFTAZIDIME: SIGNIFICANT CHANGE UP
-  CEFTAZIDIME: SIGNIFICANT CHANGE UP
-  CEFTRIAXONE: SIGNIFICANT CHANGE UP
-  CEFTRIAXONE: SIGNIFICANT CHANGE UP
-  CIPROFLOXACIN: SIGNIFICANT CHANGE UP
-  CIPROFLOXACIN: SIGNIFICANT CHANGE UP
-  CLINDAMYCIN: SIGNIFICANT CHANGE UP
-  ERTAPENEM: SIGNIFICANT CHANGE UP
-  ERTAPENEM: SIGNIFICANT CHANGE UP
-  ERYTHROMYCIN: SIGNIFICANT CHANGE UP
-  GENTAMICIN: SIGNIFICANT CHANGE UP
-  IMIPENEM: SIGNIFICANT CHANGE UP
-  IMIPENEM: SIGNIFICANT CHANGE UP
-  LEVOFLOXACIN: SIGNIFICANT CHANGE UP
-  LEVOFLOXACIN: SIGNIFICANT CHANGE UP
-  MEROPENEM: SIGNIFICANT CHANGE UP
-  MEROPENEM: SIGNIFICANT CHANGE UP
-  OXACILLIN: SIGNIFICANT CHANGE UP
-  PIPERACILLIN/TAZOBACTAM: SIGNIFICANT CHANGE UP
-  PIPERACILLIN/TAZOBACTAM: SIGNIFICANT CHANGE UP
-  RIFAMPIN: SIGNIFICANT CHANGE UP
-  TETRACYCLINE: SIGNIFICANT CHANGE UP
-  TOBRAMYCIN: SIGNIFICANT CHANGE UP
-  TOBRAMYCIN: SIGNIFICANT CHANGE UP
-  TRIMETHOPRIM/SULFAMETHOXAZOLE: SIGNIFICANT CHANGE UP
-  VANCOMYCIN: SIGNIFICANT CHANGE UP
ANION GAP SERPL CALC-SCNC: 29 MMOL/L — HIGH (ref 7–14)
ANION GAP SERPL CALC-SCNC: 29 MMOL/L — HIGH (ref 7–14)
BASOPHILS # BLD AUTO: 0.02 K/UL — SIGNIFICANT CHANGE UP (ref 0–0.2)
BASOPHILS # BLD AUTO: 0.02 K/UL — SIGNIFICANT CHANGE UP (ref 0–0.2)
BASOPHILS NFR BLD AUTO: 0.3 % — SIGNIFICANT CHANGE UP (ref 0–1)
BASOPHILS NFR BLD AUTO: 0.3 % — SIGNIFICANT CHANGE UP (ref 0–1)
BUN SERPL-MCNC: 12 MG/DL — SIGNIFICANT CHANGE UP (ref 10–20)
BUN SERPL-MCNC: 12 MG/DL — SIGNIFICANT CHANGE UP (ref 10–20)
CALCIUM SERPL-MCNC: 8.5 MG/DL — SIGNIFICANT CHANGE UP (ref 8.4–10.5)
CALCIUM SERPL-MCNC: 8.5 MG/DL — SIGNIFICANT CHANGE UP (ref 8.4–10.5)
CHLORIDE SERPL-SCNC: 86 MMOL/L — LOW (ref 98–110)
CHLORIDE SERPL-SCNC: 86 MMOL/L — LOW (ref 98–110)
CO2 SERPL-SCNC: 26 MMOL/L — SIGNIFICANT CHANGE UP (ref 17–32)
CO2 SERPL-SCNC: 26 MMOL/L — SIGNIFICANT CHANGE UP (ref 17–32)
CREAT SERPL-MCNC: 0.9 MG/DL — SIGNIFICANT CHANGE UP (ref 0.7–1.5)
CREAT SERPL-MCNC: 0.9 MG/DL — SIGNIFICANT CHANGE UP (ref 0.7–1.5)
EGFR: 101 ML/MIN/1.73M2 — SIGNIFICANT CHANGE UP
EGFR: 101 ML/MIN/1.73M2 — SIGNIFICANT CHANGE UP
EOSINOPHIL # BLD AUTO: 0.25 K/UL — SIGNIFICANT CHANGE UP (ref 0–0.7)
EOSINOPHIL # BLD AUTO: 0.25 K/UL — SIGNIFICANT CHANGE UP (ref 0–0.7)
EOSINOPHIL NFR BLD AUTO: 4.1 % — SIGNIFICANT CHANGE UP (ref 0–8)
EOSINOPHIL NFR BLD AUTO: 4.1 % — SIGNIFICANT CHANGE UP (ref 0–8)
GLUCOSE BLDC GLUCOMTR-MCNC: 107 MG/DL — HIGH (ref 70–99)
GLUCOSE BLDC GLUCOMTR-MCNC: 107 MG/DL — HIGH (ref 70–99)
GLUCOSE BLDC GLUCOMTR-MCNC: 113 MG/DL — HIGH (ref 70–99)
GLUCOSE BLDC GLUCOMTR-MCNC: 113 MG/DL — HIGH (ref 70–99)
GLUCOSE BLDC GLUCOMTR-MCNC: 129 MG/DL — HIGH (ref 70–99)
GLUCOSE BLDC GLUCOMTR-MCNC: 129 MG/DL — HIGH (ref 70–99)
GLUCOSE BLDC GLUCOMTR-MCNC: 145 MG/DL — HIGH (ref 70–99)
GLUCOSE BLDC GLUCOMTR-MCNC: 145 MG/DL — HIGH (ref 70–99)
GLUCOSE SERPL-MCNC: 134 MG/DL — HIGH (ref 70–99)
GLUCOSE SERPL-MCNC: 134 MG/DL — HIGH (ref 70–99)
HCT VFR BLD CALC: 35.8 % — LOW (ref 42–52)
HCT VFR BLD CALC: 35.8 % — LOW (ref 42–52)
HGB BLD-MCNC: 11.5 G/DL — LOW (ref 14–18)
HGB BLD-MCNC: 11.5 G/DL — LOW (ref 14–18)
IMM GRANULOCYTES NFR BLD AUTO: 0.5 % — HIGH (ref 0.1–0.3)
IMM GRANULOCYTES NFR BLD AUTO: 0.5 % — HIGH (ref 0.1–0.3)
LYMPHOCYTES # BLD AUTO: 2.29 K/UL — SIGNIFICANT CHANGE UP (ref 1.2–3.4)
LYMPHOCYTES # BLD AUTO: 2.29 K/UL — SIGNIFICANT CHANGE UP (ref 1.2–3.4)
LYMPHOCYTES # BLD AUTO: 37.4 % — SIGNIFICANT CHANGE UP (ref 20.5–51.1)
LYMPHOCYTES # BLD AUTO: 37.4 % — SIGNIFICANT CHANGE UP (ref 20.5–51.1)
MAGNESIUM SERPL-MCNC: 2 MG/DL — SIGNIFICANT CHANGE UP (ref 1.8–2.4)
MAGNESIUM SERPL-MCNC: 2 MG/DL — SIGNIFICANT CHANGE UP (ref 1.8–2.4)
MCHC RBC-ENTMCNC: 29 PG — SIGNIFICANT CHANGE UP (ref 27–31)
MCHC RBC-ENTMCNC: 29 PG — SIGNIFICANT CHANGE UP (ref 27–31)
MCHC RBC-ENTMCNC: 32.1 G/DL — SIGNIFICANT CHANGE UP (ref 32–37)
MCHC RBC-ENTMCNC: 32.1 G/DL — SIGNIFICANT CHANGE UP (ref 32–37)
MCV RBC AUTO: 90.4 FL — SIGNIFICANT CHANGE UP (ref 80–94)
MCV RBC AUTO: 90.4 FL — SIGNIFICANT CHANGE UP (ref 80–94)
METHOD TYPE: SIGNIFICANT CHANGE UP
MONOCYTES # BLD AUTO: 0.52 K/UL — SIGNIFICANT CHANGE UP (ref 0.1–0.6)
MONOCYTES # BLD AUTO: 0.52 K/UL — SIGNIFICANT CHANGE UP (ref 0.1–0.6)
MONOCYTES NFR BLD AUTO: 8.5 % — SIGNIFICANT CHANGE UP (ref 1.7–9.3)
MONOCYTES NFR BLD AUTO: 8.5 % — SIGNIFICANT CHANGE UP (ref 1.7–9.3)
NEUTROPHILS # BLD AUTO: 3.02 K/UL — SIGNIFICANT CHANGE UP (ref 1.4–6.5)
NEUTROPHILS # BLD AUTO: 3.02 K/UL — SIGNIFICANT CHANGE UP (ref 1.4–6.5)
NEUTROPHILS NFR BLD AUTO: 49.2 % — SIGNIFICANT CHANGE UP (ref 42.2–75.2)
NEUTROPHILS NFR BLD AUTO: 49.2 % — SIGNIFICANT CHANGE UP (ref 42.2–75.2)
NRBC # BLD: 0 /100 WBCS — SIGNIFICANT CHANGE UP (ref 0–0)
NRBC # BLD: 0 /100 WBCS — SIGNIFICANT CHANGE UP (ref 0–0)
PLATELET # BLD AUTO: 199 K/UL — SIGNIFICANT CHANGE UP (ref 130–400)
PLATELET # BLD AUTO: 199 K/UL — SIGNIFICANT CHANGE UP (ref 130–400)
PMV BLD: 11.1 FL — HIGH (ref 7.4–10.4)
PMV BLD: 11.1 FL — HIGH (ref 7.4–10.4)
POTASSIUM SERPL-MCNC: 3.6 MMOL/L — SIGNIFICANT CHANGE UP (ref 3.5–5)
POTASSIUM SERPL-MCNC: 3.6 MMOL/L — SIGNIFICANT CHANGE UP (ref 3.5–5)
POTASSIUM SERPL-SCNC: 3.6 MMOL/L — SIGNIFICANT CHANGE UP (ref 3.5–5)
POTASSIUM SERPL-SCNC: 3.6 MMOL/L — SIGNIFICANT CHANGE UP (ref 3.5–5)
RBC # BLD: 3.96 M/UL — LOW (ref 4.7–6.1)
RBC # BLD: 3.96 M/UL — LOW (ref 4.7–6.1)
RBC # FLD: 13.5 % — SIGNIFICANT CHANGE UP (ref 11.5–14.5)
RBC # FLD: 13.5 % — SIGNIFICANT CHANGE UP (ref 11.5–14.5)
SODIUM SERPL-SCNC: 141 MMOL/L — SIGNIFICANT CHANGE UP (ref 135–146)
SODIUM SERPL-SCNC: 141 MMOL/L — SIGNIFICANT CHANGE UP (ref 135–146)
VANCOMYCIN TROUGH SERPL-MCNC: 14.4 UG/ML — HIGH (ref 5–10)
VANCOMYCIN TROUGH SERPL-MCNC: 14.4 UG/ML — HIGH (ref 5–10)
VANCOMYCIN TROUGH SERPL-MCNC: 30.8 UG/ML — HIGH (ref 5–10)
VANCOMYCIN TROUGH SERPL-MCNC: 30.8 UG/ML — HIGH (ref 5–10)
WBC # BLD: 6.13 K/UL — SIGNIFICANT CHANGE UP (ref 4.8–10.8)
WBC # BLD: 6.13 K/UL — SIGNIFICANT CHANGE UP (ref 4.8–10.8)
WBC # FLD AUTO: 6.13 K/UL — SIGNIFICANT CHANGE UP (ref 4.8–10.8)
WBC # FLD AUTO: 6.13 K/UL — SIGNIFICANT CHANGE UP (ref 4.8–10.8)

## 2023-10-21 PROCEDURE — 99232 SBSQ HOSP IP/OBS MODERATE 35: CPT

## 2023-10-21 RX ORDER — VANCOMYCIN HCL 1 G
1250 VIAL (EA) INTRAVENOUS EVERY 12 HOURS
Refills: 0 | Status: DISCONTINUED | OUTPATIENT
Start: 2023-10-22 | End: 2023-10-22

## 2023-10-21 RX ADMIN — BUPROPION HYDROCHLORIDE 300 MILLIGRAM(S): 150 TABLET, EXTENDED RELEASE ORAL at 11:04

## 2023-10-21 RX ADMIN — Medication 2 MILLIGRAM(S): at 22:04

## 2023-10-21 RX ADMIN — ENOXAPARIN SODIUM 40 MILLIGRAM(S): 100 INJECTION SUBCUTANEOUS at 05:31

## 2023-10-21 RX ADMIN — CHLORHEXIDINE GLUCONATE 1 APPLICATION(S): 213 SOLUTION TOPICAL at 05:32

## 2023-10-21 RX ADMIN — AMPICILLIN SODIUM AND SULBACTAM SODIUM 200 GRAM(S): 250; 125 INJECTION, POWDER, FOR SUSPENSION INTRAMUSCULAR; INTRAVENOUS at 00:16

## 2023-10-21 RX ADMIN — AMPICILLIN SODIUM AND SULBACTAM SODIUM 200 GRAM(S): 250; 125 INJECTION, POWDER, FOR SUSPENSION INTRAMUSCULAR; INTRAVENOUS at 05:31

## 2023-10-21 RX ADMIN — AMPICILLIN SODIUM AND SULBACTAM SODIUM 200 GRAM(S): 250; 125 INJECTION, POWDER, FOR SUSPENSION INTRAMUSCULAR; INTRAVENOUS at 11:04

## 2023-10-21 RX ADMIN — METHADONE HYDROCHLORIDE 125 MILLIGRAM(S): 40 TABLET ORAL at 11:05

## 2023-10-21 RX ADMIN — Medication 1 PATCH: at 11:22

## 2023-10-21 RX ADMIN — Medication 1 APPLICATION(S): at 05:31

## 2023-10-21 RX ADMIN — Medication 1 TABLET(S): at 11:04

## 2023-10-21 RX ADMIN — Medication 2 MILLIGRAM(S): at 05:59

## 2023-10-21 RX ADMIN — Medication 1 APPLICATION(S): at 17:18

## 2023-10-21 RX ADMIN — Medication 1 PATCH: at 19:45

## 2023-10-21 RX ADMIN — AMPICILLIN SODIUM AND SULBACTAM SODIUM 200 GRAM(S): 250; 125 INJECTION, POWDER, FOR SUSPENSION INTRAMUSCULAR; INTRAVENOUS at 17:19

## 2023-10-21 RX ADMIN — Medication 300 MILLIGRAM(S): at 06:17

## 2023-10-21 RX ADMIN — Medication 2 MILLIGRAM(S): at 14:27

## 2023-10-21 RX ADMIN — Medication 300 MILLIGRAM(S): at 18:56

## 2023-10-21 RX ADMIN — AMPICILLIN SODIUM AND SULBACTAM SODIUM 200 GRAM(S): 250; 125 INJECTION, POWDER, FOR SUSPENSION INTRAMUSCULAR; INTRAVENOUS at 23:11

## 2023-10-21 RX ADMIN — MIRTAZAPINE 30 MILLIGRAM(S): 45 TABLET, ORALLY DISINTEGRATING ORAL at 22:05

## 2023-10-21 NOTE — PHARMACOTHERAPY INTERVENTION NOTE - COMMENTS
As per policy, ordered a vancomycin trough for 10/23 @1600 for vancomycin 1250 mg q12h regimen in order to assist with vancomycin pharmacokinetic monitoring.    Marisa Moore, PharmD   Clinical Pharmacy Specialist, Infectious Diseases  Tele-Antimicrobial Stewardship Program (Tele-ASP)  Tele-ASP Phone: [phone]    
Patient on vancomycin 1500 mg IV q12h for the treatment possible localized cellulitis and superficial necrosis per Dr. Salinas. Patient level this morning was 30.8 ug/mL, could have been falsely elevated since it was drawn during infusion. Recommended to obtain a repeat level today (10/21) with 1600 labs and hold next dose until level results. Tele-ASP will follow up with recommendation.
Recommended to adjust vancomycin dose from 1500 mg IV q12h to 1250 mg IV q12h since the vancomycin level today, was 14.4 mg/L. As per [employer] calculations, current vancomycin AUC/WILLY is 615 mg/L*h, which is higher than the therapeutic range of 400 - 600 mg/L*h. Decreasing the dose is predicted to yield an AUC/WILLY of 512 mg/L*h.    Marisa Moore, PharmD   Clinical Pharmacy Specialist, Infectious Diseases  Tele-Antimicrobial Stewardship Program (Tele-ASP)  Tele-ASP Phone: [phone]

## 2023-10-21 NOTE — PROGRESS NOTE ADULT - ASSESSMENT
52 year old Male with PMH of DM2, IVDU (heroin) on Methadone program, vertebral osteomyelitis, PEs and LE DVTs s/p LLE embolectomypresented to the ED with open wounds to b/l wrists. He states he has had chronic wounds with contracture on his wrist for at least a few years however over the past few weeks wound were open and draining with skin falling off,    A/P:   Bilateral chronic wrists wounds  Seen burn, possible need for debridement.   ID recommended Vancomycin and Unasyn, Vancomycin trough 30, recheck.   Per Hand Plastic Surgery: recommend outpatient follow up after all soft tissue is healed    History of PE and LE DVT:   Not on anticoagulation.  Venous duplex is negative.     History of IVDU on methadone  Continue Methadone 125mg     DM type II  A1C 6.5, FS is controlled, continue diabetic diet, insulin sliding scale.    DVT Prophylaxis: Lovenox.    #Progress Note Handoff:  Pending (specify): Burn follow up.   Family discussion:  Disposition: Home.

## 2023-10-21 NOTE — PROGRESS NOTE ADULT - ASSESSMENT
Mr Mayfield is a 52 year old Male with a past medical history of IVDU (heroin) not currently using, vertebral osteomyelitis s/p debridement, MSSA bacteremia, b/l PEs and LE DVTs s/p LLE embolectomy in October 2022 and DMII presented to the ED with open wounds to b/l wrists. Patient states that he takes Methadone 125mg daily. He states he has had chronic wounds with contracture on his wrist for at least a few years however over the past few weeks to a month they have been opening and draining with skin falling off, and worsening scarring around the wrists which no longer allows the patient to flex his wrist. He has been putting bacitracin and nonstick bandage however it would remove the scab formation each time. He denies any systemic fever, cough, sore throat, chills, nausea, vomiting, chest pain, shortness of breath, abdominal pain, urinary complaints. He is not on any course of antibiotics at this time.     #B/l chronic wrist wounds   - No sepsis POA  - Local wound care twice daily, wash with soap and water, silvadene, adaptic, perla  - Continue with vancomycin and unasyn as per ID  - Per Hand Plastic Surgery: recommend outpatient follow up after all soft tissue is healed  - F/u Burn. Possible debridement Monday    #Hx IVDU on methadone  - Not in active withdrawal   - Confirmed home dose of Methadone 125mg     #H/O PE in Oct 2022  #H/O LE DVTs s/p LLE embolectomy  - Pt states he was discharged with Eliquis and only completed 2 month course  - Pt currently asymptomatic and VS stable; monitor O2 sats  - F/u LE duplex    #DM type II  - Monitor FS before meals and at bedtime  - Keep 140-180  - Start SSI if FS >180    #Misc  - DVT Prophylaxis: Lovenox   Mr Mayfield is a 52 year old Male with a past medical history of IVDU (heroin) not currently using, vertebral osteomyelitis s/p debridement, MSSA bacteremia, b/l PEs and LE DVTs s/p LLE embolectomy in October 2022 and DMII presented to the ED with open wounds to b/l wrists. Patient states that he takes Methadone 125mg daily. He states he has had chronic wounds with contracture on his wrist for at least a few years however over the past few weeks to a month they have been opening and draining with skin falling off, and worsening scarring around the wrists which no longer allows the patient to flex his wrist. He has been putting bacitracin and nonstick bandage however it would remove the scab formation each time. He denies any systemic fever, cough, sore throat, chills, nausea, vomiting, chest pain, shortness of breath, abdominal pain, urinary complaints. He is not on any course of antibiotics at this time.     #B/l chronic wrist wounds   - No sepsis POA  - Local wound care twice daily, wash with soap and water, silvadene, adaptic, perla  - Continue with vancomycin and unasyn as per ID  - Per Hand Plastic Surgery: recommend outpatient follow up with Dr. Shepherd after all soft tissue is healed  - F/u Burn. Possible debridement Monday    #Hx IVDU on methadone  - Not in active withdrawal   - Confirmed home dose of Methadone 125mg     #H/O PE in Oct 2022  #H/O LE DVTs s/p LLE embolectomy  - Pt states he was discharged with Eliquis and only completed 2 month course  - Pt currently asymptomatic and VS stable; monitor O2 sats  - LE duplex negative    #DM type II  - Monitor FS before meals and at bedtime  - Keep 140-180  - Start SSI if FS >180    #Misc  - DVT Prophylaxis: Lovenox

## 2023-10-22 LAB
ANION GAP SERPL CALC-SCNC: 12 MMOL/L — SIGNIFICANT CHANGE UP (ref 7–14)
ANION GAP SERPL CALC-SCNC: 12 MMOL/L — SIGNIFICANT CHANGE UP (ref 7–14)
BASOPHILS # BLD AUTO: 0.04 K/UL — SIGNIFICANT CHANGE UP (ref 0–0.2)
BASOPHILS # BLD AUTO: 0.04 K/UL — SIGNIFICANT CHANGE UP (ref 0–0.2)
BASOPHILS NFR BLD AUTO: 0.6 % — SIGNIFICANT CHANGE UP (ref 0–1)
BASOPHILS NFR BLD AUTO: 0.6 % — SIGNIFICANT CHANGE UP (ref 0–1)
BUN SERPL-MCNC: 13 MG/DL — SIGNIFICANT CHANGE UP (ref 10–20)
BUN SERPL-MCNC: 13 MG/DL — SIGNIFICANT CHANGE UP (ref 10–20)
CALCIUM SERPL-MCNC: 8.9 MG/DL — SIGNIFICANT CHANGE UP (ref 8.4–10.5)
CALCIUM SERPL-MCNC: 8.9 MG/DL — SIGNIFICANT CHANGE UP (ref 8.4–10.5)
CHLORIDE SERPL-SCNC: 102 MMOL/L — SIGNIFICANT CHANGE UP (ref 98–110)
CHLORIDE SERPL-SCNC: 102 MMOL/L — SIGNIFICANT CHANGE UP (ref 98–110)
CO2 SERPL-SCNC: 26 MMOL/L — SIGNIFICANT CHANGE UP (ref 17–32)
CO2 SERPL-SCNC: 26 MMOL/L — SIGNIFICANT CHANGE UP (ref 17–32)
CREAT SERPL-MCNC: 0.9 MG/DL — SIGNIFICANT CHANGE UP (ref 0.7–1.5)
CREAT SERPL-MCNC: 0.9 MG/DL — SIGNIFICANT CHANGE UP (ref 0.7–1.5)
EGFR: 101 ML/MIN/1.73M2 — SIGNIFICANT CHANGE UP
EGFR: 101 ML/MIN/1.73M2 — SIGNIFICANT CHANGE UP
EOSINOPHIL # BLD AUTO: 0.38 K/UL — SIGNIFICANT CHANGE UP (ref 0–0.7)
EOSINOPHIL # BLD AUTO: 0.38 K/UL — SIGNIFICANT CHANGE UP (ref 0–0.7)
EOSINOPHIL NFR BLD AUTO: 5.4 % — SIGNIFICANT CHANGE UP (ref 0–8)
EOSINOPHIL NFR BLD AUTO: 5.4 % — SIGNIFICANT CHANGE UP (ref 0–8)
GLUCOSE BLDC GLUCOMTR-MCNC: 100 MG/DL — HIGH (ref 70–99)
GLUCOSE BLDC GLUCOMTR-MCNC: 100 MG/DL — HIGH (ref 70–99)
GLUCOSE BLDC GLUCOMTR-MCNC: 129 MG/DL — HIGH (ref 70–99)
GLUCOSE BLDC GLUCOMTR-MCNC: 129 MG/DL — HIGH (ref 70–99)
GLUCOSE BLDC GLUCOMTR-MCNC: 138 MG/DL — HIGH (ref 70–99)
GLUCOSE BLDC GLUCOMTR-MCNC: 138 MG/DL — HIGH (ref 70–99)
GLUCOSE BLDC GLUCOMTR-MCNC: 99 MG/DL — SIGNIFICANT CHANGE UP (ref 70–99)
GLUCOSE BLDC GLUCOMTR-MCNC: 99 MG/DL — SIGNIFICANT CHANGE UP (ref 70–99)
GLUCOSE SERPL-MCNC: 132 MG/DL — HIGH (ref 70–99)
GLUCOSE SERPL-MCNC: 132 MG/DL — HIGH (ref 70–99)
HCT VFR BLD CALC: 37.7 % — LOW (ref 42–52)
HCT VFR BLD CALC: 37.7 % — LOW (ref 42–52)
HGB BLD-MCNC: 12.2 G/DL — LOW (ref 14–18)
HGB BLD-MCNC: 12.2 G/DL — LOW (ref 14–18)
IMM GRANULOCYTES NFR BLD AUTO: 0.3 % — SIGNIFICANT CHANGE UP (ref 0.1–0.3)
IMM GRANULOCYTES NFR BLD AUTO: 0.3 % — SIGNIFICANT CHANGE UP (ref 0.1–0.3)
LYMPHOCYTES # BLD AUTO: 2.58 K/UL — SIGNIFICANT CHANGE UP (ref 1.2–3.4)
LYMPHOCYTES # BLD AUTO: 2.58 K/UL — SIGNIFICANT CHANGE UP (ref 1.2–3.4)
LYMPHOCYTES # BLD AUTO: 36.6 % — SIGNIFICANT CHANGE UP (ref 20.5–51.1)
LYMPHOCYTES # BLD AUTO: 36.6 % — SIGNIFICANT CHANGE UP (ref 20.5–51.1)
MAGNESIUM SERPL-MCNC: 2.2 MG/DL — SIGNIFICANT CHANGE UP (ref 1.8–2.4)
MAGNESIUM SERPL-MCNC: 2.2 MG/DL — SIGNIFICANT CHANGE UP (ref 1.8–2.4)
MCHC RBC-ENTMCNC: 29.8 PG — SIGNIFICANT CHANGE UP (ref 27–31)
MCHC RBC-ENTMCNC: 29.8 PG — SIGNIFICANT CHANGE UP (ref 27–31)
MCHC RBC-ENTMCNC: 32.4 G/DL — SIGNIFICANT CHANGE UP (ref 32–37)
MCHC RBC-ENTMCNC: 32.4 G/DL — SIGNIFICANT CHANGE UP (ref 32–37)
MCV RBC AUTO: 92.2 FL — SIGNIFICANT CHANGE UP (ref 80–94)
MCV RBC AUTO: 92.2 FL — SIGNIFICANT CHANGE UP (ref 80–94)
MONOCYTES # BLD AUTO: 0.58 K/UL — SIGNIFICANT CHANGE UP (ref 0.1–0.6)
MONOCYTES # BLD AUTO: 0.58 K/UL — SIGNIFICANT CHANGE UP (ref 0.1–0.6)
MONOCYTES NFR BLD AUTO: 8.2 % — SIGNIFICANT CHANGE UP (ref 1.7–9.3)
MONOCYTES NFR BLD AUTO: 8.2 % — SIGNIFICANT CHANGE UP (ref 1.7–9.3)
NEUTROPHILS # BLD AUTO: 3.45 K/UL — SIGNIFICANT CHANGE UP (ref 1.4–6.5)
NEUTROPHILS # BLD AUTO: 3.45 K/UL — SIGNIFICANT CHANGE UP (ref 1.4–6.5)
NEUTROPHILS NFR BLD AUTO: 48.9 % — SIGNIFICANT CHANGE UP (ref 42.2–75.2)
NEUTROPHILS NFR BLD AUTO: 48.9 % — SIGNIFICANT CHANGE UP (ref 42.2–75.2)
NRBC # BLD: 0 /100 WBCS — SIGNIFICANT CHANGE UP (ref 0–0)
NRBC # BLD: 0 /100 WBCS — SIGNIFICANT CHANGE UP (ref 0–0)
PLATELET # BLD AUTO: 175 K/UL — SIGNIFICANT CHANGE UP (ref 130–400)
PLATELET # BLD AUTO: 175 K/UL — SIGNIFICANT CHANGE UP (ref 130–400)
PMV BLD: 11.3 FL — HIGH (ref 7.4–10.4)
PMV BLD: 11.3 FL — HIGH (ref 7.4–10.4)
POTASSIUM SERPL-MCNC: 4.5 MMOL/L — SIGNIFICANT CHANGE UP (ref 3.5–5)
POTASSIUM SERPL-MCNC: 4.5 MMOL/L — SIGNIFICANT CHANGE UP (ref 3.5–5)
POTASSIUM SERPL-SCNC: 4.5 MMOL/L — SIGNIFICANT CHANGE UP (ref 3.5–5)
POTASSIUM SERPL-SCNC: 4.5 MMOL/L — SIGNIFICANT CHANGE UP (ref 3.5–5)
RBC # BLD: 4.09 M/UL — LOW (ref 4.7–6.1)
RBC # BLD: 4.09 M/UL — LOW (ref 4.7–6.1)
RBC # FLD: 13.7 % — SIGNIFICANT CHANGE UP (ref 11.5–14.5)
RBC # FLD: 13.7 % — SIGNIFICANT CHANGE UP (ref 11.5–14.5)
SODIUM SERPL-SCNC: 140 MMOL/L — SIGNIFICANT CHANGE UP (ref 135–146)
SODIUM SERPL-SCNC: 140 MMOL/L — SIGNIFICANT CHANGE UP (ref 135–146)
WBC # BLD: 7.05 K/UL — SIGNIFICANT CHANGE UP (ref 4.8–10.8)
WBC # BLD: 7.05 K/UL — SIGNIFICANT CHANGE UP (ref 4.8–10.8)
WBC # FLD AUTO: 7.05 K/UL — SIGNIFICANT CHANGE UP (ref 4.8–10.8)
WBC # FLD AUTO: 7.05 K/UL — SIGNIFICANT CHANGE UP (ref 4.8–10.8)

## 2023-10-22 PROCEDURE — 99232 SBSQ HOSP IP/OBS MODERATE 35: CPT

## 2023-10-22 RX ORDER — CIPROFLOXACIN LACTATE 400MG/40ML
500 VIAL (ML) INTRAVENOUS EVERY 12 HOURS
Refills: 0 | Status: DISCONTINUED | OUTPATIENT
Start: 2023-10-22 | End: 2023-10-23

## 2023-10-22 RX ADMIN — AMPICILLIN SODIUM AND SULBACTAM SODIUM 200 GRAM(S): 250; 125 INJECTION, POWDER, FOR SUSPENSION INTRAMUSCULAR; INTRAVENOUS at 23:02

## 2023-10-22 RX ADMIN — Medication 1 APPLICATION(S): at 05:23

## 2023-10-22 RX ADMIN — Medication 2 MILLIGRAM(S): at 06:10

## 2023-10-22 RX ADMIN — Medication 1 PATCH: at 11:00

## 2023-10-22 RX ADMIN — Medication 1 TABLET(S): at 11:31

## 2023-10-22 RX ADMIN — MIRTAZAPINE 30 MILLIGRAM(S): 45 TABLET, ORALLY DISINTEGRATING ORAL at 23:02

## 2023-10-22 RX ADMIN — BUPROPION HYDROCHLORIDE 300 MILLIGRAM(S): 150 TABLET, EXTENDED RELEASE ORAL at 11:31

## 2023-10-22 RX ADMIN — CHLORHEXIDINE GLUCONATE 1 APPLICATION(S): 213 SOLUTION TOPICAL at 06:28

## 2023-10-22 RX ADMIN — ENOXAPARIN SODIUM 40 MILLIGRAM(S): 100 INJECTION SUBCUTANEOUS at 05:18

## 2023-10-22 RX ADMIN — Medication 166.67 MILLIGRAM(S): at 06:10

## 2023-10-22 RX ADMIN — Medication 1 APPLICATION(S): at 17:58

## 2023-10-22 RX ADMIN — Medication 1 PATCH: at 06:28

## 2023-10-22 RX ADMIN — AMPICILLIN SODIUM AND SULBACTAM SODIUM 200 GRAM(S): 250; 125 INJECTION, POWDER, FOR SUSPENSION INTRAMUSCULAR; INTRAVENOUS at 11:31

## 2023-10-22 RX ADMIN — Medication 500 MILLIGRAM(S): at 17:57

## 2023-10-22 RX ADMIN — AMPICILLIN SODIUM AND SULBACTAM SODIUM 200 GRAM(S): 250; 125 INJECTION, POWDER, FOR SUSPENSION INTRAMUSCULAR; INTRAVENOUS at 05:20

## 2023-10-22 RX ADMIN — METHADONE HYDROCHLORIDE 125 MILLIGRAM(S): 40 TABLET ORAL at 11:30

## 2023-10-22 RX ADMIN — Medication 2 MILLIGRAM(S): at 23:03

## 2023-10-22 RX ADMIN — Medication 2 MILLIGRAM(S): at 14:44

## 2023-10-22 RX ADMIN — AMPICILLIN SODIUM AND SULBACTAM SODIUM 200 GRAM(S): 250; 125 INJECTION, POWDER, FOR SUSPENSION INTRAMUSCULAR; INTRAVENOUS at 17:57

## 2023-10-22 NOTE — PROGRESS NOTE ADULT - ASSESSMENT
52 year old Male with PMH of DM2, IVDU (heroin) on Methadone program, vertebral osteomyelitis, PEs and LE DVTs s/p LLE embolectomy presented to the ED with open wounds to b/l wrists. He states he has had chronic wounds with contracture on his wrist for at least a few years however over the past few weeks wound were open and draining with skin falling off,    A/P:   Bilateral chronic wrists wounds  Seen burn, possible need for debridement.   Wound Cx is growing Enterobacter colace (resistant to Unasyn), Staph species (sensitive to Unasyn  Continue Unasyn, switch Vancomycin to Cipro.   Per Hand Plastic Surgery: recommend outpatient follow up after all soft tissue is healed    History of PE and LE DVT:   Not on anticoagulation.  Venous duplex is negative.     History of IVDU on methadone  Continue Methadone 125mg     DM type II  A1C 6.5, FS is controlled, continue diabetic diet, insulin sliding scale.    DVT Prophylaxis: Lovenox.    #Progress Note Handoff:  Pending (specify): Burn follow up.   Family discussion:  Disposition: Home in 24 hrs if no further debridement needed.  52 year old Male with PMH of DM2, IVDU (heroin) on Methadone program, vertebral osteomyelitis, PEs and LE DVTs s/p LLE embolectomy presented to the ED with open wounds to b/l wrists. He states he has had chronic wounds with contracture on his wrist for at least a few years however over the past few weeks wound were open and draining with skin falling off,    A/P:   Bilateral chronic wrists wounds  Seen burn, plan for wound debridement on Monday 10/23.   Wound Cx is growing Enterobacter colace (resistant to Unasyn), Staph species (sensitive to Unasyn  Continue Unasyn, switch Vancomycin to Cipro.   Per Hand Plastic Surgery: recommend outpatient follow up after all soft tissue is healed    History of PE and LE DVT:   Not on anticoagulation.  Venous duplex is negative.     History of IVDU on methadone  Continue Methadone 125mg     DM type II  A1C 6.5, FS is controlled, continue diabetic diet, insulin sliding scale.    DVT Prophylaxis: Lovenox.    #Progress Note Handoff:  Pending (specify): Burn follow up.   Family discussion:  Disposition: Home,

## 2023-10-23 ENCOUNTER — TRANSCRIPTION ENCOUNTER (OUTPATIENT)
Age: 53
End: 2023-10-23

## 2023-10-23 VITALS
RESPIRATION RATE: 18 BRPM | TEMPERATURE: 97 F | HEART RATE: 65 BPM | SYSTOLIC BLOOD PRESSURE: 151 MMHG | DIASTOLIC BLOOD PRESSURE: 90 MMHG

## 2023-10-23 LAB
ANION GAP SERPL CALC-SCNC: 13 MMOL/L — SIGNIFICANT CHANGE UP (ref 7–14)
ANION GAP SERPL CALC-SCNC: 13 MMOL/L — SIGNIFICANT CHANGE UP (ref 7–14)
BUN SERPL-MCNC: 16 MG/DL — SIGNIFICANT CHANGE UP (ref 10–20)
BUN SERPL-MCNC: 16 MG/DL — SIGNIFICANT CHANGE UP (ref 10–20)
CALCIUM SERPL-MCNC: 8.8 MG/DL — SIGNIFICANT CHANGE UP (ref 8.4–10.5)
CALCIUM SERPL-MCNC: 8.8 MG/DL — SIGNIFICANT CHANGE UP (ref 8.4–10.5)
CHLORIDE SERPL-SCNC: 104 MMOL/L — SIGNIFICANT CHANGE UP (ref 98–110)
CHLORIDE SERPL-SCNC: 104 MMOL/L — SIGNIFICANT CHANGE UP (ref 98–110)
CO2 SERPL-SCNC: 25 MMOL/L — SIGNIFICANT CHANGE UP (ref 17–32)
CO2 SERPL-SCNC: 25 MMOL/L — SIGNIFICANT CHANGE UP (ref 17–32)
CREAT SERPL-MCNC: 0.9 MG/DL — SIGNIFICANT CHANGE UP (ref 0.7–1.5)
CREAT SERPL-MCNC: 0.9 MG/DL — SIGNIFICANT CHANGE UP (ref 0.7–1.5)
EGFR: 101 ML/MIN/1.73M2 — SIGNIFICANT CHANGE UP
EGFR: 101 ML/MIN/1.73M2 — SIGNIFICANT CHANGE UP
GLUCOSE BLDC GLUCOMTR-MCNC: 114 MG/DL — HIGH (ref 70–99)
GLUCOSE BLDC GLUCOMTR-MCNC: 114 MG/DL — HIGH (ref 70–99)
GLUCOSE BLDC GLUCOMTR-MCNC: 89 MG/DL — SIGNIFICANT CHANGE UP (ref 70–99)
GLUCOSE BLDC GLUCOMTR-MCNC: 89 MG/DL — SIGNIFICANT CHANGE UP (ref 70–99)
GLUCOSE SERPL-MCNC: 100 MG/DL — HIGH (ref 70–99)
GLUCOSE SERPL-MCNC: 100 MG/DL — HIGH (ref 70–99)
HCT VFR BLD CALC: 38.6 % — LOW (ref 42–52)
HCT VFR BLD CALC: 38.6 % — LOW (ref 42–52)
HGB BLD-MCNC: 12.4 G/DL — LOW (ref 14–18)
HGB BLD-MCNC: 12.4 G/DL — LOW (ref 14–18)
MAGNESIUM SERPL-MCNC: 2.2 MG/DL — SIGNIFICANT CHANGE UP (ref 1.8–2.4)
MAGNESIUM SERPL-MCNC: 2.2 MG/DL — SIGNIFICANT CHANGE UP (ref 1.8–2.4)
MCHC RBC-ENTMCNC: 28.9 PG — SIGNIFICANT CHANGE UP (ref 27–31)
MCHC RBC-ENTMCNC: 28.9 PG — SIGNIFICANT CHANGE UP (ref 27–31)
MCHC RBC-ENTMCNC: 32.1 G/DL — SIGNIFICANT CHANGE UP (ref 32–37)
MCHC RBC-ENTMCNC: 32.1 G/DL — SIGNIFICANT CHANGE UP (ref 32–37)
MCV RBC AUTO: 90 FL — SIGNIFICANT CHANGE UP (ref 80–94)
MCV RBC AUTO: 90 FL — SIGNIFICANT CHANGE UP (ref 80–94)
NRBC # BLD: 0 /100 WBCS — SIGNIFICANT CHANGE UP (ref 0–0)
NRBC # BLD: 0 /100 WBCS — SIGNIFICANT CHANGE UP (ref 0–0)
PLATELET # BLD AUTO: 278 K/UL — SIGNIFICANT CHANGE UP (ref 130–400)
PLATELET # BLD AUTO: 278 K/UL — SIGNIFICANT CHANGE UP (ref 130–400)
PMV BLD: 9.8 FL — SIGNIFICANT CHANGE UP (ref 7.4–10.4)
PMV BLD: 9.8 FL — SIGNIFICANT CHANGE UP (ref 7.4–10.4)
POTASSIUM SERPL-MCNC: 4.7 MMOL/L — SIGNIFICANT CHANGE UP (ref 3.5–5)
POTASSIUM SERPL-MCNC: 4.7 MMOL/L — SIGNIFICANT CHANGE UP (ref 3.5–5)
POTASSIUM SERPL-SCNC: 4.7 MMOL/L — SIGNIFICANT CHANGE UP (ref 3.5–5)
POTASSIUM SERPL-SCNC: 4.7 MMOL/L — SIGNIFICANT CHANGE UP (ref 3.5–5)
RBC # BLD: 4.29 M/UL — LOW (ref 4.7–6.1)
RBC # BLD: 4.29 M/UL — LOW (ref 4.7–6.1)
RBC # FLD: 13.7 % — SIGNIFICANT CHANGE UP (ref 11.5–14.5)
RBC # FLD: 13.7 % — SIGNIFICANT CHANGE UP (ref 11.5–14.5)
SODIUM SERPL-SCNC: 142 MMOL/L — SIGNIFICANT CHANGE UP (ref 135–146)
SODIUM SERPL-SCNC: 142 MMOL/L — SIGNIFICANT CHANGE UP (ref 135–146)
WBC # BLD: 7.5 K/UL — SIGNIFICANT CHANGE UP (ref 4.8–10.8)
WBC # BLD: 7.5 K/UL — SIGNIFICANT CHANGE UP (ref 4.8–10.8)
WBC # FLD AUTO: 7.5 K/UL — SIGNIFICANT CHANGE UP (ref 4.8–10.8)
WBC # FLD AUTO: 7.5 K/UL — SIGNIFICANT CHANGE UP (ref 4.8–10.8)

## 2023-10-23 PROCEDURE — 99239 HOSP IP/OBS DSCHRG MGMT >30: CPT

## 2023-10-23 PROCEDURE — 99231 SBSQ HOSP IP/OBS SF/LOW 25: CPT

## 2023-10-23 RX ORDER — LEVOFLOXACIN 5 MG/ML
1 INJECTION, SOLUTION INTRAVENOUS
Qty: 14 | Refills: 0
Start: 2023-10-23 | End: 2023-11-05

## 2023-10-23 RX ADMIN — Medication 500 MILLIGRAM(S): at 05:25

## 2023-10-23 RX ADMIN — BUPROPION HYDROCHLORIDE 300 MILLIGRAM(S): 150 TABLET, EXTENDED RELEASE ORAL at 11:46

## 2023-10-23 RX ADMIN — Medication 2 MILLIGRAM(S): at 08:20

## 2023-10-23 RX ADMIN — CHLORHEXIDINE GLUCONATE 1 APPLICATION(S): 213 SOLUTION TOPICAL at 06:18

## 2023-10-23 RX ADMIN — ENOXAPARIN SODIUM 40 MILLIGRAM(S): 100 INJECTION SUBCUTANEOUS at 05:26

## 2023-10-23 RX ADMIN — Medication 1 TABLET(S): at 11:46

## 2023-10-23 RX ADMIN — METHADONE HYDROCHLORIDE 125 MILLIGRAM(S): 40 TABLET ORAL at 11:46

## 2023-10-23 RX ADMIN — AMPICILLIN SODIUM AND SULBACTAM SODIUM 200 GRAM(S): 250; 125 INJECTION, POWDER, FOR SUSPENSION INTRAMUSCULAR; INTRAVENOUS at 05:25

## 2023-10-23 RX ADMIN — AMPICILLIN SODIUM AND SULBACTAM SODIUM 200 GRAM(S): 250; 125 INJECTION, POWDER, FOR SUSPENSION INTRAMUSCULAR; INTRAVENOUS at 11:48

## 2023-10-23 RX ADMIN — Medication 1 APPLICATION(S): at 06:18

## 2023-10-23 NOTE — DISCHARGE NOTE PROVIDER - CARE PROVIDER_API CALL
LUCY PETERSEN  [address] [place] [address]  Phone: [phone]  Fax: [phone]  Follow Up Time: 1-3 days    Guero Arevalo  Plastic Surgery  [address] [place] [address]  Phone: [phone]  Fax: [phone]  Follow Up Time: 1-3 days   LUCY PETERSEN  [address] [place] [address]  Phone: [phone]  Fax: [phone]  Follow Up Time: 1-3 days    Guero Arevalo  Plastic Surgery  [address] [place] [address]  Phone: [phone]  Fax: [phone]  Follow Up Time: 1-3 days    Prashanth Le  Orthopaedic Surgery  [facility] [place] [address]  Phone: [phone]  Fax: [phone]  Follow Up Time: 2 weeks

## 2023-10-23 NOTE — DISCHARGE NOTE PROVIDER - CARE PROVIDERS DIRECT ADDRESSES
,DirectAddress_Unknown,[email or web address] ,DirectAddress_Unknown,[email or web address]

## 2023-10-23 NOTE — DISCHARGE NOTE PROVIDER - ATTENDING DISCHARGE PHYSICAL EXAMINATION:
Gen- middle-age, morbidly obese M, NAD, unkempt  Eyes- anicteric sclera, non injected conjunctiva, EOMI  ENT- hearing grossly intact, oropharynx clear  Chest- symmetrical chest rise, no accessory muscle use  Cardiac- non tachycardic  Abdominal- protuberant  Skin- warm, dry, intact  Ext- right wrist malpositioned

## 2023-10-23 NOTE — DISCHARGE NOTE PROVIDER - HOSPITAL COURSE
52 year old Male with PMH of DM2, IVDU (heroin) on Methadone program, vertebral osteomyelitis, PEs and LE DVTs s/p LLE embolectomy presented to the ED with open wounds to b/l wrists. He states he has had chronic wounds with contracture on his wrist for at least a few years however over the past few weeks wound were open and draining with skin falling off,    A/P:   Bilateral chronic wrists wounds  Seen burn, plan for wound debridement on Monday 10/23.   Wound Cx is growing Enterobacter colace (resistant to Unasyn), Staph species (sensitive to Unasyn  Continue Unasyn, switch Vancomycin to Cipro.   Per Hand Plastic Surgery: recommend outpatient follow up after all soft tissue is healed  Per ID 10/23: DC with -po Augmentin 875 mg q12h and po Levoquin 750 mg q24h till 11/05      History of PE and LE DVT:   Not on anticoagulation.  Venous duplex is negative.     History of IVDU on methadone  Continue Methadone 125mg     DM type II  A1C 6.5, FS is controlled, continue diabetic diet, insulin sliding scale.    DVT Prophylaxis: Lovenox.    #Progress Note Handoff:  Pending (specify): Burn follow up.   Family discussion:  Disposition: Home,

## 2023-10-23 NOTE — DISCHARGE NOTE NURSING/CASE MANAGEMENT/SOCIAL WORK - NSDCPEFALRISK_GEN_ALL_CORE
For information on Fall & Injury Prevention, visit: [email or web address]  [email or web address] OR  https://www.cdc.gov/steadi/patient.html

## 2023-10-23 NOTE — PROGRESS NOTE ADULT - ASSESSMENT
· Assessment	  52 year old Male with a past medical history of IVDU (heroin) not currently using, vertebral osteomyelitis s/p debridement,  MSSA bacteremia, b/l PEs and LE DVTs s/p LLE embolectomy in October 2022 and DMII presented to the ED with open wounds to b/l wrists. Patient states that he takes Methadone 125mg daily. He states he has had chronic wounds with contracture on his wrist for at least a few years however over the past few weeks to a month they have been opening and draining with skin falling off, and worsening scarring around the wrists which no longer allows the patient to flex his wrist. He has been putting bacitracin and nonstick bandage however it would remove the scab formation each time. He denies any systemic fever, cough, sore throat, chills, nausea, vomiting, chest pain, shortness of breath, abdominal pain, urinary complaints. He is not on any course of antibiotics at this time.     IMPRESSION/RECOMMENDATIONS   Chronic wounds wrists dorsum b/l with possible localized cellulitis and superficial necrosis  No OM  Contractions of the hands secondary to localized fibrosing changes  WCx : S simulans, S lugdunensis, E cloacae    -po Augmentin 875 mg q12h and po Levoquin 750 mg q24h till 11/05    Please do not hesitate to recall ID if any questions arise either through [employer] or through [employer]

## 2023-10-23 NOTE — PROGRESS NOTE ADULT - SUBJECTIVE AND OBJECTIVE BOX
MIKAEL MCDERMOTT  53y  Male      Patient is a 53y old  Male who presents with a chief complaint of Cellulitis (21 Oct 2023 11:11)      INTERVAL HPI/OVERNIGHT EVENTS:  He feels ok, no new complaints.   Vital Signs Last 24 Hrs  T(C): 36.1 (22 Oct 2023 05:29), Max: 36.7 (21 Oct 2023 19:04)  T(F): 97 (22 Oct 2023 05:29), Max: 98.1 (21 Oct 2023 19:04)  HR: 71 (22 Oct 2023 05:29) (66 - 72)  BP: 153/91 (22 Oct 2023 05:29) (126/94 - 153/91)  BP(mean): --  RR: 18 (22 Oct 2023 05:29) (18 - 18)  SpO2: --          10-21-23 @ 07:01  -  10-22-23 @ 07:00  --------------------------------------------------------  IN: 0 mL / OUT: 550 mL / NET: -550 mL            Consultant(s) Notes Reviewed:  [x ] YES  [ ] NO          MEDICATIONS  (STANDING):  ampicillin/sulbactam  IVPB 3 Gram(s) IV Intermittent every 6 hours  buPROPion XL (24-Hour) . 300 milliGRAM(s) Oral daily  chlorhexidine 2% Cloths 1 Application(s) Topical <User Schedule>  ciprofloxacin     Tablet 500 milliGRAM(s) Oral every 12 hours  dextrose 5%. 1000 milliLiter(s) (50 mL/Hr) IV Continuous <Continuous>  dextrose 5%. 1000 milliLiter(s) (100 mL/Hr) IV Continuous <Continuous>  dextrose 50% Injectable 12.5 Gram(s) IV Push once  dextrose 50% Injectable 25 Gram(s) IV Push once  dextrose 50% Injectable 25 Gram(s) IV Push once  enoxaparin Injectable 40 milliGRAM(s) SubCutaneous every 24 hours  glucagon  Injectable 1 milliGRAM(s) IntraMuscular once  insulin lispro (ADMELOG) corrective regimen sliding scale   SubCutaneous three times a day before meals  lactobacillus acidophilus 1 Tablet(s) Oral daily  methadone    Tablet 125 milliGRAM(s) Oral daily  mirtazapine 30 milliGRAM(s) Oral at bedtime  silver sulfADIAZINE 1% Cream 1 Application(s) Topical two times a day    MEDICATIONS  (PRN):  acetaminophen     Tablet .. 650 milliGRAM(s) Oral every 6 hours PRN Moderate Pain (4 - 6)  ALPRAZolam 2 milliGRAM(s) Oral three times a day PRN for anxiety  aluminum hydroxide/magnesium hydroxide/simethicone Suspension 30 milliLiter(s) Oral every 4 hours PRN Dyspepsia  dextrose Oral Gel 15 Gram(s) Oral once PRN Blood Glucose LESS THAN 70 milliGRAM(s)/deciliter  melatonin 3 milliGRAM(s) Oral at bedtime PRN Insomnia  nicotine -   7 mG/24Hr(s) Patch 1 Patch Transdermal daily PRN Nicotine withdrawal  ondansetron Injectable 4 milliGRAM(s) IV Push every 8 hours PRN Nausea and/or Vomiting      LABS                          12.2   7.05  )-----------( 175      ( 22 Oct 2023 04:30 )             37.7     10-22    140  |  102  |  13  ----------------------------<  132<H>  4.5   |  26  |  0.9    Ca    8.9      22 Oct 2023 04:30  Mg     2.2     10-22        Urinalysis Basic - ( 22 Oct 2023 04:30 )    Color: x / Appearance: x / SG: x / pH: x  Gluc: 132 mg/dL / Ketone: x  / Bili: x / Urobili: x   Blood: x / Protein: x / Nitrite: x   Leuk Esterase: x / RBC: x / WBC x   Sq Epi: x / Non Sq Epi: x / Bacteria: x        Lactate Trend  10-19 @ 16:58 Lactate:1.7         CAPILLARY BLOOD GLUCOSE      POCT Blood Glucose.: 138 mg/dL (22 Oct 2023 07:35)      Culture - Joint (collected 10-19-23 @ 18:00)  Source: Wrist right wrist  Gram Stain (10-20-23 @ 02:28):    polymorphonuclear leukocytes seen    Gram Positive Cocci in Clusters seen    by cytocentrifuge  Preliminary Report (10-21-23 @ 19:57):    Rare Enterobacter cloacae complex    Moderate Staphylococcus simulans    Moderate Staphylococcus lugdunensis  Organism: Enterobacter cloacae complex  Staphylococcus simulans  Staphylococcus luterellunensis (10-21-23 @ 19:56)  Organism: Staphylococcus lugdunensis (10-21-23 @ 19:56)      Method Type: WILLY      -  Ampicillin/Sulbactam: S <=8/4      -  Cefazolin: S <=4      -  Clindamycin: S <=0.25      -  Erythromycin: S <=0.25      -  Gentamicin: S <=1 Should not be used as monotherapy      -  Oxacillin: S <=0.25      -  Rifampin: S <=1 Should not be used as monotherapy      -  Tetracycline: S <=1      -  Trimethoprim/Sulfamethoxazole: S <=0.5/9.5      -  Vancomycin: S 1  Organism: Staphylococcus simulans (10-21-23 @ 19:56)      Method Type: WILLY      -  Ampicillin/Sulbactam: S <=8/4      -  Cefazolin: S <=4      -  Clindamycin: R >4      -  Erythromycin: R >4      -  Gentamicin: S <=1 Should not be used as monotherapy      -  Oxacillin: S <=0.25      -  Rifampin: S <=1 Should not be used as monotherapy      -  Tetracycline: S <=1      -  Trimethoprim/Sulfamethoxazole: S <=0.5/9.5      -  Vancomycin: S 0.5  Organism: Enterobacter cloacae complex (10-21-23 @ 19:08)      Method Type: WILLY      -  Amikacin: S <=16      -  Amoxicillin/Clavulanic Acid: R >16/8      -  Ampicillin: R >16 These ampicillin results predict results for amoxicillin      -  Ampicillin/Sulbactam: R 16/8      -  Aztreonam: S <=4      -  Cefazolin: R >16      -  Cefepime: S <=2      -  Cefotaxime: S Enterobacter cloacae, Klebsiella aerogenes, and Citrobacter freundii may develop resistance during prolonged therapy.      -  Cefoxitin: R >16      -  Ceftazidime: S Enterobacter cloacae, Klebsiella aerogenes, and Citrobacter freundii may develop resistance during prolonged therapy.      -  Ceftriaxone: S <=1 Enterobacter cloacae, Klebsiella aerogenes, and Citrobacter freundii may develop resistance during prolonged therapy.      -  Ciprofloxacin: S <=0.25      -  Ertapenem: S <=0.5      -  Gentamicin: S <=2      -  Imipenem: S <=1      -  Levofloxacin: S <=0.5      -  Meropenem: S <=1      -  Piperacillin/Tazobactam: S <=8      -  Tobramycin: S <=2      -  Trimethoprim/Sulfamethoxazole: S <=0.5/9.5    Culture - Blood (collected 10-19-23 @ 17:07)  Source: .Blood Blood-Peripheral  Preliminary Report (10-21-23 @ 23:01):    No growth at 48 Hours    Culture - Blood (collected 10-19-23 @ 17:07)  Source: .Blood Blood-Peripheral  Preliminary Report (10-21-23 @ 23:01):    No growth at 48 Hours        RADIOLOGY & ADDITIONAL TESTS:    Imaging Personally Reviewed:  [ ] YES  [ ] NO    HEALTH ISSUES - PROBLEM Dx:          PHYSICAL EXAM:  GENERAL: NAD, well-developed.  HEAD:  Atraumatic, Normocephalic.  EYES: EOMI, PERRLA, conjunctiva and sclera clear.  NECK: Supple, No JVD.  CHEST/LUNG: Clear to auscultation bilaterally; No wheeze.  HEART: Regular rate and rhythm; S1 S2.   ABDOMEN: Soft, Nontender, Nondistended; Bowel sounds present.  EXTREMITIES:  bilateral wrist contraction wit hand deformity, dorsal aspect with open wounds approx 5x3cm with adherent yellow exudate, dry scab  PSYCH: AAOx3.  NEUROLOGY: non-focal.  SKIN: No rashes or lesions.  
24H events:    Patient is a 53y old Male who presents with a chief complaint of Cellulitis (20 Oct 2023 11:59)    Primary diagnosis of Cellulitis of wrist    Today is hospital day 1d. This morning patient was seen and examined at bedside, resting comfortably in bed.    No acute or major events overnight.    Code Status: Full Code    Family communication:  Contact date:  Name of person contacted:  Relationship to patient:  Communication details:  What matters most:    PAST MEDICAL & SURGICAL HISTORY  IV drug abuse    Vertebral osteomyelitis    MSSA bacteremia    No significant past surgical history      SOCIAL HISTORY:  Social History:      ALLERGIES:  No Known Allergies    MEDICATIONS:  STANDING MEDICATIONS  ampicillin/sulbactam  IVPB 3 Gram(s) IV Intermittent every 6 hours  buPROPion XL (24-Hour) . 300 milliGRAM(s) Oral daily  dextrose 5%. 1000 milliLiter(s) IV Continuous <Continuous>  dextrose 5%. 1000 milliLiter(s) IV Continuous <Continuous>  dextrose 50% Injectable 25 Gram(s) IV Push once  dextrose 50% Injectable 25 Gram(s) IV Push once  dextrose 50% Injectable 12.5 Gram(s) IV Push once  enoxaparin Injectable 40 milliGRAM(s) SubCutaneous every 24 hours  glucagon  Injectable 1 milliGRAM(s) IntraMuscular once  insulin lispro (ADMELOG) corrective regimen sliding scale   SubCutaneous three times a day before meals  lactobacillus acidophilus 1 Tablet(s) Oral daily  methadone    Tablet 125 milliGRAM(s) Oral daily  mirtazapine 30 milliGRAM(s) Oral at bedtime  silver sulfADIAZINE 1% Cream 1 Application(s) Topical two times a day  vancomycin  IVPB 1500 milliGRAM(s) IV Intermittent every 12 hours    PRN MEDICATIONS  acetaminophen     Tablet .. 650 milliGRAM(s) Oral every 6 hours PRN  ALPRAZolam 2 milliGRAM(s) Oral three times a day PRN  aluminum hydroxide/magnesium hydroxide/simethicone Suspension 30 milliLiter(s) Oral every 4 hours PRN  dextrose Oral Gel 15 Gram(s) Oral once PRN  melatonin 3 milliGRAM(s) Oral at bedtime PRN  ondansetron Injectable 4 milliGRAM(s) IV Push every 8 hours PRN    VITALS:   T(F): 98  HR: 64  BP: 160/92  RR: 18  SpO2: 97%    PHYSICAL EXAM:  GENERAL:   ( x ) NAD, lying in bed comfortably     (  ) obtunded     (  ) lethargic     (  ) somnolent    HEAD:   ( x ) Atraumatic     (  ) hematoma     (  ) laceration (specify location:       )     NECK:  ( x ) Supple     (  ) neck stiffness     (  ) nuchal rigidity     (  )  no JVD     (  ) JVD present ( -- cm)    HEART:  Rate -->     ( x ) normal rate     (  ) bradycardic     (  ) tachycardic  Rhythm -->     ( x ) regular     (  ) regularly irregular     (  ) irregularly irregular  Murmurs -->     ( x ) normal s1s2     (  ) systolic murmur     (  ) diastolic murmur     (  ) continuous murmur      (  ) S3 present     (  ) S4 present    LUNGS:   ( x )Unlabored respirations     (  ) tachypnea  ( x ) B/L air entry     (  ) decreased breath sounds in:  (location     )    ( x ) no adventitious sound     (  ) crackles     (  ) wheezing      (  ) rhonchi      (specify location:       )  (  ) chest wall tenderness (specify location:       )    ABDOMEN:   ( x ) Soft     (  ) tense   |   ( x ) nondistended     (  ) distended   |   (  ) +BS     (  ) hypoactive bowel sounds     (  ) hyperactive bowel sounds  ( x ) nontender     (  ) RUQ tenderness     (  ) RLQ tenderness     (  ) LLQ tenderness     (  ) epigastric tenderness     (  ) diffuse tenderness  (  ) Splenomegaly      (  ) Hepatomegaly      (  ) Jaundice     (  ) ecchymosis     EXTREMITIES: B/l wrist with open wounds ~5x3cm with adherent yellow exudate, dry scabs. No malodor, no active bleeding, no surrounding erythema or edema. Severe contraction notes with scaring - currently wrapped  (  ) Normal     (  ) Rash     (  ) ecchymosis     (  ) varicose veins      (  ) pitting edema     (  ) non-pitting edema   (  ) ulceration     (  ) gangrene:     (location:     )    NERVOUS SYSTEM:    ( x ) A&Ox3     (  ) confused     (  ) lethargic  CN II-XII:     ( x ) Intact     (  ) deficits found     (Specify:     )   Upper extremities:     ( x ) no sensorimotor deficits     (  ) weakness     (  ) loss of proprioception/vibration     (  ) loss of touch/temperature (specify:    )  Lower extremities:     ( x ) no sensorimotor deficits     (  ) weakness     (  ) loss of proprioception/vibration     (  ) loss of touch/temperature (specify:    )    SKIN: See above  (  ) No rashes or lesions     (  ) maculopapular rash     (  ) pustules     (  ) vesicles     (  ) ulcer     (  ) ecchymosis     (specify location:     )    AMPAC score:    (  ) Indwelling Kenney Catheter:   Date insterted:    Reason (  ) Critical illness     (  ) urinary retention    (  ) Accurate Ins/Outs Monitoring     (  ) CMO patient    (  ) Central Line:   Date inserted:  Location: (  ) Right IJ     (  ) Left IJ     (  ) Right Fem     (  ) Left Fem    (  ) SPC        (  ) pigtail       (  ) PEG tube       (  ) colostomy       (  ) jejunostomy  (  ) U-Dall    LABS:                        12.2   6.53  )-----------( 228      ( 20 Oct 2023 08:24 )             37.6     10-20    138  |  104  |  11  ----------------------------<  160<H>  4.4   |  26  |  0.9    Ca    8.9      20 Oct 2023 08:24    TPro  6.9  /  Alb  3.5  /  TBili  0.2  /  DBili  x   /  AST  9   /  ALT  9   /  AlkPhos  105  10-20      Urinalysis Basic - ( 20 Oct 2023 08:24 )    Color: x / Appearance: x / SG: x / pH: x  Gluc: 160 mg/dL / Ketone: x  / Bili: x / Urobili: x   Blood: x / Protein: x / Nitrite: x   Leuk Esterase: x / RBC: x / WBC x   Sq Epi: x / Non Sq Epi: x / Bacteria: x        Lactate, Blood: 1.7 mmol/L (10-19-23 @ 16:58)      Culture - Joint (collected 19 Oct 2023 18:00)  Source: Wrist right wrist  Gram Stain (20 Oct 2023 02:28):    polymorphonuclear leukocytes seen    Gram Positive Cocci in Clusters seen    by cytocentrifuge          RADIOLOGY:          
  BALDEMAR MIKAEL  53y, Male    All available historical data reviewed    OVERNIGHT EVENTS:    none  ROS:  General: Denies rigors, nightsweats  HEENT: Denies headache, rhinorrhea, sore throat, eye pain  CV: Denies CP, palpitations  PULM: Denies wheezing, hemoptysis  GI: Denies hematemesis, hematochezia, melena  : Denies discharge, hematuria  MSK: Denies arthralgias, myalgias  SKIN: Denies rash, lesions  NEURO: Denies paresthesias, weakness  PSYCH: Denies depression, anxiety    VITALS:  T(F): 97.6, Max: 97.9 (10-22-23 @ 19:42)  HR: 69  BP: 166/85  RR: 18Vital Signs Last 24 Hrs  T(C): 36.4 (23 Oct 2023 04:57), Max: 36.6 (22 Oct 2023 19:42)  T(F): 97.6 (23 Oct 2023 04:57), Max: 97.9 (22 Oct 2023 19:42)  HR: 69 (23 Oct 2023 04:57) (69 - 75)  BP: 166/85 (23 Oct 2023 04:57) (143/99 - 166/85)  BP(mean): --  RR: 18 (23 Oct 2023 04:57) (18 - 18)  SpO2: --        TESTS & MEASUREMENTS:                        12.4   7.50  )-----------( 278      ( 23 Oct 2023 08:30 )             38.6     10-23    142  |  104  |  16  ----------------------------<  100<H>  4.7   |  25  |  0.9    Ca    8.8      23 Oct 2023 08:30  Mg     2.2     10-23          Culture - Joint (collected 10-19-23 @ 18:00)  Source: Wrist right wrist  Gram Stain (10-20-23 @ 02:28):    polymorphonuclear leukocytes seen    Gram Positive Cocci in Clusters seen    by cytocentrifuge  Preliminary Report (10-21-23 @ 19:57):    Rare Enterobacter cloacae complex    Moderate Staphylococcus simulans    Moderate Staphylococcus lugdunensis  Organism: Enterobacter cloacae complex  Staphylococcus simulans  Staphylococcus lugdunensis (10-21-23 @ 19:56)  Organism: Staphylococcus lugdunensis (10-21-23 @ 19:56)      Method Type: WILLY      -  Ampicillin/Sulbactam: S <=8/4      -  Cefazolin: S <=4      -  Clindamycin: S <=0.25      -  Erythromycin: S <=0.25      -  Gentamicin: S <=1 Should not be used as monotherapy      -  Oxacillin: S <=0.25      -  Rifampin: S <=1 Should not be used as monotherapy      -  Tetracycline: S <=1      -  Trimethoprim/Sulfamethoxazole: S <=0.5/9.5      -  Vancomycin: S 1  Organism: Staphylococcus simulans (10-21-23 @ 19:56)      Method Type: WILLY      -  Ampicillin/Sulbactam: S <=8/4      -  Cefazolin: S <=4      -  Clindamycin: R >4      -  Erythromycin: R >4      -  Gentamicin: S <=1 Should not be used as monotherapy      -  Oxacillin: S <=0.25      -  Rifampin: S <=1 Should not be used as monotherapy      -  Tetracycline: S <=1      -  Trimethoprim/Sulfamethoxazole: S <=0.5/9.5      -  Vancomycin: S 0.5  Organism: Enterobacter cloacae complex (10-21-23 @ 19:08)      Method Type: WILLY      -  Amikacin: S <=16      -  Amoxicillin/Clavulanic Acid: R >16/8      -  Ampicillin: R >16 These ampicillin results predict results for amoxicillin      -  Ampicillin/Sulbactam: R 16/8      -  Aztreonam: S <=4      -  Cefazolin: R >16      -  Cefepime: S <=2      -  Cefotaxime: S Enterobacter cloacae, Klebsiella aerogenes, and Citrobacter freundii may develop resistance during prolonged therapy.      -  Cefoxitin: R >16      -  Ceftazidime: S Enterobacter cloacae, Klebsiella aerogenes, and Citrobacter freundii may develop resistance during prolonged therapy.      -  Ceftriaxone: S <=1 Enterobacter cloacae, Klebsiella aerogenes, and Citrobacter freundii may develop resistance during prolonged therapy.      -  Ciprofloxacin: S <=0.25      -  Ertapenem: S <=0.5      -  Gentamicin: S <=2      -  Imipenem: S <=1      -  Levofloxacin: S <=0.5      -  Meropenem: S <=1      -  Piperacillin/Tazobactam: S <=8      -  Tobramycin: S <=2      -  Trimethoprim/Sulfamethoxazole: S <=0.5/9.5    Culture - Blood (collected 10-19-23 @ 17:07)  Source: .Blood Blood-Peripheral  Preliminary Report (10-22-23 @ 23:01):    No growth at 72 Hours    Culture - Blood (collected 10-19-23 @ 17:07)  Source: .Blood Blood-Peripheral  Preliminary Report (10-22-23 @ 23:01):    No growth at 72 Hours      Urinalysis Basic - ( 23 Oct 2023 08:30 )    Color: x / Appearance: x / SG: x / pH: x  Gluc: 100 mg/dL / Ketone: x  / Bili: x / Urobili: x   Blood: x / Protein: x / Nitrite: x   Leuk Esterase: x / RBC: x / WBC x   Sq Epi: x / Non Sq Epi: x / Bacteria: x          RADIOLOGY & ADDITIONAL TESTS:  Personal review of radiological diagnostics performed  Echo and EKG results noted when applicable.     MEDICATIONS:  acetaminophen     Tablet .. 650 milliGRAM(s) Oral every 6 hours PRN  ALPRAZolam 2 milliGRAM(s) Oral three times a day PRN  aluminum hydroxide/magnesium hydroxide/simethicone Suspension 30 milliLiter(s) Oral every 4 hours PRN  ampicillin/sulbactam  IVPB 3 Gram(s) IV Intermittent every 6 hours  buPROPion XL (24-Hour) . 300 milliGRAM(s) Oral daily  chlorhexidine 2% Cloths 1 Application(s) Topical <User Schedule>  ciprofloxacin     Tablet 500 milliGRAM(s) Oral every 12 hours  dextrose 5%. 1000 milliLiter(s) IV Continuous <Continuous>  dextrose 5%. 1000 milliLiter(s) IV Continuous <Continuous>  dextrose 50% Injectable 25 Gram(s) IV Push once  dextrose 50% Injectable 25 Gram(s) IV Push once  dextrose 50% Injectable 12.5 Gram(s) IV Push once  dextrose Oral Gel 15 Gram(s) Oral once PRN  enoxaparin Injectable 40 milliGRAM(s) SubCutaneous every 24 hours  glucagon  Injectable 1 milliGRAM(s) IntraMuscular once  insulin lispro (ADMELOG) corrective regimen sliding scale   SubCutaneous three times a day before meals  lactobacillus acidophilus 1 Tablet(s) Oral daily  melatonin 3 milliGRAM(s) Oral at bedtime PRN  methadone    Tablet 125 milliGRAM(s) Oral daily  mirtazapine 30 milliGRAM(s) Oral at bedtime  nicotine -   7 mG/24Hr(s) Patch 1 Patch Transdermal daily PRN  ondansetron Injectable 4 milliGRAM(s) IV Push every 8 hours PRN  silver sulfADIAZINE 1% Cream 1 Application(s) Topical two times a day      ANTIBIOTICS:  ampicillin/sulbactam  IVPB 3 Gram(s) IV Intermittent every 6 hours  ciprofloxacin     Tablet 500 milliGRAM(s) Oral every 12 hours    
24H events:    Patient is a 53y old Male who presents with a chief complaint of Cellulitis (20 Oct 2023 15:51)    Primary diagnosis of Cellulitis of wrist    Today is hospital day 2d. This morning patient was seen and examined at bedside, resting comfortably in bed.    No acute or major events overnight.    Code Status: Full Code    Family communication:  Contact date:  Name of person contacted:  Relationship to patient:  Communication details:  What matters most:    PAST MEDICAL & SURGICAL HISTORY  IV drug abuse    Vertebral osteomyelitis    MSSA bacteremia    No significant past surgical history      SOCIAL HISTORY:  Social History:      ALLERGIES:  No Known Allergies    MEDICATIONS:  STANDING MEDICATIONS  ampicillin/sulbactam  IVPB 3 Gram(s) IV Intermittent every 6 hours  buPROPion XL (24-Hour) . 300 milliGRAM(s) Oral daily  chlorhexidine 2% Cloths 1 Application(s) Topical <User Schedule>  dextrose 5%. 1000 milliLiter(s) IV Continuous <Continuous>  dextrose 5%. 1000 milliLiter(s) IV Continuous <Continuous>  dextrose 50% Injectable 25 Gram(s) IV Push once  dextrose 50% Injectable 25 Gram(s) IV Push once  dextrose 50% Injectable 12.5 Gram(s) IV Push once  enoxaparin Injectable 40 milliGRAM(s) SubCutaneous every 24 hours  glucagon  Injectable 1 milliGRAM(s) IntraMuscular once  insulin lispro (ADMELOG) corrective regimen sliding scale   SubCutaneous three times a day before meals  lactobacillus acidophilus 1 Tablet(s) Oral daily  methadone    Tablet 125 milliGRAM(s) Oral daily  mirtazapine 30 milliGRAM(s) Oral at bedtime  silver sulfADIAZINE 1% Cream 1 Application(s) Topical two times a day  vancomycin  IVPB 1500 milliGRAM(s) IV Intermittent every 12 hours    PRN MEDICATIONS  acetaminophen     Tablet .. 650 milliGRAM(s) Oral every 6 hours PRN  ALPRAZolam 2 milliGRAM(s) Oral three times a day PRN  aluminum hydroxide/magnesium hydroxide/simethicone Suspension 30 milliLiter(s) Oral every 4 hours PRN  dextrose Oral Gel 15 Gram(s) Oral once PRN  melatonin 3 milliGRAM(s) Oral at bedtime PRN  nicotine -   7 mG/24Hr(s) Patch 1 Patch Transdermal daily PRN  ondansetron Injectable 4 milliGRAM(s) IV Push every 8 hours PRN    VITALS:   T(F): 97.4  HR: 71  BP: 119/84  RR: 18  SpO2: --    PHYSICAL EXAM:  GENERAL:   ( x ) NAD, lying in bed comfortably     (  ) obtunded     (  ) lethargic     (  ) somnolent    HEAD:   ( x ) Atraumatic     (  ) hematoma     (  ) laceration (specify location:       )     NECK:  ( x ) Supple     (  ) neck stiffness     (  ) nuchal rigidity     (  )  no JVD     (  ) JVD present ( -- cm)    HEART:  Rate -->     ( x ) normal rate     (  ) bradycardic     (  ) tachycardic  Rhythm -->     ( x ) regular     (  ) regularly irregular     (  ) irregularly irregular  Murmurs -->     ( x ) normal s1s2     (  ) systolic murmur     (  ) diastolic murmur     (  ) continuous murmur      (  ) S3 present     (  ) S4 present    LUNGS:   ( x )Unlabored respirations     (  ) tachypnea  ( x ) B/L air entry     (  ) decreased breath sounds in:  (location     )    ( x ) no adventitious sound     (  ) crackles     (  ) wheezing      (  ) rhonchi      (specify location:       )  (  ) chest wall tenderness (specify location:       )    ABDOMEN:   ( x ) Soft     (  ) tense   |   ( x ) nondistended     (  ) distended   |   (  ) +BS     (  ) hypoactive bowel sounds     (  ) hyperactive bowel sounds  ( x ) nontender     (  ) RUQ tenderness     (  ) RLQ tenderness     (  ) LLQ tenderness     (  ) epigastric tenderness     (  ) diffuse tenderness  (  ) Splenomegaly      (  ) Hepatomegaly      (  ) Jaundice     (  ) ecchymosis     EXTREMITIES: B/l wrist with open wounds ~5x3cm with adherent yellow exudate, dry scabs. No malodor, no active bleeding, no surrounding erythema or edema. Severe contraction notes with scaring - currently wrapped  (  ) Normal     (  ) Rash     (  ) ecchymosis     (  ) varicose veins      (  ) pitting edema     (  ) non-pitting edema   (  ) ulceration     (  ) gangrene:     (location:     )    NERVOUS SYSTEM:    ( x ) A&Ox3     (  ) confused     (  ) lethargic  CN II-XII:     ( x ) Intact     (  ) deficits found     (Specify:     )   Upper extremities:     ( x ) no sensorimotor deficits     (  ) weakness     (  ) loss of proprioception/vibration     (  ) loss of touch/temperature (specify:    )  Lower extremities:     ( x ) no sensorimotor deficits     (  ) weakness     (  ) loss of proprioception/vibration     (  ) loss of touch/temperature (specify:    )    SKIN: See above  (  ) No rashes or lesions     (  ) maculopapular rash     (  ) pustules     (  ) vesicles     (  ) ulcer     (  ) ecchymosis     (specify location:     )    AMPAC score:    (  ) Indwelling Kenney Catheter:   Date insterted:    Reason (  ) Critical illness     (  ) urinary retention    (  ) Accurate Ins/Outs Monitoring     (  ) CMO patient    (  ) Central Line:   Date inserted:  Location: (  ) Right IJ     (  ) Left IJ     (  ) Right Fem     (  ) Left Fem    (  ) SPC        (  ) pigtail       (  ) PEG tube       (  ) colostomy       (  ) jejunostomy  (  ) U-Dall    LABS:                        11.5   6.13  )-----------( x        ( 21 Oct 2023 06:53 )             35.8     10-21    141  |  86<L>  |  12  ----------------------------<  134<H>  3.6   |  26  |  0.9    Ca    8.5      21 Oct 2023 06:53  Mg     2.0     10-21    TPro  6.9  /  Alb  3.5  /  TBili  0.2  /  DBili  x   /  AST  9   /  ALT  9   /  AlkPhos  105  10-20      Urinalysis Basic - ( 21 Oct 2023 06:53 )    Color: x / Appearance: x / SG: x / pH: x  Gluc: 134 mg/dL / Ketone: x  / Bili: x / Urobili: x   Blood: x / Protein: x / Nitrite: x   Leuk Esterase: x / RBC: x / WBC x   Sq Epi: x / Non Sq Epi: x / Bacteria: x            Culture - Joint (collected 19 Oct 2023 18:00)  Source: Wrist right wrist  Gram Stain (20 Oct 2023 02:28):    polymorphonuclear leukocytes seen    Gram Positive Cocci in Clusters seen    by cytocentrifuge  Preliminary Report (20 Oct 2023 23:28):    Rare Enterobacter cloacae complex    Culture - Blood (collected 19 Oct 2023 17:07)  Source: .Blood Blood-Peripheral  Preliminary Report (20 Oct 2023 23:01):    No growth at 24 hours    Culture - Blood (collected 19 Oct 2023 17:07)  Source: .Blood Blood-Peripheral  Preliminary Report (20 Oct 2023 23:01):    No growth at 24 hours          RADIOLOGY:          
AM Rounds     Vital Signs Last 24 Hrs  T(C): 36.1 (23 Oct 2023 13:08), Max: 36.6 (22 Oct 2023 19:42)  T(F): 97 (23 Oct 2023 13:08), Max: 97.9 (22 Oct 2023 19:42)  HR: 65 (23 Oct 2023 13:08) (65 - 75)  BP: 151/90 (23 Oct 2023 13:08) (144/97 - 166/85)  RR: 18 (23 Oct 2023 13:08) (18 - 18)                          12.4   7.50  )-----------( 278      ( 23 Oct 2023 08:30 )             38.6     CAPILLARY BLOOD GLUCOSE      POCT Blood Glucose.: 114 mg/dL (23 Oct 2023 11:35)  POCT Blood Glucose.: 89 mg/dL (23 Oct 2023 07:27)  POCT Blood Glucose.: 99 mg/dL (22 Oct 2023 21:18)  POCT Blood Glucose.: 100 mg/dL (22 Oct 2023 16:55)      PHYSICAL EXAM:  GENERAL: NAD, laying in bed comfortably  HEAD:  Atraumatic, Normocephalic  CHEST/LUNG:  B/L good air entry, no tachypnea/increased WOB/retractions. no cardiopulmonary compromise  NEUROLOGY: AAOx3, non-focal,  moves all four extremities  SKIN: b/l wrist with open wounds approx [id number] pink and moist with scattered areas adherent yellow exudate, dry scabs. no malodor, no active bleeding, no surrounding erythema or edema. severe contraction noted with scaring and limited ROM.        Dressing change performed. Patient tolerated well.                               
  MIKAEL MCDERMOTT  53y  Male      Patient is a 53y old  Male who presents with a chief complaint of Cellulitis (20 Oct 2023 12:45)      INTERVAL HPI/OVERNIGHT EVENTS:  He feels ok, hands are dressed, no fever.   Vital Signs Last 24 Hrs  T(C): 36.7 (20 Oct 2023 12:04), Max: 36.8 (19 Oct 2023 20:05)  T(F): 98 (20 Oct 2023 12:04), Max: 98.3 (19 Oct 2023 20:05)  HR: 64 (20 Oct 2023 12:04) (61 - 73)  BP: 160/92 (20 Oct 2023 12:04) (144/79 - 178/73)  BP(mean): --  RR: 18 (20 Oct 2023 12:04) (17 - 18)  SpO2: 97% (19 Oct 2023 20:05) (97% - 97%)    Parameters below as of 19 Oct 2023 20:05  Patient On (Oxygen Delivery Method): room air                Consultant(s) Notes Reviewed:  [x ] YES  [ ] NO          MEDICATIONS  (STANDING):  ampicillin/sulbactam  IVPB 3 Gram(s) IV Intermittent every 6 hours  buPROPion XL (24-Hour) . 300 milliGRAM(s) Oral daily  chlorhexidine 2% Cloths 1 Application(s) Topical <User Schedule>  dextrose 5%. 1000 milliLiter(s) (100 mL/Hr) IV Continuous <Continuous>  dextrose 5%. 1000 milliLiter(s) (50 mL/Hr) IV Continuous <Continuous>  dextrose 50% Injectable 25 Gram(s) IV Push once  dextrose 50% Injectable 25 Gram(s) IV Push once  dextrose 50% Injectable 12.5 Gram(s) IV Push once  enoxaparin Injectable 40 milliGRAM(s) SubCutaneous every 24 hours  glucagon  Injectable 1 milliGRAM(s) IntraMuscular once  insulin lispro (ADMELOG) corrective regimen sliding scale   SubCutaneous three times a day before meals  lactobacillus acidophilus 1 Tablet(s) Oral daily  methadone    Tablet 125 milliGRAM(s) Oral daily  mirtazapine 30 milliGRAM(s) Oral at bedtime  silver sulfADIAZINE 1% Cream 1 Application(s) Topical two times a day  vancomycin  IVPB 1500 milliGRAM(s) IV Intermittent every 12 hours    MEDICATIONS  (PRN):  acetaminophen     Tablet .. 650 milliGRAM(s) Oral every 6 hours PRN Moderate Pain (4 - 6)  ALPRAZolam 2 milliGRAM(s) Oral three times a day PRN for anxiety  aluminum hydroxide/magnesium hydroxide/simethicone Suspension 30 milliLiter(s) Oral every 4 hours PRN Dyspepsia  dextrose Oral Gel 15 Gram(s) Oral once PRN Blood Glucose LESS THAN 70 milliGRAM(s)/deciliter  melatonin 3 milliGRAM(s) Oral at bedtime PRN Insomnia  nicotine -   7 mG/24Hr(s) Patch 1 Patch Transdermal daily PRN Nicotine withdrawal  ondansetron Injectable 4 milliGRAM(s) IV Push every 8 hours PRN Nausea and/or Vomiting      LABS                          12.2   6.53  )-----------( 228      ( 20 Oct 2023 08:24 )             37.6     10-20    138  |  104  |  11  ----------------------------<  160<H>  4.4   |  26  |  0.9    Ca    8.9      20 Oct 2023 08:24    TPro  6.9  /  Alb  3.5  /  TBili  0.2  /  DBili  x   /  AST  9   /  ALT  9   /  AlkPhos  105  10-20      Urinalysis Basic - ( 20 Oct 2023 08:24 )    Color: x / Appearance: x / SG: x / pH: x  Gluc: 160 mg/dL / Ketone: x  / Bili: x / Urobili: x   Blood: x / Protein: x / Nitrite: x   Leuk Esterase: x / RBC: x / WBC x   Sq Epi: x / Non Sq Epi: x / Bacteria: x        Lactate Trend  10-19 @ 16:58 Lactate:1.7         CAPILLARY BLOOD GLUCOSE      POCT Blood Glucose.: 136 mg/dL (20 Oct 2023 11:07)      Culture - Joint (collected 10-19-23 @ 18:00)  Source: Wrist right wrist  Gram Stain (10-20-23 @ 02:28):    polymorphonuclear leukocytes seen    Gram Positive Cocci in Clusters seen    by cytocentrifuge        RADIOLOGY & ADDITIONAL TESTS:    Imaging Personally Reviewed:  [ ] YES  [ ] NO    HEALTH ISSUES - PROBLEM Dx:          PHYSICAL EXAM:  GENERAL: NAD, well-developed.  HEAD:  Atraumatic, Normocephalic.  EYES: EOMI, PERRLA, conjunctiva and sclera clear.  NECK: Supple, No JVD.  CHEST/LUNG: Clear to auscultation bilaterally; No wheeze.  HEART: Regular rate and rhythm; S1 S2.   ABDOMEN: Soft, Nontender, Nondistended; Bowel sounds present.  EXTREMITIES:  bilateral wrist contraction wit hand deformity, dorsal aspect with open wounds approx 5x3cm with adherent yellow exudate, dry scab  PSYCH: AAOx3.  NEUROLOGY: non-focal.  SKIN: No rashes or lesions.
  MIKAEL MCDERMOTT  53y  Male      Patient is a 53y old  Male who presents with a chief complaint of Cellulitis (21 Oct 2023 09:01)      INTERVAL HPI/OVERNIGHT EVENTS:  He feels ok, his hands wounds are improving.   Vital Signs Last 24 Hrs  T(C): 36.3 (21 Oct 2023 05:14), Max: 36.7 (20 Oct 2023 12:04)  T(F): 97.4 (21 Oct 2023 05:14), Max: 98 (20 Oct 2023 12:04)  HR: 71 (21 Oct 2023 05:14) (64 - 71)  BP: 119/84 (21 Oct 2023 05:14) (111/58 - 160/92)  BP(mean): --  RR: 18 (21 Oct 2023 05:14) (18 - 18)  SpO2: --                Consultant(s) Notes Reviewed:  [x ] YES  [ ] NO          MEDICATIONS  (STANDING):  ampicillin/sulbactam  IVPB 3 Gram(s) IV Intermittent every 6 hours  buPROPion XL (24-Hour) . 300 milliGRAM(s) Oral daily  chlorhexidine 2% Cloths 1 Application(s) Topical <User Schedule>  dextrose 5%. 1000 milliLiter(s) (100 mL/Hr) IV Continuous <Continuous>  dextrose 5%. 1000 milliLiter(s) (50 mL/Hr) IV Continuous <Continuous>  dextrose 50% Injectable 25 Gram(s) IV Push once  dextrose 50% Injectable 25 Gram(s) IV Push once  dextrose 50% Injectable 12.5 Gram(s) IV Push once  enoxaparin Injectable 40 milliGRAM(s) SubCutaneous every 24 hours  glucagon  Injectable 1 milliGRAM(s) IntraMuscular once  insulin lispro (ADMELOG) corrective regimen sliding scale   SubCutaneous three times a day before meals  lactobacillus acidophilus 1 Tablet(s) Oral daily  methadone    Tablet 125 milliGRAM(s) Oral daily  mirtazapine 30 milliGRAM(s) Oral at bedtime  silver sulfADIAZINE 1% Cream 1 Application(s) Topical two times a day  vancomycin  IVPB 1500 milliGRAM(s) IV Intermittent every 12 hours    MEDICATIONS  (PRN):  acetaminophen     Tablet .. 650 milliGRAM(s) Oral every 6 hours PRN Moderate Pain (4 - 6)  ALPRAZolam 2 milliGRAM(s) Oral three times a day PRN for anxiety  aluminum hydroxide/magnesium hydroxide/simethicone Suspension 30 milliLiter(s) Oral every 4 hours PRN Dyspepsia  dextrose Oral Gel 15 Gram(s) Oral once PRN Blood Glucose LESS THAN 70 milliGRAM(s)/deciliter  melatonin 3 milliGRAM(s) Oral at bedtime PRN Insomnia  nicotine -   7 mG/24Hr(s) Patch 1 Patch Transdermal daily PRN Nicotine withdrawal  ondansetron Injectable 4 milliGRAM(s) IV Push every 8 hours PRN Nausea and/or Vomiting      LABS                          11.5   6.13  )-----------( 199      ( 21 Oct 2023 06:53 )             35.8     10-21    141  |  86<L>  |  12  ----------------------------<  134<H>  3.6   |  26  |  0.9    Ca    8.5      21 Oct 2023 06:53  Mg     2.0     10-21    TPro  6.9  /  Alb  3.5  /  TBili  0.2  /  DBili  x   /  AST  9   /  ALT  9   /  AlkPhos  105  10-20      Urinalysis Basic - ( 21 Oct 2023 06:53 )    Color: x / Appearance: x / SG: x / pH: x  Gluc: 134 mg/dL / Ketone: x  / Bili: x / Urobili: x   Blood: x / Protein: x / Nitrite: x   Leuk Esterase: x / RBC: x / WBC x   Sq Epi: x / Non Sq Epi: x / Bacteria: x        Lactate Trend  10-19 @ 16:58 Lactate:1.7         CAPILLARY BLOOD GLUCOSE      POCT Blood Glucose.: 129 mg/dL (21 Oct 2023 11:09)      Culture - Joint (collected 10-19-23 @ 18:00)  Source: Wrist right wrist  Gram Stain (10-20-23 @ 02:28):    polymorphonuclear leukocytes seen    Gram Positive Cocci in Clusters seen    by cytocentrifuge  Preliminary Report (10-20-23 @ 23:28):    Rare Enterobacter cloacae complex    Culture - Blood (collected 10-19-23 @ 17:07)  Source: .Blood Blood-Peripheral  Preliminary Report (10-20-23 @ 23:01):    No growth at 24 hours    Culture - Blood (collected 10-19-23 @ 17:07)  Source: .Blood Blood-Peripheral  Preliminary Report (10-20-23 @ 23:01):    No growth at 24 hours        RADIOLOGY & ADDITIONAL TESTS:    Imaging Personally Reviewed:  [ ] YES  [ ] NO    HEALTH ISSUES - PROBLEM Dx:          PHYSICAL EXAM:  GENERAL: NAD, well-developed.  HEAD:  Atraumatic, Normocephalic.  EYES: EOMI, PERRLA, conjunctiva and sclera clear.  NECK: Supple, No JVD.  CHEST/LUNG: Clear to auscultation bilaterally; No wheeze.  HEART: Regular rate and rhythm; S1 S2.   ABDOMEN: Soft, Nontender, Nondistended; Bowel sounds present.  EXTREMITIES:  bilateral wrist contraction wit hand deformity, dorsal aspect with open wounds approx 5x3cm with adherent yellow exudate, dry scab  PSYCH: AAOx3.  NEUROLOGY: non-focal.  SKIN: No rashes or lesions.
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
## 2023-10-23 NOTE — DISCHARGE NOTE PROVIDER - NSDCCPCAREPLAN_GEN_ALL_CORE_FT
PRINCIPAL DISCHARGE DIAGNOSIS  Diagnosis: Cellulitis of wrist  Assessment and Plan of Treatment: Take the prescribed antibiotic medicine you are given as directed until it is gone. Take it even if you feel better. It treats the infection and stops it from returning. Not taking all the medicine can make future infections hard to treat. Keep the infected area clean. When possible, raise the infected area above the level of your heart. This helps keep swelling down. Apply clean bandages as advised. Wash your hands often to prevent spreading the infection. In the future, wash your hands before and after you touch cuts, scratches, or bandages. This will help prevent infection.   Call your doctor or returnt o the emergency room or call 911 immediately if you have any of the following: Difficulty or pain when moving the joints above or below the infected area, Discharge or pus draining from the area, rever of 100.4°F (38°C) or higher, or as directed by your healthcare provider, pain that gets worse in or around the infected site, redness that gets worse in or around the infected area, particularly if the area of redness expands to a wider area, shaking chills, swelling of the infected area, vomiting.  Follow with the Burn/wound care team as speci        SECONDARY DISCHARGE DIAGNOSES  Diagnosis: Skin ulceration  Assessment and Plan of Treatment:

## 2023-10-23 NOTE — DISCHARGE NOTE NURSING/CASE MANAGEMENT/SOCIAL WORK - NSDCVIVACCINE_GEN_ALL_CORE_FT
Tdap; 15-Baljinder-2022 23:06; Jovanny Singh (STANISLAV); [employer]; [id number] (Exp. Date: 09-Sep-2023); IntraMuscular; Deltoid Left.; 0.5 milliLiter(s); VIS (VIS Published: 09-May-2013, VIS Presented: 15-Baljinder-2022);

## 2023-10-23 NOTE — DISCHARGE NOTE PROVIDER - PROVIDER TOKENS
PROVIDER:[TOKEN:[[id number]],FOLLOWUP:[1-3 days]],PROVIDER:[TOKEN:[[id number]],FOLLOWUP:[1-3 days]] PROVIDER:[TOKEN:[[id number]],FOLLOWUP:[1-3 days]],PROVIDER:[TOKEN:[[id number]],FOLLOWUP:[1-3 days]],PROVIDER:[TOKEN:[[id number]],FOLLOWUP:[2 weeks]]

## 2023-10-23 NOTE — DISCHARGE NOTE NURSING/CASE MANAGEMENT/SOCIAL WORK - PATIENT PORTAL LINK FT
You can access the FollowMyHealth Patient Portal offered by [facility] by registering at the following website: [email or web address]. By joining [facility]’s FollowMyHealth portal, you will also be able to view your health information using other applications (apps) compatible with our system.

## 2023-10-23 NOTE — DISCHARGE NOTE PROVIDER - NSDCMRMEDTOKEN_GEN_ALL_CORE_FT
acetaminophen 325 mg oral tablet: 2 tab(s) orally every 6 hours, As needed, Temp greater or equal to 38C (100.4F)  amoxicillin-clavulanate 875 mg-125 mg oral tablet: 1 tab(s) orally 2 times a day  apixaban 5 mg oral tablet: 1 tab(s) orally every 12 hours  buPROPion 300 mg/24 hours (XL) oral tablet, extended release: 1 tab(s) orally once a day  lactobacillus acidophilus oral capsule: 1 tab(s) orally once a day  levoFLOXacin 750 mg oral tablet: 1 tab(s) orally once a day  magnesium oxide 400 mg oral tablet: 1 tab(s) orally 3 times a day   metFORMIN 500 mg oral tablet: 1 tab(s) orally 2 times a day   methadone: 125 milligram(s) orally once a day  mirtazapine 30 mg oral tablet: 1 tab(s) orally once a day (at bedtime)  QUEtiapine 200 mg oral tablet: 1 tab(s) orally once a day (at bedtime)  Xanax 2 mg oral tablet: 1 tab(s) orally 3 times a day as needed for  anxiety

## 2023-10-24 LAB
CULTURE RESULTS: SIGNIFICANT CHANGE UP
SPECIMEN SOURCE: SIGNIFICANT CHANGE UP

## 2023-10-25 LAB
CULTURE RESULTS: ABNORMAL
CULTURE RESULTS: ABNORMAL
SPECIMEN SOURCE: SIGNIFICANT CHANGE UP
SPECIMEN SOURCE: SIGNIFICANT CHANGE UP

## 2023-11-02 LAB
CULTURE RESULTS: ABNORMAL
CULTURE RESULTS: ABNORMAL
GRAM STN FLD: ABNORMAL
GRAM STN FLD: ABNORMAL
ORGANISM # SPEC MICROSCOPIC CNT: ABNORMAL
ORGANISM # SPEC MICROSCOPIC CNT: SIGNIFICANT CHANGE UP
ORGANISM # SPEC MICROSCOPIC CNT: SIGNIFICANT CHANGE UP
SPECIMEN SOURCE: SIGNIFICANT CHANGE UP
SPECIMEN SOURCE: SIGNIFICANT CHANGE UP

## 2023-11-06 DIAGNOSIS — F11.20 OPIOID DEPENDENCE, UNCOMPLICATED: ICD-10-CM

## 2023-11-06 DIAGNOSIS — I96 GANGRENE, NOT ELSEWHERE CLASSIFIED: ICD-10-CM

## 2023-11-06 DIAGNOSIS — B95.8 UNSPECIFIED STAPHYLOCOCCUS AS THE CAUSE OF DISEASES CLASSIFIED ELSEWHERE: ICD-10-CM

## 2023-11-06 DIAGNOSIS — L03.113 CELLULITIS OF RIGHT UPPER LIMB: ICD-10-CM

## 2023-11-06 DIAGNOSIS — Z79.84 LONG TERM (CURRENT) USE OF ORAL HYPOGLYCEMIC DRUGS: ICD-10-CM

## 2023-11-06 DIAGNOSIS — L03.114 CELLULITIS OF LEFT UPPER LIMB: ICD-10-CM

## 2023-11-06 DIAGNOSIS — Z87.898 PERSONAL HISTORY OF OTHER SPECIFIED CONDITIONS: ICD-10-CM

## 2023-11-06 DIAGNOSIS — Z86.718 PERSONAL HISTORY OF OTHER VENOUS THROMBOSIS AND EMBOLISM: ICD-10-CM

## 2023-11-06 DIAGNOSIS — M24.532 CONTRACTURE, LEFT WRIST: ICD-10-CM

## 2023-11-06 DIAGNOSIS — E11.9 TYPE 2 DIABETES MELLITUS WITHOUT COMPLICATIONS: ICD-10-CM
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
## 2023-11-06 DIAGNOSIS — L98.499 NON-PRESSURE CHRONIC ULCER OF SKIN OF OTHER SITES WITH UNSPECIFIED SEVERITY: ICD-10-CM

## 2023-11-06 DIAGNOSIS — M24.531 CONTRACTURE, RIGHT WRIST: ICD-10-CM

## 2023-11-06 DIAGNOSIS — Z79.891 LONG TERM (CURRENT) USE OF OPIATE ANALGESIC: ICD-10-CM

## 2023-11-06 DIAGNOSIS — B96.89 OTHER SPECIFIED BACTERIAL AGENTS AS THE CAUSE OF DISEASES CLASSIFIED ELSEWHERE: ICD-10-CM

## 2023-11-06 DIAGNOSIS — Z86.711 PERSONAL HISTORY OF PULMONARY EMBOLISM: ICD-10-CM

## 2024-03-08 NOTE — DISCHARGE NOTE PROVIDER - NS AS DC PROVIDER CONTACT Y/N MULTI
Spoke with patient's wife and relayed Dr. Whaley's message \"Please notify the patient that his MRI is normal, but the MRA was just approved, so he'll have to come back to have the MRA done.\" Cate verbalized understanding and will call to schedule MRA test.   
Yes
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
## 2024-06-09 NOTE — BH CONSULTATION LIAISON ASSESSMENT NOTE - NSBHROSSYSTEMS_PSY_ALL_CORE
Hemoglobin A1c still shows prediabetes at 6.0%    Complete blood cell count continues to show slight anemia but overall this is stable    Sugar is elevated at 117 but otherwise liver, kidneys, electrolytes are all within normal limits and/or stable    Cholesterol did increase up to 192, HDL 47, , triglycerides 125  Did he run out of his medication?  If not, we will have to increase this medication further    Thyroid within normal limits alongside a normal prostate
Psychiatric

## 2024-09-10 NOTE — ED ADULT TRIAGE NOTE - HEIGHT IN CM
Diabetes.  Patient's latest A1c had improved down to 8.5 with the addition of Farxiga medication.  She will follow-up with her endocrinologist for further evaluation of this condition.  Lab Results   Component Value Date    HGBA1C 8.5 (H) 08/03/2024      175.26

## 2024-11-27 NOTE — PROGRESS NOTE ADULT - ATTENDING SUPERVISION STATEMENT
CMA Intake Information for EKG    Pt is s/p PVI 9/30/24 with Dr. Saldaña, had DCCV 10/16/24, and 11/14/24 and comes today back in AFIB.     EKG shows:      Reason for EKG: Rhythm verification due to pts reported symptoms/concerns-see additional documentation note  Verification of Anticoagulation/Medication: Medication list current below  Reported symptoms/concerns on intake/form:  PT has some SOB with exertion- currently tolerable.  Next planned Follow up: Pt has apt with Alondra Miller NP on 12/17  Communication:  Discussed results of EKG with the pt, he does use the Safe N Clear addy at home and was aware he was in AFIB. He has  some SOB with activity but otherwise feels fine.  Discussed if symptoms are not tolerable and we would recommend ER visit for DCCV.  Pt does not think this is necessary.  He will monitor his symptoms and HR and go to ER if they worsen.     He did state he has seen EP at [facility] regarding pulse field ablation- they felt he would be a good candidate for this procedure and the earliest he would be able to have this done with their team was February 2025.   He is not 100% decided if he is going to move forward yet but wanted to let us know.  He was concerned that we would be upset if he went to their team. RN discussed that we currently do not offer that type of ablation and if that is what he is wanting we would not be upset at all.  I let him know that I will forward to Dr. Saldaña so he can review chart/note, and we will reach out to him for further recommendations.    I let him know Alondra will also be made aware in case she has recommendations before Dr. Saldaña is back to review next week.  Pt was very thankful for our conversation and will await response back next week.      Bridgette Vaughn RN      Current Outpatient Medications:     amiodarone (PACERONE) 200 MG tablet, Take 1 tablet (200 mg) by mouth daily., Disp: , Rfl:     apixaban ANTICOAGULANT (ELIQUIS) 5 MG tablet, Take 1 tablet (5 
mg) by mouth 2 times daily, Disp: 60 tablet, Rfl: 11    atorvastatin (LIPITOR) 10 MG tablet, Take 1 tablet (10 mg) by mouth daily, Disp: 90 tablet, Rfl: 3    benzonatate (TESSALON) 100 MG capsule, Take 1 capsule (100 mg) by mouth 3 times daily as needed for cough, Disp: 20 capsule, Rfl: 0    clotrimazole-betamethasone (LOTRISONE) 1-0.05 % external cream, , Disp: , Rfl:     dupilumab (DUPIXENT) 300 MG/2ML prefilled syringe, Inject 300 mg Subcutaneous every 14 days, Disp: , Rfl:     empagliflozin (JARDIANCE) 10 MG TABS tablet, Take 1 tablet (10 mg) by mouth daily, Disp: 30 tablet, Rfl: 11    fenofibrate (TRIGLIDE/LOFIBRA) 160 MG tablet, Take 1 tablet (160 mg) by mouth daily, Disp: 90 tablet, Rfl: 3    losartan (COZAAR) 25 MG tablet, TAKE 1 TABLET BY MOUTH AT BEDTIME, Disp: 90 tablet, Rfl: 0    nicotine (NICORETTE) 2 MG gum, Place 2 mg inside cheek every hour as needed for nicotine withdrawal symptoms, Disp: , Rfl:     Testosterone 1.62 % GEL, PLACE 1 PUMP (20.25 MG) ONTO THE SKIN DAILY APPLY FROM DISPENSER TO CLEAN, DRY, INTACT SKIN OF THE UPPER ARMS AND SHOULDERS, Disp: 75 g, Rfl: 0    Tirzepatide 2.5 MG/0.5ML SOAJ, Inject 0.5 mLs (2.5 mg) subcutaneously every 7 days., Disp: 2 mL, Rfl: 2    triamcinolone (KENALOG) 0.1 % external cream, , Disp: , Rfl:     cefdinir (OMNICEF) 300 MG capsule, Take 1 capsule (300 mg) by mouth 2 times daily. (Patient not taking: Reported on 11/27/2024), Disp: 14 capsule, Rfl: 1  Allergies   Allergen Reactions    Clobetasol Other (See Comments)     Allergy testing done - Clobetasol-77-Propionate    Inhaled Anticholinergic Agents Other (See Comments)     Stearyl Alcohol - Allergy testing done    No Clinical Screening - See Comments Other (See Comments)     Stearyl Alcohol - Allergy testing done    Octylisothiazolinone; Phenylenediamine - allergy testing done    Shellac Other (See Comments)     Allergy testing done    Trolamine (Triethanolamine) Other (See Comments)     Allergy testing done 
   Urea Other (See Comments)     Allergy testing done      
Resident
Resident

## 2024-12-18 NOTE — CONSULT NOTE ADULT - PROBLEM SELECTOR PROBLEM 1
Eligard administered as ordered  
Acute left-sided low back pain with left-sided sciatica
Acute left-sided low back pain with left-sided sciatica

## 2025-01-07 NOTE — ED PROCEDURE NOTE - TECHNIQUE
Post-Care Instructions: I reviewed with the patient in detail post-care instructions. Patient is to wear sunprotection, and avoid picking at any of the treated lesions. Pt may apply Vaseline to crusted or scabbing areas. Duration Of Freeze Thaw-Cycle (Seconds): 5 Number Of Freeze-Thaw Cycles: 1 freeze-thaw cycle Show Applicator Variable?: Yes Detail Level: Zone Dynamic Consent: The patient's consent was obtained including but not limited to risks of crusting, scabbing, blistering, scarring, darker or lighter pigmentary change, recurrence, incomplete removal and infection. Render Note In Bullet Format When Appropriate: No

## 2025-01-09 NOTE — PATIENT PROFILE ADULT - FUNCTIONAL ASSESSMENT - DAILY ACTIVITY SCORE.
Copied from CRM #[id number]. Topic: MW Messaging - MW Patient Request  >> Jan 9, 2025  3:34 PM Leona TREVIÑO wrote:  Nathanael Mcknight called requesting to send a general message to clinician.   Verified issue is NOT regarding a symptom(s) requiring routine or emergent triage. Verified another message template type and CRM does not apply.    Selected 'Wrap Up CRM' and created new Telephone Encounter after clicking 'Convert to Clinical Call'. Selected appropriate Reason for Call.  Sent Pt message template and routed as routine priority per Clinician KB page to appropriate clinician pool. Readback full message.    -- DO NOT REPLY / DO NOT REPLY ALL --  -- This inbox is not monitored. If this was sent to the wrong provider or department, reroute message to P ECO Reroute pool. --  -- Message is from Engagement Center Operations (ECO) --    General Patient Message: Patient is calling in providing a fax number for Dr. Hopkins to have his lab results faxed over to his office. Please fax his Hepatic Function Panel results done on 12/20/24 to the fax number provided below. Please call patient and follow up.       Lorenzo Hopkins MD   [facility], a division of [facility]  [id number] [place] [address]  PH: [phone]  Fax: [phone]    Caller Information       Contact Date/Time Type Contact Phone/Fax    01/09/2025 03:31 PM CST Phone (Incoming) Nathanael Mcknight (Self) [phone] (M)            Alternative phone number: None     Can a detailed message be left? Yes - Voicemail     Patient has been advised the message will be addressed within 2-3 business days.                 18

## 2025-03-03 NOTE — ED PROVIDER NOTE - NS_BEDUNITTYPES_ED_ALL_ED
----- Message from Marimar Beltran PA-C sent at 3/3/2025  1:42 PM CST -----  Schedule BMP this week  ----- Message -----  From: Par[facility] Lab Interface  Sent: 2/28/2025   9:57 PM CST  To: Florentin Marlow MD    
Called pt to schedule him for some blood work. Pt scheduled the blood work and hung up.  
MED/SURG

## 2025-04-25 NOTE — PROGRESS NOTE ADULT - SUBJECTIVE AND OBJECTIVE BOX
[de-identified] : LAUREN [[email or web address]] : DX W ROM 4/17/25 completed 10 d of Amoxil Was never symptomatic, originally came in for uri sx/ cough child is always using tissues to blow her nose. constant congestion w runny nose in between for months   MIKAEL MCDERMOTT  [facility] [address] ([facility])      Patient is a 52y old  Male who presents with a chief complaint of Back pain (25 Aug 2022 14:58)        Interval events:  Patient seen and examined at bedside. No acute events. No current complaints.     -PMHx: IV drug abuse    Vertebral osteomyelitis    MSSA bacteremia      -PSHx:No significant past surgical history            REVIEW OF SYSTEMS:  CONSTITUTIONAL: No fever, weight loss, or fatigue  RESPIRATORY: No cough, wheezing, chills or hemoptysis; No shortness of breath  CARDIOVASCULAR: No chest pain, palpitations, dizziness, or leg swelling  GASTROINTESTINAL: No abdominal or epigastric pain. No nausea, vomiting, or hematemesis; No diarrhea or constipation. No melena or hematochezia.  NEUROLOGICAL: No headaches  LYMPH NODES: No enlarged glands  MUSCULOSKELETAL: No joint pain or swelling; No muscle, back, or extremity pain      T(C): , Max: 37.1 (08-26-22 @ 05:00)  HR: 86 (08-26-22 @ 12:19)  BP: 129/77 (08-26-22 @ 12:19)  RR: 18 (08-26-22 @ 12:19)  SpO2: 95% (08-26-22 @ 12:19)  CAPILLARY BLOOD GLUCOSE          PHYSICAL EXAM:  GEN: ill-appearing, pale   HEENT: PERRL; EOMI; conjunctiva clear; OD with swelling/erythema around orbit (improved), not painful to move eye   CHEST: clear to auscultation bilaterally; no wheezes; no rales; no rhonchi  CARDS: 4/6 systolic murmur  ABD: soft, nt, nd  NEURO: cranial nerves II-XII intact; superficial reflexes intact, mild motor weakness LEs improved; no sensory deficits  LYMPH: No lymphadedenopathy  MSK: no joint swelling; no joint erythema; no joint warmth        LABS:              Microbiology:      RADIOLOGY & ADDITIONAL TESTS:  < from: Transesophageal Echocardiogram (08.24.22 @ 15:25) >    Summary:   1. Left ventricular ejection fraction, by visual estimation, is 55 to   60%.   2. Normal global left ventricular systolic function.   3. Normal left atrial size.   4. Normal right atrial size.  5. Structurally normal mitral valve, with normal leaflet excursion.   6. Trace mitral valve regurgitation.   7. Normal trileaflet aortic valve with normal opening.   8. Structurally normal pulmonic valve, with normal leaflet excursion.   9. No evidence of infective endocarditis.    < end of copied text >        Medications:  acetaminophen     Tablet .. 650 milliGRAM(s) Oral every 6 hours PRN  ALPRAZolam 2 milliGRAM(s) Oral three times a day  aluminum hydroxide/magnesium hydroxide/simethicone Suspension 30 milliLiter(s) Oral every 4 hours PRN  buPROPion XL (24-Hour) . 300 milliGRAM(s) Oral daily  ceFAZolin   IVPB 2000 milliGRAM(s) IV Intermittent every 8 hours  cyclobenzaprine 5 milliGRAM(s) Oral three times a day PRN  dexAMETHasone     Tablet 4 milliGRAM(s) Oral every 6 hours  diatrizoate meglumine/diatrizoate sodium. 30 milliLiter(s) Oral once  enoxaparin Injectable 40 milliGRAM(s) SubCutaneous every 24 hours  folic acid 1 milliGRAM(s) Oral daily  lactated ringers. 1000 milliLiter(s) IV Continuous <Continuous>  lactulose Syrup 10 Gram(s) Oral two times a day  melatonin 3 milliGRAM(s) Oral at bedtime PRN  methadone    Tablet 135 milliGRAM(s) Oral daily  mirtazapine 30 milliGRAM(s) Oral at bedtime  multivitamin 1 Tablet(s) Oral daily  nicotine - 21 mG/24Hr(s) Patch 1 Patch Transdermal daily  pantoprazole  Injectable 40 milliGRAM(s) IV Push every 12 hours  polyethylene glycol 3350 17 Gram(s) Oral daily  QUEtiapine 200 milliGRAM(s) Oral at bedtime  scopolamine 1 mG/72 Hr(s) Patch 1 Patch Transdermal every 72 hours PRN  senna 2 Tablet(s) Oral at bedtime  thiamine 100 milliGRAM(s) Oral daily

## 2025-05-13 NOTE — ED PROVIDER NOTE - WR ORDER STATUS 1
[Well Developed] : well developed [Well Nourished] : well nourished [No Acute Distress] : no acute distress [Normal Conjunctiva] : normal conjunctiva [Normal Venous Pressure] : normal venous pressure [No Carotid Bruit] : no carotid bruit [Normal S1, S2] : normal S1, S2 [No Rub] : no rub [5th Left ICS - MCL] : palpated at the 5th LICS in the midclavicular line [Normal] : normal [No Precordial Heave] : no precordial heave was noted [Normal Rate] : normal [Rhythm Regular] : regular [Normal S1] : normal S1 [Normal S2] : normal S2 [No Gallop] : no gallop heard [II] : a grade 2 [No Pitting Edema] : no pitting edema present [2+] : left 2+ [No Abnormalities] : the abdominal aorta was not enlarged and no bruit was heard [Clear Lung Fields] : clear lung fields [Good Air Entry] : good air entry [No Respiratory Distress] : no respiratory distress  [Soft] : abdomen soft [Non Tender] : non-tender [No Masses/organomegaly] : no masses/organomegaly [Normal Bowel Sounds] : normal bowel sounds [Normal Gait] : normal gait [No Edema] : no edema [No Cyanosis] : no cyanosis [No Clubbing] : no clubbing [No Varicosities] : no varicosities [No Rash] : no rash [No Skin Lesions] : no skin lesions Performed [Moves all extremities] : moves all extremities [No Focal Deficits] : no focal deficits [Normal Speech] : normal speech [Alert and Oriented] : alert and oriented [Normal memory] : normal memory [S3] : no S3 [S4] : no S4 [Right Carotid Bruit] : no bruit heard over the right carotid [Left Carotid Bruit] : no bruit heard over the left carotid [Right Femoral Bruit] : no bruit heard over the right femoral artery [Left Femoral Bruit] : no bruit heard over the left femoral artery

## 2025-05-23 NOTE — BH CONSULTATION LIAISON PROGRESS NOTE - NSBHFUPINTERVALCCFT_PSY_A_CORE
No care due was identified.  [facility] Embedded Care Due Messages. Reference number: [id number].   5/23/2025 5:30:38 AM CDT  
"I am awake, I can hear you."
"Much better."
"How long am going to stay here."

## 2025-07-02 NOTE — PROGRESS NOTE ADULT - SUBJECTIVE AND OBJECTIVE BOX
MIKAEL MCDERMOTT 52y Male  MRN#: [id number]   Hospital Day: 21d    SUBJECTIVE  Patient is a 52y old Male who presents with a chief complaint of Hypoxia (01 Nov 2022 13:38)  Currently admitted to medicine with the primary diagnosis of Pneumonia      INTERVAL HPI AND OVERNIGHT EVENTS:  Patient was examined and seen at bedside. This morning he is resting comfortably in bed and reports no issues or overnight events.    OBJECTIVE  PAST MEDICAL & SURGICAL HISTORY  IV drug abuse    Vertebral osteomyelitis    MSSA bacteremia    No significant past surgical history      ALLERGIES:  No Known Allergies    MEDICATIONS:  STANDING MEDICATIONS  ALPRAZolam 1 milliGRAM(s) Oral <User Schedule>  ALPRAZolam 2 milliGRAM(s) Oral at bedtime  apixaban 10 milliGRAM(s) Oral once  buPROPion XL (24-Hour) . 300 milliGRAM(s) Oral daily  ceFAZolin   IVPB 2000 milliGRAM(s) IV Intermittent every 8 hours  chlorhexidine 2% Cloths 1 Application(s) Topical daily  folic acid 1 milliGRAM(s) Oral daily  magnesium oxide 400 milliGRAM(s) Oral once  magnesium oxide 400 milliGRAM(s) Oral with dinner  methadone    Tablet 135 milliGRAM(s) Oral <User Schedule>  multivitamin 1 Tablet(s) Oral daily  nicotine - 21 mG/24Hr(s) Patch 1 Patch Transdermal daily  pantoprazole    Tablet 40 milliGRAM(s) Oral before breakfast  QUEtiapine 200 milliGRAM(s) Oral at bedtime  thiamine 100 milliGRAM(s) Oral daily    PRN MEDICATIONS  acetaminophen     Tablet .. 650 milliGRAM(s) Oral every 6 hours PRN  aluminum hydroxide/magnesium hydroxide/simethicone Suspension 30 milliLiter(s) Oral every 4 hours PRN  cyclobenzaprine 10 milliGRAM(s) Oral three times a day PRN  haloperidol    Injectable 5 milliGRAM(s) IntraMuscular every 8 hours PRN  melatonin 3 milliGRAM(s) Oral at bedtime PRN  ondansetron Injectable 4 milliGRAM(s) IV Push every 8 hours PRN      VITAL SIGNS: Last 24 Hours  T(C): 36.6 (01 Nov 2022 12:00), Max: 37 (01 Nov 2022 05:00)  T(F): 97.9 (01 Nov 2022 12:00), Max: 98.6 (01 Nov 2022 05:00)  HR: 76 (01 Nov 2022 12:00) (76 - 85)  BP: 112/59 (01 Nov 2022 12:00) (100/61 - 120/68)  BP(mean): --  RR: 18 (01 Nov 2022 12:00) (18 - 19)  SpO2: 93% (01 Nov 2022 05:00) (93% - 95%)    LABS:      Mg     1.7     11-01      RADIOLOGY:      PHYSICAL EXAM:  CONSTITUTIONAL: No acute distress, AAOx3  HEAD: Atraumatic, normocephalic  EYES: EOM intact, PERRLA, conjunctiva and sclera clear  ENT: moist mucous membranes  PULMONARY: Clear to auscultation bilaterally  CARDIOVASCULAR: Regular rate and rhythm  GASTROINTESTINAL: Soft, non-tender, non-distended; bowel sounds present  MUSCULOSKELETAL: no edema  NEUROLOGY: non-focal  SKIN: warm and dry      ASSESSMENT and PLAN    Patient is a 52y old Male PMHx of IVDU (heroin), vertebral osteomyelitis s/p debridement, and MSSA bacteremia, who presents from a rehab facility with a chief complaint of Hypoxia and new onset LE edema. In the ED was, found to have b/l PEs with radiographic evidence of R heart strain and admitted to ICU, found to have LE DVTs s/p LLE embolectomy 10/18. Currently on IV antibiotics, pending discharge authorization.     #Acute hypoxemic respiratory failure / Acute Submassive PE  - resolved  - Suspected superimposed PNA  - DIONICIO/OHS, noncompliant with NIV  - TTE: EF 55%, G1DD, mild TVR, no right heart strain  -VA duplx LE vein: Acute thrombus within the left common femoral, femoral, deep femoral, popliteal, gastrocnemius, posterior tibial and anterior tibial veins, s/p LLE embolectomy 10/18  - Therapeutic Lovenox, transitioned to Eliquis.   - s/p Cefepime, now on  Cefazolin 2g q8h IV  - PT evaluated and recommends sub-acut rehab or home PT on DC    # Recurrent MSSA bacteremia with Discitis   - Recent Spinal fusion  - Abnormal L4-L5 signal on MRI suggestive of  Epidural phlegmon/OM  - No acute surgical intervention needed per Neurosurgery   - C/w Cefazolin for 3 weeks (11/17) per ID  - Sterile 18 gauge powerline Mideline placed to continue IV antibiotics at subacute rehab.   - Outpatient Neurosurgery referral      #HO IVDA, Opioid Abuse on Methadone   - improving, followed by psych: can resume Quetiapine 200 mg PO HS  - Continue with Xanax 1 mg po am, 1mg po afternoon and two times at night - total daily dose of 4mg  - Continue Wellbutrin Xl 300mg po q 24hrs  - Continue home dose Methadone 135mg po daily---> QTc 433 today 10/20  - For agitation, haldol 5mg po/IM every 8hrs prn    #Disposition  - Sub-acute Rehab, pending authorization    #MISC  - No need for daily CBC/BMP, just check daily mag and replenish as needed.         